# Patient Record
Sex: FEMALE | Race: WHITE | Employment: OTHER | URBAN - METROPOLITAN AREA
[De-identification: names, ages, dates, MRNs, and addresses within clinical notes are randomized per-mention and may not be internally consistent; named-entity substitution may affect disease eponyms.]

---

## 2017-01-30 ENCOUNTER — TELEPHONE (OUTPATIENT)
Dept: FAMILY MEDICINE | Facility: OTHER | Age: 67
End: 2017-01-30

## 2017-01-30 NOTE — TELEPHONE ENCOUNTER
Panel Management Review      Patient has the following on her problem list:     Hypertension   Last three blood pressure readings:  BP Readings from Last 3 Encounters:   12/20/16 120/62   11/17/16 131/67   10/17/16 109/52     Blood pressure: Passed    HTN Guidelines:  Age 18-59 BP range:  Less than 140/90  Age 60-85 with Diabetes:  Less than 140/90  Age 60-85 without Diabetes:  less than 150/90      Composite cancer screening  Chart review shows that this patient is due/due soon for the following Fecal Colorectal (FIT)  Summary:    Patient is due/failing the following:   CBC and FIT    Action needed:   Patient needs non-fasting lab only appointment and complete a FIT test     Type of outreach:    Phone, left message for patient to call back.     Questions for provider review:    None                                                                                                                                    Karey Candelaria       Chart routed to Care Team .

## 2017-01-30 NOTE — Clinical Note
[facility]  [id number] [address] [address]  Phone: [phone]  February 1, 2017      Preeti Ford  [address] [address] [place] [id number]      Dear Preeti,    We care about your health and have reviewed your health plan including your medical conditions, medications, and lab results.  Based on this review, it is recommended that you follow up regarding the following health topic(s):  -Colon Cancer Screening    We recommend you take the following action(s):  -schedule a COLONOSCOPY to look for colon cancer (due every 10 years or 5 years in higher risk situations.)  Colonoscopies can prevent 90-95% of colon cancer deaths.  Problem lesions can be removed before they ever become cancer.  If you do not wish to do a colonoscopy or cannot afford to do one at this time, there is another option called a Fecal Immunochemical Occult Blood Test (FIT) a take home stool sample kit.  It does not replace the colonoscopy for colorectal cancer screening, but it can detect hidden bleeding in the lower colon.  It does need to be repeated every year and if a positive result is obtained, you would be referred for a colonoscopy.  If you have completed either one of these tests at another facility, please have the records sent to our clinic for our records.     Please call us at the [facility] - [phone] (or use [facility]) to address the above recommendations.     Thank you for trusting [facility] and we appreciate the opportunity to serve you.  We look forward to supporting your healthcare needs in the future.    Healthy Regards,    Your Health Care Team  [facility]

## 2017-02-05 DIAGNOSIS — J44.9 COPD (CHRONIC OBSTRUCTIVE PULMONARY DISEASE) (H): Primary | ICD-10-CM

## 2017-02-08 RX ORDER — BUDESONIDE AND FORMOTEROL FUMARATE DIHYDRATE 160; 4.5 UG/1; UG/1
AEROSOL RESPIRATORY (INHALATION)
Qty: 1 INHALER | Refills: 8 | Status: ON HOLD | OUTPATIENT
Start: 2017-02-08 | End: 2017-03-07

## 2017-02-08 NOTE — TELEPHONE ENCOUNTER
symbicort       Last Written Prescription Date: 5/9/16  Last Fill Quantity: 1, # refills: 8    [place] [facility] [facility] prescribing provider:  12/20/16   Future Office Visit:       Date of Last Asthma Action Plan Letter:   There are no preventive care reminders to display for this patient.   Asthma Control Test: No flowsheet data found.    Date of Last Spirometry Test:   No results found for this or any previous visit.

## 2017-02-08 NOTE — TELEPHONE ENCOUNTER
Prescription approved per [facility] Refill Protocol.  Diego Hilario RN

## 2017-02-24 ENCOUNTER — TELEPHONE (OUTPATIENT)
Dept: OTHER | Facility: CLINIC | Age: 67
End: 2017-02-24

## 2017-02-24 NOTE — TELEPHONE ENCOUNTER
I spoke with Preeti Ford On the phone today.  She needs an aortobifemoral bypass graft we discussed it on her office visit this past November.  She had good runoff below the inguinal ligament bilaterally.  Carotid duplex revealed minimal disease.  We wanted to get cardiac clearance with Dr. Ramirez prior to the surgery.      She now reports that she has several linear sores on her right heel with more pain.  Lactic healing is related to her vascular insufficiency will not improve until she gets her bypass graft.  She is talked to cardiology both at the [facility] office and apparently cannot get an appointment until late March for her cardiac clearance.  We will try to call re: [facility] office to see if we can move this appointment up so she can proceed with her surgery.  We do not want to wait too long since the ulcerations if worsening not only give her a great deal of pain but increase her risk for infection of the bypass graft which would be a very serious complication.       Marcos Pratt MD

## 2017-02-24 NOTE — TELEPHONE ENCOUNTER
Pt hx of PAD, pt to be scheduled for aortobifemoral bypass graft with Dr. Pratt, pt needs to see cardiac prior to sx.     Pt called noting multiple right foot open sores, cramping in right leg, purplish discoloration, some numbness and tingling which has worsened. Notes hard to get out of bed and get going.   Pt notes couldn't get in until end of March with cardiology.     I discussed with Dr. Pratt.     Opal Hargrove, RN, BSN.

## 2017-02-28 ENCOUNTER — TELEPHONE (OUTPATIENT)
Dept: CARDIOLOGY | Facility: CLINIC | Age: 67
End: 2017-02-28

## 2017-02-28 NOTE — TELEPHONE ENCOUNTER
This patient has an appointment for lab work tomorrow from Dr Ramirez but the  lab orders placed have an expected date 5/12/17. Please place new orders or change the expected date on the labs placed.    Thank You,  Shani Toure MLT(ASCP)

## 2017-03-01 DIAGNOSIS — E78.00 HYPERCHOLESTEROLEMIA: ICD-10-CM

## 2017-03-01 LAB
ALT SERPL W P-5'-P-CCNC: 20 U/L (ref 0–50)
CHOLEST SERPL-MCNC: 264 MG/DL
HDLC SERPL-MCNC: 70 MG/DL
LDLC SERPL CALC-MCNC: 175 MG/DL
NONHDLC SERPL-MCNC: 194 MG/DL
TRIGL SERPL-MCNC: 95 MG/DL

## 2017-03-01 PROCEDURE — 80061 LIPID PANEL: CPT | Performed by: INTERNAL MEDICINE

## 2017-03-01 PROCEDURE — 36415 COLL VENOUS BLD VENIPUNCTURE: CPT | Performed by: INTERNAL MEDICINE

## 2017-03-01 PROCEDURE — 84460 ALANINE AMINO (ALT) (SGPT): CPT | Performed by: INTERNAL MEDICINE

## 2017-03-01 NOTE — NURSING NOTE
Patient was insistent that she needed to labs for Dr Ramirez. We told her that she was early and should return in 5/17. She wanted to do the labs anyway she is seeing a different cardiologist. She says she needs to be cleared for surgery, we told her that these were not pre op lab's, patient was adamant that we do these labs today. We tried to explain that she should wait until she has seen the Dr but she did not want to wait. She was told that she may have to be drawn again. Labs were drawn today at patient's request.

## 2017-03-02 DIAGNOSIS — I25.10 CORONARY ARTERY DISEASE INVOLVING NATIVE CORONARY ARTERY OF NATIVE HEART WITHOUT ANGINA PECTORIS: Primary | ICD-10-CM

## 2017-03-02 NOTE — PROGRESS NOTES
[facility]  [id number] [place] [id number]  [phone]  Dept: [phone]    PRE-OP EVALUATION:  Today's date: 3/6/2017    Preeti Ford (: 1950) presents for pre-operative evaluation assessment as requested by Dr. Pratt.  She requires evaluation and anesthesia risk assessment prior to undergoing surgery/procedure for treatment of Leg Circulation .  Proposed procedure: Bypass Graft Aortoiliac    Date of Surgery/ Procedure: 2017  Time of Surgery/ Procedure: 1:00pm  Hospital/Surgical Facility: [facility]  Fax number for surgical facility:   Primary Physician: Avelina Miramontes  Type of Anesthesia Anticipated: To Be Determined     Patient has a Health Care Directive or Living Will:  NO    1. Yes - Do you have a history of heart attack, stroke, stent, bypass or surgery on an artery in the head, neck, heart or legs?  History of  acute coronary syndrome and ischemic cardiomyopathy, possibly stroke ,  she denies any chest pain currently. She is at baseline   2. NO - Do you ever have any pain or discomfort in your chest?  3. NO - Do you have a history of  Heart Failure?  4. NO - Are you troubled by shortness of breath when: walking on the level, up a slight hill or at night?  5. Yes - Do you currently have a cold, bronchitis or other respiratory infection?  6. Yes - Do you have a cough, shortness of breath or wheezing?  7. Yes - Do you sometimes get pains in the calves of your legs when you walk?  8. NO - Do you or anyone in your family have previous history of blood clots?  9. NO - Do you or does anyone in your family have a serious bleeding problem such as prolonged bleeding following surgeries or cuts?  10. NO - Have you ever had problems with anemia or been told to take iron pills?  11. NO - Have you had any abnormal blood loss such as black, tarry or bloody stools, or abnormal vaginal bleeding?  12. NO - Have you ever had a blood transfusion?  13. NO -  Have you or any of your relatives ever had problems with anesthesia?  14. NO - Do you have sleep apnea, excessive snoring or daytime drowsiness?  15. NO - Do you have any prosthetic heart valves?  16. NO - Do you have prosthetic joints?  17. NO - Is there any chance that you may be pregnant?      HPI:                                                      HPI related to upcoming procedure: 66 year old female with multiple medical conditions including t ST elevation myocardial infarction involving the left anterior descending coronary artery, ischemic cardiomyopathy, history of stroke, chronic tobacco use, peripheral arterial disease, s/p stent placement , now having cramps in the right leg  worsening symptoms of PAD,  severe COPD who was been having recurrent pain in the right foot  with non healing ulcers, now Bypass Graft Aortoiliac    See problem list for active medical problems. Problems all longstanding and stable, except as noted/documented. See ROS for pertinent symptoms related to these conditions. .  HYPERTENSION - Patient has longstanding history of mod-severe HTN , currently denies any symptoms referable to elevated blood pressure. Specifically denies chest pain, palpitations, dyspnea, orthopnea, PND or peripheral edema. Blood pressure readings have been in normal range. Current medication regimen is as listed below. Patient denies any side effects of medication. .  CHF - Patient has a longstanding history of CHF of moderate severity. Exacerbating conditions include ischemic heart disease. Currently the patient's condition is improving. Current treatment regimen includes ACE inhibitor, Metoprolol, , ASA  The patient denies chest pain, edema, orthopnea, SOB or recent weight gain. Last 2D Echo was on March 32017 and showed estimated  ejection fraction of 55%  COPD - Patient has a longstanding history of -severe COPD . Patient has been doing well overall noting occasional SOB and continues on medication  regimen consisting of Albuterol, Symbicort , Spiriva without adverse reactions or side effects. Patient reports still smoking 10 cigarettes a  day  CAD - Patient has a longstanding history of mod-severe CAD. Patient denies recent chest pain or NTG use, denies exercise induced dyspnea or PND.     MEDICAL HISTORY:                                                      Patient Active Problem List    Diagnosis Date Noted     PAD (peripheral artery disease) (H) 02/12/2016     Priority: Medium     Chronic bronchitis, unspecified chronic bronchitis type (H) 02/09/2016     Priority: Medium     Ischemic cardiomyopathy 09/08/2015     Priority: Medium     Echo  With ejection fraction of 25-30 %       HTN, goal below 130/80 09/08/2015     Priority: Medium     Acute systolic congestive heart failure (H) 09/08/2015     Priority: Medium     ejection fraction 25-30%       Lung nodule 09/08/2015     Priority: Medium     See on CT , In the setting of smoking recommends 1 year follow up with CT        Esophageal reflux 09/08/2015     Priority: Medium     ST elevation myocardial infarction involving left anterior descending (LAD) coronary artery (H) 09/08/2015     Priority: Medium     ACS (acute coronary syndrome) (H) 08/28/2015     Priority: Medium     Tobacco use disorder 03/10/2015     Priority: Medium     Cervical high risk HPV (human papillomavirus) test positive 12/20/2016 12/20/16 NIL pap/+ HPV (not 16 or 18). Plan: cotest in 1 year, due by 12/20/17       History of stroke 08/21/2013     Numbness and tingling 08/21/2013     Right side of the face , upper and lower limbs         CVA (cerebral vascular accident) (H) 08/17/2013     Elevated blood pressure reading without diagnosis of hypertension 07/18/2013     Hyperlipidemia LDL goal <130 07/19/2012     Urinary incontinence 03/04/2012     Forgetfulness 03/04/2012     Advanced directives, counseling/discussion 11/14/2011     CARDIOVASCULAR SCREENING; LDL GOAL LESS THAN 130  06/10/2011     COPD (chronic obstructive pulmonary disease) (H) 07/07/2010      Past Medical History   Diagnosis Date     COPD (chronic obstructive pulmonary disease) (H)      CVA (cerebral vascular accident) (H) 8-     Past Surgical History   Procedure Laterality Date     Salpingo oophorectomy,r/l/delores       Salpingo Oophorectomy, RT/LT/DELORES     Appendectomy       Current Outpatient Prescriptions   Medication Sig Dispense Refill     SYMBICORT 160-4.5 MCG/ACT Inhaler INHALE TWO PUFFS BY MOUTH TWICE A DAY 1 Inhaler 8     metoprolol (TOPROL-XL) 25 MG 24 hr tablet Take 0.5 tablets (12.5 mg) by mouth daily 30 tablet 2     albuterol (PROAIR HFA/PROVENTIL HFA/VENTOLIN HFA) 108 (90 BASE) MCG/ACT Inhaler Inhale 2 puffs into the lungs every 6 hours as needed for shortness of breath / dyspnea or wheezing 3 Inhaler 1     montelukast (SINGULAIR) 10 MG tablet TAKE ONE TABLET BY MOUTH AT BEDTIME 90 tablet 2     ipratropium - albuterol 0.5 mg/2.5 mg/3 mL (DUONEB) 0.5-2.5 (3) MG/3ML neb solution Take 1 vial (3 mLs) by nebulization every 6 hours as needed for shortness of breath / dyspnea or wheezing 90 vial 1     Turmeric Curcumin 500 MG CAPS Take 250 mg by mouth daily (with breakfast)       Coenzyme Q10 (CO Q 10) 100 MG CAPS Take 100 mg by mouth daily       ASPIRIN PO Take 325 mg by mouth daily       SPIRIVA HANDIHALER 18 MCG inhalation capsule USING THE HANDIHALER, INHALE THE CONTENTS OF ONE CAPSULE DAILY 90 capsule 5     nicotine (NICODERM CQ) 21 MG/24HR patch 2h hr Place 1 patch onto the skin every 24 hours 30 patch 0     nicotine (NICODERM CQ) 14 MG/24HR patch 2h hr Place 1 patch onto the skin every 24 hours 30 patch      nicotine (NICODERM CQ) 7 MG/24HR patch 2h hr Place 1 patch onto the skin every 24 hours 30 patch      albuterol (PROAIR HFA, PROVENTIL HFA, VENTOLIN HFA) 108 (90 BASE) MCG/ACT inhaler Inhale 2 puffs into the lungs every 6 hours as needed for shortness of breath / dyspnea 1 Inhaler 0     SYMBICORT 160-4.5  "MCG/ACT inhaler INHALE TWO PUFFS BY MOUTH TWICE A DAY 1 Inhaler 5     calcium carbonate (OS-CALDERON 500 MG [place]. [place]) 500 MG tablet Take 1,200 mg by mouth daily       Cholecalciferol (VITAMIN D) 1000 UNITS capsule Take 1,000 Units by mouth daily       OTC products: None, except as noted above    Allergies   Allergen Reactions     Crestor [Rosuvastatin] Other (See Comments)     dizziness     Hmg-Coa-R Inhibitors Other (See Comments)     Muscle/joint aching     Lisinopril Cough     Penicillins      Optison [Albumin Human] Other (See Comments)     Pt was flush and very dizzy.  Also had a BP drop      Latex Allergy: NO    Social History   Substance Use Topics     Smoking status: Current Every Day Smoker     Packs/day: 0.50     Types: Cigarettes     Smokeless tobacco: Never Used      Comment: patient states she is working on it      Alcohol use No     History   Drug Use No       REVIEW OF SYSTEMS:                                                    C: NEGATIVE for fever, chills, change in weight  I: NEGATIVE for worrisome rashes, moles or lesions  E: NEGATIVE for vision changes or irritation  E/M: NEGATIVE for ear, mouth and throat problems  RESP:POSITIVE for congestion in the chest   CV: NEGATIVE for chest pain, palpitations or peripheral edema  GI: NEGATIVE for nausea, abdominal pain, heartburn, or change in bowel habits  : NEGATIVE for frequency, dysuria, or hematuria  M: NEGATIVE for significant arthralgias or myalgia  N: NEGATIVE for weakness, dizziness or paresthesias  E: NEGATIVE for temperature intolerance, skin/hair changes  H: NEGATIVE for bleeding problems  P: NEGATIVE for changes in mood or affect    EXAM:                                                    /74 (BP Location: Right arm, Cuff Size: Adult Small)  Pulse 76  Temp 97.6  F (36.4  C) (Temporal)  Resp 14  Ht 5' 2\" (1.575 m)  Wt 116 lb 6.4 oz (52.8 kg)  SpO2 97%  BMI 21.29 kg/m2    GENERAL APPEARANCE: alert and no distress     EYES: EOMI,  PERRL     " HENT: ear canals and TM's normal and nose and mouth without ulcers or lesions     NECK: no adenopathy, no asymmetry, masses, or scars and thyroid normal to palpation     RESP: Fair respiratory effort , reduced breath sounds bilaterally ,expiratory wheezes throughout     CV: regular rates and rhythm, normal S1 S2, no S3 or S4 and no murmur, click or rub     ABDOMEN:  soft, nontender, no HSM or masses and bowel sounds normal     MS: extremities normal- no gross deformities noted, no evidence of inflammation in joints, FROM in all extremities.     SKIN: no suspicious lesions or rashes     NEURO: Normal strength and tone, sensory exam grossly normal, mentation intact and speech normal     PSYCH: mentation appears normal. and affect normal/bright     LYMPHATICS: No axillary, cervical, or supraclavicular nodes    DIAGNOSTICS:                                                      Chest XRay   Spirometry: Very severe Obstruction , with low vital capacity     Labs Resulted Today:   Results for orders placed or performed in visit on 03/06/17   Spirometry, Breathing Capacity: Normal Order, Clinic Performed   Result Value Ref Range    FEV-1      FVC      FEV1/FVC      FEF 25/75     Basic metabolic panel  (Ca, Cl, CO2, Creat, Gluc, K, Na, BUN)   Result Value Ref Range    Sodium 141 133 - 144 mmol/L    Potassium 4.2 3.4 - 5.3 mmol/L    Chloride 104 94 - 109 mmol/L    Carbon Dioxide 32 20 - 32 mmol/L    Anion Gap 5 3 - 14 mmol/L    Glucose 101 (H) 70 - 99 mg/dL    Urea Nitrogen 16 7 - 30 mg/dL    Creatinine 0.75 0.52 - 1.04 mg/dL    GFR Estimate 77 >60 mL/min/1.7m2    GFR Estimate If Black >90   GFR Calc   >60 mL/min/1.7m2    Calcium 9.2 8.5 - 10.1 mg/dL   CBC with platelets   Result Value Ref Range    WBC 5.8 4.0 - 11.0 10e9/L    RBC Count 5.43 (H) 3.8 - 5.2 10e12/L    Hemoglobin 16.5 (H) 11.7 - 15.7 g/dL    Hematocrit 48.6 (H) 35.0 - 47.0 %    MCV 90 78 - 100 fl    MCH 30.4 26.5 - 33.0 pg    MCHC 34.0 31.5 - 36.5  g/dL    RDW 13.3 10.0 - 15.0 %    Platelet Count 185 150 - 450 10e9/L       Recent Labs   Lab Test  16   0912  16   0718 01/06/16   10/09/15   0825   09/02/15   0505   HGB   --   16.0*   --    --    --    --   13.4   PLT   --   206   --    --    --    --   204   INR   --   0.85*  2.3*   < >   --    < >  1.11   NA  141   --    --    --   137   < >  138   POTASSIUM  3.8   --    --    --   4.0   < >  4.3   CR  0.73  0.73   --    --   0.60   < >  0.84   A1C   --   5.7   --    --    --    --    --     < > = values in this interval not displayed.    Name: TARIK HERNANDEZ  MRN: [id number]  : 1950  Study Date: 2017 11:02 AM  Age: 66 yrs  Gender: Female  Patient Location: [facility]  Reason For Study: , Atherosclerotic heart disease of native coronary artery  withou  History: COPD, TOBACCO ABUSE, CAD  Ordering Physician: BOBBI KANG  Referring Physician: BOBBI KANG  Performed By: Don Birmingham     BSA: 1.5 m2  Height: 62 in  Weight: 115 lb  HR: 60  BP: 118/78 mmHg  _____________________________________________________________________________  __        Procedure  Complete Echo Adult.  _____________________________________________________________________________  __        Interpretation Summary     Left ventricular systolic function is normal. The visual ejection fraction is  estimated at 55%. There is mild hypokinesis of the distal septum and  inferoapex. There is mild concentric left ventricular hypertrophy.  Mild to moderate valvular aortic stenosis noted and the mean AoV pressure  gradient is 11mmHg. [place] measured LVOT at 2.3-2.4 cm which yields YOLANDA 1.5-  1.6 cm2. A bicuspid aortic valve cannot be excluded.  There is mild (1+) mitral regurgitation. There is trace to mild aortic  regurgitation.  Mild (35-45mmHg) pulmonary hypertension is present.  Compared to 2016, there has been a mild increase in the aortic gradient and  the LV function and wall motion appears  unchanged.  _____________________________________________________________________________  __        Left Ventricle  The left ventricle is normal in size. There is mild concentric left  ventricular hypertrophy. Left ventricular systolic function is normal. The  transmitral spectral Doppler flow pattern is suggestive of impaired LV  relaxation. E by E prime ratio is between 8 and 15, which is indeterminate for  assessment of left ventricular filling pressures. The visual ejection fraction  is estimated at 55%. There is mild hypokinesis of the distal septum and  inferoapex.     Right Ventricle  Normal right ventricle structure and size.     Atria  Normal left atrial size. Right atrial size is normal. There is no atrial shunt  seen.     Mitral Valve  There is mild (1+) mitral regurgitation.        Tricuspid Valve  There is trace tricuspid regurgitation. The right ventricular systolic  pressure is approximated at 44mmHg plus the right atrial pressure. Small IVC  (<1.5cm) with normal respiratory collapse; right atrial pressure is estimated  at 0-5mmHg. Mild (35-45mmHg) pulmonary hypertension is present.     Aortic Valve  A bicuspid aortic valve cannot be excluded. There is trace to mild aortic  regurgitation. The mean AoV pressure gradient is 11mmHg. Mild to moderate  valvular aortic stenosis. [place] measured LVOT at 2.3-2.4 cm which yields YOLANDA  1.5-1.6 cm2.     Pulmonic Valve  There is no pulmonic valvular stenosis.     Vessels  The aortic root is normal size.     Pericardium  The pericardium appears normal.        Rhythm  The rhythm was normal sinus.  _____________________________________________________________________________  __  MMode/2D Measurements & Calculations  IVSd: 0.82 cm     LVIDd: 4.4 cm  LVIDs: 3.2 cm  LVPWd: 0.94 cm  FS: 28.0 %  EDV(Teich): 87.7 ml  ESV(Teich): 40.0 ml  LV mass(C)d: 124.3 grams  Ao root diam: 3.5 cm  LA dimension: 3.1 cm  asc Aorta Diam: 2.7 cm  LA/Ao: 0.90  LVOT diam: 2.0 cm  LVOT  area: 3.2 cm2        Doppler Measurements & Calculations  MV E max unruly: 55.6 cm/sec  MV A max unruly: 71.0 cm/sec  MV E/A: 0.78  MV dec time: 0.22 sec  Ao V2 max: 228.0 cm/sec  Ao max P.8 mmHg  Ao V2 mean: 158.9 cm/sec  Ao mean P.1 mmHg  Ao V2 VTI: 50.4 cm  YOLANDA(I,D): 1.2 cm2  YOLANDA(V,D): 1.1 cm2  LV V1 max P.7 mmHg  LV V1 max: 81.8 cm/sec  LV V1 VTI: 18.6 cm  SV(LVOT): 59.1 ml  PA acc time: 0.07 sec  TR max unruly: 330.8 cm/sec  TR max P.8 mmHg  YOLANDA Index (cm2/m2): 0.78       IMPRESSION:                                                    Reason for surgery/procedure: Peripheral arterial Disease,  Claudication of the right lower extremity , Non healing ulcer of the right foot  Diagnosis/reason for consult: Pre op evaluation for anesthetic risk     The proposed surgical procedure is considered INTERMEDIATE risk.    REVISED CARDIAC RISK INDEX  The patient has the following serious cardiovascular risks for perioperative complications such as (MI, PE, VFib and 3  AV Block):  High risk surgery (>5% cardiac complication risk)  INTERPRETATION: 2 risks: Class III (moderate risk - 6.6% complication rate)    The patient has the following additional risks for perioperative complications:  The ASCVD Risk score (Jenny WRAY Jr., et al., 2013) failed to calculate for the following reasons:    The patient has a prior MCI or stroke diagnosis      ICD-10-CM    1. Preop general physical exam Z01.818 XR Chest 2 Views     Spirometry, Breathing Capacity: Normal Order, Clinic Performed     Basic metabolic panel  (Ca, Cl, CO2, Creat, Gluc, K, Na, BUN)     CBC with platelets   2. PAD (peripheral artery disease) (H) I73.9 XR Chest 2 Views     Spirometry, Breathing Capacity: Normal Order, Clinic Performed   3. Chronic bronchitis, unspecified chronic bronchitis type (H) J42 XR Chest 2 Views     Spirometry, Breathing Capacity: Normal Order, Clinic Performed   4. HTN, goal below 130/80 I10 XR Chest 2 Views     Spirometry, Breathing  Capacity: Normal Order, Clinic Performed     Basic metabolic panel  (Ca, Cl, CO2, Creat, Gluc, K, Na, BUN)   5. Ischemic cardiomyopathy I25.5 XR Chest 2 Views     Spirometry, Breathing Capacity: Normal Order, Clinic Performed       RECOMMENDATIONS:                                                      Moderate to high cardio pulmonary risk for surgery/ Procedure      --Consult hospital rounder / IM to assist post-op medical management  --Patient is to take all scheduled medications on the day of surgery EXCEPT for modifications listed below.     Anticoagulant or Antiplatelet Medication Use  Aspirin : Per vascular surgeon recommendations         Cardiovascular Risk  Patient is already on a Beta Blocker. Continue Betablocker therapy after surgery, using Beta blocker order set as necessary for NPO status.  Has further Cardiac Clearance today : Follow Cardiology recommendations         Pulmonary Risk  Patient with severe COPD with chronic bronchitis, follows with pulmonologist , she is currently not oxygen dependent , she is at some risk for perioperative complications including perioperative pneumonia and possibly prolonged mechanical ventilation  Incentive spirometry post op  Respiratory Therapy (Respiratory Care IP Consult) post op  NG tube decompression if abdominal distension or significant vomiting   Maximize COPD treatment           Signed Electronically by: Avelina Miramontes MD, MD    Copy of this evaluation report is provided to requesting physician.    [facility] Preop Guidelines

## 2017-03-03 ENCOUNTER — HOSPITAL ENCOUNTER (OUTPATIENT)
Dept: CARDIOLOGY | Facility: CLINIC | Age: 67
Discharge: HOME | End: 2017-03-03
Attending: NURSE PRACTITIONER | Admitting: NURSE PRACTITIONER
Payer: MEDICARE

## 2017-03-03 DIAGNOSIS — I25.10 CORONARY ARTERY DISEASE INVOLVING NATIVE CORONARY ARTERY OF NATIVE HEART WITHOUT ANGINA PECTORIS: ICD-10-CM

## 2017-03-03 PROCEDURE — 93306 TTE W/DOPPLER COMPLETE: CPT | Mod: 26 | Performed by: INTERNAL MEDICINE

## 2017-03-03 PROCEDURE — 93306 TTE W/DOPPLER COMPLETE: CPT

## 2017-03-06 ENCOUNTER — OFFICE VISIT (OUTPATIENT)
Dept: CARDIOLOGY | Facility: CLINIC | Age: 67
End: 2017-03-06
Payer: COMMERCIAL

## 2017-03-06 ENCOUNTER — RADIANT APPOINTMENT (OUTPATIENT)
Dept: GENERAL RADIOLOGY | Facility: OTHER | Age: 67
End: 2017-03-06
Attending: FAMILY MEDICINE
Payer: COMMERCIAL

## 2017-03-06 ENCOUNTER — OFFICE VISIT (OUTPATIENT)
Dept: FAMILY MEDICINE | Facility: OTHER | Age: 67
End: 2017-03-06
Payer: COMMERCIAL

## 2017-03-06 ENCOUNTER — TELEPHONE (OUTPATIENT)
Dept: OTHER | Facility: CLINIC | Age: 67
End: 2017-03-06

## 2017-03-06 VITALS
BODY MASS INDEX: 21.16 KG/M2 | SYSTOLIC BLOOD PRESSURE: 110 MMHG | HEIGHT: 62 IN | HEART RATE: 80 BPM | DIASTOLIC BLOOD PRESSURE: 64 MMHG | WEIGHT: 115 LBS

## 2017-03-06 VITALS
OXYGEN SATURATION: 97 % | WEIGHT: 116.4 LBS | DIASTOLIC BLOOD PRESSURE: 74 MMHG | HEART RATE: 76 BPM | SYSTOLIC BLOOD PRESSURE: 132 MMHG | RESPIRATION RATE: 14 BRPM | HEIGHT: 62 IN | TEMPERATURE: 97.6 F | BODY MASS INDEX: 21.42 KG/M2

## 2017-03-06 DIAGNOSIS — I73.9 PAD (PERIPHERAL ARTERY DISEASE) (H): ICD-10-CM

## 2017-03-06 DIAGNOSIS — Z01.818 PREOP GENERAL PHYSICAL EXAM: ICD-10-CM

## 2017-03-06 DIAGNOSIS — I10 HTN, GOAL BELOW 130/80: ICD-10-CM

## 2017-03-06 DIAGNOSIS — Z01.818 PREOP GENERAL PHYSICAL EXAM: Primary | ICD-10-CM

## 2017-03-06 DIAGNOSIS — I25.5 ISCHEMIC CARDIOMYOPATHY: ICD-10-CM

## 2017-03-06 DIAGNOSIS — I25.10 CORONARY ARTERY DISEASE INVOLVING NATIVE CORONARY ARTERY OF NATIVE HEART WITHOUT ANGINA PECTORIS: ICD-10-CM

## 2017-03-06 DIAGNOSIS — J42 CHRONIC BRONCHITIS, UNSPECIFIED CHRONIC BRONCHITIS TYPE (H): ICD-10-CM

## 2017-03-06 DIAGNOSIS — Z01.810 PRE-OPERATIVE CARDIOVASCULAR EXAMINATION: Primary | ICD-10-CM

## 2017-03-06 LAB
ANION GAP SERPL CALCULATED.3IONS-SCNC: 5 MMOL/L (ref 3–14)
BUN SERPL-MCNC: 16 MG/DL (ref 7–30)
CALCIUM SERPL-MCNC: 9.2 MG/DL (ref 8.5–10.1)
CHLORIDE SERPL-SCNC: 104 MMOL/L (ref 94–109)
CO2 SERPL-SCNC: 32 MMOL/L (ref 20–32)
CREAT SERPL-MCNC: 0.75 MG/DL (ref 0.52–1.04)
ERYTHROCYTE [DISTWIDTH] IN BLOOD BY AUTOMATED COUNT: 13.3 % (ref 10–15)
FEF 25/75: NORMAL
FEV-1: NORMAL
FEV1/FVC: NORMAL
FVC: NORMAL
GFR SERPL CREATININE-BSD FRML MDRD: 77 ML/MIN/1.7M2
GLUCOSE SERPL-MCNC: 101 MG/DL (ref 70–99)
HCT VFR BLD AUTO: 48.6 % (ref 35–47)
HGB BLD-MCNC: 16.5 G/DL (ref 11.7–15.7)
MCH RBC QN AUTO: 30.4 PG (ref 26.5–33)
MCHC RBC AUTO-ENTMCNC: 34 G/DL (ref 31.5–36.5)
MCV RBC AUTO: 90 FL (ref 78–100)
PLATELET # BLD AUTO: 185 10E9/L (ref 150–450)
POTASSIUM SERPL-SCNC: 4.2 MMOL/L (ref 3.4–5.3)
RBC # BLD AUTO: 5.43 10E12/L (ref 3.8–5.2)
SODIUM SERPL-SCNC: 141 MMOL/L (ref 133–144)
WBC # BLD AUTO: 5.8 10E9/L (ref 4–11)

## 2017-03-06 PROCEDURE — 71020 XR CHEST 2 VW: CPT

## 2017-03-06 PROCEDURE — 99214 OFFICE O/P EST MOD 30 MIN: CPT | Mod: 25 | Performed by: FAMILY MEDICINE

## 2017-03-06 PROCEDURE — 99214 OFFICE O/P EST MOD 30 MIN: CPT | Performed by: INTERNAL MEDICINE

## 2017-03-06 PROCEDURE — 80048 BASIC METABOLIC PNL TOTAL CA: CPT | Performed by: FAMILY MEDICINE

## 2017-03-06 PROCEDURE — 94010 BREATHING CAPACITY TEST: CPT | Performed by: FAMILY MEDICINE

## 2017-03-06 PROCEDURE — 36415 COLL VENOUS BLD VENIPUNCTURE: CPT | Performed by: FAMILY MEDICINE

## 2017-03-06 PROCEDURE — 85027 COMPLETE CBC AUTOMATED: CPT | Performed by: FAMILY MEDICINE

## 2017-03-06 RX ORDER — ATORVASTATIN CALCIUM 10 MG/1
10 TABLET, FILM COATED ORAL DAILY
Qty: 30 TABLET | Refills: 3 | Status: ON HOLD | OUTPATIENT
Start: 2017-03-06 | End: 2017-03-07

## 2017-03-06 ASSESSMENT — PAIN SCALES - GENERAL: PAINLEVEL: SEVERE PAIN (7)

## 2017-03-06 NOTE — LETTER
3/6/2017    Avelina Miramontes MD  [facility]  [address] [place] [address]    RE: Preeti Ford       Dear Colleague,    REASON FOR CONSULTATION:   1.  Preoperative evaluation.   2.  Coronary artery disease.      I had the pleasure of seeing Preeti Ford at the [facility] in [place] this afternoon.  She is a very pleasant 66-year-old female with an extensive cardiovascular history.  She has known coronary artery disease and suffered prior anterior myocardial infarction.  She also has peripheral vascular disease and is scheduled for aorto-fem bypass tomorrow.  She has hyperlipidemia, prior CVA, COPD and nicotine dependence.      She was hospitalized here at [facility] 2 years ago with an anterior myocardial infarction.  She presented late and was initially managed medically.  She had an MRI which demonstrated an ejection fraction of 34% and a large left ventricle mural thrombus.  She subsequently underwent coronary angiogram which demonstrated high-grade proximal LAD stenosis and high-grade mid-circumflex stenosis. Both lesions were stented successfully without any complications.        She was initiated on the appropriate medical program including Coumadin.  She has remained stable from a cardiac standpoint since then.  Her most recent echocardiogram obtained last year shows normalization of her LV function with an EF of 55% and mild hypokinesis of the distal septum and for apex.  No clot was seen in the left ventricle.  The echo also showed mild to moderate aortic valve stenosis.      She, unfortunately, continues to smoke.  She is not very active because of her severe lower extremity arterial insufficiency.  This past fall, she was walking on the treadmill up to 20 minutes.  She did not have any chest symptoms at that time.  With her current activity which includes climbing at least 1 flight of stairs, she does not endorse any angina or  exertional shortness of breath.  She remains on an excellent medical program with the exception of a statin.  Her cholesterol was well controlled initially with Crestor, but she discontinued this medication this past year, mainly due to cost issues.      Outpatient Encounter Prescriptions as of 3/6/2017   Medication Sig Dispense Refill     [DISCONTINUED] atorvastatin (LIPITOR) 10 MG tablet Take 1 tablet (10 mg) by mouth daily 30 tablet 3     [DISCONTINUED] SYMBICORT 160-4.5 MCG/ACT Inhaler INHALE TWO PUFFS BY MOUTH TWICE A DAY 1 Inhaler 8     ipratropium - albuterol 0.5 mg/2.5 mg/3 mL (DUONEB) 0.5-2.5 (3) MG/3ML neb solution Take 1 vial (3 mLs) by nebulization every 6 hours as needed for shortness of breath / dyspnea or wheezing 90 vial 1     [DISCONTINUED] metoprolol (TOPROL-XL) 25 MG 24 hr tablet Take 0.5 tablets (12.5 mg) by mouth daily 30 tablet 2     [DISCONTINUED] albuterol (PROAIR HFA/PROVENTIL HFA/VENTOLIN HFA) 108 (90 BASE) MCG/ACT Inhaler Inhale 2 puffs into the lungs every 6 hours as needed for shortness of breath / dyspnea or wheezing 3 Inhaler 1     [DISCONTINUED] montelukast (SINGULAIR) 10 MG tablet TAKE ONE TABLET BY MOUTH AT BEDTIME (Patient taking differently: 10 mg TAKE ONE TABLET BY MOUTH AT BEDTIME) 90 tablet 2     [DISCONTINUED] Turmeric Curcumin 500 MG CAPS Take 250 mg by mouth daily (with breakfast)       [DISCONTINUED] Coenzyme Q10 (CO Q 10) 100 MG CAPS Take 100 mg by mouth daily (Patient taking differently: Take 100 mg by mouth every 24 hours (at 16:00))       [DISCONTINUED] ASPIRIN PO Take 325 mg by mouth every morning Reported on 4/10/2017       [DISCONTINUED] SPIRIVA HANDIHALER 18 MCG inhalation capsule USING THE HANDIHALER, INHALE THE CONTENTS OF ONE CAPSULE DAILY 90 capsule 5     [DISCONTINUED] nicotine (NICODERM CQ) 21 MG/24HR patch 2h hr Place 1 patch onto the skin every 24 hours 30 patch 0     [DISCONTINUED] nicotine (NICODERM CQ) 14 MG/24HR patch 2h hr Place 1 patch onto the skin  every 24 hours (Patient taking differently: Place 1 patch onto the skin every 24 hours (remove daily at 19:00)) 30 patch      [DISCONTINUED] nicotine (NICODERM CQ) 7 MG/24HR patch 2h hr Place 1 patch onto the skin every 24 hours 30 patch      albuterol (PROAIR HFA, PROVENTIL HFA, VENTOLIN HFA) 108 (90 BASE) MCG/ACT inhaler Inhale 2 puffs into the lungs every 6 hours as needed for shortness of breath / dyspnea 1 Inhaler 0     SYMBICORT 160-4.5 MCG/ACT inhaler INHALE TWO PUFFS BY MOUTH TWICE A DAY 1 Inhaler 5     [DISCONTINUED] calcium carbonate (OS-CALDERON 500 MG [place]. [place]) 500 MG tablet Take 1,200 mg by mouth daily       [DISCONTINUED] Cholecalciferol (VITAMIN D) 1000 UNITS capsule Take 1,000 Units by mouth daily       Facility-Administered Encounter Medications as of 3/6/2017   Medication Dose Route Frequency Provider Last Rate Last Dose     Reason aspirin not prescribed (intentional)   Other Continuous Jesús Wade MD         IMPRESSION, REPORT AND PLAN:   1.  Known coronary artery disease.   2.  History of prior anterior myocardial infarction and stenting of the LAD and circumflex.   3.  History of ischemic cardiomyopathy, resolved.   4.  Hyperlipidemia, untreated.   5.  Hypertension.   6.  Nicotine dependence.   7.  Severe peripheral arterial disease.      She remains stable from a cardiac standpoint and currently does not endorse any significant cardiopulmonary symptoms.  Specifically, no angina or exertional shortness of breath.  Although she is somewhat limited, her functional capacity prior to the progression of her PAD was good.  As such, I recommend that she proceed with her upcoming aorto-fem bypass surgery without further delay.  She understands that given her background, she is somewhat at a higher risk, but certainly the risk is not prohibitive.      She continues to smoke.  We spent some time discussing the importance of nicotine cessation.  She hopes to quit soon.  We also discussed the  importance of risk factor control including management of her hyperlipidemia.  I recommended restarting Crestor, which she refused.  I convinced her that we start her on low-dose atorvastatin.  If she does not develop any symptoms and cost is not an issue, then we will up-titrate the atorvastatin to 80 mg to get her LDL down.      We will see her at [facility] within the next couple of months for followup, but sooner if she develops any cardiac symptoms.      I appreciate the opportunity to be part of this patient's care.     Sincerely,    Roberto Samuel MD    [facility]

## 2017-03-06 NOTE — MR AVS SNAPSHOT
After Visit Summary   3/6/2017    Preeti Ford    MRN: [id number]           Patient Information     Date Of Birth          1950        Visit Information        Provider Department      3/6/2017 8:00 AM Avelina Miramontes MD [facility]        Today's Diagnoses     Preop general physical exam    -  1    PAD (peripheral artery disease) (H)        Chronic bronchitis, unspecified chronic bronchitis type (H)        HTN, goal below 130/80        Ischemic cardiomyopathy          Care Instructions      Before Your Surgery      Call your surgeon if there is any change in your health. This includes signs of a cold or flu (such as a sore throat, runny nose, cough, rash or fever).    Do not smoke, drink alcohol or take over the counter medicine (unless your surgeon or primary care doctor tells you to) for the 24 hours before and after surgery.    If you take prescribed drugs: Follow your doctor s orders about which medicines to take and which to stop until after surgery.    Eating and drinking prior to surgery: follow the instructions from your surgeon    Take a shower or bath the night before surgery. Use the soap your surgeon gave you to gently clean your skin. If you do not have soap from your surgeon, use your regular soap. Do not shave or scrub the surgery site.  Wear clean pajamas and have clean sheets on your bed.         Follow-ups after your visit        Your next 10 appointments already scheduled     Mar 06, 2017  1:15 PM CST   Return Visit with Roberto Samuel MD   [facility] ([facility])    [address] [place] [id number]   [phone]            Mar 07, 2017 12:45 PM CST   [facility] OR with Marcos Pratt MD   [facility]    [facility])   [phone]            Mar  "2017   Procedure with Marcos Pratt MD   [facility] (--)    [address]  [place] [address] [phone]              Who to contact     If you have questions or need follow up information about today's clinic visit or your schedule please contact [facility] directly at [phone].  Normal or non-critical lab and imaging results will be communicated to you by Poweredhart, letter or phone within 4 business days after the clinic has received the results. If you do not hear from us within 7 days, please contact the clinic through Poweredhart or phone. If you have a critical or abnormal lab result, we will notify you by phone as soon as possible.  Submit refill requests through [employer] or call your pharmacy and they will forward the refill request to us. Please allow 3 business days for your refill to be completed.          Additional Information About Your Visit        Con[employer] Information     [employer] lets you send messages to your doctor, view your test results, renew your prescriptions, schedule appointments and more. To sign up, go to [email or web address] . Click on \"Log in\" on the left side of the screen, which will take you to the Welcome page. Then click on \"Sign up Now\" on the right side of the page.     You will be asked to enter the access code listed below, as well as some personal information. Please follow the directions to create your username and password.     Your access code is: [email or web address]  Expires: 2017  9:01 AM     Your access code will  in 90 days. If you need help or a new code, please call your [facility] clinic or [phone].        Care EveryWhere ID     This is your Care EveryWhere ID. This could be used by other organizations to access your [facility] medical records  [id number]        Your Vitals Were     Pulse Temperature Respirations Height Pulse Oximetry BMI (Body Mass Index)    76 97.6  F (36.4  C) (Temporal) " "14 5' 2\" (1.575 m) 97% 21.29 kg/m2       Blood Pressure from Last 3 Encounters:   03/06/17 132/74   12/20/16 120/62   11/17/16 131/67    Weight from Last 3 Encounters:   03/06/17 116 lb 6.4 oz (52.8 kg)   12/20/16 115 lb 9.6 oz (52.4 kg)   11/17/16 115 lb (52.2 kg)              We Performed the Following     Spirometry, Breathing Capacity: Normal Order, Clinic Performed        Primary Care Provider Office Phone # Fax #    Avelina Yael Miramontes [phone]       [facility] [address] [place] [address]        Thank you!     Thank you for choosing [facility]  for your care. Our goal is always to provide you with excellent care. Hearing back from our patients is one way we can continue to improve our services. Please take a few minutes to complete the written survey that you may receive in the mail after your visit with us. Thank you!             Your Updated Medication List - Protect others around you: Learn how to safely use, store and throw away your medicines at www.disposemymeds.org.          This list is accurate as of: 3/6/17  9:01 AM.  Always use your most recent med list.                   Brand Name Dispense Instructions for use    * albuterol 108 (90 BASE) MCG/ACT Inhaler    PROAIR HFA/PROVENTIL HFA/VENTOLIN HFA    1 Inhaler    Inhale 2 puffs into the lungs every 6 hours as needed for shortness of breath / dyspnea       * albuterol 108 (90 BASE) MCG/ACT Inhaler    PROAIR HFA/PROVENTIL HFA/VENTOLIN HFA    3 Inhaler    Inhale 2 puffs into the lungs every 6 hours as needed for shortness of breath / dyspnea or wheezing       ASPIRIN PO      Take 325 mg by mouth daily       calcium carbonate 500 MG tablet    OS-CALDERON 500 mg [place]. [place]     Take 1,200 mg by mouth daily       Co Q 10 100 MG Caps      Take 100 mg by mouth daily       ipratropium - albuterol 0.5 mg/2.5 mg/3 mL 0.5-2.5 (3) MG/3ML neb solution    DUONEB    90 vial    Take 1 vial (3 mLs) by nebulization " every 6 hours as needed for shortness of breath / dyspnea or wheezing       metoprolol 25 MG 24 hr tablet    TOPROL-XL    30 tablet    Take 0.5 tablets (12.5 mg) by mouth daily       montelukast 10 MG tablet    SINGULAIR    90 tablet    TAKE ONE TABLET BY MOUTH AT BEDTIME       * nicotine 21 MG/24HR 24 hr patch    NICODERM CQ    30 patch    Place 1 patch onto the skin every 24 hours       * nicotine 7 MG/24HR 24 hr patch    NICODERM CQ    30 patch    Place 1 patch onto the skin every 24 hours       * nicotine 14 MG/24HR 24 hr patch    NICODERM CQ    30 patch    Place 1 patch onto the skin every 24 hours       SPIRIVA HANDIHALER 18 MCG capsule   Generic drug:  tiotropium     90 capsule    USING THE HANDIHALER, INHALE THE CONTENTS OF ONE CAPSULE DAILY       * SYMBICORT 160-4.5 MCG/ACT Inhaler   Generic drug:  budesonide-formoterol     1 Inhaler    INHALE TWO PUFFS BY MOUTH TWICE A DAY       * SYMBICORT 160-4.5 MCG/ACT Inhaler   Generic drug:  budesonide-formoterol     1 Inhaler    INHALE TWO PUFFS BY MOUTH TWICE A DAY       Turmeric Curcumin 500 MG Caps      Take 250 mg by mouth daily (with breakfast)       vitamin D 1000 UNITS capsule      Take 1,000 Units by mouth daily       * Notice:  This list has 7 medication(s) that are the same as other medications prescribed for you. Read the directions carefully, and ask your doctor or other care provider to review them with you.

## 2017-03-06 NOTE — TELEPHONE ENCOUNTER
I called Preeti Ford about her aortic surgery tomorrow.  She had her ECHO and saw Cardiology today and is cleared for surgery.  She is taking her bowel prep now.   She had no further questions.       Marcos Pratt MD

## 2017-03-06 NOTE — NURSING NOTE
"Chief Complaint   Patient presents with     Pre-Op Exam     Panel Management     MyChart, OP annual INR referral, Colon cancer screen, Honoring choices, CBC       Initial /74 (BP Location: Right arm, Cuff Size: Adult Small)  Pulse 76  Temp 97.6  F (36.4  C) (Temporal)  Resp 14  Ht 5' 2\" (1.575 m)  Wt 116 lb 6.4 oz (52.8 kg)  SpO2 97%  BMI 21.29 kg/m2 Estimated body mass index is 21.29 kg/(m^2) as calculated from the following:    Height as of this encounter: 5' 2\" (1.575 m).    Weight as of this encounter: 116 lb 6.4 oz (52.8 kg).  Medication Reconciliation: complete     Marilee Quan MA    "

## 2017-03-07 ENCOUNTER — ANESTHESIA EVENT (OUTPATIENT)
Dept: SURGERY | Facility: CLINIC | Age: 67
DRG: 269 | End: 2017-03-07
Payer: MEDICARE

## 2017-03-07 ENCOUNTER — ANESTHESIA (OUTPATIENT)
Dept: SURGERY | Facility: CLINIC | Age: 67
DRG: 269 | End: 2017-03-07
Payer: MEDICARE

## 2017-03-07 ENCOUNTER — APPOINTMENT (OUTPATIENT)
Dept: GENERAL RADIOLOGY | Facility: CLINIC | Age: 67
DRG: 269 | End: 2017-03-07
Attending: SURGERY
Payer: MEDICARE

## 2017-03-07 ENCOUNTER — OFFICE VISIT (OUTPATIENT)
Dept: SURGERY | Facility: PHYSICIAN GROUP | Age: 67
End: 2017-03-07
Payer: COMMERCIAL

## 2017-03-07 ENCOUNTER — HOSPITAL ENCOUNTER (INPATIENT)
Facility: CLINIC | Age: 67
LOS: 5 days | Discharge: HOME | DRG: 269 | End: 2017-03-12
Attending: SURGERY | Admitting: SURGERY
Payer: MEDICARE

## 2017-03-07 DIAGNOSIS — Z71.6 ENCOUNTER FOR SMOKING CESSATION COUNSELING: ICD-10-CM

## 2017-03-07 DIAGNOSIS — I73.9 PAD (PERIPHERAL ARTERY DISEASE) (H): ICD-10-CM

## 2017-03-07 DIAGNOSIS — K21.9 GASTROESOPHAGEAL REFLUX DISEASE WITHOUT ESOPHAGITIS: ICD-10-CM

## 2017-03-07 DIAGNOSIS — G89.18 PAIN AT SURGICAL SITE: Primary | ICD-10-CM

## 2017-03-07 DIAGNOSIS — K59.03 DRUG-INDUCED CONSTIPATION: ICD-10-CM

## 2017-03-07 LAB
ABO + RH BLD: NORMAL
ABO + RH BLD: NORMAL
ALBUMIN SERPL-MCNC: 2.4 G/DL (ref 3.4–5)
ALBUMIN SERPL-MCNC: 2.5 G/DL (ref 3.4–5)
ALP SERPL-CCNC: 46 U/L (ref 40–150)
ALP SERPL-CCNC: 46 U/L (ref 40–150)
ALT SERPL W P-5'-P-CCNC: 26 U/L (ref 0–50)
ALT SERPL W P-5'-P-CCNC: 31 U/L (ref 0–50)
ANION GAP SERPL CALCULATED.3IONS-SCNC: 5 MMOL/L (ref 3–14)
ANION GAP SERPL CALCULATED.3IONS-SCNC: 6 MMOL/L (ref 3–14)
AST SERPL W P-5'-P-CCNC: 35 U/L (ref 0–45)
AST SERPL W P-5'-P-CCNC: 44 U/L (ref 0–45)
BASE DEFICIT BLDA-SCNC: 2.6 MMOL/L
BASOPHILS # BLD AUTO: 0 10E9/L (ref 0–0.2)
BASOPHILS NFR BLD AUTO: 0 %
BILIRUB SERPL-MCNC: 0.3 MG/DL (ref 0.2–1.3)
BILIRUB SERPL-MCNC: 0.4 MG/DL (ref 0.2–1.3)
BLD GP AB SCN SERPL QL: NORMAL
BLD PROD TYP BPU: NORMAL
BLOOD BANK CMNT PATIENT-IMP: NORMAL
BUN SERPL-MCNC: 11 MG/DL (ref 7–30)
BUN SERPL-MCNC: 13 MG/DL (ref 7–30)
CA-I BLD-MCNC: 4.3 MG/DL (ref 4.4–5.2)
CALCIUM SERPL-MCNC: 7.3 MG/DL (ref 8.5–10.1)
CALCIUM SERPL-MCNC: 7.3 MG/DL (ref 8.5–10.1)
CHLORIDE SERPL-SCNC: 110 MMOL/L (ref 94–109)
CHLORIDE SERPL-SCNC: 110 MMOL/L (ref 94–109)
CO2 BLD-SCNC: 24 MMOL/L (ref 21–28)
CO2 BLD-SCNC: 27 MMOL/L (ref 21–28)
CO2 SERPL-SCNC: 25 MMOL/L (ref 20–32)
CO2 SERPL-SCNC: 26 MMOL/L (ref 20–32)
CREAT SERPL-MCNC: 0.89 MG/DL (ref 0.52–1.04)
CREAT SERPL-MCNC: 1 MG/DL (ref 0.52–1.04)
DIFFERENTIAL METHOD BLD: ABNORMAL
EOSINOPHIL # BLD AUTO: 0 10E9/L (ref 0–0.7)
EOSINOPHIL NFR BLD AUTO: 0 %
ERYTHROCYTE [DISTWIDTH] IN BLOOD BY AUTOMATED COUNT: 13.1 % (ref 10–15)
ERYTHROCYTE [DISTWIDTH] IN BLOOD BY AUTOMATED COUNT: 13.2 % (ref 10–15)
FIBRINOGEN PPP-MCNC: 250 MG/DL (ref 200–420)
GFR SERPL CREATININE-BSD FRML MDRD: 56 ML/MIN/1.7M2
GFR SERPL CREATININE-BSD FRML MDRD: 64 ML/MIN/1.7M2
GLUCOSE SERPL-MCNC: 153 MG/DL (ref 70–99)
GLUCOSE SERPL-MCNC: 156 MG/DL (ref 70–99)
HBA1C MFR BLD: 5.5 % (ref 4.3–6)
HCO3 BLD-SCNC: 25 MMOL/L (ref 21–28)
HCT VFR BLD AUTO: 40.8 % (ref 35–47)
HCT VFR BLD AUTO: 42.4 % (ref 35–47)
HCT VFR BLD CALC: 34 %PCV (ref 35–47)
HCT VFR BLD CALC: 36 %PCV (ref 35–47)
HGB BLD CALC-MCNC: 11.6 G/DL (ref 11.7–15.7)
HGB BLD CALC-MCNC: 12.2 G/DL (ref 11.7–15.7)
HGB BLD-MCNC: 13.7 G/DL (ref 11.7–15.7)
HGB BLD-MCNC: 14.4 G/DL (ref 11.7–15.7)
IMM GRANULOCYTES # BLD: 0.1 10E9/L (ref 0–0.4)
IMM GRANULOCYTES NFR BLD: 0.3 %
INR PPP: 1.05 (ref 0.86–1.14)
LACTATE BLD-SCNC: 0.9 MMOL/L (ref 0.7–2.1)
LACTATE BLD-SCNC: 1.2 MMOL/L (ref 0.7–2.1)
LYMPHOCYTES # BLD AUTO: 1.2 10E9/L (ref 0.8–5.3)
LYMPHOCYTES NFR BLD AUTO: 8.3 %
MCH RBC QN AUTO: 30.8 PG (ref 26.5–33)
MCH RBC QN AUTO: 31.1 PG (ref 26.5–33)
MCHC RBC AUTO-ENTMCNC: 33.6 G/DL (ref 31.5–36.5)
MCHC RBC AUTO-ENTMCNC: 34 G/DL (ref 31.5–36.5)
MCV RBC AUTO: 92 FL (ref 78–100)
MCV RBC AUTO: 92 FL (ref 78–100)
MONOCYTES # BLD AUTO: 0.7 10E9/L (ref 0–1.3)
MONOCYTES NFR BLD AUTO: 4.6 %
NEUTROPHILS # BLD AUTO: 12.9 10E9/L (ref 1.6–8.3)
NEUTROPHILS NFR BLD AUTO: 86.8 %
NRBC # BLD AUTO: 0 10*3/UL
NRBC BLD AUTO-RTO: 0 /100
NUM BPU REQUESTED: 2
OXYHGB MFR BLD: 94 % (ref 92–100)
PCO2 BLD: 44 MM HG (ref 35–45)
PCO2 BLD: 49 MM HG (ref 35–45)
PCO2 BLD: 59 MM HG (ref 35–45)
PH BLD: 7.24 PH (ref 7.35–7.45)
PH BLD: 7.34 PH (ref 7.35–7.45)
PH BLD: 7.35 PH (ref 7.35–7.45)
PHOSPHATE SERPL-MCNC: 4.8 MG/DL (ref 2.5–4.5)
PLATELET # BLD AUTO: 125 10E9/L (ref 150–450)
PLATELET # BLD AUTO: 155 10E9/L (ref 150–450)
PO2 BLD: 105 MM HG (ref 80–105)
PO2 BLD: 142 MM HG (ref 80–105)
PO2 BLD: 182 MM HG (ref 80–105)
POTASSIUM BLD-SCNC: 4.2 MMOL/L (ref 3.4–5.3)
POTASSIUM BLD-SCNC: 4.5 MMOL/L (ref 3.4–5.3)
POTASSIUM SERPL-SCNC: 4.2 MMOL/L (ref 3.4–5.3)
POTASSIUM SERPL-SCNC: 4.6 MMOL/L (ref 3.4–5.3)
POTASSIUM SERPL-SCNC: 4.8 MMOL/L (ref 3.4–5.3)
PROT SERPL-MCNC: 4.7 G/DL (ref 6.8–8.8)
PROT SERPL-MCNC: 4.8 G/DL (ref 6.8–8.8)
RBC # BLD AUTO: 4.45 10E12/L (ref 3.8–5.2)
RBC # BLD AUTO: 4.63 10E12/L (ref 3.8–5.2)
SAO2 % BLDA FROM PO2: 100 % (ref 92–100)
SAO2 % BLDA FROM PO2: 99 % (ref 92–100)
SODIUM BLD-SCNC: 137 MMOL/L (ref 133–144)
SODIUM BLD-SCNC: 138 MMOL/L (ref 133–144)
SODIUM SERPL-SCNC: 141 MMOL/L (ref 133–144)
SODIUM SERPL-SCNC: 141 MMOL/L (ref 133–144)
SPECIMEN EXP DATE BLD: NORMAL
WBC # BLD AUTO: 14.7 10E9/L (ref 4–11)
WBC # BLD AUTO: 14.9 10E9/L (ref 4–11)

## 2017-03-07 PROCEDURE — 82330 ASSAY OF CALCIUM: CPT | Performed by: INTERNAL MEDICINE

## 2017-03-07 PROCEDURE — 37000009 ZZH ANESTHESIA TECHNICAL FEE, EACH ADDTL 15 MIN: Performed by: SURGERY

## 2017-03-07 PROCEDURE — 86900 BLOOD TYPING SEROLOGIC ABO: CPT | Performed by: SURGERY

## 2017-03-07 PROCEDURE — 71010 XR CHEST PORT 1 VW: CPT

## 2017-03-07 PROCEDURE — 20000003 ZZH R&B ICU

## 2017-03-07 PROCEDURE — 04CC0ZZ EXTIRPATION OF MATTER FROM RIGHT COMMON ILIAC ARTERY, OPEN APPROACH: ICD-10-PCS | Performed by: SURGERY

## 2017-03-07 PROCEDURE — 04UB07Z SUPPLEMENT INFERIOR MESENTERIC ARTERY WITH AUTOLOGOUS TISSUE SUBSTITUTE, OPEN APPROACH: ICD-10-PCS | Performed by: SURGERY

## 2017-03-07 PROCEDURE — 25000125 ZZHC RX 250: Performed by: SURGERY

## 2017-03-07 PROCEDURE — 80053 COMPREHEN METABOLIC PANEL: CPT | Performed by: SURGERY

## 2017-03-07 PROCEDURE — 82805 BLOOD GASES W/O2 SATURATION: CPT | Performed by: SURGERY

## 2017-03-07 PROCEDURE — 86850 RBC ANTIBODY SCREEN: CPT | Performed by: SURGERY

## 2017-03-07 PROCEDURE — 25000566 ZZH SEVOFLURANE, EA 15 MIN: Performed by: SURGERY

## 2017-03-07 PROCEDURE — S0077 INJECTION, CLINDAMYCIN PHOSP: HCPCS | Performed by: SURGERY

## 2017-03-07 PROCEDURE — 25000128 H RX IP 250 OP 636: Performed by: NURSE ANESTHETIST, CERTIFIED REGISTERED

## 2017-03-07 PROCEDURE — 25000128 H RX IP 250 OP 636: Performed by: ANESTHESIOLOGY

## 2017-03-07 PROCEDURE — 83605 ASSAY OF LACTIC ACID: CPT | Performed by: SURGERY

## 2017-03-07 PROCEDURE — 27211219 ZZ H OR RESEVOIR 3L 150 MICRON FILTER CELLSAVER HARDSHELL 00205-00: Performed by: SURGERY

## 2017-03-07 PROCEDURE — 71000014 ZZH RECOVERY PHASE 1 LEVEL 2 FIRST HR: Performed by: SURGERY

## 2017-03-07 PROCEDURE — 04C00Z6: ICD-10-PCS | Performed by: SURGERY

## 2017-03-07 PROCEDURE — 25800025 ZZH RX 258: Performed by: NURSE ANESTHETIST, CERTIFIED REGISTERED

## 2017-03-07 PROCEDURE — 85610 PROTHROMBIN TIME: CPT | Performed by: SURGERY

## 2017-03-07 PROCEDURE — 83605 ASSAY OF LACTIC ACID: CPT | Performed by: INTERNAL MEDICINE

## 2017-03-07 PROCEDURE — 71000015 ZZH RECOVERY PHASE 1 LEVEL 2 EA ADDTL HR: Performed by: SURGERY

## 2017-03-07 PROCEDURE — 25800025 ZZH RX 258: Performed by: ANESTHESIOLOGY

## 2017-03-07 PROCEDURE — A9270 NON-COVERED ITEM OR SERVICE: HCPCS | Mod: GY | Performed by: NURSE ANESTHETIST, CERTIFIED REGISTERED

## 2017-03-07 PROCEDURE — S0020 INJECTION, BUPIVICAINE HYDRO: HCPCS | Performed by: SURGERY

## 2017-03-07 PROCEDURE — 25000128 H RX IP 250 OP 636: Performed by: SURGERY

## 2017-03-07 PROCEDURE — 35638 BPG AORTOBI-ILIAC: CPT | Performed by: SURGERY

## 2017-03-07 PROCEDURE — 84295 ASSAY OF SERUM SODIUM: CPT

## 2017-03-07 PROCEDURE — 25000132 ZZH RX MED GY IP 250 OP 250 PS 637: Mod: GY | Performed by: PHYSICIAN ASSISTANT

## 2017-03-07 PROCEDURE — 99233 SBSQ HOSP IP/OBS HIGH 50: CPT | Performed by: INTERNAL MEDICINE

## 2017-03-07 PROCEDURE — 86923 COMPATIBILITY TEST ELECTRIC: CPT | Performed by: SURGERY

## 2017-03-07 PROCEDURE — 25000125 ZZHC RX 250: Performed by: ANESTHESIOLOGY

## 2017-03-07 PROCEDURE — 25000132 ZZH RX MED GY IP 250 OP 250 PS 637: Mod: GY | Performed by: NURSE ANESTHETIST, CERTIFIED REGISTERED

## 2017-03-07 PROCEDURE — 27211223 ZZ H OR BOWL PROCESSING SET 125ML FOR ELITE MACHINE CSE-P-125: Performed by: SURGERY

## 2017-03-07 PROCEDURE — 25000125 ZZHC RX 250: Performed by: NURSE ANESTHETIST, CERTIFIED REGISTERED

## 2017-03-07 PROCEDURE — 27210995 ZZH RX 272: Performed by: SURGERY

## 2017-03-07 PROCEDURE — 27210794 ZZH OR GENERAL SUPPLY STERILE: Performed by: SURGERY

## 2017-03-07 PROCEDURE — 37000008 ZZH ANESTHESIA TECHNICAL FEE, 1ST 30 MIN: Performed by: SURGERY

## 2017-03-07 PROCEDURE — 80053 COMPREHEN METABOLIC PANEL: CPT | Performed by: INTERNAL MEDICINE

## 2017-03-07 PROCEDURE — 83036 HEMOGLOBIN GLYCOSYLATED A1C: CPT | Performed by: SURGERY

## 2017-03-07 PROCEDURE — A9270 NON-COVERED ITEM OR SERVICE: HCPCS | Mod: GY | Performed by: SURGERY

## 2017-03-07 PROCEDURE — 85027 COMPLETE CBC AUTOMATED: CPT | Performed by: SURGERY

## 2017-03-07 PROCEDURE — 25000132 ZZH RX MED GY IP 250 OP 250 PS 637: Mod: GY | Performed by: SURGERY

## 2017-03-07 PROCEDURE — 36000063 ZZH SURGERY LEVEL 4 EA 15 ADDTL MIN: Performed by: SURGERY

## 2017-03-07 PROCEDURE — 27110038 ZZH RX 271: Performed by: SURGERY

## 2017-03-07 PROCEDURE — 85014 HEMATOCRIT: CPT

## 2017-03-07 PROCEDURE — 84100 ASSAY OF PHOSPHORUS: CPT | Performed by: INTERNAL MEDICINE

## 2017-03-07 PROCEDURE — 86901 BLOOD TYPING SEROLOGIC RH(D): CPT | Performed by: SURGERY

## 2017-03-07 PROCEDURE — 04100J8 BYPASS ABDOMINAL AORTA TO BILATERAL COMMON ILIAC ARTERIES WITH SYNTHETIC SUBSTITUTE, OPEN APPROACH: ICD-10-PCS | Performed by: SURGERY

## 2017-03-07 PROCEDURE — 27211220 ZZ H OR ASSEMBLY BASIC A&A LINE 00208-00: Performed by: SURGERY

## 2017-03-07 PROCEDURE — 84132 ASSAY OF SERUM POTASSIUM: CPT

## 2017-03-07 PROCEDURE — 40000169 ZZH STATISTIC PRE-PROCEDURE ASSESSMENT I: Performed by: SURGERY

## 2017-03-07 PROCEDURE — 27211215 ZZ H OR FILTER BLOOD TRANS 40 MICRON SQ40S: Performed by: SURGERY

## 2017-03-07 PROCEDURE — C1768 GRAFT, VASCULAR: HCPCS | Performed by: SURGERY

## 2017-03-07 PROCEDURE — 82803 BLOOD GASES ANY COMBINATION: CPT

## 2017-03-07 PROCEDURE — 85025 COMPLETE CBC W/AUTO DIFF WBC: CPT | Performed by: INTERNAL MEDICINE

## 2017-03-07 PROCEDURE — 85384 FIBRINOGEN ACTIVITY: CPT | Performed by: SURGERY

## 2017-03-07 PROCEDURE — 99223 1ST HOSP IP/OBS HIGH 75: CPT | Performed by: INTERNAL MEDICINE

## 2017-03-07 PROCEDURE — 84132 ASSAY OF SERUM POTASSIUM: CPT | Performed by: ANESTHESIOLOGY

## 2017-03-07 PROCEDURE — 93005 ELECTROCARDIOGRAM TRACING: CPT

## 2017-03-07 PROCEDURE — 36000093 ZZH SURGERY LEVEL 4 1ST 30 MIN: Performed by: SURGERY

## 2017-03-07 PROCEDURE — 93010 ELECTROCARDIOGRAM REPORT: CPT | Performed by: INTERNAL MEDICINE

## 2017-03-07 PROCEDURE — 04CD0ZZ EXTIRPATION OF MATTER FROM LEFT COMMON ILIAC ARTERY, OPEN APPROACH: ICD-10-PCS | Performed by: SURGERY

## 2017-03-07 DEVICE — GRAFT HEMAGARD BIFURCATED 16X08MMX50CM HGK1608: Type: IMPLANTABLE DEVICE | Site: ABDOMEN | Status: FUNCTIONAL

## 2017-03-07 RX ORDER — DIPHENHYDRAMINE HYDROCHLORIDE 50 MG/ML
25 INJECTION INTRAMUSCULAR; INTRAVENOUS EVERY 6 HOURS PRN
Status: DISCONTINUED | OUTPATIENT
Start: 2017-03-07 | End: 2017-03-12 | Stop reason: HOSPADM

## 2017-03-07 RX ORDER — SODIUM CHLORIDE, SODIUM LACTATE, POTASSIUM CHLORIDE, CALCIUM CHLORIDE 600; 310; 30; 20 MG/100ML; MG/100ML; MG/100ML; MG/100ML
INJECTION, SOLUTION INTRAVENOUS CONTINUOUS
Status: DISCONTINUED | OUTPATIENT
Start: 2017-03-07 | End: 2017-03-07 | Stop reason: HOSPADM

## 2017-03-07 RX ORDER — PROCHLORPERAZINE MALEATE 5 MG
5-10 TABLET ORAL EVERY 6 HOURS PRN
Status: DISCONTINUED | OUTPATIENT
Start: 2017-03-07 | End: 2017-03-12 | Stop reason: HOSPADM

## 2017-03-07 RX ORDER — ALUMINA, MAGNESIA, AND SIMETHICONE 2400; 2400; 240 MG/30ML; MG/30ML; MG/30ML
30 SUSPENSION ORAL EVERY 4 HOURS
Status: DISCONTINUED | OUTPATIENT
Start: 2017-03-07 | End: 2017-03-08

## 2017-03-07 RX ORDER — DIPHENHYDRAMINE HCL 25 MG
25 CAPSULE ORAL EVERY 6 HOURS PRN
Status: DISCONTINUED | OUTPATIENT
Start: 2017-03-07 | End: 2017-03-12 | Stop reason: HOSPADM

## 2017-03-07 RX ORDER — FENTANYL CITRATE 50 UG/ML
25-50 INJECTION, SOLUTION INTRAMUSCULAR; INTRAVENOUS
Status: COMPLETED | OUTPATIENT
Start: 2017-03-07 | End: 2017-03-07

## 2017-03-07 RX ORDER — ALBUTEROL SULFATE 90 UG/1
AEROSOL, METERED RESPIRATORY (INHALATION) PRN
Status: DISCONTINUED | OUTPATIENT
Start: 2017-03-07 | End: 2017-03-07

## 2017-03-07 RX ORDER — ONDANSETRON 2 MG/ML
4 INJECTION INTRAMUSCULAR; INTRAVENOUS EVERY 6 HOURS PRN
Status: DISCONTINUED | OUTPATIENT
Start: 2017-03-07 | End: 2017-03-12 | Stop reason: HOSPADM

## 2017-03-07 RX ORDER — ACETAMINOPHEN 325 MG/1
975 TABLET ORAL EVERY 8 HOURS
Status: DISPENSED | OUTPATIENT
Start: 2017-03-07 | End: 2017-03-10

## 2017-03-07 RX ORDER — ONDANSETRON 2 MG/ML
INJECTION INTRAMUSCULAR; INTRAVENOUS PRN
Status: DISCONTINUED | OUTPATIENT
Start: 2017-03-07 | End: 2017-03-07

## 2017-03-07 RX ORDER — SODIUM CHLORIDE, SODIUM LACTATE, POTASSIUM CHLORIDE, CALCIUM CHLORIDE 600; 310; 30; 20 MG/100ML; MG/100ML; MG/100ML; MG/100ML
INJECTION, SOLUTION INTRAVENOUS CONTINUOUS PRN
Status: DISCONTINUED | OUTPATIENT
Start: 2017-03-07 | End: 2017-03-07

## 2017-03-07 RX ORDER — LIDOCAINE HYDROCHLORIDE 20 MG/ML
INJECTION, SOLUTION INFILTRATION; PERINEURAL PRN
Status: DISCONTINUED | OUTPATIENT
Start: 2017-03-07 | End: 2017-03-07

## 2017-03-07 RX ORDER — ATORVASTATIN CALCIUM 10 MG/1
10 TABLET, FILM COATED ORAL
Status: DISCONTINUED | OUTPATIENT
Start: 2017-03-07 | End: 2017-03-12 | Stop reason: HOSPADM

## 2017-03-07 RX ORDER — ASPIRIN 300 MG/1
300 SUPPOSITORY RECTAL DAILY
Status: DISCONTINUED | OUTPATIENT
Start: 2017-03-07 | End: 2017-03-07 | Stop reason: HOSPADM

## 2017-03-07 RX ORDER — ASPIRIN 600 MG/1
600 SUPPOSITORY RECTAL DAILY
Status: DISCONTINUED | OUTPATIENT
Start: 2017-03-07 | End: 2017-03-07 | Stop reason: HOSPADM

## 2017-03-07 RX ORDER — ONDANSETRON 4 MG/1
4 TABLET, ORALLY DISINTEGRATING ORAL EVERY 30 MIN PRN
Status: DISCONTINUED | OUTPATIENT
Start: 2017-03-07 | End: 2017-03-07 | Stop reason: HOSPADM

## 2017-03-07 RX ORDER — MONTELUKAST SODIUM 10 MG/1
10 TABLET ORAL AT BEDTIME
Status: DISCONTINUED | OUTPATIENT
Start: 2017-03-07 | End: 2017-03-07

## 2017-03-07 RX ORDER — ALBUTEROL SULFATE 0.83 MG/ML
2.5 SOLUTION RESPIRATORY (INHALATION) EVERY 6 HOURS
Status: DISCONTINUED | OUTPATIENT
Start: 2017-03-07 | End: 2017-03-12 | Stop reason: HOSPADM

## 2017-03-07 RX ORDER — NICOTINE 21 MG/24HR
1 PATCH, TRANSDERMAL 24 HOURS TRANSDERMAL EVERY 24 HOURS
Status: DISCONTINUED | OUTPATIENT
Start: 2017-03-08 | End: 2017-03-07

## 2017-03-07 RX ORDER — CLINDAMYCIN PHOSPHATE 900 MG/50ML
900 INJECTION, SOLUTION INTRAVENOUS
Status: COMPLETED | OUTPATIENT
Start: 2017-03-07 | End: 2017-03-07

## 2017-03-07 RX ORDER — ACETAMINOPHEN 325 MG/1
650 TABLET ORAL EVERY 4 HOURS PRN
Status: DISCONTINUED | OUTPATIENT
Start: 2017-03-10 | End: 2017-03-12 | Stop reason: HOSPADM

## 2017-03-07 RX ORDER — ASPIRIN 325 MG
325 TABLET ORAL DAILY
Status: DISCONTINUED | OUTPATIENT
Start: 2017-03-08 | End: 2017-03-12 | Stop reason: HOSPADM

## 2017-03-07 RX ORDER — ONDANSETRON 4 MG/1
4 TABLET, ORALLY DISINTEGRATING ORAL EVERY 6 HOURS PRN
Status: DISCONTINUED | OUTPATIENT
Start: 2017-03-07 | End: 2017-03-12 | Stop reason: HOSPADM

## 2017-03-07 RX ORDER — CLINDAMYCIN PHOSPHATE 900 MG/50ML
900 INJECTION, SOLUTION INTRAVENOUS EVERY 8 HOURS
Status: COMPLETED | OUTPATIENT
Start: 2017-03-07 | End: 2017-03-08

## 2017-03-07 RX ORDER — IPRATROPIUM BROMIDE AND ALBUTEROL SULFATE 2.5; .5 MG/3ML; MG/3ML
1 SOLUTION RESPIRATORY (INHALATION) EVERY 6 HOURS PRN
Status: DISCONTINUED | OUTPATIENT
Start: 2017-03-07 | End: 2017-03-12 | Stop reason: HOSPADM

## 2017-03-07 RX ORDER — PROPOFOL 10 MG/ML
INJECTION, EMULSION INTRAVENOUS PRN
Status: DISCONTINUED | OUTPATIENT
Start: 2017-03-07 | End: 2017-03-07

## 2017-03-07 RX ORDER — IPRATROPIUM BROMIDE AND ALBUTEROL SULFATE 2.5; .5 MG/3ML; MG/3ML
3 SOLUTION RESPIRATORY (INHALATION) EVERY 6 HOURS
Status: DISCONTINUED | OUTPATIENT
Start: 2017-03-07 | End: 2017-03-07

## 2017-03-07 RX ORDER — NEOSTIGMINE METHYLSULFATE 1 MG/ML
VIAL (ML) INJECTION PRN
Status: DISCONTINUED | OUTPATIENT
Start: 2017-03-07 | End: 2017-03-07

## 2017-03-07 RX ORDER — HYDROMORPHONE HYDROCHLORIDE 1 MG/ML
.3-.5 INJECTION, SOLUTION INTRAMUSCULAR; INTRAVENOUS; SUBCUTANEOUS EVERY 5 MIN PRN
Status: DISCONTINUED | OUTPATIENT
Start: 2017-03-07 | End: 2017-03-07 | Stop reason: HOSPADM

## 2017-03-07 RX ORDER — MAGNESIUM HYDROXIDE 1200 MG/15ML
LIQUID ORAL PRN
Status: DISCONTINUED | OUTPATIENT
Start: 2017-03-07 | End: 2017-03-07 | Stop reason: HOSPADM

## 2017-03-07 RX ORDER — HEPARIN SODIUM 1000 [USP'U]/ML
INJECTION, SOLUTION INTRAVENOUS; SUBCUTANEOUS PRN
Status: DISCONTINUED | OUTPATIENT
Start: 2017-03-07 | End: 2017-03-07

## 2017-03-07 RX ORDER — ONDANSETRON 2 MG/ML
4 INJECTION INTRAMUSCULAR; INTRAVENOUS EVERY 30 MIN PRN
Status: DISCONTINUED | OUTPATIENT
Start: 2017-03-07 | End: 2017-03-07 | Stop reason: HOSPADM

## 2017-03-07 RX ORDER — LIDOCAINE 40 MG/G
CREAM TOPICAL
Status: DISCONTINUED | OUTPATIENT
Start: 2017-03-07 | End: 2017-03-12 | Stop reason: HOSPADM

## 2017-03-07 RX ORDER — DEXAMETHASONE SODIUM PHOSPHATE 4 MG/ML
INJECTION, SOLUTION INTRA-ARTICULAR; INTRALESIONAL; INTRAMUSCULAR; INTRAVENOUS; SOFT TISSUE PRN
Status: DISCONTINUED | OUTPATIENT
Start: 2017-03-07 | End: 2017-03-07

## 2017-03-07 RX ORDER — NICOTINE 21 MG/24HR
1 PATCH, TRANSDERMAL 24 HOURS TRANSDERMAL EVERY 24 HOURS
COMMUNITY
End: 2017-12-06

## 2017-03-07 RX ORDER — ALBUTEROL SULFATE 90 UG/1
2 AEROSOL, METERED RESPIRATORY (INHALATION) EVERY 6 HOURS PRN
Status: DISCONTINUED | OUTPATIENT
Start: 2017-03-07 | End: 2017-03-12 | Stop reason: HOSPADM

## 2017-03-07 RX ORDER — FENTANYL CITRATE 50 UG/ML
25-50 INJECTION, SOLUTION INTRAMUSCULAR; INTRAVENOUS
Status: DISCONTINUED | OUTPATIENT
Start: 2017-03-07 | End: 2017-03-07 | Stop reason: HOSPADM

## 2017-03-07 RX ORDER — AMOXICILLIN 250 MG
1-2 CAPSULE ORAL 2 TIMES DAILY
Status: DISCONTINUED | OUTPATIENT
Start: 2017-03-07 | End: 2017-03-12 | Stop reason: HOSPADM

## 2017-03-07 RX ORDER — GLYCOPYRROLATE 0.2 MG/ML
INJECTION, SOLUTION INTRAMUSCULAR; INTRAVENOUS PRN
Status: DISCONTINUED | OUTPATIENT
Start: 2017-03-07 | End: 2017-03-07

## 2017-03-07 RX ORDER — TIOTROPIUM BROMIDE 18 UG/1
18 CAPSULE ORAL; RESPIRATORY (INHALATION) EVERY 24 HOURS
COMMUNITY
End: 2017-06-14

## 2017-03-07 RX ORDER — NALOXONE HYDROCHLORIDE 0.4 MG/ML
.1-.4 INJECTION, SOLUTION INTRAMUSCULAR; INTRAVENOUS; SUBCUTANEOUS
Status: DISCONTINUED | OUTPATIENT
Start: 2017-03-07 | End: 2017-03-12 | Stop reason: HOSPADM

## 2017-03-07 RX ORDER — FENTANYL CITRATE 50 UG/ML
INJECTION, SOLUTION INTRAMUSCULAR; INTRAVENOUS PRN
Status: DISCONTINUED | OUTPATIENT
Start: 2017-03-07 | End: 2017-03-07

## 2017-03-07 RX ORDER — BUPIVACAINE HYDROCHLORIDE 2.5 MG/ML
INJECTION, SOLUTION INFILTRATION; PERINEURAL PRN
Status: DISCONTINUED | OUTPATIENT
Start: 2017-03-07 | End: 2017-03-07 | Stop reason: HOSPADM

## 2017-03-07 RX ORDER — SODIUM CHLORIDE 9 MG/ML
INJECTION, SOLUTION INTRAVENOUS CONTINUOUS PRN
Status: DISCONTINUED | OUTPATIENT
Start: 2017-03-07 | End: 2017-03-07

## 2017-03-07 RX ORDER — HEPARIN SODIUM 5000 [USP'U]/.5ML
5000 INJECTION, SOLUTION INTRAVENOUS; SUBCUTANEOUS EVERY 8 HOURS
Status: DISCONTINUED | OUTPATIENT
Start: 2017-03-08 | End: 2017-03-12 | Stop reason: HOSPADM

## 2017-03-07 RX ORDER — OXYCODONE HYDROCHLORIDE 5 MG/1
5-10 TABLET ORAL
Status: DISCONTINUED | OUTPATIENT
Start: 2017-03-07 | End: 2017-03-12 | Stop reason: HOSPADM

## 2017-03-07 RX ADMIN — SENNOSIDES AND DOCUSATE SODIUM 1 TABLET: 8.6; 5 TABLET ORAL at 22:48

## 2017-03-07 RX ADMIN — PHENYLEPHRINE HYDROCHLORIDE 50 MCG: 10 INJECTION, SOLUTION INTRAMUSCULAR; INTRAVENOUS; SUBCUTANEOUS at 17:54

## 2017-03-07 RX ADMIN — PHENYLEPHRINE HYDROCHLORIDE 100 MCG: 10 INJECTION, SOLUTION INTRAMUSCULAR; INTRAVENOUS; SUBCUTANEOUS at 17:00

## 2017-03-07 RX ADMIN — HYDROMORPHONE HYDROCHLORIDE 0.5 MG: 1 INJECTION, SOLUTION INTRAMUSCULAR; INTRAVENOUS; SUBCUTANEOUS at 16:23

## 2017-03-07 RX ADMIN — PHENYLEPHRINE HYDROCHLORIDE 100 MCG: 10 INJECTION, SOLUTION INTRAMUSCULAR; INTRAVENOUS; SUBCUTANEOUS at 17:43

## 2017-03-07 RX ADMIN — HYDROMORPHONE HYDROCHLORIDE: 10 INJECTION, SOLUTION INTRAMUSCULAR; INTRAVENOUS; SUBCUTANEOUS at 19:55

## 2017-03-07 RX ADMIN — ACETAMINOPHEN 975 MG: 325 TABLET, FILM COATED ORAL at 22:48

## 2017-03-07 RX ADMIN — MIDAZOLAM HYDROCHLORIDE 1 MG: 1 INJECTION, SOLUTION INTRAMUSCULAR; INTRAVENOUS at 18:27

## 2017-03-07 RX ADMIN — PHENYLEPHRINE HYDROCHLORIDE 50 MCG: 10 INJECTION, SOLUTION INTRAMUSCULAR; INTRAVENOUS; SUBCUTANEOUS at 14:33

## 2017-03-07 RX ADMIN — SODIUM CHLORIDE: 9 INJECTION, SOLUTION INTRAVENOUS at 16:27

## 2017-03-07 RX ADMIN — PHENYLEPHRINE HYDROCHLORIDE 100 MCG: 10 INJECTION, SOLUTION INTRAMUSCULAR; INTRAVENOUS; SUBCUTANEOUS at 17:25

## 2017-03-07 RX ADMIN — FENTANYL CITRATE 25 MCG: 50 INJECTION, SOLUTION INTRAMUSCULAR; INTRAVENOUS at 20:11

## 2017-03-07 RX ADMIN — ROCURONIUM BROMIDE 40 MG: 10 INJECTION INTRAVENOUS at 14:21

## 2017-03-07 RX ADMIN — PHENYLEPHRINE HYDROCHLORIDE 0.25 MCG/KG/MIN: 10 INJECTION, SOLUTION INTRAMUSCULAR; INTRAVENOUS; SUBCUTANEOUS at 17:37

## 2017-03-07 RX ADMIN — SODIUM CHLORIDE, POTASSIUM CHLORIDE, SODIUM LACTATE AND CALCIUM CHLORIDE: 600; 310; 30; 20 INJECTION, SOLUTION INTRAVENOUS at 17:00

## 2017-03-07 RX ADMIN — PHENYLEPHRINE HYDROCHLORIDE 100 MCG: 10 INJECTION, SOLUTION INTRAMUSCULAR; INTRAVENOUS; SUBCUTANEOUS at 17:37

## 2017-03-07 RX ADMIN — SODIUM CHLORIDE, POTASSIUM CHLORIDE, SODIUM LACTATE AND CALCIUM CHLORIDE: 600; 310; 30; 20 INJECTION, SOLUTION INTRAVENOUS at 14:40

## 2017-03-07 RX ADMIN — UMECLIDINIUM 1 PUFF: 62.5 AEROSOL, POWDER ORAL at 22:49

## 2017-03-07 RX ADMIN — ALBUTEROL SULFATE 4 PUFF: 90 AEROSOL, METERED RESPIRATORY (INHALATION) at 15:18

## 2017-03-07 RX ADMIN — FENTANYL CITRATE 50 MCG: 50 INJECTION, SOLUTION INTRAMUSCULAR; INTRAVENOUS at 12:21

## 2017-03-07 RX ADMIN — PHENYLEPHRINE HYDROCHLORIDE 100 MCG: 10 INJECTION, SOLUTION INTRAMUSCULAR; INTRAVENOUS; SUBCUTANEOUS at 17:10

## 2017-03-07 RX ADMIN — PHENYLEPHRINE HYDROCHLORIDE 50 MCG: 10 INJECTION, SOLUTION INTRAMUSCULAR; INTRAVENOUS; SUBCUTANEOUS at 14:34

## 2017-03-07 RX ADMIN — CISATRACURIUM BESYLATE 2 MG: 2 INJECTION INTRAVENOUS at 17:30

## 2017-03-07 RX ADMIN — SODIUM CHLORIDE, POTASSIUM CHLORIDE, SODIUM LACTATE AND CALCIUM CHLORIDE: 600; 310; 30; 20 INJECTION, SOLUTION INTRAVENOUS at 11:36

## 2017-03-07 RX ADMIN — FENTANYL CITRATE 200 MCG: 50 INJECTION, SOLUTION INTRAMUSCULAR; INTRAVENOUS at 14:21

## 2017-03-07 RX ADMIN — CISATRACURIUM BESYLATE 6 MG: 2 INJECTION INTRAVENOUS at 15:07

## 2017-03-07 RX ADMIN — PHENYLEPHRINE HYDROCHLORIDE 100 MCG: 10 INJECTION, SOLUTION INTRAMUSCULAR; INTRAVENOUS; SUBCUTANEOUS at 14:22

## 2017-03-07 RX ADMIN — PHENYLEPHRINE HYDROCHLORIDE 100 MCG: 10 INJECTION, SOLUTION INTRAMUSCULAR; INTRAVENOUS; SUBCUTANEOUS at 14:37

## 2017-03-07 RX ADMIN — CLINDAMYCIN PHOSPHATE 900 MG: 18 INJECTION, SOLUTION INTRAVENOUS at 22:09

## 2017-03-07 RX ADMIN — ONDANSETRON 4 MG: 2 INJECTION INTRAMUSCULAR; INTRAVENOUS at 14:52

## 2017-03-07 RX ADMIN — PHENYLEPHRINE HYDROCHLORIDE 100 MCG: 10 INJECTION, SOLUTION INTRAMUSCULAR; INTRAVENOUS; SUBCUTANEOUS at 17:17

## 2017-03-07 RX ADMIN — HYDROMORPHONE HYDROCHLORIDE 0.5 MG: 1 INJECTION, SOLUTION INTRAMUSCULAR; INTRAVENOUS; SUBCUTANEOUS at 20:04

## 2017-03-07 RX ADMIN — HEPARIN SODIUM 5000 UNITS: 1000 INJECTION, SOLUTION INTRAVENOUS; SUBCUTANEOUS at 15:44

## 2017-03-07 RX ADMIN — SODIUM CHLORIDE, POTASSIUM CHLORIDE, SODIUM LACTATE AND CALCIUM CHLORIDE: 600; 310; 30; 20 INJECTION, SOLUTION INTRAVENOUS at 16:05

## 2017-03-07 RX ADMIN — SODIUM CHLORIDE: 9 INJECTION, SOLUTION INTRAVENOUS at 18:16

## 2017-03-07 RX ADMIN — NEOSTIGMINE METHYLSULFATE 3 MG: 1 INJECTION INTRAMUSCULAR; INTRAVENOUS; SUBCUTANEOUS at 18:27

## 2017-03-07 RX ADMIN — CISATRACURIUM BESYLATE 4 MG: 2 INJECTION INTRAVENOUS at 16:51

## 2017-03-07 RX ADMIN — GLYCOPYRROLATE 0.6 MG: 0.2 INJECTION, SOLUTION INTRAMUSCULAR; INTRAVENOUS at 18:27

## 2017-03-07 RX ADMIN — HYDROMORPHONE HYDROCHLORIDE 0.5 MG: 1 INJECTION, SOLUTION INTRAMUSCULAR; INTRAVENOUS; SUBCUTANEOUS at 14:51

## 2017-03-07 RX ADMIN — PROPOFOL 80 MG: 10 INJECTION, EMULSION INTRAVENOUS at 14:21

## 2017-03-07 RX ADMIN — LIDOCAINE HYDROCHLORIDE 40 MG: 20 INJECTION, SOLUTION INFILTRATION; PERINEURAL at 14:21

## 2017-03-07 RX ADMIN — ONDANSETRON 4 MG: 2 INJECTION INTRAMUSCULAR; INTRAVENOUS at 18:14

## 2017-03-07 RX ADMIN — MIDAZOLAM HYDROCHLORIDE 1 MG: 1 INJECTION, SOLUTION INTRAMUSCULAR; INTRAVENOUS at 12:20

## 2017-03-07 RX ADMIN — PHENYLEPHRINE HYDROCHLORIDE 100 MCG: 10 INJECTION, SOLUTION INTRAMUSCULAR; INTRAVENOUS; SUBCUTANEOUS at 17:02

## 2017-03-07 RX ADMIN — CISATRACURIUM BESYLATE 2 MG: 2 INJECTION INTRAVENOUS at 16:26

## 2017-03-07 RX ADMIN — MIDAZOLAM HYDROCHLORIDE 1 MG: 1 INJECTION, SOLUTION INTRAMUSCULAR; INTRAVENOUS at 14:12

## 2017-03-07 RX ADMIN — DEXAMETHASONE SODIUM PHOSPHATE 4 MG: 4 INJECTION, SOLUTION INTRAMUSCULAR; INTRAVENOUS at 15:29

## 2017-03-07 RX ADMIN — ALBUTEROL SULFATE 6 PUFF: 90 AEROSOL, METERED RESPIRATORY (INHALATION) at 18:20

## 2017-03-07 RX ADMIN — CLINDAMYCIN PHOSPHATE 900 MG: 18 INJECTION, SOLUTION INTRAVENOUS at 14:31

## 2017-03-07 RX ADMIN — PHENYLEPHRINE HYDROCHLORIDE 100 MCG: 10 INJECTION, SOLUTION INTRAMUSCULAR; INTRAVENOUS; SUBCUTANEOUS at 17:32

## 2017-03-07 RX ADMIN — ASPIRIN 600 MG: 600 SUPPOSITORY RECTAL at 19:51

## 2017-03-07 ASSESSMENT — LIFESTYLE VARIABLES: TOBACCO_USE: 1

## 2017-03-07 ASSESSMENT — PAIN DESCRIPTION - DESCRIPTORS: DESCRIPTORS: ACHING

## 2017-03-07 ASSESSMENT — COPD QUESTIONNAIRES: COPD: 1

## 2017-03-07 NOTE — H&P (VIEW-ONLY)
[facility]  [id number] [place] [id number]  [phone]  Dept: [phone]    PRE-OP EVALUATION:  Today's date: 3/6/2017    Preeti Ford (: 1950) presents for pre-operative evaluation assessment as requested by Dr. Pratt.  She requires evaluation and anesthesia risk assessment prior to undergoing surgery/procedure for treatment of Leg Circulation .  Proposed procedure: Bypass Graft Aortoiliac    Date of Surgery/ Procedure: 2017  Time of Surgery/ Procedure: 1:00pm  Hospital/Surgical Facility: [facility]  Fax number for surgical facility:   Primary Physician: Avelina Miramontes  Type of Anesthesia Anticipated: To Be Determined     Patient has a Health Care Directive or Living Will:  NO    1. Yes - Do you have a history of heart attack, stroke, stent, bypass or surgery on an artery in the head, neck, heart or legs?  History of  acute coronary syndrome and ischemic cardiomyopathy, possibly stroke ,  she denies any chest pain currently. She is at baseline   2. NO - Do you ever have any pain or discomfort in your chest?  3. NO - Do you have a history of  Heart Failure?  4. NO - Are you troubled by shortness of breath when: walking on the level, up a slight hill or at night?  5. Yes - Do you currently have a cold, bronchitis or other respiratory infection?  6. Yes - Do you have a cough, shortness of breath or wheezing?  7. Yes - Do you sometimes get pains in the calves of your legs when you walk?  8. NO - Do you or anyone in your family have previous history of blood clots?  9. NO - Do you or does anyone in your family have a serious bleeding problem such as prolonged bleeding following surgeries or cuts?  10. NO - Have you ever had problems with anemia or been told to take iron pills?  11. NO - Have you had any abnormal blood loss such as black, tarry or bloody stools, or abnormal vaginal bleeding?  12. NO - Have you ever had a blood transfusion?  13. NO -  Have you or any of your relatives ever had problems with anesthesia?  14. NO - Do you have sleep apnea, excessive snoring or daytime drowsiness?  15. NO - Do you have any prosthetic heart valves?  16. NO - Do you have prosthetic joints?  17. NO - Is there any chance that you may be pregnant?      HPI:                                                      HPI related to upcoming procedure: 66 year old female with multiple medical conditions including t ST elevation myocardial infarction involving the left anterior descending coronary artery, ischemic cardiomyopathy, history of stroke, chronic tobacco use, peripheral arterial disease, s/p stent placement , now having cramps in the right leg  worsening symptoms of PAD,  severe COPD who was been having recurrent pain in the right foot  with non healing ulcers, now Bypass Graft Aortoiliac    See problem list for active medical problems. Problems all longstanding and stable, except as noted/documented. See ROS for pertinent symptoms related to these conditions. .  HYPERTENSION - Patient has longstanding history of mod-severe HTN , currently denies any symptoms referable to elevated blood pressure. Specifically denies chest pain, palpitations, dyspnea, orthopnea, PND or peripheral edema. Blood pressure readings have been in normal range. Current medication regimen is as listed below. Patient denies any side effects of medication. .  CHF - Patient has a longstanding history of CHF of moderate severity. Exacerbating conditions include ischemic heart disease. Currently the patient's condition is improving. Current treatment regimen includes ACE inhibitor, Metoprolol, , ASA  The patient denies chest pain, edema, orthopnea, SOB or recent weight gain. Last 2D Echo was on March 32017 and showed estimated  ejection fraction of 55%  COPD - Patient has a longstanding history of -severe COPD . Patient has been doing well overall noting occasional SOB and continues on medication  regimen consisting of Albuterol, Symbicort , Spiriva without adverse reactions or side effects. Patient reports still smoking 10 cigarettes a  day  CAD - Patient has a longstanding history of mod-severe CAD. Patient denies recent chest pain or NTG use, denies exercise induced dyspnea or PND.     MEDICAL HISTORY:                                                      Patient Active Problem List    Diagnosis Date Noted     PAD (peripheral artery disease) (H) 02/12/2016     Priority: Medium     Chronic bronchitis, unspecified chronic bronchitis type (H) 02/09/2016     Priority: Medium     Ischemic cardiomyopathy 09/08/2015     Priority: Medium     Echo  With ejection fraction of 25-30 %       HTN, goal below 130/80 09/08/2015     Priority: Medium     Acute systolic congestive heart failure (H) 09/08/2015     Priority: Medium     ejection fraction 25-30%       Lung nodule 09/08/2015     Priority: Medium     See on CT , In the setting of smoking recommends 1 year follow up with CT        Esophageal reflux 09/08/2015     Priority: Medium     ST elevation myocardial infarction involving left anterior descending (LAD) coronary artery (H) 09/08/2015     Priority: Medium     ACS (acute coronary syndrome) (H) 08/28/2015     Priority: Medium     Tobacco use disorder 03/10/2015     Priority: Medium     Cervical high risk HPV (human papillomavirus) test positive 12/20/2016 12/20/16 NIL pap/+ HPV (not 16 or 18). Plan: cotest in 1 year, due by 12/20/17       History of stroke 08/21/2013     Numbness and tingling 08/21/2013     Right side of the face , upper and lower limbs         CVA (cerebral vascular accident) (H) 08/17/2013     Elevated blood pressure reading without diagnosis of hypertension 07/18/2013     Hyperlipidemia LDL goal <130 07/19/2012     Urinary incontinence 03/04/2012     Forgetfulness 03/04/2012     Advanced directives, counseling/discussion 11/14/2011     CARDIOVASCULAR SCREENING; LDL GOAL LESS THAN 130  06/10/2011     COPD (chronic obstructive pulmonary disease) (H) 07/07/2010      Past Medical History   Diagnosis Date     COPD (chronic obstructive pulmonary disease) (H)      CVA (cerebral vascular accident) (H) 8-     Past Surgical History   Procedure Laterality Date     Salpingo oophorectomy,r/l/delores       Salpingo Oophorectomy, RT/LT/DELORES     Appendectomy       Current Outpatient Prescriptions   Medication Sig Dispense Refill     SYMBICORT 160-4.5 MCG/ACT Inhaler INHALE TWO PUFFS BY MOUTH TWICE A DAY 1 Inhaler 8     metoprolol (TOPROL-XL) 25 MG 24 hr tablet Take 0.5 tablets (12.5 mg) by mouth daily 30 tablet 2     albuterol (PROAIR HFA/PROVENTIL HFA/VENTOLIN HFA) 108 (90 BASE) MCG/ACT Inhaler Inhale 2 puffs into the lungs every 6 hours as needed for shortness of breath / dyspnea or wheezing 3 Inhaler 1     montelukast (SINGULAIR) 10 MG tablet TAKE ONE TABLET BY MOUTH AT BEDTIME 90 tablet 2     ipratropium - albuterol 0.5 mg/2.5 mg/3 mL (DUONEB) 0.5-2.5 (3) MG/3ML neb solution Take 1 vial (3 mLs) by nebulization every 6 hours as needed for shortness of breath / dyspnea or wheezing 90 vial 1     Turmeric Curcumin 500 MG CAPS Take 250 mg by mouth daily (with breakfast)       Coenzyme Q10 (CO Q 10) 100 MG CAPS Take 100 mg by mouth daily       ASPIRIN PO Take 325 mg by mouth daily       SPIRIVA HANDIHALER 18 MCG inhalation capsule USING THE HANDIHALER, INHALE THE CONTENTS OF ONE CAPSULE DAILY 90 capsule 5     nicotine (NICODERM CQ) 21 MG/24HR patch 2h hr Place 1 patch onto the skin every 24 hours 30 patch 0     nicotine (NICODERM CQ) 14 MG/24HR patch 2h hr Place 1 patch onto the skin every 24 hours 30 patch      nicotine (NICODERM CQ) 7 MG/24HR patch 2h hr Place 1 patch onto the skin every 24 hours 30 patch      albuterol (PROAIR HFA, PROVENTIL HFA, VENTOLIN HFA) 108 (90 BASE) MCG/ACT inhaler Inhale 2 puffs into the lungs every 6 hours as needed for shortness of breath / dyspnea 1 Inhaler 0     SYMBICORT 160-4.5  "MCG/ACT inhaler INHALE TWO PUFFS BY MOUTH TWICE A DAY 1 Inhaler 5     calcium carbonate (OS-CALDERON 500 MG [place]. [place]) 500 MG tablet Take 1,200 mg by mouth daily       Cholecalciferol (VITAMIN D) 1000 UNITS capsule Take 1,000 Units by mouth daily       OTC products: None, except as noted above    Allergies   Allergen Reactions     Crestor [Rosuvastatin] Other (See Comments)     dizziness     Hmg-Coa-R Inhibitors Other (See Comments)     Muscle/joint aching     Lisinopril Cough     Penicillins      Optison [Albumin Human] Other (See Comments)     Pt was flush and very dizzy.  Also had a BP drop      Latex Allergy: NO    Social History   Substance Use Topics     Smoking status: Current Every Day Smoker     Packs/day: 0.50     Types: Cigarettes     Smokeless tobacco: Never Used      Comment: patient states she is working on it      Alcohol use No     History   Drug Use No       REVIEW OF SYSTEMS:                                                    C: NEGATIVE for fever, chills, change in weight  I: NEGATIVE for worrisome rashes, moles or lesions  E: NEGATIVE for vision changes or irritation  E/M: NEGATIVE for ear, mouth and throat problems  RESP:POSITIVE for congestion in the chest   CV: NEGATIVE for chest pain, palpitations or peripheral edema  GI: NEGATIVE for nausea, abdominal pain, heartburn, or change in bowel habits  : NEGATIVE for frequency, dysuria, or hematuria  M: NEGATIVE for significant arthralgias or myalgia  N: NEGATIVE for weakness, dizziness or paresthesias  E: NEGATIVE for temperature intolerance, skin/hair changes  H: NEGATIVE for bleeding problems  P: NEGATIVE for changes in mood or affect    EXAM:                                                    /74 (BP Location: Right arm, Cuff Size: Adult Small)  Pulse 76  Temp 97.6  F (36.4  C) (Temporal)  Resp 14  Ht 5' 2\" (1.575 m)  Wt 116 lb 6.4 oz (52.8 kg)  SpO2 97%  BMI 21.29 kg/m2    GENERAL APPEARANCE: alert and no distress     EYES: EOMI,  PERRL     " HENT: ear canals and TM's normal and nose and mouth without ulcers or lesions     NECK: no adenopathy, no asymmetry, masses, or scars and thyroid normal to palpation     RESP: Fair respiratory effort , reduced breath sounds bilaterally ,expiratory wheezes throughout     CV: regular rates and rhythm, normal S1 S2, no S3 or S4 and no murmur, click or rub     ABDOMEN:  soft, nontender, no HSM or masses and bowel sounds normal     MS: extremities normal- no gross deformities noted, no evidence of inflammation in joints, FROM in all extremities.     SKIN: no suspicious lesions or rashes     NEURO: Normal strength and tone, sensory exam grossly normal, mentation intact and speech normal     PSYCH: mentation appears normal. and affect normal/bright     LYMPHATICS: No axillary, cervical, or supraclavicular nodes    DIAGNOSTICS:                                                      Chest XRay   Spirometry: Very severe Obstruction , with low vital capacity     Labs Resulted Today:   Results for orders placed or performed in visit on 03/06/17   Spirometry, Breathing Capacity: Normal Order, Clinic Performed   Result Value Ref Range    FEV-1      FVC      FEV1/FVC      FEF 25/75     Basic metabolic panel  (Ca, Cl, CO2, Creat, Gluc, K, Na, BUN)   Result Value Ref Range    Sodium 141 133 - 144 mmol/L    Potassium 4.2 3.4 - 5.3 mmol/L    Chloride 104 94 - 109 mmol/L    Carbon Dioxide 32 20 - 32 mmol/L    Anion Gap 5 3 - 14 mmol/L    Glucose 101 (H) 70 - 99 mg/dL    Urea Nitrogen 16 7 - 30 mg/dL    Creatinine 0.75 0.52 - 1.04 mg/dL    GFR Estimate 77 >60 mL/min/1.7m2    GFR Estimate If Black >90   GFR Calc   >60 mL/min/1.7m2    Calcium 9.2 8.5 - 10.1 mg/dL   CBC with platelets   Result Value Ref Range    WBC 5.8 4.0 - 11.0 10e9/L    RBC Count 5.43 (H) 3.8 - 5.2 10e12/L    Hemoglobin 16.5 (H) 11.7 - 15.7 g/dL    Hematocrit 48.6 (H) 35.0 - 47.0 %    MCV 90 78 - 100 fl    MCH 30.4 26.5 - 33.0 pg    MCHC 34.0 31.5 - 36.5  g/dL    RDW 13.3 10.0 - 15.0 %    Platelet Count 185 150 - 450 10e9/L       Recent Labs   Lab Test  16   0912  16   0718 01/06/16   10/09/15   0825   09/02/15   0505   HGB   --   16.0*   --    --    --    --   13.4   PLT   --   206   --    --    --    --   204   INR   --   0.85*  2.3*   < >   --    < >  1.11   NA  141   --    --    --   137   < >  138   POTASSIUM  3.8   --    --    --   4.0   < >  4.3   CR  0.73  0.73   --    --   0.60   < >  0.84   A1C   --   5.7   --    --    --    --    --     < > = values in this interval not displayed.    Name: TARIK HERNANDEZ  MRN: [id number]  : 1950  Study Date: 2017 11:02 AM  Age: 66 yrs  Gender: Female  Patient Location: [facility]  Reason For Study: , Atherosclerotic heart disease of native coronary artery  withou  History: COPD, TOBACCO ABUSE, CAD  Ordering Physician: BOBBI KANG  Referring Physician: BOBBI KANG  Performed By: Don Birmingham     BSA: 1.5 m2  Height: 62 in  Weight: 115 lb  HR: 60  BP: 118/78 mmHg  _____________________________________________________________________________  __        Procedure  Complete Echo Adult.  _____________________________________________________________________________  __        Interpretation Summary     Left ventricular systolic function is normal. The visual ejection fraction is  estimated at 55%. There is mild hypokinesis of the distal septum and  inferoapex. There is mild concentric left ventricular hypertrophy.  Mild to moderate valvular aortic stenosis noted and the mean AoV pressure  gradient is 11mmHg. [place] measured LVOT at 2.3-2.4 cm which yields YOLANDA 1.5-  1.6 cm2. A bicuspid aortic valve cannot be excluded.  There is mild (1+) mitral regurgitation. There is trace to mild aortic  regurgitation.  Mild (35-45mmHg) pulmonary hypertension is present.  Compared to 2016, there has been a mild increase in the aortic gradient and  the LV function and wall motion appears  unchanged.  _____________________________________________________________________________  __        Left Ventricle  The left ventricle is normal in size. There is mild concentric left  ventricular hypertrophy. Left ventricular systolic function is normal. The  transmitral spectral Doppler flow pattern is suggestive of impaired LV  relaxation. E by E prime ratio is between 8 and 15, which is indeterminate for  assessment of left ventricular filling pressures. The visual ejection fraction  is estimated at 55%. There is mild hypokinesis of the distal septum and  inferoapex.     Right Ventricle  Normal right ventricle structure and size.     Atria  Normal left atrial size. Right atrial size is normal. There is no atrial shunt  seen.     Mitral Valve  There is mild (1+) mitral regurgitation.        Tricuspid Valve  There is trace tricuspid regurgitation. The right ventricular systolic  pressure is approximated at 44mmHg plus the right atrial pressure. Small IVC  (<1.5cm) with normal respiratory collapse; right atrial pressure is estimated  at 0-5mmHg. Mild (35-45mmHg) pulmonary hypertension is present.     Aortic Valve  A bicuspid aortic valve cannot be excluded. There is trace to mild aortic  regurgitation. The mean AoV pressure gradient is 11mmHg. Mild to moderate  valvular aortic stenosis. [place] measured LVOT at 2.3-2.4 cm which yields YOLANDA  1.5-1.6 cm2.     Pulmonic Valve  There is no pulmonic valvular stenosis.     Vessels  The aortic root is normal size.     Pericardium  The pericardium appears normal.        Rhythm  The rhythm was normal sinus.  _____________________________________________________________________________  __  MMode/2D Measurements & Calculations  IVSd: 0.82 cm     LVIDd: 4.4 cm  LVIDs: 3.2 cm  LVPWd: 0.94 cm  FS: 28.0 %  EDV(Teich): 87.7 ml  ESV(Teich): 40.0 ml  LV mass(C)d: 124.3 grams  Ao root diam: 3.5 cm  LA dimension: 3.1 cm  asc Aorta Diam: 2.7 cm  LA/Ao: 0.90  LVOT diam: 2.0 cm  LVOT  area: 3.2 cm2        Doppler Measurements & Calculations  MV E max unruly: 55.6 cm/sec  MV A max unruly: 71.0 cm/sec  MV E/A: 0.78  MV dec time: 0.22 sec  Ao V2 max: 228.0 cm/sec  Ao max P.8 mmHg  Ao V2 mean: 158.9 cm/sec  Ao mean P.1 mmHg  Ao V2 VTI: 50.4 cm  YOLANDA(I,D): 1.2 cm2  YOLANDA(V,D): 1.1 cm2  LV V1 max P.7 mmHg  LV V1 max: 81.8 cm/sec  LV V1 VTI: 18.6 cm  SV(LVOT): 59.1 ml  PA acc time: 0.07 sec  TR max unruly: 330.8 cm/sec  TR max P.8 mmHg  YOLANDA Index (cm2/m2): 0.78       IMPRESSION:                                                    Reason for surgery/procedure: Peripheral arterial Disease,  Claudication of the right lower extremity , Non healing ulcer of the right foot  Diagnosis/reason for consult: Pre op evaluation for anesthetic risk     The proposed surgical procedure is considered INTERMEDIATE risk.    REVISED CARDIAC RISK INDEX  The patient has the following serious cardiovascular risks for perioperative complications such as (MI, PE, VFib and 3  AV Block):  High risk surgery (>5% cardiac complication risk)  INTERPRETATION: 2 risks: Class III (moderate risk - 6.6% complication rate)    The patient has the following additional risks for perioperative complications:  The ASCVD Risk score (Jenny WRAY Jr., et al., 2013) failed to calculate for the following reasons:    The patient has a prior MCI or stroke diagnosis      ICD-10-CM    1. Preop general physical exam Z01.818 XR Chest 2 Views     Spirometry, Breathing Capacity: Normal Order, Clinic Performed     Basic metabolic panel  (Ca, Cl, CO2, Creat, Gluc, K, Na, BUN)     CBC with platelets   2. PAD (peripheral artery disease) (H) I73.9 XR Chest 2 Views     Spirometry, Breathing Capacity: Normal Order, Clinic Performed   3. Chronic bronchitis, unspecified chronic bronchitis type (H) J42 XR Chest 2 Views     Spirometry, Breathing Capacity: Normal Order, Clinic Performed   4. HTN, goal below 130/80 I10 XR Chest 2 Views     Spirometry, Breathing  Capacity: Normal Order, Clinic Performed     Basic metabolic panel  (Ca, Cl, CO2, Creat, Gluc, K, Na, BUN)   5. Ischemic cardiomyopathy I25.5 XR Chest 2 Views     Spirometry, Breathing Capacity: Normal Order, Clinic Performed       RECOMMENDATIONS:                                                      Moderate to high cardio pulmonary risk for surgery/ Procedure      --Consult hospital rounder / IM to assist post-op medical management  --Patient is to take all scheduled medications on the day of surgery EXCEPT for modifications listed below.     Anticoagulant or Antiplatelet Medication Use  Aspirin : Per vascular surgeon recommendations         Cardiovascular Risk  Patient is already on a Beta Blocker. Continue Betablocker therapy after surgery, using Beta blocker order set as necessary for NPO status.  Has further Cardiac Clearance today : Follow Cardiology recommendations         Pulmonary Risk  Patient with severe COPD with chronic bronchitis, follows with pulmonologist , she is currently not oxygen dependent , she is at some risk for perioperative complications including perioperative pneumonia and possibly prolonged mechanical ventilation  Incentive spirometry post op  Respiratory Therapy (Respiratory Care IP Consult) post op  NG tube decompression if abdominal distension or significant vomiting   Maximize COPD treatment           Signed Electronically by: Avelina Miramontes MD, MD    Copy of this evaluation report is provided to requesting physician.    [facility] Preop Guidelines

## 2017-03-07 NOTE — ANESTHESIA PROCEDURE NOTES
ARTERIAL LINE PROCEDURE NOTE:   Pre-Procedure  Performed by MADELIN HARDING  Location: pre-op      Pre-Anesthestic Checklist: patient identified, IV checked, site marked, risks and benefits discussed, informed consent, monitors and equipment checked, pre-op evaluation, at physician/surgeon's request and post-op pain management    Timeout  Correct Patient: Yes   Correct Procedure: Yes   Correct Site: Yes   Correct Laterality: Yes   Correct Position: Yes   Site Marked: Yes   .   Procedure Documentation  Procedure: arterial line    Supine  Insertion Site:radial, right.Skin infiltrated with mL of 1% lidocaine. Injection technique: ultrasound guided  .  Artery evaluated via ultrasound confirming patency.  Using realtime imaging the artery was punctured and needle was observed entering artery..  A permanent image is entered into the patient's record.Patient Prep;all elements of maximal sterile barrier technique followed, mask, hat, sterile gown, sterile gloves, draped, hand hygiene, chlorhexidine gluconate and isopropyl alcohol    Assessment/Narrative    Catheter: 20 gauge, 1.75 in/4.5 cm quick cath (integral wire)      Tegaderm dressing used.    Arterial waveform: Yes IBP within 10% of NIBP: Yes    Comments:  No complications  Pt tolerated procedure well with light sedation

## 2017-03-07 NOTE — H&P (VIEW-ONLY)
HPI and Plan:   See dictation    Orders Placed This Encounter   Procedures     Follow-Up with Cardiac Advanced Practice Provider       Orders Placed This Encounter   Medications     atorvastatin (LIPITOR) 10 MG tablet     Sig: Take 1 tablet (10 mg) by mouth daily     Dispense:  30 tablet     Refill:  3       There are no discontinued medications.      Encounter Diagnoses   Name Primary?     Coronary artery disease involving native coronary artery of native heart without angina pectoris      Pre-operative cardiovascular examination Yes       CURRENT MEDICATIONS:  Current Outpatient Prescriptions   Medication Sig Dispense Refill     atorvastatin (LIPITOR) 10 MG tablet Take 1 tablet (10 mg) by mouth daily 30 tablet 3     SYMBICORT 160-4.5 MCG/ACT Inhaler INHALE TWO PUFFS BY MOUTH TWICE A DAY 1 Inhaler 8     metoprolol (TOPROL-XL) 25 MG 24 hr tablet Take 0.5 tablets (12.5 mg) by mouth daily 30 tablet 2     albuterol (PROAIR HFA/PROVENTIL HFA/VENTOLIN HFA) 108 (90 BASE) MCG/ACT Inhaler Inhale 2 puffs into the lungs every 6 hours as needed for shortness of breath / dyspnea or wheezing 3 Inhaler 1     montelukast (SINGULAIR) 10 MG tablet TAKE ONE TABLET BY MOUTH AT BEDTIME 90 tablet 2     ipratropium - albuterol 0.5 mg/2.5 mg/3 mL (DUONEB) 0.5-2.5 (3) MG/3ML neb solution Take 1 vial (3 mLs) by nebulization every 6 hours as needed for shortness of breath / dyspnea or wheezing 90 vial 1     Turmeric Curcumin 500 MG CAPS Take 250 mg by mouth daily (with breakfast)       Coenzyme Q10 (CO Q 10) 100 MG CAPS Take 100 mg by mouth daily       ASPIRIN PO Take 325 mg by mouth daily       SPIRIVA HANDIHALER 18 MCG inhalation capsule USING THE HANDIHALER, INHALE THE CONTENTS OF ONE CAPSULE DAILY 90 capsule 5     nicotine (NICODERM CQ) 21 MG/24HR patch 2h hr Place 1 patch onto the skin every 24 hours 30 patch 0     nicotine (NICODERM CQ) 14 MG/24HR patch 2h hr Place 1 patch onto the skin every 24 hours 30 patch      nicotine (NICODERM  CQ) 7 MG/24HR patch 2h hr Place 1 patch onto the skin every 24 hours 30 patch      albuterol (PROAIR HFA, PROVENTIL HFA, VENTOLIN HFA) 108 (90 BASE) MCG/ACT inhaler Inhale 2 puffs into the lungs every 6 hours as needed for shortness of breath / dyspnea 1 Inhaler 0     SYMBICORT 160-4.5 MCG/ACT inhaler INHALE TWO PUFFS BY MOUTH TWICE A DAY 1 Inhaler 5     calcium carbonate (OS-CALDERON 500 MG [place]) 500 MG tablet Take 1,200 mg by mouth daily       Cholecalciferol (VITAMIN D) 1000 UNITS capsule Take 1,000 Units by mouth daily         ALLERGIES     Allergies   Allergen Reactions     Crestor [Rosuvastatin] Other (See Comments)     dizziness     Hmg-Coa-R Inhibitors Other (See Comments)     Muscle/joint aching     Lisinopril Cough     Penicillins      Optison [Albumin Human] Other (See Comments)     Pt was flush and very dizzy.  Also had a BP drop       PAST MEDICAL HISTORY:  Past Medical History   Diagnosis Date     COPD (chronic obstructive pulmonary disease) (H)      CVA (cerebral vascular accident) (H) 8-       PAST SURGICAL HISTORY:  Past Surgical History   Procedure Laterality Date     Salpingo oophorectomy,r/l/delores       Salpingo Oophorectomy, RT/LT/DELORES     Appendectomy         FAMILY HISTORY:  Family History   Problem Relation Age of Onset     CEREBROVASCULAR DISEASE Mother      CEREBROVASCULAR DISEASE Father      Genitourinary Problems Brother      Dialysis       SOCIAL HISTORY:  Social History     Social History     Marital status:      Spouse name: N/A     Number of children: N/A     Years of education: N/A     Social History Main Topics     Smoking status: Current Every Day Smoker     Packs/day: 0.50     Types: Cigarettes     Smokeless tobacco: Never Used      Comment: patient states she is working on it      Alcohol use No     Drug use: No     Sexual activity: Not Currently     Partners: Male     Other Topics Concern      Service No     Blood Transfusions No     Caffeine Concern No      "Occupational Exposure No     Hobby Hazards No     Sleep Concern Yes     Trouble breathing at night due to COPD     Stress Concern No     Weight Concern No     Special Diet No     Back Care No     Exercise No     Bike Helmet No     Seat Belt Yes     Self-Exams Yes     Parent/Sibling W/ Cabg, Mi Or Angioplasty Before 65f 55m? No     Social History Narrative       Review of Systems:  Skin:  Negative       Eyes:  Positive for glasses    ENT:  Negative      Respiratory:  Positive for shortness of breath;cough COPD  on inhalers   Cardiovascular:  Negative      Gastroenterology: Negative      Genitourinary:  Negative      Musculoskeletal:  Negative      Neurologic:  Negative      Psychiatric:  Negative      Heme/Lymph/Imm:  Negative      Endocrine:  Negative        Physical Exam:  Vitals: /64  Pulse 80  Ht 1.575 m (5' 2\")  Wt 52.2 kg (115 lb)  BMI 21.03 kg/m2    Constitutional:  cooperative thin      Skin:  warm and dry to the touch        Head:  normocephalic        Eyes:           ENT:    poor dentition      Neck:  JVP normal        Chest:  clear to auscultation          Cardiac: regular rhythm;normal S1 and S2       systolic ejection murmur;grade 1          Abdomen:  abdomen soft;no masses;non-tender        Vascular:   pulses below the femoral arteries are diminished                                      Extremities and Back:  no edema              Neurological:  no gross motor deficits              CC  Avelina Miramontes MD  [facility]  [address] [place] [address]              "

## 2017-03-07 NOTE — ANESTHESIA PROCEDURE NOTES
CENTRAL LINE INSERTION PROCEDURE NOTE:   Pre-Procedure  Performed by MADELIN HARDING  Location: OR      Pre-Anesthestic Checklist: patient identified, IV checked, site marked, risks and benefits discussed, informed consent, monitors and equipment checked, pre-op evaluation, at physician/surgeon's request and post-op pain management    Timeout  Correct Patient: Yes   Correct Procedure: Yes   Correct Site: Yes   Correct Laterality: Yes   Correct Position: Yes   Site Marked: Yes   .   Procedure Documentation   Procedure: central line, new line and elective  Position: Trendelenburg  Patient Prep;all elements of maximal sterile barrier technique followed, mask, hat, sterile gown, sterile gloves, draped, chlorhexidine gluconate and isopropyl alcohol      Insertion Site:internal jugular, right  Using U/S with sterile probe cover and sterile gel, vein evaluated for patency/adequacy of cortis insertion is adequate, and using realtime U/S imaging the amarjit was punctured, and needle was observed entering vein on U/S. A permanent image is entered into the patient's record.     Catheter: 7 Fr, 20 cm, T.L.  Assessment/Narrative         Secured by suture  Tegaderm and Biopatch dressing used.  blood aspirated from all lumens  All lumens flushed: Yes  Verification method: Ultrasound  Comments:  No complications

## 2017-03-07 NOTE — PROGRESS NOTES
Admission medication history interview status for the 3/7/2017  admission is complete. See EPIC admission navigator for prior to admission medications     Medication history source reliability:Good    Medication history interview source(s):Patient    Medication history resources (including written lists, pill bottles, clinic record):None    [facility]    Additional medication history information not noted on PTA med list :None    Time spent in this activity: 30 minutes    Prior to Admission medications    Medication Sig Last Dose Taking? Auth Provider   Calcium Carbonate-Vitamin D (OSCAL 500/200 D-3 PO) Take 1 tablet by mouth 2 times daily Over 1 month ago at pm Yes Reported, Patient   Coenzyme Q10 (COQ10 PO) Take 100 mg by mouth every 24 hours (at 16:00) 3/5/2017 at 1600 Yes Reported, Patient   Metoprolol Succinate (TOPROL XL PO) Take 12.5 mg by mouth every morning (patient takes 0.5 X 25 mg = 12.5 mg dose) 3/7/2017 at 0800 Yes Reported, Patient   Montelukast Sodium (SINGULAIR PO) Take 10 mg by mouth At Bedtime 3/6/2017 at 2200 Yes Reported, Patient   nicotine (NICODERM CQ) 14 MG/24HR 24 hr patch Place 1 patch onto the skin every 24 hours (remove at 19:00) 3/5/2017 at am Yes Reported, Patient   tiotropium (SPIRIVA HANDIHALER) 18 MCG capsule Inhale 18 mcg into the lungs every 24 hours (at 16:00) 3/6/2017 at 1600 Yes Reported, Patient   Misc Natural Products (TURMERIC CURCUMIN) CAPS Take 1 capsule by mouth every 24 hours (at 16:00) 3/5/2017 at 1600 Yes Reported, Patient   ipratropium - albuterol 0.5 mg/2.5 mg/3 mL (DUONEB) 0.5-2.5 (3) MG/3ML neb solution Take 1 vial (3 mLs) by nebulization every 6 hours as needed for shortness of breath / dyspnea or wheezing Over 2 years ago at prn Yes Avelina Miramontes MD   ASPIRIN PO Take 325 mg by mouth every morning  3/7/2017 at 0800 Yes Reported, Patient   albuterol (PROAIR HFA, PROVENTIL HFA, VENTOLIN HFA) 108 (90 BASE) MCG/ACT inhaler Inhale 2 puffs  into the lungs every 6 hours as needed for shortness of breath / dyspnea 3/7/2017 at 0700 Yes Avelina Miramontes MD   SYMBICORT 160-4.5 MCG/ACT inhaler INHALE TWO PUFFS BY MOUTH TWICE A DAY 3/7/2017 at 0800 Yes Avelina Miramontes MD

## 2017-03-07 NOTE — IP AVS SNAPSHOT
MRN:[id number]                      After Visit Summary   3/7/2017    Preeti Ford    MRN: [id number]           Thank you!     Thank you for choosing [facility] for your care. Our goal is always to provide you with excellent care. Hearing back from our patients is one way we can continue to improve our services. Please take a few minutes to complete the written survey that you may receive in the mail after you visit with us. Thank you!        Patient Information     Date Of Birth          1950        About your hospital stay     You were admitted on:  March 7, 2017 You last received care in the:  [facility] [address] [facility]    You were discharged on:  March 12, 2017        Reason for your hospital stay       Peripheral arterial disease. You underwent Aortoiliac bypass.                  Who to Call     For medical emergencies, please call 911.  For non-urgent questions about your medical care, please call your primary care provider or clinic, [phone]  For questions related to your surgery, please call your surgery clinic        Attending Provider     Provider Specialty    Marcos Pratt MD Surgery       Primary Care Provider Office Phone # Fax #    Avelina Yael Miramontes [phone]        [facility] [address] [place] [address]        After Care Instructions     Activity       Your activity upon discharge: No vigorous activity. Don't lift more than 10 lb for 4 weeks.            Diet       Follow this diet upon discharge: Orders Placed This Encounter      Regular Diet Adult            Discharge Instructions       Don't drive while on narcotic pain medications.   Return to the emergency department for increasing abdominal pain not relieved by pain medications.  Call vascular surgery office if you experience fever, chills, purulent drainage from your wound, weakness.            Wound care and dressings       Instructions to care  for your wound at home: You may shower. Don't soak wound in tub bath. Allow the steri-strips to fall off on their own.                  Follow-up Appointments     Follow-up and recommended labs and tests        Follow up with Dr Pratt in 4 weeks.                  Further instructions from your care team         Aortic Bypass Grafts  Post-Operative Instructions    The typical length of stay in the hospital is 5-7 days.  You will likely spend 1 night in the Intensive Care Unit. Upon discharge from the hospital, plan on having someone available to drive you home and stay with you for approximately 1 week.    Diet    Nutrition is important for healing after surgery.  You may experience a temporary decrease in appetite after you surgery.  It is best to try smaller, more frequent meals until your appetite returns.      Medications    You may be started on a blood thinner after surgery.  If you are taking Coumadin, you need to have your blood monitored regularly.      You will be given a prescription for pain medication after surgery.  If you need to have this refilled, please call your pharmacy where you had it filled.  They will then call us for approval.  Please do not call after hours for a refill on pain medication.    Activity    Walking is important after surgery.  You will begin walking before you leave the hospital, and then you should gradually increase your distance as tolerated.  You should be taking at least 2-3 short walks a day.      You can climb stairs when you feel strong enough.      You should not drive for 2 weeks.  In addition, you can not be taking prescription strength pain medication and you must feel strong enough to drive safely.      You may return to work once you feel ready.  This varies depending on your recovery and the type of work you do ~ typically 4-6 weeks after surgery.  This can be discussed further at your 2 week post-operative visit.      You should avoid strenuous activity,  including running, biking, or weight-lifting until discussed at your post-operative appointment.  Typically, we ask that you do not lift over 20 lbs for 6 weeks.    Incision Care    You will have staples holding your incisions closed which will be removed 10-14 days after surgery.  Then, you may get strips of white tape, called steri-strips, applied over your incision.  These will curl up on the ends after 5-7 days, at which time they can be removed.      You may shower 2-3 days after your surgery - pat the incision dry to prevent irritation from moisture.  You do not need to cover with a bandage unless you have drainage.      An abdominal binder can be used as needed to provide comfort to your abdominal incision.      If you have a groin incision, you should keep a gauze pad tucked in the skin fold to prevent the skin on skin contact.  This will prevent a moist environment which hinders healing.  Call our office to discuss this further if the groin looks red or has an odor.     Common Concerns    You may develop bruising and firmness around your incision.  This will soften and resolve over the next 1-3 weeks.  Call our office if it enlarges, becomes red, or painful.      On occasion a soft, fluid-filled bulge can develop near a surgical incision - this is called a seroma.  It will likely resolve on its own, but occasionally requires your doctor to drain it in clinic.  You should call our office if you have questions about this.      If you have leg incisions, you may notice some swelling.  Leg swelling is common after surgery and must be controlled by elevating your legs above your heart.  The swelling does not go away by propping your legs up on a stool or sitting in a reclining chair.  For the first few weeks after surgery, you will need to really work at controlling this swelling, and will likely need to spend a fair amount of time with your legs elevated.  Eventually, you should be able to manage the swelling  by elevating 3-4 times a day for 20-30 minutes.  If the swelling does not decrease after elevating your legs above your heart for at least 3-4 hours, you should call our office.      You may notice numbness around your incision.  This is due to nerve irritation from the surgery and will gradually improve over the next several weeks.      It is not uncommon to feel tired and easily fatigued after this type of surgery.  You will gradually regain your energy and strength over the next several weeks.    Post-Operative Appointments    You will need to see your doctor in clinic 10-14 days after surgery.    o Date: ___________________________  o Time: __________________________  o Dr. ______________________________  o Location: [facility], [address] [place] [address], [address]      You will then be monitored regularly with an Ultrasound test to assure that the bypass graft is functioning properly.  We will notify you by mail when you are due for these appointments - typically, 1 year and 5 years post-operatively.    When to Call our Office  Call our office as previously instructed and with any concerns or questions.  Our main office number is [phone]; you will be directed to the appropriate care provider for further assistance.    Temperature greater than 101.      When you are unable to feel a pulse in your foot or leg, especially when you have been monitoring regularly.      If you notice new onset of numbness, tingling, coolness or pain in your legs or feet.      Severe pain, especially if it is new and preventing sleep.      If you will be having an invasive procedure, such as a colonoscopy or dental work, within one year of your surgery, please call our office.  You may need to take an antibiotic prior to the procedure if you had an artificial graft placed with your surgery.     Revised 5/2014          Pending Results     No orders found from 3/5/2017 to 3/8/2017.            Statement of Approval      "Ordered          17 0750  I have reviewed and agree with all the recommendations and orders detailed in this document.  EFFECTIVE NOW     Approved and electronically signed by:  Jacobo Patel MD             Admission Information     Date & Time Provider Department Dept. Phone    3/7/2017 Marcos Pratt MD [place] [facility] [address] [facility] [phone]      Your Vitals Were     Blood Pressure Pulse Temperature Respirations Weight Pulse Oximetry    103/57 (BP Location: Right arm) 90 97.5  F (36.4  C) (Oral) 16 57.6 kg (126 lb 15.8 oz) 90%    BMI (Body Mass Index)                   23.23 kg/m2           MyChart Information     [employer] lets you send messages to your doctor, view your test results, renew your prescriptions, schedule appointments and more. To sign up, go to [email or web address] . Click on \"Log in\" on the left side of the screen, which will take you to the Welcome page. Then click on \"Sign up Now\" on the right side of the page.     You will be asked to enter the access code listed below, as well as some personal information. Please follow the directions to create your username and password.     Your access code is: [email or web address]  Expires: 2017 10:01 AM     Your access code will  in 90 days. If you need help or a new code, please call your [facility] clinic or [phone].        Care EveryWhere ID     This is your Care EveryWhere ID. This could be used by other organizations to access your [facility] medical records  [id number]           Review of your medicines      START taking        Dose / Directions    aspirin 325 MG tablet   Used for:  PAD (peripheral artery disease) (H)        Dose:  325 mg   Take 1 tablet (325 mg) by mouth daily   Quantity:  180 tablet   Refills:  0       atorvastatin 10 MG tablet   Commonly known as:  LIPITOR   Used for:  PAD (peripheral artery disease) (H)        Dose:  10 mg   Take 1 tablet (10 mg) by mouth daily   Quantity:  30 " tablet   Refills:  3       oxyCODONE 5 MG IR tablet   Commonly known as:  ROXICODONE   Used for:  Pain at surgical site        Dose:  5-10 mg   Take 1-2 tablets (5-10 mg) by mouth every 3 hours as needed for moderate to severe pain   Quantity:  60 tablet   Refills:  0       pantoprazole 40 MG EC tablet   Commonly known as:  PROTONIX   Used for:  Gastroesophageal reflux disease without esophagitis        Dose:  40 mg   Take 1 tablet (40 mg) by mouth every morning   Quantity:  30 tablet   Refills:  3       senna-docusate 8.6-50 MG per tablet   Commonly known as:  SENOKOT-S;PERICOLACE   Used for:  Drug-induced constipation        Dose:  1-2 tablet   Take 1-2 tablets by mouth 2 times daily   Quantity:  30 tablet   Refills:  3         CONTINUE these medicines which may have CHANGED, or have new prescriptions. If we are uncertain of the size of tablets/capsules you have at home, strength may be listed as something that might have changed.        Dose / Directions    * nicotine 14 MG/24HR 24 hr patch   Commonly known as:  NICODERM CQ   This may have changed:  Another medication with the same name was added. Make sure you understand how and when to take each.        Dose:  1 patch   Place 1 patch onto the skin every 24 hours (remove at 19:00)   Refills:  0       * nicotine 14 MG/24HR 24 hr patch   Commonly known as:  NICODERM CQ   This may have changed:  You were already taking a medication with the same name, and this prescription was added. Make sure you understand how and when to take each.   Used for:  Encounter for smoking cessation counseling        Dose:  1 patch   Place 1 patch onto the skin every 24 hours   Quantity:  30 patch   Refills:  0       * nicotine 7 MG/24HR 24 hr patch   Commonly known as:  NICODERM CQ   This may have changed:  You were already taking a medication with the same name, and this prescription was added. Make sure you understand how and when to take each.   Used for:  Encounter for smoking  cessation counseling        Dose:  1 patch   Start taking on:  4/12/2017   Place 1 patch onto the skin every 24 hours   Quantity:  30 patch   Refills:  0       * Notice:  This list has 3 medication(s) that are the same as other medications prescribed for you. Read the directions carefully, and ask your doctor or other care provider to review them with you.      CONTINUE these medicines which have NOT CHANGED        Dose / Directions    albuterol 108 (90 BASE) MCG/ACT Inhaler   Commonly known as:  PROAIR HFA/PROVENTIL HFA/VENTOLIN HFA   Used for:  Chronic bronchitis, unspecified chronic bronchitis type (H)        Dose:  2 puff   Inhale 2 puffs into the lungs every 6 hours as needed for shortness of breath / dyspnea   Quantity:  1 Inhaler   Refills:  0       ASPIRIN PO        Dose:  325 mg   Take 325 mg by mouth every morning   Refills:  0       COQ10 PO        Dose:  100 mg   Take 100 mg by mouth every 24 hours (at 16:00)   Refills:  0       ipratropium - albuterol 0.5 mg/2.5 mg/3 mL 0.5-2.5 (3) MG/3ML neb solution   Commonly known as:  DUONEB   Used for:  Chronic bronchitis, unspecified chronic bronchitis type (H)        Dose:  1 vial   Take 1 vial (3 mLs) by nebulization every 6 hours as needed for shortness of breath / dyspnea or wheezing   Quantity:  90 vial   Refills:  1       OSCAL 500/200 D-3 PO        Dose:  1 tablet   Take 1 tablet by mouth 2 times daily   Refills:  0       SINGULAIR PO        Dose:  10 mg   Take 10 mg by mouth At Bedtime   Refills:  0       SPIRIVA HANDIHALER 18 MCG capsule   Generic drug:  tiotropium        Dose:  18 mcg   Inhale 18 mcg into the lungs every 24 hours (at 16:00)   Refills:  0       SYMBICORT 160-4.5 MCG/ACT Inhaler   Used for:  COPD (chronic obstructive pulmonary disease) (H)   Generic drug:  budesonide-formoterol        INHALE TWO PUFFS BY MOUTH TWICE A DAY   Quantity:  1 Inhaler   Refills:  5       TOPROL XL PO        Dose:  12.5 mg   Take 12.5 mg by mouth every morning  (patient takes 0.5 X 25 mg = 12.5 mg dose)   Refills:  0       Turmeric Curcumin Caps        Dose:  1 capsule   Take 1 capsule by mouth every 24 hours (at 16:00)   Refills:  0            Where to get your medicines      These medications were sent to [facility] [place], [place] - [address]  [address], [place] [address]     Phone:  [phone]     aspirin 325 MG tablet    atorvastatin 10 MG tablet    nicotine 14 MG/24HR 24 hr patch    nicotine 7 MG/24HR 24 hr patch    pantoprazole 40 MG EC tablet    senna-docusate 8.6-50 MG per tablet         Some of these will need a paper prescription and others can be bought over the counter. Ask your nurse if you have questions.     Bring a paper prescription for each of these medications     oxyCODONE 5 MG IR tablet                Protect others around you: Learn how to safely use, store and throw away your medicines at www.disposemymeds.org.             Medication List: This is a list of all your medications and when to take them. Check marks below indicate your daily home schedule. Keep this list as a reference.      Medications           Morning Afternoon Evening Bedtime As Needed    albuterol 108 (90 BASE) MCG/ACT Inhaler   Commonly known as:  PROAIR HFA/PROVENTIL HFA/VENTOLIN HFA   Inhale 2 puffs into the lungs every 6 hours as needed for shortness of breath / dyspnea   Last time this was given:  6 puffs on 3/7/2017  6:20 PM                                   aspirin 325 MG tablet   Take 1 tablet (325 mg) by mouth daily   Last time this was given:  325 mg on 3/12/2017  8:15 AM   Next Dose Due:  3/13                                   ASPIRIN PO   Take 325 mg by mouth every morning   Last time this was given:  325 mg on 3/12/2017  8:15 AM                                   atorvastatin 10 MG tablet   Commonly known as:  LIPITOR   Take 1 tablet (10 mg) by mouth daily   Last time this was given:  10 mg on 3/11/2017  7:23 PM   Next Dose Due:   3/13                                   COQ10 PO   Take 100 mg by mouth every 24 hours (at 16:00)                                   ipratropium - albuterol 0.5 mg/2.5 mg/3 mL 0.5-2.5 (3) MG/3ML neb solution   Commonly known as:  DUONEB   Take 1 vial (3 mLs) by nebulization every 6 hours as needed for shortness of breath / dyspnea or wheezing   Last time this was given:  3 mLs on 3/8/2017  1:11 AM                                   * nicotine 14 MG/24HR 24 hr patch   Commonly known as:  NICODERM CQ   Place 1 patch onto the skin every 24 hours (remove at 19:00)                                   * nicotine 14 MG/24HR 24 hr patch   Commonly known as:  NICODERM CQ   Place 1 patch onto the skin every 24 hours                                   * nicotine 7 MG/24HR 24 hr patch   Commonly known as:  NICODERM CQ   Place 1 patch onto the skin every 24 hours   Start taking on:  4/12/2017                                OSCAL 500/200 D-3 PO   Take 1 tablet by mouth 2 times daily   Next Dose Due:  3/13 evening                                      oxyCODONE 5 MG IR tablet   Commonly known as:  ROXICODONE   Take 1-2 tablets (5-10 mg) by mouth every 3 hours as needed for moderate to severe pain   Next Dose Due:  Any time                                   pantoprazole 40 MG EC tablet   Commonly known as:  PROTONIX   Take 1 tablet (40 mg) by mouth every morning   Last time this was given:  40 mg on 3/12/2017  8:15 AM   Next Dose Due:  3/13                                   senna-docusate 8.6-50 MG per tablet   Commonly known as:  SENOKOT-S;PERICOLACE   Take 1-2 tablets by mouth 2 times daily   Last time this was given:  1 tablet on 3/11/2017  9:24 PM   Next Dose Due:  3/13                                      SINGULAIR PO   Take 10 mg by mouth At Bedtime   Last time this was given:  10 mg on 3/11/2017  9:24 PM   Next Dose Due:  3/12                                   SPIRIVA HANDIHALER 18 MCG capsule   Inhale 18 mcg into the lungs every  24 hours (at 16:00)   Last time this was given:  18 mcg on 3/11/2017  5:15 PM   Generic drug:  tiotropium   Next Dose Due:  3/13                                   SYMBICORT 160-4.5 MCG/ACT Inhaler   INHALE TWO PUFFS BY MOUTH TWICE A DAY   Last time this was given:  2 puffs on 3/12/2017  6:33 AM   Generic drug:  budesonide-formoterol   Next Dose Due:  3/12                                      TOPROL XL PO   Take 12.5 mg by mouth every morning (patient takes 0.5 X 25 mg = 12.5 mg dose)   Last time this was given:  12.5 mg on 3/12/2017  8:15 AM   Next Dose Due:  3/13                                   Turmeric Curcumin Caps   Take 1 capsule by mouth every 24 hours (at 16:00)                                   * Notice:  This list has 3 medication(s) that are the same as other medications prescribed for you. Read the directions carefully, and ask your doctor or other care provider to review them with you.

## 2017-03-07 NOTE — CONSULTS
[facility]    Vascular Medicine Consultation     Attestation: I have examined the patient independently of Marcia Goncalves PA-C and agree with the examination and plan as delineated below.    David Barnhart MD      Date of Admission:  3/7/2017  Date of Consult (When I saw the patient): 03/07/17    Assessment & Plan     1. Peripheral arterial disease s/p stenting at iliac bifurcation (kissing stents) 2/18/16 now with recurrent bilateral lower extremity claudication and failing bilateral iliac stents undergoing an aortobifemoral bypass 3/7/17     Post-operative cares per Dr. Pratt / Vascular Surgery.      2. Ongoing tobacco use     She has been smoking for the majority of her life and smokes approximately 1/2 pack per day. The importance of tobacco cessation was stressed. She states she would like to quit now and desires nicotine patches to assist with this. She will be provided with a prescription of 14 mg nicotine patches for 30 days followed by 7 mg patches for 30 days.     3. Hyperlipidemia     Her most recent lipid panel on 3/1/17 was significant for an LDL of 175, HDL 70, triglycerides 95, and total cholesterol 264. She has not been on any lipid-lowering medication as she has been unable to tolerate Crestor, Pravastatin, and Zetia in the past. She was just seen by Dr. Samuel of Cardiology yesterday and was prescribed low-dose Lipitor to see if she can tolerate that. She will be continued on Lipitor 10 mg daily while in the hospital. If she is able to tolerate this, she should have a lipid panel repeated again in 3 months with up-titration of the Lipitor as tolerated.      4. Coronary artery disease s/p ROWDY 9/1/15     She is followed closely by Cardiology as an outpatient. She remains only on Aspirin 325 mg daily now. She has been asymptomatic on medical therapy. This will be continued and she will be monitored closely.     Reason for Consult   Reason for consult: Asked by Dr. Pratt to  evaluate vascular risk factors and assist with medical management in this 66 year old female undergoing an aortobifemoral bypass for bilateral lower extremity claudication and failing bilateral iliac artery stents.     Primary Care Physician   Avelina Miramontes MD      History of Present Illness   Preeti Ford is a 66 year old female smoker with a history of coronary disease, tobacco incited COPD, hyperlipidemia with an intolerance to statins, and PAD s/p bilateral iliac artery stenting 02/18/2016. At that time she was noted to have some mild stenosis of the left common femoral artery. Otherwise, the superficial femoral-popliteal and tibial runoffs were essentially normal with very little, if any, disease. Her vessels were noted to be smaller in caliber. Primary pedal runoff was via the larger posterior tibial arteries.       Initially, after stenting, the patient did well with improvement of her symptoms and RICHARD. However, she has now noticed recurrent symptoms in both of her legs where she can walk only a very short distance. She initially had a sore on her right foot that had healed up following the angioplasty and has not recurred. Her most recent ankle brachial index has decreased to 0.52 on the right and 0.6 on the left. She was also noted to have recurrent stenosis within both of the stents. Due to the extent of her vascular problems and the short-term recurrence, it  was felt that she may be better treated with surgical bypass. She presents today for an aortobifemoral bypass.      Past Medical History   Past Medical History   Diagnosis Date     Arthritis      COPD (chronic obstructive pulmonary disease) (H)      Coronary artery disease      CVA (cerebral vascular accident) (H) 8-     Hypertension        Past Surgical History   Past Surgical History   Procedure Laterality Date     Salpingo oophorectomy,r/l/delores       Salpingo Oophorectomy, RT/LT/DELORES     Appendectomy         Prior to Admission  Medications   Prior to Admission Medications   Prescriptions Last Dose Informant Patient Reported? Taking?   ASPIRIN PO 3/7/2017 at 0800 Self Yes Yes   Sig: Take 325 mg by mouth every morning    Calcium Carbonate-Vitamin D (OSCAL 500/200 D-3 PO) Over 1 month ago at pm Self Yes Yes   Sig: Take 1 tablet by mouth 2 times daily   Coenzyme Q10 (COQ10 PO) 3/5/2017 at 1600 Self Yes Yes   Sig: Take 100 mg by mouth every 24 hours (at 16:00)   Metoprolol Succinate (TOPROL XL PO) 3/7/2017 at 0800 Self Yes Yes   Sig: Take 12.5 mg by mouth every morning (patient takes 0.5 X 25 mg = 12.5 mg dose)   Misc Natural Products (TURMERIC CURCUMIN) CAPS 3/5/2017 at 1600 Self Yes Yes   Sig: Take 1 capsule by mouth every 24 hours (at 16:00)   Montelukast Sodium (SINGULAIR PO) 3/6/2017 at 2200 Self Yes Yes   Sig: Take 10 mg by mouth At Bedtime   SYMBICORT 160-4.5 MCG/ACT inhaler 3/7/2017 at 0800 Self No Yes   Sig: INHALE TWO PUFFS BY MOUTH TWICE A DAY   albuterol (PROAIR HFA, PROVENTIL HFA, VENTOLIN HFA) 108 (90 BASE) MCG/ACT inhaler 3/7/2017 at 0700 Self No Yes   Sig: Inhale 2 puffs into the lungs every 6 hours as needed for shortness of breath / dyspnea   ipratropium - albuterol 0.5 mg/2.5 mg/3 mL (DUONEB) 0.5-2.5 (3) MG/3ML neb solution Over 2 years ago at prn Self No Yes   Sig: Take 1 vial (3 mLs) by nebulization every 6 hours as needed for shortness of breath / dyspnea or wheezing   nicotine (NICODERM CQ) 14 MG/24HR 24 hr patch 3/5/2017 at am Self Yes Yes   Sig: Place 1 patch onto the skin every 24 hours (remove at 19:00)   tiotropium (SPIRIVA HANDIHALER) 18 MCG capsule 3/6/2017 at 1600 Self Yes Yes   Sig: Inhale 18 mcg into the lungs every 24 hours (at 16:00)      Facility-Administered Medications: None     Allergies   Allergies   Allergen Reactions     Crestor [Rosuvastatin] Other (See Comments)     dizziness     Hmg-Coa-R Inhibitors Other (See Comments)     Muscle/joint aching     Lisinopril Cough     Optison [Albumin Human] Other  (See Comments)     Pt was flush and very dizzy.  Also had a BP drop     Penicillins Rash       Social History   Preeti JEROME Ford  reports that she has been smoking Cigarettes.  She has been smoking about 0.50 packs per day. She has never used smokeless tobacco. She reports that she does not drink alcohol or use illicit drugs.    Family History   Family History   Problem Relation Age of Onset     CEREBROVASCULAR DISEASE Mother      CEREBROVASCULAR DISEASE Father      Genitourinary Problems Brother      Dialysis       Review of Systems   The 10 point Review of Systems is negative other than noted in the HPI or here.     Physical Exam               SpO2: 98 % O2 Device: Nasal cannula Oxygen Delivery: 2 LPM  Vital Signs with Ranges  Arterial Line BP: (100)/(50) 100/50  SpO2:  [98 %] 98 %  0 lbs 0 oz    Constitutional: awake, alert, cooperative, no apparent distress, and appears stated age  Eyes: Lids and lashes normal, pupils equal, round and reactive to light, extra ocular muscles intact, sclera clear, conjunctiva normal  ENT: normocepalic, without obvious abnormality, oropharynx pink and moist  Hematologic / Lymphatic: no lymphadenopathy  Respiratory: No increased work of breathing, good air exchange, clear to auscultation bilaterally, no crackles or wheezing  Cardiovascular: regular rate and rhythm, normal S1 and S2 and no murmur noted  GI: Normal bowel sounds, soft, non-distended, non-tender  Skin: no redness, warmth, or swelling, no rashes and no lesions  Musculoskeletal: There is no redness, warmth, or swelling of the joints.  Full range of motion noted.  Motor strength is 5 out of 5 all extremities bilaterally.  Tone is normal.  Neurologic: Awake, alert, oriented to name, place and time.  Cranial nerves II-XII are grossly intact.  Motor is 5 out of 5 bilaterally.    Neuropsychiatric:  Normal affect, memory, insight.  Pulses: Unable to palpate pedal pulses. No carotid bruits appreciated.     Data   Most Recent 3  CBC's:  Recent Labs   Lab Test  03/06/17   0904  02/18/16   0718  09/02/15   0505   WBC  5.8  7.7  7.8   HGB  16.5*  16.0*  13.4   MCV  90  90  89   PLT  185  206  204     Most Recent 3 BMP's:  Recent Labs   Lab Test  03/07/17   1100  03/06/17   0904  09/20/16   0912  02/18/16   0718  10/09/15   0825   NA   --   141  141   --   137   POTASSIUM  4.2  4.2  3.8   --   4.0   CHLORIDE   --   104  103   --   103   CO2   --   32  33*   --   29   BUN   --   16  18   --   11   CR   --   0.75  0.73  0.73  0.60   ANIONGAP   --   5  5   --   5   CALDERON   --   9.2  8.8   --   8.9   GLC   --   101*  91   --   95     Most Recent 3 INR's:  Recent Labs   Lab Test  02/18/16   0718 01/06/16 12/23/15   INR  0.85*  2.3*  2.4*     Most Recent Cholesterol Panel:  Recent Labs   Lab Test  03/01/17   0950   CHOL  264*   LDL  175*   HDL  70   TRIG  95     Most Recent Hemoglobin A1c:  Recent Labs   Lab Test  02/18/16   0718   A1C  5.7

## 2017-03-07 NOTE — IP AVS SNAPSHOT
[facility]    [address] [place] [address]    Phone:  [phone]                                       After Visit Summary   3/7/2017    Preeti Ford    MRN: [id number]           After Visit Summary Signature Page     I have received my discharge instructions, and my questions have been answered. I have discussed any challenges I see with this plan with the nurse or doctor.    ..........................................................................................................................................  Patient/Patient Representative Signature      ..........................................................................................................................................  Patient Representative Print Name and Relationship to Patient    ..................................................               ................................................  Date                                            Time    ..........................................................................................................................................  Reviewed by Signature/Title    ...................................................              ..............................................  Date                                                            Time

## 2017-03-07 NOTE — ANESTHESIA PREPROCEDURE EVALUATION
Anesthesia Evaluation     . Pt has had prior anesthetic.     No history of anesthetic complications     ROS/MED HX    ENT/Pulmonary:     (+)tobacco use, Current use COPD, , . .   (-) sleep apnea   Neurologic:     (+)CVA date: 2014 without deficits    Cardiovascular:     (+) Dyslipidemia, hypertension-Peripheral Vascular Disease-CAD, --stent,2015  . : . CHF . . :. . pulmonary hypertension, Previous cardiac testing Echodate:3/3/17results:Left Ventricle  The left ventricle is normal in size. There is mild concentric left  ventricular hypertrophy. Left ventricular systolic function is normal. The  transmitral spectral Doppler flow pattern is suggestive of impaired LV  relaxation. E by E prime ratio is between 8 and 15, which is indeterminate for  assessment of left ventricular filling pressures. The visual ejection fraction  is estimated at 55%. There is mild hypokinesis of the distal septum and  inferoapex.     Right Ventricle  Normal right ventricle structure and size.     Atria  Normal left atrial size. Right atrial size is normal. There is no atrial shunt  seen.     Mitral Valve  There is mild (1+) mitral regurgitation.        Tricuspid Valve  There is trace tricuspid regurgitation. The right ventricular systolic  pressure is approximated at 44mmHg plus the right atrial pressure. Small IVC  (<1.5cm) with normal respiratory collapse; right atrial pressure is estimated  at 0-5mmHg. Mild (35-45mmHg) pulmonary hypertension is present.     Aortic Valve  A bicuspid aortic valve cannot be excluded. There is trace to mild aortic  regurgitation. The mean AoV pressure gradient is 11mmHg. Mild to moderate  valvular aortic stenosis. Stevenson measured LVOT at 2.3-2.4 cm which yields YOLANDA  1.5-1.6 cm2.     Pulmonic Valve  There is no pulmonic valvular stenosis.     Vessels  The aortic root is normal size.     Pericardium  The pericardium appears normal.        Rhythm  The rhythm was normal sinus.  date: results: date: results:  date: results:          METS/Exercise Tolerance:     Hematologic:         Musculoskeletal:         GI/Hepatic:        (-) GERD and liver disease   Renal/Genitourinary:      (-) renal disease   Endo:      (-) Type II DM, thyroid disease and chronic steroid usage   Psychiatric:         Infectious Disease:         Malignancy:         Other:               Physical Exam  Normal systems: cardiovascular    Airway   Mallampati: II  TM distance: >3 FB  Neck ROM: full    Dental   (+) missing    Cardiovascular       Pulmonary (+) decreased breath sounds and wheezes                       Anesthesia Plan      History & Physical Review  History and physical reviewed and following examination; no interval change.    ASA Status:  4 .    NPO Status:  > 8 hours    Plan for General and ETT with Propofol induction. Maintenance will be Balanced.    PONV prophylaxis:  Ondansetron (or other 5HT-3) and Dexamethasone or Solumedrol  Additional equipment: Arterial Line, Central Line and CVP      Postoperative Care  Postoperative pain management:  IV analgesics.      Consents  Anesthetic plan, risks, benefits and alternatives discussed with:  Patient..                          .

## 2017-03-07 NOTE — PROGRESS NOTES
REASON FOR CONSULTATION:   1.  Preoperative evaluation.   2.  Coronary artery disease.      HISTORY OF PRESENT ILLNESS:  I had the pleasure of seeing Preeti Ford at the [facility] in [place] this afternoon.  She is a very pleasant 66-year-old female with an extensive cardiovascular history.  She has known coronary artery disease and suffered prior anterior myocardial infarction.  She also has peripheral vascular disease and is scheduled for aorto-fem bypass tomorrow.  She has hyperlipidemia, prior CVA, COPD and nicotine dependence.      She was hospitalized here at [facility] 2 years ago with an anterior myocardial infarction.  She presented late and was initially managed medically.  She had an MRI which demonstrated an ejection fraction of 34% and a large left ventricle mural thrombus.  She subsequently underwent coronary angiogram which demonstrated high-grade proximal LAD stenosis and high-grade mid-circumflex stenosis. Both lesions were stented successfully without any complications.        She was initiated on the appropriate medical program including Coumadin.  She has remained stable from a cardiac standpoint since then.  Her most recent echocardiogram obtained last year shows normalization of her LV function with an EF of 55% and mild hypokinesis of the distal septum and for apex.  No clot was seen in the left ventricle.  The echo also showed mild to moderate aortic valve stenosis.      She, unfortunately, continues to smoke.  She is not very active because of her severe lower extremity arterial insufficiency.  This past fall, she was walking on the treadmill up to 20 minutes.  She did not have any chest symptoms at that time.  With her current activity which includes climbing at least 1 flight of stairs, she does not endorse any angina or exertional shortness of breath.  She remains on an excellent medical program with the exception of a statin.  Her  cholesterol was well controlled initially with Crestor, but she discontinued this medication this past year, mainly due to cost issues.      IMPRESSION, REPORT AND PLAN:   1.  Known coronary artery disease.   2.  History of prior anterior myocardial infarction and stenting of the LAD and circumflex.   3.  History of ischemic cardiomyopathy, resolved.   4.  Hyperlipidemia, untreated.   5.  Hypertension.   6.  Nicotine dependence.   7.  Severe peripheral arterial disease.      She remains stable from a cardiac standpoint and currently does not endorse any significant cardiopulmonary symptoms.  Specifically, no angina or exertional shortness of breath.  Although she is somewhat limited, her functional capacity prior to the progression of her PAD was good.  As such, I recommend that she proceed with her upcoming aorto-fem bypass surgery without further delay.  She understands that given her background, she is somewhat at a higher risk, but certainly the risk is not prohibitive.      She continues to smoke.  We spent some time discussing the importance of nicotine cessation.  She hopes to quit soon.  We also discussed the importance of risk factor control including management of her hyperlipidemia.  I recommended restarting Crestor, which she refused.  I convinced her that we start her on low-dose atorvastatin.  If she does not develop any symptoms and cost is not an issue, then we will up-titrate the atorvastatin to 80 mg to get her LDL down.      We will see her at [facility] within the next couple of months for followup, but sooner if she develops any cardiac symptoms.      I appreciate the opportunity to be part of this patient's care.         MAX COLLINS MD             D: 2017 15:49   T: 2017 05:26   MT: PACO      Name:     TARIK HERNANDEZ   MRN:      [id number]        Account:      [id number]   :      1950           Service Date: 2017      Document: [id number]

## 2017-03-08 LAB
ANION GAP SERPL CALCULATED.3IONS-SCNC: 5 MMOL/L (ref 3–14)
BASOPHILS # BLD AUTO: 0 10E9/L (ref 0–0.2)
BASOPHILS NFR BLD AUTO: 0 %
BLD PROD TYP BPU: NORMAL
BLD PROD TYP BPU: NORMAL
BLD UNIT ID BPU: 0
BLD UNIT ID BPU: 0
BLOOD PRODUCT CODE: NORMAL
BLOOD PRODUCT CODE: NORMAL
BPU ID: NORMAL
BPU ID: NORMAL
BUN SERPL-MCNC: 15 MG/DL (ref 7–30)
CALCIUM SERPL-MCNC: 7.4 MG/DL (ref 8.5–10.1)
CHLORIDE SERPL-SCNC: 109 MMOL/L (ref 94–109)
CO2 SERPL-SCNC: 26 MMOL/L (ref 20–32)
CREAT SERPL-MCNC: 1.13 MG/DL (ref 0.52–1.04)
CREAT UR-MCNC: 121 MG/DL
DIFFERENTIAL METHOD BLD: ABNORMAL
EOSINOPHIL # BLD AUTO: 0 10E9/L (ref 0–0.7)
EOSINOPHIL NFR BLD AUTO: 0 %
ERYTHROCYTE [DISTWIDTH] IN BLOOD BY AUTOMATED COUNT: 13.3 % (ref 10–15)
FRACT EXCRET NA UR+SERPL-RTO: 0.7 %
GFR SERPL CREATININE-BSD FRML MDRD: 48 ML/MIN/1.7M2
GLUCOSE SERPL-MCNC: 131 MG/DL (ref 70–99)
HCT VFR BLD AUTO: 35.8 % (ref 35–47)
HGB BLD-MCNC: 11.9 G/DL (ref 11.7–15.7)
IMM GRANULOCYTES # BLD: 0 10E9/L (ref 0–0.4)
IMM GRANULOCYTES NFR BLD: 0.2 %
LYMPHOCYTES # BLD AUTO: 1.2 10E9/L (ref 0.8–5.3)
LYMPHOCYTES NFR BLD AUTO: 14.2 %
MAGNESIUM SERPL-MCNC: 2.1 MG/DL (ref 1.6–2.3)
MCH RBC QN AUTO: 30.6 PG (ref 26.5–33)
MCHC RBC AUTO-ENTMCNC: 33.2 G/DL (ref 31.5–36.5)
MCV RBC AUTO: 92 FL (ref 78–100)
MONOCYTES # BLD AUTO: 0.9 10E9/L (ref 0–1.3)
MONOCYTES NFR BLD AUTO: 11 %
NEUTROPHILS # BLD AUTO: 6.3 10E9/L (ref 1.6–8.3)
NEUTROPHILS NFR BLD AUTO: 74.6 %
NRBC # BLD AUTO: 0 10*3/UL
NRBC BLD AUTO-RTO: 0 /100
PLATELET # BLD AUTO: 121 10E9/L (ref 150–450)
POTASSIUM SERPL-SCNC: 5 MMOL/L (ref 3.4–5.3)
RBC # BLD AUTO: 3.89 10E12/L (ref 3.8–5.2)
SODIUM SERPL-SCNC: 140 MMOL/L (ref 133–144)
SODIUM UR-SCNC: 98 MMOL/L
TRANSFUSION STATUS PATIENT QL: NORMAL
WBC # BLD AUTO: 8.5 10E9/L (ref 4–11)

## 2017-03-08 PROCEDURE — 25000128 H RX IP 250 OP 636: Performed by: SURGERY

## 2017-03-08 PROCEDURE — 82570 ASSAY OF URINE CREATININE: CPT | Performed by: SURGERY

## 2017-03-08 PROCEDURE — 83735 ASSAY OF MAGNESIUM: CPT | Performed by: SURGERY

## 2017-03-08 PROCEDURE — 40000275 ZZH STATISTIC RCP TIME EA 10 MIN

## 2017-03-08 PROCEDURE — A9270 NON-COVERED ITEM OR SERVICE: HCPCS | Mod: GY | Performed by: SURGERY

## 2017-03-08 PROCEDURE — 25000132 ZZH RX MED GY IP 250 OP 250 PS 637: Mod: GY | Performed by: SURGERY

## 2017-03-08 PROCEDURE — 94640 AIRWAY INHALATION TREATMENT: CPT

## 2017-03-08 PROCEDURE — 94640 AIRWAY INHALATION TREATMENT: CPT | Mod: 76

## 2017-03-08 PROCEDURE — 40000885 ZZH STATISTIC STEP DOWN HRS EVENING

## 2017-03-08 PROCEDURE — 25000125 ZZHC RX 250: Performed by: SURGERY

## 2017-03-08 PROCEDURE — 40000884 ZZH STATISTIC STEP DOWN HRS NIGHT

## 2017-03-08 PROCEDURE — 84300 ASSAY OF URINE SODIUM: CPT | Performed by: SURGERY

## 2017-03-08 PROCEDURE — A9270 NON-COVERED ITEM OR SERVICE: HCPCS | Mod: GY | Performed by: PHYSICIAN ASSISTANT

## 2017-03-08 PROCEDURE — 25000125 ZZHC RX 250: Performed by: INTERNAL MEDICINE

## 2017-03-08 PROCEDURE — S0077 INJECTION, CLINDAMYCIN PHOSP: HCPCS | Performed by: SURGERY

## 2017-03-08 PROCEDURE — 25000132 ZZH RX MED GY IP 250 OP 250 PS 637: Mod: GY | Performed by: PHYSICIAN ASSISTANT

## 2017-03-08 PROCEDURE — 99207 ZZC NO CHARGE VISIT/PATIENT NOT SEEN: CPT | Performed by: PHYSICIAN ASSISTANT

## 2017-03-08 PROCEDURE — 12000007 ZZH R&B INTERMEDIATE

## 2017-03-08 PROCEDURE — 80048 BASIC METABOLIC PNL TOTAL CA: CPT | Performed by: SURGERY

## 2017-03-08 PROCEDURE — 85025 COMPLETE CBC W/AUTO DIFF WBC: CPT | Performed by: SURGERY

## 2017-03-08 PROCEDURE — 25000125 ZZHC RX 250: Performed by: PHYSICIAN ASSISTANT

## 2017-03-08 PROCEDURE — 99233 SBSQ HOSP IP/OBS HIGH 50: CPT | Performed by: INTERNAL MEDICINE

## 2017-03-08 RX ORDER — ALBUTEROL SULFATE 90 UG/1
2 AEROSOL, METERED RESPIRATORY (INHALATION) 4 TIMES DAILY PRN
Status: DISCONTINUED | OUTPATIENT
Start: 2017-03-08 | End: 2017-03-08

## 2017-03-08 RX ORDER — SODIUM CHLORIDE 9 MG/ML
INJECTION, SOLUTION INTRAVENOUS CONTINUOUS
Status: DISCONTINUED | OUTPATIENT
Start: 2017-03-08 | End: 2017-03-11

## 2017-03-08 RX ORDER — HEPARIN SODIUM,PORCINE 10 UNIT/ML
5-10 VIAL (ML) INTRAVENOUS EVERY 24 HOURS
Status: DISCONTINUED | OUTPATIENT
Start: 2017-03-09 | End: 2017-03-12 | Stop reason: HOSPADM

## 2017-03-08 RX ORDER — LIDOCAINE 40 MG/G
CREAM TOPICAL
Status: DISCONTINUED | OUTPATIENT
Start: 2017-03-08 | End: 2017-03-08

## 2017-03-08 RX ORDER — NITROGLYCERIN 0.4 MG/1
0.4 TABLET SUBLINGUAL EVERY 5 MIN PRN
Status: DISCONTINUED | OUTPATIENT
Start: 2017-03-08 | End: 2017-03-11

## 2017-03-08 RX ORDER — HEPARIN SODIUM,PORCINE 10 UNIT/ML
5-10 VIAL (ML) INTRAVENOUS
Status: DISCONTINUED | OUTPATIENT
Start: 2017-03-08 | End: 2017-03-12 | Stop reason: HOSPADM

## 2017-03-08 RX ADMIN — FLUTICASONE FUROATE AND VILANTEROL TRIFENATATE 1 PUFF: 100; 25 POWDER RESPIRATORY (INHALATION) at 20:15

## 2017-03-08 RX ADMIN — UMECLIDINIUM 1 PUFF: 62.5 AEROSOL, POWDER ORAL at 18:00

## 2017-03-08 RX ADMIN — ALBUTEROL SULFATE 2.5 MG: 2.5 SOLUTION RESPIRATORY (INHALATION) at 12:52

## 2017-03-08 RX ADMIN — ASPIRIN 325 MG ORAL TABLET 325 MG: 325 PILL ORAL at 11:08

## 2017-03-08 RX ADMIN — METOPROLOL TARTRATE 5 MG: 5 INJECTION INTRAVENOUS at 15:35

## 2017-03-08 RX ADMIN — SODIUM CHLORIDE: 9 INJECTION, SOLUTION INTRAVENOUS at 10:06

## 2017-03-08 RX ADMIN — SODIUM CHLORIDE 500 ML: 9 INJECTION, SOLUTION INTRAVENOUS at 00:37

## 2017-03-08 RX ADMIN — IPRATROPIUM BROMIDE AND ALBUTEROL SULFATE 3 ML: .5; 3 SOLUTION RESPIRATORY (INHALATION) at 01:11

## 2017-03-08 RX ADMIN — SODIUM CHLORIDE, PRESERVATIVE FREE 5 ML: 5 INJECTION INTRAVENOUS at 23:37

## 2017-03-08 RX ADMIN — SODIUM CHLORIDE 500 ML: 9 INJECTION, SOLUTION INTRAVENOUS at 09:10

## 2017-03-08 RX ADMIN — SODIUM CHLORIDE: 9 INJECTION, SOLUTION INTRAVENOUS at 23:37

## 2017-03-08 RX ADMIN — ACETAMINOPHEN 975 MG: 325 TABLET, FILM COATED ORAL at 21:54

## 2017-03-08 RX ADMIN — SENNOSIDES AND DOCUSATE SODIUM 1 TABLET: 8.6; 5 TABLET ORAL at 11:08

## 2017-03-08 RX ADMIN — ACETAMINOPHEN 975 MG: 325 TABLET, FILM COATED ORAL at 06:21

## 2017-03-08 RX ADMIN — HEPARIN SODIUM 5000 UNITS: 10000 INJECTION, SOLUTION INTRAVENOUS; SUBCUTANEOUS at 15:35

## 2017-03-08 RX ADMIN — SODIUM CHLORIDE 500 ML: 9 INJECTION, SOLUTION INTRAVENOUS at 18:00

## 2017-03-08 RX ADMIN — SODIUM CHLORIDE: 9 INJECTION, SOLUTION INTRAVENOUS at 13:38

## 2017-03-08 RX ADMIN — ALUMINUM HYDROXIDE, MAGNESIUM HYDROXIDE, AND DIMETHICONE 30 ML: 400; 400; 40 SUSPENSION ORAL at 06:10

## 2017-03-08 RX ADMIN — CLINDAMYCIN PHOSPHATE 900 MG: 18 INJECTION, SOLUTION INTRAVENOUS at 06:16

## 2017-03-08 RX ADMIN — ALBUTEROL SULFATE 2.5 MG: 2.5 SOLUTION RESPIRATORY (INHALATION) at 07:49

## 2017-03-08 RX ADMIN — SODIUM CHLORIDE 500 ML: 9 INJECTION, SOLUTION INTRAVENOUS at 04:50

## 2017-03-08 RX ADMIN — ALUMINUM HYDROXIDE, MAGNESIUM HYDROXIDE, AND DIMETHICONE 30 ML: 400; 400; 40 SUSPENSION ORAL at 11:08

## 2017-03-08 RX ADMIN — HEPARIN SODIUM 5000 UNITS: 10000 INJECTION, SOLUTION INTRAVENOUS; SUBCUTANEOUS at 21:54

## 2017-03-08 RX ADMIN — ALBUTEROL SULFATE 2.5 MG: 2.5 SOLUTION RESPIRATORY (INHALATION) at 19:20

## 2017-03-08 NOTE — PROGRESS NOTES
Staff Note    2:56 AM    Persistently low UOP despite prn bolus being given.  Agree with prior assessment that given stenosis, she may need diuresis, but concerned about potential for renal injury from ischemia during OR.  FENA ordered  Get AM labs now  Hold on additional boluses for now    Lay Ye

## 2017-03-08 NOTE — ANESTHESIA CARE TRANSFER NOTE
Patient: Preeti Ford    Procedure(s):  AORTO BI-ILLIAC BYPASS WITH REIMPLANTATION OF INFERIOR MESENTERIC ARTERY - Wound Class: I-Clean    Diagnosis: FAILING BILATERAL ILLIAC STENTS WITH RECURRENT CLAUDICATION  Diagnosis Additional Information: No value filed.    Anesthesia Type:   General, ETT     Note:  Airway :Face Mask  Patient transferred to:PACU  Comments: Resting quietly, 8 OAW placed      Vitals: (Last set prior to Anesthesia Care Transfer)    CRNA VITALS  3/7/2017 1831 - 3/7/2017 1905      3/7/2017             Pulse: 78    ART BP: 134/51    ART Mean: 80    SpO2: 96 %    Resp Rate (set): 10                Electronically Signed By: SPRING Newman CRNA  March 7, 2017  7:05 PM

## 2017-03-08 NOTE — BRIEF OP NOTE
[facility]    Brief Operative Note    Pre-operative diagnosis: Aortoiliac occlusive disease; rest pain  Post-operative diagnosis Same  Procedure: Procedure(s):  AORTO BI-ILLIAC BYPASS WITH REIMPLANTATION OF INFERIOR MESENTERIC ARTERY - Wound Class: I-Clean  Surgeon: Surgeon(s) and Role:     * Marcos Pratt MD - Primary  Anesthesia: General   Estimated blood loss: 800mL  Drains: None  Specimens: * No specimens in log *  Findings:   39min L renal and R accessory renal warm ischemia; [id number] bypass w/ 16/8 graft; reimplantation of ERIC; 500mL cell saver; 4900mL crystalloid; ~30mL/uop/hr; bilateral PT siganls at end of case; no pressors upon leaving OR; extubated.  Complications: None.  Implants: Dacron graft graft 16 main body 8 limbs.

## 2017-03-08 NOTE — PLAN OF CARE
Problem: Goal Outcome Summary  Goal: Goal Outcome Summary  OT/CR: Per nursing pt not appropriate for therapy today.  Will hold session and check on status tomorrow.

## 2017-03-08 NOTE — PROGRESS NOTES
Vascular Surgery Progress Note    Date of service: 3/8/2017    Subjective: Has some abdominal pain but comfortable overall, in good spirits    Objective:  /47 (BP Location: Right arm)  Pulse 81  Temp 97.2  F (36.2  C) (Oral)  Resp 10  Wt 57.2 kg (126 lb 1.7 oz)  SpO2 99%  BMI 23.06 kg/m2  GEN: NAD  PULM: mild bilat creackles  HEART: RRR  ABD: soft, NT, mildly distended, dressing with some shadowing  EXTR: bilateral biphasic dopplerable pulses      Assessment and Plan:  Preeti Ford is a 66 yof PAD s/p stenting at iliac bifurcation 2/18/16 now with recurrent bilateral lower extremity claudication and failing bilateral iliac stents. She is s/p aortobi-iliac bypass, reimplantation inferior mesenteric artery, aortic and bilateral iliac endarterectomy on 03/07/2017. Oliguric overnight despite boluses, slight GELY this Am likely due to intra-op ischemia. BP improved now.  - appreciate cares per ICU  - continue to monitor UOP  - NPO, ARBF  - encourage IS and try to ambulate today      Tatianna Marcus MD, PGY4  [phone]      STAFF:  POD #1  RICHARD With aortic and iliac endarterectomy.              Patient has a very small infrainguinal arteries but patent with dominant posterior tibial runoff.  Fairly comfortable overnight with used to On-Q pump and IV  Dilaudid..  Minimal NG output.  Urine output has been low and I'll give the patient another fluid bolus.    Chest= clear  Abdomen= few bowel sounds, nondistended.    Good dopplerable posterior tibial arteries.  Good Doppler left dorsalis pedis with weaker right.  Both feet are warm.  No open ulcers though she had rest pain in her right heel preop.      Serum creatinine= 1.13   Potassium=5.0   Hemoglobin= 13.7    Impression:  Overall doing very well.  Plan transfer to floor.  Keep on aspirin.  Up in chair.  Work on incentive spirometer with smoking history.  Discussed quitting smoking with the patient's  and daughter last evening and stressed the  importance of this with her atherosclerotic changes and small arteries.       Marcos Pratt MD

## 2017-03-08 NOTE — PLAN OF CARE
Problem: Goal Outcome Summary  Goal: Goal Outcome Summary  Outcome: No Change  Transferred from ICU, alert and oriented, pulses by doppler, feet warm.  Barnhart patent, PCA of dilaudid.  Incision to abdomen dressing dry with old drainage.

## 2017-03-08 NOTE — ANESTHESIA POSTPROCEDURE EVALUATION
Patient: Preeti Ford    Procedure(s):  AORTO BI-ILLIAC BYPASS WITH REIMPLANTATION OF INFERIOR MESENTERIC ARTERY - Wound Class: I-Clean    Diagnosis:FAILING BILATERAL ILLIAC STENTS WITH RECURRENT CLAUDICATION  Diagnosis Additional Information: No value filed.    Anesthesia Type:  General, ETT    Note:  Anesthesia Post Evaluation    Patient location during evaluation: PACU  Patient participation: Able to fully participate in evaluation  Level of consciousness: awake and alert  Pain management: adequate  Airway patency: patent  Cardiovascular status: acceptable  Respiratory status: acceptable  Hydration status: acceptable  PONV: none and controlled     Anesthetic complications: None          Last vitals:  Vitals:    03/07/17 2010 03/07/17 2020 03/07/17 2026   BP:  99/43    Pulse:      Resp: 8 14    Temp:  36.1  C (97  F)    SpO2: 99% 98% 99%         Electronically Signed By: Gómez Walker MD  March 7, 2017  9:09 PM

## 2017-03-08 NOTE — PLAN OF CARE
Problem: Tissue Perfusion, Ineffective Peripheral (Adult)  Goal: Identify Related Risk Factors and Signs and Symptoms  Related risk factors and signs and symptoms are identified upon initiation of Human Response Clinical Practice Guideline (CPG)   Outcome: Improving  Pt rested off/on overnight, pain control difficult at first but with PCA well controlled as night went on, see MAR, frequent CMS checks, good pedal pulses on doppler, palpable at times this morning, low urine output overnight, see I&O and provider notification notes, IV abx continue, no signs/symptoms of bleeding, pt motivated to increase activity and advance diet today.

## 2017-03-08 NOTE — PROVIDER NOTIFICATION
Notified tele hub of continued low urine output, see I&O, pt denies other symptoms, awaiting further orders

## 2017-03-08 NOTE — PLAN OF CARE
Problem: Individualization  Goal: Patient Preferences  Bolus 500mL of NS this morning and maintenance IV fluid started. Patient with minimal use of dilaudid PCA, also onQ pump. Barnhart with low urine output, some improvement with bolus/maintenance. NG clamping trial since 10am.  came to visit most of the morning. Incision covered, no new drainage noted (shadow drainage marked previously). Radial and post-tibial pulses 1+, dorsalis pedis pulses found w doppler. Patient denied numbness/tingling. Tele SR. Lungs coarse; patient on 4L NC. Transferred to  with flying squad after report called to receiving nurse.

## 2017-03-08 NOTE — PROGRESS NOTES
HOSPITALIST CONSULT CHART CHECK:     At this time the Vascular Medicine team has performed a consult and addressed all medical issues.  Hospitalist service was consulted for after hours medical cross-cover only. Formal consult will be deferred, hospitalist will chart-check tomorrow.     This was discussed with Dr. Olmstead who is in agreement.  Please see ASSESSMENT AND PLAN below.       Assessment & Plan  Preeti Ford is a 66 year old female who was admitted on 3/7/2017.     PAD s/p stenting at iliac bifurcation 2/18/16 now with recurrent bilateral lower extremity claudication and failing bilateral iliac stents. She is s/p aortobi-iliac bypass, reimplantation inferior mesenteric artery, aortic and bilateral iliac endarterectomy on 03/07/2017.    - Post-operative cares per Dr. Pratt / Vascular Surgery.   - IV fluids      Hyperlipidemia  Her most recent lipid panel on 3/1/17 with LDL of 175, HDL 70, triglycerides 95, and total cholesterol 264. She has not been on any lipid-lowering medication as she has been unable to tolerate Crestor, Pravastatin, and Zetia in the past.   - Continues on a trial of Lipitor 10mg daily, to be up-titrated as tolerates per PCP with repeat FLP in 3mo.      Coronary artery disease. Prior AMI, status-post ROWDY 9/1/15 to LAD and circumflex. Most recent echo done 03/03/2017 with EF 55%, mild hypokinesis of distal septum and apex. Followed by cardiology, maintained on full-dose ASA.  - Continues on PTA ASA 325mg daily     COPD. Continues on PTA Symbicort, Spiriva, Albuterol inhalers.     Tobacco use, ongoing. Estimated 25 pack-year-history.   - Nicotine replacement with nicoderm transdermal, 14mg daily x30d then 7mg daily x30d     DVT Prophylaxis: Heparin SQ per primary service  Code Status: Prior    Max Noguera PA-C  [facility] Hospitalist Service

## 2017-03-08 NOTE — PROVIDER NOTIFICATION
Notified tele hub of low urine output, also discussed that the cuff b/p not matching art line b/p well, pt has known PAD, denies dizziness, good arterial waveform,  Prn IVF bolus given per MAR, will continue to monitor a-line b/p per tele hub

## 2017-03-08 NOTE — PROGRESS NOTES
[facility]  Vascular Medicine Progress Note       For vascular medical concerns on this patient during business hours M-F, call the [facility] at [phone] to have the rounding / on call Vascular Medicine (NOT VASCULAR SURGERY) MD paged. After business hours M-F,  for medical concerns on this patient, please page hospitalist staff. For vascular surgical questions on this patient , please page the appropriate surgeon (primary vascular surgeon or on call vascular surgeon) based upon the time of day.         Assessment and Plan:   1-PAD s/p stenting at iliac bifurcation 2/18/16 now with recurrent bilateral lower extremity claudication and failing bilateral iliac stents. She is s/p aortobi-iliac bypass, reimplantation inferior mesenteric artery, aortic and bilateral iliac endarterectomy on 03/07/2017    Doing well on POD1  Able to transfer to floor  renal function acceptable  Oliguric - bolus IVF  NG out later today    2. Ongoing tobacco use      Still smokes 1/2 pack per day  14 mg nicotine patches for 30 days followed by 7 mg patches for 30 days.      3. Hyperlipidemia      LDL of 175 on 3/1/17  has been unable to tolerate Crestor, Pravastatin, and Zetia in the past.   just started on 10 mg Lipitor to see if she can tolerate that.   If she is able to tolerate this, she should have a lipid panel repeated again in 3 months with up-titration of the Lipitor as tolerated.       4. Coronary artery disease s/p ROWDY 9/1/15      , medical treatment                Interval History:   doing well; no cp, sob, n/v/d, or abd pain.              Review of Systems:   The 10 point Review of Systems is negative other than noted in the HPI             Medications:       sodium chloride 0.9%  500 mL Intravenous Once     umeclidinium  1 puff Inhalation Daily     atorvastatin  10 mg Oral Daily at 8 pm     sodium chloride (PF)  3 mL Intracatheter Q8H     aspirin  325 mg Oral Daily     alum & mag  hydroxide-simethicone  30 mL Per NG tube Q4H     heparin  5,000 Units Subcutaneous Q8H     senna-docusate  1-2 tablet Oral BID     acetaminophen  975 mg Oral Q8H     HYDROmorphone   Intravenous PCA     metoprolol  5 mg Intravenous Q6H     albuterol  2.5 mg Nebulization Q6H                  Physical Exam:     Patient Vitals for the past 24 hrs:   BP Temp Temp src Pulse Heart Rate Resp SpO2 Weight   03/08/17 0730 (!) 86/45 - - - 74 10 97 % -   03/08/17 0700 95/49 - - - 78 19 99 % -   03/08/17 0645 - - - - 73 10 99 % -   03/08/17 0630 - - - - 80 20 98 % -   03/08/17 0615 - - - - 75 15 99 % -   03/08/17 0600 - - - - 76 20 99 % 57.2 kg (126 lb 1.7 oz)   03/08/17 0545 - - - - 78 (!) 5 98 % -   03/08/17 0530 - - - - 83 15 99 % -   03/08/17 0515 - - - - 75 (!) 7 99 % -   03/08/17 0500 - - - - 74 (!) 6 (!) 88 % -   03/08/17 0445 101/47 - - - 74 8 93 % -   03/08/17 0430 - - - - 75 (!) 6 93 % -   03/08/17 0415 - - - - 73 8 95 % -   03/08/17 0400 - 97.2  F (36.2  C) Oral - 72 (!) 6 98 % -   03/08/17 0345 - - - - 76 11 98 % -   03/08/17 0330 - - - - 72 (!) 7 96 % -   03/08/17 0315 - - - - 74 9 97 % -   03/08/17 0300 - - - - 78 10 99 % -   03/08/17 0245 - - - - 75 13 98 % -   03/08/17 0230 - - - - 73 9 98 % -   03/08/17 0215 - - - - 74 10 98 % -   03/08/17 0200 - - - - 74 9 99 % -   03/08/17 0145 - - - - 77 16 98 % -   03/08/17 0130 - - - - 77 18 98 % -   03/08/17 0115 - - - - 72 13 99 % -   03/08/17 0111 - - - - - - 99 % -   03/08/17 0100 - - - - 78 23 96 % -   03/08/17 0045 - - - - 72 (!) 7 98 % -   03/08/17 0030 (!) 77/49 - - - 75 21 99 % -   03/08/17 0015 - - - - 70 10 99 % -   03/08/17 0000 - 97.9  F (36.6  C) Oral - 71 11 100 % -   03/07/17 2345 - - - - 70 11 99 % -   03/07/17 2330 - - - - 71 11 99 % -   03/07/17 2315 - - - - 70 12 99 % -   03/07/17 2300 - - - - 75 13 99 % -   03/07/17 2245 - - - - 73 11 100 % -   03/07/17 2230 - - - - 73 12 100 % -   03/07/17 2215 - - - - 72 12 100 % -   03/07/17 2200 - - - - 79 14 100 % -    03/07/17 2145 - - - - 77 19 98 % -   03/07/17 2130 - - - - 76 13 99 % -   03/07/17 2115 - - - - 77 17 99 % -   03/07/17 2110 (!) 72/56 - - - 78 13 100 % -   03/07/17 2105 96/58 - - - 75 14 100 % -   03/07/17 2100 (!) 81/69 - - - 75 10 100 % -   03/07/17 2045 (!) 76/37 97.5  F (36.4  C) Oral - 77 22 99 % 55.7 kg (122 lb 12.7 oz)   03/07/17 2026 - - - - - - 99 % -   03/07/17 2020 99/43 97  F (36.1  C) Temporal - 68 14 98 % -   03/07/17 2010 - - - - 70 8 99 % -   03/07/17 2000 113/51 - - - 74 18 98 % -   03/07/17 1950 113/51 - - - 78 24 97 % -   03/07/17 1940 113/65 - - - 81 14 97 % -   03/07/17 1930 120/55 - - - 76 - 99 % -   03/07/17 1927 116/75 - - 81 - - - -   03/07/17 1910 110/58 - - 78 - 14 98 % -   03/07/17 1904 109/50 97.4  F (36.3  C) Temporal 83 - 14 94 % -   03/07/17 1235 - - - - - - 98 % -     Wt Readings from Last 4 Encounters:   03/08/17 57.2 kg (126 lb 1.7 oz)   03/06/17 52.2 kg (115 lb)   03/06/17 52.8 kg (116 lb 6.4 oz)   12/20/16 52.4 kg (115 lb 9.6 oz)       Intake/Output Summary (Last 24 hours) at 03/08/17 0947  Last data filed at 03/08/17 0800   Gross per 24 hour   Intake             6750 ml   Output             2565 ml   Net             4185 ml     Constitutional: normal  Eyes: normal  ENT: normal  Neck: Supple, symmetrical, trachea midline, no adenopathy, thyroid symmetric, not enlarged and no tenderness, skin normal.  Hematologic / Lymphatic: normal  Back: normal  Lungs: No increased work of breathing, good air exchange, clear to auscultation bilaterally, no crackles or wheezing.  Cardiovascular: Regular rate and rhythm, normal S1 and S2, no S3 or S4, and no murmur noted.  Chest / Breast: normal  Abdomen: decreased BS  Musculoskeletal: No redness, warmth, or swelling of the joints.  Full range of motion noted.  Motor strength is 5 out of 5 all extremities bilaterally.  Tone is normal.  Neurologic: normal  Neuropsychiatric: normal  Skin: normal  Biphasic dopplerable distal pulses            Data:     Results for orders placed or performed during the hospital encounter of 03/07/17 (from the past 24 hour(s))   ABO/Rh type and screen   Result Value Ref Range    Units Ordered 2     ABO B     RH(D)  Pos     Antibody Screen Neg     Test Valid Only At [facility]     Specimen Expires 03/10/2017     Crossmatch Red Blood Cells    Potassium   Result Value Ref Range    Potassium 4.2 3.4 - 5.3 mmol/L   Blood component   Result Value Ref Range    Unit Number [id number]     Blood Component Type Red Blood Cells Leukocyte Reduced     Division Number 00     Status of Unit No longer available 03/08/2017 0818     Blood Product Code X9415Z25     Unit Status RET    Blood component   Result Value Ref Range    Unit Number [id number]     Blood Component Type Red Blood Cells Leukocyte Reduced     Division Number 00     Status of Unit No longer available 03/08/2017 0818     Blood Product Code C6905F03     Unit Status RET    EKG 12-lead, tracing only   Result Value Ref Range    Interpretation ECG Click View Image link to view waveform and result    ISTAT gases elec art POCT   Result Value Ref Range    pH Arterial 7.35 7.35 - 7.45 pH    pCO2 Arterial 49 (H) 35 - 45 mm Hg    pO2 Arterial 182 (H) 80 - 105 mm Hg    Bicarbonate Arterial 27 21 - 28 mmol/L    O2 Sat Arterial 100 92 - 100 %    Sodium 137 133 - 144 mmol/L    Potassium 4.2 3.4 - 5.3 mmol/L    Hemoglobin 12.2 11.7 - 15.7 g/dL    Hematocrit - POCT 36 35.0 - 47.0 %PCV   ISTAT gases elec art POCT   Result Value Ref Range    pH Arterial 7.34 (L) 7.35 - 7.45 pH    pCO2 Arterial 44 35 - 45 mm Hg    pO2 Arterial 142 (H) 80 - 105 mm Hg    Bicarbonate Arterial 24 21 - 28 mmol/L    O2 Sat Arterial 99 92 - 100 %    Sodium 138 133 - 144 mmol/L    Potassium 4.5 3.4 - 5.3 mmol/L    Hemoglobin 11.6 (L) 11.7 - 15.7 g/dL    Hematocrit - POCT 34 (L) 35.0 - 47.0 %PCV   Blood gas arterial and oxyhgb   Result Value Ref Range    pH Arterial 7.24 (L) 7.35 - 7.45 pH    pCO2  Arterial 59 (H) 35 - 45 mm Hg    pO2 Arterial 105 80 - 105 mm Hg    Bicarbonate Arterial 25 21 - 28 mmol/L    Oxyhemoglobin Arterial 94 92 - 100 %    Base Deficit Art 2.6 mmol/L   CBC with platelets   Result Value Ref Range    WBC 14.7 (H) 4.0 - 11.0 10e9/L    RBC Count 4.63 3.8 - 5.2 10e12/L    Hemoglobin 14.4 11.7 - 15.7 g/dL    Hematocrit 42.4 35.0 - 47.0 %    MCV 92 78 - 100 fl    MCH 31.1 26.5 - 33.0 pg    MCHC 34.0 31.5 - 36.5 g/dL    RDW 13.2 10.0 - 15.0 %    Platelet Count 155 150 - 450 10e9/L   Comprehensive metabolic panel   Result Value Ref Range    Sodium 141 133 - 144 mmol/L    Potassium 4.6 3.4 - 5.3 mmol/L    Chloride 110 (H) 94 - 109 mmol/L    Carbon Dioxide 26 20 - 32 mmol/L    Anion Gap 5 3 - 14 mmol/L    Glucose 156 (H) 70 - 99 mg/dL    Urea Nitrogen 11 7 - 30 mg/dL    Creatinine 0.89 0.52 - 1.04 mg/dL    GFR Estimate 64 >60 mL/min/1.7m2    GFR Estimate If Black 77 >60 mL/min/1.7m2    Calcium 7.3 (L) 8.5 - 10.1 mg/dL    Bilirubin Total 0.4 0.2 - 1.3 mg/dL    Albumin 2.4 (L) 3.4 - 5.0 g/dL    Protein Total 4.7 (L) 6.8 - 8.8 g/dL    Alkaline Phosphatase 46 40 - 150 U/L    ALT 26 0 - 50 U/L    AST 35 0 - 45 U/L   INR   Result Value Ref Range    INR 1.05 0.86 - 1.14   Fibrinogen activity   Result Value Ref Range    Fibrinogen 250 200 - 420 mg/dL   Lactic acid whole blood   Result Value Ref Range    Lactic Acid 1.2 0.7 - 2.1 mmol/L   Hemoglobin A1c   Result Value Ref Range    Hemoglobin A1C 5.5 4.3 - 6.0 %   XR Chest Port 1 View    Narrative    CHEST ONE VIEW  3/7/2017 7:41 PM     HISTORY: Evaluate for line placement status post aortic surgery.    COMPARISON: 3/6/2017.      Impression    IMPRESSION: Right IJ line with tip projecting at the atrial caval  junction noted. No gross pneumothorax demonstrated. No acute  infiltrates. Orogastric tube tip below the margin of the study.   Hospitalist IP Consult: Patient to be seen: Routine - within 24 hours; Please assist with Vascular Medicine Cross-coverage on  this patient who is s/p aortobifemoral bypass.; Consultant may enter orders: Yes    Narrative    Kd Holguin PA-C     3/7/2017 10:30 PM  HOSPITALIST CONSULT CHART CHECK:    At this time the Vascular Medicine team has performed a consult   and addressed all medical issues.  Hospitalist service was   consulted for after hours medical cross-cover only.  Formal   consult will be deferred, hospitalist will chart-check tomorrow.    This was discussed with Dr. Oglesby who is in agreement.    Please see ASSESSMENT AND PLAN below.      Assessment & Plan   Preeti Ford is a 66 year old female who was admitted on   3/7/2017.    PAD s/p stenting at iliac bifurcation 2/18/16 now with recurrent   bilateral lower extremity claudication and failing bilateral   iliac stents.  She is s/p aortobi-iliac bypass, reimplantation   inferior mesenteric artery, aortic and bilateral iliac   endarterectomy on 03/07/2017.    - Post-operative cares per Dr. Pratt / Vascular Surgery.       Hyperlipidemia  Her most recent lipid panel on 3/1/17 with LDL of 175, HDL 70,   triglycerides 95, and total cholesterol 264. She has not been on   any lipid-lowering medication as she has been unable to tolerate   Crestor, Pravastatin, and Zetia in the past.   - Continues on a trial of Lipitor 10mg daily, to be up-titrated   as tolerates per PCP with repeat FLP in 3mo.      Coronary artery disease. Prior AMI, status-post ROWDY 9/1/15 to LAD   and circumflex.  Most recent echo done 03/03/2017 with EF 55%,   mild hypokinesis of distal septum and apex.  Followed by   cardiology, maintained on full-dose ASA.  - Continues on PTA ASA 325mg daily    COPD.  Continues on PTA Symbicort, Spiriva, Albuterol inhalers.    Tobacco use, ongoing.  Estimated 25 pack-year-history.    - Nicotine replacement with nicoderm transdermal, 14mg daily x30d   then 7mg daily x30d    DVT Prophylaxis: Heparin SQ per primary service  Code Status: Prior    Kd Holguin PA-C  Pager:  [phone]   CBC with platelets differential   Result Value Ref Range    WBC 14.9 (H) 4.0 - 11.0 10e9/L    RBC Count 4.45 3.8 - 5.2 10e12/L    Hemoglobin 13.7 11.7 - 15.7 g/dL    Hematocrit 40.8 35.0 - 47.0 %    MCV 92 78 - 100 fl    MCH 30.8 26.5 - 33.0 pg    MCHC 33.6 31.5 - 36.5 g/dL    RDW 13.1 10.0 - 15.0 %    Platelet Count 125 (L) 150 - 450 10e9/L    Diff Method Automated Method     % Neutrophils 86.8 %    % Lymphocytes 8.3 %    % Monocytes 4.6 %    % Eosinophils 0.0 %    % Basophils 0.0 %    % Immature Granulocytes 0.3 %    Nucleated RBCs 0 0 /100    Absolute Neutrophil 12.9 (H) 1.6 - 8.3 10e9/L    Absolute Lymphocytes 1.2 0.8 - 5.3 10e9/L    Absolute Monocytes 0.7 0.0 - 1.3 10e9/L    Absolute Eosinophils 0.0 0.0 - 0.7 10e9/L    Absolute Basophils 0.0 0.0 - 0.2 10e9/L    Abs Immature Granulocytes 0.1 0 - 0.4 10e9/L    Absolute Nucleated RBC 0.0    Comprehensive metabolic panel   Result Value Ref Range    Sodium 141 133 - 144 mmol/L    Potassium 4.8 3.4 - 5.3 mmol/L    Chloride 110 (H) 94 - 109 mmol/L    Carbon Dioxide 25 20 - 32 mmol/L    Anion Gap 6 3 - 14 mmol/L    Glucose 153 (H) 70 - 99 mg/dL    Urea Nitrogen 13 7 - 30 mg/dL    Creatinine 1.00 0.52 - 1.04 mg/dL    GFR Estimate 56 (L) >60 mL/min/1.7m2    GFR Estimate If Black 68 >60 mL/min/1.7m2    Calcium 7.3 (L) 8.5 - 10.1 mg/dL    Bilirubin Total 0.3 0.2 - 1.3 mg/dL    Albumin 2.5 (L) 3.4 - 5.0 g/dL    Protein Total 4.8 (L) 6.8 - 8.8 g/dL    Alkaline Phosphatase 46 40 - 150 U/L    ALT 31 0 - 50 U/L    AST 44 0 - 45 U/L   Lactic acid whole blood   Result Value Ref Range    Lactic Acid 0.9 0.7 - 2.1 mmol/L   Calcium ionized whole blood   Result Value Ref Range    Calcium Ionized Whole Blood 4.3 (L) 4.4 - 5.2 mg/dL   Phosphorus   Result Value Ref Range    Phosphorus 4.8 (H) 2.5 - 4.5 mg/dL   Basic metabolic panel   Result Value Ref Range    Sodium 140 133 - 144 mmol/L    Potassium 5.0 3.4 - 5.3 mmol/L    Chloride 109 94 - 109 mmol/L    Carbon Dioxide 26  20 - 32 mmol/L    Anion Gap 5 3 - 14 mmol/L    Glucose 131 (H) 70 - 99 mg/dL    Urea Nitrogen 15 7 - 30 mg/dL    Creatinine 1.13 (H) 0.52 - 1.04 mg/dL    GFR Estimate 48 (L) >60 mL/min/1.7m2    GFR Estimate If Black 59 (L) >60 mL/min/1.7m2    Calcium 7.4 (L) 8.5 - 10.1 mg/dL   Magnesium   Result Value Ref Range    Magnesium 2.1 1.6 - 2.3 mg/dL   Fractional excretion of sodium   Result Value Ref Range    Creatinine Urine 121 mg/dL    Sodium Urine mmol/L 98 mmol/L    %FENA 0.7 %

## 2017-03-08 NOTE — PROGRESS NOTES
ICU Consult Note:    65 yo F-active smoker, H/O severe PVD, CAD s/p recent stenting , HTN, mild moderate AS and mild-moderate PHTN  1. s/p aortobi-iliac bypass   A) Re-implantation of ERIC  2. S/P Aortic and bilateral iliac endarterectomy  3. Pre-op ECHO ; mild-moderate AS, mild-moderate PHTN    EBL ~ 800 cc  Received: 500 cc CS,  2100 cc crystalloid  Made average ~ 30 cc/hr UOP    Extubated in PACU and doing well from respiratory and hemodynamic standpoint.  Reviewed labs, imaging and examined patient,    Physical exam significant for bilateral ankle/lateral malleolar pitting edema    Recommend  1. Obtain labs; CBC, CMP, Mg++, PO4, ICA, lactic acid  2. Would not place nicotine patch (particularly in light of recent stenting)significant increase risk of myocardial event when acutely ill  3. D/C inhalers  4. Duonebs Q 6 hrs  5. May need diuresis given AS, PHTN and volume transfused in OR.      Blossom Nielsen MD  #[id number]  Critical Care Total Time 30 minutes

## 2017-03-08 NOTE — CONSULTS
HOSPITALIST CONSULT CHART CHECK:    At this time the Vascular Medicine team has performed a consult and addressed all medical issues.  Hospitalist service was consulted for after hours medical cross-cover only.  Formal consult will be deferred, hospitalist will chart-check tomorrow.    This was discussed with Dr. Oglesby who is in agreement.  Please see ASSESSMENT AND PLAN below.      Assessment & Plan   Preeti Ford is a 66 year old female who was admitted on 3/7/2017.    PAD s/p stenting at iliac bifurcation 2/18/16 now with recurrent bilateral lower extremity claudication and failing bilateral iliac stents.  She is s/p aortobi-iliac bypass, reimplantation inferior mesenteric artery, aortic and bilateral iliac endarterectomy on 03/07/2017.    - Post-operative cares per Dr. Pratt / Vascular Surgery.       Hyperlipidemia  Her most recent lipid panel on 3/1/17 with LDL of 175, HDL 70, triglycerides 95, and total cholesterol 264. She has not been on any lipid-lowering medication as she has been unable to tolerate Crestor, Pravastatin, and Zetia in the past.   - Continues on a trial of Lipitor 10mg daily, to be up-titrated as tolerates per PCP with repeat FLP in 3mo.      Coronary artery disease. Prior AMI, status-post ROWDY 9/1/15 to LAD and circumflex.  Most recent echo done 03/03/2017 with EF 55%, mild hypokinesis of distal septum and apex.  Followed by cardiology, maintained on full-dose ASA.  - Continues on PTA ASA 325mg daily    COPD.  Continues on PTA Symbicort, Spiriva, Albuterol inhalers.    Tobacco use, ongoing.  Estimated 25 pack-year-history.    - Nicotine replacement with nicoderm transdermal, 14mg daily x30d then 7mg daily x30d    DVT Prophylaxis: Heparin SQ per primary service  Code Status: Prior    Kd Holguin PA-C  Pager: [phone]

## 2017-03-08 NOTE — OP NOTE
DATE OF PROCEDURE:  03/07/2017      PREOPERATIVE DIAGNOSIS:  Severe aortoiliac occlusive disease with rest pain to feet.      POSTOPERATIVE DIAGNOSIS:  Severe aortoiliac occlusive disease with rest pain to feet.      PROCEDURES:   1.  Aortobi-iliac bypass (16 x 8 mm intravascular graft).   a.  Reimplantation inferior mesenteric artery.   2.  Aortic and bilateral iliac endarterectomy.      SURGEON:  Marcos Pratt MD      ASSISTANT:  Rafiq Galeano MD (vascular fellow)   :   AARON WOO MD ([facility] [place]      ANESTHESIA:  General.      PREOPERATIVE MEDICATIONS:  Cleocin 900 mg IV.      INDICATIONS:  Preeti Ford is a 66-year-old patient who has severe aortoiliac occlusive disease.  She has undergone prior stenting of both common iliac arteries.  These have developed recurrent stenosis by intimal hyperplasia.  With the severe calcified infrarenal aorta it was felt that she would benefit from surgical treatment.  She has very small arteries and with some calcification in the left common femoral artery.  However, her superficial dwerbmt-zjvngueib-xnjplymcqfyk vessels are patent with a dominant posterior tibial runoff into the distal foot.  The inframesenteric artery was also noted to be patent as were both internal iliac arteries.  She has now developed rest pain in her feet, particularly on the right side where she has a superficial heel ulcer.  She comes to the operating today for aortic reconstructive surgery.      DESCRIPTION OF PROCEDURE:  The patient was brought to the operating room and induced under general anesthesia, orally intubated without difficulty.  A right jugular triple lumen catheter was placed by anesthesia staff along with a left radial arterial line.  Barnhart catheter was placed.  Calf pneumatic compression boots were used, and a pillow was placed under her knees.  The abdomen and both groin areas were prepped and draped in the usual fashion.      EXPLORATORY  LAPAROTOMY:  Timeout was called and the sites were identified.  A midline incision was made from the xiphoid to the symphysis pubis.  Dissection was carried down through a mild amount of adipose tissue to the linea alba which was split vertically.  We entered the preperitoneal space that was created.  The peritoneum was entered at its midportion.  Exploration revealed a normal stomach with NG tube in the proper position.  The gallbladder and liver were unremarkable.  The patient had a prior appendectomy.  The colon was normal with no diverticular disease.    Small bowel was unremarkable.  Aorta was noted to be quite calcified, as were both common iliac arteries with a stent being in place.      VASCULAR EXPOSURE:  Appropriate vascular retractors were placed.  The retroperitoneum was entered.  Bowel was placed within an intestinal bag for warmth and protection and deeper retractors were placed.  With the patient's body habitus ( BMI=23.06 kg/m2), we had excellent visualization.  We dissected down to the aorta which was markedly calcified.  Distally we dissected down to the external and internal iliac arteries.  There was significant plaque in the distal common iliac arteries beyond the stent.  The external iliac arteries were small in caliber but relatively disease free and felt the smaller size was partially related to be very poor inflow from the near complete stent occlusion.  We then dissected up to the left renal vein.  There was a large complex of lymphatics lying adjacent to the renal vein and these were preserved and retracted to the patient's right side.  There was one feeding branch going posteriorly that required ligation with 5-0 Prolene suture.  We then dissected around the aorta.  Small inferior pole right renal artery was preserved and encircled with Silastic vessel loops.  The left renal artery had some calcification within its orifice, but a strong pulse distally.  We prepared the aorta so we would  clamp above the left renal artery, and in between the main and accessory right renal artery.  The inferior mesenteric artery had very minimal pulse within but adequate caliber and encircled with Silastic vessel loop.      AORTIC ENDARTERECTOMY:  Heparin 5000 units intravenous were given.  After 5 minutes, a supraceliac clamp was applied to the aorta above the left renal artery.  Vessel loops were tightened around the iliac arteries.  We entered the distal left common iliac artery and Banks scissors to cut through the stent into the main aorta.  This was continued into approximately 1 cm below the accessory right renal artery.  The aorta was transected at this level.  There was significant thrombus and plaque within the more proximal segment and a partial endarterectomy was performed, being complete on the posterior aspect but a smooth rim on the medial and lateral borders and anterior border.  A good luminal diameter was noted.  An endarterectomy was performed in the rest of the aorta and several large lumbar vessels were oversewn with 3-0 silk suture as was a large caudal artery.  We extended our arteriotomy onto the right limb removing the stent.  There was still significant posterior plaque.  We had a relatively good endpoint that was made with a #15 blade scalpel at the iliac bifurcation.  We extended the arteriotomy slightly onto the external iliac artery which did accept a 4 mm dilator.  Heparinized saline solution was injected.  A similar endarterectomy was performed on the left side but the plaque was somewhat thicker.  Both of the plaques were tacked down with interrupted 5-0 Prolene sutures with a very smooth transition being noted.  The plaque did not extend into the external iliac arteries significantly and the internal iliac arteries also had minimal disease.      AORTOILIAC BYPASS:  A 16 x 8 Intravascular Dacron bifurcated graft was selected.  We left the main body long.  This was sewn end-to-end to  the inferior aorta with running 3-0 Prolene suture.  Suture line was checked and we had excellent hemostasis.  Additional cuff of graft was brought over the suture line for additional protection.  We then placed a clamp below the renal arteries on the graft to allow reperfusion of the renal arteries with good Doppler signal. Good urine output was noted after real artery reperfusion.     RIGHT ILIAC ANASTOMOSIS:  Right limb of the graft was trimmed to the appropriate length.  We performed a spatulated end-to-end anastomosis slightly extending on the external iliac artery with running 4-0 Prolene suture and loupe magnification.  The graft was flushed and blood flow was sequentially allowed to go into the right leg with a strong pulse being noted, good Doppler signal, and good hemostasis.  A small amount of needle hole bleeding stopped with Surgicel and tying on the external iliac artery.      LEFT ILIAC ANASTOMOSIS:  Clamp had been placed on the left limb of the graft.  This was again trimmed obliquely.  This was sewn end-to-end spatulated fashion to the distal left common iliac artery extending the toe onto the external iliac artery for less than 1 cm.  Again, the lumen was approximately 4 mm in diameter and had good back-bleeding easily irrigated with heparinized saline solution.  Blood flow was allowed to go down the left limb with a single 5-0 Prolene suture used on the lateral wall for good hemostasis.  Again, we had a strong pulse within the external iliac artery and good Doppler signals.  We ensured that the renal arteries also had good Doppler signals bilaterally.      REIMPLANTATION INFERIOR MESENTERIC ARTERY:  We had performed endarterectomy of the aorta at the origin of the inframammary mesenteric artery with no disease going into the artery.  This controlled with a small vascular bulldog.  A Carrel patch was cut.  A partial occluding Satinsky clamp was placed under the main body of our graft and a 1.1 cm  longitudinal graftotomy was made with an 11 blade.  A small portion of the graft was excised. The Carrel patch was sewn in an end-to-side fashion with running 5-0 Prolene suture.  Backbleeding was noted from the ERIC  We released the bulldog clamp prior to complete closure.  Blood flow was allowed to go into the ERIC with a strong pulse and Doppler. Good hemostasis being noted.      We inspected all suture lines and we had good hemostasis.  Doppler signals were appreciated in both common femoral arteries.  We had Doppler signal in the posterior tibial arteries bilaterally ( Angio with small infrainguinal arteries and 3 vessel runoff with PT dominance).      The retroperitoneum was closed with good coverage of the graft with running 2-0 Vicryl suture, protecting the duodenum and preserving a large lymphatics as mentioned earlier.  Bowel contents were returned to normal anatomical position.  We had good perfusion to the colon, particularly the descending/sigmoid colon.  A dry field was appreciated.  The peritoneum was closed with running 2-0 Vicryl suture.  The fascia was closed with looped 0 PDS suture in a running fashion.  We placed a 5-inch On-Q catheter via a left upper quadrant stab incision in the preperitoneal space for postoperative analgesia.  Subcutaneous tissue was closed with running 3-0 Vicryl and skin was closed with 4-0 Monocryl in subcuticular fashion, followed by gauze dressings and an abdominal binder.      The patient tolerated the procedure well.  Estimated blood loss is 800 mL.  500 mL was given back via the Cell Saver for acute blood loss anemia.  The patient was hemodynamically very stable with adequate urine output.  She was extubated and returned to the recovery room in satisfactory condition. Strong PT Dopplers noted in PACU.        SHAYNE NAVARRETE MD             D: 03/07/2017 18:45   T: 03/07/2017 21:39   MT: [id number]      Name:     TARIK HERNANDEZ   MRN:      [id number]        Account:         [id number]   :      1950           Procedure Date: 2017      Document: [id number]       cc: [facility]        Marcos Pratt MD

## 2017-03-09 LAB
ANION GAP SERPL CALCULATED.3IONS-SCNC: 6 MMOL/L (ref 3–14)
BASOPHILS # BLD AUTO: 0 10E9/L (ref 0–0.2)
BASOPHILS NFR BLD AUTO: 0.1 %
BUN SERPL-MCNC: 15 MG/DL (ref 7–30)
CALCIUM SERPL-MCNC: 7.6 MG/DL (ref 8.5–10.1)
CHLORIDE SERPL-SCNC: 107 MMOL/L (ref 94–109)
CO2 SERPL-SCNC: 25 MMOL/L (ref 20–32)
CREAT SERPL-MCNC: 1.14 MG/DL (ref 0.52–1.04)
DIFFERENTIAL METHOD BLD: ABNORMAL
EOSINOPHIL # BLD AUTO: 0 10E9/L (ref 0–0.7)
EOSINOPHIL NFR BLD AUTO: 0.4 %
ERYTHROCYTE [DISTWIDTH] IN BLOOD BY AUTOMATED COUNT: 13.4 % (ref 10–15)
GFR SERPL CREATININE-BSD FRML MDRD: 48 ML/MIN/1.7M2
GLUCOSE SERPL-MCNC: 69 MG/DL (ref 70–99)
HCT VFR BLD AUTO: 35.9 % (ref 35–47)
HGB BLD-MCNC: 11.7 G/DL (ref 11.7–15.7)
IMM GRANULOCYTES # BLD: 0 10E9/L (ref 0–0.4)
IMM GRANULOCYTES NFR BLD: 0.3 %
LYMPHOCYTES # BLD AUTO: 1 10E9/L (ref 0.8–5.3)
LYMPHOCYTES NFR BLD AUTO: 12.6 %
MCH RBC QN AUTO: 30.4 PG (ref 26.5–33)
MCHC RBC AUTO-ENTMCNC: 32.6 G/DL (ref 31.5–36.5)
MCV RBC AUTO: 93 FL (ref 78–100)
MONOCYTES # BLD AUTO: 0.9 10E9/L (ref 0–1.3)
MONOCYTES NFR BLD AUTO: 11.4 %
NEUTROPHILS # BLD AUTO: 6 10E9/L (ref 1.6–8.3)
NEUTROPHILS NFR BLD AUTO: 75.2 %
NRBC # BLD AUTO: 0 10*3/UL
NRBC BLD AUTO-RTO: 0 /100
PLATELET # BLD AUTO: 110 10E9/L (ref 150–450)
POTASSIUM SERPL-SCNC: 4.4 MMOL/L (ref 3.4–5.3)
RBC # BLD AUTO: 3.85 10E12/L (ref 3.8–5.2)
SODIUM SERPL-SCNC: 138 MMOL/L (ref 133–144)
WBC # BLD AUTO: 8 10E9/L (ref 4–11)

## 2017-03-09 PROCEDURE — 85025 COMPLETE CBC W/AUTO DIFF WBC: CPT | Performed by: INTERNAL MEDICINE

## 2017-03-09 PROCEDURE — A9270 NON-COVERED ITEM OR SERVICE: HCPCS | Mod: GY | Performed by: SURGERY

## 2017-03-09 PROCEDURE — 25000132 ZZH RX MED GY IP 250 OP 250 PS 637: Mod: GY | Performed by: SURGERY

## 2017-03-09 PROCEDURE — 80048 BASIC METABOLIC PNL TOTAL CA: CPT | Performed by: INTERNAL MEDICINE

## 2017-03-09 PROCEDURE — 25000125 ZZHC RX 250: Performed by: INTERNAL MEDICINE

## 2017-03-09 PROCEDURE — 25000128 H RX IP 250 OP 636: Performed by: SURGERY

## 2017-03-09 PROCEDURE — 94640 AIRWAY INHALATION TREATMENT: CPT | Mod: 76

## 2017-03-09 PROCEDURE — 40000275 ZZH STATISTIC RCP TIME EA 10 MIN

## 2017-03-09 PROCEDURE — 99207 ZZC NO CHARGE VISIT/PATIENT NOT SEEN: CPT | Performed by: PHYSICIAN ASSISTANT

## 2017-03-09 PROCEDURE — 25000132 ZZH RX MED GY IP 250 OP 250 PS 637: Mod: GY | Performed by: INTERNAL MEDICINE

## 2017-03-09 PROCEDURE — 94640 AIRWAY INHALATION TREATMENT: CPT

## 2017-03-09 PROCEDURE — 25000125 ZZHC RX 250: Performed by: SURGERY

## 2017-03-09 PROCEDURE — A9270 NON-COVERED ITEM OR SERVICE: HCPCS | Mod: GY | Performed by: INTERNAL MEDICINE

## 2017-03-09 PROCEDURE — 21400002 ZZH R&B CCU CICU CRITICAL

## 2017-03-09 PROCEDURE — 99233 SBSQ HOSP IP/OBS HIGH 50: CPT | Performed by: INTERNAL MEDICINE

## 2017-03-09 RX ORDER — MONTELUKAST SODIUM 10 MG/1
10 TABLET ORAL AT BEDTIME
Status: DISCONTINUED | OUTPATIENT
Start: 2017-03-09 | End: 2017-03-12 | Stop reason: HOSPADM

## 2017-03-09 RX ORDER — ALUMINA, MAGNESIA, AND SIMETHICONE 2400; 2400; 240 MG/30ML; MG/30ML; MG/30ML
30 SUSPENSION ORAL EVERY 4 HOURS PRN
Status: DISCONTINUED | OUTPATIENT
Start: 2017-03-09 | End: 2017-03-12 | Stop reason: HOSPADM

## 2017-03-09 RX ORDER — TIOTROPIUM BROMIDE 18 UG/1
18 CAPSULE ORAL; RESPIRATORY (INHALATION) DAILY
Status: DISCONTINUED | OUTPATIENT
Start: 2017-03-09 | End: 2017-03-12 | Stop reason: HOSPADM

## 2017-03-09 RX ORDER — MONTELUKAST SODIUM 10 MG/1
10 TABLET ORAL AT BEDTIME
Status: DISCONTINUED | OUTPATIENT
Start: 2017-03-09 | End: 2017-03-09

## 2017-03-09 RX ORDER — BUDESONIDE AND FORMOTEROL FUMARATE DIHYDRATE 160; 4.5 UG/1; UG/1
2 AEROSOL RESPIRATORY (INHALATION)
Status: DISCONTINUED | OUTPATIENT
Start: 2017-03-09 | End: 2017-03-12 | Stop reason: HOSPADM

## 2017-03-09 RX ORDER — CALCIUM CARBONATE 500 MG/1
500-1000 TABLET, CHEWABLE ORAL
Status: DISCONTINUED | OUTPATIENT
Start: 2017-03-09 | End: 2017-03-12 | Stop reason: HOSPADM

## 2017-03-09 RX ADMIN — BUDESONIDE AND FORMOTEROL FUMARATE DIHYDRATE 2 PUFF: 160; 4.5 AEROSOL RESPIRATORY (INHALATION) at 18:22

## 2017-03-09 RX ADMIN — HEPARIN SODIUM 5000 UNITS: 10000 INJECTION, SOLUTION INTRAVENOUS; SUBCUTANEOUS at 14:15

## 2017-03-09 RX ADMIN — ASPIRIN 325 MG ORAL TABLET 325 MG: 325 PILL ORAL at 09:52

## 2017-03-09 RX ADMIN — METOPROLOL TARTRATE 5 MG: 5 INJECTION INTRAVENOUS at 02:28

## 2017-03-09 RX ADMIN — HYDROMORPHONE HYDROCHLORIDE: 10 INJECTION, SOLUTION INTRAMUSCULAR; INTRAVENOUS; SUBCUTANEOUS at 22:07

## 2017-03-09 RX ADMIN — SODIUM CHLORIDE, PRESERVATIVE FREE 10 ML: 5 INJECTION INTRAVENOUS at 06:04

## 2017-03-09 RX ADMIN — ALBUTEROL SULFATE 2.5 MG: 2.5 SOLUTION RESPIRATORY (INHALATION) at 00:54

## 2017-03-09 RX ADMIN — CALCIUM CARBONATE (ANTACID) CHEW TAB 500 MG 1000 MG: 500 CHEW TAB at 06:00

## 2017-03-09 RX ADMIN — FLUTICASONE FUROATE AND VILANTEROL TRIFENATATE 1 PUFF: 100; 25 POWDER RESPIRATORY (INHALATION) at 05:52

## 2017-03-09 RX ADMIN — HEPARIN SODIUM 5000 UNITS: 10000 INJECTION, SOLUTION INTRAVENOUS; SUBCUTANEOUS at 06:01

## 2017-03-09 RX ADMIN — ALUMINUM HYDROXIDE, MAGNESIUM HYDROXIDE, AND DIMETHICONE 30 ML: 400; 400; 40 SUSPENSION ORAL at 22:07

## 2017-03-09 RX ADMIN — METOPROLOL TARTRATE 5 MG: 5 INJECTION INTRAVENOUS at 14:22

## 2017-03-09 RX ADMIN — ACETAMINOPHEN 975 MG: 325 TABLET, FILM COATED ORAL at 14:15

## 2017-03-09 RX ADMIN — METOPROLOL TARTRATE 5 MG: 5 INJECTION INTRAVENOUS at 08:07

## 2017-03-09 RX ADMIN — ALBUTEROL SULFATE 2.5 MG: 2.5 SOLUTION RESPIRATORY (INHALATION) at 08:08

## 2017-03-09 RX ADMIN — SENNOSIDES AND DOCUSATE SODIUM 2 TABLET: 8.6; 5 TABLET ORAL at 09:52

## 2017-03-09 RX ADMIN — ACETAMINOPHEN 975 MG: 325 TABLET, FILM COATED ORAL at 06:00

## 2017-03-09 RX ADMIN — MONTELUKAST SODIUM 10 MG: 10 TABLET, FILM COATED ORAL at 22:07

## 2017-03-09 RX ADMIN — TIOTROPIUM BROMIDE 18 MCG: 18 CAPSULE ORAL; RESPIRATORY (INHALATION) at 15:55

## 2017-03-09 RX ADMIN — SODIUM CHLORIDE: 9 INJECTION, SOLUTION INTRAVENOUS at 06:31

## 2017-03-09 RX ADMIN — CALCIUM CARBONATE (ANTACID) CHEW TAB 500 MG 1000 MG: 500 CHEW TAB at 08:07

## 2017-03-09 RX ADMIN — HEPARIN SODIUM 5000 UNITS: 10000 INJECTION, SOLUTION INTRAVENOUS; SUBCUTANEOUS at 22:07

## 2017-03-09 NOTE — PROGRESS NOTES
HOSPITALIST CONSULT CHART CHECK:     At this time the Vascular Medicine team has performed a consult and addressed all medical issues.  Hospitalist service was consulted for after hours medical cross-cover only. Formal consult will be deferred, hospitalist will chart-check tomorrow.     Assessment & Plan  Preeti Ford is a 66 year old female who was admitted on 3/7/2017.     PAD s/p stenting at iliac bifurcation 2/18/16 now with recurrent bilateral lower extremity claudication and failing bilateral iliac stents. She is s/p aortobi-iliac bypass, reimplantation inferior mesenteric artery, aortic and bilateral iliac endarterectomy on 03/07/2017.    - Post-operative cares per Dr. Pratt / Vascular Surgery.   - IV fluids. Having some post-op oliguria. Bolus per Vascular surgery  - Diet advancement per surgery, continues NPO with ice chips      Hyperlipidemia  Her most recent lipid panel on 3/1/17 with LDL of 175, HDL 70, triglycerides 95, and total cholesterol 264. She has not been on any lipid-lowering medication as she has been unable to tolerate Crestor, Pravastatin, and Zetia in the past.   - Continues on a trial of Lipitor 10mg daily, to be up-titrated as tolerates per PCP with repeat FLP in 3mo.      Coronary artery disease. Prior AMI, status-post ROWDY 9/1/15 to LAD and circumflex. Most recent echo done 03/03/2017 with EF 55%, mild hypokinesis of distal septum and apex. Followed by cardiology, maintained on full-dose ASA.  - Continues on PTA ASA 325mg daily     COPD. Continues on PTA Symbicort, Spiriva, Albuterol inhalers.  - O2 weaned down to 1L NC     Tobacco use, ongoing. Estimated 25 pack-year-history.   - Nicotine replacement with nicoderm transdermal, 14mg daily x30d then 7mg daily x30d     DVT Prophylaxis: Heparin SQ per primary service  Code Status: Prior

## 2017-03-09 NOTE — PROGRESS NOTES
Patient requests albuterol inhaler which she takes at home.   Nebulizers already ordered.  I ordered PRN inhaler for patient to use if she prefers.

## 2017-03-09 NOTE — PLAN OF CARE
Problem: Goal Outcome Summary  Goal: Goal Outcome Summary  Outcome: No Change  VSS. Afebrile. Weaned down to 2LPM. LS coarse, with loose nonproductive cough. BS hypoactive. Denies passing flatus. Tolerating sips. Denies nausea. Improvement in urine output via renae. CMS intact, Pulses via doppler. Incision d/i with small amount of marked shadow drainage.  Pain managed with tylenol and PCA. C/o of heartburn managed with tums. Minimal sleep overnight between frequent monitoring.

## 2017-03-09 NOTE — PROVIDER NOTIFICATION
Called about patient's inhalers, patient feeling symptomatic and breo ellipta is not ordered, patient usually takes symbicort at home.

## 2017-03-09 NOTE — PROGRESS NOTES
[facility]  Vascular Medicine Progress Note       For vascular medical concerns on this patient during business hours M-F, call the [facility] at [phone] to have the rounding / on call Vascular Medicine (NOT VASCULAR SURGERY) MD paged. After business hours M-F,  for medical concerns on this patient, please page hospitalist staff. For vascular surgical questions on this patient , please page the appropriate surgeon (primary vascular surgeon or on call vascular surgeon) based upon the time of day.         Assessment and Plan:   1-PAD s/p stenting at iliac bifurcation 2/18/16 now with recurrent bilateral lower extremity claudication and failing bilateral iliac stents. She is s/p aortobi-iliac bypass, reimplantation inferior mesenteric artery, aortic and bilateral iliac endarterectomy on 03/07/2017    Doing well on POD2  Pain under control with PCA.  Low urine output - monitoring. Continue IV fluids for now. Continue renae another day to monitor output. Renal function acceptable  Multiphasic pedal pulses by doppler.   Post-operative cares per Dr. Pratt.     2. Ongoing tobacco use      Still smokes 1/2 pack per day  14 mg nicotine patches for 30 days followed by 7 mg patches for 30 days.  Spiriva and Symbicort to be continued from home.       3. Hyperlipidemia       LDL of 175 on 3/1/17  has been unable to tolerate Crestor, Pravastatin, and Zetia in the past.   just started on 10 mg Lipitor to see if she can tolerate that.   If she is able to tolerate this, she should have a lipid panel repeated again in 3 months with up-titration of the Lipitor as tolerated. If she does not tolerate then consider use of  a PCSK9 inhibitor       4. Coronary artery disease s/p ROWDY 9/1/15     Stable. Asymptomatic.    , medical treatment                Interval History:   doing well; Some abdominal pain at surgical site, some nausea with empty stomach and meds. Would like to eat more. Not  passing gas yet. Tolerating clear liquids.               Review of Systems:   The 10 point Review of Systems is negative other than noted in the HPI             Medications:       sodium chloride 0.9%  500 mL Intravenous Once     fluticasone-vilanterol  1 puff Inhalation Daily     heparin lock flush  5-10 mL Intracatheter Q24H     umeclidinium  1 puff Inhalation Daily     atorvastatin  10 mg Oral Daily at 8 pm     aspirin  325 mg Oral Daily     heparin  5,000 Units Subcutaneous Q8H     senna-docusate  1-2 tablet Oral BID     acetaminophen  975 mg Oral Q8H     HYDROmorphone   Intravenous PCA     metoprolol  5 mg Intravenous Q6H     albuterol  2.5 mg Nebulization Q6H                  Physical Exam:     Patient Vitals for the past 24 hrs:   BP Temp Temp src Heart Rate Resp SpO2   03/09/17 0800 128/61 97.7  F (36.5  C) Oral 75 18 100 %   03/09/17 0600 108/54 - - 79 15 98 %   03/09/17 0400 122/59 - - 72 19 100 %   03/09/17 0240 119/46 - - 75 15 95 %   03/09/17 0200 117/70 - - 80 14 100 %   03/09/17 0054 - - - - - 96 %   03/09/17 0000 105/51 98.4  F (36.9  C) Oral 80 12 99 %   03/08/17 2200 119/53 - - 81 17 99 %   03/08/17 2000 104/64 - - 76 18 99 %   03/08/17 1957 - 97.4  F (36.3  C) Oral - - -   03/08/17 1900 - - - - - 97 %   03/08/17 1800 112/54 - - 75 18 91 %   03/08/17 1600 105/51 - - 69 11 100 %   03/08/17 1415 121/53 - - 81 10 98 %   03/08/17 1300 104/50 - - 80 14 100 %     Wt Readings from Last 4 Encounters:   03/08/17 57.2 kg (126 lb 1.7 oz)   03/06/17 52.2 kg (115 lb)   03/06/17 52.8 kg (116 lb 6.4 oz)   12/20/16 52.4 kg (115 lb 9.6 oz)       Intake/Output Summary (Last 24 hours) at 03/08/17 0947  Last data filed at 03/08/17 0800   Gross per 24 hour   Intake             6750 ml   Output             2565 ml   Net             4185 ml     Constitutional: normal  Eyes: normal  ENT: normal  Neck: Supple, symmetrical, trachea midline, no adenopathy, thyroid symmetric, not enlarged and no tenderness, skin  normal.  Hematologic / Lymphatic: normal  Back: normal  Lungs: No increased work of breathing, good air exchange, clear to auscultation bilaterally, no crackles or wheezing.  Cardiovascular: Regular rate and rhythm, normal S1 and S2, no S3 or S4, and no murmur noted.  Abdomen: decreased BS. Tender around incision.   Musculoskeletal: No redness, warmth, or swelling of the joints.  Full range of motion noted.  Motor strength is 5 out of 5 all extremities bilaterally.  Tone is normal.  Neurologic: normal  Neuropsychiatric: normal  Skin: normal  Biphasic dopplerable distal pulses           Data:     Results for orders placed or performed during the hospital encounter of 03/07/17 (from the past 24 hour(s))   CBC with platelets differential   Result Value Ref Range    WBC 8.0 4.0 - 11.0 10e9/L    RBC Count 3.85 3.8 - 5.2 10e12/L    Hemoglobin 11.7 11.7 - 15.7 g/dL    Hematocrit 35.9 35.0 - 47.0 %    MCV 93 78 - 100 fl    MCH 30.4 26.5 - 33.0 pg    MCHC 32.6 31.5 - 36.5 g/dL    RDW 13.4 10.0 - 15.0 %    Platelet Count 110 (L) 150 - 450 10e9/L    Diff Method Automated Method     % Neutrophils 75.2 %    % Lymphocytes 12.6 %    % Monocytes 11.4 %    % Eosinophils 0.4 %    % Basophils 0.1 %    % Immature Granulocytes 0.3 %    Nucleated RBCs 0 0 /100    Absolute Neutrophil 6.0 1.6 - 8.3 10e9/L    Absolute Lymphocytes 1.0 0.8 - 5.3 10e9/L    Absolute Monocytes 0.9 0.0 - 1.3 10e9/L    Absolute Eosinophils 0.0 0.0 - 0.7 10e9/L    Absolute Basophils 0.0 0.0 - 0.2 10e9/L    Abs Immature Granulocytes 0.0 0 - 0.4 10e9/L    Absolute Nucleated RBC 0.0    Basic metabolic panel   Result Value Ref Range    Sodium 138 133 - 144 mmol/L    Potassium 4.4 3.4 - 5.3 mmol/L    Chloride 107 94 - 109 mmol/L    Carbon Dioxide 25 20 - 32 mmol/L    Anion Gap 6 3 - 14 mmol/L    Glucose 69 (L) 70 - 99 mg/dL    Urea Nitrogen 15 7 - 30 mg/dL    Creatinine 1.14 (H) 0.52 - 1.04 mg/dL    GFR Estimate 48 (L) >60 mL/min/1.7m2    GFR Estimate If Black 58 (L)  >60 mL/min/1.7m2    Calcium 7.6 (L) 8.5 - 10.1 mg/dL

## 2017-03-09 NOTE — PROGRESS NOTES
Vascular Surgery Progress Note    Date of service: 3/9/2017    Subjective: Pain controlled but c/o SOB, states that she has not been on all of her home inhalers but that that is fixed now. Requiring minimal O2, desats off it. Not passing flatus yet.    Objective:  /54  Pulse 81  Temp 98.4  F (36.9  C) (Oral)  Resp 15  Wt 57.2 kg (126 lb 1.7 oz)  SpO2 98%  BMI 23.06 kg/m2  GEN: NAD  PULM: breathing comfortably  HEART: RRR  ABD: soft, NT, ND, incision with minimal drainage  EXTR: bilateral biphasic dopplerable pulses      Assessment and Plan:  Preeti Ford is a 66 yof PAD s/p stenting at iliac bifurcation 2/18/16 now with recurrent bilateral lower extremity claudication and failing bilateral iliac stents. She is s/p aortobi-iliac bypass, reimplantation inferior mesenteric artery, aortic and bilateral iliac endarterectomy on 03/07/2017. Oliguric with mild improvements in UOP.  - continue pRN O2 and inhalers  - continue mIVFs, additional bolus now  - continue NPO with ice chips  - encourage pulm toilet    Tatianna Marcus MD, PGY4  [phone]     VASCULAR STAFF:  POD#2      Overall doing very well.  VSS but still lower UO (dry tongue implies still dehydrated)    Chest= clear  Abd= +BS   +3 palpable PT pulses   No edema.   Wd=A     On-Q still working.       Hgb=11.7  SCr=1.14   K=4.4      PLAN:  IV fluids.   Advance diet.   Abd Binder and up in chair.  Use IS                Leave Barnhart in today for fluid monitoring.        Marcos Pratt MD

## 2017-03-10 LAB
INTERPRETATION ECG - MUSE: NORMAL
PLATELET # BLD AUTO: 121 10E9/L (ref 150–450)

## 2017-03-10 PROCEDURE — 40000275 ZZH STATISTIC RCP TIME EA 10 MIN

## 2017-03-10 PROCEDURE — 25000128 H RX IP 250 OP 636: Performed by: PHYSICIAN ASSISTANT

## 2017-03-10 PROCEDURE — 25000132 ZZH RX MED GY IP 250 OP 250 PS 637: Mod: GY | Performed by: SURGERY

## 2017-03-10 PROCEDURE — 25000128 H RX IP 250 OP 636: Performed by: SURGERY

## 2017-03-10 PROCEDURE — 25000132 ZZH RX MED GY IP 250 OP 250 PS 637: Mod: GY | Performed by: PHYSICIAN ASSISTANT

## 2017-03-10 PROCEDURE — 99207 ZZC NO CHARGE VISIT/PATIENT NOT SEEN: CPT | Performed by: PHYSICIAN ASSISTANT

## 2017-03-10 PROCEDURE — 12000007 ZZH R&B INTERMEDIATE

## 2017-03-10 PROCEDURE — A9270 NON-COVERED ITEM OR SERVICE: HCPCS | Mod: GY | Performed by: INTERNAL MEDICINE

## 2017-03-10 PROCEDURE — 25000125 ZZHC RX 250: Performed by: INTERNAL MEDICINE

## 2017-03-10 PROCEDURE — A9270 NON-COVERED ITEM OR SERVICE: HCPCS | Mod: GY | Performed by: PHYSICIAN ASSISTANT

## 2017-03-10 PROCEDURE — 25000125 ZZHC RX 250: Performed by: SURGERY

## 2017-03-10 PROCEDURE — A9270 NON-COVERED ITEM OR SERVICE: HCPCS | Mod: GY | Performed by: SURGERY

## 2017-03-10 PROCEDURE — 85049 AUTOMATED PLATELET COUNT: CPT | Performed by: SURGERY

## 2017-03-10 PROCEDURE — A9270 NON-COVERED ITEM OR SERVICE: HCPCS | Mod: GY | Performed by: FAMILY MEDICINE

## 2017-03-10 PROCEDURE — 25000132 ZZH RX MED GY IP 250 OP 250 PS 637: Mod: GY | Performed by: FAMILY MEDICINE

## 2017-03-10 PROCEDURE — 94640 AIRWAY INHALATION TREATMENT: CPT

## 2017-03-10 PROCEDURE — 25000132 ZZH RX MED GY IP 250 OP 250 PS 637: Mod: GY | Performed by: INTERNAL MEDICINE

## 2017-03-10 PROCEDURE — 99233 SBSQ HOSP IP/OBS HIGH 50: CPT | Performed by: INTERNAL MEDICINE

## 2017-03-10 RX ORDER — FUROSEMIDE 10 MG/ML
20 INJECTION INTRAMUSCULAR; INTRAVENOUS ONCE
Status: COMPLETED | OUTPATIENT
Start: 2017-03-10 | End: 2017-03-10

## 2017-03-10 RX ORDER — PANTOPRAZOLE SODIUM 40 MG/1
40 TABLET, DELAYED RELEASE ORAL EVERY MORNING
Status: DISCONTINUED | OUTPATIENT
Start: 2017-03-10 | End: 2017-03-12 | Stop reason: HOSPADM

## 2017-03-10 RX ORDER — FUROSEMIDE 10 MG/ML
20 INJECTION INTRAMUSCULAR; INTRAVENOUS EVERY 8 HOURS
Status: DISCONTINUED | OUTPATIENT
Start: 2017-03-10 | End: 2017-03-12 | Stop reason: HOSPADM

## 2017-03-10 RX ADMIN — ONDANSETRON HYDROCHLORIDE 4 MG: 2 SOLUTION INTRAMUSCULAR; INTRAVENOUS at 21:11

## 2017-03-10 RX ADMIN — ALBUTEROL SULFATE 2.5 MG: 2.5 SOLUTION RESPIRATORY (INHALATION) at 19:37

## 2017-03-10 RX ADMIN — HYDROMORPHONE HYDROCHLORIDE: 10 INJECTION, SOLUTION INTRAMUSCULAR; INTRAVENOUS; SUBCUTANEOUS at 14:02

## 2017-03-10 RX ADMIN — HEPARIN SODIUM 5000 UNITS: 10000 INJECTION, SOLUTION INTRAVENOUS; SUBCUTANEOUS at 21:12

## 2017-03-10 RX ADMIN — SODIUM CHLORIDE: 9 INJECTION, SOLUTION INTRAVENOUS at 02:01

## 2017-03-10 RX ADMIN — SODIUM CHLORIDE: 9 INJECTION, SOLUTION INTRAVENOUS at 21:19

## 2017-03-10 RX ADMIN — TIOTROPIUM BROMIDE 18 MCG: 18 CAPSULE ORAL; RESPIRATORY (INHALATION) at 16:12

## 2017-03-10 RX ADMIN — BUDESONIDE AND FORMOTEROL FUMARATE DIHYDRATE 2 PUFF: 160; 4.5 AEROSOL RESPIRATORY (INHALATION) at 06:54

## 2017-03-10 RX ADMIN — FUROSEMIDE 20 MG: 10 INJECTION, SOLUTION INTRAVENOUS at 08:10

## 2017-03-10 RX ADMIN — ALBUTEROL SULFATE 2.5 MG: 2.5 SOLUTION RESPIRATORY (INHALATION) at 08:13

## 2017-03-10 RX ADMIN — SENNOSIDES AND DOCUSATE SODIUM 1 TABLET: 8.6; 5 TABLET ORAL at 21:11

## 2017-03-10 RX ADMIN — BUDESONIDE AND FORMOTEROL FUMARATE DIHYDRATE 2 PUFF: 160; 4.5 AEROSOL RESPIRATORY (INHALATION) at 21:12

## 2017-03-10 RX ADMIN — HEPARIN SODIUM 5000 UNITS: 10000 INJECTION, SOLUTION INTRAVENOUS; SUBCUTANEOUS at 14:01

## 2017-03-10 RX ADMIN — ATORVASTATIN CALCIUM 10 MG: 10 TABLET, FILM COATED ORAL at 21:11

## 2017-03-10 RX ADMIN — ASPIRIN 325 MG ORAL TABLET 325 MG: 325 PILL ORAL at 09:49

## 2017-03-10 RX ADMIN — ALBUTEROL SULFATE 2.5 MG: 2.5 SOLUTION RESPIRATORY (INHALATION) at 15:31

## 2017-03-10 RX ADMIN — SODIUM CHLORIDE, PRESERVATIVE FREE 5 ML: 5 INJECTION INTRAVENOUS at 06:54

## 2017-03-10 RX ADMIN — HEPARIN SODIUM 5000 UNITS: 10000 INJECTION, SOLUTION INTRAVENOUS; SUBCUTANEOUS at 06:47

## 2017-03-10 RX ADMIN — ONDANSETRON HYDROCHLORIDE 4 MG: 2 SOLUTION INTRAMUSCULAR; INTRAVENOUS at 09:45

## 2017-03-10 RX ADMIN — Medication 12.5 MG: at 09:49

## 2017-03-10 RX ADMIN — MONTELUKAST SODIUM 10 MG: 10 TABLET, FILM COATED ORAL at 21:11

## 2017-03-10 RX ADMIN — ONDANSETRON HYDROCHLORIDE 4 MG: 2 SOLUTION INTRAMUSCULAR; INTRAVENOUS at 02:31

## 2017-03-10 RX ADMIN — PANTOPRAZOLE SODIUM 40 MG: 40 TABLET, DELAYED RELEASE ORAL at 08:09

## 2017-03-10 RX ADMIN — SENNOSIDES AND DOCUSATE SODIUM 2 TABLET: 8.6; 5 TABLET ORAL at 09:49

## 2017-03-10 RX ADMIN — ALBUTEROL SULFATE 2.5 MG: 2.5 SOLUTION RESPIRATORY (INHALATION) at 00:32

## 2017-03-10 RX ADMIN — FUROSEMIDE 20 MG: 10 INJECTION, SOLUTION INTRAVENOUS at 16:12

## 2017-03-10 NOTE — PROGRESS NOTES
[facility]  Vascular Medicine Progress Note     Attestation: I have examined the patient independently of Marcia Goncalves PA-C and agree with the examination and plan as delineated below.    David Renae MD         For vascular medical concerns on this patient during business hours M-F, call the [facility] at [phone] to have the rounding / on call Vascular Medicine (NOT VASCULAR SURGERY) MD paged. After business hours M-F,  for medical concerns on this patient, please page hospitalist staff. For vascular surgical questions on this patient , please page the appropriate surgeon (primary vascular surgeon or on call vascular surgeon) based upon the time of day.         Assessment and Plan:   1-PAD s/p stenting at iliac bifurcation 2/18/16 now with recurrent bilateral lower extremity claudication and failing bilateral iliac stents. She is s/p aorto bi-iliac bypass, reimplantation inferior mesenteric artery, aortic and bilateral iliac endarterectomy on 03/07/2017    Doing well on POD3  Pain under control with PCA.  Urine output ok.  Remove renae.   Renal function acceptable.  Continue IV fluids for now as remains NPO with just sips and ice chips due to ileus.  Bipedal edema. I/O up 7 liters, no weight x 2 days. Will get daily weights. Give lasix 20 mg IV q 8h.   Multiphasic pedal pulses by doppler.   Post-operative cares per Dr. Pratt.     2. Ongoing tobacco use      Still smokes 1/2 pack per day. Lungs with wheezing and rhonchi, +cough. SOB improved after going back on brand name inhalers rather than generic in hospital.   14 mg nicotine patches for 30 days followed by 7 mg patches for 30 days.  Spiriva and Symbicort to be continued from home.   Encouraged IS.       3. Hyperlipidemia       LDL of 175 on 3/1/17  has been unable to tolerate Crestor, Pravastatin, and Zetia in the past.   just started on 10 mg Lipitor to see if she can tolerate that.   If she is able to  tolerate this, she should have a lipid panel repeated again in 3 months with up-titration of the Lipitor as tolerated. If she does not tolerate then consider use of  a PCSK9 inhibitor       4. Coronary artery disease s/p ROWDY 9/1/15     Stable. Asymptomatic.    , medical treatment                Interval History:   doing well; Some abdominal pain at surgical site. Bad GERD overnight. Not passing gas yet. + Ileus. Swelling lower legs.               Review of Systems:   The 10 point Review of Systems is negative other than noted in the HPI             Medications:       pantoprazole  40 mg Oral QAM     sodium chloride 0.9%  500 mL Intravenous Once     budesonide-formoterol  2 puff Inhalation 2 times daily     montelukast (SINGULAIR) tablet 10 mg  10 mg Oral At Bedtime     tiotropium  18 mcg Inhalation Daily     metoprolol (TOPROL-XL) half-tab 12.5 mg  12.5 mg Oral Daily     heparin lock flush  5-10 mL Intracatheter Q24H     atorvastatin  10 mg Oral Daily at 8 pm     aspirin  325 mg Oral Daily     heparin  5,000 Units Subcutaneous Q8H     senna-docusate  1-2 tablet Oral BID     acetaminophen  975 mg Oral Q8H     HYDROmorphone   Intravenous PCA     albuterol  2.5 mg Nebulization Q6H                  Physical Exam:     Patient Vitals for the past 24 hrs:   BP Temp Temp src Pulse Heart Rate Resp SpO2   03/10/17 0729 129/70 97.7  F (36.5  C) Oral - 85 16 94 %   03/10/17 0600 - - - - - 16 -   03/10/17 0407 118/67 98.2  F (36.8  C) Oral - 85 16 95 %   03/10/17 0200 - - - - - 16 -   03/10/17 0130 - - - - - 16 -   03/10/17 0100 124/69 98.1  F (36.7  C) Oral - - 16 92 %   03/10/17 0032 - - - - - - 96 %   03/09/17 2018 137/64 97.9  F (36.6  C) Oral 83 - 18 92 %   03/09/17 1605 - 98.3  F (36.8  C) Oral - 78 18 91 %   03/09/17 1600 110/55 - - - 78 - 95 %   03/09/17 1224 146/62 98.1  F (36.7  C) Oral - 80 18 95 %     Wt Readings from Last 4 Encounters:   03/08/17 57.2 kg (126 lb 1.7 oz)   03/06/17 52.2 kg (115 lb)   03/06/17  52.8 kg (116 lb 6.4 oz)   12/20/16 52.4 kg (115 lb 9.6 oz)       Intake/Output Summary (Last 24 hours) at 03/08/17 0947  Last data filed at 03/08/17 0800   Gross per 24 hour   Intake             6750 ml   Output             2565 ml   Net             4185 ml     Constitutional: normal  Eyes: normal  ENT: normal  Neck: Supple, symmetrical, trachea midline, no adenopathy, thyroid symmetric, not enlarged and no tenderness, skin normal.  Hematologic / Lymphatic: normal  Back: normal  Lungs: No increased work of breathing. + Wheezing/rhonchi  Cardiovascular: Regular rate and rhythm, normal S1 and S2, no S3 or S4, and no murmur noted.  Abdomen: decreased BS. Tender around incision.   Musculoskeletal: No redness, warmth, or swelling of the joints.  Full range of motion noted.  Motor strength is 5 out of 5 all extremities bilaterally.  Tone is normal.  Neurologic: normal  Neuropsychiatric: normal  Skin: normal  Biphasic dopplerable distal pulses           Data:     Results for orders placed or performed during the hospital encounter of 03/07/17 (from the past 24 hour(s))   Platelet count   Result Value Ref Range    Platelet Count 121 (L) 150 - 450 10e9/L

## 2017-03-10 NOTE — PLAN OF CARE
Problem: Goal Outcome Summary  Goal: Goal Outcome Summary  Outcome: No Change  AVSS. NPO with ice chips. Flatus-, no BM. C/O heartburn, 1 occurrence of emesis on shift, prn zofran given. , needs encouragement, long time smoker, croupy cough, LS wheezy. Urinary renae c adequate output. Abd incision, CDI, SERGIO, binder in place. Mepilex in place on rt heel for dry skin. PCA at 0.2 for pain, river well. Continue to monitor.

## 2017-03-10 NOTE — PROGRESS NOTES
[facility]  Vascular Medicine Progress Note            For vascular medical concerns on this patient during business hours M-F, call the [facility] at [phone] to have the rounding / on call Vascular Medicine (NOT VASCULAR SURGERY) MD paged. After business hours M-F,  for medical concerns on this patient, please page hospitalist staff. For vascular surgical questions on this patient , please page the appropriate surgeon (primary vascular surgeon or on call vascular surgeon) based upon the time of day.         Assessment and Plan:   1-PAD s/p stenting at iliac bifurcation 2/18/16 now with recurrent bilateral lower extremity claudication and failing bilateral iliac stents. She is s/p aorto bi-iliac bypass, reimplantation inferior mesenteric artery, aortic and bilateral iliac endarterectomy on 03/07/2017    Doing well on POD4  Pain under control  Urine output ok.  Renal function acceptable.  Bipedal edema. I/O up 5 liters, no weight x 2 days. Will get daily weights. Continue lasix 20 mg IV q 8h.   Multiphasic pedal pulses by doppler.   Post-operative cares per Dr. Pratt.   Ambulate - positive flatus , no BM    2. Ongoing tobacco use      Still smokes 1/2 pack per day. Lungs with wheezing and rhonchi, +cough. SOB improved after going back on brand name inhalers rather than generic in hospital.   14 mg nicotine patches for 30 days followed by 7 mg patches for 30 days.  Spiriva and Symbicort to be continued from home.   Encouraged IS.       3. Hyperlipidemia       LDL of 175 on 3/1/17  has been unable to tolerate Crestor, Pravastatin, and Zetia in the past.   just started on 10 mg Lipitor to see if she can tolerate that.   If she is able to tolerate this, she should have a lipid panel repeated again in 3 months with up-titration of the Lipitor as tolerated. If she does not tolerate then consider use of  a PCSK9 inhibitor       4. Coronary artery disease s/p ROWDY 9/1/15     Stable.  Asymptomatic.    , medical treatment                Interval History:   doing well; Some abdominal pain at surgical site. passing gas no BM yet. + Ileus. Swelling lower legs.               Review of Systems:   The 10 point Review of Systems is negative other than noted in the HPI             Medications:       pantoprazole  40 mg Oral QAM     furosemide  20 mg Intravenous Q8H     sodium chloride 0.9%  500 mL Intravenous Once     budesonide-formoterol  2 puff Inhalation 2 times daily     montelukast (SINGULAIR) tablet 10 mg  10 mg Oral At Bedtime     tiotropium  18 mcg Inhalation Daily     metoprolol (TOPROL-XL) half-tab 12.5 mg  12.5 mg Oral Daily     heparin lock flush  5-10 mL Intracatheter Q24H     atorvastatin  10 mg Oral Daily at 8 pm     aspirin  325 mg Oral Daily     heparin  5,000 Units Subcutaneous Q8H     senna-docusate  1-2 tablet Oral BID     acetaminophen  975 mg Oral Q8H     HYDROmorphone   Intravenous PCA     albuterol  2.5 mg Nebulization Q6H                  Physical Exam:     Patient Vitals for the past 24 hrs:   BP Temp Temp src Pulse Heart Rate Resp SpO2   03/10/17 1556 126/67 97.9  F (36.6  C) Oral - 87 16 96 %   03/10/17 1335 - - - - - - 92 %   03/10/17 1330 - - - - - 16 (!) 88 %   03/10/17 1130 125/50 97.5  F (36.4  C) - - 73 16 96 %   03/10/17 1030 - - - - - 16 -   03/10/17 0946 142/54 - - - 88 - -   03/10/17 0729 129/70 97.7  F (36.5  C) Oral - 85 16 94 %   03/10/17 0600 - - - - - 16 -   03/10/17 0407 118/67 98.2  F (36.8  C) Oral - 85 16 95 %   03/10/17 0200 - - - - - 16 -   03/10/17 0130 - - - - - 16 -   03/10/17 0100 124/69 98.1  F (36.7  C) Oral - - 16 92 %   03/10/17 0032 - - - - - - 96 %   03/09/17 2018 137/64 97.9  F (36.6  C) Oral 83 - 18 92 %     Wt Readings from Last 4 Encounters:   03/08/17 126 lb 1.7 oz (57.2 kg)   03/06/17 115 lb (52.2 kg)   03/06/17 116 lb 6.4 oz (52.8 kg)   12/20/16 115 lb 9.6 oz (52.4 kg)       Intake/Output Summary (Last 24 hours) at 03/08/17  0947  Last data filed at 03/08/17 0800   Gross per 24 hour   Intake             6750 ml   Output             2565 ml   Net             4185 ml     Constitutional: normal  Eyes: normal  ENT: normal  Neck: Supple, symmetrical, trachea midline, no adenopathy, thyroid symmetric, not enlarged and no tenderness, skin normal.  Hematologic / Lymphatic: normal  Back: normal  Lungs: No increased work of breathing. + Wheezing/rhonchi  Cardiovascular: Regular rate and rhythm, normal S1 and S2, no S3 or S4, and no murmur noted.  Abdomen: decreased BS. Tender around incision.   Musculoskeletal: No redness, warmth, or swelling of the joints.  Full range of motion noted.  Motor strength is 5 out of 5 all extremities bilaterally.  Tone is normal.  Neurologic: normal  Neuropsychiatric: normal  Skin: normal  Biphasic dopplerable distal pulses           Data:     Results for orders placed or performed during the hospital encounter of 03/07/17 (from the past 24 hour(s))   Platelet count   Result Value Ref Range    Platelet Count 121 (L) 150 - 450 10e9/L

## 2017-03-10 NOTE — PROGRESS NOTES
HOSPITALIST CONSULT CHART CHECK:     At this time the Vascular Medicine team has performed a consult and addressed all medical issues.  Hospitalist service was consulted for after hours medical cross-cover only. Formal consult will be deferred, hospitalist will see the patient for Vascular Medicine over the weekend.     Assessment & Plan  Preeti Ford is a 66 year old female who was admitted on 3/7/2017.     PAD s/p stenting at iliac bifurcation 2/18/16 now with recurrent bilateral lower extremity claudication and failing bilateral iliac stents. She is s/p aortobi-iliac bypass, reimplantation inferior mesenteric artery, aortic and bilateral iliac endarterectomy on 03/07/2017.    - Post-operative cares per Dr. Pratt / Vascular Surgery.   - Continue IV fluids for now as remains NPO with just sips and ice chips due to ileus.  - Bipedal edema. I/O up 7 liters, no weight x 2 days. Will get daily weights. Give lasix 20 mg IV q 8h.   - Diet advancement per surgery, continues NPO with ice chips      Hyperlipidemia  Her most recent lipid panel on 3/1/17 with LDL of 175, HDL 70, triglycerides 95, and total cholesterol 264. She has not been on any lipid-lowering medication as she has been unable to tolerate Crestor, Pravastatin, and Zetia in the past.   - Continues on a trial of Lipitor 10mg daily, to be up-titrated as tolerates per PCP with repeat FLP in 3mo.      Coronary artery disease. Prior AMI, status-post ROWDY 9/1/15 to LAD and circumflex. Most recent echo done 03/03/2017 with EF 55%, mild hypokinesis of distal septum and apex. Followed by cardiology, maintained on full-dose ASA.  - Continues on PTA ASA 325mg daily     COPD. Continues on PTA Symbicort, Spiriva, Albuterol inhalers.  - O2 weaned down to 1L NC     Tobacco use, ongoing. Estimated 25 pack-year-history.   - Nicotine replacement with nicoderm transdermal, 14mg daily x30d then 7mg daily x30d.  - Spiriva and Symbicort to be continued from home.   -  Encouraged IS.      DVT Prophylaxis: Heparin SQ per primary service  Code Status: Prior    Max Noguera PA-C

## 2017-03-10 NOTE — PLAN OF CARE
"Problem: Goal Outcome Summary  Goal: Goal Outcome Summary  Outcome: Improving  A&O, AVSS. PCA at 0.2, not used on shift, pt states \"only hurts to cough\". Splinting encouraged, binder in placed. Incision WNL, adhesive strips and SERGIO. C/o indigestion, PRN Maalox/Mylanta given with relief.  BS active, - flatus. Tolerating diet. Barnhart patent, good UOP, LS very coarse. IS completed to 750 with encouragement, croupy cough, non-productive. Remains on 1L via NC. Declines much of her medications, MD aware.      "

## 2017-03-10 NOTE — PROGRESS NOTES
Vascular Surgery Progress Note    Date of service: 3/10/2017    Subjective: Had emesis, uncomfortable with gastric acidity. Having productive cough, difficulty with IS. Not passing flatus    Objective:  /67 (BP Location: Right arm)  Pulse 83  Temp 98.2  F (36.8  C) (Oral)  Resp 16  Wt 57.2 kg (126 lb 1.7 oz)  SpO2 95%  BMI 23.06 kg/m2  GEN: NAD  PULM: coarse breath sounds  HEART: RRR  ABD: soft, NT, distended  EXTR: bilateral multiphasic dopplerable pulses      Assessment and Plan:  Preeti Ford is a 66 yof PAD s/p stenting at iliac bifurcation 2/18/16 now with recurrent bilateral lower extremity claudication and failing bilateral iliac stents. She is s/p aortobi-iliac bypass, reimplantation inferior mesenteric artery, aortic and bilateral iliac endarterectomy on 03/07/2017. UOP improving, still has an ileus.  - continue Barnhart, IVFS  - NPO with sips and ice chips  - will add protonix today  Tatianna Marcus MD, PGY4  [phone]    STAFF:  Nausea and GERD (chronic)- Protonix.  Abd=soft   Wd=A                   +3 PT pulses bilaterally.                  Remove Barnhart.  Ambulate.  Use IS.        Marcos Pratt MD

## 2017-03-10 NOTE — PLAN OF CARE
Problem: Goal Outcome Summary  Goal: Goal Outcome Summary  Outcome: No Change  VSS. 1L NC. A&Ox4. Pain managed with PCA dilaudid. Incision SERGIO with steri strips, dried drainage otherwise WDL. BLE edema, +1 LLE, +2 RLE, pulses +2 with doppler. Coarse/diminished LS, VERDE. Lasix given, good outpt. Barnhart removed at 1030, voided 200cc since. Nausea and heartburn this morning, relieved by protonix and zofran. Tolerated full liquids for lunch. Up Ax1 GB, weakness. Continue to monitor.

## 2017-03-10 NOTE — PLAN OF CARE
Problem: Goal Outcome Summary  Goal: Goal Outcome Summary  Outcome: No Change  [facility] dc'd at 0830. VSS, up with 1, ambulated halls. PCA for pain. BS active, not passing flatus. Tolerating diet. Barnhart in, good UO. LS coarse, on 1L NC.

## 2017-03-11 PROCEDURE — A9270 NON-COVERED ITEM OR SERVICE: HCPCS | Mod: GY | Performed by: FAMILY MEDICINE

## 2017-03-11 PROCEDURE — 25000125 ZZHC RX 250: Performed by: INTERNAL MEDICINE

## 2017-03-11 PROCEDURE — 94640 AIRWAY INHALATION TREATMENT: CPT

## 2017-03-11 PROCEDURE — 94640 AIRWAY INHALATION TREATMENT: CPT | Mod: 76

## 2017-03-11 PROCEDURE — A9270 NON-COVERED ITEM OR SERVICE: HCPCS | Mod: GY | Performed by: INTERNAL MEDICINE

## 2017-03-11 PROCEDURE — A9270 NON-COVERED ITEM OR SERVICE: HCPCS | Mod: GY | Performed by: PHYSICIAN ASSISTANT

## 2017-03-11 PROCEDURE — 25000132 ZZH RX MED GY IP 250 OP 250 PS 637: Mod: GY | Performed by: FAMILY MEDICINE

## 2017-03-11 PROCEDURE — 25000125 ZZHC RX 250: Performed by: SURGERY

## 2017-03-11 PROCEDURE — 25000132 ZZH RX MED GY IP 250 OP 250 PS 637: Mod: GY | Performed by: INTERNAL MEDICINE

## 2017-03-11 PROCEDURE — 25000132 ZZH RX MED GY IP 250 OP 250 PS 637: Mod: GY | Performed by: SURGERY

## 2017-03-11 PROCEDURE — 99233 SBSQ HOSP IP/OBS HIGH 50: CPT | Performed by: INTERNAL MEDICINE

## 2017-03-11 PROCEDURE — 25000128 H RX IP 250 OP 636: Performed by: SURGERY

## 2017-03-11 PROCEDURE — 12000007 ZZH R&B INTERMEDIATE

## 2017-03-11 PROCEDURE — 25000132 ZZH RX MED GY IP 250 OP 250 PS 637: Mod: GY | Performed by: PHYSICIAN ASSISTANT

## 2017-03-11 PROCEDURE — 99207 ZZC NO CHARGE VISIT/PATIENT NOT SEEN: CPT | Performed by: INTERNAL MEDICINE

## 2017-03-11 PROCEDURE — 25000128 H RX IP 250 OP 636: Performed by: PHYSICIAN ASSISTANT

## 2017-03-11 PROCEDURE — 40000275 ZZH STATISTIC RCP TIME EA 10 MIN

## 2017-03-11 PROCEDURE — 99211 OFF/OP EST MAY X REQ PHY/QHP: CPT

## 2017-03-11 PROCEDURE — A9270 NON-COVERED ITEM OR SERVICE: HCPCS | Mod: GY | Performed by: SURGERY

## 2017-03-11 RX ADMIN — ALBUTEROL SULFATE 2.5 MG: 2.5 SOLUTION RESPIRATORY (INHALATION) at 20:35

## 2017-03-11 RX ADMIN — ALBUTEROL SULFATE 2.5 MG: 2.5 SOLUTION RESPIRATORY (INHALATION) at 12:12

## 2017-03-11 RX ADMIN — Medication 12.5 MG: at 09:16

## 2017-03-11 RX ADMIN — MONTELUKAST SODIUM 10 MG: 10 TABLET, FILM COATED ORAL at 21:24

## 2017-03-11 RX ADMIN — ALBUTEROL SULFATE 2.5 MG: 2.5 SOLUTION RESPIRATORY (INHALATION) at 08:12

## 2017-03-11 RX ADMIN — PANTOPRAZOLE SODIUM 40 MG: 40 TABLET, DELAYED RELEASE ORAL at 09:17

## 2017-03-11 RX ADMIN — SENNOSIDES AND DOCUSATE SODIUM 1 TABLET: 8.6; 5 TABLET ORAL at 09:17

## 2017-03-11 RX ADMIN — BUDESONIDE AND FORMOTEROL FUMARATE DIHYDRATE 2 PUFF: 160; 4.5 AEROSOL RESPIRATORY (INHALATION) at 19:24

## 2017-03-11 RX ADMIN — FUROSEMIDE 20 MG: 10 INJECTION, SOLUTION INTRAVENOUS at 00:34

## 2017-03-11 RX ADMIN — BUDESONIDE AND FORMOTEROL FUMARATE DIHYDRATE 2 PUFF: 160; 4.5 AEROSOL RESPIRATORY (INHALATION) at 05:25

## 2017-03-11 RX ADMIN — SODIUM CHLORIDE, PRESERVATIVE FREE 10 ML: 5 INJECTION INTRAVENOUS at 05:21

## 2017-03-11 RX ADMIN — ATORVASTATIN CALCIUM 10 MG: 10 TABLET, FILM COATED ORAL at 19:23

## 2017-03-11 RX ADMIN — FUROSEMIDE 20 MG: 10 INJECTION, SOLUTION INTRAVENOUS at 17:22

## 2017-03-11 RX ADMIN — HEPARIN SODIUM 5000 UNITS: 10000 INJECTION, SOLUTION INTRAVENOUS; SUBCUTANEOUS at 13:53

## 2017-03-11 RX ADMIN — HEPARIN SODIUM 5000 UNITS: 10000 INJECTION, SOLUTION INTRAVENOUS; SUBCUTANEOUS at 21:24

## 2017-03-11 RX ADMIN — HEPARIN SODIUM 5000 UNITS: 10000 INJECTION, SOLUTION INTRAVENOUS; SUBCUTANEOUS at 05:21

## 2017-03-11 RX ADMIN — FUROSEMIDE 20 MG: 10 INJECTION, SOLUTION INTRAVENOUS at 09:17

## 2017-03-11 RX ADMIN — SENNOSIDES AND DOCUSATE SODIUM 1 TABLET: 8.6; 5 TABLET ORAL at 21:24

## 2017-03-11 RX ADMIN — TIOTROPIUM BROMIDE 18 MCG: 18 CAPSULE ORAL; RESPIRATORY (INHALATION) at 17:15

## 2017-03-11 RX ADMIN — ASPIRIN 325 MG ORAL TABLET 325 MG: 325 PILL ORAL at 09:16

## 2017-03-11 NOTE — PROGRESS NOTES
Vascular Surgery Progress Note    Date of service: 3/11/2017    Subjective: AF, VSS. Pain is controlled. Trying full liquid diet this morning.     Objective:  /51 (BP Location: Right arm)  Pulse 83  Temp 97.7  F (36.5  C) (Oral)  Resp 16  Wt 126 lb 15.8 oz  SpO2 96%  BMI 23.23 kg/m2  GEN: NAD  ABD: soft, NT, distended  EXTR: bilateral multiphasic dopplerable pulses      Assessment and Plan:  Preeti Ford is a 66 yof PAD s/p stenting at iliac bifurcation 2/18/16 now with recurrent bilateral lower extremity claudication and failing bilateral iliac stents. She is s/p aortobi-iliac bypass, reimplantation inferior mesenteric artery, aortic and bilateral iliac endarterectomy on 03/07/2017. Ileus resolving.    - DC PCA  - DC IVF  - Place peripheral IV and DC central line  - PO pain meds with oxycodone PRN  - SubQ heparin  - Encourage ambulation    Karime (Danilo) MD [facility]  Vascular Surgery Fellow  [phone] (p)

## 2017-03-11 NOTE — PLAN OF CARE
Problem: Goal Outcome Summary  Goal: Goal Outcome Summary  Outcome: Improving  Pt in good spirits, pca/iv fluids/jugular central line all discontinued today, ambulating well, abdominal bruising remains unchanged with binder and on q in place, reports being hungry and did have bm today so evening nurse will f/u if diet can be advanced, abdomen c/d/i, voiding well, off oxygen.

## 2017-03-11 NOTE — PROGRESS NOTES
Hospitalist Brief note    Patient seen by vascular medicine today.  Will chart check again tomorrow to make sure that pt is seen by either hospitalist or vascular medicine.    FUAD Hobbs MD

## 2017-03-11 NOTE — PLAN OF CARE
Problem: Goal Outcome Summary  Goal: Goal Outcome Summary  Outcome: Improving  Pleasant. VSS. Denies pain. Minimal intake/slight nausea, zofran c relief. Passing gas! Voiding adequately in bathroom. Pulses dopplered. Midline adhesive strips suze/cdi. Std by.

## 2017-03-11 NOTE — PLAN OF CARE
Problem: Goal Outcome Summary  Goal: Goal Outcome Summary  Outcome: No Change  VSS. Denies pain, PCA available. SBA. Voiding well after renae removal. Denies nausea/heartburn.

## 2017-03-11 NOTE — PROGRESS NOTES
Patient currently on room air. LS faint expiratory wheezes. SpO2 in the high 90's. Neb tx given as ordered. Will continue to follow.    Ron Ovalles RT

## 2017-03-12 VITALS
SYSTOLIC BLOOD PRESSURE: 103 MMHG | TEMPERATURE: 97.5 F | DIASTOLIC BLOOD PRESSURE: 57 MMHG | RESPIRATION RATE: 16 BRPM | OXYGEN SATURATION: 90 % | HEART RATE: 90 BPM | WEIGHT: 126.98 LBS | BODY MASS INDEX: 23.23 KG/M2

## 2017-03-12 PROCEDURE — 25000128 H RX IP 250 OP 636: Performed by: PHYSICIAN ASSISTANT

## 2017-03-12 PROCEDURE — 99233 SBSQ HOSP IP/OBS HIGH 50: CPT | Performed by: INTERNAL MEDICINE

## 2017-03-12 PROCEDURE — A9270 NON-COVERED ITEM OR SERVICE: HCPCS | Mod: GY | Performed by: FAMILY MEDICINE

## 2017-03-12 PROCEDURE — A9270 NON-COVERED ITEM OR SERVICE: HCPCS | Mod: GY | Performed by: SURGERY

## 2017-03-12 PROCEDURE — 40000275 ZZH STATISTIC RCP TIME EA 10 MIN

## 2017-03-12 PROCEDURE — 25000125 ZZHC RX 250: Performed by: INTERNAL MEDICINE

## 2017-03-12 PROCEDURE — 25000132 ZZH RX MED GY IP 250 OP 250 PS 637: Mod: GY | Performed by: SURGERY

## 2017-03-12 PROCEDURE — A9270 NON-COVERED ITEM OR SERVICE: HCPCS | Mod: GY | Performed by: INTERNAL MEDICINE

## 2017-03-12 PROCEDURE — 25000128 H RX IP 250 OP 636: Performed by: SURGERY

## 2017-03-12 PROCEDURE — 94640 AIRWAY INHALATION TREATMENT: CPT

## 2017-03-12 PROCEDURE — 25000132 ZZH RX MED GY IP 250 OP 250 PS 637: Mod: GY | Performed by: INTERNAL MEDICINE

## 2017-03-12 PROCEDURE — 25000132 ZZH RX MED GY IP 250 OP 250 PS 637: Mod: GY | Performed by: FAMILY MEDICINE

## 2017-03-12 RX ORDER — ASPIRIN 325 MG
325 TABLET ORAL DAILY
Qty: 180 TABLET | Refills: 0 | Status: ON HOLD | OUTPATIENT
Start: 2017-03-12 | End: 2018-03-25

## 2017-03-12 RX ORDER — OXYCODONE HYDROCHLORIDE 5 MG/1
5-10 TABLET ORAL
Qty: 60 TABLET | Refills: 0 | Status: SHIPPED | OUTPATIENT
Start: 2017-03-12 | End: 2017-03-12

## 2017-03-12 RX ORDER — ATORVASTATIN CALCIUM 10 MG/1
10 TABLET, FILM COATED ORAL DAILY
Qty: 30 TABLET | Refills: 3 | Status: SHIPPED | OUTPATIENT
Start: 2017-03-12 | End: 2017-04-10

## 2017-03-12 RX ORDER — OXYCODONE HYDROCHLORIDE 5 MG/1
5-10 TABLET ORAL
Qty: 60 TABLET | Refills: 0 | Status: SHIPPED | OUTPATIENT
Start: 2017-03-12 | End: 2017-04-10

## 2017-03-12 RX ORDER — PANTOPRAZOLE SODIUM 40 MG/1
40 TABLET, DELAYED RELEASE ORAL EVERY MORNING
Qty: 30 TABLET | Refills: 3 | Status: SHIPPED | OUTPATIENT
Start: 2017-03-12 | End: 2017-04-10

## 2017-03-12 RX ORDER — AMOXICILLIN 250 MG
1-2 CAPSULE ORAL 2 TIMES DAILY
Qty: 30 TABLET | Refills: 3 | Status: SHIPPED | OUTPATIENT
Start: 2017-03-12 | End: 2017-12-06

## 2017-03-12 RX ORDER — NICOTINE 21 MG/24HR
1 PATCH, TRANSDERMAL 24 HOURS TRANSDERMAL EVERY 24 HOURS
Qty: 30 PATCH | Refills: 0 | Status: SHIPPED | OUTPATIENT
Start: 2017-03-12 | End: 2017-04-10

## 2017-03-12 RX ADMIN — ALBUTEROL SULFATE 2.5 MG: 2.5 SOLUTION RESPIRATORY (INHALATION) at 07:53

## 2017-03-12 RX ADMIN — BUDESONIDE AND FORMOTEROL FUMARATE DIHYDRATE 2 PUFF: 160; 4.5 AEROSOL RESPIRATORY (INHALATION) at 06:33

## 2017-03-12 RX ADMIN — FUROSEMIDE 20 MG: 10 INJECTION, SOLUTION INTRAVENOUS at 08:15

## 2017-03-12 RX ADMIN — ASPIRIN 325 MG ORAL TABLET 325 MG: 325 PILL ORAL at 08:15

## 2017-03-12 RX ADMIN — PANTOPRAZOLE SODIUM 40 MG: 40 TABLET, DELAYED RELEASE ORAL at 08:15

## 2017-03-12 RX ADMIN — FUROSEMIDE 20 MG: 10 INJECTION, SOLUTION INTRAVENOUS at 00:21

## 2017-03-12 RX ADMIN — Medication 12.5 MG: at 08:15

## 2017-03-12 RX ADMIN — HEPARIN SODIUM 5000 UNITS: 10000 INJECTION, SOLUTION INTRAVENOUS; SUBCUTANEOUS at 06:34

## 2017-03-12 NOTE — PLAN OF CARE
Problem: Goal Outcome Summary  Goal: Goal Outcome Summary  Outcome: No Change  VSS, AOx4.  Incision in abdomen is approximated, steri strips in place. Pain at 1/10, declined intervention.  1+ Edema in both legs, right foot +3. dopplar needed to find pulses. Hx of smoking, diminished lung sounds and loose cough. Advanced to regular diet.  Up independently.

## 2017-03-12 NOTE — PROGRESS NOTES
[facility]  Vascular Medicine Progress Note            Assessment and Plan:   1-PAD s/p stenting at iliac bifurcation 2/18/16 now with recurrent bilateral lower extremity claudication and failing bilateral iliac stents. She is s/p aorto bi-iliac bypass, reimplantation inferior mesenteric artery, aortic and bilateral iliac endarterectomy on 03/07/2017    Doing well on POD5  Pain under control  Urine output ok.  Renal function acceptable.  Large BM yesterday  Multiphasic pedal pulses by doppler.   Stable for discharge to home today    2. Ongoing tobacco use      Still smokes 1/2 pack per day. Lungs with wheezing and rhonchi, +cough. SOB improved after going back on brand name inhalers rather than generic in hospital.   14 mg nicotine patches for 30 days followed by 7 mg patches for 30 days.        3. Hyperlipidemia       LDL of 175 on 3/1/17  has been unable to tolerate Crestor, Pravastatin, and Zetia in the past.   just started on 10 mg Lipitor to see if she can tolerate that.   If she is able to tolerate this, she should have a lipid panel repeated again in 3 months with up-titration of the Lipitor as tolerated. If she does not tolerate then consider use of  a PCSK9 inhibitor       4. Coronary artery disease s/p ROWDY 9/1/15     Stable. Asymptomatic.    , medical treatment                Interval History:   doing well; Some abdominal pain at surgical site. passing gas no BM yet. + Ileus. Swelling lower legs.               Review of Systems:   The 10 point Review of Systems is negative other than noted in the HPI             Medications:       pantoprazole  40 mg Oral QAM     furosemide  20 mg Intravenous Q8H     sodium chloride 0.9%  500 mL Intravenous Once     budesonide-formoterol  2 puff Inhalation 2 times daily     montelukast (SINGULAIR) tablet 10 mg  10 mg Oral At Bedtime     tiotropium  18 mcg Inhalation Daily     metoprolol (TOPROL-XL) half-tab 12.5 mg  12.5 mg Oral Daily     heparin  lock flush  5-10 mL Intracatheter Q24H     atorvastatin  10 mg Oral Daily at 8 pm     aspirin  325 mg Oral Daily     heparin  5,000 Units Subcutaneous Q8H     senna-docusate  1-2 tablet Oral BID     albuterol  2.5 mg Nebulization Q6H                  Physical Exam:     Patient Vitals for the past 24 hrs:   BP Temp Temp src Pulse Heart Rate Resp SpO2   03/12/17 0753 - - - - - - 90 %   03/12/17 0717 103/57 97.5  F (36.4  C) Oral 90 87 16 91 %   03/12/17 0013 129/67 98  F (36.7  C) Oral - 90 16 91 %   03/11/17 1900 122/53 97.6  F (36.4  C) Oral - 77 16 96 %   03/11/17 1600 - - - - - 16 -   03/11/17 1500 106/59 97.8  F (36.6  C) Axillary - 96 16 96 %   03/11/17 1250 107/53 98  F (36.7  C) Oral 81 83 16 95 %   03/11/17 1212 - - - - - - 96 %     Wt Readings from Last 4 Encounters:   03/11/17 57.6 kg (126 lb 15.8 oz)   03/06/17 52.2 kg (115 lb)   03/06/17 52.8 kg (116 lb 6.4 oz)   12/20/16 52.4 kg (115 lb 9.6 oz)       Intake/Output Summary (Last 24 hours) at 03/08/17 0947  Last data filed at 03/08/17 0800   Gross per 24 hour   Intake             6750 ml   Output             2565 ml   Net             4185 ml     Constitutional: normal  Eyes: normal  ENT: normal  Neck: Supple, symmetrical, trachea midline, no adenopathy, thyroid symmetric, not enlarged and no tenderness, skin normal.  Hematologic / Lymphatic: normal  Back: normal  Lungs: No increased work of breathing. + Wheezing/rhonchi  Cardiovascular: Regular rate and rhythm, normal S1 and S2, no S3 or S4, and no murmur noted.  Abdomen: decreased BS. Tender around incision.   Musculoskeletal: No redness, warmth, or swelling of the joints.  Full range of motion noted.  Motor strength is 5 out of 5 all extremities bilaterally.  Tone is normal.  Neurologic: normal  Neuropsychiatric: normal  Skin: normal  Biphasic dopplerable distal pulses           Data:     No results found for this or any previous visit (from the past 24 hour(s)).

## 2017-03-12 NOTE — DISCHARGE INSTRUCTIONS
Aortic Bypass Grafts  Post-Operative Instructions    The typical length of stay in the hospital is 5-7 days.  You will likely spend 1 night in the Intensive Care Unit. Upon discharge from the hospital, plan on having someone available to drive you home and stay with you for approximately 1 week.    Diet    Nutrition is important for healing after surgery.  You may experience a temporary decrease in appetite after you surgery.  It is best to try smaller, more frequent meals until your appetite returns.      Medications    You may be started on a blood thinner after surgery.  If you are taking Coumadin, you need to have your blood monitored regularly.      You will be given a prescription for pain medication after surgery.  If you need to have this refilled, please call your pharmacy where you had it filled.  They will then call us for approval.  Please do not call after hours for a refill on pain medication.    Activity    Walking is important after surgery.  You will begin walking before you leave the hospital, and then you should gradually increase your distance as tolerated.  You should be taking at least 2-3 short walks a day.      You can climb stairs when you feel strong enough.      You should not drive for 2 weeks.  In addition, you can not be taking prescription strength pain medication and you must feel strong enough to drive safely.      You may return to work once you feel ready.  This varies depending on your recovery and the type of work you do ~ typically 4-6 weeks after surgery.  This can be discussed further at your 2 week post-operative visit.      You should avoid strenuous activity, including running, biking, or weight-lifting until discussed at your post-operative appointment.  Typically, we ask that you do not lift over 20 lbs for 6 weeks.    Incision Care    You will have staples holding your incisions closed which will be removed 10-14 days after surgery.  Then, you may get strips of white  tape, called steri-strips, applied over your incision.  These will curl up on the ends after 5-7 days, at which time they can be removed.      You may shower 2-3 days after your surgery - pat the incision dry to prevent irritation from moisture.  You do not need to cover with a bandage unless you have drainage.      An abdominal binder can be used as needed to provide comfort to your abdominal incision.      If you have a groin incision, you should keep a gauze pad tucked in the skin fold to prevent the skin on skin contact.  This will prevent a moist environment which hinders healing.  Call our office to discuss this further if the groin looks red or has an odor.     Common Concerns    You may develop bruising and firmness around your incision.  This will soften and resolve over the next 1-3 weeks.  Call our office if it enlarges, becomes red, or painful.      On occasion a soft, fluid-filled bulge can develop near a surgical incision - this is called a seroma.  It will likely resolve on its own, but occasionally requires your doctor to drain it in clinic.  You should call our office if you have questions about this.      If you have leg incisions, you may notice some swelling.  Leg swelling is common after surgery and must be controlled by elevating your legs above your heart.  The swelling does not go away by propping your legs up on a stool or sitting in a reclining chair.  For the first few weeks after surgery, you will need to really work at controlling this swelling, and will likely need to spend a fair amount of time with your legs elevated.  Eventually, you should be able to manage the swelling by elevating 3-4 times a day for 20-30 minutes.  If the swelling does not decrease after elevating your legs above your heart for at least 3-4 hours, you should call our office.      You may notice numbness around your incision.  This is due to nerve irritation from the surgery and will gradually improve over the  next several weeks.      It is not uncommon to feel tired and easily fatigued after this type of surgery.  You will gradually regain your energy and strength over the next several weeks.    Post-Operative Appointments    You will need to see your doctor in clinic 10-14 days after surgery.    o Date: ___________________________  o Time: __________________________  o Dr. ______________________________  o Location: [facility], [address] [place] [address], [address]      You will then be monitored regularly with an Ultrasound test to assure that the bypass graft is functioning properly.  We will notify you by mail when you are due for these appointments - typically, 1 year and 5 years post-operatively.    When to Call our Office  Call our office as previously instructed and with any concerns or questions.  Our main office number is [phone]; you will be directed to the appropriate care provider for further assistance.    Temperature greater than 101.      When you are unable to feel a pulse in your foot or leg, especially when you have been monitoring regularly.      If you notice new onset of numbness, tingling, coolness or pain in your legs or feet.      Severe pain, especially if it is new and preventing sleep.      If you will be having an invasive procedure, such as a colonoscopy or dental work, within one year of your surgery, please call our office.  You may need to take an antibiotic prior to the procedure if you had an artificial graft placed with your surgery.     Revised 5/2014

## 2017-03-12 NOTE — DISCHARGE SUMMARY
[facility]   Vascular Surgery Discharge Summary    Date of Admission: 3/7/2017  Date of Discharge: 3/12/2017    Admission Diagnosis:  1. Aortoiliac occlusive disease  Past Medical History   Diagnosis Date     Arthritis      COPD (chronic obstructive pulmonary disease) (H)      Coronary artery disease      CVA (cerebral vascular accident) (H) 8-     Hypertension        Discharge Diagnosis:  1. Same as above  Past Medical History   Diagnosis Date     Arthritis      COPD (chronic obstructive pulmonary disease) (H)      Coronary artery disease      CVA (cerebral vascular accident) (H) 8-     Hypertension        Consultations:  Hospitalist and Vascular medicine    Procedures:  Aortobi-iliac bypass (16 x 8 mm intravascular graft), reimplantation inferior mesenteric artery, aortic and bilateral iliac endarterectomy by Dr Marcos Pratt on 3/7/17    Brief HPI:  Preeti Ford is a 66-year-old patient who has severe aortoiliac occlusive disease. She has undergone prior stenting of both common iliac arteries. These have developed recurrent stenosis by intimal hyperplasia. With the severe calcified infrarenal aorta it was felt that she would benefit from surgical treatment. She has very small arteries and with some calcification in the left common femoral artery. However, her superficial hqwuakv-iyxbfmera-qdqzsdqfgtox vessels are patent with a dominant posterior tibial runoff into the distal foot. The inframesenteric artery was also noted to be patent as were both internal iliac arteries. She has now developed rest pain in her feet, particularly on the right side where she has a superficial heel ulcer. She comes to the operating today for aortic reconstructive surgery.     Hospital Course:  The patient was admitted and underwent the above procedure. The patient tolerated the procedure well. There were no complications. The patient's diet was slowly advanced. Pain was  controlled with oral pain medication and the patient was able to ambulate and void without difficulty. The patient's OnQ pain pump was removed. The patient received appropriate education post operatively. On POD #5 the patient was discharged to home. She was counseled on smoking cessation.     Discharge Physical Exam:  /51 (BP Location: Right arm) Pulse 83 Temp 97.7  F (36.5  C) (Oral) Resp 16 Wt 126 lb 15.8 oz SpO2 96% BMI 23.23 kg/m2  GEN: NAD  ABD: soft, NT, distended  EXTR: bilateral multiphasic dopplerable pulses    Meds:     Review of your medicines      START taking       Dose / Directions    aspirin 325 MG tablet   Used for:  PAD (peripheral artery disease) (H)        Dose:  325 mg   Take 1 tablet (325 mg) by mouth daily   Quantity:  180 tablet   Refills:  0       atorvastatin 10 MG tablet   Commonly known as:  LIPITOR   Used for:  PAD (peripheral artery disease) (H)        Dose:  10 mg   Take 1 tablet (10 mg) by mouth daily   Quantity:  30 tablet   Refills:  3       oxyCODONE 5 MG IR tablet   Commonly known as:  ROXICODONE   Used for:  Pain at surgical site        Dose:  5-10 mg   Take 1-2 tablets (5-10 mg) by mouth every 3 hours as needed for moderate to severe pain   Quantity:  60 tablet   Refills:  0       pantoprazole 40 MG EC tablet   Commonly known as:  PROTONIX   Used for:  Gastroesophageal reflux disease without esophagitis        Dose:  40 mg   Take 1 tablet (40 mg) by mouth every morning   Quantity:  30 tablet   Refills:  3       senna-docusate 8.6-50 MG per tablet   Commonly known as:  SENOKOT-S;PERICOLACE   Used for:  Drug-induced constipation        Dose:  1-2 tablet   Take 1-2 tablets by mouth 2 times daily   Quantity:  30 tablet   Refills:  3         CONTINUE these medicines which may have CHANGED, or have new prescriptions. If we are uncertain of the size of tablets/capsules you have at home, strength may be listed as something that might have changed.       Dose / Directions    *  nicotine 14 MG/24HR 24 hr patch   Commonly known as:  NICODERM CQ   This may have changed:  Another medication with the same name was added. Make sure you understand how and when to take each.        Dose:  1 patch   Place 1 patch onto the skin every 24 hours (remove at 19:00)   Refills:  0       * nicotine 14 MG/24HR 24 hr patch   Commonly known as:  NICODERM CQ   This may have changed:  You were already taking a medication with the same name, and this prescription was added. Make sure you understand how and when to take each.   Used for:  Encounter for smoking cessation counseling        Dose:  1 patch   Place 1 patch onto the skin every 24 hours   Quantity:  30 patch   Refills:  0       * nicotine 7 MG/24HR 24 hr patch   Commonly known as:  NICODERM CQ   This may have changed:  You were already taking a medication with the same name, and this prescription was added. Make sure you understand how and when to take each.   Used for:  Encounter for smoking cessation counseling        Dose:  1 patch   Start taking on:  4/12/2017   Place 1 patch onto the skin every 24 hours   Quantity:  30 patch   Refills:  0       * Notice:  This list has 3 medication(s) that are the same as other medications prescribed for you. Read the directions carefully, and ask your doctor or other care provider to review them with you.      CONTINUE these medicines which have NOT CHANGED       Dose / Directions    albuterol 108 (90 BASE) MCG/ACT Inhaler   Commonly known as:  PROAIR HFA/PROVENTIL HFA/VENTOLIN HFA   Used for:  Chronic bronchitis, unspecified chronic bronchitis type (H)        Dose:  2 puff   Inhale 2 puffs into the lungs every 6 hours as needed for shortness of breath / dyspnea   Quantity:  1 Inhaler   Refills:  0       ASPIRIN PO        Dose:  325 mg   Take 325 mg by mouth every morning   Refills:  0       COQ10 PO        Dose:  100 mg   Take 100 mg by mouth every 24 hours (at 16:00)   Refills:  0       ipratropium - albuterol  0.5 mg/2.5 mg/3 mL 0.5-2.5 (3) MG/3ML neb solution   Commonly known as:  DUONEB   Used for:  Chronic bronchitis, unspecified chronic bronchitis type (H)        Dose:  1 vial   Take 1 vial (3 mLs) by nebulization every 6 hours as needed for shortness of breath / dyspnea or wheezing   Quantity:  90 vial   Refills:  1       OSCAL 500/200 D-3 PO        Dose:  1 tablet   Take 1 tablet by mouth 2 times daily   Refills:  0       SINGULAIR PO        Dose:  10 mg   Take 10 mg by mouth At Bedtime   Refills:  0       SPIRIVA HANDIHALER 18 MCG capsule   Generic drug:  tiotropium        Dose:  18 mcg   Inhale 18 mcg into the lungs every 24 hours (at 16:00)   Refills:  0       SYMBICORT 160-4.5 MCG/ACT Inhaler   Used for:  COPD (chronic obstructive pulmonary disease) (H)   Generic drug:  budesonide-formoterol        INHALE TWO PUFFS BY MOUTH TWICE A DAY   Quantity:  1 Inhaler   Refills:  5       TOPROL XL PO        Dose:  12.5 mg   Take 12.5 mg by mouth every morning (patient takes 0.5 X 25 mg = 12.5 mg dose)   Refills:  0       Turmeric Curcumin Caps        Dose:  1 capsule   Take 1 capsule by mouth every 24 hours (at 16:00)   Refills:  0            Where to get your medicines      These medications were sent to [facility] [place], [place] - [address]  [address], [place] [address]     Phone:  [phone]      aspirin 325 MG tablet     atorvastatin 10 MG tablet     nicotine 14 MG/24HR 24 hr patch     nicotine 7 MG/24HR 24 hr patch     pantoprazole 40 MG EC tablet     senna-docusate 8.6-50 MG per tablet         Some of these will need a paper prescription and others can be bought over the counter. Ask your nurse if you have questions.     Bring a paper prescription for each of these medications      oxyCODONE 5 MG IR tablet             Discharge Instructions:    Reason for your hospital stay   Peripheral arterial disease. You underwent Aortoiliac bypass.     Follow-up and recommended labs and  tests    Follow up with Dr Pratt in 4 weeks.     Activity   Your activity upon discharge: No vigorous activity. Don't lift more than 10 lb for 4 weeks.     Wound care and dressings   Instructions to care for your wound at home: You may shower. Don't soak wound in tub bath. Allow the steri-strips to fall off on their own.     Discharge Instructions   Don't drive while on narcotic pain medications.   Return to the emergency department for increasing abdominal pain not relieved by pain medications.  Call vascular surgery office if you experience fever, chills, purulent drainage from your wound, weakness.     Full Code     Diet   Follow this diet upon discharge: Orders Placed This Encounter     Regular Diet Adult         Karime (Danilo) MD [facility]  Vascular Surgery Fellow  [phone] (p)

## 2017-03-12 NOTE — PROGRESS NOTES
Vascular Surgery Progress Note    Date of service: 3/12/2017    Subjective: AF, VSS. Tolerating regular diet. Passed a bowel movement. Pain is controlled.    Objective:  /57 (BP Location: Right arm)  Pulse 90  Temp 97.5  F (36.4  C) (Oral)  Resp 16  Wt 126 lb 15.8 oz  SpO2 90%  BMI 23.23 kg/m2  GEN: NAD  ABD: soft, NT, distended  EXTR: bilateral multiphasic dopplerable pulses      Assessment and Plan:  Preeti Ford is a 66 yof PAD s/p stenting at iliac bifurcation 2/18/16 now with recurrent bilateral lower extremity claudication and failing bilateral iliac stents. She is s/p aortobi-iliac bypass, reimplantation inferior mesenteric artery, aortic and bilateral iliac endarterectomy on 03/07/2017. The patient is doing well.    - DC OnQ pump.  - Discharge home today.  - Follow up with Dr Pratt in 4 weeks for wound check.  - Prescribed the patient nicotine patches to quit smoking.    Karime Kinney) MD [facility]  Vascular Surgery Fellow  [phone] (p)

## 2017-03-13 ENCOUNTER — TELEPHONE (OUTPATIENT)
Dept: FAMILY MEDICINE | Facility: OTHER | Age: 67
End: 2017-03-13

## 2017-03-13 NOTE — TELEPHONE ENCOUNTER
RN to call for hospital follow up:    Reason for follow up: Preeti Ford appeared on our list for being seen in an Emergency Room or a recent Hospital discharge.    Admitting date: 03/07/2017  Discharge date: 03/12/2017  Location: [facility]  Reason for visit: Pain At Surgical Site    Suad Goncalves RN

## 2017-03-13 NOTE — TELEPHONE ENCOUNTER
"Hospital/TCU/ED for chronic condition Discharge Protocol    \"Hi, my name is Betty Angulo, a registered nurse, and I am calling from [facility].  I am calling to follow up and see how things are going for you after your recent emergency visit/hospital/TCU stay.\"    Tell me how you are doing now that you are home?\" \"I feel exhausted, Im eating better, I don't really feel much discomfort.\"        Discharge Instructions    \"Let's review your discharge instructions.  What is/are the follow-up recommendations?  Pt. Response: Will  Follow up with Dr. Pratt in 4 weeks    \"Has an appointment with your primary care provider been scheduled?\"   Feesl like she is exhausted right now.  Will call back in 7 days to schedule follow up with PCP    \"When you see the provider, I would recommend that you bring your medications with you.\"    Medications    \"Tell me what changed about your medicines when you discharged?\"    Changes to chronic meds?    2 or more - Epic MTM referral needed    \"What questions do you have about your medications?\"    None     New diagnoses of heart failure, COPD, diabetes, or MI?    No              Medication reconciliation completed? Yes  Was MTM referral placed (*Make sure to put transitions as reason for referral)?   No    Call Summary    \"What questions or concerns do you have about your recent visit and your follow-up care?\"     Patient has some notable pitting edema in right extremity that runs from toes to above the knee.  She reports swelling is not worse than it was at discharge.  She denies any chest pain, coughing up blood, severe SOB, cold/blue feet, numbness, tingling, inability to walk, fever, warm or red streaking, pain.    Advised patient to contact specialty surgery to discuss swelling.    \"If you have questions or things don't continue to improve, we encourage you contact us through the main clinic number (give number).  Even if the clinic is not open, triage nurses are available " "24/7 to help you.     We would like you to know that our clinic has extended hours (provide information).  We also have urgent care (provide details on closest location and hours/contact info)\"      \"Thank you for your time and take care!\"  Betty Angulo RN             "

## 2017-04-06 NOTE — PROGRESS NOTES
SUBJECTIVE:                                                    Preeti Ford is a 66 year old female who presents to clinic today for the following health issues:      Concern - loss of appetite      Onset: 1 month ago     Description:   Patient states she has had loss of appetite since circulation surgery. She states she stopped taking her Lipitor due to vomiting after taking. Since stopping this her appetite has improved slightly. No problems eating sweets.    Intensity: moderate    Progression of Symptoms:  improving    Accompanying Signs & Symptoms:  Bitter taste  Nausea  Decreased swelling         Previous history of similar problem:   none    Precipitating factors:   Worsened by: Lipitor    Alleviating factors:  Improved by: none       Therapies Tried and outcome: Patient has not tried any medication.     Patient is here in clinic today with concern about lack of energy and lack of appetite. She had a surgery about a month ago and reports that she has not had energy or appetite since then. She says that  If she has a full meal she gets nauseated, she has also not had any appetite for things and states that nothing tastes good. She is able to eat sweats without any issues but has not been able to have normal meals. She does continue to smoke but has cut back. She has stopped taking her statin due to it making her nauseated, she has been taking that in the early afternoon. She has not been taking protonix and refuses another medication to help with reflux/nausea concerns. She has also not been using nicotine patches.     -------------------------------------    Problem list and histories reviewed & adjusted, as indicated.  Additional history: as documented    BP Readings from Last 3 Encounters:   04/10/17 122/64   03/12/17 103/57   03/06/17 110/64    Wt Readings from Last 3 Encounters:   04/10/17 114 lb 3.2 oz (51.8 kg)   03/11/17 126 lb 15.8 oz (57.6 kg)   03/06/17 115 lb (52.2 kg)                 "    Reviewed and updated as needed this visit by clinical staff       Reviewed and updated as needed this visit by Provider         ROS:  Constitutional, HEENT, cardiovascular, pulmonary, gi and gu systems are negative, except as otherwise noted.    OBJECTIVE:                                                    /64 (BP Location: Right arm, Cuff Size: Adult Regular)  Pulse 78  Temp 97.8  F (36.6  C) (Temporal)  Resp 14  Ht 5' 2\" (1.575 m)  Wt 114 lb 3.2 oz (51.8 kg)  SpO2 99%  Breastfeeding? No  BMI 20.89 kg/m2  Body mass index is 20.89 kg/(m^2).  GENERAL: alert, no distress and frail  RESP: lungs clear to auscultation - no rales, rhonchi or wheezes  CV: regular rates and rhythm  MS: no gross musculoskeletal defects noted, no edema  SKIN: no suspicious lesions or rashes    Diagnostic Test Results:  none      ASSESSMENT/PLAN:                                                      Tobacco Cessation:   reports that she has been smoking Cigarettes.  She has been smoking about 0.50 packs per day. She has never used smokeless tobacco.  Tobacco Cessation Action Plan: Self help information given to patient    BMI:   Estimated body mass index is 20.89 kg/(m^2) as calculated from the following:    Height as of this encounter: 5' 2\" (1.575 m).    Weight as of this encounter: 114 lb 3.2 oz (51.8 kg).   Weight management plan return visit in 1 Month        ICD-10-CM    1. Loss of appetite R63.0    2. Low energy R53.83    3. PAD (peripheral artery disease) (H) I73.9 atorvastatin (LIPITOR) 10 MG tablet   4. Tobacco use disorder F17.200        I will have patient use supplement shakes to increase nutritional intake. I would have her eat small snacks/meals every few hours rather than larger meals and would have her try to increase protein intake. I discussed the importance of her trying to stop smoking as continuing will cause problems with her current PAD, CAD and COPD. I would have her make eating and activity " adjustments and if worsening or not improving have her follow up for further evaluation.     See Patient Instructions    Angelique Ureña PA-C  [facility]

## 2017-04-10 ENCOUNTER — OFFICE VISIT (OUTPATIENT)
Dept: FAMILY MEDICINE | Facility: OTHER | Age: 67
End: 2017-04-10
Payer: COMMERCIAL

## 2017-04-10 VITALS
RESPIRATION RATE: 14 BRPM | OXYGEN SATURATION: 99 % | BODY MASS INDEX: 21.02 KG/M2 | DIASTOLIC BLOOD PRESSURE: 64 MMHG | HEART RATE: 78 BPM | TEMPERATURE: 97.8 F | SYSTOLIC BLOOD PRESSURE: 122 MMHG | HEIGHT: 62 IN | WEIGHT: 114.2 LBS

## 2017-04-10 DIAGNOSIS — R53.83 LOW ENERGY: ICD-10-CM

## 2017-04-10 DIAGNOSIS — I73.9 PAD (PERIPHERAL ARTERY DISEASE) (H): ICD-10-CM

## 2017-04-10 DIAGNOSIS — R63.0 LOSS OF APPETITE: Primary | ICD-10-CM

## 2017-04-10 DIAGNOSIS — F17.200 TOBACCO USE DISORDER: ICD-10-CM

## 2017-04-10 PROCEDURE — 99214 OFFICE O/P EST MOD 30 MIN: CPT | Performed by: PHYSICIAN ASSISTANT

## 2017-04-10 RX ORDER — ATORVASTATIN CALCIUM 10 MG/1
10 TABLET, FILM COATED ORAL AT BEDTIME
Qty: 30 TABLET | Refills: 3 | Status: SHIPPED | OUTPATIENT
Start: 2017-04-10 | End: 2017-08-04

## 2017-04-10 ASSESSMENT — PAIN SCALES - GENERAL: PAINLEVEL: NO PAIN (0)

## 2017-04-10 NOTE — MR AVS SNAPSHOT
After Visit Summary   4/10/2017    Preeti Ford    MRN: [id number]           Patient Information     Date Of Birth          1950        Visit Information        Provider Department      4/10/2017 10:40 AM Angelique Ureña PA-C [facility]        Today's Diagnoses     Loss of appetite    -  1    Low energy        PAD (peripheral artery disease) (H)        Tobacco use disorder          Care Instructions    I will have you start eating smaller meals about every 2-3 hours rather than large meals that have been upsetting stomach. Due to loss of appetite I would have you start using nutritional shakes like Boost 1-2x daily. Try to work on cutting back and stopping smoking. If symptoms are worsening or not improving with diet changes I would have you follow up for further evaluation.        Tips for Quitting Smoking (Cardiovascular)  Quitting smoking is a gift to yourself, one of the best things you can do to keep your heart disease from getting worse. Smoking reduces oxygen flow to your heart, speeds the buildup of plaque, and increases your risk for heart attack, also known as acute myocardial infarction, or AMI. Quitting helps reduce smoking's harmful effects. You may have tried to quit before, but don t give up. Try again. Many smokers try four or five times before they succeed.     You ll have the best chance of success if you join a stop-smoking group and have the support of family and friends.     Line up help    Ask for the support of your family and friends.    Join a quit-smoking class, or ask your health care provider for a referral to a psychologist who specializes in helping people quit smoking.     Ask your health care provider about nicotine replacement products and prescription medications that can help you quit.  Set a quit date    Choose a date within the next 2 to 4 weeks.    After picking a day, loretta it in bold letters on a calendar.     Your quit list  Start  "by giving up cigarettes at the times you least need them. Write down a few more ideas.     Set limits    Limit where you can smoke. Pick one room or a porch, and smoke only in that place.    Make smoking outdoors a house rule. Other smokers won t tempt you as much.    Hang a list of   quit benefits  in the spot where you smoke. Put one on the refrigerator and one on your car dashboard.     For more information    smokefree.gov/zgrb-hi-qp-expert    National Cancer [place] Smoking Quitline: 877-44U-QUIT (404-540-6945)        8042-6741 [employer]. [address] [place] [address]. All rights reserved. This information is not intended as a substitute for professional medical care. Always follow your healthcare professional's instructions.              Follow-ups after your visit        Follow-up notes from your care team     Return if symptoms worsen or fail to improve.      Who to contact     If you have questions or need follow up information about today's clinic visit or your schedule please contact [facility] directly at [phone].  Normal or non-critical lab and imaging results will be communicated to you by Nanoogohart, letter or phone within 4 business days after the clinic has received the results. If you do not hear from us within 7 days, please contact the clinic through Appscot or phone. If you have a critical or abnormal lab result, we will notify you by phone as soon as possible.  Submit refill requests through Dropcam or call your pharmacy and they will forward the refill request to us. Please allow 3 business days for your refill to be completed.          Additional Information About Your Visit        NanoogoharJaguar Animal [employer] Information     Dropcam lets you send messages to your doctor, view your test results, renew your prescriptions, schedule appointments and more. To sign up, go to [email or web address] . Click on \"Log in\" on the left side of the screen, which will take you " "to the Welcome page. Then click on \"Sign up Now\" on the right side of the page.     You will be asked to enter the access code listed below, as well as some personal information. Please follow the directions to create your username and password.     Your access code is: [email or web address]  Expires: 2017 10:01 AM     Your access code will  in 90 days. If you need help or a new code, please call your [facility] clinic or [phone].        Care EveryWhere ID     This is your Care EveryWhere ID. This could be used by other organizations to access your [facility] medical records  [id number]        Your Vitals Were     Pulse Temperature Respirations Height Pulse Oximetry Breastfeeding?    78 97.8  F (36.6  C) (Temporal) 14 5' 2\" (1.575 m) 99% No    BMI (Body Mass Index)                   20.89 kg/m2            Blood Pressure from Last 3 Encounters:   04/10/17 122/64   17 103/57   17 110/64    Weight from Last 3 Encounters:   04/10/17 114 lb 3.2 oz (51.8 kg)   17 126 lb 15.8 oz (57.6 kg)   17 115 lb (52.2 kg)              Today, you had the following     No orders found for display         Today's Medication Changes          These changes are accurate as of: 4/10/17 11:16 AM.  If you have any questions, ask your nurse or doctor.               These medicines have changed or have updated prescriptions.        Dose/Directions    atorvastatin 10 MG tablet   Commonly known as:  LIPITOR   This may have changed:  when to take this   Used for:  PAD (peripheral artery disease) (H)   Changed by:  Angelique Ureña PA-C        Dose:  10 mg   Take 1 tablet (10 mg) by mouth At Bedtime   Quantity:  30 tablet   Refills:  3         Stop taking these medicines if you haven't already. Please contact your care team if you have questions.     ASPIRIN PO   Stopped by:  Angelique Ureña PA-C                Where to get your medicines      These medications were sent to [facility] [place], [place] - [address]   [address] [place] [address]     Phone:  [phone]     atorvastatin 10 MG tablet                Primary Care Provider Office Phone # Fax #    Avelina Yael Miramontes [phone]       [facility] [address] [place] [address]        Thank you!     Thank you for choosing [facility]  for your care. Our goal is always to provide you with excellent care. Hearing back from our patients is one way we can continue to improve our services. Please take a few minutes to complete the written survey that you may receive in the mail after your visit with us. Thank you!             Your Updated Medication List - Protect others around you: Learn how to safely use, store and throw away your medicines at www.disposemymeds.org.          This list is accurate as of: 4/10/17 11:16 AM.  Always use your most recent med list.                   Brand Name Dispense Instructions for use    albuterol 108 (90 BASE) MCG/ACT Inhaler    PROAIR HFA/PROVENTIL HFA/VENTOLIN HFA    1 Inhaler    Inhale 2 puffs into the lungs every 6 hours as needed for shortness of breath / dyspnea       aspirin 325 MG tablet     180 tablet    Take 1 tablet (325 mg) by mouth daily       atorvastatin 10 MG tablet    LIPITOR    30 tablet    Take 1 tablet (10 mg) by mouth At Bedtime       COQ10 PO      Take 100 mg by mouth every 24 hours (at 16:00)       ipratropium - albuterol 0.5 mg/2.5 mg/3 mL 0.5-2.5 (3) MG/3ML neb solution    DUONEB    90 vial    Take 1 vial (3 mLs) by nebulization every 6 hours as needed for shortness of breath / dyspnea or wheezing       * nicotine 14 MG/24HR 24 hr patch    NICODERM CQ     Place 1 patch onto the skin every 24 hours Reported on 4/10/2017       * nicotine 14 MG/24HR 24 hr patch    NICODERM CQ    30 patch    Place 1 patch onto the skin every 24 hours       * nicotine 7 MG/24HR 24 hr patch   Start taking on:  4/12/2017    NICODERM CQ    30 patch     Place 1 patch onto the skin every 24 hours       OSCAL 500/200 D-3 PO      Take 1 tablet by mouth 2 times daily       pantoprazole 40 MG EC tablet    PROTONIX    30 tablet    Take 1 tablet (40 mg) by mouth every morning       senna-docusate 8.6-50 MG per tablet    SENOKOT-S;PERICOLACE    30 tablet    Take 1-2 tablets by mouth 2 times daily       SINGULAIR PO      Take 10 mg by mouth At Bedtime       SPIRIVA HANDIHALER 18 MCG capsule   Generic drug:  tiotropium      Inhale 18 mcg into the lungs every 24 hours (at 16:00)       SYMBICORT 160-4.5 MCG/ACT Inhaler   Generic drug:  budesonide-formoterol     1 Inhaler    INHALE TWO PUFFS BY MOUTH TWICE A DAY       TOPROL XL PO      Take 12.5 mg by mouth every morning (patient takes 0.5 X 25 mg = 12.5 mg dose)       Turmeric Curcumin Caps      Take 1 capsule by mouth every 24 hours (at 16:00)       * Notice:  This list has 3 medication(s) that are the same as other medications prescribed for you. Read the directions carefully, and ask your doctor or other care provider to review them with you.

## 2017-04-10 NOTE — NURSING NOTE
"Chief Complaint   Patient presents with     Surgical Followup     Panel Management     MyCnat, OP annual INR referral, Colon cancer screen, Honoring obdulia COPD action plan       Initial /64 (BP Location: Right arm, Cuff Size: Adult Regular)  Pulse 78  Temp 97.8  F (36.6  C) (Temporal)  Resp 14  Ht 5' 2\" (1.575 m)  Wt 114 lb 3.2 oz (51.8 kg)  SpO2 99%  Breastfeeding? No  BMI 20.89 kg/m2 Estimated body mass index is 20.89 kg/(m^2) as calculated from the following:    Height as of this encounter: 5' 2\" (1.575 m).    Weight as of this encounter: 114 lb 3.2 oz (51.8 kg).  Medication Reconciliation: complete     Marilee Quan MA    "

## 2017-04-10 NOTE — PATIENT INSTRUCTIONS
I will have you start eating smaller meals about every 2-3 hours rather than large meals that have been upsetting stomach. Due to loss of appetite I would have you start using nutritional shakes like Boost 1-2x daily. Try to work on cutting back and stopping smoking. If symptoms are worsening or not improving with diet changes I would have you follow up for further evaluation.        Tips for Quitting Smoking (Cardiovascular)  Quitting smoking is a gift to yourself, one of the best things you can do to keep your heart disease from getting worse. Smoking reduces oxygen flow to your heart, speeds the buildup of plaque, and increases your risk for heart attack, also known as acute myocardial infarction, or AMI. Quitting helps reduce smoking's harmful effects. You may have tried to quit before, but don t give up. Try again. Many smokers try four or five times before they succeed.     You ll have the best chance of success if you join a stop-smoking group and have the support of family and friends.     Line up help    Ask for the support of your family and friends.    Join a quit-smoking class, or ask your health care provider for a referral to a psychologist who specializes in helping people quit smoking.     Ask your health care provider about nicotine replacement products and prescription medications that can help you quit.  Set a quit date    Choose a date within the next 2 to 4 weeks.    After picking a day, loretta it in bold letters on a calendar.     Your quit list  Start by giving up cigarettes at the times you least need them. Write down a few more ideas.     Set limits    Limit where you can smoke. Pick one room or a porch, and smoke only in that place.    Make smoking outdoors a house rule. Other smokers won t tempt you as much.    Hang a list of   quit benefits  in the spot where you smoke. Put one on the refrigerator and one on your car dashboard.     For more information    smokefree.gov/angc-fn-wi-expert     National Cancer [place] Smoking Quitline: 877-44U-QUIT (613-741-9199)        4556-8347 [employer]. [address] [place] [address], [place] [address]. All rights reserved. This information is not intended as a substitute for professional medical care. Always follow your healthcare professional's instructions.

## 2017-04-28 DIAGNOSIS — Z79.2 PROPHYLACTIC ANTIBIOTIC: Primary | ICD-10-CM

## 2017-04-28 RX ORDER — CLINDAMYCIN HCL 300 MG
CAPSULE ORAL
Qty: 2 CAPSULE | Refills: 0 | Status: SHIPPED | OUTPATIENT
Start: 2017-04-28 | End: 2017-12-06

## 2017-04-28 NOTE — TELEPHONE ENCOUNTER
Fax received from [facility] for prophylactic medication/premedication verification.     Faxed information back to [facility] and Prophylactic medication (Cleocin) order placed in [facility].    Opal Hargrove RN, BSN.

## 2017-05-30 DIAGNOSIS — J42 CHRONIC BRONCHITIS, UNSPECIFIED CHRONIC BRONCHITIS TYPE (H): ICD-10-CM

## 2017-05-30 NOTE — TELEPHONE ENCOUNTER
Spiriva Handihaler 18 MCG capsule       Last Written Prescription Date: 3/9/17 (was discontinued on 3/12/17)  Last Fill Quantity: 28, # refills: 0    Last Office Visit with G, P or [facility] prescribing provider:  4/10/17   Future Office Visit:       Date of Last Asthma Action Plan Letter:   There are no preventive care reminders to display for this patient.   Asthma Control Test: No flowsheet data found.    Date of Last Spirometry Test:   No results found for this or any previous visit.

## 2017-05-31 RX ORDER — TIOTROPIUM BROMIDE 18 UG/1
CAPSULE ORAL; RESPIRATORY (INHALATION)
Qty: 90 CAPSULE | Refills: 5 | Status: SHIPPED | OUTPATIENT
Start: 2017-05-31 | End: 2018-05-16

## 2017-05-31 NOTE — TELEPHONE ENCOUNTER
Routing refill request to provider for review/approval because:  Medication is reported/historical    Minerva Rosario RN, BSN

## 2017-06-07 ENCOUNTER — TELEPHONE (OUTPATIENT)
Dept: FAMILY MEDICINE | Facility: OTHER | Age: 67
End: 2017-06-07

## 2017-06-07 NOTE — LETTER
[facility]  [address] [facility] [address] [address]  Phone: [phone]  June 7, 2017      Preeti Fodr  [address] [address] [place] [id number]      Dear Preeti,    We care about your health and have reviewed your health plan including your medical conditions, medications, and lab results.  Based on this review, it is recommended that you follow up regarding the following health topic(s):  -Colon Cancer Screening    We recommend you take the following action(s):  -schedule a COLONOSCOPY to look for colon cancer (due every 10 years or 5 years in higher risk situations.)  Colonoscopies can prevent 90-95% of colon cancer deaths.  Problem lesions can be removed before they ever become cancer.  If you do not wish to do a colonoscopy or cannot afford to do one at this time, there is another option called a Fecal Immunochemical Occult Blood Test (FIT) a take home stool sample kit.  It does not replace the colonoscopy for colorectal cancer screening, but it can detect hidden bleeding in the lower colon.  It does need to be repeated every year and if a positive result is obtained, you would be referred for a colonoscopy.  If you have completed either one of these tests at another facility, please have the records sent to our clinic for our records.     Please call us at the [facility] - [phone] (or use Sporthold) to address the above recommendations.     Thank you for trusting [facility] and we appreciate the opportunity to serve you.  We look forward to supporting your healthcare needs in the future.    Healthy Regards,    Your Health Care Team  [facility]

## 2017-06-07 NOTE — TELEPHONE ENCOUNTER
Summary:    Patient is due/failing the following:   FIT    Action needed:   Complete a FIT test     Type of outreach:    Sent letter.    Questions for provider review:    None                                                                                                                                    Karey Candelaria       Chart routed to Care Team .        Panel Management Review      Patient has the following on her problem list:     Hypertension   Last three blood pressure readings:  BP Readings from Last 3 Encounters:   04/10/17 122/64   03/12/17 103/57   03/06/17 110/64     Blood pressure: Passed    HTN Guidelines:  Age 18-59 BP range:  Less than 140/90  Age 60-85 with Diabetes:  Less than 140/90  Age 60-85 without Diabetes:  less than 150/90      Composite cancer screening  Chart review shows that this patient is due/due soon for the following Fecal Colorectal (FIT)

## 2017-06-08 NOTE — PROGRESS NOTES
SUBJECTIVE:                                                    Preeti Ford is a 66 year old female who presents to clinic today for the following health issues:      COPD Follow-Up    Symptoms are currently: slightly worsened    Current fatigue or dyspnea with ambulation: worsened from baseline    Shortness of breath: slightly worsened    Increased or change in Cough/Sputum: Yes-  Coughing at night     Fever(s): No    Baseline ambulation without stopping to rest 1 block. Able to walk up 1-2 flights of stairs without stopping to rest.    Any ER/UC or hospital admissions since your last visit? No     History   Smoking Status     Current Every Day Smoker     Packs/day: 0.50     Types: Cigarettes   Smokeless Tobacco     Never Used     Comment: patient states she is working on it      No results found for: FEV1, SCG6XYQ       Amount of exercise or physical activity: None    Problems taking medications regularly: No    Medication side effects: none    Diet: regular (no restrictions)          Problem list and histories reviewed & adjusted, as indicated.  Additional history: as documented    Patient Active Problem List   Diagnosis     COPD (chronic obstructive pulmonary disease) (H)     CARDIOVASCULAR SCREENING; LDL GOAL LESS THAN 130     Advanced directives, counseling/discussion     Urinary incontinence     Forgetfulness     Hyperlipidemia LDL goal <130     Elevated blood pressure reading without diagnosis of hypertension     History of stroke     Numbness and tingling     Tobacco use disorder     CVA (cerebral vascular accident) (H)     ACS (acute coronary syndrome) (H)     Ischemic cardiomyopathy     HTN, goal below 130/80     Acute systolic congestive heart failure (H)     Lung nodule     Esophageal reflux     ST elevation myocardial infarction involving left anterior descending (LAD) coronary artery (H)     Chronic bronchitis, unspecified chronic bronchitis type (H)     PAD (peripheral artery disease) (H)      Cervical high risk HPV (human papillomavirus) test positive     Past Surgical History:   Procedure Laterality Date     APPENDECTOMY       BYPASS GRAFT AORTOILIAC N/A 3/7/2017    Procedure: BYPASS GRAFT AORTOILIAC;  Surgeon: Marcos Pratt MD;  Location: [facility] OR     SALPINGO OOPHORECTOMY,R/L/DELORES      Salpingo Oophorectomy, RT/LT/DELORES       Social History   Substance Use Topics     Smoking status: Current Every Day Smoker     Packs/day: 0.50     Types: Cigarettes     Smokeless tobacco: Never Used      Comment: patient states she is working on it      Alcohol use No     Family History   Problem Relation Age of Onset     CEREBROVASCULAR DISEASE Mother      CEREBROVASCULAR DISEASE Father      Genitourinary Problems Brother      Dialysis         Current Outpatient Prescriptions   Medication Sig Dispense Refill     ipratropium - albuterol 0.5 mg/2.5 mg/3 mL (DUONEB) 0.5-2.5 (3) MG/3ML neb solution Take 1 vial (3 mLs) by nebulization every 6 hours as needed for shortness of breath / dyspnea or wheezing 90 vial 1     predniSONE (DELTASONE) 20 MG tablet 2 tabs (40 mg) orally daily for 3 days, 1 tab (20 mg) orally daily for 3 days, then 1/2 tab (10 mg) orally for 3 days 13 tablet 0     doxycycline (VIBRA-TABS) 100 MG tablet Take 1 tablet (100 mg) by mouth 2 times daily 20 tablet 0     SPIRIVA HANDIHALER 18 MCG capsule USING THE HANDIHALER, INHALE ONE CAPSULE BY MOUTH EVERY DAY 90 capsule 5     clindamycin (CLEOCIN) 300 MG capsule Take 2 capsules (600mg) by mouth once for 1 dose, Take 1 hour prior to invasive procedure (dental cleaning). 2 capsule 0     atorvastatin (LIPITOR) 10 MG tablet Take 1 tablet (10 mg) by mouth At Bedtime 30 tablet 3     aspirin 325 MG tablet Take 1 tablet (325 mg) by mouth daily 180 tablet 0     senna-docusate (SENOKOT-S;PERICOLACE) 8.6-50 MG per tablet Take 1-2 tablets by mouth 2 times daily 30 tablet 3     Calcium Carbonate-Vitamin D (OSCAL 500/200 D-3 PO) Take 1 tablet by mouth 2 times daily        Coenzyme Q10 (COQ10 PO) Take 100 mg by mouth every 24 hours (at 16:00)       Metoprolol Succinate (TOPROL XL PO) Take 12.5 mg by mouth every morning (patient takes 0.5 X 25 mg = 12.5 mg dose)       Montelukast Sodium (SINGULAIR PO) Take 10 mg by mouth At Bedtime       nicotine (NICODERM CQ) 14 MG/24HR 24 hr patch Place 1 patch onto the skin every 24 hours Reported on 4/10/2017       tiotropium (SPIRIVA HANDIHALER) 18 MCG capsule Inhale 18 mcg into the lungs every 24 hours (at 16:00)       Misc Natural Products (TURMERIC CURCUMIN) CAPS Take 1 capsule by mouth every 24 hours (at 16:00)       albuterol (PROAIR HFA, PROVENTIL HFA, VENTOLIN HFA) 108 (90 BASE) MCG/ACT inhaler Inhale 2 puffs into the lungs every 6 hours as needed for shortness of breath / dyspnea 1 Inhaler 0     SYMBICORT 160-4.5 MCG/ACT inhaler INHALE TWO PUFFS BY MOUTH TWICE A DAY 1 Inhaler 5     [DISCONTINUED] ipratropium - albuterol 0.5 mg/2.5 mg/3 mL (DUONEB) 0.5-2.5 (3) MG/3ML neb solution Take 1 vial (3 mLs) by nebulization every 6 hours as needed for shortness of breath / dyspnea or wheezing 90 vial 1     Allergies   Allergen Reactions     Crestor [Rosuvastatin] Other (See Comments)     dizziness     Hmg-Coa-R Inhibitors Other (See Comments)     Muscle/joint aching     Lisinopril Cough     Optison [Albumin Human] Other (See Comments)     Pt was flush and very dizzy.  Also had a BP drop     Penicillins Rash     BP Readings from Last 3 Encounters:   06/12/17 132/70   04/10/17 122/64   03/12/17 103/57    Wt Readings from Last 3 Encounters:   06/12/17 112 lb (50.8 kg)   04/10/17 114 lb 3.2 oz (51.8 kg)   03/11/17 126 lb 15.8 oz (57.6 kg)                  Labs reviewed in EPIC    Reviewed and updated as needed this visit by clinical staff       Reviewed and updated as needed this visit by Provider         ROS:  C: NEGATIVE for fever, chills, change in weight  E/M: NEGATIVE for ear, mouth and throat problems  RESP:POSITIVE for cough-non productive,  "dyspnea on exertion, Hx COPD, SOB/dyspnea and wheezing  CV: NEGATIVE for chest pain, palpitations or peripheral edema    OBJECTIVE:                                                    /70  Pulse 82  Temp 98.9  F (37.2  C) (Temporal)  Resp 14  Ht 5' 2\" (1.575 m)  Wt 112 lb (50.8 kg)  SpO2 99%  Breastfeeding? No  BMI 20.49 kg/m2  Body mass index is 20.49 kg/(m^2).   GENERAL:, alert,  well hydrated, no distress  HENT: ear canals- normal; TMs- normal; Nose- normal; Mouth- no ulcers, no lesions  NECK: no tenderness, no adenopathy, no asymmetry, no masses, no stiffness; thyroid- normal to palpation  RESP: Decreased breath sounds in lungs field bilaterally , no crackles, scant expiratory wheezes in lungs fields bilaterally    CV: regular rates and rhythm, normal S1 S2, -    Diagnostic test results:  Diagnostic Test Results:  CXR; No obvious infiltrate noted , suspects fluid in fissure so the left vs atelectasis   possible peribronchial cuffing      ASSESSMENT/PLAN:                                                    1. Chronic obstructive pulmonary disease, unspecified COPD type (H)    - COPD ACTION PLAN  - XR Chest 2 Views; Future    2. COPD exacerbation (H)  Discussed with patient   - doxycycline (VIBRA-TABS) 100 MG tablet; Take 1 tablet (100 mg) by mouth 2 times daily  Dispense: 20 tablet; Refill: 0   ipratropium - albuterol 0.5 mg/2.5 mg/3 mL         (DUONEB) 0.5-2.5 (3) MG/3ML neb solution,         predniSONE (DELTASONE) 20 MG tablet,         doxycycline (VIBRA-TABS) 100 MG tablet           3. SOB (shortness of breath)  As above   - COPD ACTION PLAN  - XR Chest 2 Views; Future    4. Tobacco use disorder  - TOBACCO CESSATION ORDER FOR HM      Follow up with Provider - nakul Miramontes MD, MD  [facility]    "

## 2017-06-12 ENCOUNTER — OFFICE VISIT (OUTPATIENT)
Dept: FAMILY MEDICINE | Facility: OTHER | Age: 67
End: 2017-06-12
Payer: COMMERCIAL

## 2017-06-12 ENCOUNTER — RADIANT APPOINTMENT (OUTPATIENT)
Dept: GENERAL RADIOLOGY | Facility: OTHER | Age: 67
End: 2017-06-12
Attending: FAMILY MEDICINE
Payer: COMMERCIAL

## 2017-06-12 VITALS
TEMPERATURE: 98.9 F | HEIGHT: 62 IN | WEIGHT: 112 LBS | BODY MASS INDEX: 20.61 KG/M2 | RESPIRATION RATE: 14 BRPM | DIASTOLIC BLOOD PRESSURE: 70 MMHG | OXYGEN SATURATION: 99 % | HEART RATE: 82 BPM | SYSTOLIC BLOOD PRESSURE: 132 MMHG

## 2017-06-12 DIAGNOSIS — F17.200 TOBACCO USE DISORDER: ICD-10-CM

## 2017-06-12 DIAGNOSIS — J44.1 COPD EXACERBATION (H): ICD-10-CM

## 2017-06-12 DIAGNOSIS — J44.9 CHRONIC OBSTRUCTIVE PULMONARY DISEASE, UNSPECIFIED COPD TYPE (H): ICD-10-CM

## 2017-06-12 DIAGNOSIS — R06.02 SOB (SHORTNESS OF BREATH): ICD-10-CM

## 2017-06-12 DIAGNOSIS — J44.9 CHRONIC OBSTRUCTIVE PULMONARY DISEASE, UNSPECIFIED COPD TYPE (H): Primary | ICD-10-CM

## 2017-06-12 PROCEDURE — 99214 OFFICE O/P EST MOD 30 MIN: CPT | Performed by: FAMILY MEDICINE

## 2017-06-12 PROCEDURE — 71020 XR CHEST 2 VW: CPT

## 2017-06-12 RX ORDER — IPRATROPIUM BROMIDE AND ALBUTEROL SULFATE 2.5; .5 MG/3ML; MG/3ML
1 SOLUTION RESPIRATORY (INHALATION) EVERY 6 HOURS PRN
Qty: 90 VIAL | Refills: 1 | Status: ON HOLD | OUTPATIENT
Start: 2017-06-12 | End: 2017-06-17

## 2017-06-12 RX ORDER — PREDNISONE 20 MG/1
TABLET ORAL
Qty: 13 TABLET | Refills: 0 | Status: ON HOLD | OUTPATIENT
Start: 2017-06-12 | End: 2017-06-17

## 2017-06-12 RX ORDER — DOXYCYCLINE HYCLATE 100 MG
100 TABLET ORAL 2 TIMES DAILY
Qty: 20 TABLET | Refills: 0 | Status: ON HOLD | OUTPATIENT
Start: 2017-06-12 | End: 2017-06-17

## 2017-06-12 ASSESSMENT — PAIN SCALES - GENERAL: PAINLEVEL: NO PAIN (0)

## 2017-06-12 NOTE — NURSING NOTE
"Chief Complaint   Patient presents with     COPD     Panel Management     FIT, honoring choices, smoking cessation, copd action plan       Initial /70  Pulse 82  Temp 98.9  F (37.2  C) (Temporal)  Resp 14  Ht 5' 2\" (1.575 m)  Wt 112 lb (50.8 kg)  SpO2 99%  Breastfeeding? No  BMI 20.49 kg/m2 Estimated body mass index is 20.49 kg/(m^2) as calculated from the following:    Height as of this encounter: 5' 2\" (1.575 m).    Weight as of this encounter: 112 lb (50.8 kg).  Medication Reconciliation: complete     Marilee Quan MA    "

## 2017-06-12 NOTE — MR AVS SNAPSHOT
"              After Visit Summary   6/12/2017    Preeti Ford    MRN: [id number]           Patient Information     Date Of Birth          1950        Visit Information        Provider Department      6/12/2017 9:20 AM Avelina Miramontes MD [facility]        Today's Diagnoses     Chronic obstructive pulmonary disease, unspecified COPD type (H)    -  1    SOB (shortness of breath)        Screen for colon cancer        Tobacco use disorder        Chronic bronchitis, unspecified chronic bronchitis type (H)        Bronchitis        COPD exacerbation (H)           Follow-ups after your visit        Who to contact     If you have questions or need follow up information about today's clinic visit or your schedule please contact [facility] directly at [phone].  Normal or non-critical lab and imaging results will be communicated to you by [employer], letter or phone within 4 business days after the clinic has received the results. If you do not hear from us within 7 days, please contact the clinic through [employer] or phone. If you have a critical or abnormal lab result, we will notify you by phone as soon as possible.  Submit refill requests through Ghost or call your pharmacy and they will forward the refill request to us. Please allow 3 business days for your refill to be completed.          Additional Information About Your Visit        [employer] Information     Ghost lets you send messages to your doctor, view your test results, renew your prescriptions, schedule appointments and more. To sign up, go to [email or web address] . Click on \"Log in\" on the left side of the screen, which will take you to the Welcome page. Then click on \"Sign up Now\" on the right side of the page.     You will be asked to enter the access code listed below, as well as some personal information. Please follow the directions to create your username and password.     Your access code is: " [email or web address]  Expires: 9/10/2017 10:01 AM     Your access code will  in 90 days. If you need help or a new code, please call your [facility] clinic or [phone].        Care EveryWhere ID     This is your Care EveryWhere ID. This could be used by other organizations to access your [facility] medical records  [id number]        Your Vitals Were     Pulse Temperature Respirations Height Pulse Oximetry Breastfeeding?    82 98.9  F (37.2  C) (Temporal) 14 5' 2\" (1.575 m) 99% No    BMI (Body Mass Index)                   20.49 kg/m2            Blood Pressure from Last 3 Encounters:   17 132/70   04/10/17 122/64   17 103/57    Weight from Last 3 Encounters:   17 112 lb (50.8 kg)   04/10/17 114 lb 3.2 oz (51.8 kg)   17 126 lb 15.8 oz (57.6 kg)              We Performed the Following     COPD ACTION PLAN     TOBACCO CESSATION ORDER FOR HM          Today's Medication Changes          These changes are accurate as of: 17 10:01 AM.  If you have any questions, ask your nurse or doctor.               Start taking these medicines.        Dose/Directions    doxycycline 100 MG tablet   Commonly known as:  VIBRA-TABS   Used for:  Bronchitis, COPD exacerbation (H)   Started by:  Avelina Miramontes MD        Dose:  100 mg   Take 1 tablet (100 mg) by mouth 2 times daily   Quantity:  20 tablet   Refills:  0       predniSONE 20 MG tablet   Commonly known as:  DELTASONE   Used for:  Chronic bronchitis, unspecified chronic bronchitis type (H)   Started by:  Avelina Miramontes MD        2 tabs (40 mg) orally daily for 3 days, 1 tab (20 mg) orally daily for 3 days, then 1/2 tab (10 mg) orally for 3 days   Quantity:  13 tablet   Refills:  0            Where to get your medicines      These medications were sent to [facility] - [address]  [address] [address]     Phone:  [phone]     doxycycline 100 MG tablet    ipratropium - " albuterol 0.5 mg/2.5 mg/3 mL 0.5-2.5 (3) MG/3ML neb solution    predniSONE 20 MG tablet                Primary Care Provider Office Phone # Fax #    Avelina Yael Miramontes [phone]       [facility] [address] [place] [address]        Thank you!     Thank you for choosing [facility]  for your care. Our goal is always to provide you with excellent care. Hearing back from our patients is one way we can continue to improve our services. Please take a few minutes to complete the written survey that you may receive in the mail after your visit with us. Thank you!             Your Updated Medication List - Protect others around you: Learn how to safely use, store and throw away your medicines at www.disposemymeds.org.          This list is accurate as of: 6/12/17 10:01 AM.  Always use your most recent med list.                   Brand Name Dispense Instructions for use    albuterol 108 (90 BASE) MCG/ACT Inhaler    PROAIR HFA/PROVENTIL HFA/VENTOLIN HFA    1 Inhaler    Inhale 2 puffs into the lungs every 6 hours as needed for shortness of breath / dyspnea       aspirin 325 MG tablet     180 tablet    Take 1 tablet (325 mg) by mouth daily       atorvastatin 10 MG tablet    LIPITOR    30 tablet    Take 1 tablet (10 mg) by mouth At Bedtime       clindamycin 300 MG capsule    CLEOCIN    2 capsule    Take 2 capsules (600mg) by mouth once for 1 dose, Take 1 hour prior to invasive procedure (dental cleaning).       COQ10 PO      Take 100 mg by mouth every 24 hours (at 16:00)       doxycycline 100 MG tablet    VIBRA-TABS    20 tablet    Take 1 tablet (100 mg) by mouth 2 times daily       ipratropium - albuterol 0.5 mg/2.5 mg/3 mL 0.5-2.5 (3) MG/3ML neb solution    DUONEB    90 vial    Take 1 vial (3 mLs) by nebulization every 6 hours as needed for shortness of breath / dyspnea or wheezing       nicotine 14 MG/24HR 24 hr patch    NICODERM CQ     Place 1 patch onto the skin every  24 hours Reported on 4/10/2017       OSCAL 500/200 D-3 PO      Take 1 tablet by mouth 2 times daily       predniSONE 20 MG tablet    DELTASONE    13 tablet    2 tabs (40 mg) orally daily for 3 days, 1 tab (20 mg) orally daily for 3 days, then 1/2 tab (10 mg) orally for 3 days       senna-docusate 8.6-50 MG per tablet    SENOKOT-S;PERICOLACE    30 tablet    Take 1-2 tablets by mouth 2 times daily       SINGULAIR PO      Take 10 mg by mouth At Bedtime       * SPIRIVA HANDIHALER 18 MCG capsule   Generic drug:  tiotropium      Inhale 18 mcg into the lungs every 24 hours (at 16:00)       * SPIRIVA HANDIHALER 18 MCG capsule   Generic drug:  tiotropium     90 capsule    USING THE HANDIHALER, INHALE ONE CAPSULE BY MOUTH EVERY DAY       SYMBICORT 160-4.5 MCG/ACT Inhaler   Generic drug:  budesonide-formoterol     1 Inhaler    INHALE TWO PUFFS BY MOUTH TWICE A DAY       TOPROL XL PO      Take 12.5 mg by mouth every morning (patient takes 0.5 X 25 mg = 12.5 mg dose)       Turmeric Curcumin Caps      Take 1 capsule by mouth every 24 hours (at 16:00)       * Notice:  This list has 2 medication(s) that are the same as other medications prescribed for you. Read the directions carefully, and ask your doctor or other care provider to review them with you.

## 2017-06-14 ENCOUNTER — HOSPITAL ENCOUNTER (INPATIENT)
Facility: CLINIC | Age: 67
LOS: 3 days | Discharge: HOME | DRG: 190 | End: 2017-06-17
Attending: EMERGENCY MEDICINE | Admitting: INTERNAL MEDICINE
Payer: MEDICARE

## 2017-06-14 ENCOUNTER — TELEPHONE (OUTPATIENT)
Dept: FAMILY MEDICINE | Facility: OTHER | Age: 67
End: 2017-06-14

## 2017-06-14 ENCOUNTER — APPOINTMENT (OUTPATIENT)
Dept: CT IMAGING | Facility: CLINIC | Age: 67
DRG: 190 | End: 2017-06-14
Attending: EMERGENCY MEDICINE
Payer: MEDICARE

## 2017-06-14 ENCOUNTER — APPOINTMENT (OUTPATIENT)
Dept: GENERAL RADIOLOGY | Facility: CLINIC | Age: 67
DRG: 190 | End: 2017-06-14
Attending: EMERGENCY MEDICINE
Payer: MEDICARE

## 2017-06-14 DIAGNOSIS — B96.5 PSEUDOMONAS RESPIRATORY INFECTION: Primary | ICD-10-CM

## 2017-06-14 DIAGNOSIS — J96.01 ACUTE RESPIRATORY FAILURE WITH HYPOXIA (H): ICD-10-CM

## 2017-06-14 DIAGNOSIS — J98.8 PSEUDOMONAS RESPIRATORY INFECTION: Primary | ICD-10-CM

## 2017-06-14 DIAGNOSIS — R79.89 ELEVATED TROPONIN: ICD-10-CM

## 2017-06-14 DIAGNOSIS — J44.1 COPD EXACERBATION (H): ICD-10-CM

## 2017-06-14 LAB
ANION GAP SERPL CALCULATED.3IONS-SCNC: 7 MMOL/L (ref 3–14)
BASE EXCESS BLDV CALC-SCNC: 3.3 MMOL/L
BASOPHILS # BLD AUTO: 0 10E9/L (ref 0–0.2)
BASOPHILS NFR BLD AUTO: 0.2 %
BUN SERPL-MCNC: 15 MG/DL (ref 7–30)
CALCIUM SERPL-MCNC: 8.8 MG/DL (ref 8.5–10.1)
CHLORIDE SERPL-SCNC: 105 MMOL/L (ref 94–109)
CO2 SERPL-SCNC: 29 MMOL/L (ref 20–32)
CREAT SERPL-MCNC: 0.66 MG/DL (ref 0.52–1.04)
D DIMER PPP FEU-MCNC: 2 UG/ML FEU (ref 0–0.5)
DIFFERENTIAL METHOD BLD: ABNORMAL
EOSINOPHIL # BLD AUTO: 0 10E9/L (ref 0–0.7)
EOSINOPHIL NFR BLD AUTO: 0 %
ERYTHROCYTE [DISTWIDTH] IN BLOOD BY AUTOMATED COUNT: 13.6 % (ref 10–15)
GFR SERPL CREATININE-BSD FRML MDRD: 89 ML/MIN/1.7M2
GLUCOSE SERPL-MCNC: 110 MG/DL (ref 70–99)
HCO3 BLDV-SCNC: 28 MMOL/L (ref 21–28)
HCT VFR BLD AUTO: 43.2 % (ref 35–47)
HGB BLD-MCNC: 13.8 G/DL (ref 11.7–15.7)
IMM GRANULOCYTES # BLD: 0 10E9/L (ref 0–0.4)
IMM GRANULOCYTES NFR BLD: 0 %
LYMPHOCYTES # BLD AUTO: 0.6 10E9/L (ref 0.8–5.3)
LYMPHOCYTES NFR BLD AUTO: 9.6 %
MCH RBC QN AUTO: 27.3 PG (ref 26.5–33)
MCHC RBC AUTO-ENTMCNC: 31.9 G/DL (ref 31.5–36.5)
MCV RBC AUTO: 86 FL (ref 78–100)
MONOCYTES # BLD AUTO: 0.2 10E9/L (ref 0–1.3)
MONOCYTES NFR BLD AUTO: 3.4 %
NEUTROPHILS # BLD AUTO: 5.3 10E9/L (ref 1.6–8.3)
NEUTROPHILS NFR BLD AUTO: 86.8 %
NT-PROBNP SERPL-MCNC: 3930 PG/ML (ref 0–900)
O2/TOTAL GAS SETTING VFR VENT: 24 %
PCO2 BLDV: 45 MM HG (ref 40–50)
PH BLDV: 7.41 PH (ref 7.32–7.43)
PLATELET # BLD AUTO: 208 10E9/L (ref 150–450)
PO2 BLDV: 73 MM HG (ref 25–47)
POTASSIUM SERPL-SCNC: 4.4 MMOL/L (ref 3.4–5.3)
RBC # BLD AUTO: 5.05 10E12/L (ref 3.8–5.2)
SODIUM SERPL-SCNC: 141 MMOL/L (ref 133–144)
TROPONIN I SERPL-MCNC: 0.36 UG/L (ref 0–0.04)
TROPONIN I SERPL-MCNC: 0.36 UG/L (ref 0–0.04)
WBC # BLD AUTO: 6.1 10E9/L (ref 4–11)

## 2017-06-14 PROCEDURE — 25000128 H RX IP 250 OP 636: Performed by: EMERGENCY MEDICINE

## 2017-06-14 PROCEDURE — 80048 BASIC METABOLIC PNL TOTAL CA: CPT | Performed by: EMERGENCY MEDICINE

## 2017-06-14 PROCEDURE — 96374 THER/PROPH/DIAG INJ IV PUSH: CPT | Performed by: EMERGENCY MEDICINE

## 2017-06-14 PROCEDURE — A9270 NON-COVERED ITEM OR SERVICE: HCPCS | Mod: GY | Performed by: INTERNAL MEDICINE

## 2017-06-14 PROCEDURE — 94640 AIRWAY INHALATION TREATMENT: CPT | Performed by: EMERGENCY MEDICINE

## 2017-06-14 PROCEDURE — 85379 FIBRIN DEGRADATION QUANT: CPT | Performed by: EMERGENCY MEDICINE

## 2017-06-14 PROCEDURE — 25000128 H RX IP 250 OP 636: Performed by: INTERNAL MEDICINE

## 2017-06-14 PROCEDURE — 82803 BLOOD GASES ANY COMBINATION: CPT | Performed by: EMERGENCY MEDICINE

## 2017-06-14 PROCEDURE — 71020 XR CHEST 2 VW: CPT | Mod: TC

## 2017-06-14 PROCEDURE — 85025 COMPLETE CBC W/AUTO DIFF WBC: CPT | Performed by: EMERGENCY MEDICINE

## 2017-06-14 PROCEDURE — 83880 ASSAY OF NATRIURETIC PEPTIDE: CPT | Performed by: EMERGENCY MEDICINE

## 2017-06-14 PROCEDURE — 25000132 ZZH RX MED GY IP 250 OP 250 PS 637: Mod: GY | Performed by: INTERNAL MEDICINE

## 2017-06-14 PROCEDURE — 71260 CT THORAX DX C+: CPT

## 2017-06-14 PROCEDURE — 99291 CRITICAL CARE FIRST HOUR: CPT | Mod: Z6 | Performed by: EMERGENCY MEDICINE

## 2017-06-14 PROCEDURE — 93005 ELECTROCARDIOGRAM TRACING: CPT | Performed by: EMERGENCY MEDICINE

## 2017-06-14 PROCEDURE — 84484 ASSAY OF TROPONIN QUANT: CPT | Performed by: EMERGENCY MEDICINE

## 2017-06-14 PROCEDURE — 25000125 ZZHC RX 250: Performed by: INTERNAL MEDICINE

## 2017-06-14 PROCEDURE — 12000007 ZZH R&B INTERMEDIATE

## 2017-06-14 PROCEDURE — 25000125 ZZHC RX 250: Performed by: EMERGENCY MEDICINE

## 2017-06-14 PROCEDURE — 99285 EMERGENCY DEPT VISIT HI MDM: CPT | Mod: 25 | Performed by: EMERGENCY MEDICINE

## 2017-06-14 PROCEDURE — 99207 ZZC MOONLIGHTING INDICATOR: CPT | Performed by: INTERNAL MEDICINE

## 2017-06-14 PROCEDURE — 87081 CULTURE SCREEN ONLY: CPT | Performed by: PEDIATRICS

## 2017-06-14 PROCEDURE — 99207 ZZC CDG-UP CODE -MED NECESSITY: CPT | Performed by: INTERNAL MEDICINE

## 2017-06-14 PROCEDURE — 99223 1ST HOSP IP/OBS HIGH 75: CPT | Mod: AI | Performed by: INTERNAL MEDICINE

## 2017-06-14 RX ORDER — ALBUTEROL SULFATE 0.83 MG/ML
2.5 SOLUTION RESPIRATORY (INHALATION)
Status: DISCONTINUED | OUTPATIENT
Start: 2017-06-14 | End: 2017-06-17 | Stop reason: HOSPADM

## 2017-06-14 RX ORDER — ATORVASTATIN CALCIUM 10 MG/1
10 TABLET, FILM COATED ORAL AT BEDTIME
Status: DISCONTINUED | OUTPATIENT
Start: 2017-06-14 | End: 2017-06-17 | Stop reason: HOSPADM

## 2017-06-14 RX ORDER — IOPAMIDOL 755 MG/ML
100 INJECTION, SOLUTION INTRAVASCULAR ONCE
Status: COMPLETED | OUTPATIENT
Start: 2017-06-14 | End: 2017-06-14

## 2017-06-14 RX ORDER — AZITHROMYCIN 250 MG/1
250 TABLET, FILM COATED ORAL DAILY
Status: DISCONTINUED | OUTPATIENT
Start: 2017-06-15 | End: 2017-06-17 | Stop reason: HOSPADM

## 2017-06-14 RX ORDER — ALBUTEROL SULFATE 0.83 MG/ML
2.5 SOLUTION RESPIRATORY (INHALATION)
Status: DISCONTINUED | OUTPATIENT
Start: 2017-06-14 | End: 2017-06-16

## 2017-06-14 RX ORDER — NICOTINE 21 MG/24HR
1 PATCH, TRANSDERMAL 24 HOURS TRANSDERMAL DAILY
Status: DISCONTINUED | OUTPATIENT
Start: 2017-06-14 | End: 2017-06-17 | Stop reason: HOSPADM

## 2017-06-14 RX ORDER — IPRATROPIUM BROMIDE AND ALBUTEROL SULFATE 2.5; .5 MG/3ML; MG/3ML
3 SOLUTION RESPIRATORY (INHALATION) ONCE
Status: COMPLETED | OUTPATIENT
Start: 2017-06-14 | End: 2017-06-14

## 2017-06-14 RX ORDER — ALBUTEROL SULFATE 0.83 MG/ML
2.5 SOLUTION RESPIRATORY (INHALATION)
Status: DISCONTINUED | OUTPATIENT
Start: 2017-06-14 | End: 2017-06-14

## 2017-06-14 RX ORDER — METHYLPREDNISOLONE SODIUM SUCCINATE 125 MG/2ML
62.5 INJECTION, POWDER, LYOPHILIZED, FOR SOLUTION INTRAMUSCULAR; INTRAVENOUS EVERY 8 HOURS
Status: DISCONTINUED | OUTPATIENT
Start: 2017-06-14 | End: 2017-06-16

## 2017-06-14 RX ORDER — PROCHLORPERAZINE MALEATE 5 MG
5-10 TABLET ORAL EVERY 6 HOURS PRN
Status: DISCONTINUED | OUTPATIENT
Start: 2017-06-14 | End: 2017-06-17 | Stop reason: HOSPADM

## 2017-06-14 RX ORDER — PROCHLORPERAZINE 25 MG
25 SUPPOSITORY, RECTAL RECTAL EVERY 12 HOURS PRN
Status: DISCONTINUED | OUTPATIENT
Start: 2017-06-14 | End: 2017-06-17 | Stop reason: HOSPADM

## 2017-06-14 RX ORDER — AMOXICILLIN 250 MG
1-2 CAPSULE ORAL 2 TIMES DAILY
Status: DISCONTINUED | OUTPATIENT
Start: 2017-06-14 | End: 2017-06-17 | Stop reason: HOSPADM

## 2017-06-14 RX ORDER — IPRATROPIUM BROMIDE AND ALBUTEROL SULFATE 2.5; .5 MG/3ML; MG/3ML
3 SOLUTION RESPIRATORY (INHALATION) EVERY 4 HOURS
Status: DISCONTINUED | OUTPATIENT
Start: 2017-06-14 | End: 2017-06-17 | Stop reason: HOSPADM

## 2017-06-14 RX ORDER — ONDANSETRON 4 MG/1
4 TABLET, ORALLY DISINTEGRATING ORAL EVERY 6 HOURS PRN
Status: DISCONTINUED | OUTPATIENT
Start: 2017-06-14 | End: 2017-06-17 | Stop reason: HOSPADM

## 2017-06-14 RX ORDER — METHYLPREDNISOLONE SODIUM SUCCINATE 125 MG/2ML
125 INJECTION, POWDER, LYOPHILIZED, FOR SOLUTION INTRAMUSCULAR; INTRAVENOUS ONCE
Status: COMPLETED | OUTPATIENT
Start: 2017-06-14 | End: 2017-06-14

## 2017-06-14 RX ORDER — AZITHROMYCIN 250 MG/1
500 TABLET, FILM COATED ORAL ONCE
Status: COMPLETED | OUTPATIENT
Start: 2017-06-14 | End: 2017-06-14

## 2017-06-14 RX ORDER — NALOXONE HYDROCHLORIDE 0.4 MG/ML
.1-.4 INJECTION, SOLUTION INTRAMUSCULAR; INTRAVENOUS; SUBCUTANEOUS
Status: DISCONTINUED | OUTPATIENT
Start: 2017-06-14 | End: 2017-06-17 | Stop reason: HOSPADM

## 2017-06-14 RX ORDER — ONDANSETRON 2 MG/ML
4 INJECTION INTRAMUSCULAR; INTRAVENOUS EVERY 6 HOURS PRN
Status: DISCONTINUED | OUTPATIENT
Start: 2017-06-14 | End: 2017-06-17 | Stop reason: HOSPADM

## 2017-06-14 RX ORDER — ASPIRIN 325 MG
325 TABLET ORAL DAILY
Status: DISCONTINUED | OUTPATIENT
Start: 2017-06-15 | End: 2017-06-17 | Stop reason: HOSPADM

## 2017-06-14 RX ORDER — MONTELUKAST SODIUM 10 MG/1
10 TABLET ORAL AT BEDTIME
Status: DISCONTINUED | OUTPATIENT
Start: 2017-06-14 | End: 2017-06-17 | Stop reason: HOSPADM

## 2017-06-14 RX ORDER — ACETAMINOPHEN 325 MG/1
650 TABLET ORAL EVERY 4 HOURS PRN
Status: DISCONTINUED | OUTPATIENT
Start: 2017-06-14 | End: 2017-06-17 | Stop reason: HOSPADM

## 2017-06-14 RX ADMIN — ALBUTEROL SULFATE 2.5 MG: 2.5 SOLUTION RESPIRATORY (INHALATION) at 23:28

## 2017-06-14 RX ADMIN — SENNOSIDES AND DOCUSATE SODIUM 1 TABLET: 8.6; 5 TABLET ORAL at 21:33

## 2017-06-14 RX ADMIN — IPRATROPIUM BROMIDE AND ALBUTEROL SULFATE 3 ML: .5; 3 SOLUTION RESPIRATORY (INHALATION) at 16:44

## 2017-06-14 RX ADMIN — IPRATROPIUM BROMIDE AND ALBUTEROL SULFATE 3 ML: .5; 3 SOLUTION RESPIRATORY (INHALATION) at 21:16

## 2017-06-14 RX ADMIN — IOPAMIDOL 70 ML: 755 INJECTION, SOLUTION INTRAVENOUS at 17:06

## 2017-06-14 RX ADMIN — MONTELUKAST 10 MG: 10 TABLET, FILM COATED ORAL at 21:33

## 2017-06-14 RX ADMIN — ATORVASTATIN CALCIUM 10 MG: 10 TABLET, FILM COATED ORAL at 21:33

## 2017-06-14 RX ADMIN — ALBUTEROL SULFATE 2.5 MG: 2.5 SOLUTION RESPIRATORY (INHALATION) at 20:12

## 2017-06-14 RX ADMIN — METHYLPREDNISOLONE SODIUM SUCCINATE 62.5 MG: 125 INJECTION, POWDER, FOR SOLUTION INTRAMUSCULAR; INTRAVENOUS at 23:26

## 2017-06-14 RX ADMIN — AZITHROMYCIN 500 MG: 250 TABLET, FILM COATED ORAL at 21:34

## 2017-06-14 RX ADMIN — SODIUM CHLORIDE 60 ML: 9 INJECTION, SOLUTION INTRAVENOUS at 17:06

## 2017-06-14 RX ADMIN — METHYLPREDNISOLONE SODIUM SUCCINATE 125 MG: 125 INJECTION, POWDER, FOR SOLUTION INTRAMUSCULAR; INTRAVENOUS at 15:31

## 2017-06-14 RX ADMIN — NICOTINE 1 PATCH: 14 PATCH, EXTENDED RELEASE TRANSDERMAL at 21:33

## 2017-06-14 ASSESSMENT — ENCOUNTER SYMPTOMS
SHORTNESS OF BREATH: 1
NUMBNESS: 0
COUGH: 1

## 2017-06-14 NOTE — TELEPHONE ENCOUNTER
Called back to see if pt got a ride to the hospital. No answer, unable to leave message, no answering machine. Will try again later.    Minerva Rosario, RN, BSN

## 2017-06-14 NOTE — ED PROVIDER NOTES
History     Chief Complaint   Patient presents with     Shortness of Breath     The history is provided by the patient.     Preeti Ford is a 66 year old female who presented to the ED with complaints of shortness of breath. She states that she has been experiencing shortness of breath, accompanied by a cough, for a couple weeks, but today was much worse. She reports that she was seen in the clinic on Monday for her shortness of breath, and was put on Prednisone and Doxycycline for a COPD episode. The patient endorses that today she has done two nebulizer treatments and two rescue inhaler treatments to help alleviate her shortness of breath. She denies daily use of a nebulizer and oxygen at home, numbness, chest pain and history of blood clots.     I have reviewed the Medications, Allergies, Past Medical and Surgical History, and Social History in the Epic system.    Patient Active Problem List   Diagnosis     COPD (chronic obstructive pulmonary disease) (H)     CARDIOVASCULAR SCREENING; LDL GOAL LESS THAN 130     Advanced directives, counseling/discussion     Urinary incontinence     Forgetfulness     Hyperlipidemia LDL goal <130     Elevated blood pressure reading without diagnosis of hypertension     History of stroke - right thalamus in 8/2013     Numbness and tingling     Tobacco use disorder     CVA (cerebral vascular accident) (H)     ACS (acute coronary syndrome) (H)     Ischemic cardiomyopathy - EF 25% in 2015 but 55% in 3/2017     HTN, goal below 130/80     Acute systolic congestive heart failure (H)     Lung nodule     Esophageal reflux     ST elevation myocardial infarction involving left anterior descending (LAD) coronary artery (H)     Chronic bronchitis, unspecified chronic bronchitis type (H)     PAD (peripheral artery disease) (H)     Cervical high risk HPV (human papillomavirus) test positive     Acute respiratory failure with hypoxia (H)     COPD exacerbation (H)     Coronary artery disease  involving native coronary artery - STEMI with LAD and circ stents in 8/2015     Elevated troponin     Past Medical History:   Diagnosis Date     Arthritis      COPD (chronic obstructive pulmonary disease) (H)      Coronary artery disease      CVA (cerebral vascular accident) (H) 8-     Hypertension      Past Surgical History:   Procedure Laterality Date     APPENDECTOMY       BYPASS GRAFT AORTOILIAC N/A 3/7/2017    Procedure: BYPASS GRAFT AORTOILIAC;  Surgeon: Marcos Pratt MD;  Location: [facility] OR     SALPINGO OOPHORECTOMY,R/L/DELORES      Salpingo Oophorectomy, RT/LT/DELORES     Family History   Problem Relation Age of Onset     CEREBROVASCULAR DISEASE Mother      CEREBROVASCULAR DISEASE Father      Genitourinary Problems Brother      Dialysis     Social History   Substance Use Topics     Smoking status: Current Every Day Smoker     Packs/day: 0.50     Types: Cigarettes     Smokeless tobacco: Never Used      Comment: patient states she is working on it      Alcohol use No      Immunization History   Administered Date(s) Administered     Influenza (IIV3) 10/18/2011, 01/04/2013     Influenza Vaccine IM 3yrs+ 4 Valent IIV4 10/07/2014, 10/14/2015, 12/20/2016     Pneumococcal 23 valent 01/04/2013     TDAP Vaccine (Adacel) 03/02/2012     Allergies   Allergen Reactions     Crestor [Rosuvastatin] Other (See Comments)     dizziness     Hmg-Coa-R Inhibitors Other (See Comments)     Muscle/joint aching     Lisinopril Cough     Optison [Albumin Human] Other (See Comments)     Pt was flush and very dizzy.  Also had a BP drop     Penicillins Rash     Current Outpatient Prescriptions   Medication Sig Dispense Refill     ipratropium - albuterol 0.5 mg/2.5 mg/3 mL (DUONEB) 0.5-2.5 (3) MG/3ML neb solution Take 1 vial (3 mLs) by nebulization every 6 hours as needed for shortness of breath / dyspnea or wheezing 90 vial 1     predniSONE (DELTASONE) 20 MG tablet 2 tabs (40 mg) orally daily for 3 days, 1 tab (20 mg) orally daily  for 3 days, then 1/2 tab (10 mg) orally for 3 days 13 tablet 0     doxycycline (VIBRA-TABS) 100 MG tablet Take 1 tablet (100 mg) by mouth 2 times daily 20 tablet 0     SPIRIVA HANDIHALER 18 MCG capsule USING THE HANDIHALER, INHALE ONE CAPSULE BY MOUTH EVERY DAY 90 capsule 5     atorvastatin (LIPITOR) 10 MG tablet Take 1 tablet (10 mg) by mouth At Bedtime 30 tablet 3     aspirin 325 MG tablet Take 1 tablet (325 mg) by mouth daily 180 tablet 0     Coenzyme Q10 (COQ10 PO) Take 100 mg by mouth every 24 hours (at 16:00)       Metoprolol Succinate (TOPROL XL PO) Take 12.5 mg by mouth every morning (patient takes 0.5 X 25 mg = 12.5 mg dose)       Montelukast Sodium (SINGULAIR PO) Take 10 mg by mouth At Bedtime       albuterol (PROAIR HFA, PROVENTIL HFA, VENTOLIN HFA) 108 (90 BASE) MCG/ACT inhaler Inhale 2 puffs into the lungs every 6 hours as needed for shortness of breath / dyspnea 1 Inhaler 0     SYMBICORT 160-4.5 MCG/ACT inhaler INHALE TWO PUFFS BY MOUTH TWICE A DAY 1 Inhaler 5     clindamycin (CLEOCIN) 300 MG capsule Take 2 capsules (600mg) by mouth once for 1 dose, Take 1 hour prior to invasive procedure (dental cleaning). 2 capsule 0     senna-docusate (SENOKOT-S;PERICOLACE) 8.6-50 MG per tablet Take 1-2 tablets by mouth 2 times daily 30 tablet 3     Calcium Carbonate-Vitamin D (OSCAL 500/200 D-3 PO) Take 1 tablet by mouth 2 times daily       nicotine (NICODERM CQ) 14 MG/24HR 24 hr patch Place 1 patch onto the skin every 24 hours Reported on 4/10/2017       [facility] Natural Products (TURMERIC CURCUMIN) CAPS Take 1 capsule by mouth every 24 hours (at 16:00)       [DISCONTINUED] tiotropium (SPIRIVA HANDIHALER) 18 MCG capsule Inhale 18 mcg into the lungs every 24 hours (at 16:00)       Review of Systems   Respiratory: Positive for cough and shortness of breath.    Cardiovascular: Negative for chest pain.   Neurological: Negative for numbness.   All other systems reviewed and are negative.    Physical Exam   BP:  189/87  Heart Rate: 90  Temp: 98.4  F (36.9  C)  Resp: 24  Weight: 50.3 kg (111 lb)  SpO2: 94 %  Physical Exam   Constitutional: She is oriented to person, place, and time. She appears well-developed and well-nourished.   HENT:   Head: Atraumatic.   Eyes: Conjunctivae and EOM are normal.   Neck: Normal range of motion. Neck supple.   Cardiovascular: Normal rate, regular rhythm and normal heart sounds.    Pulmonary/Chest: She has decreased breath sounds. She has wheezes (bi-laterally ).   Abdominal: Soft. Bowel sounds are normal.   Musculoskeletal: Normal range of motion.        Right lower leg: She exhibits edema (1+).        Left lower leg: She exhibits edema (1+).   Neurological: She is alert and oriented to person, place, and time.   Skin: Skin is warm and dry.   Psychiatric: She has a normal mood and affect. Her behavior is normal.   Nursing note and vitals reviewed.    ED Course     ED Course     Procedures           EKG reveals sinus rhythm at 90 bpm.  One  to 1-1/2 mm of ST elevation is seen isolated in lead V3.      Critical Care 60 minutes excluding procedures          Results for orders placed or performed during the hospital encounter of 06/14/17 (from the past 24 hour(s))   CBC with platelets differential   Result Value Ref Range    WBC 6.1 4.0 - 11.0 10e9/L    RBC Count 5.05 3.8 - 5.2 10e12/L    Hemoglobin 13.8 11.7 - 15.7 g/dL    Hematocrit 43.2 35.0 - 47.0 %    MCV 86 78 - 100 fl    MCH 27.3 26.5 - 33.0 pg    MCHC 31.9 31.5 - 36.5 g/dL    RDW 13.6 10.0 - 15.0 %    Platelet Count 208 150 - 450 10e9/L    Diff Method Automated Method     % Neutrophils 86.8 %    % Lymphocytes 9.6 %    % Monocytes 3.4 %    % Eosinophils 0.0 %    % Basophils 0.2 %    % Immature Granulocytes 0.0 %    Absolute Neutrophil 5.3 1.6 - 8.3 10e9/L    Absolute Lymphocytes 0.6 (L) 0.8 - 5.3 10e9/L    Absolute Monocytes 0.2 0.0 - 1.3 10e9/L    Absolute Eosinophils 0.0 0.0 - 0.7 10e9/L    Absolute Basophils 0.0 0.0 - 0.2 10e9/L    Abs  Immature Granulocytes 0.0 0 - 0.4 10e9/L   Basic metabolic panel   Result Value Ref Range    Sodium 141 133 - 144 mmol/L    Potassium 4.4 3.4 - 5.3 mmol/L    Chloride 105 94 - 109 mmol/L    Carbon Dioxide 29 20 - 32 mmol/L    Anion Gap 7 3 - 14 mmol/L    Glucose 110 (H) 70 - 99 mg/dL    Urea Nitrogen 15 7 - 30 mg/dL    Creatinine 0.66 0.52 - 1.04 mg/dL    GFR Estimate 89 >60 mL/min/1.7m2    GFR Estimate If Black >90   GFR Calc   >60 mL/min/1.7m2    Calcium 8.8 8.5 - 10.1 mg/dL   Troponin I   Result Value Ref Range    Troponin I ES 0.356 (HH) 0.000 - 0.045 ug/L   Blood gas venous   Result Value Ref Range    Ph Venous 7.41 7.32 - 7.43 pH    PCO2 Venous 45 40 - 50 mm Hg    PO2 Venous 73 (H) 25 - 47 mm Hg    Bicarbonate Venous 28 21 - 28 mmol/L    Base Excess Venous 3.3 mmol/L    FIO2 24    D dimer quantitative   Result Value Ref Range    D Dimer 2.0 (H) 0.0 - 0.50 ug/ml FEU   Nt probnp inpatient   Result Value Ref Range    N-Terminal Pro BNP Inpatient 3930 (H) 0 - 900 pg/mL   XR Chest 2 Views    Narrative    CHEST TWO VIEWS   6/14/2017 2:40 PM     HISTORY: Short of air.    COMPARISON: Chest x-rays dated 6/12/2017, 3/6/2017 and 2/12/2016.    FINDINGS:  Hyperaeration is again noted. Probable mild scarring in the  right medial base is stable. Lungs are otherwise clear. Blunting of  the costophrenic angles is stable since the prior study and likely  secondary to hyperaeration and scarring. Lungs are otherwise clear.  Heart size and pulmonary vascularity are within normal limits. No  pneumothorax is identified. No significant pleural fluid collection is  seen.      Impression    IMPRESSION: Probable right medial basilar scarring versus atelectasis.  No other evidence of acute cardiopulmonary disease is seen.     OZIEL OHARA MD   CT Chest Pulmonary Embolism w Contrast    Narrative    CT CHEST PULMONARY EMBOLISM WITH CONTRAST 6/14/2017 5:30 PM     HISTORY: Dyspnea    CONTRAST DOSE: 70mL Isovue 370      Radiation dose for this scan was reduced using automated exposure  control, adjustment of the mA and/or kV according to patient size, or  iterative reconstruction technique.    FINDINGS: There is a good contrast bolus within the pulmonary  arteries. No pulmonary arterial filling defects are demonstrated to  indicate pulmonary embolism. Minimal contrast is present within the  aorta precluding evaluation for dissection. Scattered aortic  calcifications are noted. Coronary artery calcification is also noted.  The mediastinum and alize are otherwise unremarkable. Pulmonary  emphysematous changes are noted. There is a calcified granuloma in the  left posterior costophrenic angle. The lungs are otherwise clear. No  pleural effusion or pneumothorax.      Impression    IMPRESSION:  1. Marked pulmonary emphysematous changes.  2. No CT evidence of pulmonary embolism.  3. Coronary artery and aortic calcification.   Troponin I (second draw)   Result Value Ref Range    Troponin I ES 0.364 (HH) 0.000 - 0.045 ug/L     Medications   albuterol neb solution 2.5 mg (2.5 mg Nebulization Given 6/14/17 2012)   ipratropium - albuterol 0.5 mg/2.5 mg/3 mL (DUONEB) neb solution 3 mL (3 mLs Nebulization Given 6/14/17 1644)   methylPREDNISolone sodium succinate (solu-MEDROL) injection 125 mg (125 mg Intravenous Given 6/14/17 1531)   sodium chloride (PF) 0.9% PF flush 3 mL (3 mLs Intravenous Given 6/14/17 1705)   sodium chloride 0.9 % for CT scan flush dose 500 mL (60 mLs Intravenous Given 6/14/17 1706)   iopamidol (ISOVUE-370) solution 100 mL (70 mLs Intravenous Given 6/14/17 1706)     Assessments & Plan (with Medical Decision Making)  66-year-old female  with known COPD who presents with significant dyspnea.  She was seen in clinic and initiated on prednisone.  Still not doing well.  Here she was hypoxic to 79% with short distance of ambulation.  Improved here with nebulizers.  Elevated d-dimer.  CT without PE.  Stable on O2.    She does  have an elevated troponin that is noted at 0.356.  She denies significant chest pain.  EKG with isolated change in lead V3 without other ischemic changes.  She does have known coronary artery disease with stenting previously.  Case was reviewed with her hospitalist, Dr. Faulkner.  We agreed to repeat a troponin and if not significantly change would admit her here treating her COPD and presuming her troponin elevation was possibly related to her COPD exacerbation and cardiac strain.  Repeat troponin of 0.364.  She is not given aspirin of note as she states she took a full dose this morning.       I have reviewed the nursing notes.    I have reviewed the findings, diagnosis, plan and need for follow up with the patient.             New Prescriptions    No medications on file     Final diagnoses:   COPD exacerbation (H)   Acute respiratory failure with hypoxia (H)   Elevated troponin     This document serves as a record of services personally performed by Curtis Fay MD. It was created on their behalf by Tamara Bueno, a trained medical scribe. The creation of this record is based on the provider's personal observations and the statements of the patient. This document has been checked and approved by the attending provider.    Note: Chart documentation done in part with Dragon Voice Recognition software. Although reviewed after completion, some word and grammatical errors may remain.    6/14/2017   [facility] EMERGENCY DEPARTMENT     Curtis Fay MD  06/14/17 2037

## 2017-06-14 NOTE — ED NOTES
Patient ambulated to bathroom with assist, patient extremely short of breath after using bathroom O2 sat 79% on room air. , patient wheeled back to room on the [place] Ste[place], O2 sat when back in room 83% - 86%, 1 Liter O2 applied and patients O2 sat up to 91%

## 2017-06-14 NOTE — ED NOTES
Here with increase work of breathing and shortness of breath. Was seen in the clinic two days ago for similar symptoms but feels worse today. History of COPD

## 2017-06-14 NOTE — IP AVS SNAPSHOT
[address] [facility]    [address] [place] [address]    Phone:  [phone]                                       After Visit Summary   6/14/2017    Preeti Ford    MRN: [id number]           After Visit Summary Signature Page     I have received my discharge instructions, and my questions have been answered. I have discussed any challenges I see with this plan with the nurse or doctor.    ..........................................................................................................................................  Patient/Patient Representative Signature      ..........................................................................................................................................  Patient Representative Print Name and Relationship to Patient    ..................................................               ................................................  Date                                            Time    ..........................................................................................................................................  Reviewed by Signature/Title    ...................................................              ..............................................  Date                                                            Time

## 2017-06-14 NOTE — TELEPHONE ENCOUNTER
RN to call back to check on Preeti in 20-30 minutes to make sure she got a ride to the hospital. She was going to call her  to come get her and take her to the ED. REFUSED ambulance. Offered to call ambulance for her, refused again.    Not feeling very good. A lot of trouble with breathing. Tried the nebulizer, not helpful.   Haven't felt this bad on Monday. Seems worse.  No chest pain. But upper chest feels very tight.  Wondering if it's the antibiotics.   Don't feel sweaty.   Upper chest, really really tight. Can't get a full breath in.  4am, woke and thought I needed to go to hospital - used rescure inhaler, got a little better.  Not painful to breath.  Feels like fluid in lungs, in upper part.   Wheezing.   Speaking in short sentences and pausing for breaths.  Did not ask - disposition determined and recommendation given based on severity of symptoms  Pt has COPD, CHF, Hx of stroke, Tobacco use     Call 911/ED, another person drive - Home alone currently, pt refused to call or have me call 911 for ambulance. Will call  for ride. RN to follow up to make sure she got a ride.  Will comply with recommendation: no, will not use ambulance. Yes, will go to ED,  drive.  If further questions/concerns or if sxs do not improve, worsen or new sxs develop, call your PCP or [facility] as soon as possible.    Guideline used: Breathing problems  Telephone Triage Protocols for Nurses, Fourth Edition, Aislinn Rosario, EULA, BSN

## 2017-06-14 NOTE — TELEPHONE ENCOUNTER
Attempted to reach patient again via home phone and via 's cell phone. Unable to leave message. Chart shows that patient has arrived in the ED. Will close encounter.    Minerva Rosario, RN, BSN

## 2017-06-15 LAB
BACTERIA SPEC CULT: NORMAL
GRAM STN SPEC: ABNORMAL
MICRO REPORT STATUS: ABNORMAL
MICRO REPORT STATUS: NORMAL
SPECIMEN SOURCE: ABNORMAL
SPECIMEN SOURCE: NORMAL
TROPONIN I SERPL-MCNC: 0.35 UG/L (ref 0–0.04)

## 2017-06-15 PROCEDURE — 12000007 ZZH R&B INTERMEDIATE

## 2017-06-15 PROCEDURE — 40000275 ZZH STATISTIC RCP TIME EA 10 MIN

## 2017-06-15 PROCEDURE — 40000274 ZZH STATISTIC RCP CONSULT EA 30 MIN

## 2017-06-15 PROCEDURE — 84484 ASSAY OF TROPONIN QUANT: CPT | Performed by: INTERNAL MEDICINE

## 2017-06-15 PROCEDURE — 40000270 ZZH STATISTIC OXYGEN  O2DAILY TECH TIME

## 2017-06-15 PROCEDURE — 25000132 ZZH RX MED GY IP 250 OP 250 PS 637: Mod: GY | Performed by: INTERNAL MEDICINE

## 2017-06-15 PROCEDURE — 87186 SC STD MICRODIL/AGAR DIL: CPT | Performed by: PEDIATRICS

## 2017-06-15 PROCEDURE — A9270 NON-COVERED ITEM OR SERVICE: HCPCS | Mod: GY | Performed by: INTERNAL MEDICINE

## 2017-06-15 PROCEDURE — 25000125 ZZHC RX 250: Performed by: INTERNAL MEDICINE

## 2017-06-15 PROCEDURE — 87077 CULTURE AEROBIC IDENTIFY: CPT | Performed by: PEDIATRICS

## 2017-06-15 PROCEDURE — 87205 SMEAR GRAM STAIN: CPT | Performed by: PEDIATRICS

## 2017-06-15 PROCEDURE — 36415 COLL VENOUS BLD VENIPUNCTURE: CPT | Performed by: INTERNAL MEDICINE

## 2017-06-15 PROCEDURE — 94640 AIRWAY INHALATION TREATMENT: CPT | Mod: 76

## 2017-06-15 PROCEDURE — 25000128 H RX IP 250 OP 636: Performed by: INTERNAL MEDICINE

## 2017-06-15 PROCEDURE — 94640 AIRWAY INHALATION TREATMENT: CPT

## 2017-06-15 PROCEDURE — 99232 SBSQ HOSP IP/OBS MODERATE 35: CPT | Performed by: INTERNAL MEDICINE

## 2017-06-15 PROCEDURE — 87070 CULTURE OTHR SPECIMN AEROBIC: CPT | Performed by: PEDIATRICS

## 2017-06-15 RX ORDER — METOPROLOL SUCCINATE 25 MG/1
25 TABLET, EXTENDED RELEASE ORAL EVERY MORNING
Status: DISCONTINUED | OUTPATIENT
Start: 2017-06-15 | End: 2017-06-17 | Stop reason: HOSPADM

## 2017-06-15 RX ADMIN — IPRATROPIUM BROMIDE AND ALBUTEROL SULFATE 3 ML: .5; 3 SOLUTION RESPIRATORY (INHALATION) at 16:19

## 2017-06-15 RX ADMIN — IPRATROPIUM BROMIDE AND ALBUTEROL SULFATE 3 ML: .5; 3 SOLUTION RESPIRATORY (INHALATION) at 05:31

## 2017-06-15 RX ADMIN — ALBUTEROL SULFATE 2.5 MG: 2.5 SOLUTION RESPIRATORY (INHALATION) at 14:45

## 2017-06-15 RX ADMIN — METHYLPREDNISOLONE SODIUM SUCCINATE 62.5 MG: 125 INJECTION, POWDER, FOR SOLUTION INTRAMUSCULAR; INTRAVENOUS at 14:31

## 2017-06-15 RX ADMIN — ATORVASTATIN CALCIUM 10 MG: 10 TABLET, FILM COATED ORAL at 21:15

## 2017-06-15 RX ADMIN — IPRATROPIUM BROMIDE AND ALBUTEROL SULFATE 3 ML: .5; 3 SOLUTION RESPIRATORY (INHALATION) at 21:16

## 2017-06-15 RX ADMIN — METHYLPREDNISOLONE SODIUM SUCCINATE 62.5 MG: 125 INJECTION, POWDER, FOR SOLUTION INTRAMUSCULAR; INTRAVENOUS at 06:53

## 2017-06-15 RX ADMIN — SENNOSIDES AND DOCUSATE SODIUM 1 TABLET: 8.6; 5 TABLET ORAL at 09:21

## 2017-06-15 RX ADMIN — NICOTINE 1 PATCH: 14 PATCH, EXTENDED RELEASE TRANSDERMAL at 09:22

## 2017-06-15 RX ADMIN — ALBUTEROL SULFATE 2.5 MG: 2.5 SOLUTION RESPIRATORY (INHALATION) at 06:53

## 2017-06-15 RX ADMIN — IPRATROPIUM BROMIDE AND ALBUTEROL SULFATE 3 ML: .5; 3 SOLUTION RESPIRATORY (INHALATION) at 08:51

## 2017-06-15 RX ADMIN — SENNOSIDES AND DOCUSATE SODIUM 1 TABLET: 8.6; 5 TABLET ORAL at 21:15

## 2017-06-15 RX ADMIN — MONTELUKAST 10 MG: 10 TABLET, FILM COATED ORAL at 21:15

## 2017-06-15 RX ADMIN — IPRATROPIUM BROMIDE AND ALBUTEROL SULFATE 3 ML: .5; 3 SOLUTION RESPIRATORY (INHALATION) at 01:25

## 2017-06-15 RX ADMIN — IPRATROPIUM BROMIDE AND ALBUTEROL SULFATE 3 ML: .5; 3 SOLUTION RESPIRATORY (INHALATION) at 12:03

## 2017-06-15 RX ADMIN — METHYLPREDNISOLONE SODIUM SUCCINATE 62.5 MG: 125 INJECTION, POWDER, FOR SOLUTION INTRAMUSCULAR; INTRAVENOUS at 23:18

## 2017-06-15 RX ADMIN — AZITHROMYCIN 250 MG: 250 TABLET, FILM COATED ORAL at 19:13

## 2017-06-15 RX ADMIN — ALBUTEROL SULFATE 2.5 MG: 2.5 SOLUTION RESPIRATORY (INHALATION) at 19:12

## 2017-06-15 RX ADMIN — ALBUTEROL SULFATE 2.5 MG: 2.5 SOLUTION RESPIRATORY (INHALATION) at 23:19

## 2017-06-15 RX ADMIN — ALBUTEROL SULFATE 2.5 MG: 2.5 SOLUTION RESPIRATORY (INHALATION) at 11:10

## 2017-06-15 RX ADMIN — METOPROLOL SUCCINATE 25 MG: 25 TABLET, EXTENDED RELEASE ORAL at 09:21

## 2017-06-15 RX ADMIN — ASPIRIN 325 MG ORAL TABLET 325 MG: 325 PILL ORAL at 09:21

## 2017-06-15 NOTE — PROGRESS NOTES
[facility]    Hospitalist Progress Note    Date of Service (when I saw the patient): 06/15/2017    Assessment & Plan   Preeti Ford is a 66 year old female who was admitted on 6/14/2017.     Active Problems:    Acute respiratory failure with hypoxia (H)    Assessment: small improvement from admission.  Less SOB.  Air movement is improved but remains very wheezy and gets SOB with minimal activity.     Plan: continue IV solumedrol as well as scheduled duonebs and albuterol nebs for now.       COPD exacerbation (H)    Assessment: as above    Plan: as above      History of stroke - right thalamus in 8/2013    Assessment: noted past history    Plan: continue home meds      Tobacco use disorder    Assessment: advised no smoking    Plan: nicotine patch      Ischemic cardiomyopathy - EF 25% in 2015 but 55% in 3/2017    Assessment: no signs of CHF     Plan: monitor I and O's      HTN, goal below 130/80    Assessment: BP running slightly high    Plan: increase metoprolol      PAD (peripheral artery disease) (H)    Assessment: noted past history without acute symptoms    Plan: no intervention      Coronary artery disease involving native coronary artery - STEMI with LAD and circ stents in 8/2015    Assessment: mild bump in her troponin but asymptomatic and troponin coming down.  Do not suspect MI but rather mild demand ischemia from her COPD    Plan: no need to follow troponin unless her symptoms change or worsen      Elevated troponin    Assessment: as above    Plan: as above    DVT Prophylaxis: Pneumatic Compression Devices  Code Status: Full Code    Disposition: Expected discharge in 1-3 days once acute respiratory failure has resolved.    Dereck Faulkner MD    Interval History   Slightly less SOB.  Cough has now loosened    -Data reviewed today: I reviewed all new labs and imaging results over the last 24 hours. I personally reviewed no images or EKG's today.    Physical Exam    Temp: 97.5  F (36.4  C) Temp src: Oral BP: 172/63 Pulse: 89 Heart Rate: 89 Resp: 20 SpO2: 98 % O2 Device: Nasal cannula Oxygen Delivery: 2 LPM  Vitals:    06/14/17 1347 06/14/17 2047   Weight: 50.3 kg (111 lb) 48.5 kg (106 lb 14.8 oz)     Vital Signs with Ranges  Temp:  [96  F (35.6  C)-98.4  F (36.9  C)] 97.5  F (36.4  C)  Pulse:  [77-89] 89  Heart Rate:  [77-90] 89  Resp:  [17-24] 20  BP: (142-189)/(63-89) 172/63  SpO2:  [79 %-99 %] 98 %  I/O last 3 completed shifts:  In: 240 [P.O.:240]  Out: 650 [Urine:650]    Lungs:    Air movement has improved but remains diminished and still has diffuse expiratory wheezing throughout and her work of breathing remains increased.     Cardiovascular:   RRR with no murmur, rub or gallop     Abdomen:   +BS.  Soft, NT         Medications        metoprolol (TOPROL-XL) 24 hr tablet 25 mg  25 mg Oral QAM     aspirin  325 mg Oral Daily     atorvastatin  10 mg Oral At Bedtime     montelukast (SINGULAIR) tablet 10 mg  10 mg Oral At Bedtime     nicotine  1 patch Transdermal Daily     senna-docusate  1-2 tablet Oral BID     methylPREDNISolone  62.5 mg Intravenous Q8H     ipratropium - albuterol 0.5 mg/2.5 mg/3 mL  3 mL Nebulization Q4H     albuterol  2.5 mg Nebulization Q4H While awake     azithromycin  250 mg Oral Daily     nicotine   Transdermal Daily     nicotine   Transdermal Q8H       Data     Recent Labs  Lab 06/15/17  0533 06/14/17  1910 06/14/17  1408   WBC  --   --  6.1   HGB  --   --  13.8   MCV  --   --  86   PLT  --   --  208   NA  --   --  141   POTASSIUM  --   --  4.4   CHLORIDE  --   --  105   CO2  --   --  29   BUN  --   --  15   CR  --   --  0.66   ANIONGAP  --   --  7   CALDERON  --   --  8.8   GLC  --   --  110*   TROPI 0.348* 0.364* 0.356*       Recent Results (from the past 24 hour(s))   XR Chest 2 Views    Narrative    CHEST TWO VIEWS   6/14/2017 2:40 PM     HISTORY: Short of air.    COMPARISON: Chest x-rays dated 6/12/2017, 3/6/2017 and 2/12/2016.    FINDINGS:   Hyperaeration is again noted. Probable mild scarring in the  right medial base is stable. Lungs are otherwise clear. Blunting of  the costophrenic angles is stable since the prior study and likely  secondary to hyperaeration and scarring. Lungs are otherwise clear.  Heart size and pulmonary vascularity are within normal limits. No  pneumothorax is identified. No significant pleural fluid collection is  seen.      Impression    IMPRESSION: Probable right medial basilar scarring versus atelectasis.  No other evidence of acute cardiopulmonary disease is seen.     OZIEL OHARA MD   CT Chest Pulmonary Embolism w Contrast    Narrative    CT CHEST PULMONARY EMBOLISM WITH CONTRAST 6/14/2017 5:30 PM     HISTORY: Dyspnea    CONTRAST DOSE: 70mL Isovue 370     Radiation dose for this scan was reduced using automated exposure  control, adjustment of the mA and/or kV according to patient size, or  iterative reconstruction technique.    FINDINGS: There is a good contrast bolus within the pulmonary  arteries. No pulmonary arterial filling defects are demonstrated to  indicate pulmonary embolism. Minimal contrast is present within the  aorta precluding evaluation for dissection. Scattered aortic  calcifications are noted. Coronary artery calcification is also noted.  The mediastinum and alize are otherwise unremarkable. Pulmonary  emphysematous changes are noted. There is a calcified granuloma in the  left posterior costophrenic angle. The lungs are otherwise clear. No  pleural effusion or pneumothorax.      Impression    IMPRESSION:  1. Marked pulmonary emphysematous changes.  2. No CT evidence of pulmonary embolism.  3. Coronary artery and aortic calcification.    WALDO ZEPEDA MD

## 2017-06-15 NOTE — CONSULTS
Care Transition Initial Assessment - RN    Reason For Consult: discharge planning   Met with: Patient.    DATA   Active Problems:    Tobacco use disorder    HTN, goal below 130/80    PAD (peripheral artery disease) (H)    Acute respiratory failure with hypoxia (H)    COPD exacerbation (H)    Coronary artery disease involving native coronary artery - STEMI with LAD and circ stents in 8/2015    Elevated troponin    History of stroke - right thalamus in 8/2013    Ischemic cardiomyopathy - EF 25% in 2015 but 55% in 3/2017       Cognitive Status: awake, alert and oriented.  [facility]  Primary Care MD Name: Kulwinder  Contact information and PCP information verified: Yes    Lives With: spouse  Living Arrangements: house     Description of Support System: Involved   Who is your support system?:    Support Assessment: Adequate family and caregiver support (needs to support patient in stoping smoking)     Insurance concerns: No Insurance issues identified    ASSESSMENT  Patient currently receives the following services:  none        Identified issues/concerns regarding health management: Patient continues to smoke going home on O2 and verbalizes that she is done smoking now and hopes her  will quit too.      PLAN  Financial costs for the patient include none .  Patient given options and choices for discharge Yes .  Patient/family is agreeable to the plan?  Yes:   Patient anticipates discharging to home with clinic follow up .        Patient anticipates needs for home equipment: Yes Home O2  Discharge Planner   Discharge Plans in progress: home with O2 and clinic follow up education given on COPD with Yellow stop sign symptoms  Barriers to discharge plan:  still smoking in the house  Follow up plan: Patient will follow up at the clinic and have  quit smoking or go outside       Entered by: Lisa Dawson 06/15/2017 11:34 AM           Plan/Disposition: Home    Appointments: Made at discharge    BILL Dawson CTS RN    Care  (CTS) will continue to follow as needed.

## 2017-06-15 NOTE — PLAN OF CARE
Problem: Goal Outcome Summary  Goal: Goal Outcome Summary  Outcome: No Change  Pt continues to be dyspneic with any activity. Has been up to the bedside commode to void, desats to 89% on 2Lnc, then to 95% when at rest, recovers in 1-2 minutes. Pt feels more dyspneic just before scheduled nebs are due to be given. Has congested cough, nonproductive. Tele is in NSR. Will continue with respiratory monitoring, scheduled nebs, prednisone, abx.

## 2017-06-15 NOTE — PROGRESS NOTES
S- Respiratory Therapy  B- COPD  A- Patient continues to require oxygen 2 liters via nasal cannula to maintain SpO2.  Currently SpO2 is 92 to 95 on 2 liters.  Patient has a frequent loose congested cough producing thick yellow green secretions.  Breath sounds are diminished with wheezing increased at times.  R- Patient wishes to continue with rotating nebulizer treatments every two hours during the day until dyspnea and congestion improves.  RCP will wean off oxygen as able to maintain SpO2.  Patient has no preference as to a home oxygen company if needed at discharge.   R- RCP will follow.

## 2017-06-15 NOTE — PROGRESS NOTES
SPIRITUAL HEALTH SERVICES  SPIRITUAL ASSESSMENT Progress Note  [facility]      PRIMARY FOCUS:     Emotional/spiritual/Scientologist distress    Support for coping    ILLNESS CIRCUMSTANCES:     Reviewed documentation.     Context of Serious Illness/Symptoms - COPD    Resources for Support -     DISTRESS:     Emotional/Spiritual/Existential Distress - In addition to dealing with a chronic illness, pt is trying to emotionally support her father in [place] who has Alzheimer's and is in hospice.  She said her siblings aren't interested in supporting him, so, outside of his brother and sister, she is on her own.    Presybeterian Distress - Not Applicable    Social/Cultural/Economic Distress - Not Discussed    SPIRITUAL/Mandaeism COPING:     Gnosticism/Karen - Confucianist    Spiritual Practice - prayer -  provided pt with both prayer and a prayer shawl.    GOALS OF CARE:    Goals of Care - Address COPD    Meaning/Sense-Making -  talked with pt about Artur sympathizing with her struggles since he probably  from respiratory issues.  Pt stated she had never thought of that.    PLAN:  will follow up tomorrow.    Judah Hung M.Div., [facility]  Staff   Office tel: [phone]

## 2017-06-15 NOTE — H&P
[facility]    History and Physical  Hospitalist       Date of Admission:  6/14/2017  Date of Service (when I saw the patient): 06/14/17    Assessment & Plan       Active Problems:    Acute respiratory failure with hypoxia (H)    Assessment: secondary to a COPD exacerbation.  No PE or infiltrates on CT to suggest pneumonia.  Elevated BNP but no signs of pulmonary edema on CT or edema on exam.  Elevated troponin but no clear EKG changes suggesting ischemia and repeat troponin is stable.   She has been using nebs, oral prednisone, doxycycline as an outpatient and continues to worsen.  She presents with sats in the 70's and continues to have diffuse wheezing, with increased work of breathing and will therefore need inpatient management.     Plan: admit to inpatient, change to IV steroids, start oral zithromax, scheduled duonebs and albuterol nebs in addition to prn albuterol nebs, oxygen and monitor her oxygen sats       COPD exacerbation (H)    Assessment: as above.  Poor air movement and ongoing wheezing    Plan: as above      History of stroke - right thalamus in 8/2013    Assessment: noted past history.  No acute symptoms    Plan: continue ASA.      Tobacco use disorder    Assessment: ongoing despite her significant COPD, CVA, PAD and CAD    Plan: start nicotine patch and advised to quit smoking      Ischemic cardiomyopathy - EF 25% in 2015 but 55% in 3/2017    Assessment: no signs of acute CHF.  Not on ACEI due to cough but unclear why she is not on an ARB.     Plan: continue beta blocker and monitor intake and output      HTN, goal below 130/80    Assessment: controlled    Plan: continue metoprolol      PAD (peripheral artery disease) (H)    Assessment: had aortobiiliac bypass in 3/2017 and doing well    Plan: no acute intervention      Coronary artery disease involving native coronary artery - STEMI with LAD and circ stents in 8/2015    Assessment: as above    Plan: continue ASA,  beta blocker and statin      Elevated troponin    Assessment: repeat troponin stable from the first check and MI is not suspected.  EKG with J point elevation in V3 but otherwise no acute ischemic changes    Plan: recheck tomorrow am    DVT Prophylaxis: Pneumatic Compression Devices  Code Status: Full Code    Disposition: Expected discharge in 2-3 days once acute hypoxic respiratory failure has resolved.    Dereck Faulkner MD    Primary Care Physician   Avelina Miramontes MD    Chief Complaint   SOB    History is obtained from the patient    History of Present Illness   Preeti JEROME Ford is a 66 year old female who presents with SOB and cough.  She has had a cough for the last couple of weeks.  She was seen in clinic two days ago and started on prednisone and doxycycline.  She has been using prn albuterol inhaler as well as duonebs every 6 hours but her breathing is not improving.  She has not had fever, chills or sweats.  She has some chest tightness in her upper chest but says it does not feel like the symptoms she had when she had her MI.  With ongoing symptoms she presented to the ED and is now admitted.     Past Medical History    I have reviewed this patient's medical history and updated it with pertinent information if needed.   Past Medical History:   Diagnosis Date     Arthritis      COPD (chronic obstructive pulmonary disease) (H)      Coronary artery disease      CVA (cerebral vascular accident) (H) 8-     Hypertension        Past Surgical History   I have reviewed this patient's surgical history and updated it with pertinent information if needed.  Past Surgical History:   Procedure Laterality Date     APPENDECTOMY       BYPASS GRAFT AORTOILIAC N/A 3/7/2017    Procedure: BYPASS GRAFT AORTOILIAC;  Surgeon: Marcos Pratt MD;  Location: [facility] OR     SALPINGO OOPHORECTOMY,R/L/DELORES      Salpingo Oophorectomy, RT/LT/DELORES       Prior to Admission Medications   Prior to Admission  Medications   Prescriptions Last Dose Informant Patient Reported? Taking?   Calcium Carbonate-Vitamin D (OSCAL 500/200 D-3 PO) Unknown at Unknown time Self Yes No   Sig: Take 1 tablet by mouth 2 times daily   Coenzyme Q10 (COQ10 PO) Past Week at Unknown time Self Yes Yes   Sig: Take 100 mg by mouth every 24 hours (at 16:00)   Metoprolol Succinate (TOPROL XL PO) 6/14/2017 at 0800 Self Yes Yes   Sig: Take 12.5 mg by mouth every morning (patient takes 0.5 X 25 mg = 12.5 mg dose)   Misc Natural Products (TURMERIC CURCUMIN) CAPS Unknown at Unknown time Self Yes No   Sig: Take 1 capsule by mouth every 24 hours (at 16:00)   Montelukast Sodium (SINGULAIR PO) 6/13/2017 at 2100 Self Yes Yes   Sig: Take 10 mg by mouth At Bedtime   SPIRIVA HANDIHALER 18 MCG capsule 6/13/2017 at 1600  No Yes   Sig: USING THE HANDIHALER, INHALE ONE CAPSULE BY MOUTH EVERY DAY   SYMBICORT 160-4.5 MCG/ACT inhaler 6/14/2017 at 0700 Self No Yes   Sig: INHALE TWO PUFFS BY MOUTH TWICE A DAY   albuterol (PROAIR HFA, PROVENTIL HFA, VENTOLIN HFA) 108 (90 BASE) MCG/ACT inhaler 6/14/2017 at 0830 Self No Yes   Sig: Inhale 2 puffs into the lungs every 6 hours as needed for shortness of breath / dyspnea   aspirin 325 MG tablet 6/14/2017 at 0700  No Yes   Sig: Take 1 tablet (325 mg) by mouth daily   atorvastatin (LIPITOR) 10 MG tablet 6/13/2017 at 2100  No Yes   Sig: Take 1 tablet (10 mg) by mouth At Bedtime   clindamycin (CLEOCIN) 300 MG capsule Unknown at Unknown time  No No   Sig: Take 2 capsules (600mg) by mouth once for 1 dose, Take 1 hour prior to invasive procedure (dental cleaning).   doxycycline (VIBRA-TABS) 100 MG tablet 6/14/2017 at 0700  No Yes   Sig: Take 1 tablet (100 mg) by mouth 2 times daily   ipratropium - albuterol 0.5 mg/2.5 mg/3 mL (DUONEB) 0.5-2.5 (3) MG/3ML neb solution 6/14/2017 at 0800  No Yes   Sig: Take 1 vial (3 mLs) by nebulization every 6 hours as needed for shortness of breath / dyspnea or wheezing   nicotine (NICODERM CQ) 14  MG/24HR 24 hr patch Unknown at Unknown time Self Yes No   Sig: Place 1 patch onto the skin every 24 hours Reported on 4/10/2017   predniSONE (DELTASONE) 20 MG tablet 2017 at 0700  No Yes   Si tabs (40 mg) orally daily for 3 days, 1 tab (20 mg) orally daily for 3 days, then 1/2 tab (10 mg) orally for 3 days   senna-docusate (SENOKOT-S;PERICOLACE) 8.6-50 MG per tablet Unknown at Unknown time  No No   Sig: Take 1-2 tablets by mouth 2 times daily      Facility-Administered Medications: None     Allergies   Allergies   Allergen Reactions     Crestor [Rosuvastatin] Other (See Comments)     dizziness     Hmg-Coa-R Inhibitors Other (See Comments)     Muscle/joint aching     Lisinopril Cough     Optison [Albumin Human] Other (See Comments)     Pt was flush and very dizzy.  Also had a BP drop     Penicillins Rash       Social History   I have reviewed this patient's social history and updated it with pertinent information if needed. Preeti Ford  reports that she has been smoking Cigarettes.  She has been smoking about 0.50 packs per day. She has never used smokeless tobacco. She reports that she does not drink alcohol or use illicit drugs.    Family History   I have reviewed this patient's family history and updated it with pertinent information if needed.   Family History   Problem Relation Age of Onset     CEREBROVASCULAR DISEASE Mother      CEREBROVASCULAR DISEASE Father      Genitourinary Problems Brother      Dialysis       Review of Systems   The 10 point Review of Systems is negative other than noted in the HPI or here.     Physical Exam   Temp: 98.4  F (36.9  C)   BP: 161/76   Heart Rate: 84 Resp: 17 SpO2: 96 % O2 Device: Nasal cannula Oxygen Delivery: 1 LPM  Vital Signs with Ranges  Temp:  [98.4  F (36.9  C)] 98.4  F (36.9  C)  Heart Rate:  [80-90] 84  Resp:  [17-24] 17  BP: (142-189)/(63-89) 161/76  SpO2:  [79 %-99 %] 96 %  111 lbs 0 oz    Constitutional:   awake, alert, cooperative, moderate  increased work of breathing, cachectic, and appears stated age     Eyes:   Lids and lashes normal, pupils equal, round and reactive to light, extra ocular muscles intact, sclera clear, conjunctiva normal     ENT:   Normocephalic, without obvious abnormality, atraumatic, sinuses nontender on palpation, external ears without lesions, oral pharynx with moist mucous membranes, tonsils without erythema or exudates, gums normal and good dentition.     Neck:   Supple, symmetrical, trachea midline, no adenopathy, thyroid symmetric, not enlarged and no tenderness, skin normal     Hematologic / Lymphatic:   no cervical lymphadenopathy and no supraclavicular lymphadenopathy     Back:   Symmetric, no curvature, spinous processes are non-tender on palpation, paraspinous muscles are non-tender on palpation, no costal vertebral tenderness     Lungs:   Moderate increased work of breathing, decreased BS throughout with diffuse expiratory wheezing.  No rales or rhonchi     Cardiovascular:   Normal apical impulse, regular rate and rhythm, normal S1 and S2, no S3 or S4, and no murmur noted     Abdomen:   normal bowel sounds, soft, non-distended, non-tender, no masses palpated, no hepatosplenomegally     Neurologic:   Awake, alert, oriented to name, place and time.  Cranial nerves II-XII are grossly intact.  Motor is 5 out of 5 bilaterally.   Sensory is intact.         Data   Data reviewed today:  I personally reviewed the EKG tracing showing NSR with J point elevation isolated to V3 but no acute ischemic changes noted.    Recent Labs  Lab 06/14/17  1910 06/14/17  1408   WBC  --  6.1   HGB  --  13.8   MCV  --  86   PLT  --  208   NA  --  141   POTASSIUM  --  4.4   CHLORIDE  --  105   CO2  --  29   BUN  --  15   CR  --  0.66   ANIONGAP  --  7   CALDERON  --  8.8   GLC  --  110*   TROPI 0.364* 0.356*       Recent Results (from the past 24 hour(s))   XR Chest 2 Views    Narrative    CHEST TWO VIEWS   6/14/2017 2:40 PM     HISTORY: Short of  air.    COMPARISON: Chest x-rays dated 6/12/2017, 3/6/2017 and 2/12/2016.    FINDINGS:  Hyperaeration is again noted. Probable mild scarring in the  right medial base is stable. Lungs are otherwise clear. Blunting of  the costophrenic angles is stable since the prior study and likely  secondary to hyperaeration and scarring. Lungs are otherwise clear.  Heart size and pulmonary vascularity are within normal limits. No  pneumothorax is identified. No significant pleural fluid collection is  seen.      Impression    IMPRESSION: Probable right medial basilar scarring versus atelectasis.  No other evidence of acute cardiopulmonary disease is seen.     OZIEL OHARA MD   CT Chest Pulmonary Embolism w Contrast    Narrative    CT CHEST PULMONARY EMBOLISM WITH CONTRAST 6/14/2017 5:30 PM     HISTORY: Dyspnea    CONTRAST DOSE: 70mL Isovue 370     Radiation dose for this scan was reduced using automated exposure  control, adjustment of the mA and/or kV according to patient size, or  iterative reconstruction technique.    FINDINGS: There is a good contrast bolus within the pulmonary  arteries. No pulmonary arterial filling defects are demonstrated to  indicate pulmonary embolism. Minimal contrast is present within the  aorta precluding evaluation for dissection. Scattered aortic  calcifications are noted. Coronary artery calcification is also noted.  The mediastinum and alize are otherwise unremarkable. Pulmonary  emphysematous changes are noted. There is a calcified granuloma in the  left posterior costophrenic angle. The lungs are otherwise clear. No  pleural effusion or pneumothorax.      Impression    IMPRESSION:  1. Marked pulmonary emphysematous changes.  2. No CT evidence of pulmonary embolism.  3. Coronary artery and aortic calcification.

## 2017-06-15 NOTE — PROGRESS NOTES
S-(situation): Patient arrives to room 267 via cart from ED     B-(background): respiratory distress    A-(assessment): pt is alert and oriented, SOA with activity. Pt denies pain at this time. Lungs are diminished and has rhonchi and exp wheezes t/o. Using 2L nc with O2 sats 95%.     R-(recommendations): Orders reviewed with pt. Will monitor patient per MD orders. Will closely monitor resp status, steroids, abx, nebs every two hours.     Inpatient nursing criteria listed below were met:    Health care directives status obtained and documented: Yes  Core Measures assessed (SSI): Yes  SCD's Documented: Yes  Vaccine assessment done and vaccines ordered if appropriate: Yes  Skin issues/needs documented:NA  Isolation needs addressed, if appropriate: NA  Fall Prevention: Care plan updated, Education given and documented Yes  MRSA swab completed for patient 55 years and older (exclude KRISTEN and TKA): Yes  My Chart patient sign up addressed and documented: Yes  Care Plan initiated: Yes  Education Assessment documented:Yes  Education Documented (Pre-existing chronic infection such as, MRSA/VRE need education on admission): Yes  New medication patient education completed and documented (Possible Side Effects of Common Medications handout): Yes  Home medications if not able to send immediately home with family stored here: NA   Reminder note placed in discharge instructions: NA  Discharge planning review completed (admission navigator) Yes

## 2017-06-15 NOTE — PLAN OF CARE
Problem: Goal Outcome Summary  Goal: Goal Outcome Summary  Outcome: No Change  S-(situation): End of shift note.     B-(background): COPD exacerbation.     A-(assessment): Patient alert and oriented, BP elevated at 177/73, metoprolol increased this am. Lungs sound with exp wheezes and occasional insp wheezes. At noon patient short of air, sats in 90's on 2L NC but can tell is working to breath. Duo neb given early. Alternating albuterol and duonebs every 2 hours. Lungs sounding very tight. Afebrile. Telemetry is SR. Patient pleasant.     R-(recommendations): Cont to monitor.

## 2017-06-16 LAB
ANION GAP SERPL CALCULATED.3IONS-SCNC: 5 MMOL/L (ref 3–14)
BUN SERPL-MCNC: 24 MG/DL (ref 7–30)
CALCIUM SERPL-MCNC: 8.8 MG/DL (ref 8.5–10.1)
CHLORIDE SERPL-SCNC: 101 MMOL/L (ref 94–109)
CO2 SERPL-SCNC: 34 MMOL/L (ref 20–32)
CREAT SERPL-MCNC: 0.67 MG/DL (ref 0.52–1.04)
GFR SERPL CREATININE-BSD FRML MDRD: 89 ML/MIN/1.7M2
GLUCOSE SERPL-MCNC: 124 MG/DL (ref 70–99)
POTASSIUM SERPL-SCNC: 4.5 MMOL/L (ref 3.4–5.3)
SODIUM SERPL-SCNC: 140 MMOL/L (ref 133–144)

## 2017-06-16 PROCEDURE — 25000125 ZZHC RX 250: Performed by: PEDIATRICS

## 2017-06-16 PROCEDURE — 12000007 ZZH R&B INTERMEDIATE

## 2017-06-16 PROCEDURE — 36415 COLL VENOUS BLD VENIPUNCTURE: CPT | Performed by: INTERNAL MEDICINE

## 2017-06-16 PROCEDURE — 25000125 ZZHC RX 250: Performed by: INTERNAL MEDICINE

## 2017-06-16 PROCEDURE — 94640 AIRWAY INHALATION TREATMENT: CPT | Mod: 76

## 2017-06-16 PROCEDURE — 25000132 ZZH RX MED GY IP 250 OP 250 PS 637: Mod: GY | Performed by: INTERNAL MEDICINE

## 2017-06-16 PROCEDURE — 94640 AIRWAY INHALATION TREATMENT: CPT

## 2017-06-16 PROCEDURE — 25000128 H RX IP 250 OP 636: Performed by: INTERNAL MEDICINE

## 2017-06-16 PROCEDURE — 99232 SBSQ HOSP IP/OBS MODERATE 35: CPT | Performed by: PEDIATRICS

## 2017-06-16 PROCEDURE — 80048 BASIC METABOLIC PNL TOTAL CA: CPT | Performed by: INTERNAL MEDICINE

## 2017-06-16 PROCEDURE — A9270 NON-COVERED ITEM OR SERVICE: HCPCS | Mod: GY | Performed by: INTERNAL MEDICINE

## 2017-06-16 RX ORDER — PREDNISONE 20 MG/1
40 TABLET ORAL DAILY
Status: DISCONTINUED | OUTPATIENT
Start: 2017-06-16 | End: 2017-06-17 | Stop reason: HOSPADM

## 2017-06-16 RX ADMIN — MONTELUKAST 10 MG: 10 TABLET, FILM COATED ORAL at 20:37

## 2017-06-16 RX ADMIN — IPRATROPIUM BROMIDE AND ALBUTEROL SULFATE 3 ML: .5; 3 SOLUTION RESPIRATORY (INHALATION) at 08:54

## 2017-06-16 RX ADMIN — IPRATROPIUM BROMIDE AND ALBUTEROL SULFATE 3 ML: .5; 3 SOLUTION RESPIRATORY (INHALATION) at 01:07

## 2017-06-16 RX ADMIN — IPRATROPIUM BROMIDE AND ALBUTEROL SULFATE 3 ML: .5; 3 SOLUTION RESPIRATORY (INHALATION) at 20:38

## 2017-06-16 RX ADMIN — SENNOSIDES AND DOCUSATE SODIUM 2 TABLET: 8.6; 5 TABLET ORAL at 08:19

## 2017-06-16 RX ADMIN — IPRATROPIUM BROMIDE AND ALBUTEROL SULFATE 3 ML: .5; 3 SOLUTION RESPIRATORY (INHALATION) at 05:24

## 2017-06-16 RX ADMIN — METOPROLOL SUCCINATE 25 MG: 25 TABLET, EXTENDED RELEASE ORAL at 08:20

## 2017-06-16 RX ADMIN — ASPIRIN 325 MG ORAL TABLET 325 MG: 325 PILL ORAL at 08:20

## 2017-06-16 RX ADMIN — AZITHROMYCIN 250 MG: 250 TABLET, FILM COATED ORAL at 20:37

## 2017-06-16 RX ADMIN — ALBUTEROL SULFATE 2.5 MG: 2.5 SOLUTION RESPIRATORY (INHALATION) at 06:50

## 2017-06-16 RX ADMIN — IPRATROPIUM BROMIDE AND ALBUTEROL SULFATE 3 ML: .5; 3 SOLUTION RESPIRATORY (INHALATION) at 16:33

## 2017-06-16 RX ADMIN — ALBUTEROL SULFATE 2.5 MG: 2.5 SOLUTION RESPIRATORY (INHALATION) at 18:38

## 2017-06-16 RX ADMIN — ATORVASTATIN CALCIUM 10 MG: 10 TABLET, FILM COATED ORAL at 20:37

## 2017-06-16 RX ADMIN — NICOTINE 1 PATCH: 14 PATCH, EXTENDED RELEASE TRANSDERMAL at 08:20

## 2017-06-16 RX ADMIN — SENNOSIDES AND DOCUSATE SODIUM 2 TABLET: 8.6; 5 TABLET ORAL at 21:50

## 2017-06-16 RX ADMIN — PREDNISONE 40 MG: 20 TABLET ORAL at 11:41

## 2017-06-16 RX ADMIN — METHYLPREDNISOLONE SODIUM SUCCINATE 62.5 MG: 125 INJECTION, POWDER, FOR SOLUTION INTRAMUSCULAR; INTRAVENOUS at 08:20

## 2017-06-16 RX ADMIN — IPRATROPIUM BROMIDE AND ALBUTEROL SULFATE 3 ML: .5; 3 SOLUTION RESPIRATORY (INHALATION) at 13:08

## 2017-06-16 NOTE — PROGRESS NOTES
S  Respiratory Care B  COPD  A  Breath sounds diminished with a few wheezes in bases. Oxygen weaned to room air this shift and nebulizer treatments reduced to q4 Duonebs and PRN Albuterol. Nonproductive cough. R  Follow until discharge; monitor respiratory status and provide nebulizer treatments.

## 2017-06-16 NOTE — PROGRESS NOTES
[facility]    Hospitalist Progress Note    Date of Service (when I saw the patient): 06/16/2017    Assessment & Plan   Preeti Ford is a 66 year old female who was admitted on 6/14/2017 due to COPD exacerbation with acute respiratory failure.  Her respiratory status is improving.  So far, her chronic cardiovascular problems including CAD, ischemic cardiomyopathy with chronic systolic CHF, and PAD have been stable.  Initially elevated troponin was attributed to severe hypoxia from her acute on chronic lung disease rather than an acute coronary syndrome.    Principal Problem:    COPD exacerbation (H)  Active Problems:    Ischemic cardiomyopathy - EF 25% in 2015 but 55% in 3/2017    HTN, goal below 130/80    PAD (peripheral artery disease) (H)    Acute respiratory failure with hypoxia (H)    Coronary artery disease involving native coronary artery - STEMI with LAD and circ stents in 8/2015    Elevated troponin    History of stroke - right thalamus in 8/2013    Tobacco use disorder    Switch IV to oral corticosteroids with prednisone today  Reduce frequency of bronchodilator nebulization treatments from every 2 hours to every 4 hours  Advance activity, encourage ambulation today  Wean oxygen as tolerated for saturations above 88%    DVT Prophylaxis: Low Risk/Ambulatory with no VTE prophylaxis indicated  Code Status: Full Code    Disposition: Expected discharge tomorrow to home.    Gael Bass MD    Interval History   She is feeling better today.  She is not dyspneic at rest.  She continues to get dyspneic with activity but is recovering more quickly.  Cough persists but is improving and seems looser.  She remains afebrile.  She has been stable hemodynamically with mildly to moderately elevated blood pressures.  Oxygenation has fluctuated, but she has weaned to room air today at rest although transiently used oxygen overnight.  She is tolerating good oral intake.  She is voiding  well.  She feels a bit unsteady on her feet today with wobbly legs during ambulation but denies dizziness or other weakness.    -Data reviewed today: I reviewed all new labs and imaging results over the last 24 hours. I personally reviewed no images or EKG's today.    Physical Exam   Temp: 97  F (36.1  C) Temp src: Oral BP: 165/67 Pulse: 83 Heart Rate: 83 Resp: 18 SpO2: 92 % O2 Device: None (Room air) Oxygen Delivery: 1 LPM  Vitals:    06/14/17 1347 06/14/17 2047   Weight: 50.3 kg (111 lb) 48.5 kg (106 lb 14.8 oz)     Vital Signs with Ranges  Temp:  [96.3  F (35.7  C)-97.7  F (36.5  C)] 97  F (36.1  C)  Pulse:  [80-89] 83  Heart Rate:  [65-89] 83  Resp:  [18-20] 18  BP: (152-169)/(55-76) 165/67  SpO2:  [77 %-100 %] 92 %  I/O last 3 completed shifts:  In: 1630 [P.O.:1630]  Out: 2000 [Urine:2000]    Constitutional: No acute distress sitting in a chair  Respiratory: Normal respiratory effort, diminished breath sounds throughout, clear lungs  Cardiovascular: Regular rate and rhythm    Medications        predniSONE  40 mg Oral Daily     metoprolol (TOPROL-XL) 24 hr tablet 25 mg  25 mg Oral QAM     aspirin  325 mg Oral Daily     atorvastatin  10 mg Oral At Bedtime     montelukast (SINGULAIR) tablet 10 mg  10 mg Oral At Bedtime     nicotine  1 patch Transdermal Daily     senna-docusate  1-2 tablet Oral BID     ipratropium - albuterol 0.5 mg/2.5 mg/3 mL  3 mL Nebulization Q4H     azithromycin  250 mg Oral Daily     nicotine   Transdermal Daily     nicotine   Transdermal Q8H       Data   Data reviewed today:  I personally reviewed no images or EKG's today.    Recent Labs  Lab 06/16/17  0625 06/15/17  0533 06/14/17  1910 06/14/17  1408   WBC  --   --   --  6.1   HGB  --   --   --  13.8   MCV  --   --   --  86   PLT  --   --   --  208     --   --  141   POTASSIUM 4.5  --   --  4.4   CHLORIDE 101  --   --  105   CO2 34*  --   --  29   BUN 24  --   --  15   CR 0.67  --   --  0.66   ANIONGAP 5  --   --  7   CALDERON 8.8  --   --   8.8   *  --   --  110*   TROPI  --  0.348* 0.364* 0.356*     Sputum culture is pending, nasal culture for MRSA was negative

## 2017-06-16 NOTE — PLAN OF CARE
Problem: Goal Outcome Summary  Goal: Goal Outcome Summary  Outcome: Improving  Pt has increased air exchange but still is dyspneic with activity and has coarse expiratory wheezes. Has been on RA t/o night, O2 sats 90-93% when at rest. Pt desatted to 77% on RA when up to the bathroom at beginning of the night, recovered to low 90s within 2 minutes. desatted again when up to the bathroom later in the night to 85% and again recovered in 1-2 minutes to 93%. Continues to have congested cough, scant sputum, brown. Has 1+ edema to ankles and legs. Denies pain, voiding well, pt reports overall feeling better. Will continue to monitor respiratory status, I and O.

## 2017-06-16 NOTE — PLAN OF CARE
Problem: Goal Outcome Summary  Goal: Goal Outcome Summary  Outcome: Improving  Vitals stable, afebrile. O2 sats 90-97 on room air. Lungs diminished with expiratory wheezes, air exchange improves after nebs. 1 prn Albuteral neb given in addition to the scheduled Duo nebs. Pt ambulated in the hallway x5, pt does become short of breath with ambulating but O2 sats stayed above 90%. Loose, harsh sounding cough, worse after activity.

## 2017-06-16 NOTE — PLAN OF CARE
"Problem: Goal Outcome Summary  Goal: Goal Outcome Summary  Outcome: Improving  LS exp wheezes, 2 L O2 n/c sat's mid 90's, saline locked with scheduled Zithromax po, solu medrol. Up with SBA, pt is not home O2 dependent, sitting on bed in a tripod position occasionally, states she is feeling \"much better.\" Adequate oral intake, voiding.      "

## 2017-06-17 VITALS
DIASTOLIC BLOOD PRESSURE: 63 MMHG | BODY MASS INDEX: 19.68 KG/M2 | RESPIRATION RATE: 18 BRPM | OXYGEN SATURATION: 96 % | SYSTOLIC BLOOD PRESSURE: 146 MMHG | HEIGHT: 62 IN | TEMPERATURE: 96.6 F | WEIGHT: 106.92 LBS | HEART RATE: 84 BPM

## 2017-06-17 PROCEDURE — 40000275 ZZH STATISTIC RCP TIME EA 10 MIN

## 2017-06-17 PROCEDURE — 99239 HOSP IP/OBS DSCHRG MGMT >30: CPT | Performed by: PEDIATRICS

## 2017-06-17 PROCEDURE — 25000125 ZZHC RX 250: Performed by: PEDIATRICS

## 2017-06-17 PROCEDURE — 94640 AIRWAY INHALATION TREATMENT: CPT | Mod: 76

## 2017-06-17 PROCEDURE — 94640 AIRWAY INHALATION TREATMENT: CPT

## 2017-06-17 PROCEDURE — 40000274 ZZH STATISTIC RCP CONSULT EA 30 MIN

## 2017-06-17 PROCEDURE — 25000125 ZZHC RX 250: Performed by: INTERNAL MEDICINE

## 2017-06-17 PROCEDURE — 25000132 ZZH RX MED GY IP 250 OP 250 PS 637: Mod: GY | Performed by: INTERNAL MEDICINE

## 2017-06-17 PROCEDURE — A9270 NON-COVERED ITEM OR SERVICE: HCPCS | Mod: GY | Performed by: INTERNAL MEDICINE

## 2017-06-17 RX ORDER — AZITHROMYCIN 250 MG/1
250 TABLET, FILM COATED ORAL DAILY
Qty: 2 TABLET | Refills: 0 | Status: SHIPPED | OUTPATIENT
Start: 2017-06-17 | End: 2017-06-19

## 2017-06-17 RX ORDER — METOPROLOL SUCCINATE 25 MG/1
25 TABLET, EXTENDED RELEASE ORAL EVERY MORNING
Qty: 30 TABLET | COMMUNITY
Start: 2017-06-17 | End: 2018-03-22

## 2017-06-17 RX ORDER — IPRATROPIUM BROMIDE AND ALBUTEROL SULFATE 2.5; .5 MG/3ML; MG/3ML
1 SOLUTION RESPIRATORY (INHALATION) 4 TIMES DAILY
Qty: 90 VIAL | Refills: 1 | COMMUNITY
Start: 2017-06-17 | End: 2017-08-04

## 2017-06-17 RX ORDER — PREDNISONE 20 MG/1
40 TABLET ORAL DAILY
Qty: 10 TABLET | Refills: 0 | Status: SHIPPED | OUTPATIENT
Start: 2017-06-17 | End: 2017-06-22

## 2017-06-17 RX ADMIN — METOPROLOL SUCCINATE 25 MG: 25 TABLET, EXTENDED RELEASE ORAL at 08:55

## 2017-06-17 RX ADMIN — PREDNISONE 40 MG: 20 TABLET ORAL at 08:55

## 2017-06-17 RX ADMIN — SENNOSIDES AND DOCUSATE SODIUM 2 TABLET: 8.6; 5 TABLET ORAL at 08:55

## 2017-06-17 RX ADMIN — ALBUTEROL SULFATE 2.5 MG: 2.5 SOLUTION RESPIRATORY (INHALATION) at 02:36

## 2017-06-17 RX ADMIN — IPRATROPIUM BROMIDE AND ALBUTEROL SULFATE 3 ML: .5; 3 SOLUTION RESPIRATORY (INHALATION) at 08:38

## 2017-06-17 RX ADMIN — IPRATROPIUM BROMIDE AND ALBUTEROL SULFATE 3 ML: .5; 3 SOLUTION RESPIRATORY (INHALATION) at 05:47

## 2017-06-17 RX ADMIN — ASPIRIN 325 MG ORAL TABLET 325 MG: 325 PILL ORAL at 08:55

## 2017-06-17 RX ADMIN — NICOTINE 1 PATCH: 14 PATCH, EXTENDED RELEASE TRANSDERMAL at 08:55

## 2017-06-17 NOTE — PROGRESS NOTES
"S-(situation): Patient discharged to home  with      B-(background): COPD with acute respiratory failure    A-(assessment): /63 (BP Location: Left arm)  Pulse 84  Temp 96.6  F (35.9  C) (Oral)  Resp 18  Ht 1.575 m (5' 2\")  Wt 48.5 kg (106 lb 14.8 oz)  SpO2 96%  BMI 19.56 kg/m2.   Pt is alert and oriented, vitals stable. Lungs diminished with expiratory wheezes. Pt is steady on her feet and is able to maintain O2 sats >90% with activity.     R-(recommendations): Discharge instructions reviewed with pt. Listed belongings gathered and returned to patient.          Discharge Nursing Criteria:     Care Plan and Patient education resolved: Yes    New Medications- pt has been educated about purpose and side effects: Yes    Vaccines  Pneumonia Vaccine verified at discharge: Yes  Influenza status verified at discharge:  Yes          MISC  Prescriptions if needed, hard copies sent with patient  NA  Home and hospital aquired medications returned to patient: NA  Medication Bin checked and emptied on discharge Yes  Patient reports post-discharge pain management plan is effective: Yes    "

## 2017-06-17 NOTE — PLAN OF CARE
Problem: Goal Outcome Summary  Goal: Goal Outcome Summary  Outcome: Improving  S-(situation): shift note     B-(background): COPD     A-(assessment): Pt is A&O.  Up independently to BR, steady on feet.  Gets SOA when up and moving.  Lung sounds are diminished and has some wheezes in the lower bases.  States slept  well - woke up and requested a neb TX.   States the nebs help her. VSS.  Afebrile.     R-(recommendations): Hopes to go home today.  Cont to monitor the above.

## 2017-06-17 NOTE — DISCHARGE SUMMARY
[facility]    Discharge Summary  Hospitalist    Date of Admission:  6/14/2017  Date of Discharge:  6/17/2017  Discharging Provider: Gael Bass  Date of Service (when I saw the patient): 06/17/17    Discharge Diagnoses     COPD exacerbation (H)    Ischemic cardiomyopathy - EF 25% in 2015 but 55% in 3/2017    HTN, goal below 130/80    PAD (peripheral artery disease) (H)    Acute respiratory failure with hypoxia (H)    Coronary artery disease involving native coronary artery - STEMI with LAD and circ stents in 8/2015    Elevated troponin    History of stroke - right thalamus in 8/2013    Tobacco use disorder    * No resolved hospital problems. *      History of Present Illness   Preeti Ford is an 66 year old female with COPD who presented with 2-3 week history of worsening shortness of breath and cough despite attempted outpatient treatment including a recent course of prednisone and doxycycline.   Because of concern for COPD exacerbation with acute respiratory failure, she was hospitalized. See admission history and physical for details.    Hospital Course   Preeti Ford was admitted on 6/14/2017.  The following problems were addressed during her hospitalization:    Problem #1 COPD exacerbation with acute hypoxic respiratory failure.  Chest CT demonstrated radiographic findings consistent with emphysema, but no pneumothorax, focal infiltrates, or pulmonary embolism were demonstrated.  Nasal culture for MRSA was negative.  Sputum culture is pending at discharge.  She was severely hypoxic but not hypercapnic.  She was treated with corticosteroids, bronchodilators, oxygen, and Zithromax.  She did not require assisted ventilation.  She improved and tolerated advancing diet and activity.  She weaned off of oxygen by discharge maintaining normal oxygen saturations in room air at rest and with activity.  Smoking cessation was advised.    Problem #2 elevated troponin, coronary  "artery disease with previous coronary artery stents and ischemic cardiomyopathy.  Troponin was elevated, but there were no acute ischemic changes on EKG and she did not have angina.  Troponin levels did not increase significantly and started to trend downward.  From a cardiac standpoint, she had an unremarkable clinical course.  Neither an acute coronary syndrome or worsening CHF were suspected.  After investigation, elevated troponin was attributed to severe hypoxia from her COPD exacerbation.    Gael Bass MD    Significant Results and Procedures   No procedures performed during this admission    Pending Results   These results will be followed up by PCP  Unresulted Labs Ordered in the Past 30 Days of this Admission     Date and Time Order Name Status Description    6/15/2017 1635 Sputum Culture Aerobic Bacterial In process           Code Status   Full Code       Primary Care Physician   Avelina Miramontes MD    Physical Exam   106 lbs 14.77 oz  /63 (BP Location: Left arm)  Pulse 84  Temp 96.6  F (35.9  C) (Oral)  Resp 18  Ht 1.575 m (5' 2\")  Wt 48.5 kg (106 lb 14.8 oz)  SpO2 96%  BMI 19.56 kg/m2    No acute distress, easily speaking full sentences  Normal respiratory effort, diminished breath sounds throughout, clear lungs    Discharge Disposition   Discharged to home  Condition at discharge: Stable    Consultations This Hospital Stay   SPIRITUAL HEALTH SERVICES IP CONSULT  CARE COORDINATOR IP CONSULT    Time Spent on this Encounter   I, Gael Bass, personally saw the patient today and spent greater than 30 minutes discharging this patient.    Discharge Orders     Reason for your hospital stay   Hospitalized for breathing problems due to COPD and improved     Follow-up and recommended labs and tests    Follow up with primary care provider, Avelina Miramontes MD, within 7 days for hospital follow- up.     Activity   Your activity upon discharge: activity as tolerated     Full Code "     Diet   Follow this diet upon discharge: Orders Placed This Encounter     Combination Diet Regular Diet Adult       Discharge Medications   Discharge Medication List as of 6/17/2017 10:30 AM      START taking these medications    Details   azithromycin (ZITHROMAX) 250 MG tablet Take 1 tablet (250 mg) by mouth daily for 2 days, Disp-2 tablet, R-0, E-Prescribe         CONTINUE these medications which have CHANGED    Details   ipratropium - albuterol 0.5 mg/2.5 mg/3 mL (DUONEB) 0.5-2.5 (3) MG/3ML neb solution Take 1 vial (3 mLs) by nebulization 4 times daily, Disp-90 vial, R-1, Historical      metoprolol (TOPROL XL) 25 MG 24 hr tablet Take 1 tablet (25 mg) by mouth every morning (patient takes 0.5 X 25 mg = 12.5 mg dose), Disp-30 tablet, Historical      predniSONE (DELTASONE) 20 MG tablet Take 2 tablets (40 mg) by mouth daily for 5 days, Disp-10 tablet, R-0, E-Prescribe         CONTINUE these medications which have NOT CHANGED    Details   SPIRIVA HANDIHALER 18 MCG capsule USING THE HANDIHALER, INHALE ONE CAPSULE BY MOUTH EVERY DAY, Disp-90 capsule, R-5, E-Prescribe      atorvastatin (LIPITOR) 10 MG tablet Take 1 tablet (10 mg) by mouth At Bedtime, Disp-30 tablet, R-3, E-Prescribe      aspirin 325 MG tablet Take 1 tablet (325 mg) by mouth daily, Disp-180 tablet, R-0, E-Prescribe      Coenzyme Q10 (COQ10 PO) Take 100 mg by mouth every 24 hours (at 16:00), Historical      Montelukast Sodium (SINGULAIR PO) Take 10 mg by mouth At Bedtime, Historical      albuterol (PROAIR HFA, PROVENTIL HFA, VENTOLIN HFA) 108 (90 BASE) MCG/ACT inhaler Inhale 2 puffs into the lungs every 6 hours as needed for shortness of breath / dyspnea, Disp-1 Inhaler, R-0, Fax      SYMBICORT 160-4.5 MCG/ACT inhaler INHALE TWO PUFFS BY MOUTH TWICE A DAY, Disp-1 Inhaler, R-5, Fax      clindamycin (CLEOCIN) 300 MG capsule Take 2 capsules (600mg) by mouth once for 1 dose, Take 1 hour prior to invasive procedure (dental cleaning)., Disp-2 capsule, R-0,  E-Prescribe      senna-docusate (SENOKOT-S;PERICOLACE) 8.6-50 MG per tablet Take 1-2 tablets by mouth 2 times daily, Disp-30 tablet, R-3, E-Prescribe      Calcium Carbonate-Vitamin D (OSCAL 500/200 D-3 PO) Take 1 tablet by mouth 2 times daily, Historical      nicotine (NICODERM CQ) 14 MG/24HR 24 hr patch Place 1 patch onto the skin every 24 hours Reported on 4/10/2017, Historical      Misc Natural Products (TURMERIC CURCUMIN) CAPS Take 1 capsule by mouth every 24 hours (at 16:00), Historical         STOP taking these medications       doxycycline (VIBRA-TABS) 100 MG tablet Comments:   Reason for Stopping:             Allergies   Allergies   Allergen Reactions     Crestor [Rosuvastatin] Other (See Comments)     dizziness     Hmg-Coa-R Inhibitors Other (See Comments)     Muscle/joint aching     Lisinopril Cough     Optison [Albumin Human] Other (See Comments)     Pt was flush and very dizzy.  Also had a BP drop     Penicillins Rash     Data   Most Recent 3 CBC's:  Recent Labs   Lab Test  06/14/17   1408  03/10/17   0659  03/09/17   0616  03/08/17   1140   WBC  6.1   --   8.0  8.5   HGB  13.8   --   11.7  11.9   MCV  86   --   93  92   PLT  208  121*  110*  121*      Most Recent 3 BMP's:  Recent Labs   Lab Test  06/16/17   0625  06/14/17   1408  03/09/17   0616   NA  140  141  138   POTASSIUM  4.5  4.4  4.4   CHLORIDE  101  105  107   CO2  34*  29  25   BUN  24  15  15   CR  0.67  0.66  1.14*   ANIONGAP  5  7  6   CALDERON  8.8  8.8  7.6*   GLC  124*  110*  69*     Most Recent 3 Troponin's:  Recent Labs   Lab Test  06/15/17   0533  06/14/17   1910  06/14/17   1408   08/18/13   0830   TROPI  0.348*  0.364*  0.356*   < >  Duplicate request   TROPONIN   --    --    --    --   0.00    < > = values in this interval not displayed.     Most Recent 6 Bacteria Isolates From Any Culture (See EPIC Reports for Culture Details):  Recent Labs   Lab Test  06/14/17 2120   CULT  No MRSA isolated     Most Recent TSH, T4 and A1c  Labs:  Recent Labs   Lab Test  03/07/17   1940   08/18/13   0830   TSH   --    --   1.05   A1C  5.5   < >   --     < > = values in this interval not displayed.     Results for orders placed or performed during the hospital encounter of 06/14/17   XR Chest 2 Views    Narrative    CHEST TWO VIEWS   6/14/2017 2:40 PM     HISTORY: Short of air.    COMPARISON: Chest x-rays dated 6/12/2017, 3/6/2017 and 2/12/2016.    FINDINGS:  Hyperaeration is again noted. Probable mild scarring in the  right medial base is stable. Lungs are otherwise clear. Blunting of  the costophrenic angles is stable since the prior study and likely  secondary to hyperaeration and scarring. Lungs are otherwise clear.  Heart size and pulmonary vascularity are within normal limits. No  pneumothorax is identified. No significant pleural fluid collection is  seen.      Impression    IMPRESSION: Probable right medial basilar scarring versus atelectasis.  No other evidence of acute cardiopulmonary disease is seen.     OZIEL OHARA MD   CT Chest Pulmonary Embolism w Contrast    Narrative    CT CHEST PULMONARY EMBOLISM WITH CONTRAST 6/14/2017 5:30 PM     HISTORY: Dyspnea    CONTRAST DOSE: 70mL Isovue 370     Radiation dose for this scan was reduced using automated exposure  control, adjustment of the mA and/or kV according to patient size, or  iterative reconstruction technique.    FINDINGS: There is a good contrast bolus within the pulmonary  arteries. No pulmonary arterial filling defects are demonstrated to  indicate pulmonary embolism. Minimal contrast is present within the  aorta precluding evaluation for dissection. Scattered aortic  calcifications are noted. Coronary artery calcification is also noted.  The mediastinum and alize are otherwise unremarkable. Pulmonary  emphysematous changes are noted. There is a calcified granuloma in the  left posterior costophrenic angle. The lungs are otherwise clear. No  pleural effusion or pneumothorax.       Impression    IMPRESSION:  1. Marked pulmonary emphysematous changes.  2. No CT evidence of pulmonary embolism.  3. Coronary artery and aortic calcification.    WALDO ZEPEDA MD     Addendum 6/18/17:  After hospital discharge, final sputum culture results were abnormal demonstrating heavy pseudomonas aeruginosa growth.  This is concerning for pseudomonas respiratory tract infection.  Treatment is recommended, and Zithromax which was prescribed at discharge is probably not sufficient antibiotic treatment.  Therefore, a course of ciprofloxacin was recommended and prescribed.      Gael Bass MD

## 2017-06-18 LAB
BACTERIA SPEC CULT: ABNORMAL
MICRO REPORT STATUS: ABNORMAL
MICROORGANISM SPEC CULT: ABNORMAL
SPECIMEN SOURCE: ABNORMAL

## 2017-06-19 ENCOUNTER — TELEPHONE (OUTPATIENT)
Dept: FAMILY MEDICINE | Facility: OTHER | Age: 67
End: 2017-06-19

## 2017-06-19 RX ORDER — CIPROFLOXACIN 500 MG/1
500 TABLET, FILM COATED ORAL 2 TIMES DAILY
Qty: 28 TABLET | Refills: 0 | Status: SHIPPED | OUTPATIENT
Start: 2017-06-19 | End: 2017-07-03

## 2017-06-19 NOTE — PROGRESS NOTES
"Preeti Ford  Gender: female  : 1950  [id number] [address] [place] [address] [id number] [phone] (home)     Medical Record: [id number]  Pharmacy: [facility] [place], [place] - [address]   Primary Care Provider: Avelina Miramontes    Parent's names are: Data Unavailable (mother) and Data Unavailable (father).      [facility]  2017     Discharge Phone Call:  Key Words/Key Times      Introduction - AIDET (Acknowledge, Introduce, Duration, Explanation)      Empathy-   We are calling to see how you are since your recent stay in the hospital?     Call back COMMENTS: Doing better      Clinical Questions -  (f/u appts, medication side effects/purpose, ability to care for self at home) \"For your safety, it is important to us that you understand the purpose and side effects of your medications, can you tell me what your new medications are?\"     Call back COMMENTS: No questions at this time      Staff Recognition -  We like to recognize staff and physicians who have done an excellent job.  Do you remember any people from your care team that you would like recognize?     Call back COMMENTS: \"Everyone was terrific, Nurses and doctors are the best, so impressed\"      Very Good Care -  We want to provide very good care to all patients.  How was your care?     Call back COMMENTS: Great      Opportunities for Improvement -  Our goal is to be the best.  Do you have any suggestions for things that we could improve upon?     Call back COMMENTS: No      Thank You   Kd Weir, RN      Physician wanted me to call patient with new prescription details, sputum culture came back pseudomonas. Recommended and ordered Cipro for 2 weeks, sent to retail pharmacy, patient asked me to have that changed to [facility].  Prescription rerouted.          "

## 2017-06-19 NOTE — TELEPHONE ENCOUNTER
RN to call for hospital follow up:    Reason for follow up: Preeti Ford appeared on our list for being seen in an Emergency Room or a recent Hospital discharge.    Admitting date: 06/14/2017  Discharge date: 06/17/2017  Location: [facility]  Reason for visit: Copd Exacerbation (H)    Suad Goncalves RN, BSN

## 2017-06-19 NOTE — TELEPHONE ENCOUNTER
ED / Discharge Outreach Protocol    Patient Contact    Attempt # 1    Was call answered?  No.  Unable to leave message. Busy tone.     Will ask RN from alternative site to attempt to reach out to patient. Suad Goncalves, RN, BSN

## 2017-06-20 NOTE — PROGRESS NOTES
Please call patient to inform of note from Dr Bass below  If not notified already and to  rx from pharmacy

## 2017-06-20 NOTE — TELEPHONE ENCOUNTER
ED / Discharge Outreach Protocol    Patient Contact    Attempt # 2    Was call answered?  No.  Left message on voicemail with information to call me back.    Diego Hilario, RN, BSN

## 2017-06-21 ENCOUNTER — TELEPHONE (OUTPATIENT)
Dept: FAMILY MEDICINE | Facility: OTHER | Age: 67
End: 2017-06-21

## 2017-06-21 NOTE — TELEPHONE ENCOUNTER
----- Message from Gael Bass MD sent at 6/19/2017  1:55 PM CDT -----  Abnormal results in patient previously discharged from the hospital.  Treatment with ciprofloxacin is recommended, prescription was sent to pharmacy.  Forwarded to PCP for review.

## 2017-06-21 NOTE — TELEPHONE ENCOUNTER
Notes Recorded by Avelina Miramontes MD on 6/20/2017 at 3:05 PM  Please call patient to inform of note from Dr Bass below  If not notified already and to  rx from pharmacy    Spoke with pt and she did get this and started Rx.       Melanie Clemente CMA ([facility])

## 2017-06-21 NOTE — PROGRESS NOTES
SUBJECTIVE:                                                    Preeti Ford is a 66 year old female who presents to clinic today for the following health issues:    Hospital Follow-up Visit:    Hospital/Nursing Home/IP Rehab Facility: [facility]  Date of Admission: 06/14/17  Date of Discharge: 06/17/17  Reason(s) for Admission: COPD exacerbation    Hospital course below     Problem #1 COPD exacerbation with acute hypoxic respiratory failure.  Chest CT demonstrated radiographic findings consistent with emphysema, but no pneumothorax, focal infiltrates, or pulmonary embolism were demonstrated.  Nasal culture for MRSA was negative.  Sputum culture is pending at discharge.  She was severely hypoxic but not hypercapnic.  She was treated with corticosteroids, bronchodilators, oxygen, and Zithromax.  She did not require assisted ventilation.  She improved and tolerated advancing diet and activity.  She weaned off of oxygen by discharge maintaining normal oxygen saturations in room air at rest and with activity.  Smoking cessation was advised.     Problem #2 elevated troponin, coronary artery disease with previous coronary artery stents and ischemic cardiomyopathy.  Troponin was elevated, but there were no acute ischemic changes on EKG and she did not have angina.  Troponin levels did not increase significantly and started to trend downward.  From a cardiac standpoint, she had an unremarkable clinical course.  Neither an acute coronary syndrome or worsening CHF were suspected.  After investigation, elevated troponin was attributed to severe hypoxia from her COPD exacerbation.            Problems taking medications regularly:  None       Medication changes since discharge: antibiotic change- now taking Cipro.       Problems adhering to non-medication therapy:  None    Summary of hospitalization:  [facility] discharge summary reviewed  Diagnostic Tests/Treatments reviewed.  Follow up needed:  none  Other Healthcare Providers Involved in Patient s Care:         MTM  Update since discharge: improved.     Post Discharge Medication Reconciliation: discharge medications reconciled, continue medications without change.  Plan of care communicated with patient     Coding guidelines for this visit:  Type of Medical   Decision Making Face-to-Face Visit       within 7 Days of discharge Face-to-Face Visit        within 14 days of discharge   Moderate Complexity [id number]   High Complexity [id number]                Problem list and histories reviewed & adjusted, as indicated.  Additional history: as documented    Patient Active Problem List   Diagnosis     COPD (chronic obstructive pulmonary disease) (H)     CARDIOVASCULAR SCREENING; LDL GOAL LESS THAN 130     Advanced directives, counseling/discussion     Urinary incontinence     Forgetfulness     Hyperlipidemia LDL goal <130     Elevated blood pressure reading without diagnosis of hypertension     History of stroke - right thalamus in 8/2013     Numbness and tingling     Tobacco use disorder     CVA (cerebral vascular accident) (H)     ACS (acute coronary syndrome) (H)     Ischemic cardiomyopathy - EF 25% in 2015 but 55% in 3/2017     HTN, goal below 130/80     Acute systolic congestive heart failure (H)     Lung nodule     Esophageal reflux     ST elevation myocardial infarction involving left anterior descending (LAD) coronary artery (H)     Chronic bronchitis, unspecified chronic bronchitis type (H)     PAD (peripheral artery disease) (H)     Cervical high risk HPV (human papillomavirus) test positive     Acute respiratory failure with hypoxia (H)     COPD exacerbation (H)     Coronary artery disease involving native coronary artery - STEMI with LAD and circ stents in 8/2015     Elevated troponin     Past Surgical History:   Procedure Laterality Date     APPENDECTOMY       BYPASS GRAFT AORTOILIAC N/A 3/7/2017    Procedure: BYPASS GRAFT AORTOILIAC;  Surgeon:  Marcos Pratt MD;  Location: [facility] OR     SALPINGO OOPHORECTOMY,R/L/DELORES      Salpingo Oophorectomy, RT/LT/DELORES       Social History   Substance Use Topics     Smoking status: Current Every Day Smoker     Packs/day: 0.50     Types: Cigarettes     Smokeless tobacco: Never Used      Comment: patient states she is working on it      Alcohol use No     Family History   Problem Relation Age of Onset     CEREBROVASCULAR DISEASE Mother      CEREBROVASCULAR DISEASE Father      Genitourinary Problems Brother      Dialysis         Current Outpatient Prescriptions   Medication Sig Dispense Refill     ciprofloxacin (CIPRO) 500 MG tablet Take 1 tablet (500 mg) by mouth 2 times daily for 14 days 28 tablet 0     ipratropium - albuterol 0.5 mg/2.5 mg/3 mL (DUONEB) 0.5-2.5 (3) MG/3ML neb solution Take 1 vial (3 mLs) by nebulization 4 times daily 90 vial 1     metoprolol (TOPROL XL) 25 MG 24 hr tablet Take 1 tablet (25 mg) by mouth every morning (patient takes 0.5 X 25 mg = 12.5 mg dose) 30 tablet      SPIRIVA HANDIHALER 18 MCG capsule USING THE HANDIHALER, INHALE ONE CAPSULE BY MOUTH EVERY DAY 90 capsule 5     clindamycin (CLEOCIN) 300 MG capsule Take 2 capsules (600mg) by mouth once for 1 dose, Take 1 hour prior to invasive procedure (dental cleaning). 2 capsule 0     atorvastatin (LIPITOR) 10 MG tablet Take 1 tablet (10 mg) by mouth At Bedtime 30 tablet 3     aspirin 325 MG tablet Take 1 tablet (325 mg) by mouth daily 180 tablet 0     senna-docusate (SENOKOT-S;PERICOLACE) 8.6-50 MG per tablet Take 1-2 tablets by mouth 2 times daily 30 tablet 3     Calcium Carbonate-Vitamin D (OSCAL 500/200 D-3 PO) Take 1 tablet by mouth 2 times daily       Coenzyme Q10 (COQ10 PO) Take 100 mg by mouth every 24 hours (at 16:00)       Montelukast Sodium (SINGULAIR PO) Take 10 mg by mouth At Bedtime       Misc Natural Products (TURMERIC CURCUMIN) CAPS Take 1 capsule by mouth every 24 hours (at 16:00)       albuterol (PROAIR HFA, PROVENTIL HFA,  "VENTOLIN HFA) 108 (90 BASE) MCG/ACT inhaler Inhale 2 puffs into the lungs every 6 hours as needed for shortness of breath / dyspnea 1 Inhaler 0     SYMBICORT 160-4.5 MCG/ACT inhaler INHALE TWO PUFFS BY MOUTH TWICE A DAY 1 Inhaler 5     nicotine (NICODERM CQ) 14 MG/24HR 24 hr patch Place 1 patch onto the skin every 24 hours Reported on 4/10/2017       Allergies   Allergen Reactions     Crestor [Rosuvastatin] Other (See Comments)     dizziness     Hmg-Coa-R Inhibitors Other (See Comments)     Muscle/joint aching     Lisinopril Cough     Optison [Albumin Human] Other (See Comments)     Pt was flush and very dizzy.  Also had a BP drop     Penicillins Rash     BP Readings from Last 3 Encounters:   06/23/17 120/68   06/17/17 146/63   06/12/17 132/70    Wt Readings from Last 3 Encounters:   06/23/17 118 lb 9.6 oz (53.8 kg)   06/14/17 106 lb 14.8 oz (48.5 kg)   06/12/17 112 lb (50.8 kg)                  Labs reviewed in EPIC    Reviewed and updated as needed this visit by clinical staff       Reviewed and updated as needed this visit by Provider         ROS:  C: NEGATIVE for fever, chills, change in weight  E/M: NEGATIVE for ear, mouth and throat problems  RESP:POSITIVE for Hx COPD and cough   CV: NEGATIVE for chest pain, palpitations or peripheral edema    OBJECTIVE:                                                    /68  Pulse 76  Temp 98.8  F (37.1  C) (Temporal)  Resp 20  Ht 5' 2.5\" (1.588 m)  Wt 118 lb 9.6 oz (53.8 kg)  SpO2 97%  Breastfeeding? No  BMI 21.35 kg/m2  Body mass index is 21.35 kg/(m^2).   GENERAL: alert, well hydrated, no distress, at baseline   NECK: no tenderness, no adenopathy, no asymmetry, no masses, no stiffness; thyroid- normal to palpation  RESP: Fair respiratory effort , breath sound is still bronchial, no wheezes noted today,    CV: regular rates and rhythm, normal S1 S2, no S3 or S4 and no murmur, no click or rub -  MS: extremities- no gross deformities noted, no " edema    Diagnostic test results:  Diagnostic Test Results:  none      ASSESSMENT/PLAN:                                                    1. COPD exacerbation (H)  Improving  Plan: Based on culture results was started on ciprofloxacin. Advised to call continue on complaints  Continue with inhalers and neb Treatment. Has been on 40 mg prednisone  Okay to taper down prednisone to 20 mg daily for the next 3 days and 10 mg daily for 3 days after that       2. HTN, goal below 130/80  Controlled  Plan; continue on metoprolol 25 mg by mouth daily      Follow up with Provider - allegran      Avelina Miramontes MD, MD  [facility]

## 2017-06-23 ENCOUNTER — OFFICE VISIT (OUTPATIENT)
Dept: FAMILY MEDICINE | Facility: OTHER | Age: 67
End: 2017-06-23
Payer: COMMERCIAL

## 2017-06-23 VITALS
SYSTOLIC BLOOD PRESSURE: 120 MMHG | HEART RATE: 76 BPM | TEMPERATURE: 98.8 F | WEIGHT: 118.6 LBS | HEIGHT: 63 IN | OXYGEN SATURATION: 97 % | BODY MASS INDEX: 21.02 KG/M2 | DIASTOLIC BLOOD PRESSURE: 68 MMHG | RESPIRATION RATE: 20 BRPM

## 2017-06-23 DIAGNOSIS — J44.1 COPD EXACERBATION (H): Primary | ICD-10-CM

## 2017-06-23 DIAGNOSIS — I10 HTN, GOAL BELOW 130/80: ICD-10-CM

## 2017-06-23 PROCEDURE — 99213 OFFICE O/P EST LOW 20 MIN: CPT | Performed by: FAMILY MEDICINE

## 2017-06-23 ASSESSMENT — PAIN SCALES - GENERAL: PAINLEVEL: NO PAIN (0)

## 2017-06-23 NOTE — NURSING NOTE
"Chief Complaint   Patient presents with     Hospital F/U     Panel Management     See        Initial /68  Pulse 76  Temp 98.8  F (37.1  C) (Temporal)  Resp 20  Ht 5' 2.5\" (1.588 m)  Wt 118 lb 9.6 oz (53.8 kg)  SpO2 97%  Breastfeeding? No  BMI 21.35 kg/m2 Estimated body mass index is 21.35 kg/(m^2) as calculated from the following:    Height as of this encounter: 5' 2.5\" (1.588 m).    Weight as of this encounter: 118 lb 9.6 oz (53.8 kg).  Medication Reconciliation: complete    "

## 2017-06-23 NOTE — MR AVS SNAPSHOT
"              After Visit Summary   2017    Preeti Ford    MRN: [id number]           Patient Information     Date Of Birth          1950        Visit Information        Provider Department      2017 10:50 AM Avelina Miramontes MD [facility]        Today's Diagnoses     COPD exacerbation (H)    -  1      Care Instructions    Wean down steroid , 20 mg daily for 3 days , then 10 mg daily for 3 days and stop           Follow-ups after your visit        Who to contact     If you have questions or need follow up information about today's clinic visit or your schedule please contact [facility] directly at [phone].  Normal or non-critical lab and imaging results will be communicated to you by MyChart, letter or phone within 4 business days after the clinic has received the results. If you do not hear from us within 7 days, please contact the clinic through MyChart or phone. If you have a critical or abnormal lab result, we will notify you by phone as soon as possible.  Submit refill requests through [employer] or call your pharmacy and they will forward the refill request to us. Please allow 3 business days for your refill to be completed.          Additional Information About Your Visit        MyChart Information     [employer] lets you send messages to your doctor, view your test results, renew your prescriptions, schedule appointments and more. To sign up, go to [email or web address] . Click on \"Log in\" on the left side of the screen, which will take you to the Welcome page. Then click on \"Sign up Now\" on the right side of the page.     You will be asked to enter the access code listed below, as well as some personal information. Please follow the directions to create your username and password.     Your access code is: [email or web address]  Expires: 9/10/2017 10:01 AM     Your access code will  in 90 days. If you need help or a new code, please call your " "[facility] or [phone].        Care EveryWhere ID     This is your Care EveryWhere ID. This could be used by other organizations to access your [place] medical records  [id number]        Your Vitals Were     Pulse Temperature Respirations Height Pulse Oximetry Breastfeeding?    76 98.8  F (37.1  C) (Temporal) 20 5' 2.5\" (1.588 m) 97% No    BMI (Body Mass Index)                   21.35 kg/m2            Blood Pressure from Last 3 Encounters:   06/23/17 120/68   06/17/17 146/63   06/12/17 132/70    Weight from Last 3 Encounters:   06/23/17 118 lb 9.6 oz (53.8 kg)   06/14/17 106 lb 14.8 oz (48.5 kg)   06/12/17 112 lb (50.8 kg)              Today, you had the following     No orders found for display       Primary Care Provider Office Phone # Fax #    Avelina Yael Miramontes [phone]       [facility] [address] [place] [address]        Equal Access to Services     [facility]: Hadii aad ku hadasho Soomaali, waaxda luqadaha, qaybta kaalmada adeegkeon, sagar hampton . So [facility] [phone].    ATENCIÓN: Si habla español, tiene a jeffries disposición servicios gratuitos de asistencia lingüística. Geovanna al [phone].    We comply with applicable federal civil rights laws and [place] laws. We do not discriminate on the basis of race, color, national origin, age, disability sex, sexual orientation or gender identity.            Thank you!     Thank you for choosing [facility]  for your care. Our goal is always to provide you with excellent care. Hearing back from our patients is one way we can continue to improve our services. Please take a few minutes to complete the written survey that you may receive in the mail after your visit with us. Thank you!             Your Updated Medication List - Protect others around you: Learn how to safely use, store and throw away your medicines at www.disposemymeds.org.          This list is accurate " as of: 6/23/17 11:09 AM.  Always use your most recent med list.                   Brand Name Dispense Instructions for use Diagnosis    albuterol 108 (90 BASE) MCG/ACT Inhaler    PROAIR HFA/PROVENTIL HFA/VENTOLIN HFA    1 Inhaler    Inhale 2 puffs into the lungs every 6 hours as needed for shortness of breath / dyspnea    Chronic bronchitis, unspecified chronic bronchitis type (H)       aspirin 325 MG tablet     180 tablet    Take 1 tablet (325 mg) by mouth daily    PAD (peripheral artery disease) (H)       atorvastatin 10 MG tablet    LIPITOR    30 tablet    Take 1 tablet (10 mg) by mouth At Bedtime    PAD (peripheral artery disease) (H)       ciprofloxacin 500 MG tablet    CIPRO    28 tablet    Take 1 tablet (500 mg) by mouth 2 times daily for 14 days    Pseudomonas respiratory infection       clindamycin 300 MG capsule    CLEOCIN    2 capsule    Take 2 capsules (600mg) by mouth once for 1 dose, Take 1 hour prior to invasive procedure (dental cleaning).    Prophylactic antibiotic       COQ10 PO      Take 100 mg by mouth every 24 hours (at 16:00)        ipratropium - albuterol 0.5 mg/2.5 mg/3 mL 0.5-2.5 (3) MG/3ML neb solution    DUONEB    90 vial    Take 1 vial (3 mLs) by nebulization 4 times daily    COPD exacerbation (H)       nicotine 14 MG/24HR 24 hr patch    NICODERM CQ     Place 1 patch onto the skin every 24 hours Reported on 4/10/2017        OSCAL 500/200 D-3 PO      Take 1 tablet by mouth 2 times daily        senna-docusate 8.6-50 MG per tablet    SENOKOT-S;PERICOLACE    30 tablet    Take 1-2 tablets by mouth 2 times daily    Drug-induced constipation       SINGULAIR PO      Take 10 mg by mouth At Bedtime        SPIRIVA HANDIHALER 18 MCG capsule   Generic drug:  tiotropium     90 capsule    USING THE HANDIHALER, INHALE ONE CAPSULE BY MOUTH EVERY DAY    Chronic bronchitis, unspecified chronic bronchitis type (H)       SYMBICORT 160-4.5 MCG/ACT Inhaler   Generic drug:  budesonide-formoterol     1 Inhaler     INHALE TWO PUFFS BY MOUTH TWICE A DAY    COPD (chronic obstructive pulmonary disease) (H)       TOPROL XL 25 MG 24 hr tablet   Generic drug:  metoprolol     30 tablet    Take 1 tablet (25 mg) by mouth every morning (patient takes 0.5 X 25 mg = 12.5 mg dose)        Turmeric Curcumin Caps      Take 1 capsule by mouth every 24 hours (at 16:00)

## 2017-08-03 NOTE — PROGRESS NOTES
"  SUBJECTIVE:                                                    Preeti Ford is a 66 year old female who presents to clinic today for the following health issues:  /74  Pulse 77  Temp 98.1  F (36.7  C) (Temporal)  Resp 16  Ht 5' 3\" (1.6 m)  Wt 110 lb 9.6 oz (50.2 kg)  SpO2 99%  Breastfeeding? No  BMI 19.59 kg/m2    New Patient/Transfer of Care  Hyperlipidemia Follow-Up      Rate your low fat/cholesterol diet?: fair    Taking statin?  Yes, no muscle aches from statin    Other lipid medications/supplements?:  none    Hypertension Follow-up      Outpatient blood pressures are being checked at home.  Results are 130/70-80.    Low Salt Diet: no added salt    Heart Failure Follow-up    Symptoms:    Shortness of breath: happens with exertion only - slightly worsened    Lower extremity edema: stable- swells more when she does her neb.    Chest pain: Yes-  More so discomfort from time to time. Once in awhile.    Using more pillows than normal: Yes-  2 soft pillows    Cough at night: Yes-  Lots of phlegm.     Weight:    Checking weight daily: no- couple times a week.    Weight change: none    Cardiology visits, ER/UC, or hospital admissions since last visit: None    Medication side effects: cough and swelling    COPD Follow-Up    Symptoms are currently: Slightly improved.    Current fatigue or dyspnea with ambulation: stable     Shortness of breath: slightly worsened    Increased or change in Cough/Sputum: Yes-  Coughs up more phlegm. Neb is helping lossen up.    Fever(s): No    Baseline ambulation without stopping to rest 2-3 blocks. Able to walk up 1-2 flights of stairs without stopping to rest.    Any ER/UC or hospital admissions since your last visit? No     History   Smoking Status     Current Every Day Smoker     Packs/day: 0.50     Types: Cigarettes   Smokeless Tobacco     Never Used     Comment: patient states she is working on it      No results found for: FEV1, [id number]      Amount of exercise or " physical activity: 1 day/week for an average of 15-30 minutes    Problems taking medications regularly: Yes, Sometimes if she is having a good day she wonders if she should even take it but normally she will take it because it she doesn't she knows she won't feel good.    Medication side effects: none  Diet: low salt      PROBLEMS TO ADD ON...    Problem list and histories reviewed & adjusted, as indicated.  Additional history: as documented    Patient Active Problem List   Diagnosis     COPD (chronic obstructive pulmonary disease) (H)     CARDIOVASCULAR SCREENING; LDL GOAL LESS THAN 130     Advanced directives, counseling/discussion     Urinary incontinence     Forgetfulness     Hyperlipidemia LDL goal <130     Elevated blood pressure reading without diagnosis of hypertension     History of stroke - right thalamus in 8/2013     Numbness and tingling     Tobacco use disorder     CVA (cerebral vascular accident) (H)     ACS (acute coronary syndrome) (H)     Ischemic cardiomyopathy - EF 25% in 2015 but 55% in 3/2017     HTN, goal below 130/80     Acute systolic congestive heart failure (H)     Lung nodule     Esophageal reflux     ST elevation myocardial infarction involving left anterior descending (LAD) coronary artery (H)     Chronic bronchitis, unspecified chronic bronchitis type (H)     PAD (peripheral artery disease) (H)     Cervical high risk HPV (human papillomavirus) test positive     Acute respiratory failure with hypoxia (H)     COPD exacerbation (H)     Coronary artery disease involving native coronary artery - STEMI with LAD and circ stents in 8/2015     Elevated troponin     Past Surgical History:   Procedure Laterality Date     APPENDECTOMY       BYPASS GRAFT AORTOILIAC N/A 3/7/2017    Procedure: BYPASS GRAFT AORTOILIAC;  Surgeon: Marcos Pratt MD;  Location: [facility] OR     SALPINGO OOPHORECTOMY,R/L/DELORES      Salpingo Oophorectomy, RT/LT/DELORES       Social History   Substance Use Topics     Smoking  status: Current Every Day Smoker     Packs/day: 0.50     Types: Cigarettes     Smokeless tobacco: Never Used      Comment: patient states she is working on it      Alcohol use No     Family History   Problem Relation Age of Onset     CEREBROVASCULAR DISEASE Mother      CEREBROVASCULAR DISEASE Father      Genitourinary Problems Brother      Dialysis         Current Outpatient Prescriptions   Medication Sig Dispense Refill     ipratropium - albuterol 0.5 mg/2.5 mg/3 mL (DUONEB) 0.5-2.5 (3) MG/3ML neb solution Take 1 vial (3 mLs) by nebulization 4 times daily 90 vial 1     metoprolol (TOPROL XL) 25 MG 24 hr tablet Take 1 tablet (25 mg) by mouth every morning (patient takes 0.5 X 25 mg = 12.5 mg dose) 30 tablet      SPIRIVA HANDIHALER 18 MCG capsule USING THE HANDIHALER, INHALE ONE CAPSULE BY MOUTH EVERY DAY 90 capsule 5     atorvastatin (LIPITOR) 10 MG tablet Take 1 tablet (10 mg) by mouth At Bedtime 30 tablet 3     aspirin 325 MG tablet Take 1 tablet (325 mg) by mouth daily 180 tablet 0     Calcium Carbonate-Vitamin D (OSCAL 500/200 D-3 PO) Take 1 tablet by mouth 2 times daily       Montelukast Sodium (SINGULAIR PO) Take 10 mg by mouth At Bedtime       albuterol (PROAIR HFA, PROVENTIL HFA, VENTOLIN HFA) 108 (90 BASE) MCG/ACT inhaler Inhale 2 puffs into the lungs every 6 hours as needed for shortness of breath / dyspnea 1 Inhaler 0     SYMBICORT 160-4.5 MCG/ACT inhaler INHALE TWO PUFFS BY MOUTH TWICE A DAY 1 Inhaler 5     [DISCONTINUED] ipratropium - albuterol 0.5 mg/2.5 mg/3 mL (DUONEB) 0.5-2.5 (3) MG/3ML neb solution Take 1 vial (3 mLs) by nebulization 4 times daily 90 vial 1     clindamycin (CLEOCIN) 300 MG capsule Take 2 capsules (600mg) by mouth once for 1 dose, Take 1 hour prior to invasive procedure (dental cleaning). (Patient not taking: Reported on 8/4/2017) 2 capsule 0     senna-docusate (SENOKOT-S;PERICOLACE) 8.6-50 MG per tablet Take 1-2 tablets by mouth 2 times daily (Patient not taking: Reported on  "8/4/2017) 30 tablet 3     Coenzyme Q10 (COQ10 PO) Take 100 mg by mouth every 24 hours (at 16:00)       nicotine (NICODERM CQ) 14 MG/24HR 24 hr patch Place 1 patch onto the skin every 24 hours Reported on 4/10/2017       Allergies   Allergen Reactions     Crestor [Rosuvastatin] Other (See Comments)     dizziness     Hmg-Coa-R Inhibitors Other (See Comments)     Muscle/joint aching     Lisinopril Cough     Optison [Albumin Human] Other (See Comments)     Pt was flush and very dizzy.  Also had a BP drop     Penicillins Rash         Reviewed and updated as needed this visit by clinical staff     Reviewed and updated as needed this visit by Provider         ROS:  Constitutional, HEENT, cardiovascular, pulmonary, gi and gu systems are negative, except as otherwise noted.      OBJECTIVE:   /74  Pulse 77  Temp 98.1  F (36.7  C) (Temporal)  Resp 16  Ht 5' 3\" (1.6 m)  Wt 110 lb 9.6 oz (50.2 kg)  SpO2 99%  Breastfeeding? No  BMI 19.59 kg/m2  Body mass index is 19.59 kg/(m^2).  GENERAL: healthy, alert and no distress  EYES: Eyes grossly normal to inspection, PERRL and conjunctivae and sclerae normal  NECK: no adenopathy, no asymmetry, masses, or scars and thyroid normal to palpation  RESP: lungs clear to auscultation - no rales, rhonchi or wheezes  CV: regular rate and rhythm, normal S1 S2, no S3 or S4, no murmur, click or rub, no peripheral edema and peripheral pulses strong  ABDOMEN: soft, nontender, no hepatosplenomegaly, no masses and bowel sounds normal  MS: no gross musculoskeletal defects noted, no edema  NEURO: Normal strength and tone, mentation intact and speech normal  BACK: no CVA tenderness, no paralumbar tenderness    Diagnostic Test Results:    ASSESSMENT/PLAN:     Hyperlipidemia; controlled   Plan:  No changes in the patient's current treatment plan    Hypertension; controlled   Associated with the following complications:    None   Plan:  No changes in the patient's current treatment " "plan      COPD: Moderate = FeV1 < 79% -50%, controlled  Associated with the following complications:  None    Plan:  No changes in the patient's current treatment plan    COPD education: [email or web address]        Heart Failure:   ; chronic, controlled    Tobacco Cessation:   reports that she has been smoking Cigarettes.  She has been smoking about 0.50 packs per day. She has never used smokeless tobacco.      BMI:   Estimated body mass index is 19.59 kg/(m^2) as calculated from the following:    Height as of this encounter: 5' 3\" (1.6 m).    Weight as of this encounter: 110 lb 9.6 oz (50.2 kg).   Weight management plan noted, stable and monitoring              ICD-10-CM    1. Hyperlipidemia LDL goal <130 E78.5 Lipid panel reflex to direct LDL   2. PAD (peripheral artery disease) (H) I73.9 atorvastatin (LIPITOR) 10 MG tablet   3. Tobacco use disorder F17.200    4. COPD exacerbation (H) J44.1 ipratropium - albuterol 0.5 mg/2.5 mg/3 mL (DUONEB) 0.5-2.5 (3) MG/3ML neb solution     The patient understood the rational for the diagnosis and treatment plan. All questions were answered to best of my ability and the patient's satisfaction.  I have reviewed all pertinent investigations including labs and imaging including outside records if relevent.  Risks, benefits and alternatives of treatments discussed. Plan agreed on.  Followup: if not better.  Will call, return to clinic, or go to ED if worsening or symptoms not improving as discussed.  See patient instructions.     MEDICATIONS:  Continue current medications without change  SELF MONITORING:       - Please check blood pressure readings several times per week  Work on weight loss  Regular exercise  See Patient Instructions  Patient Instructions       High Cholesterol: Assessing Your Risk    Have you been told that your cholesterol is too high? If so, you could be heading for a heart attack, also known as acute myocardial infarction (AMI), or stroke. This is especially true if " you have other risk factors for heart disease. Get smart about cholesterol and your heart disease risk. This sheet can help you understand your heart disease risk and how your cholesterol level affects it. Talk to your healthcare provider about how to get started controlling your cholesterol.  Why is high cholesterol a problem?  Blood cholesterol is a fatty substance. The body uses it to make membranes in cells and for hormone production. It travels through the bloodstream and is used by the tissues for normal function. When blood cholesterol is high, it forms plaque and causes inflammation. The plaque builds up in the walls of arteries (blood vessels that carry blood from the heart to the body). This narrows the opening for blood flow. Over time, the heart may not get enough oxygen. This can lead to coronary artery disease, heart attack, or stroke.  3 steps to assessing your risk  Step 1. Find your risk factors for heart disease and stroke  How your cholesterol numbers affect your heart health depends on other risk factors for heart attack and stroke. Check off each risk factor below that applies to you:    Are you a man 45 years old or older or a woman 55 years old or older?    Does your family have a history of heart problems before the age of 55 in male relatives or age 65 in female relatives? This includes heart attack, coronary heart disease, or atherosclerosis.    Do you have high blood pressure? Do you take medicine to treat high blood pressure?    Do you smoke?    Do you have diabetes?    Do you exercise very little or not very often? Recommendations are for 30 minutes of exercise at least 5 days a week. If you are not doing cardiovascular exercise as often as these recommendations, it may not be enough and you may be at higher risk for elevated cholesterol and heart disease.    Do you eat a diet that is high in saturated or trans fats, cholesterol, sugar, or alcohol? You may be at increased risk for heart  disease if you do not eat enough fruits, vegetables, lean meats and eat sugars or drink alcohol sparingly.    Have you been told you have high cholesterol? Do you take medicine to control your cholesterol?  Step 2. Test your cholesterol  Have your cholesterol tested every 5 years after the age of 20 and more often if you have risk factors. Cholesterol testing most often needs no preparation. Sometimes you may be asked to fast (not eat) before your test. A blood sample is taken and sent to a lab. There, the amount of cholesterol and triglyceride in your blood is measured. There are 2 types of cholesterol in the sample. The first is HDL ( good cholesterol ). The second is LDL ( bad cholesterol ). Cholesterol test results are most often shown as the total of HDL and LDL cholesterol numbers. You may also be told the separate HDL and LDL cholesterol results.  Fill in your numbers below.  HDL cholesterol:                           LDL cholesterol:                         Total cholesterol:                         Triglyceride:                           Step 3. Discuss the results with your healthcare provider   If your cholesterol levels are higher than normal, your healthcare provider will help you with steps to take to lower your levels. Steps may include lifestyle changes like diet, physical activity, and quitting smoking, and medicine to lower bad cholesterol levels.  If you have high cholesterol, you may need your cholesterol level tested more often to make sure your medicine and lifestyle changes are working to reduce your risks of having a heart attack or stroke.   Date Last Reviewed: 6/1/2016 2000-2017 [employer]. [address] [place] [address]. All rights reserved. This information is not intended as a substitute for professional medical care. Always follow your healthcare professional's instructions.        Chronic Lung Disease: Preventing Lung Infections  Chronic lung diseases  include chronic obstructive pulmonary disease (COPD), which includes chronic bronchitis and emphysema. Other chronic lung diseases include pulmonary fibrosis, sarcoidosis, and other conditions. When you have chronic lung diseases, it's very important to protect yourself from respiratory infections, like colds, the flu, and lung infections. Infections may cause your lung condition to worsen. Although you can't completely avoid them, there are things you can do to lessen the chance of infections.    Take precautions  Taking the following precautions can help you avoid illness:    Remember to keep your hands away from your nose and mouth. Germs on your hands get into your respiratory system this way.    Wash your hands often. When you wash them:    Use soap and warm water.    Rub your hands together well for at least 20 seconds.    Make sure to rinse them well.    Dry your hands on clean towels or air-dry them.    Use hand  containing alcohol, if you are unable to wash your hands. Use the  after touching doorknobs, handles, and supermarket carts, for example, since lots of people touch them. Then wash your hands as soon as you can.    To help prevent the flu, get a flu vaccination every year. This may be given at your healthcare provider's office, a drugstore, or pharmacy, or at work. Get your flu shot as soon as the vaccines are available in your area. This is usually around September each year.    To help prevent pneumococcal pneumonia, get pneumonia vaccinations. Talk with your healthcare provider about which pneumococcal vaccinations you need.    Try to stay away from people with respiratory infections, such as colds or the flu. Stay away from crowded places, like shopping centers or movie theatres during cold and flu season.    If you smoke, think about quitting. In addition to causing or worsening many lung conditions, the lung damage from smoking increases your risk of infections. Stay away  from others who smoke, too. This is also harmful and increases your chance of infections.  Date Last Reviewed: 4/14/2016 2000-2017 [employer]. [address] [place] [address], [place] [address]. All rights reserved. This information is not intended as a substitute for professional medical care. Always follow your healthcare professional's instructions.        Treatment for COPD    Your healthcare provider will prescribe the best treatments for your COPD.  Treatment  Recommendations include the following:    Medicines. Some medicines help relieve symptoms when you have them. Others are taken daily to control inflammation in the lungs. Always take your medicines as prescribed. Learn the names of your medicines, as well as how and when to use them.    Oxygen therapy. Oxygen may be prescribed if tests show that your blood contains too little oxygen.    Smoking. If you smoke, quit. Smoking is the main cause of COPD. Quitting will help you be able to better manage your COPD. Ask your healthcare provider about ways to help you quit smoking.    Avoiding infections. Infections, like a cold or the flu, can cause your symptoms to worsen. Try to stay away from people who are sick. Wash your hands often. And, ask your healthcare provider about vaccines for the flu and pneumonia.  Coping with shortness of breath  Coping tips include the following:    Exercise. Try to be as active as possible. This will improve energy levels and strengthen your muscles, so you can do more.    Breathing techniques. Ask your healthcare provider or nurse to show you how to do pursed-lip breathing.    Balance rest and activity. Each day, try to balance rest periods with activity. For example, you might start the day with getting dressed and eating breakfast, then relax and read the paper. After that, take a brief walk. And then sit with your feet up for a while.    Pulmonary rehabilitation. Ask your provider, or call your local hospital to  find out about pulmonary rehab programs. The programs help with managing your disease, breathing techniques, exercise, support and counseling.    Healthy eating. Eating a healthy, balanced diet and making an effort to maintain your ideal weight are important to staying as healthy as possible. Make sure you have a lot of fruit and vegetables every day, as well as balanced portions of whole grains, lean meats and fish, and low-fat dairy products.  Date Last Reviewed: 5/1/2016 2000-2017 [employer]. [address] [place] [address]. All rights reserved. This information is not intended as a substitute for professional medical care. Always follow your healthcare professional's instructions.            Chichi Méndez MD  [facility]

## 2017-08-04 ENCOUNTER — OFFICE VISIT (OUTPATIENT)
Dept: FAMILY MEDICINE | Facility: OTHER | Age: 67
End: 2017-08-04
Payer: COMMERCIAL

## 2017-08-04 VITALS
RESPIRATION RATE: 16 BRPM | WEIGHT: 110.6 LBS | SYSTOLIC BLOOD PRESSURE: 128 MMHG | DIASTOLIC BLOOD PRESSURE: 74 MMHG | HEART RATE: 77 BPM | HEIGHT: 63 IN | TEMPERATURE: 98.1 F | BODY MASS INDEX: 19.6 KG/M2 | OXYGEN SATURATION: 99 %

## 2017-08-04 DIAGNOSIS — I73.9 PAD (PERIPHERAL ARTERY DISEASE) (H): ICD-10-CM

## 2017-08-04 DIAGNOSIS — J44.1 COPD EXACERBATION (H): ICD-10-CM

## 2017-08-04 DIAGNOSIS — F17.200 TOBACCO USE DISORDER: ICD-10-CM

## 2017-08-04 DIAGNOSIS — E78.5 HYPERLIPIDEMIA LDL GOAL <130: Primary | ICD-10-CM

## 2017-08-04 LAB
CHOLEST SERPL-MCNC: 174 MG/DL
HDLC SERPL-MCNC: 72 MG/DL
LDLC SERPL CALC-MCNC: 90 MG/DL
NONHDLC SERPL-MCNC: 102 MG/DL
TRIGL SERPL-MCNC: 61 MG/DL

## 2017-08-04 PROCEDURE — 80061 LIPID PANEL: CPT | Performed by: FAMILY MEDICINE

## 2017-08-04 PROCEDURE — 36415 COLL VENOUS BLD VENIPUNCTURE: CPT | Performed by: FAMILY MEDICINE

## 2017-08-04 PROCEDURE — 99213 OFFICE O/P EST LOW 20 MIN: CPT | Performed by: FAMILY MEDICINE

## 2017-08-04 RX ORDER — IPRATROPIUM BROMIDE AND ALBUTEROL SULFATE 2.5; .5 MG/3ML; MG/3ML
1 SOLUTION RESPIRATORY (INHALATION) 4 TIMES DAILY
Qty: 90 VIAL | Refills: 1 | Status: SHIPPED | OUTPATIENT
Start: 2017-08-04 | End: 2017-10-16

## 2017-08-04 RX ORDER — ATORVASTATIN CALCIUM 10 MG/1
10 TABLET, FILM COATED ORAL AT BEDTIME
Qty: 90 TABLET | Refills: 3 | Status: SHIPPED | OUTPATIENT
Start: 2017-08-04 | End: 2017-12-06

## 2017-08-04 ASSESSMENT — PAIN SCALES - GENERAL: PAINLEVEL: NO PAIN (0)

## 2017-08-04 NOTE — NURSING NOTE
"Chief Complaint   Patient presents with     Establish Care     Recheck Medication     Panel Management     See        Initial /74  Pulse 77  Temp 98.1  F (36.7  C) (Temporal)  Resp 16  Ht 5' 3\" (1.6 m)  Wt 110 lb 9.6 oz (50.2 kg)  SpO2 99%  Breastfeeding? No  BMI 19.59 kg/m2 Estimated body mass index is 19.59 kg/(m^2) as calculated from the following:    Height as of this encounter: 5' 3\" (1.6 m).    Weight as of this encounter: 110 lb 9.6 oz (50.2 kg).  Medication Reconciliation: complete    "

## 2017-08-04 NOTE — MR AVS SNAPSHOT
After Visit Summary   8/4/2017    Preeti Ford    MRN: [id number]           Patient Information     Date Of Birth          1950        Visit Information        Provider Department      8/4/2017 8:10 AM Chichi Méndez MD [facility]        Today's Diagnoses     Hyperlipidemia LDL goal <130    -  1    PAD (peripheral artery disease) (H)        Tobacco use disorder        COPD exacerbation (H)          Care Instructions      High Cholesterol: Assessing Your Risk    Have you been told that your cholesterol is too high? If so, you could be heading for a heart attack, also known as acute myocardial infarction (AMI), or stroke. This is especially true if you have other risk factors for heart disease. Get smart about cholesterol and your heart disease risk. This sheet can help you understand your heart disease risk and how your cholesterol level affects it. Talk to your healthcare provider about how to get started controlling your cholesterol.  Why is high cholesterol a problem?  Blood cholesterol is a fatty substance. The body uses it to make membranes in cells and for hormone production. It travels through the bloodstream and is used by the tissues for normal function. When blood cholesterol is high, it forms plaque and causes inflammation. The plaque builds up in the walls of arteries (blood vessels that carry blood from the heart to the body). This narrows the opening for blood flow. Over time, the heart may not get enough oxygen. This can lead to coronary artery disease, heart attack, or stroke.  3 steps to assessing your risk  Step 1. Find your risk factors for heart disease and stroke  How your cholesterol numbers affect your heart health depends on other risk factors for heart attack and stroke. Check off each risk factor below that applies to you:    Are you a man 45 years old or older or a woman 55 years old or older?    Does your family have a history of heart  problems before the age of 55 in male relatives or age 65 in female relatives? This includes heart attack, coronary heart disease, or atherosclerosis.    Do you have high blood pressure? Do you take medicine to treat high blood pressure?    Do you smoke?    Do you have diabetes?    Do you exercise very little or not very often? Recommendations are for 30 minutes of exercise at least 5 days a week. If you are not doing cardiovascular exercise as often as these recommendations, it may not be enough and you may be at higher risk for elevated cholesterol and heart disease.    Do you eat a diet that is high in saturated or trans fats, cholesterol, sugar, or alcohol? You may be at increased risk for heart disease if you do not eat enough fruits, vegetables, lean meats and eat sugars or drink alcohol sparingly.    Have you been told you have high cholesterol? Do you take medicine to control your cholesterol?  Step 2. Test your cholesterol  Have your cholesterol tested every 5 years after the age of 20 and more often if you have risk factors. Cholesterol testing most often needs no preparation. Sometimes you may be asked to fast (not eat) before your test. A blood sample is taken and sent to a lab. There, the amount of cholesterol and triglyceride in your blood is measured. There are 2 types of cholesterol in the sample. The first is HDL ( good cholesterol ). The second is LDL ( bad cholesterol ). Cholesterol test results are most often shown as the total of HDL and LDL cholesterol numbers. You may also be told the separate HDL and LDL cholesterol results.  Fill in your numbers below.  HDL cholesterol:                           LDL cholesterol:                         Total cholesterol:                         Triglyceride:                           Step 3. Discuss the results with your healthcare provider   If your cholesterol levels are higher than normal, your healthcare provider will help you with steps to take to lower  your levels. Steps may include lifestyle changes like diet, physical activity, and quitting smoking, and medicine to lower bad cholesterol levels.  If you have high cholesterol, you may need your cholesterol level tested more often to make sure your medicine and lifestyle changes are working to reduce your risks of having a heart attack or stroke.   Date Last Reviewed: 6/1/2016 2000-2017 [employer]. [address] [place] [address], [place] [address]. All rights reserved. This information is not intended as a substitute for professional medical care. Always follow your healthcare professional's instructions.        Chronic Lung Disease: Preventing Lung Infections  Chronic lung diseases include chronic obstructive pulmonary disease (COPD), which includes chronic bronchitis and emphysema. Other chronic lung diseases include pulmonary fibrosis, sarcoidosis, and other conditions. When you have chronic lung diseases, it's very important to protect yourself from respiratory infections, like colds, the flu, and lung infections. Infections may cause your lung condition to worsen. Although you can't completely avoid them, there are things you can do to lessen the chance of infections.    Take precautions  Taking the following precautions can help you avoid illness:    Remember to keep your hands away from your nose and mouth. Germs on your hands get into your respiratory system this way.    Wash your hands often. When you wash them:    Use soap and warm water.    Rub your hands together well for at least 20 seconds.    Make sure to rinse them well.    Dry your hands on clean towels or air-dry them.    Use hand  containing alcohol, if you are unable to wash your hands. Use the  after touching doorknobs, handles, and supermarket carts, for example, since lots of people touch them. Then wash your hands as soon as you can.    To help prevent the flu, get a flu vaccination every year. This may be given  at your healthcare provider's office, a drugstore, or pharmacy, or at work. Get your flu shot as soon as the vaccines are available in your area. This is usually around September each year.    To help prevent pneumococcal pneumonia, get pneumonia vaccinations. Talk with your healthcare provider about which pneumococcal vaccinations you need.    Try to stay away from people with respiratory infections, such as colds or the flu. Stay away from crowded places, like shopping centers or movie theatres during cold and flu season.    If you smoke, think about quitting. In addition to causing or worsening many lung conditions, the lung damage from smoking increases your risk of infections. Stay away from others who smoke, too. This is also harmful and increases your chance of infections.  Date Last Reviewed: 4/14/2016 2000-2017 [employer]. [address] [place] [address], [place] [address]. All rights reserved. This information is not intended as a substitute for professional medical care. Always follow your healthcare professional's instructions.        Treatment for COPD    Your healthcare provider will prescribe the best treatments for your COPD.  Treatment  Recommendations include the following:    Medicines. Some medicines help relieve symptoms when you have them. Others are taken daily to control inflammation in the lungs. Always take your medicines as prescribed. Learn the names of your medicines, as well as how and when to use them.    Oxygen therapy. Oxygen may be prescribed if tests show that your blood contains too little oxygen.    Smoking. If you smoke, quit. Smoking is the main cause of COPD. Quitting will help you be able to better manage your COPD. Ask your healthcare provider about ways to help you quit smoking.    Avoiding infections. Infections, like a cold or the flu, can cause your symptoms to worsen. Try to stay away from people who are sick. Wash your hands often. And, ask your healthcare  provider about vaccines for the flu and pneumonia.  Coping with shortness of breath  Coping tips include the following:    Exercise. Try to be as active as possible. This will improve energy levels and strengthen your muscles, so you can do more.    Breathing techniques. Ask your healthcare provider or nurse to show you how to do pursed-lip breathing.    Balance rest and activity. Each day, try to balance rest periods with activity. For example, you might start the day with getting dressed and eating breakfast, then relax and read the paper. After that, take a brief walk. And then sit with your feet up for a while.    Pulmonary rehabilitation. Ask your provider, or call your local hospital to find out about pulmonary rehab programs. The programs help with managing your disease, breathing techniques, exercise, support and counseling.    Healthy eating. Eating a healthy, balanced diet and making an effort to maintain your ideal weight are important to staying as healthy as possible. Make sure you have a lot of fruit and vegetables every day, as well as balanced portions of whole grains, lean meats and fish, and low-fat dairy products.  Date Last Reviewed: 5/1/2016 2000-2017 [employer]. [address] [place] [address]. All rights reserved. This information is not intended as a substitute for professional medical care. Always follow your healthcare professional's instructions.                Follow-ups after your visit        Who to contact     If you have questions or need follow up information about today's clinic visit or your schedule please contact [facility] directly at [phone].  Normal or non-critical lab and imaging results will be communicated to you by MyChart, letter or phone within 4 business days after the clinic has received the results. If you do not hear from us within 7 days, please contact the clinic through MyChart or phone. If you have a critical or  "abnormal lab result, we will notify you by phone as soon as possible.  Submit refill requests through [employer] or call your pharmacy and they will forward the refill request to us. Please allow 3 business days for your refill to be completed.          Additional Information About Your Visit        MyChart Information     [employer] lets you send messages to your doctor, view your test results, renew your prescriptions, schedule appointments and more. To sign up, go to [email or web address] . Click on \"Log in\" on the left side of the screen, which will take you to the Welcome page. Then click on \"Sign up Now\" on the right side of the page.     You will be asked to enter the access code listed below, as well as some personal information. Please follow the directions to create your username and password.     Your access code is: [email or web address]  Expires: 9/10/2017 10:01 AM     Your access code will  in 90 days. If you need help or a new code, please call your [place] clinic or [phone].        Care EveryWhere ID     This is your Care EveryWhere ID. This could be used by other organizations to access your [place] medical records  [id number]        Your Vitals Were     Pulse Temperature Respirations Height Pulse Oximetry Breastfeeding?    77 98.1  F (36.7  C) (Temporal) 16 5' 3\" (1.6 m) 99% No    BMI (Body Mass Index)                   19.59 kg/m2            Blood Pressure from Last 3 Encounters:   17 166/74   17 120/68   17 146/63    Weight from Last 3 Encounters:   17 110 lb 9.6 oz (50.2 kg)   17 118 lb 9.6 oz (53.8 kg)   17 106 lb 14.8 oz (48.5 kg)              We Performed the Following     Lipid panel reflex to direct LDL          Where to get your medicines      These medications were sent to [facility] - [id number]  [address] [id number]     Phone:  [phone]     ipratropium - albuterol 0.5 mg/2.5 mg/3 mL " 0.5-2.5 (3) MG/3ML neb solution          Primary Care Provider Office Phone # Fax #    Chichi Méndez [phone]       [facility] [address] [facility] [address]        Equal Access to Services     LYDIA WASHINGTON : Hadii aad ku hadpoojajessica Sodollyali, waaxda luqadaha, qaybta kaalmada berenice, sagar myrick. [facility] [phone].    ATENCIÓN: Si habla español, tiene a jeffries disposición servicios gratuitos de asistencia lingüística. Llame al [phone].    We comply with applicable federal civil rights laws and [place] laws. We do not discriminate on the basis of race, color, national origin, age, disability sex, sexual orientation or gender identity.            Thank you!     Thank you for choosing [facility]  for your care. Our goal is always to provide you with excellent care. Hearing back from our patients is one way we can continue to improve our services. Please take a few minutes to complete the written survey that you may receive in the mail after your visit with us. Thank you!             Your Updated Medication List - Protect others around you: Learn how to safely use, store and throw away your medicines at www.disposemymeds.org.          This list is accurate as of: 8/4/17  9:00 AM.  Always use your most recent med list.                   Brand Name Dispense Instructions for use Diagnosis    albuterol 108 (90 BASE) MCG/ACT Inhaler    PROAIR HFA/PROVENTIL HFA/VENTOLIN HFA    1 Inhaler    Inhale 2 puffs into the lungs every 6 hours as needed for shortness of breath / dyspnea    Chronic bronchitis, unspecified chronic bronchitis type (H)       aspirin 325 MG tablet     180 tablet    Take 1 tablet (325 mg) by mouth daily    PAD (peripheral artery disease) (H)       atorvastatin 10 MG tablet    LIPITOR    30 tablet    Take 1 tablet (10 mg) by mouth At Bedtime    PAD (peripheral artery disease) (H)       clindamycin 300 MG capsule     CLEOCIN    2 capsule    Take 2 capsules (600mg) by mouth once for 1 dose, Take 1 hour prior to invasive procedure (dental cleaning).    Prophylactic antibiotic       COQ10 PO      Take 100 mg by mouth every 24 hours (at 16:00)        ipratropium - albuterol 0.5 mg/2.5 mg/3 mL 0.5-2.5 (3) MG/3ML neb solution    DUONEB    90 vial    Take 1 vial (3 mLs) by nebulization 4 times daily    COPD exacerbation (H)       nicotine 14 MG/24HR 24 hr patch    NICODERM CQ     Place 1 patch onto the skin every 24 hours Reported on 4/10/2017        OSCAL 500/200 D-3 PO      Take 1 tablet by mouth 2 times daily        senna-docusate 8.6-50 MG per tablet    SENOKOT-S;PERICOLACE    30 tablet    Take 1-2 tablets by mouth 2 times daily    Drug-induced constipation       SINGULAIR PO      Take 10 mg by mouth At Bedtime        SPIRIVA HANDIHALER 18 MCG capsule   Generic drug:  tiotropium     90 capsule    USING THE HANDIHALER, INHALE ONE CAPSULE BY MOUTH EVERY DAY    Chronic bronchitis, unspecified chronic bronchitis type (H)       SYMBICORT 160-4.5 MCG/ACT Inhaler   Generic drug:  budesonide-formoterol     1 Inhaler    INHALE TWO PUFFS BY MOUTH TWICE A DAY    COPD (chronic obstructive pulmonary disease) (H)       TOPROL XL 25 MG 24 hr tablet   Generic drug:  metoprolol     30 tablet    Take 1 tablet (25 mg) by mouth every morning (patient takes 0.5 X 25 mg = 12.5 mg dose)

## 2017-08-04 NOTE — PATIENT INSTRUCTIONS
High Cholesterol: Assessing Your Risk    Have you been told that your cholesterol is too high? If so, you could be heading for a heart attack, also known as acute myocardial infarction (AMI), or stroke. This is especially true if you have other risk factors for heart disease. Get smart about cholesterol and your heart disease risk. This sheet can help you understand your heart disease risk and how your cholesterol level affects it. Talk to your healthcare provider about how to get started controlling your cholesterol.  Why is high cholesterol a problem?  Blood cholesterol is a fatty substance. The body uses it to make membranes in cells and for hormone production. It travels through the bloodstream and is used by the tissues for normal function. When blood cholesterol is high, it forms plaque and causes inflammation. The plaque builds up in the walls of arteries (blood vessels that carry blood from the heart to the body). This narrows the opening for blood flow. Over time, the heart may not get enough oxygen. This can lead to coronary artery disease, heart attack, or stroke.  3 steps to assessing your risk  Step 1. Find your risk factors for heart disease and stroke  How your cholesterol numbers affect your heart health depends on other risk factors for heart attack and stroke. Check off each risk factor below that applies to you:    Are you a man 45 years old or older or a woman 55 years old or older?    Does your family have a history of heart problems before the age of 55 in male relatives or age 65 in female relatives? This includes heart attack, coronary heart disease, or atherosclerosis.    Do you have high blood pressure? Do you take medicine to treat high blood pressure?    Do you smoke?    Do you have diabetes?    Do you exercise very little or not very often? Recommendations are for 30 minutes of exercise at least 5 days a week. If you are not doing cardiovascular exercise as often as these  recommendations, it may not be enough and you may be at higher risk for elevated cholesterol and heart disease.    Do you eat a diet that is high in saturated or trans fats, cholesterol, sugar, or alcohol? You may be at increased risk for heart disease if you do not eat enough fruits, vegetables, lean meats and eat sugars or drink alcohol sparingly.    Have you been told you have high cholesterol? Do you take medicine to control your cholesterol?  Step 2. Test your cholesterol  Have your cholesterol tested every 5 years after the age of 20 and more often if you have risk factors. Cholesterol testing most often needs no preparation. Sometimes you may be asked to fast (not eat) before your test. A blood sample is taken and sent to a lab. There, the amount of cholesterol and triglyceride in your blood is measured. There are 2 types of cholesterol in the sample. The first is HDL ( good cholesterol ). The second is LDL ( bad cholesterol ). Cholesterol test results are most often shown as the total of HDL and LDL cholesterol numbers. You may also be told the separate HDL and LDL cholesterol results.  Fill in your numbers below.  HDL cholesterol:                           LDL cholesterol:                         Total cholesterol:                         Triglyceride:                           Step 3. Discuss the results with your healthcare provider   If your cholesterol levels are higher than normal, your healthcare provider will help you with steps to take to lower your levels. Steps may include lifestyle changes like diet, physical activity, and quitting smoking, and medicine to lower bad cholesterol levels.  If you have high cholesterol, you may need your cholesterol level tested more often to make sure your medicine and lifestyle changes are working to reduce your risks of having a heart attack or stroke.   Date Last Reviewed: 6/1/2016 2000-2017 The [employer]. [address] [place] [address], [place] [address].  All rights reserved. This information is not intended as a substitute for professional medical care. Always follow your healthcare professional's instructions.        Chronic Lung Disease: Preventing Lung Infections  Chronic lung diseases include chronic obstructive pulmonary disease (COPD), which includes chronic bronchitis and emphysema. Other chronic lung diseases include pulmonary fibrosis, sarcoidosis, and other conditions. When you have chronic lung diseases, it's very important to protect yourself from respiratory infections, like colds, the flu, and lung infections. Infections may cause your lung condition to worsen. Although you can't completely avoid them, there are things you can do to lessen the chance of infections.    Take precautions  Taking the following precautions can help you avoid illness:    Remember to keep your hands away from your nose and mouth. Germs on your hands get into your respiratory system this way.    Wash your hands often. When you wash them:    Use soap and warm water.    Rub your hands together well for at least 20 seconds.    Make sure to rinse them well.    Dry your hands on clean towels or air-dry them.    Use hand  containing alcohol, if you are unable to wash your hands. Use the  after touching doorknobs, handles, and supermarket carts, for example, since lots of people touch them. Then wash your hands as soon as you can.    To help prevent the flu, get a flu vaccination every year. This may be given at your healthcare provider's office, a drugstore, or pharmacy, or at work. Get your flu shot as soon as the vaccines are available in your area. This is usually around September each year.    To help prevent pneumococcal pneumonia, get pneumonia vaccinations. Talk with your healthcare provider about which pneumococcal vaccinations you need.    Try to stay away from people with respiratory infections, such as colds or the flu. Stay away from crowded places,  like shopping centers or movie theatres during cold and flu season.    If you smoke, think about quitting. In addition to causing or worsening many lung conditions, the lung damage from smoking increases your risk of infections. Stay away from others who smoke, too. This is also harmful and increases your chance of infections.  Date Last Reviewed: 4/14/2016 2000-2017 [employer] [address] [place] [address], [place] [address]. All rights reserved. This information is not intended as a substitute for professional medical care. Always follow your healthcare professional's instructions.        Treatment for COPD    Your healthcare provider will prescribe the best treatments for your COPD.  Treatment  Recommendations include the following:    Medicines. Some medicines help relieve symptoms when you have them. Others are taken daily to control inflammation in the lungs. Always take your medicines as prescribed. Learn the names of your medicines, as well as how and when to use them.    Oxygen therapy. Oxygen may be prescribed if tests show that your blood contains too little oxygen.    Smoking. If you smoke, quit. Smoking is the main cause of COPD. Quitting will help you be able to better manage your COPD. Ask your healthcare provider about ways to help you quit smoking.    Avoiding infections. Infections, like a cold or the flu, can cause your symptoms to worsen. Try to stay away from people who are sick. Wash your hands often. And, ask your healthcare provider about vaccines for the flu and pneumonia.  Coping with shortness of breath  Coping tips include the following:    Exercise. Try to be as active as possible. This will improve energy levels and strengthen your muscles, so you can do more.    Breathing techniques. Ask your healthcare provider or nurse to show you how to do pursed-lip breathing.    Balance rest and activity. Each day, try to balance rest periods with activity. For example, you might start  the day with getting dressed and eating breakfast, then relax and read the paper. After that, take a brief walk. And then sit with your feet up for a while.    Pulmonary rehabilitation. Ask your provider, or call your local hospital to find out about pulmonary rehab programs. The programs help with managing your disease, breathing techniques, exercise, support and counseling.    Healthy eating. Eating a healthy, balanced diet and making an effort to maintain your ideal weight are important to staying as healthy as possible. Make sure you have a lot of fruit and vegetables every day, as well as balanced portions of whole grains, lean meats and fish, and low-fat dairy products.  Date Last Reviewed: 5/1/2016 2000-2017 [employer]. [address] [place] [address], [place] [address]. All rights reserved. This information is not intended as a substitute for professional medical care. Always follow your healthcare professional's instructions.

## 2017-08-04 NOTE — LETTER
Preeti Ford  [address] [address] [place] [id number]        August 4, 2017          Dear MsBillLiliana,    We are writing to inform you of your test results.    Cholesterol is well controlled.  Keep up the great work and continue current medication.    Resulted Orders   Lipid panel reflex to direct LDL   Result Value Ref Range    Cholesterol 174 <200 mg/dL    Triglycerides 61 <150 mg/dL    HDL Cholesterol 72 >49 mg/dL    LDL Cholesterol Calculated 90 <100 mg/dL      Comment:      Desirable:       <100 mg/dl    Non HDL Cholesterol 102 <130 mg/dL       If you have any questions or concerns, please call the clinic at the number listed above.       Sincerely,        Chichi Méndez MD

## 2017-08-06 DIAGNOSIS — I21.02 ST ELEVATION MYOCARDIAL INFARCTION INVOLVING LEFT ANTERIOR DESCENDING (LAD) CORONARY ARTERY (H): ICD-10-CM

## 2017-08-07 NOTE — TELEPHONE ENCOUNTER
metoprolol (TOPROL XL) 25 MG 24 hr tablet      Last Written Prescription Date: 6/17/2017  Last Fill Quantity: 30, # refills: 0    Last Office Visit with [facility], [facility] or [facility] prescribing provider:  8/4/2017   Future Office Visit:        BP Readings from Last 3 Encounters:   08/04/17 128/74   06/23/17 120/68   06/17/17 146/63

## 2017-08-09 RX ORDER — METOPROLOL SUCCINATE 25 MG/1
TABLET, EXTENDED RELEASE ORAL
Qty: 30 TABLET | Refills: 3 | Status: SHIPPED | OUTPATIENT
Start: 2017-08-09 | End: 2017-12-06

## 2017-08-09 NOTE — TELEPHONE ENCOUNTER
Prescription [place] per [facility] [facility].  Diego Hilario, RN, BSN

## 2017-10-09 ENCOUNTER — TELEPHONE (OUTPATIENT)
Dept: FAMILY MEDICINE | Facility: OTHER | Age: 67
End: 2017-10-09

## 2017-10-09 NOTE — TELEPHONE ENCOUNTER
Summary:    Patient is due/failing the following:   Establish care with new PCP and FIT test  Action needed:   Establish care with new PCP and complete a FIT test     Type of outreach:    Phone, left message for patient to call back.     Questions for provider review:    None                                                                                                                                    Karey Candelaria       Chart routed to Care Team .      Panel Management Review      Patient has the following on her problem list:     Hypertension   Last three blood pressure readings:  BP Readings from Last 3 Encounters:   08/04/17 128/74   06/23/17 120/68   06/17/17 146/63     Blood pressure: Passed    HTN Guidelines:  Age 18-59 BP range:  Less than 140/90  Age 60-85 with Diabetes:  Less than 140/90  Age 60-85 without Diabetes:  less than 150/90        Composite cancer screening  Chart review shows that this patient is due/due soon for the following Fecal Colorectal (FIT)

## 2017-10-16 DIAGNOSIS — J44.1 COPD EXACERBATION (H): ICD-10-CM

## 2017-10-16 RX ORDER — IPRATROPIUM BROMIDE AND ALBUTEROL SULFATE 2.5; .5 MG/3ML; MG/3ML
1 SOLUTION RESPIRATORY (INHALATION) 4 TIMES DAILY
Qty: 90 VIAL | Refills: 0 | Status: SHIPPED | OUTPATIENT
Start: 2017-10-16 | End: 2017-11-17

## 2017-10-16 NOTE — TELEPHONE ENCOUNTER
Ipratropium/albuterol       Last Written Prescription Date: 8-4-17 from RYAN Méndez  Last Fill Quantity: 90, # refills: 1    Last Office Visit with G, [facility] or [facility] prescribing provider:  8-4-17   Future Office Visit:       Date of Last Asthma Action Plan Letter:   There are no preventive care reminders to display for this patient.   Asthma Control Test: No flowsheet data found.    Date of Last Spirometry Test:   No results found for this or any previous visit.

## 2017-11-01 ENCOUNTER — ALLIED HEALTH/NURSE VISIT (OUTPATIENT)
Dept: FAMILY MEDICINE | Facility: OTHER | Age: 67
End: 2017-11-01
Payer: COMMERCIAL

## 2017-11-01 DIAGNOSIS — Z23 NEED FOR VACCINATION: ICD-10-CM

## 2017-11-01 DIAGNOSIS — Z23 NEED FOR PROPHYLACTIC VACCINATION AND INOCULATION AGAINST INFLUENZA: Primary | ICD-10-CM

## 2017-11-01 PROCEDURE — 90662 IIV NO PRSV INCREASED AG IM: CPT

## 2017-11-01 PROCEDURE — G0008 ADMIN INFLUENZA VIRUS VAC: HCPCS

## 2017-11-01 PROCEDURE — 90670 PCV13 VACCINE IM: CPT

## 2017-11-01 PROCEDURE — G0009 ADMIN PNEUMOCOCCAL VACCINE: HCPCS

## 2017-11-01 PROCEDURE — 99207 ZZC NO CHARGE NURSE ONLY: CPT

## 2017-11-01 NOTE — PROGRESS NOTES
Chief Complaint   Patient presents with     Imm/Inj     flu shot, pneumonia      Prior to injection verified patient identity using patient's name and date of birth.  Injectable Influenza Immunization Documentation    1.  Is the person to be vaccinated sick today?   No    2. Does the person to be vaccinated have an allergy to a component   of the vaccine?   No  Egg Allergy Algorithm Link    3. Has the person to be vaccinated ever had a serious reaction   to influenza vaccine in the past?   No    4. Has the person to be vaccinated ever had Guillain-Barré syndrome?   No    Form completed by Melanie Clemente CMA ([facility])  Screening Questionnaire for Adult Immunization    Are you sick today?   No   Do you have allergies to medications, food, a vaccine component or latex?   No   Have you ever had a serious reaction after receiving a vaccination?   No   Do you have a long-term health problem with heart disease, lung disease, asthma, kidney disease, metabolic disease (e.g. diabetes), anemia, or other blood disorder?   Yes   Do you have cancer, leukemia, HIV/AIDS, or any other immune system problem?   No   In the past 3 months, have you taken medications that affect  your immune system, such as prednisone, other steroids, or anticancer drugs; drugs for the treatment of rheumatoid arthritis, Crohn s disease, or psoriasis; or have you had radiation treatments?   No   Have you had a seizure, or a brain or other nervous system problem?   No   During the past year, have you received a transfusion of blood or blood     products, or been given immune (gamma) globulin or antiviral drug?   No   For women: Are you pregnant or is there a chance you could become        pregnant during the next month?   No   Have you received any vaccinations in the past 4 weeks?   No     Immunization questionnaire was positive for at least one answer.  Notified provider ok to give.        Per orders of Dodie Vieira, injection of flu shot and prevnar  given by Melanie Clemente. Patient instructed to remain in clinic for 15 minutes afterwards, and to report any adverse reaction to me immediately.       Screening performed by Melanie Clemente on 11/1/2017 at 11:47 AM.

## 2017-11-01 NOTE — MR AVS SNAPSHOT
"              After Visit Summary   2017    Preeti Ford    MRN: [id number]           Patient Information     Date Of Birth          1950        Visit Information        Provider Department      2017 11:30 AM LONG CALI NURSE [facility]        Today's Diagnoses     Need for prophylactic vaccination and inoculation against influenza    -  1    Need for vaccination           Follow-ups after your visit        Who to contact     If you have questions or need follow up information about today's clinic visit or your schedule please contact [facility] directly at [phone].  Normal or non-critical lab and imaging results will be communicated to you by Chooslyhart, letter or phone within 4 business days after the clinic has received the results. If you do not hear from us within 7 days, please contact the clinic through Chooslyhart or phone. If you have a critical or abnormal lab result, we will notify you by phone as soon as possible.  Submit refill requests through Geeklist or call your pharmacy and they will forward the refill request to us. Please allow 3 business days for your refill to be completed.          Additional Information About Your Visit        MyChart Information     Geeklist lets you send messages to your doctor, view your test results, renew your prescriptions, schedule appointments and more. To sign up, go to [email or web address] . Click on \"Log in\" on the left side of the screen, which will take you to the Welcome page. Then click on \"Sign up Now\" on the right side of the page.     You will be asked to enter the access code listed below, as well as some personal information. Please follow the directions to create your username and password.     Your access code is: [email or web address]  Expires: 2018 11:50 AM     Your access code will  in 90 days. If you need help or a new code, please call your [facility] or [phone].        Care " EveryWhere ID     This is your Care EveryWhere ID. This could be used by other organizations to access your [place] medical records  [id number]         Blood Pressure from Last 3 Encounters:   08/04/17 128/74   06/23/17 120/68   06/17/17 146/63    Weight from Last 3 Encounters:   08/04/17 110 lb 9.6 oz (50.2 kg)   06/23/17 118 lb 9.6 oz (53.8 kg)   06/14/17 106 lb 14.8 oz (48.5 kg)              We Performed the Following     Each additional admin.  (Right click and add QUANTITY)  [94877]     FLU VACCINE, INCREASED ANTIGEN, PRESV FREE, AGE 65+ [81026]     Pneumococcal vaccine 13 valent PCV13 IM (Prevnar) [51381]     Vaccine Administration, Initial [40023]        Primary Care Provider    None Specified       No primary provider on file.        Equal Access to Services     LYDIA WASHINGTON : Cindy Farias, peter ashby, ruddy ng, sagar hampton . So [facility] [phone].    ATENCIÓN: Si habla español, tiene a jeffries disposición servicios gratuitos de asistencia lingüística. Llame al [phone].    We comply with applicable federal civil rights laws and [place] laws. We do not discriminate on the basis of race, color, national origin, age, disability, sex, sexual orientation, or gender identity.            Thank you!     Thank you for choosing [facility]  for your care. Our goal is always to provide you with excellent care. Hearing back from our patients is one way we can continue to improve our services. Please take a few minutes to complete the written survey that you may receive in the mail after your visit with us. Thank you!             Your Updated Medication List - Protect others around you: Learn how to safely use, store and throw away your medicines at www.disposemymeds.org.          This list is accurate as of: 11/1/17 11:50 AM.  Always use your most recent med list.                   Brand Name Dispense Instructions for use Diagnosis     albuterol 108 (90 BASE) MCG/ACT Inhaler    PROAIR HFA/PROVENTIL HFA/VENTOLIN HFA    1 Inhaler    Inhale 2 puffs into the lungs every 6 hours as needed for shortness of breath / dyspnea    Chronic bronchitis, unspecified chronic bronchitis type (H)       aspirin 325 MG tablet     180 tablet    Take 1 tablet (325 mg) by mouth daily    PAD (peripheral artery disease) (H)       atorvastatin 10 MG tablet    LIPITOR    90 tablet    Take 1 tablet (10 mg) by mouth At Bedtime    PAD (peripheral artery disease) (H)       clindamycin 300 MG capsule    CLEOCIN    2 capsule    Take 2 capsules (600mg) by mouth once for 1 dose, Take 1 hour prior to invasive procedure (dental cleaning).    Prophylactic antibiotic       COQ10 PO      Take 100 mg by mouth every 24 hours (at 16:00)        ipratropium - albuterol 0.5 mg/2.5 mg/3 mL 0.5-2.5 (3) MG/3ML neb solution    DUONEB    90 vial    Take 1 vial (3 mLs) by nebulization 4 times daily    COPD exacerbation (H)       nicotine 14 MG/24HR 24 hr patch    NICODERM CQ     Place 1 patch onto the skin every 24 hours Reported on 4/10/2017        OSCAL 500/200 D-3 PO      Take 1 tablet by mouth 2 times daily        senna-docusate 8.6-50 MG per tablet    SENOKOT-S;PERICOLACE    30 tablet    Take 1-2 tablets by mouth 2 times daily    Drug-induced constipation       SINGULAIR PO      Take 10 mg by mouth At Bedtime        SPIRIVA HANDIHALER 18 MCG capsule   Generic drug:  tiotropium     90 capsule    USING THE HANDIHALER, INHALE ONE CAPSULE BY MOUTH EVERY DAY    Chronic bronchitis, unspecified chronic bronchitis type (H)       SYMBICORT 160-4.5 MCG/ACT Inhaler   Generic drug:  budesonide-formoterol     1 Inhaler    INHALE TWO PUFFS BY MOUTH TWICE A DAY    COPD (chronic obstructive pulmonary disease) (H)       * TOPROL XL 25 MG 24 hr tablet   Generic drug:  metoprolol     30 tablet    Take 1 tablet (25 mg) by mouth every morning (patient takes 0.5 X 25 mg = 12.5 mg dose)        * metoprolol 25 MG 24  hr tablet    TOPROL-XL    30 tablet    TAKE ONE-HALF TABLET BY MOUTH EVERY DAY    ST elevation myocardial infarction involving left anterior descending (LAD) coronary artery (H)       * Notice:  This list has 2 medication(s) that are the same as other medications prescribed for you. Read the directions carefully, and ask your doctor or other care provider to review them with you.

## 2017-11-17 ENCOUNTER — TELEPHONE (OUTPATIENT)
Dept: PSYCHOLOGY | Facility: CLINIC | Age: 67
End: 2017-11-17

## 2017-11-17 DIAGNOSIS — J44.1 COPD EXACERBATION (H): ICD-10-CM

## 2017-11-17 DIAGNOSIS — J44.9 CHRONIC OBSTRUCTIVE PULMONARY DISEASE, UNSPECIFIED COPD TYPE (H): Primary | ICD-10-CM

## 2017-11-17 RX ORDER — MONTELUKAST SODIUM 10 MG/1
10 TABLET ORAL AT BEDTIME
Qty: 30 TABLET | Refills: 0 | Status: SHIPPED | OUTPATIENT
Start: 2017-11-17 | End: 2017-12-06

## 2017-11-17 RX ORDER — IPRATROPIUM BROMIDE AND ALBUTEROL SULFATE 2.5; .5 MG/3ML; MG/3ML
1 SOLUTION RESPIRATORY (INHALATION) 4 TIMES DAILY
Qty: 90 VIAL | Refills: 0 | Status: SHIPPED | OUTPATIENT
Start: 2017-11-17 | End: 2017-12-27

## 2017-11-17 RX ORDER — IPRATROPIUM BROMIDE AND ALBUTEROL SULFATE 2.5; .5 MG/3ML; MG/3ML
1 SOLUTION RESPIRATORY (INHALATION) 4 TIMES DAILY
Qty: 90 VIAL | Refills: 0 | Status: CANCELLED | OUTPATIENT
Start: 2017-11-17

## 2017-11-17 NOTE — TELEPHONE ENCOUNTER
Patient informed of the message below, and agrees.  She also needs her nuoneb solution refilled.  Otis Reynolds, YANIRA  ipratropium - albuterol 0.5 mg/2.5 mg/3 mL (DUONEB) 0.5-2.5 (3) MG/3ML neb solution         Last Written Prescription Date: 10/16/17  Last Fill Quantity: 90 vials, # refills:      [place] [facility] or [facility] prescribing provider:  08/04/17   Future Office Visit:       Date of Last Asthma Action Plan Letter:   There are no preventive care reminders to display for this patient.   Asthma Control Test: No flowsheet data found.    Date of Last Spirometry Test:   No results found for this or any previous visit.

## 2017-11-17 NOTE — TELEPHONE ENCOUNTER
Montelukast 10mg      Last Written Prescription Date:  12/20/16  Last Fill Quantity: 90,   # refills: 2  Future Office visit:       Routing refill request to provider for review/approval because:  Medication is reported/historical    On behalf of [facility]  Shea Beavers [facility]

## 2017-11-17 NOTE — TELEPHONE ENCOUNTER
Your medication has been refilled.  Please schedule a visit to establish with a new provider and to follow up on how this medication is working for you before your next refill.  We need to be sure everything is working well and stable prior to further refills.

## 2017-12-04 ENCOUNTER — TELEPHONE (OUTPATIENT)
Dept: FAMILY MEDICINE | Facility: OTHER | Age: 67
End: 2017-12-04

## 2017-12-04 RX ORDER — BUDESONIDE AND FORMOTEROL FUMARATE DIHYDRATE 160; 4.5 UG/1; UG/1
AEROSOL RESPIRATORY (INHALATION)
Qty: 1 INHALER | Refills: 5 | OUTPATIENT
Start: 2017-12-04

## 2017-12-04 NOTE — PROGRESS NOTES
"  SUBJECTIVE:                                                    Preeti Ford is a 67 year old female who presents to clinic today for the following health issues:      History of Present Illness     Diet:  Low salt  Taking medications regularly:  Yes  Additional concerns today:  YES     Patient is wondering if there is a generic Symbicort and Spiriva.    COPD Follow-Up    Symptoms are currently: During the day, may have some problems, but pretty good. Uses nebulizer in AM and PM    Current fatigue or dyspnea with ambulation: Only when walking up and down stairs and cold weather    Shortness of breath: Is short of breath when going up and down stairs and cold weather.    Increased or change in Cough/Sputum: Yes-  \"About the same\"    Fever(s): No    Baseline ambulation without stopping to rest:  1 blocks. Able to walk up 1 flights of stairs without stopping to rest.    Any ER/UC or hospital admissions since your last visit? No     History   Smoking Status     Current Every Day Smoker     Packs/day: 0.50     Types: Cigarettes   Smokeless Tobacco     Never Used     Comment: patient states she is working on it      No results found for: FEV1, DSV0NOP  Chest pain:  Chest Pain    Onset: a couple month(s) ago    Description:   Location:  left side  Radiation: none        Character: achy, tight. Denies sensation of heaviness in chest or pressure with the discomfort    Duration, How long does pain last: a few minute(s)    Intensity: mild    Frequency (if intermittent):        Frequency between episodes:  Several day(s) to weeks        Pain-free between episodes? yes    Precipitating and/or Alleviating factors:   Related to exertion: no  What type of activity will bring it on: not related to activity level  Does it go away with rest in 3-5 minutes: yes  Worse with deep breaths or coughs: no  Related to food: no    Accompanying Signs & Symptoms:        Sweating: no        Nausea/vomiting: no        Lightheadedness: no     " "   Palpitations: no        Cough: YES          Fevers: no        Abdominal pain: no      History:        First degree family History of heart disease; YES          Smoker: YES      Progression of Symptoms: same    Therapies tried and outcome: rest      Answers for HPI/ROS submitted by the patient on 12/6/2017   PHQ-2 Score: 0      Problem list and histories reviewed & adjusted, as indicated.  Additional history: as documented    Labs reviewed in EPIC    ROS:  Constitutional, HEENT, cardiovascular, pulmonary, gi and gu systems are negative, except as otherwise noted.      OBJECTIVE:   /78  Pulse 73  Temp 98.2  F (36.8  C) (Temporal)  Ht 5' 2.99\" (1.6 m)  Wt 111 lb 12.8 oz (50.7 kg)  SpO2 97%  BMI 19.81 kg/m2  Body mass index is 19.81 kg/(m^2).  GENERAL: healthy, alert and no distress  EYES: Eyes grossly normal to inspection, PERRL and conjunctivae and sclerae normal  HENT: ear canals and TM's normal, nose and mouth without ulcers or lesions  NECK: no adenopathy, no asymmetry, masses, or scars and thyroid normal to palpation  RESP: lungs clear to auscultation - no rales, rhonchi or wheezes  CV: regular rate and rhythm, normal S1 S2, no S3 or S4, no murmur, click or rub, no peripheral edema and peripheral pulses strong  ABDOMEN: soft, nontender, no hepatosplenomegaly, no masses and bowel sounds normal  MS: no gross musculoskeletal defects noted, no edema  SKIN: no suspicious lesions or rashes  NEURO: Normal strength and tone, mentation intact and speech normal  PSYCH: mentation appears normal, affect normal/bright    Diagnostic Test Results:  Results for orders placed or performed in visit on 12/06/17   Basic metabolic panel   Result Value Ref Range    Sodium 139 133 - 144 mmol/L    Potassium 4.1 3.4 - 5.3 mmol/L    Chloride 103 94 - 109 mmol/L    Carbon Dioxide 33 (H) 20 - 32 mmol/L    Anion Gap 3 3 - 14 mmol/L    Glucose 83 70 - 99 mg/dL    Urea Nitrogen 12 7 - 30 mg/dL    Creatinine 0.70 0.52 - 1.04 " mg/dL    GFR Estimate 83 >60 mL/min/1.7m2    GFR Estimate If Black >90 >60 mL/min/1.7m2    Calcium 8.6 8.5 - 10.1 mg/dL       ASSESSMENT/PLAN:       1. Chronic bronchitis, unspecified chronic bronchitis type (H)  Chronic COPD. Symptoms stable. Continue medication.  - albuterol (PROAIR HFA/PROVENTIL HFA/VENTOLIN HFA) 108 (90 BASE) MCG/ACT Inhaler; Inhale 2 puffs into the lungs every 6 hours as needed for shortness of breath / dyspnea  Dispense: 1 Inhaler; Refill: 11  - montelukast (SINGULAIR) 10 MG tablet; Take 1 tablet (10 mg) by mouth At Bedtime  Dispense: 30 tablet; Refill: 11  - budesonide-formoterol (SYMBICORT) 160-4.5 MCG/ACT Inhaler; INHALE TWO PUFFS BY MOUTH TWICE A DAY  Dispense: 1 Inhaler; Refill: 5    2. Tobacco use disorder  Not interested in smoking cessation at this time.    3. PAD (peripheral artery disease) (H)  Continue statin.  - Basic metabolic panel  - atorvastatin (LIPITOR) 10 MG tablet; Take 1 tablet (10 mg) by mouth At Bedtime  Dispense: 90 tablet; Refill: 3    4. ST elevation myocardial infarction involving left anterior descending (LAD) coronary artery (H)  History of MI. Continue beta blocker.  - metoprolol (TOPROL-XL) 25 MG 24 hr tablet; Take 0.5 tablets (12.5 mg) by mouth daily  Dispense: 45 tablet; Refill: 3    5. Chest discomfort   EKG completed today in office due to chest discomfort that has been happening occasionally over the past several months. Her EKG shows occasional ectopic ventricular beat and possible LVH hypertrophy which is consistent with her echocardiogram from March of this year. She had been nonspecific T abnormality which is likely due to her underlying COPD and CAD. Recommend follow-up with her cardiologist.  - EKG 12-lead complete w/read - Clinics    6. Encounter for screening mammogram for breast cancer  - *MA Screening Digital Bilateral; Future    7. Special screening for malignant neoplasms, colon  - Fecal colorectal cancer screen (FIT); Future    Deisy MUSA  [facility]

## 2017-12-04 NOTE — TELEPHONE ENCOUNTER
Please call. Patient due for office visit before refill. Also needs to establish care with a provider as her PCP is no longer in clinic.   Deisy [facility], NP-C

## 2017-12-04 NOTE — TELEPHONE ENCOUNTER
Spoke to patient, gave her message below. Patient expressed understanding. Appt has been made for 12/6/17

## 2017-12-06 ENCOUNTER — OFFICE VISIT (OUTPATIENT)
Dept: FAMILY MEDICINE | Facility: OTHER | Age: 67
End: 2017-12-06
Payer: COMMERCIAL

## 2017-12-06 DIAGNOSIS — F17.200 TOBACCO USE DISORDER: ICD-10-CM

## 2017-12-06 DIAGNOSIS — Z12.31 ENCOUNTER FOR SCREENING MAMMOGRAM FOR BREAST CANCER: ICD-10-CM

## 2017-12-06 DIAGNOSIS — J42 CHRONIC BRONCHITIS, UNSPECIFIED CHRONIC BRONCHITIS TYPE (H): Primary | ICD-10-CM

## 2017-12-06 DIAGNOSIS — Z12.11 SPECIAL SCREENING FOR MALIGNANT NEOPLASMS, COLON: ICD-10-CM

## 2017-12-06 DIAGNOSIS — I21.02 ST ELEVATION MYOCARDIAL INFARCTION INVOLVING LEFT ANTERIOR DESCENDING (LAD) CORONARY ARTERY (H): ICD-10-CM

## 2017-12-06 DIAGNOSIS — R07.89 CHEST DISCOMFORT: ICD-10-CM

## 2017-12-06 DIAGNOSIS — I73.9 PAD (PERIPHERAL ARTERY DISEASE) (H): ICD-10-CM

## 2017-12-06 LAB
ANION GAP SERPL CALCULATED.3IONS-SCNC: 3 MMOL/L (ref 3–14)
BUN SERPL-MCNC: 12 MG/DL (ref 7–30)
CALCIUM SERPL-MCNC: 8.6 MG/DL (ref 8.5–10.1)
CHLORIDE SERPL-SCNC: 103 MMOL/L (ref 94–109)
CO2 SERPL-SCNC: 33 MMOL/L (ref 20–32)
CREAT SERPL-MCNC: 0.7 MG/DL (ref 0.52–1.04)
GFR SERPL CREATININE-BSD FRML MDRD: 83 ML/MIN/1.7M2
GLUCOSE SERPL-MCNC: 83 MG/DL (ref 70–99)
POTASSIUM SERPL-SCNC: 4.1 MMOL/L (ref 3.4–5.3)
SODIUM SERPL-SCNC: 139 MMOL/L (ref 133–144)

## 2017-12-06 PROCEDURE — 93000 ELECTROCARDIOGRAM COMPLETE: CPT | Performed by: STUDENT IN AN ORGANIZED HEALTH CARE EDUCATION/TRAINING PROGRAM

## 2017-12-06 PROCEDURE — 36415 COLL VENOUS BLD VENIPUNCTURE: CPT | Performed by: STUDENT IN AN ORGANIZED HEALTH CARE EDUCATION/TRAINING PROGRAM

## 2017-12-06 PROCEDURE — 80048 BASIC METABOLIC PNL TOTAL CA: CPT | Performed by: STUDENT IN AN ORGANIZED HEALTH CARE EDUCATION/TRAINING PROGRAM

## 2017-12-06 PROCEDURE — 99214 OFFICE O/P EST MOD 30 MIN: CPT | Performed by: STUDENT IN AN ORGANIZED HEALTH CARE EDUCATION/TRAINING PROGRAM

## 2017-12-06 RX ORDER — MONTELUKAST SODIUM 10 MG/1
10 TABLET ORAL AT BEDTIME
Qty: 30 TABLET | Refills: 11 | Status: SHIPPED | OUTPATIENT
Start: 2017-12-06 | End: 2018-12-27

## 2017-12-06 RX ORDER — ATORVASTATIN CALCIUM 10 MG/1
10 TABLET, FILM COATED ORAL AT BEDTIME
Qty: 90 TABLET | Refills: 3 | Status: SHIPPED | OUTPATIENT
Start: 2017-12-06 | End: 2018-12-07

## 2017-12-06 RX ORDER — BUDESONIDE AND FORMOTEROL FUMARATE DIHYDRATE 160; 4.5 UG/1; UG/1
AEROSOL RESPIRATORY (INHALATION)
Qty: 1 INHALER | Refills: 5 | Status: SHIPPED | OUTPATIENT
Start: 2017-12-06 | End: 2018-07-06

## 2017-12-06 RX ORDER — METOPROLOL SUCCINATE 25 MG/1
12.5 TABLET, EXTENDED RELEASE ORAL DAILY
Qty: 45 TABLET | Refills: 3 | Status: ON HOLD | OUTPATIENT
Start: 2017-12-06 | End: 2018-03-25

## 2017-12-06 RX ORDER — ALBUTEROL SULFATE 90 UG/1
2 AEROSOL, METERED RESPIRATORY (INHALATION) EVERY 6 HOURS PRN
Qty: 1 INHALER | Refills: 11 | Status: SHIPPED | OUTPATIENT
Start: 2017-12-06 | End: 2018-12-07

## 2017-12-06 ASSESSMENT — PAIN SCALES - GENERAL: PAINLEVEL: NO PAIN (0)

## 2017-12-06 NOTE — MR AVS SNAPSHOT
After Visit Summary   12/6/2017    Preeti Ford    MRN: [id number]           Patient Information     Date Of Birth          1950        Visit Information        Provider Department      12/6/2017 10:40 AM Deisy Carter APRN CNP [facility]        Today's Diagnoses     Encounter for screening mammogram for breast cancer    -  1    Simple chronic bronchitis (H)        Chronic bronchitis, unspecified chronic bronchitis type (H)        PAD (peripheral artery disease) (H)        ST elevation myocardial infarction involving left anterior descending (LAD) coronary artery (H)        Chronic obstructive pulmonary disease, unspecified COPD type (H)        Special screening for malignant neoplasms, colon          Care Instructions    Mammogram, FIT test    Blood work    Schedule appointment to do Pap smear as your last Pap was abnormal.    KARLY Roberto                Follow-ups after your visit        Future tests that were ordered for you today     Open Future Orders        Priority Expected Expires Ordered    Fecal colorectal cancer screen (FIT) Routine 12/27/2017 2/28/2018 12/6/2017    *MA Screening Digital Bilateral Routine  12/6/2018 12/6/2017            Who to contact     If you have questions or need follow up information about today's clinic visit or your schedule please contact [facility] directly at [phone].  Normal or non-critical lab and imaging results will be communicated to you by MyChart, letter or phone within 4 business days after the clinic has received the results. If you do not hear from us within 7 days, please contact the clinic through MyChart or phone. If you have a critical or abnormal lab result, we will notify you by phone as soon as possible.  Submit refill requests through "[employer] or call your pharmacy and they will forward the refill request to us. Please allow 3 business days for your refill to be completed.     "      Additional Information About Your Visit        MyChart Information     [employer] lets you send messages to your doctor, view your test results, renew your prescriptions, schedule appointments and more. To sign up, go to [email or web address] . Click on \"Log in\" on the left side of the screen, which will take you to the Welcome page. Then click on \"Sign up Now\" on the right side of the page.     You will be asked to enter the access code listed below, as well as some personal information. Please follow the directions to create your username and password.     Your access code is: [email or web address]  Expires: 2018 10:50 AM     Your access code will  in 90 days. If you need help or a new code, please call your [place] clinic or [phone].        Care EveryWhere ID     This is your Care EveryWhere ID. This could be used by other organizations to access your [place] medical records  [id number]        Your Vitals Were     Pulse Temperature Height Pulse Oximetry BMI (Body Mass Index)       73 98.2  F (36.8  C) (Temporal) 5' 2.99\" (1.6 m) 97% 19.81 kg/m2        Blood Pressure from Last 3 Encounters:   17 174/62   17 128/74   17 120/68    Weight from Last 3 Encounters:   17 111 lb 12.8 oz (50.7 kg)   17 110 lb 9.6 oz (50.2 kg)   17 118 lb 9.6 oz (53.8 kg)              We Performed the Following     Basic metabolic panel          Today's Medication Changes          These changes are accurate as of: 17 11:18 AM.  If you have any questions, ask your nurse or doctor.               These medicines have changed or have updated prescriptions.        Dose/Directions    budesonide-formoterol 160-4.5 MCG/ACT Inhaler   Commonly known as:  SYMBICORT   This may have changed:  See the new instructions.   Used for:  Simple chronic bronchitis (H)   Changed by:  Deisy Carter APRN CNP        INHALE TWO PUFFS BY MOUTH TWICE A DAY   Quantity:  1 Inhaler   Refills:  5       " * TOPROL XL 25 MG 24 hr tablet   This may have changed:  Another medication with the same name was changed. Make sure you understand how and when to take each.   Generic drug:  metoprolol        Dose:  25 mg   Take 1 tablet (25 mg) by mouth every morning (patient takes 0.5 X 25 mg = 12.5 mg dose)   Quantity:  30 tablet   Refills:  0       * metoprolol 25 MG 24 hr tablet   Commonly known as:  TOPROL-XL   This may have changed:  See the new instructions.   Used for:  ST elevation myocardial infarction involving left anterior descending (LAD) coronary artery (H)   Changed by:  Deisy Carter APRN CNP        Dose:  12.5 mg   Take 0.5 tablets (12.5 mg) by mouth daily   Quantity:  45 tablet   Refills:  3       * Notice:  This list has 2 medication(s) that are the same as other medications prescribed for you. Read the directions carefully, and ask your doctor or other care provider to review them with you.         Where to get your medicines      These medications were sent to [facility] - [address]   [address] [place] [address]     Phone:  [phone]     albuterol 108 (90 BASE) MCG/ACT Inhaler    atorvastatin 10 MG tablet    budesonide-formoterol 160-4.5 MCG/ACT Inhaler    metoprolol 25 MG 24 hr tablet    montelukast 10 MG tablet                Primary Care Provider Fax #    Physician No Ref-Primary [phone]       No address on file        Equal Access to Services     [facility]: Hadii steve gomeso Jarrett, waaxda luqadaha, qaybta kaalmada adesveta, sagar myrick. So [facility] [phone].    ATENCIÓN: Si habla elijahañol, tiene a jeffries disposición servicios gratuitos de asistencia lingüística. Llame al [phone].    We comply with applicable federal civil rights laws and [place] laws. We do not discriminate on the basis of race, color, national origin, age, disability, sex, sexual orientation, or gender identity.             Thank you!     Thank you for choosing [facility]  for your care. Our goal is always to provide you with excellent care. Hearing back from our patients is one way we can continue to improve our services. Please take a few minutes to complete the written survey that you may receive in the mail after your visit with us. Thank you!             Your Updated Medication List - Protect others around you: Learn how to safely use, store and throw away your medicines at www.disposemymeds.org.          This list is accurate as of: 12/6/17 11:18 AM.  Always use your most recent med list.                   Brand Name Dispense Instructions for use Diagnosis    albuterol 108 (90 BASE) MCG/ACT Inhaler    PROAIR HFA/PROVENTIL HFA/VENTOLIN HFA    1 Inhaler    Inhale 2 puffs into the lungs every 6 hours as needed for shortness of breath / dyspnea    Chronic bronchitis, unspecified chronic bronchitis type (H)       aspirin 325 MG tablet     180 tablet    Take 1 tablet (325 mg) by mouth daily    PAD (peripheral artery disease) (H)       atorvastatin 10 MG tablet    LIPITOR    90 tablet    Take 1 tablet (10 mg) by mouth At Bedtime    PAD (peripheral artery disease) (H)       budesonide-formoterol 160-4.5 MCG/ACT Inhaler    SYMBICORT    1 Inhaler    INHALE TWO PUFFS BY MOUTH TWICE A DAY    Simple chronic bronchitis (H)       COQ10 PO      Take 100 mg by mouth every 24 hours (at 16:00)        ipratropium - albuterol 0.5 mg/2.5 mg/3 mL 0.5-2.5 (3) MG/3ML neb solution    DUONEB    90 vial    Take 1 vial (3 mLs) by nebulization 4 times daily    COPD exacerbation (H)       montelukast 10 MG tablet    SINGULAIR    30 tablet    Take 1 tablet (10 mg) by mouth At Bedtime    Chronic obstructive pulmonary disease, unspecified COPD type (H)       OSCAL 500/200 D-3 PO      Take 1 tablet by mouth 2 times daily        SPIRIVA HANDIHALER 18 MCG capsule   Generic drug:  tiotropium     90 capsule    USING THE HANDIHALER, INHALE ONE  CAPSULE BY MOUTH EVERY DAY    Chronic bronchitis, unspecified chronic bronchitis type (H)       * TOPROL XL 25 MG 24 hr tablet   Generic drug:  metoprolol     30 tablet    Take 1 tablet (25 mg) by mouth every morning (patient takes 0.5 X 25 mg = 12.5 mg dose)        * metoprolol 25 MG 24 hr tablet    TOPROL-XL    45 tablet    Take 0.5 tablets (12.5 mg) by mouth daily    ST elevation myocardial infarction involving left anterior descending (LAD) coronary artery (H)       * Notice:  This list has 2 medication(s) that are the same as other medications prescribed for you. Read the directions carefully, and ask your doctor or other care provider to review them with you.

## 2017-12-06 NOTE — NURSING NOTE
"Chief Complaint   Patient presents with     Recheck Medication     Panel Management     pap, immunization, op annual inr referral, fit test, dexa scan, fall risk, honoring choices, bmp       Initial /62 (BP Location: Right arm, Patient Position: Sitting, Cuff Size: Adult Regular)  Pulse 73  Temp 98.2  F (36.8  C) (Temporal)  Ht 5' 2.99\" (1.6 m)  Wt 111 lb 12.8 oz (50.7 kg)  SpO2 97%  BMI 19.81 kg/m2 Estimated body mass index is 19.81 kg/(m^2) as calculated from the following:    Height as of this encounter: 5' 2.99\" (1.6 m).    Weight as of this encounter: 111 lb 12.8 oz (50.7 kg).  Medication Reconciliation: complete   Amara Smith CMA      "

## 2017-12-06 NOTE — PATIENT INSTRUCTIONS
Mammogram, FIT test    Blood work    Schedule appointment to do Pap smear as your last Pap was abnormal.    TAHMINA RobertoC

## 2017-12-11 VITALS
TEMPERATURE: 98.2 F | HEIGHT: 63 IN | HEART RATE: 73 BPM | BODY MASS INDEX: 19.81 KG/M2 | OXYGEN SATURATION: 97 % | SYSTOLIC BLOOD PRESSURE: 138 MMHG | DIASTOLIC BLOOD PRESSURE: 78 MMHG | WEIGHT: 111.8 LBS

## 2017-12-12 PROCEDURE — 82274 ASSAY TEST FOR BLOOD FECAL: CPT | Performed by: FAMILY MEDICINE

## 2017-12-16 LAB — HEMOCCULT STL QL IA: POSITIVE

## 2017-12-18 DIAGNOSIS — Z12.11 SCREEN FOR COLON CANCER: ICD-10-CM

## 2017-12-20 ENCOUNTER — OFFICE VISIT (OUTPATIENT)
Dept: FAMILY MEDICINE | Facility: OTHER | Age: 67
End: 2017-12-20
Payer: COMMERCIAL

## 2017-12-20 VITALS
OXYGEN SATURATION: 94 % | WEIGHT: 107.4 LBS | DIASTOLIC BLOOD PRESSURE: 86 MMHG | RESPIRATION RATE: 14 BRPM | BODY MASS INDEX: 19.77 KG/M2 | HEIGHT: 62 IN | TEMPERATURE: 98.1 F | HEART RATE: 84 BPM | SYSTOLIC BLOOD PRESSURE: 142 MMHG

## 2017-12-20 DIAGNOSIS — R87.810 CERVICAL HIGH RISK HPV (HUMAN PAPILLOMAVIRUS) TEST POSITIVE: ICD-10-CM

## 2017-12-20 DIAGNOSIS — Z12.4 SCREENING FOR MALIGNANT NEOPLASM OF CERVIX: ICD-10-CM

## 2017-12-20 DIAGNOSIS — R19.5 POSITIVE FECAL OCCULT BLOOD TEST: ICD-10-CM

## 2017-12-20 DIAGNOSIS — K59.00 CONSTIPATION, UNSPECIFIED CONSTIPATION TYPE: Primary | ICD-10-CM

## 2017-12-20 PROCEDURE — 99213 OFFICE O/P EST LOW 20 MIN: CPT | Performed by: STUDENT IN AN ORGANIZED HEALTH CARE EDUCATION/TRAINING PROGRAM

## 2017-12-20 PROCEDURE — G0476 HPV COMBO ASSAY CA SCREEN: HCPCS | Performed by: STUDENT IN AN ORGANIZED HEALTH CARE EDUCATION/TRAINING PROGRAM

## 2017-12-20 PROCEDURE — G0145 SCR C/V CYTO,THINLAYER,RESCR: HCPCS | Performed by: STUDENT IN AN ORGANIZED HEALTH CARE EDUCATION/TRAINING PROGRAM

## 2017-12-20 PROCEDURE — G0124 SCREEN C/V THIN LAYER BY MD: HCPCS | Performed by: STUDENT IN AN ORGANIZED HEALTH CARE EDUCATION/TRAINING PROGRAM

## 2017-12-20 RX ORDER — POLYETHYLENE GLYCOL 3350 17 G/17G
1 POWDER, FOR SOLUTION ORAL DAILY
Qty: 510 G | Refills: 1 | Status: ON HOLD | COMMUNITY
Start: 2017-12-20 | End: 2018-05-22

## 2017-12-20 ASSESSMENT — PAIN SCALES - GENERAL: PAINLEVEL: NO PAIN (0)

## 2017-12-20 NOTE — MR AVS SNAPSHOT
After Visit Summary   12/20/2017    Preeti Ford    MRN: [id number]           Patient Information     Date Of Birth          1950        Visit Information        Provider Department      12/20/2017 2:20 PM Deisy Carter APRN CNP [facility]        Today's Diagnoses     Positive fecal occult blood test    -  1    Cervical high risk HPV (human papillomavirus) test positive        Screening for malignant neoplasm of cervix        Constipation, unspecified constipation type          Care Instructions    For constipation - try Miralax one capful daily.    Pap today. The RN will contact you with results of Pap smear.    KARLY Roberto            Follow-ups after your visit        Your next 10 appointments already scheduled     Dec 20, 2017  2:20 PM CST   [facility] SPRING [facility] C[facility]    [address] [facility] [address] [phone]           Bring a current list of meds and any records pertaining to this visit. For Physicals, please bring immunization records and any forms needing to be filled out. Please arrive 10 minutes early to complete paperwork.            Jan 02, 2018 10:45 AM CST   (Arrive by 10:30 AM)   MA SCREENING DIGITAL BILATERAL with PHMA1   [facility] ([facility])    [address] [place] [address] [phone]           Do not use any powder, lotion or deodorant under your arms or on your breast. If you do, we will ask you to remove it before your exam.  Wear comfortable, two-piece clothing.  If you have any allergies, tell your care team.  Bring any previous mammograms from other facilities or have them mailed to the breast center. Three-dimensional (3D) mammograms are available at [facility] locations in [facility], [place], [place], [facility], [place], [place], and [place]. [facility]  "locations include [facility] in [place]. Benefits of 3D mammograms include: - Improved rate of cancer detection - Decreases your chance of having to go back for more tests, which means fewer: - \"False-positive\" results (This means that there is an abnormal area but it isn't cancer.) - Invasive testing procedures, such as a biopsy or surgery - Can provide clearer images of the breast if you have dense breast tissue. 3D mammography is an optional exam that anyone can have with a 2D mammogram. It doesn't replace or take the place of a 2D mammogram. 2D mammograms remain an effective screening test for all women.  Not all insurance companies cover the cost of a 3D mammogram. Check with your insurance.              Who to contact     If you have questions or need follow up information about today's clinic visit or your schedule please contact [facility] directly at [phone].  Normal or non-critical lab and imaging results will be communicated to you by [employer], letter or phone within 4 business days after the clinic has received the results. If you do not hear from us within 7 days, please contact the clinic through [employer] or phone. If you have a critical or abnormal lab result, we will notify you by phone as soon as possible.  Submit refill requests through [employer] or call your pharmacy and they will forward the refill request to us. Please allow 3 business days for your refill to be completed.          Additional Information About Your Visit        Nitro Information     [employer] lets you send messages to your doctor, view your test results, renew your prescriptions, schedule appointments and more. To sign up, go to [email or web address] . Click on \"Log in\" on the left side of the screen, which will take you to the Welcome page. Then click on \"Sign up Now\" on the right side of the page.     You will be asked to enter the access code listed below, as well as some " "personal information. Please follow the directions to create your username and password.     Your access code is: [email or web address]  Expires: 2018 10:50 AM     Your access code will  in 90 days. If you need help or a new code, please call your [facility] or [phone].        Care EveryWhere ID     This is your Care EveryWhere ID. This could be used by other organizations to access your [place] medical records  [id number]        Your Vitals Were     Pulse Temperature Respirations Height Pulse Oximetry BMI (Body Mass Index)    84 98.1  F (36.7  C) (Oral) 14 5' 2\" (1.575 m) 94% 19.64 kg/m2       Blood Pressure from Last 3 Encounters:   17 142/86   17 138/78   17 128/74    Weight from Last 3 Encounters:   17 107 lb 6.4 oz (48.7 kg)   17 111 lb 12.8 oz (50.7 kg)   17 110 lb 9.6 oz (50.2 kg)              We Performed the Following     HPV High Risk Types DNA Cervical     Pap imaged thin layer screen with HPV - recommended age 30 - 65 years (select HPV order below)          Today's Medication Changes          These changes are accurate as of: 17 10:40 AM.  If you have any questions, ask your nurse or doctor.               Start taking these medicines.        Dose/Directions    polyethylene glycol powder   Commonly known as:  MIRALAX   Used for:  Constipation, unspecified constipation type   Started by:  Deisy Carter APRN CNP        Dose:  1 capful   Take 17 g (1 capful) by mouth daily   Quantity:  510 g   Refills:  1            Where to get your medicines      Some of these will need a paper prescription and others can be bought over the counter.  Ask your nurse if you have questions.     You don't need a prescription for these medications     polyethylene glycol powder                Primary Care Provider Office Phone # Fax #    SRPING Brown [phone]       [address] [address] [place] [address]        Equal " Access to Services     [facility]: Hadii aad ku hadpoojajessica Earleneali, wacarlos albertoda luqparker, qanicholas kadaesagar nielsen. So [facility] [phone].    ATENCIÓN: Si karola kathia, tiene a jeffries disposición servicios gratuitos de asistencia lingüística. Llame al [phone].    We comply with applicable federal civil rights laws and [place] laws. We do not discriminate on the basis of race, color, national origin, age, disability, sex, sexual orientation, or gender identity.            Thank you!     Thank you for choosing [facility]  for your care. Our goal is always to provide you with excellent care. Hearing back from our patients is one way we can continue to improve our services. Please take a few minutes to complete the written survey that you may receive in the mail after your visit with us. Thank you!             Your Updated Medication List - Protect others around you: Learn how to safely use, store and throw away your medicines at www.disposemymeds.org.          This list is accurate as of: 12/20/17 10:40 AM.  Always use your most recent med list.                   Brand Name Dispense Instructions for use Diagnosis    albuterol 108 (90 BASE) MCG/ACT Inhaler    PROAIR HFA/PROVENTIL HFA/VENTOLIN HFA    1 Inhaler    Inhale 2 puffs into the lungs every 6 hours as needed for shortness of breath / dyspnea    Chronic bronchitis, unspecified chronic bronchitis type (H)       aspirin 325 MG tablet     180 tablet    Take 1 tablet (325 mg) by mouth daily    PAD (peripheral artery disease) (H)       atorvastatin 10 MG tablet    LIPITOR    90 tablet    Take 1 tablet (10 mg) by mouth At Bedtime    PAD (peripheral artery disease) (H)       budesonide-formoterol 160-4.5 MCG/ACT Inhaler    SYMBICORT    1 Inhaler    INHALE TWO PUFFS BY MOUTH TWICE A DAY    Chronic bronchitis, unspecified chronic bronchitis type (H)       COQ10 PO      Take 100 mg by mouth every 24 hours (at 16:00)         ipratropium - albuterol 0.5 mg/2.5 mg/3 mL 0.5-2.5 (3) MG/3ML neb solution    DUONEB    90 vial    Take 1 vial (3 mLs) by nebulization 4 times daily    COPD exacerbation (H)       montelukast 10 MG tablet    SINGULAIR    30 tablet    Take 1 tablet (10 mg) by mouth At Bedtime    Chronic bronchitis, unspecified chronic bronchitis type (H)       OSCAL 500/200 D-3 PO      Take 1 tablet by mouth 2 times daily        polyethylene glycol powder    MIRALAX    510 g    Take 17 g (1 capful) by mouth daily    Constipation, unspecified constipation type       SPIRIVA HANDIHALER 18 MCG capsule   Generic drug:  tiotropium     90 capsule    USING THE HANDIHALER, INHALE ONE CAPSULE BY MOUTH EVERY DAY    Chronic bronchitis, unspecified chronic bronchitis type (H)       * TOPROL XL 25 MG 24 hr tablet   Generic drug:  metoprolol     30 tablet    Take 1 tablet (25 mg) by mouth every morning (patient takes 0.5 X 25 mg = 12.5 mg dose)        * metoprolol 25 MG 24 hr tablet    TOPROL-XL    45 tablet    Take 0.5 tablets (12.5 mg) by mouth daily    ST elevation myocardial infarction involving left anterior descending (LAD) coronary artery (H)       * Notice:  This list has 2 medication(s) that are the same as other medications prescribed for you. Read the directions carefully, and ask your doctor or other care provider to review them with you.

## 2017-12-20 NOTE — PROGRESS NOTES
"  SUBJECTIVE:   Preeti Ford is a 67 year old female who presents to clinic today for the following health issues:      PAP - patient had HPV positive Pap last year and is here for follow up. Denies pelvic pain or vaginal discharge.    FIT test positive for blood - patient does not want to do colonoscopy. She states she is constipated and thinks that the blood was from the constipation tearing her lining. She has not tried anything for the constipation which has been going on for quite some time. She is not having shalonda blood in her stool       Problem list and histories reviewed & adjusted, as indicated.  Additional history: as documented    Reviewed and updated as needed this visit by clinical staff  Tobacco  Allergies  Meds  Med Hx  Surg Hx  Fam Hx  Soc Hx      Reviewed and updated as needed this visit by Provider         ROS:  Constitutional, HEENT, cardiovascular, pulmonary, gi and gu systems are negative, except as otherwise noted.      OBJECTIVE:   /86  Pulse 84  Temp 98.1  F (36.7  C) (Oral)  Resp 14  Ht 5' 2\" (1.575 m)  Wt 107 lb 6.4 oz (48.7 kg)  SpO2 94%  BMI 19.64 kg/m2  Body mass index is 19.64 kg/(m^2).  GENERAL: healthy, alert and no distress  RESP: lungs clear to auscultation - no rales, rhonchi or wheezes  CV: regular rate and rhythm, normal S1 S2, no S3 or S4, no murmur, click or rub, no peripheral edema   ABDOMEN: soft, nontender, no hepatosplenomegaly, no masses and bowel sounds normal   (female): normal female external genitalia, normal urethral meatus, vaginal mucosa, normal cervix/adnexa/uterus without masses or discharge  MS: no gross musculoskeletal defects noted, no edema  SKIN: no suspicious lesions or rashes  NEURO: Normal strength and tone, mentation intact and speech normal  PSYCH: mentation appears normal, affect normal/bright    Diagnostic Test Results:  Pap pending  Fit test - positive for occult blood    ASSESSMENT/PLAN:     1. Constipation, unspecified " constipation type  Try Miralax daily to see if this helps with constipation. Increase fluid and fiber intake. Return to clinic if symptoms persist or worsen.  - polyethylene glycol (MIRALAX) powder; Take 17 g (1 capful) by mouth daily  Dispense: 510 g; Refill: 1    2. Cervical high risk HPV (human papillomavirus) test positive  - Pap imaged thin layer screen with HPV - recommended age 30 - 65 years (select HPV order below)  - HPV High Risk Types DNA Cervical    3. Positive fecal occult blood test  Patient adamantly declines having a colonoscopy despite having a positive FIT test and colon cancer risk.     4. Screening for malignant neoplasm of cervix  - Pap imaged thin layer screen with HPV - recommended age 30 - 65 years (select HPV order below)  - HPV High Risk Types DNA Cervical    SPRING Banegas [facility]

## 2017-12-20 NOTE — PATIENT INSTRUCTIONS
For constipation - try Miralax one capful daily.    Pap today. The RN will contact you with results of Pap smear.    TAHMINA RobertoC

## 2017-12-20 NOTE — NURSING NOTE
"Chief Complaint   Patient presents with     Gyn Exam       Initial /86  Pulse 84  Temp 98.1  F (36.7  C) (Oral)  Resp 14  Ht 5' 2\" (1.575 m)  Wt 107 lb 6.4 oz (48.7 kg)  SpO2 94%  BMI 19.64 kg/m2 Estimated body mass index is 19.64 kg/(m^2) as calculated from the following:    Height as of this encounter: 5' 2\" (1.575 m).    Weight as of this encounter: 107 lb 6.4 oz (48.7 kg).  Medication Reconciliation: complete  Moy Morrell MA    "

## 2017-12-21 NOTE — PROGRESS NOTES
Discussed these results with patient in the clinic.    Deisy Carter NP-C  [facility]

## 2017-12-22 LAB
COPATH REPORT: ABNORMAL
PAP: ABNORMAL

## 2017-12-27 DIAGNOSIS — J44.1 COPD EXACERBATION (H): ICD-10-CM

## 2017-12-27 LAB
FINAL DIAGNOSIS: ABNORMAL
HPV HR 12 DNA CVX QL NAA+PROBE: POSITIVE
HPV16 DNA SPEC QL NAA+PROBE: NEGATIVE
HPV18 DNA SPEC QL NAA+PROBE: NEGATIVE
SPECIMEN DESCRIPTION: ABNORMAL

## 2017-12-29 RX ORDER — IPRATROPIUM BROMIDE AND ALBUTEROL SULFATE 2.5; .5 MG/3ML; MG/3ML
SOLUTION RESPIRATORY (INHALATION)
Qty: 270 ML | Refills: 3 | Status: ON HOLD | OUTPATIENT
Start: 2017-12-29 | End: 2018-05-25

## 2017-12-29 NOTE — TELEPHONE ENCOUNTER
Duoneb:  Prescription approved per [facility] Refill Protocol.  Love Mays, RN, BSN

## 2017-12-29 NOTE — TELEPHONE ENCOUNTER
Pt is calling to check the status of this refill. She is hoping this can be done today.   Thank you,  Suzanne Perea- Pt Rep.

## 2018-01-02 ENCOUNTER — HOSPITAL ENCOUNTER (OUTPATIENT)
Dept: MAMMOGRAPHY | Facility: CLINIC | Age: 68
Discharge: HOME | End: 2018-01-02
Attending: STUDENT IN AN ORGANIZED HEALTH CARE EDUCATION/TRAINING PROGRAM | Admitting: STUDENT IN AN ORGANIZED HEALTH CARE EDUCATION/TRAINING PROGRAM
Payer: MEDICARE

## 2018-01-02 DIAGNOSIS — Z12.31 VISIT FOR SCREENING MAMMOGRAM: ICD-10-CM

## 2018-01-02 PROCEDURE — 77067 SCR MAMMO BI INCL CAD: CPT

## 2018-01-09 ENCOUNTER — TELEPHONE (OUTPATIENT)
Dept: FAMILY MEDICINE | Facility: OTHER | Age: 68
End: 2018-01-09

## 2018-01-09 NOTE — LETTER
[facility]  [address] [facility] [address]  Phone: [phone]  March 6, 2018      Preeti Ford  [address] [address] [place] [address]      Dear Preeti,    We care about your health and have reviewed your health plan including your medical conditions, medications, and lab results.  Based on this review, it is recommended that you follow up regarding the following health topic(s):  -High Blood Pressure    We recommend you take the following action(s):  -schedule a FREE FLOAT MA-ONLY BLOOD PRESSURE APPOINTMENT within the next 1-4 weeks.     Please call us at the [facility] - [phone] (or use DialedIN) to address the above recommendations.     Thank you for trusting [facility] and we appreciate the opportunity to serve you.  We look forward to supporting your healthcare needs in the future.    Healthy Regards,    Your Health Care Team  [facility]

## 2018-01-09 NOTE — LETTER
[facility]  [address] [facility] [address]  Phone: [phone]  January 9, 2018      Preeti Ford  [address] [address] [place] [id number]      Dear Preeti,    We care about your health and have reviewed your health plan including your medical conditions, medications, and lab results.  Based on this review, it is recommended that you follow up regarding the following health topic(s):  -High Blood Pressure    We recommend you take the following action(s):  -schedule a FREE FLOAT MA-ONLY BLOOD PRESSURE APPOINTMENT within the next 1-4 weeks.     Please call us at the [facility] [place] - [phone] (or use [employer]) to address the above recommendations.     Thank you for trusting [facility] and we appreciate the opportunity to serve you.  We look forward to supporting your healthcare needs in the future.    Healthy Regards,    Your Health Care Team  [facility]

## 2018-01-17 DIAGNOSIS — Z95.828 HISTORY OF AORTOILIOFEMORAL VASCULAR BYPASS: Primary | ICD-10-CM

## 2018-01-17 DIAGNOSIS — Z95.828 HISTORY OF AORTOILIOFEMORAL VASCULAR BYPASS: ICD-10-CM

## 2018-01-17 RX ORDER — AZITHROMYCIN 250 MG/1
500 TABLET, FILM COATED ORAL ONCE
Qty: 2 TABLET | Refills: 0 | Status: SHIPPED | OUTPATIENT
Start: 2018-01-17 | End: 2018-01-17

## 2018-02-07 NOTE — TELEPHONE ENCOUNTER
Spoke to patient and she will stop in for a BP check in the next couple of days.    Karey Candelaria

## 2018-03-22 ENCOUNTER — APPOINTMENT (OUTPATIENT)
Dept: CT IMAGING | Facility: CLINIC | Age: 68
DRG: 066 | End: 2018-03-22
Attending: EMERGENCY MEDICINE
Payer: MEDICARE

## 2018-03-22 ENCOUNTER — HOSPITAL ENCOUNTER (INPATIENT)
Facility: CLINIC | Age: 68
LOS: 2 days | Discharge: HOME HEALTH | DRG: 066 | End: 2018-03-25
Attending: EMERGENCY MEDICINE | Admitting: FAMILY MEDICINE
Payer: MEDICARE

## 2018-03-22 DIAGNOSIS — I63.9 ACUTE CVA (CEREBROVASCULAR ACCIDENT) (H): Primary | ICD-10-CM

## 2018-03-22 DIAGNOSIS — R56.9 SEIZURES (H): ICD-10-CM

## 2018-03-22 DIAGNOSIS — R79.89 ELEVATED TROPONIN: ICD-10-CM

## 2018-03-22 DIAGNOSIS — I21.02 ST ELEVATION MYOCARDIAL INFARCTION INVOLVING LEFT ANTERIOR DESCENDING (LAD) CORONARY ARTERY (H): ICD-10-CM

## 2018-03-22 PROBLEM — I25.10 CORONARY ARTERY DISEASE INVOLVING NATIVE CORONARY ARTERY: Status: ACTIVE | Noted: 2017-06-14

## 2018-03-22 PROBLEM — G93.40 ACUTE ENCEPHALOPATHY: Status: ACTIVE | Noted: 2018-03-22

## 2018-03-22 LAB
ANION GAP SERPL CALCULATED.3IONS-SCNC: 7 MMOL/L (ref 3–14)
APTT PPP: 29 SEC (ref 22–37)
BASOPHILS # BLD AUTO: 0 10E9/L (ref 0–0.2)
BASOPHILS NFR BLD AUTO: 0.5 %
BUN SERPL-MCNC: 17 MG/DL (ref 7–30)
CALCIUM SERPL-MCNC: 8.2 MG/DL (ref 8.5–10.1)
CHLORIDE SERPL-SCNC: 101 MMOL/L (ref 94–109)
CO2 SERPL-SCNC: 30 MMOL/L (ref 20–32)
CREAT SERPL-MCNC: 0.65 MG/DL (ref 0.52–1.04)
DIFFERENTIAL METHOD BLD: ABNORMAL
EOSINOPHIL # BLD AUTO: 0.2 10E9/L (ref 0–0.7)
EOSINOPHIL NFR BLD AUTO: 3 %
ERYTHROCYTE [DISTWIDTH] IN BLOOD BY AUTOMATED COUNT: 15 % (ref 10–15)
GFR SERPL CREATININE-BSD FRML MDRD: >90 ML/MIN/1.7M2
GLUCOSE SERPL-MCNC: 102 MG/DL (ref 70–99)
HCT VFR BLD AUTO: 48.4 % (ref 35–47)
HGB BLD-MCNC: 16 G/DL (ref 11.7–15.7)
IMM GRANULOCYTES # BLD: 0 10E9/L (ref 0–0.4)
IMM GRANULOCYTES NFR BLD: 0.2 %
INR PPP: 0.95 (ref 0.86–1.14)
LYMPHOCYTES # BLD AUTO: 1.3 10E9/L (ref 0.8–5.3)
LYMPHOCYTES NFR BLD AUTO: 22 %
MCH RBC QN AUTO: 29.1 PG (ref 26.5–33)
MCHC RBC AUTO-ENTMCNC: 33.1 G/DL (ref 31.5–36.5)
MCV RBC AUTO: 88 FL (ref 78–100)
MONOCYTES # BLD AUTO: 0.7 10E9/L (ref 0–1.3)
MONOCYTES NFR BLD AUTO: 11.7 %
NEUTROPHILS # BLD AUTO: 3.8 10E9/L (ref 1.6–8.3)
NEUTROPHILS NFR BLD AUTO: 62.6 %
PLATELET # BLD AUTO: 160 10E9/L (ref 150–450)
POTASSIUM SERPL-SCNC: 4 MMOL/L (ref 3.4–5.3)
RBC # BLD AUTO: 5.5 10E12/L (ref 3.8–5.2)
SODIUM SERPL-SCNC: 138 MMOL/L (ref 133–144)
TROPONIN I SERPL-MCNC: 0.23 UG/L (ref 0–0.04)
WBC # BLD AUTO: 6.1 10E9/L (ref 4–11)

## 2018-03-22 PROCEDURE — 96360 HYDRATION IV INFUSION INIT: CPT | Performed by: EMERGENCY MEDICINE

## 2018-03-22 PROCEDURE — 70450 CT HEAD/BRAIN W/O DYE: CPT | Mod: XS

## 2018-03-22 PROCEDURE — 85610 PROTHROMBIN TIME: CPT | Performed by: EMERGENCY MEDICINE

## 2018-03-22 PROCEDURE — 36415 COLL VENOUS BLD VENIPUNCTURE: CPT | Performed by: EMERGENCY MEDICINE

## 2018-03-22 PROCEDURE — 80048 BASIC METABOLIC PNL TOTAL CA: CPT | Performed by: EMERGENCY MEDICINE

## 2018-03-22 PROCEDURE — 25000128 H RX IP 250 OP 636: Performed by: EMERGENCY MEDICINE

## 2018-03-22 PROCEDURE — 70496 CT ANGIOGRAPHY HEAD: CPT

## 2018-03-22 PROCEDURE — 93010 ELECTROCARDIOGRAM REPORT: CPT | Mod: Z6 | Performed by: EMERGENCY MEDICINE

## 2018-03-22 PROCEDURE — 96361 HYDRATE IV INFUSION ADD-ON: CPT | Performed by: EMERGENCY MEDICINE

## 2018-03-22 PROCEDURE — 85730 THROMBOPLASTIN TIME PARTIAL: CPT | Performed by: EMERGENCY MEDICINE

## 2018-03-22 PROCEDURE — 93005 ELECTROCARDIOGRAM TRACING: CPT | Performed by: EMERGENCY MEDICINE

## 2018-03-22 PROCEDURE — 25000125 ZZHC RX 250: Performed by: EMERGENCY MEDICINE

## 2018-03-22 PROCEDURE — 99285 EMERGENCY DEPT VISIT HI MDM: CPT | Mod: 25 | Performed by: EMERGENCY MEDICINE

## 2018-03-22 PROCEDURE — 84484 ASSAY OF TROPONIN QUANT: CPT | Performed by: EMERGENCY MEDICINE

## 2018-03-22 PROCEDURE — 85025 COMPLETE CBC W/AUTO DIFF WBC: CPT | Performed by: EMERGENCY MEDICINE

## 2018-03-22 RX ORDER — IOPAMIDOL 755 MG/ML
100 INJECTION, SOLUTION INTRAVASCULAR ONCE
Status: COMPLETED | OUTPATIENT
Start: 2018-03-22 | End: 2018-03-22

## 2018-03-22 RX ADMIN — SODIUM CHLORIDE 100 ML: 9 INJECTION, SOLUTION INTRAVENOUS at 21:37

## 2018-03-22 RX ADMIN — SODIUM CHLORIDE 500 ML: 9 INJECTION, SOLUTION INTRAVENOUS at 22:48

## 2018-03-22 RX ADMIN — IOPAMIDOL 70 ML: 755 INJECTION, SOLUTION INTRAVENOUS at 21:37

## 2018-03-22 NOTE — IP AVS SNAPSHOT
MRN:[id number]                      After Visit Summary   3/22/2018    Preeti Ford    MRN: [id number]           Thank you!     Thank you for choosing [facility] for your care. Our goal is always to provide you with excellent care. Hearing back from our patients is one way we can continue to improve our services. Please take a few minutes to complete the written survey that you may receive in the mail after you visit with us. Thank you!        Patient Information     Date Of Birth          1950        Designated Caregiver       Most Recent Value    Caregiver    Will someone help with your care after discharge? yes    Name of designated caregiver Bud    Phone number of caregiver same    Caregiver address [place]      About your hospital stay     You were admitted on:  March 23, 2018 You last received care in the:  [address]    You were discharged on:  March 25, 2018        Reason for your hospital stay       You had another stroke which has been causing dizziness, which thankfully has been slowly improving.  You have been switched to Plavix instead of the aspirin you were previously taking to try to decrease the change of having another stroke in the future but as we discussed, you should also seriously consider stopping smoking to make your risk as low as possible for another stroke, heart attack or other issue going forward.  You are able to go home and will have home care services and ongoing physical therapy to help as you continue to recover from this stroke.  Enjoy going home!                  Who to Call     For medical emergencies, please call 911.  For non-urgent questions about your medical care, please call your primary care provider or clinic, [phone]          Attending Provider     Provider Specialty    Dereck Mendosa MD Emergency Medicine    Deisy Laird MD Family Practice       Primary Care Provider Office Phone # Fax  #    SPRING Brown [phone]      After Care Instructions     Activity       Your activity upon discharge: activity as tolerated with consistent use of a walker            Diet       Follow this diet upon discharge: Regular                  Follow-up Appointments     Follow-up and recommended labs and tests        Follow up with primary care provider, Deisy Carter, within 7 days for hospital follow- up.                  Your next 10 appointments already scheduled     Apr 02, 2018 11:30 AM CDT   Office Visit with SPRING Brown CNP   [facility]    [address] [facility] [address] [phone]           Bring a current list of meds and any records pertaining to this visit. For Physicals, please bring immunization records and any forms needing to be filled out. Please arrive 10 minutes early to complete paperwork.              Additional Services     HOME CARE NURSING REFERRAL       **Order classes of: [facility], [facility] and [facility] will route to the Home Care and Hospice Referral Pool.  Home Care or Hospice will then contact the patient to schedule their appointment.**    If you do not hear from Home Care and Hospice, or you would like to call to schedule, please call the referring place of service that your provider has listed below.  ______________________________________________________________________    Your provider has referred you to: [facility]: [facility] [place] [phone]   [email or web address]    Extended Emergency Contact Information  Primary Emergency Contact: Yoseph wray (loraine)  Address: [address] [address] [place] [address] [facility]  Home Phone: [phone]  Work Phone: none  Mobile Phone: [phone]  Relation: Spouse  Secondary Emergency Contact: Yamilet Lima   [facility]  Home Phone:  [phone]  Work Phone: none  Mobile Phone: [phone]  Relation: Relative    Patient Anticipated Discharge Date: 3/25/18   RN, PT, HHA to begin 24 - 48 hours after discharge.  PLEASE EVALUATE AND TREAT (Evaluation timeline is 24 - 48 hrs. Please call if there is need for a variance to this timeline).    REASON FOR REFERRAL: Assessment & Treatment: PT and RN    ADDITIONAL SERVICES NEEDED: none identified    OTHER PERTINENT INFORMATION: Patient was last seen by provider on 3/25/18 for acute cerebellar stroke causing residual dizziness/vertigo.    Current Outpatient Prescriptions:  acetaminophen (TYLENOL) 325 MG tablet, Take 2 tablets (650 mg) by mouth every 4 hours as needed for mild pain, Disp: 100 tablet, Rfl:   metoprolol succinate (TOPROL-XL) 25 MG 24 hr tablet, Take 1 tablet (25 mg) by mouth daily, Disp: 45 tablet, Rfl: 3  [START ON 3/26/2018] clopidogrel (PLAVIX) 75 MG tablet, 1 tablet (75 mg) by Oral or NG Tube route daily, Disp: 30 tablet, Rfl: 0      Patient Active Problem List:     Advanced directives, counseling/discussion     Urinary incontinence     Forgetfulness     Hyperlipidemia LDL goal <130     Elevated blood pressure reading without diagnosis of hypertension     History of stroke - right thalamus in 8/2013     Numbness and tingling     Tobacco use disorder     CVA (cerebral vascular accident) (H)     ACS (acute coronary syndrome) (H)     Ischemic cardiomyopathy - EF 25% in 2015 but 55% in 3/2017 and 45-50% on 3/18     HTN, goal below 130/80     Acute systolic congestive heart failure (H)     Lung nodule     Esophageal reflux     ST elevation myocardial infarction involving left anterior descending (LAD) coronary artery (H)     Chronic bronchitis, unspecified chronic bronchitis type (H)     PAD (peripheral artery disease) (H) - moderate left common carotid stenosis, aortoiliac bypass, chronic left vertebral and right posterior cerebral stenoses     Cervical high risk HPV (human papillomavirus)  test positive     Acute respiratory failure with hypoxia (H)     COPD exacerbation (H)     Coronary artery disease involving native coronary artery - STEMI with LAD and circ stents in 8/2015     Elevated troponin     Pulmonary emphysema (H)     Vertigo     Other seizures (H) - possible     Acute CVA (cerebrovascular accident) - right paramedian superior vermis and left cerebellar hemisphere      Documentation of Face to Face and Certification for Home Health Services    I certify that patient, Preeti Ford is under my care and that I, or a Nurse Practitioner or Physician's Assistant working with me, had a face-to-face encounter that meets the physician face-to-face encounter requirements with this patient on: 3/25/2018.    This encounter with the patient was in whole, or in part, for the following medical condition, which is the primary reason for Home Health Care: acute stroke causing residual dizziness/vertigo.    I certify that, based on my findings, the following services are medically necessary Home Health Services: Nursing and Physical Therapy    My clinical findings support the need for the above services because: Nurse is needed: To assess medical stability after changes in medications or other medical regimen  Physical Therapy Services are needed to assess and treat the following functional impairments: weakness and balance issues following cerebellar stroke.    Further, I certify that my clinical findings support that this patient is homebound (i.e. absences from home require considerable and taxing effort and are for medical reasons or [employer] services or infrequently or of short duration when for other reasons) because: Requires assistance of another person or specialized equipment to access medical services because patient: Requires supervision of another for safe transfer outside the home.    Based on the above findings, I certify that this patient is confined to the home and needs intermittent  "skilled nursing care, physical therapy and/or speech therapy.  The patient is under my care, and I have initiated the establishment of the plan of care.  This patient will be followed by a physician who will periodically review the plan of care.    Physician/Provider to provide follow up care: Deisy Carter    Responsible PECOS certified Physician at time of discharge: Deisy Laird MD    Please be aware that coverage of these services is subject to the terms and limitations of your health insurance plan.  Call member services at your health plan with any benefit or coverage questions.                  Pending Results     No orders found from 3/20/2018 to 3/23/2018.            Statement of Approval     Ordered          03/25/18 1035  I have reviewed and agree with all the recommendations and orders detailed in this document.  EFFECTIVE NOW     Approved and electronically signed by:  Deisy Laird MD             Admission Information     Date & Time Provider Department Dept. Phone    3/22/2018 Deisy Laird MD [address] [phone]      Your Vitals Were     Blood Pressure Pulse Temperature Respirations Height Weight    158/61 (BP Location: Right arm) 75 97.5  F (36.4  C) (Oral) 20 1.575 m (5' 2\") 49.8 kg (109 lb 12.6 oz)    Pulse Oximetry BMI (Body Mass Index)                92% 20.08 kg/m2          [employer] Information     [employer] lets you send messages to your doctor, view your test results, renew your prescriptions, schedule appointments and more. To sign up, go to [email or web address] . Click on \"Log in\" on the left side of the screen, which will take you to the Welcome page. Then click on \"Sign up Now\" on the right side of the page.     You will be asked to enter the access code listed below, as well as some personal information. Please follow the directions to create your username and password.     Your access code is: " [email or web address]  Expires: 2018  8:20 AM     Your access code will  in 90 days. If you need help or a new code, please call your [facility] clinic or [phone].        Care EveryWhere ID     This is your Care EveryWhere ID. This could be used by other organizations to access your [facility] medical records  [id number]        Equal Access to Services     LYDIA WASHINGTON : Hadii aad ku hadasho Soomaali, waaxda luqadaha, qaybta kaalmada adeegyada, waxay vonin haymadhun romepatricia neal laKazsana . So [facility] [phone].    ATENCIÓN: Si habla kathia, tiene a jeffries disposición servicios gratuitos de asistencia lingüística. Geovanna al [phone].    We comply with applicable federal civil rights laws and [place] laws. We do not discriminate on the basis of race, color, national origin, age, disability, sex, sexual orientation, or gender identity.               Review of your medicines      START taking        Dose / Directions    acetaminophen 325 MG tablet   Commonly known as:  TYLENOL        Dose:  650 mg   Take 2 tablets (650 mg) by mouth every 4 hours as needed for mild pain   Quantity:  100 tablet   Refills:  0       clopidogrel 75 MG tablet   Commonly known as:  PLAVIX        Dose:  75 mg   Start taking on:  3/26/2018   1 tablet (75 mg) by Oral or NG Tube route daily   Quantity:  30 tablet   Refills:  0         CONTINUE these medicines which may have CHANGED, or have new prescriptions. If we are uncertain of the size of tablets/capsules you have at home, strength may be listed as something that might have changed.        Dose / Directions    metoprolol succinate 25 MG 24 hr tablet   Commonly known as:  TOPROL-XL   This may have changed:  how much to take   Used for:  ST elevation myocardial infarction involving left anterior descending (LAD) coronary artery (H)        Dose:  25 mg   Take 1 tablet (25 mg) by mouth daily   Quantity:  45 tablet   Refills:  3         CONTINUE these medicines which have NOT CHANGED         Dose / Directions    albuterol 108 (90 BASE) MCG/ACT Inhaler   Commonly known as:  PROAIR HFA/PROVENTIL HFA/VENTOLIN HFA   Used for:  Chronic bronchitis, unspecified chronic bronchitis type (H)        Dose:  2 puff   Inhale 2 puffs into the lungs every 6 hours as needed for shortness of breath / dyspnea   Quantity:  1 Inhaler   Refills:  11       atorvastatin 10 MG tablet   Commonly known as:  LIPITOR   Used for:  PAD (peripheral artery disease) (H)        Dose:  10 mg   Take 1 tablet (10 mg) by mouth At Bedtime   Quantity:  90 tablet   Refills:  3       budesonide-formoterol 160-4.5 MCG/ACT Inhaler   Commonly known as:  SYMBICORT   Used for:  Chronic bronchitis, unspecified chronic bronchitis type (H)        INHALE TWO PUFFS BY MOUTH TWICE A DAY   Quantity:  1 Inhaler   Refills:  5       COQ10 PO        Dose:  100 mg   Take 100 mg by mouth every 24 hours (at 16:00)   Refills:  0       ipratropium - albuterol 0.5 mg/2.5 mg/3 mL 0.5-2.5 (3) MG/3ML neb solution   Commonly known as:  DUONEB   Used for:  COPD exacerbation (H)        USE 1 VIAL IN NEBULIZER FOUR TIMES A DAY   Quantity:  270 mL   Refills:  3       montelukast 10 MG tablet   Commonly known as:  SINGULAIR   Used for:  Chronic bronchitis, unspecified chronic bronchitis type (H)        Dose:  10 mg   Take 1 tablet (10 mg) by mouth At Bedtime   Quantity:  30 tablet   Refills:  11       OSCAL 500/200 D-3 PO        Dose:  1 tablet   Take 1 tablet by mouth 2 times daily   Refills:  0       polyethylene glycol powder   Commonly known as:  MIRALAX   Used for:  Constipation, unspecified constipation type        Dose:  1 capful   Take 17 g (1 capful) by mouth daily   Quantity:  510 g   Refills:  1       SPIRIVA HANDIHALER 18 MCG capsule   Used for:  Chronic bronchitis, unspecified chronic bronchitis type (H)   Generic drug:  tiotropium        USING THE HANDIHALER, INHALE ONE CAPSULE BY MOUTH EVERY DAY   Quantity:  90 capsule   Refills:  5         STOP taking      aspirin 325 MG tablet                Where to get your medicines      These medications were sent to [facility] [place], [place] - [address]  [address], [facility] [address]     Phone:  [phone]     clopidogrel 75 MG tablet                Protect others around you: Learn how to safely use, store and throw away your medicines at www.disposemymeds.org.             Medication List: This is a list of all your medications and when to take them. Check marks below indicate your daily home schedule. Keep this list as a reference.      Medications           Morning Afternoon Evening Bedtime As Needed    acetaminophen 325 MG tablet   Commonly known as:  TYLENOL   Take 2 tablets (650 mg) by mouth every 4 hours as needed for mild pain   Last time this was given:  650 mg on 3/24/2018  7:57 AM                                   albuterol 108 (90 BASE) MCG/ACT Inhaler   Commonly known as:  PROAIR HFA/PROVENTIL HFA/VENTOLIN HFA   Inhale 2 puffs into the lungs every 6 hours as needed for shortness of breath / dyspnea                                   atorvastatin 10 MG tablet   Commonly known as:  LIPITOR   Take 1 tablet (10 mg) by mouth At Bedtime   Last time this was given:  10 mg on 3/24/2018  8:13 PM                                   budesonide-formoterol 160-4.5 MCG/ACT Inhaler   Commonly known as:  SYMBICORT   INHALE TWO PUFFS BY MOUTH TWICE A DAY                                      clopidogrel 75 MG tablet   Commonly known as:  PLAVIX   1 tablet (75 mg) by Oral or NG Tube route daily   Start taking on:  3/26/2018   Last time this was given:  75 mg on 3/25/2018  8:50 AM                                   COQ10 PO   Take 100 mg by mouth every 24 hours (at 16:00)                                ipratropium - albuterol 0.5 mg/2.5 mg/3 mL 0.5-2.5 (3) MG/3ML neb solution   Commonly known as:  DUONEB   USE 1 VIAL IN NEBULIZER FOUR TIMES A DAY   Last time this was given:  3 mLs on 3/25/2018  9:18 AM                                             metoprolol succinate 25 MG 24 hr tablet   Commonly known as:  TOPROL-XL   Take 1 tablet (25 mg) by mouth daily   Last time this was given:  50 mg on 3/25/2018  8:50 AM                                   montelukast 10 MG tablet   Commonly known as:  SINGULAIR   Take 1 tablet (10 mg) by mouth At Bedtime   Last time this was given:  10 mg on 3/24/2018  8:13 PM                                   OSCAL 500/200 D-3 PO   Take 1 tablet by mouth 2 times daily                                      polyethylene glycol powder   Commonly known as:  MIRALAX   Take 17 g (1 capful) by mouth daily                                   SPIRIVA HANDIHALER 18 MCG capsule   USING THE HANDIHALER, INHALE ONE CAPSULE BY MOUTH EVERY DAY   Generic drug:  tiotropium                                             More Information        Clopidogrel tablets  Brand Name: Plavix  What is this medicine?  CLOPIDOGREL (kloh PID oh grel) helps to prevent blood clots. This medicine is used to prevent heart attack, stroke, or other vascular events in people who are at high risk.  How should I use this medicine?  Take this medicine by mouth with a glass of water. Follow the directions on the prescription label. You may take this medicine with or without food. If it upsets your stomach, take it with food. Take your medicine at regular intervals. Do not take it more often than directed. Do not stop taking except on your doctor's advice.  A special MedGuide will be given to you by the pharmacist with each prescription and refill. Be sure to read this information carefully each time.  Talk to your pediatrician regarding the use of this medicine in children. Special care may be needed.  What side effects may I notice from receiving this medicine?  Side effects that you should report to your doctor or health care professional as soon as possible:    allergic reactions like skin rash, itching or hives, swelling of the face, lips,  or tongue    signs and symptoms of bleeding such as bloody or black, tarry stools; red or dark-brown urine; spitting up blood or brown material that looks like coffee grounds; red spots on the skin; unusual bruising or bleeding from the eye, gums, or nose    signs and symptoms of a blood clot such as breathing problems; changes in vision; chest pain; severe, sudden headache; pain, swelling, warmth in the leg; trouble speaking; sudden numbness or weakness of the face, arm or leg  Side effects that usually do not require medical attention (report to your doctor or health care professional if they continue or are bothersome):    constipation    diarrhea    headache    upset stomach  What may interact with this medicine?  Do not take this medicine with the following medications:    dasabuvir; ombitasvir; paritaprevir; ritonavir    defibrotide  This medicine may also interact with the following medications:    antiviral medicines for HIV or AIDS    aspirin    certain medicines for depression like citalopram, fluoxetine, fluvoxamine    certain medicines for fungal infections like ketoconazole, fluconazole, voriconazole    certain medicines for seizures like felbamate, oxcarbazepine, phenytoin    certain medicines for stomach problems like cimetidine, omeprazole, esomeprazole    certain medicines that treat or prevent blood clots like warfarin, enoxaparin, dalteparin, apixaban, dabigatran, rivaroxaban, ticlopidine    chloramphenicol    cilostazol    fluvastatin    isoniazid    modafinil    nicardipine    NSAIDS, medicines for pain and inflammation, like ibuprofen or naproxen    quinine    repaglinide    tamoxifen    tolbutamide    topiramate    torsemide  What if I miss a dose?  If you miss a dose, take it as soon as you can. If it is almost time for your next dose, take only that dose. Do not take double or extra doses.  Where should I keep my medicine?  Keep out of the reach of children.  Store at room temperature of 59  to 86 degrees F (15 to 30 degrees C). Throw away any unused medicine after the expiration date.  What should I tell my health care provider before I take this medicine?  They need to know if you have any of the following conditions:    bleeding disorders    bleeding in the brain    having surgery    history of stomach bleeding    an unusual or allergic reaction to clopidogrel, other medicines, foods, dyes, or preservatives    pregnant or trying to get pregnant    breast-feeding  What should I watch for while using this medicine?  Visit your doctor or health care professional for regular check ups. Do not stop taking your medicine unless your doctor tells you to.  Notify your doctor or health care professional and seek emergency treatment if you develop breathing problems; changes in vision; chest pain; severe, sudden headache; pain, swelling, warmth in the leg; trouble speaking; sudden numbness or weakness of the face, arm or leg. These can be signs that your condition has gotten worse.  If you are going to have surgery or dental work, tell your doctor or health care professional that you are taking this medicine.  Certain genetic factors may reduce the effect of this medicine. Your doctor may use genetic tests to determine treatment.  NOTE:This sheet is a summary. It may not cover all possible information. If you have questions about this medicine, talk to your doctor, pharmacist, or health care provider. Copyright  2017 [employer]

## 2018-03-22 NOTE — LETTER
Transition Communication Hand-off for Care Transitions to Next Level of Care Provider    Name: Preeti Ford  : 1950  MRN #: [id number]  Primary Care Provider: Deisy Carter  Primary Care MD Name: SPRING Roberto, CNP   Primary Clinic: [address] [facility] [address]  Primary Care Clinic Name: [facility]   Reason for Hospitalization:  Seizures (H) [R56.9]  Admit Date/Time: 3/22/2018  8:51 PM  Discharge Date: 3/25/18  Payor Source: Payor: MEDICA / Plan: MEDICA PRIME SOLUTION / Product Type: Indemnity /     Readmission Assessment Measure (NEETU) Risk Score/category: Low           Reason for Communication Hand-off Referral: Fragility  Avoidable readmission within 30 days    Discharge Plan:  Discharge Plan:       Most Recent Value    Disposition Comments To be determined           Concern for non-adherence with plan of care:   Y/N Yes continue to smoke with her COPD  Discharge Needs Assessment:  Needs       Most Recent Value    Transportation Available car          Already enrolled in Tele-monitoring program and name of program:  no  Follow-up specialty is recommended: No    Follow-up plan:  Future Appointments  Date Time Provider Department Center   2018 11:30 AM Deisy Carter APRN CNP [facility]       Any outstanding tests or procedures:        Referrals     Future Labs/Procedures    HOME CARE NURSING REFERRAL     Comments:    **Order classes of: [facility], [facility] and [facility] will route to the Home Care and Hospice Referral Pool.  Home Care or Hospice will then contact the patient to schedule their appointment.**    If you do not hear from Home Care and Hospice, or you would like to call to schedule, please call the referring place of service that your provider has listed below.  ______________________________________________________________________    Your provider has referred you to: [facility]: [facility] [phone]   [email or web address]    Extended Emergency Contact Information  Primary Emergency Contact: Yoseph wray (loraine)  Address: [address] [place], [place] [id number] [facility]  Home Phone: [phone]  Work Phone: none  Mobile Phone: [phone]  Relation: Spouse  Secondary Emergency Contact: Yamilet Lima   [facility]  Home Phone: [phone]  Work Phone: none  Mobile Phone: [phone]  Relation: Relative    Patient Anticipated Discharge Date: 3/25/18   RN, PT, HHA to begin 24 - 48 hours after discharge.  PLEASE EVALUATE AND TREAT (Evaluation timeline is 24 - 48 hrs. Please call if there is need for a variance to this timeline).    REASON FOR REFERRAL: Assessment & Treatment: PT and RN    ADDITIONAL SERVICES NEEDED: none identified    OTHER PERTINENT INFORMATION: Patient was last seen by provider on 3/25/18 for acute cerebellar stroke causing residual dizziness/vertigo.    Current Outpatient Prescriptions:  acetaminophen (TYLENOL) 325 MG tablet, Take 2 tablets (650 mg) by mouth every 4 hours as needed for mild pain, Disp: 100 tablet, Rfl:   metoprolol succinate (TOPROL-XL) 25 MG 24 hr tablet, Take 1 tablet (25 mg) by mouth daily, Disp: 45 tablet, Rfl: 3  [START ON 3/26/2018] clopidogrel (PLAVIX) 75 MG tablet, 1 tablet (75 mg) by Oral or NG Tube route daily, Disp: 30 tablet, Rfl: 0      Patient Active Problem List:     Advanced directives, counseling/discussion     Urinary incontinence     Forgetfulness     Hyperlipidemia LDL goal <130     Elevated blood pressure reading without diagnosis of hypertension     History of stroke - right thalamus in 8/2013     Numbness and tingling     Tobacco use disorder     CVA (cerebral vascular accident) (H)     ACS (acute coronary syndrome) (H)     Ischemic cardiomyopathy - EF 25% in 2015 but 55% in 3/2017 and 45-50% on 3/18     HTN, goal below 130/80     Acute systolic congestive heart failure (H)     Lung nodule      Esophageal reflux     ST elevation myocardial infarction involving left anterior descending (LAD) coronary artery (H)     Chronic bronchitis, unspecified chronic bronchitis type (H)     PAD (peripheral artery disease) (H) - moderate left common carotid stenosis, aortoiliac bypass, chronic left vertebral and right posterior cerebral stenoses     Cervical high risk HPV (human papillomavirus) test positive     Acute respiratory failure with hypoxia (H)     COPD exacerbation (H)     Coronary artery disease involving native coronary artery - STEMI with LAD and circ stents in 8/2015     Elevated troponin     Pulmonary emphysema (H)     Vertigo     Other seizures (H) - possible     Acute CVA (cerebrovascular accident) - right paramedian superior vermis and left cerebellar hemisphere      Documentation of Face to Face and Certification for Home Health Services    I certify that patient, Preeti Ford is under my care and that I, or a Nurse Practitioner or Physician's Assistant working with me, had a face-to-face encounter that meets the physician face-to-face encounter requirements with this patient on: 3/25/2018.    This encounter with the patient was in whole, or in part, for the following medical condition, which is the primary reason for Home Health Care: acute stroke causing residual dizziness/vertigo.    I certify that, based on my findings, the following services are medically necessary Home Health Services: Nursing and Physical Therapy    My clinical findings support the need for the above services because: Nurse is needed: To assess medical stability after changes in medications or other medical regimen  Physical Therapy Services are needed to assess and treat the following functional impairments: weakness and balance issues following cerebellar stroke.    Further, I certify that my clinical findings support that this patient is homebound (i.e. absences from home require considerable and taxing effort and are  for medical reasons or Worship services or infrequently or of short duration when for other reasons) because: Requires assistance of another person or specialized equipment to access medical services because patient: Requires supervision of another for safe transfer outside the home.    Based on the above findings, I certify that this patient is confined to the home and needs intermittent skilled nursing care, physical therapy and/or speech therapy.  The patient is under my care, and I have initiated the establishment of the plan of care.  This patient will be followed by a physician who will periodically review the plan of care.    Physician/Provider to provide follow up care: Deisy Carter    Responsible PECOS certified Physician at time of discharge: Deisy Laird MD    Please be aware that coverage of these services is subject to the terms and limitations of your health insurance plan.  Call member services at your health plan with any benefit or coverage questions.            Key Recommendations:  Patient will discharge home with husbands support clinic [facility] . Patient will need encouragement to quit smoking did not want to hear about it in the hospital.    Lisa Dawson    AVS/Discharge Summary is the source of truth; this is a helpful guide for improved communication of patient story

## 2018-03-22 NOTE — IP AVS SNAPSHOT
[address] [facility]    [address] [place] [address]    Phone:  [phone]                                       After Visit Summary   3/22/2018    Preeti Ford    MRN: [id number]           After Visit Summary Signature Page     I have received my discharge instructions, and my questions have been answered. I have discussed any challenges I see with this plan with the nurse or doctor.    ..........................................................................................................................................  Patient/Patient Representative Signature      ..........................................................................................................................................  Patient Representative Print Name and Relationship to Patient    ..................................................               ................................................  Date                                            Time    ..........................................................................................................................................  Reviewed by Signature/Title    ...................................................              ..............................................  Date                                                            Time

## 2018-03-23 ENCOUNTER — APPOINTMENT (OUTPATIENT)
Dept: CARDIOLOGY | Facility: CLINIC | Age: 68
DRG: 066 | End: 2018-03-23
Attending: FAMILY MEDICINE
Payer: MEDICARE

## 2018-03-23 ENCOUNTER — APPOINTMENT (OUTPATIENT)
Dept: SPEECH THERAPY | Facility: CLINIC | Age: 68
DRG: 066 | End: 2018-03-23
Attending: FAMILY MEDICINE
Payer: MEDICARE

## 2018-03-23 ENCOUNTER — APPOINTMENT (OUTPATIENT)
Dept: MRI IMAGING | Facility: CLINIC | Age: 68
DRG: 066 | End: 2018-03-23
Attending: FAMILY MEDICINE
Payer: MEDICARE

## 2018-03-23 ENCOUNTER — APPOINTMENT (OUTPATIENT)
Dept: PHYSICAL THERAPY | Facility: CLINIC | Age: 68
DRG: 066 | End: 2018-03-23
Attending: INTERNAL MEDICINE
Payer: MEDICARE

## 2018-03-23 PROBLEM — R42 VERTIGO: Status: ACTIVE | Noted: 2018-03-23

## 2018-03-23 PROBLEM — G40.89 OTHER SEIZURES (H): Status: ACTIVE | Noted: 2018-03-23

## 2018-03-23 PROBLEM — I63.9 ACUTE CVA (CEREBROVASCULAR ACCIDENT) (H): Status: ACTIVE | Noted: 2018-03-23

## 2018-03-23 PROCEDURE — 25000132 ZZH RX MED GY IP 250 OP 250 PS 637: Mod: GY | Performed by: FAMILY MEDICINE

## 2018-03-23 PROCEDURE — 93306 TTE W/DOPPLER COMPLETE: CPT

## 2018-03-23 PROCEDURE — 92610 EVALUATE SWALLOWING FUNCTION: CPT | Mod: GN | Performed by: SPEECH-LANGUAGE PATHOLOGIST

## 2018-03-23 PROCEDURE — 99207 ZZC CDG-CODE CATEGORY CHANGED: CPT | Performed by: INTERNAL MEDICINE

## 2018-03-23 PROCEDURE — 96361 HYDRATE IV INFUSION ADD-ON: CPT | Performed by: EMERGENCY MEDICINE

## 2018-03-23 PROCEDURE — G0378 HOSPITAL OBSERVATION PER HR: HCPCS

## 2018-03-23 PROCEDURE — 94640 AIRWAY INHALATION TREATMENT: CPT | Mod: 76

## 2018-03-23 PROCEDURE — 25000128 H RX IP 250 OP 636: Performed by: INTERNAL MEDICINE

## 2018-03-23 PROCEDURE — 99222 1ST HOSP IP/OBS MODERATE 55: CPT | Mod: AI | Performed by: INTERNAL MEDICINE

## 2018-03-23 PROCEDURE — 70553 MRI BRAIN STEM W/O & W/DYE: CPT

## 2018-03-23 PROCEDURE — 97162 PT EVAL MOD COMPLEX 30 MIN: CPT | Mod: GP | Performed by: PHYSICAL THERAPIST

## 2018-03-23 PROCEDURE — 96374 THER/PROPH/DIAG INJ IV PUSH: CPT

## 2018-03-23 PROCEDURE — 93306 TTE W/DOPPLER COMPLETE: CPT | Mod: 26 | Performed by: INTERNAL MEDICINE

## 2018-03-23 PROCEDURE — 25000132 ZZH RX MED GY IP 250 OP 250 PS 637: Mod: GY | Performed by: INTERNAL MEDICINE

## 2018-03-23 PROCEDURE — 94640 AIRWAY INHALATION TREATMENT: CPT

## 2018-03-23 PROCEDURE — A9270 NON-COVERED ITEM OR SERVICE: HCPCS | Mod: GY | Performed by: FAMILY MEDICINE

## 2018-03-23 PROCEDURE — 25000128 H RX IP 250 OP 636: Performed by: FAMILY MEDICINE

## 2018-03-23 PROCEDURE — 87081 CULTURE SCREEN ONLY: CPT | Performed by: FAMILY MEDICINE

## 2018-03-23 PROCEDURE — 40000225 ZZH STATISTIC SLP WARD VISIT: Performed by: SPEECH-LANGUAGE PATHOLOGIST

## 2018-03-23 PROCEDURE — 12000007 ZZH R&B INTERMEDIATE

## 2018-03-23 PROCEDURE — 25000128 H RX IP 250 OP 636: Performed by: RADIOLOGY

## 2018-03-23 PROCEDURE — A9270 NON-COVERED ITEM OR SERVICE: HCPCS | Mod: GY | Performed by: INTERNAL MEDICINE

## 2018-03-23 PROCEDURE — A9585 GADOBUTROL INJECTION: HCPCS | Performed by: RADIOLOGY

## 2018-03-23 PROCEDURE — 99207 ZZC APP CREDIT; MD BILLING SHARED VISIT: CPT | Performed by: FAMILY MEDICINE

## 2018-03-23 PROCEDURE — 25000125 ZZHC RX 250: Performed by: INTERNAL MEDICINE

## 2018-03-23 PROCEDURE — 96375 TX/PRO/DX INJ NEW DRUG ADDON: CPT

## 2018-03-23 PROCEDURE — G8981 BODY POS CURRENT STATUS: HCPCS | Mod: GP,CL | Performed by: PHYSICAL THERAPIST

## 2018-03-23 PROCEDURE — 40000193 ZZH STATISTIC PT WARD VISIT: Performed by: PHYSICAL THERAPIST

## 2018-03-23 RX ORDER — PROCHLORPERAZINE MALEATE 5 MG
5 TABLET ORAL EVERY 6 HOURS PRN
Status: DISCONTINUED | OUTPATIENT
Start: 2018-03-23 | End: 2018-03-25 | Stop reason: HOSPADM

## 2018-03-23 RX ORDER — BUDESONIDE AND FORMOTEROL FUMARATE DIHYDRATE 160; 4.5 UG/1; UG/1
2 AEROSOL RESPIRATORY (INHALATION)
Status: DISCONTINUED | OUTPATIENT
Start: 2018-03-23 | End: 2018-03-25 | Stop reason: HOSPADM

## 2018-03-23 RX ORDER — TIOTROPIUM BROMIDE 18 UG/1
18 CAPSULE ORAL; RESPIRATORY (INHALATION) DAILY
Status: DISCONTINUED | OUTPATIENT
Start: 2018-03-23 | End: 2018-03-23 | Stop reason: ALTCHOICE

## 2018-03-23 RX ORDER — PROCHLORPERAZINE 25 MG
12.5 SUPPOSITORY, RECTAL RECTAL EVERY 12 HOURS PRN
Status: DISCONTINUED | OUTPATIENT
Start: 2018-03-23 | End: 2018-03-25 | Stop reason: HOSPADM

## 2018-03-23 RX ORDER — ONDANSETRON 2 MG/ML
4 INJECTION INTRAMUSCULAR; INTRAVENOUS EVERY 6 HOURS PRN
Status: DISCONTINUED | OUTPATIENT
Start: 2018-03-23 | End: 2018-03-25 | Stop reason: HOSPADM

## 2018-03-23 RX ORDER — CLOPIDOGREL BISULFATE 75 MG/1
75 TABLET ORAL DAILY
Status: DISCONTINUED | OUTPATIENT
Start: 2018-03-23 | End: 2018-03-25 | Stop reason: HOSPADM

## 2018-03-23 RX ORDER — NALOXONE HYDROCHLORIDE 0.4 MG/ML
.1-.4 INJECTION, SOLUTION INTRAMUSCULAR; INTRAVENOUS; SUBCUTANEOUS
Status: DISCONTINUED | OUTPATIENT
Start: 2018-03-23 | End: 2018-03-25 | Stop reason: HOSPADM

## 2018-03-23 RX ORDER — ASPIRIN 325 MG
325 TABLET ORAL DAILY
Status: DISCONTINUED | OUTPATIENT
Start: 2018-03-23 | End: 2018-03-23

## 2018-03-23 RX ORDER — ACETAMINOPHEN 325 MG/1
650 TABLET ORAL EVERY 4 HOURS PRN
Status: DISCONTINUED | OUTPATIENT
Start: 2018-03-23 | End: 2018-03-25 | Stop reason: HOSPADM

## 2018-03-23 RX ORDER — ATORVASTATIN CALCIUM 10 MG/1
10 TABLET, FILM COATED ORAL AT BEDTIME
Status: DISCONTINUED | OUTPATIENT
Start: 2018-03-23 | End: 2018-03-25 | Stop reason: HOSPADM

## 2018-03-23 RX ORDER — METOPROLOL SUCCINATE 25 MG/1
25 TABLET, EXTENDED RELEASE ORAL DAILY
Status: DISCONTINUED | OUTPATIENT
Start: 2018-03-23 | End: 2018-03-24

## 2018-03-23 RX ORDER — IPRATROPIUM BROMIDE AND ALBUTEROL SULFATE 2.5; .5 MG/3ML; MG/3ML
3 SOLUTION RESPIRATORY (INHALATION)
Status: DISCONTINUED | OUTPATIENT
Start: 2018-03-23 | End: 2018-03-23

## 2018-03-23 RX ORDER — POLYETHYLENE GLYCOL 3350 17 G/17G
17 POWDER, FOR SOLUTION ORAL DAILY PRN
Status: DISCONTINUED | OUTPATIENT
Start: 2018-03-23 | End: 2018-03-25 | Stop reason: HOSPADM

## 2018-03-23 RX ORDER — ALBUTEROL SULFATE 0.83 MG/ML
2.5 SOLUTION RESPIRATORY (INHALATION)
Status: DISCONTINUED | OUTPATIENT
Start: 2018-03-23 | End: 2018-03-25 | Stop reason: HOSPADM

## 2018-03-23 RX ORDER — METOCLOPRAMIDE HYDROCHLORIDE 5 MG/ML
5 INJECTION INTRAMUSCULAR; INTRAVENOUS EVERY 6 HOURS PRN
Status: DISCONTINUED | OUTPATIENT
Start: 2018-03-23 | End: 2018-03-25 | Stop reason: HOSPADM

## 2018-03-23 RX ORDER — ALBUTEROL SULFATE 90 UG/1
2 AEROSOL, METERED RESPIRATORY (INHALATION) EVERY 6 HOURS PRN
Status: DISCONTINUED | OUTPATIENT
Start: 2018-03-23 | End: 2018-03-25 | Stop reason: HOSPADM

## 2018-03-23 RX ORDER — GADOBUTROL 604.72 MG/ML
7.5 INJECTION INTRAVENOUS ONCE
Status: COMPLETED | OUTPATIENT
Start: 2018-03-23 | End: 2018-03-23

## 2018-03-23 RX ORDER — MONTELUKAST SODIUM 10 MG/1
10 TABLET ORAL AT BEDTIME
Status: DISCONTINUED | OUTPATIENT
Start: 2018-03-23 | End: 2018-03-25 | Stop reason: HOSPADM

## 2018-03-23 RX ORDER — ONDANSETRON 4 MG/1
4 TABLET, ORALLY DISINTEGRATING ORAL EVERY 6 HOURS PRN
Status: DISCONTINUED | OUTPATIENT
Start: 2018-03-23 | End: 2018-03-25 | Stop reason: HOSPADM

## 2018-03-23 RX ORDER — IPRATROPIUM BROMIDE AND ALBUTEROL SULFATE 2.5; .5 MG/3ML; MG/3ML
3 SOLUTION RESPIRATORY (INHALATION) EVERY 4 HOURS
Status: DISCONTINUED | OUTPATIENT
Start: 2018-03-23 | End: 2018-03-25 | Stop reason: HOSPADM

## 2018-03-23 RX ORDER — METOCLOPRAMIDE 5 MG/1
5 TABLET ORAL EVERY 6 HOURS PRN
Status: DISCONTINUED | OUTPATIENT
Start: 2018-03-23 | End: 2018-03-25 | Stop reason: HOSPADM

## 2018-03-23 RX ADMIN — MONTELUKAST SODIUM 10 MG: 10 TABLET, FILM COATED ORAL at 20:45

## 2018-03-23 RX ADMIN — ATORVASTATIN CALCIUM 10 MG: 10 TABLET, FILM COATED ORAL at 20:45

## 2018-03-23 RX ADMIN — IPRATROPIUM BROMIDE AND ALBUTEROL SULFATE 3 ML: .5; 3 SOLUTION RESPIRATORY (INHALATION) at 07:28

## 2018-03-23 RX ADMIN — UMECLIDINIUM 1 PUFF: 62.5 AEROSOL, POWDER ORAL at 18:08

## 2018-03-23 RX ADMIN — GADOBUTROL 7.5 ML: 604.72 INJECTION INTRAVENOUS at 12:55

## 2018-03-23 RX ADMIN — MONTELUKAST SODIUM 10 MG: 10 TABLET, FILM COATED ORAL at 01:52

## 2018-03-23 RX ADMIN — ONDANSETRON 4 MG: 2 INJECTION INTRAMUSCULAR; INTRAVENOUS at 07:21

## 2018-03-23 RX ADMIN — IPRATROPIUM BROMIDE AND ALBUTEROL SULFATE 3 ML: .5; 3 SOLUTION RESPIRATORY (INHALATION) at 23:40

## 2018-03-23 RX ADMIN — ATORVASTATIN CALCIUM 10 MG: 10 TABLET, FILM COATED ORAL at 01:52

## 2018-03-23 RX ADMIN — METOPROLOL SUCCINATE 25 MG: 25 TABLET, EXTENDED RELEASE ORAL at 07:08

## 2018-03-23 RX ADMIN — IPRATROPIUM BROMIDE AND ALBUTEROL SULFATE 3 ML: .5; 3 SOLUTION RESPIRATORY (INHALATION) at 18:08

## 2018-03-23 RX ADMIN — PROCHLORPERAZINE EDISYLATE 5 MG: 5 INJECTION INTRAMUSCULAR; INTRAVENOUS at 10:37

## 2018-03-23 RX ADMIN — CLOPIDOGREL BISULFATE 75 MG: 75 TABLET, FILM COATED ORAL at 17:23

## 2018-03-23 NOTE — ED NOTES
"Patient sitting in her chair this evening, started to feeling ringing in her ears, then had seizure like activity per her . Patient can recall this event. Currently completely alert and oriented. Only complaint at this time is \"pressure/headache behind eyes\".  "

## 2018-03-23 NOTE — PROGRESS NOTES
"   03/23/18 1500   General Information   Onset Date 03/22/18   Start of Care Date 03/23/18   Referring Physician Deisy Laird   Patient/Family Goals Statement \"I want to get outa here.\"   Swallowing Evaluation Bedside swallow evaluation   Behaviorial Observations WFL (within functional limits)   Mode of current nutrition Oral diet   Type of oral diet Regular;Thin liquid   Respiratory Status Room air   Comments No swallowing difficulties identified at this time per nursing report, patient report and SLP evaluation   Clinical Swallow Evaluation   Oral Musculature generally intact   Structural Abnormalities none present   Dentition present and adequate   Mucosal Quality good   Mandibular Strength and Mobility intact   Oral Labial Strength and Mobility WFL   Lingual Strength and Mobility WFL   Velar Elevation intact   Buccal Strength and Mobility intact   Laryngeal Function Cough;Throat clear;Swallow;Voicing initiated;Dry swallow palpated   Oral Musculature Comments No deficits noted.  WFL for range, rate, strength and coordination   Additional Documentation No   Swallow Eval: Clinical Impressions   Skilled Criteria for Therapy Intervention No problems identified which require skilled intervention   Functional Assessment Scale (FAS) 6   Dysphagia Outcome Severity Scale (RADHA) Level 6 - RADHA   Treatment Diagnosis no applicable treatment diagnosis   Diet texture recommendations Regular diet;Thin liquids   Recommended Feeding/Eating Techniques maintain upright posture during/after eating for 30 mins   Risks and Benefits of Treatment have been explained. Yes   Patient, family and/or staff in agreement with Plan of Care Yes   Clinical Impression Comments No swallowing difficulties identified at this time per nursing report, patient report and SLP evaluation   Therapy Certification   Medical Diagnosis CVA   Total Evaluation Time   Total Evaluation Time (Minutes) 20     "

## 2018-03-23 NOTE — ED NOTES
Patient up with assist of 2 d/t dizziness to bedside commode. Does not tolerate any activity well d/t severe dizziness.

## 2018-03-23 NOTE — PROGRESS NOTES
SPIRITUAL HEALTH SERVICES  SPIRITUAL ASSESSMENT Progress Note  [facility]      During Rounding,  introduced himself to Preeti Ford and informed her of his availability.    Judah Hung M.Div., [facility]  Staff   Office tel: [phone]

## 2018-03-23 NOTE — PROGRESS NOTES
[facility] Progress Note          Assessment and Plan:   Assessment:   Principal Problem:    Acute CVA (cerebrovascular accident) - right paramedian superior vermis and left cerebellar hemisphere    Assessment: Patient has ongoing vertigo, with symptoms significantly impairing activity this morning and was felt to be central in origin.  MRI was performed and does confirm new small acute infarct of the right paramedian superior vermis as well as the left cerebellar hemisphere.    Plan:  Discussed with Dr. Hirsch, stroke neurologist on-call for the [facility], who reviewed the CTA and MRI.  Recommendations at this time are to improve patient's blood pressure control, transition her from aspirin to Plavix 75 mg daily without need to perform bolus dosing, and proceed with remainder stroke evaluation exam including continuous cardiac monitoring and evaluation by PT, OT, and speech therapy, as well as echocardiogram with bubble study.  Will reassess hemoglobin A1c as well as cholesterol and address diabetes is identified and Lipitor dosing if LDL is above 100.  Patient will be made inpatient status as the above workup is performed.    Active Problems:    Vertigo    Assessment: Likely residual effects of CVA in the vermis and cerebellar hemisphere as above    Plan: Proceed with plan as above      Other seizures (H) - possible    Assessment: In discussion with Dr. Hirsch this is likely the onset of stroke and not likely a true seizure given lack of postictal symptoms    Plan: Monitor for any recurrent activity concerning for seizure going forward      History of stroke - right thalamus in 8/2013    Assessment: Now with recurrent stroke as above     Plan:  proceed with plan as above      Tobacco use disorder    Assessment: Ongoing    Plan: Emphasized the patient the importance of smoking cessation going forward.      Ischemic cardiomyopathy - EF 25% in 2015 but 55% in 3/2017    Assessment:  Patient continues to be euvolemic     Plan:  will proceed with echocardiogram for workup of CVA as above and recheck EF at that time      HTN, goal below 130/80    Assessment: Blood sugars consistently above goal, currently in the 160-180 range systolic.     Plan: Metoprolol has been increased to 25 mg daily but will monitor for heart rate and blood pressure effects, further titrating antihypertensive agents as needed, however in light of patient's recent stroke will need to do so more slowly.        PAD (peripheral artery disease) (H) - moderate left common carotid stenosis, aortoiliac bypass, chronic left vertebral and right posterior cerebral stenoses    Assessment:  Previous history    Plan: Transition to Plavix as above      Coronary artery disease involving native coronary artery - STEMI with LAD and circ stents in 8/2015    Assessment: Previous history but patient not having any chest pain or shortness of breath with EKG showing no acute changes.  Mild elevation of the troponin is chronic in nature    Plan: Continue home medication including statin and metoprolol with increased dosing of metoprolol as above.  Will transition to Plavix as above.      Elevated troponin    Assessment:  Chronic elevation as above    Plan: No further monitoring as needed      Pulmonary emphysema (H)    Assessment: Chronic and stable, with patient having mild wheezing on exam today and has weaned down to 1 L nasal cannula oxygen.      Plan:  We will perform VBG with next lab draw to evaluate if patient chronically retaining CO2, wean O2 supplementation as tolerated.        # Pain Assessment:  Current Pain Score 3/23/2018   Patient currently in pain? denies   Pain score (0-10) -   Pain location -   Pain descriptors -   Preeti fierro pain level was assessed and she currently denies pain.        VTE:  SCDs  Code Status:  Full code        Interval History:   About the same today with degree of vertical continuing to significantly limit  "patient's ability to ambulate.  Vitals signs stable.  Tolerating medications and treatment plan without significant side effects or problems.  Eating and voiding well.  No new concerns today.           Significant Problems:     Past Medical History:   Diagnosis Date     Arthritis      COPD (chronic obstructive pulmonary disease) (H)      Coronary artery disease      CVA (cerebral vascular accident) (H) 8-     Hypertension             Physical Exam:   Blood pressure 163/68, pulse 74, temperature 95.9  F (35.5  C), temperature source Oral, resp. rate 14, height 1.575 m (5' 2\"), weight 51 kg (112 lb 7 oz), SpO2 99 %, not currently breastfeeding.  Constitutional:   awake, alert, cooperative, no apparent distress, and appears stated age     Lungs:   No increased work of breathing, decreased air exchange, patient does have expiratory wheezing diffusely     Cardiovascular:   Normal apical impulse, regular rate and rhythm     Abdomen:   Bowel sounds present, abdomen soft and non-tender     Musculoskeletal:   Patient is able to sit up in bed without support or assistance and states it is feeling better to do that than it was this morning  no lower extremity pitting edema present     Neurologic:   Awake, alert, oriented to name, place and time.       Skin:   normal skin color, texture, turgor             Data:   All laboratory and imaging data in the past 24 hours reviewed    Attestation:  I have reviewed today's vital signs, notes, medications, labs and imaging.     Electronically Signed:  Deisy Laird MD    Note: Chart documentation done in part with Dragon Voice Recognition software. Although reviewed after completion, some word and grammatical errors may remain.       "

## 2018-03-23 NOTE — PROGRESS NOTES
S-(situation): Patient changed to inpatient status    B-(background): Patient status change due to observation goals not being met.     A-(assessment): see vs f/s. Pt A/Ox3. Pt transferring with max assist of 2, ataxia with mobility.    R-(recommendations):  . Will monitor patient per MD orders.       Inpatient nursing criteria listed below were met:    Adult Profile completedYes  Health care directives status obtained and documented: Yes  VTE prophylaxis orders: SCD's  (FYI: Asprin is not an approved anticoagulation for DVT prophylaxis)  SCD's Documented: Yes  Vaccine assessment done and vaccine ordered if needed. Yes  MRSA swab completed for patient 55 years and older (exclude KRISTEN and TKA): Yes  My Chart patient sign up addressed and documented: Yes  Care Plan initiated: Yes  Discharge planning review completed (admission navigator) Yes

## 2018-03-23 NOTE — PROGRESS NOTES
"S-(situation): Patient registered to Observation. Patient arrived to room 251 via cart from ED    B-(background): possible seizure, Vertigo    A-(assessment): /81  Pulse 75  Temp 96.8  F (36  C) (Oral)  Resp 20  Ht 1.575 m (5' 2\")  Wt 51 kg (112 lb 7 oz)  SpO2 97%  BMI 20.56 kg/m2  Pt is alert and oriented. Vital signs stable. Neuro's intact. Lungs diminished with rhonchi throughout and expiratory wheezes. Blanchable redness noted on elbows bilaterally. Pt reports continued dizziness. Pt family will bring in home inhalers in the am.     R-(recommendations): Orders and observation goals reviewed with pt    Nursing Observation criteria listed below was met:    Skin issues/needs documented:Yes  Isolation needs addressed, if appropriate: NA  Fall Prevention: Education given and documented: NA  Education Assessment documented:Yes  Education Documented (Pre-existing chronic infection such as, MRSA/VRE need education on admission): Yes  New medication patient education completed and documented (Possible Side Effects of Common Medications handout): Yes  Home medications if not able to send immediately home with family stored here: NA  Reminder note placed in discharge instructions: NA  Patient has discharge needs (If yes, please explain): No            "

## 2018-03-23 NOTE — PLAN OF CARE
"Problem: Patient Care Overview  Goal: Plan of Care/Patient Progress Review  Outcome: No Change  S-(situation): 4 hour shift note    B-(background): Dizziness, Nausea    A-(assessment): -792f/70s.  Reports nausea and difficult opening eyes this am due to severe dizziness and nausea.  Patient resting this am and awoke at 1000 and reports feeling \"better\" and still dizzy and nauseated.  Compazine IV admin prior to leaving for MRI.   Refuses to get OOB due to nausea and dizziness with slight movement. Neuros intact.     R-(recommendations): monitor      "

## 2018-03-23 NOTE — PLAN OF CARE
Problem: Patient Care Overview  Goal: Plan of Care/Patient Progress Review  Discharge Planner SLP   Patient plan for discharge:  Home with spouse support  Current status: regular diet with thin liquids  Barriers to return to prior living situation: Nothing from ST standpoint.  Refer to PT and OT evaluations.  Recommendations for discharge: regular diet with thin liquids, pills whole with water  Rationale for recommendations: no swallow or language concerns identified.  ST not warranted at this time.        Entered by: Karma Brian 03/23/2018 3:35 PM

## 2018-03-23 NOTE — CONSULTS
CARE TRANSITION SOCIAL WORK INITIAL ASSESSMENT:  Reason For Consult: discharge planning   Met with: Patient and Family.    DATA  Principal Problem:    Acute CVA (cerebrovascular accident) - right paramedian superior vermis and left cerebellar hemisphere  Active Problems:    History of stroke - right thalamus in 8/2013    Tobacco use disorder    Ischemic cardiomyopathy - EF 25% in 2015 but 55% in 3/2017    HTN, goal below 130/80    PAD (peripheral artery disease) (H) - moderate left common carotid stenosis, aortoiliac bypass, chronic left vertebral and right posterior cerebral stenoses    Coronary artery disease involving native coronary artery - STEMI with LAD and circ stents in 8/2015    Elevated troponin    Pulmonary emphysema (H)    Vertigo    Other seizures (H) - possible       [facility]   Primary Care MD Name: SPRING Roberto, CNP   Contact information and PCP information verified: Yes      ASSESSMENT  Cognitive Status: awake, alert and oriented.             Lives With: spouse  Living Arrangements: house  Quality Of Family Relationships: supportive, involved  Description of Support System: Supportive, Involved   Who is your support system?: , Children       Insurance Concerns: No Insurance issues identified        This writer met with patient and her , Bud, and introduced self and role. Discussed discharge planning and medicare guidelines in regards to home care and SNF benefits.     Patient lives with her  in a house in [place].  She has no current home care services at this time. Patient is very independent.  She has a daughter in [place] and son lives in [place].      Discussed patient's diagnosis of stroke and possible recommendations for care after the hospital, including Acute Rehab, TCU, or home care services.  Writer has provided patient Medicare certified lists of all 3.      Patient stated that she does not want to go to any TCU.  She  "would \"have to think about acute rehab or the home care\" if either is recommended.  Patient wants to return home after the hospital stay.  She is aware that PT and OT will be seeing her for evaluations.  Speech already saw patient.        PLAN    Care Transitions to continue to follow patient and make appropriate referrals based on recommendations and patient consent.      Discharge Planner   Discharge Plans in progress: to be determined based on needs  Barriers to discharge plan:   Follow up plan: Care Transitions to continue to follow and assist with d/c planning.          Entered by: RAYMOND RICO 03/23/2018 3:40 PM     PHOEBE Chavez      "

## 2018-03-23 NOTE — PROGRESS NOTES
S-(situation): shift note    B-(background): obs status-vertigo/possible stroke    A-(assessment): pt is alert and oriented. q2h neuros intact. BP elevated at 170/81 recheck /64. PRN Albuterol neb given for shortness of breath and wheezing. 1-2L O2 used overnight due to patient reporting air hunger and shortness of breath. O2 sats 90-98% Pt has a frequent harsh, loose cough. Pt is still having significant dizziness that is worse with activity. Bed pan used d/t to patient reporting she was unable to get up from dizziness.     R-(recommendations): Continue with plan of care.

## 2018-03-23 NOTE — ED PROVIDER NOTES
"  History     Chief Complaint   Patient presents with     Seizures     HPI  Preeti Ford is a 67 year old female who resents the emergency department after an episode where she was sitting in a chair and had some buzzing in her ear is followed by \"jumpy legs\".  She has had intermittent episodes of difficulty with word finding since then.  She has not had any focal neurologic weaknesses, slurred speech or facial droop.  She has been able to move her extremities normally.  She complains of some altered sensation in her left lower extremity.  She has a mild headache behind her eyes.  No nausea, vomiting, chest pain, shortness of breath, abdominal pain, diarrhea.  She has had some dizziness with these episodes.  No double vision or change in vision acutely.  No eye pain.  There is question of whether or not she had some seizure activity due to body shaking when she was at home but the patient states that she was awake and alert during this time.  This was witnessed by her significant other who says she wasn't ever confused and there was no LOC, but that she had shaking of her arms and legs. No biting of the tongue, no incontinence.    Problem List:    Patient Active Problem List    Diagnosis Date Noted     Acute respiratory failure with hypoxia (H) 06/14/2017     Priority: Medium     COPD exacerbation (H) 06/14/2017     Priority: Medium     Coronary artery disease involving native coronary artery - STEMI with LAD and circ stents in 8/2015 06/14/2017     Priority: Medium     Elevated troponin 06/14/2017     Priority: Medium     Cervical high risk HPV (human papillomavirus) test positive 12/20/2016     Priority: Medium     12/20/16 NIL pap/+ HPV (not 16 or 18). Plan: cotest in 1 year, due by 12/20/17 12/20/17 NIL Pap, +HR HPV (Not types 16/18). Plan colp  3/21/18 3 month Nelson not done, updated to 6mo Nelson/Pap due by 6/20/18          PAD (peripheral artery disease) (H) 02/12/2016     Priority: Medium     Chronic " bronchitis, unspecified chronic bronchitis type (H) 02/09/2016     Priority: Medium     Ischemic cardiomyopathy - EF 25% in 2015 but 55% in 3/2017 09/08/2015     Priority: Medium     Echo  With ejection fraction of 25-30 %       HTN, goal below 130/80 09/08/2015     Priority: Medium     Acute systolic congestive heart failure (H) 09/08/2015     Priority: Medium     ejection fraction 25-30%       Lung nodule 09/08/2015     Priority: Medium     See on CT , In the setting of smoking recommends 1 year follow up with CT        Esophageal reflux 09/08/2015     Priority: Medium     ST elevation myocardial infarction involving left anterior descending (LAD) coronary artery (H) 09/08/2015     Priority: Medium     ACS (acute coronary syndrome) (H) 08/28/2015     Priority: Medium     Tobacco use disorder 03/10/2015     Priority: Medium     History of stroke - right thalamus in 8/2013 08/21/2013     Priority: Medium     Numbness and tingling 08/21/2013     Priority: Medium     Right side of the face , upper and lower limbs         CVA (cerebral vascular accident) (H) 08/17/2013     Priority: Medium     Elevated blood pressure reading without diagnosis of hypertension 07/18/2013     Priority: Medium     Hyperlipidemia LDL goal <130 07/19/2012     Priority: Medium     Urinary incontinence 03/04/2012     Priority: Medium     Forgetfulness 03/04/2012     Priority: Medium     Advanced directives, counseling/discussion 11/14/2011     Priority: Medium        Past Medical History:    Past Medical History:   Diagnosis Date     Arthritis      COPD (chronic obstructive pulmonary disease) (H)      Coronary artery disease      CVA (cerebral vascular accident) (H) 8-     Hypertension        Past Surgical History:    Past Surgical History:   Procedure Laterality Date     APPENDECTOMY       BYPASS GRAFT AORTOILIAC N/A 3/7/2017    Procedure: BYPASS GRAFT AORTOILIAC;  Surgeon: Marcos Pratt MD;  Location: [facility]  OOPHORECTOMY,R/L/DELORES      Salpingo Oophorectomy, RT/LT/DELORES       Family History:    Family History   Problem Relation Age of Onset     CEREBROVASCULAR DISEASE Mother      CEREBROVASCULAR DISEASE Father      Genitourinary Problems Brother      Dialysis       Social History:  Marital Status:   [2]  Social History   Substance Use Topics     Smoking status: Current Every Day Smoker     Packs/day: 0.50     Types: Cigarettes     Smokeless tobacco: Never Used      Comment: patient states she is working on it      Alcohol use No        Medications:      ipratropium - albuterol 0.5 mg/2.5 mg/3 mL (DUONEB) 0.5-2.5 (3) MG/3ML neb solution   polyethylene glycol (MIRALAX) powder   albuterol (PROAIR HFA/PROVENTIL HFA/VENTOLIN HFA) 108 (90 BASE) MCG/ACT Inhaler   atorvastatin (LIPITOR) 10 MG tablet   metoprolol (TOPROL-XL) 25 MG 24 hr tablet   montelukast (SINGULAIR) 10 MG tablet   budesonide-formoterol (SYMBICORT) 160-4.5 MCG/ACT Inhaler   SPIRIVA HANDIHALER 18 MCG capsule   aspirin 325 MG tablet   Calcium Carbonate-Vitamin D (OSCAL 500/200 D-3 PO)   Coenzyme Q10 (COQ10 PO)   [DISCONTINUED] metoprolol (TOPROL XL) 25 MG 24 hr tablet         Review of Systems   All other systems reviewed and are negative.      Physical Exam   BP: (!) 175/104  Pulse: 88  Heart Rate: 88  Temp: 98  F (36.7  C)  Resp: 18  Weight: 47.6 kg (105 lb)  SpO2: 95 %      Physical Exam   Constitutional: She is oriented to person, place, and time. No distress.   Thin   HENT:   Head: Normocephalic and atraumatic.   Mouth/Throat: Oropharynx is clear and moist.   Eyes: EOM are normal. Pupils are equal, round, and reactive to light. Right eye exhibits no discharge. Left eye exhibits no discharge.   Neck: Normal range of motion. Neck supple. No JVD present. No thyromegaly present.   Cardiovascular: Normal rate, regular rhythm and normal heart sounds.  Exam reveals no gallop and no friction rub.    No murmur heard.  Pulmonary/Chest: Effort normal and breath  sounds normal. No stridor. No respiratory distress. She has no wheezes. She has no rales. She exhibits no tenderness.   Abdominal: Soft. She exhibits no distension. There is no tenderness. There is no rebound and no guarding.   Musculoskeletal: Normal range of motion. She exhibits no edema, tenderness or deformity.   Neurological: She is alert and oriented to person, place, and time. No cranial nerve deficit. She exhibits normal muscle tone. Coordination normal.   Normal finger to nose, normal coordination of her lower extremities with normal strength in all 4 extremities.  Mild subjective left lower extremity sensory changes.    No nystagmus   Skin: Skin is warm and dry. No rash noted. She is not diaphoretic. No erythema. No pallor.   Psychiatric: She has a normal mood and affect. Her behavior is normal. Judgment and thought content normal.       ED Course     ED Course     Procedures               EKG Interpretation:      Interpreted by Dereck Mendosa  Time reviewed: 2154  Symptoms at time of EKG: dizziness   Rhythm: normal sinus   Rate: normal  Axis: normal  Ectopy: none  Conduction: normal  ST Segments/ T Waves: 1 mm elevation in isolation - V3  Q Waves: V1 V2  Comparison to prior: Unchanged    Clinical Impression: normal EKG          The patient has stroke symptoms:           ED Stroke specific documentation           NIHSS PDF          Protocol PDF     Patient last known well time: 2030  ED Provider first to bedside at: 2115  CT Results received at: unknown  Patient was not treated with TPA due to the following reason(s):  Mild stroke symptoms ( NIHSS < 4 and not globally aphasic)    National Institutes of Health Stroke Scale (Baseline)  Time Performed: 2115      Score    Level of consciousness: (0)   Alert, keenly responsive    LOC questions: (0)   Answers both questions correctly    LOC commands: (0)   Performs both tasks correctly    Best gaze: (0) no gaze paresis    Visual: (0)   No visual loss    Facial  palsy: (0)   Normal symmetrical movements    Motor arm (left): (0)   No drift    Motor arm (right): (0)   No drift    Motor leg (left): (0)   No drift    Motor leg (right): (0)   No drift    Limb ataxia: (0)   Absent    Sensory: (1)   Mild to moderate sensory loss    Best language: (0)   Normal- no aphasia    Dysarthria: (0)   Normal, intermittent episodes of word finding difficulty    Extinction and inattention: (0)   No abnormality        Total Score:  1-2        Stroke Mimics were considered (including migraine headache, seizure disorder, hypoglycemia (or hyperglycemia), head or spinal trauma, CNS infection, Toxin ingestion and shock state (e.g. sepsis) .              National Institutes of Health Stroke Scale  Time Performed: 2315  Total Score: 1  (unchanged from last stroke score)                       Results for orders placed or performed during the hospital encounter of 03/22/18 (from the past 24 hour(s))   Head CT w/o contrast    Narrative    CT OF THE HEAD WITHOUT CONTRAST 3/22/2018 9:25 PM     COMPARISON: Brain MR 8/19/2013.    HISTORY: Confusion.    TECHNIQUE: 5 mm thick axial CT images of the head were acquired  without IV contrast material.    FINDINGS: There is mild diffuse cerebral volume loss. There are subtle  patchy areas of decreased density in the cerebral white matter  bilaterally that are consistent with sequela of chronic small vessel  ischemic disease. Chronic left frontal and right occipital infarcts  again noted. No definite new infarcts.    The ventricles and basal cisterns are within normal limits in  configuration given the degree of cerebral volume loss.  There is no  midline shift. There are no extra-axial fluid collections.    No intracranial hemorrhage, mass or recent infarct.    The visualized paranasal sinuses are well-aerated. There is no  mastoiditis. There are no fractures of the visualized bones.      Impression    IMPRESSION: Diffuse cerebral volume loss and cerebral white  matter  changes consistent with chronic small vessel ischemic disease. No  evidence for acute intracranial pathology.    Radiation dose for this scan was reduced using automated exposure  control, adjustment of the mA and/or kV according to patient size, or  iterative reconstruction technique.     TEJ TROTTER MD   CT Head Neck Angio w/o & w Contrast    Narrative    CT ANGIOGRAM OF THE HEAD AND NECK WITHOUT AND WITH CONTRAST  3/22/2018  9:47 PM     COMPARISON: None    HISTORY: Evaluate for dissection/thromboembolism. Altered mental  status. Slurred speech.    TECHNIQUE:  Precontrast localizing scans were followed by CT  angiography with an injection of 70mL Isovue-370 nonionic intravenous  contrast material with scans through the head and neck.  Images were  transferred to a separate 3-D workstation where multiplanar  reformations and 3-D images were created.  Estimates of carotid  stenoses are made relative to the distal internal carotid artery  diameters except as noted.      FINDINGS:   Neck CTA: There is calcified atherosclerotic plaque throughout the  aortic arch. There is moderate atherosclerotic narrowing at the arch  origin of the left common carotid artery. The common carotid arteries  bilaterally are otherwise patent without stenosis. There is calcified  atherosclerotic plaque at the origins of the internal carotid arteries  on both sides but does not result in stenosis on either side. The  cervical internal carotid arteries bilaterally are tortuous but are  patent without stenosis. The right vertebral artery is patent without  stenosis. The proximal left vertebral artery from the origin to the C6  level was not opacified with contrast. The distal left vertebral  artery from the basilar artery down to the C6 level is opacified with  contrast suggesting retrograde filling of an occluded left vertebral  artery.    Head CTA: There is calcified atherosclerotic plaque of the  intracranial distal internal  carotid arteries on both sides that  results in no significant narrowing on either side. The basilar,  bilateral anterior cerebral, bilateral middle cerebral and left  posterior cerebral arteries are patent and unremarkable. The right  posterior cerebral artery is narrow with decreased flow compared to  the normal-appearing left side either due to atherosclerotic disease  or previous occlusion. This corresponds with a chronic infarct noted  in the right occipital lobe on the accompanying head CT.      Impression    IMPRESSION:  1. Moderate atherosclerotic narrowing at the origin of the left common  carotid artery.  2. Plaque without narrowing at the origins of the internal carotid  arteries on both sides.  3. 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 This is likely chronic as  retrograde filling of the left vertebral artery was noted on a Doppler  ultrasound of the neck from 2016.  4. Decreased flow in the right posterior cerebral artery that is  likely chronic and related to the chronic right occipital infarct.  5. Otherwise, normal neck and head CTA. No evidence for acute  abnormality.      Radiation dose for this scan was reduced using automated exposure  control, adjustment of the mA and/or kV according to patient size, or  iterative reconstruction technique    TEJ TROTTER MD   CBC with platelets differential   Result Value Ref Range    WBC 6.1 4.0 - 11.0 10e9/L    RBC Count 5.50 (H) 3.8 - 5.2 10e12/L    Hemoglobin 16.0 (H) 11.7 - 15.7 g/dL    Hematocrit 48.4 (H) 35.0 - 47.0 %    MCV 88 78 - 100 fl    MCH 29.1 26.5 - 33.0 pg    MCHC 33.1 31.5 - 36.5 g/dL    RDW 15.0 10.0 - 15.0 %    Platelet Count 160 150 - 450 10e9/L    Diff Method Automated Method     % Neutrophils 62.6 %    % Lymphocytes 22.0 %    % Monocytes 11.7 %    % Eosinophils 3.0 %    % Basophils 0.5 %    % Immature  Granulocytes 0.2 %    Absolute Neutrophil 3.8 1.6 - 8.3 10e9/L    Absolute Lymphocytes 1.3 0.8 - 5.3 10e9/L    Absolute Monocytes 0.7 0.0 - 1.3 10e9/L    Absolute Eosinophils 0.2 0.0 - 0.7 10e9/L    Absolute Basophils 0.0 0.0 - 0.2 10e9/L    Abs Immature Granulocytes 0.0 0 - 0.4 10e9/L   Basic metabolic panel   Result Value Ref Range    Sodium 138 133 - 144 mmol/L    Potassium 4.0 3.4 - 5.3 mmol/L    Chloride 101 94 - 109 mmol/L    Carbon Dioxide 30 20 - 32 mmol/L    Anion Gap 7 3 - 14 mmol/L    Glucose 102 (H) 70 - 99 mg/dL    Urea Nitrogen 17 7 - 30 mg/dL    Creatinine 0.65 0.52 - 1.04 mg/dL    GFR Estimate >90 >60 mL/min/1.7m2    GFR Estimate If Black >90 >60 mL/min/1.7m2    Calcium 8.2 (L) 8.5 - 10.1 mg/dL   INR   Result Value Ref Range    INR 0.95 0.86 - 1.14   Partial thromboplastin time   Result Value Ref Range    PTT 29 22 - 37 sec   Troponin I   Result Value Ref Range    Troponin I ES 0.235 (HH) 0.000 - 0.045 ug/L       Medications   0.9% sodium chloride BOLUS (500 mLs Intravenous New Bag 3/22/18 2248)   sodium chloride 0.9 % bag 500mL for CT scan flush use (100 mLs Intravenous Given 3/22/18 2137)   sodium chloride (PF) 0.9% PF flush 3 mL (3 mLs Intravenous Given 3/22/18 2137)   iopamidol (ISOVUE-370) solution 100 mL (70 mLs Intravenous Given 3/22/18 2137)       Assessments & Plan (with Medical Decision Making)     I have reviewed the nursing notes.    I have reviewed the findings, diagnosis, plan and need for follow up with the patient.  67-year-old vasculopath here with an episode of all 4 extremity shaking that appeared to be seizure-like.  She has an abnormal troponin but has had no chest pain and no acute ST-T changes on her EKG.  She did not have altered mental status or a postictal.  Or any other evidence of seizure activity other than it being witnessed by her significant other.  Given the abnormal troponin, abnormal seizure-like activity, dizziness I felt she was appropriate for admission under  observation and seizure precautions.  Discussed with Dr. Jorgensen who accepts the patient for admission.    A: possible seizure      Elevated troponin  P: admit to observation, seizure precautions    New Prescriptions    No medications on file       Final diagnoses:   Seizures (H)       3/22/2018   [facility] EMERGENCY DEPARTMENT     Dereck Mendosa MD  03/22/18 [id number]

## 2018-03-23 NOTE — PLAN OF CARE
Problem: Patient Care Overview  Goal: Plan of Care/Patient Progress Review  Discharge Planner PT   Patient plan for discharge: Not feeling good so we did not get into details at the time of the session.   Current status: Nausea and dizziness with inability to change position (sit<->supine) or move head without increased symptoms without relieving with rest  Barriers to return to prior living situation: Safety with gait and transfers due to dizziness  Recommendations for discharge: Recommend TCU initially due to unrelenting dizziness and safety issues with gait. If she does not want to consider this, she will need 24 hour supervision and 1:1 assistance with gait, transfers and ADLs for safety.   Rationale for recommendations: Unable to decrease her dizziness even with holding positions. She may have BPPV but  Will need further work, suspect central symptoms. She will need a safe environment to avoid falls and for personal care.         Entered by: Karla Lucero 03/23/2018 10:21 AM

## 2018-03-23 NOTE — H&P
[facility]    History and Physical  Hospitalist       Date of Admission:  3/22/2018  Date of Service (when I saw the patient): 03/23/18    Assessment & Plan       Active Problems:    Vertigo    Assessment: etiology is unclear.  It does appear to be positional and may be BPPV.  She does not have any nystagmus.  She does have a previous history of stroke with known PAD and ongoing smoking so a cerebellar infarct is possible however she does not have continuous or severe symptoms which would make a cerebellar infarct less likely. Also no focal deficits on exam including finger nose finger.  Also some concern about possible seizure activity witnessed by her  at home but patient was awake and aware of the event.     Plan: register to observation, neuro checks every 2 hours, seizure precautions, PT evaluation tomorrow and if any concern about central etiology would then get an MRI.  Will give ASA.         Other seizures (H) - possible    Assessment: patient described shaking in all four extremities but patient was awake and aware during the event.  Seizures possible but no GTC, LOC or loss of control of bowel or bladder or tongue biting.     Plan: seizure precautions and neuro checks for now      History of stroke - right thalamus in 8/2013    Assessment: noted past history    Plan: continue asa      Tobacco use disorder    Assessment: ongoing    Plan: advised to quit smoking completely consider her CAD and PAD as well as COPD      Ischemic cardiomyopathy - EF 25% in 2015 but 55% in 3/2017    Assessment: no signs of CHF    Plan: no intervention      HTN, goal below 130/80    Assessment: isolated systolic HTN    Plan: continue home meds and monitor      PAD (peripheral artery disease) (H) - moderate left common carotid stenosis, aortoiliac bypass, chronic left vertebral and right posterior cerebral stenoses    Assessment: noted history    Plan: ASA      Coronary artery disease  involving native coronary artery - STEMI with LAD and circ stents in 8/2015    Assessment: no CP or SOB.  EKG without ischemic changes.  Mild elevation in troponin appears to be chronic and is lower than when checked last year    Plan: continue home meds including statin, ASA and beta blocker      Elevated troponin    Assessment: as above    Plan: as above      Pulmonary emphysema (H)    Assessment: chronic and stable    Plan: continue home nebs and inhalers      # Pain Assessment:  Current Pain Score 3/22/2018   Patient currently in pain? -   Pain score (0-10) 0   Pain location -   Pain descriptors -   Preeti fierro pain level was assessed and she currently denies pain.        DVT Prophylaxis: anticipate less than 24 hour stay  Code Status: Full Code    Disposition: Expected discharge in 1 days once PT eval completed and no seizure activity witnessed.    Dereck Faulkner MD    Primary Care Physician   Deisy Carter    Chief Complaint   Dizzy    History is obtained from the patient    History of Present Illness   Preeti Ford is a 67 year old female who presents with dizziness.  She apparently had an episode at home of buzzing in her ears and she just didn't feel right.  Her  said all four of her limbs were shaking.  The event lasted about 3 minutes.  The patient does recall the event.  She had no LOC, loss of control of bowel or bladder, tongue biting and was not post ictal.  Since that time she has been dizzy.  She describes it as the room is spinning.  The dizziness is brought on by sitting up but improved with lying down.  With these symptoms she presented to the ED and is now registered to observation with plans as outlined above.    Past Medical History    I have reviewed this patient's medical history and updated it with pertinent information if needed.   Past Medical History:   Diagnosis Date     Arthritis      COPD (chronic obstructive pulmonary disease) (H)      Coronary artery disease       CVA (cerebral vascular accident) (H) 8-     Hypertension        Past Surgical History   I have reviewed this patient's surgical history and updated it with pertinent information if needed.  Past Surgical History:   Procedure Laterality Date     APPENDECTOMY       BYPASS GRAFT AORTOILIAC N/A 3/7/2017    Procedure: BYPASS GRAFT AORTOILIAC;  Surgeon: Marcos Pratt MD;  Location: [facility] OR     SALPINGO OOPHORECTOMY,R/L/DELORES      Salpingo Oophorectomy, RT/LT/DELORES       Prior to Admission Medications   Prior to Admission Medications   Prescriptions Last Dose Informant Patient Reported? Taking?   Calcium Carbonate-Vitamin D (OSCAL 500/200 D-3 PO) 3/22/2018 at Unknown time Self Yes Yes   Sig: Take 1 tablet by mouth 2 times daily   Coenzyme Q10 (COQ10 PO) Past Month at Unknown time Self Yes Yes   Sig: Take 100 mg by mouth every 24 hours (at 16:00)   SPIRIVA HANDIHALER 18 MCG capsule 3/22/2018 at 1600  No Yes   Sig: USING THE HANDIHALER, INHALE ONE CAPSULE BY MOUTH EVERY DAY   albuterol (PROAIR HFA/PROVENTIL HFA/VENTOLIN HFA) 108 (90 BASE) MCG/ACT Inhaler 3/22/2018 at 1400  No Yes   Sig: Inhale 2 puffs into the lungs every 6 hours as needed for shortness of breath / dyspnea   aspirin 325 MG tablet 3/22/2018 at 0800  No Yes   Sig: Take 1 tablet (325 mg) by mouth daily   atorvastatin (LIPITOR) 10 MG tablet 3/21/2018 at 2100  No Yes   Sig: Take 1 tablet (10 mg) by mouth At Bedtime   budesonide-formoterol (SYMBICORT) 160-4.5 MCG/ACT Inhaler 3/22/2018 at 1900  No Yes   Sig: INHALE TWO PUFFS BY MOUTH TWICE A DAY   ipratropium - albuterol 0.5 mg/2.5 mg/3 mL (DUONEB) 0.5-2.5 (3) MG/3ML neb solution 3/22/2018 at 0700  No Yes   Sig: USE 1 VIAL IN NEBULIZER FOUR TIMES A DAY   metoprolol (TOPROL-XL) 25 MG 24 hr tablet 3/22/2018 at 0800  No Yes   Sig: Take 0.5 tablets (12.5 mg) by mouth daily   montelukast (SINGULAIR) 10 MG tablet 3/21/2018 at 2200  No Yes   Sig: Take 1 tablet (10 mg) by mouth At Bedtime   polyethylene glycol  (MIRALAX) powder Past Week at Unknown time  No Yes   Sig: Take 17 g (1 capful) by mouth daily      Facility-Administered Medications: None     Allergies   Allergies   Allergen Reactions     Crestor [Rosuvastatin] Other (See Comments)     dizziness     Hmg-Coa-R Inhibitors Other (See Comments)     Muscle/joint aching     Lisinopril Cough     Optison [Albumin Human] Other (See Comments)     Pt was flush and very dizzy.  Also had a BP drop     Penicillins Rash       Social History   I have reviewed this patient's social history and updated it with pertinent information if needed. Preeti Ford  reports that she has been smoking Cigarettes.  She has been smoking about 0.50 packs per day. She has never used smokeless tobacco. She reports that she does not drink alcohol or use illicit drugs.    Family History   I have reviewed this patient's family history and updated it with pertinent information if needed.   Family History   Problem Relation Age of Onset     CEREBROVASCULAR DISEASE Mother      CEREBROVASCULAR DISEASE Father      Genitourinary Problems Brother      Dialysis       Review of Systems   The 10 point Review of Systems is negative other than noted in the HPI or here.     Physical Exam   Temp: 98  F (36.7  C) Temp src: Oral BP: 169/87 Pulse: 78 Heart Rate: 75 Resp: 21 SpO2: 97 % O2 Device: Nasal cannula Oxygen Delivery: 2 LPM  Vital Signs with Ranges  Temp:  [98  F (36.7  C)] 98  F (36.7  C)  Pulse:  [78-88] 78  Heart Rate:  [75-88] 75  Resp:  [10-21] 21  BP: (165-175)/() 169/87  SpO2:  [93 %-98 %] 97 %  105 lbs 0 oz    Constitutional:   awake, alert, cooperative, no apparent distress, and appears stated age     Eyes:   Lids and lashes normal, pupils equal, round and reactive to light, extra ocular muscles intact, sclera clear, conjunctiva normal     ENT:   Normocephalic, without obvious abnormality, atraumatic, sinuses nontender on palpation, external ears without lesions, oral pharynx with moist  mucous membranes, tonsils without erythema or exudates, gums normal and good dentition.     Neck:   Supple, symmetrical, trachea midline, no adenopathy, thyroid symmetric, not enlarged and no tenderness, skin normal     Hematologic / Lymphatic:   no cervical lymphadenopathy and no supraclavicular lymphadenopathy     Back:   Symmetric, no curvature, spinous processes are non-tender on palpation, paraspinous muscles are non-tender on palpation, no costal vertebral tenderness     Lungs:   No increased work of breathing, good air exchange, clear to auscultation bilaterally except for mild expiratory wheezing, no crackles     Cardiovascular:   Normal apical impulse, regular rate and rhythm, normal S1 and S2, no S3 or S4, and no murmur noted     Abdomen:   normal bowel sounds, soft, non-distended, non-tender, no masses palpated, no hepatosplenomegally     Neurologic:   Awake, alert, oriented to name, place and time.  Cranial nerves II-XII are grossly intact with no nystagmus.  Motor is 5 out of 5 bilaterally to dorsi and plantar flexion of her feet and bilateral hand grasp.  Cerebellar finger to nose, heel to shin intact.  Sensory is intact to light touch throughout.       Data   Data reviewed today:  I personally reviewed the EKG tracing showing NSR without acute ischemic changes.    Recent Labs  Lab 03/22/18  2205   WBC 6.1   HGB 16.0*   MCV 88      INR 0.95      POTASSIUM 4.0   CHLORIDE 101   CO2 30   BUN 17   CR 0.65   ANIONGAP 7   CALDERON 8.2*   *   TROPI 0.235*       Recent Results (from the past 24 hour(s))   Head CT w/o contrast    Narrative    CT OF THE HEAD WITHOUT CONTRAST 3/22/2018 9:25 PM     COMPARISON: Brain MR 8/19/2013.    HISTORY: Confusion.    TECHNIQUE: 5 mm thick axial CT images of the head were acquired  without IV contrast material.    FINDINGS: There is mild diffuse cerebral volume loss. There are subtle  patchy areas of decreased density in the cerebral white matter  bilaterally  that are consistent with sequela of chronic small vessel  ischemic disease. Chronic left frontal and right occipital infarcts  again noted. No definite new infarcts.    The ventricles and basal cisterns are within normal limits in  configuration given the degree of cerebral volume loss.  There is no  midline shift. There are no extra-axial fluid collections.    No intracranial hemorrhage, mass or recent infarct.    The visualized paranasal sinuses are well-aerated. There is no  mastoiditis. There are no fractures of the visualized bones.      Impression    IMPRESSION: Diffuse cerebral volume loss and cerebral white matter  changes consistent with chronic small vessel ischemic disease. No  evidence for acute intracranial pathology.    Radiation dose for this scan was reduced using automated exposure  control, adjustment of the mA and/or kV according to patient size, or  iterative reconstruction technique.     TEJ TROTTER MD   CT Head Neck Angio w/o & w Contrast    Narrative    CT ANGIOGRAM OF THE HEAD AND NECK WITHOUT AND WITH CONTRAST  3/22/2018  9:47 PM     COMPARISON: None    HISTORY: Evaluate for dissection/thromboembolism. Altered mental  status. Slurred speech.    TECHNIQUE:  Precontrast localizing scans were followed by CT  angiography with an injection of 70mL Isovue-370 nonionic intravenous  contrast material with scans through the head and neck.  Images were  transferred to a separate 3-D workstation where multiplanar  reformations and 3-D images were created.  Estimates of carotid  stenoses are made relative to the distal internal carotid artery  diameters except as noted.      FINDINGS:   Neck CTA: There is calcified atherosclerotic plaque throughout the  aortic arch. There is moderate atherosclerotic narrowing at the arch  origin of the left common carotid artery. The common carotid arteries  bilaterally are otherwise patent without stenosis. There is calcified  atherosclerotic plaque at the origins  of the internal carotid arteries  on both sides but does not result in stenosis on either side. The  cervical internal carotid arteries bilaterally are tortuous but are  patent without stenosis. The right vertebral artery is patent without  stenosis. The proximal left vertebral artery from the origin to the C6  level was not opacified with contrast. The distal left vertebral  artery from the basilar artery down to the C6 level is opacified with  contrast suggesting retrograde filling of an occluded left vertebral  artery.    Head CTA: There is calcified atherosclerotic plaque of the  intracranial distal internal carotid arteries on both sides that  results in no significant narrowing on either side. The basilar,  bilateral anterior cerebral, bilateral middle cerebral and left  posterior cerebral arteries are patent and unremarkable. The right  posterior cerebral artery is narrow with decreased flow compared to  the normal-appearing left side either due to atherosclerotic disease  or previous occlusion. This corresponds with a chronic infarct noted  in the right occipital lobe on the accompanying head CT.      Impression    IMPRESSION:  1. Moderate atherosclerotic narrowing at the origin of the left common  carotid artery.  2. Plaque without narrowing at the origins of the internal carotid  arteries on both sides.  3. 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 This is likely chronic as  retrograde filling of the left vertebral artery was noted on a Doppler  ultrasound of the neck from 2016.  4. Decreased flow in the right posterior cerebral artery that is  likely chronic and related to the chronic right occipital infarct.  5. Otherwise, normal neck and head CTA. No evidence for acute  abnormality.      Radiation dose for this scan was reduced using automated exposure  control, adjustment  of the mA and/or kV according to patient size, or  iterative reconstruction technique    TEJ TROTTER MD

## 2018-03-23 NOTE — PROGRESS NOTES
Pt was given Metoprolol 25mg this am. About 5 min after taking medication patient felt nauseous and had a medium emesis. Emesis was clear bile, bag was inspected for pill and no pill was noted.

## 2018-03-23 NOTE — PROGRESS NOTES
03/23/18 0751   Quick Adds   Quick Adds Certification;Vestibular Eval   Type of Visit Initial PT Evaluation       Present no   Living Environment   Lives With spouse   Living Arrangements house   Home Accessibility no concerns   Number of Stairs to Enter Home 4  (bilateral railings)   Number of Stairs Within Home 12  (to the basement, one railing)   Transportation Available car  (has someone to drive her if needed)   Self-Care   Dominant Hand right   Usual Activity Tolerance moderate   Current Activity Tolerance poor  (due to dizziness)   Regular Exercise no   Functional Level Prior   Ambulation 0-->independent   Transferring 0-->independent   Toileting 0-->independent   Bathing 0-->independent   Dressing 0-->independent   Eating 0-->independent   Communication 0-->understands/communicates without difficulty   Swallowing 0-->swallows foods/liquids without difficulty   Cognition 0 - no cognition issues reported   Fall history within last six months no   Which of the above functional risks had a recent onset or change? none   Prior Functional Level Comment was independent with ADLS   General Information   Onset of Illness/Injury or Date of Surgery - Date 03/22/18   Referring Physician Dr. Dereck Faulkner   Patient/Family Goals Statement does not know, anything to get rid of the nausea   Pertinent History of Current Problem (include personal factors and/or comorbidities that impact the POC) 68 yo female with a history of a CVA. She was independent with her ADLs and gait. She lives with her . On 3-22-18 she exterienced all 4 extremities shaking. She recalls the incident. She has not had previous eexperience like this. She did not have any injury and did not fall during the shaking. She reports she remembers what happened. SHe is very nauseous this morning and just received Zofran. No dizziness with walking since last night. Her current vitals are 163/68 blood pressure with 112 bpm heart rate  per monitor. She is using a nasal cannula. She does not use oxygen at home. UPon further questoning, patient reports her dizziness has not totally gone away since last night, it just decreases.    Precautions/Limitations fall precautions;seizure precautions   Weight-Bearing Status - LUE full weight-bearing   Weight-Bearing Status - RUE full weight-bearing   Weight-Bearing Status - LLE full weight-bearing   Weight-Bearing Status - RLE full weight-bearing   General Observations In bed eyes closed moving about, feels nausea   Cognitive Status Examination   Orientation orientation to person, place and time   Level of Consciousness alert   Follows Commands and Answers Questions 100% of the time   Personal Safety and Judgment intact   Memory intact   Pain Assessment   Patient Currently in Pain No  (mainly just nausea)   Bed Mobility   Bed Mobility Comments Limited due to nausea and increased dizziness with head elevation even when slow. Rates dizziness with head elevated to approx 50 degrees as 9/10.    Cervicogenic Screen   Neck ROM Limited as she has increased dizziness with cervical movement   Oculomotor Exam   Smooth Pursuit Comment No change with lateral pursuit, increased symptoms with vertical pursuit with inability to track mid way looking up. Test completed semi supine due to patient discomfort   VOR Comments Patient too dizzy and increasing nausea   Convergence Testing Normal   Convergence Testing Comments Had to encourage her to keep her eyes open during her testing.    General Therapy Interventions   Intervention Comments Evaluation only for vertigo. May need transfer and mobility, gait skills   Clinical Impression   Criteria for Skilled Therapeutic Intervention evaluation only   PT Diagnosis Need to rule out central issues and visual issues   Influenced by the following impairments history of CVA, dizziness and nausea with movements and position changes   Functional limitations due to impairments walking, bed  "mobility and transfers   Clinical Presentation Evolving/Changing   Clinical Presentation Rationale Symptoms with smooth pursuit, position change and head movement, no relenting of symptoms even with minutes of rest.    Clinical Decision Making (Complexity) Moderate complexity   Predicted Duration of Therapy Intervention (days/wks) Will discharge from PT at this time. May need PT in near future for gait, transfer and bed mobility assessment.    Anticipated Discharge Disposition Home with Home Therapy;Home with Assist  (24 hr assist, may want to consider TCU for gait and transfer)   Risk & Benefits of therapy have been explained Yes   Patient, Family & other staff in agreement with plan of care Yes   Clinical Impression Comments Attempted Epley maneuver, but symptoms increased with cervical rotation and supine<->sit and there was minimal ramping down of vertigo with holds up to 2 minutes - 3 minutes. She may have BPPV however, there are issues with oculomotor and further investigation into central issues may be warrented. She will need 24 hour supervision to return home   1x Eval Only-Outpatient/Observation Medicare G-Code   G-code    Body Position: Changing & Maintaining Body Position   Body Position: Current Status , Goal , Discharge -Jqvo Only- Modifier the same for all G-codes    Body Position: Current & Discharge Rationale-Eval Only    Body Position Comments She has an increase in dizziness with all movements and there is no time that she feels the dizziness goes away.    Therapy Certification   Start of care date 03/23/18   Certification date from 03/23/18   Certification date to 03/23/18   Medical Diagnosis Vertigo   Certification I certify the need for these services furnished under this plan of treatment and while under my care.  (Physician co-signature of this document indicates review and certification of the therapy plan).    [facility] AM-PAC TM \"6 Clicks\"   2016, Trustees of [employer] " "[employer], under license to [employer].  All rights reserved.   6 Clicks Short Forms Basic Mobility Inpatient Short Form   [facility] AM-PAC  \"6 Clicks\" V.2 Basic Mobility Inpatient Short Form   1. Turning from your back to your side while in a flat bed without using bedrails? 3 - A Little   2. Moving from lying on your back to sitting on the side of a flat bed without using bedrails? 3 - A Little   3. Moving to and from a bed to a chair (including a wheelchair)? 2 - A Lot   4. Standing up from a chair using your arms (e.g., wheelchair, or bedside chair)? 2 - A Lot   5. To walk in hospital room? 2 - A Lot   6. Climbing 3-5 steps with a railing? 2 - A Lot   Basic Mobility Raw Score (Score out of 24.Lower scores equate to lower levels of function) 14   Total Evaluation Time   Total Evaluation Time (Minutes) 32      Karla Lucero, PT  [phone]      "

## 2018-03-23 NOTE — ED NOTES
"Shortly after arrival, patient up to bedside commode. C/O dizziness and \"not feeling well\". Assisted back to bed. Patient became confused, talking about putting items in the refrigerator, and stated that her tongue \"felt thick\". MD notified. Code stroke called. Patient to CT.  "

## 2018-03-24 ENCOUNTER — APPOINTMENT (OUTPATIENT)
Dept: PHYSICAL THERAPY | Facility: CLINIC | Age: 68
DRG: 066 | End: 2018-03-24
Payer: MEDICARE

## 2018-03-24 ENCOUNTER — APPOINTMENT (OUTPATIENT)
Dept: OCCUPATIONAL THERAPY | Facility: CLINIC | Age: 68
DRG: 066 | End: 2018-03-24
Attending: FAMILY MEDICINE
Payer: MEDICARE

## 2018-03-24 LAB
BACTERIA SPEC CULT: NORMAL
BASE EXCESS BLDV CALC-SCNC: 8 MMOL/L
CHOLEST SERPL-MCNC: 165 MG/DL
HBA1C MFR BLD: 5.5 % (ref 4.3–6)
HCO3 BLDV-SCNC: 38 MMOL/L (ref 21–28)
HDLC SERPL-MCNC: 67 MG/DL
LDLC SERPL CALC-MCNC: 77 MG/DL
NONHDLC SERPL-MCNC: 98 MG/DL
O2/TOTAL GAS SETTING VFR VENT: 36 %
PCO2 BLDV: 73 MM HG (ref 40–50)
PH BLDV: 7.32 PH (ref 7.32–7.43)
PO2 BLDV: 37 MM HG (ref 25–47)
SPECIMEN SOURCE: NORMAL
TRIGL SERPL-MCNC: 106 MG/DL

## 2018-03-24 PROCEDURE — 12000007 ZZH R&B INTERMEDIATE

## 2018-03-24 PROCEDURE — 82803 BLOOD GASES ANY COMBINATION: CPT | Performed by: FAMILY MEDICINE

## 2018-03-24 PROCEDURE — 97535 SELF CARE MNGMENT TRAINING: CPT | Mod: GO | Performed by: OCCUPATIONAL THERAPIST

## 2018-03-24 PROCEDURE — 25000132 ZZH RX MED GY IP 250 OP 250 PS 637: Mod: GY | Performed by: INTERNAL MEDICINE

## 2018-03-24 PROCEDURE — 97116 GAIT TRAINING THERAPY: CPT | Mod: GP | Performed by: PHYSICAL THERAPIST

## 2018-03-24 PROCEDURE — 40000133 ZZH STATISTIC OT WARD VISIT: Performed by: OCCUPATIONAL THERAPIST

## 2018-03-24 PROCEDURE — A9270 NON-COVERED ITEM OR SERVICE: HCPCS | Mod: GY | Performed by: FAMILY MEDICINE

## 2018-03-24 PROCEDURE — 36415 COLL VENOUS BLD VENIPUNCTURE: CPT | Performed by: FAMILY MEDICINE

## 2018-03-24 PROCEDURE — 99232 SBSQ HOSP IP/OBS MODERATE 35: CPT | Performed by: FAMILY MEDICINE

## 2018-03-24 PROCEDURE — 97530 THERAPEUTIC ACTIVITIES: CPT | Mod: GP | Performed by: PHYSICAL THERAPIST

## 2018-03-24 PROCEDURE — 25000132 ZZH RX MED GY IP 250 OP 250 PS 637: Mod: GY | Performed by: FAMILY MEDICINE

## 2018-03-24 PROCEDURE — 80061 LIPID PANEL: CPT | Performed by: FAMILY MEDICINE

## 2018-03-24 PROCEDURE — 40000193 ZZH STATISTIC PT WARD VISIT: Performed by: PHYSICAL THERAPIST

## 2018-03-24 PROCEDURE — 94640 AIRWAY INHALATION TREATMENT: CPT

## 2018-03-24 PROCEDURE — 97165 OT EVAL LOW COMPLEX 30 MIN: CPT | Mod: GO | Performed by: OCCUPATIONAL THERAPIST

## 2018-03-24 PROCEDURE — A9270 NON-COVERED ITEM OR SERVICE: HCPCS | Mod: GY | Performed by: INTERNAL MEDICINE

## 2018-03-24 PROCEDURE — 83036 HEMOGLOBIN GLYCOSYLATED A1C: CPT | Performed by: FAMILY MEDICINE

## 2018-03-24 PROCEDURE — 25000125 ZZHC RX 250: Performed by: INTERNAL MEDICINE

## 2018-03-24 PROCEDURE — 94640 AIRWAY INHALATION TREATMENT: CPT | Mod: 76

## 2018-03-24 RX ORDER — METOPROLOL SUCCINATE 50 MG/1
50 TABLET, EXTENDED RELEASE ORAL DAILY
Status: DISCONTINUED | OUTPATIENT
Start: 2018-03-25 | End: 2018-03-25 | Stop reason: HOSPADM

## 2018-03-24 RX ORDER — METOPROLOL TARTRATE 25 MG/1
25 TABLET, FILM COATED ORAL ONCE
Status: COMPLETED | OUTPATIENT
Start: 2018-03-24 | End: 2018-03-24

## 2018-03-24 RX ADMIN — ACETAMINOPHEN 650 MG: 325 TABLET ORAL at 07:57

## 2018-03-24 RX ADMIN — METOPROLOL TARTRATE 25 MG: 25 TABLET ORAL at 20:13

## 2018-03-24 RX ADMIN — UMECLIDINIUM 1 PUFF: 62.5 AEROSOL, POWDER ORAL at 07:58

## 2018-03-24 RX ADMIN — IPRATROPIUM BROMIDE AND ALBUTEROL SULFATE 3 ML: .5; 3 SOLUTION RESPIRATORY (INHALATION) at 12:11

## 2018-03-24 RX ADMIN — ATORVASTATIN CALCIUM 10 MG: 10 TABLET, FILM COATED ORAL at 20:13

## 2018-03-24 RX ADMIN — METOPROLOL SUCCINATE 25 MG: 25 TABLET, EXTENDED RELEASE ORAL at 07:58

## 2018-03-24 RX ADMIN — MONTELUKAST SODIUM 10 MG: 10 TABLET, FILM COATED ORAL at 20:13

## 2018-03-24 RX ADMIN — IPRATROPIUM BROMIDE AND ALBUTEROL SULFATE 3 ML: .5; 3 SOLUTION RESPIRATORY (INHALATION) at 16:32

## 2018-03-24 RX ADMIN — IPRATROPIUM BROMIDE AND ALBUTEROL SULFATE 3 ML: .5; 3 SOLUTION RESPIRATORY (INHALATION) at 23:49

## 2018-03-24 RX ADMIN — ALBUTEROL SULFATE 2.5 MG: 2.5 SOLUTION RESPIRATORY (INHALATION) at 04:24

## 2018-03-24 RX ADMIN — CLOPIDOGREL BISULFATE 75 MG: 75 TABLET, FILM COATED ORAL at 07:57

## 2018-03-24 RX ADMIN — IPRATROPIUM BROMIDE AND ALBUTEROL SULFATE 3 ML: .5; 3 SOLUTION RESPIRATORY (INHALATION) at 20:13

## 2018-03-24 RX ADMIN — IPRATROPIUM BROMIDE AND ALBUTEROL SULFATE 3 ML: .5; 3 SOLUTION RESPIRATORY (INHALATION) at 05:32

## 2018-03-24 ASSESSMENT — ACTIVITIES OF DAILY LIVING (ADL): PREVIOUS_RESPONSIBILITIES: MEAL PREP;HOUSEKEEPING;LAUNDRY;SHOPPING;MEDICATION MANAGEMENT;FINANCES;DRIVING

## 2018-03-24 NOTE — PROGRESS NOTES
S: Respiratory therapy  B: severe COPD   A: Bs with coarse expiratory wheezes bilaterally.  Harsh loose non productive cough.  SOB with activity and oxygen desat's down to around 69% on room air (per RN).    R: increased frequency of neb treatments, encourage DB&C.  Arthur Krishnan, RT on 3/23/2018 at 7:22 PM

## 2018-03-24 NOTE — PROGRESS NOTES
Occupational Therapy Evaluation     03/24/18 0845   Quick Adds   Type of Visit Initial Occupational Therapy Evaluation   Living Environment   Lives With spouse   Living Arrangements house   Home Accessibility tub/shower is not walk in   Number of Stairs to Enter Home 4   Number of Stairs Within Home 12   Transportation Available car   Living Environment Comment Patient only needs to go in the basement to the laundry,  will assist as needed.  No grab bars by the toilet and in the tub/shower.   Self-Care   Dominant Hand right   Usual Activity Tolerance moderate   Current Activity Tolerance moderate   Activity/Exercise/Self-Care Comment Patient enjoys; quilting, reads, colors/adult coloring   Functional Level Prior   Ambulation 0-->independent   Transferring 0-->independent   Toileting 0-->independent   Bathing 0-->independent   Dressing 0-->independent   Eating 0-->independent   Communication 0-->understands/communicates without difficulty   Swallowing 0-->swallows foods/liquids without difficulty   Cognition 0 - no cognition issues reported   Fall history within last six months no   General Information   Onset of Illness/Injury or Date of Surgery - Date 03/23/18   Referring Physician Dr Deisy Laird   Patient/Family Goals Statement Return home with spouse, if able.   Additional Occupational Profile Info/Pertinent History of Current Problem The patient is a 68 y/o female who was experiencing increased dizziness, nausea and extremity tremor and lack of coordination.  MHX: previous stroke 8/2013, PAD, CAD, pulmonary emphysemia.  She had decreased safety with functional tasks and activities including; dressing, toilet transfer and hygiene, meal prep/clean up and household mgt.   Precautions/Limitations fall precautions;oxygen therapy device and L/min  (2L)   General Observations Alert, pleasant and cooperative with OT eval and treatment.  Patient sitting upright in bed  without any symptoms demonstrated or reported.   Cognitive Status Examination   Orientation orientation to person, place and time   Level of Consciousness alert   Able to Follow Commands WNL/WFL   Personal Safety (Cognitive) WNL/WFL   Memory intact   Attention No deficits were identified   Organization/Problem Solving No deficits were identified   Executive Function No deficits were identified   Visual Perception   Visual Perception No deficits were identified  (reading)   Pain Assessment   Patient Currently in Pain No   Posture   Posture forward head position   Range of Motion (ROM)   ROM Quick Adds No deficits were identified   Strength   Manual Muscle Testing Quick Adds No deficits were identified   Coordination   Upper Extremity Coordination No deficits were identified   Mobility   Bed Mobility Bed mobility skill: Rolling/Turning;Bed mobility skill: Scooting/Bridging;Bed mobility skill: Sit to supine;Bed mobility skill: Supine to sit;Bed mobility analysis   Bed Mobility Skill: Rolling/Turning   Level of Fourmile - Bed Mobility Skill Rolling Turning independent   Bed Mobility Skill: Scooting/Bridging   Level of [place]: Scooting/Bridging independent   Bed Mobility Skill: Sit to Supine   Level of [place]: Sit/Supine independent   Bed Mobility Skill: Supine to Sit   Level of [place]: Supine/Sit independent   Transfer Skills   Transfer Transfer Safety Analysis Bed/Chair;Transfer Skill: Stand to Sit;Transfer Safety Analysis Sit/Stand   Transfer Skill: Bed to Chair/Chair to Bed   Level of Four[place]: Bed to Chair stand-by assist   Physical Assist/Nonphysical Assist: Bed to Chair set-up required   Transfer Safety Analysis Bed/Chair   Transfer Safety Concerns Noted decreased balance during turns;losing balance backward   Transfer Skill: Sit to Stand   Level of [place]: Sit/Stand independent   Toilet Transfer   Toilet Transfer Toilet Transfer Skill;Toilet Transfer Safety Analysis   Transfer  Skill: Toilet Transfer   Level of Jenner: Toilet contact guard   Physical Assist/Nonphysical Assist: Toilet set-up required;1 person assist   Transfer Safety Analysis Toilet   Transfer Safety Concerns Noted: Toilet decreased balance during turns   Balance   Balance Comments Defer to PT   Bathing   Level of Jenner independent  (pt report)   Upper Body Dressing   Level of Jenner: Dress Upper Body independent   Lower Body Dressing   Level of Jenner: Dress Lower Body independent   Toileting   Level of Jenner: Toilet independent   Grooming   Level of Jenner: Grooming independent   Eating/Self Feeding   Level of Jenner: Eating independent   Instrumental Activities of Daily Living (IADL)   Previous Responsibilities meal prep;housekeeping;laundry;shopping;medication management;finances;driving   General Therapy Interventions   Planned Therapy Interventions ADL retraining;home program guidelines   Clinical Impression   Criteria for Skilled Therapeutic Interventions Met yes, treatment indicated   OT Diagnosis Decreased safety with functional ambulation and higher level daily tasks/activities due to decreased balance.   Influenced by the following impairments decreased balance, O2 2L   Assessment of Occupational Performance 1-3 Performance Deficits   Identified Performance Deficits shower/bathing transfer, kitchen and meal prep/clean up, driving   Clinical Decision Making (Complexity) Low complexity   Predicted Duration of Therapy Intervention (days/wks) Eval and one time treatment this date ONLY.   Anticipated Equipment Needs at Discharge (OT recommended grab bar instrallation for safety)   Anticipated Discharge Disposition Home with Assist   Risks and Benefits of Treatment have been explained. Yes   Patient, Family & other staff in agreement with plan of care Yes   Clinical Impression Comments Patient will benefit from home health PT services to address safety and decreased balance  "issues.  No further OT services needed at this time.   [facility] AM-PAC TM \"6 Clicks\"   2016, Trustees of [facility], under license to [employer].  All rights reserved.   6 Clicks Short Forms Daily Activity Inpatient Short Form   [facility] AM-PAC  \"6 Clicks\" Daily Activity Inpatient Short Form   1. Putting on and taking off regular lower body clothing? 4 - None   2. Bathing (including washing, rinsing, drying)? 3 - A Little   3. Toileting, which includes using toilet, bedpan or urinal? 3 - A Little   4. Putting on and taking off regular upper body clothing? 4 - None   5. Taking care of personal grooming such as brushing teeth? 4 - None   6. Eating meals? 4 - None   Daily Activity Raw Score (Score out of 24.Lower scores equate to lower levels of function) 22   Total Evaluation Time   Total Evaluation Time (Minutes) 15       Vanessa HELLER/L  [facility]  [phone]  "

## 2018-03-24 NOTE — PROVIDER NOTIFICATION
Pt's tele rhythm changed to A-flutter from sinus rhythm.  MD notified and was up to see rhythm strips and monitor; rhythm is still sinus with a U wave.  Will continue to watch.

## 2018-03-24 NOTE — PROGRESS NOTES
CTS update: Writer talked with patient and  and they are willing to have home care a Medicare certified list of home cares and coverage was discussed and patient would like [facility] Phone: [phone] RN, PT on discharge. BILL Dawson CTS RN

## 2018-03-24 NOTE — PLAN OF CARE
Problem: Stroke (Ischemic) (Adult)  Goal: Signs and Symptoms of Listed Potential Problems Will be Absent, Minimized or Managed (Stroke)  Signs and symptoms of listed potential problems will be absent, minimized or managed by discharge/transition of care (reference Stroke (Ischemic) (Adult) CPG).   Outcome: Improving  Neuro's intact. Up with assist of one and walker, light dizziness when getting up too fast, and tends to lean to right side at times. C/o HA this AM, relieve with Tylenol and coffee. Good appetite for meals. Will continue with POC.

## 2018-03-24 NOTE — PLAN OF CARE
Problem: Patient Care Overview  Goal: Plan of Care/Patient Progress Review  Discharge Planner PT   Patient plan for discharge: patient is not back to baseline or safety to return home without 24 hour assist   Current status: min assist to CG with gait and transfers   Barriers to return to prior living situation: due to dizzy and decrease O2 level with activity patient is unsafe to return home   Recommendations for discharge: 24 hour assist   Rationale for recommendations: due to continued but improved dizzy feeling and O2 level dropping to 79 with activity        Entered by: Katlin Trotter 03/24/2018 9:30 AM

## 2018-03-24 NOTE — PROGRESS NOTES
[facility] Progress Note          Assessment and Plan:   Assessment:   Principal Problem:    Acute CVA (cerebrovascular accident) - right paramedian superior vermis and left cerebellar hemisphere    Assessment: Patient's vertigo has started to improve with patient having increased activity tolerance.  Patient has worked with physical therapy and the hope is to return home with home care services tomorrow if she continues to have improvement.  Patient has been evaluated by speech therapy and occupational therapy and no further treatment is needed.  Bubble study has been performed without positive bubble study results and overall heart unchanged from 2017.      Plan:  Will continue with Plavix, simvastatin, improving blood pressure control by increasing metoprolol, and monitor for ongoing clinical improvement.  Anticipate discharge home with home care services tomorrow following physical therapy evaluation and approval.    Active Problems:    Vertigo    Assessment:  Residual effect of CVA as above     Plan:   proceed with plan as above      Other seizures (H) - possible    Assessment:  No longer felt to be seizure activity, but likely the onset of stroke as above.    Plan:  Continue to monitor for any symptoms concerning for stroke following CVA as above      Pulmonary emphysema (H)    Assessment: patient is a pCO2 retainer and feels normal while satting 86-92% on room air.  Still is needing some oxygen support with activity as she gets winded very quickly and will desat into the 60-70% range.  Per patient this likely has been occurring at home as well as she will often need to take a break and focus on recatching her breath with activity - has been worsening over the past year.      Plan: We will monitor patient closely during the remainder of this hospitalization, but anticipate patient will likely need to discharge home with O2 supplementation with activity secondary to worsening severe COPD      History of  "stroke - right thalamus in 8/2013    Assessment: Previous history, now with acute stroke as above    Plan: Continue with plan as above      Tobacco use disorder    Assessment: Chronic and ongoing    Plan: Once more strongly discussed with patient the importance of trying to quit smoking going forward.  She continues to feel this is not a priority for her at this time and that \"the deck is stacked against me.\"  Did try to discuss with patient the increased risk she is taking by continuing to smoke and patient promises she will think about it.      Ischemic cardiomyopathy - EF 25% in 2015 but 55% in 3/2017 and now 45-50% in 3/2018    Assessment: Ejection fraction has decreased slightly compared to last year    Plan: Continue with metoprolol and monitor closely for signs and symptoms concerning for volume overload      HTN, goal below 130/80    Assessment: blood pressures have been in the 140-160 range consistently and sometimes increasing into the 180-190 range systolic.  HR has been in the 70-80s    Plan: Will increase metoprolol XL to 50 mg daily dosing and monitor for improvement of blood pressure and tolerance of HR.      PAD (peripheral artery disease) (H) - moderate left common carotid stenosis, aortoiliac bypass, chronic left vertebral and right posterior cerebral stenoses    Assessment:   Ongoing but stable    Plan: Continue with current regimen      Coronary artery disease involving native coronary artery - STEMI with LAD and circ stents in 8/2015    Assessment:   Chronic and stable without concern for acute angina    Plan: Continue plan as above      Elevated troponin    Assessment: Chronic and stable    Plan: No further monitoring as needed         # Pain Assessment:  Current Pain Score 3/23/2018   Patient currently in pain? denies   Pain score (0-10) -   Pain location -   Pain descriptors -   Preeti fierro pain level was assessed and she currently denies pain.  She does have tylenol available if needed    VTE:  " "SCDs  Code Status:  Full code        Interval History:   Continues to improve with dizziness continuing to improve but still present and worsened with activity.  Vital signs generally better with blood pressures improving into the 140-160 range most of the time.  Eating and voiding well.  Tolerating medications without significant side effects.  No new concerns today.           Significant Problems:     Past Medical History:   Diagnosis Date     Arthritis      COPD (chronic obstructive pulmonary disease) (H)      Coronary artery disease      CVA (cerebral vascular accident) (H) 8-     Hypertension             Physical Exam:   Blood pressure 161/63, pulse 82, temperature 98.7  F (37.1  C), temperature source Oral, resp. rate 20, height 1.575 m (5' 2\"), weight 48.1 kg (106 lb), SpO2 (!) 86 %, not currently breastfeeding.  Constitutional:   awake, alert, cooperative, no apparent distress, and appears older than stated age     Lungs:   No increased work of breathing, decreased air exchange but suspected at patient baseline, clear to auscultation bilaterally, no crackles or wheezing     Cardiovascular:   Normal apical impulse, regular rate and rhythm, normal S1 and S2, no S3 or S4, and no murmur noted     Abdomen:   Bowel sounds present, abdomen soft and non-tender     Musculoskeletal:   no lower extremity pitting edema present     Neurologic:   Awake, alert, oriented to name, place and time.       Skin:   normal skin color, texture, turgor             Data:   All laboratory and imaging data in the past 24 hours reviewed    Attestation:  I have reviewed today's vital signs, notes, medications, labs and imaging.     Electronically Signed:  Deisy Laird MD    Note: Chart documentation done in part with Dragon Voice Recognition software. Although reviewed after completion, some word and grammatical errors may remain.       "

## 2018-03-24 NOTE — PLAN OF CARE
Problem: Patient Care Overview  Goal: Plan of Care/Patient Progress Review  Outcome: Completed Date Met: 03/24/18  Discharge Planner OT   Patient plan for discharge: Home with  assist as needed.  Current status: OT referral received and evaluation completed.  Patient has one level living situation.  Tub shower combination without grab bars, but has a shower chair and non-skin tub floor.  Patient has a FWW. Laundry is located in the basement, 12 stairs with rail; however due to decreased balance she will have  assist with laundry needs.  Independent with all dressing, groom and hygiene tasks.  Bilateral UE strength MMT 5/5 over all planes of movement.  No UE coordination deficits. No visual deficits.  WNL cognitive processing level.  Barriers to return to prior living situation: none identified.  Recommendations for discharge: Home with /family assist as needed. Car/family transport.  Home PT services for home safety assessment and balance.  Rationale for recommendations: Patient is doing well, no dizziness or nauseated feeling during OT eval.  She has understanding for her safety with daily tasks and activities.  She demonstrated fully independence with all functional transfers using FWW and self cares.  No further inpatient OT services needed at this time.         Entered by: Vaenssa Sosa 03/24/2018 9:25 AM     Occupational Therapy Discharge Summary    Reason for therapy discharge:    All goals and outcomes met, no further needs identified.    Progress towards therapy goal(s). See goals on Care Plan in [facility] electronic health record for goal details.  Goals met    Therapy recommendation(s):    No further therapy is recommended.       PINA Ashley/L  [facility]  [phone]

## 2018-03-25 ENCOUNTER — APPOINTMENT (OUTPATIENT)
Dept: PHYSICAL THERAPY | Facility: CLINIC | Age: 68
DRG: 066 | End: 2018-03-25
Payer: MEDICARE

## 2018-03-25 VITALS
SYSTOLIC BLOOD PRESSURE: 158 MMHG | OXYGEN SATURATION: 92 % | BODY MASS INDEX: 20.2 KG/M2 | DIASTOLIC BLOOD PRESSURE: 61 MMHG | HEART RATE: 75 BPM | RESPIRATION RATE: 20 BRPM | WEIGHT: 109.79 LBS | TEMPERATURE: 97.5 F | HEIGHT: 62 IN

## 2018-03-25 PROCEDURE — G8980 MOBILITY D/C STATUS: HCPCS | Mod: GP,CL | Performed by: PHYSICAL THERAPIST

## 2018-03-25 PROCEDURE — 40000225 ZZH STATISTIC SLP WARD VISIT: Performed by: SPEECH-LANGUAGE PATHOLOGIST

## 2018-03-25 PROCEDURE — 25000125 ZZHC RX 250: Performed by: INTERNAL MEDICINE

## 2018-03-25 PROCEDURE — 94640 AIRWAY INHALATION TREATMENT: CPT

## 2018-03-25 PROCEDURE — G8979 MOBILITY GOAL STATUS: HCPCS | Mod: GP,CH | Performed by: PHYSICAL THERAPIST

## 2018-03-25 PROCEDURE — 99239 HOSP IP/OBS DSCHRG MGMT >30: CPT | Performed by: FAMILY MEDICINE

## 2018-03-25 PROCEDURE — 97530 THERAPEUTIC ACTIVITIES: CPT | Mod: GP | Performed by: PHYSICAL THERAPIST

## 2018-03-25 PROCEDURE — A9270 NON-COVERED ITEM OR SERVICE: HCPCS | Mod: GY | Performed by: FAMILY MEDICINE

## 2018-03-25 PROCEDURE — 92610 EVALUATE SWALLOWING FUNCTION: CPT | Mod: GN | Performed by: SPEECH-LANGUAGE PATHOLOGIST

## 2018-03-25 PROCEDURE — 40000193 ZZH STATISTIC PT WARD VISIT: Performed by: PHYSICAL THERAPIST

## 2018-03-25 PROCEDURE — 97116 GAIT TRAINING THERAPY: CPT | Mod: GP | Performed by: PHYSICAL THERAPIST

## 2018-03-25 PROCEDURE — 25000132 ZZH RX MED GY IP 250 OP 250 PS 637: Mod: GY | Performed by: FAMILY MEDICINE

## 2018-03-25 RX ORDER — CLOPIDOGREL BISULFATE 75 MG/1
75 TABLET ORAL DAILY
Qty: 30 TABLET | Refills: 0 | Status: SHIPPED | OUTPATIENT
Start: 2018-03-26 | End: 2018-04-02

## 2018-03-25 RX ORDER — ACETAMINOPHEN 325 MG/1
325 TABLET ORAL EVERY 4 HOURS PRN
Qty: 100 TABLET | COMMUNITY
Start: 2018-03-25 | End: 2021-01-01

## 2018-03-25 RX ORDER — METOPROLOL SUCCINATE 25 MG/1
25 TABLET, EXTENDED RELEASE ORAL DAILY
Qty: 45 TABLET | Refills: 3 | Status: ON HOLD | COMMUNITY
Start: 2018-03-25 | End: 2018-05-25

## 2018-03-25 RX ADMIN — IPRATROPIUM BROMIDE AND ALBUTEROL SULFATE 3 ML: .5; 3 SOLUTION RESPIRATORY (INHALATION) at 09:18

## 2018-03-25 RX ADMIN — METOPROLOL SUCCINATE 50 MG: 50 TABLET, EXTENDED RELEASE ORAL at 08:50

## 2018-03-25 RX ADMIN — IPRATROPIUM BROMIDE AND ALBUTEROL SULFATE 3 ML: .5; 3 SOLUTION RESPIRATORY (INHALATION) at 04:15

## 2018-03-25 RX ADMIN — ALBUTEROL SULFATE 2.5 MG: 2.5 SOLUTION RESPIRATORY (INHALATION) at 04:06

## 2018-03-25 RX ADMIN — UMECLIDINIUM 1 PUFF: 62.5 AEROSOL, POWDER ORAL at 08:52

## 2018-03-25 RX ADMIN — CLOPIDOGREL BISULFATE 75 MG: 75 TABLET, FILM COATED ORAL at 08:50

## 2018-03-25 NOTE — PROGRESS NOTES
Care Coordinator- Discharge Planning     Admission Date/Time:  3/22/2018  Attending MD:  Deisy Laird*     Data  Date of initial CC assessment:  3/23/18  Chart reviewed, discussed with interdisciplinary team.   Patient was admitted for:   1. Acute CVA (cerebrovascular accident) - right paramedian superior vermis and left cerebellar hemisphere    2. Seizures (H)    3. ST elevation myocardial infarction involving left anterior descending (LAD) coronary artery (H)         Assessment  Full assessment completed in previous note    Coordination of Care and Referrals: Provided patient/family with options for Home Care.[facility] Phone: [phone]      Plan  Anticipated Discharge Date:  3/25/18  Anticipated Discharge Plan:  Home with husbands support and [facility] RN PT    CTS Handoff completed:  YES    Lisa Dawson RN

## 2018-03-25 NOTE — DISCHARGE SUMMARY
[facility] Discharge Summary    Preeti Ford MRN# [id number]   Age: 67 year old YOB: 1950     Date of Admission:  3/22/2018  Date of Discharge::  3/25/2018  Admitting Physician:  Deisy Laird MD  Discharge Physician:  Deisy Laird MD    Home clinic: [facility]          Admission Diagnoses:   Seizures (H) [R56.9]          Discharge Diagnosis:   Principal Problem:    Acute CVA (cerebrovascular accident) - right paramedian superior vermis and left cerebellar hemisphere    Assessment: Causing dizziness, with the symptoms continuing to slowly improve and patient has been cleared to return home with home physical therapy.  Workup performed during this hospitalization including CTA, echocardiogram with bubble study, and therapist's evaluation has not showed any acute changes compared to her most recent stroke.  Patient has been transitioned to Plavix as she has now failed aspirin therapy     Plan: Patient will discharge home with ongoing Plavix 75 mg daily, ongoing Lipitor as well as increased metoprolol to improve her blood pressure control.  She will have [facility] home care services for ongoing monitoring and physical therapy will follow up with her primary care provider closely.  It has been strongly emphasized that patient does need to seriously consider stopping smoking to decrease her risk of recurrent stroke or heart attack in the future.    Active Problems:    Vertigo    Assessment:  secondary to stroke as above, improving daily    Plan:  Discharge with plan as above      Other seizures (H) - possible    Assessment:  Suspected initially, based on clinical story, but is now felt to be the onset of stroke symptoms with patient having no further activity concerning for seizure during this hospitalization.     Plan:  discharge with plan as above       History of stroke - right thalamus in 8/2013    Assessment: With acute stroke as above    Plan:  Discharge with plan as above      Tobacco use disorder    Assessment: Ongoing, with patient having little interest in quitting     Plan: much time during this hospitalization was spent in conversation as to why patient still smokes at this time she feels that the deck is stacked against her and why should she about her quitting.  Did discuss the increased risk that she is taking and strongly encouraged her to continue to think about it at time of discharge and discuss it with her primary care provider going forward.      Ischemic cardiomyopathy - EF 25% in 2015 but 55% in 3/2017 and 45-50% on 3/18    Assessment: Overall stable without concern for volume overload.  Metoprolol was increased during this hospitalization to improve blood pressure control    Plan:  Discharge patient with ongoing metoprolol 25 mg daily dosing and outpatient follow-up      HTN, goal below 130/80    Assessment:  Blood pressures were initially elevated in the 170-190 range systolically, they have decreased into the 130-150 range with increased metoprolol to 25 mg daily with heart rate tolerating this well.     Plan:  Discharge with metoprolol 25 mg daily.  Did discuss with patient that she might need further adjustment of her medication long-term if blood pressures continue to be slightly elevated.       PAD (peripheral artery disease) (H) - moderate left common carotid stenosis, aortoiliac bypass, chronic left vertebral and right posterior cerebral stenoses    Assessment: chronic and stable     Plan: Discharge with plan as above      Coronary artery disease involving native coronary artery - STEMI with LAD and circ stents in 8/2015    Assessment:  Previous history, without known anginal symptoms during this hospital stay    Plan: Discharge with plan as above      Elevated troponin    Assessment: Chronic and stable from patient's previous baseline without any evidence of angina and EKG  unchanged from previous     Plan:  discharge with plan  as above      Pulmonary emphysema (H)    Assessment:  Chronic and stable    Plan: Discharge without change to home regimen.    # Discharge Pain Plan:   - Patient currently has NO PAIN and is not being prescribed pain medications on discharge.            Procedures:   No procedures performed during this admission          Medications Prior to Admission:     Prescriptions Prior to Admission   Medication Sig Dispense Refill Last Dose     ipratropium - albuterol 0.5 mg/2.5 mg/3 mL (DUONEB) 0.5-2.5 (3) MG/3ML neb solution USE 1 VIAL IN NEBULIZER FOUR TIMES A  mL 3 3/22/2018 at 0700     polyethylene glycol (MIRALAX) powder Take 17 g (1 capful) by mouth daily 510 g 1 Past Week at Unknown time     albuterol (PROAIR HFA/PROVENTIL HFA/VENTOLIN HFA) 108 (90 BASE) MCG/ACT Inhaler Inhale 2 puffs into the lungs every 6 hours as needed for shortness of breath / dyspnea 1 Inhaler 11 3/22/2018 at 1400     atorvastatin (LIPITOR) 10 MG tablet Take 1 tablet (10 mg) by mouth At Bedtime 90 tablet 3 3/21/2018 at 2100     montelukast (SINGULAIR) 10 MG tablet Take 1 tablet (10 mg) by mouth At Bedtime 30 tablet 11 3/21/2018 at 2200     budesonide-formoterol (SYMBICORT) 160-4.5 MCG/ACT Inhaler INHALE TWO PUFFS BY MOUTH TWICE A DAY 1 Inhaler 5 3/22/2018 at 1900     SPIRIVA HANDIHALER 18 MCG capsule USING THE HANDIHALER, INHALE ONE CAPSULE BY MOUTH EVERY DAY 90 capsule 5 3/22/2018 at 1600     Calcium Carbonate-Vitamin D (OSCAL 500/200 D-3 PO) Take 1 tablet by mouth 2 times daily   3/22/2018 at Unknown time     Coenzyme Q10 (COQ10 PO) Take 100 mg by mouth every 24 hours (at 16:00)   Past Month at Unknown time     [DISCONTINUED] aspirin 325 MG tablet Take 1 tablet (325 mg) by mouth daily 180 tablet 0 3/22/2018 at 0800             Discharge Medications:     Current Discharge Medication List      START taking these medications    Details   acetaminophen (TYLENOL) 325 MG tablet Take 2 tablets (650 mg) by mouth every 4 hours as needed for mild  pain  Qty: 100 tablet      clopidogrel (PLAVIX) 75 MG tablet 1 tablet (75 mg) by Oral or NG Tube route daily  Qty: 30 tablet, Refills: 0    Associated Diagnoses: Acute CVA (cerebrovascular accident) (H)         CONTINUE these medications which have CHANGED    Details   metoprolol succinate (TOPROL-XL) 25 MG 24 hr tablet Take 1 tablet (25 mg) by mouth daily  Qty: 45 tablet, Refills: 3    Associated Diagnoses: ST elevation myocardial infarction involving left anterior descending (LAD) coronary artery (H)         CONTINUE these medications which have NOT CHANGED    Details   ipratropium - albuterol 0.5 mg/2.5 mg/3 mL (DUONEB) 0.5-2.5 (3) MG/3ML neb solution USE 1 VIAL IN NEBULIZER FOUR TIMES A DAY  Qty: 270 mL, Refills: 3    Associated Diagnoses: COPD exacerbation (H)      polyethylene glycol (MIRALAX) powder Take 17 g (1 capful) by mouth daily  Qty: 510 g, Refills: 1    Associated Diagnoses: Constipation, unspecified constipation type      albuterol (PROAIR HFA/PROVENTIL HFA/VENTOLIN HFA) 108 (90 BASE) MCG/ACT Inhaler Inhale 2 puffs into the lungs every 6 hours as needed for shortness of breath / dyspnea  Qty: 1 Inhaler, Refills: 11    Associated Diagnoses: Chronic bronchitis, unspecified chronic bronchitis type (H)      atorvastatin (LIPITOR) 10 MG tablet Take 1 tablet (10 mg) by mouth At Bedtime  Qty: 90 tablet, Refills: 3    Associated Diagnoses: PAD (peripheral artery disease) (H)      montelukast (SINGULAIR) 10 MG tablet Take 1 tablet (10 mg) by mouth At Bedtime  Qty: 30 tablet, Refills: 11    Associated Diagnoses: Chronic bronchitis, unspecified chronic bronchitis type (H)      budesonide-formoterol (SYMBICORT) 160-4.5 MCG/ACT Inhaler INHALE TWO PUFFS BY MOUTH TWICE A DAY  Qty: 1 Inhaler, Refills: 5    Associated Diagnoses: Chronic bronchitis, unspecified chronic bronchitis type (H)      SPIRIVA HANDIHALER 18 MCG capsule USING THE HANDIHALER, INHALE ONE CAPSULE BY MOUTH EVERY DAY  Qty: 90 capsule, Refills: 5     Associated Diagnoses: Chronic bronchitis, unspecified chronic bronchitis type (H)      Calcium Carbonate-Vitamin D (OSCAL 500/200 D-3 PO) Take 1 tablet by mouth 2 times daily      Coenzyme Q10 (COQ10 PO) Take 100 mg by mouth every 24 hours (at 16:00)         STOP taking these medications       aspirin 325 MG tablet Comments:   Reason for Stopping:                     Consultations:   Telephone consultation during this admission received from Dr. Hirsch, stroke neurologist on call from the [facility], regarding treatment of this patient          Brief History of Illness:   Patient is a 67-year-old female who presented to the emergency room following an episode at home in which she had shaking of all her extremities and associated buzzing in her ears.  After the shaking resolved she did not have any postictal symptoms but had persistent dizziness.  She was brought to the emergency room for evaluation.  Initial CT scan and CTA showed ongoing vascular blockage but no acute stroke.  Patient was placed under observation status for further monitoring and evaluation of possible stroke           Hospital Course:   During her observation stay an MRI was performed that did show a new superior vermis and left cerebellar stroke, consistent with her dizziness symptoms.  She was transitioned to inpatient status and had echocardiogram with bubble study that did not show significant results.  She was also evaluated by speech therapy, occupational therapy, and physical therapy and is safe to discharge home with home physical therapy services.  During this hospitalization stroke neurology was consulted and patient has been transitioned to Plavix instead of aspirin for further anticoagulation.  Her LDL is at goal and she will continue with her Lipitor.  It was strongly emphasized to the patient the importance of smoking cessation going forward to decrease her risk of recurrent stroke or heart attack.  Patient is  discharged home in stable condition.         Physical Exam:   Vitals were reviewed  Constitutional:   awake, alert, cooperative, no apparent distress, and appears older than her stated age     Lungs:   No increased work of breathing, decreased air exchange but at patient's baseline, clear to auscultation bilaterally, no crackles or wheezing     Cardiovascular:   Normal apical impulse, regular rate and rhythm, normal S1 and S2, no S3 or S4, and no murmur noted     Abdomen:   Bowel sounds present, abdomen soft and non-tender     Musculoskeletal:   no lower extremity pitting edema present     Neurologic:   Awake, alert, oriented to name, place and time.  Patient does still lean slightly to the right with head tipped to the right to help with her dizziness symptoms but is less pronounced than yesterday      Skin:   normal skin color, texture, turgor              Discharge Instructions and Follow-Up:   Discharge diet: Regular   Discharge activity: Activity as tolerated with consistent use of a cane   Discharge follow-up: Follow up with primary care provider within 7 days           Discharge Disposition:   Discharged to home with [facility] services       Attestation:  I have reviewed today's vital signs, notes, medications, labs and imaging.    More than 30 minutes was spent on this discharge.      Deisy Laird MD    Note: Chart documentation done in part with Dragon Voice Recognition software. Although reviewed after completion, some word and grammatical errors may remain.

## 2018-03-25 NOTE — PLAN OF CARE
"Problem: Stroke (Ischemic) (Adult)  Goal: Signs and Symptoms of Listed Potential Problems Will be Absent, Minimized or Managed (Stroke)  Signs and symptoms of listed potential problems will be absent, minimized or managed by discharge/transition of care (reference Stroke (Ischemic) (Adult) CPG).   Outcome: Improving  O2 sats maintained 91-92% RA with HR 72 during ambulation in halls. C/o slight dizziness towards end of ambulating. Periods of resting due to shortness of breath, but pt reports \"this is normal for me at home\". Will continue to monitor.      "

## 2018-03-25 NOTE — PROGRESS NOTES
S-(situation): Patient discharged to home via w/c with .    B-(background): CVA    A-(assessment): see VS and assessment f/s. Pt A/Ox4. Transferring with minimal SBA and walker. Gets dyspneic with exertion, but recovers at rest, this is pt's baseline at home.     R-(recommendations): Discharge instructions reviewed with pt and . Listed belongings gathered and returned to patient.          Discharge Nursing Criteria:     Care Plan and Patient education resolved: Yes    New Medications- pt has been educated about purpose and side effects: Yes    Vaccines  Influenza status verified at discharge:  Yes    MISC  Prescriptions if needed, hard copies sent with patient  Yes  Home and hospital aquired medications returned to patient: Yes  Medication Bin checked and emptied on discharge Yes  Patient reports post-discharge pain management plan is effective: Yes

## 2018-03-25 NOTE — PLAN OF CARE
Problem: Stroke (Ischemic) (Adult)  Goal: Signs and Symptoms of Listed Potential Problems Will be Absent, Minimized or Managed (Stroke)  Signs and symptoms of listed potential problems will be absent, minimized or managed by discharge/transition of care (reference Stroke (Ischemic) (Adult) CPG).   Outcome: Therapy, progress toward functional goals is gradual  Patient ambulated in hallway with walker and SBA, does become dizzy still at times if she stands or moves head too quickly.  Off oxygen all night.  Up to bathroom with assistance. No complaints of nausea through out night.  Sitting at side of bed during neb tx.   Wanting to go home today.

## 2018-03-25 NOTE — PLAN OF CARE
Problem: Patient Care Overview  Goal: Individualization & Mutuality  Discharge Planner PT   Patient plan for discharge: discharge to home with assist of family and continued home care therapy   Current status: patient did better today , was able to ambulate 100feet x 2 with CG and able to do stairs with CG , transfers with SBA and bed mobility independent   Needs to rest often due to SOB/fatigue  Barriers to return to prior living situation: will need assist at home due to continued but improved dizzy feeling for safety   Recommendations for discharge: continued home care therapy   Rationale for recommendations: continues to have improved dizzy feeling and decrease tolerance to activity        Entered by: Katlin Trotter 03/25/2018 8:28 AM

## 2018-03-26 ENCOUNTER — PATIENT OUTREACH (OUTPATIENT)
Dept: CARE COORDINATION | Facility: CLINIC | Age: 68
End: 2018-03-26

## 2018-03-26 ENCOUNTER — DOCUMENTATION ONLY (OUTPATIENT)
Dept: CARE COORDINATION | Facility: CLINIC | Age: 68
End: 2018-03-26

## 2018-03-26 ENCOUNTER — TELEPHONE (OUTPATIENT)
Dept: FAMILY MEDICINE | Facility: OTHER | Age: 68
End: 2018-03-26

## 2018-03-26 NOTE — TELEPHONE ENCOUNTER
ED / Discharge Outreach Protocol    Patient Contact    Attempt # 1    Was call answered?  No.  Left message on voicemail with information to call me back.    Next 5 appointments (look out 90 days)     Apr 02, 2018 11:30 AM CDT   Office Visit with SPRING [facility] ([facility])    [address] [facility] [id number] [phone]

## 2018-03-26 NOTE — PROGRESS NOTES
Clinic Care Coordination Contact  [facility]/Voicemail    Referral Source: IP Handoff  Name: Preeti Ford  : 1950  MRN #: [id number]  Primary Care Provider: Deisy Carter  Primary Care MD Name: Deisy Carter, SPRING, CNP   Primary Clinic: [address] [place] [id number]  Primary Care Clinic Name: [facility]   Reason for Hospitalization:  Seizures (H) [R56.9]  Admit Date/Time: 3/22/2018  8:51 PM  Discharge Date: 3/25/18  Payor Source: Payor: MEDICA / Plan: MEDICA PRIME SOLUTION / Product Type: Indemnity /      Readmission Assessment Measure (NEETU) Risk Score/category: Low         Reason for Communication Hand-off Referral: Fragility    Clinical Data: Care Coordinator Outreach  Outreach attempted x 1.  Left message on voicemail with call back information and requested return call.  Plan: Care Coordinator will mail out care coordination introduction letter with care coordinator contact information and explanation of care coordination services. Care Coordinator will try to reach patient again in 1-2 business days.    Victor Hugo Whelan RN  Clinic Care Coordinator  [facility]  [phone]

## 2018-03-26 NOTE — LETTER
[place] CARE COORDINATION  [facility]  March 26, 2018    Preeti JEROME Ford  [address] [address] [place] [id number]      Dear Preeti,    I am a clinic care coordinator who works with SPRING Banegas CNP. I recently tried to call and was unable to reach you. I wanted to introduce myself and provide you with my contact information so that you can call me with questions or concerns about your health care. Below is a description of clinic care coordination and how I can further assist you.     The clinic care coordinator is a registered nurse and/or  who understand the health care system. The goal of clinic care coordination is to help you manage your health and improve access to the [facility] system in the most efficient manner. The registered nurse can assist you in meeting your health care goals by providing education, coordinating services, and strengthening the communication among your providers. The  can assist you with financial, behavioral, psychosocial, chemical dependency, counseling, and/or psychiatric resources.    Please feel free to contact me at [phone], with any questions or concerns. We at [facility] are focused on providing you with the highest-quality healthcare experience possible and that all starts with you.     Sincerely,     Victor Hugo Alfaro

## 2018-03-26 NOTE — PROGRESS NOTES
[facility] now requests orders and shares plan of care/discharge summaries for some patients through "[employer]".  Please REPLY TO THIS MESSAGE in order to give authorization for orders when needed.  This is considered a verbal order, you will still receive a faxed copy of orders for signature.  Thank you for your assistance in improving collaboration for our patients.    ORDER    1. Skilled Nursing Visits 2w2, 1w7, 2 prn  2. PT eval and treat    Pain: thumbs. arthritic. neck 8/10.  stiff. almost constant    O2 93 percent RA, P 75, /69, R 17,  T 96.5, LS [place] BL   Coughing up cloudy sputum sometimes, thick, not so much today. SOB minimal exertion. Smoking 0.5 ppd.     Thank You for this referral.  Kelly Vela RN  [phone]

## 2018-03-27 ENCOUNTER — DOCUMENTATION ONLY (OUTPATIENT)
Dept: CARE COORDINATION | Facility: CLINIC | Age: 68
End: 2018-03-27

## 2018-03-27 NOTE — PROGRESS NOTES
[facility] utilizes an encounter to take the place of a direct phone call to your office. Please take a moment to review the below request. Your reply to this encounter will act as a verbal OK of orders if requested below. Thank you.    ORDER/Physical Therapy 1 x 1 wk then 2x2 wks for ther ex/balance/gait training and HEP.

## 2018-03-27 NOTE — TELEPHONE ENCOUNTER
ED / Discharge Outreach Protocol    Patient Contact    Attempt # 2    Was call answered?  No.  Unable to leave message. Number stated does not accept telemarketers and then disconnected. Suad Goncalves, RN, BSN

## 2018-03-29 ENCOUNTER — PATIENT OUTREACH (OUTPATIENT)
Dept: CARE COORDINATION | Facility: CLINIC | Age: 68
End: 2018-03-29

## 2018-03-29 NOTE — PROGRESS NOTES
Clinic Care Coordination Contact  [facility]       Clinical Data: Care Coordinator Outreach  Outreach attempted x 1.  Phone not answered and unable to leave a message.    4/2/2018 11:30 AM Deisy Carter APRN CNP         Plan: Care Coordinator mailed out care coordination introduction letter on 3/26/18. Care Coordinator will do no further outreaches at this time.    Victor Hugo Whelan RN  Clinic Care Coordinator  [facility]  [phone]

## 2018-03-30 ENCOUNTER — TELEPHONE (OUTPATIENT)
Dept: FAMILY MEDICINE | Facility: OTHER | Age: 68
End: 2018-03-30

## 2018-03-30 NOTE — TELEPHONE ENCOUNTER
Reason for Call:  Form, our goal is to have forms completed with 72 hours, however, some forms may require a visit or additional information.    Type of letter, form or note:  medical    Who is the form from?: Home care    Where did the form come from: form was faxed in    What clinic location was the form placed at?: [facility] - [phone]    Where the form was placed: 's Box    What number is listed as a contact on the form?: [phone]       Additional comments: [phone]    Call taken on 3/30/2018 at 2:32 PM by Suzanne Perea

## 2018-04-02 ENCOUNTER — OFFICE VISIT (OUTPATIENT)
Dept: FAMILY MEDICINE | Facility: OTHER | Age: 68
End: 2018-04-02
Payer: COMMERCIAL

## 2018-04-02 VITALS
TEMPERATURE: 98.1 F | HEART RATE: 68 BPM | BODY MASS INDEX: 18.85 KG/M2 | HEIGHT: 63 IN | SYSTOLIC BLOOD PRESSURE: 170 MMHG | WEIGHT: 106.4 LBS | DIASTOLIC BLOOD PRESSURE: 72 MMHG | OXYGEN SATURATION: 95 % | RESPIRATION RATE: 14 BRPM

## 2018-04-02 DIAGNOSIS — F17.210 CIGARETTE NICOTINE DEPENDENCE WITHOUT COMPLICATION: ICD-10-CM

## 2018-04-02 DIAGNOSIS — I63.9 ACUTE CVA (CEREBROVASCULAR ACCIDENT) (H): ICD-10-CM

## 2018-04-02 DIAGNOSIS — J43.9 PULMONARY EMPHYSEMA, UNSPECIFIED EMPHYSEMA TYPE (H): Primary | ICD-10-CM

## 2018-04-02 PROCEDURE — 99495 TRANSJ CARE MGMT MOD F2F 14D: CPT | Performed by: STUDENT IN AN ORGANIZED HEALTH CARE EDUCATION/TRAINING PROGRAM

## 2018-04-02 RX ORDER — CLOPIDOGREL BISULFATE 75 MG/1
75 TABLET ORAL DAILY
Qty: 90 TABLET | Refills: 3 | Status: SHIPPED | OUTPATIENT
Start: 2018-04-02 | End: 2019-04-14

## 2018-04-02 NOTE — TELEPHONE ENCOUNTER
Form has been faxed, sent to scanning and copy placed in Dr Diane fax folder  Closing encounter  Arabella NICOLAS (R)

## 2018-04-02 NOTE — PATIENT INSTRUCTIONS
Nicotine patch 7 mg - place on alternating places daily.    Refill of Plavix sent.    We will fax nebulizer order to [facility].    TAHMINA RobertoC

## 2018-04-02 NOTE — MR AVS SNAPSHOT
"              After Visit Summary   4/2/2018    Preeti Ford    MRN: [id number]           Patient Information     Date Of Birth          1950        Visit Information        Provider Department      4/2/2018 11:30 AM Deisy Carter APRN CNP [facility]        Today's Diagnoses     Pulmonary emphysema, unspecified emphysema type (H)    -  1    Cigarette nicotine dependence without complication        Acute CVA (cerebrovascular accident) - right paramedian superior vermis and left cerebellar hemisphere          Care Instructions    Nicotine patch 7 mg - place on alternating places daily.    Refill of Plavix sent.    We will fax nebulizer order to [facility].    TAHMINA RobertoC            Follow-ups after your visit        Who to contact     If you have questions or need follow up information about today's clinic visit or your schedule please contact [facility] directly at [phone].  Normal or non-critical lab and imaging results will be communicated to you by MyChart, letter or phone within 4 business days after the clinic has received the results. If you do not hear from us within 7 days, please contact the clinic through [employer] or phone. If you have a critical or abnormal lab result, we will notify you by phone as soon as possible.  Submit refill requests through [employer] or call your pharmacy and they will forward the refill request to us. Please allow 3 business days for your refill to be completed.          Additional Information About Your Visit        MyChart Information     Ge[employer] lets you send messages to your doctor, view your test results, renew your prescriptions, schedule appointments and more. To sign up, go to [email or web address] . Click on \"Log in\" on the left side of the screen, which will take you to the Welcome page. Then click on \"Sign up Now\" on the right side of the page.     You will be asked to enter the access code listed " "below, as well as some personal information. Please follow the directions to create your username and password.     Your access code is: [email or web address]  Expires: 2018  8:20 AM     Your access code will  in 90 days. If you need help or a new code, please call your Issue clinic or [phone].        Care EveryWhere ID     This is your Care EveryWhere ID. This could be used by other organizations to access your Issue medical records  [id number]        Your Vitals Were     Pulse Temperature Respirations Height Pulse Oximetry BMI (Body Mass Index)    68 98.1  F (36.7  C) (Temporal) 14 5' 3.07\" (1.602 m) 95% 18.81 kg/m2       Blood Pressure from Last 3 Encounters:   18 170/72   18 158/61   17 142/86    Weight from Last 3 Encounters:   18 106 lb 6.4 oz (48.3 kg)   18 109 lb 12.6 oz (49.8 kg)   17 107 lb 6.4 oz (48.7 kg)              Today, you had the following     No orders found for display         Today's Medication Changes          These changes are accurate as of 18 12:07 PM.  If you have any questions, ask your nurse or doctor.               Start taking these medicines.        Dose/Directions    nicotine 7 MG/24HR 24 hr patch   Commonly known as:  NICODERM CQ   Used for:  Cigarette nicotine dependence without complication   Started by:  Deisy Carter APRN CNP        Dose:  1 patch   Place 1 patch onto the skin every 24 hours   Quantity:  30 patch   Refills:  1       order for DME   Used for:  Pulmonary emphysema, unspecified emphysema type (H)   Started by:  Deisy Carter APRN CNP        Equipment being ordered: Nebulizer   Quantity:  1 Device   Refills:  0         These medicines have changed or have updated prescriptions.        Dose/Directions    clopidogrel 75 MG tablet   Commonly known as:  PLAVIX   This may have changed:  how to take this   Used for:  Acute CVA (cerebrovascular accident) (H)   Changed by:  Deisy Carter" SPRING Liu CNP        Dose:  75 mg   Take 1 tablet (75 mg) by mouth daily   Quantity:  90 tablet   Refills:  3            Where to get your medicines      These medications were sent to [facility] - [address]   [address] [place] [address]     Phone:  [phone]     clopidogrel 75 MG tablet    nicotine 7 MG/24HR 24 hr patch         Some of these will need a paper prescription and others can be bought over the counter.  Ask your nurse if you have questions.     Bring a paper prescription for each of these medications     order for DME                Primary Care Provider Office Phone # Fax #    Deisy SPRING Vidal [phone]       [address] [address] [place] [address]  [place] [address]        Equal Access to Services     LYDIA WASHINGTON : Hadii steve miranda hadasho Soomaali, waaxda luqadaha, qaybta kaalmada adeegyada, waxgarth hampton . So [facility] [phone].    ATENCIÓN: Si habla español, tiene a jeffries disposición servicios gratuitos de asistencia lingüística. Llame al [phone].    We comply with applicable federal civil rights laws and [place] laws. We do not discriminate on the basis of race, color, national origin, age, disability, sex, sexual orientation, or gender identity.            Thank you!     Thank you for choosing [facility]  for your care. Our goal is always to provide you with excellent care. Hearing back from our patients is one way we can continue to improve our services. Please take a few minutes to complete the written survey that you may receive in the mail after your visit with us. Thank you!             Your Updated Medication List - Protect others around you: Learn how to safely use, store and throw away your medicines at www.disposemymeds.org.          This list is accurate as of 4/2/18 12:07 PM.  Always use your most recent med list.                   Brand Name Dispense Instructions for  use Diagnosis    acetaminophen 325 MG tablet    TYLENOL    100 tablet    Take 2 tablets (650 mg) by mouth every 4 hours as needed for mild pain        albuterol 108 (90 BASE) MCG/ACT Inhaler    PROAIR HFA/PROVENTIL HFA/VENTOLIN HFA    1 Inhaler    Inhale 2 puffs into the lungs every 6 hours as needed for shortness of breath / dyspnea    Chronic bronchitis, unspecified chronic bronchitis type (H)       atorvastatin 10 MG tablet    LIPITOR    90 tablet    Take 1 tablet (10 mg) by mouth At Bedtime    PAD (peripheral artery disease) (H)       budesonide-formoterol 160-4.5 MCG/ACT Inhaler    SYMBICORT    1 Inhaler    INHALE TWO PUFFS BY MOUTH TWICE A DAY    Chronic bronchitis, unspecified chronic bronchitis type (H)       clopidogrel 75 MG tablet    PLAVIX    90 tablet    Take 1 tablet (75 mg) by mouth daily    Acute CVA (cerebrovascular accident) (H)       COQ10 PO      Take 100 mg by mouth every 24 hours (at 16:00)        ipratropium - albuterol 0.5 mg/2.5 mg/3 mL 0.5-2.5 (3) MG/3ML neb solution    DUONEB    270 mL    USE 1 VIAL IN NEBULIZER FOUR TIMES A DAY    COPD exacerbation (H)       metoprolol succinate 25 MG 24 hr tablet    TOPROL-XL    45 tablet    Take 1 tablet (25 mg) by mouth daily    ST elevation myocardial infarction involving left anterior descending (LAD) coronary artery (H)       montelukast 10 MG tablet    SINGULAIR    30 tablet    Take 1 tablet (10 mg) by mouth At Bedtime    Chronic bronchitis, unspecified chronic bronchitis type (H)       nicotine 7 MG/24HR 24 hr patch    NICODERM CQ    30 patch    Place 1 patch onto the skin every 24 hours    Cigarette nicotine dependence without complication       order for DME     1 Device    Equipment being ordered: Nebulizer    Pulmonary emphysema, unspecified emphysema type (H)       OSCAL 500/200 D-3 PO      Take 1 tablet by mouth 2 times daily        polyethylene glycol powder    MIRALAX    510 g    Take 17 g (1 capful) by mouth daily    Constipation,  unspecified constipation type       SPIRIVA HANDIHALER 18 MCG capsule   Generic drug:  tiotropium     90 capsule    USING THE HANDIHALER, INHALE ONE CAPSULE BY MOUTH EVERY DAY    Chronic bronchitis, unspecified chronic bronchitis type (H)

## 2018-04-07 DIAGNOSIS — Z53.9 DIAGNOSIS NOT YET DEFINED: Primary | ICD-10-CM

## 2018-04-07 PROCEDURE — G0180 MD CERTIFICATION HHA PATIENT: HCPCS | Performed by: FAMILY MEDICINE

## 2018-04-09 ENCOUNTER — TELEPHONE (OUTPATIENT)
Dept: FAMILY MEDICINE | Facility: OTHER | Age: 68
End: 2018-04-09

## 2018-04-09 NOTE — TELEPHONE ENCOUNTER
Reason for Call:  Form, our goal is to have forms completed with 72 hours, however, some forms may require a visit or additional information.    Type of letter, form or note:  medical    Who is the form from?: Home care    Where did the form come from: form was faxed in    What clinic location was the form placed at?: [facility] - [phone]    Where the form was placed: [facility]    What number is listed as a contact on the form?: [phone]       Additional comments: fax [phone]    Call taken on 4/9/2018 at 2:42 PM by Suzanne Perea

## 2018-04-09 NOTE — TELEPHONE ENCOUNTER
Summary:    Patient is due/failing the following:   BP CHECK    Action needed:   Patient needs nurse only appointment.    Type of outreach:    Sent letter.    Questions for provider review:    None                                                                                                                                    Karey Candelaria     Chart routed to Care Team .        Panel Management Review      Patient has the following on her problem list:       IVD   ASA:     Last LDL:    Lab Results   Component Value Date    CHOL 165 03/24/2018     Lab Results   Component Value Date    HDL 67 03/24/2018     Lab Results   Component Value Date    LDL 77 03/24/2018     Lab Results   Component Value Date    TRIG 106 03/24/2018        Lab Results   Component Value Date    CHOLHDLRATIO 3.6 10/09/2015        Is the patient on a Statin? YES   Is the patient on Aspirin? NO                  Medications     HMG CoA Reductase Inhibitors    atorvastatin (LIPITOR) 10 MG tablet          Last three blood pressure readings:  BP Readings from Last 3 Encounters:   04/02/18 170/72   03/25/18 158/61   12/20/17 142/86        Tobacco History:     History   Smoking Status     Current Every Day Smoker     Packs/day: 0.50     Types: Cigarettes   Smokeless Tobacco     Never Used     Comment: patient states she is working on it          Composite cancer screening  Chart review shows that this patient is due/due soon for the following None

## 2018-04-09 NOTE — LETTER
[facility]  [address] [facility] [address] [facility] [address]  Phone: [phone]  April 9, 2018      Preeti Ford  [address] [place] [address]      Dear Preeti,    We care about your health and have reviewed your health plan including your medical conditions, medications, and lab results.  Based on this review, it is recommended that you follow up regarding the following health topic(s):  -High Blood Pressure    We recommend you take the following action(s):  -schedule a FREE FLOAT MA-ONLY BLOOD PRESSURE APPOINTMENT within the next 1-4 weeks.     Please call us at the [facility] - [phone] (or use [employer]) to address the above recommendations.     Thank you for trusting [facility] and we appreciate the opportunity to serve you.  We look forward to supporting your healthcare needs in the future.    Healthy Regards,    Your Health Care Team  [facility]

## 2018-04-13 ENCOUNTER — TELEPHONE (OUTPATIENT)
Dept: FAMILY MEDICINE | Facility: OTHER | Age: 68
End: 2018-04-13

## 2018-04-13 ENCOUNTER — OFFICE VISIT (OUTPATIENT)
Dept: FAMILY MEDICINE | Facility: OTHER | Age: 68
End: 2018-04-13
Payer: COMMERCIAL

## 2018-04-13 ENCOUNTER — RADIANT APPOINTMENT (OUTPATIENT)
Dept: GENERAL RADIOLOGY | Facility: OTHER | Age: 68
End: 2018-04-13
Attending: PHYSICIAN ASSISTANT
Payer: COMMERCIAL

## 2018-04-13 VITALS
DIASTOLIC BLOOD PRESSURE: 58 MMHG | RESPIRATION RATE: 24 BRPM | TEMPERATURE: 97.7 F | SYSTOLIC BLOOD PRESSURE: 102 MMHG | HEART RATE: 90 BPM | WEIGHT: 98.8 LBS | OXYGEN SATURATION: 90 % | BODY MASS INDEX: 17.46 KG/M2

## 2018-04-13 DIAGNOSIS — R63.4 LOSS OF WEIGHT: ICD-10-CM

## 2018-04-13 DIAGNOSIS — R05.9 COUGH: ICD-10-CM

## 2018-04-13 DIAGNOSIS — E86.0 DEHYDRATION: ICD-10-CM

## 2018-04-13 DIAGNOSIS — Z86.73 HISTORY OF STROKE: Primary | ICD-10-CM

## 2018-04-13 DIAGNOSIS — J43.1 PANLOBULAR EMPHYSEMA (H): ICD-10-CM

## 2018-04-13 DIAGNOSIS — E44.1 MILD MALNUTRITION (H): ICD-10-CM

## 2018-04-13 DIAGNOSIS — R53.81 MALAISE: ICD-10-CM

## 2018-04-13 DIAGNOSIS — I25.5 ISCHEMIC CARDIOMYOPATHY: ICD-10-CM

## 2018-04-13 DIAGNOSIS — J42 CHRONIC BRONCHITIS, UNSPECIFIED CHRONIC BRONCHITIS TYPE (H): ICD-10-CM

## 2018-04-13 DIAGNOSIS — Z86.73 HISTORY OF STROKE: ICD-10-CM

## 2018-04-13 LAB
ALBUMIN SERPL-MCNC: 3.5 G/DL (ref 3.4–5)
ALP SERPL-CCNC: 69 U/L (ref 40–150)
ALT SERPL W P-5'-P-CCNC: 31 U/L (ref 0–50)
ANION GAP SERPL CALCULATED.3IONS-SCNC: 10 MMOL/L (ref 3–14)
AST SERPL W P-5'-P-CCNC: 44 U/L (ref 0–45)
BASOPHILS # BLD AUTO: 0 10E9/L (ref 0–0.2)
BASOPHILS NFR BLD AUTO: 0.2 %
BILIRUB SERPL-MCNC: 0.8 MG/DL (ref 0.2–1.3)
BUN SERPL-MCNC: 17 MG/DL (ref 7–30)
CALCIUM SERPL-MCNC: 8.3 MG/DL (ref 8.5–10.1)
CHLORIDE SERPL-SCNC: 93 MMOL/L (ref 94–109)
CO2 SERPL-SCNC: 26 MMOL/L (ref 20–32)
CREAT SERPL-MCNC: 0.7 MG/DL (ref 0.52–1.04)
DIFFERENTIAL METHOD BLD: ABNORMAL
EOSINOPHIL # BLD AUTO: 0 10E9/L (ref 0–0.7)
EOSINOPHIL NFR BLD AUTO: 0 %
ERYTHROCYTE [DISTWIDTH] IN BLOOD BY AUTOMATED COUNT: 15 % (ref 10–15)
ERYTHROCYTE [SEDIMENTATION RATE] IN BLOOD BY WESTERGREN METHOD: 5 MM/H (ref 0–30)
GFR SERPL CREATININE-BSD FRML MDRD: 85 ML/MIN/1.7M2
GLUCOSE SERPL-MCNC: 93 MG/DL (ref 70–99)
HCT VFR BLD AUTO: 52.4 % (ref 35–47)
HGB BLD-MCNC: 18.2 G/DL (ref 11.7–15.7)
LYMPHOCYTES # BLD AUTO: 1.2 10E9/L (ref 0.8–5.3)
LYMPHOCYTES NFR BLD AUTO: 14.5 %
MCH RBC QN AUTO: 29.6 PG (ref 26.5–33)
MCHC RBC AUTO-ENTMCNC: 34.7 G/DL (ref 31.5–36.5)
MCV RBC AUTO: 85 FL (ref 78–100)
MONOCYTES # BLD AUTO: 0.8 10E9/L (ref 0–1.3)
MONOCYTES NFR BLD AUTO: 9.6 %
NEUTROPHILS # BLD AUTO: 6.5 10E9/L (ref 1.6–8.3)
NEUTROPHILS NFR BLD AUTO: 75.7 %
PLATELET # BLD AUTO: 99 10E9/L (ref 150–450)
POTASSIUM SERPL-SCNC: 3.9 MMOL/L (ref 3.4–5.3)
PROT SERPL-MCNC: 7.1 G/DL (ref 6.8–8.8)
RBC # BLD AUTO: 6.14 10E12/L (ref 3.8–5.2)
SODIUM SERPL-SCNC: 129 MMOL/L (ref 133–144)
WBC # BLD AUTO: 8.6 10E9/L (ref 4–11)

## 2018-04-13 PROCEDURE — 71046 X-RAY EXAM CHEST 2 VIEWS: CPT | Mod: FY

## 2018-04-13 PROCEDURE — 36415 COLL VENOUS BLD VENIPUNCTURE: CPT | Performed by: PHYSICIAN ASSISTANT

## 2018-04-13 PROCEDURE — 85025 COMPLETE CBC W/AUTO DIFF WBC: CPT | Performed by: PHYSICIAN ASSISTANT

## 2018-04-13 PROCEDURE — 80053 COMPREHEN METABOLIC PANEL: CPT | Performed by: PHYSICIAN ASSISTANT

## 2018-04-13 PROCEDURE — 99213 OFFICE O/P EST LOW 20 MIN: CPT | Performed by: PHYSICIAN ASSISTANT

## 2018-04-13 PROCEDURE — 85652 RBC SED RATE AUTOMATED: CPT | Performed by: PHYSICIAN ASSISTANT

## 2018-04-13 RX ORDER — LACTOSE-REDUCED FOOD
1 LIQUID (ML) ORAL
Qty: 90 EACH | Refills: 0 | Status: SHIPPED | OUTPATIENT
Start: 2018-04-13 | End: 2021-01-01

## 2018-04-13 RX ORDER — AZITHROMYCIN 250 MG/1
TABLET, FILM COATED ORAL
Qty: 6 TABLET | Refills: 0 | Status: SHIPPED | OUTPATIENT
Start: 2018-04-13 | End: 2018-04-19

## 2018-04-13 ASSESSMENT — PAIN SCALES - GENERAL: PAINLEVEL: MODERATE PAIN (4)

## 2018-04-13 NOTE — TELEPHONE ENCOUNTER
couple days ago. Cough non-productive.  Slight fever.  Has been having headaches.     Preeti Ford is a 67 year old female who calls with increase SOB and cough.    NURSING ASSESSMENT:  Description:  Spoke with patient.  Started a few days ago.  Nonproductvie cough and congestion. Noticed nebs are not lasting as much.      Allergies:   Allergies   Allergen Reactions     Crestor [Rosuvastatin] Other (See Comments)     dizziness     Hmg-Coa-R Inhibitors Other (See Comments)     Muscle/joint aching     Lisinopril Cough     Optison [Albumin Human] Other (See Comments)     Pt was flush and very dizzy.  Also had a BP drop     Penicillins Rash       MEDICATIONS:   Taking medication(s) as prescribed? Yes  Taking over the counter medication(s?) Yes  Any medication side effects? No significant side effects    Any barriers to taking medication(s) as prescribed?  No  Medication(s) improving/managing symptoms?  Yes  Medication reconciliation completed: Yes      NURSING PLAN: Nursing advice to patient needs to be seen,  appointment made    RECOMMENDED DISPOSITION:  See in 24 hours -   Next 5 appointments (look out 90 days)     Apr 13, 2018  1:40 PM CDT   [facility] Visit with Vince Mcbride PA-C   [facility]    [address] [facility] [address] [phone]                  Will comply with recommendation: Yes  If further questions/concerns or if symptoms do not improve, worsen or new symptoms develop, call your PCP or [facility] as soon as possible.      Guideline used:breathing problem  Telephone Triage Protocols for Nurses, Fifth Edition, Aislinn Hilario, EULA, BSN

## 2018-04-13 NOTE — PATIENT INSTRUCTIONS
Dehydration (Adult)  Dehydration occurs when your body loses too much fluid. This may be the result of prolonged vomiting or diarrhea, excessive sweating, or a high fever. It may also happen if you don t drink enough fluid when you re sick or out in the heat. Misuse of diuretics (water pills) can also be a cause.  Symptoms include thirst and decreased urine output. You may also feel dizzy, weak, fatigued, or very drowsy. The diet described below is usually enough to treat dehydration. In some cases, you may need medicine.  Home care    Drink at least 12 8-ounce glasses of fluid every day to resolve the dehydration. Fluid may include water; orange juice; lemonade; apple, grape, or cranberry juice; clear fruit drinks; electrolyte replacement and sports drinks; and teas and coffee without caffeine. Don't drink alcohol. If you have been diagnosed with a kidney disease, ask your doctor how much and what types of fluids you should drink to prevent dehydration. If you have kidney disease, fluid can build up in the body. This can be dangerous to your health.    If you have a fever, muscle aches, or a headache as a result of a cold or flu, you may take acetaminophen or ibuprofen, unless another medicine was prescribed. If you have chronic liver or kidney disease, or have ever had a stomach ulcer or gastrointestinal bleeding, talk with your healthcare provider before using these medicines. Don't take aspirin if you are younger than 18 and have a fever. Aspirin raises the chance for severe liver injury.  Follow-up care  Follow up with your healthcare provider, or as advised.  When to seek medical advice  Call your healthcare provider right away if any of these occur:    Continued vomiting    Frequent diarrhea (more than 5 times a day); blood (red or black color) or mucus in diarrhea    Blood in vomit or stool    Swollen abdomen or increasing abdominal pain    Weakness, dizziness, or fainting    Unusual drowsiness or  confusion    Reduced urine output or extreme thirst    Fever of 100.4 F (38 C) or higher  Date Last Reviewed: 5/1/2017 2000-2017 [employer]. [address] [place] [address], [place] [address]. All rights reserved. This information is not intended as a substitute for professional medical care. Always follow your healthcare professional's instructions.        Diet for Vomiting or Diarrhea (Adult)    Your symptoms may return or get worse after eating certain foods listed below. If this happens, stop eating these foods until your symptoms ease and you feel better.  Once the vomiting stops, follow the steps below.   During the first 12 to 24 hours  During the first 12 to 24 hours, follow this diet:    Drinks. Plain water, sport drinks like electrolyte solutions, soft drinks without caffeine, mineral water (plain or flavored), clear fruit juices, and decaffeinated tea and coffee.    Soups. Clear broth.    Desserts. Plain gelatin, popsicles, and fruit juice bars. As you feel better, you may add 6 to 8 ounces of yogurt per day. If you have diarrhea, don't have foods or drinks that contain sugar, high-fructose corn syrup, or sugar alcohols.  During the next 24 hours  During the next 24 hours you may add the following to the above:    Hot cereal, plain toast, bread, rolls, and crackers    Plain noodles, rice, mashed potatoes, and chicken noodle or rice soup    Unsweetened canned fruit (but not pineapple) and bananas  Don't eat more than 15 grams of fat a day. Do this by staying away from margarine, butter, oils, mayonnaise, sauces, gravies, fried foods, peanut butter, meat, poultry, and fish.  Don't eat much fiber. Stay away from raw or cooked vegetables, fresh fruits (except bananas), and bran cereals.  Limit how much caffeine and chocolate you have. Do not use any spices or seasonings except salt.  During the next 24 hours  Slowly go back to your normal diet, as you feel better and your symptoms ease.  Date Last  Reviewed: 8/1/2016 2000-2017 [employer]. [address] [place] [address], [place] [address]. All rights reserved. This information is not intended as a substitute for professional medical care. Always follow your healthcare professional's instructions.

## 2018-04-13 NOTE — MR AVS SNAPSHOT
After Visit Summary   4/13/2018    Pereti Ford    MRN: [id number]           Patient Information     Date Of Birth          1950        Visit Information        Provider Department      4/13/2018 1:40 PM Vince Mcbride PA-C [facility]        Today's Diagnoses     History of stroke - right thalamus in 8/2013    -  1    Ischemic cardiomyopathy - EF 25% in 2015 but 55% in 3/2017 and 45-50% on 3/18        Chronic bronchitis, unspecified chronic bronchitis type (H)        Panlobular emphysema (H)        Cough        Malaise        Mild malnutrition (H)        Loss of weight        Dehydration          Care Instructions      Dehydration (Adult)  Dehydration occurs when your body loses too much fluid. This may be the result of prolonged vomiting or diarrhea, excessive sweating, or a high fever. It may also happen if you don t drink enough fluid when you re sick or out in the heat. Misuse of diuretics (water pills) can also be a cause.  Symptoms include thirst and decreased urine output. You may also feel dizzy, weak, fatigued, or very drowsy. The diet described below is usually enough to treat dehydration. In some cases, you may need medicine.  Home care    Drink at least 12 8-ounce glasses of fluid every day to resolve the dehydration. Fluid may include water; orange juice; lemonade; apple, grape, or cranberry juice; clear fruit drinks; electrolyte replacement and sports drinks; and teas and coffee without caffeine. Don't drink alcohol. If you have been diagnosed with a kidney disease, ask your doctor how much and what types of fluids you should drink to prevent dehydration. If you have kidney disease, fluid can build up in the body. This can be dangerous to your health.    If you have a fever, muscle aches, or a headache as a result of a cold or flu, you may take acetaminophen or ibuprofen, unless another medicine was prescribed. If you have chronic liver or kidney disease, or  have ever had a stomach ulcer or gastrointestinal bleeding, talk with your healthcare provider before using these medicines. Don't take aspirin if you are younger than 18 and have a fever. Aspirin raises the chance for severe liver injury.  Follow-up care  Follow up with your healthcare provider, or as advised.  When to seek medical advice  Call your healthcare provider right away if any of these occur:    Continued vomiting    Frequent diarrhea (more than 5 times a day); blood (red or black color) or mucus in diarrhea    Blood in vomit or stool    Swollen abdomen or increasing abdominal pain    Weakness, dizziness, or fainting    Unusual drowsiness or confusion    Reduced urine output or extreme thirst    Fever of 100.4 F (38 C) or higher  Date Last Reviewed: 5/1/2017 2000-2017 [employer]. [address] [place] [address]. All rights reserved. This information is not intended as a substitute for professional medical care. Always follow your healthcare professional's instructions.        Diet for Vomiting or Diarrhea (Adult)    Your symptoms may return or get worse after eating certain foods listed below. If this happens, stop eating these foods until your symptoms ease and you feel better.  Once the vomiting stops, follow the steps below.   During the first 12 to 24 hours  During the first 12 to 24 hours, follow this diet:    Drinks. Plain water, sport drinks like electrolyte solutions, soft drinks without caffeine, mineral water (plain or flavored), clear fruit juices, and decaffeinated tea and coffee.    Soups. Clear broth.    Desserts. Plain gelatin, popsicles, and fruit juice bars. As you feel better, you may add 6 to 8 ounces of yogurt per day. If you have diarrhea, don't have foods or drinks that contain sugar, high-fructose corn syrup, or sugar alcohols.  During the next 24 hours  During the next 24 hours you may add the following to the above:    Hot cereal, plain toast, bread,  rolls, and crackers    Plain noodles, rice, mashed potatoes, and chicken noodle or rice soup    Unsweetened canned fruit (but not pineapple) and bananas  Don't eat more than 15 grams of fat a day. Do this by staying away from margarine, butter, oils, mayonnaise, sauces, gravies, fried foods, peanut butter, meat, poultry, and fish.  Don't eat much fiber. Stay away from raw or cooked vegetables, fresh fruits (except bananas), and bran cereals.  Limit how much caffeine and chocolate you have. Do not use any spices or seasonings except salt.  During the next 24 hours  Slowly go back to your normal diet, as you feel better and your symptoms ease.  Date Last Reviewed: 8/1/2016 2000-2017 [employer]. [address] [place] [address]. All rights reserved. This information is not intended as a substitute for professional medical care. Always follow your healthcare professional's instructions.                Follow-ups after your visit        Follow-up notes from your care team     Return in about 1 week (around 4/20/2018), or if symptoms worsen or fail to improve, for with PCP.      Who to contact     If you have questions or need follow up information about today's clinic visit or your schedule please contact [facility] directly at [phone].  Normal or non-critical lab and imaging results will be communicated to you by UGOBEhart, letter or phone within 4 business days after the clinic has received the results. If you do not hear from us within 7 days, please contact the clinic through [employer] or phone. If you have a critical or abnormal lab result, we will notify you by phone as soon as possible.  Submit refill requests through [employer]WIFI or call your pharmacy and they will forward the refill request to us. Please allow 3 business days for your refill to be completed.          Additional Information About Your Visit        Ch[employer] Information     [employer] lets you send messages to  "your doctor, view your test results, renew your prescriptions, schedule appointments and more. To sign up, go to [email or web address] . Click on \"Log in\" on the left side of the screen, which will take you to the Welcome page. Then click on \"Sign up Now\" on the right side of the page.     You will be asked to enter the access code listed below, as well as some personal information. Please follow the directions to create your username and password.     Your access code is: [email or web address]  Expires: 2018  8:20 AM     Your access code will  in 90 days. If you need help or a new code, please call your [place] clinic or [phone].        Care EveryWhere ID     This is your Care EveryWhere ID. This could be used by other organizations to access your [place] medical records  [id number]        Your Vitals Were     Pulse Temperature Respirations Pulse Oximetry BMI (Body Mass Index)       90 97.7  F (36.5  C) (Temporal) 24 90% 17.46 kg/m2        Blood Pressure from Last 3 Encounters:   18 102/58   18 170/72   18 158/61    Weight from Last 3 Encounters:   18 98 lb 12.8 oz (44.8 kg)   18 106 lb 6.4 oz (48.3 kg)   18 109 lb 12.6 oz (49.8 kg)              We Performed the Following     CBC with platelets and differential     Comprehensive metabolic panel     ESR: Erythrocyte sedimentation rate          Today's Medication Changes          These changes are accurate as of 18  2:45 PM.  If you have any questions, ask your nurse or doctor.               Start taking these medicines.        Dose/Directions    azithromycin 250 MG tablet   Commonly known as:  ZITHROMAX   Used for:  Chronic bronchitis, unspecified chronic bronchitis type (H), Panlobular emphysema (H), Cough, Malaise, Mild malnutrition (H), Loss of weight   Started by:  Vince Mcbride PA-C        Two tablets first day, then one tablet daily for four days   Quantity:  6 tablet   Refills:  0       BOOST   Used for:  " History of stroke, Ischemic cardiomyopathy, Panlobular emphysema (H), Malaise, Loss of weight   Started by:  Vince Mcbride PA-C        Dose:  1 Bottle   Take 1 Bottle by mouth 3 times daily (with meals)   Quantity:  90 each   Refills:  0            Where to get your medicines      These medications were sent to [facility] [place], [place] - [address]   [address] [place] [address]     Phone:  [phone]     azithromycin 250 MG tablet    BOOST                Primary Care Provider Office Phone # Fax #    Deisy Carter, APRN [phone]       [address] [facility] [address]        Equal Access to Services     [facility] Center[facility]AL[facility] : Hadii aad ku hadasho Soomaali, waaxda luqadaha, qaybta kaalmada adeegyada, waxay vonin haysana hampton . So [facility] [phone].    ATENCIÓN: Si habla español, tiene a jeffries disposición servicios gratuitos de asistencia lingüística. Ll[facility] [phone].    We comply with applicable federal civil rights laws and [place] laws. We do not discriminate on the basis of race, color, national origin, age, disability, sex, sexual orientation, or gender identity.            Thank you!     Thank you for choosing [facility]  for your care. Our goal is always to provide you with excellent care. Hearing back from our patients is one way we can continue to improve our services. Please take a few minutes to complete the written survey that you may receive in the mail after your visit with us. Thank you!             Your Updated Medication List - Protect others around you: Learn how to safely use, store and throw away your medicines at www.disposemymeds.org.          This list is accurate as of 4/13/18  2:45 PM.  Always use your most recent med list.                   Brand Name Dispense Instructions for use Diagnosis    acetaminophen 325 MG tablet    TYLENOL    100 tablet    Take 2 tablets (650 mg) by mouth every 4  hours as needed for mild pain        albuterol 108 (90 Base) MCG/ACT Inhaler    PROAIR HFA/PROVENTIL HFA/VENTOLIN HFA    1 Inhaler    Inhale 2 puffs into the lungs every 6 hours as needed for shortness of breath / dyspnea    Chronic bronchitis, unspecified chronic bronchitis type (H)       atorvastatin 10 MG tablet    LIPITOR    90 tablet    Take 1 tablet (10 mg) by mouth At Bedtime    PAD (peripheral artery disease) (H)       azithromycin 250 MG tablet    ZITHROMAX    6 tablet    Two tablets first day, then one tablet daily for four days    Chronic bronchitis, unspecified chronic bronchitis type (H), Panlobular emphysema (H), Cough, Malaise, Mild malnutrition (H), Loss of weight       BOOST     90 each    Take 1 Bottle by mouth 3 times daily (with meals)    History of stroke, Ischemic cardiomyopathy, Panlobular emphysema (H), Malaise, Loss of weight       budesonide-formoterol 160-4.5 MCG/ACT Inhaler    SYMBICORT    1 Inhaler    INHALE TWO PUFFS BY MOUTH TWICE A DAY    Chronic bronchitis, unspecified chronic bronchitis type (H)       clopidogrel 75 MG tablet    PLAVIX    90 tablet    Take 1 tablet (75 mg) by mouth daily    Acute CVA (cerebrovascular accident) (H)       COQ10 PO      Take 100 mg by mouth every 24 hours (at 16:00)        ipratropium - albuterol 0.5 mg/2.5 mg/3 mL 0.5-2.5 (3) MG/3ML neb solution    DUONEB    270 mL    USE 1 VIAL IN NEBULIZER FOUR TIMES A DAY    COPD exacerbation (H)       metoprolol succinate 25 MG 24 hr tablet    TOPROL-XL    45 tablet    Take 1 tablet (25 mg) by mouth daily    ST elevation myocardial infarction involving left anterior descending (LAD) coronary artery (H)       montelukast 10 MG tablet    SINGULAIR    30 tablet    Take 1 tablet (10 mg) by mouth At Bedtime    Chronic bronchitis, unspecified chronic bronchitis type (H)       nicotine 7 MG/24HR 24 hr patch    NICODERM CQ    30 patch    Place 1 patch onto the skin every 24 hours    Cigarette nicotine dependence without  complication       order for DME     1 Device    Equipment being ordered: Nebulizer    Pulmonary emphysema, unspecified emphysema type (H)       OSCAL 500/200 D-3 PO      Take 1 tablet by mouth 2 times daily        polyethylene glycol powder    MIRALAX    510 g    Take 17 g (1 capful) by mouth daily    Constipation, unspecified constipation type       SPIRIVA HANDIHALER 18 MCG capsule   Generic drug:  tiotropium     90 capsule    USING THE HANDIHALER, INHALE ONE CAPSULE BY MOUTH EVERY DAY    Chronic bronchitis, unspecified chronic bronchitis type (H)

## 2018-04-13 NOTE — PROGRESS NOTES
"  SUBJECTIVE:   Preeti Ford is a 67 year old female who presents to clinic today for the following health issues:      HPI  Acute Illness   Acute illness concerns: Body aches  Onset: 2-3 days    Fever: no    Chills/Sweats: YES- Cold     Headache (location?): YES    Sinus Pressure:no    Conjunctivitis:  no    Ear Pain: no    Rhinorrhea: YES    Congestion: no    Sore Throat: no     Cough: YES - \"Coughing so hard I almost throw up\"    Wheeze: no    Decreased Appetite: YES- Patient has lost almost 10 lbs this month    Nausea: YES    Vomiting: no    Diarrhea:  no    Dysuria/Freq.: no    Fatigue/Achiness: YES    Sick/Strep Exposure: no     Therapies Tried and outcome: rest    Problem list and histories reviewed & adjusted, as indicated.  Additional history:     Patient Active Problem List   Diagnosis     Advanced directives, counseling/discussion     Urinary incontinence     Forgetfulness     Hyperlipidemia LDL goal <130     Elevated blood pressure reading without diagnosis of hypertension     History of stroke - right thalamus in 8/2013     Numbness and tingling     Tobacco use disorder     CVA (cerebral vascular accident) (H)     ACS (acute coronary syndrome) (H)     Ischemic cardiomyopathy - EF 25% in 2015 but 55% in 3/2017 and 45-50% on 3/18     HTN, goal below 130/80     Acute systolic congestive heart failure (H)     Lung nodule     GERD (gastroesophageal reflux disease)     ST elevation myocardial infarction involving left anterior descending (LAD) coronary artery (H)     Chronic bronchitis, unspecified chronic bronchitis type (H)     PAD (peripheral artery disease) (H) - moderate left common carotid stenosis, aortoiliac bypass, chronic left vertebral and right posterior cerebral stenoses     Cervical high risk HPV (human papillomavirus) test positive     Acute respiratory failure with hypoxia (H)     COPD exacerbation (H)     Coronary artery disease involving native coronary artery - STEMI with LAD and circ " stents in 8/2015     Elevated troponin     Pulmonary emphysema (H)     Vertigo     Other seizures (H) - possible     Acute CVA (cerebrovascular accident) - right paramedian superior vermis and left cerebellar hemisphere     Past Surgical History:   Procedure Laterality Date     APPENDECTOMY       BYPASS GRAFT AORTOILIAC N/A 3/7/2017    Procedure: BYPASS GRAFT AORTOILIAC;  Surgeon: Marcos Pratt MD;  Location: [facility] OR     SALPINGO OOPHORECTOMY,R/L/DELORES      Salpingo Oophorectomy, RT/LT/DELORES       Social History   Substance Use Topics     Smoking status: Current Every Day Smoker     Packs/day: 0.50     Types: Cigarettes     Smokeless tobacco: Never Used      Comment: patient states she is working on it      Alcohol use No     Family History   Problem Relation Age of Onset     CEREBROVASCULAR DISEASE Mother      CEREBROVASCULAR DISEASE Father      Genitourinary Problems Brother      Dialysis         Current Outpatient Prescriptions   Medication Sig Dispense Refill     clopidogrel (PLAVIX) 75 MG tablet Take 1 tablet (75 mg) by mouth daily 90 tablet 3     acetaminophen (TYLENOL) 325 MG tablet Take 2 tablets (650 mg) by mouth every 4 hours as needed for mild pain 100 tablet      metoprolol succinate (TOPROL-XL) 25 MG 24 hr tablet Take 1 tablet (25 mg) by mouth daily 45 tablet 3     ipratropium - albuterol 0.5 mg/2.5 mg/3 mL (DUONEB) 0.5-2.5 (3) MG/3ML neb solution USE 1 VIAL IN NEBULIZER FOUR TIMES A  mL 3     polyethylene glycol (MIRALAX) powder Take 17 g (1 capful) by mouth daily 510 g 1     albuterol (PROAIR HFA/PROVENTIL HFA/VENTOLIN HFA) 108 (90 BASE) MCG/ACT Inhaler Inhale 2 puffs into the lungs every 6 hours as needed for shortness of breath / dyspnea 1 Inhaler 11     atorvastatin (LIPITOR) 10 MG tablet Take 1 tablet (10 mg) by mouth At Bedtime 90 tablet 3     montelukast (SINGULAIR) 10 MG tablet Take 1 tablet (10 mg) by mouth At Bedtime 30 tablet 11     budesonide-formoterol (SYMBICORT) 160-4.5  MCG/ACT Inhaler INHALE TWO PUFFS BY MOUTH TWICE A DAY 1 Inhaler 5     SPIRIVA HANDIHALER 18 MCG capsule USING THE HANDIHALER, INHALE ONE CAPSULE BY MOUTH EVERY DAY 90 capsule 5     Calcium Carbonate-Vitamin D (OSCAL 500/200 D-3 PO) Take 1 tablet by mouth 2 times daily       Coenzyme Q10 (COQ10 PO) Take 100 mg by mouth every 24 hours (at 16:00)       order for DME Equipment being ordered: Nebulizer 1 Device 0     nicotine (NICODERM CQ) 7 MG/24HR 24 hr patch Place 1 patch onto the skin every 24 hours (Patient not taking: Reported on 4/13/2018) 30 patch 1     Allergies   Allergen Reactions     Crestor [Rosuvastatin] Other (See Comments)     dizziness     Hmg-Coa-R Inhibitors Other (See Comments)     Muscle/joint aching     Lisinopril Cough     Optison [Albumin Human] Other (See Comments)     Pt was flush and very dizzy.  Also had a BP drop     Penicillins Rash     Recent Labs   Lab Test  03/24/18   0515  03/22/18   2205  12/06/17   1148  08/04/17   0910   03/07/17   2300  03/07/17   1940   03/01/17   0950   02/18/16   0718   08/18/13   0830   02/16/11   1425   A1C  5.5   --    --    --    --    --   5.5   --    --    --   5.7   --    --    --    --    LDL  77   --    --   90   --    --    --    --   175*   < >  86   < >   --    < >  199*   HDL  67   --    --   72   --    --    --    --   70   < >  66   < >   --    < >  55   TRIG  106   --    --   61   --    --    --    --   95   < >  90   < >   --    < >  123   ALT   --    --    --    --    --   31  26   --   20   < >   --    < >  20   --    --    CR   --   0.65  0.70   --    < >  1.00  0.89   < >   --    < >  0.73   < >  0.84   --   0.81   GFRESTIMATED   --   >90  83   --    < >  56*  64   < >   --    < >  80   < >  69   --   73   GFRESTBLACK   --   >90  >90   --    < >  68  77   < >   --    < >  >90   GFR Calc     < >  84   --   88   POTASSIUM   --   4.0  4.1   --    < >  4.8  4.6   < >   --    < >   --    < >  3.7   --   4.8   TSH   --    --    --     --    --    --    --    --    --    --    --    --   1.05   --   0.86    < > = values in this interval not displayed.      BP Readings from Last 3 Encounters:   04/13/18 102/58   04/02/18 170/72   03/25/18 158/61    Wt Readings from Last 3 Encounters:   04/13/18 98 lb 12.8 oz (44.8 kg)   04/02/18 106 lb 6.4 oz (48.3 kg)   03/25/18 109 lb 12.6 oz (49.8 kg)         Labs reviewed in EPIC    ROS:  Constitutional, HEENT, cardiovascular, pulmonary, gi and gu systems are negative, except as otherwise noted.    OBJECTIVE:     /58 (Cuff Size: Child)  Pulse 90  Temp 97.7  F (36.5  C) (Temporal)  Resp 24  Wt 98 lb 12.8 oz (44.8 kg)  SpO2 90%  BMI 17.46 kg/m2  Body mass index is 17.46 kg/(m^2).  GENERAL: healthy, alert and no distress  HENT: ear canals and TM's normal, nose and mouth without ulcers or lesions but tongue is quite fissured C/W dehydration  NECK: no adenopathy, no asymmetry, masses, or scars and trachea midline and normal to palpation  RESP: decreased breath sounds bibasilar and faint crackles to the right base posteriorly.  CV: regular rate and rhythm, normal S1 S2, no S3 or S4, no murmur, click or rub, no peripheral edema and peripheral pulses strong  ABDOMEN: soft, nontender, no hepatosplenomegaly, no masses and bowel sounds normal  MS: no gross musculoskeletal defects noted, no edema  PSYCH: fatigued and distracted today.    Diagnostic Test Results:  Results for orders placed or performed in visit on 04/13/18 (from the past 24 hour(s))   CBC with platelets and differential   Result Value Ref Range    WBC 8.6 4.0 - 11.0 10e9/L    RBC Count 6.14 (H) 3.8 - 5.2 10e12/L    Hemoglobin 18.2 (H) 11.7 - 15.7 g/dL    Hematocrit 52.4 (H) 35.0 - 47.0 %    MCV 85 78 - 100 fl    MCH 29.6 26.5 - 33.0 pg    MCHC 34.7 31.5 - 36.5 g/dL    RDW 15.0 10.0 - 15.0 %    Platelet Count 99 (L) 150 - 450 10e9/L    Diff Method Automated Method     % Neutrophils 75.7 %    % Lymphocytes 14.5 %    % Monocytes 9.6 %    %  Eosinophils 0.0 %    % Basophils 0.2 %    Absolute Neutrophil 6.5 1.6 - 8.3 10e9/L    Absolute Lymphocytes 1.2 0.8 - 5.3 10e9/L    Absolute Monocytes 0.8 0.0 - 1.3 10e9/L    Absolute Eosinophils 0.0 0.0 - 0.7 10e9/L    Absolute Basophils 0.0 0.0 - 0.2 10e9/L        CBC is concerning for dehydration  CXR - similar to previous and without convincing breath sounds I suspect this to be WN:L.    ASSESSMENT/PLAN:     1. History of stroke - right thalamus in 8/2013  - XR Chest 2 Views; Future  - CBC with platelets and differential    2. Ischemic cardiomyopathy - EF 25% in 2015 but 55% in 3/2017 and 45-50% on 3/18  Unfortunately this limits what she can use for p.o. fluids for right rehydration we discussed this with her today.  - XR Chest 2 Views; Future  - CBC with platelets and differential    3. Chronic bronchitis, unspecified chronic bronchitis type (H)  4. Panlobular emphysema (H)  5. Cough  6. Malaise  At this point time I think that is more viral upper respiratory infection that gave her malaise and fatigue and thereby did not allow for much of an appetite.  I doubt that she has been drinking as much water as she thinks she is.  We discussed rehydration status as well as methods to try to get her more fluid without putting her into congestive heart failure.  Advised minimal salt intake.  Advised nutritional supplement 3 times a day.  - XR Chest 2 Views; Future  - CBC with platelets and differential  - Comprehensive metabolic panel  - ESR: Erythrocyte sedimentation rate    Have advised that she follow-up with her primary care provider next week for rehydration status update as well as overall nutritional concerns.      Vince Daniels PA-C  [facility]

## 2018-04-13 NOTE — TELEPHONE ENCOUNTER
Reason for Call:  Same Day Appointment, Requested Provider:  Deisy Carter NP    PCP: Deisy Carter    Reason for visit: tired / increased shortness of breath    Duration of symptoms: n/a    Have you been treated for this in the past? Yes    Additional comments: [facility] nurse is requesting that the patient be seen today.    Can we leave a detailed message on this number? YES    Phone number patient can be reached at: Home number on file [phone] (home)    Best Time: anytime    Call taken on 4/13/2018 at 9:40 AM by Beverly Garzon

## 2018-04-13 NOTE — NURSING NOTE
"Chief Complaint   Patient presents with     COPD     URI       Initial /58 (Cuff Size: Child)  Pulse 90  Temp 97.7  F (36.5  C) (Temporal)  Resp 24  Wt 98 lb 12.8 oz (44.8 kg)  SpO2 90%  BMI 17.46 kg/m2 Estimated body mass index is 17.46 kg/(m^2) as calculated from the following:    Height as of 4/2/18: 5' 3.07\" (1.602 m).    Weight as of this encounter: 98 lb 12.8 oz (44.8 kg).  Medication Reconciliation: complete  "

## 2018-04-16 ENCOUNTER — TELEPHONE (OUTPATIENT)
Dept: FAMILY MEDICINE | Facility: OTHER | Age: 68
End: 2018-04-16

## 2018-04-16 DIAGNOSIS — E87.1 HYPONATREMIA: ICD-10-CM

## 2018-04-16 DIAGNOSIS — E46 MALNUTRITION, UNSPECIFIED TYPE (H): Primary | ICD-10-CM

## 2018-04-16 DIAGNOSIS — I10 HTN, GOAL BELOW 130/80: ICD-10-CM

## 2018-04-16 DIAGNOSIS — E87.8 HYPOCHLOREMIA: ICD-10-CM

## 2018-04-16 NOTE — TELEPHONE ENCOUNTER
Discussed lab results and the need to recheck this.  She does describe a better diet and has been trying to use supplements as directed.  Labs to be rechecked this week for sodium and chloride balance.  Electronically signed:    Vince Daniels PA-C

## 2018-04-17 NOTE — PROGRESS NOTES
SUBJECTIVE:   Preeti Ford is a 67 year old female who presents to clinic today for the following health issues:    She feels she is doing better.  No new concerns    HPI  Followup:    Date of visit: 04/13/18  Reason for visit: COPD, URI  Current Status: Patient states she has been feeling better. She has gained a little weight back.        Problem list and histories reviewed & adjusted, as indicated.  Additional history: as documented    Patient Active Problem List   Diagnosis     Advanced directives, counseling/discussion     Urinary incontinence     Forgetfulness     Hyperlipidemia LDL goal <130     Elevated blood pressure reading without diagnosis of hypertension     History of stroke - right thalamus in 8/2013     Numbness and tingling     Tobacco use disorder     CVA (cerebral vascular accident) (H)     ACS (acute coronary syndrome) (H)     Ischemic cardiomyopathy - EF 25% in 2015 but 55% in 3/2017 and 45-50% on 3/18     HTN, goal below 130/80     Acute systolic congestive heart failure (H)     Lung nodule     GERD (gastroesophageal reflux disease)     ST elevation myocardial infarction involving left anterior descending (LAD) coronary artery (H)     Chronic bronchitis, unspecified chronic bronchitis type (H)     PAD (peripheral artery disease) (H) - moderate left common carotid stenosis, aortoiliac bypass, chronic left vertebral and right posterior cerebral stenoses     Cervical high risk HPV (human papillomavirus) test positive     Acute respiratory failure with hypoxia (H)     COPD exacerbation (H)     Coronary artery disease involving native coronary artery - STEMI with LAD and circ stents in 8/2015     Elevated troponin     Pulmonary emphysema (H)     Vertigo     Other seizures (H) - possible     Acute CVA (cerebrovascular accident) - right paramedian superior vermis and left cerebellar hemisphere     Malnutrition, unspecified type (H)     Hyponatremia     Hypochloremia     Past Surgical History:    Procedure Laterality Date     APPENDECTOMY       BYPASS GRAFT AORTOILIAC N/A 3/7/2017    Procedure: BYPASS GRAFT AORTOILIAC;  Surgeon: Marcos Pratt MD;  Location: [facility]     SALPINGO OOPHORECTOMY,R/L/DELORES      Salpingo Oophorectomy, RT/LT/DELORES       Social History   Substance Use Topics     Smoking status: Current Every Day Smoker     Packs/day: 0.50     Types: Cigarettes     Smokeless tobacco: Never Used      Comment: patient states she is working on it      Alcohol use No     Family History   Problem Relation Age of Onset     CEREBROVASCULAR DISEASE Mother      CEREBROVASCULAR DISEASE Father      Genitourinary Problems Brother      Dialysis         Current Outpatient Prescriptions   Medication Sig Dispense Refill     ACE/ARB/ARNI NOT PRESCRIBED, INTENTIONAL, Please choose reason not prescribed, below       acetaminophen (TYLENOL) 325 MG tablet Take 2 tablets (650 mg) by mouth every 4 hours as needed for mild pain 100 tablet      albuterol (PROAIR HFA/PROVENTIL HFA/VENTOLIN HFA) 108 (90 BASE) MCG/ACT Inhaler Inhale 2 puffs into the lungs every 6 hours as needed for shortness of breath / dyspnea 1 Inhaler 11     atorvastatin (LIPITOR) 10 MG tablet Take 1 tablet (10 mg) by mouth At Bedtime 90 tablet 3     budesonide-formoterol (SYMBICORT) 160-4.5 MCG/ACT Inhaler INHALE TWO PUFFS BY MOUTH TWICE A DAY 1 Inhaler 5     Calcium Carbonate-Vitamin D (OSCAL 500/200 D-3 PO) Take 1 tablet by mouth 2 times daily       clopidogrel (PLAVIX) 75 MG tablet Take 1 tablet (75 mg) by mouth daily 90 tablet 3     Coenzyme Q10 (COQ10 PO) Take 100 mg by mouth every 24 hours (at 16:00)       ipratropium - albuterol 0.5 mg/2.5 mg/3 mL (DUONEB) 0.5-2.5 (3) MG/3ML neb solution USE 1 VIAL IN NEBULIZER FOUR TIMES A  mL 3     metoprolol succinate (TOPROL-XL) 25 MG 24 hr tablet Take 1 tablet (25 mg) by mouth daily 45 tablet 3     montelukast (SINGULAIR) 10 MG tablet Take 1 tablet (10 mg) by mouth At Bedtime 30 tablet 11      nicotine (NICODERM CQ) 7 MG/24HR 24 hr patch Place 1 patch onto the skin every 24 hours (Patient not taking: Reported on 4/19/2018) 30 patch 1     Nutritional Supplements (BOOST) Take 1 Bottle by mouth 3 times daily (with meals) 90 each 0     order for DME Equipment being ordered: Nebulizer 1 Device 0     polyethylene glycol (MIRALAX) powder Take 17 g (1 capful) by mouth daily 510 g 1     SPIRIVA HANDIHALER 18 MCG capsule USING THE HANDIHALER, INHALE ONE CAPSULE BY MOUTH EVERY DAY 90 capsule 5     Allergies   Allergen Reactions     Crestor [Rosuvastatin] Other (See Comments)     dizziness     Hmg-Coa-R Inhibitors Other (See Comments)     Muscle/joint aching     Lisinopril Cough     Optison [Albumin Human] Other (See Comments)     Pt was flush and very dizzy.  Also had a BP drop     Penicillins Rash     Recent Labs   Lab Test  04/13/18   1419  03/24/18   0515  03/22/18   2205   08/04/17   0910   03/07/17   2300  03/07/17   1940   03/01/17   0950   02/18/16   0718   08/18/13   0830   02/16/11   1425   A1C   --   5.5   --    --    --    --    --   5.5   --    --    --   5.7   --    --    --    --    LDL   --   77   --    --   90   --    --    --    --   175*   < >  86   < >   --    < >  199*   HDL   --   67   --    --   72   --    --    --    --   70   < >  66   < >   --    < >  55   TRIG   --   106   --    --   61   --    --    --    --   95   < >  90   < >   --    < >  123   ALT  31   --    --    --    --    --   31  26   --   20   < >   --    < >  20   --    --    CR  0.70   --   0.65   < >   --    < >  1.00  0.89   < >   --    < >  0.73   < >  0.84   --   0.81   GFRESTIMATED  85   --   >90   < >   --    < >  56*  64   < >   --    < >  80   < >  69   --   73   GFRESTBLACK  >90   --   >90   < >   --    < >  68  77   < >   --    < >  >90   GFR Calc     < >  84   --   88   POTASSIUM  3.9   --   4.0   < >   --    < >  4.8  4.6   < >   --    < >   --    < >  3.7   --   4.8   TSH   --    --    --    --     --    --    --    --    --    --    --    --    --   1.05   --   0.86    < > = values in this interval not displayed.      BP Readings from Last 3 Encounters:   04/19/18 126/62   04/13/18 102/58   04/02/18 170/72    Wt Readings from Last 3 Encounters:   04/19/18 108 lb 3.2 oz (49.1 kg)   04/13/18 98 lb 12.8 oz (44.8 kg)   04/02/18 106 lb 6.4 oz (48.3 kg)                  Labs reviewed in EPIC    ROS:  Constitutional, HEENT, cardiovascular, pulmonary, gi and gu systems are negative, except as otherwise noted.    OBJECTIVE:     /62 (Cuff Size: Adult Regular)  Pulse 72  Temp 97.4  F (36.3  C) (Temporal)  Resp 24  Wt 108 lb 3.2 oz (49.1 kg)  SpO2 94%  BMI 19.12 kg/m2  Body mass index is 19.12 kg/(m^2).  GENERAL: healthy, alert and no distress  RESP: lungs clear to auscultation - no rales, rhonchi or wheezes  CV: regular rate and rhythm, normal S1 S2, no S3 or S4, no murmur, click or rub, no peripheral edema and peripheral pulses strong  MS: no gross musculoskeletal defects noted, no edema  PSYCH: mentation appears normal, affect normal/bright    Diagnostic Test Results:  No results found for this or any previous visit (from the past 24 hour(s)).    ASSESSMENT/PLAN:     1. Elevated blood pressure reading without diagnosis of hypertension  In need of further evaluation  - INR  - ACE/ARB/ARNI NOT PRESCRIBED, INTENTIONAL,; Please choose reason not prescribed, below    2. History of stroke - right thalamus in 8/2013  Rechecking labs.  - INR  - ACE/ARB/ARNI NOT PRESCRIBED, INTENTIONAL,; Please choose reason not prescribed, below    FUTURE APPOINTMENTS:       - Follow-up visit in 2-3 weeks.    Vince Daniels PA-C  [facility]

## 2018-04-19 ENCOUNTER — OFFICE VISIT (OUTPATIENT)
Dept: FAMILY MEDICINE | Facility: OTHER | Age: 68
End: 2018-04-19
Payer: COMMERCIAL

## 2018-04-19 VITALS
WEIGHT: 108.2 LBS | TEMPERATURE: 97.4 F | HEART RATE: 72 BPM | BODY MASS INDEX: 19.12 KG/M2 | RESPIRATION RATE: 24 BRPM | DIASTOLIC BLOOD PRESSURE: 62 MMHG | SYSTOLIC BLOOD PRESSURE: 126 MMHG | OXYGEN SATURATION: 94 %

## 2018-04-19 DIAGNOSIS — R03.0 ELEVATED BLOOD PRESSURE READING WITHOUT DIAGNOSIS OF HYPERTENSION: Primary | ICD-10-CM

## 2018-04-19 DIAGNOSIS — I10 HTN, GOAL BELOW 130/80: ICD-10-CM

## 2018-04-19 DIAGNOSIS — Z86.73 HISTORY OF STROKE: ICD-10-CM

## 2018-04-19 DIAGNOSIS — E87.8 HYPOCHLOREMIA: ICD-10-CM

## 2018-04-19 DIAGNOSIS — E87.1 HYPONATREMIA: ICD-10-CM

## 2018-04-19 DIAGNOSIS — E46 MALNUTRITION, UNSPECIFIED TYPE (H): ICD-10-CM

## 2018-04-19 LAB
ALBUMIN SERPL-MCNC: 3.1 G/DL (ref 3.4–5)
ALP SERPL-CCNC: 63 U/L (ref 40–150)
ALT SERPL W P-5'-P-CCNC: 25 U/L (ref 0–50)
ANION GAP SERPL CALCULATED.3IONS-SCNC: 5 MMOL/L (ref 3–14)
AST SERPL W P-5'-P-CCNC: 21 U/L (ref 0–45)
BILIRUB SERPL-MCNC: 0.5 MG/DL (ref 0.2–1.3)
BUN SERPL-MCNC: 10 MG/DL (ref 7–30)
CALCIUM SERPL-MCNC: 8.7 MG/DL (ref 8.5–10.1)
CHLORIDE SERPL-SCNC: 101 MMOL/L (ref 94–109)
CO2 SERPL-SCNC: 31 MMOL/L (ref 20–32)
CREAT SERPL-MCNC: 0.58 MG/DL (ref 0.52–1.04)
GFR SERPL CREATININE-BSD FRML MDRD: >90 ML/MIN/1.7M2
GLUCOSE SERPL-MCNC: 125 MG/DL (ref 70–99)
INR PPP: 0.95 (ref 0.86–1.14)
POTASSIUM SERPL-SCNC: 4 MMOL/L (ref 3.4–5.3)
PROT SERPL-MCNC: 6.7 G/DL (ref 6.8–8.8)
SODIUM SERPL-SCNC: 137 MMOL/L (ref 133–144)

## 2018-04-19 PROCEDURE — 80053 COMPREHEN METABOLIC PANEL: CPT | Performed by: PHYSICIAN ASSISTANT

## 2018-04-19 PROCEDURE — 99213 OFFICE O/P EST LOW 20 MIN: CPT | Performed by: PHYSICIAN ASSISTANT

## 2018-04-19 PROCEDURE — 36415 COLL VENOUS BLD VENIPUNCTURE: CPT | Performed by: PHYSICIAN ASSISTANT

## 2018-04-19 PROCEDURE — 85610 PROTHROMBIN TIME: CPT | Performed by: PHYSICIAN ASSISTANT

## 2018-04-19 ASSESSMENT — PAIN SCALES - GENERAL: PAINLEVEL: NO PAIN (0)

## 2018-04-19 NOTE — NURSING NOTE
"Chief Complaint   Patient presents with     Follow up labs     Panel Management     Pneumo, Dexa, Fall risk, Honoring choices, ASA, ACE/ARB, INR Referral, Colposcopy       Initial /62 (Cuff Size: Adult Regular)  Pulse 72  Temp 97.4  F (36.3  C) (Temporal)  Resp 24  Wt 108 lb 3.2 oz (49.1 kg)  SpO2 94%  BMI 19.12 kg/m2 Estimated body mass index is 19.12 kg/(m^2) as calculated from the following:    Height as of 4/2/18: 5' 3.07\" (1.602 m).    Weight as of this encounter: 108 lb 3.2 oz (49.1 kg).  Medication Reconciliation: complete  "

## 2018-04-19 NOTE — MR AVS SNAPSHOT
"              After Visit Summary   4/19/2018    Preeti Ford    MRN: [id number]           Patient Information     Date Of Birth          1950        Visit Information        Provider Department      4/19/2018 10:00 AM Vince Mcbride PA-C [facility]        Today's Diagnoses     Elevated blood pressure reading without diagnosis of hypertension    -  1    History of stroke - right thalamus in 8/2013        Malnutrition, unspecified type (H)        HTN, goal below 130/80        Hyponatremia        Hypochloremia           Follow-ups after your visit        Who to contact     If you have questions or need follow up information about today's clinic visit or your schedule please contact [facility] directly at [phone].  Normal or non-critical lab and imaging results will be communicated to you by MyChart, letter or phone within 4 business days after the clinic has received the results. If you do not hear from us within 7 days, please contact the clinic through [facility] or phone. If you have a critical or abnormal lab result, we will notify you by phone as soon as possible.  Submit refill requests through [employer] or call your pharmacy and they will forward the refill request to us. Please allow 3 business days for your refill to be completed.          Additional Information About Your Visit        MyChart Information     [employer]Pal lets you send messages to your doctor, view your test results, renew your prescriptions, schedule appointments and more. To sign up, go to [email or web address] . Click on \"Log in\" on the left side of the screen, which will take you to the Welcome page. Then click on \"Sign up Now\" on the right side of the page.     You will be asked to enter the access code listed below, as well as some personal information. Please follow the directions to create your username and password.     Your access code is: [email or web address]  Expires: 5/11/2018  8:20 AM     Your " access code will  in 90 days. If you need help or a new code, please call your [facility] clinic or [phone].        Care EveryWhere ID     This is your Care EveryWhere ID. This could be used by other organizations to access your [facility] medical records  [id number]        Your Vitals Were     Pulse Temperature Respirations Pulse Oximetry BMI (Body Mass Index)       72 97.4  F (36.3  C) (Temporal) 24 94% 19.12 kg/m2        Blood Pressure from Last 3 Encounters:   18 126/62   18 102/58   18 170/72    Weight from Last 3 Encounters:   18 108 lb 3.2 oz (49.1 kg)   18 98 lb 12.8 oz (44.8 kg)   18 106 lb 6.4 oz (48.3 kg)              We Performed the Following     Comprehensive metabolic panel     INR          Today's Medication Changes          These changes are accurate as of 18 11:29 AM.  If you have any questions, ask your nurse or doctor.               Start taking these medicines.        Dose/Directions    ACE/ARB/ARNI NOT PRESCRIBED (INTENTIONAL)   Used for:  Elevated blood pressure reading without diagnosis of hypertension, History of stroke   Started by:  Vince Mcbride PA-C        Please choose reason not prescribed, below   Refills:  0            Where to get your medicines      Some of these will need a paper prescription and others can be bought over the counter.  Ask your nurse if you have questions.     You don't need a prescription for these medications     ACE/ARB/ARNI NOT PRESCRIBED (INTENTIONAL)                Primary Care Provider Office Phone # Fax #    Deisy SPRING Vidla [phone] [address] [facility] [address]        Equal Access to Services     [facility] : Hadii steve gomeso Sovolodymyr, waaxda luqadaha, qaybta kaalmada roxanada, sagar myrick. So [facility] [phone].    ATENCIÓN: Si habla español, tiene a jeffries disposición servicios gratuitos de asistencia lingüística. Llame al  [phone].    We comply with applicable federal civil rights laws and [place] laws. We do not discriminate on the basis of race, color, national origin, age, disability, sex, sexual orientation, or gender identity.            Thank you!     Thank you for choosing [facility]  for your care. Our goal is always to provide you with excellent care. Hearing back from our patients is one way we can continue to improve our services. Please take a few minutes to complete the written survey that you may receive in the mail after your visit with us. Thank you!             Your Updated Medication List - Protect others around you: Learn how to safely use, store and throw away your medicines at www.disposemymeds.org.          This list is accurate as of 4/19/18 11:29 AM.  Always use your most recent med list.                   Brand Name Dispense Instructions for use Diagnosis    ACE/ARB/ARNI NOT PRESCRIBED (INTENTIONAL)      Please choose reason not prescribed, below    Elevated blood pressure reading without diagnosis of hypertension, History of stroke       acetaminophen 325 MG tablet    TYLENOL    100 tablet    Take 2 tablets (650 mg) by mouth every 4 hours as needed for mild pain        albuterol 108 (90 Base) MCG/ACT Inhaler    PROAIR HFA/PROVENTIL HFA/VENTOLIN HFA    1 Inhaler    Inhale 2 puffs into the lungs every 6 hours as needed for shortness of breath / dyspnea    Chronic bronchitis, unspecified chronic bronchitis type (H)       atorvastatin 10 MG tablet    LIPITOR    90 tablet    Take 1 tablet (10 mg) by mouth At Bedtime    PAD (peripheral artery disease) (H)       BOOST     90 each    Take 1 Bottle by mouth 3 times daily (with meals)    History of stroke, Ischemic cardiomyopathy, Panlobular emphysema (H), Malaise, Loss of weight       budesonide-formoterol 160-4.5 MCG/ACT Inhaler    SYMBICORT    1 Inhaler    INHALE TWO PUFFS BY MOUTH TWICE A DAY    Chronic bronchitis, unspecified chronic  bronchitis type (H)       clopidogrel 75 MG tablet    PLAVIX    90 tablet    Take 1 tablet (75 mg) by mouth daily    Acute CVA (cerebrovascular accident) (H)       COQ10 PO      Take 100 mg by mouth every 24 hours (at 16:00)        ipratropium - albuterol 0.5 mg/2.5 mg/3 mL 0.5-2.5 (3) MG/3ML neb solution    DUONEB    270 mL    USE 1 VIAL IN NEBULIZER FOUR TIMES A DAY    COPD exacerbation (H)       metoprolol succinate 25 MG 24 hr tablet    TOPROL-XL    45 tablet    Take 1 tablet (25 mg) by mouth daily    ST elevation myocardial infarction involving left anterior descending (LAD) coronary artery (H)       montelukast 10 MG tablet    SINGULAIR    30 tablet    Take 1 tablet (10 mg) by mouth At Bedtime    Chronic bronchitis, unspecified chronic bronchitis type (H)       nicotine 7 MG/24HR 24 hr patch    NICODERM CQ    30 patch    Place 1 patch onto the skin every 24 hours    Cigarette nicotine dependence without complication       order for DME     1 Device    Equipment being ordered: Nebulizer    Pulmonary emphysema, unspecified emphysema type (H)       OSCAL 500/200 D-3 PO      Take 1 tablet by mouth 2 times daily        polyethylene glycol powder    MIRALAX    510 g    Take 17 g (1 capful) by mouth daily    Constipation, unspecified constipation type       SPIRIVA HANDIHALER 18 MCG capsule   Generic drug:  tiotropium     90 capsule    USING THE HANDIHALER, INHALE ONE CAPSULE BY MOUTH EVERY DAY    Chronic bronchitis, unspecified chronic bronchitis type (H)

## 2018-04-20 ENCOUNTER — TELEPHONE (OUTPATIENT)
Dept: FAMILY MEDICINE | Facility: OTHER | Age: 68
End: 2018-04-20

## 2018-04-20 NOTE — TELEPHONE ENCOUNTER
Attempted to reach pt but just heard busy signal.  Please try again later and inform her of her results below.  Otis Reynolds, YANIRA Álvarez:  Improved labs are noted I would recommend that we recheck this in about 2 weeks.  Please continue to take the supplements as directed.  Electronically signed:    Vince Daniels PA-C

## 2018-04-23 ENCOUNTER — TELEPHONE (OUTPATIENT)
Dept: FAMILY MEDICINE | Facility: OTHER | Age: 68
End: 2018-04-23

## 2018-04-23 DIAGNOSIS — J42 CHRONIC BRONCHITIS, UNSPECIFIED CHRONIC BRONCHITIS TYPE (H): Primary | ICD-10-CM

## 2018-04-23 NOTE — TELEPHONE ENCOUNTER
MA/KIMMIE: please follow up on Neb from 04/02/2018.  Call pharmacy, did they received ordered,  Is it covered there?  Diego Hilario, RN, BSN

## 2018-04-23 NOTE — TELEPHONE ENCOUNTER
Spoke with pt and she stated it is the nebulizer machine not the solution. Spoke with pharmacist today and they do not have a record of even getting signed script but can not fill due to [facility] and will need to send Rx to  [facility] for her. Provider please reprint script and sign. Will fax to  [facility] when done.    Melanie Clemente CMA ([facility])

## 2018-04-23 NOTE — TELEPHONE ENCOUNTER
Reason for Call:  Medication or medication refill:    Do you use a [facility]?  Name of the pharmacy and phone number for the current request:  [facility] - [phone]    Name of the medication requested: nebulizer    Other request: patient states beginning of April she asked for nebulizer to be sent in. Pharmacy has not gotten it yet.   Please call patient    Can we leave a detailed message on this number? YES    Phone number patient can be reached at: Home number on file [phone] (home)    Best Time: any    Call taken on 4/23/2018 at 2:50 PM by Padmini Cheema

## 2018-04-24 ENCOUNTER — TELEPHONE (OUTPATIENT)
Dept: FAMILY MEDICINE | Facility: OTHER | Age: 68
End: 2018-04-24

## 2018-04-24 NOTE — TELEPHONE ENCOUNTER
Reason for Call:  Form, our goal is to have forms completed with 72 hours, however, some forms may require a visit or additional information.    Type of letter, form or note:  medical    Who is the form from?: Home care    Where did the form come from: form was faxed in    What clinic location was the form placed at?: [facility] - [phone]    Where the form was placed: 's Box    What number is listed as a contact on the form?: [phone]       Additional comments: fax [phone]

## 2018-04-25 DIAGNOSIS — Z53.9 DIAGNOSIS NOT YET DEFINED: Primary | ICD-10-CM

## 2018-04-25 NOTE — TELEPHONE ENCOUNTER
Form signed and placed in MA task. Please ask Dr. Diane if he is willing to co-sign or fax to Dr. Henderson for co-sign.   Thanks,  Deisy Carter, CNP

## 2018-04-26 NOTE — TELEPHONE ENCOUNTER
Form has been faxed. A copy is placed in Dr. Diane's faxed folder. Closing encounter.    Ranjana Crews MA

## 2018-05-02 ENCOUNTER — TELEPHONE (OUTPATIENT)
Dept: FAMILY MEDICINE | Facility: OTHER | Age: 68
End: 2018-05-02

## 2018-05-02 DIAGNOSIS — I21.02 ST ELEVATION MYOCARDIAL INFARCTION INVOLVING LEFT ANTERIOR DESCENDING (LAD) CORONARY ARTERY (H): ICD-10-CM

## 2018-05-03 NOTE — TELEPHONE ENCOUNTER
"Requested Prescriptions   Pending Prescriptions Disp Refills     metoprolol succinate (TOPROL-XL) 25 MG 24 hr tablet 45 tablet 3     Sig: Take 1 tablet (25 mg) by mouth daily    Beta-Blockers Protocol Passed    5/3/2018  4:55 PM       Passed - Blood pressure under 140/90 in past 12 months    BP Readings from Last 3 Encounters:   04/19/18 126/62   04/13/18 102/58   04/02/18 170/72          Passed - Patient is age 6 or older       Passed - Recent (12 mo) or future (30 days) visit within the authorizing provider's specialty    Patient had office visit in the last 12 months or has a visit in the next 30 days with authorizing provider or within the authorizing provider's specialty.  See \"Patient Info\" tab in inbasket, or \"Choose Columns\" in Meds & Orders section of the refill encounter.            metoprolol succinate (TOPROL-XL) 25 MG 24 hr tablet  Routing refill request to provider for review/approval because:  Medication is reported/historical    Suad Goncalves RN, BSN           "

## 2018-05-04 RX ORDER — METOPROLOL SUCCINATE 25 MG/1
25 TABLET, EXTENDED RELEASE ORAL DAILY
Qty: 45 TABLET | Refills: 3 | OUTPATIENT
Start: 2018-05-04

## 2018-05-04 NOTE — TELEPHONE ENCOUNTER
Patient needs follow up visit per last office visit note from Vince Obrien on 4/19/18. She should have refills already of metoprolol.  TAHMINA RobertoC

## 2018-05-04 NOTE — TELEPHONE ENCOUNTER
Spoke with patient informed her that she will need office visit for further refills, she should still have refills on Metoprolol.   Spoke with Harinder at [facility] Rx given over the phone as they do not have record of 3/25/2018 Rx  Closing encounter  Arabella Palmer RT (R)

## 2018-05-16 ENCOUNTER — OFFICE VISIT (OUTPATIENT)
Dept: FAMILY MEDICINE | Facility: OTHER | Age: 68
End: 2018-05-16
Payer: COMMERCIAL

## 2018-05-16 VITALS
TEMPERATURE: 99.4 F | WEIGHT: 103.4 LBS | DIASTOLIC BLOOD PRESSURE: 86 MMHG | RESPIRATION RATE: 24 BRPM | BODY MASS INDEX: 18.32 KG/M2 | HEART RATE: 87 BPM | OXYGEN SATURATION: 93 % | HEIGHT: 63 IN | SYSTOLIC BLOOD PRESSURE: 168 MMHG

## 2018-05-16 DIAGNOSIS — J20.9 ACUTE BRONCHITIS WITH COEXISTING CONDITION REQUIRING PROPHYLACTIC TREATMENT: Primary | ICD-10-CM

## 2018-05-16 DIAGNOSIS — J42 CHRONIC BRONCHITIS, UNSPECIFIED CHRONIC BRONCHITIS TYPE (H): ICD-10-CM

## 2018-05-16 PROCEDURE — 99213 OFFICE O/P EST LOW 20 MIN: CPT | Performed by: STUDENT IN AN ORGANIZED HEALTH CARE EDUCATION/TRAINING PROGRAM

## 2018-05-16 RX ORDER — AZITHROMYCIN 250 MG/1
TABLET, FILM COATED ORAL
Qty: 6 TABLET | Refills: 0 | Status: ON HOLD | OUTPATIENT
Start: 2018-05-16 | End: 2018-05-22

## 2018-05-16 RX ORDER — PREDNISONE 20 MG/1
40 TABLET ORAL DAILY
Qty: 10 TABLET | Refills: 0 | Status: ON HOLD | OUTPATIENT
Start: 2018-05-16 | End: 2018-05-25

## 2018-05-16 RX ORDER — TIOTROPIUM BROMIDE 18 UG/1
18 CAPSULE ORAL; RESPIRATORY (INHALATION) DAILY
Qty: 30 CAPSULE | Refills: 3 | Status: SHIPPED | OUTPATIENT
Start: 2018-05-16 | End: 2018-12-07

## 2018-05-16 ASSESSMENT — PAIN SCALES - GENERAL: PAINLEVEL: NO PAIN (0)

## 2018-05-16 NOTE — PATIENT INSTRUCTIONS
Duoneb four times daily.    Prednisone 40 mg daily.    Azithromycin antibiotic (Z-pack).    Avoid smoking    Eat small meals/snacks every 2-3 hours.    See me back Monday for recheck.    TAHMINA RobertoC

## 2018-05-16 NOTE — MR AVS SNAPSHOT
"              After Visit Summary   5/16/2018    Preeti Ford    MRN: [id number]           Patient Information     Date Of Birth          1950        Visit Information        Provider Department      5/16/2018 10:00 AM Deisy Carter APRN CNP [facility]        Today's Diagnoses     Acute bronchitis with coexisting condition requiring prophylactic treatment    -  1      Care Instructions    Duoneb four times daily.    Prednisone 40 mg daily.    Azithromycin antibiotic (Z-pack).    Avoid smoking    Eat small meals/snacks every 2-3 hours.    See me back Monday for recheck.    Deisy Carter, DRE-C                Follow-ups after your visit        Who to contact     If you have questions or need follow up information about today's clinic visit or your schedule please contact [facility] directly at [phone].  Normal or non-critical lab and imaging results will be communicated to you by [facility], letter or phone within 4 business days after the clinic has received the results. If you do not hear from us within 7 days, please contact the clinic through MyChart or phone. If you have a critical or abnormal lab result, we will notify you by phone as soon as possible.  Submit refill requests through [facility] or call your pharmacy and they will forward the refill request to us. Please allow 3 business days for your refill to be completed.          Additional Information About Your Visit        MyChart Information     [facility]OKT lets you send messages to your doctor, view your test results, renew your prescriptions, schedule appointments and more. To sign up, go to [email or web address] . Click on \"Log in\" on the left side of the screen, which will take you to the Welcome page. Then click on \"Sign up Now\" on the right side of the page.     You will be asked to enter the access code listed below, as well as some personal information. Please follow the directions to create " "your username and password.     Your access code is: [email or web address]  Expires: 2018 10:31 AM     Your access code will  in 90 days. If you need help or a new code, please call your [place] clinic or [phone].        Care EveryWhere ID     This is your Care EveryWhere ID. This could be used by other organizations to access your [place] medical records  [id number]        Your Vitals Were     Pulse Temperature Respirations Height Pulse Oximetry BMI (Body Mass Index)    87 99.4  F (37.4  C) (Temporal) 24 5' 3.07\" (1.602 m) 93% 18.28 kg/m2       Blood Pressure from Last 3 Encounters:   18 168/86   18 126/62   18 102/58    Weight from Last 3 Encounters:   18 103 lb 6.4 oz (46.9 kg)   18 108 lb 3.2 oz (49.1 kg)   18 98 lb 12.8 oz (44.8 kg)              Today, you had the following     No orders found for display         Today's Medication Changes          These changes are accurate as of 18 10:31 AM.  If you have any questions, ask your nurse or doctor.               Start taking these medicines.        Dose/Directions    azithromycin 250 MG tablet   Commonly known as:  ZITHROMAX   Used for:  Acute bronchitis with coexisting condition requiring prophylactic treatment   Started by:  Deisy Carter APRN CNP        Two tablets first day, then one tablet daily for four days.   Quantity:  6 tablet   Refills:  0       predniSONE 20 MG tablet   Commonly known as:  DELTASONE   Used for:  Acute bronchitis with coexisting condition requiring prophylactic treatment   Started by:  Deisy Carter APRN CNP        Dose:  40 mg   Take 2 tablets (40 mg) by mouth daily for 5 days   Quantity:  10 tablet   Refills:  0            Where to get your medicines      These medications were sent to [facility] - [id number]  [address] [address]     Phone:  [phone]     azithromycin 250 MG tablet    " predniSONE 20 MG tablet                Primary Care Provider Office Phone # Fax #    Deisy Carter, [place] [phone] [address] [address] [place] [id number]        Equal Access to Services     LYDIA WASHINGTON : Hadii steve ku hadpoojao Soomaali, waaxda luqadaha, qaybta kaalmada adeegyada, waxgarth vonin haymadhun omar ángel memo myrick. So [facility] [phone].    ATENCIÓN: Si habla español, tiene a jeffries disposición servicios gratuitos de asistencia lingüística. Llame al [phone].    We comply with applicable federal civil rights laws and [place] laws. We do not discriminate on the basis of race, color, national origin, age, disability, sex, sexual orientation, or gender identity.            Thank you!     Thank you for choosing [facility]  for your care. Our goal is always to provide you with excellent care. Hearing back from our patients is one way we can continue to improve our services. Please take a few minutes to complete the written survey that you may receive in the mail after your visit with us. Thank you!             Your Updated Medication List - Protect others around you: Learn how to safely use, store and throw away your medicines at www.disposemymeds.org.          This list is accurate as of 5/16/18 10:31 AM.  Always use your most recent med list.                   Brand Name Dispense Instructions for use Diagnosis    ACE/ARB/ARNI NOT PRESCRIBED (INTENTIONAL)      Please choose reason not prescribed, below    Elevated blood pressure reading without diagnosis of hypertension, History of stroke       acetaminophen 325 MG tablet    TYLENOL    100 tablet    Take 2 tablets (650 mg) by mouth every 4 hours as needed for mild pain        albuterol 108 (90 Base) MCG/ACT Inhaler    PROAIR HFA/PROVENTIL HFA/VENTOLIN HFA    1 Inhaler    Inhale 2 puffs into the lungs every 6 hours as needed for shortness of breath / dyspnea    Chronic bronchitis, unspecified chronic bronchitis  type (H)       atorvastatin 10 MG tablet    LIPITOR    90 tablet    Take 1 tablet (10 mg) by mouth At Bedtime    PAD (peripheral artery disease) (H)       azithromycin 250 MG tablet    ZITHROMAX    6 tablet    Two tablets first day, then one tablet daily for four days.    Acute bronchitis with coexisting condition requiring prophylactic treatment       BOOST     90 each    Take 1 Bottle by mouth 3 times daily (with meals)    History of stroke, Ischemic cardiomyopathy, Panlobular emphysema (H), Malaise, Loss of weight       budesonide-formoterol 160-4.5 MCG/ACT Inhaler    SYMBICORT    1 Inhaler    INHALE TWO PUFFS BY MOUTH TWICE A DAY    Chronic bronchitis, unspecified chronic bronchitis type (H)       clopidogrel 75 MG tablet    PLAVIX    90 tablet    Take 1 tablet (75 mg) by mouth daily    Acute CVA (cerebrovascular accident) (H)       COQ10 PO      Take 100 mg by mouth every 24 hours (at 16:00)        ipratropium - albuterol 0.5 mg/2.5 mg/3 mL 0.5-2.5 (3) MG/3ML neb solution    DUONEB    270 mL    USE 1 VIAL IN NEBULIZER FOUR TIMES A DAY    COPD exacerbation (H)       metoprolol succinate 25 MG 24 hr tablet    TOPROL-XL    45 tablet    Take 1 tablet (25 mg) by mouth daily    ST elevation myocardial infarction involving left anterior descending (LAD) coronary artery (H)       montelukast 10 MG tablet    SINGULAIR    30 tablet    Take 1 tablet (10 mg) by mouth At Bedtime    Chronic bronchitis, unspecified chronic bronchitis type (H)       nicotine 7 MG/24HR 24 hr patch    NICODERM CQ    30 patch    Place 1 patch onto the skin every 24 hours    Cigarette nicotine dependence without complication       order for DME     1 Device    Equipment being ordered: Nebulizer    Pulmonary emphysema, unspecified emphysema type (H)       order for DME     1 Device    Equipment being ordered: Nebulizer machine    Chronic bronchitis, unspecified chronic bronchitis type (H)       OSCAL 500/200 D-3 PO      Take 1 tablet by mouth 2  times daily        polyethylene glycol powder    MIRALAX    510 g    Take 17 g (1 capful) by mouth daily    Constipation, unspecified constipation type       predniSONE 20 MG tablet    DELTASONE    10 tablet    Take 2 tablets (40 mg) by mouth daily for 5 days    Acute bronchitis with coexisting condition requiring prophylactic treatment       SPIRIVA HANDIHALER 18 MCG capsule   Generic drug:  tiotropium     90 capsule    USING THE HANDIHALER, INHALE ONE CAPSULE BY MOUTH EVERY DAY    Chronic bronchitis, unspecified chronic bronchitis type (H)

## 2018-05-16 NOTE — PROGRESS NOTES
"  SUBJECTIVE:   Preeti Ford is a 67 year old female who presents to clinic today for the following health issues:      HPI  Acute Illness   Acute illness concerns: URI  Onset: Few days    Fever: YES- low grade    Chills/Sweats: YES- Chills    Headache (location?): YES- little    Sinus Pressure:no    Conjunctivitis:  no    Ear Pain: no    Rhinorrhea: YES- intermittent    Congestion: YES    Sore Throat: no     Cough: YES-productive of yellow sputum, with shortness of breath    Wheeze: no     Decreased Appetite: YES    Nausea: no    Vomiting: no    Diarrhea:  no    Dysuria/Freq.: no    Fatigue/Achiness: YES    Sick/Strep Exposure: YES-  had cold     Therapies Tried and outcome: None    Problem list and histories reviewed & adjusted, as indicated.  Additional history: as documented    BP Readings from Last 3 Encounters:   05/16/18 168/86   04/19/18 126/62   04/13/18 102/58    Wt Readings from Last 3 Encounters:   05/16/18 103 lb 6.4 oz (46.9 kg)   04/19/18 108 lb 3.2 oz (49.1 kg)   04/13/18 98 lb 12.8 oz (44.8 kg)               Labs reviewed in EPIC    ROS:  Constitutional, HEENT, cardiovascular, pulmonary, gi and gu systems are negative, except as otherwise noted.    OBJECTIVE:     /86  Pulse 87  Temp 99.4  F (37.4  C) (Temporal)  Resp 24  Ht 5' 3.07\" (1.602 m)  Wt 103 lb 6.4 oz (46.9 kg)  SpO2 93%  BMI 18.28 kg/m2  Body mass index is 18.28 kg/(m^2).  GENERAL: dyspneic, alert and no acute distress  EYES: Eyes grossly normal to inspection, PERRL and conjunctivae and sclerae normal  NECK: no adenopathy, no asymmetry, masses, or scars and thyroid normal to palpation  RESP: diminished lung sounds on right, inspiratory and expiratory wheeze left upper and lower lobes, no rhonchi  CV: regular rate and rhythm, normal S1 S2, no S3 or S4, no murmur, click or rub, no peripheral edema  MS: no gross musculoskeletal defects noted, no edema  SKIN: no suspicious lesions or rashes  NEURO: Normal strength and " tone, mentation intact and speech normal  PSYCH: mentation appears normal, affect normal/bright    Diagnostic Test Results:  none     ASSESSMENT/PLAN:     1. Acute bronchitis with coexisting condition requiring prophylactic treatment  Treat with oral steroid and antibiotic. Will hold off on CXR to avoid radiation exposure as results will not change plan of care. Recommend avoid smoking, Duoneb 4 times daily. Follow up on Monday of next week for recheck.   - predniSONE (DELTASONE) 20 MG tablet; Take 2 tablets (40 mg) by mouth daily for 5 days  Dispense: 10 tablet; Refill: 0  - azithromycin (ZITHROMAX) 250 MG tablet; Two tablets first day, then one tablet daily for four days.  Dispense: 6 tablet; Refill: 0    2. Chronic bronchitis, unspecified chronic bronchitis type (H)  - tiotropium (SPIRIVA HANDIHALER) 18 MCG capsule; Inhale 1 capsule (18 mcg) into the lungs daily  Dispense: 30 capsule; Refill: 3    [place] [facility]

## 2018-05-17 ENCOUNTER — APPOINTMENT (OUTPATIENT)
Dept: GENERAL RADIOLOGY | Facility: CLINIC | Age: 68
DRG: 246 | End: 2018-05-17
Attending: INTERNAL MEDICINE
Payer: MEDICARE

## 2018-05-17 ENCOUNTER — APPOINTMENT (OUTPATIENT)
Dept: CARDIOLOGY | Facility: CLINIC | Age: 68
DRG: 246 | End: 2018-05-17
Attending: INTERNAL MEDICINE
Payer: MEDICARE

## 2018-05-17 ENCOUNTER — HOSPITAL ENCOUNTER (EMERGENCY)
Facility: CLINIC | Age: 68
Discharge: ANOTHER HOSPITAL | End: 2018-05-17
Attending: FAMILY MEDICINE | Admitting: FAMILY MEDICINE
Payer: MEDICARE

## 2018-05-17 ENCOUNTER — APPOINTMENT (OUTPATIENT)
Dept: GENERAL RADIOLOGY | Facility: CLINIC | Age: 68
End: 2018-05-17
Attending: FAMILY MEDICINE
Payer: MEDICARE

## 2018-05-17 ENCOUNTER — HOSPITAL ENCOUNTER (INPATIENT)
Facility: CLINIC | Age: 68
LOS: 9 days | Discharge: NURSING, REHAB OR LONG-TERM CARE | DRG: 246 | End: 2018-05-26
Attending: INTERNAL MEDICINE | Admitting: INTERNAL MEDICINE
Payer: MEDICARE

## 2018-05-17 VITALS
RESPIRATION RATE: 14 BRPM | DIASTOLIC BLOOD PRESSURE: 55 MMHG | SYSTOLIC BLOOD PRESSURE: 90 MMHG | OXYGEN SATURATION: 99 %

## 2018-05-17 DIAGNOSIS — J44.1 COPD EXACERBATION (H): ICD-10-CM

## 2018-05-17 DIAGNOSIS — R79.89 ELEVATED TROPONIN: ICD-10-CM

## 2018-05-17 DIAGNOSIS — K21.9 GASTROESOPHAGEAL REFLUX DISEASE, ESOPHAGITIS PRESENCE NOT SPECIFIED: ICD-10-CM

## 2018-05-17 DIAGNOSIS — R79.89 ELEVATED BRAIN NATRIURETIC PEPTIDE (BNP) LEVEL: ICD-10-CM

## 2018-05-17 DIAGNOSIS — K59.00 CONSTIPATION, UNSPECIFIED CONSTIPATION TYPE: ICD-10-CM

## 2018-05-17 DIAGNOSIS — J96.02 ACUTE RESPIRATORY FAILURE WITH HYPOXIA AND HYPERCAPNIA (H): ICD-10-CM

## 2018-05-17 DIAGNOSIS — I25.119 CORONARY ARTERY DISEASE INVOLVING NATIVE CORONARY ARTERY OF NATIVE HEART WITH ANGINA PECTORIS (H): Primary | ICD-10-CM

## 2018-05-17 DIAGNOSIS — J96.01 ACUTE RESPIRATORY FAILURE WITH HYPOXIA AND HYPERCAPNIA (H): ICD-10-CM

## 2018-05-17 PROBLEM — J96.92 HYPERCAPNIC RESPIRATORY FAILURE (H): Status: ACTIVE | Noted: 2018-05-17

## 2018-05-17 LAB
ALBUMIN SERPL-MCNC: 3.6 G/DL (ref 3.4–5)
ALBUMIN UR-MCNC: 100 MG/DL
ALP SERPL-CCNC: 90 U/L (ref 40–150)
ALT SERPL W P-5'-P-CCNC: 48 U/L (ref 0–50)
ANION GAP SERPL CALCULATED.3IONS-SCNC: 7 MMOL/L (ref 3–14)
APPEARANCE UR: ABNORMAL
AST SERPL W P-5'-P-CCNC: 54 U/L (ref 0–45)
BACTERIA #/AREA URNS HPF: ABNORMAL /HPF
BASE DEFICIT BLDV-SCNC: 0.4 MMOL/L
BASE DEFICIT BLDV-SCNC: 2.1 MMOL/L
BASE EXCESS BLDA CALC-SCNC: 1.4 MMOL/L
BASE EXCESS BLDV CALC-SCNC: 6.6 MMOL/L
BASOPHILS # BLD AUTO: 0.1 10E9/L (ref 0–0.2)
BASOPHILS NFR BLD AUTO: 0.5 %
BILIRUB SERPL-MCNC: 0.4 MG/DL (ref 0.2–1.3)
BILIRUB UR QL STRIP: NEGATIVE
BUN SERPL-MCNC: 14 MG/DL (ref 7–30)
CALCIUM SERPL-MCNC: 8.4 MG/DL (ref 8.5–10.1)
CHLORIDE SERPL-SCNC: 100 MMOL/L (ref 94–109)
CO2 SERPL-SCNC: 30 MMOL/L (ref 20–32)
COLOR UR AUTO: ABNORMAL
CREAT SERPL-MCNC: 0.7 MG/DL (ref 0.52–1.04)
DIFFERENTIAL METHOD BLD: ABNORMAL
EOSINOPHIL # BLD AUTO: 0 10E9/L (ref 0–0.7)
EOSINOPHIL NFR BLD AUTO: 0 %
ERYTHROCYTE [DISTWIDTH] IN BLOOD BY AUTOMATED COUNT: 13.5 % (ref 10–15)
ERYTHROCYTE [DISTWIDTH] IN BLOOD BY AUTOMATED COUNT: 13.9 % (ref 10–15)
GFR SERPL CREATININE-BSD FRML MDRD: 85 ML/MIN/1.7M2
GLUCOSE BLDC GLUCOMTR-MCNC: 143 MG/DL (ref 70–99)
GLUCOSE BLDC GLUCOMTR-MCNC: 144 MG/DL (ref 70–99)
GLUCOSE BLDC GLUCOMTR-MCNC: 178 MG/DL (ref 70–99)
GLUCOSE SERPL-MCNC: 148 MG/DL (ref 70–99)
GLUCOSE UR STRIP-MCNC: 50 MG/DL
GRAM STN SPEC: NORMAL
GRAM STN SPEC: NORMAL
GRAN CASTS #/AREA URNS LPF: 69 /LPF
HCO3 BLD-SCNC: 29 MMOL/L (ref 21–28)
HCO3 BLDV-SCNC: 31 MMOL/L (ref 21–28)
HCO3 BLDV-SCNC: 32 MMOL/L (ref 21–28)
HCO3 BLDV-SCNC: 33 MMOL/L (ref 21–28)
HCT VFR BLD AUTO: 46.3 % (ref 35–47)
HCT VFR BLD AUTO: 52.7 % (ref 35–47)
HGB BLD-MCNC: 15.3 G/DL (ref 11.7–15.7)
HGB BLD-MCNC: 17.4 G/DL (ref 11.7–15.7)
HGB UR QL STRIP: NEGATIVE
HYALINE CASTS #/AREA URNS LPF: 160 /LPF (ref 0–2)
IMM GRANULOCYTES # BLD: 0 10E9/L (ref 0–0.4)
IMM GRANULOCYTES NFR BLD: 0.1 %
INR PPP: 0.91 (ref 0.86–1.14)
KETONES UR STRIP-MCNC: NEGATIVE MG/DL
LACTATE BLD-SCNC: 1.6 MMOL/L (ref 0.7–2)
LEUKOCYTE ESTERASE UR QL STRIP: NEGATIVE
LMWH PPP CHRO-ACNC: 0.35 IU/ML
LYMPHOCYTES # BLD AUTO: 2.2 10E9/L (ref 0.8–5.3)
LYMPHOCYTES NFR BLD AUTO: 22.6 %
MAGNESIUM SERPL-MCNC: 1.9 MG/DL (ref 1.6–2.3)
MCH RBC QN AUTO: 29.6 PG (ref 26.5–33)
MCH RBC QN AUTO: 29.8 PG (ref 26.5–33)
MCHC RBC AUTO-ENTMCNC: 33 G/DL (ref 31.5–36.5)
MCHC RBC AUTO-ENTMCNC: 33 G/DL (ref 31.5–36.5)
MCV RBC AUTO: 90 FL (ref 78–100)
MCV RBC AUTO: 90 FL (ref 78–100)
MIXED CELL CASTS #/AREA URNS LPF: 3 /LPF
MONOCYTES # BLD AUTO: 1.4 10E9/L (ref 0–1.3)
MONOCYTES NFR BLD AUTO: 14.3 %
MRSA DNA SPEC QL NAA+PROBE: NEGATIVE
MUCOUS THREADS #/AREA URNS LPF: PRESENT /LPF
NEUTROPHILS # BLD AUTO: 6.2 10E9/L (ref 1.6–8.3)
NEUTROPHILS NFR BLD AUTO: 62.5 %
NITRATE UR QL: NEGATIVE
NT-PROBNP SERPL-MCNC: ABNORMAL PG/ML (ref 0–900)
O2/TOTAL GAS SETTING VFR VENT: 100 %
O2/TOTAL GAS SETTING VFR VENT: 100 %
O2/TOTAL GAS SETTING VFR VENT: 50 %
OXYHGB MFR BLD: 98 % (ref 92–100)
OXYHGB MFR BLDV: 82 %
PCO2 BLD: 55 MM HG (ref 35–45)
PCO2 BLDV: 104 MM HG (ref 40–50)
PCO2 BLDV: 54 MM HG (ref 40–50)
PCO2 BLDV: 82 MM HG (ref 40–50)
PH BLD: 7.33 PH (ref 7.35–7.45)
PH BLDV: 7.09 PH (ref 7.32–7.43)
PH BLDV: 7.19 PH (ref 7.32–7.43)
PH BLDV: 7.4 PH (ref 7.32–7.43)
PH UR STRIP: 6 PH (ref 5–7)
PHOSPHATE SERPL-MCNC: 3.7 MG/DL (ref 2.5–4.5)
PLATELET # BLD AUTO: 119 10E9/L (ref 150–450)
PLATELET # BLD AUTO: 119 10E9/L (ref 150–450)
PLATELET # BLD AUTO: 192 10E9/L (ref 150–450)
PO2 BLD: 171 MM HG (ref 80–105)
PO2 BLDV: 101 MM HG (ref 25–47)
PO2 BLDV: 47 MM HG (ref 25–47)
PO2 BLDV: 95 MM HG (ref 25–47)
POTASSIUM SERPL-SCNC: 4.8 MMOL/L (ref 3.4–5.3)
PROCALCITONIN SERPL-MCNC: 0.43 NG/ML
PROT SERPL-MCNC: 7.7 G/DL (ref 6.8–8.8)
RBC # BLD AUTO: 5.14 10E12/L (ref 3.8–5.2)
RBC # BLD AUTO: 5.88 10E12/L (ref 3.8–5.2)
RBC #/AREA URNS AUTO: 2 /HPF (ref 0–2)
SODIUM SERPL-SCNC: 137 MMOL/L (ref 133–144)
SOURCE: ABNORMAL
SP GR UR STRIP: 1.02 (ref 1–1.03)
SPECIMEN SOURCE: NORMAL
SPECIMEN SOURCE: NORMAL
TROPONIN I SERPL-MCNC: 0.37 UG/L (ref 0–0.04)
TROPONIN I SERPL-MCNC: 0.5 UG/L (ref 0–0.04)
TROPONIN I SERPL-MCNC: 0.57 UG/L (ref 0–0.04)
UROBILINOGEN UR STRIP-MCNC: 2 MG/DL (ref 0–2)
WBC # BLD AUTO: 7.2 10E9/L (ref 4–11)
WBC # BLD AUTO: 9.9 10E9/L (ref 4–11)
WBC #/AREA URNS AUTO: 8 /HPF (ref 0–5)

## 2018-05-17 PROCEDURE — 25000131 ZZH RX MED GY IP 250 OP 636 PS 637: Mod: GY | Performed by: INTERNAL MEDICINE

## 2018-05-17 PROCEDURE — 96368 THER/DIAG CONCURRENT INF: CPT | Performed by: FAMILY MEDICINE

## 2018-05-17 PROCEDURE — 40000008 ZZH STATISTIC AIRWAY CARE

## 2018-05-17 PROCEDURE — 94640 AIRWAY INHALATION TREATMENT: CPT

## 2018-05-17 PROCEDURE — 31500 INSERT EMERGENCY AIRWAY: CPT | Mod: Z6 | Performed by: FAMILY MEDICINE

## 2018-05-17 PROCEDURE — 93005 ELECTROCARDIOGRAM TRACING: CPT

## 2018-05-17 PROCEDURE — 96365 THER/PROPH/DIAG IV INF INIT: CPT | Mod: 59 | Performed by: FAMILY MEDICINE

## 2018-05-17 PROCEDURE — 93005 ELECTROCARDIOGRAM TRACING: CPT | Performed by: FAMILY MEDICINE

## 2018-05-17 PROCEDURE — 31500 INSERT EMERGENCY AIRWAY: CPT | Performed by: FAMILY MEDICINE

## 2018-05-17 PROCEDURE — 87205 SMEAR GRAM STAIN: CPT | Performed by: INTERNAL MEDICINE

## 2018-05-17 PROCEDURE — 84100 ASSAY OF PHOSPHORUS: CPT | Performed by: INTERNAL MEDICINE

## 2018-05-17 PROCEDURE — 80053 COMPREHEN METABOLIC PANEL: CPT | Performed by: FAMILY MEDICINE

## 2018-05-17 PROCEDURE — 76604 US EXAM CHEST: CPT | Performed by: FAMILY MEDICINE

## 2018-05-17 PROCEDURE — 85025 COMPLETE CBC W/AUTO DIFF WBC: CPT | Performed by: FAMILY MEDICINE

## 2018-05-17 PROCEDURE — 25000128 H RX IP 250 OP 636: Performed by: FAMILY MEDICINE

## 2018-05-17 PROCEDURE — 40000986 XR CHEST PORT 1 VW

## 2018-05-17 PROCEDURE — 25000125 ZZHC RX 250: Performed by: INTERNAL MEDICINE

## 2018-05-17 PROCEDURE — 3E033XZ INTRODUCTION OF VASOPRESSOR INTO PERIPHERAL VEIN, PERCUTANEOUS APPROACH: ICD-10-PCS | Performed by: INTERNAL MEDICINE

## 2018-05-17 PROCEDURE — 83605 ASSAY OF LACTIC ACID: CPT | Performed by: FAMILY MEDICINE

## 2018-05-17 PROCEDURE — 99291 CRITICAL CARE FIRST HOUR: CPT | Mod: 25 | Performed by: INTERNAL MEDICINE

## 2018-05-17 PROCEDURE — 25000125 ZZHC RX 250

## 2018-05-17 PROCEDURE — 99292 CRITICAL CARE ADDL 30 MIN: CPT | Mod: 25 | Performed by: FAMILY MEDICINE

## 2018-05-17 PROCEDURE — 87641 MR-STAPH DNA AMP PROBE: CPT | Performed by: INTERNAL MEDICINE

## 2018-05-17 PROCEDURE — 93005 ELECTROCARDIOGRAM TRACING: CPT | Mod: 76 | Performed by: FAMILY MEDICINE

## 2018-05-17 PROCEDURE — 25000128 H RX IP 250 OP 636: Performed by: INTERNAL MEDICINE

## 2018-05-17 PROCEDURE — 94640 AIRWAY INHALATION TREATMENT: CPT | Mod: 76

## 2018-05-17 PROCEDURE — 96366 THER/PROPH/DIAG IV INF ADDON: CPT | Performed by: FAMILY MEDICINE

## 2018-05-17 PROCEDURE — 36556 INSERT NON-TUNNEL CV CATH: CPT | Performed by: INTERNAL MEDICINE

## 2018-05-17 PROCEDURE — 36600 WITHDRAWAL OF ARTERIAL BLOOD: CPT

## 2018-05-17 PROCEDURE — 84145 PROCALCITONIN (PCT): CPT | Performed by: INTERNAL MEDICINE

## 2018-05-17 PROCEDURE — 87040 BLOOD CULTURE FOR BACTERIA: CPT | Mod: 91 | Performed by: FAMILY MEDICINE

## 2018-05-17 PROCEDURE — 82805 BLOOD GASES W/O2 SATURATION: CPT | Performed by: INTERNAL MEDICINE

## 2018-05-17 PROCEDURE — 99222 1ST HOSP IP/OBS MODERATE 55: CPT | Mod: 25 | Performed by: INTERNAL MEDICINE

## 2018-05-17 PROCEDURE — 93308 TTE F-UP OR LMTD: CPT | Mod: 26 | Performed by: INTERNAL MEDICINE

## 2018-05-17 PROCEDURE — 25000132 ZZH RX MED GY IP 250 OP 250 PS 637: Mod: GY | Performed by: INTERNAL MEDICINE

## 2018-05-17 PROCEDURE — 94002 VENT MGMT INPAT INIT DAY: CPT

## 2018-05-17 PROCEDURE — 99292 CRITICAL CARE ADDL 30 MIN: CPT | Mod: 25 | Performed by: INTERNAL MEDICINE

## 2018-05-17 PROCEDURE — 93010 ELECTROCARDIOGRAM REPORT: CPT | Performed by: INTERNAL MEDICINE

## 2018-05-17 PROCEDURE — 94645 CONT INHLJ TX EACH ADDL HOUR: CPT | Performed by: FAMILY MEDICINE

## 2018-05-17 PROCEDURE — 93325 DOPPLER ECHO COLOR FLOW MAPG: CPT | Mod: 26 | Performed by: INTERNAL MEDICINE

## 2018-05-17 PROCEDURE — 20000003 ZZH R&B ICU

## 2018-05-17 PROCEDURE — 94640 AIRWAY INHALATION TREATMENT: CPT | Performed by: FAMILY MEDICINE

## 2018-05-17 PROCEDURE — A9270 NON-COVERED ITEM OR SERVICE: HCPCS | Mod: GY | Performed by: FAMILY MEDICINE

## 2018-05-17 PROCEDURE — 40000270 ZZH STATISTIC OXYGEN  O2DAILY TECH TIME

## 2018-05-17 PROCEDURE — 40000986 XR ABDOMEN PORT 1 VW

## 2018-05-17 PROCEDURE — 36415 COLL VENOUS BLD VENIPUNCTURE: CPT | Performed by: INTERNAL MEDICINE

## 2018-05-17 PROCEDURE — 87077 CULTURE AEROBIC IDENTIFY: CPT | Performed by: INTERNAL MEDICINE

## 2018-05-17 PROCEDURE — 87640 STAPH A DNA AMP PROBE: CPT | Performed by: INTERNAL MEDICINE

## 2018-05-17 PROCEDURE — 51702 INSERT TEMP BLADDER CATH: CPT

## 2018-05-17 PROCEDURE — 93321 DOPPLER ECHO F-UP/LMTD STD: CPT | Mod: 26 | Performed by: INTERNAL MEDICINE

## 2018-05-17 PROCEDURE — 99291 CRITICAL CARE FIRST HOUR: CPT | Mod: 25 | Performed by: FAMILY MEDICINE

## 2018-05-17 PROCEDURE — 85610 PROTHROMBIN TIME: CPT | Performed by: INTERNAL MEDICINE

## 2018-05-17 PROCEDURE — 94644 CONT INHLJ TX 1ST HOUR: CPT | Mod: 59 | Performed by: FAMILY MEDICINE

## 2018-05-17 PROCEDURE — 40000274 ZZH STATISTIC RCP CONSULT EA 30 MIN

## 2018-05-17 PROCEDURE — 87186 SC STD MICRODIL/AGAR DIL: CPT | Performed by: INTERNAL MEDICINE

## 2018-05-17 PROCEDURE — 85520 HEPARIN ASSAY: CPT | Performed by: INTERNAL MEDICINE

## 2018-05-17 PROCEDURE — 00000146 ZZHCL STATISTIC GLUCOSE BY METER IP

## 2018-05-17 PROCEDURE — 40000275 ZZH STATISTIC RCP TIME EA 10 MIN

## 2018-05-17 PROCEDURE — 76604 US EXAM CHEST: CPT | Mod: 26 | Performed by: FAMILY MEDICINE

## 2018-05-17 PROCEDURE — 93010 ELECTROCARDIOGRAM REPORT: CPT | Mod: 59 | Performed by: FAMILY MEDICINE

## 2018-05-17 PROCEDURE — 82803 BLOOD GASES ANY COMBINATION: CPT | Mod: 91 | Performed by: FAMILY MEDICINE

## 2018-05-17 PROCEDURE — 87070 CULTURE OTHR SPECIMN AEROBIC: CPT | Performed by: INTERNAL MEDICINE

## 2018-05-17 PROCEDURE — 25000125 ZZHC RX 250: Performed by: FAMILY MEDICINE

## 2018-05-17 PROCEDURE — 81001 URINALYSIS AUTO W/SCOPE: CPT | Performed by: FAMILY MEDICINE

## 2018-05-17 PROCEDURE — A9270 NON-COVERED ITEM OR SERVICE: HCPCS | Mod: GY | Performed by: INTERNAL MEDICINE

## 2018-05-17 PROCEDURE — 25000128 H RX IP 250 OP 636

## 2018-05-17 PROCEDURE — 93321 DOPPLER ECHO F-UP/LMTD STD: CPT

## 2018-05-17 PROCEDURE — 85027 COMPLETE CBC AUTOMATED: CPT | Performed by: INTERNAL MEDICINE

## 2018-05-17 PROCEDURE — 83735 ASSAY OF MAGNESIUM: CPT | Performed by: INTERNAL MEDICINE

## 2018-05-17 PROCEDURE — 25000132 ZZH RX MED GY IP 250 OP 250 PS 637: Mod: GY | Performed by: FAMILY MEDICINE

## 2018-05-17 PROCEDURE — 96375 TX/PRO/DX INJ NEW DRUG ADDON: CPT | Performed by: FAMILY MEDICINE

## 2018-05-17 PROCEDURE — 83880 ASSAY OF NATRIURETIC PEPTIDE: CPT | Performed by: FAMILY MEDICINE

## 2018-05-17 PROCEDURE — 84484 ASSAY OF TROPONIN QUANT: CPT | Performed by: FAMILY MEDICINE

## 2018-05-17 PROCEDURE — 84484 ASSAY OF TROPONIN QUANT: CPT | Performed by: INTERNAL MEDICINE

## 2018-05-17 PROCEDURE — 5A1945Z RESPIRATORY VENTILATION, 24-96 CONSECUTIVE HOURS: ICD-10-PCS | Performed by: INTERNAL MEDICINE

## 2018-05-17 RX ORDER — FENTANYL CITRATE 50 UG/ML
100 INJECTION, SOLUTION INTRAMUSCULAR; INTRAVENOUS ONCE
Status: COMPLETED | OUTPATIENT
Start: 2018-05-17 | End: 2018-05-17

## 2018-05-17 RX ORDER — IPRATROPIUM BROMIDE AND ALBUTEROL SULFATE 2.5; .5 MG/3ML; MG/3ML
SOLUTION RESPIRATORY (INHALATION)
Status: COMPLETED
Start: 2018-05-17 | End: 2018-05-17

## 2018-05-17 RX ORDER — SODIUM CHLORIDE, SODIUM LACTATE, POTASSIUM CHLORIDE, CALCIUM CHLORIDE 600; 310; 30; 20 MG/100ML; MG/100ML; MG/100ML; MG/100ML
INJECTION, SOLUTION INTRAVENOUS CONTINUOUS
Status: DISCONTINUED | OUTPATIENT
Start: 2018-05-17 | End: 2018-05-20

## 2018-05-17 RX ORDER — VECURONIUM BROMIDE 1 MG/ML
5 INJECTION, POWDER, LYOPHILIZED, FOR SOLUTION INTRAVENOUS ONCE
Status: COMPLETED | OUTPATIENT
Start: 2018-05-17 | End: 2018-05-17

## 2018-05-17 RX ORDER — ALBUTEROL SULFATE 0.83 MG/ML
2.5 SOLUTION RESPIRATORY (INHALATION)
Status: DISCONTINUED | OUTPATIENT
Start: 2018-05-17 | End: 2018-05-26 | Stop reason: HOSPADM

## 2018-05-17 RX ORDER — HYDROMORPHONE HYDROCHLORIDE 1 MG/ML
0.2 INJECTION, SOLUTION INTRAMUSCULAR; INTRAVENOUS; SUBCUTANEOUS
Status: DISCONTINUED | OUTPATIENT
Start: 2018-05-17 | End: 2018-05-26 | Stop reason: HOSPADM

## 2018-05-17 RX ORDER — NITROGLYCERIN 20 MG/100ML
2 INJECTION INTRAVENOUS CONTINUOUS
Status: DISCONTINUED | OUTPATIENT
Start: 2018-05-17 | End: 2018-05-17 | Stop reason: HOSPADM

## 2018-05-17 RX ORDER — PROPOFOL 10 MG/ML
5-75 INJECTION, EMULSION INTRAVENOUS CONTINUOUS
Status: DISCONTINUED | OUTPATIENT
Start: 2018-05-17 | End: 2018-05-17 | Stop reason: HOSPADM

## 2018-05-17 RX ORDER — NALOXONE HYDROCHLORIDE 0.4 MG/ML
.1-.4 INJECTION, SOLUTION INTRAMUSCULAR; INTRAVENOUS; SUBCUTANEOUS
Status: DISCONTINUED | OUTPATIENT
Start: 2018-05-17 | End: 2018-05-26 | Stop reason: HOSPADM

## 2018-05-17 RX ORDER — CHLORHEXIDINE GLUCONATE ORAL RINSE 1.2 MG/ML
15 SOLUTION DENTAL EVERY 12 HOURS
Status: DISCONTINUED | OUTPATIENT
Start: 2018-05-17 | End: 2018-05-21

## 2018-05-17 RX ORDER — ATORVASTATIN CALCIUM 10 MG/1
10 TABLET, FILM COATED ORAL AT BEDTIME
Status: DISCONTINUED | OUTPATIENT
Start: 2018-05-17 | End: 2018-05-26 | Stop reason: HOSPADM

## 2018-05-17 RX ORDER — NALOXONE HYDROCHLORIDE 0.4 MG/ML
.1-.4 INJECTION, SOLUTION INTRAMUSCULAR; INTRAVENOUS; SUBCUTANEOUS
Status: DISCONTINUED | OUTPATIENT
Start: 2018-05-17 | End: 2018-05-17

## 2018-05-17 RX ORDER — METHYLPREDNISOLONE SODIUM SUCCINATE 40 MG/ML
40 INJECTION, POWDER, LYOPHILIZED, FOR SOLUTION INTRAMUSCULAR; INTRAVENOUS DAILY
Status: DISCONTINUED | OUTPATIENT
Start: 2018-05-17 | End: 2018-05-21

## 2018-05-17 RX ORDER — ALBUTEROL SULFATE 5 MG/ML
10 SOLUTION, NON-ORAL INHALATION CONTINUOUS PRN
Status: DISPENSED | OUTPATIENT
Start: 2018-05-17 | End: 2018-05-17

## 2018-05-17 RX ORDER — NITROGLYCERIN 0.4 MG/1
0.4 TABLET SUBLINGUAL EVERY 5 MIN PRN
Status: DISCONTINUED | OUTPATIENT
Start: 2018-05-17 | End: 2018-05-17 | Stop reason: HOSPADM

## 2018-05-17 RX ORDER — FUROSEMIDE 10 MG/ML
40 INJECTION INTRAMUSCULAR; INTRAVENOUS ONCE
Status: COMPLETED | OUTPATIENT
Start: 2018-05-17 | End: 2018-05-17

## 2018-05-17 RX ORDER — METHYLPREDNISOLONE SODIUM SUCCINATE 125 MG/2ML
125 INJECTION, POWDER, LYOPHILIZED, FOR SOLUTION INTRAMUSCULAR; INTRAVENOUS ONCE
Status: COMPLETED | OUTPATIENT
Start: 2018-05-17 | End: 2018-05-17

## 2018-05-17 RX ORDER — ASPIRIN 81 MG/1
81 TABLET, CHEWABLE ORAL DAILY
Status: DISCONTINUED | OUTPATIENT
Start: 2018-05-17 | End: 2018-05-18 | Stop reason: DRUGHIGH

## 2018-05-17 RX ORDER — FENTANYL CITRATE 50 UG/ML
INJECTION, SOLUTION INTRAMUSCULAR; INTRAVENOUS
Status: COMPLETED
Start: 2018-05-17 | End: 2018-05-17

## 2018-05-17 RX ORDER — POLYETHYLENE GLYCOL 3350 17 G/17G
17 POWDER, FOR SOLUTION ORAL DAILY
Status: DISCONTINUED | OUTPATIENT
Start: 2018-05-17 | End: 2018-05-26 | Stop reason: HOSPADM

## 2018-05-17 RX ORDER — DEXTROSE MONOHYDRATE 25 G/50ML
25-50 INJECTION, SOLUTION INTRAVENOUS
Status: DISCONTINUED | OUTPATIENT
Start: 2018-05-17 | End: 2018-05-26 | Stop reason: HOSPADM

## 2018-05-17 RX ORDER — CEFTAZIDIME 1 G/1
1 INJECTION, POWDER, FOR SOLUTION INTRAMUSCULAR; INTRAVENOUS ONCE
Status: COMPLETED | OUTPATIENT
Start: 2018-05-17 | End: 2018-05-17

## 2018-05-17 RX ORDER — CLOPIDOGREL BISULFATE 75 MG/1
75 TABLET ORAL DAILY
Status: DISCONTINUED | OUTPATIENT
Start: 2018-05-17 | End: 2018-05-26 | Stop reason: HOSPADM

## 2018-05-17 RX ORDER — IPRATROPIUM BROMIDE AND ALBUTEROL SULFATE 2.5; .5 MG/3ML; MG/3ML
3 SOLUTION RESPIRATORY (INHALATION) EVERY 4 HOURS
Status: DISCONTINUED | OUTPATIENT
Start: 2018-05-17 | End: 2018-05-24

## 2018-05-17 RX ORDER — NICOTINE POLACRILEX 4 MG
15-30 LOZENGE BUCCAL
Status: DISCONTINUED | OUTPATIENT
Start: 2018-05-17 | End: 2018-05-26 | Stop reason: HOSPADM

## 2018-05-17 RX ORDER — NOREPINEPHRINE BITARTRATE/D5W 16MG/250ML
0.03-0.4 PLASTIC BAG, INJECTION (ML) INTRAVENOUS CONTINUOUS
Status: DISCONTINUED | OUTPATIENT
Start: 2018-05-17 | End: 2018-05-20

## 2018-05-17 RX ORDER — MAGNESIUM SULFATE HEPTAHYDRATE 40 MG/ML
4 INJECTION, SOLUTION INTRAVENOUS EVERY 4 HOURS PRN
Status: DISCONTINUED | OUTPATIENT
Start: 2018-05-17 | End: 2018-05-19

## 2018-05-17 RX ORDER — LORAZEPAM 2 MG/ML
0.5 INJECTION INTRAMUSCULAR ONCE
Status: COMPLETED | OUTPATIENT
Start: 2018-05-17 | End: 2018-05-17

## 2018-05-17 RX ORDER — PROPOFOL 10 MG/ML
5-75 INJECTION, EMULSION INTRAVENOUS CONTINUOUS
Status: DISCONTINUED | OUTPATIENT
Start: 2018-05-17 | End: 2018-05-19

## 2018-05-17 RX ORDER — MONTELUKAST SODIUM 10 MG/1
10 TABLET ORAL AT BEDTIME
Status: DISCONTINUED | OUTPATIENT
Start: 2018-05-17 | End: 2018-05-21

## 2018-05-17 RX ORDER — VECURONIUM BROMIDE 1 MG/ML
5 INJECTION, POWDER, LYOPHILIZED, FOR SOLUTION INTRAVENOUS ONCE
Status: DISCONTINUED | OUTPATIENT
Start: 2018-05-17 | End: 2018-05-17 | Stop reason: HOSPADM

## 2018-05-17 RX ORDER — IPRATROPIUM BROMIDE AND ALBUTEROL SULFATE 2.5; .5 MG/3ML; MG/3ML
3 SOLUTION RESPIRATORY (INHALATION)
Status: DISCONTINUED | OUTPATIENT
Start: 2018-05-17 | End: 2018-05-17 | Stop reason: HOSPADM

## 2018-05-17 RX ORDER — PROPOFOL 10 MG/ML
10-20 INJECTION, EMULSION INTRAVENOUS EVERY 30 MIN PRN
Status: DISCONTINUED | OUTPATIENT
Start: 2018-05-17 | End: 2018-05-19

## 2018-05-17 RX ORDER — POLYETHYLENE GLYCOL 3350 17 G/17G
17 POWDER, FOR SOLUTION ORAL DAILY
Status: DISCONTINUED | OUTPATIENT
Start: 2018-05-17 | End: 2018-05-17

## 2018-05-17 RX ORDER — HEPARIN SODIUM 5000 [USP'U]/.5ML
5000 INJECTION, SOLUTION INTRAVENOUS; SUBCUTANEOUS EVERY 8 HOURS
Status: DISCONTINUED | OUTPATIENT
Start: 2018-05-17 | End: 2018-05-17

## 2018-05-17 RX ADMIN — ATORVASTATIN CALCIUM 10 MG: 10 TABLET, FILM COATED ORAL at 22:21

## 2018-05-17 RX ADMIN — FAMOTIDINE 20 MG: 10 INJECTION, SOLUTION INTRAVENOUS at 10:45

## 2018-05-17 RX ADMIN — FUROSEMIDE 40 MG: 10 INJECTION, SOLUTION INTRAVENOUS at 09:27

## 2018-05-17 RX ADMIN — PROPOFOL 50 MCG/KG/MIN: 10 INJECTION, EMULSION INTRAVENOUS at 11:32

## 2018-05-17 RX ADMIN — SODIUM CHLORIDE, POTASSIUM CHLORIDE, SODIUM LACTATE AND CALCIUM CHLORIDE: 600; 310; 30; 20 INJECTION, SOLUTION INTRAVENOUS at 10:08

## 2018-05-17 RX ADMIN — MIDAZOLAM HYDROCHLORIDE 2 MG: 1 INJECTION, SOLUTION INTRAMUSCULAR; INTRAVENOUS at 12:04

## 2018-05-17 RX ADMIN — IPRATROPIUM BROMIDE AND ALBUTEROL SULFATE 3 ML: .5; 3 SOLUTION RESPIRATORY (INHALATION) at 10:55

## 2018-05-17 RX ADMIN — IPRATROPIUM BROMIDE AND ALBUTEROL SULFATE 3 ML: .5; 3 SOLUTION RESPIRATORY (INHALATION) at 15:04

## 2018-05-17 RX ADMIN — CLOPIDOGREL 75 MG: 75 TABLET, FILM COATED ORAL at 12:09

## 2018-05-17 RX ADMIN — IPRATROPIUM BROMIDE AND ALBUTEROL SULFATE 3 ML: .5; 3 SOLUTION RESPIRATORY (INHALATION) at 18:05

## 2018-05-17 RX ADMIN — PROPOFOL 10 MCG/KG/MIN: 10 INJECTION, EMULSION INTRAVENOUS at 08:30

## 2018-05-17 RX ADMIN — METHYLPREDNISOLONE SODIUM SUCCINATE 40 MG: 40 INJECTION, POWDER, FOR SOLUTION INTRAMUSCULAR; INTRAVENOUS at 09:27

## 2018-05-17 RX ADMIN — NITROGLYCERIN 0.4 MG: 0.4 TABLET SUBLINGUAL at 04:51

## 2018-05-17 RX ADMIN — HEPARIN SODIUM 5000 UNITS: 5000 INJECTION, SOLUTION INTRAVENOUS; SUBCUTANEOUS at 09:28

## 2018-05-17 RX ADMIN — FENTANYL CITRATE 100 MCG: 50 INJECTION, SOLUTION INTRAMUSCULAR; INTRAVENOUS at 12:05

## 2018-05-17 RX ADMIN — CEFTAZIDIME 1 G: 1 INJECTION, POWDER, FOR SOLUTION INTRAMUSCULAR; INTRAVENOUS at 10:25

## 2018-05-17 RX ADMIN — CHLORHEXIDINE GLUCONATE 15 ML: 1.2 RINSE ORAL at 19:22

## 2018-05-17 RX ADMIN — PROPOFOL 45 MCG/KG/MIN: 10 INJECTION, EMULSION INTRAVENOUS at 19:09

## 2018-05-17 RX ADMIN — NITROGLYCERIN 1 MCG/KG/MIN: 20 INJECTION INTRAVENOUS at 04:55

## 2018-05-17 RX ADMIN — VECURONIUM BROMIDE 5 MG: 1 INJECTION, POWDER, LYOPHILIZED, FOR SOLUTION INTRAVENOUS at 07:17

## 2018-05-17 RX ADMIN — IPRATROPIUM BROMIDE AND ALBUTEROL SULFATE 3 ML: .5; 3 SOLUTION RESPIRATORY (INHALATION) at 07:23

## 2018-05-17 RX ADMIN — FENTANYL CITRATE 100 MCG: 50 INJECTION, SOLUTION INTRAMUSCULAR; INTRAVENOUS at 12:00

## 2018-05-17 RX ADMIN — CEFTRIAXONE 1 G: 1 INJECTION, SOLUTION INTRAVENOUS at 06:14

## 2018-05-17 RX ADMIN — HEPARIN SODIUM 550 UNITS/HR: 10000 INJECTION, SOLUTION INTRAVENOUS at 13:19

## 2018-05-17 RX ADMIN — PROPOFOL 1.78 MCG/KG/MIN: 10 INJECTION, EMULSION INTRAVENOUS at 06:17

## 2018-05-17 RX ADMIN — LORAZEPAM 0.5 MG: 2 INJECTION INTRAMUSCULAR; INTRAVENOUS at 05:21

## 2018-05-17 RX ADMIN — INSULIN ASPART 1 UNITS: 100 INJECTION, SOLUTION INTRAVENOUS; SUBCUTANEOUS at 19:21

## 2018-05-17 RX ADMIN — IPRATROPIUM BROMIDE AND ALBUTEROL SULFATE 3 ML: 2.5; .5 SOLUTION RESPIRATORY (INHALATION) at 07:23

## 2018-05-17 RX ADMIN — CHLORHEXIDINE GLUCONATE 15 ML: 1.2 RINSE ORAL at 09:28

## 2018-05-17 RX ADMIN — HYDROMORPHONE HYDROCHLORIDE 0.2 MG: 1 INJECTION, SOLUTION INTRAMUSCULAR; INTRAVENOUS; SUBCUTANEOUS at 11:27

## 2018-05-17 RX ADMIN — CEFTAZIDIME 2 G: 2 INJECTION, POWDER, FOR SOLUTION INTRAVENOUS at 17:59

## 2018-05-17 RX ADMIN — ALBUTEROL SULFATE 10 MG/HR: 5 SOLUTION RESPIRATORY (INHALATION) at 05:06

## 2018-05-17 RX ADMIN — Medication 2 G: at 18:00

## 2018-05-17 RX ADMIN — FAMOTIDINE 20 MG: 10 INJECTION, SOLUTION INTRAVENOUS at 22:21

## 2018-05-17 RX ADMIN — ASPIRIN 81 MG 81 MG: 81 TABLET ORAL at 12:09

## 2018-05-17 RX ADMIN — SODIUM CHLORIDE 500 ML: 9 INJECTION, SOLUTION INTRAVENOUS at 22:18

## 2018-05-17 RX ADMIN — PROPOFOL 10 MCG/KG/MIN: 10 INJECTION, EMULSION INTRAVENOUS at 07:02

## 2018-05-17 RX ADMIN — MONTELUKAST SODIUM 10 MG: 10 TABLET, FILM COATED ORAL at 22:21

## 2018-05-17 RX ADMIN — METHYLPREDNISOLONE SODIUM SUCCINATE 125 MG: 125 INJECTION, POWDER, FOR SOLUTION INTRAMUSCULAR; INTRAVENOUS at 04:51

## 2018-05-17 RX ADMIN — NOREPINEPHRINE BITARTRATE 0.03 MCG/KG/MIN: 1 INJECTION INTRAVENOUS at 12:34

## 2018-05-17 RX ADMIN — IPRATROPIUM BROMIDE 1 MG: 0.5 SOLUTION RESPIRATORY (INHALATION) at 05:07

## 2018-05-17 ASSESSMENT — ACTIVITIES OF DAILY LIVING (ADL)
BATHING: 0-->INDEPENDENT
COMMUNICATION: OTHER (SEE COMMENTS)
DRESS: 0-->INDEPENDENT
BATHING: 4 - COMPLETELY DEPENDENT
SWALLOWING: OTHER (SEE COMMENTS)
TOILETING: 0-->INDEPENDENT
RETIRED_COMMUNICATION: 0-->UNDERSTANDS/COMMUNICATES WITHOUT DIFFICULTY
SWALLOWING: 0-->SWALLOWS FOODS/LIQUIDS WITHOUT DIFFICULTY
RETIRED_EATING: 0-->INDEPENDENT
FALL_HISTORY_WITHIN_LAST_SIX_MONTHS: NO
AMBULATION: 0-->INDEPENDENT
AMBULATION: 4 - COMPLETELY DEPENDENT
DRESS: 4 - COMPLETELY DEPENDENT
COGNITION: 0 - NO COGNITION ISSUES REPORTED
CHANGE_IN_FUNCTIONAL_STATUS_SINCE_ONSET_OF_CURRENT_ILLNESS/INJURY: YES
TOILETING: 4 - COMPLETELY DEPENDENT
TRANSFERRING: 0-->INDEPENDENT
TRANSFERRING: 4 - COMPLETELY DEPENDENT
EATING: 4 - COMPLETELY DEPENDENT

## 2018-05-17 NOTE — IP AVS SNAPSHOT
` `     [facility] CARDIAC SPECIALTY CARE: [phone]                 INTERAGENCY TRANSFER FORM - NOTES (H&P, Discharge Summary, Consults, Procedures, Therapies)   2018                    Hospital Admission Date: 2018  PREETI FORD   : 1950  Sex: Female        Patient PCP Information     Provider PCP Type    SPRING Banegas CNP General         History & Physicals      H&P by Summer Pitts MD at 2018 10:16 AM     Author:  Summer Pitts MD Service:  ICU Author Type:  Physician    Filed:  2018 10:16 AM Date of Service:  2018 10:16 AM Creation Time:  2018  9:05 AM    Status:  Signed :  Summer Pitts MD (Physician)         [facility] [place] [facility]  Comprehensive Daily ICU Note        Preeti Ford MRN# [id number]   Age: 67 year old YOB: 1950     Date of Admission: 2018    Primary care provider: Deisy Carter     CODE STATUS: FULL      Problem List:   Severe COPD with exacerbation  NSTEMI - unclear if demand ischemia or true ACS           Treatment goals for next 24 hours:   Lung protective ventilation  Evaluate for ACS  COPD treatment  Summer Pitts MD      Subjective/ Last 24 hours:   HPI: History obtained from chart as patient intubated and sedated. 66 yo woman with known severe COPD and coronary artery disease who visited her clinic 5/15afternoon complaining of a few days of shortness of breath. Given steroids and antibiotics and sent home. Presented to the ED very early the next morning () in respiratory extremis.  Had EKG changes as well as severe hypercapnia.  Intubated and transferred here.  Aunt is bedside and tells me she spoke with her a couple of days ago and she seemed very well at that time.  Of note patient has significant history of coronary artery disease with stents to Cirucumflex and LAD in  in setting of recent STEMI.  Of note, presentation noted at that  time seems similar to presentation today with subacute respiratory symptoms.[SK1.1]     Past Medical History:   Diagnosis Date     Arthritis      COPD (chronic obstructive pulmonary disease) (H)      Coronary artery disease      CVA (cerebral vascular accident) (H) 8-     Hypertension[SK1.2]    Severe obstruction seen on PFTs in 2017[SK1.1]    Social History   Substance Use Topics     Smoking status: Current Every Day Smoker     Packs/day: 0.50     Types: Cigarettes     Smokeless tobacco: Never Used      Comment: patient states she is working on it      Alcohol use No     Family History   Problem Relation Age of Onset     CEREBROVASCULAR DISEASE Mother      CEREBROVASCULAR DISEASE Father      Genitourinary Problems Brother      Dialysis     Past Surgical History:   Procedure Laterality Date     APPENDECTOMY       BYPASS GRAFT AORTOILIAC N/A 3/7/2017    Procedure: BYPASS GRAFT AORTOILIAC;  Surgeon: Marcos Pratt MD;  Location: [facility] OR     SALPINGO OOPHORECTOMY,R/L/DELORES      Salpingo Oophorectomy, RT/LT/DELORES[SK1.3]                 Mechanical Ventilation/Vitalsigns/IsandOs:[SK1.1]   Temp:  [99.4  F (37.4  C)-100  F (37.8  C)] 100  F (37.8  C)  Pulse:  [87] 87  Heart Rate:  [] 87  Resp:  [13-57] 16  BP: ()/() 133/79  FiO2 (%):  [50 %] 50 %  SpO2:  [77 %-100 %] 98 %[SK1.4]      Intake/Output Summary (Last 24 hours) at 05/17/18 0931  Last data filed at 05/17/18 0830   Gross per 24 hour   Intake                0 ml   Output               60 ml   Net              -60 ml[SK1.2]       Ventilation Mode: CMV/AC  (Continuous Mandatory Ventilation/ Assist Control)  FiO2 (%): 50 %  Rate Set (breaths/minute): 16 breaths/min  Tidal Volume Set (mL): 400 mL  PEEP (cm H2O): 5 cmH2O  Oxygen Concentration (%): 50 %  Resp: 16[SK1.5]    Day since 5/16    Peak airway pressure: 24  Auto-PEEP:  Small amount         Physical Examination:     General: Stated age, intubated, chronically ill appearing  HEENT: ET  tube, KELSEY  Lungs: crackles bilaterally, wheezes on the left  CVS: RRR, distant  Abdomen: soft, bowel sounds, non tender  Extremities/musculoskeletal: unable to palpate pulses in lower extremities and cool, palpable pulses in upper extremities and warm, no edema  Neurology: Sedated  Skin: no abnormalities  Psychiatry: unable  Exam of Line sites:  No lines       Hospital Procedures   TTE  Central line          Feeding/Glucose:   NPO    Blood glucose/Insulin requirement last 24 hours: none         Medications:[SK1.1]       atorvastatin  10 mg Oral At Bedtime     cefTAZidime  2 g Intravenous Q8H     chlorhexidine  15 mL Mouth/Throat Q12H     clopidogrel  75 mg Oral Daily     furosemide  40 mg Intravenous Once     heparin  5,000 Units Subcutaneous Q8H     ipratropium - albuterol 0.5 mg/2.5 mg/3 mL  3 mL Nebulization Q4H     metoprolol succinate  12.5 mg Oral Daily     montelukast  10 mg Oral At Bedtime     polyethylene glycol  17 g Oral Daily[SK1.5]             Labs/Diagnostic studies:     EKst EKG:  ST elevation leads V2-V4, ST depression inferior leads             2nd EKG: resolution of ST depression, less ST depression inferior leads    Echo:  pending    ROUTINE ICU LABS (Last four results)  CMP[SK1.1]  Recent Labs  Lab 18  0436      POTASSIUM 4.8   CHLORIDE 100   CO2 30   ANIONGAP 7   *   BUN 14   CR 0.70   GFRESTIMATED 85   GFRESTBLACK >90   CALDERON 8.4*   PROTTOTAL 7.7   ALBUMIN 3.6   BILITOTAL 0.4   ALKPHOS 90   AST 54*   ALT 48[SK1.5]     CBC[SK1.1]  Recent Labs  Lab 18  0436   WBC 9.9   RBC 5.88*   HGB 17.4*   HCT 52.7*   MCV 90   MCH 29.6   MCHC 33.0   RDW 13.9   [SK1.5]     INR[SK1.1]No lab results found in last 7 days.[SK1.5]  Arterial Blood Gas[SK1.1]  Recent Labs  Lab 18  0830 18  0535 18  0436   PH 7.33*  --   --    PCO2 55*  --   --    PO2 171*  --   --    HCO3 29*  --   --    O2PER 50 100 100[SK1.5]       Other Lab Data:  Troponin: 0.336  BNP:  92238    Cultures:[SK1.1]  No results for input(s): CULT in the last 168 hours.[SK1.5]  Blood culture:[SK1.1]  Invalid input(s): BC[SK1.5]   Urine culture:[SK1.1]  No results for input(s): URC in the last 168 hours.[SK1.5]          Imaging:     CXR:  Hyperinflated, possible infiltrates in lower lung fields; right more than left    CT:  Emphysematous changes seen on CT scan in 2017 along with granuloma         Assessment and Plan:     Summary:  Preeti Ford is a 67 year old female admitted on 5/17/2018 for acute respiratory failure.  I have personally reviewed the daily labs, imaging studies, cultures and discussed the case with referring physician and consulting physicians. My assessment and plan as follows:    Neurology and Psychiatry:  Needs sedation to tolerate ventilator   - Propofol infusion and narcotic bumps.    Pulmonary:   Acute hypoxic and hypercapnic respiratory failure secondary to COPD exacerbation. Initially very hypercapnic but has essentially resolved and CO2 is likely near baseline.  No longer hypoxic. Slight amount of air trapping seen on ventilator. No spontaneous respirations  - Decrease RR from 16 to 14. Recheck ABG 1 hour post changes.   - Steroids, bronchodilators, antibiotics  - Sputum culture  - Evaluate for ACS contributing to her respiratory failure    Cardiovascular system:   ST elevation on initial EKG and troponin leak on initial lab. History of coronary artery disease in LAD and Cirumflex as well as low normal EF and report of non viable anterior/apical wall. Presentation today similar to presentation in 2015 of resolving MI.   - TTE  - Cardiology consultation to recommend on whether to administer heparin and/or do other evaluation  - Trend Troponins until peak  - Statin and BB as per outpatient regimen    Renal/Electrolytes:  No acute issues. Creatinine near baseline. K high normal.   - Check Magnesium and Phosphorus. LR infusion.    ID:  No issues  - Antibiotics empirically for  severe COPD exacerbation  - Sputum culture    GI/:  No issues  - Feeding tube    Nutrition:   Tube feeds tomorrow if doesn't appear to be on track for extubation. Suspect her nutritional status is suboptimal.    Musculoskeletal/Rheumatology:  No acute issues    Endocrine:   No current issues  - Monitor sugars  - Steroid burst ordered    Heme/Onc:  Polycythemia secondary to chronic hypoxia. Mild thrombocytopenia  - Check INR. May start heparin infusion pending Cardiology opinion.     ICU prophylaxis:      DVT: heparin     VAP: As above     Stress ulcer: Ranitidine     Restraints needed: yes     Lines   Central Line/PICC - placed 5/16 needed: y   Arterial line - placed n needed: n   Barnhart catheter.  Needed: y       Prognosis:    Very guarded      Family update:  and aunt    Billing: total time spend providing critical care was 90 min, excluding procedure time.    Summer Pitts MD  [phone][SK1.1]         Revision History        User Key Date/Time User Provider Type Action    > SK1.3 5/17/2018 10:16 AM Summer Pitts MD Physician Sign     SK1.2 5/17/2018  9:31 AM Summer Pitts MD Physician      SK1.4 5/17/2018  9:15 AM Summer Pitts MD Physician      SK1.5 5/17/2018  9:06 AM Summer Pitts MD Physician      SK1.1 5/17/2018  9:05 AM Summer Pitts MD Physician                   Discharge Summaries     No notes of this type exist for this encounter.         Consult Notes      Consults by Johnna Flowers LICSW at 5/24/2018  4:27 PM     Author:  Johnna Flowers LICSW Service:  Social Work Author Type:      Filed:  5/24/2018  4:39 PM Date of Service:  5/24/2018  4:27 PM Creation Time:  5/24/2018  3:52 PM    Status:  Signed :  Johnna Flowers LICSW ()     Consult Orders:    1. Social Work IP Consult [[id number]] ordered by Rocio Nair MD at 05/24/18 0841                Care Transition Initial Assessment -   Reason For  Consult: discharge planning  Met with: Patient and Family  ( Bud)  Active Problems:    Hypercapnic respiratory failure (H)       DATA  Lives With: spouse  Living Arrangements: house  Description of Support System: Supportive, Involved  Who is your support system?:   Support Assessment: Adequate family and caregiver support.   Identified issues/concerns regarding health management:       Quality Of Family Relationships: supportive, involved  Transportation Available: car, family or friend will provide    Per social service protocol for discharge planning.  Patient was admitted on 5-17-18 hypercapnic respiratory failure.  The tentative date of discharge is 5-25-18 or 5-26-18.  Reviewed chart and spoke with patient and  regarding discharge plans.  Per patient and family report, patient lives with her  I a house with 3 steps to enter.  Once inside, patient does not need to go to the basement.  Patient uses a single end cane at baseline.  Patient is independent with ADL's and IADL's.  Patient manages her own meds.  Patient still drives.  Reviewed the therapy discharge recommendations of tcu placement on discharge and patient is considering her options.  Patient would prefer to return home with homecare.  Patient's  would prefer for patient to go to tcu[SJ1.1].  Patient states she will consider going to a tcu if it is close to home.  She is asking  referrals to be sent to [facility] in [place] and [facility].  Referrals sent, via discharge on the double, to check bed availability.  Received a call back from [facility].  They may have a bed on Sunday.  If patient will still be here then they will continue to do the assessment.  Explained that I will follow up with the physician tomorrow and update them at that time.  Awaiting a return call from [facility] in [place].  If neither facility have a bed, patient is interested in homecare services for RN/PT/OT.       AS[SJ1.2]SESSMENT  Cognitive Status:[SJ1.1]  awake, alert and oriented[SJ1.2]  Concerns to be addressed:[SJ1.1] discharge planning[SJ1.2].     PLAN  Financial costs for the patient includes[SJ1.1] N/A[SJ1.2].  Patient given options and choices for discharge[SJ1.1] TCU versus homecare[SJ1.2].  Patient/family is agreeable to the plan?[SJ1.1]  Yes[SJ1.2]  Patient Goals and Preferences:[SJ1.1] homecare versus TCU[SJ1.2].  Patient anticipates discharging to:[SJ1.1]  TBD[SJ1.2].[SJ1.1]    Will confirm a bed, continue to follow, and assist with a safe discharge plan.[SJ1.2]    Johnna Flowers[SJ1.3], [facility] [place]  [phone][SJ1.2]           Revision History        User Key Date/Time User Provider Type Action    > SJ1.3 5/24/2018  4:39 PM Johnna Flowers, [facility]  Sign     SJ1.2 5/24/2018  4:34 PM Johnna Flowers, [facility]       SJ1.1 5/24/2018  3:52 PM Johnna Flowers [facility]              Consults by Max Noguera PA at 5/23/2018  9:35 AM     Author:  Max Noguera PA Service:  Hospitalist Author Type:  Physician Assistant    Filed:  5/23/2018 10:47 AM Date of Service:  5/23/2018  9:35 AM Creation Time:  5/23/2018  9:35 AM    Status:  Cosign Needed :  Max Noguera PA (Physician Assistant)    Cosign Required:  Yes             [facility][BG1.1]    Transfer of care summary[BG1.2]  Hospitalist       Date of Admission:  5/17/2018  Date of Service (when I saw the patient): 05/23/18    Assessment & Plan[BG1.1]   Preeti Ford is a 67 year old female with PMHx of severe COPD and CAD s/p PCI who was admitted on 5/17/2018 with a severe COPD exacerbation c/b STEMI found to have restenosis of circumflex artery s/p re-stenting. Extubated 5/20.[BG1.3]  She has been clinically improving and now is on intermittent BiPAP and high flow oxygen.  The hospitalist service was contacted for transfer of care and the patient will be transferring to  the intermediate care unit[BG1.2]    STEMI, in stent restenosis s/p re-stenting to circumflex[BG1.3]   Ischemic cardiomyopathy[BG1.2]  Hypertension[BG1.1]  ST elevation on initial EKG and troponin leak on initial lab. Presentation this admission similar to presentation in 2015 with resolving MI. Angiogram found in stent restenosis of Circumflex. Was re[BG1.3]-stented. EF had recovered but now severely reduced again (EF 25-30% on 5/17).  Initially given diuretics[BG1.2].[BG1.1]   -[BG1.3]-[BG1.1] N[BG1.2]ow off pressors.  --[BG1.3] D[BG1.1]ual antiplatelet therapy[BG1.3] with Plavix and aspirin[BG1.1]  --[BG1.3] Continue[BG1.1] statin  --[BG1.3] Continue PTA[BG1.1] metoprolol. ACE/ARB when able[BG1.3]  -- Cardiology is following[BG1.2]      Encephalopathy/del[BG1.3]i[BG1.2]rium   -- encephalopathy improved. Likely at least partially related to CO2 retention vs ICU del[BG1.3]ir[BG1.2]ium   -- continue Seroquel 25mg BID  -- PRN[BG1.3] Tylenol[BG1.2] and norco       COPD exacerbation  Acute hypercapnic respiratory failure s/p extubation 5/20  Pseudomonas in sputum   -- Acute hypoxic and hypercapnic respiratory failure secondary to COPD exacerbation, and probably STEMI and ischemic cardiomyopathy. Pseudomonas in sputum[BG1.3], pansensitive[BG1.2].  - extubated 5/20  - Continue Methylpred 40 q8h  - scheduled duonebs, restart PTA inhalers   - Levofloxacin to complete a 10 day course. (On day #3)  - intermittent BiPAP and high flow NC, appears more comfortable on this regimen   - serum CO2 improving on BMP    Dysphagia  No prior hx[BG1.3]. Likely related to intubation.[BG1.2]  -- advance to level[BG1.3] 2[BG1.2] with[BG1.3] thin[BG1.2] liquids[BG1.3] per SLP eval today[BG1.2]      DVT Prophylaxis: Heparin SQ  GI Prophylaxis: Not indicated    # Pain Assessment:[BG1.3]  Current Pain Score 5/23/2018   Patient currently in pain? denies   Pain score (0-10) -   Pain location -   Pain descriptors -   CPOT pain score -[BG1.4]    Preeti fierro pain level was assessed and she currently denies pain.      Code Status: Full Code.  This should be readdressed with patient and family as there is a high likelihood that she may need to be intubated at some point again in the future.    Disposition:[BG1.1] Continue hospitalization and transfer to [facility].  Will likely require at least 2 more days or more in the hospital for close monitoring and weaning oxygen.  Continue PT/OT.[BG1.2]    Max Noguera    Primary Care Physician   Deisy Carter    Requesting provider: Sarah Parker PA-C    Reason for consultation: Transfer of care    History of Present Illness[BG1.1]   Preeti Ford is a 67 year old female with PMHx of severe COPD and CAD s/p PCI who was admitted on 5/17/2018 with a severe COPD exacerbation[BG1.3].  Patient had been complaining a few days of worsening shortness of breath and was seen at her clinic on 5/15.  She was given steroids and antibiotics and sent home.  She presented to the emergency department very early the next morning 5/16 and respiratory distress.  She had EKG changes as well as severe hypercapnia.  She was intubated and transferred here and was admitted to the intensive care unit.  She was found to have a[BG1.2] STEMI[BG1.3] with[BG1.2] restenosis of circumflex artery[BG1.3].  She is[BG1.2] s/p re-stenting[BG1.3] of the left circumflex[BG1.2]. Extubated[BG1.3] successfully on[BG1.2] 5/20.[BG1.3]  She has been clinically improving and now is on intermittent BiPAP and high flow oxygen.  The hospitalist service was contacted for transfer of care and the patient is transferring to the intermediate care unit.    Patient is seen standing up at the bedside with nursing assistance upon my arrival.  She reports feeling much better and made a big improvement over the last 24 hours.  She states that her breathing is currently comfortable.  She denies any wheezing, chest tightness or pain.  She does have a intermittent cough.   No headache, lightheadedness, fever, abdominal pain, nausea, vomiting.  She does feel fatigued.  She is on high flow oxygen.  She states that she normally is not on any oxygen at home.  She voices no other new concerns today.[BG1.2]    Past Medical History    Severe COPD  CVA-right thalamus and 8/2013 and left cerebellar and right vermis and 3/2018  Coronary artery disease[BG1.1] with stents to circumflex and LAD in 2015[BG1.2]  Hypertension  GERD  Hyperlipidemia  Tobacco use disorder  Lung nodules  Ischemic cardiomyopathy  Chronic bronchitis  Peripheral arterial disease-moderate left common carotid stenosis, aortoiliac bypass, chronic left vertebral and right posterior cerebral stenoses    Past Surgical History[BG1.1]   Past Surgical History:   Procedure Laterality Date     APPENDECTOMY       BYPASS GRAFT AORTOILIAC N/A 3/7/2017    Procedure: BYPASS GRAFT AORTOILIAC;  Surgeon: Marcos Pratt MD;  Location: [facility] OR     SALPINGO OOPHORECTOMY,R/L/DELORES      Salpingo Oophorectomy, RT/LT/DELORES[BG1.5]       Prior to Admission Medications   Prior to Admission Medications   Prescriptions Last Dose Informant Patient Reported? Taking?   ACE/ARB/ARNI NOT PRESCRIBED, INTENTIONAL,  Self No No   Sig: Please choose reason not prescribed, below   Calcium Carbonate-Vitamin D (OSCAL 500/200 D-3 PO)  Self Yes Yes   Sig: Take 1 tablet by mouth 2 times daily   Coenzyme Q10 (COQ10 PO)  Self Yes Yes   Sig: Take 100 mg by mouth every 24 hours (at 16:00)   Nutritional Supplements (BOOST)  Self No No   Sig: Take 1 Bottle by mouth 3 times daily (with meals)   acetaminophen (TYLENOL) 325 MG tablet prn med Self Yes Yes   Sig: Take 325 mg by mouth every 4 hours as needed for mild pain    albuterol (PROAIR HFA/PROVENTIL HFA/VENTOLIN HFA) 108 (90 BASE) MCG/ACT Inhaler prn med Self No Yes   Sig: Inhale 2 puffs into the lungs every 6 hours as needed for shortness of breath / dyspnea   atorvastatin (LIPITOR) 10 MG tablet  Self No Yes   Sig: Take  1 tablet (10 mg) by mouth At Bedtime   budesonide-formoterol (SYMBICORT) 160-4.5 MCG/ACT Inhaler  Self No Yes   Sig: INHALE TWO PUFFS BY MOUTH TWICE A DAY   clopidogrel (PLAVIX) 75 MG tablet  Self No Yes   Sig: Take 1 tablet (75 mg) by mouth daily   ipratropium - albuterol 0.5 mg/2.5 mg/3 mL (DUONEB) 0.5-2.5 (3) MG/3ML neb solution  Self No Yes   Sig: USE 1 VIAL IN NEBULIZER FOUR TIMES A DAY   metoprolol succinate (TOPROL-XL) 25 MG 24 hr tablet  Self Yes Yes   Sig: Take 1 tablet (25 mg) by mouth daily   montelukast (SINGULAIR) 10 MG tablet  Self No Yes   Sig: Take 1 tablet (10 mg) by mouth At Bedtime   nicotine (NICODERM CQ) 7 MG/24HR 24 hr patch  Self No Yes   Sig: Place 1 patch onto the skin every 24 hours   order for DME  Self No No   Sig: Equipment being ordered: Nebulizer   order for DME  Self No No   Sig: Equipment being ordered: Nebulizer machine   polyethylene glycol (MIRALAX/GLYCOLAX) Packet  Self Yes Yes   Sig: Take 1 packet by mouth daily as needed for constipation   predniSONE (DELTASONE) 20 MG tablet   No Yes   Sig: Take 2 tablets (40 mg) by mouth daily for 5 days   tiotropium (SPIRIVA HANDIHALER) 18 MCG capsule  Self No Yes   Sig: Inhale 1 capsule (18 mcg) into the lungs daily      Facility-Administered Medications: None     Allergies   Allergies   Allergen Reactions     Crestor [Rosuvastatin] Other (See Comments)     dizziness     Hmg-Coa-R Inhibitors Other (See Comments)     Muscle/joint aching     Lisinopril Cough     Optison [Albumin Human] Other (See Comments)     Pt was flush and very dizzy.  Also had a BP drop     Penicillins Rash       Social History[BG1.1]   The patient is still smoking cigarettes about half pack per day.  She does not drink alcohol or use illicit drugs.[BG1.2]    Family History   I have reviewed this patient's family history and updated it with pertinent information if needed.[BG1.1]   Family History   Problem Relation Age of Onset     CEREBROVASCULAR DISEASE Mother       CEREBROVASCULAR DISEASE Father      Genitourinary Problems Brother      Dialysis[BG1.5]       Review of Systems[BG1.1]   A complete 10 point review of systems was performed and is negative other than the items previously mentioned in the above HPI.[BG1.2]    Physical Exam   Temp: 97.7  F (36.5  C) Temp src: Oral BP: 95/50   Heart Rate: 90 Resp: 16 SpO2: 100 % O2 Device: High Flow Nasal Cannula (HFNC) Oxygen Delivery: Other (Comments) (30LPM)  Vital Signs with Ranges  Temp:  [97.2  F (36.2  C)-98.3  F (36.8  C)] 97.7  F (36.5  C)  Heart Rate:  [72-98] 90  Resp:  [10-28] 16  BP: ()/(50-86) 95/50  FiO2 (%):  [30 %-40 %] 40 %  SpO2:  [96 %-100 %] 100 %  99 lbs 10.37 oz    GENERAL:[BG1.1] Thin appearing female.[BG1.2]  Alert, oriented to person, place,[BG1.1] situation[BG1.2]. Cooperative[BG1.1], standing up at the bedside[BG1.2] in no acute distress.   EYES: Pupils equal[BG1.1] and round[BG1.2]. Extraocular movements[BG1.1] grossly[BG1.2] intact.  HEENT:  Normocephalic, Mucous membranes moist.    NECK: Supple  CARDIOVASCULAR: Regular rate and rhythm without murmurs, rubs, or gallops.   PULMONARY:[BG1.1] Lung sounds diminished bilaterally[BG1.2], without crackles, wheezes, or rhonchi[BG1.1]. Breathing is nonlabored.[BG1.2]  GASTROINTESTINAL: Soft and non-distended. Normoactive bowel sounds. Nontender[BG1.1].[BG1.2]  MUSCULOSKELETAL: Strength[BG1.1] normal and intact[BG1.2] in upper and lower extremeties bilaterally.   SKIN: Warm, dry[BG1.1].[BG1.2]  EXTREMITIES: No lower extremity edema.  NEUROLOGIC: Cranial nerves II-XII[BG1.1] grossly[BG1.2] intact.[BG1.1]  Strength 5/5 in all 4 extremities.[BG1.2]  PSYCHIATRIC: Normal mood and affect.     Data   Data reviewed today:  I personally reviewed[BG1.1] all lab and imaging results from the last 24 hours[BG1.2]    Recent Labs  Lab 05/23/18  0430 05/22/18  0310 05/21/18  0630 05/20/18  0550  05/19/18  0545  05/18/18  0430 05/17/18  2113 05/17/18  0911 05/17/18  0436   WBC  4.8 4.5  --  7.9  --  8.9  --   --   --  7.2 9.9   HGB 12.9 12.5  --  13.0  --  13.6  --   --   --  15.3 17.4*   MCV 92 94  --  92  --  90  --   --   --  90 90    142*  --  116*  Canceled, Test credited  --  128*  --   --   --  119*  119* 192   INR  --   --   --   --   --   --   --   --   --  0.91  --     139 144 142  --  142  --  139  --   --  137   POTASSIUM 4.1 4.2 3.7 4.0  < > 3.7  < > 3.8  --   --  4.8   CHLORIDE 101 96 97 102  --  100  --  102  --   --  100   CO2 34* 36* 41* 33*  --  37*  --  26  --   --  30   BUN 17 18 16 14  --  12  --  15  --   --  14   CR 0.48* 0.48* 0.48* 0.52  --  0.63  --  0.70  --   --  0.70   ANIONGAP 6 7 6  --   --  5  --   --   --   --  7   CALDERON 8.8 8.5 8.7 8.6  --  8.1*  --  7.9*  --   --  8.4*   * 113* 71 102*  --  90  --  110*  --   --  148*   ALBUMIN  --   --   --   --   --   --   --   --   --   --  3.6   PROTTOTAL  --   --   --   --   --   --   --   --   --   --  7.7   BILITOTAL  --   --   --   --   --   --   --   --   --   --  0.4   ALKPHOS  --   --   --   --   --   --   --   --   --   --  90   ALT  --   --   --   --   --   --   --   --   --   --  48   AST  --   --   --   --   --   --   --   --   --   --  54*   TROPI  --   --   --   --   --  0.319*  --  0.506* 0.566* 0.501* 0.366*   < > = values in this interval not displayed.    No results found for this or any previous visit (from the past 24 hour(s)).[BG1.1]       Revision History        User Key Date/Time User Provider Type Action    > BG1.2 5/23/2018 10:47 AM Max Noguera PA Physician Assistant Sign     BG1.5 5/23/2018  9:56 AM Max Noguera PA Physician Assistant      BG1.1 5/23/2018  9:50 AM Max Noguera PA Physician Assistant      BG1.4 5/23/2018  9:46 AM Max Noguera PA Physician Assistant      BG1.3 5/23/2018  9:40 AM Max Noguera PA Physician Assistant             Consults by Melinda Yepez RD, LD at 5/19/2018 12:48 PM     Author:   "Melinda Yepez RD, VINCENZO Service:  Nutrition Author Type:  Registered Dietitian    Filed:  5/19/2018 12:48 PM Date of Service:  5/19/2018 12:48 PM Creation Time:  5/19/2018 12:27 PM    Status:  Signed :  Melinda Yepez RD, VINCENZO (Registered Dietitian)     Consult Orders:    1. Nutrition Services Adult IP Consult [[id number]] ordered by Summer Pitts MD at 05/19/18 1159                CLINICAL NUTRITION SERVICES  -  ASSESSMENT NOTE      Recommendations Ordered by Registered Dietitian (BRENDAN): Replete with Fiber at 15 mL/hr to provide:  360 kcal, 23 g protein (0.5 g/kg), 45 g CHO, 5 g fiber, 299 mL H2O  Total with Propofol = 508 kcal (11 kcal/kg)  Flushes 60 mL every 4 hours  Certavite daily   Future/Additional Recommendations: Recommend eventual preliminary goal of Replete with Fiber at 35 mL/hr to provide:  840 kcal, 54 g protein (1.2 g/kg), 104 g CHO, 697 mL H2O   Total with Propofol = 988 kcal (22 kcal/kg)   Malnutrition:   % Weight Loss:  Up to 20% in 1 year (non-severe malnutrition)  % Intake:  </= 50% for >/= 1 month (severe malnutrition)  Subcutaneous Fat Loss:  None observed  Muscle Loss:  Temporal region mild-moderate depletion  Fluid Retention:  None noted    Malnutrition Diagnosis: Non-Severe malnutrition  In Context of:  Acute illness or injury  Chronic illness or disease        REASON FOR ASSESSMENT  Preeti Ford is a 67 year old female seen by Registered Dietitian for Provider Order - Registered Dietitian to Assess and Order TF per Medical Nutrition protocol      NUTRITION HISTORY  - Information obtained from  [employer] as patient intubated and sedated.  He states that she has had \"trouble gaining weight\".  Feels like over the last year she has been eating < 50% of her normal intake.  Patient has been taking Ensure or [place] Breakfast Essentials per  and typically will drink 2 per day.  Her primary issue has been poor appetite but he also believes her breathing issues have " "contributed as well.       CURRENT NUTRITION ORDERS  Diet Order:     NPO day #3    Current Intake/Tolerance:  N/A      PHYSICAL FINDINGS  Observed  Muscle Wasting - Temporal   Obtained from Chart/Interdisciplinary Team  \"Chronically ill appearing\"    ANTHROPOMETRICS  Height: 5'3\"  Weight: 45.1 kg (99#)(5/19)  Body mass index is 17.57 kg/(m^2)  Weight Status:  Underweight BMI <18.5  IBW: 52.3 kg   % IBW: 86%  Weight History:[KK1.1]   Wt Readings from Last 10 Encounters:   05/19/18 45.1 kg (99 lb 6.8 oz)   05/16/18 46.9 kg (103 lb 6.4 oz)   04/19/18 49.1 kg (108 lb 3.2 oz)   04/13/18 44.8 kg (98 lb 12.8 oz)   04/02/18 48.3 kg (106 lb 6.4 oz)   03/25/18 49.8 kg (109 lb 12.6 oz)   12/20/17 48.7 kg (107 lb 6.4 oz)   12/06/17 50.7 kg (111 lb 12.8 oz)   08/04/17 50.2 kg (110 lb 9.6 oz)   06/23/17 53.8 kg (118 lb 9.6 oz)[KK1.2]   Down 19# or 16% over the last year       LABS  Labs reviewed    MEDICATIONS  Propofol at 5.6 mL/hr= 148 kcal   LR at 50 mL/hr   Norepi drip       ASSESSED NUTRITION NEEDS PER APPROVED PRACTICE GUIDELINES:    Dosing Weight 45.1 kg   2/3 Needs 1st week on TF:  890-1040 kcal (20-23 kcal/kg)  40-53 grams (0.9-1.2 g/kg)  Estimated Energy Needs: 5158-4451 kcals (30-35 Kcal/Kg)  Justification: repletion and vented  Estimated Protein Needs: 60-80 grams protein (1.3-1.8 g pro/Kg)  Justification: repletion  Estimated Fluid Needs: 0135-4293 mL (1 mL/Kcal)  Justification: maintenance    MALNUTRITION:  % Weight Loss:  Up to 20% in 1 year (non-severe malnutrition)  % Intake:  </= 50% for >/= 1 month (severe malnutrition)  Subcutaneous Fat Loss:  None observed  Muscle Loss:  Temporal region mild-moderate depletion  Fluid Retention:  None noted    Malnutrition Diagnosis: Non-Severe malnutrition  In Context of:  Acute illness or injury  Chronic illness or disease    NUTRITION DIAGNOSIS:  Inadequate protein-energy intake related to NPO on vent as evidenced by meeting 0% protein and 15% preliminary energy needs via " "Propofol       NUTRITION INTERVENTIONS  Recommendations / Nutrition Prescription  Replete with Fiber at 15 mL/hr to provide:  360 kcal, 23 g protein (0.5 g/kg), 45 g CHO, 5 g fiber, 299 mL H2O  Total with Propofol = 508 kcal (11 kcal/kg)  Flushes 60 mL every 4 hours  Certavite daily    Recommend eventual preliminary goal of Replete with Fiber at 35 mL/hr to provide:  840 kcal, 54 g protein (1.2 g/kg), 104 g CHO, 697 mL H2O   Total with Propofol = 988 kcal (22 kcal/kg)    Implementation  Nutrition education: Not appropriate at this time due to patient condition  EN Composition, EN Schedule, Feeding Tube Flush and Multivitamin/Mineral:  Orders placed to begin Replete with Fiber at 15 mL/hr, do not advance.  Flushes and MVI ordered as above.    Nutrition Goals  Patient will meet preliminary needs within the next 2-3 days     MONITORING AND EVALUATION:  Progress towards goals will be monitored and evaluated per protocol and Practice Guidelines    Melinda Yepez RD, LD, [facility]   Clinical Dietitian - [facility][KK1.1]                      Revision History        User Key Date/Time User Provider Type Action    > KK1.2 5/19/2018 12:48 PM Melinda Yepez RD, LD Registered Dietitian Sign     KK1.1 5/19/2018 12:27 PM Melinda Yepez RD, LD Registered Dietitian             Consults by Abi Montana RD, LD at 5/18/2018 12:18 PM     Author:  Abi Montana RD, LD Service:  Nutrition Author Type:  Registered Dietitian    Filed:  5/18/2018 12:18 PM Date of Service:  5/18/2018 12:18 PM Creation Time:  5/18/2018 12:14 PM    Status:  Signed :  Abi Montana RD, LD (Registered Dietitian)     Consult Orders:    1. Nutrition Services Adult IP Consult [[id number]] ordered by Ger Correa MD at 05/18/18 1115                BRIEF NUTRITION NOTE:    Received routine Nutrition Consult s/p angiogram procedure for \"Dietitian to see for Heart Healthy Diet Education\"  Pt currently intubated and " "on 1 pressor.  Per rounds this morning, hope to extubate today.  Per Dr. Pitts's note yesterday, \"Tube feeds tomorrow if doesn't appear to be on track for extubation. Suspect her nutritional status is suboptimal.\"  Note pt was seen and instructed in Heart Healthy Diet in Sept 2015 after angiogram --> stent procedure.  Will monitor for nutritional plan of care.[MH1.1]    Abi Montana[MH1.2], VINCENZO CARDONA, [facility][MH1.1]       Revision History        User Key Date/Time User Provider Type Action    > MH1.2 5/18/2018 12:18 PM Abi Montana, BRENDAN, VINCENZO Registered Dietitian Sign     MH1.1 5/18/2018 12:14 PM Abi Montana RD, VINCENZO Registered Dietitian                      Progress Notes - Physician (Notes from 05/23/18 through 05/26/18)      Progress Notes by Johnna Flowers LICSW at 5/25/2018  3:47 PM     Author:  Johnna Flowers LICSW Service:  Social Work Author Type:      Filed:  5/25/2018  4:40 PM Date of Service:  5/25/2018  3:47 PM Creation Time:  5/25/2018  3:47 PM    Status:  Signed :  Johnna Flowers LICSW ()         SW:  D:  Received a call back from [facility] in [place].  They have one bed available and it is at the end of the parr.  They are hesitant to accept patient due to her confusion at night.  They would like me to discuss this with patient's .  Call placed to patient's  to inquire about the room situation.  He feels that patient is at her baseline and she would be fine at the end of the parr.  Call placed to update Johnna at [facility] and she is in agreement to accept patient tomorrow as early in the day as possible.  Call placed to [facility].  They will assess patient for Sunday.  Referral sent to [facility] in [place].  They have no available beds.  [facility] has no available beds.  [facility] in [place] has no available beds due to staffing.  [facility] has no available beds, [facility] has no available beds.  Received " a call back from [facility].  They can accept patient tomorrow.  Received a call back from [facility] in [place].  They have a bed available on Sunday.  Patient's  would prefer [facility].  He plans on transporting patient tomorrow.  He plans on being to the hospital around 10-10:30.  Updated [facility].  They will need a nurse-to-nurse report called to [phone].  P:  Will continue to follow.[SJ1.1]     Revision History        User Key Date/Time User Provider Type Action    > SJ1.1 5/25/2018  4:40 PM [facility]  Sign            Progress Notes by Ron Ovalles RT at 5/25/2018  3:30 PM     Author:  Ron Ovalles RT Service:  Respiratory Therapy Author Type:  Respiratory Therapist    Filed:  5/25/2018  3:30 PM Date of Service:  5/25/2018  3:30 PM Creation Time:  5/25/2018  3:30 PM    Status:  Signed :  Ron Ovalles RT (Respiratory Therapist)         Patient on 2L NC. [place] sounds diminished. SpO2 in the mid 90's.  Nebulizer tx given as ordered. Will continue to follow.    Ron NICOLAS[KV1.1]     Revision History        User Key Date/Time User Provider Type Action    > KV1.1 5/25/2018  3:30 PM Ron Ovalles, RT Respiratory Therapist Sign            Progress Notes by Rocio Nair MD at 5/25/2018  3:21 PM     Author:  Rocio Nair MD Service:  Hospitalist Author Type:  Physician    Filed:  5/25/2018  3:26 PM Date of Service:  5/25/2018  3:21 PM Creation Time:  5/25/2018  3:07 PM    Status:  Addendum :  Rocio Nair MD (Physician)         [facility]    Hospitalist Progress Note    Date of Service (when I saw the patient): 05/25/2018    Assessment & Plan   Preeti Ford is a 67 year old female with hx of known CAD s/p PCI, CHF, aortic stenosis, PAD s/p aortoiliac bypass, severe COPD, and prior CVA[MW1.1] maintained on Plavix[MW1.2] who was admitted to the ICU service on 5/17/2018 for NSTEMI with cardiogenic shock due to in-stent restenosis of  the circumflex which has been re-stented and acute hypoxic respiratory failure due to COPD exacerbation and Pseudomonas pneumonia, requiring intubation. She was successfully extubated and weaned off pressors on 5/20. She was transferred to the Hospitalist service on 5/23.    NSTEMI with cardiogenic shock due to in-stent restenosis, s/p ROWDY to mid-circumflex on 5/18  CAD, native heart, native vessels s/p PCI  PAD s/p aortoiliac bypass  She was seen in clinic one day prior to admission and sent home with prednisone and azithromycin for a COPD exacerbation. She then presented to the ED in acute respiratory distress with hypoxia to 81% on room air, and intubated on arrival. Troponin was mildly elevated to 0.566, but had worrisome EKG changes. TTE on admission showed new and decreased LVEF 25-30% with global LV dysfunction. She was taken to the cath lab on 5/18 where she was found to have mid-circumflex in-stent restenosis which was re-stented with a ROWDY. She was also treated with norepinephrine for shock between 5/18-5/20[MW1.1]  - She was previously on Plavix and atorvastatin 10 mg qhs for prior CVA[MW1.2]. These medications have been continued.[MW1.3]  - She was started on[MW1.2] aspirin 81 mg daily[MW1.1] and[MW1.2] metoprolol 12.5 mg BID[MW1.1] during this hospital stay[MW1.2]  - Cardiac rehab recommending TCU followed by OP CR at [facility]    Chronic systolic CHF due to ischemic cardiomyopathy  She was found to have a decreased LVEF to 25-30% on admission TTE compared to ~55% in Mar 2017, but this had appeared to improve to at least 40-45% by the time of her angiogram on 5/18.   - Appears euvolemic  - On BB as noted above. Not on ACEi/ARB due to reported[MW1.1] history of[MW1.2] intolerance    Moderate aortic stenosis  Noted on admission TTE with mean gradient of 14.1 mmHg  - Stable, appears euvolemic    Acute hypoxic and hypercapnic respiratory failure due to acute COPD exacerbation and Pseudomonas pneumonia  Due  to acute COPD exacerbation and Pseudomonas pneumonia. Initiated on azithromycin and prednisone by her PCP on 5/16, but developed progressive respiratory failure as noted above. CXR on arrival showed no acute infiltrates, but sputum grew Pseudomonas. She was intubated on arrival and successfully extubated to NIPPV on 5/20. She has improved with steroids, duonebs, and antibiotics (treated with ceftazidime 5/17-5/22, levofloxacin 5/22-5/26).  - On 0-2 lpm/NC, wean as tolerated  - Continues on levofloxacin, will complete 10-day course tomorrow (5/26)  - IV methylprednisolone d/c'd 5/24. Continues on prednisone 40 mg daily with[MW1.1] 10 day taper[MW1.4] at discharge  - Continues on duonebs QID while inpatient, QID PRN at discharge  - Continues on PTA Symbicort ([employer] formulary) and Spiriva ([employer] formulary)  - Encourage incentive spirometry    GI prophylaxis  She has been started on pantoprazole 40 mg daily for GI prophylaxis in setting of dual-antiplatelet therapy, steroid burst, and recent critical illness    Acute delirium, Resolved  Due to ICU delirium and possibly steroids. Her mental status continued to improve upon transfer from the ICU, and she is now at her baseline mental status.    Dysphagia due to recent intubation and encephalopathy  - On DDL2 with thin liquids as per SLP  - SLP following, advance diet as per SLP    History of CVA  - On aspirin, Plavix, and statin as noted above    # Pain Assessment:  Current Pain Score 5/25/2018   Patient currently in pain? denies   Pain score (0-10) -   Pain location -   Pain descriptors -   CPOT pain score -   Preeti fierro pain level was assessed and she currently denies pain.      FEN: DDL2 with thin liquids  DVT Prophylaxis: Heparin SQ  Code Status: Prior    Disposition: Expected discharge to TCU once bed available    Rocio Nair    Interval History   No events overnight. Continues to feel better. Denies cp/sob, dizziness/lightheadedness, nausea, or pain  elsewhere. Only gave SW 2 TCU options yesterday, but beds aren't available. Now willing to expand TCU choices.   - TCU discharge orders completed  - Wean O2 as tolerated    -Data reviewed today: I reviewed all new labs and imaging results over the last 24 hours. I personally reviewed no images or EKG's today.    Physical Exam   Temp: 98.1  F (36.7  C) Temp src: Oral BP: 111/56   Heart Rate: 77 Resp: 22 SpO2: 98 % O2 Device: None (Room air) Oxygen Delivery: 2 LPM  Vitals:    05/22/18 0400 05/24/18 1700 05/25/18 0553   Weight: 45.2 kg (99 lb 10.4 oz) 46.3 kg (102 lb 1.6 oz) 45.6 kg (100 lb 8.5 oz)     Vital Signs with Ranges  Temp:  [97.7  F (36.5  C)-98.4  F (36.9  C)] 98.1  F (36.7  C)  Heart Rate:  [69-92] 77  Resp:  [20-22] 22  BP: (104-146)/(56-80) 111/56  SpO2:  [85 %-99 %] 98 %  I/O last 3 completed shifts:  In: 1080 [P.O.:1080]  Out: 1220 [Urine:1220]    Constitutional: Chronically-ill appearing, NAD  Respiratory: Breathing non-labored. Diminished breath sounds bilaterally  Cardiovascular: Heart sounds distant, RRR. No pedal edema  GI: +BS, abd soft/NT  Skin/Integumen: No rash  Neuro: Alert and appropriate, DEE  Psych: Calm and cooperative    Medications     - MEDICATION INSTRUCTIONS -       Percutaneous Coronary Intervention orders placed (this is information for BPA alerting)       Reason ACE/ARB/ARNI order not selected       Reason beta blocker order not selected[MW1.1]         albuterol  3 mL Nebulization 4x daily     aspirin  81 mg Oral Daily     atorvastatin  10 mg Oral At Bedtime     clopidogrel  75 mg Oral or Feeding Tube Daily     dornase alpha  2.5 mg Inhalation Daily     fluticasone-vilanterol  1 puff Inhalation Daily     heparin  5,000 Units Subcutaneous Q12H     insulin aspart  1-3 Units Subcutaneous TID AC     insulin aspart  1-3 Units Subcutaneous At Bedtime     levofloxacin  500 mg Oral Daily     metoprolol tartrate  12.5 mg Oral BID     nicotine  1 patch Transdermal Daily     nicotine    Transdermal Q8H     nicotine   Transdermal Daily     pantoprazole  40 mg Oral QAM     polyethylene glycol  17 g Oral Daily     predniSONE  40 mg Oral Daily     umeclidinium  1 puff Inhalation Daily[MW1.5]     Data     Recent Labs  Lab 05/24/18  0515 05/23/18  0430 05/22/18  0310  05/19/18  0545   WBC 6.1 4.8 4.5  < > 8.9   HGB 12.6 12.9 12.5  < > 13.6   MCV 91 92 94  < > 90    179 142*  < > 128*    141 139  < > 142   POTASSIUM 4.1 4.1 4.2  < > 3.7   CHLORIDE 102 101 96  < > 100   CO2 37* 34* 36*  < > 37*   BUN 19 17 18  < > 12   CR 0.43* 0.48* 0.48*  < > 0.63   ANIONGAP 3 6 7  < > 5   CALDERON 8.8 8.8 8.5  < > 8.1*   * 120* 113*  < > 90   TROPI  --   --   --   --  0.319*   < > = values in this interval not displayed.    No results found for this or any previous visit (from the past 24 hour(s)).[MW1.1]         Revision History        User Key Date/Time User Provider Type Action    > [N/A] 5/25/2018  3:26 PM Rocio Nair MD Physician Addend     MW1.3 5/25/2018  3:26 PM Rocio Nair MD Physician Addend     MW1.2 5/25/2018  3:24 PM Rocio Nair MD Physician      MW1.4 5/25/2018  3:23 PM Rocio Nair MD Physician Addend     MW1.5 5/25/2018  3:21 PM Rocio Nair MD Physician Sign     MW1.1 5/25/2018  3:07 PM Rocio Nair MD Physician             Progress Notes by Zeynep Lozoya at 5/25/2018  7:52 AM     Author:  Zeynep Lozoya Service:  Nutrition Author Type:  Dietician    Filed:  5/25/2018  8:55 AM Date of Service:  5/25/2018  7:52 AM Creation Time:  5/25/2018  7:52 AM    Status:  Attested :  Zeynep Lozoya (Dietician)    Cosigner:  Malia Decker RD, LD at 5/25/2018 12:28 PM        Attestation signed by Malia Decker RD, LD at 5/25/2018 12:28 PM        I have reviewed and agree with the following note.  Malia Decker RD  Pager [phone] (M-F)            [phone] ([facility] Hol)                                   CLINICAL NUTRITION  "SERVICES - REASSESSMENT NOTE      Malnutrition: 5/19:  % Weight Loss:  Up to 20% in 1 year (non-severe malnutrition)  % Intake:  </= 50% for >/= 1 month (severe malnutrition)  Subcutaneous Fat Loss:  None observed  Muscle Loss:  Temporal region mild-moderate depletion  Fluid Retention:  None noted      Malnutrition Diagnosis: Non-Severe malnutrition  In Context of:  Acute illness or injury  Chronic illness or disease       EVALUATION OF PROGRESS TOWARD GOALS   Diet: DD2 w/ Thin liquids, Magic Shake BID  - Diet advanced from DD1 w/ Nectar thick liquids yesterday    Intake:   5/24:[AM1.1] Per flowsheets, consumed[AM1.2] % of 3 meals  5/23:[AM1.1] Per flowsheets, consumed[AM1.2] 50-75% of[AM1.1] 2[AM1.2] meals  5/22: D5 IVF off[AM1.1]    No documented stool since admit (5/17) -- receiving scheduled bowel regimen x 2 days    Per patient: Appetite is great today, states that she eats >/=75% of all meals and takes 2-4 protein supplements daily. Has enjoyed the [employer] and ice cream. Pt denies any chewing/dentition concerns[AM1.2]     NEW FINDINGS:   5/23: Txr --> floor  Intermittent HFNC[AM1.1]  Per SLP:[AM1.2] plan to keep[AM1.1] dentures[AM1.2] at home[AM1.1] (fear of losing them),[AM1.2] but can still chew w/o them[AM1.1]  Per chart: \"Pt needs encouragement to eat,\" Pt also has been forgetful this admit  Plan for TCU upon d/c[AM1.2]    Previous Goals:   Patient will consume 2/3-3/4 of meals TID and supplements BID  Evaluation:[AM1.1] Met[AM1.2]    Previous Nutrition Diagnosis:   Inadequate oral intake related to diet just advanced today, has been NPO on on low drip TF as evidenced by meeting 0% needs  Evaluation:[AM1.1] Completed[AM1.2]      CURRENT NUTRITION DIAGNOSIS[AM1.1]  Predicted inadequate oral intake R/t recent diet advancement to solid foods w/ h/o forgetfulness and needing encouragement to eat --> now with great appetite[AM1.2]     INTERVENTIONS  Recommendations / Nutrition " Prescription[AM1.1]  Continue diet per SLP w/ BID supplements[AM1.2]    Implementation[AM1.1]  No nutrition-related implementation at this time.[AM1.2]     Goals[AM1.1]  Pt to consume % of meals TID w/ supplements BID.[AM1.2]       MONITORING AND EVALUATION:  Progress towards goals will be monitored and evaluated per protocol and Practice Guidelines        Kanika Salvadoritian[AM1.1]        Revision History        User Key Date/Time User Provider Type Action    > AM1.2 5/25/2018  8:55 AM Zeynep Lozoya Sign     AM1.1 5/25/2018  7:52 AM Zeynep Lozoya             Progress Notes by Johnna Flowers LICSW at 5/25/2018 11:23 AM     Author:  Johnna Flowers LICSW Service:  Social Work Author Type:      Filed:  5/25/2018 11:28 AM Date of Service:  5/25/2018 11:23 AM Creation Time:  5/25/2018 11:23 AM    Status:  Signed :  Johnna Flowers LICSW ()         SW:  D:  Call placed to [facility] in [place] to follow up on the referral from yesterday.  Per Johnna, she has given the referral to nursing to complete the assessment.  Once this is done she needs to make some room changes and then she will know what her room availability is.  Patient is agreeable to expand her tcu choices.  Spoke with patient's  to get additional tcu choices.  Patient's  is asking for additional referrals to be sent to [facility], [facility], [facility] in [place], [facility] in [place], and [facility].  Referrals sent, via discharge on the double, to check bed availability.  Patient's  states he will transport when discharge is known.  P:  Will continue to follow.[SJ1.1]     Revision History        User Key Date/Time User Provider Type Action    > SJ1.1 5/25/2018 11:28 AM Johnna Flowers LICSW  Sign            Progress Notes by Sarah Parker PA-C at 5/23/2018   8:31 AM     Author:  Sarah Parker PA-C Service:  ICU Author Type:  Physician Assistant - MELINA    Filed:  5/23/2018  8:44 AM Date of Service:  5/23/2018  8:31 AM Creation Time:  5/23/2018  8:31 AM    Status:  Attested :  Sarah Parker PA-C (Physician Assistant - MELINA)    Cosigner:  Blossom Nielsen MD at 5/24/2018  6:18 PM        Attestation signed by Blossom Nielsen MD at 5/24/2018  6:18 PM        ICU STAFF:  I have discussed Ms. Ford' case with Sarah Parker PA-C and the ICU team; reviewed the patient's radiographic and laboratory data and met with her. I agree with the findings, assessment and plan as outlined below by Ms. Keith.    Blossom Nielsen MD  #[phone]                               [facility]    Critical Care Service  Progress Note    Date of Service (when I saw the patient): 05/23/2018     Problem List   COPD exacerbation   NSTEMI, in stent restenosis s/p re-stenting to circumflex  Pseudomonas in sputum      Main Plans for Today  Transfer to the floor   Continue intermittent high flow nasal cannula     Assessment & Plan  Preeti Ford is a 67 year old female with PMHx of severe COPD and CAD s/p PCI who was admitted on 5/17/2018 with a severe COPD exacerbation c/b NSTEMI found to have restenosis of circumflex artery s/p re-stenting. Extubated 5/20.       Neuro  1. Encephalopathy/delerium   Plan:  -- encephalopathy improved. Likely at least partially related to CO2 retention vs ICU delerium   -- continue Seroquel 25mg BID  -- PRN tylneol and norco       CV  1. NSTEMI, in stent restenosis s/p re-stenting to circumflex   2. HTN  Plan:  -- ST elevation on initial EKG and troponin leak on initial lab. Presentation this admission similar to presentation in 2015 with resolving MI. Angiogram found in stent restenosis of Circumflex. Was re stented. EF had recovered but now severely reduced again (EF 25-30% on 5/17).  Initially given diuretics - now off pressors.  --  "dual antiplatelet therapy   -- statin  -- on Home metoprolol. ACE/ARB when able     Resp:  1. COPD exacerbation  2. Acute hypercapnic respiratory failure s/p extubation 5/20  3. Pseudomonas in sputum   Plan:  -- Acute hypoxic and hypercapnic respiratory failure secondary to COPD exacerbation, and probably NSTEMI and ischemic cardiomyopathy. Pseudomonas in sputum.  - extubated 5/20  - Continue Methylpred 40 Q8 and then start taper   - scheduled duonebs, restart PTA inhalers   - Levofloxacin to complete a 10 day course.   - intermittent BiPAP and high flow NC, appears more comfortable on this regimen   - serum CO2 improving on BMP      GI/Nutrition  1. No prior hx  Plan:  -- swallow evaluation, advance diet as able   -- advance to level 1 with nectar thickened liquids, re-evaluate today       Renal  1. No prior hx.  Baseline creatinine appears to be 0.48  2. Compensated respiratory acidosis   Plan:  --monitor function and electrolytes as needed with replacement per ICU protocols.   -- generally avoid nephrotoxic agents such as NSAID, IV contrast unless specifically required  -- adjust medications as needed for renal clearance  -- follow I/O's as appropriate.  -- maintain euvolemia      ID  1. Pseudomonas in sputum   Plan:  -- ceftazidime changed to levofloxacin, continue for total of 10 day course       Endocrine  1. No active issues   Plan:  -- Keep BG  <180 for optimal healing  -- SSI      Heme:  1. No acute issues       General cares:  DVT Prophylaxis: Heparin SQ  GI Prophylaxis: Not indicated  Restraints: Restraints for medical healing needed: NO  Family update by me today: No      Sarah JASKARAN. Keith     Interval History   Course reviewed. No acute events overnight. Refused BiPAP throughout evening shift. Patient endorses feeling \"much better\" today. She denies feeling SOB this AM. She denies chest pain, abdominal pain. All other ROS negative.     Physical Exam   Temp: 97.7  F (36.5  C) Temp src: Oral Temp  Min: 97.2 "  F (36.2  C)  Max: 98.3  F (36.8  C) BP: 124/59   Heart Rate: 87 Resp: 18 SpO2: 100 % O2 Device: High Flow Nasal Cannula (HFNC) Oxygen Delivery: Other (Comments) (30LPM)  Vitals:    05/20/18 0650 05/21/18 0430 05/22/18 0400   Weight: 45.2 kg (99 lb 10.4 oz) 47.3 kg (104 lb 4.4 oz) 45.2 kg (99 lb 10.4 oz)     I/O last 3 completed shifts:  In: 620 [P.O.:320; I.V.:300]  Out: 2060 [Urine:2060]    GEN:  female, in no acute distress   EYES: PERRL, Anicteric sclera.   HEENT:  Normocephalic, atraumatic, trachea midline, high flow nasal cannula in place  CV: RRR, no murmurs  PULM/CHEST: Diminished, no wheezes or rales   GI:  soft, non-tender  : renae catheter in place, urine yellow and clear  EXTREMITIES: no peripheral edema  NEURO: A+O x3, Cranial nerves II-XII grossly intact, no motor-sensory deficits noted  SKIN: warm and dry   Imaging personally reviewed: no new imaging   ECG- NSR    Data     Recent Labs  Lab 05/23/18  0430 05/22/18  0310 05/21/18  0630 05/20/18  0550  05/19/18  0545  05/18/18  0430 05/17/18  2113 05/17/18  0911 05/17/18  0436   WBC 4.8 4.5  --  7.9  --  8.9  --   --   --  7.2 9.9   HGB 12.9 12.5  --  13.0  --  13.6  --   --   --  15.3 17.4*   MCV 92 94  --  92  --  90  --   --   --  90 90    142*  --  116*  Canceled, Test credited  --  128*  --   --   --  119*  119* 192   INR  --   --   --   --   --   --   --   --   --  0.91  --     139 144 142  --  142  --  139  --   --  137   POTASSIUM 4.1 4.2 3.7 4.0  < > 3.7  < > 3.8  --   --  4.8   CHLORIDE 101 96 97 102  --  100  --  102  --   --  100   CO2 34* 36* 41* 33*  --  37*  --  26  --   --  30   BUN 17 18 16 14  --  12  --  15  --   --  14   CR 0.48* 0.48* 0.48* 0.52  --  0.63  --  0.70  --   --  0.70   ANIONGAP 6 7 6  --   --  5  --   --   --   --  7   CALDERON 8.8 8.5 8.7 8.6  --  8.1*  --  7.9*  --   --  8.4*   * 113* 71 102*  --  90  --  110*  --   --  148*   ALBUMIN  --   --   --   --   --   --   --   --   --   --  3.6    PROTTOTAL  --   --   --   --   --   --   --   --   --   --  7.7   BILITOTAL  --   --   --   --   --   --   --   --   --   --  0.4   ALKPHOS  --   --   --   --   --   --   --   --   --   --  90   ALT  --   --   --   --   --   --   --   --   --   --  48   AST  --   --   --   --   --   --   --   --   --   --  54*   TROPI  --   --   --   --   --  0.319*  --  0.506* 0.566* 0.501* 0.366*   < > = values in this interval not displayed.[AT1.1]                   Revision History        User Key Date/Time User Provider Type Action    > AT1.1 5/23/2018  8:44 AM Sarah Parker PA-C Physician Assistant - MELINA Sign            Progress Notes by GALI LEGER at 5/24/2018  5:35 PM     Author:  GALI LEGER Service:  Respiratory Therapy Author Type:  Respiratory Therapist    Filed:  5/24/2018  5:37 PM Date of Service:  5/24/2018  5:35 PM Creation Time:  5/24/2018  5:35 PM    Status:  Signed :  GALI LEGER RT (Respiratory Therapist)         Patient is on a 1L NC with SpO2 in the mid to upper 90's. BS diminished. All nebs were given as ordered.  Will cont to follow.[SB1.1]  5/24/2018[SB1.2]  Gali Leger RRT[SB1.1]     Revision History        User Key Date/Time User Provider Type Action    > SB1.2 5/24/2018  5:37 PM GALI LEGER Respiratory Therapist Sign     SB1.1 5/24/2018  5:35 PM GALI LEGER Respiratory Therapist             Progress Notes by Rocio Nair MD at 5/24/2018  9:33 AM     Author:  Rocio Nair MD Service:  Hospitalist Author Type:  Physician    Filed:  5/24/2018  9:52 AM Date of Service:  5/24/2018  9:33 AM Creation Time:  5/24/2018  7:38 AM    Status:  Addendum :  Rocio Nair MD (Physician)         [facility]    Hospitalist Progress Note    Date of Service (when I saw the patient):[MW1.1] 05/24/2018    Assessment & Plan[MW1.2]   Preeti Ford is a 67 year old female with hx of known CAD s/p PCI, CHF, aortic stenosis, PAD s/p  aortoiliac bypass, severe COPD, and prior CVA who was admitted to the ICU service on 5/17/2018 for[MW1.1] N[MW1.3]STEMI with cardiogenic shock due to in-stent restenosis of the circumflex which has been re-stented and acute hypoxic respiratory failure due to COPD exacerbation and Pseudomonas pneumonia, requiring intubation. She was successfully extubated and weaned off pressors on 5/20. She was transferred to the Hospitalist service on 5/23.[MW1.1]    N[MW1.3]STEMI with cardiogenic shock due to in-stent restenosis, s/p ROWDY to mid-circumflex on 5/18  CAD, native heart, native vessels s/p PCI[MW1.1]  PAD s/p aortoiliac bypass  She was seen in clinic one day prior to admission and[MW1.3] sent home[MW1.4] with prednisone an[MW1.3]d[MW1.4] azithromycin for a COPD exacerbation. She then presented to the ED in acute respiratory distress with hypoxia to 81% on room air, and intubated on arrival. Troponin was mildly elevated to 0.566, but had worrisome EKG changes. TTE on admission showed new and decreased LVEF 25-30% with global LV dysfunction. She was taken to the cath lab on 5/18 where she was found to have mid-circumflex in-stent restenosis which was re-stented with a ROWDY. She was also treated with norepinephrine for shock between 5/18-5/20.   - Management as per Cardiology  - Currently on aspirin 81 mg daily (new), Plavix (new), metoprolol 12.5 mg BID (new), and home atorvastatin 10 mg qhs  - Cardiac rehab[MW1.3]    Chronic systolic CHF due to ischemic cardiomyopathy[MW1.1]  She was found to have a decreased LVEF to 25-30% on admission TTE compared to ~55% in Mar 2017, but this had appeared to improve to at least 40-45% by the time of her angiogram on 5/18.   - Appears euvolemic  - On BB as noted above, defer initiation of ACEi to Cardiology    Moderate aortic stenosis  Noted on admission TTE with mean gradient of 14.1 mmHg  - Stable, appears euvolemic    Acute hypoxic and hypercapnic respiratory failure due to acute  COPD exacerbation and Pseudomonas pneumonia  Due to acute COPD exacerbation and Pseudomonas pneumonia. Initiated on azithromycin and prednisone by her PCP on 5/16, but developed progressive respiratory failure as noted above. CXR on arrival showed no acute infiltrates, but sputum grew Pseudomonas. She was intubated on arrival and successfully extubated to NIPPV on 5/20. She has improved with IV steroids, duonebs, and IV antibiotics (treated with ceftazidime 5/17-5/22, levofloxacin 5/22 to present).  - On 3-5 lpm/NC, wean as tolerated  - d/c IV methylprednisolone and start prednisone 40 mg po daily  - Continues on duonebs QID  - Continues on levofloxacin to complete a 10 day course of antibiotics (last day: 5/26)  - Encourage incentive spirometry    GI prophylaxis  She has been started on pantoprazole 40 mg daily for GI prophylaxis in setting of dual-antiplatelet therapy, steroid burst, and recent critical illness[MW1.3]    Acute[MW1.1] delirium, Improved  Due to ICU delirium and possibly steroids  - She has mostly returned to her baseline mental status, but continues to sundown   - On delirium protocol with Seroquel and Haldol available PRN    Dysphagia due to recent intubation and encephalopathy  - On DDL2 with thin liquids as per SLP  - SLP following, advance diet as per SLP    History of CVA  - On aspirin, Plavix, and statin as noted above[MW1.3]    # Pain Assessment:[MW1.1]  Current Pain Score 5/24/2018   Patient currently in pain? denies   Pain score (0-10) -   Pain location -   Pain descriptors -   CPOT pain score -[MW1.2]   Preeti fierro pain level was assessed and she currently denies pain.      FEN: DDL2 with thin liquids[MW1.3]  DVT Prophylaxis:[MW1.1] Heparin SQ[MW1.3]  Code Status:[MW1.1] Full Code[MW1.2]    Disposition:[MW1.1] d/c IMC status[MW1.3], [place] [facility] [place]MW1.3] Expected discharge[MW1.1] pending ongoing improvement in respiratory status[MW1.3]. Could d/c to TCU with O2 as early as  tomorrow, but patient is resistant to TCU. Will continue to encourage.[MW1.6]    Rocio Nair    Interval History[MW1.2]   Hallucinations overnight, none this morning. Calm and cooperative. Denies chest pain, shortness of breath, or nausea. Weaned off HFNC and breathing comfortably on nasal cannula. Has not had a bowel movement since admission[MW1.5]. PT/OT recommending TCU, but she is very resistant[MW1.6]  - d/c IV methylprednisolone and start oral prednisone  - decrease duonebs to QID  - encourage incentive spirometry  - change antibiotics to PO  - start PPI for GI ppx  - PRN bowel meds ordered  - Delirium protocol ordered  - d/c IMC status and [place] [facility] [place]1.5[place]  - encourage TCU[MW1.6]    -Data reviewed today: I reviewed all new labs and imaging results over the last 24 hours. I personally reviewed[MW1.1] no images or EKG's today[MW1.5].[MW1.1]    Physical Exam[MW1.2]   Temp: 98.2  F (36.8  C)[MW1.7] Temp src: Oral BP: 105/63   Heart Rate: 79 Resp: 17 SpO2: 99 %[MW1.2] O2 Device: Nasal cannula[MW1.7] Oxygen Delivery: Other (Comments) (20L)  Vitals:    05/20/18 0650 05/21/18 0430 05/22/18 0400   Weight: 45.2 kg (99 lb 10.4 oz) 47.3 kg (104 lb 4.4 oz) 45.2 kg (99 lb 10.4 oz)[MW1.2]     Vital Signs with Ranges[MW1.1]  Temp:  [97.7  F (36.5  C)-98.9  F (37.2  C)] 98.2  F (36.8  C)  Heart Rate:  [70-87] 78  Resp:  [16-21] 20  BP: ()/(45-85) 123/65  FiO2 (%):  [40 %] 40 %  SpO2:  [96 %-100 %] 100 %[MW1.7]  I/O last 3 completed shifts:  In: 400 [P.O.:400]  Out: 1025 [Urine:1025][MW1.2]    Constitutional:[MW1.1] Chronically-ill appearing,[MW1.5] NAD  Respiratory: Breathing non-labored.[MW1.1] Diminished breath sounds bilaterally[MW1.5]  Cardiovascular: Heart[MW1.1] sounds distant, RRR[MW1.5]. No pedal edema  GI: +BS, abd soft/NT  Skin/Integumen: No rash  Neuro: Alert and appropriate, DEE  Psych: Calm and cooperative[MW1.1]    Medications[MW1.2]     - MEDICATION INSTRUCTIONS -       Percutaneous Coronary  Intervention orders placed (this is information for BPA alerting)       Reason ACE/ARB/ARNI order not selected       Reason beta blocker order not selected         aspirin  81 mg Oral Daily     atorvastatin  10 mg Oral At Bedtime     clopidogrel  75 mg Oral or Feeding Tube Daily     dornase alpha  2.5 mg Inhalation Daily     fluticasone-vilanterol  1 puff Inhalation Daily     heparin  5,000 Units Subcutaneous Q12H     insulin aspart  1-3 Units Subcutaneous TID AC     insulin aspart  1-3 Units Subcutaneous At Bedtime     ipratropium - albuterol 0.5 mg/2.5 mg/3 mL  3 mL Nebulization 4x daily     [START ON 5/25/2018] levofloxacin  500 mg Oral Daily     metoprolol tartrate  12.5 mg Oral BID     pantoprazole  40 mg Oral QAM     polyethylene glycol  17 g Oral Daily     predniSONE  40 mg Oral Daily     umeclidinium  1 puff Inhalation Daily[MW1.8]     Data     Recent Labs  Lab 05/24/18  0515 05/23/18  0430 05/22/18  0310  05/19/18  0545  05/18/18  0430  05/17/18  2113 05/17/18  0911   WBC 6.1 4.8 4.5  < > 8.9  --   --   --   --  7.2   HGB 12.6 12.9 12.5  < > 13.6  --   --   --   --  15.3   MCV 91 92 94  < > 90  --   --   --   --  90    179 142*  < > 128*  --   --   --   --  119*  119*   INR  --   --   --   --   --   --   --   --   --  0.91    141 139  < > 142  --  139  < >  --   --    POTASSIUM 4.1 4.1 4.2  < > 3.7  < > 3.8  --   --   --    CHLORIDE 102 101 96  < > 100  --  102  < >  --   --    CO2 37* 34* 36*  < > 37*  --  26  < >  --   --    BUN 19 17 18  < > 12  --  15  < >  --   --    CR 0.43* 0.48* 0.48*  < > 0.63  --  0.70  < >  --   --    ANIONGAP 3 6 7  < > 5  --   --   --   --   --    CALDERON 8.8 8.8 8.5  < > 8.1*  --  7.9*  < >  --   --    * 120* 113*  < > 90  --  110*  < >  --   --    TROPI  --   --   --   --  0.319*  --  0.506*  --  0.566* 0.501*   < > = values in this interval not displayed.    No results found for this or any previous visit (from the past 24 hour(s)).[MW1.2]         Revision  History        User Key Date/Time User Provider Type Action    > [N/A] 5/24/2018  9:52 AM Rocio Nair MD Physician Addend     MW1.4 5/24/2018  9:52 AM Rocio Nair MD Physician Addend     MW1.6 5/24/2018  9:51 AM Rocio Nair MD Physician      MW1.8 5/24/2018  9:33 AM Rocio Nair MD Physician Sign     MW1.7 5/24/2018  9:32 AM Rocio Nair MD Physician      MW1.5 5/24/2018  9:30 AM Rocio Nair MD Physician      MW1.3 5/24/2018  8:56 AM Rocio Nair MD Physician      MW1.2 5/24/2018  7:39 AM Rocio Nair MD Physician      MW1.1 5/24/2018  7:38 AM Rocio Nair MD Physician             Progress Notes by James Castro RN at 5/23/2018  6:16 PM     Author:  James Castro RN Service:  (none) Author Type:  Registered Nurse    Filed:  5/23/2018  6:17 PM Date of Service:  5/23/2018  6:16 PM Creation Time:  5/23/2018  6:16 PM    Status:  Signed :  James Castro RN (Registered Nurse)         SBAR given to CCU EULA gonzalez. Pt transferred by wheelchair, all belongings- cellphone,  and glass with pt.[TD1.1]      Revision History        User Key Date/Time User Provider Type Action    > TD1.1 5/23/2018  6:17 PM James Castro RN Registered Nurse Sign            Progress Notes by Marcia Galeano, RT at 5/23/2018  1:22 PM     Author:  Marcia Galeano,  Service:  (none) Author Type:  Respiratory Therapist    Filed:  5/23/2018  1:22 PM Date of Service:  5/23/2018  1:22 PM Creation Time:  5/23/2018  1:22 PM    Status:  Signed :  Marcia Galeano,  (Respiratory Therapist)         Patient remains on HFNC 40%, 30L.  Lung sounds diminished.  Getting scheduled nebs.  Continue to follow.[SG1.1]     Revision History        User Key Date/Time User Provider Type Action    > SG1.1 5/23/2018  1:22 PM Marcia Galeano, RT Respiratory Therapist Sign            Progress Notes by Preeti Rai at 5/23/2018 11:58 AM     Author:  Fredi  Preeti LOPES Service:  Spiritual Health Author Type:      Filed:  5/23/2018 12:02 PM Date of Service:  5/23/2018 11:58 AM Creation Time:  5/23/2018 11:58 AM    Status:  Signed :  Preeti Rai ()         SPIRITUAL HEALTH SERVICES  Spiritual Assessment Progress Note  [facility] ICU  Note from 5/22/18   visited with pt due to LOS.  Pt was very discouraged stating she has not felt so poorly.  She was wondering how long she would feel like this.  She has the support of her family.  Pt stated that she would like a prayer.     listened as pt processed her experience and challenges.  Provided encouragement and prayer.     SH continues to be available as needed.                                                                                                                                             Preeti Rai M.Div., [facility]  Staff    Pager [phone][CW1.1]     Revision History        User Key Date/Time User Provider Type Action    > CW1.1 5/23/2018 12:02 PM Preeti Raiin Sign            Progress Notes by Jaimie Renteria RT at 5/23/2018  6:05 AM     Author:  Jaimie Renteria RT Service:  (none) Author Type:  Respiratory Therapist    Filed:  5/23/2018  6:07 AM Date of Service:  5/23/2018  6:05 AM Creation Time:  5/23/2018  6:05 AM    Status:  Signed :  Jaimie Renteria RT (Respiratory Therapist)         Pt has been on HFNC 30LPM 40% throughout the night, pt refused BIPAP. Q4 schedule neb tx given as ordered. LS diminished, SPO2 100%. Will continue to follow.[HA1.1]  5/23/2018  Jaimie Renteria[HA1.2]       Revision History        User Key Date/Time User Provider Type Action    > HA1.2 5/23/2018  6:07 AM Jaimie Renteria RT Respiratory Therapist Sign     HA1.1 5/23/2018  6:05 AM Jaimie Renteria RT Respiratory Therapist                      Procedure Notes      Procedures by Summer Pitts MD at 5/17/2018 12:32 PM     Author:  Summer Pitts MD Service:  ICU  Author Type:  Physician    Filed:  5/17/2018 12:32 PM Date of Service:  5/17/2018 12:32 PM Creation Time:  5/17/2018 12:31 PM    Status:  Signed :  Summer Pitts MD (Physician)     Procedures:    1. CENTRAL LINE [PRO85 (Custom)]                 Procedure: Central Line Placement  Consent:   1. Obtained from  after discussion of risk/benefit/alternatives) and all questions answered to the best of my knowledge. This signed consent is in the chart.     Universal Protocol:   Patient Identification was verified, time out was performed, site marking N/A, Imaging data N/A. Full Barrier precaution done: Hands washed, mask, gloves, gown, cap and eye protection all used.    Indication and diagnosis: IV access for medication(s) and CVP monitoring in a patient with severe COPD and possible ACS.    Narrative: The vein was identified with portable ultrasound device (compression test, venous color and doppler flow pattern). Area prepped with chlorhexedine and Patient was head to toe draped. Sterile technique and full barrier precautions used. 1% lidocaine injected subcutaneously for local anesthesia. A triple lumen catheter was inserted using standard Seldinger technique under real time US guidance after careful evaluation of the best side to minimize risk of infection and complication related to insertion. The central line was sutured at 18 cm depth.    Post-procedure imaging:  Central line is in good position and no visible pneumothorax per my review.     Complications: No apparent complication.    Procedure performed by Dr Pitts on May 17, 2018[SK1.1]         Revision History        User Key Date/Time User Provider Type Action    > SK1.1 5/17/2018 12:32 PM Summer Pitts MD Physician Sign                     Progress Notes - Therapies (Notes from 05/23/18 through 05/26/18)      Progress Notes by Ron Ovalles RT at 5/25/2018  3:30 PM     Author:  Ron Ovalles RT Service:  Respiratory Therapy Author  Type:  Respiratory Therapist    Filed:  5/25/2018  3:30 PM Date of Service:  5/25/2018  3:30 PM Creation Time:  5/25/2018  3:30 PM    Status:  Signed :  Ron Ovalles RT (Respiratory Therapist)         Patient on 2L NC. Pedro sounds diminished. SpO2 in the mid 90's.  Nebulizer tx given as ordered. Will continue to follow.    Ron Ovalles RT[KV1.1]     Revision History        User Key Date/Time User Provider Type Action    > KV1.1 5/25/2018  3:30 PM Ron Ovalles RT Respiratory Therapist Sign            Progress Notes by GALI LEGER at 5/24/2018  5:35 PM     Author:  GALI LEGER Service:  Respiratory Therapy Author Type:  Respiratory Therapist    Filed:  5/24/2018  5:37 PM Date of Service:  5/24/2018  5:35 PM Creation Time:  5/24/2018  5:35 PM    Status:  Signed :  GALI LEGER RT (Respiratory Therapist)         Patient is on a 1L NC with SpO2 in the mid to upper 90's. BS diminished. All nebs were given as ordered.  Will cont to follow.[SB1.1]  5/24/2018[SB1.2]  Gali Leger RRT[SB1.1]     Revision History        User Key Date/Time User Provider Type Action    > SB1.2 5/24/2018  5:37 PM GALI LEGER Respiratory Therapist Sign     SB1.1 5/24/2018  5:35 PM GALI LEGER Respiratory Therapist             Progress Notes by Marcia Galeano, RT at 5/23/2018  1:22 PM     Author:  Marcia Galeano, RT Service:  (none) Author Type:  Respiratory Therapist    Filed:  5/23/2018  1:22 PM Date of Service:  5/23/2018  1:22 PM Creation Time:  5/23/2018  1:22 PM    Status:  Signed :  Marcia Galeano RT (Respiratory Therapist)         Patient remains on HFNC 40%, 30L.  Lung sounds diminished.  Getting scheduled nebs.  Continue to follow.[SG1.1]     Revision History        User Key Date/Time User Provider Type Action    > SG1.1 5/23/2018  1:22 PM Marcia Galeano, RT Respiratory Therapist Sign            Progress Notes by Jaimie Renteria RT at 5/23/2018  6:05 AM     Author:  Jaimie Renteria, RT Service:  (none)  Author Type:  Respiratory Therapist    Filed:  5/23/2018  6:07 AM Date of Service:  5/23/2018  6:05 AM Creation Time:  5/23/2018  6:05 AM    Status:  Signed :  Jaimie Renteria RT (Respiratory Therapist)         Pt has been on HFNC 30LPM 40% throughout the night, pt refused BIPAP. Q4 schedule neb tx given as ordered. LS diminished, SPO2 100%. Will continue to follow.[HA1.1]  5/23/2018  Jaimie Renteria[HA1.2]       Revision History        User Key Date/Time User Provider Type Action    > HA1.2 5/23/2018  6:07 AM Jaimie Renteria, RT Respiratory Therapist Sign     HA1.1 5/23/2018  6:05 AM Jaimie Renteria, RT Respiratory Therapist

## 2018-05-17 NOTE — IP AVS SNAPSHOT
[facility]    [address] [place], [place] [address]., [address]    [place] [address]    Phone:  [phone]                                       After Visit Summary   5/17/2018    Preeti Ford    MRN: [id number]           After Visit Summary Signature Page     I have received my discharge instructions, and my questions have been answered. I have discussed any challenges I see with this plan with the nurse or doctor.    ..........................................................................................................................................  Patient/Patient Representative Signature      ..........................................................................................................................................  Patient Representative Print Name and Relationship to Patient    ..................................................               ................................................  Date                                            Time    ..........................................................................................................................................  Reviewed by Signature/Title    ...................................................              ..............................................  Date                                                            Time

## 2018-05-17 NOTE — PROGRESS NOTES
[facility] ICU RESPIRATORY NOTE  Date of Admission: 5/17/2018  Date of Intubation (most recent): 5/17/18 (intubated at outside facility)  Reason for Mechanical Ventilation: Respiratory failure  Number of Days on Mechanical Ventilation: 1  Met Criteria for Pressure Support Trial: No    Ventilation Mode: CMV/AC  (Continuous Mandatory Ventilation/ Assist Control)  FiO2 (%): 35 %  Rate Set (breaths/minute): 14 breaths/min  Tidal Volume Set (mL): 400 mL  PEEP (cm H2O): 5 cmH2O  Oxygen Concentration (%): 35 %  Resp: 14    Significant Events Today:     ABG Results:      Recent Labs  Lab 05/17/18  0830 05/17/18  0535 05/17/18  0436   PH 7.33*  --   --    PCO2 55*  --   --    PO2 171*  --   --    HCO3 29*  --   --    O2PER 50 100 100       ETT appearance on chest x-ray:    Plan:    Continue full ventilatory support.

## 2018-05-17 NOTE — ED NOTES
See MD dictation in order to capture the entire picture. Pt was intubated and renae given, ntg off and bolus iv given. Pt tolerated it well.

## 2018-05-17 NOTE — ED PROVIDER NOTES
History     Chief Complaint   Patient presents with     Respiratory Distress     HPI  Preeti Ford is a 67 year old female who resents to the ED by ambulance this morning because of severe shortness of breath.  She was seen in the clinic yesterday and put on Zithromax and a prednisone burst for COPD exacerbation.  She woke at about 230 this morning and yelled to her  that she could not breathe.  He called 911.  When officers arrived her O2 sats were 81% and her heart rate was in the 160s.  Medics arrived and gave her a DuoNeb and supplemental O2 and her sats have been in the upper 90s.  Her end-tidal CO2 has been bouncing around but is averaged about 30.  Heart rate is now come down into the 120s.  She still quite tight.  She cannot give me any meaningful history because of her severe respiratory distress.        Problem List:    Patient Active Problem List    Diagnosis Date Noted     Malnutrition, unspecified type (H) 04/16/2018     Priority: Medium     Hyponatremia 04/16/2018     Priority: Medium     Hypochloremia 04/16/2018     Priority: Medium     Vertigo 03/23/2018     Priority: Medium     Other seizures (H) - possible 03/23/2018     Priority: Medium     Acute CVA (cerebrovascular accident) - right paramedian superior vermis and left cerebellar hemisphere 03/23/2018     Priority: Medium     Pulmonary emphysema (H) 03/22/2018     Priority: Medium     Acute respiratory failure with hypoxia (H) 06/14/2017     Priority: Medium     COPD exacerbation (H) 06/14/2017     Priority: Medium     Coronary artery disease involving native coronary artery - STEMI with LAD and circ stents in 8/2015 06/14/2017     Priority: Medium     Elevated troponin 06/14/2017     Priority: Medium     Cervical high risk HPV (human papillomavirus) test positive 12/20/2016     Priority: Medium     12/20/16 NIL pap/+ HPV (not 16 or 18). Plan: cotest in 1 year, due by 12/20/17 12/20/17 NIL Pap, +HR HPV (Not types 16/18). Plan  colp  3/21/18 3 month [place] not done, updated to 6mo [place]/Pap due by 6/20/18          PAD (peripheral artery disease) (H) - moderate left common carotid stenosis, aortoiliac bypass, chronic left vertebral and right posterior cerebral stenoses 02/12/2016     Priority: Medium     Chronic bronchitis, unspecified chronic bronchitis type (H) 02/09/2016     Priority: Medium     Ischemic cardiomyopathy - EF 25% in 2015 but 55% in 3/2017 and 45-50% on 3/18 09/08/2015     Priority: Medium     Echo  With ejection fraction of 25-30 %       HTN, goal below 130/80 09/08/2015     Priority: Medium     Acute systolic congestive heart failure (H) 09/08/2015     Priority: Medium     ejection fraction 25-30%       Lung nodule 09/08/2015     Priority: Medium     See on CT , In the setting of smoking recommends 1 year follow up with CT        GERD (gastroesophageal reflux disease) 09/08/2015     Priority: Medium     ST elevation myocardial infarction involving left anterior descending (LAD) coronary artery (H) 09/08/2015     Priority: Medium     ACS (acute coronary syndrome) (H) 08/28/2015     Priority: Medium     Tobacco use disorder 03/10/2015     Priority: Medium     History of stroke - right thalamus in 8/2013 and left cerebellar and right vermis in 3/2018 08/21/2013     Priority: Medium     Numbness and tingling 08/21/2013     Priority: Medium     Right side of the face , upper and lower limbs         CVA (cerebral vascular accident) (H) 08/17/2013     Priority: Medium     Elevated blood pressure reading without diagnosis of hypertension 07/18/2013     Priority: Medium     Hyperlipidemia LDL goal <130 07/19/2012     Priority: Medium     Urinary incontinence 03/04/2012     Priority: Medium     Forgetfulness 03/04/2012     Priority: Medium     Advanced directives, counseling/discussion 11/14/2011     Priority: Medium        Past Medical History:    Past Medical History:   Diagnosis Date     Arthritis      COPD (chronic obstructive  pulmonary disease) (H)      Coronary artery disease      CVA (cerebral vascular accident) (H) 8-     Hypertension        Past Surgical History:    Past Surgical History:   Procedure Laterality Date     APPENDECTOMY       BYPASS GRAFT AORTOILIAC N/A 3/7/2017    Procedure: BYPASS GRAFT AORTOILIAC;  Surgeon: Marcos Pratt MD;  Location: [facility] OR     SALPINGO OOPHORECTOMY,R/L/DELORES      Salpingo Oophorectomy, RT/LT/DELORES       Family History:    Family History   Problem Relation Age of Onset     CEREBROVASCULAR DISEASE Mother      CEREBROVASCULAR DISEASE Father      Genitourinary Problems Brother      Dialysis       Social History:  Marital Status:   [2]  Social History   Substance Use Topics     Smoking status: Current Every Day Smoker     Packs/day: 0.50     Types: Cigarettes     Smokeless tobacco: Never Used      Comment: patient states she is working on it      Alcohol use No        Medications:      ACE/ARB/ARNI NOT PRESCRIBED, INTENTIONAL,   acetaminophen (TYLENOL) 325 MG tablet   albuterol (PROAIR HFA/PROVENTIL HFA/VENTOLIN HFA) 108 (90 BASE) MCG/ACT Inhaler   atorvastatin (LIPITOR) 10 MG tablet   azithromycin (ZITHROMAX) 250 MG tablet   budesonide-formoterol (SYMBICORT) 160-4.5 MCG/ACT Inhaler   Calcium Carbonate-Vitamin D (OSCAL 500/200 D-3 PO)   clopidogrel (PLAVIX) 75 MG tablet   Coenzyme Q10 (COQ10 PO)   ipratropium - albuterol 0.5 mg/2.5 mg/3 mL (DUONEB) 0.5-2.5 (3) MG/3ML neb solution   metoprolol succinate (TOPROL-XL) 25 MG 24 hr tablet   montelukast (SINGULAIR) 10 MG tablet   nicotine (NICODERM CQ) 7 MG/24HR 24 hr patch   Nutritional Supplements (BOOST)   order for DME   order for DME   polyethylene glycol (MIRALAX) powder   predniSONE (DELTASONE) 20 MG tablet   tiotropium (SPIRIVA HANDIHALER) 18 MCG capsule         Review of Systems   Unable to perform ROS: Severe respiratory distress       Physical Exam   BP: (!) 202/111  Heart Rate: 125  Resp: (!) 42  SpO2: 96 %      Physical Exam    Constitutional:   Thin lady in severe respiratory distress.  Just finishing up a DuoNeb in the ambulance.   HENT:   Head: Normocephalic.   Oral mucosa is dry.   Eyes: EOM are normal.   Cardiovascular: Regular rhythm.  Tachycardia present.    Pulmonary/Chest: She is in respiratory distress (severe). She has wheezes (tight). She has no rales.   Abdominal: Soft. There is no tenderness.   Musculoskeletal: Normal range of motion. She exhibits no edema.   Neurological: She is alert.   Psychiatric: Her mood appears anxious.       ED Course  (with Medical Decision Making)        Patient was seen immediately upon arrival and report received from medics.  She was placed on BiPAP and another DuoNeb was given while setting up for continuous DuoNeb.  O2 sats were around 97%.  We were able to titrate her FiO2 down to about 40% and she dropped her sats to about 89 so we bumped it back up to 60%.    Blood pressure was still markedly elevated.  She was given a dose of sublingual nitro while setting up a nitro drip to bring her blood pressure down and also help if there was an element of pulmonary edema or cardiac ischemia.  Quick bedside ultrasound showed a few B-lines consistent with pulmonary edema.  Her chest x-ray does not look markedly wet and she has no pneumothorax.  Does have some white interstitial prominence at the right base.  I am going to cover her with Rocephin as well.    Initial VBG showed a pH of 7.19, PCO2 82, PO2 95, bicarb 31 on 100% FiO2.  Her lactic acid was normal at 1.6.  White count came back normal at 9.9.    EKG shows a sinus tachycardia at 124 bpm.  Right axis.  She does have inferior ST depression in leads II, 3, F and about a mm of ST elevation in V1-4.  She denies chest pain just states that she is short of breath.    5:31 AM:  Troponin just came back elevated at 0.366.  BC and repeat VBG will be drawn.  If her gas is worse, will likely need to intubate her.  BNP elevated over 10,000.    She is  started tiring before the labs could come back and we decided to intubate her rather than waiting.    She was kept on the BiPAP until she was paralyzed.  We used rocuronium 1.2 mg/kg paralysis.  I ordered a small dose of etomidate for sedation but she was somnolent by the time the meds were all drawn up and we elected to just paralyze her and proceed with the intubation.  We did turn off the nitro drip prior to this so as not to drop her blood pressure.    She was intubated with a 7.5 ET tube under direct laryngoscopy over a bougie.  The tube was secured at 23 cm at the upper lip.  Breath sounds were equal bilaterally.  There was color change with the CO2 detector.  O2 sats remained between 97 and 99% during the entire procedure.  She was started on propofol for sedation.  Once she was intubated her blood pressure came down nicely into the 130s systolic.    She did drop her blood pressure to 89 systolic and the propofol was discontinued.  Fluid flush was given.  We are titrating down on her FiO2.    0558:  I spoke with Dr. Alcocer, the intensivist at [facility] who graciously accepted her in transfer.  She is quite ill and with her terrible lung disease, her  is aware that she may not get off of the ventilator.  We will send her by [facility] as I do not want her sitting in morning rush hour traffic in her tenuous state.    Blood pressure back up to the 120s systolic.  She is now back on the propofol for sedation and we gave her another dose of vecuronium for the flight.      We did repeat an EKG just prior to transfer.  Shows a sinus rhythm at 88 bpm.  ST elevation has improved but she now has T-wave inversion in V3- 6.  The baseline is wandering a bit but she does have some nonspecific ST/T-wave changes in the inferior leads.  Suspect that she will have further troponin leak which may have been due to the hypoxia and severe hypertension rather than coronary artery occlusion.           ED Course      Procedures         EKG Interpretation:      Interpreted by Abhay Manuel  Time reviewed: 0515  Symptoms at time of EKG: Severe dyspnea   Rhythm: sinus tachycardia  Rate: 124  Axis: Right Axis Deviation  Ectopy: none  Conduction: normal  ST Segments/ T Waves: ST depression inferiorly in leads II, 3, F with about 1 mm of ST elevation in V1-4  Q Waves: none  Comparison to prior: ST depression and elevation is new compared to her previous EKG from 3/22/2018.    Clinical Impression: Sinus tachycardia with ST elevation in V1-4 with reciprocal changes in 2, 3 and F.  Consistent with STEMI.                  Critical Care time:  was 150 minutes for this patient excluding procedures.               Results for orders placed or performed during the hospital encounter of 05/17/18 (from the past 24 hour(s))   CBC with platelets differential   Result Value Ref Range    WBC 9.9 4.0 - 11.0 10e9/L    RBC Count 5.88 (H) 3.8 - 5.2 10e12/L    Hemoglobin 17.4 (H) 11.7 - 15.7 g/dL    Hematocrit 52.7 (H) 35.0 - 47.0 %    MCV 90 78 - 100 fl    MCH 29.6 26.5 - 33.0 pg    MCHC 33.0 31.5 - 36.5 g/dL    RDW 13.9 10.0 - 15.0 %    Platelet Count 192 150 - 450 10e9/L    Diff Method Automated Method     % Neutrophils 62.5 %    % Lymphocytes 22.6 %    % Monocytes 14.3 %    % Eosinophils 0.0 %    % Basophils 0.5 %    % Immature Granulocytes 0.1 %    Absolute Neutrophil 6.2 1.6 - 8.3 10e9/L    Absolute Lymphocytes 2.2 0.8 - 5.3 10e9/L    Absolute Monocytes 1.4 (H) 0.0 - 1.3 10e9/L    Absolute Eosinophils 0.0 0.0 - 0.7 10e9/L    Absolute Basophils 0.1 0.0 - 0.2 10e9/L    Abs Immature Granulocytes 0.0 0 - 0.4 10e9/L   Comprehensive metabolic panel   Result Value Ref Range    Sodium 137 133 - 144 mmol/L    Potassium 4.8 3.4 - 5.3 mmol/L    Chloride 100 94 - 109 mmol/L    Carbon Dioxide 30 20 - 32 mmol/L    Anion Gap 7 3 - 14 mmol/L    Glucose 148 (H) 70 - 99 mg/dL    Urea Nitrogen 14 7 - 30 mg/dL    Creatinine 0.70 0.52 - 1.04 mg/dL    GFR  Estimate 85 >60 mL/min/1.7m2    GFR Estimate If Black >90 >60 mL/min/1.7m2    Calcium 8.4 (L) 8.5 - 10.1 mg/dL    Bilirubin Total 0.4 0.2 - 1.3 mg/dL    Albumin 3.6 3.4 - 5.0 g/dL    Protein Total 7.7 6.8 - 8.8 g/dL    Alkaline Phosphatase 90 40 - 150 U/L    ALT 48 0 - 50 U/L    AST 54 (H) 0 - 45 U/L   Troponin I   Result Value Ref Range    Troponin I ES 0.366 (HH) 0.000 - 0.045 ug/L   Nt probnp inpatient   Result Value Ref Range    N-Terminal Pro BNP Inpatient 15260 (H) 0 - 900 pg/mL   Blood gas venous   Result Value Ref Range    Ph Venous 7.19 (LL) 7.32 - 7.43 pH    PCO2 Venous 82 (HH) 40 - 50 mm Hg    PO2 Venous 95 (H) 25 - 47 mm Hg    Bicarbonate Venous 31 (H) 21 - 28 mmol/L    Base Deficit Venous 0.4 mmol/L    FIO2 100    Lactic acid whole blood   Result Value Ref Range    Lactic Acid 1.6 0.7 - 2.0 mmol/L   POC US CHEST B-SCAN    Impression    Limited Bedside Lung Ultrasound, performed and interpreted by me.   Indication: shortness of breath and hypoxia    With the patient positioned upright, bilateral anterior lung fields were examined for evidence of thoracic free fluid, pulmonary consolidation, and pulmonary edema.       Findings: Very slight sliding signs bilaterally because she is not moving much air.  B lines bilaterally in the anterior upper air fields.  I did not check posteriorly because of her respiratory distress..     IMPRESSION: Few B lines bilaterally consistent with early pulmonary edema.   XR Chest Port 1 View    Narrative    XR CHEST PORT 1 VW  5/17/2018 5:01 AM     INDICATION: Shortness of breath.    COMPARISON: 4/13/2018.      Impression    IMPRESSION: Emphysema. Slight interstitial prominence at the right  base. No consolidative infiltrates or other acute findings. Normal  heart size.    PAULA SETHI MD   Blood gas venous   Result Value Ref Range    Ph Venous 7.09 (LL) 7.32 - 7.43 pH    PCO2 Venous 104 (HH) 40 - 50 mm Hg    PO2 Venous 101 (H) 25 - 47 mm Hg    Bicarbonate Venous 32 (H)  21 - 28 mmol/L    Base Deficit Venous 2.1 mmol/L    FIO2 100    XR Chest Port 1 View    Narrative    XR CHEST PORT 1 VW  5/17/2018 6:07 AM     INDICATION: Tube placement.    COMPARISON: 5/17/2018 at 0452 hours.      Impression    IMPRESSION: New ETT with tip at the level of the aortic arch in good  position. Otherwise no change. Emphysema and slight interstitial  prominence in the lower lungs, greater on the right. Coronary artery  stent.    PAULA SETHI MD   Routine UA with microscopic   Result Value Ref Range    Color Urine Mikki     Appearance Urine Cloudy     Glucose Urine 50 (A) NEG^Negative mg/dL    Bilirubin Urine Negative NEG^Negative    Ketones Urine Negative NEG^Negative mg/dL    Specific Gravity Urine 1.017 1.003 - 1.035    Blood Urine Negative NEG^Negative    pH Urine 6.0 5.0 - 7.0 pH    Protein Albumin Urine 100 (A) NEG^Negative mg/dL    Urobilinogen mg/dL 2.0 0.0 - 2.0 mg/dL    Nitrite Urine Negative NEG^Negative    Leukocyte Esterase Urine Negative NEG^Negative    Source Catheterized Urine     WBC Urine 8 (H) 0 - 5 /HPF    RBC Urine 2 0 - 2 /HPF    Bacteria Urine Few (A) NEG^Negative /HPF    Mucous Urine Present (A) NEG^Negative /LPF    Hyaline Casts 160 (H) 0 - 2 /LPF    Granular Casts 69 (A) NEG^Negative /LPF    Cellular Cast 3 (A) NEG^Negative /LPF       Medications   albuterol (PROVENTIL) continous nebulization (0 mg/hr Nebulization Stopped 5/17/18 0619)   ipratropium (ATROVENT) 0.02 % neb solution 1 mg (0 mg Nebulization Stopped 5/17/18 0619)   nitroGLYcerin (NITROSTAT) sublingual tablet 0.4 mg (0.4 mg Sublingual Given 5/17/18 0451)   nitroGLYcerin 50 mg in D5W 250 mL (adult std) infusion (0 mcg/kg/min × 46.9 kg Intravenous Stopped 5/17/18 0619)   propofol (DIPRIVAN) infusion (10 mcg/kg/min × 46.9 kg Intravenous New Bag 5/17/18 0702)   vecuronium (NORCURON) injection 5 mg (5 mg Intravenous Not Given 5/17/18 0620)   midazolam (VERSED) 1 mg/mL infusion (not administered)   ipratropium -  albuterol 0.5 mg/2.5 mg/3 mL (DUONEB) neb solution 3 mL (3 mLs Nebulization Given 5/17/18 0723)   methylPREDNISolone sodium succinate (solu-MEDROL) injection 125 mg (125 mg Intravenous Given 5/17/18 0451)   LORazepam (ATIVAN) injection 0.5 mg (0.5 mg Intravenous Given 5/17/18 0521)   cefTRIAXone (ROCEPHIN) intermittent infusion 1 g (0 g Intravenous Stopped 5/17/18 0659)   vecuronium (NORCURON) injection 5 mg (5 mg Intravenous Given 5/17/18 0717)       Assessments & Plan     I have reviewed the nursing notes.    I have reviewed the findings, diagnosis, plan and need for follow up with the patient.     Critical Care Addendum    My initial assessment, based on my review of prehospital provider report, review of vital signs, focused history and physical exam, established that Preeti Ford has respiratory failure, which requires immediate intervention, and therefore she is critically ill.     After the initial assessment, the care team initiated multiple lab tests, initiated medication therapy with continuous duo nebs and IV nitroglycerin and initiated intensive non-invasive respiratory support to provide stabilization care. Due to the critical nature of this patient, I reassessed nursing observations, vital signs, physical exam, 12 lead ECG analysis, interpretation of labs, CXR, POCUS US and respiratory status multiple times prior to her disposition.     Time also spent performing documentation, discussion with family to obtain medical information for decision making, reviewing test results and discussion with consultants.     Critical care time (excluding teaching time and procedures): 150 minutes.     Current Discharge Medication List          Final diagnoses:   Acute respiratory failure with hypoxia and hypercapnia (H)   COPD exacerbation (H)   Elevated troponin   Elevated brain natriuretic peptide (BNP) level       5/17/2018   [facility] EMERGENCY DEPARTMENT     Abhay Manuel MD  05/17/18  0714

## 2018-05-17 NOTE — PROGRESS NOTES
Modified Gilberto's test performed prior to right radial ABG draw. Collateral circulation confirmed. ABG drawn and sent to lab.     Marcia Galeano, RRT  5/17/2018

## 2018-05-17 NOTE — PROGRESS NOTES
[facility]    Cardiology Consultation     Date of Admission:  5/17/2018    Assessment & Plan     1.  Cardiomyopathy as noted below, possibly as a result of current presentation with COPD, however EKG changes and depressed LV systolic function  2.  Presentation with severe COPD exacerbation  3.  EKG with ST changes AVR and widespread ST segment depression  4.  Non-STEMI  5. History of anterior MI in the past with LV thrombus with PCI performed of the LAD and circumflex.  She had resolution of the LV thrombus with Coumadin in the past (2015)  6.  Known peripheral arterial disease with bypass in past  7.  Former smoker  8.  CVA  9.  COPD       Recommendation    1.  Patient is currently intubated.  Discussed findings with  and aunt.  Concern for ACS pattern with EKG changes versus stress pattern due to current COPD presentation.  After a lengthy discussion regarding the pros and cons of various approaches given the fact that her EKG is worrisome as well as findings on echocardiogram, we will proceed with coronary angiography.  2.  Consent is signed by   3.  Continue supportive care  4.  Further recommendations after coronary anatomy is known.      Radha Schafer MD        History of present illness    Patient is a very pleasant 67-year-old patient who is followed in the cardiovascular clinic.  She has seen my colleague Dr. Sanchez in the past.  She lives in the [facility].  She unfortunately has an extensive cardiac and vascular history.  She apparently had an anterior MI in the past with apical thrombus.  At that point she underwent PCI and was found to have two-vessel coronary artery disease in 2015.  She underwent PCI of her LAD and mid circumflex lesions.  She was placed on appropriate medical therapy including Coumadin and her LV systolic function had recovered to low normal.  There was still some mild hypokinesis in the distal septum and apex.  As the clot had  resolved she was taken off of her Coumadin.  She also had moderate aortic stenosis.  Patient over the last several days has reported to her  some dyspnea on exertion.  She has been using her inhalers without much benefit.  She also had some URI symptoms preceding her current presentation.  This morning she notified her  that she had a difficult time breathing.  EMS was subsequently called and when she presented to the hospital she had findings suggestive of respiratory arrest.  Due to concern for airway and presentation she was emergently intubated and sent to [facility].  Her troponins have been trended and they have been minimally positive.  EKGs have been reviewed and there is some ST elevation in lead aVR with widespread ST depression elsewhere.  An echocardiogram has been performed today and demonstrates a reduction in her LV systolic function with similar findings regarding her aortic valve.  It is possible on review of some images that her aortic valve is bicuspid in nature.  Due to these findings cardiology service is consulted.  She has been receiving supportive care initially she was hypotensive however currently is stable.        Radha Schafer MD    Primary Care Physician   Deisy Carter      Patient Active Problem List   Diagnosis     Advanced directives, counseling/discussion     Urinary incontinence     Forgetfulness     Hyperlipidemia LDL goal <130     Elevated blood pressure reading without diagnosis of hypertension     History of stroke - right thalamus in 8/2013 and left cerebellar and right vermis in 3/2018     Numbness and tingling     Tobacco use disorder     CVA (cerebral vascular accident) (H)     ACS (acute coronary syndrome) (H)     Ischemic cardiomyopathy - EF 25% in 2015 but 55% in 3/2017 and 45-50% on 3/18     HTN, goal below 130/80     Acute systolic congestive heart failure (H)     Lung nodule     GERD (gastroesophageal reflux disease)      ST elevation myocardial infarction involving left anterior descending (LAD) coronary artery (H)     Chronic bronchitis, unspecified chronic bronchitis type (H)     PAD (peripheral artery disease) (H) - moderate left common carotid stenosis, aortoiliac bypass, chronic left vertebral and right posterior cerebral stenoses     Cervical high risk HPV (human papillomavirus) test positive     Acute respiratory failure with hypoxia (H)     COPD exacerbation (H)     Coronary artery disease involving native coronary artery - STEMI with LAD and circ stents in 8/2015     Elevated troponin     Pulmonary emphysema (H)     Vertigo     Other seizures (H) - possible     Acute CVA (cerebrovascular accident) - right paramedian superior vermis and left cerebellar hemisphere     Malnutrition, unspecified type (H)     Hyponatremia     Hypochloremia     Hypercapnic respiratory failure (H)       Past Medical History   I have reviewed this patient's medical history and updated it with pertinent information if needed.   Past Medical History:   Diagnosis Date     Arthritis      COPD (chronic obstructive pulmonary disease) (H)      Coronary artery disease      CVA (cerebral vascular accident) (H) 8-     Hypertension        Past Surgical History   I have reviewed this patient's surgical history and updated it with pertinent information if needed.  Past Surgical History:   Procedure Laterality Date     APPENDECTOMY       BYPASS GRAFT AORTOILIAC N/A 3/7/2017    Procedure: BYPASS GRAFT AORTOILIAC;  Surgeon: Marcos Pratt MD;  Location: [facility] OR     SALPINGO OOPHORECTOMY,R/L/DELORES      Salpingo Oophorectomy, RT/LT/DELORES       Prior to Admission Medications   Prior to Admission Medications   Prescriptions Last Dose Informant Patient Reported? Taking?   ACE/ARB/ARNI NOT PRESCRIBED, INTENTIONAL,   No No   Sig: Please choose reason not prescribed, below   Calcium Carbonate-Vitamin D (OSCAL 500/200 D-3 PO)  Self Yes Yes   Sig: Take 1 tablet by  mouth 2 times daily   Coenzyme Q10 (COQ10 PO)  Self Yes Yes   Sig: Take 100 mg by mouth every 24 hours (at 16:00)   Nutritional Supplements (BOOST)   No No   Sig: Take 1 Bottle by mouth 3 times daily (with meals)   acetaminophen (TYLENOL) 325 MG tablet prn med  Yes Yes   Sig: Take 2 tablets (650 mg) by mouth every 4 hours as needed for mild pain   albuterol (PROAIR HFA/PROVENTIL HFA/VENTOLIN HFA) 108 (90 BASE) MCG/ACT Inhaler prn med  No Yes   Sig: Inhale 2 puffs into the lungs every 6 hours as needed for shortness of breath / dyspnea   atorvastatin (LIPITOR) 10 MG tablet   No Yes   Sig: Take 1 tablet (10 mg) by mouth At Bedtime   azithromycin (ZITHROMAX) 250 MG tablet   No Yes   Sig: Two tablets first day, then one tablet daily for four days.   budesonide-formoterol (SYMBICORT) 160-4.5 MCG/ACT Inhaler   No Yes   Sig: INHALE TWO PUFFS BY MOUTH TWICE A DAY   clopidogrel (PLAVIX) 75 MG tablet   No Yes   Sig: Take 1 tablet (75 mg) by mouth daily   ipratropium - albuterol 0.5 mg/2.5 mg/3 mL (DUONEB) 0.5-2.5 (3) MG/3ML neb solution   No Yes   Sig: USE 1 VIAL IN NEBULIZER FOUR TIMES A DAY   metoprolol succinate (TOPROL-XL) 25 MG 24 hr tablet   Yes Yes   Sig: Take 1 tablet (25 mg) by mouth daily   montelukast (SINGULAIR) 10 MG tablet   No Yes   Sig: Take 1 tablet (10 mg) by mouth At Bedtime   nicotine (NICODERM CQ) 7 MG/24HR 24 hr patch   No Yes   Sig: Place 1 patch onto the skin every 24 hours   order for DME   No No   Sig: Equipment being ordered: Nebulizer   order for DME   No No   Sig: Equipment being ordered: Nebulizer machine   polyethylene glycol (MIRALAX) powder   No Yes   Sig: Take 17 g (1 capful) by mouth daily   predniSONE (DELTASONE) 20 MG tablet   No Yes   Sig: Take 2 tablets (40 mg) by mouth daily for 5 days   tiotropium (SPIRIVA HANDIHALER) 18 MCG capsule   No Yes   Sig: Inhale 1 capsule (18 mcg) into the lungs daily      Facility-Administered Medications: None     Current Facility-Administered Medications    Medication Dose Route Frequency     aspirin  81 mg Oral or Feeding Tube Daily     atorvastatin  10 mg Oral At Bedtime     cefTAZidime  2 g Intravenous Q8H     chlorhexidine  15 mL Mouth/Throat Q12H     clopidogrel  75 mg Oral or Feeding Tube Daily     famotidine  20 mg Intravenous Q12H     ipratropium - albuterol 0.5 mg/2.5 mg/3 mL  3 mL Nebulization Q4H     methylPREDNISolone  40 mg Intravenous Daily     metoprolol succinate  12.5 mg Oral Daily     montelukast  10 mg Oral At Bedtime     polyethylene glycol  17 g Oral Daily     Current Facility-Administered Medications   Medication Last Rate     HEParin       lactated ringers 50 mL/hr at 05/17/18 1008     propofol (DIPRIVAN) infusion 50 mcg/kg/min (05/17/18 1132)     Allergies   Allergies   Allergen Reactions     Crestor [Rosuvastatin] Other (See Comments)     dizziness     Hmg-Coa-R Inhibitors Other (See Comments)     Muscle/joint aching     Lisinopril Cough     Optison [Albumin Human] Other (See Comments)     Pt was flush and very dizzy.  Also had a BP drop     Penicillins Rash       Social History    reports that she has been smoking Cigarettes.  She has been smoking about 0.50 packs per day. She has never used smokeless tobacco. She reports that she does not drink alcohol or use illicit drugs.    Family History   Family History   Problem Relation Age of Onset     CEREBROVASCULAR DISEASE Mother      CEREBROVASCULAR DISEASE Father      Genitourinary Problems Brother      Dialysis       Review of Systems   The comprehensive 10 point Review of Systems is negative other than noted in the HPI or here.     Physical Exam   Vital Signs with Ranges  Temp:  [99.1  F (37.3  C)-100  F (37.8  C)] 99.3  F (37.4  C)  Heart Rate:  [] 89  Resp:  [13-57] 16  BP: ()/() 139/81  FiO2 (%):  [35 %-50 %] 35 %  SpO2:  [77 %-100 %] 94 %  Wt Readings from Last 4 Encounters:   05/16/18 46.9 kg (103 lb 6.4 oz)   04/19/18 49.1 kg (108 lb 3.2 oz)   04/13/18 44.8 kg (98 lb  12.8 oz)   04/02/18 48.3 kg (106 lb 6.4 oz)            Vitals: /81  Temp 99.3  F (37.4  C)  Resp 16  SpO2 94%  General: Stated age, intubated, chronically ill appearing  HEENT: ET tube, KELSEY  Lungs: crackles bilaterally, wheezes on the left  CVS: RRR, distant  Abdomen: soft, bowel sounds, non tender  Extremities/musculoskeletal: unable to palpate pulses in lower extremities and cool, palpable pulses in upper extremities and warm, no edema  Neurology: Sedated  Skin: no abnormalities  Psychiatry: unable        Recent Labs  Lab 05/17/18  0911 05/17/18  0436   TROPI 0.501* 0.366*         Recent Labs  Lab 05/17/18  0911 05/17/18  0436   WBC 7.2 9.9   HGB 15.3 17.4*   MCV 90 90   *  119* 192   INR 0.91  --    NA  --  137   POTASSIUM  --  4.8   CHLORIDE  --  100   CO2  --  30   BUN  --  14   CR  --  0.70   GFRESTIMATED  --  85   GFRESTBLACK  --  >90   ANIONGAP  --  7   CALDERON  --  8.4*   GLC  --  148*   ALBUMIN  --  3.6   PROTTOTAL  --  7.7   BILITOTAL  --  0.4   ALKPHOS  --  90   ALT  --  48   AST  --  54*   TROPI 0.501* 0.366*     Recent Labs   Lab Test  03/24/18   0515  08/04/17   0910   10/09/15   0825  08/29/15   0700   CHOL  165  174   < >  177  207*   HDL  67  72   < >  49*  64   LDL  77  90   < >  107  117   TRIG  106  61   < >  106  132   CHOLHDLRATIO   --    --    --   3.6  3.2    < > = values in this interval not displayed.       Recent Labs  Lab 05/17/18  0911 05/17/18  0436   WBC 7.2 9.9   HGB 15.3 17.4*   HCT 46.3 52.7*   MCV 90 90   *  119* 192       Recent Labs  Lab 05/17/18  0830 05/17/18  0535 05/17/18  0436   PH 7.33*  --   --    PHV  --  7.09* 7.19*   PO2 171*  --   --    PO2V  --  101* 95*   PCO2 55*  --   --    PCO2V  --  104* 82*   HCO3 29*  --   --    HCO3V  --  32* 31*       Recent Labs  Lab 05/17/18  0436   NTBN [id number]*     No results for input(s): DD in the last 168 hours.  No results for input(s): SED, CRP in the last 168 hours.    Recent Labs  Lab 05/17/18  0969  05/17/18  0436   *  119* 192     No results for input(s): TSH in the last 168 hours.    Recent Labs  Lab 05/17/18  0614   COLOR Mikki   APPEARANCE Cloudy   URINEGLC 50*   URINEBILI Negative   URINEKETONE Negative   SG 1.017   UBLD Negative   URINEPH 6.0   PROTEIN 100*   NITRITE Negative   LEUKEST Negative   RBCU 2   WBCU 8*       Imaging:  Recent Results (from the past 48 hour(s))   POC US CHEST B-SCAN    Impression    Limited Bedside Lung Ultrasound, performed and interpreted by me.   Indication: shortness of breath and hypoxia    With the patient positioned upright, bilateral anterior lung fields were examined for evidence of thoracic free fluid, pulmonary consolidation, and pulmonary edema.       Findings: Very slight sliding signs bilaterally because she is not moving much air.  B lines bilaterally in the anterior upper air fields.  I did not check posteriorly because of her respiratory distress..     IMPRESSION: Few B lines bilaterally consistent with early pulmonary edema.   XR Chest Port 1 View    Narrative    XR CHEST PORT 1 VW  5/17/2018 5:01 AM     INDICATION: Shortness of breath.    COMPARISON: 4/13/2018.      Impression    IMPRESSION: Emphysema. Slight interstitial prominence at the right  base. No consolidative infiltrates or other acute findings. Normal  heart size.    PAULA SETHI MD   XR Chest Port 1 View    Narrative    XR CHEST PORT 1 VW  5/17/2018 6:07 AM     INDICATION: Tube placement.    COMPARISON: 5/17/2018 at 0452 hours.      Impression    IMPRESSION: New ETT with tip at the level of the aortic arch in good  position. Otherwise no change. Emphysema and slight interstitial  prominence in the lower lungs, greater on the right. Coronary artery  stent.    PAULA SETHI MD       Echo:  Recent Results (from the past 4320 hour(s))   Echo limited    Narrative    [id number]  [facility]  [id number]  [id number]^ROXI^KEREN^[facility]  Echocardiography  Laboratory  [address] [place] [address]        Name: TARIK HERNANDEZ  MRN: [id number]  : 1950  Study Date: 2018 09:14 AM  Age: 67 yrs  Gender: Female  Patient Location: [facility]  Reason For Study: Abn EKG  Ordering Physician: KEREN DIANE  Referring Physician: Deisy Carter  Performed By: Beena Colón     BSA: 1.4 m2  Height: 62 in  Weight: 103 lb  HR: 86  BP: 111/70 mmHg  _____________________________________________________________________________  __        Procedure  Limited Portable Echo Adult.  _____________________________________________________________________________  __        Interpretation Summary     The visual ejection fraction is estimated at 25-30%.  Mostly global LV dysfunction with the inferolateral wall moving best.  The right ventricular systolic function is normal.  Moderate valvular aortic stenosis.  The degree of aortic stenosis may be underestimated due to the patients  significant LV dysfunction.  Sinus rhythm was noted.  In comparison with the previous study the LVEF has dropped.  _____________________________________________________________________________  __        Left Ventricle  The visual ejection fraction is estimated at 25-30%. Left ventricular  diastolic function is indeterminate. Mostly global LV dysfunction with the  inferolateral wall moving best. The patient has a large inferolateral  papillary muscle noted on the present scan. This was seen on previous studies  but appears slightly larger on the present study. This may represent different  imaging technique but clinical correlation is recommended.     Right Ventricle  The right ventricular systolic function is normal. The RV is note well seen  but the TAPSE is 1.8 cm.     Atria  The left atrium is not well visualized. Right atrium not well visualized.  There is no atrial shunt seen.     Mitral Valve  The mitral valve leaflets appear thickened, but open well. There is trace  mitral  regurgitation.        Tricuspid Valve  The tricuspid valve is not well visualized, but is grossly normal. There is  mild (1+) tricuspid regurgitation. The right ventricular systolic pressure is  approximated at 24.9 mmHg plus the right atrial pressure.     Aortic Valve  There is trace to mild aortic regurgitation. The mean AoV pressure gradient is  14.1 mmHg. Moderate valvular aortic stenosis. The degree of aortic stenosis  may be underestimated due to the patients significant LV dysfunction.     Pulmonic Valve  The pulmonic valve is not well visualized.     Vessels  The aortic root is not well visualized. The inferior vena cava is not dilated.  The IVC does not collapse with inspiration.     Pericardium  Trivial pericardial effusion. There are no echocardiographic indications of  cardiac tamponade.        Rhythm  Sinus rhythm was noted.  _____________________________________________________________________________  __  MMode/2D Measurements & Calculations  IVSd: 1.0 cm     LVIDd: 3.8 cm  LVIDs: 3.4 cm  LVPWd: 1.2 cm  FS: 9.9 %  LV mass(C)d: 135.8 grams  LV mass(C)dI: 94.2 grams/m2  LVOT diam: 2.3 cm  LVOT area: 4.2 cm2  RWT: 0.62  TAPSE: 1.8 cm        Doppler Measurements & Calculations  MV E max ryan: 44.9 cm/sec  MV A max ryan: 83.6 cm/sec  MV E/A: 0.54  MV dec time: 0.19 sec  Ao V2 max: 246.7 cm/sec  Ao max P.0 mmHg  Ao V2 mean: 174.9 cm/sec  Ao mean P.1 mmHg  Ao V2 VTI: 44.3 cm  YOLANDA(I,D): 1.4 cm2  YOLANDA(V,D): 1.4 cm2  LV V1 max P.8 mmHg  LV V1 max: 83.3 cm/sec  LV V1 VTI: 15.2 cm  SV(LVOT): 63.7 ml  SI(LVOT): 44.2 ml/m2  TR max ryan: 249.3 cm/sec  TR max P.9 mmHg  AV Ryan Ratio (DI): 0.34  YOLANDA Index (cm2/m2): 1.00     E/E' av.0  Lateral E/e': 11.8  Medial E/e': 12.3           _____________________________________________________________________________  __           Report approved by: Chencho Hope 2018 10:59 AM

## 2018-05-17 NOTE — IP AVS SNAPSHOT
` `     [facility] CARDIAC SPECIALTY CARE: [phone]            Medication Administration Report for Preeti Ford as of 05/26/18 0948   Legend:    Given Hold Not Given Due Canceled Entry Other Actions    Time Time (Time) Time  Time-Action       Inactive    Active    Linked        Medications 05/20/18 05/21/18 05/22/18 05/23/18 05/24/18 05/25/18 05/26/18    acetaminophen (TYLENOL) tablet 325-650 mg  Dose: 325-650 mg  Freq: EVERY 4 HOURS PRN Route: PO  PRN Reasons: mild pain,headaches  Start: 05/18/18 1148   Admin Instructions: May give first dose 4 hours after last scheduled dose of acetaminophen.  Maximum acetaminophen dose from all sources = 75 mg/kg/day not to exceed 4 grams/day.    Admin. Amount: 1-2 tablet (1-2 × 325 mg tablet)  Dispense Loc: [facility]               albuterol neb solution 2.5 mg  Dose: 3 mL  Freq: 4 TIMES DAILY Route: NEBULIZATION  Start: 05/24/18 1100   Admin Instructions: Therapeutic interchange for DuoNebs while patient is already on LAMA.<br>    Admin. Amount: 2.5 mg = 3 mL Conc: 2.5 mg/3 mL  Last Admin: 05/25/18 1926  Dispense Loc:  [facility]  Volume: 3 mL         1114 (2.5 mg)-Given       1518 (2.5 mg)-Given       1941 (2.5 mg)-Given        0730 (2.5 mg)-Given       (1230)-Not Given       1527 (2.5 mg)-Given       1926 (2.5 mg)-Given        (0829)-Not Given       [ ] 1100       [ ] 1500       [ ] 1900           albuterol neb solution 2.5 mg  Dose: 2.5 mg  Freq: EVERY 1 HOUR PRN Route: NEBULIZATION  PRN Reason: wheezing  Start: 05/17/18 0856   Admin. Amount: 2.5 mg = 3 mL Conc: 2.5 mg/3 mL  Last Admin: 05/22/18 1737  Dispense Loc:  [facility]  Volume: 3 mL      0106 (2.5 mg)-Given        0948 (2.5 mg)-Given       1737 (2.5 mg)-Given               aspirin EC tablet 81 mg  Dose: 81 mg  Freq: DAILY Route: PO  Start: 05/19/18 0900   Admin Instructions: Starting today.  May omit if dose given/taken pre-procedure today.  Post -procedurally for CAR percutaneous coronary  intervention (PCI).    Admin. Amount: 1 tablet (1 × 81 mg tablet)  Last Admin: 05/26/18 0923  Dispense Loc:  [facility]     0922 (81 mg)-Given        1129 (81 mg)-Given        0807 (81 mg)-Given        0824 (81 mg)-Given        0809 (81 mg)-Given        0827 (81 mg)-Given        0923 (81 mg)-Given           atorvastatin (LIPITOR) tablet 10 mg  Dose: 10 mg  Freq: AT BEDTIME Route: PO  Start: 05/17/18 2200   Admin. Amount: 1 tablet (1 × 10 mg tablet)  Last Admin: 05/25/18 2135  Dispense Loc:  [facility]      (0513)-Not Given       2050 (10 mg)-Given               2032 (10 mg)-Given               2100 (10 mg)-Given        2052 (10 mg)-Given               2135 (10 mg)-Given        [ ] 2200           benztropine (COGENTIN) tablet 1-2 mg  Dose: 1-2 mg  Freq: 3 TIMES DAILY PRN Route: PO  PRN Reason: other  PRN Comment: for extrapyramidal effects  Start: 05/24/18 0853   Admin. Amount: 1-2 tablet (1-2 × 1 mg tablet)  Dispense Loc:  [facility]               bisacodyl (DULCOLAX) Suppository 10 mg  Dose: 10 mg  Freq: DAILY PRN Route: RE  PRN Reason: constipation  Start: 05/24/18 0901   Admin Instructions: Hold for loose stools.  This is the third step of a three step constipation treatment.    Admin. Amount: 1 suppository (1 × 10 mg suppository)  Dispense Loc:  [facility]               clopidogrel (PLAVIX) tablet 75 mg  Dose: 75 mg  Freq: DAILY Route: ORAL OR FEED  Start: 05/17/18 0915   Admin. Amount: 1 tablet (1 × 75 mg tablet)  Last Admin: 05/26/18 0920  Dispense Loc:  [facility]     0922 (75 mg)-Given        1130 (75 mg)-Given        0807 (75 mg)-Given        0824 (75 mg)-Given        0809 (75 mg)-Given        0827 (75 mg)-Given        0920 (75 mg)-Given           Continuing statin from home medication list OR statin order already placed during this visit  Freq: DOES NOT GO TO MAR Route: XX  PRN Reason: other  Start: 05/18/18 1148   Admin Instructions: Continuing statin from home medication list OR statin order already  placed during this visit    Dispense Loc: [facility] Floor Stock               dornase alpha (PULMOZYME) neb solution 2.5 mg  Dose: 2.5 mg  Freq: DAILY Route: IN  Start: 05/21/18 1330   Admin. Amount: 2.5 mg  Last Admin: 05/25/18 1932  Dispense Loc:  Main Pharmacy  Volume: 2.5 mL      (1505)-Not Given       1955 (2.5 mg)-Given        1949 (2.5 mg)-Given        (1951)-Not Given        1942 (2.5 mg)-Given        1932 (2.5 mg)-Given        [ ] 1900           fluticasone-vilanterol (BREO ELLIPTA) 200-25 MCG/INH oral inhaler 1 puff  Dose: 1 puff  Freq: DAILY Route: IN  Start: 05/21/18 0915   Admin Instructions: *Do not use more frequently than once daily.*  Rinse mouth after use. Therapeutic interchange for symbicort.    Admin. Amount: 1 puff  Last Admin: 05/26/18 0926  Dispense Loc: [facility]      0935 (1 puff)-Given        0807 (1 puff)-Given        0824 (1 puff)-Given        0936 (1 puff)-Given        0829 (1 puff)-Given        0926 (1 puff)-Given           glucose gel 15-30 g  Dose: 15-30 g  Freq: EVERY 15 MIN PRN Route: PO  PRN Reason: low blood sugar  Start: 05/17/18 1810   Admin Instructions: Give 15 g for BG 51 to 69 mg/dL IF patient is conscious and able to swallow. Give 30 g for BG less than or equal to 50 mg/dL IF patient is conscious and able to swallow. Do NOT give glucose gel via enteral tube.  IF patient has enteral tube: give apple juice 120 mL (4 oz or 15 g of CHO) via enteral tube for BG 51 to 69 mg/dL.  Give apple juice 240 mL (8 oz or 30 g of CHO) via enteral tube for BG less than or equal to 50 mg/dL.    ~Oral gel is preferable for conscious and able to swallow patient.   ~IF gel unavailable or patient refuses may provide apple juice 120 mL (4 oz or 15 g of CHO). Document juice on I and O flowsheet.    Admin. Amount: 15-30 g  Dispense Loc: [facility]  Volume: 93.75 mL                     Or  dextrose 50 % injection 25-50 mL  Dose: 25-50 mL  Freq: EVERY 15 MIN PRN Route: IV  PRN Reason: low blood  sugar  Start: 05/17/18 1810   Admin Instructions: Use if have IV access, BG less than 70 mg/dL and meet dose criteria below:  Dose if conscious and alert (or disorientated) and NPO = 25 mL  Dose if unconscious / not alert = 50 mL  Vesicant. For ordered doses up to 25 g, give IV Push undiluted. Give each 5g over 1 minute.    Admin. Amount: 25-50 mL  Last Admin: 05/21/18 0442  Dispense Loc:  [facility]  Infused Over: 1-5 Minutes  Volume: 50 mL      0442 (25 mL)-Given               Or  glucagon injection 1 mg  Dose: 1 mg  Freq: EVERY 15 MIN PRN Route: SC  PRN Reason: low blood sugar  PRN Comment: May repeat x 1 only  Start: 05/17/18 1810   Admin Instructions: May give SQ or IM. ONLY use glucagon IF patient has NO IV access AND is UNABLE to swallow AND blood glucose is LESS than or EQUAL to 50 mg/dL.  If ordered IV, give IV Push over 1 minute. Reconstitute with 1mL sterile water.    Admin. Amount: 1 mg  Dispense Loc:  [facility]                      haloperidol lactate (HALDOL) injection 1-2 mg  Dose: 1-2 mg  Freq: EVERY 6 HOURS PRN Route: IV  PRN Reasons: agitation,other  PRN Comment: or hallucinations  Start: 05/24/18 0853   Admin Instructions: For ordered doses up to 5 mg, drug can be give IV Push undiluted over 1 minute.    Admin. Amount: 1-2 mg = 0.2-0.4 mL Conc: 5 mg/mL  Dispense Loc:  [facility]  Infused Over: 1 Minutes  Volume: 1 mL               heparin sodium PF injection 5,000 Units  Dose: 5,000 Units  Freq: EVERY 12 HOURS Route: SC  Start: 05/21/18 1430   Admin Instructions: HOLD heparin IF platelet count falls below 50% baseline or less than 100,000 /  L and notify provider. Use this product If CrCl less than 30 mL/min.  High concentration heparin. Not for line flush or cath care.    Admin. Amount: 5,000 Units = 0.5 mL Conc: 5,000 Units/0.5 mL  Last Admin: 05/26/18 0310  Dispense Loc:  [facility]  Volume: 0.5 mL      1446 (5,000 Units)-Given        0302 (5,000 Units)-Given       1355 (5,000  Units)-Given        0415 (5,000 Units)-Given       1508 (5,000 Units)-Given        0134 (5,000 Units)-Given       1331 (5,000 Units)-Given        0116 (5,000 Units)-Given              1519 (5,000 Units)-Given        0310 (5,000 Units)-Given       [ ] 1430           HYDROcodone-acetaminophen (NORCO) 5-325 MG per tablet 1-2 tablet  Dose: 1-2 tablet  Freq: EVERY 4 HOURS PRN Route: PO  PRN Reason: other  PRN Comment: for pain control or improvement in physical function.  Hold dose for analgesic side effects.  Start: 05/18/18 1148   Admin Instructions: Start with the lowest dose.  May adjust dose by one tablet every 4 hours as needed. Notify provider to assess for uncontrolled pain or analgesic side effects.  Hold with regular IV opioid dosing. Maximum total is 60 mg of hydrocodone in 24 hours due to acetaminophen limit.  Maximum acetaminophen dose from all sources= 75 mg/kg/day not to exceed 4 grams    Admin. Amount: 1-2 tablet  Dispense Loc: [facility]               HYDROmorphone (PF) (DILAUDID) injection 0.2 mg  Dose: 0.2 mg  Freq: EVERY 1 HOUR PRN Route: IV  PRN Reason: other  PRN Comment: pain control or improvement in physical function.  Start: 05/17/18 0856   Admin Instructions: Hold dose for analgesic side effects.  Notify provider to assess for uncontrolled pain or analgesic side effects. Hold while on IV PCA.  For ordered doses up to 4 mg give IV Push undiluted. Administer each 2mg over 2-5 minutes.    Admin. Amount: 0.2 mg  Last Admin: 05/19/18 2115  Dispense Loc:  [facility]               magnesium hydroxide (MILK OF MAGNESIA) suspension 30 mL  Dose: 30 mL  Freq: DAILY PRN Route: PO  PRN Reason: constipation  Start: 05/24/18 0901   Admin Instructions: Hold for loose stools.  This is the second step of a three step constipation treatment.    Admin. Amount: 30 mL  Dispense Loc:  [facility]  Volume: 30 mL               magnesium sulfate 2 g in NS intermittent infusion (PharMEDium or FV Cmpd)  Dose: 2  g  Freq: DAILY PRN Route: IV  PRN Reason: magnesium supplementation  Last Dose: 2 g (05/25/18 2227)  Start: 05/19/18 0101   Admin Instructions: For Serum Mg++ 1.6 - 2 mg/dL  Give 2 g and recheck magnesium level next AM.    Admin. Amount: 2 g = 50 mL Conc: 2 g/50 mL  Last Admin: 05/25/18 2227  Dispense Loc: Contact Rx for dose  Infused Over: 60 Minutes  Volume: 50 mL   Current Line: Peripheral IV 05/17/18 Right Lower forearm     0734 (2 g)-New Bag           2227 (2 g)-New Bag            magnesium sulfate 4 g in 100 mL sterile water (premade)  Dose: 4 g  Freq: EVERY 4 HOURS PRN Route: IV  PRN Reason: magnesium supplementation  Start: 05/19/18 0101   Admin Instructions: For serum Mg++ less than 1.6 mg/dL  Give 4 g and recheck magnesium level 2 hours after dose, and next AM.    Admin. Amount: 4 g = 100 mL Conc: 4 g/100 mL  Dispense Loc: Contact Rx for dose  Infused Over: 120 Minutes  Volume: 100 mL               metoprolol tartrate (LOPRESSOR) half-tab 12.5 mg  Dose: 12.5 mg  Freq: 2 TIMES DAILY Route: PO  Start: 05/20/18 2100   Admin. Amount: 1 half-tab (1 × 12.5 mg half-tab)  Last Admin: 05/25/18 2134  Dispense Loc: [facility]     (2025)-Not Given        1130 (12.5 mg)-Given       2050 (12.5 mg)-Given        1246 (12.5 mg)-Given       2032 (12.5 mg)-Given        0824 (12.5 mg)-Given       2054 (12.5 mg)-Given        0809 (12.5 mg)-Given [C]       2052 (12.5 mg)-Given        0827 (12.5 mg)-Given       2134 (12.5 mg)-Given        (0920)-Not Given       [ ] 2100           naloxone (NARCAN) injection 0.1-0.4 mg  Dose: 0.1-0.4 mg  Freq: EVERY 2 MIN PRN Route: IV  PRN Reason: opioid reversal  Start: 05/17/18 0853   Admin Instructions: For respiratory rate LESS than or EQUAL to 8.  Partial reversal dose:  0.1 mg titrated q 2 minutes for Analgesia Side Effects Monitoring Sedation Level of 3 (frequently drowsy, arousable, drifts to sleep during conversation).Full reversal dose:  0.4 mg bolus for Analgesia Side Effects  Monitoring Sedation Level of 4 (somnolent, minimal or no response to stimulation).  For ordered doses up to 2mg give IVP. Give each 0.4mg over 15 seconds in emergency situations. For non-emergent situations further dilute in 9mL of NS to facilitate titration of response.    Admin. Amount: 0.1-0.4 mg = 0.25-1 mL Conc: 0.4 mg/mL  Dispense Loc:  [facility]  Volume: 1 mL               nicotine (NICODERM CQ) 7 MG/24HR 24 hr patch 1 patch  Dose: 1 patch  Freq: DAILY Route: TD  Start: 05/24/18 1245   Admin. Amount: 1 patch  Last Admin: 05/26/18 0920  Dispense Loc: [facility]                 0833 (1 patch)-Given        0920 (1 patch)-Given           nicotine Patch in Place  Freq: EVERY 8 HOURS Route: TD  Start: 05/24/18 1200   Admin Instructions: Chart every shift, confirming that patch is still in place on patient (no barcode scan needed). See patch order for dose information.    Last Admin: 05/26/18 0603  Dispense Loc:  Main Pharmacy                               1330 ( )-Patch in Place       2231 ( )-Patch in Place        0603 ( )-Patch in Place       [ ] 1200       [ ] 2000           nicotine patch REMOVAL  Freq: DAILY Route: TD  Start: 05/25/18 0900   Admin Instructions: Remove patch when new patch is applied or patch is discontinued.    Dispense Loc:  Main Pharmacy          0828 ( )-Patch Removed        0920 ( )-Patch Removed           nitroGLYcerin (NITROSTAT) sublingual tablet 0.4 mg  Dose: 0.4 mg  Freq: EVERY 5 MIN PRN Route: SL  PRN Reason: chest pain  Start: 05/23/18 1826   Admin Instructions: Maximum 3 doses in 15 minutes.  Notify provider if no relief after 3 doses.    Do NOT give nitroglycerin SL IF the patient has taken avanafil (STENDRA), sildenafil (VIAGRA) or (REVATIO) within the last 8 hours, vardenafil (LEVITRA) or (STAXYN) within the last 18 hours, tadalafil (CIALIS) or (ADCIRCA) within the last 36 hours. Inform provider if patient has taken one of these medications.  If patient is still having  acute angina requiring treatment, an alternative treatment option may be used such as: IV beta-blocker [2.5 mg - 5 mg metoprolol (LOPRESSOR)] if ordered by a provider.    Admin. Amount: 1 tablet (1 × 0.4 mg tablet)  Dispense Loc: [facility]               pantoprazole (PROTONIX) EC tablet 40 mg  Dose: 40 mg  Freq: EVERY MORNING Route: PO  Start: 05/24/18 0900   Admin Instructions: DO NOT CRUSH.    Admin. Amount: 1 tablet (1 × 40 mg tablet)  Last Admin: 05/26/18 0920  Dispense Loc: [facility]         0957 (40 mg)-Given        0827 (40 mg)-Given        0920 (40 mg)-Given           Percutaneous Coronary Intervention orders placed (this is information for BPA alerting)  Freq: DOES NOT GO TO MAR Route: XX  Start: 05/18/18 1148   Dispense Loc:  Main Pharmacy               polyethylene glycol (MIRALAX/GLYCOLAX) Packet 17 g  Dose: 17 g  Freq: DAILY Route: PO  Start: 05/17/18 0915   Admin Instructions: 1 Packet = 17 grams. Mixed prescribed dose in 8 ounces of water.  1 Packet = 17 grams. Mixed prescribed dose in 8 ounces of water. Follow with 8 oz. of water.    Admin. Amount: 17 g  Last Admin: 05/26/18 0920  Dispense Loc: [facility]     0923 (17 g)-Given        (0832)-Not Given        (0805)-Not Given        0822 (17 g)-Given        0809 (17 g)-Given        0827 (17 g)-Given        0920 (17 g)-Given           potassium chloride (KLOR-CON) Packet 20-40 mEq  Dose: 20-40 mEq  Freq: EVERY 2 HOURS PRN Route: ORAL OR FEED  PRN Reason: potassium supplementation  Start: 05/19/18 0101   Admin Instructions: Use if unable to tolerate tablets.    If Serum K+ 3.4-4.0, dose = 20 mEq x1. Recheck K+ level the next AM.  If Serum K+ 3.0-3.3, dose = 60 mEq po total dose (40 mEq x 1 followed in 2 hours by 20 mEq X1). Recheck K+ level 4 hours after dose and the next AM.  If Serum K+ 2.5-2.9, dose = 80 mEq po total dose (40 mEq Q2H x2). Recheck K+ level 4 hours after dose and the next AM.  If Serum K+ less than 2.5, See IV order.  Dissolve  packet contents in 4-8 ounces of cold water or juice.    Admin. Amount: 20-40 mEq  Dispense Loc: [facility]               potassium chloride 10 mEq in 100 mL intermittent infusion with 10 mg lidocaine  Dose: 10 mEq  Freq: EVERY 1 HOUR PRN Route: IV  PRN Reason: potassium supplementation  Start: 05/19/18 0101   Admin Instructions: Infuse via PERIPHERAL LINE. Use potassium with lidocaine for pain with peripheral administration.  If Serum K+ 3.4-4.0, dose = 10 mEq/hr x2 doses. Recheck K+ level the next AM.  If Serum K+ 3.0-3.3, dose = 10 mEq/hr x4 doses (40 mEq IV total dose). Recheck K+ level 2 hours after dose and the next AM.  If Serum K+ less than 3.0, dose = 10 mEq/hr x6 doses (60 mEq IV total dose). Recheck K+ level 2 hours after dose and the next AM.    Admin. Amount: 10 mEq = 100 mL Conc: 10 mEq/100 mL  Dispense Loc: Contact Rx for dose  Infused Over: 1 Hours  Volume: 100 mL               potassium chloride 10 mEq in 100 mL sterile water intermittent infusion (premix)  Dose: 10 mEq  Freq: EVERY 1 HOUR PRN Route: IV  PRN Reason: potassium supplementation  Start: 05/19/18 0101   Admin Instructions: Infuse via PERIPHERAL LINE or CENTRAL LINE. Use for central line replacement if patient weight less than 65 kg, if patient is on TPN with high potassium content or if unit does not stock 20 mEq bags.  If Serum K+ 3.4-4.0, dose = 10 mEq/hr x2 doses. Recheck K+ level the next AM.  If Serum K+ 3.0-3.3, dose = 10 mEq/hr x4 doses (40 mEq IV total dose). Recheck K+ level 2 hours after dose and the next AM.  If Serum K+ less than 3.0, dose = 10 mEq/hr x6 doses (60 mEq IV total dose). Recheck K+ level 2 hours after dose and the next AM.    Admin. Amount: 10 mEq = 100 mL Conc: 10 mEq/100 mL  Dispense Loc: Contact Rx for dose  Infused Over: 60 Minutes  Volume: 100 mL               potassium chloride 20 mEq in 50 mL intermittent infusion  Dose: 20 mEq  Freq: EVERY 2 HOURS PRN Route: IV  PRN Reason: potassium  supplementation  Last Dose: 20 mEq (05/21/18 0733)  Start: 05/19/18 0108   Admin Instructions: Infuse via CENTRAL LINE Only.  May need EKG if less than 65 kg or on TPN - Max rate is 0.3 mEq/kg/hr for patients not on EKG monitoring.    If Serum K+ 3.4-4.0, dose = 20 mEq/hr x1 doses. Recheck K+ level the next AM.  If Serum K+ 3.0-3.3, dose = 20 mEq/hr x2 doses (40 mEq IV total dose).  Recheck K+ level 2 hours after dose and the next AM.  If Serum K+ less than 3.0, dose = 20 mEq/hr x3 doses (60 mEq IV total dose). Recheck K+ level 2 hours after dose and the next AM.    Admin. Amount: 20 mEq = 50 mL Conc: 20 mEq/50 mL  Last Admin: 05/21/18 0733  Dispense Loc: Contact Rx for dose  Infused Over: 2 Hours  Volume: 50 mL     0657 (20 mEq)-New Bag        0733 (20 mEq)-New Bag                potassium chloride SA (K-DUR/KLOR-CON M) CR tablet 20-40 mEq  Dose: 20-40 mEq  Freq: EVERY 2 HOURS PRN Route: PO  PRN Reason: potassium supplementation  Start: 05/19/18 0101   Admin Instructions: Use if able to take PO.   If Serum K+ 3.4-4.0, dose = 20 mEq x1. Recheck K+ level the next AM.  If Serum K+ 3.0-3.3, dose = 60 mEq po total dose (40 mEq x1 followed in 2 hours by 20 mEq x1). Recheck K+ level 4 hours after dose and the next AM.  If Serum K+ 2.5-2.9, dose = 80 mEq po total dose (40 mEq Q2H x2). Recheck K+ level 4 hours after dose and the next AM.  If Serum K+ less than 2.5, See IV order.  DO NOT CRUSH    Admin. Amount: 1-2 tablet (1-2 × 20 mEq tablet)  Dispense Loc: [facility]               predniSONE (DELTASONE) tablet 40 mg  Dose: 40 mg  Freq: DAILY Route: PO  Start: 05/24/18 0900   Admin. Amount: 2 tablet (2 × 20 mg tablet)  Last Admin: 05/26/18 0923  Dispense Loc: [facility]         (1001)-Not Given [C]        0827 (40 mg)-Given        0923 (40 mg)-Given           QUEtiapine (SEROquel) half-tab 12.5-25 mg  Dose: 12.5-25 mg  Freq: EVERY 6 HOURS PRN Route: PO  PRN Comment: Agitation  Start: 05/24/18 0853   Admin. Amount: 1-2  half-tab (1-2 × 12.5 mg half-tab)  Last Admin: 05/25/18 0104  Dispense Loc:  [facility]          0104 (25 mg)-Given            Reason ACE/ARB/ARNI order not selected  Freq: DOES NOT GO TO MAR Route: OTHER  PRN Reason: other  Start: 05/18/18 1148   Admin Instructions: Post -procedurally for CAR percutaneous coronary intervention (PCI).        Order specific questions:  Reason not prescribed: symptomatic hypotension     Dispense Loc:  Main Pharmacy               Reason beta blocker not prescribed  Freq: DOES NOT GO TO MAR Route: XX  PRN Reason: other  Start: 05/18/18 1148   Admin Instructions: Post -procedurally for CAR percutaneous coronary intervention (PCI).            Order specific questions:  Reason not prescribed: hypotension (SBP<90)     Dispense Loc: [facility]               senna-docusate (SENOKOT-S;PERICOLACE) 8.6-50 MG per tablet 1 tablet  Dose: 1 tablet  Freq: 2 TIMES DAILY PRN Route: PO  PRN Reason: constipation  Start: 05/24/18 0901   Admin Instructions: If no bowel movement in 24 hours, increase to 2 tablets PO.  Hold for loose stools.  This is the first step of a three step constipation treatment.    Admin. Amount: 1 tablet  Dispense Loc:  [facility]              Or  senna-docusate (SENOKOT-S;PERICOLACE) 8.6-50 MG per tablet 2 tablet  Dose: 2 tablet  Freq: 2 TIMES DAILY PRN Route: PO  PRN Reason: constipation  Start: 05/24/18 0901   Admin Instructions: Hold for loose stools.  This is the first step of a three step constipation treatment.    Admin. Amount: 2 tablet  Dispense Loc: [facility]               umeclidinium (INCRUSE ELLIPTA) 62.5 MCG/INH oral inhaler 1 puff  Dose: 1 puff  Freq: DAILY Route: IN  Start: 05/21/18 0915   Admin Instructions: Therapeutic interchange for spiriva.    Admin. Amount: 1 puff  Last Admin: 05/26/18 0926  Dispense Loc: [facility]      1002 (1 puff)-Given        0807 (1 puff)-Given        0824 (1 puff)-Given        0936 (1 puff)-Given        0829 (1  puff)-Given        09 (1 puff)-Given          Completed Medications  Medications 18         Dose: 500 mg  Freq: DAILY Route: PO  Indications of Use: HEALTHCARE-ASSOCIATED PNEUMONIA  Start: 18 0700   End: 18 06   Admin Instructions: Administer at least 2 hrs before or 4 hrs after aluminum, calcium, iron, zinc or magnesium containing products.    Admin. Amount: 1 tablet (1 × 500 mg tablet)  Last Admin: 18 06  Dispense Loc:  [facility]  Administrations Remainin          0824 (500 mg)-Given        06 (500 mg)-Given          Discontinued Medications  Medications 18         Dose: 2-5 mg  Freq: EVERY 6 HOURS PRN Route: IV  PRN Reason: agitation  Start: 18   End: 18   Admin Instructions: For ordered doses up to 5 mg, drug can be give IV Push undiluted over 1 minute.    Admin. Amount: 2-5 mg = 0.4-1 mL Conc: 5 mg/mL  Last Admin: 18 1019  Dispense Loc:  [facility]  Infused Over: 1 Minutes  Volume: 1 mL        1826-Med Discontinued            Dose: 1-3 Units  Freq: AT BEDTIME Route: SC  Start: 18   End: 18 1521   Admin Instructions: LOW INSULIN RESISTANCE DOSING    Do Not give Bedtime Correction Insulin if BG less than 200.   For  - 299 give 1 unit.   For  - 399 give 2 units   For BG greater than or equal 400 give 3 units.   Notify provider if glucose greater than or equal to 350 mg/dL after administration of correction dose.  If given at mealtime, must be administered 5 min before meal or immediately after.    Admin. Amount: 1-3 Units  Dispense Loc: Contact Rx for dose  Volume: 3 mL         (4212)-Not Given        1521-Med Discontinued          Dose: 1-3 Units  Freq: 3 TIMES DAILY BEFORE MEALS Route: SC  Start: 18 0900   End: 18 1521   Admin Instructions: Correction Scale - LOW INSULIN RESISTANCE DOSING     Do  Not give Correction Insulin if Pre-Meal BG less than 140.   For Pre-Meal  - 239 give 1 unit.   For Pre-Meal  - 339 give 2 units.   For Pre-Meal BG greater than or equal to 340 give 3 units.   To be given with prandial insulin, and based on pre-meal blood glucose.   Notify provider if glucose greater than or equal to 350 mg/dL after administration of correction dose.  If given at mealtime, must be administered 5 min before meal or immediately after.    Admin. Amount: 1-3 Units  Last Admin: 05/24/18 1330  Dispense Loc: Contact Rx for dose  Volume: 3 mL         (0937)-Not Given [C]       1330 (1 Units)-Given       (1830)-Not Given        (0929)-Not Given       (1219)-Not Given       1521-Med Discontinued          Dose: 1-4 Units  Freq: EVERY 4 HOURS Route: SC  Start: 05/17/18 1815   End: 05/24/18 0855   Admin Instructions: Correction Scale - LOW INSULIN RESISTANCE DOSING     Do Not give Correction Insulin if BG less than 140.  For  - 239 give 1 unit.  For  - 339 give 2 units.  For BG  340 - 439  give 3 units  For BG greater than or equal to 440 give 4 units  Check blood glucose Q4H and administer based on blood glucose.   Notify provider if glucose greater than or equal to 350 mg/dL after administration of correction dose.  If given at mealtime, must be administered 5 min before meal or immediately after.    Admin. Amount: 1-4 Units  Last Admin: 05/23/18 5638  Dispense Loc: Contact Rx for dose  Volume: 3 mL     (0125)-Not Given       (0526)-Not Given       (0939)-Not Given       (1354)-Not Given       (1740)-Not Given       (2043)-Not Given        (0000)-Not Given       (0446)-Not Given       (0817)-Not Given       (1238)-Not Given       (1643)-Not Given       (2105)-Not Given        (0007)-Not Given       (0316)-Not Given       (0804)-Not Given       (1132)-Not Given       (1621)-Not Given       2032 (1 Units)-Given        (0050)-Not Given       (0415)-Not Given       (0739)-Not Given        (1220)-Not Given       (1614)-Not Given       2053 (1 Units)-Given [C]       2358 (1 Units)-Given [C]        (0417)-Not Given [C]       0855-Med Discontinued  (936)-Not Given [C]               Dose: 100 mL  Freq: ONCE Route: IA  Start: 18 111   End: 18   Admin. Amount: 100 mL  Dispense Loc:  [facility]  Administrations Remainin  Volume: 100 mL        1829-Med Discontinued            Dose: 3 mL  Freq: EVERY 4 HOURS Route: NEBULIZATION  Start: 18   End: 18 0853   Admin. Amount: 3 mL  Last Admin: 18  Dispense Loc:  [facility]  Volume: 3 mL     0302 (3 mL)-Given       0724 (3 mL)-Given       1050 (3 mL)-Given       1521 (3 mL)-Given       1956 (3 mL)-Given       2226 (3 mL)-Given        0402 (3 mL)-Given       0811 (3 mL)-Given       1149 (3 mL)-Given       1611 (3 mL)-Given       1955 (3 mL)-Given       2343 (3 mL)-Given        0354 (3 mL)-Given       0733 (3 mL)-Given       1130 (3 mL)-Given       1610 (3 mL)-Given       1949 (3 mL)-Given       2335 (3 mL)-Given        0451 (3 mL)-Given       0752 (3 mL)-Given       1113 (3 mL)-Given       1531 (3 mL)-Given       1907 (3 mL)-Given       2253 (3 mL)-Given        (0319)-Not Given       0702 (3 mL)-Given       0853-Med Discontinued           Dose: 500 mg  Freq: EVERY 24 HOURS Route: IV  Indications Comment: COPD exacerbation  Last Dose: 500 mg (18)  Start: 18 0600   End: 18 0852   Admin Instructions: Irritant. Administer at a rate of no greater than 100 mL/hr    Admin. Amount: 500 mg = 100 mL Conc: 500 mg/100 mL  Last Admin: 18  Dispense Loc:  Main Pharmacy  Infused Over: 60 Minutes  Administrations Remaining: 3   Current Line: Peripheral IV 18 Right Lower forearm     0626 (500 mg)-New Bag        0539 (500 mg)-New Bag        0618 (500 mg)-New Bag        0515 (500 mg)-New Bag       0852-Med Discontinued           Dose: 500 mg  Freq: DAILY Route: PO  Indications of Use:  HEALTHCARE-ASSOCIATED PNEUMONIA  Start: 05/25/18 0700   End: 05/24/18 0922   Admin Instructions: Administer at least 2 hrs before or 4 hrs after aluminum, calcium, iron, zinc or magnesium containing products.    Admin. Amount: 1 tablet (1 × 500 mg tablet)  Dispense Loc: [facility]         0922-Med Discontinued           Dose: 5 mg  Freq: DAILY Route: PO  Start: 05/25/18 1515   End: 05/25/18 1509   Admin Instructions: Hold for SBP < 100    Admin. Amount: 1 tablet (1 × 5 mg tablet)          1509-Med Discontinued          Dose: 40 mg  Freq: EVERY 8 HOURS Route: IV  Start: 05/22/18 1607   End: 05/24/18 0852   Admin Instructions: Give Doses 125mg and less IV Push over 2-3 minutes - reconstitute with 1 mL of bacteriostatic water if no diluent is provided. Doses greater than 125 mg need to be in at least 50 mL IVPB.    Admin. Amount: 40 mg = 1 mL Conc: 40 mg/mL  Last Admin: 05/24/18 0809  Dispense Loc:  [facility]  Volume: 1 mL   Current Line: Peripheral IV 05/17/18 Right Lower forearm      1625 (40 mg)-Given        0050 (40 mg)-Given       0824 (40 mg)-Given       1616 (40 mg)-Given        0005 (40 mg)-Given       0809 (40 mg)-Given       0852-Med Discontinued           Dose: 1 patch  Freq: DAILY Route: TD  Start: 05/24/18 1200   End: 05/24/18 1233   Admin. Amount: 1 patch  Dispense Loc:  [facility]                1233-Med Discontinued           Dose: 0.4 mg  Freq: EVERY 5 MIN PRN Route: SL  PRN Reason: chest pain  Start: 05/18/18 1148   End: 05/23/18 1830   Admin Instructions: Maximum 3 doses in 15 minutes. Notify provider if no relief after 3 doses.  Do NOT give nitroGLYCERIN SL IF patient has received sildenafil (VIAGRA/REVATIO) within the last 8 hours, avanafil (STENDRA) within the last 8 hours, vardenafil (LEVITRA/STAXYN) within the last 18 hours, or tadalafil (CIALIS/ADCIRCA) within the last 36 hours.  Inform provider if patient has taken one of these medications.  If patient is still having acute angina requiring  treatment, an alternative treatment option may be used such as: IV beta-blocker (2.5 mg - 5 mg) metoprolol (LOPRESSOR) if ordered by a provider.    Admin. Amount: 1 tablet (1 × 0.4 mg tablet)  Dispense Loc: [facility]        1830-Med Discontinued            Dose: 25 mg  Freq: 2 TIMES DAILY Route: PO  Start: 05/22/18 1100   End: 05/24/18 0852   Admin. Amount: 1 tablet (1 × 25 mg tablet)  Last Admin: 05/24/18 0809  Dispense Loc: [facility]       1127 (25 mg)-Given       2032 (25 mg)-Given        0824 (25 mg)-Given       2054 (25 mg)-Given        0809 (25 mg)-Given       0852-Med Discontinued      Medications 05/20/18 05/21/18 05/22/18 05/23/18 05/24/18 05/25/18 05/26/18

## 2018-05-17 NOTE — H&P
[facility]  Comprehensive Daily ICU Note        Preeti Ford MRN# [id number]   Age: 67 year old YOB: 1950     Date of Admission: 5/17/2018    Primary care provider: Deisy Carter     CODE STATUS: FULL      Problem List:   Severe COPD with exacerbation  NSTEMI - unclear if demand ischemia or true ACS           Treatment goals for next 24 hours:   Lung protective ventilation  Evaluate for ACS  COPD treatment  Summer Pitts MD      Subjective/ Last 24 hours:   HPI: History obtained from chart as patient intubated and sedated. 68 yo woman with known severe COPD and coronary artery disease who visited her clinic 5/15afternoon complaining of a few days of shortness of breath. Given steroids and antibiotics and sent home. Presented to the ED very early the next morning (5/16) in respiratory extremis.  Had EKG changes as well as severe hypercapnia.  Intubated and transferred here.  Aunt is bedside and tells me she spoke with her a couple of days ago and she seemed very well at that time.  Of note patient has significant history of coronary artery disease with stents to Cirucumflex and LAD in 2015 in setting of recent STEMI.  Of note, presentation noted at that time seems similar to presentation today with subacute respiratory symptoms.     Past Medical History:   Diagnosis Date     Arthritis      COPD (chronic obstructive pulmonary disease) (H)      Coronary artery disease      CVA (cerebral vascular accident) (H) 8-     Hypertension    Severe obstruction seen on PFTs in 2017    Social History   Substance Use Topics     Smoking status: Current Every Day Smoker     Packs/day: 0.50     Types: Cigarettes     Smokeless tobacco: Never Used      Comment: patient states she is working on it      Alcohol use No     Family History   Problem Relation Age of Onset     CEREBROVASCULAR DISEASE Mother      CEREBROVASCULAR DISEASE Father      Genitourinary Problems Brother       Dialysis     Past Surgical History:   Procedure Laterality Date     APPENDECTOMY       BYPASS GRAFT AORTOILIAC N/A 3/7/2017    Procedure: BYPASS GRAFT AORTOILIAC;  Surgeon: Marcos Pratt MD;  Location: [facility] OR     SALPINGO OOPHORECTOMY,R/L/DELORES      Salpingo Oophorectomy, RT/LT/DELORES                 Mechanical Ventilation/Vitalsigns/IsandOs:   Temp:  [99.4  F (37.4  C)-100  F (37.8  C)] 100  F (37.8  C)  Pulse:  [87] 87  Heart Rate:  [] 87  Resp:  [13-57] 16  BP: ()/() 133/79  FiO2 (%):  [50 %] 50 %  SpO2:  [77 %-100 %] 98 %      Intake/Output Summary (Last 24 hours) at 05/17/18 0931  Last data filed at 05/17/18 0830   Gross per 24 hour   Intake                0 ml   Output               60 ml   Net              -60 ml       Ventilation Mode: CMV/AC  (Continuous Mandatory Ventilation/ Assist Control)  FiO2 (%): 50 %  Rate Set (breaths/minute): 16 breaths/min  Tidal Volume Set (mL): 400 mL  PEEP (cm H2O): 5 cmH2O  Oxygen Concentration (%): 50 %  Resp: 16    Day since 5/16    Peak airway pressure: 24  Auto-PEEP:  Small amount         Physical Examination:     General: Stated age, intubated, chronically ill appearing  HEENT: ET tube, KELSEY  Lungs: crackles bilaterally, wheezes on the left  CVS: RRR, distant  Abdomen: soft, bowel sounds, non tender  Extremities/musculoskeletal: unable to palpate pulses in lower extremities and cool, palpable pulses in upper extremities and warm, no edema  Neurology: Sedated  Skin: no abnormalities  Psychiatry: unable  Exam of Line sites:  No lines       Hospital Procedures   TTE  Central line          Feeding/Glucose:   NPO    Blood glucose/Insulin requirement last 24 hours: none         Medications:       atorvastatin  10 mg Oral At Bedtime     cefTAZidime  2 g Intravenous Q8H     chlorhexidine  15 mL Mouth/Throat Q12H     clopidogrel  75 mg Oral Daily     furosemide  40 mg Intravenous Once     heparin  5,000 Units Subcutaneous Q8H     ipratropium - albuterol 0.5  mg/2.5 mg/3 mL  3 mL Nebulization Q4H     metoprolol succinate  12.5 mg Oral Daily     montelukast  10 mg Oral At Bedtime     polyethylene glycol  17 g Oral Daily             Labs/Diagnostic studies:     EKst EKG:  ST elevation leads V2-V4, ST depression inferior leads             2nd EKG: resolution of ST depression, less ST depression inferior leads    Echo:  pending    ROUTINE ICU LABS (Last four results)  CMP  Recent Labs  Lab 18  0436      POTASSIUM 4.8   CHLORIDE 100   CO2 30   ANIONGAP 7   *   BUN 14   CR 0.70   GFRESTIMATED 85   GFRESTBLACK >90   CALDERON 8.4*   PROTTOTAL 7.7   ALBUMIN 3.6   BILITOTAL 0.4   ALKPHOS 90   AST 54*   ALT 48     CBC  Recent Labs  Lab 18  0436   WBC 9.9   RBC 5.88*   HGB 17.4*   HCT 52.7*   MCV 90   MCH 29.6   MCHC 33.0   RDW 13.9        INRNo lab results found in last 7 days.  Arterial Blood Gas  Recent Labs  Lab 18  0830 18  0535 18  0436   PH 7.33*  --   --    PCO2 55*  --   --    PO2 171*  --   --    HCO3 29*  --   --    O2PER 50 100 100       Other Lab Data:  Troponin: 0.336  BNP: 38420    Cultures:  No results for input(s): CULT in the last 168 hours.  Blood culture:  Invalid input(s): BC   Urine culture:  No results for input(s): URC in the last 168 hours.          Imaging:     CXR:  Hyperinflated, possible infiltrates in lower lung fields; right more than left    CT:  Emphysematous changes seen on CT scan in 2017 along with granuloma         Assessment and Plan:     Summary:  Preeti oFrd is a 67 year old female admitted on 2018 for acute respiratory failure.  I have personally reviewed the daily labs, imaging studies, cultures and discussed the case with referring physician and consulting physicians. My assessment and plan as follows:    Neurology and Psychiatry:  Needs sedation to tolerate ventilator   - Propofol infusion and narcotic bumps.    Pulmonary:   Acute hypoxic and hypercapnic respiratory failure  secondary to COPD exacerbation. Initially very hypercapnic but has essentially resolved and CO2 is likely near baseline.  No longer hypoxic. Slight amount of air trapping seen on ventilator. No spontaneous respirations  - Decrease RR from 16 to 14. Recheck ABG 1 hour post changes.   - Steroids, bronchodilators, antibiotics  - Sputum culture  - Evaluate for ACS contributing to her respiratory failure    Cardiovascular system:   ST elevation on initial EKG and troponin leak on initial lab. History of coronary artery disease in LAD and Cirumflex as well as low normal EF and report of non viable anterior/apical wall. Presentation today similar to presentation in 2015 of resolving MI.   - TTE  - Cardiology consultation to recommend on whether to administer heparin and/or do other evaluation  - Trend Troponins until peak  - Statin and BB as per outpatient regimen    Renal/Electrolytes:  No acute issues. Creatinine near baseline. K high normal.   - Check Magnesium and Phosphorus. LR infusion.    ID:  No issues  - Antibiotics empirically for severe COPD exacerbation  - Sputum culture    GI/:  No issues  - Feeding tube    Nutrition:   Tube feeds tomorrow if doesn't appear to be on track for extubation. Suspect her nutritional status is suboptimal.    Musculoskeletal/Rheumatology:  No acute issues    Endocrine:   No current issues  - Monitor sugars  - Steroid burst ordered    Heme/Onc:  Polycythemia secondary to chronic hypoxia. Mild thrombocytopenia  - Check INR. May start heparin infusion pending Cardiology opinion.     ICU prophylaxis:      DVT: heparin     VAP: As above     Stress ulcer: Ranitidine     Restraints needed: yes     Lines   Central Line/PICC - placed 5/16 needed: y   Arterial line - placed n needed: n   Barnhart catheter.  Needed: y       Prognosis:    Very guarded      Family update:  and aunt    Billing: total time spend providing critical care was 90 min, excluding procedure time.    Summer Pitts  MD  [phone]

## 2018-05-17 NOTE — IP AVS SNAPSHOT
MRN:[id number]                      After Visit Summary   5/17/2018    Preeti Ford    MRN: [id number]           Thank you!     Thank you for choosing [facility] for your care. Our goal is always to provide you with excellent care. Hearing back from our patients is one way we can continue to improve our services. Please take a few minutes to complete the written survey that you may receive in the mail after you visit with us. Thank you!        Patient Information     Date Of Birth          1950        Designated Caregiver       Most Recent Value    Caregiver    Will someone help with your care after discharge? yes    Name of designated caregiver Yoseph    Phone number of caregiver same as pt    Caregiver address same as pt      About your hospital stay     You were admitted on:  May 17, 2018 You last received care in the:  [facility]    You were discharged on:  May 26, 2018        Reason for your hospital stay       Heart attack due to blockage of one of your stents. This was replaced. You were also treated for pneumonia and a COPD exacerbation                  Who to Call     For medical emergencies, please call 911.  For non-urgent questions about your medical care, please call your primary care provider or clinic, [phone]  For questions related to your surgery, please call your surgery clinic        Attending Provider     Provider Specialty    Tramaine Alcocer MD Critical Care Medicine    GisselleSummer chaney MD Internal Medicine       Primary Care Provider Office Phone # Fax #    [facility] [phone]      After Care Instructions     Activity - Up with nursing assistance           Advance Diet as Tolerated       Follow this diet upon discharge: Orders Placed This Encounter  *Dysphagia Diet Level 2 Mech Altered Thin Liquids (water, ice chips, juice, milk gelatin, ice cream, etc)  *Magic shake 10-2 (RD);  Between Meals            Daily weights       Call Provider for weight gain of more than 2 pounds per day or 5 pounds per week.            General info for SNF       Length of Stay Estimate: Short Term Care: Estimated # of Days <30  Condition at Discharge: Improving  Level of care:skilled   Rehabilitation Potential: Excellent  Admission H&P remains valid and up-to-date: Yes  Recent Chemotherapy: N/A  Use Nursing Home Standing Orders: Yes            Intake and output       Every shift            Mantoux instructions       Give two-step Mantoux (PPD) Per Facility Policy Yes            Oxygen - Nasal cannula       2 Lpm by nasal cannula to keep O2 sats 90% or greater.                  Follow-up Appointments     Follow Up and recommended labs and tests       Follow up with transitional care physician.  No follow up labs or test are needed.                  Additional Services     Nutrition Services Adult IP Consult       Reason:  Non-severe malnutrition - on Magic cup BID            Occupational Therapy Adult Consult       Evaluate and treat as clinically indicated.    Reason:  Generalized weakness, deconditioning. NSTEMI            Physical Therapy Adult Consult       Evaluate and treat as clinically indicated.    Reason:  Generalized weakness, deconditioning. NSTEMI            Speech Language Path Adult Consult       Evaluate and treat as clinically indicated.    Reason:  Dysphagia                  Further instructions from your care team         Discharge Instructions for Coronary Angioplasty and Stenting  During your angioplasty, a doctor inserts a thin tube called a catheter into a blood vessel in your groin or wrist. The catheter is pushed through your blood vessel to a blocked area in one of your heart s arteries. The doctor inflates a tiny balloon at the tip of the catheter and stretches the blocked vessel so blood can flow freely. The balloon is then deflated and removed with the catheter. The doctor may also  insert a metal mesh tube called a stent in the blocked vessel. The stent helps the vessel stay open. You may get several stents if you have blockages in more than one of your arteries.  Home care    Ask someone to drive you to your appointments for the next few days.    Rest for 2 to 3 days after the procedure. Most people are able to go back to normal activity within a few days.    Take your temperature and check your incision for signs of infection every day for a week. Signs of infection include redness, swelling, drainage, or warmth. It is normal to have a small bruise or bump where the catheter was inserted.    Take your medicines exactly as directed. Don t skip doses. It is important to take aspirin or other similar medicines for as long as your doctor advises. If you were also prescribed clopidogrel, prasugrel, or ticagrelor, it is very important to take these medicines, as well. These medicines prevent clots that could cause a heart attack. If you have a problem with any of your medicines, call healthcare provider right away. Call your provider right away if you have extra bleeding, but go to the emergency room if the bleeding can't be controlled.    Unless told otherwise, drink plenty of fluids to help flush your body of the dye that was used during your angioplasty. Let your healthcare provider know if the color of your urine changes and doesn't return to normal color.    Eat a healthy diet that is low in fat, salt, and cholesterol. Ask your healthcare team for menus and other diet information.    Exercise according to your healthcare team's recommendation. Depending on your case, your team may recommend you start a cardiac rehabilitation program. Cardiac rehab is an exercise program in which trained healthcare staff monitor your progress and stress on your heart while you exercise. Ask how to enroll if your team recommends this program.    Don't swim or take a bath for 5 to 7 days. You may shower the day  after the procedure. This keeps the incision site from getting wet and infected until the skin and artery can heal.  Follow-up care    Make a follow-up appointment as directed by our staff. Follow-up appointments are usually scheduled for 2 to 4 weeks after an angioplasty or coronary stent procedure.    Have a yearly checkup to make sure you are still doing well and not having any new symptoms.    Don't wait for a follow-up appointment if your medicines are not working or you are having heart-related symptoms.     When to call your healthcare provider  Call your healthcare provider right away if you have any of the following:    Chest pain or a return of the symptoms you had prior to the angioplasty    Constant or increasing pain or numbness in your leg, or if your leg looks blue or feels cold    Fever above 100.4 F (38.0 C) or other signs of infection (redness, swelling, drainage, or warmth at the incision site of the leg or wrist)    Shortness of breath    Bleeding, bruising, or a large swelling where the catheter (tube) was inserted    Blood in your urine    Black or tarry stools    Feeling faint    Difficulty speaking or weakness in any muscle   Date Last Reviewed: 10/1/2016    2779-6687 [employer]. [address] [place] [address]. All rights reserved. This information is not intended as a substitute for professional medical care. Always follow your healthcare professional's instructions.          General Recommendations To Control Heart Failure When You Get Home       Heart Failure Instructions for Patients and Families: Please read and check off each of these important instructions as you do them when you get home.          Weight and Symptoms       ___ Put a scale in your bathroom.    ___ Post a weight chart or calendar next to your scale.    ___ Weigh yourself everyday as soon as you get up in the morning (before breakfast).  You should only be wearing your pajamas.  Write your  weight on the chart/calendar.  ___ Bring your weight chart/calendar with you to all appointments.  ___ Call your doctor or nurse practitioner if you gain 2 pounds (in 1 day) or 5 pounds in (1 week) from your goal  good  weight.  Your good weight is also called your  dry  weight.  Your doctor or nurse will tell you what your good weight should be.    ___ Call your doctor or nurse practitioner if you have shortness of breath that gets worse over time, leg swelling or fatigue.       Medications and Diet       ___ Make sure to take your medication as prescribed.    ___ Bring a current list of your medication and all of your medicine bottles with you to all appointments.    ___ Limit fluids if you still have swelling or shortness of breath, or if your doctor tells you to do so.    ___ Eat less than 2000 mg of sodium (salt) every day. Read food labels, and do not add salt to meals. Remember, if you eat less salt you retain less fluid.  ___ Follow a heart healthy diet that is low in saturated fat.        Activity and Suggested Lifestyle Changes      ___ Stay active. Talk to your doctor about an exercise program that is safe for your heart.  ___ Stop smoking. Reduce alcohol use.      ___ Lose weight if you are overweight. Extra weight puts a lot of stress on the heart.                 Control for Leg Swelling     ___ Keep your legs elevated (raised) as needed for swelling. If swelling is uncomfortable or elevation doesn t help, ask your doctor about using ACE wraps or support stockings.        What is the [facility]?  Cardiomyopathy  Optimization  Rehabilitation  Education    The [facility] is a heart failure specialty clinic within [facility].  It is an outpatient disease management program that is based on a phase-by-phase approach, which is tailored to each patient s individual needs.  The cardiologist, nurse practitioner, physician assistant and nurses provide an ongoing outpatient care and treatment  plan that guides heart failure and cardiomyopathy patients from evaluation and education to stabilization. This team works with your current primary care doctor and cardiologist to help you:    - Avoid hospitalizations  - Slow the progression of the disease  - Improve length and quality of life  - Know who and when to call if heart failure symptoms appear  - Receive easy access to quality health care and advice  - Better understand your condition and treatment  - Decrease the tremendous cost burden of heart failure care  - Detect future heart problems before they become life threatening                                 Follow-up Appointments     ___ An appointment with the [facility] may already have been made for you (see section   Your next appointments already scheduled ).  If you have a question about your appointment, would like to speak with a [facility].O.R[facility] nurse, or would like to become a [facility] Clinic patient, please call one of the following locations:     - [facility]):                                             [phone]  - [facility]):                                            [phone]  - [facility]):                 [phone]      ___ Your [facility].O.R[facility] Clinic Team will continue to educate you on your heart failure and may adjust medications based on your vital signs, lab work, and how you are feeling.  Therefore, it is very important to bring the following to all C.O.R.E[facility] appointments:    - An accurate list of your medications  - Your medicine bottles  - Your weight chart/calendar                   Pending Results     No orders found from 5/15/2018 to 5/18/2018.            Statement of Approval     Ordered          05/26/18 0834  I have reviewed and agree with all the recommendations and orders detailed in this document.  EFFECTIVE NOW     Approved and electronically signed by:  Soren Robles MD   "           Admission Information     Date & Time Provider Department Dept. Phone    2018 Summer Pitts MD [facility] [phone]      Your Vitals Were     Blood Pressure Pulse Temperature Respirations Height Weight    91/50 (BP Location: Right arm) 66 97.7  F (36.5  C) (Oral) 20 1.575 m (5' 2\") 45.5 kg (100 lb 3.2 oz)    Pulse Oximetry BMI (Body Mass Index)                94% 18.33 kg/m2          [employer] Information     [employer] lets you send messages to your doctor, view your test results, renew your prescriptions, schedule appointments and more. To sign up, go to [email or web address] . Click on \"Log in\" on the left side of the screen, which will take you to the Welcome page. Then click on \"Sign up Now\" on the right side of the page.     You will be asked to enter the access code listed below, as well as some personal information. Please follow the directions to create your username and password.     Your access code is: [email or web address]  Expires: 2018 10:31 AM     Your access code will  in 90 days. If you need help or a new code, please call your [place] clinic or [phone].        Care EveryWhere ID     This is your Care EveryWhere ID. This could be used by other organizations to access your [place] medical records  [id number]        Equal Access to Services     LYDIA WASHINGTON AH: Hadii steve gomeso Sovolodymyr, waaxda luqadaha, qaybta kaalmada berenice, wax dave hampton . So [facility] [phone].    ATENCIÓN: Si habla español, tiene a jeffries disposición servicios gratuitos de asistencia lingüística. Geovanna al [phone].    We comply with applicable federal civil rights laws and [place] laws. We do not discriminate on the basis of race, color, national origin, age, disability, sex, sexual orientation, or gender identity.               Review of your medicines      START taking        Dose / Directions    aspirin 81 MG EC tablet   Used " for:  Coronary artery disease involving native coronary artery of native heart with angina pectoris (H)        Dose:  81 mg   Take 1 tablet (81 mg) by mouth daily   Refills:  0       metoprolol tartrate 25 MG tablet   Commonly known as:  LOPRESSOR   Used for:  Coronary artery disease involving native coronary artery of native heart with angina pectoris (H)        Dose:  12.5 mg   Take 0.5 tablets (12.5 mg) by mouth 2 times daily   Quantity:  60 tablet   Refills:  0       nitroGLYcerin 0.4 MG sublingual tablet   Commonly known as:  NITROSTAT   Used for:  Coronary artery disease involving native coronary artery of native heart with angina pectoris (H)        For chest pain place 1 tablet under the tongue every 5 minutes for 3 doses. If symptoms persist 5 minutes after 1st dose call 911.   Quantity:  25 tablet   Refills:  0       pantoprazole 40 MG EC tablet   Commonly known as:  PROTONIX   Used for:  Gastroesophageal reflux disease, esophagitis presence not specified        Dose:  40 mg   Take 1 tablet (40 mg) by mouth every morning   Quantity:  30 tablet   Refills:  0       senna-docusate 8.6-50 MG per tablet   Commonly known as:  SENOKOT-S;PERICOLACE   Used for:  Constipation, unspecified constipation type        Dose:  1 tablet   Take 1 tablet by mouth 2 times daily as needed for constipation   Quantity:  100 tablet   Refills:  0         CONTINUE these medicines which may have CHANGED, or have new prescriptions. If we are uncertain of the size of tablets/capsules you have at home, strength may be listed as something that might have changed.        Dose / Directions    ipratropium - albuterol 0.5 mg/2.5 mg/3 mL 0.5-2.5 (3) MG/3ML neb solution   Commonly known as:  DUONEB   This may have changed:  See the new instructions.   Used for:  COPD exacerbation (H)        Dose:  1 vial   Take 1 vial (3 mLs) by nebulization every 4 hours as needed for shortness of breath / dyspnea or wheezing   Quantity:  270 mL   Refills:  3        predniSONE 10 MG tablet   Commonly known as:  DELTASONE   This may have changed:    - medication strength  - how much to take  - how to take this  - when to take this  - additional instructions   Used for:  COPD exacerbation (H)        Start taking on:  5/27/2018   Take 3 tabs daily for 4 days, then 2 tabs daily for 4 days, then 1 tab daily for 4 days, then stop   Quantity:  30 tablet   Refills:  0         CONTINUE these medicines which have NOT CHANGED        Dose / Directions    ACE/ARB/ARNI NOT PRESCRIBED (INTENTIONAL)   Used for:  Elevated blood pressure reading without diagnosis of hypertension, History of stroke        Please choose reason not prescribed, below   Refills:  0       acetaminophen 325 MG tablet   Commonly known as:  TYLENOL        Dose:  325 mg   Take 325 mg by mouth every 4 hours as needed for mild pain   Quantity:  100 tablet   Refills:  0       albuterol 108 (90 Base) MCG/ACT Inhaler   Commonly known as:  PROAIR HFA/PROVENTIL HFA/VENTOLIN HFA   Used for:  Chronic bronchitis, unspecified chronic bronchitis type (H)        Dose:  2 puff   Inhale 2 puffs into the lungs every 6 hours as needed for shortness of breath / dyspnea   Quantity:  1 Inhaler   Refills:  11       atorvastatin 10 MG tablet   Commonly known as:  LIPITOR   Used for:  PAD (peripheral artery disease) (H)        Dose:  10 mg   Take 1 tablet (10 mg) by mouth At Bedtime   Quantity:  90 tablet   Refills:  3       BOOST   Used for:  History of stroke, Ischemic cardiomyopathy, Panlobular emphysema (H), Malaise, Loss of weight        Dose:  1 Bottle   Take 1 Bottle by mouth 3 times daily (with meals)   Quantity:  90 each   Refills:  0       budesonide-formoterol 160-4.5 MCG/ACT Inhaler   Commonly known as:  SYMBICORT   Used for:  Chronic bronchitis, unspecified chronic bronchitis type (H)        INHALE TWO PUFFS BY MOUTH TWICE A DAY   Quantity:  1 Inhaler   Refills:  5       clopidogrel 75 MG tablet   Commonly known as:  PLAVIX    Used for:  Acute CVA (cerebrovascular accident) (H)        Dose:  75 mg   Take 1 tablet (75 mg) by mouth daily   Quantity:  90 tablet   Refills:  3       COQ10 PO        Dose:  100 mg   Take 100 mg by mouth every 24 hours (at 16:00)   Refills:  0       montelukast 10 MG tablet   Commonly known as:  SINGULAIR   Used for:  Chronic bronchitis, unspecified chronic bronchitis type (H)        Dose:  10 mg   Take 1 tablet (10 mg) by mouth At Bedtime   Quantity:  30 tablet   Refills:  11       nicotine 7 MG/24HR 24 hr patch   Commonly known as:  NICODERM CQ   Used for:  Cigarette nicotine dependence without complication        Dose:  1 patch   Place 1 patch onto the skin every 24 hours   Quantity:  30 patch   Refills:  1       order for DME   Used for:  Pulmonary emphysema, unspecified emphysema type (H)        Equipment being ordered: Nebulizer   Quantity:  1 Device   Refills:  0       order for DME   Used for:  Chronic bronchitis, unspecified chronic bronchitis type (H)        Equipment being ordered: Nebulizer machine   Quantity:  1 Device   Refills:  0       OSCAL 500/200 D-3 PO        Dose:  1 tablet   Take 1 tablet by mouth 2 times daily   Refills:  0       polyethylene glycol Packet   Commonly known as:  MIRALAX/GLYCOLAX        Dose:  1 packet   Take 1 packet by mouth daily as needed for constipation   Refills:  0       tiotropium 18 MCG capsule   Commonly known as:  SPIRIVA HANDIHALER   Used for:  Chronic bronchitis, unspecified chronic bronchitis type (H)        Dose:  18 mcg   Inhale 1 capsule (18 mcg) into the lungs daily   Quantity:  30 capsule   Refills:  3         STOP taking     metoprolol succinate 25 MG 24 hr tablet   Commonly known as:  TOPROL-XL                Where to get your medicines      Some of these will need a paper prescription and others can be bought over the counter. Ask your nurse if you have questions.     You don't need a prescription for these medications     aspirin 81 MG EC tablet     ipratropium - albuterol 0.5 mg/2.5 mg/3 mL 0.5-2.5 (3) MG/3ML neb solution    metoprolol tartrate 25 MG tablet    nitroGLYcerin 0.4 MG sublingual tablet    pantoprazole 40 MG EC tablet    predniSONE 10 MG tablet    senna-docusate 8.6-50 MG per tablet                Protect others around you: Learn how to safely use, store and throw away your medicines at www.disposemymeds.org.             Medication List: This is a list of all your medications and when to take them. Check marks below indicate your daily home schedule. Keep this list as a reference.      Medications           Morning Afternoon Evening Bedtime As Needed    ACE/ARB/ARNI NOT PRESCRIBED (INTENTIONAL)   Please choose reason not prescribed, below                                acetaminophen 325 MG tablet   Commonly known as:  TYLENOL   Take 325 mg by mouth every 4 hours as needed for mild pain                                   albuterol 108 (90 Base) MCG/ACT Inhaler   Commonly known as:  PROAIR HFA/PROVENTIL HFA/VENTOLIN HFA   Inhale 2 puffs into the lungs every 6 hours as needed for shortness of breath / dyspnea                                   aspirin 81 MG EC tablet   Take 1 tablet (81 mg) by mouth daily   Last time this was given:  81 mg on 5/26/2018  9:23 AM   Next Dose Due:  5-                                   atorvastatin 10 MG tablet   Commonly known as:  LIPITOR   Take 1 tablet (10 mg) by mouth At Bedtime   Last time this was given:  10 mg on 5/25/2018  9:35 PM   Next Dose Due:  5-                                   BOOST   Take 1 Bottle by mouth 3 times daily (with meals)                                budesonide-formoterol 160-4.5 MCG/ACT Inhaler   Commonly known as:  SYMBICORT   INHALE TWO PUFFS BY MOUTH TWICE A DAY   Next Dose Due:  5-                                      clopidogrel 75 MG tablet   Commonly known as:  PLAVIX   Take 1 tablet (75 mg) by mouth daily   Last time this was given:  75 mg on 5/26/2018  9:20 AM    Next Dose Due:  5-                                   COQ10 PO   Take 100 mg by mouth every 24 hours (at 16:00)   Next Dose Due:  5-                                   ipratropium - albuterol 0.5 mg/2.5 mg/3 mL 0.5-2.5 (3) MG/3ML neb solution   Commonly known as:  DUONEB   Take 1 vial (3 mLs) by nebulization every 4 hours as needed for shortness of breath / dyspnea or wheezing   Last time this was given:  3 mLs on 5/24/2018  7:02 AM                                   metoprolol tartrate 25 MG tablet   Commonly known as:  LOPRESSOR   Take 0.5 tablets (12.5 mg) by mouth 2 times daily   Last time this was given:  12.5 mg on 5/25/2018  9:34 PM   Next Dose Due:  5-                                      montelukast 10 MG tablet   Commonly known as:  SINGULAIR   Take 1 tablet (10 mg) by mouth At Bedtime   Last time this was given:  10 mg on 5/19/2018  9:15 PM   Next Dose Due:  5-                                     nicotine 7 MG/24HR 24 hr patch   Commonly known as:  NICODERM CQ   Place 1 patch onto the skin every 24 hours   Last time this was given:  1 patch on 5/26/2018  9:20 AM   Next Dose Due:  5-                                     nitroGLYcerin 0.4 MG sublingual tablet   Commonly known as:  NITROSTAT   For chest pain place 1 tablet under the tongue every 5 minutes for 3 doses. If symptoms persist 5 minutes after 1st dose call 911.                                   order for DME   Equipment being ordered: Nebulizer                                order for DME   Equipment being ordered: Nebulizer machine                                OSCAL 500/200 D-3 PO   Take 1 tablet by mouth 2 times daily   Next Dose Due:  5-                                      pantoprazole 40 MG EC tablet   Commonly known as:  PROTONIX   Take 1 tablet (40 mg) by mouth every morning   Last time this was given:  40 mg on 5/26/2018  9:20 AM   Next Dose Due:  5-                                      polyethylene glycol Packet   Commonly known as:  MIRALAX/GLYCOLAX   Take 1 packet by mouth daily as needed for constipation   Last time this was given:  17 g on 5/26/2018  9:20 AM                                   predniSONE 10 MG tablet   Commonly known as:  DELTASONE   Take 3 tabs daily for 4 days, then 2 tabs daily for 4 days, then 1 tab daily for 4 days, then stop   Start taking on:  5/27/2018   Last time this was given:  40 mg on 5/26/2018  9:23 AM   Next Dose Due:  5-                                   senna-docusate 8.6-50 MG per tablet   Commonly known as:  SENOKOT-S;PERICOLACE   Take 1 tablet by mouth 2 times daily as needed for constipation                                   tiotropium 18 MCG capsule   Commonly known as:  SPIRIVA HANDIHALER   Inhale 1 capsule (18 mcg) into the lungs daily   Next Dose Due:  5-

## 2018-05-17 NOTE — PLAN OF CARE
Problem: Patient Care Overview  Goal: Plan of Care/Patient Progress Review  Outcome: Therapy, unable to show any progress toward functional goals  Received from [facility] via helicopter.  Is intubated and vented.  Sedated w propofol.  Breath sounds are diminished.  Sputum culture sent.  MRSA swab sent.  ECHO done.  Central line inserted by Dr Pitts.  Anxious at times--given fentanyl and versed for line placement. UO good.   has been at bedside and understands POC.

## 2018-05-17 NOTE — IP AVS SNAPSHOT
"` [facility] CARDIAC SPECIAL[facility] CARE: [phone]                                              INTERAGENCY TRANSFER FORM - NURSING   2018                    Hospital Admission Date: 2018  TARIK HERNANDEZ   : 1950  Sex: Female        Attending Provider: Summer Pitts MD     Allergies:  Crestor [Rosuvastatin], Hmg-coa-r Inhibitors, Lisinopril, Optison [Albumin Human], Penicillins    Infection:  None   Service:  HOSPITALIST    Ht:  1.575 m (5' 2\")   Wt:  45.5 kg (100 lb 3.2 oz)   Admission Wt:  46 kg (101 lb 6.6 oz)    BMI:  18.33 kg/m 2   BSA:  1.41 m 2            Patient PCP Information     Provider PCP Type    SPRING Banegas CNP General      Current Code Status     Date Active Code Status Order ID Comments User Context       Prior      Code Status History     Date Active Date Inactive Code Status Order ID Comments User Context    2018  9:21 AM  Full Code [id number]  Rocio Nair MD Outpatient    2018  9:00 AM 2018  9:21 AM Full Code [id number]  Summer Pitts MD Inpatient    3/25/2018 10:34 AM 2018  9:00 AM Full Code [id number]  Deisy Laird MD Outpatient    3/23/2018  1:28 AM 3/25/2018 10:34 AM Full Code [id number]  Dereck Faulkner MD Inpatient    2017 10:10 AM 3/23/2018  1:28 AM Full Code [id number]  Gael Bass MD Outpatient    2017  8:39 PM 2017 10:10 AM Full Code [id number]  Dereck Faulkner MD Inpatient    3/12/2017  7:55 AM 2017  8:39 PM Full Code [id number]  Karime Mendoza MD Outpatient    2015  2:06 PM 3/12/2017  7:55 AM Full Code [id number]  Jesús Olmstead MD Outpatient    2015 10:15 AM 2015  2:06 PM Full Code [id number]  Carroll Patterson MD Inpatient      Advance Directives        Scanned docmt in ACP Activity?           No scanned doc        Hospital Problems as of 2018              Priority Class Noted POA    Hypercapnic respiratory failure (H) Medium "  5/17/2018 Yes      Non-Hospital Problems as of 5/26/2018              Priority Class Noted    Advanced directives, counseling/discussion Medium  11/14/2011    Urinary incontinence Medium  3/4/2012    Forgetfulness Medium  3/4/2012    Hyperlipidemia LDL goal <130 Medium  7/19/2012    Elevated blood pressure reading without diagnosis of hypertension Medium  7/18/2013    CVA (cerebral vascular accident) (H) Medium  8/17/2013    History of stroke - right thalamus in 8/2013 and left cerebellar and right vermis in 3/2018 Medium  8/21/2013    Numbness and tingling Medium  8/21/2013    Tobacco use disorder Medium  3/10/2015    ACS (acute coronary syndrome) (H) Medium  8/28/2015    Ischemic cardiomyopathy - EF 25% in 2015 but 55% in 3/2017 and 45-50% on 3/18 Medium  9/8/2015    HTN, goal below 130/80 Medium  9/8/2015    Acute systolic congestive heart failure (H) Medium  9/8/2015    Lung nodule Medium  9/8/2015    GERD (gastroesophageal reflux disease) Medium  9/8/2015    ST elevation myocardial infarction involving left anterior descending (LAD) coronary artery (H) Medium  9/8/2015    Chronic bronchitis, unspecified chronic bronchitis type (H) Medium  2/9/2016    PAD (peripheral artery disease) (H) - moderate left common carotid stenosis, aortoiliac bypass, chronic left vertebral and right posterior cerebral stenoses Medium  2/12/2016    Cervical high risk HPV (human papillomavirus) test positive Medium  12/20/2016    Acute respiratory failure with hypoxia (H) Medium  6/14/2017    COPD exacerbation (H) Medium  6/14/2017    Coronary artery disease involving native coronary artery - STEMI with LAD and circ stents in 8/2015 Medium  6/14/2017    Elevated troponin Medium  6/14/2017    Pulmonary emphysema (H) Medium  3/22/2018    Vertigo Medium  3/23/2018    Other seizures (H) - possible Medium  3/23/2018    Acute CVA (cerebrovascular accident) - right paramedian superior vermis and left cerebellar hemisphere Medium  3/23/2018     Malnutrition, unspecified type (H) Medium  4/16/2018    Hyponatremia Medium  4/16/2018    Hypochloremia Medium  4/16/2018      Immunizations     Name Date      Influenza (High Dose) 3 valent vaccine 11/01/17     Influenza (IIV3) PF 01/04/13     Influenza (IIV3) PF 10/18/11     Influenza Vaccine IM 3yrs+ 4 Valent IIV4 12/20/16     Influenza Vaccine IM 3yrs+ 4 Valent IIV4 10/14/15     Influenza Vaccine IM 3yrs+ 4 Valent IIV4 10/07/14     Pneumo Conj 13-V (2010&after) 11/01/17     Pneumococcal 23 valent 01/04/13     TDAP Vaccine (Adacel) 03/02/12          END      ASSESSMENT     Discharge Profile Flowsheet     EXPECTED DISCHARGE     GI WDL  WDL 05/25/18 2144    Expected Discharge Date  05/26/18 05/25/18 1412   Abdominal Appearance  flat 05/24/18 1016    DISCHARGE NEEDS ASSESSMENT     All Quadrants Bowel Sounds  audible and normoactive 05/23/18 1614    Equipment Currently Used at Home  cane, straight 05/21/18 0944   Last Bowel Movement  05/17/18 05/23/18 1614    Transportation Available  car;family or friend will provide 05/21/18 0944   GI Signs/Symptoms  constipation 05/24/18 2004    # of Referrals Placed by CTS  Post Acute Facilities 05/24/18 1552   Passing flatus  yes 05/25/18 2144    Key Recommendations  Return home with O2 stop smoking clinic follow up COPD education and tellow stoplight given with verbal understanding 06/15/17 1132   COMMUNICATION ASSESSMENT      [facility]  03/23/18 1420   Patient's communication style  spoken language (English or Bilingual) 03/23/18 1736    Primary Care MD Name  Deisy Carter, APRN, CNP  03/23/18 1420   SKIN      FUNCTIONAL LEVEL CURRENT     Inspection of bony prominences  Full 05/25/18 1637    Ambulation  4 - completely dependent 05/17/18 1556   Inspection under devices  Full 05/24/18 2004    Transferring  4 - completely dependent 05/17/18 1556   Skin WDL  ex 05/25/18 2144    Toileting  4 - completely dependent 05/17/18 1556   Skin  "Color/Characteristics  dusky;bruised (ecchymotic) 05/25/18 2144    Bathing  4 - completely dependent 05/17/18 1556   Skin Temperature  cool 05/25/18 2144    Dressing  4 - completely dependent 05/17/18 1556   Skin Moisture  dry 05/25/18 2144    Eating  4 - completely dependent 05/17/18 1556   Skin Elasticity  slow return to original state 05/23/18 0837    Communication  other (see comments) (intubated) 05/17/18 1556   Skin Integrity  bruise(s) 05/25/18 2144    Swallowing  other (see comments) (NPO) 05/17/18 1556   SAFETY      Current Functional Level Comment  on ventilator & bedrest 05/17/18 1556   Safety WDL  WDL 05/25/18 2144    Change in Functional Status Since Onset of Current Illness/Injury  yes 05/17/18 1556   Safety Equipment  oxygen flowmeter;manual resusciator with appropriate mask;suction regulator;suction equipment 05/22/18 1159    GASTROINTESTINAL (ADULT,PEDIATRIC,OB)     All Alarms  alarm(s) activated and audible 05/26/18 0548                 Assessment WDL (Within Defined Limits) Definitions           Safety WDL     Effective: 09/28/15    Row Information: <b>WDL Definition:</b> Bed in low position, wheels locked; call light in reach; upper side rails up x 2; ID band on<br> <font color=\"gray\"><i>Item=AS safety wdl>>List=AS safety wdl>>Version=F14</i></font>      Skin WDL     Effective: 09/28/15    Row Information: <b>WDL Definition:</b> Warm; dry; intact; elastic; without discoloration; pressure points without redness<br> <font color=\"gray\"><i>Item=AS skin wdl>>List=AS skin wdl>>Version=F14</i></font>      Vitals     Vital Signs Flowsheet     COMMENTS     BMI (Calculated)  18.71 05/24/18 1735    Comments  Following amb with CR on RA 02 dropped, (was 100% on 2L at rest). Replaced NC @2L and recovered >90% withing 40sec. 05/25/18 1206   POSITIONING      VITAL SIGNS     Body Position  independently positioning 05/26/18 0900    Temp  97.7  F (36.5  C) 05/26/18 0900   Head of Bed (HOB)  HOB at 30-45 degrees " 05/26/18 0548    Temp src  Oral 05/26/18 0900   Positioning/Transfer Devices  pillows;in use 05/26/18 0548    Resp  20 05/26/18 0824   Chair  Upright in chair 05/26/18 0900    Pulse  66 05/24/18 1044   ECG      Heart Rate  89 05/26/18 0923   ECG Rhythm  Atrial fibrillation 05/25/18 1628    Pulse/Heart Rate Source  Monitor 05/25/18 1612   Ectopy  None 05/23/18 0454    BP  91/50 05/26/18 0923   Lead Monitored  Lead II;V 1 05/23/18 0454    BP Location  Right arm 05/26/18 0932   Equipment  electrodes changed 05/22/18 0405    OXYGEN THERAPY     RESPIRATORY MONITORING      SpO2  94 % 05/26/18 0915   Respiratory Monitoring (EtCO2)  53 mmHg 05/17/18 0656    O2 Device  None (Room air) 05/26/18 0915   POINT OF CARE TESTING      FiO2 (%)  40 % 05/24/18 0816   Puncture Site  fingertip 05/23/18 0106    Oxygen Delivery  2 LPM 05/26/18 0824   Bedside Glucose (mg/dl )   119 mg/dl 05/23/18 0106    Suction Occurrance  2 05/17/18 2119   DRUG CALCULATION WEIGHT      PAIN/COMFORT     Drug Calculation Weight  47 kg (103 lb 9.9 oz) 05/17/18 0505    Patient Currently in Pain  denies 05/26/18 0824   DAILY CARE      Preferred Pain Scale  number (Numeric Rating Pain Scale) 05/24/18 1731   Activity Management  ambulated to bathroom 05/26/18 0900    Patient's Stated Pain Goal  No pain 05/24/18 1731   Activity Assistance Provided  assistance, 1 person 05/26/18 0900    CRITICAL-CARE PAIN OBSERVATION TOOL (CPOT)     Assistive Device Utilized  gait belt;walker 05/26/18 0900    Facial Expression  0 05/20/18 2200   Additional Documentation  Activity Device Assistance (Row) 05/25/18 1628    Body Movements  0 05/20/18 2200   [REMOVED] RIGHT GROIN INTERVENTIONAL PROCEDURE ACCESS      Compliance w/ventilator (intubated patients)  Extubated 05/20/18 2200   Site Assessment  Ecchymotic 05/23/18 1316    Vocalization (extubated patients)  0 05/20/18 2200   Hemostasis management  Unchanged 05/23/18 1316    Muscle Tension  0 05/20/18 2200   Femoral Bruit   "not present 05/23/18 1316    Total  0 05/20/18 2200   CMS Right Extremity  WDL Except 05/23/18 1316    ANALGESIA SIDE EFFECTS MONITORING     Dorsalis Pulse - Right Leg  Weak 05/23/18 1316    Side Effects Monitoring: Respiratory Quality  R 05/25/18 1628   Popliteal Pulse - Right Leg  Weak 05/20/18 0629    Side Effects Monitoring: Respiratory Depth  N 05/25/18 1628   Posterior Tibial Pulse - Right Leg  Weak 05/23/18 1316    Side Effects Monitoring: Sedation Level  1 05/25/18 1628   JEREMIAH COMA SCALE      HEIGHT AND WEIGHT     Best Eye Response  4-->(E4) spontaneous 05/26/18 0722    Height  1.575 m (5' 2\") 05/24/18 1733   Best Motor Response  6-->(M6) obeys commands 05/26/18 0722    Weight  45.5 kg (100 lb 3.2 oz) 05/26/18 0541   Best Verbal Response  5-->(V5) oriented 05/26/18 0722    Weight Method  Standing scale 05/24/18 1735   Jeremiah Coma Scale Score  15 05/26/18 0722    Bed Scale  Standard (fitted sheet, draw sheet/ pad, cover/flat sheet, blanket, two pillows) 05/20/18 0723   Assessment Qualifiers  patient not sedated/intubated 05/23/18 0454    BSA (Calculated - sq m)  1.42 05/24/18 1735                 Patient Lines/Drains/Airways Status    Active LINES/DRAINS/AIRWAYS     Name: Placement date: Placement time: Site: Days: Last dressing change:    Peripheral IV 05/17/18 Right Lower forearm 05/17/18   0436   Lower forearm   9             Patient Lines/Drains/Airways Status    Active PICC/CVC     None            Intake/Output Detail Report     Date Intake         Output   Net    Shift P.O. I.V. NG/GT IV Piggyback Enteral Total Urine Emesis/NG output Total       Noc 05/24/18 2300 - 05/25/18 0659 100 -- -- -- -- 100 200 -- 200 -100    Day 05/25/18 0700 - 05/25/18 1459 700 -- -- -- -- 700 820 -- 820 -120    Lilly 05/25/18 1500 - 05/25/18 2259 240 -- -- -- -- 240 300 -- 300 -60    Noc 05/25/18 2300 - 05/26/18 0659 -- -- -- -- -- -- 450 -- 450 -450    Day 05/26/18 0700 - 05/26/18 1459 240 -- -- -- -- 240 400 -- 400 " -160      Last Void/BM       Most Recent Value    Urine Occurrence 1 at 05/24/2018 1911    Stool Occurrence       Case Management/Discharge Planning     Case Management/Discharge Planning Flowsheet     REFERRAL INFORMATION     Spouse's Name  Bud 05/24/18 1552    Did the Initial Social Work Assessment result in a Social Work Case?  Yes 05/24/18 1552   Description of Support System  Supportive;Involved 05/24/18 1552    Admission Type  inpatient 05/24/18 1552   Support Assessment  Adequate family and caregiver support 05/24/18 1552    Arrived From  home or self-care 05/24/18 1552   EMPLOYMENT      Referral Source  physician 05/24/18 1552   Do you work full or part-time?  no 05/24/18 1552    # of Referrals Placed by CTS  Post Acute Facilities 05/24/18 1552   COPING/STRESS      Post Acute Facilities  TCU 05/24/18 1552   Major Change/Loss/Stressor  hospitalization 05/21/18 1241    Reason For Consult  discharge planning 05/24/18 1552   EXPECTED DISCHARGE      Record Reviewed  medical record 05/24/18 1552   Expected Discharge Date  05/26/18 05/25/18 1412    CTS Assigned to Case  Johnna Flowers 05/24/18 1552   DISCHARGE PLANNING      [facility]  03/23/18 1420   Transportation Available  car;family or friend will provide 05/21/18 0944    Primary Care MD Name  SPRING Roberto, CNP  03/23/18 1420   Key Recommendations  Return home with O2 stop smoking clinic follow up COPD education and tellow stoplight given with verbal understanding 06/15/17 1132    LIVING ENVIRONMENT     FINAL RESOURCES      Lives With  spouse 05/24/18 1552   Equipment Currently Used at Home  cane, straight 05/21/18 0944    Living Arrangements  house 05/24/18 1552   ABUSE RISK SCREEN      Provides Primary Care For  no one 05/24/18 1552   QUESTION TO PATIENT:  Has a member of your family or a partner(now or in the past) intimidated, hurt, manipulated, or controlled you in any way?  patient declined to answer or is  unable to answer 05/17/18 1557    Quality Of Family Relationships  supportive;involved 05/24/18 1552   QUESTION TO PATIENT: Do you feel safe going back to the place where you are living?  patient declined to answer or is unable to answer 05/17/18 1557    ASSESSMENT OF FAMILY/SOCIAL SUPPORT     OBSERVATION: Is there reason to believe there has been maltreatment of a vulnerable adult (ie. Physical/Sexual/Emotional abuse, self neglect, lack of adequate food, shelter, medical care, or financial exploitation)?  no 05/17/18 1557    Marital Status   05/24/18 1552   OTHER      Who is your support system?   05/24/18 1552   Are you depressed or being treated for depression?  No 05/21/18 1241

## 2018-05-17 NOTE — PROCEDURES
Procedure: Central Line Placement  Consent:   1. Obtained from  after discussion of risk/benefit/alternatives) and all questions answered to the best of my knowledge. This signed consent is in the chart.     Universal Protocol:   Patient Identification was verified, time out was performed, site marking N/A, Imaging data N/A. Full Barrier precaution done: Hands washed, mask, gloves, gown, cap and eye protection all used.    Indication and diagnosis: IV access for medication(s) and CVP monitoring in a patient with severe COPD and possible ACS.    Narrative: The vein was identified with portable ultrasound device (compression test, venous color and doppler flow pattern). Area prepped with chlorhexedine and Patient was head to toe draped. Sterile technique and full barrier precautions used. 1% lidocaine injected subcutaneously for local anesthesia. A triple lumen catheter was inserted using standard Seldinger technique under real time US guidance after careful evaluation of the best side to minimize risk of infection and complication related to insertion. The central line was sutured at 18 cm depth.    Post-procedure imaging:  Central line is in good position and no visible pneumothorax per my review.     Complications: No apparent complication.    Procedure performed by Dr Pitts on May 17, 2018

## 2018-05-17 NOTE — IP AVS SNAPSHOT
[facility] CARDIAC SPECIALTY CARE: [phone]                                              INTERAGENCY TRANSFER FORM - PHYSICIAN ORDERS   5/17/2018                   CHF Orders/Instructions for Nursing Home/TCU     CHF Orders and Instructions for Nursing Home/TCU  1.  Have the patient get a scale to put in their room and then use at home.  2.  Post a weight chart or calendar in room next to the scale.  3.  Ask patient to weigh themselves daily first thing when they get up in the morning, before breakfast, with only their night gown on and record on the chart/calendar (if able).  4.  Record NH/TCU admission weight.  5.  Record daily am weight, with same clothes every day. If patient is in a wheelchair, note weight of wheel chair.   6.  Provide patient CHF education booklet and review with patient and family.  7.  Vital signs twice daily and prn. Check oxygen sats prn dyspnea.  8.  Apply Oxygen 2 or 3 liters/min by nasal cannula prn O2 Sat < 90%.   9.  Notify NP/MD:   1. If HR <50 bpm, SBP <90mmHg, or O2 Sat < 90%.   2. If weight is up more than 2 lbs. in one day or 5 lbs. in one week from their admission weight OR if patient has signs or symptoms of CHF, such as worsening dyspnea or leg edema.  10.  ACE-wraps or support stockings (knee high) to bilateral lower extremities prn edema. On in am and off at HS.   11.  Diet: 2 gm sodium cardiac diet, with additional diet modifications if ordered elsewhere.  12.  Fluid restriction:  1500cc/day, if hospital discharge sodium < 134.  13.  Dietician: CHF education  14.  PT/OT to Evaluate and Treat for deconditioning and CHF.  12.  Activity as tolerated   13.  PT to notify RN if wheelchair equipment has been modified (change in weight should also be noted on the patients weight calendar).    Heart Failure Follow-up Appointments and Instructions for TCU Discharge   _____An appointment to enroll the patient into [facility] may already have been made (see section  " Your next appointments already scheduled ).  If there is a question about the appointment or you would like to speak with a [facility].O.R.E[facility] nurse, please call one of the following locations:     [facility]):                                                    [phone]    [facility] ([place]):                                                   [phone]    [facility] ([place]):                   [phone]    _____If no [facility].O.R.E[facility] appointment was made, please call one of the locations and schedule:    NP/PA appointment 14 days after hospital discharge if still in TCU    NP/PA appointment for 3-5 days after discharge from TCU    _____Have patient bring their med list and daily vitals/weight chart with them to appointment.    _____At discharge from the TCU give the patient the \"General Recommendations to Control Heart Failure When You Get Home  information for them to take home and their scale.  _____Upon discharge from the TCU reinforce the importance of home management of CHF including follow up in [facility].  What is the [facility] Clinic?  Cardiomyopathy  Optimization  Rehabilitation  Education  The [facility] is a heart failure specialty clinic within [facility].  It is an outpatient disease management program that is based on a phase-by-phase approach, which is tailored to each patient s individual needs.  The cardiologist, nurse practitioner, physician assistant and nurses provide an ongoing outpatient care and treatment plan that guides heart failure and cardiomyopathy patients from evaluation and education to stabilization. This team works with the current primary care doctor and cardiologist to help patients:    Avoid hospitalizations    Slow the progression of the disease    Improve length and quality of life    Know who and when to call if heart failure symptoms appear    Receive easy access to quality health care and " "advice    Better understand your condition and treatment    Decrease the tremendous cost burden of heart failure care    Detect future heart problems before they become life threatening         Hospital Admission Date: 2018  TARIK HERNANDEZ   : 1950  Sex: Female        Attending Provider: Summer Pitts MD     Allergies:  Crestor [Rosuvastatin], Hmg-coa-r Inhibitors, Lisinopril, Optison [Albumin Human], Penicillins    Infection:  None   Service:  HOSPITALIST    Ht:  1.575 m (5' 2\")   Wt:  45.5 kg (100 lb 3.2 oz)   Admission Wt:  46 kg (101 lb 6.6 oz)    BMI:  18.33 kg/m 2   BSA:  1.41 m 2            Patient PCP Information     Provider PCP Type    [facility] General      ED Clinical Impression     Diagnosis Description Comment Added By Time Added    Coronary artery disease involving native coronary artery of native heart with angina pectoris (H) [I25.119] Coronary artery disease involving native coronary artery of native heart with angina pectoris (H) [I25.119]  Rocio Nair MD 2018  9:15 AM    Constipation, unspecified constipation type [K59.00] Constipation, unspecified constipation type [K59.00]  Rocio Nair MD 2018  9:16 AM    Gastroesophageal reflux disease, esophagitis presence not specified [K21.9] Gastroesophageal reflux disease, esophagitis presence not specified [K21.9]  Rocio Nair MD 2018  9:17 AM    COPD exacerbation (H) [J44.1] COPD exacerbation (H) [J44.1]  Rocio Nair MD 2018  3:13 PM      Hospital Problems as of 2018              Priority Class Noted POA    Hypercapnic respiratory failure (H) Medium  2018 Yes      Non-Hospital Problems as of 2018              Priority Class Noted    Advanced directives, counseling/discussion Medium  2011    Urinary incontinence Medium  3/4/2012    Forgetfulness Medium  3/4/2012    Hyperlipidemia LDL goal <130 Medium  2012    Elevated blood " pressure reading without diagnosis of hypertension Medium  7/18/2013    CVA (cerebral vascular accident) (H) Medium  8/17/2013    History of stroke - right thalamus in 8/2013 and left cerebellar and right vermis in 3/2018 Medium  8/21/2013    Numbness and tingling Medium  8/21/2013    Tobacco use disorder Medium  3/10/2015    ACS (acute coronary syndrome) (H) Medium  8/28/2015    Ischemic cardiomyopathy - EF 25% in 2015 but 55% in 3/2017 and 45-50% on 3/18 Medium  9/8/2015    HTN, goal below 130/80 Medium  9/8/2015    Acute systolic congestive heart failure (H) Medium  9/8/2015    Lung nodule Medium  9/8/2015    GERD (gastroesophageal reflux disease) Medium  9/8/2015    ST elevation myocardial infarction involving left anterior descending (LAD) coronary artery (H) Medium  9/8/2015    Chronic bronchitis, unspecified chronic bronchitis type (H) Medium  2/9/2016    PAD (peripheral artery disease) (H) - moderate left common carotid stenosis, aortoiliac bypass, chronic left vertebral and right posterior cerebral stenoses Medium  2/12/2016    Cervical high risk HPV (human papillomavirus) test positive Medium  12/20/2016    Acute respiratory failure with hypoxia (H) Medium  6/14/2017    COPD exacerbation (H) Medium  6/14/2017    Coronary artery disease involving native coronary artery - STEMI with LAD and circ stents in 8/2015 Medium  6/14/2017    Elevated troponin Medium  6/14/2017    Pulmonary emphysema (H) Medium  3/22/2018    Vertigo Medium  3/23/2018    Other seizures (H) - possible Medium  3/23/2018    Acute CVA (cerebrovascular accident) - right paramedian superior vermis and left cerebellar hemisphere Medium  3/23/2018    Malnutrition, unspecified type (H) Medium  4/16/2018    Hyponatremia Medium  4/16/2018    Hypochloremia Medium  4/16/2018      Code Status History     Date Active Date Inactive Code Status Order ID Comments User Context    5/25/2018  9:21 AM  Full Code [id number]  Rocio Nair MD  Outpatient    5/17/2018  9:00 AM 5/25/2018  9:21 AM Full Code [id number]  Summer Pitts MD Inpatient    3/25/2018 10:34 AM 5/17/2018  9:00 AM Full Code [id number]  Deisy Laird MD Outpatient    3/23/2018  1:28 AM 3/25/2018 10:34 AM Full Code [id number]  Dereck Faulkner MD Inpatient    6/17/2017 10:10 AM 3/23/2018  1:28 AM Full Code [id number]  Gael Bass MD Outpatient    6/14/2017  8:39 PM 6/17/2017 10:10 AM Full Code [id number]  Dereck Faulkner MD Inpatient    3/12/2017  7:55 AM 6/14/2017  8:39 PM Full Code [id number]  Karime Mendoza MD Outpatient    9/5/2015  2:06 PM 3/12/2017  7:55 AM Full Code [id number]  Jesús Olmstead MD Outpatient    8/31/2015 10:15 AM 9/5/2015  2:06 PM Full Code [id number]  Carroll Patterson MD Inpatient         Medication Review      START taking        Dose / Directions Comments    aspirin 81 MG EC tablet   Used for:  Coronary artery disease involving native coronary artery of native heart with angina pectoris (H)        Dose:  81 mg   Take 1 tablet (81 mg) by mouth daily   Refills:  0        metoprolol tartrate 25 MG tablet   Commonly known as:  LOPRESSOR   Used for:  Coronary artery disease involving native coronary artery of native heart with angina pectoris (H)        Dose:  12.5 mg   Take 0.5 tablets (12.5 mg) by mouth 2 times daily   Quantity:  60 tablet   Refills:  0        nitroGLYcerin 0.4 MG sublingual tablet   Commonly known as:  NITROSTAT   Used for:  Coronary artery disease involving native coronary artery of native heart with angina pectoris (H)        For chest pain place 1 tablet under the tongue every 5 minutes for 3 doses. If symptoms persist 5 minutes after 1st dose call 911.   Quantity:  25 tablet   Refills:  0        pantoprazole 40 MG EC tablet   Commonly known as:  PROTONIX   Used for:  Gastroesophageal reflux disease, esophagitis presence not specified        Dose:  40 mg   Take 1 tablet (40 mg) by mouth every morning   Quantity:   30 tablet   Refills:  0        senna-docusate 8.6-50 MG per tablet   Commonly known as:  SENOKOT-S;PERICOLACE   Used for:  Constipation, unspecified constipation type        Dose:  1 tablet   Take 1 tablet by mouth 2 times daily as needed for constipation   Quantity:  100 tablet   Refills:  0          CONTINUE these medications which may have CHANGED, or have new prescriptions. If we are uncertain of the size of tablets/capsules you have at home, strength may be listed as something that might have changed.        Dose / Directions Comments    ipratropium - albuterol 0.5 mg/2.5 mg/3 mL 0.5-2.5 (3) MG/3ML neb solution   Commonly known as:  DUONEB   This may have changed:  See the new instructions.   Used for:  COPD exacerbation (H)        Dose:  1 vial   Take 1 vial (3 mLs) by nebulization every 4 hours as needed for shortness of breath / dyspnea or wheezing   Quantity:  270 mL   Refills:  3        predniSONE 10 MG tablet   Commonly known as:  DELTASONE   This may have changed:    - medication strength  - how much to take  - how to take this  - when to take this  - additional instructions   Used for:  COPD exacerbation (H)        Start taking on:  5/27/2018   Take 3 tabs daily for 4 days, then 2 tabs daily for 4 days, then 1 tab daily for 4 days, then stop   Quantity:  30 tablet   Refills:  0          CONTINUE these medications which have NOT CHANGED        Dose / Directions Comments    ACE/ARB/ARNI NOT PRESCRIBED (INTENTIONAL)   Used for:  Elevated blood pressure reading without diagnosis of hypertension, History of stroke        Please choose reason not prescribed, below   Refills:  0        acetaminophen 325 MG tablet   Commonly known as:  TYLENOL        Dose:  325 mg   Take 325 mg by mouth every 4 hours as needed for mild pain   Quantity:  100 tablet   Refills:  0        albuterol 108 (90 Base) MCG/ACT Inhaler   Commonly known as:  PROAIR HFA/PROVENTIL HFA/VENTOLIN HFA   Used for:  Chronic bronchitis, unspecified  chronic bronchitis type (H)        Dose:  2 puff   Inhale 2 puffs into the lungs every 6 hours as needed for shortness of breath / dyspnea   Quantity:  1 Inhaler   Refills:  11        atorvastatin 10 MG tablet   Commonly known as:  LIPITOR   Used for:  PAD (peripheral artery disease) (H)        Dose:  10 mg   Take 1 tablet (10 mg) by mouth At Bedtime   Quantity:  90 tablet   Refills:  3        BOOST   Used for:  History of stroke, Ischemic cardiomyopathy, Panlobular emphysema (H), Malaise, Loss of weight        Dose:  1 Bottle   Take 1 Bottle by mouth 3 times daily (with meals)   Quantity:  90 each   Refills:  0        budesonide-formoterol 160-4.5 MCG/ACT Inhaler   Commonly known as:  SYMBICORT   Used for:  Chronic bronchitis, unspecified chronic bronchitis type (H)        INHALE TWO PUFFS BY MOUTH TWICE A DAY   Quantity:  1 Inhaler   Refills:  5        clopidogrel 75 MG tablet   Commonly known as:  PLAVIX   Used for:  Acute CVA (cerebrovascular accident) (H)        Dose:  75 mg   Take 1 tablet (75 mg) by mouth daily   Quantity:  90 tablet   Refills:  3        COQ10 PO        Dose:  100 mg   Take 100 mg by mouth every 24 hours (at 16:00)   Refills:  0        montelukast 10 MG tablet   Commonly known as:  SINGULAIR   Used for:  Chronic bronchitis, unspecified chronic bronchitis type (H)        Dose:  10 mg   Take 1 tablet (10 mg) by mouth At Bedtime   Quantity:  30 tablet   Refills:  11        nicotine 7 MG/24HR 24 hr patch   Commonly known as:  NICODERM CQ   Used for:  Cigarette nicotine dependence without complication        Dose:  1 patch   Place 1 patch onto the skin every 24 hours   Quantity:  30 patch   Refills:  1        order for DME   Used for:  Pulmonary emphysema, unspecified emphysema type (H)        Equipment being ordered: Nebulizer   Quantity:  1 Device   Refills:  0        order for DME   Used for:  Chronic bronchitis, unspecified chronic bronchitis type (H)        Equipment being ordered: Nebulizer  machine   Quantity:  1 Device   Refills:  0        OSCAL 500/200 D-3 PO        Dose:  1 tablet   Take 1 tablet by mouth 2 times daily   Refills:  0        polyethylene glycol Packet   Commonly known as:  MIRALAX/GLYCOLAX        Dose:  1 packet   Take 1 packet by mouth daily as needed for constipation   Refills:  0        tiotropium 18 MCG capsule   Commonly known as:  SPIRIVA HANDIHALER   Used for:  Chronic bronchitis, unspecified chronic bronchitis type (H)        Dose:  18 mcg   Inhale 1 capsule (18 mcg) into the lungs daily   Quantity:  30 capsule   Refills:  3          STOP taking     metoprolol succinate 25 MG 24 hr tablet   Commonly known as:  TOPROL-XL                     Further instructions from your care team         Discharge Instructions for Coronary Angioplasty and Stenting  During your angioplasty, a doctor inserts a thin tube called a catheter into a blood vessel in your groin or wrist. The catheter is pushed through your blood vessel to a blocked area in one of your heart s arteries. The doctor inflates a tiny balloon at the tip of the catheter and stretches the blocked vessel so blood can flow freely. The balloon is then deflated and removed with the catheter. The doctor may also insert a metal mesh tube called a stent in the blocked vessel. The stent helps the vessel stay open. You may get several stents if you have blockages in more than one of your arteries.  Home care    Ask someone to drive you to your appointments for the next few days.    Rest for 2 to 3 days after the procedure. Most people are able to go back to normal activity within a few days.    Take your temperature and check your incision for signs of infection every day for a week. Signs of infection include redness, swelling, drainage, or warmth. It is normal to have a small bruise or bump where the catheter was inserted.    Take your medicines exactly as directed. Don t skip doses. It is important to take aspirin or other similar  medicines for as long as your doctor advises. If you were also prescribed clopidogrel, prasugrel, or ticagrelor, it is very important to take these medicines, as well. These medicines prevent clots that could cause a heart attack. If you have a problem with any of your medicines, call healthcare provider right away. Call your provider right away if you have extra bleeding, but go to the emergency room if the bleeding can't be controlled.    Unless told otherwise, drink plenty of fluids to help flush your body of the dye that was used during your angioplasty. Let your healthcare provider know if the color of your urine changes and doesn't return to normal color.    Eat a healthy diet that is low in fat, salt, and cholesterol. Ask your healthcare team for menus and other diet information.    Exercise according to your healthcare team's recommendation. Depending on your case, your team may recommend you start a cardiac rehabilitation program. Cardiac rehab is an exercise program in which trained healthcare staff monitor your progress and stress on your heart while you exercise. Ask how to enroll if your team recommends this program.    Don't swim or take a bath for 5 to 7 days. You may shower the day after the procedure. This keeps the incision site from getting wet and infected until the skin and artery can heal.  Follow-up care    Make a follow-up appointment as directed by our staff. Follow-up appointments are usually scheduled for 2 to 4 weeks after an angioplasty or coronary stent procedure.    Have a yearly checkup to make sure you are still doing well and not having any new symptoms.    Don't wait for a follow-up appointment if your medicines are not working or you are having heart-related symptoms.     When to call your healthcare provider  Call your healthcare provider right away if you have any of the following:    Chest pain or a return of the symptoms you had prior to the angioplasty    Constant or  increasing pain or numbness in your leg, or if your leg looks blue or feels cold    Fever above 100.4 F (38.0 C) or other signs of infection (redness, swelling, drainage, or warmth at the incision site of the leg or wrist)    Shortness of breath    Bleeding, bruising, or a large swelling where the catheter (tube) was inserted    Blood in your urine    Black or tarry stools    Feeling faint    Difficulty speaking or weakness in any muscle   Date Last Reviewed: 10/1/2016    1133-1608 [employer]. [address] [place] [address]. All rights reserved. This information is not intended as a substitute for professional medical care. Always follow your healthcare professional's instructions.          Summary of Visit     Reason for your hospital stay       Heart attack due to blockage of one of your stents. This was replaced. You were also treated for pneumonia and a COPD exacerbation             After Care     Activity - Up with nursing assistance           Advance Diet as Tolerated       Follow this diet upon discharge: Orders Placed This Encounter  *Dysphagia Diet Level 2 [facility] Altered Thin Liquids (water, ice chips, juice, milk gelatin, ice cream, etc)  *Magic shake 10-2 (RD); Between Meals       Daily weights       Call Provider for weight gain of more than 2 pounds per day or 5 pounds per week.       General info for SNF       Length of Stay Estimate: Short Term Care: Estimated # of Days <30  Condition at Discharge: Improving  Level of care:skilled   Rehabilitation Potential: Excellent  Admission H&P remains valid and up-to-date: Yes  Recent Chemotherapy: N/A  Use Nursing Home Standing Orders: Yes       Intake and output       Every shift       Mantoux instructions       Give two-step Mantoux (PPD) Per Facility Policy Yes             Procedures     Oxygen - Nasal cannula       2 Lpm by nasal cannula to keep O2 sats 90% or greater.             Referrals     Nutrition Services Adult IP Consult        Reason:  Non-severe malnutrition - on Magic cup BID       Occupational Therapy Adult Consult       Evaluate and treat as clinically indicated.    Reason:  Generalized weakness, deconditioning. NSTEMI       Physical Therapy Adult Consult       Evaluate and treat as clinically indicated.    Reason:  Generalized weakness, deconditioning. NSTEMI       Speech Language Path Adult Consult       Evaluate and treat as clinically indicated.    Reason:  Dysphagia             Follow-Up Appointment Instructions     Future Labs/Procedures    Follow Up and recommended labs and tests     Comments:    Follow up with transitional care physician.  No follow up labs or test are needed.      Follow-Up Appointment Instructions     Follow Up and recommended labs and tests       Follow up with transitional care physician.  No follow up labs or test are needed.             Statement of Approval     Ordered          05/26/18 0834  I have reviewed and agree with all the recommendations and orders detailed in this document.  EFFECTIVE NOW     Approved and electronically signed by:  Soren Robles MD               General Recommendations To Control Heart Failure When You Get Home (Give at DC from TCU)       Heart Failure Instructions for Patients and Families: Please read and check off each of these important instructions as you do them when you get home.          Weight and Symptoms       ___ Put a scale in your bathroom.    ___ Post a weight chart or calendar next to your scale.    ___ Weigh yourself everyday as soon as you get up in the morning (before breakfast).  You should only be wearing your pajamas.  Write your weight on the chart/calendar.  ___ Bring your weight chart/calendar with you to all appointments.  ___ Call your doctor or nurse practitioner if you gain 2 pounds (in 1 day) or 5 pounds in (1 week) from your goal  good  weight.  Your good weight is also called your  dry  weight.  Your doctor or nurse will tell you what  your good weight should be.    ___ Call your doctor or nurse practitioner if you have shortness of breath that gets worse over time, leg swelling or fatigue.       Medications and Diet       ___ Make sure to take your medication as prescribed.    ___ Bring a current list of your medication and all of your medicine bottles with you to all appointments.    ___ Limit fluids if you still have swelling or shortness of breath, or if your doctor tells you to do so.    ___ Eat less than 2000 mg of sodium (salt) every day. Read food labels, and do not add salt to meals. Remember, if you eat less salt you retain less fluid.  ___ Follow a heart healthy diet that is low in saturated fat.        Activity and Suggested Lifestyle Changes      ___ Stay active. Talk to your doctor about an exercise program that is safe for your heart.  ___ Stop smoking. Reduce alcohol use.      ___ Lose weight if you are overweight. Extra weight puts a lot of stress on the heart.                 Control for Leg Swelling     ___ Keep your legs elevated (raised) as needed for swelling. If swelling is uncomfortable or elevation doesn t help, ask your doctor about using ACE wraps or support stockings.        What is the [facility]?  Cardiomyopathy  Optimization  Rehabilitation  Education    The [facility] Clinic is a heart failure specialty clinic within [facility].  It is an outpatient disease management program that is based on a phase-by-phase approach, which is tailored to each patient s individual needs.  The cardiologist, nurse practitioner, physician assistant and nurses provide an ongoing outpatient care and treatment plan that guides heart failure and cardiomyopathy patients from evaluation and education to stabilization. This team works with your current primary care doctor and cardiologist to help you:    - Avoid hospitalizations  - Slow the progression of the disease  - Improve length and quality of life  - Know who and when to  call if heart failure symptoms appear  - Receive easy access to quality health care and advice  - Better understand your condition and treatment  - Decrease the tremendous cost burden of heart failure care  - Detect future heart problems before they become life threatening                                 Follow-up Appointments     ___ An appointment with the [facility] may already have been made for you (see section   Your next appointments already scheduled ).  If you have a question about your appointment, would like to speak with a C.O.R.E. nurse, or would like to become a [facility] patient, please call one of the following locations:     - [facility]):                                             [phone]  - [facility]):                                            [phone]  - [facility] ([place]):                 [phone]      ___ Your C.O.R.E. Clinic Team will continue to educate you on your heart failure and may adjust medications based on your vital signs, lab work, and how you are feeling.  Therefore, it is very important to bring the following to all C.O.R.E. appointments:    - An accurate list of your medications  - Your medicine bottles  - Your weight chart/calendar

## 2018-05-17 NOTE — IP AVS SNAPSHOT
"          [facility]: [phone]                                              INTERAGENCY TRANSFER FORM - LAB / IMAGING / EKG / EMG RESULTS   2018                    Hospital Admission Date: 2018  TARIK HERNANDEZ   : 1950  Sex: Female        Attending Provider: Summer Pitts MD     Allergies:  Crestor [Rosuvastatin], Hmg-coa-r Inhibitors, Lisinopril, Optison [Albumin Human], Penicillins    Infection:  None   Service:  HOSPITALIST    Ht:  1.575 m (5' 2\")   Wt:  45.5 kg (100 lb 3.2 oz)   Admission Wt:  46 kg (101 lb 6.6 oz)    BMI:  18.33 kg/m 2   BSA:  1.41 m 2            Patient PCP Information     Provider PCP Type    SPRING Banegas CNP General         Lab Results - 3 Days      Glucose by meter [[id number]]  Resulted: 18 0806, Result status: Final result    Ordering provider: Summer Pitts MD  18 0750 Resulting lab: POINT OF CARE TEST, GLUCOSE    Specimen Information    Type Source Collected On     18 0750          Components       Value Reference Range Flag Lab   Glucose 81 70 - 99 mg/dL  170            Magnesium (AM Draw) [[id number]]  Resulted: 18 0623, Result status: Final result    Ordering provider: Summer Pitts MD  18 0000 Resulting lab: [facility]    Specimen Information    Type Source Collected On   Blood  18 0555          Components       Value Reference Range Flag Lab   Magnesium 2.2 1.6 - 2.3 mg/dL  FrStHsLb            Magnesium (AM Draw) [[id number]]  Resulted: 18 2117, Result status: Final result    Ordering provider: Rocio Nair MD  18 2004 Resulting lab: [facility]    Specimen Information    Type Source Collected On   Blood  18 2050          Components       Value Reference Range Flag Lab   Magnesium 1.9 1.6 - 2.3 mg/dL  FrStHsLb            Glucose by meter [[id number]] (Abnormal)  Resulted: 18 1205, Result " status: Final result    Ordering provider: Summer Pitts MD  05/25/18 1148 Resulting lab: POINT OF CARE TEST, GLUCOSE    Specimen Information    Type Source Collected On     05/25/18 1148          Components       Value Reference Range Flag Lab   Glucose 125 70 - 99 mg/dL H 170            Glucose by meter [[id number]]  Resulted: 05/25/18 0905, Result status: Final result    Ordering provider: Summer Pitts MD  05/25/18 0853 Resulting lab: POINT OF CARE TEST, GLUCOSE    Specimen Information    Type Source Collected On     05/25/18 0853          Components       Value Reference Range Flag Lab   Glucose 87 70 - 99 mg/dL  170            Glucose by meter [[id number]] (Abnormal)  Resulted: 05/24/18 2249, Result status: Final result    Ordering provider: Summer Pitts MD  05/24/18 2235 Resulting lab: POINT OF CARE TEST, GLUCOSE    Specimen Information    Type Source Collected On     05/24/18 2235          Components       Value Reference Range Flag Lab   Glucose 110 70 - 99 mg/dL H 170            Glucose by meter [[id number]]  Resulted: 05/24/18 1916, Result status: Final result    Ordering provider: Summer Pitts MD  05/24/18 1904 Resulting lab: POINT OF CARE TEST, GLUCOSE    Specimen Information    Type Source Collected On     05/24/18 1904          Components       Value Reference Range Flag Lab   Glucose 77 70 - 99 mg/dL  170            Glucose by meter [[id number]] (Abnormal)  Resulted: 05/24/18 1206, Result status: Final result    Ordering provider: Summer Pitts MD  05/24/18 1154 Resulting lab: POINT OF CARE TEST, GLUCOSE    Specimen Information    Type Source Collected On     05/24/18 1154          Components       Value Reference Range Flag Lab   Glucose 156 70 - 99 mg/dL H 170            Glucose by meter [[id number]] (Abnormal)  Resulted: 05/24/18 1139, Result status: Final result    Ordering provider: Summer Pitts MD  05/24/18 1127 Resulting lab: POINT OF  CARE TEST, GLUCOSE    Specimen Information    Type Source Collected On     05/24/18 1127          Components       Value Reference Range Flag Lab   Glucose 193 70 - 99 mg/dL H 170            Glucose by meter [[id number]]  Resulted: 05/24/18 0813, Result status: Final result    Ordering provider: Summer Pitts MD  05/24/18 0757 Resulting lab: POINT OF CARE TEST, GLUCOSE    Specimen Information    Type Source Collected On     05/24/18 0757          Components       Value Reference Range Flag Lab   Glucose 93 70 - 99 mg/dL  170            Basic metabolic panel [[id number]] (Abnormal)  Resulted: 05/24/18 0542, Result status: Final result    Ordering provider: Max Noguera PA  05/24/18 0000 Resulting lab: [facility]    Specimen Information    Type Source Collected On   Blood  05/24/18 0515          Components       Value Reference Range Flag Lab   Sodium 142 133 - 144 mmol/L  FrStHsLb   Potassium 4.1 3.4 - 5.3 mmol/L  FrStHsLb   Chloride 102 94 - 109 mmol/L  FrStHsLb   Carbon Dioxide 37 20 - 32 mmol/L H FrStHsLb   Anion Gap 3 3 - 14 mmol/L  FrStHsLb   Glucose 128 70 - 99 mg/dL H FrStHsLb   Urea Nitrogen 19 7 - 30 mg/dL  FrStHsLb   Creatinine 0.43 0.52 - 1.04 mg/dL L FrStHsLb   GFR Estimate >90 >60 mL/min/1.7m2  FrStHsLb   Comment:  Non  GFR Calc   GFR Estimate If Black >90 >60 mL/min/1.7m2  FrStHsLb   Comment:  African American GFR Calc   Calcium 8.8 8.5 - 10.1 mg/dL  FrStHsLb            CBC with platelets [[id number]]  Resulted: 05/24/18 0528, Result status: Final result    Ordering provider: Max Noguera PA  05/24/18 0000 Resulting lab: [facility]    Specimen Information    Type Source Collected On   Blood  05/24/18 0515          Components       Value Reference Range Flag Lab   WBC 6.1 4.0 - 11.0 10e9/L  FrStHsLb   RBC Count 4.24 3.8 - 5.2 10e12/L  FrStHsLb   Hemoglobin 12.6 11.7 - 15.7 g/dL  FrStHsLb   Hematocrit 38.5 35.0 - 47.0 %   FrStHsLb   MCV 91 78 - 100 fl  FrStHsLb   MCH 29.7 26.5 - 33.0 pg  FrStHsLb   MCHC 32.7 31.5 - 36.5 g/dL  FrStHsLb   RDW 12.5 10.0 - 15.0 %  FrStHsLb   Platelet Count 176 150 - 450 10e9/L  FrStHsLb            Glucose by meter [[id number]] (Abnormal)  Resulted: 05/24/18 0405, Result status: Final result    Ordering provider: Summer Pitts MD  05/24/18 0353 Resulting lab: POINT OF CARE TEST, GLUCOSE    Specimen Information    Type Source Collected On     05/24/18 0353          Components       Value Reference Range Flag Lab   Glucose 125 70 - 99 mg/dL H 170            Glucose by meter [[id number]] (Abnormal)  Resulted: 05/24/18 0008, Result status: Final result    Ordering provider: Summer Pitts MD  05/23/18 2355 Resulting lab: POINT OF CARE TEST, GLUCOSE    Specimen Information    Type Source Collected On     05/23/18 2355          Components       Value Reference Range Flag Lab   Glucose 143 70 - 99 mg/dL H 170            Glucose by meter [[id number]] (Abnormal)  Resulted: 05/23/18 2040, Result status: Final result    Ordering provider: Summer Pitts MD  05/23/18 2028 Resulting lab: POINT OF CARE TEST, GLUCOSE    Specimen Information    Type Source Collected On     05/23/18 2028          Components       Value Reference Range Flag Lab   Glucose 143 70 - 99 mg/dL H 170            Glucose by meter [[id number]] (Abnormal)  Resulted: 05/23/18 1622, Result status: Final result    Ordering provider: Summer Pitts MD  05/23/18 1610 Resulting lab: POINT OF CARE TEST, GLUCOSE    Specimen Information    Type Source Collected On     05/23/18 1610          Components       Value Reference Range Flag Lab   Glucose 135 70 - 99 mg/dL H 170            Glucose by meter [[id number]] (Abnormal)  Resulted: 05/23/18 1203, Result status: Final result    Ordering provider: Summer Pitts MD  05/23/18 1152 Resulting lab: POINT OF CARE TEST, GLUCOSE    Specimen Information    Type Source  Collected On     05/23/18 1152          Components       Value Reference Range Flag Lab   Glucose 109 70 - 99 mg/dL H 170            Glucose by meter [[id number]] (Abnormal)  Resulted: 05/23/18 0749, Result status: Final result    Ordering provider: Summer Pitts MD  05/23/18 0738 Resulting lab: POINT OF CARE TEST, GLUCOSE    Specimen Information    Type Source Collected On     05/23/18 0738          Components       Value Reference Range Flag Lab   Glucose 124 70 - 99 mg/dL H 170            Basic metabolic panel [[id number]] (Abnormal)  Resulted: 05/23/18 0454, Result status: Final result    Ordering provider: Sarah Parker PA-C  05/23/18 0000 Resulting lab: [facility]    Specimen Information    Type Source Collected On   Blood  05/23/18 0430          Components       Value Reference Range Flag Lab   Sodium 141 133 - 144 mmol/L  FrStHsLb   Potassium 4.1 3.4 - 5.3 mmol/L  FrStHsLb   Chloride 101 94 - 109 mmol/L  FrStHsLb   Carbon Dioxide 34 20 - 32 mmol/L H FrStHsLb   Anion Gap 6 3 - 14 mmol/L  FrStHsLb   Glucose 120 70 - 99 mg/dL H FrStHsLb   Urea Nitrogen 17 7 - 30 mg/dL  FrStHsLb   Creatinine 0.48 0.52 - 1.04 mg/dL L FrStHsLb   GFR Estimate >90 >60 mL/min/1.7m2  FrStHsLb   Comment:  Non  GFR Calc   GFR Estimate If Black >90 >60 mL/min/1.7m2  FrStHsLb   Comment:  African American GFR Calc   Calcium 8.8 8.5 - 10.1 mg/dL  FrStHsLb            CBC with platelets [[id number]]  Resulted: 05/23/18 0441, Result status: Final result    Ordering provider: Sarah Parker PA-C  05/23/18 0000 Resulting lab: [facility]    Specimen Information    Type Source Collected On   Blood  05/23/18 0430          Components       Value Reference Range Flag Lab   WBC 4.8 4.0 - 11.0 10e9/L  FrStHsLb   RBC Count 4.40 3.8 - 5.2 10e12/L  FrStHsLb   Hemoglobin 12.9 11.7 - 15.7 g/dL  FrStHsLb   Hematocrit 40.3 35.0 - 47.0 %  FrStHsLb   MCV 92 78 - 100 fl  FrStHsLb   MCH 29.3  26.5 - 33.0 pg  FrStHsLb   MCHC 32.0 31.5 - 36.5 g/dL  FrStHsLb   RDW 12.6 10.0 - 15.0 %  FrStHsLb   Platelet Count 179 150 - 450 10e9/L  FrStHsLb            Glucose by meter [[id number]] (Abnormal)  Resulted: 05/23/18 0426, Result status: Final result    Ordering provider: Summer Pitts MD  05/23/18 0413 Resulting lab: POINT OF CARE TEST, GLUCOSE    Specimen Information    Type Source Collected On     05/23/18 0413          Components       Value Reference Range Flag Lab   Glucose 127 70 - 99 mg/dL H 170            Glucose by meter [[id number]] (Abnormal)  Resulted: 05/23/18 0100, Result status: Final result    Ordering provider: Summer Pitts MD  05/23/18 0048 Resulting lab: POINT OF CARE TEST, GLUCOSE    Specimen Information    Type Source Collected On     05/23/18 0048          Components       Value Reference Range Flag Lab   Glucose 119 70 - 99 mg/dL H 170            Glucose by meter [[id number]] (Abnormal)  Resulted: 05/22/18 2042, Result status: Final result    Ordering provider: Summer Pitts MD  05/22/18 2030 Resulting lab: POINT OF CARE TEST, GLUCOSE    Specimen Information    Type Source Collected On     05/22/18 2030          Components       Value Reference Range Flag Lab   Glucose 167 70 - 99 mg/dL H 170            Testing Performed By     Lab - Abbreviation Name Director Address Valid Date Range    14 - [facility] Unknown [address] [place] [address] 05/08/15 1057 - Present    170 - Unknown POINT OF CARE TEST, GLUCOSE Unknown Unknown 10/31/11 1114 - Present            Unresulted Labs (24h ago through future)    Start       Ordered    05/24/18 0600  Platelet count  (Pharmacological Prophylaxis- heparin (If CrCl less than 30 mL/min))  EVERY THREE DAYS,   Routine     Comments:  Repeat every 3 days while on VTE prophylaxis. If no result is listed, this lab has not been done the past 365 days. LATEST LAB RESULT: Platelet Count (10e9/L)       Date    "                  Value                 05/20/2018                                 Canceled, Test credited       05/20/2018               116 (L)          ----------      05/21/18 1421    05/20/18 0600  Platelet count  (Pharmacological Prophylaxis - heparin (If CrCl less than 30 mL/min))  EVERY THREE DAYS,   Routine     Comments:  If no result is listed, this lab has not been done the past 365 days. LATEST LAB RESULT: Platelet Count (10e9/L)       Date                     Value                 05/19/2018               128 (L)          ----------    05/20/18 0441    Unscheduled  Magnesium  (Magnesium Replacement - Adult - \"High\" - Replacement for all levels less than or equal to 2 mg/dL)  CONDITIONAL (SPECIFY),   Routine     Comments:  Obtain Magnesium Level for these conditions:  *IF no magnesium result within 24 hrs before initiation of order set, draw magnesium level with next lab collect.    *2 HOURS AFTER last magnesium replacement dose when magnesium replacement given for level less than 1.6  *Next morning after magnesium dose.     Repeat Magnesium Replacement if necessary.    05/17/18 1715    Unscheduled  Potassium  (Potassium Replacement - \"High\" - Replacement for all levels less than 4.1 mmol/L - UU,UR,UA,RH,SH,PH,WY )  CONDITIONAL (SPECIFY),   Routine     Comments:  Obtain Potassium Level for these conditions:  *IF no potassium result within 24 hrs before initiation of order set, draw potassium level with next lab collect.    *2 HOURS AFTER last IV potassium replacement dose and 4 hours after an oral replacement dose when potassium replacement given for level less than 3.4.  *Next morning after potassium dose.     Repeat Potassium Replacement if necessary.    05/19/18 0101    Unscheduled  Magnesium  (Magnesium Replacement - Adult - \"High\" - Replacement for all levels less than or equal to 2 mg/dL)  CONDITIONAL (SPECIFY),   Routine     Comments:  Obtain Magnesium Level for these conditions:  *IF no magnesium " result within 24 hrs before initiation of order set, draw magnesium level with next lab collect.    *2 HOURS AFTER last magnesium replacement dose when magnesium replacement given for level less than 1.6  *Next morning after magnesium dose.     Repeat Magnesium Replacement if necessary.    18 0101    Unscheduled  Blood gas blood with oxy  CONDITIONAL X 3,   Routine     Comments:  Release order if patient in respiratory distress and Notify Provider.    18 182    Unscheduled  Glucose  CONDITIONAL X 3,   Routine     Comments:  Release order if patient experiencing hyperglycemia or hypoglycemia symptoms and Notify Provider.    18 182    Unscheduled  Hemoglobin  CONDITIONAL X 3,   Routine     Comments:  Release order if patient experiencing active bleeding and/or hypotension and Notify Provider.    18 182         ECG/EMG Results      Echo limited [[id number]]  Resulted: 18, Result status: Edited Result - FINAL    Ordering provider: Summer Diane MD  18 0847 Resulted by: Chencho Hua MD    Performed: 1848 - 18 Resulting lab: RADIOLOGY RESULTS    Narrative:       [id number]  [facility]  [id number]  [id number]^ROXI^SUMMER^MUNA           [facility]  Echocardiography Laboratory  [address] [place] [address]        Name: TARIK HERNANDEZ  MRN: [id number]  : 1950  Study Date: 2018 09:14 AM  Age: 67 yrs  Gender: Female  Patient Location: [facility]  Reason For Study: Abn EKG  Ordering Physician: SUMMER DIANE  Referring Physician: Deisy Carter  Performed By: Beena Colón     BSA: 1.4 m2  Height: 62 in  Weight: 103 lb  HR: 86  BP: 111/70 mmHg  _____________________________________________________________________________  __        Procedure  Limited Portable Echo Adult.  _____________________________________________________________________________  __        Interpretation Summary     The visual ejection fraction is  estimated at 25-30%.  Mostly global LV dysfunction with the inferolateral wall moving best.  The right ventricular systolic function is normal.  Moderate valvular aortic stenosis.  The degree of aortic stenosis may be underestimated due to the patients  significant LV dysfunction.  Sinus rhythm was noted.  In comparison with the previous study the LVEF has dropped.  _____________________________________________________________________________  __        Left Ventricle  The visual ejection fraction is estimated at 25-30%. Left ventricular  diastolic function is indeterminate. Mostly global LV dysfunction with the  inferolateral wall moving best. The patient has a large inferolateral  papillary muscle noted on the present scan. This was seen on previous studies  but appears slightly larger on the present study. This may represent different  imaging technique but clinical correlation is recommended.     Right Ventricle  The right ventricular systolic function is normal. The RV is note well seen  but the TAPSE is 1.8 cm.     Atria  The left atrium is not well visualized. Right atrium not well visualized.  There is no atrial shunt seen.     Mitral Valve  The mitral valve leaflets appear thickened, but open well. There is trace  mitral regurgitation.        Tricuspid Valve  The tricuspid valve is not well visualized, but is grossly normal. There is  mild (1+) tricuspid regurgitation. The right ventricular systolic pressure is  approximated at 24.9 mmHg plus the right atrial pressure.     Aortic Valve  There is trace to mild aortic regurgitation. The mean AoV pressure gradient is  14.1 mmHg. Moderate valvular aortic stenosis. The degree of aortic stenosis  may be underestimated due to the patients significant LV dysfunction.     Pulmonic Valve  The pulmonic valve is not well visualized.     Vessels  The aortic root is not well visualized. The inferior vena cava is not dilated.  The IVC does not collapse with inspiration.      Pericardium  Trivial pericardial effusion. There are no echocardiographic indications of  cardiac tamponade.        Rhythm  Sinus rhythm was noted.  _____________________________________________________________________________  __  MMode/2D Measurements & Calculations  IVSd: 1.0 cm     LVIDd: 3.8 cm  LVIDs: 3.4 cm  LVPWd: 1.2 cm  FS: 9.9 %  LV mass(C)d: 135.8 grams  LV mass(C)dI: 94.2 grams/m2  LVOT diam: 2.3 cm  LVOT area: 4.2 cm2  RWT: 0.62  TAPSE: 1.8 cm        Doppler Measurements & Calculations  MV E max ryan: 44.9 cm/sec  MV A max ryan: 83.6 cm/sec  MV E/A: 0.54  MV dec time: 0.19 sec  Ao V2 max: 246.7 cm/sec  Ao max P.0 mmHg  Ao V2 mean: 174.9 cm/sec  Ao mean P.1 mmHg  Ao V2 VTI: 44.3 cm  YOLANDA(I,D): 1.4 cm2  YOLANDA(V,D): 1.4 cm2  LV V1 max P.8 mmHg  LV V1 max: 83.3 cm/sec  LV V1 VTI: 15.2 cm  SV(LVOT): 63.7 ml  SI(LVOT): 44.2 ml/m2  TR max ryan: 249.3 cm/sec  TR max P.9 mmHg  AV Ryan Ratio (DI): 0.34  YOLANDA Index (cm2/m2): 1.00     E/E' av.0  Lateral E/e': 11.8  Medial E/e': 12.3           _____________________________________________________________________________  __           Report approved by: Chencho Hope 2018 10:59 AM       1    Type Source Collected On     18 0914          View Image (below)              Encounter-Level Documents:     There are no encounter-level documents.      Order-Level Documents:     There are no order-level documents.

## 2018-05-17 NOTE — IP AVS SNAPSHOT
` ` Patient Information     Patient Name Sex     Preeti Ford ([id number]) Female 1950       Room Bed    0259 0259-01      Patient Demographics     Address Phone E-mail Address    [address] [place] [id number] [phone] (Home)  [phone] (Mobile) [email or web address]      Patient Ethnicity & Race     Ethnic Group Patient Race    American White      Emergency Contact(s)     Name Relation Home Work Mobile    Yoseph Ford (bud) Spouse [phone] none [phone]    Yfn Nunez  Son   [phone]    Yamilet Lima Relative [phone] none [phone]      Documents on File        Status Date Received Description       Documents for the Patient    Privacy Notice - [place] Received 07/07/10     Face Sheet Received () 07/07/10     Waiver - Payment Received 07/07/10     Insurance Card  ()      External Medication Information Consent Accepted () 11     Patient ID Received () 14 DL - 10/15    Consent for Services - Hospital/Clinic Received () 11     Other Received 11 bcbs cob    Consent for Services - Hospital/Clinic Received () 06/10/11     Other Received 10/18/11 BCBS COB    HIM LEVON Authorization   Disability Determination    HIM LEVON Authorization   [facility]    Insurance Card Received () 12     Other Received 12 bcbs cob    External Medication Information Consent Accepted () 12     Consent for Services - Hospital/Clinic Received () 12     HIM LEVON Authorization - File Only   AUTHORIZATION TO DISCLOSE PHI [facility] PROVIDERS CARE EVERYWHERE - 12    Consent for EHR Access  13 Copied from existing Consent for services - C/HOD collected on 2012    [facility] Specified Other       Insurance Card Received 07/15/13 medicare    External Medication Information Consent Accepted 07/15/13     Consent for Services - Hospital/Clinic  Received () 13     Insurance Card Received 13 medicare n rx cards    HIM LEVON Authorization  10/02/13 [facility] NEUROLOGY 10/02/13    [facility] LEVON Authorization  10/03/13 [facility] NEUROLOGY 10/03/13    [facility] LEVON Authorization - File Only  10/04/13 [facility] NEUROLOGY - 10/3/2013    Insurance Card Received () 14 medica    Consent for Services - Hospital/Clinic Received () 10/07/14     Insurance Card Received 08/26/15 [employer] -     Consent for Services - Hospital/Clinic Received () 10/09/15     Consent for Services/Privacy Notice - Hospital/Clinic Received () 16     Consent to Communicate  16 AUTHORIZATION TO DISCUSS PROTECTED HEALTH INFORMATION    Insurance Card Received 10/06/16 medica      Insurance Card Received 10/06/16 medicare    Consent for Services/Privacy Notice - Hospital/Clinic Received () 17     Business/Insurance/Care Coordination/Health Form - Patient  17 [facility] DENTAL-PREMEDICATION VERIFICATION-17    Care Everywhere Prospective Auth Received 17     Insurance Card Received 18 [employer]    Patient ID Received 01/02/18 10/24/19    Consent for Services/Privacy Notice - Hospital/Clinic Received 18     Consent for Services - Hospital and Clinic Received 18     HIE Auth Received 18        Documents for the Encounter    CMS IM for Patient Signature Received 18     Cardiac Cath - HIM Scan   cardiac cath      Admission Information     Attending Provider Admitting Provider Admission Type Admission Date/Time    Summer Pitts MD Kesler, Sarah Margaret, MD Urgent 18  0816    Discharge Date Hospital Service Auth/Cert Status Service Area     Hospitalist [facility] SERVICES    Unit Room/Bed Admission Status       SH CARDIAC SPEC CARE 0259/0259-01 Admission (Confirmed)       Admission     Complaint    respiratory failure COPD HIGH  TROP, Hypercapnic respiratory failure (H), RESPIRATORY FAILURE/COPD, RESPIRATORY FAILURE/COPD      Hospital Account     Name Acct ID Class Status Primary Coverage    Preeti Ford [id number] Inpatient Open MEDICARE - MEDICARE FOR HB SUPPLEMENT            Guarantor Account (for Hospital Account #[id number])     Name Relation to Pt Service Area Active? Acct Type    Preeti Ford  FCS Yes Personal/Family    Address Phone          [id number] [place] [address] [phone](H)              Coverage Information (for Hospital Account #[id number])     1. MEDICARE/MEDICARE FOR HB SUPPLEMENT     F/O Payor/Plan Precert #    MEDICARE/MEDICARE FOR HB SUPPLEMENT     Subscriber Subscriber #    Preeti Ford [id number]    Address Phone    ATTN CLAIMS  [address]  [place] [address] [phone]          2. MEDICA/MEDICA PRIME SOLUTION     F/O Payor/Plan Precert #    MEDICA/MEDICA PRIME SOLUTION     Subscriber Subscriber #    Preeti Ford [id number]    Address Phone    [address] [address]  [place] [address] [phone]

## 2018-05-18 ENCOUNTER — APPOINTMENT (OUTPATIENT)
Dept: GENERAL RADIOLOGY | Facility: CLINIC | Age: 68
DRG: 246 | End: 2018-05-18
Attending: INTERNAL MEDICINE
Payer: MEDICARE

## 2018-05-18 ENCOUNTER — APPOINTMENT (OUTPATIENT)
Dept: CARDIOLOGY | Facility: CLINIC | Age: 68
DRG: 246 | End: 2018-05-18
Attending: INTERNAL MEDICINE
Payer: MEDICARE

## 2018-05-18 ENCOUNTER — ANESTHESIA EVENT (OUTPATIENT)
Dept: SURGERY | Facility: CLINIC | Age: 68
DRG: 246 | End: 2018-05-18
Payer: MEDICARE

## 2018-05-18 ENCOUNTER — ANESTHESIA (OUTPATIENT)
Dept: SURGERY | Facility: CLINIC | Age: 68
DRG: 246 | End: 2018-05-18
Payer: MEDICARE

## 2018-05-18 LAB
BUN SERPL-MCNC: 15 MG/DL (ref 7–30)
CALCIUM SERPL-MCNC: 7.9 MG/DL (ref 8.5–10.1)
CHLORIDE SERPL-SCNC: 102 MMOL/L (ref 94–109)
CHOLEST SERPL-MCNC: 165 MG/DL
CO2 SERPL-SCNC: 26 MMOL/L (ref 20–32)
CREAT SERPL-MCNC: 0.7 MG/DL (ref 0.52–1.04)
GFR SERPL CREATININE-BSD FRML MDRD: 84 ML/MIN/1.7M2
GLUCOSE BLDC GLUCOMTR-MCNC: 123 MG/DL (ref 70–99)
GLUCOSE BLDC GLUCOMTR-MCNC: 124 MG/DL (ref 70–99)
GLUCOSE BLDC GLUCOMTR-MCNC: 127 MG/DL (ref 70–99)
GLUCOSE BLDC GLUCOMTR-MCNC: 132 MG/DL (ref 70–99)
GLUCOSE BLDC GLUCOMTR-MCNC: 139 MG/DL (ref 70–99)
GLUCOSE SERPL-MCNC: 110 MG/DL (ref 70–99)
HDLC SERPL-MCNC: 53 MG/DL
KCT BLD-ACNC: 176 SEC (ref 75–150)
KCT BLD-ACNC: 310 SEC (ref 75–150)
LACTATE BLD-SCNC: 1.3 MMOL/L (ref 0.7–2)
LDLC SERPL CALC-MCNC: 91 MG/DL
LMWH PPP CHRO-ACNC: 0.28 IU/ML
MAGNESIUM SERPL-MCNC: 2.2 MG/DL (ref 1.6–2.3)
NONHDLC SERPL-MCNC: 112 MG/DL
POTASSIUM SERPL-SCNC: 3.8 MMOL/L (ref 3.4–5.3)
SODIUM SERPL-SCNC: 139 MMOL/L (ref 133–144)
TRIGL SERPL-MCNC: 107 MG/DL
TROPONIN I SERPL-MCNC: 0.51 UG/L (ref 0–0.04)

## 2018-05-18 PROCEDURE — 25000128 H RX IP 250 OP 636: Performed by: INTERNAL MEDICINE

## 2018-05-18 PROCEDURE — 99232 SBSQ HOSP IP/OBS MODERATE 35: CPT | Mod: 25 | Performed by: INTERNAL MEDICINE

## 2018-05-18 PROCEDURE — 71045 X-RAY EXAM CHEST 1 VIEW: CPT

## 2018-05-18 PROCEDURE — 94003 VENT MGMT INPAT SUBQ DAY: CPT

## 2018-05-18 PROCEDURE — 027034Z DILATION OF CORONARY ARTERY, ONE ARTERY WITH DRUG-ELUTING INTRALUMINAL DEVICE, PERCUTANEOUS APPROACH: ICD-10-PCS | Performed by: INTERNAL MEDICINE

## 2018-05-18 PROCEDURE — 25000125 ZZHC RX 250: Performed by: INTERNAL MEDICINE

## 2018-05-18 PROCEDURE — 27210845 ZZH DEVICE INFLATION CR5

## 2018-05-18 PROCEDURE — B2111ZZ FLUOROSCOPY OF MULTIPLE CORONARY ARTERIES USING LOW OSMOLAR CONTRAST: ICD-10-PCS | Performed by: INTERNAL MEDICINE

## 2018-05-18 PROCEDURE — 93571 IV DOP VEL&/PRESS C FLO 1ST: CPT | Mod: 26 | Performed by: INTERNAL MEDICINE

## 2018-05-18 PROCEDURE — 27210787 ZZH MANIFOLD CR2

## 2018-05-18 PROCEDURE — C1887 CATHETER, GUIDING: HCPCS

## 2018-05-18 PROCEDURE — 27210856 ZZH ACCESS HEART CATH CR2

## 2018-05-18 PROCEDURE — 4A033BC MEASUREMENT OF ARTERIAL PRESSURE, CORONARY, PERCUTANEOUS APPROACH: ICD-10-PCS | Performed by: INTERNAL MEDICINE

## 2018-05-18 PROCEDURE — 94640 AIRWAY INHALATION TREATMENT: CPT | Mod: 76

## 2018-05-18 PROCEDURE — C1769 GUIDE WIRE: HCPCS

## 2018-05-18 PROCEDURE — 92928 PRQ TCAT PLMT NTRAC ST 1 LES: CPT | Mod: LC | Performed by: INTERNAL MEDICINE

## 2018-05-18 PROCEDURE — 25000128 H RX IP 250 OP 636: Performed by: NURSE ANESTHETIST, CERTIFIED REGISTERED

## 2018-05-18 PROCEDURE — 37000009 ZZH ANESTHESIA TECHNICAL FEE, EACH ADDTL 15 MIN

## 2018-05-18 PROCEDURE — 37000008 ZZH ANESTHESIA TECHNICAL FEE, 1ST 30 MIN

## 2018-05-18 PROCEDURE — 94640 AIRWAY INHALATION TREATMENT: CPT

## 2018-05-18 PROCEDURE — 27210946 ZZH KIT HC TOTES DISP CR8

## 2018-05-18 PROCEDURE — 84484 ASSAY OF TROPONIN QUANT: CPT | Performed by: INTERNAL MEDICINE

## 2018-05-18 PROCEDURE — 25000125 ZZHC RX 250: Performed by: NURSE ANESTHETIST, CERTIFIED REGISTERED

## 2018-05-18 PROCEDURE — C1874 STENT, COATED/COV W/DEL SYS: HCPCS

## 2018-05-18 PROCEDURE — 93005 ELECTROCARDIOGRAM TRACING: CPT

## 2018-05-18 PROCEDURE — 25000128 H RX IP 250 OP 636

## 2018-05-18 PROCEDURE — 4A023N7 MEASUREMENT OF CARDIAC SAMPLING AND PRESSURE, LEFT HEART, PERCUTANEOUS APPROACH: ICD-10-PCS | Performed by: INTERNAL MEDICINE

## 2018-05-18 PROCEDURE — 25000132 ZZH RX MED GY IP 250 OP 250 PS 637: Mod: GY | Performed by: INTERNAL MEDICINE

## 2018-05-18 PROCEDURE — 94660 CPAP INITIATION&MGMT: CPT

## 2018-05-18 PROCEDURE — A9270 NON-COVERED ITEM OR SERVICE: HCPCS | Mod: GY | Performed by: INTERNAL MEDICINE

## 2018-05-18 PROCEDURE — 85520 HEPARIN ASSAY: CPT | Performed by: INTERNAL MEDICINE

## 2018-05-18 PROCEDURE — 20000003 ZZH R&B ICU

## 2018-05-18 PROCEDURE — C1725 CATH, TRANSLUMIN NON-LASER: HCPCS

## 2018-05-18 PROCEDURE — 83605 ASSAY OF LACTIC ACID: CPT | Performed by: INTERNAL MEDICINE

## 2018-05-18 PROCEDURE — 25000566 ZZH SEVOFLURANE, EA 15 MIN

## 2018-05-18 PROCEDURE — 99291 CRITICAL CARE FIRST HOUR: CPT | Performed by: INTERNAL MEDICINE

## 2018-05-18 PROCEDURE — 27211089 ZZH KIT ACIST INJECTOR CR3

## 2018-05-18 PROCEDURE — 40000008 ZZH STATISTIC AIRWAY CARE

## 2018-05-18 PROCEDURE — 80061 LIPID PANEL: CPT | Performed by: INTERNAL MEDICINE

## 2018-05-18 PROCEDURE — 00000146 ZZHCL STATISTIC GLUCOSE BY METER IP

## 2018-05-18 PROCEDURE — 93571 IV DOP VEL&/PRESS C FLO 1ST: CPT | Mod: 52

## 2018-05-18 PROCEDURE — 93458 L HRT ARTERY/VENTRICLE ANGIO: CPT | Mod: 26 | Performed by: INTERNAL MEDICINE

## 2018-05-18 PROCEDURE — 93458 L HRT ARTERY/VENTRICLE ANGIO: CPT | Mod: XU

## 2018-05-18 PROCEDURE — 40000275 ZZH STATISTIC RCP TIME EA 10 MIN

## 2018-05-18 PROCEDURE — 85347 COAGULATION TIME ACTIVATED: CPT

## 2018-05-18 PROCEDURE — C9600 PERC DRUG-EL COR STENT SING: HCPCS | Mod: LC

## 2018-05-18 PROCEDURE — B2151ZZ FLUOROSCOPY OF LEFT HEART USING LOW OSMOLAR CONTRAST: ICD-10-PCS | Performed by: INTERNAL MEDICINE

## 2018-05-18 PROCEDURE — 83735 ASSAY OF MAGNESIUM: CPT | Performed by: INTERNAL MEDICINE

## 2018-05-18 PROCEDURE — 93010 ELECTROCARDIOGRAM REPORT: CPT | Mod: 76 | Performed by: INTERNAL MEDICINE

## 2018-05-18 PROCEDURE — 80048 BASIC METABOLIC PNL TOTAL CA: CPT | Performed by: INTERNAL MEDICINE

## 2018-05-18 RX ORDER — LORAZEPAM 0.5 MG/1
0.5 TABLET ORAL
Status: DISCONTINUED | OUTPATIENT
Start: 2018-05-18 | End: 2018-05-18

## 2018-05-18 RX ORDER — EPINEPHRINE 1 MG/ML
0.3 INJECTION, SOLUTION, CONCENTRATE INTRAVENOUS
Status: DISCONTINUED | OUTPATIENT
Start: 2018-05-18 | End: 2018-05-18 | Stop reason: HOSPADM

## 2018-05-18 RX ORDER — NITROGLYCERIN 0.4 MG/1
0.4 TABLET SUBLINGUAL EVERY 5 MIN PRN
Status: DISCONTINUED | OUTPATIENT
Start: 2018-05-18 | End: 2018-05-23

## 2018-05-18 RX ORDER — LIDOCAINE HYDROCHLORIDE 10 MG/ML
30 INJECTION, SOLUTION EPIDURAL; INFILTRATION; INTRACAUDAL; PERINEURAL
Status: DISCONTINUED | OUTPATIENT
Start: 2018-05-18 | End: 2018-05-18 | Stop reason: HOSPADM

## 2018-05-18 RX ORDER — DOPAMINE HYDROCHLORIDE 160 MG/100ML
2-20 INJECTION, SOLUTION INTRAVENOUS CONTINUOUS PRN
Status: DISCONTINUED | OUTPATIENT
Start: 2018-05-18 | End: 2018-05-18 | Stop reason: HOSPADM

## 2018-05-18 RX ORDER — FENTANYL CITRATE 50 UG/ML
25-50 INJECTION, SOLUTION INTRAMUSCULAR; INTRAVENOUS
Status: ACTIVE | OUTPATIENT
Start: 2018-05-18 | End: 2018-05-18

## 2018-05-18 RX ORDER — ACETAMINOPHEN 325 MG/1
325-650 TABLET ORAL EVERY 4 HOURS PRN
Status: DISCONTINUED | OUTPATIENT
Start: 2018-05-18 | End: 2018-05-26 | Stop reason: HOSPADM

## 2018-05-18 RX ORDER — NITROGLYCERIN 5 MG/ML
100-500 VIAL (ML) INTRAVENOUS
Status: DISCONTINUED | OUTPATIENT
Start: 2018-05-18 | End: 2018-05-18 | Stop reason: HOSPADM

## 2018-05-18 RX ORDER — PRASUGREL 10 MG/1
10-60 TABLET, FILM COATED ORAL
Status: DISCONTINUED | OUTPATIENT
Start: 2018-05-18 | End: 2018-05-18 | Stop reason: HOSPADM

## 2018-05-18 RX ORDER — LIDOCAINE 40 MG/G
CREAM TOPICAL
Status: DISCONTINUED | OUTPATIENT
Start: 2018-05-18 | End: 2018-05-18 | Stop reason: HOSPADM

## 2018-05-18 RX ORDER — ASPIRIN 81 MG/1
81 TABLET ORAL DAILY
Status: DISCONTINUED | OUTPATIENT
Start: 2018-05-19 | End: 2018-05-26 | Stop reason: HOSPADM

## 2018-05-18 RX ORDER — ATROPINE SULFATE 0.1 MG/ML
0.5 INJECTION INTRAVENOUS EVERY 5 MIN PRN
Status: ACTIVE | OUTPATIENT
Start: 2018-05-18 | End: 2018-05-18

## 2018-05-18 RX ORDER — CLOPIDOGREL 300 MG/1
300-600 TABLET, FILM COATED ORAL
Status: DISCONTINUED | OUTPATIENT
Start: 2018-05-18 | End: 2018-05-18 | Stop reason: HOSPADM

## 2018-05-18 RX ORDER — SODIUM NITROPRUSSIDE 25 MG/ML
100-200 INJECTION INTRAVENOUS
Status: DISCONTINUED | OUTPATIENT
Start: 2018-05-18 | End: 2018-05-18 | Stop reason: HOSPADM

## 2018-05-18 RX ORDER — METOPROLOL TARTRATE 1 MG/ML
5 INJECTION, SOLUTION INTRAVENOUS EVERY 5 MIN PRN
Status: DISCONTINUED | OUTPATIENT
Start: 2018-05-18 | End: 2018-05-18 | Stop reason: HOSPADM

## 2018-05-18 RX ORDER — ASPIRIN 325 MG
325 TABLET ORAL
Status: DISCONTINUED | OUTPATIENT
Start: 2018-05-18 | End: 2018-05-18 | Stop reason: HOSPADM

## 2018-05-18 RX ORDER — NITROGLYCERIN 5 MG/ML
100-200 VIAL (ML) INTRAVENOUS
Status: DISCONTINUED | OUTPATIENT
Start: 2018-05-18 | End: 2018-05-18 | Stop reason: HOSPADM

## 2018-05-18 RX ORDER — PROTAMINE SULFATE 10 MG/ML
25-100 INJECTION, SOLUTION INTRAVENOUS EVERY 5 MIN PRN
Status: DISCONTINUED | OUTPATIENT
Start: 2018-05-18 | End: 2018-05-18 | Stop reason: HOSPADM

## 2018-05-18 RX ORDER — POTASSIUM CHLORIDE 1500 MG/1
20 TABLET, EXTENDED RELEASE ORAL
Status: DISCONTINUED | OUTPATIENT
Start: 2018-05-18 | End: 2018-05-18

## 2018-05-18 RX ORDER — MORPHINE SULFATE 2 MG/ML
1-2 INJECTION, SOLUTION INTRAMUSCULAR; INTRAVENOUS EVERY 5 MIN PRN
Status: DISCONTINUED | OUTPATIENT
Start: 2018-05-18 | End: 2018-05-18 | Stop reason: HOSPADM

## 2018-05-18 RX ORDER — LIDOCAINE HYDROCHLORIDE 10 MG/ML
1-10 INJECTION, SOLUTION EPIDURAL; INFILTRATION; INTRACAUDAL; PERINEURAL
Status: COMPLETED | OUTPATIENT
Start: 2018-05-18 | End: 2018-05-18

## 2018-05-18 RX ORDER — LORAZEPAM 2 MG/ML
.5-2 INJECTION INTRAMUSCULAR EVERY 4 HOURS PRN
Status: DISCONTINUED | OUTPATIENT
Start: 2018-05-18 | End: 2018-05-18 | Stop reason: HOSPADM

## 2018-05-18 RX ORDER — CLOPIDOGREL BISULFATE 75 MG/1
75 TABLET ORAL
Status: DISCONTINUED | OUTPATIENT
Start: 2018-05-18 | End: 2018-05-18 | Stop reason: HOSPADM

## 2018-05-18 RX ORDER — ONDANSETRON 2 MG/ML
4 INJECTION INTRAMUSCULAR; INTRAVENOUS EVERY 4 HOURS PRN
Status: DISCONTINUED | OUTPATIENT
Start: 2018-05-18 | End: 2018-05-18 | Stop reason: HOSPADM

## 2018-05-18 RX ORDER — SODIUM CHLORIDE 9 MG/ML
INJECTION, SOLUTION INTRAVENOUS CONTINUOUS PRN
Status: DISCONTINUED | OUTPATIENT
Start: 2018-05-18 | End: 2018-05-18

## 2018-05-18 RX ORDER — NALOXONE HYDROCHLORIDE 0.4 MG/ML
.1-.4 INJECTION, SOLUTION INTRAMUSCULAR; INTRAVENOUS; SUBCUTANEOUS
Status: DISCONTINUED | OUTPATIENT
Start: 2018-05-18 | End: 2018-05-18

## 2018-05-18 RX ORDER — PROTAMINE SULFATE 10 MG/ML
1-5 INJECTION, SOLUTION INTRAVENOUS
Status: DISCONTINUED | OUTPATIENT
Start: 2018-05-18 | End: 2018-05-18 | Stop reason: HOSPADM

## 2018-05-18 RX ORDER — HALOPERIDOL 5 MG/ML
5 INJECTION INTRAMUSCULAR ONCE
Status: COMPLETED | OUTPATIENT
Start: 2018-05-18 | End: 2018-05-18

## 2018-05-18 RX ORDER — LORAZEPAM 2 MG/ML
0.5 INJECTION INTRAMUSCULAR
Status: DISCONTINUED | OUTPATIENT
Start: 2018-05-18 | End: 2018-05-18

## 2018-05-18 RX ORDER — NIFEDIPINE 10 MG/1
10 CAPSULE ORAL
Status: DISCONTINUED | OUTPATIENT
Start: 2018-05-18 | End: 2018-05-18 | Stop reason: HOSPADM

## 2018-05-18 RX ORDER — PROPOFOL 10 MG/ML
INJECTION, EMULSION INTRAVENOUS CONTINUOUS PRN
Status: DISCONTINUED | OUTPATIENT
Start: 2018-05-18 | End: 2018-05-18

## 2018-05-18 RX ORDER — HYDROCODONE BITARTRATE AND ACETAMINOPHEN 5; 325 MG/1; MG/1
1-2 TABLET ORAL EVERY 4 HOURS PRN
Status: DISCONTINUED | OUTPATIENT
Start: 2018-05-18 | End: 2018-05-26 | Stop reason: HOSPADM

## 2018-05-18 RX ORDER — POTASSIUM CHLORIDE 29.8 MG/ML
20 INJECTION INTRAVENOUS
Status: DISCONTINUED | OUTPATIENT
Start: 2018-05-18 | End: 2018-05-18 | Stop reason: HOSPADM

## 2018-05-18 RX ORDER — LORAZEPAM 2 MG/ML
0.5 INJECTION INTRAMUSCULAR
Status: DISCONTINUED | OUTPATIENT
Start: 2018-05-18 | End: 2018-05-18 | Stop reason: HOSPADM

## 2018-05-18 RX ORDER — ASPIRIN 81 MG/1
81-324 TABLET, CHEWABLE ORAL
Status: DISCONTINUED | OUTPATIENT
Start: 2018-05-18 | End: 2018-05-18 | Stop reason: HOSPADM

## 2018-05-18 RX ORDER — DEXTROSE MONOHYDRATE 25 G/50ML
12.5-5 INJECTION, SOLUTION INTRAVENOUS EVERY 30 MIN PRN
Status: DISCONTINUED | OUTPATIENT
Start: 2018-05-18 | End: 2018-05-18 | Stop reason: HOSPADM

## 2018-05-18 RX ORDER — FUROSEMIDE 10 MG/ML
40 INJECTION INTRAMUSCULAR; INTRAVENOUS ONCE
Status: COMPLETED | OUTPATIENT
Start: 2018-05-19 | End: 2018-05-19

## 2018-05-18 RX ORDER — METHYLPREDNISOLONE SODIUM SUCCINATE 125 MG/2ML
125 INJECTION, POWDER, LYOPHILIZED, FOR SOLUTION INTRAMUSCULAR; INTRAVENOUS
Status: DISCONTINUED | OUTPATIENT
Start: 2018-05-18 | End: 2018-05-18 | Stop reason: HOSPADM

## 2018-05-18 RX ORDER — DOBUTAMINE HYDROCHLORIDE 200 MG/100ML
2-20 INJECTION INTRAVENOUS CONTINUOUS PRN
Status: DISCONTINUED | OUTPATIENT
Start: 2018-05-18 | End: 2018-05-18 | Stop reason: HOSPADM

## 2018-05-18 RX ORDER — PHENYLEPHRINE HCL IN 0.9% NACL 1 MG/10 ML
20-100 SYRINGE (ML) INTRAVENOUS
Status: DISCONTINUED | OUTPATIENT
Start: 2018-05-18 | End: 2018-05-18 | Stop reason: HOSPADM

## 2018-05-18 RX ORDER — ADENOSINE 3 MG/ML
12-12000 INJECTION, SOLUTION INTRAVENOUS
Status: DISCONTINUED | OUTPATIENT
Start: 2018-05-18 | End: 2018-05-18 | Stop reason: HOSPADM

## 2018-05-18 RX ORDER — NICARDIPINE HYDROCHLORIDE 2.5 MG/ML
100 INJECTION INTRAVENOUS
Status: DISCONTINUED | OUTPATIENT
Start: 2018-05-18 | End: 2018-05-18 | Stop reason: HOSPADM

## 2018-05-18 RX ORDER — SODIUM CHLORIDE 9 MG/ML
INJECTION, SOLUTION INTRAVENOUS CONTINUOUS
Status: DISCONTINUED | OUTPATIENT
Start: 2018-05-18 | End: 2018-05-18

## 2018-05-18 RX ORDER — NALOXONE HYDROCHLORIDE 0.4 MG/ML
0.4 INJECTION, SOLUTION INTRAMUSCULAR; INTRAVENOUS; SUBCUTANEOUS EVERY 5 MIN PRN
Status: DISCONTINUED | OUTPATIENT
Start: 2018-05-18 | End: 2018-05-18 | Stop reason: HOSPADM

## 2018-05-18 RX ORDER — NITROGLYCERIN 20 MG/100ML
.07-2 INJECTION INTRAVENOUS CONTINUOUS PRN
Status: DISCONTINUED | OUTPATIENT
Start: 2018-05-18 | End: 2018-05-18 | Stop reason: HOSPADM

## 2018-05-18 RX ORDER — SODIUM CHLORIDE, SODIUM LACTATE, POTASSIUM CHLORIDE, CALCIUM CHLORIDE 600; 310; 30; 20 MG/100ML; MG/100ML; MG/100ML; MG/100ML
INJECTION, SOLUTION INTRAVENOUS CONTINUOUS PRN
Status: DISCONTINUED | OUTPATIENT
Start: 2018-05-18 | End: 2018-05-18

## 2018-05-18 RX ORDER — HEPARIN SODIUM 1000 [USP'U]/ML
1000-10000 INJECTION, SOLUTION INTRAVENOUS; SUBCUTANEOUS EVERY 5 MIN PRN
Status: DISCONTINUED | OUTPATIENT
Start: 2018-05-18 | End: 2018-05-18 | Stop reason: HOSPADM

## 2018-05-18 RX ORDER — HYDRALAZINE HYDROCHLORIDE 20 MG/ML
10-20 INJECTION INTRAMUSCULAR; INTRAVENOUS
Status: DISCONTINUED | OUTPATIENT
Start: 2018-05-18 | End: 2018-05-18 | Stop reason: HOSPADM

## 2018-05-18 RX ORDER — FENTANYL CITRATE 50 UG/ML
25-50 INJECTION, SOLUTION INTRAMUSCULAR; INTRAVENOUS
Status: DISCONTINUED | OUTPATIENT
Start: 2018-05-18 | End: 2018-05-18 | Stop reason: HOSPADM

## 2018-05-18 RX ORDER — LIDOCAINE 40 MG/G
CREAM TOPICAL
Status: DISCONTINUED | OUTPATIENT
Start: 2018-05-18 | End: 2018-05-18

## 2018-05-18 RX ORDER — BUPIVACAINE HYDROCHLORIDE 2.5 MG/ML
1-10 INJECTION, SOLUTION EPIDURAL; INFILTRATION; INTRACAUDAL
Status: DISCONTINUED | OUTPATIENT
Start: 2018-05-18 | End: 2018-05-18 | Stop reason: HOSPADM

## 2018-05-18 RX ORDER — ATROPINE SULFATE 0.1 MG/ML
.5-1 INJECTION INTRAVENOUS
Status: DISCONTINUED | OUTPATIENT
Start: 2018-05-18 | End: 2018-05-18 | Stop reason: HOSPADM

## 2018-05-18 RX ORDER — HALOPERIDOL 5 MG/ML
2-5 INJECTION INTRAMUSCULAR EVERY 6 HOURS PRN
Status: DISCONTINUED | OUTPATIENT
Start: 2018-05-18 | End: 2018-05-23

## 2018-05-18 RX ORDER — NALOXONE HYDROCHLORIDE 0.4 MG/ML
.2-.4 INJECTION, SOLUTION INTRAMUSCULAR; INTRAVENOUS; SUBCUTANEOUS
Status: ACTIVE | OUTPATIENT
Start: 2018-05-18 | End: 2018-05-18

## 2018-05-18 RX ORDER — FLUMAZENIL 0.1 MG/ML
0.2 INJECTION, SOLUTION INTRAVENOUS
Status: ACTIVE | OUTPATIENT
Start: 2018-05-18 | End: 2018-05-18

## 2018-05-18 RX ORDER — POTASSIUM CHLORIDE 1500 MG/1
20 TABLET, EXTENDED RELEASE ORAL
Status: DISCONTINUED | OUTPATIENT
Start: 2018-05-18 | End: 2018-05-18 | Stop reason: HOSPADM

## 2018-05-18 RX ORDER — ENALAPRILAT 1.25 MG/ML
1.25-2.5 INJECTION INTRAVENOUS
Status: DISCONTINUED | OUTPATIENT
Start: 2018-05-18 | End: 2018-05-18 | Stop reason: HOSPADM

## 2018-05-18 RX ORDER — IOPAMIDOL 755 MG/ML
100 INJECTION, SOLUTION INTRAVASCULAR ONCE
Status: DISCONTINUED | OUTPATIENT
Start: 2018-05-18 | End: 2018-05-23

## 2018-05-18 RX ORDER — VERAPAMIL HYDROCHLORIDE 2.5 MG/ML
1-2.5 INJECTION, SOLUTION INTRAVENOUS
Status: DISCONTINUED | OUTPATIENT
Start: 2018-05-18 | End: 2018-05-18 | Stop reason: HOSPADM

## 2018-05-18 RX ORDER — FUROSEMIDE 10 MG/ML
20-100 INJECTION INTRAMUSCULAR; INTRAVENOUS
Status: DISCONTINUED | OUTPATIENT
Start: 2018-05-18 | End: 2018-05-18 | Stop reason: HOSPADM

## 2018-05-18 RX ORDER — FUROSEMIDE 10 MG/ML
40 INJECTION INTRAMUSCULAR; INTRAVENOUS ONCE
Status: COMPLETED | OUTPATIENT
Start: 2018-05-18 | End: 2018-05-18

## 2018-05-18 RX ORDER — HALOPERIDOL 5 MG/ML
INJECTION INTRAMUSCULAR
Status: COMPLETED
Start: 2018-05-18 | End: 2018-05-18

## 2018-05-18 RX ORDER — PROMETHAZINE HYDROCHLORIDE 25 MG/ML
6.25-25 INJECTION, SOLUTION INTRAMUSCULAR; INTRAVENOUS EVERY 4 HOURS PRN
Status: DISCONTINUED | OUTPATIENT
Start: 2018-05-18 | End: 2018-05-18 | Stop reason: HOSPADM

## 2018-05-18 RX ORDER — SODIUM CHLORIDE 9 MG/ML
INJECTION, SOLUTION INTRAVENOUS CONTINUOUS
Status: DISCONTINUED | OUTPATIENT
Start: 2018-05-18 | End: 2018-05-18 | Stop reason: HOSPADM

## 2018-05-18 RX ORDER — DIPHENHYDRAMINE HYDROCHLORIDE 50 MG/ML
25-50 INJECTION INTRAMUSCULAR; INTRAVENOUS
Status: DISCONTINUED | OUTPATIENT
Start: 2018-05-18 | End: 2018-05-18 | Stop reason: HOSPADM

## 2018-05-18 RX ORDER — CLOPIDOGREL BISULFATE 75 MG/1
75 TABLET ORAL DAILY
Status: DISCONTINUED | OUTPATIENT
Start: 2018-05-19 | End: 2018-05-18

## 2018-05-18 RX ORDER — POTASSIUM CHLORIDE 7.45 MG/ML
10 INJECTION INTRAVENOUS
Status: DISCONTINUED | OUTPATIENT
Start: 2018-05-18 | End: 2018-05-18 | Stop reason: HOSPADM

## 2018-05-18 RX ORDER — FLUMAZENIL 0.1 MG/ML
0.2 INJECTION, SOLUTION INTRAVENOUS
Status: DISCONTINUED | OUTPATIENT
Start: 2018-05-18 | End: 2018-05-18 | Stop reason: HOSPADM

## 2018-05-18 RX ORDER — LORAZEPAM 0.5 MG/1
0.5 TABLET ORAL
Status: DISCONTINUED | OUTPATIENT
Start: 2018-05-18 | End: 2018-05-18 | Stop reason: HOSPADM

## 2018-05-18 RX ORDER — NITROGLYCERIN 0.4 MG/1
0.4 TABLET SUBLINGUAL EVERY 5 MIN PRN
Status: DISCONTINUED | OUTPATIENT
Start: 2018-05-18 | End: 2018-05-18 | Stop reason: HOSPADM

## 2018-05-18 RX ADMIN — HALOPERIDOL LACTATE 5 MG: 5 INJECTION, SOLUTION INTRAMUSCULAR at 16:07

## 2018-05-18 RX ADMIN — ROCURONIUM BROMIDE 30 MG: 10 INJECTION INTRAVENOUS at 10:11

## 2018-05-18 RX ADMIN — CEFTAZIDIME 2 G: 2 INJECTION, POWDER, FOR SOLUTION INTRAVENOUS at 09:38

## 2018-05-18 RX ADMIN — PROPOFOL 65 MCG/KG/MIN: 10 INJECTION, EMULSION INTRAVENOUS at 12:04

## 2018-05-18 RX ADMIN — IPRATROPIUM BROMIDE AND ALBUTEROL SULFATE 3 ML: .5; 3 SOLUTION RESPIRATORY (INHALATION) at 07:39

## 2018-05-18 RX ADMIN — IPRATROPIUM BROMIDE AND ALBUTEROL SULFATE 3 ML: .5; 3 SOLUTION RESPIRATORY (INHALATION) at 23:17

## 2018-05-18 RX ADMIN — HYDROMORPHONE HYDROCHLORIDE 0.2 MG: 1 INJECTION, SOLUTION INTRAMUSCULAR; INTRAVENOUS; SUBCUTANEOUS at 09:10

## 2018-05-18 RX ADMIN — SODIUM CHLORIDE: 9 INJECTION, SOLUTION INTRAVENOUS at 07:43

## 2018-05-18 RX ADMIN — CHLORHEXIDINE GLUCONATE 15 ML: 1.2 RINSE ORAL at 20:20

## 2018-05-18 RX ADMIN — PROPOFOL 65 MCG/KG/MIN: 10 INJECTION, EMULSION INTRAVENOUS at 10:09

## 2018-05-18 RX ADMIN — CEFTAZIDIME 2 G: 2 INJECTION, POWDER, FOR SOLUTION INTRAVENOUS at 01:26

## 2018-05-18 RX ADMIN — IPRATROPIUM BROMIDE AND ALBUTEROL SULFATE 3 ML: .5; 3 SOLUTION RESPIRATORY (INHALATION) at 11:57

## 2018-05-18 RX ADMIN — HALOPERIDOL 5 MG: 5 INJECTION INTRAMUSCULAR at 16:07

## 2018-05-18 RX ADMIN — PROPOFOL 40 MCG/KG/MIN: 10 INJECTION, EMULSION INTRAVENOUS at 18:21

## 2018-05-18 RX ADMIN — ROCURONIUM BROMIDE 20 MG: 10 INJECTION INTRAVENOUS at 10:22

## 2018-05-18 RX ADMIN — SODIUM CHLORIDE, POTASSIUM CHLORIDE, SODIUM LACTATE AND CALCIUM CHLORIDE: 600; 310; 30; 20 INJECTION, SOLUTION INTRAVENOUS at 10:09

## 2018-05-18 RX ADMIN — ASPIRIN 325 MG: 325 TABLET, DELAYED RELEASE ORAL at 09:39

## 2018-05-18 RX ADMIN — CEFTAZIDIME 2 G: 2 INJECTION, POWDER, FOR SOLUTION INTRAVENOUS at 18:20

## 2018-05-18 RX ADMIN — NOREPINEPHRINE BITARTRATE 0.3 MCG/KG/MIN: 1 INJECTION INTRAVENOUS at 20:24

## 2018-05-18 RX ADMIN — HEPARIN SODIUM 10000 UNITS: 1000 INJECTION, SOLUTION INTRAVENOUS; SUBCUTANEOUS at 10:55

## 2018-05-18 RX ADMIN — MONTELUKAST SODIUM 10 MG: 10 TABLET, FILM COATED ORAL at 21:40

## 2018-05-18 RX ADMIN — IPRATROPIUM BROMIDE AND ALBUTEROL SULFATE 3 ML: .5; 3 SOLUTION RESPIRATORY (INHALATION) at 03:36

## 2018-05-18 RX ADMIN — PROPOFOL 65 MCG/KG/MIN: 10 INJECTION, EMULSION INTRAVENOUS at 07:38

## 2018-05-18 RX ADMIN — FAMOTIDINE 20 MG: 10 INJECTION, SOLUTION INTRAVENOUS at 09:39

## 2018-05-18 RX ADMIN — IPRATROPIUM BROMIDE AND ALBUTEROL SULFATE 3 ML: .5; 3 SOLUTION RESPIRATORY (INHALATION) at 19:44

## 2018-05-18 RX ADMIN — CHLORHEXIDINE GLUCONATE 15 ML: 1.2 RINSE ORAL at 09:47

## 2018-05-18 RX ADMIN — METHYLPREDNISOLONE SODIUM SUCCINATE 40 MG: 40 INJECTION, POWDER, FOR SOLUTION INTRAMUSCULAR; INTRAVENOUS at 09:40

## 2018-05-18 RX ADMIN — PROPOFOL 50 MCG/KG/MIN: 10 INJECTION, EMULSION INTRAVENOUS at 20:23

## 2018-05-18 RX ADMIN — LIDOCAINE HYDROCHLORIDE 4 ML: 10 INJECTION, SOLUTION EPIDURAL; INFILTRATION; INTRACAUDAL; PERINEURAL at 10:34

## 2018-05-18 RX ADMIN — SODIUM CHLORIDE: 9 INJECTION, SOLUTION INTRAVENOUS at 10:09

## 2018-05-18 RX ADMIN — PROPOFOL 55 MCG/KG/MIN: 10 INJECTION, EMULSION INTRAVENOUS at 01:20

## 2018-05-18 RX ADMIN — FUROSEMIDE 40 MG: 10 INJECTION, SOLUTION INTRAVENOUS at 14:08

## 2018-05-18 RX ADMIN — IPRATROPIUM BROMIDE AND ALBUTEROL SULFATE 3 ML: .5; 3 SOLUTION RESPIRATORY (INHALATION) at 00:25

## 2018-05-18 RX ADMIN — DEXMEDETOMIDINE HYDROCHLORIDE 0.2 MCG/KG/HR: 100 INJECTION, SOLUTION INTRAVENOUS at 17:42

## 2018-05-18 RX ADMIN — PROPOFOL 50 MCG/KG/MIN: 10 INJECTION, EMULSION INTRAVENOUS at 23:07

## 2018-05-18 RX ADMIN — NITROGLYCERIN 200 MCG: 5 INJECTION, SOLUTION INTRAVENOUS at 11:02

## 2018-05-18 RX ADMIN — CLOPIDOGREL 75 MG: 75 TABLET, FILM COATED ORAL at 09:40

## 2018-05-18 RX ADMIN — SODIUM CHLORIDE, POTASSIUM CHLORIDE, SODIUM LACTATE AND CALCIUM CHLORIDE 50 ML/HR: 600; 310; 30; 20 INJECTION, SOLUTION INTRAVENOUS at 20:27

## 2018-05-18 RX ADMIN — IPRATROPIUM BROMIDE AND ALBUTEROL SULFATE 3 ML: .5; 3 SOLUTION RESPIRATORY (INHALATION) at 15:30

## 2018-05-18 RX ADMIN — SODIUM CHLORIDE, POTASSIUM CHLORIDE, SODIUM LACTATE AND CALCIUM CHLORIDE: 600; 310; 30; 20 INJECTION, SOLUTION INTRAVENOUS at 12:05

## 2018-05-18 RX ADMIN — FAMOTIDINE 20 MG: 10 INJECTION, SOLUTION INTRAVENOUS at 21:40

## 2018-05-18 RX ADMIN — ATORVASTATIN CALCIUM 10 MG: 10 TABLET, FILM COATED ORAL at 21:40

## 2018-05-18 ASSESSMENT — COPD QUESTIONNAIRES: COPD: 1

## 2018-05-18 ASSESSMENT — LIFESTYLE VARIABLES: TOBACCO_USE: 1

## 2018-05-18 NOTE — CONSULTS
"BRIEF NUTRITION NOTE:    Received routine Nutrition Consult s/p angiogram procedure for \"Dietitian to see for Heart Healthy Diet Education\"  Pt currently intubated and on 1 pressor.  Per rounds this morning, hope to extubate today.  Per Dr. Pitts's note yesterday, \"Tube feeds tomorrow if doesn't appear to be on track for extubation. Suspect her nutritional status is suboptimal.\"  Note pt was seen and instructed in Heart Healthy Diet in Sept 2015 after angiogram --> stent procedure.  Will monitor for nutritional plan of care.    Abi Montana, RD, LD, CNSC    "

## 2018-05-18 NOTE — PROGRESS NOTES
Cardiology    S/P PCI of CFX for ISR.  LV Gram reviewed and function is better than what the echocardiogram described.  Question whether her presentation caused some stunning and depressed LV systolic function.  Continue with dual antiplatelet therapy.  Would recommend repeat limited echocardiogram prior to discharge.      Radha Schafer MD

## 2018-05-18 NOTE — ANESTHESIA PREPROCEDURE EVALUATION
Procedure: Procedure(s):  ANESTHESIA OUT OF OR  Preop diagnosis: RESPIRATORY FAILURE/COPD  Allergies   Allergen Reactions     Crestor [Rosuvastatin] Other (See Comments)     dizziness     Hmg-Coa-R Inhibitors Other (See Comments)     Muscle/joint aching     Lisinopril Cough     Optison [Albumin Human] Other (See Comments)     Pt was flush and very dizzy.  Also had a BP drop     Penicillins Rash     Patient Active Problem List   Diagnosis     Advanced directives, counseling/discussion     Urinary incontinence     Forgetfulness     Hyperlipidemia LDL goal <130     Elevated blood pressure reading without diagnosis of hypertension     History of stroke - right thalamus in 8/2013 and left cerebellar and right vermis in 3/2018     Numbness and tingling     Tobacco use disorder     CVA (cerebral vascular accident) (H)     ACS (acute coronary syndrome) (H)     Ischemic cardiomyopathy - EF 25% in 2015 but 55% in 3/2017 and 45-50% on 3/18     HTN, goal below 130/80     Acute systolic congestive heart failure (H)     Lung nodule     GERD (gastroesophageal reflux disease)     ST elevation myocardial infarction involving left anterior descending (LAD) coronary artery (H)     Chronic bronchitis, unspecified chronic bronchitis type (H)     PAD (peripheral artery disease) (H) - moderate left common carotid stenosis, aortoiliac bypass, chronic left vertebral and right posterior cerebral stenoses     Cervical high risk HPV (human papillomavirus) test positive     Acute respiratory failure with hypoxia (H)     COPD exacerbation (H)     Coronary artery disease involving native coronary artery - STEMI with LAD and circ stents in 8/2015     Elevated troponin     Pulmonary emphysema (H)     Vertigo     Other seizures (H) - possible     Acute CVA (cerebrovascular accident) - right paramedian superior vermis and left cerebellar hemisphere     Malnutrition, unspecified type (H)     Hyponatremia     Hypochloremia     Hypercapnic respiratory  failure (H)     Past Medical History:   Diagnosis Date     Arthritis      COPD (chronic obstructive pulmonary disease) (H)      Coronary artery disease      CVA (cerebral vascular accident) (H) 8-     Hypertension      Past Surgical History:   Procedure Laterality Date     APPENDECTOMY       BYPASS GRAFT AORTOILIAC N/A 3/7/2017    Procedure: BYPASS GRAFT AORTOILIAC;  Surgeon: Marcos Pratt MD;  Location: [facility] OR     SALPINGO OOPHORECTOMY,R/L/DELORES      Salpingo Oophorectomy, RT/LT/DELORES       Current Facility-Administered Medications on File Prior to Encounter:  Reason aspirin not prescribed (intentional)   [DISCONTINUED] ipratropium - albuterol 0.5 mg/2.5 mg/3 mL (DUONEB) neb solution 3 mL   [DISCONTINUED] midazolam (VERSED) 1 mg/mL infusion   [DISCONTINUED] nitroGLYcerin (NITROSTAT) sublingual tablet 0.4 mg   [DISCONTINUED] nitroGLYcerin 50 mg in D5W 250 mL (adult std) infusion   [DISCONTINUED] propofol (DIPRIVAN) infusion   [DISCONTINUED] vecuronium (NORCURON) injection 5 mg     Current Outpatient Prescriptions on File Prior to Encounter:  acetaminophen (TYLENOL) 325 MG tablet Take 2 tablets (650 mg) by mouth every 4 hours as needed for mild pain   albuterol (PROAIR HFA/PROVENTIL HFA/VENTOLIN HFA) 108 (90 BASE) MCG/ACT Inhaler Inhale 2 puffs into the lungs every 6 hours as needed for shortness of breath / dyspnea   atorvastatin (LIPITOR) 10 MG tablet Take 1 tablet (10 mg) by mouth At Bedtime   azithromycin (ZITHROMAX) 250 MG tablet Two tablets first day, then one tablet daily for four days.   budesonide-formoterol (SYMBICORT) 160-4.5 MCG/ACT Inhaler INHALE TWO PUFFS BY MOUTH TWICE A DAY   Calcium Carbonate-Vitamin D (OSCAL 500/200 D-3 PO) Take 1 tablet by mouth 2 times daily   clopidogrel (PLAVIX) 75 MG tablet Take 1 tablet (75 mg) by mouth daily   Coenzyme Q10 (COQ10 PO) Take 100 mg by mouth every 24 hours (at 16:00)   ipratropium - albuterol 0.5 mg/2.5 mg/3 mL (DUONEB) 0.5-2.5 (3) MG/3ML neb solution  USE 1 VIAL IN NEBULIZER FOUR TIMES A DAY   metoprolol succinate (TOPROL-XL) 25 MG 24 hr tablet Take 1 tablet (25 mg) by mouth daily   montelukast (SINGULAIR) 10 MG tablet Take 1 tablet (10 mg) by mouth At Bedtime   nicotine (NICODERM CQ) 7 MG/24HR 24 hr patch Place 1 patch onto the skin every 24 hours   polyethylene glycol (MIRALAX) powder Take 17 g (1 capful) by mouth daily   predniSONE (DELTASONE) 20 MG tablet Take 2 tablets (40 mg) by mouth daily for 5 days   tiotropium (SPIRIVA HANDIHALER) 18 MCG capsule Inhale 1 capsule (18 mcg) into the lungs daily   ACE/ARB/ARNI NOT PRESCRIBED, INTENTIONAL, Please choose reason not prescribed, below   Nutritional Supplements (BOOST) Take 1 Bottle by mouth 3 times daily (with meals)   order for DME Equipment being ordered: Nebulizer   order for DME Equipment being ordered: Nebulizer machine     /67  Temp 37.1  C (98.8  F)  Resp 13  Wt 46 kg (101 lb 6.6 oz)  SpO2 97%  BMI 17.92 kg/m2    Lab Results   Component Value Date    WBC 7.2 05/17/2018     Lab Results   Component Value Date    RBC 5.14 05/17/2018     Lab Results   Component Value Date    HGB 15.3 05/17/2018     Lab Results   Component Value Date    HCT 46.3 05/17/2018     Lab Results   Component Value Date    MCV 90 05/17/2018     Lab Results   Component Value Date    MCH 29.8 05/17/2018     Lab Results   Component Value Date    MCHC 33.0 05/17/2018     Lab Results   Component Value Date    RDW 13.5 05/17/2018     Lab Results   Component Value Date     05/17/2018     05/17/2018     Lab Results   Component Value Date    INR 0.91 05/17/2018       Last Basic Metabolic Panel:  Lab Results   Component Value Date     05/17/2018      Lab Results   Component Value Date    POTASSIUM 3.8 05/18/2018     Lab Results   Component Value Date    CHLORIDE 100 05/17/2018     Lab Results   Component Value Date    CALDERON 8.4 05/17/2018     Lab Results   Component Value Date    CO2 30 05/17/2018     Lab Results    Component Value Date    BUN 14 05/17/2018     Lab Results   Component Value Date    CR 0.70 05/17/2018     Lab Results   Component Value Date     05/17/2018     Echo  Interpretation Summary     The visual ejection fraction is estimated at 25-30%.  Mostly global LV dysfunction with the inferolateral wall moving best.  The right ventricular systolic function is normal.  Moderate valvular aortic stenosis.  The degree of aortic stenosis may be underestimated due to the patients  significant LV dysfunction.  Sinus rhythm was noted.  In comparison with the previous study the LVEF has dropped.  _____________________________________________________________________________  __        Left Ventricle  The visual ejection fraction is estimated at 25-30%. Left ventricular  diastolic function is indeterminate. Mostly global LV dysfunction with the  inferolateral wall moving best. The patient has a large inferolateral  papillary muscle noted on the present scan. This was seen on previous studies  but appears slightly larger on the present study. This may represent different  imaging technique but clinical correlation is recommended.     Right Ventricle  The right ventricular systolic function is normal. The RV is note well seen  but the TAPSE is 1.8 cm.     Atria  The left atrium is not well visualized. Right atrium not well visualized.  There is no atrial shunt seen.     Mitral Valve  The mitral valve leaflets appear thickened, but open well. There is trace  mitral regurgitation.        Tricuspid Valve  The tricuspid valve is not well visualized, but is grossly normal. There is  mild (1+) tricuspid regurgitation. The right ventricular systolic pressure is  approximated at 24.9 mmHg plus the right atrial pressure.     Aortic Valve  There is trace to mild aortic regurgitation. The mean AoV pressure gradient is  14.1 mmHg. Moderate valvular aortic stenosis. The degree of aortic stenosis  may be underestimated due to the  patients significant LV dysfunction.     Pulmonic Valve  The pulmonic valve is not well visualized.     Vessels  The aortic root is not well visualized. The inferior vena cava is not dilated.  The IVC does not collapse with inspiration.     Pericardium  Trivial pericardial effusion. There are no echocardiographic indications of  cardiac tamponade.    18-MAY-2018 07:40:16 [facility] ROUTINE RECORD  Sinus rhythm  Right superior axis deviation  Pulmonary disease pattern  Septal infarct , age undetermined  ST & T wave abnormality, consider inferior ischemia  ST & T wave abnormality, consider anterolateral ischemia  Prolonged QT  Abnormal ECG  When compared with ECG of 17-MAY-2018 10:02, (unconfirmed)  Questionable change in QRS axis ...    Anesthesia Evaluation     . Pt has had prior anesthetic.            ROS/MED HX    ENT/Pulmonary: Comment: Hypoxic respiratory failure - intubated FiO2 35%, , rr 14, peep 5  Chronic bronchitis    Sedated with propofol    (+)tobacco use, Current use COPD, , . .    Neurologic: Comment: vertigo    (+)CVA (rigfht thalmus 8/2013; right vermis 3/2018)     Cardiovascular: Comment: PVD - left common carotid stenosis, aortoiliac bypass, left vertebral and right posterior cerebral stenosis    Ischemic cardiomyopathy - EF 25% in 2015 but 55% in 3/2017 and 45-50% on 3/18.  EKG changes and worsening EF will go to angiography    Hx of LV thrombus with resolution of thrombus with coumadin    Heparin/ levophed infusions    (+) Dyslipidemia, hypertension-Peripheral Vascular Disease-CAD, -past MI (ST elevation MI LAD distribution in past, NONSTEMI this admission),-stent (2015 to CIRC and LAD),2015  2 . Taking blood thinners : . CHF . . :. .       METS/Exercise Tolerance:     Hematologic:         Musculoskeletal:   (+) arthritis, , , -       GI/Hepatic: Comment: malnutrition    (+) GERD       Renal/Genitourinary:         Endo:         Psychiatric:         Infectious Disease:          Malignancy:         Other: Comment: Right IJ central line                    Physical Exam  Normal systems: dental    Airway   Comment: Intubated with 7.0 ET tube    Dental     Cardiovascular   Rhythm and rate: regular and normal      Pulmonary   PE comment: Coarse breath sounds bilaterally                    Anesthesia Plan      History & Physical Review  History and physical reviewed and following examination; no interval change.    ASA Status:  4 .    NPO Status:  > 8 hours    Plan for General and ETT     Hx obtained from chart      Postoperative Care      Consents  Anesthetic plan, risks, benefits and alternatives discussed with:  Implied consent/emergency..                          .

## 2018-05-18 NOTE — PROGRESS NOTES
[facility] ICU RESPIRATORY NOTE  Date of Admission: 5/17/2018  Date of Intubation (most recent): 5/17/2018  Reason for Mechanical Ventilation: Respiratory failure  Number of Days on Mechanical Ventilation: 2  Met Criteria for Pressure Support Trial: No  Length of Pressure Support Trial: PS 10/5 about 10 minutes   Reason for Stopping Pressure Support Trial: Low Vt    Significant Events Today: Cath lab (angiogram west)  Ventilation Mode: CMV/AC  (Continuous Mandatory Ventilation/ Assist Control)  FiO2 (%): 35 %  Rate Set (breaths/minute): 14 breaths/min  Tidal Volume Set (mL): 400 mL  PEEP (cm H2O): 5 cmH2O  Oxygen Concentration (%): 35 %  Resp: 18    ABG Results: No ABG result for today    ETT appearance on chest x-ray: ET tube in good position    Plan:  Try PS trial to extubate later today

## 2018-05-18 NOTE — PROGRESS NOTES
Notified of low UOP.  Giving 500 cc bolus.  Checking central venous o2 sat.  If low will consider inotropy.    ADDENDUM:  scvo2 82%, ok.  Hold off on inotropy.

## 2018-05-18 NOTE — PROGRESS NOTES
Cardiology    Patient seen and examined this am.  Findings overnight reviewed.  Family at bedside.    Blood pressure 118/67, temperature 98.8  F (37.1  C), resp. rate 13, weight 46 kg (101 lb 6.6 oz), SpO2 97 %, not currently breastfeeding.    General: Stated age, intubated, chronically ill appearing  HEENT: ET tube, KELSEY  Lungs: crackles bilaterally, wheezes on the left  CVS: RRR, distant  Abdomen: soft, bowel sounds, non tender  Extremities/musculoskeletal: unable to palpate pulses in lower extremities and cool, palpable pulses in upper extremities and warm, no edema  Neurology: Sedated  Skin: no abnormalities  Psychiatry: unable    Echo:  The visual ejection fraction is estimated at 25-30%.  Mostly global LV dysfunction with the inferolateral wall moving best.  The right ventricular systolic function is normal.  Moderate valvular aortic stenosis.  The degree of aortic stenosis may be underestimated due to the patients  significant LV dysfunction.  Sinus rhythm was noted.  In comparison with the previous study the LVEF has dropped.    A/P    1.  Cardiomyopathy as noted below, possibly as a result of current presentation with COPD, however EKG changes and depressed LV systolic function  2.  Presentation with severe COPD exacerbation  3.  EKG with ST changes AVR and widespread ST segment depression  4.  Non-STEMI  5. History of anterior MI in the past with LV thrombus with PCI performed of the LAD and circumflex.  She had resolution of the LV thrombus with Coumadin in the past (2015)  6.  Known peripheral arterial disease with bypass in past  7.  Former smoker  8.  CVA  9.  COPD         Recommendation     1.  Patient is currently intubated.  Discussed findings with  and aunt.  Concern for ACS pattern with EKG changes versus stress pattern due to current COPD presentation.  After a lengthy discussion regarding the pros and cons of various approaches given the fact that her EKG is worrisome as well as  findings on echocardiogram, we will proceed with coronary angiography.  2.  Consent is signed by   3.  Continue supportive care  4.  Further recommendations after coronary anatomy is known      Radha Schafer MD

## 2018-05-18 NOTE — PLAN OF CARE
Problem: Patient Care Overview  Goal: Plan of Care/Patient Progress Review  Outcome: No Change  Pt remains intubated, sedated on propofol.  Pt meet RASS of -2, pt wakes nods to questions, denies pain, DEE.  Pt intermittently restless w/coughing,    Sinus Rhythm, VSS, MAP meets goal of >65 w/use of low dose norepi gtt. Heparin gtt infusing per orders.   Lungs coarse, rhonchi, sm amt creamy occ slight blood tinged secretions via ETT.    Low urine output from renae, NS bolus infusing per MD order.  Pt's son fYn at bedside frequently, updated to plan of care.

## 2018-05-18 NOTE — PROGRESS NOTES
Date of Admission: 5/17/2018  Date of Intubation (most recent): 5/17/18 (intubated at outside facility)  Reason for Mechanical Ventilation: Respiratory failure  Number of Days on Mechanical Ventilation: 1  Met Criteria for Pressure Support Trial: No  Ventilation Mode: CMV/AC  (Continuous Mandatory Ventilation/ Assist Control)  FiO2 (%): 35 %  Rate Set (breaths/minute): 14 breaths/min  Tidal Volume Set (mL): 400 mL  PEEP (cm H2O): 5 cmH2O  Oxygen Concentration (%): 35 %  Resp: 14    Recent Labs  Lab 05/17/18  0830 05/17/18  0535 05/17/18  0436   PH 7.33*  --   --    PCO2 55*  --   --    PO2 171*  --   --    HCO3 29*  --   --    O2PER 50 100 100   plan: continue full vent support tonight

## 2018-05-18 NOTE — PROGRESS NOTES
[facility]  Comprehensive Daily ICU Note        Preeti Ford MRN# [id number]   Age: 67 year old YOB: 1950     Date of Admission: 5/17/2018    Primary care provider: Deisy Carter     CODE STATUS: FULL      Problem List:   Severe COPD with exacerbation  NSTEMI - unclear if demand ischemia or true ACS           Treatment goals for next 24 hours:   Lung protective ventilation  Evaluate for ACS  COPD treatment    Summer Pitts MD      Subjective/ Last 24 hours:   HPI: History obtained from chart as patient intubated and sedated. 68 yo woman with known severe COPD and coronary artery disease who visited her clinic 5/15afternoon complaining of a few days of shortness of breath. Given steroids and antibiotics and sent home. Presented to the ED very early the next morning (5/16) in respiratory extremis.  Had EKG changes as well as severe hypercapnia.  Intubated and transferred here.  Aunt is bedside and tells me she spoke with her a couple of days ago and she seemed very well at that time.  Of note patient has significant history of coronary artery disease with stents to Cirucumflex and LAD in 2015 in setting of recent STEMI.  Of note, presentation noted at that time seems similar to presentation today with subacute respiratory symptoms.     Past Medical History:   Diagnosis Date     Arthritis      COPD (chronic obstructive pulmonary disease) (H)      Coronary artery disease      CVA (cerebral vascular accident) (H) 8-     Hypertension    Severe obstruction seen on PFTs in 2017    Social History   Substance Use Topics     Smoking status: Current Every Day Smoker     Packs/day: 0.50     Types: Cigarettes     Smokeless tobacco: Never Used      Comment: patient states she is working on it      Alcohol use No     Family History   Problem Relation Age of Onset     CEREBROVASCULAR DISEASE Mother      CEREBROVASCULAR DISEASE Father      Genitourinary Problems Brother       Dialysis     Past Surgical History:   Procedure Laterality Date     APPENDECTOMY       BYPASS GRAFT AORTOILIAC N/A 3/7/2017    Procedure: BYPASS GRAFT AORTOILIAC;  Surgeon: Marcos Pratt MD;  Location: [facility] OR     SALPINGO OOPHORECTOMY,R/L/DELORES      Salpingo Oophorectomy, RT/LT/DELORES                 Mechanical Ventilation/Vitalsigns/IsandOs:   Temp:  [98.4  F (36.9  C)-99.3  F (37.4  C)] 98.8  F (37.1  C)  Heart Rate:  [79-89] 85  Resp:  [9-18] 13  BP: ()/(53-88) 118/67  FiO2 (%):  [35 %] 35 %  SpO2:  [89 %-100 %] 97 %      Intake/Output Summary (Last 24 hours) at 05/18/18 1530  Last data filed at 05/18/18 1200   Gross per 24 hour   Intake          1767.34 ml   Output              500 ml   Net          1267.34 ml   +360 ml      Ventilation Mode: CMV/AC  (Continuous Mandatory Ventilation/ Assist Control)  FiO2 (%): 35 %  Rate Set (breaths/minute): 14 breaths/min  Tidal Volume Set (mL): 400 mL  PEEP (cm H2O): 5 cmH2O  Oxygen Concentration (%): 35 %  Resp: 13    Day since 5/16    Peak airway pressure: 24  Auto-PEEP:  Small amount         Physical Examination:     General: Stated age, intubated, chronically ill appearing  HEENT: ET tube, KELSEY  Lungs: crackles bilaterally, wheezes on the left  CVS: RRR, distant  Abdomen: soft, bowel sounds, non tender  Extremities/musculoskeletal: unable to palpate pulses in lower extremities and cool, palpable pulses in upper extremities and warm, no edema  Neurology: Sedated  Skin: no abnormalities  Psychiatry: unable  Exam of Line sites:  No lines       Hospital Procedures   TTE  Central line          Feeding/Glucose:   NPO    Blood glucose/Insulin requirement last 24 hours: none         Medications:       [START ON 5/19/2018] aspirin  81 mg Oral Daily     atorvastatin  10 mg Oral At Bedtime     cefTAZidime  2 g Intravenous Q8H     chlorhexidine  15 mL Mouth/Throat Q12H     clopidogrel  75 mg Oral or Feeding Tube Daily     famotidine  20 mg Intravenous Q12H     insulin  aspart  1-4 Units Subcutaneous Q4H     iopamidol  100 mL INTRA-ARTERIAL Once     ipratropium - albuterol 0.5 mg/2.5 mg/3 mL  3 mL Nebulization Q4H     methylPREDNISolone  40 mg Intravenous Daily     metoprolol tartrate  6.25 mg Oral BID     montelukast  10 mg Oral At Bedtime     polyethylene glycol  17 g Oral Daily             Labs/Diagnostic studies:     EKst EKG:  ST elevation leads V2-V4, ST depression inferior leads             2nd EKG: resolution of ST depression, less ST depression inferior leads    Echo:  pending    ROUTINE ICU LABS (Last four results)  CMP    Recent Labs  Lab 18  0430 18  0911 18  0436     --  137   POTASSIUM 3.8  --  4.8   CHLORIDE 102  --  100   CO2 26  --  30   ANIONGAP  --   --  7   *  --  148*   BUN 15  --  14   CR 0.70  --  0.70   GFRESTIMATED 84  --  85   GFRESTBLACK >90  --  >90   CALDERON 7.9*  --  8.4*   MAG 2.2 1.9  --    PHOS  --  3.7  --    PROTTOTAL  --   --  7.7   ALBUMIN  --   --  3.6   BILITOTAL  --   --  0.4   ALKPHOS  --   --  90   AST  --   --  54*   ALT  --   --  48     CBC    Recent Labs  Lab 18  0911 18  0436   WBC 7.2 9.9   RBC 5.14 5.88*   HGB 15.3 17.4*   HCT 46.3 52.7*   MCV 90 90   MCH 29.8 29.6   MCHC 33.0 33.0   RDW 13.5 13.9   *  119* 192     INR    Recent Labs  Lab 18  0911   INR 0.91     Arterial Blood Gas    Recent Labs  Lab 18  0830 18  0535 18  0436   PH 7.33*  --   --    PCO2 55*  --   --    PO2 171*  --   --    HCO3 29*  --   --    O2PER 50 100 100       Other Lab Data:  Troponin: peaked at 0.566  BNP: 59582    Cultures:    Recent Labs  Lab 18  1020 18  0530   CULT Light growthNormal melany to date  Moderate growthNon lactose fermenting gram negative rods* No growth after 1 day  No growth after 1 day     Blood culture:  Invalid input(s): BC   Urine culture:  No results for input(s): URC in the last 168 hours.          Imaging:     CXR:  Hyperinflated, possible  infiltrates in lower lung fields; right more than left    CT:  Emphysematous changes seen on CT scan in 2017 along with granuloma         Assessment and Plan:     Summary:  Preeti Ford is a 67 year old female admitted on 5/17/2018 for acute respiratory failure.  I have personally reviewed the daily labs, imaging studies, cultures and discussed the case with referring physician and consulting physicians. My assessment and plan as follows:    Neurology and Psychiatry:  Needs sedation to tolerate ventilator   - Propofol infusion and narcotic bumps.  - May need to quickly discontinue sedation to extubate    Pulmonary:   Acute hypoxic and hypercapnic respiratory failure secondary to COPD exacerbation, and probably NSTEMI and ischemic cardiomyopathy. Initially very hypercapnic but has essentially resolved and CO2 is likely near baseline. No longer hypoxic. Slight amount of air trapping seen on ventilator. Sputum culture with GNR.  - Prolonged pressure support trial with hope for extubation later today.  - Steroids, bronchodilators, antibiotics  - Await final sputum culture  - Stent as below.    Cardiovascular system:   ST elevation on initial EKG and troponin leak on initial lab. History of coronary artery disease in LAD and Cirumflex as well as low normal EF and report of non viable anterior/apical wall. Presentation this admission similar to presentation in 2015 with resolving MI. Angiogram done today found in stent restenosis of Circumflex. Was re stented.  - Dual antiplatelet therapy  - Statin and BB as per outpatient regimen  - Repeat diuretic dose today    Renal/Electrolytes:  No acute issues. Creatinine near baseline. K high normal.   - Check Magnesium and Phosphorus. LR infusion.    ID:  GNR growing in sputum   - Continue Ceftazidime. Tailor antibiotics when able.    GI/:  No issues  - Continue outpatient Miralax for now.    Nutrition:   Defer tube feeds for now. Hope to extubate today or  tomorrow..    Musculoskeletal/Rheumatology:  No acute issues    Endocrine:   No current issues  - Monitor sugars  - Steroid burst ordered    Heme/Onc:  Polycythemia secondary to chronic hypoxia. Mild thrombocytopenia  - Resume dual antiplatelet therapy    ICU prophylaxis:      DVT: heparin     VAP: As above     Stress ulcer: Ranitidine     Restraints needed: yes     Lines   Central Line/PICC - placed 5/16 needed: y   Arterial line - placed n needed: n   Barnhart catheter.  Needed: y       Prognosis:  Guarded    Family update:   and aunt yesterday    Billing: total time spend providing critical care was 50 min, excluding procedure time.    Summer Pitts MD  [phone]

## 2018-05-18 NOTE — PLAN OF CARE
Problem: Patient Care Overview  Goal: Plan of Care/Patient Progress Review  Outcome: No Change  N: On propofol for sedation; very agitated at times. Restraints in place. Follows some commands, moves all extremities. PERRL.  CV: Remains on levophed to maintain MAP > 65. Tele shows SR.   Pulm: Remains on full vent support. Lung sounds coarse.   GI/: NG to LIS. Barnhart patent. Low UO, improved slightly with NS bolus last evening.   Skin: Intact.    Plan for angiogram today. No family present this shift. Will continue to monitor.

## 2018-05-18 NOTE — ANESTHESIA POSTPROCEDURE EVALUATION
Patient: Preeti Ford    Procedure(s):  ANGIOGRAM OF HEART.    Diagnosis:RESPIRATORY FAILURE/COPD  Diagnosis Additional Information: No value filed.    Anesthesia Type:  General, ETT    Note:  Anesthesia Post Evaluation    Patient location during evaluation: ICU  Patient participation: Unable to evaluate secondary to administered sedation  Post-procedure mental status: intubated and sedated.  Pain management: adequate  Airway patency: patent  Cardiovascular status: acceptable  Respiratory status: ETT and acceptable  Hydration status: acceptable  PONV: none     Anesthetic complications: None          Last vitals:  Vitals:    05/18/18 0645 05/18/18 0742 05/18/18 1004   BP: 118/67     Resp: 13     Temp: 37.1  C (98.8  F)     SpO2: 97% 97% 97%         Electronically Signed By: Victor Hugo Tavares MD  May 18, 2018  11:42 AM

## 2018-05-18 NOTE — ANESTHESIA CARE TRANSFER NOTE
Patient: Preeti Ford    Procedure(s):  ANGIOGRAM OF HEART.    Diagnosis: RESPIRATORY FAILURE/COPD  Diagnosis Additional Information: No value filed.    Anesthesia Type:   General, ETT     Note:  Airway :ETT  Patient transferred to:PACU  Comments: Patient connected to SpO2, EKG, and blood pressure transport monitors and accompanied by CRNA, anesthesiologist, circulating RN to ICU room. Patient ventilated by anesthesiologist with ambu via ETT with O2 at 15 liters per minute during transport.     On arrival to ICU, endotracheal tube position unchanged, equal, bilateral breath sounds auscultated in ICU room, patient placed on ICU ventilator by respiratory therapist, teeth and oral mucosa intact and unchanged at handoff of care. At anesthesia handoff of care, clinical monitors and alarms on and functioning, report on patient's clinical status given to ICU RN, report on patient's clinical status given to intensivist, ICU staff questions answered.Handoff Report: Identifed the Patient, Identified the Reponsible Provider, Reviewed the pertinent medical history, Discussed the surgical course, Reviewed Intra-OP anesthesia mangement and issues during anesthesia, Set expectations for post-procedure period and Allowed opportunity for questions and acknowledgement of understanding      Vitals: (Last set prior to Anesthesia Care Transfer)    CRNA VITALS  5/18/2018 1047 - 5/18/2018 1131      5/18/2018             NIBP: 132/62    Ht Rate: 76    SpO2: 99 %    EKG: Sinus rhythm                Electronically Signed By: SPRING Johnston CRNA  May 18, 2018  11:31 AM

## 2018-05-18 NOTE — PROGRESS NOTES
Pt's right femoral sheath removed at 1702.  Hematoma started to form at 1707 and stopped at 1712.  Direct pressure applied until 1735.  Hematoma area marked.

## 2018-05-19 LAB
ANION GAP SERPL CALCULATED.3IONS-SCNC: 5 MMOL/L (ref 3–14)
BASE EXCESS BLDA CALC-SCNC: 8.7 MMOL/L
BASE EXCESS BLDV CALC-SCNC: 9.4 MMOL/L
BASE EXCESS BLDV CALC-SCNC: 9.9 MMOL/L
BUN SERPL-MCNC: 12 MG/DL (ref 7–30)
CALCIUM SERPL-MCNC: 8.1 MG/DL (ref 8.5–10.1)
CHLORIDE SERPL-SCNC: 100 MMOL/L (ref 94–109)
CO2 SERPL-SCNC: 37 MMOL/L (ref 20–32)
CREAT SERPL-MCNC: 0.63 MG/DL (ref 0.52–1.04)
ERYTHROCYTE [DISTWIDTH] IN BLOOD BY AUTOMATED COUNT: 13.6 % (ref 10–15)
GFR SERPL CREATININE-BSD FRML MDRD: >90 ML/MIN/1.7M2
GLUCOSE BLDC GLUCOMTR-MCNC: 105 MG/DL (ref 70–99)
GLUCOSE BLDC GLUCOMTR-MCNC: 117 MG/DL (ref 70–99)
GLUCOSE BLDC GLUCOMTR-MCNC: 125 MG/DL (ref 70–99)
GLUCOSE BLDC GLUCOMTR-MCNC: 81 MG/DL (ref 70–99)
GLUCOSE BLDC GLUCOMTR-MCNC: 97 MG/DL (ref 70–99)
GLUCOSE SERPL-MCNC: 90 MG/DL (ref 70–99)
HCO3 BLD-SCNC: 36 MMOL/L (ref 21–28)
HCO3 BLDV-SCNC: 38 MMOL/L (ref 21–28)
HCO3 BLDV-SCNC: 38 MMOL/L (ref 21–28)
HCT VFR BLD AUTO: 41.5 % (ref 35–47)
HGB BLD-MCNC: 13.6 G/DL (ref 11.7–15.7)
LACTATE BLD-SCNC: 2.1 MMOL/L (ref 0.7–2)
MAGNESIUM SERPL-MCNC: 1.8 MG/DL (ref 1.6–2.3)
MCH RBC QN AUTO: 29.5 PG (ref 26.5–33)
MCHC RBC AUTO-ENTMCNC: 32.8 G/DL (ref 31.5–36.5)
MCV RBC AUTO: 90 FL (ref 78–100)
O2/TOTAL GAS SETTING VFR VENT: ABNORMAL %
OXYHGB MFR BLD: 97 % (ref 92–100)
OXYHGB MFR BLDV: 78 %
OXYHGB MFR BLDV: 78 %
PCO2 BLD: 60 MM HG (ref 35–45)
PCO2 BLDV: 65 MM HG (ref 40–50)
PCO2 BLDV: 69 MM HG (ref 40–50)
PH BLD: 7.39 PH (ref 7.35–7.45)
PH BLDV: 7.35 PH (ref 7.32–7.43)
PH BLDV: 7.37 PH (ref 7.32–7.43)
PLATELET # BLD AUTO: 128 10E9/L (ref 150–450)
PO2 BLD: 96 MM HG (ref 80–105)
PO2 BLDV: 45 MM HG (ref 25–47)
PO2 BLDV: 46 MM HG (ref 25–47)
POTASSIUM SERPL-SCNC: 3.4 MMOL/L (ref 3.4–5.3)
POTASSIUM SERPL-SCNC: 3.7 MMOL/L (ref 3.4–5.3)
POTASSIUM SERPL-SCNC: 4.2 MMOL/L (ref 3.4–5.3)
RBC # BLD AUTO: 4.61 10E12/L (ref 3.8–5.2)
SODIUM SERPL-SCNC: 142 MMOL/L (ref 133–144)
TROPONIN I SERPL-MCNC: 0.32 UG/L (ref 0–0.04)
WBC # BLD AUTO: 8.9 10E9/L (ref 4–11)

## 2018-05-19 PROCEDURE — 25000128 H RX IP 250 OP 636: Performed by: SURGERY

## 2018-05-19 PROCEDURE — 84484 ASSAY OF TROPONIN QUANT: CPT | Performed by: INTERNAL MEDICINE

## 2018-05-19 PROCEDURE — 40000008 ZZH STATISTIC AIRWAY CARE

## 2018-05-19 PROCEDURE — 94640 AIRWAY INHALATION TREATMENT: CPT

## 2018-05-19 PROCEDURE — 94003 VENT MGMT INPAT SUBQ DAY: CPT

## 2018-05-19 PROCEDURE — 25000125 ZZHC RX 250: Performed by: INTERNAL MEDICINE

## 2018-05-19 PROCEDURE — 94640 AIRWAY INHALATION TREATMENT: CPT | Mod: 76

## 2018-05-19 PROCEDURE — 83605 ASSAY OF LACTIC ACID: CPT | Performed by: INTERNAL MEDICINE

## 2018-05-19 PROCEDURE — 25000132 ZZH RX MED GY IP 250 OP 250 PS 637: Mod: GY | Performed by: INTERNAL MEDICINE

## 2018-05-19 PROCEDURE — A9270 NON-COVERED ITEM OR SERVICE: HCPCS | Mod: GY | Performed by: INTERNAL MEDICINE

## 2018-05-19 PROCEDURE — 85027 COMPLETE CBC AUTOMATED: CPT | Performed by: INTERNAL MEDICINE

## 2018-05-19 PROCEDURE — 84132 ASSAY OF SERUM POTASSIUM: CPT | Performed by: SURGERY

## 2018-05-19 PROCEDURE — 36600 WITHDRAWAL OF ARTERIAL BLOOD: CPT

## 2018-05-19 PROCEDURE — 99291 CRITICAL CARE FIRST HOUR: CPT | Performed by: INTERNAL MEDICINE

## 2018-05-19 PROCEDURE — 20000003 ZZH R&B ICU

## 2018-05-19 PROCEDURE — 99232 SBSQ HOSP IP/OBS MODERATE 35: CPT | Performed by: INTERNAL MEDICINE

## 2018-05-19 PROCEDURE — 25000128 H RX IP 250 OP 636: Performed by: INTERNAL MEDICINE

## 2018-05-19 PROCEDURE — 80048 BASIC METABOLIC PNL TOTAL CA: CPT | Performed by: INTERNAL MEDICINE

## 2018-05-19 PROCEDURE — 25000125 ZZHC RX 250: Performed by: SURGERY

## 2018-05-19 PROCEDURE — 83735 ASSAY OF MAGNESIUM: CPT | Performed by: INTERNAL MEDICINE

## 2018-05-19 PROCEDURE — 40000275 ZZH STATISTIC RCP TIME EA 10 MIN

## 2018-05-19 PROCEDURE — 82805 BLOOD GASES W/O2 SATURATION: CPT | Performed by: INTERNAL MEDICINE

## 2018-05-19 PROCEDURE — 00000146 ZZHCL STATISTIC GLUCOSE BY METER IP

## 2018-05-19 RX ORDER — HALOPERIDOL 5 MG/ML
2 INJECTION INTRAMUSCULAR EVERY 6 HOURS
Status: DISCONTINUED | OUTPATIENT
Start: 2018-05-19 | End: 2018-05-21

## 2018-05-19 RX ORDER — HALOPERIDOL 5 MG/ML
5 INJECTION INTRAMUSCULAR ONCE
Status: COMPLETED | OUTPATIENT
Start: 2018-05-19 | End: 2018-05-19

## 2018-05-19 RX ORDER — POTASSIUM CHLORIDE 1.5 G/1.58G
20-40 POWDER, FOR SOLUTION ORAL
Status: DISCONTINUED | OUTPATIENT
Start: 2018-05-19 | End: 2018-05-26 | Stop reason: HOSPADM

## 2018-05-19 RX ORDER — FUROSEMIDE 10 MG/ML
20 INJECTION INTRAMUSCULAR; INTRAVENOUS DAILY
Status: DISCONTINUED | OUTPATIENT
Start: 2018-05-19 | End: 2018-05-19

## 2018-05-19 RX ORDER — POTASSIUM CHLORIDE 29.8 MG/ML
20 INJECTION INTRAVENOUS
Status: DISCONTINUED | OUTPATIENT
Start: 2018-05-19 | End: 2018-05-26 | Stop reason: HOSPADM

## 2018-05-19 RX ORDER — POTASSIUM CHLORIDE 7.45 MG/ML
10 INJECTION INTRAVENOUS
Status: DISCONTINUED | OUTPATIENT
Start: 2018-05-19 | End: 2018-05-26 | Stop reason: HOSPADM

## 2018-05-19 RX ORDER — PROPOFOL 10 MG/ML
5-75 INJECTION, EMULSION INTRAVENOUS CONTINUOUS
Status: DISCONTINUED | OUTPATIENT
Start: 2018-05-19 | End: 2018-05-20

## 2018-05-19 RX ORDER — POTASSIUM CL/LIDO/0.9 % NACL 10MEQ/0.1L
10 INTRAVENOUS SOLUTION, PIGGYBACK (ML) INTRAVENOUS
Status: DISCONTINUED | OUTPATIENT
Start: 2018-05-19 | End: 2018-05-26 | Stop reason: HOSPADM

## 2018-05-19 RX ORDER — PROPOFOL 10 MG/ML
10-20 INJECTION, EMULSION INTRAVENOUS EVERY 30 MIN PRN
Status: DISCONTINUED | OUTPATIENT
Start: 2018-05-19 | End: 2018-05-20

## 2018-05-19 RX ORDER — MAGNESIUM SULFATE HEPTAHYDRATE 40 MG/ML
4 INJECTION, SOLUTION INTRAVENOUS EVERY 4 HOURS PRN
Status: DISCONTINUED | OUTPATIENT
Start: 2018-05-19 | End: 2018-05-26 | Stop reason: HOSPADM

## 2018-05-19 RX ORDER — POTASSIUM CHLORIDE 1500 MG/1
20-40 TABLET, EXTENDED RELEASE ORAL
Status: DISCONTINUED | OUTPATIENT
Start: 2018-05-19 | End: 2018-05-26 | Stop reason: HOSPADM

## 2018-05-19 RX ADMIN — IPRATROPIUM BROMIDE AND ALBUTEROL SULFATE 3 ML: .5; 3 SOLUTION RESPIRATORY (INHALATION) at 20:14

## 2018-05-19 RX ADMIN — CEFTAZIDIME 2 G: 2 INJECTION, POWDER, FOR SOLUTION INTRAVENOUS at 10:21

## 2018-05-19 RX ADMIN — Medication 6.25 MG: at 21:15

## 2018-05-19 RX ADMIN — POTASSIUM CHLORIDE 20 MEQ: 400 INJECTION, SOLUTION INTRAVENOUS at 07:48

## 2018-05-19 RX ADMIN — IPRATROPIUM BROMIDE AND ALBUTEROL SULFATE 3 ML: .5; 3 SOLUTION RESPIRATORY (INHALATION) at 02:39

## 2018-05-19 RX ADMIN — PROPOFOL 40 MCG/KG/MIN: 10 INJECTION, EMULSION INTRAVENOUS at 05:35

## 2018-05-19 RX ADMIN — MONTELUKAST SODIUM 10 MG: 10 TABLET, FILM COATED ORAL at 21:15

## 2018-05-19 RX ADMIN — Medication 2 G: at 06:34

## 2018-05-19 RX ADMIN — HALOPERIDOL LACTATE 5 MG: 5 INJECTION, SOLUTION INTRAMUSCULAR at 10:19

## 2018-05-19 RX ADMIN — CHLORHEXIDINE GLUCONATE 15 ML: 1.2 RINSE ORAL at 21:16

## 2018-05-19 RX ADMIN — IPRATROPIUM BROMIDE AND ALBUTEROL SULFATE 3 ML: .5; 3 SOLUTION RESPIRATORY (INHALATION) at 23:27

## 2018-05-19 RX ADMIN — CEFTAZIDIME 2 G: 2 INJECTION, POWDER, FOR SOLUTION INTRAVENOUS at 18:46

## 2018-05-19 RX ADMIN — SODIUM CHLORIDE, POTASSIUM CHLORIDE, SODIUM LACTATE AND CALCIUM CHLORIDE 50 ML/HR: 600; 310; 30; 20 INJECTION, SOLUTION INTRAVENOUS at 19:24

## 2018-05-19 RX ADMIN — MULTIVITAMIN 15 ML: LIQUID ORAL at 18:46

## 2018-05-19 RX ADMIN — HYDROMORPHONE HYDROCHLORIDE 0.2 MG: 1 INJECTION, SOLUTION INTRAMUSCULAR; INTRAVENOUS; SUBCUTANEOUS at 05:30

## 2018-05-19 RX ADMIN — IPRATROPIUM BROMIDE AND ALBUTEROL SULFATE 3 ML: .5; 3 SOLUTION RESPIRATORY (INHALATION) at 15:39

## 2018-05-19 RX ADMIN — IPRATROPIUM BROMIDE AND ALBUTEROL SULFATE 3 ML: .5; 3 SOLUTION RESPIRATORY (INHALATION) at 11:57

## 2018-05-19 RX ADMIN — ATORVASTATIN CALCIUM 10 MG: 10 TABLET, FILM COATED ORAL at 21:15

## 2018-05-19 RX ADMIN — PROPOFOL 20 MCG/KG/MIN: 10 INJECTION, EMULSION INTRAVENOUS at 19:04

## 2018-05-19 RX ADMIN — METHYLPREDNISOLONE SODIUM SUCCINATE 40 MG: 40 INJECTION, POWDER, FOR SOLUTION INTRAMUSCULAR; INTRAVENOUS at 08:53

## 2018-05-19 RX ADMIN — PROPOFOL 30 MCG/KG/MIN: 10 INJECTION, EMULSION INTRAVENOUS at 19:25

## 2018-05-19 RX ADMIN — IPRATROPIUM BROMIDE AND ALBUTEROL SULFATE 3 ML: .5; 3 SOLUTION RESPIRATORY (INHALATION) at 07:53

## 2018-05-19 RX ADMIN — SODIUM CHLORIDE 500 ML: 9 INJECTION, SOLUTION INTRAVENOUS at 12:03

## 2018-05-19 RX ADMIN — DEXMEDETOMIDINE HYDROCHLORIDE 0.7 MCG/KG/HR: 100 INJECTION, SOLUTION INTRAVENOUS at 09:51

## 2018-05-19 RX ADMIN — POTASSIUM CHLORIDE 20 MEQ: 400 INJECTION, SOLUTION INTRAVENOUS at 01:59

## 2018-05-19 RX ADMIN — FUROSEMIDE 40 MG: 10 INJECTION, SOLUTION INTRAVENOUS at 00:04

## 2018-05-19 RX ADMIN — HYDROMORPHONE HYDROCHLORIDE 0.2 MG: 1 INJECTION, SOLUTION INTRAMUSCULAR; INTRAVENOUS; SUBCUTANEOUS at 12:11

## 2018-05-19 RX ADMIN — HALOPERIDOL LACTATE 5 MG: 5 INJECTION, SOLUTION INTRAMUSCULAR at 07:52

## 2018-05-19 RX ADMIN — CLOPIDOGREL 75 MG: 75 TABLET, FILM COATED ORAL at 08:53

## 2018-05-19 RX ADMIN — HYDROMORPHONE HYDROCHLORIDE 0.2 MG: 1 INJECTION, SOLUTION INTRAMUSCULAR; INTRAVENOUS; SUBCUTANEOUS at 21:15

## 2018-05-19 RX ADMIN — HALOPERIDOL LACTATE 2 MG: 5 INJECTION, SOLUTION INTRAMUSCULAR at 21:20

## 2018-05-19 RX ADMIN — RANITIDINE HYDROCHLORIDE 150 MG: 150 SOLUTION ORAL at 09:20

## 2018-05-19 RX ADMIN — HYDROMORPHONE HYDROCHLORIDE 0.2 MG: 1 INJECTION, SOLUTION INTRAMUSCULAR; INTRAVENOUS; SUBCUTANEOUS at 01:19

## 2018-05-19 RX ADMIN — CHLORHEXIDINE GLUCONATE 15 ML: 1.2 RINSE ORAL at 08:14

## 2018-05-19 RX ADMIN — DEXMEDETOMIDINE HYDROCHLORIDE 0.5 MCG/KG/HR: 100 INJECTION, SOLUTION INTRAVENOUS at 21:54

## 2018-05-19 RX ADMIN — CEFTAZIDIME 2 G: 2 INJECTION, POWDER, FOR SOLUTION INTRAVENOUS at 01:19

## 2018-05-19 RX ADMIN — ASPIRIN 81 MG: 81 TABLET, COATED ORAL at 08:53

## 2018-05-19 RX ADMIN — RANITIDINE HYDROCHLORIDE 150 MG: 150 SOLUTION ORAL at 21:15

## 2018-05-19 RX ADMIN — HALOPERIDOL LACTATE 2 MG: 5 INJECTION, SOLUTION INTRAMUSCULAR at 14:52

## 2018-05-19 NOTE — PROGRESS NOTES
[facility] ICU RESPIRATORY NOTE  Date of Admission: 5/17/2018  Date of Intubation (most recent):5/17/18  Reason for Mechanical Ventilation:Respiratory failure  Number of Days on Mechanical Ventilation:3  Met Criteria for Pressure Support Trial:Yes  Length of Pressure Support Trial:PS 10/5 3.5 hrs, 15/5 45 minutes, 7/5 3hrs. Settings changed per Dr. Pitts.  Reason for Stopping Pressure Support Trial:RR in the 30's    Ventilation Mode: CMV/AC  (Continuous Mandatory Ventilation/ Assist Control)  FiO2 (%): 35 %  Rate Set (breaths/minute): 10 breaths/min  Tidal Volume Set (mL): 400 mL  PEEP (cm H2O): 5 cmH2O  Pressure Support (cm H2O): 7 cmH2O  Oxygen Concentration (%): 35 %  Resp: 10      Significant Events Today:ABG done while pt was on PS 7/5    ABG Results:    Recent Labs  Lab 05/19/18  1400 05/17/18  0830 05/17/18  0535 05/17/18  0436   PH 7.39 7.33*  --   --    PCO2 60* 55*  --   --    PO2 96 171*  --   --    HCO3 36* 29*  --   --    O2PER VENT 35% 50 100 100         ETT appearance on chest x-ray: In good position.    Plan:  Will cont full vent support overnight and assess for another PS trial in the morning.  5/19/2018  Gali Leger RRT

## 2018-05-19 NOTE — PROGRESS NOTES
Gilberto's test performed on right wrist prior to radial ABG draw. Collateral circulation confirmed. ABG was then drawn.  5/19/2018  Gali Leger RRT

## 2018-05-19 NOTE — PLAN OF CARE
Problem: Cardiac: ACS (Acute Coronary Syndrome) (Adult)  Goal: Signs and Symptoms of Listed Potential Problems Will be Absent, Minimized or Managed (Cardiac: ACS)  Signs and symptoms of listed potential problems will be absent, minimized or managed by discharge/transition of care (reference Cardiac: ACS (Acute Coronary Syndrome) (Adult) CPG).  Outcome: No Change  [facility]   Intensive Care Unit   Nursing Note    Vital signs stable during the day.  PERRL.  Moves all extremities, spontaneously. Pt had a drug alluding stent placed in the mid Circ today in cath lab.  Tolerating ventilator on propofol at 40 and precedex at 0.4. Pt had low 150-180 volumes when vent wean was tried. Pt is on full vent support. Labs noted.  Continue to monitor closely.    Lab Results   Component Value Date    TROPI 0.506 () 05/18/2018    TROPI 0.566 () 05/17/2018    TROPI 0.501 () 05/17/2018    TROPI 0.366 () 05/17/2018    TROPI 0.235 () 03/22/2018    TROPONIN 0.00 08/18/2013       ROUTINE IP LABS (Last four results)  BMP  Recent Labs  Lab 05/18/18  0430 05/17/18  0436    137   POTASSIUM 3.8 4.8   CHLORIDE 102 100   CALDERON 7.9* 8.4*   CO2 26 30   BUN 15 14   CR 0.70 0.70   * 148*     INR  Recent Labs  Lab 05/17/18  0911   INR 0.91       Donn Vasquez RN

## 2018-05-19 NOTE — CONSULTS
"CLINICAL NUTRITION SERVICES  -  ASSESSMENT NOTE      Recommendations Ordered by Registered Dietitian (RD): Replete with Fiber at 15 mL/hr to provide:  360 kcal, 23 g protein (0.5 g/kg), 45 g CHO, 5 g fiber, 299 mL H2O  Total with Propofol = 508 kcal (11 kcal/kg)  Flushes 60 mL every 4 hours  Certavite daily   Future/Additional Recommendations: Recommend eventual preliminary goal of Replete with Fiber at 35 mL/hr to provide:  840 kcal, 54 g protein (1.2 g/kg), 104 g CHO, 697 mL H2O   Total with Propofol = 988 kcal (22 kcal/kg)   Malnutrition:   % Weight Loss:  Up to 20% in 1 year (non-severe malnutrition)  % Intake:  </= 50% for >/= 1 month (severe malnutrition)  Subcutaneous Fat Loss:  None observed  Muscle Loss:  Temporal region mild-moderate depletion  Fluid Retention:  None noted    Malnutrition Diagnosis: Non-Severe malnutrition  In Context of:  Acute illness or injury  Chronic illness or disease        REASON FOR ASSESSMENT  Preeti Ford is a 67 year old female seen by Registered Dietitian for Provider Order - Registered Dietitian to Assess and Order TF per Medical Nutrition protocol      NUTRITION HISTORY  - Information obtained from  [place] as patient intubated and sedated.  He states that she has had \"trouble gaining weight\".  Feels like over the last year she has been eating < 50% of her normal intake.  Patient has been taking Ensure or [place] Breakfast Essentials per  and typically will drink 2 per day.  Her primary issue has been poor appetite but he also believes her breathing issues have contributed as well.       CURRENT NUTRITION ORDERS  Diet Order:     NPO day #3    Current Intake/Tolerance:  N/A      PHYSICAL FINDINGS  Observed  Muscle Wasting - Temporal   Obtained from Chart/Interdisciplinary Team  \"Chronically ill appearing\"    ANTHROPOMETRICS  Height: 5'3\"  Weight: 45.1 kg (99#)(5/19)  Body mass index is 17.57 kg/(m^2)  Weight Status:  Underweight BMI <18.5  IBW: 52.3 kg   % " IBW: 86%  Weight History:   Wt Readings from Last 10 Encounters:   05/19/18 45.1 kg (99 lb 6.8 oz)   05/16/18 46.9 kg (103 lb 6.4 oz)   04/19/18 49.1 kg (108 lb 3.2 oz)   04/13/18 44.8 kg (98 lb 12.8 oz)   04/02/18 48.3 kg (106 lb 6.4 oz)   03/25/18 49.8 kg (109 lb 12.6 oz)   12/20/17 48.7 kg (107 lb 6.4 oz)   12/06/17 50.7 kg (111 lb 12.8 oz)   08/04/17 50.2 kg (110 lb 9.6 oz)   06/23/17 53.8 kg (118 lb 9.6 oz)   Down 19# or 16% over the last year       LABS  Labs reviewed    MEDICATIONS  Propofol at 5.6 mL/hr= 148 kcal   LR at 50 mL/hr   Norepi drip       ASSESSED NUTRITION NEEDS PER APPROVED PRACTICE GUIDELINES:    Dosing Weight 45.1 kg   2/3 Needs 1st week on TF:  890-1040 kcal (20-23 kcal/kg)  40-53 grams (0.9-1.2 g/kg)  Estimated Energy Needs: 3731-3695 kcals (30-35 Kcal/Kg)  Justification: repletion and vented  Estimated Protein Needs: 60-80 grams protein (1.3-1.8 g pro/Kg)  Justification: repletion  Estimated Fluid Needs: 8721-1739 mL (1 mL/Kcal)  Justification: maintenance    MALNUTRITION:  % Weight Loss:  Up to 20% in 1 year (non-severe malnutrition)  % Intake:  </= 50% for >/= 1 month (severe malnutrition)  Subcutaneous Fat Loss:  None observed  Muscle Loss:  Temporal region mild-moderate depletion  Fluid Retention:  None noted    Malnutrition Diagnosis: Non-Severe malnutrition  In Context of:  Acute illness or injury  Chronic illness or disease    NUTRITION DIAGNOSIS:  Inadequate protein-energy intake related to NPO on vent as evidenced by meeting 0% protein and 15% preliminary energy needs via Propofol       NUTRITION INTERVENTIONS  Recommendations / Nutrition Prescription  Replete with Fiber at 15 mL/hr to provide:  360 kcal, 23 g protein (0.5 g/kg), 45 g CHO, 5 g fiber, 299 mL H2O  Total with Propofol = 508 kcal (11 kcal/kg)  Flushes 60 mL every 4 hours  Certavite daily    Recommend eventual preliminary goal of Replete with Fiber at 35 mL/hr to provide:  840 kcal, 54 g protein (1.2 g/kg), 104 g CHO,  697 mL H2O   Total with Propofol = 988 kcal (22 kcal/kg)    Implementation  Nutrition education: Not appropriate at this time due to patient condition  EN Composition, EN Schedule, Feeding Tube Flush and Multivitamin/Mineral:  Orders placed to begin Replete with Fiber at 15 mL/hr, do not advance.  Flushes and MVI ordered as above.    Nutrition Goals  Patient will meet preliminary needs within the next 2-3 days     MONITORING AND EVALUATION:  Progress towards goals will be monitored and evaluated per protocol and Practice Guidelines    Melinda Yepez RD, LD, CNSC   Clinical Dietitian - [facility]

## 2018-05-19 NOTE — PROGRESS NOTES
[facility]  Comprehensive Daily ICU Note        Preeti Ford MRN# [id number]   Age: 67 year old YOB: 1950     Date of Admission: 5/17/2018    Primary care provider: Deisy Carter     CODE STATUS: FULL      Problem List:   Severe COPD with exacerbation. Not doing well enough for extubation today.  NSTEMI - in stent restenosis found on angiogram  Delirium            Treatment goals for next 24 hours:   Lung protective ventilation with daily PS trials  Evaluate for ACS  COPD treatment  Delirium treatment    Summer Pitts MD      Subjective/ Last 24 hours:   HPI: History obtained from chart as patient intubated and sedated. 66 yo woman with known severe COPD and coronary artery disease who visited her clinic 5/15afternoon complaining of a few days of shortness of breath. Given steroids and antibiotics and sent home. Presented to the ED very early the next morning (5/16) in respiratory extremis.  Had EKG changes as well as severe hypercapnia.  Intubated and transferred here.  Aunt is bedside and tells me she spoke with her a couple of days ago and she seemed very well at that time.  Of note patient has significant history of coronary artery disease with stents to Cirucumflex and LAD in 2015 in setting of recent STEMI.  Of note, presentation noted at that time seems similar to presentation today with subacute respiratory symptoms.     Past Medical History:   Diagnosis Date     Arthritis      COPD (chronic obstructive pulmonary disease) (H)      Coronary artery disease      CVA (cerebral vascular accident) (H) 8-     Hypertension    Severe obstruction seen on PFTs in 2017    Social History   Substance Use Topics     Smoking status: Current Every Day Smoker     Packs/day: 0.50     Types: Cigarettes     Smokeless tobacco: Never Used      Comment: patient states she is working on it      Alcohol use No     Family History   Problem Relation Age of Onset      CEREBROVASCULAR DISEASE Mother      CEREBROVASCULAR DISEASE Father      Genitourinary Problems Brother      Dialysis     Past Surgical History:   Procedure Laterality Date     APPENDECTOMY       BYPASS GRAFT AORTOILIAC N/A 3/7/2017    Procedure: BYPASS GRAFT AORTOILIAC;  Surgeon: Marcos Pratt MD;  Location: [facility] OR     SALPINGO OOPHORECTOMY,R/L/DELORES      Salpingo Oophorectomy, RT/LT/DELORES                 Mechanical Ventilation/Vitalsigns/IsandOs:   Temp:  [96.3  F (35.7  C)-98.8  F (37.1  C)] 98.8  F (37.1  C)  Heart Rate:  [71-86] 71  Resp:  [7-29] 11  BP: ()/(42-88) 86/49  FiO2 (%):  [35 %] 35 %  SpO2:  [92 %-100 %] 97 %      Intake/Output Summary (Last 24 hours) at 05/19/18 1141  Last data filed at 05/19/18 0846   Gross per 24 hour   Intake          1856.38 ml   Output             4990 ml   Net         -3133.62 ml   -2.7L      Ventilation Mode: CMV/AC  (Continuous Mandatory Ventilation/ Assist Control)  FiO2 (%): 35 %  Rate Set (breaths/minute): 14 breaths/min  Tidal Volume Set (mL): 400 mL  PEEP (cm H2O): 5 cmH2O  Oxygen Concentration (%): 35 %  Resp: 11    Day since 5/16    Peak airway pressure: 24  Auto-PEEP:  Small amount         Physical Examination:     General: Stated age, intubated, chronically ill appearing  HEENT: ET tube, KELSEY  Lungs: LCTAB anteriorly  CVS: RRR, distant  Abdomen: soft, bowel sounds, non tender  Extremities/musculoskeletal: unable to palpate pulses in lower extremities and cool, palpable pulses in upper extremities and warm, no edema, bruising in left groin  Neurology: intermittently very sedated and very agitated  Skin: no abnormalities  Psychiatry: unable  Exam of Line sites:  Right central line in IJ looks good      Hospital Procedures   TTE  Central line   CXRs  AXR  Angiogram  EKGs         Feeding/Glucose:   NPO    Blood glucose/Insulin requirement last 24 hours: none         Medications:       aspirin  81 mg Oral Daily     atorvastatin  10 mg Oral At Bedtime      cefTAZidime  2 g Intravenous Q8H     chlorhexidine  15 mL Mouth/Throat Q12H     clopidogrel  75 mg Oral or Feeding Tube Daily     famotidine  20 mg Intravenous Q12H     haloperidol lactate  5 mg Intravenous Once     insulin aspart  1-4 Units Subcutaneous Q4H     iopamidol  100 mL INTRA-ARTERIAL Once     ipratropium - albuterol 0.5 mg/2.5 mg/3 mL  3 mL Nebulization Q4H     methylPREDNISolone  40 mg Intravenous Daily     metoprolol tartrate  6.25 mg Oral BID     montelukast  10 mg Oral At Bedtime     polyethylene glycol  17 g Oral Daily        - MEDICATION INSTRUCTIONS -       dexmedetomidine 0.7 mcg/kg/hr (18 0951)     lactated ringers 50 mL/hr at 18 0714     norepinephrine 0.12 mcg/kg/min (18 0858)     Percutaneous Coronary Intervention orders placed (this is information for BPA alerting)       propofol (DIPRIVAN) infusion Stopped (18 0846)     Reason ACE/ARB/ARNI order not selected       Reason beta blocker order not selected                Labs/Diagnostic studies:     EKst EKG:  ST elevation leads V2-V4, ST depression inferior leads             2nd EKG: resolution of ST depression, less ST depression inferior leads    Echo:  pending    ROUTINE ICU LABS (Last four results)  CMP    Recent Labs  Lab 18  0545 18  0124 18  0430 18  0911 18  0436     --  139  --  137   POTASSIUM 3.7 3.4 3.8  --  4.8   CHLORIDE 100  --  102  --  100   CO2 37*  --  26  --  30   ANIONGAP 5  --   --   --  7   GLC 90  --  110*  --  148*   BUN 12  --  15  --  14   CR 0.63  --  0.70  --  0.70   GFRESTIMATED >90  --  84  --  85   GFRESTBLACK >90  --  >90  --  >90   CALDERON 8.1*  --  7.9*  --  8.4*   MAG 1.8  --  2.2 1.9  --    PHOS  --   --   --  3.7  --    PROTTOTAL  --   --   --   --  7.7   ALBUMIN  --   --   --   --  3.6   BILITOTAL  --   --   --   --  0.4   ALKPHOS  --   --   --   --  90   AST  --   --   --   --  54*   ALT  --   --   --   --  48     CBC    Recent Labs  Lab  05/19/18  0545 05/17/18  0911 05/17/18  0436   WBC 8.9 7.2 9.9   RBC 4.61 5.14 5.88*   HGB 13.6 15.3 17.4*   HCT 41.5 46.3 52.7*   MCV 90 90 90   MCH 29.5 29.8 29.6   MCHC 32.8 33.0 33.0   RDW 13.6 13.5 13.9   * 119*  119* 192     INR    Recent Labs  Lab 05/17/18  0911   INR 0.91     Arterial Blood Gas    Recent Labs  Lab 05/17/18  0830 05/17/18  0535 05/17/18  0436   PH 7.33*  --   --    PCO2 55*  --   --    PO2 171*  --   --    HCO3 29*  --   --    O2PER 50 100 100       Other Lab Data:  Troponin: peaked at 0.566  BNP: 08849    Cultures:    Recent Labs  Lab 05/17/18  1020 05/17/18  0530   CULT Light growthNormal melany to date  Moderate growthNon lactose fermenting gram negative rods* No growth after 1 day  No growth after 1 day     Blood culture:  Invalid input(s): BC   Urine culture:  No results for input(s): URC in the last 168 hours.          Imaging:     CXR:  Hyperinflated, possible infiltrates in lower lung fields; right more than left    CT:  Emphysematous changes seen on CT scan in 2017 along with granuloma         Assessment and Plan:     Summary:  Preeti Ford is a 67 year old female admitted on 5/17/2018 for acute respiratory failure.  I have personally reviewed the daily labs, imaging studies, cultures and discussed the case with referring physician and consulting physicians. My assessment and plan as follows:    Neurology and Psychiatry:  Needs sedation to tolerate ventilator and appears to be delirious.  - Propofol infusion and narcotic bumps.  - Dexmedetomidine and Haloperidol  - If respiratory status allows may need to quickly wean sedation and extubate    Pulmonary:   Acute hypoxic and hypercapnic respiratory failure secondary to COPD exacerbation, and probably NSTEMI and ischemic cardiomyopathy. Initially very hypercapnic but has essentially resolved and CO2 is likely near baseline. No longer hypoxic. Slight amount of air trapping seen on ventilator. Sputum culture with GNR. On  pressure support trial is tachypneic on 10/5 and worse when support is reduced  - Pressure support trial with higher levels of support.   - Steroids, bronchodilators, antibiotics  - Await final sputum culture  - Stent as below.    Cardiovascular system:   ST elevation on initial EKG and troponin leak on initial lab. Presentation this admission similar to presentation in 2015 with resolving MI. Angiogram done today found in stent restenosis of Circumflex. Was re stented. EF had recovered but now severely reduced again.  Initially given diuretics - hypotensive now with increased pressors needs  - Dual antiplatelet therapy  - Statin and BB as per outpatient regimen when blood pressure allows  - Small IVF bolus    Renal/Electrolytes:  No acute issues.   - LR infusion until on tube feeds.    ID:  GNR growing in sputum   - Continue Ceftazidime. Tailor antibiotics when able.    GI/:  No issues  - Continue outpatient Miralax for now.    Nutrition:   Nutrition consultation as does not appear she will be extubated in the near future.     Musculoskeletal/Rheumatology:  No acute issues.   - Start out of bed    Endocrine:   No current issues  - Monitor sugars  - Steroid burst ordered    Heme/Onc:  Polycythemia secondary to chronic hypoxia. Mild thrombocytopenia  - Resume dual antiplatelet therapy    ICU prophylaxis:      DVT: heparin     VAP: As above     Stress ulcer: Ranitidine     Restraints needed: yes     Lines   Central Line/PICC - placed 5/16 needed: y   Arterial line - placed n needed: n   Barnhart catheter.  Needed: y       Prognosis:  Guarded    Family update:   and aunt yesterday    Billing: total time spend providing critical care was 50 min, excluding procedure time.    Summer Pitts MD  [phone]

## 2018-05-19 NOTE — PROVIDER NOTIFICATION
Notified Person: MD Jiménez     Notification Date/Time: 5/19/2018 0115    Notification Interaction: In person via med student    Purpose of Notification: to replace K    Orders Received: yes    Comments:  K replacement protocol initiated

## 2018-05-19 NOTE — PROGRESS NOTES
[facility]    Cardiology Progress Note     Assessment & Plan   Preeti Ford is a 67 year old female admitted on 5/17/2018 for acute respiratory failure due to a severe COPD exacerbation.  Patient was found to have an overlapping NSTEMI.  She is now s/p successful PCI of a mid-circumflex ISR.      Summary:       NSTEMI s/p successful ROWDY to mid circumflex in-stent restenosis on 5/17/18    Coronary Artery Disease with a history of MI (2015)    Ischemic Cardiomyopathy, LVEF 25-30% on latest TTE    History of LV thrombus    Acute hypoxic and hypercapnic respiratory failure secondary to COPD exacerbation    Peripheral arterial disease with a history of aortoiliac bypassbypass     COPD    HL    HTN    Former smoker    Prior CVA  - Continue with dual antiplatelet therapy  - Continue BB upward titration as tolerated      --- continue metoprolol 6.25 mg BID for now  - After weaning pressors would introduce low dose ACEI      --- when able, recommend initiating lisinopril 5 mg Qdaily  - Plan for limited echocardiogram prior to discharge    Carroll Eduardo MD    Interval History   Patient remains intubated on SCMV mode ventilation.  Patient continues to have a low-dose pressor requirement.    Physical Exam   Temp: 97.9  F (36.6  C) Temp src: Esophageal BP: 144/74   Heart Rate: 75 Resp: 10 SpO2: 96 % O2 Device: Mechanical Ventilator    Vitals:    05/18/18 0600 05/19/18 0600   Weight: 46 kg (101 lb 6.6 oz) 45.1 kg (99 lb 6.8 oz)     Vital Signs with Ranges  Temp:  [96.3  F (35.7  C)-98.8  F (37.1  C)] 97.9  F (36.6  C)  Heart Rate:  [67-86] 75  Resp:  [7-31] 10  BP: ()/(26-88) 144/74  FiO2 (%):  [35 %] 35 %  SpO2:  [88 %-100 %] 96 %  I/O last 3 completed shifts:  In: 2216.55 [I.V.:2216.55]  Out: 4900 [Urine:4750; Emesis/NG output:150]  Patient Active Problem List   Diagnosis     Advanced directives, counseling/discussion     Urinary incontinence     Forgetfulness     Hyperlipidemia LDL goal <130      Elevated blood pressure reading without diagnosis of hypertension     History of stroke - right thalamus in 8/2013 and left cerebellar and right vermis in 3/2018     Numbness and tingling     Tobacco use disorder     CVA (cerebral vascular accident) (H)     ACS (acute coronary syndrome) (H)     Ischemic cardiomyopathy - EF 25% in 2015 but 55% in 3/2017 and 45-50% on 3/18     HTN, goal below 130/80     Acute systolic congestive heart failure (H)     Lung nodule     GERD (gastroesophageal reflux disease)     ST elevation myocardial infarction involving left anterior descending (LAD) coronary artery (H)     Chronic bronchitis, unspecified chronic bronchitis type (H)     PAD (peripheral artery disease) (H) - moderate left common carotid stenosis, aortoiliac bypass, chronic left vertebral and right posterior cerebral stenoses     Cervical high risk HPV (human papillomavirus) test positive     Acute respiratory failure with hypoxia (H)     COPD exacerbation (H)     Coronary artery disease involving native coronary artery - STEMI with LAD and circ stents in 8/2015     Elevated troponin     Pulmonary emphysema (H)     Vertigo     Other seizures (H) - possible     Acute CVA (cerebrovascular accident) - right paramedian superior vermis and left cerebellar hemisphere     Malnutrition, unspecified type (H)     Hyponatremia     Hypochloremia     Hypercapnic respiratory failure (H)       General:  Intubated and sedated  Vessels:  Nonelevated JVP.    Heart:  Normal S1/2 No murmurs appreciated.  No S3 or S4.  No RV lift.    Lungs:  Profound wheezing bilaterally  Abdomen:  Soft, nontender, nondistended.   Extremities:  No edema.  Normal perfusion.    Medications     - MEDICATION INSTRUCTIONS -       dexmedetomidine 0.7 mcg/kg/hr (05/19/18 0951)     IV fluid REPLACEMENT ONLY       lactated ringers 50 mL/hr at 05/19/18 0714     norepinephrine 0.03 mcg/kg/min (05/19/18 1241)     Percutaneous Coronary Intervention orders placed (this is  information for BPA alerting)       propofol (DIPRIVAN) infusion 20 mcg/kg/min (05/19/18 1205)     Reason ACE/ARB/ARNI order not selected       Reason beta blocker order not selected         aspirin  81 mg Oral Daily     atorvastatin  10 mg Oral At Bedtime     cefTAZidime  2 g Intravenous Q8H     chlorhexidine  15 mL Mouth/Throat Q12H     clopidogrel  75 mg Oral or Feeding Tube Daily     haloperidol lactate  2 mg Intravenous Q6H     insulin aspart  1-4 Units Subcutaneous Q4H     iopamidol  100 mL INTRA-ARTERIAL Once     ipratropium - albuterol 0.5 mg/2.5 mg/3 mL  3 mL Nebulization Q4H     methylPREDNISolone  40 mg Intravenous Daily     metoprolol tartrate  6.25 mg Oral BID     montelukast  10 mg Oral At Bedtime     multivitamins with minerals  15 mL Per Feeding Tube Daily     polyethylene glycol  17 g Oral Daily     rantidine  150 mg Oral BID       Data   Results for orders placed or performed during the hospital encounter of 05/17/18 (from the past 24 hour(s))   Glucose by meter   Result Value Ref Range    Glucose 139 (H) 70 - 99 mg/dL   Activated clotting time POCT   Result Value Ref Range    Activated Clot Time 176 (H) 75 - 150 sec   Glucose by meter   Result Value Ref Range    Glucose 124 (H) 70 - 99 mg/dL   Glucose by meter   Result Value Ref Range    Glucose 105 (H) 70 - 99 mg/dL   Potassium   Result Value Ref Range    Potassium 3.4 3.4 - 5.3 mmol/L   Glucose by meter   Result Value Ref Range    Glucose 81 70 - 99 mg/dL   Troponin I   Result Value Ref Range    Troponin I ES 0.319 (HH) 0.000 - 0.045 ug/L   Basic metabolic panel   Result Value Ref Range    Sodium 142 133 - 144 mmol/L    Potassium 3.7 3.4 - 5.3 mmol/L    Chloride 100 94 - 109 mmol/L    Carbon Dioxide 37 (H) 20 - 32 mmol/L    Anion Gap 5 3 - 14 mmol/L    Glucose 90 70 - 99 mg/dL    Urea Nitrogen 12 7 - 30 mg/dL    Creatinine 0.63 0.52 - 1.04 mg/dL    GFR Estimate >90 >60 mL/min/1.7m2    GFR Estimate If Black >90 >60 mL/min/1.7m2    Calcium 8.1 (L)  8.5 - 10.1 mg/dL   CBC with platelets   Result Value Ref Range    WBC 8.9 4.0 - 11.0 10e9/L    RBC Count 4.61 3.8 - 5.2 10e12/L    Hemoglobin 13.6 11.7 - 15.7 g/dL    Hematocrit 41.5 35.0 - 47.0 %    MCV 90 78 - 100 fl    MCH 29.5 26.5 - 33.0 pg    MCHC 32.8 31.5 - 36.5 g/dL    RDW 13.6 10.0 - 15.0 %    Platelet Count 128 (L) 150 - 450 10e9/L   Magnesium   Result Value Ref Range    Magnesium 1.8 1.6 - 2.3 mg/dL   Glucose by meter   Result Value Ref Range    Glucose 97 70 - 99 mg/dL   Blood gas venous with oxyhemoglobin (1200)   Result Value Ref Range    Ph Venous 7.35 7.32 - 7.43 pH    PCO2 Venous 69 (H) 40 - 50 mm Hg    PO2 Venous 46 25 - 47 mm Hg    Bicarbonate Venous 38 (H) 21 - 28 mmol/L    Oxyhemoglobin Venous 78 %    Base Excess Venous 9.4 mmol/L   Lactic acid whole blood   Result Value Ref Range    Lactic Acid 2.1 (H) 0.7 - 2.0 mmol/L   Nutrition Services Adult IP Consult    Narrative    Melinda Yepez RD, LD     5/19/2018 12:48 PM  CLINICAL NUTRITION SERVICES  -  ASSESSMENT NOTE      Recommendations Ordered by Registered Dietitian (RD): Replete   with Fiber at 15 mL/hr to provide:  360 kcal, 23 g protein (0.5   g/kg), 45 g CHO, 5 g fiber, 299 mL H2O  Total with Propofol = 508 kcal (11 kcal/kg)  Flushes 60 mL every 4 hours  Certavite daily   Future/Additional Recommendations: Recommend eventual preliminary   goal of Replete with Fiber at 35 mL/hr to provide:  840 kcal, 54   g protein (1.2 g/kg), 104 g CHO, 697 mL H2O   Total with Propofol = 988 kcal (22 kcal/kg)   Malnutrition:   % Weight Loss:  Up to 20% in 1 year (non-severe malnutrition)  % Intake:  </= 50% for >/= 1 month (severe malnutrition)  Subcutaneous Fat Loss:  None observed  Muscle Loss:  Temporal region mild-moderate depletion  Fluid Retention:  None noted    Malnutrition Diagnosis: Non-Severe malnutrition  In Context of:  Acute illness or injury  Chronic illness or disease        REASON FOR ASSESSMENT  Preeti A Maliongas is a 67 year old  "female seen by Registered   Dietitian for Provider Order - Registered Dietitian to Assess and   Order TF per Medical Nutrition protocol      NUTRITION HISTORY  - Information obtained from  [place]ley as patient intubated and   sedated.  He states that she has had \"trouble gaining weight\".    Feels like over the last year she has been eating < 50% of her   normal intake.  Patient has been taking Ensure or Sipsey   Breakfast Essentials per  and typically will drink 2 per   day.  Her primary issue has been poor appetite but he also   believes her breathing issues have contributed as well.       CURRENT NUTRITION ORDERS  Diet Order:     NPO day #3    Current Intake/Tolerance:  N/A      PHYSICAL FINDINGS  Observed  Muscle Wasting - Temporal   Obtained from Chart/Interdisciplinary Team  \"Chronically ill appearing\"    ANTHROPOMETRICS  Height: 5'3\"  Weight: 45.1 kg (99#)(5/19)  Body mass index is 17.57 kg/(m^2)  Weight Status:  Underweight BMI <18.5  IBW: 52.3 kg   % IBW: 86%  Weight History:   Wt Readings from Last 10 Encounters:   05/19/18 45.1 kg (99 lb 6.8 oz)   05/16/18 46.9 kg (103 lb 6.4 oz)   04/19/18 49.1 kg (108 lb 3.2 oz)   04/13/18 44.8 kg (98 lb 12.8 oz)   04/02/18 48.3 kg (106 lb 6.4 oz)   03/25/18 49.8 kg (109 lb 12.6 oz)   12/20/17 48.7 kg (107 lb 6.4 oz)   12/06/17 50.7 kg (111 lb 12.8 oz)   08/04/17 50.2 kg (110 lb 9.6 oz)   06/23/17 53.8 kg (118 lb 9.6 oz)   Down 19# or 16% over the last year       LABS  Labs reviewed    MEDICATIONS  Propofol at 5.6 mL/hr= 148 kcal   LR at 50 mL/hr   Norepi drip       ASSESSED NUTRITION NEEDS PER APPROVED PRACTICE GUIDELINES:    Dosing Weight 45.1 kg   2/3 Needs 1st week on TF:  890-1040 kcal (20-23 kcal/kg)  40-53 grams (0.9-1.2 g/kg)  Estimated Energy Needs: 7300-4373 kcals (30-35 Kcal/Kg)  Justification: repletion and vented  Estimated Protein Needs: 60-80 grams protein (1.3-1.8 g pro/Kg)  Justification: repletion  Estimated Fluid Needs: 3419-3318 mL (1 " mL/Kcal)  Justification: maintenance    MALNUTRITION:  % Weight Loss:  Up to 20% in 1 year (non-severe malnutrition)  % Intake:  </= 50% for >/= 1 month (severe malnutrition)  Subcutaneous Fat Loss:  None observed  Muscle Loss:  Temporal region mild-moderate depletion  Fluid Retention:  None noted    Malnutrition Diagnosis: Non-Severe malnutrition  In Context of:  Acute illness or injury  Chronic illness or disease    NUTRITION DIAGNOSIS:  Inadequate protein-energy intake related to NPO on vent as   evidenced by meeting 0% protein and 15% preliminary energy needs   via Propofol       NUTRITION INTERVENTIONS  Recommendations / Nutrition Prescription  Replete with Fiber at 15 mL/hr to provide:  360 kcal, 23 g   protein (0.5 g/kg), 45 g CHO, 5 g fiber, 299 mL H2O  Total with Propofol = 508 kcal (11 kcal/kg)  Flushes 60 mL every 4 hours  Certavite daily    Recommend eventual preliminary goal of Replete with Fiber at 35   mL/hr to provide:  840 kcal, 54 g protein (1.2 g/kg), 104 g CHO,   697 mL H2O   Total with Propofol = 988 kcal (22 kcal/kg)    Implementation  Nutrition education: Not appropriate at this time due to patient   condition  EN Composition, EN Schedule, Feeding Tube Flush and   Multivitamin/Mineral:  Orders placed to begin Replete with Fiber   at 15 mL/hr, do not advance.  Flushes and MVI ordered as above.    Nutrition Goals  Patient will meet preliminary needs within the next 2-3 days     MONITORING AND EVALUATION:  Progress towards goals will be monitored and evaluated per   protocol and Practice Guidelines    Melinda Yepez, RD, LD, [facility]   Clinical Dietitian - [facility]                        Recent Labs  Lab 05/19/18  0545 05/19/18  0124 05/18/18  0430 05/17/18  2113 05/17/18  0911 05/17/18  0436   WBC 8.9  --   --   --  7.2 9.9   HGB 13.6  --   --   --  15.3 17.4*   MCV 90  --   --   --  90 90   *  --   --   --  119*  119* 192   INR  --   --   --   --  0.91  --      --   139  --   --  137   POTASSIUM 3.7 3.4 3.8  --   --  4.8   CHLORIDE 100  --  102  --   --  100   CO2 37*  --  26  --   --  30   BUN 12  --  15  --   --  14   CR 0.63  --  0.70  --   --  0.70   ANIONGAP 5  --   --   --   --  7   CALDERON 8.1*  --  7.9*  --   --  8.4*   GLC 90  --  110*  --   --  148*   ALBUMIN  --   --   --   --   --  3.6   PROTTOTAL  --   --   --   --   --  7.7   BILITOTAL  --   --   --   --   --  0.4   ALKPHOS  --   --   --   --   --  90   ALT  --   --   --   --   --  48   AST  --   --   --   --   --  54*   TROPI 0.319*  --  0.506* 0.566* 0.501* 0.366*     No results found for this or any previous visit (from the past 24 hour(s)).

## 2018-05-19 NOTE — PLAN OF CARE
Problem: Patient Care Overview  Goal: Plan of Care/Patient Progress Review  Outcome: No Change  Pts remained on full vent support overnight. Sedated with propofol and precedex gtt. Blood pressure supported on a very low dose of levophed. Coarse lung sounds. No BM and adequate UOP per renae as a result of lasix 40 mg. Pts gets episodes of agitation intermittently. Prn dilaudid given. Electrolytes monitored and replaced overnight per protocol. Plan sedation vacation, vent weaning and likely extubation today. Will continue to monitor.

## 2018-05-19 NOTE — PROGRESS NOTES
Date of Admission: 5/17/2018  Date of Intubation (most recent): 5/17/18 (intubated at outside facility)  Reason for Mechanical Ventilation: Respiratory failure  Number of Days on Mechanical Ventilation: 2  Met Criteria for Pressure Support Trial: yes  Ventilation Mode: CMV/AC  (Continuous Mandatory Ventilation/ Assist Control)  FiO2 (%): 35 %  Rate Set (breaths/minute): 14 breaths/min  Tidal Volume Set (mL): 400 mL  PEEP (cm H2O): 5 cmH2O  Oxygen Concentration (%): 35 %  Resp: 13      Recent Labs  Lab 05/17/18  0830 05/17/18  0535 05/17/18  0436   PH 7.33*  --   --    PCO2 55*  --   --    PO2 171*  --   --    HCO3 29*  --   --    O2PER 50 100 100     Plan: Continue full vent support tonight

## 2018-05-19 NOTE — PLAN OF CARE
Problem: Patient Care Overview  Goal: Plan of Care/Patient Progress Review  OT/CR: Nurse reports patient will potentially be extubated today. Holding eval until extubation.

## 2018-05-20 LAB
BACTERIA SPEC CULT: ABNORMAL
BACTERIA SPEC CULT: ABNORMAL
BASE EXCESS BLDV CALC-SCNC: 8.8 MMOL/L
BUN SERPL-MCNC: 14 MG/DL (ref 7–30)
CALCIUM SERPL-MCNC: 8.6 MG/DL (ref 8.5–10.1)
CHLORIDE SERPL-SCNC: 102 MMOL/L (ref 94–109)
CO2 SERPL-SCNC: 33 MMOL/L (ref 20–32)
CREAT SERPL-MCNC: 0.52 MG/DL (ref 0.52–1.04)
ERYTHROCYTE [DISTWIDTH] IN BLOOD BY AUTOMATED COUNT: 13.8 % (ref 10–15)
GFR SERPL CREATININE-BSD FRML MDRD: >90 ML/MIN/1.7M2
GLUCOSE BLDC GLUCOMTR-MCNC: 112 MG/DL (ref 70–99)
GLUCOSE BLDC GLUCOMTR-MCNC: 85 MG/DL (ref 70–99)
GLUCOSE BLDC GLUCOMTR-MCNC: 88 MG/DL (ref 70–99)
GLUCOSE BLDC GLUCOMTR-MCNC: 93 MG/DL (ref 70–99)
GLUCOSE SERPL-MCNC: 102 MG/DL (ref 70–99)
HCO3 BLDV-SCNC: 38 MMOL/L (ref 21–28)
HCT VFR BLD AUTO: 40.3 % (ref 35–47)
HGB BLD-MCNC: 13 G/DL (ref 11.7–15.7)
INTERPRETATION ECG - MUSE: NORMAL
MAGNESIUM SERPL-MCNC: 2 MG/DL (ref 1.6–2.3)
MCH RBC QN AUTO: 29.8 PG (ref 26.5–33)
MCHC RBC AUTO-ENTMCNC: 32.3 G/DL (ref 31.5–36.5)
MCV RBC AUTO: 92 FL (ref 78–100)
OXYHGB MFR BLDV: 79 %
PCO2 BLDV: 74 MM HG (ref 40–50)
PH BLDV: 7.32 PH (ref 7.32–7.43)
PLATELET # BLD AUTO: 116 10E9/L (ref 150–450)
PLATELET # BLD AUTO: NORMAL 10E9/L (ref 150–450)
PO2 BLDV: 48 MM HG (ref 25–47)
POTASSIUM SERPL-SCNC: 4 MMOL/L (ref 3.4–5.3)
RBC # BLD AUTO: 4.36 10E12/L (ref 3.8–5.2)
SODIUM SERPL-SCNC: 142 MMOL/L (ref 133–144)
SPECIMEN SOURCE: ABNORMAL
WBC # BLD AUTO: 7.9 10E9/L (ref 4–11)

## 2018-05-20 PROCEDURE — 99232 SBSQ HOSP IP/OBS MODERATE 35: CPT | Performed by: INTERNAL MEDICINE

## 2018-05-20 PROCEDURE — 85049 AUTOMATED PLATELET COUNT: CPT | Performed by: INTERNAL MEDICINE

## 2018-05-20 PROCEDURE — 82805 BLOOD GASES W/O2 SATURATION: CPT | Performed by: INTERNAL MEDICINE

## 2018-05-20 PROCEDURE — 85027 COMPLETE CBC AUTOMATED: CPT | Performed by: INTERNAL MEDICINE

## 2018-05-20 PROCEDURE — 83735 ASSAY OF MAGNESIUM: CPT | Performed by: INTERNAL MEDICINE

## 2018-05-20 PROCEDURE — 25000132 ZZH RX MED GY IP 250 OP 250 PS 637: Mod: GY | Performed by: INTERNAL MEDICINE

## 2018-05-20 PROCEDURE — 94640 AIRWAY INHALATION TREATMENT: CPT | Mod: 76

## 2018-05-20 PROCEDURE — 80048 BASIC METABOLIC PNL TOTAL CA: CPT | Performed by: INTERNAL MEDICINE

## 2018-05-20 PROCEDURE — 40000008 ZZH STATISTIC AIRWAY CARE

## 2018-05-20 PROCEDURE — 25000128 H RX IP 250 OP 636: Performed by: INTERNAL MEDICINE

## 2018-05-20 PROCEDURE — 99291 CRITICAL CARE FIRST HOUR: CPT | Performed by: INTERNAL MEDICINE

## 2018-05-20 PROCEDURE — 25000125 ZZHC RX 250: Performed by: INTERNAL MEDICINE

## 2018-05-20 PROCEDURE — A9270 NON-COVERED ITEM OR SERVICE: HCPCS | Mod: GY | Performed by: INTERNAL MEDICINE

## 2018-05-20 PROCEDURE — 94003 VENT MGMT INPAT SUBQ DAY: CPT

## 2018-05-20 PROCEDURE — 20000003 ZZH R&B ICU

## 2018-05-20 PROCEDURE — 40000275 ZZH STATISTIC RCP TIME EA 10 MIN

## 2018-05-20 PROCEDURE — 00000146 ZZHCL STATISTIC GLUCOSE BY METER IP

## 2018-05-20 PROCEDURE — 25000128 H RX IP 250 OP 636: Performed by: SURGERY

## 2018-05-20 PROCEDURE — 94640 AIRWAY INHALATION TREATMENT: CPT

## 2018-05-20 RX ORDER — LEVOFLOXACIN 5 MG/ML
500 INJECTION, SOLUTION INTRAVENOUS EVERY 24 HOURS
Status: DISCONTINUED | OUTPATIENT
Start: 2018-05-21 | End: 2018-05-24

## 2018-05-20 RX ORDER — FUROSEMIDE 10 MG/ML
20 INJECTION INTRAMUSCULAR; INTRAVENOUS DAILY
Status: DISCONTINUED | OUTPATIENT
Start: 2018-05-20 | End: 2018-05-21

## 2018-05-20 RX ORDER — SODIUM CHLORIDE, SODIUM LACTATE, POTASSIUM CHLORIDE, CALCIUM CHLORIDE 600; 310; 30; 20 MG/100ML; MG/100ML; MG/100ML; MG/100ML
INJECTION, SOLUTION INTRAVENOUS CONTINUOUS
Status: DISCONTINUED | OUTPATIENT
Start: 2018-05-20 | End: 2018-05-21

## 2018-05-20 RX ORDER — LISINOPRIL 5 MG/1
5 TABLET ORAL DAILY
Status: DISCONTINUED | OUTPATIENT
Start: 2018-05-20 | End: 2018-05-20

## 2018-05-20 RX ADMIN — PROPOFOL 30 MCG/KG/MIN: 10 INJECTION, EMULSION INTRAVENOUS at 02:12

## 2018-05-20 RX ADMIN — IPRATROPIUM BROMIDE AND ALBUTEROL SULFATE 3 ML: .5; 3 SOLUTION RESPIRATORY (INHALATION) at 10:50

## 2018-05-20 RX ADMIN — HALOPERIDOL LACTATE 2 MG: 5 INJECTION, SOLUTION INTRAMUSCULAR at 15:28

## 2018-05-20 RX ADMIN — CEFTAZIDIME 2 G: 2 INJECTION, POWDER, FOR SOLUTION INTRAVENOUS at 02:57

## 2018-05-20 RX ADMIN — CEFTAZIDIME 2 G: 2 INJECTION, POWDER, FOR SOLUTION INTRAVENOUS at 17:44

## 2018-05-20 RX ADMIN — METHYLPREDNISOLONE SODIUM SUCCINATE 40 MG: 40 INJECTION, POWDER, FOR SOLUTION INTRAMUSCULAR; INTRAVENOUS at 09:23

## 2018-05-20 RX ADMIN — IPRATROPIUM BROMIDE AND ALBUTEROL SULFATE 3 ML: .5; 3 SOLUTION RESPIRATORY (INHALATION) at 22:26

## 2018-05-20 RX ADMIN — POTASSIUM CHLORIDE 20 MEQ: 400 INJECTION, SOLUTION INTRAVENOUS at 06:57

## 2018-05-20 RX ADMIN — HALOPERIDOL LACTATE 2 MG: 5 INJECTION, SOLUTION INTRAMUSCULAR at 20:35

## 2018-05-20 RX ADMIN — FUROSEMIDE 20 MG: 10 INJECTION, SOLUTION INTRAMUSCULAR; INTRAVENOUS at 18:19

## 2018-05-20 RX ADMIN — IPRATROPIUM BROMIDE AND ALBUTEROL SULFATE 3 ML: .5; 3 SOLUTION RESPIRATORY (INHALATION) at 03:02

## 2018-05-20 RX ADMIN — CLOPIDOGREL 75 MG: 75 TABLET, FILM COATED ORAL at 09:22

## 2018-05-20 RX ADMIN — IPRATROPIUM BROMIDE AND ALBUTEROL SULFATE 3 ML: .5; 3 SOLUTION RESPIRATORY (INHALATION) at 07:24

## 2018-05-20 RX ADMIN — IPRATROPIUM BROMIDE AND ALBUTEROL SULFATE 3 ML: .5; 3 SOLUTION RESPIRATORY (INHALATION) at 15:21

## 2018-05-20 RX ADMIN — RANITIDINE HYDROCHLORIDE 150 MG: 150 SOLUTION ORAL at 09:23

## 2018-05-20 RX ADMIN — HALOPERIDOL LACTATE 2 MG: 5 INJECTION, SOLUTION INTRAMUSCULAR at 02:58

## 2018-05-20 RX ADMIN — CEFTAZIDIME 2 G: 2 INJECTION, POWDER, FOR SOLUTION INTRAVENOUS at 09:39

## 2018-05-20 RX ADMIN — ASPIRIN 81 MG: 81 TABLET, COATED ORAL at 09:22

## 2018-05-20 RX ADMIN — POLYETHYLENE GLYCOL 3350 17 G: 17 POWDER, FOR SOLUTION ORAL at 09:23

## 2018-05-20 RX ADMIN — HALOPERIDOL LACTATE 2 MG: 5 INJECTION, SOLUTION INTRAMUSCULAR at 09:22

## 2018-05-20 RX ADMIN — MULTIVITAMIN 15 ML: LIQUID ORAL at 09:23

## 2018-05-20 RX ADMIN — CHLORHEXIDINE GLUCONATE 15 ML: 1.2 RINSE ORAL at 20:35

## 2018-05-20 RX ADMIN — IPRATROPIUM BROMIDE AND ALBUTEROL SULFATE 3 ML: .5; 3 SOLUTION RESPIRATORY (INHALATION) at 19:56

## 2018-05-20 RX ADMIN — CHLORHEXIDINE GLUCONATE 15 ML: 1.2 RINSE ORAL at 08:53

## 2018-05-20 NOTE — PROVIDER NOTIFICATION
Notified Person: MD naranjo        Notification Date/Time: 5/20/2018 0200    Notification Interaction: via med student     Purpose of Notification: low UOP    Orders Received: yes     Comments:  Increased LR gtt to 75 and continue to monitor

## 2018-05-20 NOTE — PROGRESS NOTES
[facility] ICU RESPIRATORY NOTE  Date of Admission: 5/17/2018  Date of Intubation (most recent): 5/17/18  Reason for Mechanical Ventilation: Respiratory failure   Number of Days on Mechanical Ventilation: 4  Met Criteria for Pressure Support Trial: No  Reason for No Pressure Support Trial: rest overnight     Significant Events Today: None  Ventilation Mode: CMV/AC  (Continuous Mandatory Ventilation/ Assist Control)  FiO2 (%): 35 %  Rate Set (breaths/minute): 10 breaths/min  Tidal Volume Set (mL): 400 mL  PEEP (cm H2O): 5 cmH2O  Pressure Support (cm H2O): 7 cmH2O  Oxygen Concentration (%): 35 %  Resp: 13    ABG Results:    Recent Labs  Lab 05/19/18  1400 05/17/18  0830 05/17/18  0535 05/17/18  0436   PH 7.39 7.33*  --   --    PCO2 60* 55*  --   --    PO2 96 171*  --   --    HCO3 36* 29*  --   --    O2PER VENT 35% 50 100 100     ETT appearance on chest x-ray: ETT in good position     Plan: Pt will remains on full vent support.    Harsha Curran RT  5/20/2018

## 2018-05-20 NOTE — PROVIDER NOTIFICATION
Restraints were removed because patient demonstrated ability to cooperate, follow instructions and leave remaining therapeutic lines and tubes alone while awake, thus meeting discontinuation criteria.  Pt was extubated to Bipap at 1205. Restrainst D/C.

## 2018-05-20 NOTE — PLAN OF CARE
Problem: Ventilation, Mechanical Invasive (Adult)  Goal: Signs and Symptoms of Listed Potential Problems Will be Absent, Minimized or Managed (Ventilation, Mechanical Invasive)  Signs and symptoms of listed potential problems will be absent, minimized or managed by discharge/transition of care (reference Ventilation, Mechanical Invasive (Adult) CPG).   Outcome: No Change  [facility]   Intensive Care Unit   Nursing Note    Vital signs stable during the day.  PERRL.  Moves all extremities.  Follows commands when off propofol. Tolerating ventilator on propofol at 20 and precedex at 0.7.  Pt weaned on PS 7/5 better in the afternoon when on propofol and precedex. Spouse updated at the bedside.Labs noted.  Continue to monitor closely.      Recent Labs  Lab 05/19/18  1400 05/17/18  0830 05/17/18  0535 05/17/18  0436   PH 7.39 7.33*  --   --    PCO2 60* 55*  --   --    PO2 96 171*  --   --    HCO3 36* 29*  --   --    O2PER VENT 35% 50 100 100       Lab Results   Component Value Date    TROPI 0.319 () 05/19/2018    TROPI 0.506 () 05/18/2018    TROPI 0.566 () 05/17/2018    TROPI 0.501 () 05/17/2018    TROPI 0.366 () 05/17/2018    TROPONIN 0.00 08/18/2013       ROUTINE IP LABS (Last four results)  BMP  Recent Labs  Lab 05/19/18  1514 05/19/18  0545 05/19/18  0124 05/18/18  0430 05/17/18  0436   NA  --  142  --  139 137   POTASSIUM 4.2 3.7 3.4 3.8 4.8   CHLORIDE  --  100  --  102 100   CALDERON  --  8.1*  --  7.9* 8.4*   CO2  --  37*  --  26 30   BUN  --  12  --  15 14   CR  --  0.63  --  0.70 0.70   GLC  --  90  --  110* 148*     CBC  Recent Labs  Lab 05/19/18  0545 05/17/18  0911 05/17/18  0436   WBC 8.9 7.2 9.9   RBC 4.61 5.14 5.88*   HGB 13.6 15.3 17.4*   HCT 41.5 46.3 52.7*   MCV 90 90 90   MCH 29.5 29.8 29.6   MCHC 32.8 33.0 33.0   RDW 13.6 13.5 13.9   * 119*  119* 192     Donn Vasquez RN  \

## 2018-05-20 NOTE — PLAN OF CARE
Problem: Patient Care Overview  Goal: Plan of Care/Patient Progress Review  OT/CR: Continues to be intubated and with hypotension this AM. Not appropriate for CR eval.

## 2018-05-20 NOTE — PROGRESS NOTES
.  [facility]    Cardiology Progress Note     Assessment & Plan   Preeti Ford is a 67 year old female admitted on 5/17/2018 for acute respiratory failure due to a severe COPD exacerbation.  Patient was found to have an overlapping NSTEMI.  She is now s/p successful PCI of a mid-circumflex ISR.      Summary:       NSTEMI s/p successful ROWDY to mid circumflex in-stent restenosis on 5/17/18    Coronary Artery Disease with a history of MI (2015)    Ischemic Cardiomyopathy, LVEF 25-30% on latest TTE    History of LV thrombus    Acute hypoxic and hypercapnic respiratory failure secondary to COPD exacerbation    Peripheral arterial disease with a history of aortoiliac bypassbypass     COPD    HL    HTN    Former smoker    Prior CVA  - Continue with dual antiplatelet therapy  - Continue BB and ACEI upward titration as tolerated      --- continue metoprolol 6.25 mg BID for now      --- initiating lisinopril 5 mg Qdaily  - Plan for limited echocardiogram prior to discharge    Carroll Eduardo MD    Interval History   Patient was extubated today.  Now on NIPPV. Vasopressors have been weaned.    Physical Exam   Temp: 99  F (37.2  C) Temp src: Esophageal BP: 109/64   Heart Rate: 82 Resp: (!) 31 SpO2: 98 % O2 Device: BiPAP/CPAP (Simultaneous filing. User may not have seen previous data.)    Vitals:    05/18/18 0600 05/19/18 0600 05/20/18 0650   Weight: 46 kg (101 lb 6.6 oz) 45.1 kg (99 lb 6.8 oz) 45.2 kg (99 lb 10.4 oz)     Vital Signs with Ranges  Temp:  [97.3  F (36.3  C)-99.3  F (37.4  C)] 99  F (37.2  C)  Heart Rate:  [62-90] 82  Resp:  [8-35] 31  BP: ()/() 109/64  FiO2 (%):  [35 %-40 %] 40 %  SpO2:  [91 %-100 %] 98 %  I/O last 3 completed shifts:  In: 2456.4 [I.V.:1404.4; NG/GT:320; IV Piggyback:500]  Out: 1080 [Urine:830; Emesis/NG output:250]  Patient Active Problem List   Diagnosis     Advanced directives, counseling/discussion     Urinary incontinence     Forgetfulness     Hyperlipidemia LDL  goal <130     Elevated blood pressure reading without diagnosis of hypertension     History of stroke - right thalamus in 8/2013 and left cerebellar and right vermis in 3/2018     Numbness and tingling     Tobacco use disorder     CVA (cerebral vascular accident) (H)     ACS (acute coronary syndrome) (H)     Ischemic cardiomyopathy - EF 25% in 2015 but 55% in 3/2017 and 45-50% on 3/18     HTN, goal below 130/80     Acute systolic congestive heart failure (H)     Lung nodule     GERD (gastroesophageal reflux disease)     ST elevation myocardial infarction involving left anterior descending (LAD) coronary artery (H)     Chronic bronchitis, unspecified chronic bronchitis type (H)     PAD (peripheral artery disease) (H) - moderate left common carotid stenosis, aortoiliac bypass, chronic left vertebral and right posterior cerebral stenoses     Cervical high risk HPV (human papillomavirus) test positive     Acute respiratory failure with hypoxia (H)     COPD exacerbation (H)     Coronary artery disease involving native coronary artery - STEMI with LAD and circ stents in 8/2015     Elevated troponin     Pulmonary emphysema (H)     Vertigo     Other seizures (H) - possible     Acute CVA (cerebrovascular accident) - right paramedian superior vermis and left cerebellar hemisphere     Malnutrition, unspecified type (H)     Hyponatremia     Hypochloremia     Hypercapnic respiratory failure (H)       General: On NIPPV; able to follow commands; unable to communicate clearly  Vessels:  Nonelevated JVP.    Heart:  Normal S1/2 No murmurs appreciated.  No S3 or S4.  No RV lift.    Lungs:  Profound wheezing bilaterally  Abdomen:  Soft, nontender, nondistended.   Extremities:  No edema.  Normal perfusion.    Medications     - MEDICATION INSTRUCTIONS -       dexmedetomidine 0.3 mcg/kg/hr (05/20/18 1205)     IV fluid REPLACEMENT ONLY       lactated ringers 75 mL/hr at 05/20/18 0852     norepinephrine Stopped (05/20/18 1145)      Percutaneous Coronary Intervention orders placed (this is information for BPA alerting)       propofol (DIPRIVAN) infusion Stopped (05/20/18 1155)     Reason ACE/ARB/ARNI order not selected       Reason beta blocker order not selected         aspirin  81 mg Oral Daily     atorvastatin  10 mg Oral At Bedtime     cefTAZidime  2 g Intravenous Q8H     chlorhexidine  15 mL Mouth/Throat Q12H     clopidogrel  75 mg Oral or Feeding Tube Daily     haloperidol lactate  2 mg Intravenous Q6H     insulin aspart  1-4 Units Subcutaneous Q4H     iopamidol  100 mL INTRA-ARTERIAL Once     ipratropium - albuterol 0.5 mg/2.5 mg/3 mL  3 mL Nebulization Q4H     methylPREDNISolone  40 mg Intravenous Daily     metoprolol tartrate  6.25 mg Oral BID     montelukast  10 mg Oral At Bedtime     multivitamins with minerals  15 mL Per Feeding Tube Daily     polyethylene glycol  17 g Oral Daily     rantidine  150 mg Oral BID       Data   Results for orders placed or performed during the hospital encounter of 05/17/18 (from the past 24 hour(s))   Blood gas venous with oxyhemoglobin (1200)   Result Value Ref Range    Ph Venous 7.37 7.32 - 7.43 pH    PCO2 Venous 65 (H) 40 - 50 mm Hg    PO2 Venous 45 25 - 47 mm Hg    Bicarbonate Venous 38 (H) 21 - 28 mmol/L    Oxyhemoglobin Venous 78 %    Base Excess Venous 9.9 mmol/L   Potassium   Result Value Ref Range    Potassium 4.2 3.4 - 5.3 mmol/L   Glucose by meter   Result Value Ref Range    Glucose 125 (H) 70 - 99 mg/dL   Glucose by meter   Result Value Ref Range    Glucose 117 (H) 70 - 99 mg/dL   Glucose by meter   Result Value Ref Range    Glucose 93 70 - 99 mg/dL   Glucose by meter   Result Value Ref Range    Glucose 85 70 - 99 mg/dL   Platelet count   Result Value Ref Range    Platelet Count Canceled, Test credited 150 - 450 10e9/L   Magnesium   Result Value Ref Range    Magnesium 2.0 1.6 - 2.3 mg/dL   Potassium   Result Value Ref Range    Potassium 4.0 3.4 - 5.3 mmol/L   Blood gas venous and oxyhgb    Result Value Ref Range    Ph Venous 7.32 7.32 - 7.43 pH    PCO2 Venous 74 (H) 40 - 50 mm Hg    PO2 Venous 48 (H) 25 - 47 mm Hg    Bicarbonate Venous 38 (H) 21 - 28 mmol/L    Oxyhemoglobin Venous 79 %    Base Excess Venous 8.8 mmol/L   Glucose by meter   Result Value Ref Range    Glucose 112 (H) 70 - 99 mg/dL       Recent Labs  Lab 05/20/18  0550 05/19/18  1514 05/19/18  0545  05/18/18  0430 05/17/18  2113 05/17/18  0911 05/17/18  0436   WBC  --   --  8.9  --   --   --  7.2 9.9   HGB  --   --  13.6  --   --   --  15.3 17.4*   MCV  --   --  90  --   --   --  90 90   PLT Canceled, Test credited  --  128*  --   --   --  119*  119* 192   INR  --   --   --   --   --   --  0.91  --    NA  --   --  142  --  139  --   --  137   POTASSIUM 4.0 4.2 3.7  < > 3.8  --   --  4.8   CHLORIDE  --   --  100  --  102  --   --  100   CO2  --   --  37*  --  26  --   --  30   BUN  --   --  12  --  15  --   --  14   CR  --   --  0.63  --  0.70  --   --  0.70   ANIONGAP  --   --  5  --   --   --   --  7   CALDERON  --   --  8.1*  --  7.9*  --   --  8.4*   GLC  --   --  90  --  110*  --   --  148*   ALBUMIN  --   --   --   --   --   --   --  3.6   PROTTOTAL  --   --   --   --   --   --   --  7.7   BILITOTAL  --   --   --   --   --   --   --  0.4   ALKPHOS  --   --   --   --   --   --   --  90   ALT  --   --   --   --   --   --   --  48   AST  --   --   --   --   --   --   --  54*   TROPI  --   --  0.319*  --  0.506* 0.566* 0.501* 0.366*   < > = values in this interval not displayed.  No results found for this or any previous visit (from the past 24 hour(s)).

## 2018-05-20 NOTE — PROGRESS NOTES
Patient was extubated at 1205 to BiPAP 12/5 40%. SpO2 in the upper 90's. No stridor present. Gel pad in place. Alarm volume set at 10.  Will cont to follow.  5/20/2018  Gali Leger RRT

## 2018-05-20 NOTE — PLAN OF CARE
Problem: Patient Care Overview  Goal: Plan of Care/Patient Progress Review  Outcome: Declining  Pts remained same full vent setting overnight. Labile blood pressure. Had hypotensive episode this am and restarted levophed. titrating propofol/precedex. Low UOP. Increased LR to 75 cc/hr. TF at 15 cc with residual of 110. Will continue to monitor.

## 2018-05-20 NOTE — PROGRESS NOTES
[facility]  Comprehensive Daily ICU Note        Preeti Ford MRN# [id number]   Age: 67 year old YOB: 1950     Date of Admission: 5/17/2018    Primary care provider: Deisy Carter     CODE STATUS: FULL      Problem List:   Severe COPD with exacerbation.   NSTEMI - in stent restenosis found on angiogram  Delirium            Treatment goals for next 24 hours:   Extubate to Bipap  COPD treatment  Delirium treatment    Summer Pitts MD      Subjective/ Last 24 hours:   HPI: History obtained from chart as patient intubated and sedated. 68 yo woman with known severe COPD and coronary artery disease who visited her clinic 5/15afternoon complaining of a few days of shortness of breath. Given steroids and antibiotics and sent home. Presented to the ED very early the next morning (5/16) in respiratory extremis.  Had EKG changes as well as severe hypercapnia.  Intubated and transferred here.  Aunt is bedside and tells me she spoke with her a couple of days ago and she seemed very well at that time.  Of note patient has significant history of coronary artery disease with stents to Cirucumflex and LAD in 2015 in setting of recent STEMI.  Of note, presentation noted at that time seems similar to presentation here with subacute respiratory symptoms.     Had new stent placed on 5/17. Since then have been treating delirium and COPD. On 5/20, has marginal RSBI but stable vital signs and baseline blood gas after extended PS trial. Calmer today.          Mechanical Ventilation/Vitalsigns/IsandOs:   Temp:  [97.3  F (36.3  C)-99.3  F (37.4  C)] 98.2  F (36.8  C)  Heart Rate:  [62-78] 64  Resp:  [8-28] 10  BP: ()/(43-87) 112/57  FiO2 (%):  [35 %] 35 %  SpO2:  [96 %-100 %] 97 %      Intake/Output Summary (Last 24 hours) at 05/20/18 1156  Last data filed at 05/20/18 0900   Gross per 24 hour   Intake          2496.57 ml   Output             1170 ml   Net          1326.57 ml   -1.2  L      Ventilation Mode: CPAP/PS  (Continuous positive airway pressure with Pressure Support)  FiO2 (%): 35 %  Rate Set (breaths/minute): 10 breaths/min  Tidal Volume Set (mL): 400 mL  PEEP (cm H2O): 5 cmH2O  Pressure Support (cm H2O): 7 cmH2O  Oxygen Concentration (%): 35 %  Resp: 10    Day since 5/16           Physical Examination:     General: Stated age, intubated, chronically ill appearing  HEENT: ET tube, KELSEY  Lungs: LCTAB anteriorly  CVS: RRR, distant  Abdomen: soft, bowel sounds, non tender  Extremities/musculoskeletal: unable to palpate pulses in lower extremities and cool, palpable pulses in upper extremities and warm, no edema, bruising in left groin has receded from yesterday  Neurology: much calmer than yesterday  Skin: no abnormalities  Psychiatry: unable  Exam of Line sites:  Right central line in IJ looks good  Hospital Procedures   TTE  Central line   CXRs  AXR  Angiogram  EKGs  Intubation and mechanical ventilation         Feeding/Glucose:   Tube feeds    Blood glucose/Insulin requirement last 24 hours: none         Medications:       aspirin  81 mg Oral Daily     atorvastatin  10 mg Oral At Bedtime     cefTAZidime  2 g Intravenous Q8H     chlorhexidine  15 mL Mouth/Throat Q12H     clopidogrel  75 mg Oral or Feeding Tube Daily     haloperidol lactate  2 mg Intravenous Q6H     insulin aspart  1-4 Units Subcutaneous Q4H     iopamidol  100 mL INTRA-ARTERIAL Once     ipratropium - albuterol 0.5 mg/2.5 mg/3 mL  3 mL Nebulization Q4H     methylPREDNISolone  40 mg Intravenous Daily     metoprolol tartrate  6.25 mg Oral BID     montelukast  10 mg Oral At Bedtime     multivitamins with minerals  15 mL Per Feeding Tube Daily     polyethylene glycol  17 g Oral Daily     rantidine  150 mg Oral BID        - MEDICATION INSTRUCTIONS -       dexmedetomidine 0.5 mcg/kg/hr (05/20/18 0851)     IV fluid REPLACEMENT ONLY       lactated ringers 75 mL/hr at 05/20/18 0852     norepinephrine 0.05 mcg/kg/min (05/20/18  6531)     Percutaneous Coronary Intervention orders placed (this is information for BPA alerting)       propofol (DIPRIVAN) infusion 20 mcg/kg/min (18 0955)     Reason ACE/ARB/ARNI order not selected       Reason beta blocker order not selected                Labs/Diagnostic studies:     EKst EKG:  ST elevation leads V2-V4, ST depression inferior leads             2nd EKG: resolution of ST depression, less ST depression inferior leads    Echo:      ROUTINE ICU LABS (Last four results)  CMP    Recent Labs  Lab 18  0550 18  1514 18  0545 18  0124 18  0430 18  0911 18  0436     --  142  --  139  --  137   POTASSIUM 4.0 4.2 3.7 3.4 3.8  --  4.8   CHLORIDE 102  --  100  --  102  --  100   CO2 33*  --  37*  --  26  --  30   ANIONGAP  --   --  5  --   --   --  7   *  --  90  --  110*  --  148*   BUN 14  --  12  --  15  --  14   CR 0.52  --  0.63  --  0.70  --  0.70   GFRESTIMATED >90  --  >90  --  84  --  85   GFRESTBLACK >90  --  >90  --  >90  --  >90   CALDERON 8.6  --  8.1*  --  7.9*  --  8.4*   MAG 2.0  --  1.8  --  2.2 1.9  --    PHOS  --   --   --   --   --  3.7  --    PROTTOTAL  --   --   --   --   --   --  7.7   ALBUMIN  --   --   --   --   --   --  3.6   BILITOTAL  --   --   --   --   --   --  0.4   ALKPHOS  --   --   --   --   --   --  90   AST  --   --   --   --   --   --  54*   ALT  --   --   --   --   --   --  48     CBC    Recent Labs  Lab 18  0550 18  0545 18  0911 18  0436   WBC 7.9 8.9 7.2 9.9   RBC 4.36 4.61 5.14 5.88*   HGB 13.0 13.6 15.3 17.4*   HCT 40.3 41.5 46.3 52.7*   MCV 92 90 90 90   MCH 29.8 29.5 29.8 29.6   MCHC 32.3 32.8 33.0 33.0   RDW 13.8 13.6 13.5 13.9   *  Canceled, Test credited 128* 119*  119* 192     INR    Recent Labs  Lab 18  0911   INR 0.91     Arterial Blood Gas    Recent Labs  Lab 18  1400 18  0830 18  0535 18  0436   PH 7.39 7.33*  --   --    PCO2 60* 55*  --    --    PO2 96 171*  --   --    HCO3 36* 29*  --   --    O2PER VENT 35% 50 100 100       Other Lab Data:  Troponin: peaked at 0.566  BNP: 28427    Cultures:    Recent Labs  Lab 05/17/18  1020 05/17/18  0530   CULT Light growthNormal melany  Moderate growthPseudomonas aeruginosa* No growth after 3 days  No growth after 3 days     Blood culture:  Invalid input(s): BC   Urine culture:  No results for input(s): URC in the last 168 hours.          Imaging:     CXR:  Hyperinflated, possible infiltrates in lower lung fields; right more than left    CT:  Emphysematous changes seen on CT scan in 2017 along with granuloma         Assessment and Plan:     Summary:  Preeti Ford is a 67 year old female admitted on 5/17/2018 for acute respiratory failure.  I have personally reviewed the daily labs, imaging studies, cultures and discussed the case with referring physician and consulting physicians. My assessment and plan as follows:    Neurology and Psychiatry:  Needed sedation to tolerate ventilator and then was very agitated.  Haloperidol was helpful and looks much better today.   - Dexmedetomidine (if needed) and Haloperidol after extubation.     Pulmonary:   Acute hypoxic and hypercapnic respiratory failure secondary to COPD exacerbation, and probably NSTEMI and ischemic cardiomyopathy. Initially very hypercapnic but has essentially resolved and CO2 is likely near baseline. Pseudomonas in sputum. Doing better on PS trial: probably because delirium is better.  - Trial of extubation.   - Steroids, bronchodilators, antibiotics  - Change to Levofloxacin to complete a 10 day course.   - Stent as below.    Cardiovascular system:   ST elevation on initial EKG and troponin leak on initial lab. Presentation this admission similar to presentation in 2015 with resolving MI. Angiogram done today found in stent restenosis of Circumflex. Was re stented. EF had recovered but now severely reduced again.  Initially given diuretics - now  off pressors.   - Dual antiplatelet therapy  - Statin   - Titrate up BB. ACE/ARB when able.  Has a cough with Lisinopril so will probably need to start an ARB.  - Scheduled low dose diuretics to achieve net even      Renal/Electrolytes:  No acute issues.   - LR infusion until tomorrow and hopefully will be able to resume PO.     ID:  Pan sensitive Pseudomonas in sputum.   - Stop Ceftazidime and change to Levofloxacin.     GI/:  No issues  - Continue outpatient Miralax for now.    Nutrition:   Was on tube feeds. She pulled her feeding tube.     Musculoskeletal/Rheumatology:  Deconditioned and now able to participate.  - PT tomorrow    Endocrine:   No current issues  - Monitor sugars  - Steroids    Heme/Onc:  Polycythemia secondary to chronic hypoxia. Mild thrombocytopenia  - Resume dual antiplatelet therapy after tracheostomy    ICU prophylaxis:      DVT: heparin     VAP: As above     Stress ulcer: Ranitidine     Restraints needed: yes     Lines   Central Line/PICC - placed 5/16 needed: y   Arterial line - placed n needed: n   Barnhart catheter.  Needed: y       Prognosis:  Guarded    Family update:   on the phone    Billing: total time spend providing critical care was 50 min, excluding procedure time.    Summer Pitts MD  [phone]

## 2018-05-21 ENCOUNTER — APPOINTMENT (OUTPATIENT)
Dept: SPEECH THERAPY | Facility: CLINIC | Age: 68
DRG: 246 | End: 2018-05-21
Attending: INTERNAL MEDICINE
Payer: MEDICARE

## 2018-05-21 ENCOUNTER — APPOINTMENT (OUTPATIENT)
Dept: GENERAL RADIOLOGY | Facility: CLINIC | Age: 68
DRG: 246 | End: 2018-05-21
Attending: PHYSICIAN ASSISTANT
Payer: MEDICARE

## 2018-05-21 ENCOUNTER — APPOINTMENT (OUTPATIENT)
Dept: OCCUPATIONAL THERAPY | Facility: CLINIC | Age: 68
DRG: 246 | End: 2018-05-21
Attending: INTERNAL MEDICINE
Payer: MEDICARE

## 2018-05-21 LAB
ANION GAP SERPL CALCULATED.3IONS-SCNC: 6 MMOL/L (ref 3–14)
BASE EXCESS BLDV CALC-SCNC: 15.4 MMOL/L
BUN SERPL-MCNC: 16 MG/DL (ref 7–30)
CALCIUM SERPL-MCNC: 8.7 MG/DL (ref 8.5–10.1)
CHLORIDE SERPL-SCNC: 97 MMOL/L (ref 94–109)
CO2 SERPL-SCNC: 41 MMOL/L (ref 20–32)
CREAT SERPL-MCNC: 0.48 MG/DL (ref 0.52–1.04)
GFR SERPL CREATININE-BSD FRML MDRD: >90 ML/MIN/1.7M2
GLUCOSE BLDC GLUCOMTR-MCNC: 104 MG/DL (ref 70–99)
GLUCOSE BLDC GLUCOMTR-MCNC: 106 MG/DL (ref 70–99)
GLUCOSE BLDC GLUCOMTR-MCNC: 114 MG/DL (ref 70–99)
GLUCOSE BLDC GLUCOMTR-MCNC: 121 MG/DL (ref 70–99)
GLUCOSE BLDC GLUCOMTR-MCNC: 66 MG/DL (ref 70–99)
GLUCOSE BLDC GLUCOMTR-MCNC: 75 MG/DL (ref 70–99)
GLUCOSE SERPL-MCNC: 71 MG/DL (ref 70–99)
HCO3 BLDV-SCNC: 46 MMOL/L (ref 21–28)
MAGNESIUM SERPL-MCNC: 1.9 MG/DL (ref 1.6–2.3)
OXYHGB MFR BLDV: 76 %
PCO2 BLDV: 88 MM HG (ref 40–50)
PH BLDV: 7.33 PH (ref 7.32–7.43)
PHOSPHATE SERPL-MCNC: 2.6 MG/DL (ref 2.5–4.5)
PO2 BLDV: 44 MM HG (ref 25–47)
POTASSIUM SERPL-SCNC: 3.7 MMOL/L (ref 3.4–5.3)
SODIUM SERPL-SCNC: 144 MMOL/L (ref 133–144)

## 2018-05-21 PROCEDURE — 97530 THERAPEUTIC ACTIVITIES: CPT | Mod: GO | Performed by: OCCUPATIONAL THERAPIST

## 2018-05-21 PROCEDURE — 94640 AIRWAY INHALATION TREATMENT: CPT | Mod: 76

## 2018-05-21 PROCEDURE — 25000125 ZZHC RX 250: Performed by: INTERNAL MEDICINE

## 2018-05-21 PROCEDURE — 40000275 ZZH STATISTIC RCP TIME EA 10 MIN

## 2018-05-21 PROCEDURE — 25800025 ZZH RX 258: Performed by: INTERNAL MEDICINE

## 2018-05-21 PROCEDURE — 71045 X-RAY EXAM CHEST 1 VIEW: CPT

## 2018-05-21 PROCEDURE — 25000125 ZZHC RX 250: Performed by: SURGERY

## 2018-05-21 PROCEDURE — 40000225 ZZH STATISTIC SLP WARD VISIT: Performed by: SPEECH-LANGUAGE PATHOLOGIST

## 2018-05-21 PROCEDURE — 25800025 ZZH RX 258

## 2018-05-21 PROCEDURE — 80048 BASIC METABOLIC PNL TOTAL CA: CPT | Performed by: INTERNAL MEDICINE

## 2018-05-21 PROCEDURE — 99232 SBSQ HOSP IP/OBS MODERATE 35: CPT | Performed by: INTERNAL MEDICINE

## 2018-05-21 PROCEDURE — 00000146 ZZHCL STATISTIC GLUCOSE BY METER IP

## 2018-05-21 PROCEDURE — 25000128 H RX IP 250 OP 636: Performed by: INTERNAL MEDICINE

## 2018-05-21 PROCEDURE — 40000133 ZZH STATISTIC OT WARD VISIT: Performed by: OCCUPATIONAL THERAPIST

## 2018-05-21 PROCEDURE — 82805 BLOOD GASES W/O2 SATURATION: CPT | Performed by: PHYSICIAN ASSISTANT

## 2018-05-21 PROCEDURE — 99233 SBSQ HOSP IP/OBS HIGH 50: CPT | Performed by: PHYSICIAN ASSISTANT

## 2018-05-21 PROCEDURE — 20000003 ZZH R&B ICU

## 2018-05-21 PROCEDURE — 25000128 H RX IP 250 OP 636

## 2018-05-21 PROCEDURE — 25000132 ZZH RX MED GY IP 250 OP 250 PS 637: Mod: GY | Performed by: INTERNAL MEDICINE

## 2018-05-21 PROCEDURE — 94640 AIRWAY INHALATION TREATMENT: CPT

## 2018-05-21 PROCEDURE — 97535 SELF CARE MNGMENT TRAINING: CPT | Mod: GO | Performed by: OCCUPATIONAL THERAPIST

## 2018-05-21 PROCEDURE — 92610 EVALUATE SWALLOWING FUNCTION: CPT | Mod: GN | Performed by: SPEECH-LANGUAGE PATHOLOGIST

## 2018-05-21 PROCEDURE — A9270 NON-COVERED ITEM OR SERVICE: HCPCS | Mod: GY | Performed by: INTERNAL MEDICINE

## 2018-05-21 PROCEDURE — 97166 OT EVAL MOD COMPLEX 45 MIN: CPT | Mod: GO | Performed by: OCCUPATIONAL THERAPIST

## 2018-05-21 PROCEDURE — 84100 ASSAY OF PHOSPHORUS: CPT | Performed by: INTERNAL MEDICINE

## 2018-05-21 PROCEDURE — 25000132 ZZH RX MED GY IP 250 OP 250 PS 637: Mod: GY

## 2018-05-21 PROCEDURE — 25000128 H RX IP 250 OP 636: Performed by: PHYSICIAN ASSISTANT

## 2018-05-21 PROCEDURE — 25000125 ZZHC RX 250: Performed by: PHYSICIAN ASSISTANT

## 2018-05-21 PROCEDURE — 25000128 H RX IP 250 OP 636: Performed by: SURGERY

## 2018-05-21 PROCEDURE — 92526 ORAL FUNCTION THERAPY: CPT | Mod: GN | Performed by: SPEECH-LANGUAGE PATHOLOGIST

## 2018-05-21 PROCEDURE — 83735 ASSAY OF MAGNESIUM: CPT | Performed by: INTERNAL MEDICINE

## 2018-05-21 RX ORDER — BUDESONIDE AND FORMOTEROL FUMARATE DIHYDRATE 160; 4.5 UG/1; UG/1
2 AEROSOL RESPIRATORY (INHALATION)
Status: DISCONTINUED | OUTPATIENT
Start: 2018-05-21 | End: 2018-05-21 | Stop reason: CLARIF

## 2018-05-21 RX ORDER — DEXTROSE MONOHYDRATE, SODIUM CHLORIDE, AND POTASSIUM CHLORIDE 50; .745; 4.5 G/1000ML; G/1000ML; G/1000ML
INJECTION, SOLUTION INTRAVENOUS CONTINUOUS
Status: DISCONTINUED | OUTPATIENT
Start: 2018-05-21 | End: 2018-05-21

## 2018-05-21 RX ORDER — METHYLPREDNISOLONE SODIUM SUCCINATE 40 MG/ML
40 INJECTION, POWDER, LYOPHILIZED, FOR SOLUTION INTRAMUSCULAR; INTRAVENOUS EVERY 12 HOURS
Status: DISCONTINUED | OUTPATIENT
Start: 2018-05-21 | End: 2018-05-22

## 2018-05-21 RX ORDER — TIOTROPIUM BROMIDE 18 UG/1
18 CAPSULE ORAL; RESPIRATORY (INHALATION) DAILY
Status: DISCONTINUED | OUTPATIENT
Start: 2018-05-21 | End: 2018-05-21 | Stop reason: CLARIF

## 2018-05-21 RX ORDER — ACETAZOLAMIDE 500 MG/5ML
250 INJECTION, POWDER, LYOPHILIZED, FOR SOLUTION INTRAVENOUS EVERY 12 HOURS
Status: COMPLETED | OUTPATIENT
Start: 2018-05-21 | End: 2018-05-22

## 2018-05-21 RX ORDER — HEPARIN SODIUM 5000 [USP'U]/.5ML
5000 INJECTION, SOLUTION INTRAVENOUS; SUBCUTANEOUS EVERY 12 HOURS
Status: DISCONTINUED | OUTPATIENT
Start: 2018-05-21 | End: 2018-05-26 | Stop reason: HOSPADM

## 2018-05-21 RX ADMIN — SODIUM CHLORIDE, POTASSIUM CHLORIDE, SODIUM LACTATE AND CALCIUM CHLORIDE: 600; 310; 30; 20 INJECTION, SOLUTION INTRAVENOUS at 00:32

## 2018-05-21 RX ADMIN — HALOPERIDOL LACTATE 2 MG: 5 INJECTION, SOLUTION INTRAMUSCULAR at 08:19

## 2018-05-21 RX ADMIN — ACETAZOLAMIDE 250 MG: 500 INJECTION, POWDER, LYOPHILIZED, FOR SOLUTION INTRAVENOUS at 14:46

## 2018-05-21 RX ADMIN — CLOPIDOGREL 75 MG: 75 TABLET, FILM COATED ORAL at 11:30

## 2018-05-21 RX ADMIN — UMECLIDINIUM 1 PUFF: 62.5 AEROSOL, POWDER ORAL at 10:02

## 2018-05-21 RX ADMIN — IPRATROPIUM BROMIDE AND ALBUTEROL SULFATE 3 ML: .5; 3 SOLUTION RESPIRATORY (INHALATION) at 04:02

## 2018-05-21 RX ADMIN — POTASSIUM CHLORIDE: 149 INJECTION, SOLUTION, CONCENTRATE INTRAVENOUS at 08:24

## 2018-05-21 RX ADMIN — LEVOFLOXACIN 500 MG: 5 INJECTION, SOLUTION INTRAVENOUS at 06:26

## 2018-05-21 RX ADMIN — Medication 2 G: at 07:34

## 2018-05-21 RX ADMIN — Medication 12.5 MG: at 20:50

## 2018-05-21 RX ADMIN — POTASSIUM CHLORIDE 20 MEQ: 400 INJECTION, SOLUTION INTRAVENOUS at 07:33

## 2018-05-21 RX ADMIN — Medication 12.5 MG: at 11:30

## 2018-05-21 RX ADMIN — ATORVASTATIN CALCIUM 10 MG: 10 TABLET, FILM COATED ORAL at 20:50

## 2018-05-21 RX ADMIN — DORNASE ALFA 2.5 MG: 1 SOLUTION RESPIRATORY (INHALATION) at 19:55

## 2018-05-21 RX ADMIN — ALBUTEROL SULFATE 2.5 MG: 2.5 SOLUTION RESPIRATORY (INHALATION) at 01:06

## 2018-05-21 RX ADMIN — DEXTROSE MONOHYDRATE 25 ML: 25 INJECTION, SOLUTION INTRAVENOUS at 04:42

## 2018-05-21 RX ADMIN — DEXMEDETOMIDINE HYDROCHLORIDE 0.2 MCG/KG/HR: 100 INJECTION, SOLUTION INTRAVENOUS at 21:15

## 2018-05-21 RX ADMIN — METHYLPREDNISOLONE SODIUM SUCCINATE 40 MG: 40 INJECTION, POWDER, FOR SOLUTION INTRAMUSCULAR; INTRAVENOUS at 08:20

## 2018-05-21 RX ADMIN — HEPARIN SODIUM 5000 UNITS: 5000 INJECTION, SOLUTION INTRAVENOUS; SUBCUTANEOUS at 14:46

## 2018-05-21 RX ADMIN — ASPIRIN 81 MG: 81 TABLET, COATED ORAL at 11:29

## 2018-05-21 RX ADMIN — CEFTAZIDIME 2 G: 2 INJECTION, POWDER, FOR SOLUTION INTRAVENOUS at 03:14

## 2018-05-21 RX ADMIN — HALOPERIDOL LACTATE 2 MG: 5 INJECTION, SOLUTION INTRAMUSCULAR at 03:14

## 2018-05-21 RX ADMIN — IPRATROPIUM BROMIDE AND ALBUTEROL SULFATE 3 ML: .5; 3 SOLUTION RESPIRATORY (INHALATION) at 19:55

## 2018-05-21 RX ADMIN — IPRATROPIUM BROMIDE AND ALBUTEROL SULFATE 3 ML: .5; 3 SOLUTION RESPIRATORY (INHALATION) at 16:11

## 2018-05-21 RX ADMIN — IPRATROPIUM BROMIDE AND ALBUTEROL SULFATE 3 ML: .5; 3 SOLUTION RESPIRATORY (INHALATION) at 23:43

## 2018-05-21 RX ADMIN — FLUTICASONE FUROATE AND VILANTEROL TRIFENATATE 1 PUFF: 200; 25 POWDER RESPIRATORY (INHALATION) at 09:35

## 2018-05-21 RX ADMIN — CEFTAZIDIME 2 G: 2 INJECTION, POWDER, FOR SOLUTION INTRAVENOUS at 09:27

## 2018-05-21 RX ADMIN — IPRATROPIUM BROMIDE AND ALBUTEROL SULFATE 3 ML: .5; 3 SOLUTION RESPIRATORY (INHALATION) at 08:11

## 2018-05-21 RX ADMIN — FUROSEMIDE 20 MG: 10 INJECTION, SOLUTION INTRAMUSCULAR; INTRAVENOUS at 08:20

## 2018-05-21 RX ADMIN — IPRATROPIUM BROMIDE AND ALBUTEROL SULFATE 3 ML: .5; 3 SOLUTION RESPIRATORY (INHALATION) at 11:49

## 2018-05-21 RX ADMIN — METHYLPREDNISOLONE SODIUM SUCCINATE 40 MG: 40 INJECTION, POWDER, FOR SOLUTION INTRAMUSCULAR; INTRAVENOUS at 20:50

## 2018-05-21 ASSESSMENT — ACTIVITIES OF DAILY LIVING (ADL): PREVIOUS_RESPONSIBILITIES: MEAL PREP;HOUSEKEEPING;LAUNDRY;SHOPPING;MEDICATION MANAGEMENT;FINANCES;DRIVING

## 2018-05-21 NOTE — PROGRESS NOTES
05/21/18 0943   Quick Adds   Type of Visit Initial Occupational Therapy Evaluation   Living Environment   Lives With spouse   Living Arrangements house   Home Accessibility stairs to enter home   Number of Stairs to Enter Home 3   Number of Stairs Within Home (basement doesn't need to go down)   Transportation Available car;family or friend will provide   Self-Care   Dominant Hand right   Equipment Currently Used at Home cane, straight   Activity/Exercise/Self-Care Comment uses a SEC once in awhile   Functional Level Prior   Ambulation 1-->assistive equipment   Transferring 0-->independent   Toileting 0-->independent   Bathing 0-->independent   Dressing 0-->independent   Eating 0-->independent   Communication 0-->understands/communicates without difficulty   Swallowing 0-->swallows foods/liquids without difficulty   Cognition 0 - no cognition issues reported   Fall history within last six months no   Prior Functional Level Comment Pt I with all ADL/IADL's prior to admit, driving, shopping, managing own meds, etc.    General Information   Onset of Illness/Injury or Date of Surgery - Date 05/17/18   Referring Physician Ger Correa MD   Patient/Family Goals Statement will think about rehab, home   Additional Occupational Profile Info/Pertinent History of Current Problem Preeti Ford is a 67 year old female admitted on 5/17/2018 for acute respiratory failure due to a severe COPD exacerbation.  Patient was found to have an overlapping NSTEMI.  She is now s/p successful PCI of a mid-circumflex ISR.  PMH of MI and ischemic EF 25-30%. PT was intubated and extubated 5/20   Precautions/Limitations fall precautions;oxygen therapy device and L/min   Heart Disease Risk Factors High blood pressure;Lack of physical activity;Dislipidemia;Medical history;Age  (former smoker)   Cognitive Status Examination   Orientation orientation to person, place and time   Level of Consciousness alert;lethargic/somnolent   Able to  Follow Commands WNL/WFL   Personal Safety (Cognitive) moderate impairment   Memory (apperas WFL)   Sensory Examination   Sensory Quick Adds No deficits were identified   Pain Assessment   Patient Currently in Pain No   Range of Motion (ROM)   ROM Comment B UE AAROM WFL   Strength   Strength Comments B UE MMT NT due to overall weakness and    Bed Mobility Skill: Scooting/Bridging   Level of [place]: Scooting/Bridging minimum assist (75% patients effort)   Bed Mobility Skill: Supine to Sit   Level of [place]: Supine/Sit minimum assist (75% patients effort)   Physical Assist/Nonphysical Assist: Supine/Sit (HOB raised 60 degrees)   Assistive Device: Supine/Sit bedrail   Transfer Skill: Bed to Chair/Chair to Bed   Level of [place]: Bed to Chair moderate assist (50% patients effort)   Physical Assist/Nonphysical Assist: Bed to Chair 2 persons   Transfer Skill: Sit to Stand   Level of [place]: Sit/Stand moderate assist (50% patients effort)   Physical Assist/Nonphysical Assist: Sit/Stand 2 persons   Upper Body Dressing   Level of [place]: Dress Upper Body maximum assist (25% patients effort)   Lower Body Dressing   Level of [place]: Dress Lower Body dependent (less than 25% patients effort)   Eating/Self Feeding   Level of [place]: Eating minimum assist (75% patients effort)   Instrumental Activities of Daily Living (IADL)   Previous Responsibilities meal prep;housekeeping;laundry;shopping;medication management;finances;driving   Activities of Daily Living Analysis   Impairments Contributing to Impaired Activities of Daily Living balance impaired;strength decreased;post surgical precautions  (decreased activity tolerance)   General Therapy Interventions   Planned Therapy Interventions ADL retraining;cognition;transfer training;home program guidelines;progressive activity/exercise;risk factor education   Clinical Impression   Criteria for Skilled Therapeutic Interventions Met yes,  "treatment indicated   OT Diagnosis decreased ADL/IADL's and functional mobiltiy   Influenced by the following impairments impaired balance, overall wekaness and activity tolerance decreased    Assessment of Occupational Performance 3-5 Performance Deficits   Identified Performance Deficits impaired I with dressing, toielt and shower transfer, driving, cleaning, cooking, laundry, etc.   Clinical Decision Making (Complexity) Moderate complexity   Therapy Frequency 2 times/day   Predicted Duration of Therapy Intervention (days/wks) 3 days   Anticipated Discharge Disposition Transitional Care Facility  (eventual OP CR)   Risks and Benefits of Treatment have been explained. Yes   Patient, Family & other staff in agreement with plan of care Yes   [facility] AM-PAC  \"6 Clicks\" Daily Activity Inpatient Short Form   1. Putting on and taking off regular lower body clothing? 1 - Total   2. Bathing (including washing, rinsing, drying)? 2 - A Lot   3. Toileting, which includes using toilet, bedpan or urinal? 2 - A Lot   4. Putting on and taking off regular upper body clothing? 2 - A Lot   5. Taking care of personal grooming such as brushing teeth? 3 - A Little   6. Eating meals? 3 - A Little   Daily Activity Raw Score (Score out of 24.Lower scores equate to lower levels of function) 13   Total Evaluation Time   Total Evaluation Time (Minutes) 10     "

## 2018-05-21 NOTE — PROGRESS NOTES
[facility]    Cardiology Progress Note    Date of Service (when I saw the patient): 05/21/2018     Assessment & Plan   Preeti Ford is a 67 year old female who was admitted on 5/17/2018.     1-acute STEMI present admission complicating severe underlying lung disease.  At the time of angiography circumflex was patent but severely abnormal eye far.  Successfully stented.  Peak troponin only about 0.5.  Initial echo suggested significant LV dysfunction, but at the time of angiography ejection fraction 40-45% at least.  Suspect ejection fraction acutely was affected by her hypoxia and hyper Anemia.  Expected to get back to its baseline this admission.  Currently stable on dual antiplatelet therapy.    2-history of prior stenting of both the proximal LAD which is widely patent, and the midcircumflex.  Status post ISR PCI to his admission.    3-aortic stenosis, likely moderate.  Does not appear severe by 2-D.  Gradient is modest.  Exam suggests at most moderate left ear.    4-severe COPD exacerbation with hypoxia and hypercapnia.  Per ICU service.  Improving.    She appears stable from a cardiac standpoint with resolution of ischemia.  Likely ejection fraction is back close to her baseline of 45-50%.  Recent echo had shown only mild to moderate aortic stenosis, no likely change in this.  Not hemodynamically significant at this point.  She is on appropriate medical therapy from a cardiac standpoint including statin and dual antiplatelet therapy.  Not sure if she really tolerates metoprolol well and she could get by without it if needed.  No arrhythmia findings.    Tramaine Melendez M.D.    Interval History   Denies any chest discomfort.  States breathing and be getting worse for weeks prior to her presentation but that event may have precipitated the event on her previously existing in-stent restenosis.  Currently denies any chest discomfort.    Physical Exam   Vital Signs with Ranges  Temp:  [97  F (36.1   C)-98.5  F (36.9  C)] 98  F (36.7  C)  Heart Rate:  [] 84  Resp:  [12-30] 18  BP: ()/() 136/67  FiO2 (%):  [30 %-50 %] 50 %  SpO2:  [88 %-100 %] 100 %  Vitals:    05/19/18 0600 05/20/18 0650 05/21/18 0430   Weight: 45.1 kg (99 lb 6.8 oz) 45.2 kg (99 lb 10.4 oz) 47.3 kg (104 lb 4.4 oz)     I/O last 3 completed shifts:  In: 1930 [I.V.:1930]  Out: 5460 [Urine:5460]    Vitals: /67  Temp 98  F (36.7  C) (Axillary)  Resp 18  Wt 47.3 kg (104 lb 4.4 oz)  SpO2 100%  BMI 18.43 kg/m2    Constitutional: On Oxymizer.  No acute respiratory distress.    Chest:  Diffuse upper airway mild rhonchi.  Some crackles and decreased breath sounds left base.  No real increased effort.    Cardiac: Distant heart sounds.  Grade 2 to 3/6 systolic murmur loudest left sternal border, heard at base also.  No clear JVD.    Abdomen:  Nondistended, nontender    Vascular/Extremities: Upper and lower extremities warm and dry.  No edema.      Recent Labs  Lab 05/19/18  0545 05/18/18  0430 05/17/18 2113 05/17/18  0911 05/17/18  0436   TROPI 0.319* 0.506* 0.566* 0.501* 0.366*         Recent Labs  Lab 05/21/18  0630 05/20/18  0550 05/19/18  1514 05/19/18  0545  05/18/18  0430 05/17/18 2113 05/17/18  0911 05/17/18  0436   WBC  --  7.9  --  8.9  --   --   --  7.2 9.9   HGB  --  13.0  --  13.6  --   --   --  15.3 17.4*   MCV  --  92  --  90  --   --   --  90 90   PLT  --  116*  Canceled, Test credited  --  128*  --   --   --  119*  119* 192   INR  --   --   --   --   --   --   --  0.91  --     142  --  142  --  139  --   --  137   POTASSIUM 3.7 4.0 4.2 3.7  < > 3.8  --   --  4.8   CHLORIDE 97 102  --  100  --  102  --   --  100   CO2 41* 33*  --  37*  --  26  --   --  30   BUN 16 14  --  12  --  15  --   --  14   CR 0.48* 0.52  --  0.63  --  0.70  --   --  0.70   GFRESTIMATED >90 >90  --  >90  --  84  --   --  85   GFRESTBLACK >90 >90  --  >90  --  >90  --   --  >90   ANIONGAP 6  --   --  5  --   --   --   --  7   CALDERON  8.7 8.6  --  8.1*  --  7.9*  --   --  8.4*   GLC 71 102*  --  90  --  110*  --   --  148*   ALBUMIN  --   --   --   --   --   --   --   --  3.6   PROTTOTAL  --   --   --   --   --   --   --   --  7.7   BILITOTAL  --   --   --   --   --   --   --   --  0.4   ALKPHOS  --   --   --   --   --   --   --   --  90   ALT  --   --   --   --   --   --   --   --  48   AST  --   --   --   --   --   --   --   --  54*   TROPI  --   --   --  0.319*  --  0.506* 0.566* 0.501* 0.366*   < > = values in this interval not displayed.    Recent Results (from the past 48 hour(s))   XR Chest Port 1 View    Narrative    XR CHEST PORT 1 VW  2018 10:25 AM     HISTORY:  COPD exacerbation;     COMPARISON: Film dated 2018    FINDINGS:  The ET tube, temperature probe, and NG tube have been  removed. Right jugular venous catheter tip is projected over the  superior vena cava. Opacity over the left upper lung is probably due  to a skin fold. Patchy opacity right base slightly improved.      Impression    IMPRESSION: ET tube and temperature probe removed. Slightly improved  right basilar opacity.    ANUP MORENO MD       Recent Results (from the past 4320 hour(s))   Echo limited    Narrative    [id number]  [facility]  [id number]  [id number]^ROXI^KEREN^[facility]  Echocardiography Laboratory  [address] [place] [id number]        Name: TARIK HERNANDEZ  MRN: [id number]  : 1950  Study Date: 2018 09:14 AM  Age: 67 yrs  Gender: Female  Patient Location: [facility]  Reason For Study: Abn EKG  Ordering Physician: KEREN DIANE  Referring Physician: Deisy Carter  Performed By: Beena Colón     BSA: 1.4 m2  Height: 62 in  Weight: 103 lb  HR: 86  BP: 111/70 mmHg  _____________________________________________________________________________  __        Procedure  Limited Portable Echo Adult.  _____________________________________________________________________________  __        Interpretation  Summary     The visual ejection fraction is estimated at 25-30%.  Mostly global LV dysfunction with the inferolateral wall moving best.  The right ventricular systolic function is normal.  Moderate valvular aortic stenosis.  The degree of aortic stenosis may be underestimated due to the patients  significant LV dysfunction.  Sinus rhythm was noted.  In comparison with the previous study the LVEF has dropped.  _____________________________________________________________________________  __        Left Ventricle  The visual ejection fraction is estimated at 25-30%. Left ventricular  diastolic function is indeterminate. Mostly global LV dysfunction with the  inferolateral wall moving best. The patient has a large inferolateral  papillary muscle noted on the present scan. This was seen on previous studies  but appears slightly larger on the present study. This may represent different  imaging technique but clinical correlation is recommended.     Right Ventricle  The right ventricular systolic function is normal. The RV is note well seen  but the TAPSE is 1.8 cm.     Atria  The left atrium is not well visualized. Right atrium not well visualized.  There is no atrial shunt seen.     Mitral Valve  The mitral valve leaflets appear thickened, but open well. There is trace  mitral regurgitation.        Tricuspid Valve  The tricuspid valve is not well visualized, but is grossly normal. There is  mild (1+) tricuspid regurgitation. The right ventricular systolic pressure is  approximated at 24.9 mmHg plus the right atrial pressure.     Aortic Valve  There is trace to mild aortic regurgitation. The mean AoV pressure gradient is  14.1 mmHg. Moderate valvular aortic stenosis. The degree of aortic stenosis  may be underestimated due to the patients significant LV dysfunction.     Pulmonic Valve  The pulmonic valve is not well visualized.     Vessels  The aortic root is not well visualized. The inferior vena cava is not  dilated.  The IVC does not collapse with inspiration.     Pericardium  Trivial pericardial effusion. There are no echocardiographic indications of  cardiac tamponade.        Rhythm  Sinus rhythm was noted.  _____________________________________________________________________________  __  MMode/2D Measurements & Calculations  IVSd: 1.0 cm     LVIDd: 3.8 cm  LVIDs: 3.4 cm  LVPWd: 1.2 cm  FS: 9.9 %  LV mass(C)d: 135.8 grams  LV mass(C)dI: 94.2 grams/m2  LVOT diam: 2.3 cm  LVOT area: 4.2 cm2  RWT: 0.62  TAPSE: 1.8 cm        Doppler Measurements & Calculations  MV E max ryan: 44.9 cm/sec  MV A max ryan: 83.6 cm/sec  MV E/A: 0.54  MV dec time: 0.19 sec  Ao V2 max: 246.7 cm/sec  Ao max P.0 mmHg  Ao V2 mean: 174.9 cm/sec  Ao mean P.1 mmHg  Ao V2 VTI: 44.3 cm  YOLANDA(I,D): 1.4 cm2  YOLANDA(V,D): 1.4 cm2  LV V1 max P.8 mmHg  LV V1 max: 83.3 cm/sec  LV V1 VTI: 15.2 cm  SV(LVOT): 63.7 ml  SI(LVOT): 44.2 ml/m2  TR max ryan: 249.3 cm/sec  TR max P.9 mmHg  AV Ryan Ratio (DI): 0.34  YOLANDA Index (cm2/m2): 1.00     E/E' av.0  Lateral E/e': 11.8  Medial E/e': 12.3           _____________________________________________________________________________  __           Report approved by: Chencho Hope 2018 10:59 AM      Echo Complete Bubble    Narrative    [id number]  [facility]  [id number]  [id number]^ANNA^DEISY^ABHISHEK           [facility]  Echocardiography Laboratory  [address] [place] [address]        Name: TARIK HERNANDEZ  MRN: [id number]  : 1950  Study Date: 2018 02:05 PM  Age: 67 yrs  Gender: Female  Patient Location: [facility]  Reason For Study: CVA  History: COPD, CAD, AS, Tobacco Abuse     Ordering Physician: DEISY CASTILLO  Referring Physician: Deisy Gottlieb  Performed By: Don Birmingham     BSA: 1.5 m2  Height: 62 in  Weight: 112 lb  HR: 66  BP: 163/68 mmHg  _____________________________________________________________________________  __         Procedure  Complete Portable Bubble Echo Adult.  _____________________________________________________________________________  __        Interpretation Summary     1. Moderate valvular aortic stenosis. The aortic valve cusps were not well  visualized but appear calcified. Cannot rule out congenital/functional  bicuspid aortic valve.  2. Mean gradient 10.8 mmHg, valve area 1.2 cmÂ , peak velocity 2.5 m/s, normal  stroke volume index of 38.6 mL/m2.  3. Moderately decreased left ventricular systolic function. Visually LVEF 45-  50%.  4. Apical hypokinesia. Contrast not used due to patient reported reaction in  the past.  5. Normal right ventricle size and systolic function.  6. Negative bubble study.     Compared to the prior study dated 3/3/2017, there have been no changes.  _____________________________________________________________________________  __        Left Ventricle  The left ventricle is normal in size. There is mild concentric left  ventricular hypertrophy. Left ventricular systolic function is moderately  reduced. The visual ejection fraction is estimated at 45-50%. Grade I or early  diastolic dysfunction. There is moderate apical wall hypokinesis.     Right Ventricle  The right ventricle is normal in size and function.     Atria  The left atrium is moderately dilated. Right atrial size is normal. There is  no color Doppler evidence of an atrial shunt. A contrast injection (Bubble  Study) was performed that was negative for flow across the interatrial septum.     Mitral Valve  The mitral valve leaflets appear normal. There is no evidence of stenosis,  fluttering, or prolapse. There is trace mitral regurgitation.        Tricuspid Valve  Normal tricuspid valve. There is mild (1+) tricuspid regurgitation. The right  ventricular systolic pressure is approximated at 44.3 mmHg plus the right  atrial pressure.     Aortic Valve  The aortic valve is not well visualized. The cusps are heavily calcified.  Cannot  rule out congenitally/functionally bicuspid aortic valve. There is mild  (1+) aortic regurgitation. Moderate valvular aortic stenosis. The calculated  aortic valve are is 1.2 cm^2. The mean AoV pressure gradient is 10.8 mmHg.  Peak velocity 2.5 m/s. LVOT diameter 2.0 centimeters.     Pulmonic Valve  The pulmonic valve is not well seen, but is grossly normal. There is trace  pulmonic valvular regurgitation. Normal pulmonic valve velocity.     Vessels  The aortic root is normal size. (3.0 cm). Normal size ascending aorta. (2.6  cm). The IVC is normal in size and reactivity with respiration, suggesting  normal central venous pressure.     Pericardium  There is no pericardial effusion.        Rhythm  Sinus rhythm was noted.  _____________________________________________________________________________  __  MMode/2D Measurements & Calculations  IVSd: 0.84 cm     LVIDd: 4.7 cm  LVIDs: 3.6 cm  LVPWd: 1.0 cm  FS: 23.5 %  LV mass(C)d: 150.9 grams  LV mass(C)dI: 101.1 grams/m2  Ao root diam: 3.0 cm  LA dimension: 3.4 cm  asc Aorta Diam: 2.6 cm  LA/Ao: 1.1  LVOT diam: 2.0 cm  LVOT area: 3.1 cm2  RWT: 0.43        Doppler Measurements & Calculations  MV E max ryan: 64.1 cm/sec  MV A max ryan: 77.3 cm/sec  MV E/A: 0.83  MV dec time: 0.17 sec  Ao V2 max: 235.5 cm/sec  Ao max P.2 mmHg  Ao V2 mean: 155.2 cm/sec  Ao mean PG: 10.8 mmHg  Ao V2 VTI: 48.5 cm  YOLANDA(I,D): 1.2 cm2  YOLANDA(V,D): 1.1 cm2  LV V1 max P.9 mmHg  LV V1 max: 84.5 cm/sec  LV V1 VTI: 18.5 cm  SV(LVOT): 57.6 ml  SI(LVOT): 38.6 ml/m2  PA acc time: 0.09 sec  TR max ryan: 332.9 cm/sec  TR max P.3 mmHg     AV Ryan Ratio (DI): 0.36  YOLANDA Index (cm2/m2): 0.80  E/E' av.0  Lateral E/e': 12.5  Medial E/e': 13.6           _____________________________________________________________________________  __           Report approved by: Dr Lynsey Mendoza 2018 03:59 PM          Medications     Current Facility-Administered Medications   Medication Dose Route  Frequency     acetaZOLAMIDE  250 mg Intravenous Q12H     aspirin  81 mg Oral Daily     atorvastatin  10 mg Oral At Bedtime     clopidogrel  75 mg Oral or Feeding Tube Daily     dornase alpha  2.5 mg Inhalation Daily     fluticasone-vilanterol  1 puff Inhalation Daily     heparin  5,000 Units Subcutaneous Q12H     insulin aspart  1-4 Units Subcutaneous Q4H     iopamidol  100 mL INTRA-ARTERIAL Once     ipratropium - albuterol 0.5 mg/2.5 mg/3 mL  3 mL Nebulization Q4H     levofloxacin  500 mg Intravenous Q24H     methylPREDNISolone  40 mg Intravenous Q12H     metoprolol tartrate  12.5 mg Oral BID     polyethylene glycol  17 g Oral Daily     umeclidinium  1 puff Inhalation Daily         Current Facility-Administered Medications   Medication Last Rate     - MEDICATION INSTRUCTIONS -       dexmedetomidine       IV infusion builder WITH additives 50 mL/hr at 05/21/18 0824     Percutaneous Coronary Intervention orders placed (this is information for BPA alerting)       Reason ACE/ARB/ARNI order not selected       Reason beta blocker order not selected

## 2018-05-21 NOTE — PROGRESS NOTES
"   05/21/18 1133   General Information   Onset Date 05/17/18   Start of Care Date 05/21/18   Referring Physician Dr. Pitts   Patient Profile Review/OT: Additional Occupational Profile Info See Profile for full history and prior level of function   Patient/Family Goals Statement \"I'm hungry\"   Swallowing Evaluation Bedside swallow evaluation   Behaviorial Observations Initiation problems  (Fatigued, eyes half open, fluctuating alertness)   Mode of current nutrition NPO   Respiratory Status O2 Supply   Type of O2 supply (Oxymizer)   Comments Per MD note: Preeti Ford is a 67 year old female admitted on 5/17/2018 for acute respiratory failure due to a severe COPD exacerbation.  Patient was found to have an overlapping NSTEMI.  She is now s/p successful PCI of a mid-circumflex ISR.      Hx of CVI.  No dysphagia concerns in past.  Limited po trials were presented given patient's overall level of fatigue and fluctuating alertness.   Clinical Swallow Evaluation   Oral Musculature generally intact  (overall mild fatigue)   Dentition (missing teeth, dentures note present)   Laryngeal Function Cough;Throat clear;Swallow;Voicing initiated;Dry swallow palpated  (Weak, hoarse voice, decreased elevation)   Clinical Swallow Eval: Thin Liquid Texture Trial   Mode of Presentation, Thin Liquids spoon   Volume of Liquid or Food Presented ice chip x 1   Oral Phase of Swallow Premature pharyngeal entry   Pharyngeal Phase of Swallow reduction in laryngeal movement;repeated swallows  (delay)   Diagnostic Statement no overt signs of aspiration    Clinical Swallow Eval: Nectar Thick Liquid Texture Trial   Mode of Presentation, Nectar spoon   Volume of Nectar Presented tsps x 7   Oral Phase, Nectar Premature pharyngeal entry   Pharyngeal Phase, Nectar reduction in laryngeal movement;repeated swallows  (delay)   Diagnostic Statement no overt signs of aspiration    Swallow Compensations   Swallow Compensations Pacing;Reduce " amounts;Multiple swallow;Effortful swallow   Esophageal Phase of Swallow   Patient reports or presents with symptoms of esophageal dysphagia No   Swallow Eval: Clinical Impressions   Skilled Criteria for Therapy Intervention Skilled criteria met.  Treatment indicated.   Functional Assessment Scale (FAS) 2   Treatment Diagnosis mod-severe dysphagia impacted by fatigue and decreased alertness   Diet texture recommendations NPO  (see below)   Recommended Feeding/Eating Techniques (see below)   Therapy Frequency daily   Predicted Duration of Therapy Intervention (days/wks) 1 week   Anticipated Discharge Disposition inpatient rehabilitation facility   Risks and Benefits of Treatment have been explained. Yes   Patient, family and/or staff in agreement with Plan of Care Yes   Clinical Impression Comments Patient presents with moderate-severe oral-pharyngeal dysphagia impacted by overall fatigue and fluctuating alertness.  Deficits also include decreased oral awareness, swallow delay, decreased elevation, and need for 2-3 swallows per trial.  Recommend NPO status except for the following with nursing supervision: crushed meds and 1-10 tsps nectar thick by tsp, verify/cue 2 swallows per tsp, sit at 90 degrees, slow rate, hold po if aspiration signs, decreased alertness, and/or respiratory status declines.  Plan to continue swallow Tx with increased po trials on 5/22 as able to assess safety for diet trays.   Total Evaluation Time   Total Evaluation Time (Minutes) 15

## 2018-05-21 NOTE — PLAN OF CARE
Problem: Patient Care Overview  Goal: Plan of Care/Patient Progress Review  Outcome: No Change  Neuro: lethargic, drowsy, at times alert after BIPAP. Up in the chair X2 with 2 person assist  CV: SR, BP within parameters  Pulm: on and off BIPAP, was on 6L Oximizer now on 40LPM 50%fio2 hiflow to flush CO2. sats %, LS exp insp wheeze- dim, non productive nagging cough. Pt with increased Work of breathing with accessory muscle use this morning. Family spoke with leisa about code status, currently full code. Will trial BIPAP over night if in distress and if agitated precedex to start.   GI/: nectar thick , meds curshed. Barnhart with good response to diuretic, lasix dcd and diamox started.  Skin: bruising generalized, fragile frail skin  Lines: TLC RIJ, PIV X1 Rt arm    Pul toilet encouarged, pt SOB with activity, family- spouse, son, daughters aunt updated about the POC. Pt home inhalers restarted.      Plan: Rest overnight on HIflow and if resp distress increase transition to BIPAP, start precedex if pt agitated on BIPAP per MD

## 2018-05-21 NOTE — PLAN OF CARE
Problem: Patient Care Overview  Goal: Plan of Care/Patient Progress Review    Discharge Planner SLP   Patient plan for discharge: Did not state  Current status: A bedside swallow evaluation was completed this am.  Patient presents with moderate-severe oral-pharyngeal dysphagia impacted by overall fatigue and fluctuating alertness.  Recommend NPO status except for the following with nursing supervision: crushed meds and 1-10 tsps nectar thick by tsp, verify/cue 2 swallows per tsp, sit at 90 degrees, slow rate, hold po if aspiration signs, decreased alertness, and/or respiratory status declines.  Plan to continue swallow Tx with increased po trials on 5/22 as able to assess safety for diet trays.  Barriers to return to prior living situation: Weakness, level of assist  Recommendations for discharge: TCU with SLP swallow Tx  Rationale for recommendations: SLP swallow Tx to maximize swallow function for a least restrictive diet       Entered by: Nona Berman 05/21/2018 11:26 AM

## 2018-05-21 NOTE — PLAN OF CARE
Problem: Cardiac: ACS (Acute Coronary Syndrome) (Adult)  Goal: Signs and Symptoms of Listed Potential Problems Will be Absent, Minimized or Managed (Cardiac: ACS)  Signs and symptoms of listed potential problems will be absent, minimized or managed by discharge/transition of care (reference Cardiac: ACS (Acute Coronary Syndrome) (Adult) CPG).   Outcome: Improving  [facility]   Intensive Care Unit   Nursing Note    Vital signs stable during the day.  PERRL.  Moves all extremities.  Follows commands.  Pt extubated to Bipap 40% 12/5 at 1205.  Pt states she does not want to be intubated and will discuss with  [facility] tomorrow. Pt is still FULL code.  Pt on 6 liters oxymask now and Bipap PRN. Great Barnhart output and lasix given. Bud updated via phone.  Labs noted.  Continue to monitor closely.      ROUTINE IP LABS (Last four results)  BMP  Recent Labs  Lab 05/20/18  0550 05/19/18  1514 05/19/18  0545 05/19/18  0124 05/18/18  0430 05/17/18  0436     --  142  --  139 137   POTASSIUM 4.0 4.2 3.7 3.4 3.8 4.8   CHLORIDE 102  --  100  --  102 100   CALDERON 8.6  --  8.1*  --  7.9* 8.4*   CO2 33*  --  37*  --  26 30   BUN 14  --  12  --  15 14   CR 0.52  --  0.63  --  0.70 0.70   *  --  90  --  110* 148*     CBC  Recent Labs  Lab 05/20/18  0550 05/19/18  0545 05/17/18  0911 05/17/18  0436   WBC 7.9 8.9 7.2 9.9   RBC 4.36 4.61 5.14 5.88*   HGB 13.0 13.6 15.3 17.4*   HCT 40.3 41.5 46.3 52.7*   MCV 92 90 90 90   MCH 29.8 29.5 29.8 29.6   MCHC 32.3 32.8 33.0 33.0   RDW 13.8 13.6 13.5 13.9   *  Canceled, Test credited 128* 119*  119* 192       Donn Vasquez RN

## 2018-05-21 NOTE — PROGRESS NOTES
[facility]    Critical Care Service  Progress Note    Date of Service (when I saw the patient): 05/21/2018     Main Plans for Today    VBG  CXR  Restart home inhalers  Swallow evaluation   Intermittent BiPAP  D/C scheduled haldol   Acetazolamide  D/C lasix  Increase methylpred to BID    Assessment & Plan   Preeti Ford is a 67 year old female with PMHx of severe COPD and CAD s/p PCI who was admitted on 5/17/2018 with a severe COPD exacerbation c/b NSTEMI found to have restenosis of circumflex artery s/p re-stenting. Extubated 5/20.     Neuro  1. Encephalopathy/delerium   Plan:  -- encephalopathy could be partially secondary to CO2 retention vs ICU delerium   -- D/C scheduled haldol   -- PRN tylneol and norco     CV  1. NSTEMI, in stent restenosis s/p re-stenting to circumflex   2. HTN  Plan:  -- ST elevation on initial EKG and troponin leak on initial lab. Presentation this admission similar to presentation in 2015 with resolving MI. Angiogram done today found in stent restenosis of Circumflex. Was re stented. EF had recovered but now severely reduced again.  Initially given diuretics - now off pressors.   -- dual antiplatelet therapy   -- statin  -- titrate up BB. ACE/ARB when able   -- low dose diuretics to achieve net even     Resp:  1. COPD exacerbation  2. Acute hypercapnic respiratory failure s/p extubation 5/20  3. Pseudomonas in sputum   Plan:  -- Acute hypoxic and hypercapnic respiratory failure secondary to COPD exacerbation, and probably NSTEMI and ischemic cardiomyopathy. Initially very hypercapnic but has essentially resolved and CO2 is likely near baseline. Pseudomonas in sputum.  - extubated 5/20  - Increase to Methylpred 40 BID  - scheduled duonebs, restart PTA inhalers   - Levofloxacin to complete a 10 day course.   - CXR and VBG  - intermittent BiPAP and high flow NC    GI/Nutrition  1. No prior hx  Plan:  -- swallow evaluation, advance diet as able     Renal  1. No prior hx.   Baseline creatinine appears to be 0.48  2. Compensated respiratory acidosis   Plan:  --monitor function and electrolytes as needed with replacement per ICU protocols. - generally avoid nephrotoxic agents such as NSAID, IV contrast unless specifically required  -- adjust medications as needed for renal clearance  -- follow I/O's as appropriate.  -- maintain euvolemia  -- D5 1/2 +K at 50cc/hr until able to take adequate PO  -- D/C lasix gtt, give two doses of acetazolamide given high serum bicarb     ID  1. Pseudomonas in sputum   Plan:  -- ceftazidime changed to levofloxacin, continue for total of 10 day course     Endocrine  1. No active issues   Plan:  --  Insulin gtt per protocol if indicated  -- Keep BG  <180 for optimal healing    Heme:  1. No acute issues     General cares:  DVT Prophylaxis: Heparin SQ  GI Prophylaxis: Not indicated  Restraints: Restraints for medical healing needed: NO  Family update by me today: Yes     Sarah Parker     Interval History   Course reviewed. No acute events overnight. Patient tolerated being extubated. Intermittent BiPAP today. Endorses SOB and cough. Denies chest pain, headache or abdominal pain.    Physical Exam   Temp: 98  F (36.7  C) Temp src: Axillary Temp  Min: 97  F (36.1  C)  Max: 98.5  F (36.9  C) BP: 144/68   Heart Rate: 85 Resp: 28 SpO2: 97 % O2 Device: BiPAP/CPAP Oxygen Delivery: 6 LPM  Vitals:    05/19/18 0600 05/20/18 0650 05/21/18 0430   Weight: 45.1 kg (99 lb 6.8 oz) 45.2 kg (99 lb 10.4 oz) 47.3 kg (104 lb 4.4 oz)     I/O last 3 completed shifts:  In: 1875.79 [I.V.:1732.79; NG/GT:60]  Out: 4050 [Urine:4050]    GEN:  female, appears somewhat uncomfortable   EYES: PERRL, Anicteric sclera.   HEENT:  Normocephalic, atraumatic, trachea midline  CV: RRR, no murmurs  PULM/CHEST: Diminished, course throughout, expiratory wheeze  GI:  soft, non-tender  : renae catheter in place, urine yellow and clear  EXTREMITIES: no peripheral edema  NEURO: Cranial nerves  II-XII grossly intact, no motor-sensory deficits noted  SKIN: warm and dry   Imaging personally reviewed: hyperinflated lungs   ECG- NSR    Data     Recent Labs  Lab 05/21/18  0630 05/20/18  0550 05/19/18  1514 05/19/18  0545  05/18/18  0430 05/17/18  2113 05/17/18  0911 05/17/18  0436   WBC  --  7.9  --  8.9  --   --   --  7.2 9.9   HGB  --  13.0  --  13.6  --   --   --  15.3 17.4*   MCV  --  92  --  90  --   --   --  90 90   PLT  --  116*  Canceled, Test credited  --  128*  --   --   --  119*  119* 192   INR  --   --   --   --   --   --   --  0.91  --     142  --  142  --  139  --   --  137   POTASSIUM 3.7 4.0 4.2 3.7  < > 3.8  --   --  4.8   CHLORIDE 97 102  --  100  --  102  --   --  100   CO2 41* 33*  --  37*  --  26  --   --  30   BUN 16 14  --  12  --  15  --   --  14   CR 0.48* 0.52  --  0.63  --  0.70  --   --  0.70   ANIONGAP 6  --   --  5  --   --   --   --  7   CALDERON 8.7 8.6  --  8.1*  --  7.9*  --   --  8.4*   GLC 71 102*  --  90  --  110*  --   --  148*   ALBUMIN  --   --   --   --   --   --   --   --  3.6   PROTTOTAL  --   --   --   --   --   --   --   --  7.7   BILITOTAL  --   --   --   --   --   --   --   --  0.4   ALKPHOS  --   --   --   --   --   --   --   --  90   ALT  --   --   --   --   --   --   --   --  48   AST  --   --   --   --   --   --   --   --  54*   TROPI  --   --   --  0.319*  --  0.506* 0.566* 0.501* 0.366*   < > = values in this interval not displayed.

## 2018-05-21 NOTE — PROGRESS NOTES
Patient is on 5L Oxymask with SpO2 96%. BBS coarse/cracles. All nebs given as order. Will continue to monitor.    Harsha Curran RT  5/21/2018

## 2018-05-21 NOTE — PLAN OF CARE
Problem: Patient Care Overview  Goal: Plan of Care/Patient Progress Review  Discharge Planner OT   Patient plan for discharge: home  Current status: Eval completed and treatment initiated. Pt lives in a house with her  and reports I with ADL/IADl's and functional mobility at time uses a SEC. Pt manages own meds, drives, does the shopping, etc. Pt admitted due to NSTEMI, s/p PCI to circumflex restenosis. CM 25-30%. Pt requires MIN A for supine to sit with HOB raised approx 60 degrees, sit <> stand and transfer to bedside chair with MOD A x 2 and cues for tech. Pt dyspneic and fatigued with activity. Hypotensive response to activity and sitting up in chair, pt with no c/o dizziness, nursing aware. Pt reports feeling good sitting in chair. Pt attempted to complete LE CVC exercises but feeling to fatigued and weak at this time. Pt resting arms on table in front of her. Pt able to complete simple grooming tasks with SBA/min A.  Barriers to return to prior living situation: increased A with ADL/IADL and functional mobility, overall weakness, decreased activity tolerance and balance.   Recommendations for discharge: TCU  Rationale for recommendations: daily therapy to increase ADL/IADL and functional mobility I and safety to OF, followed by OP CR at [facility] when returns home.        Entered by: Suzanne Thomson 05/21/2018 9:59 AM

## 2018-05-21 NOTE — PLAN OF CARE
Problem: Patient Care Overview  Goal: Plan of Care/Patient Progress Review  Outcome: No Change  Pt with coarse LS and expiratory wheezes. Nonproductive, tight cough. Pt tripods at times. Sats 90+% on 6L Oxymask. Pt refused nicotine patch at this time. Will continue to monitor.

## 2018-05-22 ENCOUNTER — APPOINTMENT (OUTPATIENT)
Dept: SPEECH THERAPY | Facility: CLINIC | Age: 68
DRG: 246 | End: 2018-05-22
Attending: INTERNAL MEDICINE
Payer: MEDICARE

## 2018-05-22 ENCOUNTER — APPOINTMENT (OUTPATIENT)
Dept: OCCUPATIONAL THERAPY | Facility: CLINIC | Age: 68
DRG: 246 | End: 2018-05-22
Attending: INTERNAL MEDICINE
Payer: MEDICARE

## 2018-05-22 LAB
ANION GAP SERPL CALCULATED.3IONS-SCNC: 7 MMOL/L (ref 3–14)
BACTERIA SPEC CULT: NORMAL
BACTERIA SPEC CULT: NORMAL
BUN SERPL-MCNC: 18 MG/DL (ref 7–30)
CALCIUM SERPL-MCNC: 8.5 MG/DL (ref 8.5–10.1)
CHLORIDE SERPL-SCNC: 96 MMOL/L (ref 94–109)
CO2 SERPL-SCNC: 36 MMOL/L (ref 20–32)
CREAT SERPL-MCNC: 0.48 MG/DL (ref 0.52–1.04)
ERYTHROCYTE [DISTWIDTH] IN BLOOD BY AUTOMATED COUNT: 12.8 % (ref 10–15)
GFR SERPL CREATININE-BSD FRML MDRD: >90 ML/MIN/1.7M2
GLUCOSE BLDC GLUCOMTR-MCNC: 107 MG/DL (ref 70–99)
GLUCOSE BLDC GLUCOMTR-MCNC: 107 MG/DL (ref 70–99)
GLUCOSE BLDC GLUCOMTR-MCNC: 109 MG/DL (ref 70–99)
GLUCOSE BLDC GLUCOMTR-MCNC: 115 MG/DL (ref 70–99)
GLUCOSE BLDC GLUCOMTR-MCNC: 139 MG/DL (ref 70–99)
GLUCOSE BLDC GLUCOMTR-MCNC: 167 MG/DL (ref 70–99)
GLUCOSE SERPL-MCNC: 113 MG/DL (ref 70–99)
HCT VFR BLD AUTO: 39.4 % (ref 35–47)
HGB BLD-MCNC: 12.5 G/DL (ref 11.7–15.7)
INTERPRETATION ECG - MUSE: NORMAL
INTERPRETATION ECG - MUSE: NORMAL
MAGNESIUM SERPL-MCNC: 2.1 MG/DL (ref 1.6–2.3)
MCH RBC QN AUTO: 29.8 PG (ref 26.5–33)
MCHC RBC AUTO-ENTMCNC: 31.7 G/DL (ref 31.5–36.5)
MCV RBC AUTO: 94 FL (ref 78–100)
PLATELET # BLD AUTO: 142 10E9/L (ref 150–450)
POTASSIUM SERPL-SCNC: 4.2 MMOL/L (ref 3.4–5.3)
RBC # BLD AUTO: 4.19 10E12/L (ref 3.8–5.2)
SODIUM SERPL-SCNC: 139 MMOL/L (ref 133–144)
SPECIMEN SOURCE: NORMAL
SPECIMEN SOURCE: NORMAL
WBC # BLD AUTO: 4.5 10E9/L (ref 4–11)

## 2018-05-22 PROCEDURE — 97110 THERAPEUTIC EXERCISES: CPT | Mod: GO

## 2018-05-22 PROCEDURE — 25000125 ZZHC RX 250: Performed by: INTERNAL MEDICINE

## 2018-05-22 PROCEDURE — 25000132 ZZH RX MED GY IP 250 OP 250 PS 637: Mod: GY | Performed by: INTERNAL MEDICINE

## 2018-05-22 PROCEDURE — 97530 THERAPEUTIC ACTIVITIES: CPT | Mod: GO

## 2018-05-22 PROCEDURE — 80048 BASIC METABOLIC PNL TOTAL CA: CPT | Performed by: PHYSICIAN ASSISTANT

## 2018-05-22 PROCEDURE — 94640 AIRWAY INHALATION TREATMENT: CPT | Mod: 76

## 2018-05-22 PROCEDURE — 00000146 ZZHCL STATISTIC GLUCOSE BY METER IP

## 2018-05-22 PROCEDURE — A9270 NON-COVERED ITEM OR SERVICE: HCPCS | Mod: GY | Performed by: INTERNAL MEDICINE

## 2018-05-22 PROCEDURE — 85027 COMPLETE CBC AUTOMATED: CPT | Performed by: PHYSICIAN ASSISTANT

## 2018-05-22 PROCEDURE — 20000003 ZZH R&B ICU

## 2018-05-22 PROCEDURE — 40000225 ZZH STATISTIC SLP WARD VISIT: Performed by: SPEECH-LANGUAGE PATHOLOGIST

## 2018-05-22 PROCEDURE — 25000128 H RX IP 250 OP 636: Performed by: INTERNAL MEDICINE

## 2018-05-22 PROCEDURE — A9270 NON-COVERED ITEM OR SERVICE: HCPCS | Mod: GY | Performed by: PHYSICIAN ASSISTANT

## 2018-05-22 PROCEDURE — 94640 AIRWAY INHALATION TREATMENT: CPT

## 2018-05-22 PROCEDURE — 40000275 ZZH STATISTIC RCP TIME EA 10 MIN

## 2018-05-22 PROCEDURE — 25000128 H RX IP 250 OP 636: Performed by: PHYSICIAN ASSISTANT

## 2018-05-22 PROCEDURE — 25000132 ZZH RX MED GY IP 250 OP 250 PS 637: Mod: GY | Performed by: PHYSICIAN ASSISTANT

## 2018-05-22 PROCEDURE — 40000133 ZZH STATISTIC OT WARD VISIT

## 2018-05-22 PROCEDURE — 83735 ASSAY OF MAGNESIUM: CPT | Performed by: PHYSICIAN ASSISTANT

## 2018-05-22 PROCEDURE — 92526 ORAL FUNCTION THERAPY: CPT | Mod: GN | Performed by: SPEECH-LANGUAGE PATHOLOGIST

## 2018-05-22 PROCEDURE — 25000128 H RX IP 250 OP 636

## 2018-05-22 PROCEDURE — 40000983 ZZH STATISTIC HFNC ADULT NON-CPAP

## 2018-05-22 PROCEDURE — 99233 SBSQ HOSP IP/OBS HIGH 50: CPT | Performed by: PHYSICIAN ASSISTANT

## 2018-05-22 PROCEDURE — 25800025 ZZH RX 258

## 2018-05-22 RX ORDER — POLYETHYLENE GLYCOL 3350 17 G/17G
1 POWDER, FOR SOLUTION ORAL DAILY PRN
COMMUNITY
End: 2021-01-01

## 2018-05-22 RX ORDER — QUETIAPINE FUMARATE 25 MG/1
25 TABLET, FILM COATED ORAL 2 TIMES DAILY
Status: DISCONTINUED | OUTPATIENT
Start: 2018-05-22 | End: 2018-05-24

## 2018-05-22 RX ORDER — METHYLPREDNISOLONE SODIUM SUCCINATE 40 MG/ML
40 INJECTION, POWDER, LYOPHILIZED, FOR SOLUTION INTRAMUSCULAR; INTRAVENOUS EVERY 8 HOURS
Status: DISCONTINUED | OUTPATIENT
Start: 2018-05-22 | End: 2018-05-24

## 2018-05-22 RX ADMIN — IPRATROPIUM BROMIDE AND ALBUTEROL SULFATE 3 ML: .5; 3 SOLUTION RESPIRATORY (INHALATION) at 16:10

## 2018-05-22 RX ADMIN — ASPIRIN 81 MG: 81 TABLET, COATED ORAL at 08:07

## 2018-05-22 RX ADMIN — POTASSIUM CHLORIDE: 149 INJECTION, SOLUTION, CONCENTRATE INTRAVENOUS at 05:39

## 2018-05-22 RX ADMIN — INSULIN ASPART 1 UNITS: 100 INJECTION, SOLUTION INTRAVENOUS; SUBCUTANEOUS at 20:32

## 2018-05-22 RX ADMIN — ACETAZOLAMIDE 250 MG: 500 INJECTION, POWDER, LYOPHILIZED, FOR SOLUTION INTRAVENOUS at 03:02

## 2018-05-22 RX ADMIN — HEPARIN SODIUM 5000 UNITS: 5000 INJECTION, SOLUTION INTRAVENOUS; SUBCUTANEOUS at 13:55

## 2018-05-22 RX ADMIN — HEPARIN SODIUM 5000 UNITS: 5000 INJECTION, SOLUTION INTRAVENOUS; SUBCUTANEOUS at 03:02

## 2018-05-22 RX ADMIN — QUETIAPINE FUMARATE 25 MG: 25 TABLET ORAL at 11:27

## 2018-05-22 RX ADMIN — ALBUTEROL SULFATE 2.5 MG: 2.5 SOLUTION RESPIRATORY (INHALATION) at 17:37

## 2018-05-22 RX ADMIN — IPRATROPIUM BROMIDE AND ALBUTEROL SULFATE 3 ML: .5; 3 SOLUTION RESPIRATORY (INHALATION) at 19:49

## 2018-05-22 RX ADMIN — LEVOFLOXACIN 500 MG: 5 INJECTION, SOLUTION INTRAVENOUS at 05:39

## 2018-05-22 RX ADMIN — FLUTICASONE FUROATE AND VILANTEROL TRIFENATATE 1 PUFF: 200; 25 POWDER RESPIRATORY (INHALATION) at 08:07

## 2018-05-22 RX ADMIN — ATORVASTATIN CALCIUM 10 MG: 10 TABLET, FILM COATED ORAL at 20:32

## 2018-05-22 RX ADMIN — CLOPIDOGREL 75 MG: 75 TABLET, FILM COATED ORAL at 08:07

## 2018-05-22 RX ADMIN — IPRATROPIUM BROMIDE AND ALBUTEROL SULFATE 3 ML: .5; 3 SOLUTION RESPIRATORY (INHALATION) at 23:35

## 2018-05-22 RX ADMIN — Medication 12.5 MG: at 12:46

## 2018-05-22 RX ADMIN — IPRATROPIUM BROMIDE AND ALBUTEROL SULFATE 3 ML: .5; 3 SOLUTION RESPIRATORY (INHALATION) at 03:54

## 2018-05-22 RX ADMIN — METHYLPREDNISOLONE SODIUM SUCCINATE 40 MG: 40 INJECTION, POWDER, FOR SOLUTION INTRAMUSCULAR; INTRAVENOUS at 08:07

## 2018-05-22 RX ADMIN — IPRATROPIUM BROMIDE AND ALBUTEROL SULFATE 3 ML: .5; 3 SOLUTION RESPIRATORY (INHALATION) at 11:30

## 2018-05-22 RX ADMIN — IPRATROPIUM BROMIDE AND ALBUTEROL SULFATE 3 ML: .5; 3 SOLUTION RESPIRATORY (INHALATION) at 07:33

## 2018-05-22 RX ADMIN — UMECLIDINIUM 1 PUFF: 62.5 AEROSOL, POWDER ORAL at 08:07

## 2018-05-22 RX ADMIN — QUETIAPINE FUMARATE 25 MG: 25 TABLET ORAL at 20:32

## 2018-05-22 RX ADMIN — DORNASE ALFA 2.5 MG: 1 SOLUTION RESPIRATORY (INHALATION) at 19:49

## 2018-05-22 RX ADMIN — ALBUTEROL SULFATE 2.5 MG: 2.5 SOLUTION RESPIRATORY (INHALATION) at 09:48

## 2018-05-22 RX ADMIN — Medication 12.5 MG: at 20:32

## 2018-05-22 RX ADMIN — METHYLPREDNISOLONE SODIUM SUCCINATE 40 MG: 40 INJECTION, POWDER, FOR SOLUTION INTRAMUSCULAR; INTRAVENOUS at 16:25

## 2018-05-22 NOTE — PROGRESS NOTES
Patient is on HFNC 40L 50% with SpO2 in the upper 90's. BS diminished. All nebs were given as ordered.  Will cont to follow.  5/21/2018  Gali Leger RRT

## 2018-05-22 NOTE — PLAN OF CARE
Problem: Patient Care Overview  Goal: Plan of Care/Patient Progress Review    Discharge Planner SLP   Patient plan for discharge: Did not state  Current status: Swallow Tx was provided this am with increased po trials.  Patient demonstrated improved alertness, voicing, and laryngeal elevation this am.  Patient tolerated ice chip, nectar thick liquid, and pudding trials without overt signs of aspiration.  Thin liquids and solids were not attempted due to swallow delay.  Recommend a dysphagia diet level 1 and nectar thick liquids by tsp, sit at 90 degrees, slow rate, only while on oxymizer or nasal cannual with stable respiratory status, only when alert, crush meds and give with puree, ok for single ice chips in between meals.  Hold po if aspiration signs noted.  Plan to continue Tx to advance diet as able.  Barriers to return to prior living situation: Weakness, level of assist  Recommendations for discharge: TCU with SLP swallow Tx  Rationale for recommendations: SLP swallow Tx to maximize swallow function for a least restrictive diet       Entered by: Nona Berman 05/22/2018 8:41 AM

## 2018-05-22 NOTE — PROGRESS NOTES
Pt remains on HFNC 35LPM 50% with SPO2 of 100%. BBS diminished, schedule neb tx given as ordered. Will continue to follow.  5/22/2018  Jaimie Renteria

## 2018-05-22 NOTE — PHARMACY-ADMISSION MEDICATION HISTORY
Admission medication history interview status for the 5/17/2018  admission is complete. See EPIC admission navigator for prior to admission medications     Medication history source reliability:Good    Actions taken by pharmacist (provider contacted, etc):None     Additional medication history information not noted on PTA med list :None    Medication reconciliation/reorder completed by provider prior to medication history? Yes    Time spent in this activity: 10    Prior to Admission medications    Medication Sig Last Dose Taking? Auth Provider   acetaminophen (TYLENOL) 325 MG tablet Take 325 mg by mouth every 4 hours as needed for mild pain  prn med Yes Deisy Laird MD   albuterol (PROAIR HFA/PROVENTIL HFA/VENTOLIN HFA) 108 (90 BASE) MCG/ACT Inhaler Inhale 2 puffs into the lungs every 6 hours as needed for shortness of breath / dyspnea prn med Yes Deisy Carter APRN CNP   atorvastatin (LIPITOR) 10 MG tablet Take 1 tablet (10 mg) by mouth At Bedtime  Yes Deisy Carter APRN CNP   budesonide-formoterol (SYMBICORT) 160-4.5 MCG/ACT Inhaler INHALE TWO PUFFS BY MOUTH TWICE A DAY  Yes Deisy Carter APRN CNP   Calcium Carbonate-Vitamin D (OSCAL 500/200 D-3 PO) Take 1 tablet by mouth 2 times daily  Yes Reported, Patient   clopidogrel (PLAVIX) 75 MG tablet Take 1 tablet (75 mg) by mouth daily  Yes Deisy Carter APRN CNP   Coenzyme Q10 (COQ10 PO) Take 100 mg by mouth every 24 hours (at 16:00)  Yes Reported, Patient   ipratropium - albuterol 0.5 mg/2.5 mg/3 mL (DUONEB) 0.5-2.5 (3) MG/3ML neb solution USE 1 VIAL IN NEBULIZER FOUR TIMES A DAY  Yes Deisy Carter APRN CNP   metoprolol succinate (TOPROL-XL) 25 MG 24 hr tablet Take 1 tablet (25 mg) by mouth daily  Yes Deisy Laird MD   montelukast (SINGULAIR) 10 MG tablet Take 1 tablet (10 mg) by mouth At Bedtime  Yes Deisy Carter APRN CNP   nicotine (NICODERM CQ) 7  MG/24HR 24 hr patch Place 1 patch onto the skin every 24 hours  Yes Deisy Carter APRN CNP   polyethylene glycol (MIRALAX/GLYCOLAX) Packet Take 1 packet by mouth daily as needed for constipation  Yes Unknown, Entered By History   tiotropium (SPIRIVA HANDIHALER) 18 MCG capsule Inhale 1 capsule (18 mcg) into the lungs daily  Yes Deisy Carter APRN CNP   ACE/ARB/ARNI NOT PRESCRIBED, INTENTIONAL, Please choose reason not prescribed, below   Vince Mcbride PA-C   Nutritional Supplements (BOOST) Take 1 Bottle by mouth 3 times daily (with meals)   Vince Mcbride PA-C   order for DME Equipment being ordered: Nebulizer   Deisy Carter APRN CNP   order for DME Equipment being ordered: Nebulizer machine   Deisy Carter APRN CNP

## 2018-05-22 NOTE — PLAN OF CARE
"Problem: Patient Care Overview  Goal: Plan of Care/Patient Progress Review  Outcome: No Change  Neuro: more alert today than yesterday. Oriented X4  CV: SR, BP within parameter  Pulm: on BIPAP 1 hour today, refusing to keep on, currently on 30LPM 40% fio2 hi flow. On NC at 2L when eating. LS dim some exp wheeze ( better than yesterday), getting scheduled neb in addition to home inhalers  GI/: DD1 diet with nectar thick liquid and ice chips. Barnhart with good UOP  Skin: multiple bruises   Lines: TLC RIJ. Barnhart PIV Rt arm    Family- spouse and aunt here today updated about the POC. Pt most of the time SOB, using accessory muscles, refusing to wear BIPAP. Pt informed if resp status declines then pt will get intubated, pt verbalizes \" donot want that mask\" ok if gets intubated.     Plan: Rest overnight, possible transfer if resp status improves      "

## 2018-05-22 NOTE — PROGRESS NOTES
Nebs given as ordered. LS are diminished t/o and pt is on HFNC 25L 40%.  Pt continues to be SOB but refuses bipap.  Will continue to follow.  Austin Chaudhary

## 2018-05-22 NOTE — PROGRESS NOTES
[facility]    Critical Care Service  Progress Note    Date of Service (when I saw the patient): 05/22/2018     Problem List   COPD exacerbation   NSTEMI, in stent restenosis s/p re-stenting to circumflex  Pseudomonas in sputum     Main Plans for Today    - continue intermittent BiPAP and high flow   - advance diet as tolerated   - add seroquel   - increase methylpred from BID to Q8    Assessment & Plan   Preeti Ford is a 67 year old female with PMHx of severe COPD and CAD s/p PCI who was admitted on 5/17/2018 with a severe COPD exacerbation c/b NSTEMI found to have restenosis of circumflex artery s/p re-stenting. Extubated 5/20.      Neuro  1. Encephalopathy/delerium   Plan:  -- encephalopathy improving. Likely at least partially related to CO2 retention vs ICU delerium   -- D/C scheduled haldol   -- PRN tylneol and norco      CV  1. NSTEMI, in stent restenosis s/p re-stenting to circumflex   2. HTN  Plan:  -- ST elevation on initial EKG and troponin leak on initial lab. Presentation this admission similar to presentation in 2015 with resolving MI. Angiogram found in stent restenosis of Circumflex. Was re stented. EF had recovered but now severely reduced again (EF 25-30% on 5/17).  Initially given diuretics - now off pressors.  -- dual antiplatelet therapy   -- statin  -- BB. ACE/ARB when able, hold off for now given lower MAPs  -- D/C lasix given increasing serum CO2, will give 2 doses of acetazolamide and re-assess      Resp:  1. COPD exacerbation  2. Acute hypercapnic respiratory failure s/p extubation 5/20  3. Pseudomonas in sputum   Plan:  -- Acute hypoxic and hypercapnic respiratory failure secondary to COPD exacerbation, and probably NSTEMI and ischemic cardiomyopathy. Pseudomonas in sputum.  - extubated 5/20  - Increase to Methylpred 40 BID  - scheduled duonebs, restart PTA inhalers   - Levofloxacin to complete a 10 day course.   - intermittent BiPAP and high flow NC, appears more  comfortable on this regimen   - serum CO2 improving on BMP     GI/Nutrition  1. No prior hx  Plan:  -- swallow evaluation, advance diet as able   -- advance to level 1 with nectar thickened liquids      Renal  1. No prior hx.  Baseline creatinine appears to be 0.48  2. Compensated respiratory acidosis   Plan:  --monitor function and electrolytes as needed with replacement per ICU protocols. - generally avoid nephrotoxic agents such as NSAID, IV contrast unless specifically required  -- adjust medications as needed for renal clearance  -- follow I/O's as appropriate.  -- maintain euvolemia  -- D5 1/2 +K at 50cc/hr until able to take adequate PO  -- D/C lasix gtt, give two doses of acetazolamide given high serum bicarb      ID  1. Pseudomonas in sputum   Plan:  -- ceftazidime changed to levofloxacin, continue for total of 10 day course      Endocrine  1. No active issues   Plan:  --  Insulin gtt per protocol if indicated  -- Keep BG  <180 for optimal healing     Heme:  1. No acute issues      General cares:  DVT Prophylaxis: Heparin SQ  GI Prophylaxis: Not indicated  Restraints: Restraints for medical healing needed: NO  Family update by me today: No      Sarah Parker     Interval History   Course reviewed. No acute events overnight. Patient intermittently required BiPAP. She endorses feeling less SOB and less fatigued today than yesterday. She denies abdominal pain, chest pain.     Physical Exam   Temp: 97.2  F (36.2  C) Temp src: Axillary Temp  Min: 96.8  F (36  C)  Max: 98.2  F (36.8  C) BP: 93/47   Heart Rate: 80 Resp: 26 SpO2: 100 % O2 Device: Oxymizer cannula Oxygen Delivery: 5 LPM  Vitals:    05/20/18 0650 05/21/18 0430 05/22/18 0400   Weight: 45.2 kg (99 lb 10.4 oz) 47.3 kg (104 lb 4.4 oz) 45.2 kg (99 lb 10.4 oz)     I/O last 3 completed shifts:  In: 1699.3 [I.V.:1699.3]  Out: 3160 [Urine:3160]     GEN:  female, in no acute distress   EYES: PERRL, Anicteric sclera.   HEENT:  Normocephalic,  atraumatic, trachea midline, BiPAP mask in place   CV: RRR, no murmurs  PULM/CHEST: Diminished, course throughout, expiratory wheeze  GI:  soft, non-tender  : renae catheter in place, urine yellow and clear  EXTREMITIES: no peripheral edema  NEURO: A+O x3, Cranial nerves II-XII grossly intact, no motor-sensory deficits noted  SKIN: warm and dry   Imaging personally reviewed: no new imaging   ECG- NSR    Data     Recent Labs  Lab 05/22/18  0310 05/21/18  0630 05/20/18  0550  05/19/18  0545  05/18/18  0430 05/17/18  2113 05/17/18  0911 05/17/18  0436   WBC 4.5  --  7.9  --  8.9  --   --   --  7.2 9.9   HGB 12.5  --  13.0  --  13.6  --   --   --  15.3 17.4*   MCV 94  --  92  --  90  --   --   --  90 90   *  --  116*  Canceled, Test credited  --  128*  --   --   --  119*  119* 192   INR  --   --   --   --   --   --   --   --  0.91  --     144 142  --  142  --  139  --   --  137   POTASSIUM 4.2 3.7 4.0  < > 3.7  < > 3.8  --   --  4.8   CHLORIDE 96 97 102  --  100  --  102  --   --  100   CO2 36* 41* 33*  --  37*  --  26  --   --  30   BUN 18 16 14  --  12  --  15  --   --  14   CR 0.48* 0.48* 0.52  --  0.63  --  0.70  --   --  0.70   ANIONGAP 7 6  --   --  5  --   --   --   --  7   CALDERON 8.5 8.7 8.6  --  8.1*  --  7.9*  --   --  8.4*   * 71 102*  --  90  --  110*  --   --  148*   ALBUMIN  --   --   --   --   --   --   --   --   --  3.6   PROTTOTAL  --   --   --   --   --   --   --   --   --  7.7   BILITOTAL  --   --   --   --   --   --   --   --   --  0.4   ALKPHOS  --   --   --   --   --   --   --   --   --  90   ALT  --   --   --   --   --   --   --   --   --  48   AST  --   --   --   --   --   --   --   --   --  54*   TROPI  --   --   --   --  0.319*  --  0.506* 0.566* 0.501* 0.366*   < > = values in this interval not displayed.

## 2018-05-22 NOTE — PROGRESS NOTES
CLINICAL NUTRITION SERVICES - REASSESSMENT NOTE      Recommendations Ordered by Registered Dietitian (RD): Magic Shake BID between meals    Malnutrition: 5/19:  % Weight Loss:  Up to 20% in 1 year (non-severe malnutrition)  % Intake:  </= 50% for >/= 1 month (severe malnutrition)  Subcutaneous Fat Loss:  None observed  Muscle Loss:  Temporal region mild-moderate depletion  Fluid Retention:  None noted     Malnutrition Diagnosis: Non-Severe malnutrition  In Context of:  Acute illness or injury  Chronic illness or disease       EVALUATION OF PROGRESS TOWARD GOALS   Diet:  DD1, Nectar thick (initiated today)  Nutrition Support:  Patient started on TF 5/19 (ran at 15 mL/hr) but off with extubation.  Has been with little nutrition since admit (day #6).  Intake:  Patient has not eaten yet today since diet advanced.  RN approached this writer in search of a supplement to be sent up to ease into oral intake (rather than taking pureed food right away).      ASSESSED NUTRITION NEEDS:  Estimated Energy Needs: 4625-5514 kcals (30-35 Kcal/Kg)  Justification: repletion and vented  Estimated Protein Needs: 60-80 grams protein (1.3-1.8 g pro/Kg)  Justification: repletion      NEW FINDINGS:   Patient receiving D5 IVF at 50 mL/hr= 60 g CHO, 204 kcal   BGM < 150 with Low SSI     Previous Goals (5/19):   Patient will meet preliminary needs within the next 2-3 days   Evaluation: Not met    Previous Nutrition Diagnosis (5/19):   Inadequate protein-energy intake related to NPO on vent as evidenced by meeting 0% protein and 15% preliminary energy needs via Propofol   Evaluation: No change      CURRENT NUTRITION DIAGNOSIS  Inadequate oral intake related to diet just advanced today, has been NPO on on low drip TF as evidenced by meeting 0% needs    INTERVENTIONS  Recommendations / Nutrition Prescription  Continue DD1, Nectar thick liquids as above   Magic Shake BID between meals     Implementation  Medical Food Supplement:  Ordered as above    Collaboration and Referral of Nutrition care:  Patient discussed today during interdisciplinary bedside rounds    Goals  Patient will consume 2/3-3/4 of meals TID and supplements BID    MONITORING AND EVALUATION:  Progress towards goals will be monitored and evaluated per protocol and Practice Guidelines    Melinda Yepez RD, LD, CNSC   Clinical Dietitian - [facility]

## 2018-05-22 NOTE — PLAN OF CARE
Problem: Patient Care Overview  Goal: Plan of Care/Patient Progress Review  Discharge Planner OT   Patient plan for discharge: did not state  Current status: Performed supine to sit SBA, sit to stand and bed to chair with Min A of 1. Tolerated CV ex's for 1min intervals, 4 seated and 2 in standing holding onto back of chair for balance support and therapist providing CGA. Pt c/o SOB with activity and required 2-3min rest breaks between each set but maintained normal cardiac response throughout. /64 to 121/56, HR 88, 02 96%. Barriers to return to prior living situation: current level of assist  Recommendations for discharge: TCU followed by OPCR at [facility]  Rationale for recommendations: pt will benefit from daily therapies following dc to advance ADLs and mobilities prior to return home and initiation of OPCR.        Entered by: Salazar Rowe 05/22/2018 1:00 PM

## 2018-05-22 NOTE — PLAN OF CARE
Problem: Patient Care Overview  Goal: Plan of Care/Patient Progress Review  Outcome: No Change  Pt lethargic, hoarse voice however oriented x4. VSS on HFNC overnight. Last evening tri podding upright in bed; attempted to premedicate with precedex before initiating BiPAP at hs. Pt adamantly refusing to trail BiPAP until 0615 this morning. LS exp wheezes. Fragile skin. Tolerated only a few sips of NT liquids with medications last night. Pt and family need to further discuss goals of care.

## 2018-05-23 ENCOUNTER — APPOINTMENT (OUTPATIENT)
Dept: SPEECH THERAPY | Facility: CLINIC | Age: 68
DRG: 246 | End: 2018-05-23
Attending: INTERNAL MEDICINE
Payer: MEDICARE

## 2018-05-23 ENCOUNTER — APPOINTMENT (OUTPATIENT)
Dept: OCCUPATIONAL THERAPY | Facility: CLINIC | Age: 68
DRG: 246 | End: 2018-05-23
Attending: INTERNAL MEDICINE
Payer: MEDICARE

## 2018-05-23 LAB
ANION GAP SERPL CALCULATED.3IONS-SCNC: 6 MMOL/L (ref 3–14)
BUN SERPL-MCNC: 17 MG/DL (ref 7–30)
CALCIUM SERPL-MCNC: 8.8 MG/DL (ref 8.5–10.1)
CHLORIDE SERPL-SCNC: 101 MMOL/L (ref 94–109)
CO2 SERPL-SCNC: 34 MMOL/L (ref 20–32)
CREAT SERPL-MCNC: 0.48 MG/DL (ref 0.52–1.04)
ERYTHROCYTE [DISTWIDTH] IN BLOOD BY AUTOMATED COUNT: 12.6 % (ref 10–15)
GFR SERPL CREATININE-BSD FRML MDRD: >90 ML/MIN/1.7M2
GLUCOSE BLDC GLUCOMTR-MCNC: 109 MG/DL (ref 70–99)
GLUCOSE BLDC GLUCOMTR-MCNC: 119 MG/DL (ref 70–99)
GLUCOSE BLDC GLUCOMTR-MCNC: 124 MG/DL (ref 70–99)
GLUCOSE BLDC GLUCOMTR-MCNC: 127 MG/DL (ref 70–99)
GLUCOSE BLDC GLUCOMTR-MCNC: 135 MG/DL (ref 70–99)
GLUCOSE BLDC GLUCOMTR-MCNC: 143 MG/DL (ref 70–99)
GLUCOSE SERPL-MCNC: 120 MG/DL (ref 70–99)
HCT VFR BLD AUTO: 40.3 % (ref 35–47)
HGB BLD-MCNC: 12.9 G/DL (ref 11.7–15.7)
MCH RBC QN AUTO: 29.3 PG (ref 26.5–33)
MCHC RBC AUTO-ENTMCNC: 32 G/DL (ref 31.5–36.5)
MCV RBC AUTO: 92 FL (ref 78–100)
PLATELET # BLD AUTO: 179 10E9/L (ref 150–450)
POTASSIUM SERPL-SCNC: 4.1 MMOL/L (ref 3.4–5.3)
RBC # BLD AUTO: 4.4 10E12/L (ref 3.8–5.2)
SODIUM SERPL-SCNC: 141 MMOL/L (ref 133–144)
WBC # BLD AUTO: 4.8 10E9/L (ref 4–11)

## 2018-05-23 PROCEDURE — 85027 COMPLETE CBC AUTOMATED: CPT | Performed by: PHYSICIAN ASSISTANT

## 2018-05-23 PROCEDURE — 21000000 ZZH R&B IMCU HEART CARE

## 2018-05-23 PROCEDURE — 92526 ORAL FUNCTION THERAPY: CPT | Mod: GN | Performed by: SPEECH-LANGUAGE PATHOLOGIST

## 2018-05-23 PROCEDURE — 25000132 ZZH RX MED GY IP 250 OP 250 PS 637: Mod: GY | Performed by: INTERNAL MEDICINE

## 2018-05-23 PROCEDURE — 25000125 ZZHC RX 250: Performed by: INTERNAL MEDICINE

## 2018-05-23 PROCEDURE — A9270 NON-COVERED ITEM OR SERVICE: HCPCS | Mod: GY | Performed by: PHYSICIAN ASSISTANT

## 2018-05-23 PROCEDURE — 25000132 ZZH RX MED GY IP 250 OP 250 PS 637: Mod: GY | Performed by: PHYSICIAN ASSISTANT

## 2018-05-23 PROCEDURE — A9270 NON-COVERED ITEM OR SERVICE: HCPCS | Mod: GY | Performed by: INTERNAL MEDICINE

## 2018-05-23 PROCEDURE — 00000146 ZZHCL STATISTIC GLUCOSE BY METER IP

## 2018-05-23 PROCEDURE — 99233 SBSQ HOSP IP/OBS HIGH 50: CPT | Performed by: PHYSICIAN ASSISTANT

## 2018-05-23 PROCEDURE — 94640 AIRWAY INHALATION TREATMENT: CPT

## 2018-05-23 PROCEDURE — 25000128 H RX IP 250 OP 636: Performed by: PHYSICIAN ASSISTANT

## 2018-05-23 PROCEDURE — 40000885 ZZH STATISTIC STEP DOWN HRS EVENING

## 2018-05-23 PROCEDURE — 40000884 ZZH STATISTIC STEP DOWN HRS NIGHT

## 2018-05-23 PROCEDURE — 40000275 ZZH STATISTIC RCP TIME EA 10 MIN

## 2018-05-23 PROCEDURE — 40000225 ZZH STATISTIC SLP WARD VISIT: Performed by: SPEECH-LANGUAGE PATHOLOGIST

## 2018-05-23 PROCEDURE — 40000983 ZZH STATISTIC HFNC ADULT NON-CPAP

## 2018-05-23 PROCEDURE — 40000133 ZZH STATISTIC OT WARD VISIT

## 2018-05-23 PROCEDURE — 25000128 H RX IP 250 OP 636: Performed by: INTERNAL MEDICINE

## 2018-05-23 PROCEDURE — 99223 1ST HOSP IP/OBS HIGH 75: CPT | Performed by: PHYSICIAN ASSISTANT

## 2018-05-23 PROCEDURE — 94640 AIRWAY INHALATION TREATMENT: CPT | Mod: 76

## 2018-05-23 PROCEDURE — 80048 BASIC METABOLIC PNL TOTAL CA: CPT | Performed by: PHYSICIAN ASSISTANT

## 2018-05-23 PROCEDURE — 97110 THERAPEUTIC EXERCISES: CPT | Mod: GO

## 2018-05-23 RX ORDER — NITROGLYCERIN 0.4 MG/1
0.4 TABLET SUBLINGUAL EVERY 5 MIN PRN
Status: DISCONTINUED | OUTPATIENT
Start: 2018-05-23 | End: 2018-05-26 | Stop reason: HOSPADM

## 2018-05-23 RX ADMIN — QUETIAPINE FUMARATE 25 MG: 25 TABLET ORAL at 08:24

## 2018-05-23 RX ADMIN — POLYETHYLENE GLYCOL 3350 17 G: 17 POWDER, FOR SOLUTION ORAL at 08:22

## 2018-05-23 RX ADMIN — INSULIN ASPART 1 UNITS: 100 INJECTION, SOLUTION INTRAVENOUS; SUBCUTANEOUS at 23:58

## 2018-05-23 RX ADMIN — IPRATROPIUM BROMIDE AND ALBUTEROL SULFATE 3 ML: .5; 3 SOLUTION RESPIRATORY (INHALATION) at 15:31

## 2018-05-23 RX ADMIN — FLUTICASONE FUROATE AND VILANTEROL TRIFENATATE 1 PUFF: 200; 25 POWDER RESPIRATORY (INHALATION) at 08:24

## 2018-05-23 RX ADMIN — Medication 12.5 MG: at 08:24

## 2018-05-23 RX ADMIN — ATORVASTATIN CALCIUM 10 MG: 10 TABLET, FILM COATED ORAL at 21:00

## 2018-05-23 RX ADMIN — IPRATROPIUM BROMIDE AND ALBUTEROL SULFATE 3 ML: .5; 3 SOLUTION RESPIRATORY (INHALATION) at 11:13

## 2018-05-23 RX ADMIN — IPRATROPIUM BROMIDE AND ALBUTEROL SULFATE 3 ML: .5; 3 SOLUTION RESPIRATORY (INHALATION) at 04:51

## 2018-05-23 RX ADMIN — HEPARIN SODIUM 5000 UNITS: 5000 INJECTION, SOLUTION INTRAVENOUS; SUBCUTANEOUS at 04:15

## 2018-05-23 RX ADMIN — IPRATROPIUM BROMIDE AND ALBUTEROL SULFATE 3 ML: .5; 3 SOLUTION RESPIRATORY (INHALATION) at 19:07

## 2018-05-23 RX ADMIN — INSULIN ASPART 1 UNITS: 100 INJECTION, SOLUTION INTRAVENOUS; SUBCUTANEOUS at 20:53

## 2018-05-23 RX ADMIN — METHYLPREDNISOLONE SODIUM SUCCINATE 40 MG: 40 INJECTION, POWDER, FOR SOLUTION INTRAMUSCULAR; INTRAVENOUS at 00:50

## 2018-05-23 RX ADMIN — ASPIRIN 81 MG: 81 TABLET, COATED ORAL at 08:24

## 2018-05-23 RX ADMIN — UMECLIDINIUM 1 PUFF: 62.5 AEROSOL, POWDER ORAL at 08:24

## 2018-05-23 RX ADMIN — CLOPIDOGREL 75 MG: 75 TABLET, FILM COATED ORAL at 08:24

## 2018-05-23 RX ADMIN — METHYLPREDNISOLONE SODIUM SUCCINATE 40 MG: 40 INJECTION, POWDER, FOR SOLUTION INTRAMUSCULAR; INTRAVENOUS at 08:24

## 2018-05-23 RX ADMIN — LEVOFLOXACIN 500 MG: 5 INJECTION, SOLUTION INTRAVENOUS at 06:18

## 2018-05-23 RX ADMIN — IPRATROPIUM BROMIDE AND ALBUTEROL SULFATE 3 ML: .5; 3 SOLUTION RESPIRATORY (INHALATION) at 07:52

## 2018-05-23 RX ADMIN — HEPARIN SODIUM 5000 UNITS: 5000 INJECTION, SOLUTION INTRAVENOUS; SUBCUTANEOUS at 15:08

## 2018-05-23 RX ADMIN — Medication 12.5 MG: at 20:54

## 2018-05-23 RX ADMIN — IPRATROPIUM BROMIDE AND ALBUTEROL SULFATE 3 ML: .5; 3 SOLUTION RESPIRATORY (INHALATION) at 22:53

## 2018-05-23 RX ADMIN — QUETIAPINE FUMARATE 25 MG: 25 TABLET ORAL at 20:54

## 2018-05-23 RX ADMIN — METHYLPREDNISOLONE SODIUM SUCCINATE 40 MG: 40 INJECTION, POWDER, FOR SOLUTION INTRAMUSCULAR; INTRAVENOUS at 16:16

## 2018-05-23 NOTE — PLAN OF CARE
"Problem: Patient Care Overview  Goal: Plan of Care/Patient Progress Review  Discharge Planner OT   Patient plan for discharge: did not state  Current status: Pt able to complete 3 standing exercises x2 mins and 2 seated exercises x 1 min each. Seated rest breaks provided. Difficulty to obtain accurate 02 sats- nursing aware. Pt with no signs/symptoms of activity intolerance. Pt noted to be hypertensive to activity with /62, HR 79 pre and 133/65, HR 80 post.     PM session: Bed mobility with S/SBA and A for lines, sit/stand with SBA. Pt able to complete 3 standing CVCs x 2 mins each. 1 seated rest break during activity. Pt with some noted confusion, oriented to month, year, and date (her anniversary) and to \"hospital\". Pt hypertensive with standing, and then hypertensive when returning to bed. See flow sheet- nursing updated.   Barriers to return to prior living situation: current level of assist  Recommendations for discharge: TCU followed by OPCR at [facility] location  Rationale for recommendations: pt will benefit from daily therapies following dc to advance ADLs and mobilities prior to return home and initiation of OPCR.        Entered by: Letty Costello 05/23/2018 10:48 AM         "

## 2018-05-23 NOTE — PROGRESS NOTES
Patient remains on HFNC 40%, 30L.  Lung sounds diminished.  Getting scheduled nebs.  Continue to follow.

## 2018-05-23 NOTE — PROGRESS NOTES
Pt has been on HFNC 30LPM 40% throughout the night, pt refused BIPAP. Q4 schedule neb tx given as ordered. LS diminished, SPO2 100%. Will continue to follow.  5/23/2018  Jaimie Renteria

## 2018-05-23 NOTE — CONSULTS
[facility]    Transfer of care summary  Hospitalist       Date of Admission:  5/17/2018  Date of Service (when I saw the patient): 05/23/18    Assessment & Plan   Preeti Ford is a 67 year old female with PMHx of severe COPD and CAD s/p PCI who was admitted on 5/17/2018 with a severe COPD exacerbation c/b STEMI found to have restenosis of circumflex artery s/p re-stenting. Extubated 5/20.  She has been clinically improving and now is on intermittent BiPAP and high flow oxygen.  The hospitalist service was contacted for transfer of care and the patient will be transferring to the intermediate care unit    STEMI, in stent restenosis s/p re-stenting to circumflex   Ischemic cardiomyopathy  Hypertension  ST elevation on initial EKG and troponin leak on initial lab. Presentation this admission similar to presentation in 2015 with resolving MI. Angiogram found in stent restenosis of Circumflex. Was re-stented. EF had recovered but now severely reduced again (EF 25-30% on 5/17).  Initially given diuretics.   -- Now off pressors.  -- Dual antiplatelet therapy with Plavix and aspirin  -- Continue statin  -- Continue PTA metoprolol. ACE/ARB when able  -- Cardiology is following      Encephalopathy/delirium   -- encephalopathy improved. Likely at least partially related to CO2 retention vs ICU delirium   -- continue Seroquel 25mg BID  -- PRN Tylenol and norco       COPD exacerbation  Acute hypercapnic respiratory failure s/p extubation 5/20  Pseudomonas in sputum   -- Acute hypoxic and hypercapnic respiratory failure secondary to COPD exacerbation, and probably STEMI and ischemic cardiomyopathy. Pseudomonas in sputum, pansensitive.  - extubated 5/20  - Continue Methylpred 40 q8h  - scheduled duonebs, restart PTA inhalers   - Levofloxacin to complete a 10 day course. (On day #3)  - intermittent BiPAP and high flow NC, appears more comfortable on this regimen   - serum CO2 improving on BMP    Dysphagia  No  prior hx. Likely related to intubation.  -- advance to level 2 with thin liquids per SLP eval today      DVT Prophylaxis: Heparin SQ  GI Prophylaxis: Not indicated    # Pain Assessment:  Current Pain Score 5/23/2018   Patient currently in pain? denies   Pain score (0-10) -   Pain location -   Pain descriptors -   CPOT pain score -   Preeti fierro pain level was assessed and she currently denies pain.      Code Status: Full Code.  This should be readdressed with patient and family as there is a high likelihood that she may need to be intubated at some point again in the future.    Disposition: Continue hospitalization and [place] [facility]  Will likely require at least 2 more days or more in the hospital for close monitoring and weaning oxygen.  Continue PT/OT.    Max Noguera    Primary Care Physician   Deisy Carter    Requesting provider: Sarah Parker PA-C    Reason for consultation: Transfer of care    History of Present Illness   Preeti Ford is a 67 year old female with PMHx of severe COPD and CAD s/p PCI who was admitted on 5/17/2018 with a severe COPD exacerbation.  Patient had been complaining a few days of worsening shortness of breath and was seen at her clinic on 5/15.  She was given steroids and antibiotics and sent home.  She presented to the emergency department very early the next morning 5/16 and respiratory distress.  She had EKG changes as well as severe hypercapnia.  She was intubated and transferred here and was admitted to the intensive care unit.  She was found to have a STEMI with restenosis of circumflex artery.  She is s/p re-stenting of the left circumflex. Extubated successfully on 5/20.  She has been clinically improving and now is on intermittent BiPAP and high flow oxygen.  The hospitalist service was contacted for transfer of care and the patient is transferring to the intermediate care unit.    Patient is seen standing up at the bedside with nursing assistance upon my  arrival.  She reports feeling much better and made a big improvement over the last 24 hours.  She states that her breathing is currently comfortable.  She denies any wheezing, chest tightness or pain.  She does have a intermittent cough.  No headache, lightheadedness, fever, abdominal pain, nausea, vomiting.  She does feel fatigued.  She is on high flow oxygen.  She states that she normally is not on any oxygen at home.  She voices no other new concerns today.    Past Medical History    Severe COPD  CVA-right thalamus and 8/2013 and left cerebellar and right vermis and 3/2018  Coronary artery disease with stents to circumflex and LAD in 2015  Hypertension  GERD  Hyperlipidemia  Tobacco use disorder  Lung nodules  Ischemic cardiomyopathy  Chronic bronchitis  Peripheral arterial disease-moderate left common carotid stenosis, aortoiliac bypass, chronic left vertebral and right posterior cerebral stenoses    Past Surgical History   Past Surgical History:   Procedure Laterality Date     APPENDECTOMY       BYPASS GRAFT AORTOILIAC N/A 3/7/2017    Procedure: BYPASS GRAFT AORTOILIAC;  Surgeon: Marcos Pratt MD;  Location: [facility] OR     SALPINGO OOPHORECTOMY,R/L/DELORES      Salpingo Oophorectomy, RT/LT/DELORES       Prior to Admission Medications   Prior to Admission Medications   Prescriptions Last Dose Informant Patient Reported? Taking?   ACE/ARB/ARNI NOT PRESCRIBED, INTENTIONAL,  Self No No   Sig: Please choose reason not prescribed, below   Calcium Carbonate-Vitamin D (OSCAL 500/200 D-3 PO)  Self Yes Yes   Sig: Take 1 tablet by mouth 2 times daily   Coenzyme Q10 (COQ10 PO)  Self Yes Yes   Sig: Take 100 mg by mouth every 24 hours (at 16:00)   Nutritional Supplements (BOOST)  Self No No   Sig: Take 1 Bottle by mouth 3 times daily (with meals)   acetaminophen (TYLENOL) 325 MG tablet prn med Self Yes Yes   Sig: Take 325 mg by mouth every 4 hours as needed for mild pain    albuterol (PROAIR HFA/PROVENTIL HFA/VENTOLIN HFA) 108  (90 BASE) MCG/ACT Inhaler prn med Self No Yes   Sig: Inhale 2 puffs into the lungs every 6 hours as needed for shortness of breath / dyspnea   atorvastatin (LIPITOR) 10 MG tablet  Self No Yes   Sig: Take 1 tablet (10 mg) by mouth At Bedtime   budesonide-formoterol (SYMBICORT) 160-4.5 MCG/ACT Inhaler  Self No Yes   Sig: INHALE TWO PUFFS BY MOUTH TWICE A DAY   clopidogrel (PLAVIX) 75 MG tablet  Self No Yes   Sig: Take 1 tablet (75 mg) by mouth daily   ipratropium - albuterol 0.5 mg/2.5 mg/3 mL (DUONEB) 0.5-2.5 (3) MG/3ML neb solution  Self No Yes   Sig: USE 1 VIAL IN NEBULIZER FOUR TIMES A DAY   metoprolol succinate (TOPROL-XL) 25 MG 24 hr tablet  Self Yes Yes   Sig: Take 1 tablet (25 mg) by mouth daily   montelukast (SINGULAIR) 10 MG tablet  Self No Yes   Sig: Take 1 tablet (10 mg) by mouth At Bedtime   nicotine (NICODERM CQ) 7 MG/24HR 24 hr patch  Self No Yes   Sig: Place 1 patch onto the skin every 24 hours   order for DME  Self No No   Sig: Equipment being ordered: Nebulizer   order for DME  Self No No   Sig: Equipment being ordered: Nebulizer machine   polyethylene glycol (MIRALAX/GLYCOLAX) Packet  Self Yes Yes   Sig: Take 1 packet by mouth daily as needed for constipation   predniSONE (DELTASONE) 20 MG tablet   No Yes   Sig: Take 2 tablets (40 mg) by mouth daily for 5 days   tiotropium (SPIRIVA HANDIHALER) 18 MCG capsule  Self No Yes   Sig: Inhale 1 capsule (18 mcg) into the lungs daily      Facility-Administered Medications: None     Allergies   Allergies   Allergen Reactions     Crestor [Rosuvastatin] Other (See Comments)     dizziness     Hmg-Coa-R Inhibitors Other (See Comments)     Muscle/joint aching     Lisinopril Cough     Optison [Albumin Human] Other (See Comments)     Pt was flush and very dizzy.  Also had a BP drop     Penicillins Rash       Social History   The patient is still smoking cigarettes about half pack per day.  She does not drink alcohol or use illicit drugs.    Family History   I have  reviewed this patient's family history and updated it with pertinent information if needed.   Family History   Problem Relation Age of Onset     CEREBROVASCULAR DISEASE Mother      CEREBROVASCULAR DISEASE Father      Genitourinary Problems Brother      Dialysis       Review of Systems   A complete 10 point review of systems was performed and is negative other than the items previously mentioned in the above HPI.    Physical Exam   Temp: 97.7  F (36.5  C) Temp src: Oral BP: 95/50   Heart Rate: 90 Resp: 16 SpO2: 100 % O2 Device: High Flow Nasal Cannula (HFNC) Oxygen Delivery: Other (Comments) (30LPM)  Vital Signs with Ranges  Temp:  [97.2  F (36.2  C)-98.3  F (36.8  C)] 97.7  F (36.5  C)  Heart Rate:  [72-98] 90  Resp:  [10-28] 16  BP: ()/(50-86) 95/50  FiO2 (%):  [30 %-40 %] 40 %  SpO2:  [96 %-100 %] 100 %  99 lbs 10.37 oz    GENERAL: Thin appearing female.  Alert, oriented to person, place, situation. Cooperative, standing up at the bedside in no acute distress.   EYES: Pupils equal and round. Extraocular movements grossly intact.  HEENT:  Normocephalic, Mucous membranes moist.    NECK: Supple  CARDIOVASCULAR: Regular rate and rhythm without murmurs, rubs, or gallops.   PULMONARY: Lung sounds diminished bilaterally, without crackles, wheezes, or rhonchi. Breathing is nonlabored.  GASTROINTESTINAL: Soft and non-distended. Normoactive bowel sounds. Nontender.  MUSCULOSKELETAL: Strength normal and intact in upper and lower extremeties bilaterally.   SKIN: Warm, dry.  EXTREMITIES: No lower extremity edema.  NEUROLOGIC: Cranial nerves II-XII grossly intact.  Strength 5/5 in all 4 extremities.  PSYCHIATRIC: Normal mood and affect.     Data   Data reviewed today:  I personally reviewed all lab and imaging results from the last 24 hours    Recent Labs  Lab 05/23/18  0430 05/22/18  0310 05/21/18  0630 05/20/18  0550  05/19/18  0545  05/18/18  0430 05/17/18  2113 05/17/18  0911 05/17/18  0436   WBC 4.8 4.5  --  7.9  --   8.9  --   --   --  7.2 9.9   HGB 12.9 12.5  --  13.0  --  13.6  --   --   --  15.3 17.4*   MCV 92 94  --  92  --  90  --   --   --  90 90    142*  --  116*  Canceled, Test credited  --  128*  --   --   --  119*  119* 192   INR  --   --   --   --   --   --   --   --   --  0.91  --     139 144 142  --  142  --  139  --   --  137   POTASSIUM 4.1 4.2 3.7 4.0  < > 3.7  < > 3.8  --   --  4.8   CHLORIDE 101 96 97 102  --  100  --  102  --   --  100   CO2 34* 36* 41* 33*  --  37*  --  26  --   --  30   BUN 17 18 16 14  --  12  --  15  --   --  14   CR 0.48* 0.48* 0.48* 0.52  --  0.63  --  0.70  --   --  0.70   ANIONGAP 6 7 6  --   --  5  --   --   --   --  7   CALDERON 8.8 8.5 8.7 8.6  --  8.1*  --  7.9*  --   --  8.4*   * 113* 71 102*  --  90  --  110*  --   --  148*   ALBUMIN  --   --   --   --   --   --   --   --   --   --  3.6   PROTTOTAL  --   --   --   --   --   --   --   --   --   --  7.7   BILITOTAL  --   --   --   --   --   --   --   --   --   --  0.4   ALKPHOS  --   --   --   --   --   --   --   --   --   --  90   ALT  --   --   --   --   --   --   --   --   --   --  48   AST  --   --   --   --   --   --   --   --   --   --  54*   TROPI  --   --   --   --   --  0.319*  --  0.506* 0.566* 0.501* 0.366*   < > = values in this interval not displayed.    No results found for this or any previous visit (from the past 24 hour(s)).

## 2018-05-23 NOTE — PLAN OF CARE
Problem: Patient Care Overview  Goal: Plan of Care/Patient Progress Review  Outcome: Improving  Pt alert, orientated and very conversational last evening. Remains on HFNC 40% 30L and reports no SOB, no observed tri podding or accessory muscle use. Independently sitting and standing at the edge of bed. Refusing any trail of BiPAP. Iv steroids and abx ongoing. Tolerating NT liquids and DD1 diet. Barnhart with good output. Extremely fragile skin, refusing hygiene cares overnight. Spouse Bud updated over the phone regarding plan of care.

## 2018-05-23 NOTE — PLAN OF CARE
Problem: Patient Care Overview  Goal: Plan of Care/Patient Progress Review  Discharge Planner SLP   Patient plan for discharge: Not stated.   Current status: Patient seen for swallowing treatment, tolerating trials of soft solids and thin liquids without overt Sx of aspiration or O2 sats changes on HFNC at this time.  Recommend upgrade to dysphagia diet level 2 with thin liquids when up to chair for meals, pacing rate of intake.  Patient refusing dentures to be brought to hospital for fear of losing them.   Barriers to return to prior living situation: Weakness  Recommendations for discharge: TCU  Rationale for recommendations: Recommend follow up SLP services to safe return to her baseline regular diet.       Entered by: Viviana Solitario 05/23/2018 9:22 AM

## 2018-05-23 NOTE — PROGRESS NOTES
[facility]    Critical Care Service  Progress Note    Date of Service (when I saw the patient): 05/23/2018     Problem List   COPD exacerbation   NSTEMI, in stent restenosis s/p re-stenting to circumflex  Pseudomonas in sputum      Main Plans for Today  Transfer to the floor   Continue intermittent high flow nasal cannula     Assessment & Plan  Preeti Ford is a 67 year old female with PMHx of severe COPD and CAD s/p PCI who was admitted on 5/17/2018 with a severe COPD exacerbation c/b NSTEMI found to have restenosis of circumflex artery s/p re-stenting. Extubated 5/20.       Neuro  1. Encephalopathy/delerium   Plan:  -- encephalopathy improved. Likely at least partially related to CO2 retention vs ICU delerium   -- continue Seroquel 25mg BID  -- PRN tylneol and norco       CV  1. NSTEMI, in stent restenosis s/p re-stenting to circumflex   2. HTN  Plan:  -- ST elevation on initial EKG and troponin leak on initial lab. Presentation this admission similar to presentation in 2015 with resolving MI. Angiogram found in stent restenosis of Circumflex. Was re stented. EF had recovered but now severely reduced again (EF 25-30% on 5/17).  Initially given diuretics - now off pressors.  -- dual antiplatelet therapy   -- statin  -- on Home metoprolol. ACE/ARB when able     Resp:  1. COPD exacerbation  2. Acute hypercapnic respiratory failure s/p extubation 5/20  3. Pseudomonas in sputum   Plan:  -- Acute hypoxic and hypercapnic respiratory failure secondary to COPD exacerbation, and probably NSTEMI and ischemic cardiomyopathy. Pseudomonas in sputum.  - extubated 5/20  - Continue Methylpred 40 Q8 and then start taper   - scheduled duonebs, restart PTA inhalers   - Levofloxacin to complete a 10 day course.   - intermittent BiPAP and high flow NC, appears more comfortable on this regimen   - serum CO2 improving on BMP      GI/Nutrition  1. No prior hx  Plan:  -- swallow evaluation, advance diet as able   --  "advance to level 1 with nectar thickened liquids, re-evaluate today       Renal  1. No prior hx.  Baseline creatinine appears to be 0.48  2. Compensated respiratory acidosis   Plan:  --monitor function and electrolytes as needed with replacement per ICU protocols.   -- generally avoid nephrotoxic agents such as NSAID, IV contrast unless specifically required  -- adjust medications as needed for renal clearance  -- follow I/O's as appropriate.  -- maintain euvolemia      ID  1. Pseudomonas in sputum   Plan:  -- ceftazidime changed to levofloxacin, continue for total of 10 day course       Endocrine  1. No active issues   Plan:  -- Keep BG  <180 for optimal healing  -- SSI      Heme:  1. No acute issues       General cares:  DVT Prophylaxis: Heparin SQ  GI Prophylaxis: Not indicated  Restraints: Restraints for medical healing needed: NO  Family update by me today: No      Sarah Parker     Interval History   Course reviewed. No acute events overnight. Refused BiPAP throughout evening shift. Patient endorses feeling \"much better\" today. She denies feeling SOB this AM. She denies chest pain, abdominal pain. All other ROS negative.     Physical Exam   Temp: 97.7  F (36.5  C) Temp src: Oral Temp  Min: 97.2  F (36.2  C)  Max: 98.3  F (36.8  C) BP: 124/59   Heart Rate: 87 Resp: 18 SpO2: 100 % O2 Device: High Flow Nasal Cannula (HFNC) Oxygen Delivery: Other (Comments) (30LPM)  Vitals:    05/20/18 0650 05/21/18 0430 05/22/18 0400   Weight: 45.2 kg (99 lb 10.4 oz) 47.3 kg (104 lb 4.4 oz) 45.2 kg (99 lb 10.4 oz)     I/O last 3 completed shifts:  In: 620 [P.O.:320; I.V.:300]  Out: 2060 [Urine:2060]    GEN:  female, in no acute distress   EYES: PERRL, Anicteric sclera.   HEENT:  Normocephalic, atraumatic, trachea midline, high flow nasal cannula in place  CV: RRR, no murmurs  PULM/CHEST: Diminished, no wheezes or rales   GI:  soft, non-tender  : renae catheter in place, urine yellow and clear  EXTREMITIES: no " peripheral edema  NEURO: A+O x3, Cranial nerves II-XII grossly intact, no motor-sensory deficits noted  SKIN: warm and dry   Imaging personally reviewed: no new imaging   ECG- NSR    Data     Recent Labs  Lab 05/23/18  0430 05/22/18  0310 05/21/18  0630 05/20/18  0550  05/19/18  0545  05/18/18  0430 05/17/18  2113 05/17/18  0911 05/17/18  0436   WBC 4.8 4.5  --  7.9  --  8.9  --   --   --  7.2 9.9   HGB 12.9 12.5  --  13.0  --  13.6  --   --   --  15.3 17.4*   MCV 92 94  --  92  --  90  --   --   --  90 90    142*  --  116*  Canceled, Test credited  --  128*  --   --   --  119*  119* 192   INR  --   --   --   --   --   --   --   --   --  0.91  --     139 144 142  --  142  --  139  --   --  137   POTASSIUM 4.1 4.2 3.7 4.0  < > 3.7  < > 3.8  --   --  4.8   CHLORIDE 101 96 97 102  --  100  --  102  --   --  100   CO2 34* 36* 41* 33*  --  37*  --  26  --   --  30   BUN 17 18 16 14  --  12  --  15  --   --  14   CR 0.48* 0.48* 0.48* 0.52  --  0.63  --  0.70  --   --  0.70   ANIONGAP 6 7 6  --   --  5  --   --   --   --  7   CALDERON 8.8 8.5 8.7 8.6  --  8.1*  --  7.9*  --   --  8.4*   * 113* 71 102*  --  90  --  110*  --   --  148*   ALBUMIN  --   --   --   --   --   --   --   --   --   --  3.6   PROTTOTAL  --   --   --   --   --   --   --   --   --   --  7.7   BILITOTAL  --   --   --   --   --   --   --   --   --   --  0.4   ALKPHOS  --   --   --   --   --   --   --   --   --   --  90   ALT  --   --   --   --   --   --   --   --   --   --  48   AST  --   --   --   --   --   --   --   --   --   --  54*   TROPI  --   --   --   --   --  0.319*  --  0.506* 0.566* 0.501* 0.366*   < > = values in this interval not displayed.

## 2018-05-23 NOTE — PROGRESS NOTES
SPIRITUAL HEALTH SERVICES  Spiritual Assessment Progress Note  [facility] ICU  Note from 5/22/18   visited with pt due to LOS.  Pt was very discouraged stating she has not felt so poorly.  She was wondering how long she would feel like this.  She has the support of her family.  Pt stated that she would like a prayer.     listened as pt processed her experience and challenges.  Provided encouragement and prayer.     SH continues to be available as needed.                                                                                                                                             Preeti Rai M.Div., [facility]  Staff    Pager [phone]

## 2018-05-24 ENCOUNTER — APPOINTMENT (OUTPATIENT)
Dept: SPEECH THERAPY | Facility: CLINIC | Age: 68
DRG: 246 | End: 2018-05-24
Attending: INTERNAL MEDICINE
Payer: MEDICARE

## 2018-05-24 ENCOUNTER — APPOINTMENT (OUTPATIENT)
Dept: OCCUPATIONAL THERAPY | Facility: CLINIC | Age: 68
DRG: 246 | End: 2018-05-24
Attending: INTERNAL MEDICINE
Payer: MEDICARE

## 2018-05-24 LAB
ANION GAP SERPL CALCULATED.3IONS-SCNC: 3 MMOL/L (ref 3–14)
BUN SERPL-MCNC: 19 MG/DL (ref 7–30)
CALCIUM SERPL-MCNC: 8.8 MG/DL (ref 8.5–10.1)
CHLORIDE SERPL-SCNC: 102 MMOL/L (ref 94–109)
CO2 SERPL-SCNC: 37 MMOL/L (ref 20–32)
CREAT SERPL-MCNC: 0.43 MG/DL (ref 0.52–1.04)
ERYTHROCYTE [DISTWIDTH] IN BLOOD BY AUTOMATED COUNT: 12.5 % (ref 10–15)
GFR SERPL CREATININE-BSD FRML MDRD: >90 ML/MIN/1.7M2
GLUCOSE BLDC GLUCOMTR-MCNC: 110 MG/DL (ref 70–99)
GLUCOSE BLDC GLUCOMTR-MCNC: 125 MG/DL (ref 70–99)
GLUCOSE BLDC GLUCOMTR-MCNC: 143 MG/DL (ref 70–99)
GLUCOSE BLDC GLUCOMTR-MCNC: 156 MG/DL (ref 70–99)
GLUCOSE BLDC GLUCOMTR-MCNC: 193 MG/DL (ref 70–99)
GLUCOSE BLDC GLUCOMTR-MCNC: 77 MG/DL (ref 70–99)
GLUCOSE BLDC GLUCOMTR-MCNC: 93 MG/DL (ref 70–99)
GLUCOSE SERPL-MCNC: 128 MG/DL (ref 70–99)
HCT VFR BLD AUTO: 38.5 % (ref 35–47)
HGB BLD-MCNC: 12.6 G/DL (ref 11.7–15.7)
MCH RBC QN AUTO: 29.7 PG (ref 26.5–33)
MCHC RBC AUTO-ENTMCNC: 32.7 G/DL (ref 31.5–36.5)
MCV RBC AUTO: 91 FL (ref 78–100)
PLATELET # BLD AUTO: 176 10E9/L (ref 150–450)
POTASSIUM SERPL-SCNC: 4.1 MMOL/L (ref 3.4–5.3)
RBC # BLD AUTO: 4.24 10E12/L (ref 3.8–5.2)
SODIUM SERPL-SCNC: 142 MMOL/L (ref 133–144)
WBC # BLD AUTO: 6.1 10E9/L (ref 4–11)

## 2018-05-24 PROCEDURE — A9270 NON-COVERED ITEM OR SERVICE: HCPCS | Mod: GY | Performed by: PHYSICIAN ASSISTANT

## 2018-05-24 PROCEDURE — 97535 SELF CARE MNGMENT TRAINING: CPT | Mod: GO

## 2018-05-24 PROCEDURE — 25000132 ZZH RX MED GY IP 250 OP 250 PS 637: Mod: GY | Performed by: INTERNAL MEDICINE

## 2018-05-24 PROCEDURE — 92526 ORAL FUNCTION THERAPY: CPT | Mod: GN | Performed by: SPEECH-LANGUAGE PATHOLOGIST

## 2018-05-24 PROCEDURE — 40000275 ZZH STATISTIC RCP TIME EA 10 MIN

## 2018-05-24 PROCEDURE — 94640 AIRWAY INHALATION TREATMENT: CPT

## 2018-05-24 PROCEDURE — 25000132 ZZH RX MED GY IP 250 OP 250 PS 637: Mod: GY | Performed by: PHYSICIAN ASSISTANT

## 2018-05-24 PROCEDURE — 97530 THERAPEUTIC ACTIVITIES: CPT | Mod: GO

## 2018-05-24 PROCEDURE — A9270 NON-COVERED ITEM OR SERVICE: HCPCS | Mod: GY | Performed by: INTERNAL MEDICINE

## 2018-05-24 PROCEDURE — 40000886 ZZH STATISTIC STEP DOWN HRS DAY

## 2018-05-24 PROCEDURE — 25000125 ZZHC RX 250: Performed by: INTERNAL MEDICINE

## 2018-05-24 PROCEDURE — 36415 COLL VENOUS BLD VENIPUNCTURE: CPT | Performed by: PHYSICIAN ASSISTANT

## 2018-05-24 PROCEDURE — 25000128 H RX IP 250 OP 636: Performed by: INTERNAL MEDICINE

## 2018-05-24 PROCEDURE — 99233 SBSQ HOSP IP/OBS HIGH 50: CPT | Performed by: INTERNAL MEDICINE

## 2018-05-24 PROCEDURE — 40000133 ZZH STATISTIC OT WARD VISIT

## 2018-05-24 PROCEDURE — 00000146 ZZHCL STATISTIC GLUCOSE BY METER IP

## 2018-05-24 PROCEDURE — 21000001 ZZH R&B HEART CARE

## 2018-05-24 PROCEDURE — 80048 BASIC METABOLIC PNL TOTAL CA: CPT | Performed by: PHYSICIAN ASSISTANT

## 2018-05-24 PROCEDURE — 94640 AIRWAY INHALATION TREATMENT: CPT | Mod: 76

## 2018-05-24 PROCEDURE — 97110 THERAPEUTIC EXERCISES: CPT | Mod: GO

## 2018-05-24 PROCEDURE — 40000225 ZZH STATISTIC SLP WARD VISIT: Performed by: SPEECH-LANGUAGE PATHOLOGIST

## 2018-05-24 PROCEDURE — 85027 COMPLETE CBC AUTOMATED: CPT | Performed by: PHYSICIAN ASSISTANT

## 2018-05-24 PROCEDURE — 25000128 H RX IP 250 OP 636: Performed by: PHYSICIAN ASSISTANT

## 2018-05-24 RX ORDER — BISACODYL 10 MG
10 SUPPOSITORY, RECTAL RECTAL DAILY PRN
Status: DISCONTINUED | OUTPATIENT
Start: 2018-05-24 | End: 2018-05-26 | Stop reason: HOSPADM

## 2018-05-24 RX ORDER — AMOXICILLIN 250 MG
2 CAPSULE ORAL 2 TIMES DAILY PRN
Status: DISCONTINUED | OUTPATIENT
Start: 2018-05-24 | End: 2018-05-26 | Stop reason: HOSPADM

## 2018-05-24 RX ORDER — BENZTROPINE MESYLATE 1 MG/1
1-2 TABLET ORAL 3 TIMES DAILY PRN
Status: DISCONTINUED | OUTPATIENT
Start: 2018-05-24 | End: 2018-05-26 | Stop reason: HOSPADM

## 2018-05-24 RX ORDER — LEVOFLOXACIN 500 MG/1
500 TABLET, FILM COATED ORAL DAILY
Status: DISCONTINUED | OUTPATIENT
Start: 2018-05-25 | End: 2018-05-24

## 2018-05-24 RX ORDER — AMOXICILLIN 250 MG
1 CAPSULE ORAL 2 TIMES DAILY PRN
Status: DISCONTINUED | OUTPATIENT
Start: 2018-05-24 | End: 2018-05-26 | Stop reason: HOSPADM

## 2018-05-24 RX ORDER — HALOPERIDOL 5 MG/ML
1-2 INJECTION INTRAMUSCULAR EVERY 6 HOURS PRN
Status: DISCONTINUED | OUTPATIENT
Start: 2018-05-24 | End: 2018-05-26 | Stop reason: HOSPADM

## 2018-05-24 RX ORDER — LEVOFLOXACIN 500 MG/1
500 TABLET, FILM COATED ORAL DAILY
Status: COMPLETED | OUTPATIENT
Start: 2018-05-25 | End: 2018-05-26

## 2018-05-24 RX ORDER — PANTOPRAZOLE SODIUM 40 MG/1
40 TABLET, DELAYED RELEASE ORAL EVERY MORNING
Status: DISCONTINUED | OUTPATIENT
Start: 2018-05-24 | End: 2018-05-26 | Stop reason: HOSPADM

## 2018-05-24 RX ORDER — PREDNISONE 20 MG/1
40 TABLET ORAL DAILY
Status: DISCONTINUED | OUTPATIENT
Start: 2018-05-24 | End: 2018-05-26 | Stop reason: HOSPADM

## 2018-05-24 RX ORDER — NICOTINE 21 MG/24HR
1 PATCH, TRANSDERMAL 24 HOURS TRANSDERMAL DAILY
Status: DISCONTINUED | OUTPATIENT
Start: 2018-05-24 | End: 2018-05-24

## 2018-05-24 RX ORDER — ALBUTEROL SULFATE 0.83 MG/ML
3 SOLUTION RESPIRATORY (INHALATION)
Status: DISCONTINUED | OUTPATIENT
Start: 2018-05-24 | End: 2018-05-26 | Stop reason: HOSPADM

## 2018-05-24 RX ADMIN — IPRATROPIUM BROMIDE AND ALBUTEROL SULFATE 3 ML: .5; 3 SOLUTION RESPIRATORY (INHALATION) at 07:02

## 2018-05-24 RX ADMIN — ALBUTEROL SULFATE 2.5 MG: 2.5 SOLUTION RESPIRATORY (INHALATION) at 11:14

## 2018-05-24 RX ADMIN — UMECLIDINIUM 1 PUFF: 62.5 AEROSOL, POWDER ORAL at 09:36

## 2018-05-24 RX ADMIN — FLUTICASONE FUROATE AND VILANTEROL TRIFENATATE 1 PUFF: 200; 25 POWDER RESPIRATORY (INHALATION) at 09:36

## 2018-05-24 RX ADMIN — HEPARIN SODIUM 5000 UNITS: 5000 INJECTION, SOLUTION INTRAVENOUS; SUBCUTANEOUS at 01:34

## 2018-05-24 RX ADMIN — POLYETHYLENE GLYCOL 3350 17 G: 17 POWDER, FOR SOLUTION ORAL at 08:09

## 2018-05-24 RX ADMIN — PANTOPRAZOLE SODIUM 40 MG: 40 TABLET, DELAYED RELEASE ORAL at 09:57

## 2018-05-24 RX ADMIN — QUETIAPINE FUMARATE 25 MG: 25 TABLET ORAL at 08:09

## 2018-05-24 RX ADMIN — METHYLPREDNISOLONE SODIUM SUCCINATE 40 MG: 40 INJECTION, POWDER, FOR SOLUTION INTRAMUSCULAR; INTRAVENOUS at 00:05

## 2018-05-24 RX ADMIN — Medication 12.5 MG: at 08:09

## 2018-05-24 RX ADMIN — CLOPIDOGREL 75 MG: 75 TABLET, FILM COATED ORAL at 08:09

## 2018-05-24 RX ADMIN — ATORVASTATIN CALCIUM 10 MG: 10 TABLET, FILM COATED ORAL at 20:52

## 2018-05-24 RX ADMIN — HEPARIN SODIUM 5000 UNITS: 5000 INJECTION, SOLUTION INTRAVENOUS; SUBCUTANEOUS at 13:31

## 2018-05-24 RX ADMIN — ALBUTEROL SULFATE 2.5 MG: 2.5 SOLUTION RESPIRATORY (INHALATION) at 15:18

## 2018-05-24 RX ADMIN — LEVOFLOXACIN 500 MG: 5 INJECTION, SOLUTION INTRAVENOUS at 05:15

## 2018-05-24 RX ADMIN — Medication 12.5 MG: at 20:52

## 2018-05-24 RX ADMIN — DORNASE ALFA 2.5 MG: 1 SOLUTION RESPIRATORY (INHALATION) at 19:42

## 2018-05-24 RX ADMIN — ASPIRIN 81 MG: 81 TABLET, COATED ORAL at 08:09

## 2018-05-24 RX ADMIN — METHYLPREDNISOLONE SODIUM SUCCINATE 40 MG: 40 INJECTION, POWDER, FOR SOLUTION INTRAMUSCULAR; INTRAVENOUS at 08:09

## 2018-05-24 RX ADMIN — ALBUTEROL SULFATE 2.5 MG: 2.5 SOLUTION RESPIRATORY (INHALATION) at 19:41

## 2018-05-24 NOTE — PLAN OF CARE
Problem: Patient Care Overview  Goal: Plan of Care/Patient Progress Review  Outcome: No Change  VSS, HFNC 40%FiO2 30L, SR, denies pain, up with SBA, renae patent with adequate output. No BM in several days, pt reports being given something during the day-per documentation scheduled miralax administered, pt declined any further medication at this time. Some intermittent confusion from encephalopathy d/t Co2 retention. Pt restless with impulsive bed exits, frequent education/redirection needed. Pt becomes agitated at times but explanation helpful in de-escalation. Continue to monitor.

## 2018-05-24 NOTE — PLAN OF CARE
Problem: Patient Care Overview  Goal: Plan of Care/Patient Progress Review  Discharge Planner SLP   Patient plan for discharge: Pt reported home with  tomorrow  Current status: SLP: Pt alert in chair after transferring from CICU. Pt able to wean to nasal cannula. Pt reported plan to keep dentures at home, but can still chew foods without them. Pt agreeable to trial regular texture cracker, however, she requested applesauce to moisten the cracker. Pt tolerated cup of semi-solids with adequate oral phase and no overt s/sx of aspiration throughout. Pt verbalized agreement with recommended: upgrade to mechanical/dental soft diet with thin liquids.   Barriers to return to prior living situation: Per PT/OT  Recommendations for discharge: Per PT/OT  Rationale for recommendations: Will follow 1-2 sessions at a meal to ensure tolerance and expect pt will be able to return to regular solids at home with her dentures in place.        Entered by: Becky Mendes 05/24/2018 11:52 AM

## 2018-05-24 NOTE — PROGRESS NOTES
Patient is on a 1L NC with SpO2 in the mid to upper 90's. BS diminished. All nebs were given as ordered.  Will cont to follow.  5/24/2018  Gali Leger RRT

## 2018-05-24 NOTE — PLAN OF CARE
Problem: Patient Care Overview  Goal: Plan of Care/Patient Progress Review  Discharge Planner OT   Patient plan for discharge: did not state  Current:  OT/cardiac rehab saw pt in pm following transfer to floor. Pt tolerated ambulation with FWW CGA x 180ft on 2L 02. Unable to get 02 reading despite multiple attempts due to low perfusion, however BP and HR stable (see flowsheet). Pt performed toilet transfer (commode fitted over toilet) with SBA following cues for safe walker use.   Barriers to return to prior living situation: general weakness, stairs, intermittent confusion, level of assist  Recommendations for discharge: TCU followed by OPCR at [facility]  Rationale for recommendations: pt will benefit from continued therapies to advance mobility and ADLs prior to return home and then from OPCR to advance cardiac health       Entered by: Salazar Rowe 05/24/2018 3:23 PM

## 2018-05-24 NOTE — PROGRESS NOTES
[facility]    Hospitalist Progress Note    Date of Service (when I saw the patient): 05/24/2018    Assessment & Plan   Preeti Ford is a 67 year old female with hx of known CAD s/p PCI, CHF, aortic stenosis, PAD s/p aortoiliac bypass, severe COPD, and prior CVA who was admitted to the ICU service on 5/17/2018 for NSTEMI with cardiogenic shock due to in-stent restenosis of the circumflex which has been re-stented and acute hypoxic respiratory failure due to COPD exacerbation and Pseudomonas pneumonia, requiring intubation. She was successfully extubated and weaned off pressors on 5/20. She was transferred to the Hospitalist service on 5/23.    NSTEMI with cardiogenic shock due to in-stent restenosis, s/p ROWDY to mid-circumflex on 5/18  CAD, native heart, native vessels s/p PCI  PAD s/p aortoiliac bypass  She was seen in clinic one day prior to admission and sent home with prednisone and azithromycin for a COPD exacerbation. She then presented to the ED in acute respiratory distress with hypoxia to 81% on room air, and intubated on arrival. Troponin was mildly elevated to 0.566, but had worrisome EKG changes. TTE on admission showed new and decreased LVEF 25-30% with global LV dysfunction. She was taken to the cath lab on 5/18 where she was found to have mid-circumflex in-stent restenosis which was re-stented with a ROWDY. She was also treated with norepinephrine for shock between 5/18-5/20.   - Management as per Cardiology  - Currently on aspirin 81 mg daily (new), Plavix (new), metoprolol 12.5 mg BID (new), and home atorvastatin 10 mg qhs  - Cardiac rehab    Chronic systolic CHF due to ischemic cardiomyopathy  She was found to have a decreased LVEF to 25-30% on admission TTE compared to ~55% in Mar 2017, but this had appeared to improve to at least 40-45% by the time of her angiogram on 5/18.   - Appears euvolemic  - On BB as noted above, defer initiation of ACEi to Cardiology    Moderate aortic  stenosis  Noted on admission TTE with mean gradient of 14.1 mmHg  - Stable, appears euvolemic    Acute hypoxic and hypercapnic respiratory failure due to acute COPD exacerbation and Pseudomonas pneumonia  Due to acute COPD exacerbation and Pseudomonas pneumonia. Initiated on azithromycin and prednisone by her PCP on 5/16, but developed progressive respiratory failure as noted above. CXR on arrival showed no acute infiltrates, but sputum grew Pseudomonas. She was intubated on arrival and successfully extubated to NIPPV on 5/20. She has improved with IV steroids, duonebs, and IV antibiotics (treated with ceftazidime 5/17-5/22, levofloxacin 5/22 to present).  - On 3-5 lpm/NC, wean as tolerated  - d/c IV methylprednisolone and start prednisone 40 mg po daily  - Continues on duonebs QID  - Continues on levofloxacin to complete a 10 day course of antibiotics (last day: 5/26)  - Encourage incentive spirometry    GI prophylaxis  She has been started on pantoprazole 40 mg daily for GI prophylaxis in setting of dual-antiplatelet therapy, steroid burst, and recent critical illness    Acute delirium, Improved  Due to ICU delirium and possibly steroids  - She has mostly returned to her baseline mental status, but continues to sundown   - On delirium protocol with Seroquel and Haldol available PRN    Dysphagia due to recent intubation and encephalopathy  - On DDL2 with thin liquids as per SLP  - SLP following, advance diet as per SLP    History of CVA  - On aspirin, Plavix, and statin as noted above    # Pain Assessment:  Current Pain Score 5/24/2018   Patient currently in pain? denies   Pain score (0-10) -   Pain location -   Pain descriptors -   CPOT pain score -   Preeti fierro pain level was assessed and she currently denies pain.      FEN: DDL2 with thin liquids  DVT Prophylaxis: Heparin SQ  Code Status: Full Code    Disposition: d/c IMC status, OK to transfer to [facility]. Expected discharge pending ongoing improvement in respiratory  status. Could d/c to TCU with O2 as early as tomorrow, but patient is resistant to TCU. Will continue to encourage.    Rocio De Los Santos Korey    Interval History   Hallucinations overnight, none this morning. Calm and cooperative. Denies chest pain, shortness of breath, or nausea. Weaned off HFNC and breathing comfortably on nasal cannula. Has not had a bowel movement since admission. PT/OT recommending TCU, but she is very resistant  - d/c IV methylprednisolone and start oral prednisone  - decrease duonebs to QID  - encourage incentive spirometry  - change antibiotics to PO  - start PPI for GI ppx  - PRN bowel meds ordered  - Delirium protocol ordered  - d/c IM status and transfer to [facility]  - encourage TCU    -Data reviewed today: I reviewed all new labs and imaging results over the last 24 hours. I personally reviewed no images or EKG's today.    Physical Exam   Temp: 98.2  F (36.8  C) Temp src: Oral BP: 105/63   Heart Rate: 79 Resp: 17 SpO2: 99 % O2 Device: Nasal cannula Oxygen Delivery: Other (Comments) (20L)  Vitals:    05/20/18 0650 05/21/18 0430 05/22/18 0400   Weight: 45.2 kg (99 lb 10.4 oz) 47.3 kg (104 lb 4.4 oz) 45.2 kg (99 lb 10.4 oz)     Vital Signs with Ranges  Temp:  [97.7  F (36.5  C)-98.9  F (37.2  C)] 98.2  F (36.8  C)  Heart Rate:  [70-87] 78  Resp:  [16-21] 20  BP: ()/(45-85) 123/65  FiO2 (%):  [40 %] 40 %  SpO2:  [96 %-100 %] 100 %  I/O last 3 completed shifts:  In: 400 [P.O.:400]  Out: 1025 [Urine:1025]    Constitutional: Chronically-ill appearing, NAD  Respiratory: Breathing non-labored. Diminished breath sounds bilaterally  Cardiovascular: Heart sounds distant, RRR. No pedal edema  GI: +BS, abd soft/NT  Skin/Integumen: No rash  Neuro: Alert and appropriate, DEE  Psych: Calm and cooperative    Medications     - MEDICATION INSTRUCTIONS -       Percutaneous Coronary Intervention orders placed (this is information for BPA alerting)       Reason ACE/ARB/ARNI order not selected       Reason beta  blocker order not selected         aspirin  81 mg Oral Daily     atorvastatin  10 mg Oral At Bedtime     clopidogrel  75 mg Oral or Feeding Tube Daily     dornase alpha  2.5 mg Inhalation Daily     fluticasone-vilanterol  1 puff Inhalation Daily     heparin  5,000 Units Subcutaneous Q12H     insulin aspart  1-3 Units Subcutaneous TID AC     insulin aspart  1-3 Units Subcutaneous At Bedtime     ipratropium - albuterol 0.5 mg/2.5 mg/3 mL  3 mL Nebulization 4x daily     [START ON 5/25/2018] levofloxacin  500 mg Oral Daily     metoprolol tartrate  12.5 mg Oral BID     pantoprazole  40 mg Oral QAM     polyethylene glycol  17 g Oral Daily     predniSONE  40 mg Oral Daily     umeclidinium  1 puff Inhalation Daily     Data     Recent Labs  Lab 05/24/18  0515 05/23/18  0430 05/22/18  0310  05/19/18  0545  05/18/18  0430  05/17/18  2113 05/17/18  0911   WBC 6.1 4.8 4.5  < > 8.9  --   --   --   --  7.2   HGB 12.6 12.9 12.5  < > 13.6  --   --   --   --  15.3   MCV 91 92 94  < > 90  --   --   --   --  90    179 142*  < > 128*  --   --   --   --  119*  119*   INR  --   --   --   --   --   --   --   --   --  0.91    141 139  < > 142  --  139  < >  --   --    POTASSIUM 4.1 4.1 4.2  < > 3.7  < > 3.8  --   --   --    CHLORIDE 102 101 96  < > 100  --  102  < >  --   --    CO2 37* 34* 36*  < > 37*  --  26  < >  --   --    BUN 19 17 18  < > 12  --  15  < >  --   --    CR 0.43* 0.48* 0.48*  < > 0.63  --  0.70  < >  --   --    ANIONGAP 3 6 7  < > 5  --   --   --   --   --    CALDERON 8.8 8.8 8.5  < > 8.1*  --  7.9*  < >  --   --    * 120* 113*  < > 90  --  110*  < >  --   --    TROPI  --   --   --   --  0.319*  --  0.506*  --  0.566* 0.501*   < > = values in this interval not displayed.    No results found for this or any previous visit (from the past 24 hour(s)).

## 2018-05-24 NOTE — PLAN OF CARE
Problem: Patient Care Overview  Goal: Plan of Care/Patient Progress Review  Discharge Planner OT   Patient plan for discharge: pt wants to go home and on [place] picnic with family this w/e  Current status: Pt seen in CICU this am. Up in chair upon arrival and 02 100% on 3L, /68, HR 77. Tolerated seated and standing cals @ 2min each, trialled RA for initial seated ex and maintained between 94-97% however when trialled in standing rapidly decreased below 90%. Therefore reapplied NC and completed 2 standing and 2seated cals maintaining 02 between 94-97% on 3L, HR max 88, /78. Plan is transfer to floor today and OT/CR to advance mobility and ADLs and assess cognitive function.  Barriers to return to prior living situation: current level of assist  Recommendations for discharge: TCU followed by OPCR at [facility]  Rationale for recommendations: pt will benefit from continued daily therapies to advance safety and independence with mobility and ADLS prior to return home to [place].        Entered by: Salazar Rowe 05/24/2018 10:07 AM

## 2018-05-24 NOTE — PLAN OF CARE
Problem: Patient Care Overview  Goal: Plan of Care/Patient Progress Review  Outcome: No Change  A/O, VSS on 2L o2, VERDE, renae out, voiding well. Needs encouragement to eat. Tele SR, plan to dc to TCU when bed avl. Cont to monitor.

## 2018-05-24 NOTE — CONSULTS
Care Transition Initial Assessment -   Reason For Consult: discharge planning  Met with: Patient and Family  ( Bud)  Active Problems:    Hypercapnic respiratory failure (H)       DATA  Lives With: spouse  Living Arrangements: house  Description of Support System: Supportive, Involved  Who is your support system?:   Support Assessment: Adequate family and caregiver support.   Identified issues/concerns regarding health management:       Quality Of Family Relationships: supportive, involved  Transportation Available: car, family or friend will provide    Per social service protocol for discharge planning.  Patient was admitted on 5-17-18 hypercapnic respiratory failure.  The tentative date of discharge is 5-25-18 or 5-26-18.  Reviewed chart and spoke with patient and  regarding discharge plans.  Per patient and family report, patient lives with her  I a house with 3 steps to enter.  Once inside, patient does not need to go to the basement.  Patient uses a single end cane at baseline.  Patient is independent with ADL's and IADL's.  Patient manages her own meds.  Patient still drives.  Reviewed the therapy discharge recommendations of tcu placement on discharge and patient is considering her options.  Patient would prefer to return home with homecare.  Patient's  would prefer for patient to go to tcu.  Patient states she will consider going to a tcu if it is close to home.  She is asking  referrals to be sent to [facility] in [place] and [facility].  Referrals sent, via discharge on the double, to check bed availability.  Received a call back from [facility].  They may have a bed on Sunday.  If patient will still be here then they will continue to do the assessment.  Explained that I will follow up with the physician tomorrow and update them at that time.  Awaiting a return call from [facility] in [place].  If neither facility have a bed, patient is interested in  homecare services for RN/PT/OT.      ASSESSMENT  Cognitive Status:  awake, alert and oriented  Concerns to be addressed: discharge planning.     PLAN  Financial costs for the patient includes N/A.  Patient given options and choices for discharge TCU versus homecare.  Patient/family is agreeable to the plan?  Yes  Patient Goals and Preferences: homecare versus TCU.  Patient anticipates discharging to:  TBD.    Will confirm a bed, continue to follow, and assist with a safe discharge plan.    LUIS Casanova, [facility]  [phone]

## 2018-05-25 ENCOUNTER — APPOINTMENT (OUTPATIENT)
Dept: OCCUPATIONAL THERAPY | Facility: CLINIC | Age: 68
DRG: 246 | End: 2018-05-25
Attending: INTERNAL MEDICINE
Payer: MEDICARE

## 2018-05-25 LAB
GLUCOSE BLDC GLUCOMTR-MCNC: 125 MG/DL (ref 70–99)
GLUCOSE BLDC GLUCOMTR-MCNC: 87 MG/DL (ref 70–99)
MAGNESIUM SERPL-MCNC: 1.9 MG/DL (ref 1.6–2.3)

## 2018-05-25 PROCEDURE — A9270 NON-COVERED ITEM OR SERVICE: HCPCS | Mod: GY | Performed by: INTERNAL MEDICINE

## 2018-05-25 PROCEDURE — 99233 SBSQ HOSP IP/OBS HIGH 50: CPT | Performed by: INTERNAL MEDICINE

## 2018-05-25 PROCEDURE — 97535 SELF CARE MNGMENT TRAINING: CPT | Mod: GO

## 2018-05-25 PROCEDURE — 94640 AIRWAY INHALATION TREATMENT: CPT

## 2018-05-25 PROCEDURE — 25000125 ZZHC RX 250: Performed by: INTERNAL MEDICINE

## 2018-05-25 PROCEDURE — 94640 AIRWAY INHALATION TREATMENT: CPT | Mod: 76

## 2018-05-25 PROCEDURE — 25000125 ZZHC RX 250: Performed by: SURGERY

## 2018-05-25 PROCEDURE — 25000132 ZZH RX MED GY IP 250 OP 250 PS 637: Mod: GY | Performed by: INTERNAL MEDICINE

## 2018-05-25 PROCEDURE — 36415 COLL VENOUS BLD VENIPUNCTURE: CPT | Performed by: INTERNAL MEDICINE

## 2018-05-25 PROCEDURE — 25000128 H RX IP 250 OP 636: Performed by: PHYSICIAN ASSISTANT

## 2018-05-25 PROCEDURE — 83735 ASSAY OF MAGNESIUM: CPT | Performed by: INTERNAL MEDICINE

## 2018-05-25 PROCEDURE — 97110 THERAPEUTIC EXERCISES: CPT | Mod: GO

## 2018-05-25 PROCEDURE — 40000133 ZZH STATISTIC OT WARD VISIT

## 2018-05-25 PROCEDURE — 00000146 ZZHCL STATISTIC GLUCOSE BY METER IP

## 2018-05-25 PROCEDURE — 40000275 ZZH STATISTIC RCP TIME EA 10 MIN

## 2018-05-25 PROCEDURE — 21000001 ZZH R&B HEART CARE

## 2018-05-25 RX ORDER — AMOXICILLIN 250 MG
1 CAPSULE ORAL 2 TIMES DAILY PRN
Qty: 100 TABLET | DISCHARGE
Start: 2018-05-25 | End: 2021-01-01

## 2018-05-25 RX ORDER — IPRATROPIUM BROMIDE AND ALBUTEROL SULFATE 2.5; .5 MG/3ML; MG/3ML
1 SOLUTION RESPIRATORY (INHALATION) EVERY 4 HOURS PRN
Qty: 270 ML | Refills: 3 | DISCHARGE
Start: 2018-05-25 | End: 2020-02-14

## 2018-05-25 RX ORDER — NITROGLYCERIN 0.4 MG/1
TABLET SUBLINGUAL
Qty: 25 TABLET | DISCHARGE
Start: 2018-05-25 | End: 2021-01-01

## 2018-05-25 RX ORDER — PANTOPRAZOLE SODIUM 40 MG/1
40 TABLET, DELAYED RELEASE ORAL EVERY MORNING
Qty: 30 TABLET | DISCHARGE
Start: 2018-05-26 | End: 2019-12-11

## 2018-05-25 RX ORDER — METOPROLOL TARTRATE 25 MG/1
12.5 TABLET, FILM COATED ORAL 2 TIMES DAILY
Qty: 60 TABLET | DISCHARGE
Start: 2018-05-25 | End: 2018-06-07

## 2018-05-25 RX ORDER — PREDNISONE 10 MG/1
TABLET ORAL
Qty: 30 TABLET | Refills: 0 | DISCHARGE
Start: 2018-05-27 | End: 2018-05-26

## 2018-05-25 RX ORDER — LISINOPRIL 5 MG/1
5 TABLET ORAL DAILY
Status: DISCONTINUED | OUTPATIENT
Start: 2018-05-25 | End: 2018-05-25

## 2018-05-25 RX ADMIN — CLOPIDOGREL 75 MG: 75 TABLET, FILM COATED ORAL at 08:27

## 2018-05-25 RX ADMIN — Medication 12.5 MG: at 21:34

## 2018-05-25 RX ADMIN — ALBUTEROL SULFATE 2.5 MG: 2.5 SOLUTION RESPIRATORY (INHALATION) at 15:27

## 2018-05-25 RX ADMIN — ALBUTEROL SULFATE 2.5 MG: 2.5 SOLUTION RESPIRATORY (INHALATION) at 19:26

## 2018-05-25 RX ADMIN — ATORVASTATIN CALCIUM 10 MG: 10 TABLET, FILM COATED ORAL at 21:35

## 2018-05-25 RX ADMIN — HEPARIN SODIUM 5000 UNITS: 5000 INJECTION, SOLUTION INTRAVENOUS; SUBCUTANEOUS at 15:19

## 2018-05-25 RX ADMIN — NICOTINE 1 PATCH: 7 PATCH, EXTENDED RELEASE TRANSDERMAL at 08:33

## 2018-05-25 RX ADMIN — HEPARIN SODIUM 5000 UNITS: 5000 INJECTION, SOLUTION INTRAVENOUS; SUBCUTANEOUS at 01:16

## 2018-05-25 RX ADMIN — ASPIRIN 81 MG: 81 TABLET, COATED ORAL at 08:27

## 2018-05-25 RX ADMIN — PREDNISONE 40 MG: 20 TABLET ORAL at 08:27

## 2018-05-25 RX ADMIN — UMECLIDINIUM 1 PUFF: 62.5 AEROSOL, POWDER ORAL at 08:29

## 2018-05-25 RX ADMIN — POLYETHYLENE GLYCOL 3350 17 G: 17 POWDER, FOR SOLUTION ORAL at 08:27

## 2018-05-25 RX ADMIN — PANTOPRAZOLE SODIUM 40 MG: 40 TABLET, DELAYED RELEASE ORAL at 08:27

## 2018-05-25 RX ADMIN — DORNASE ALFA 2.5 MG: 1 SOLUTION RESPIRATORY (INHALATION) at 19:32

## 2018-05-25 RX ADMIN — FLUTICASONE FUROATE AND VILANTEROL TRIFENATATE 1 PUFF: 200; 25 POWDER RESPIRATORY (INHALATION) at 08:29

## 2018-05-25 RX ADMIN — Medication 25 MG: at 01:04

## 2018-05-25 RX ADMIN — Medication 2 G: at 22:27

## 2018-05-25 RX ADMIN — LEVOFLOXACIN 500 MG: 500 TABLET, FILM COATED ORAL at 08:24

## 2018-05-25 RX ADMIN — ALBUTEROL SULFATE 2.5 MG: 2.5 SOLUTION RESPIRATORY (INHALATION) at 07:30

## 2018-05-25 RX ADMIN — Medication 12.5 MG: at 08:27

## 2018-05-25 NOTE — PROGRESS NOTES
[facility]    Hospitalist Progress Note    Date of Service (when I saw the patient): 05/25/2018    Assessment & Plan   Preeti Ford is a 67 year old female with hx of known CAD s/p PCI, CHF, aortic stenosis, PAD s/p aortoiliac bypass, severe COPD, and prior CVA maintained on Plavix who was admitted to the ICU service on 5/17/2018 for NSTEMI with cardiogenic shock due to in-stent restenosis of the circumflex which has been re-stented and acute hypoxic respiratory failure due to COPD exacerbation and Pseudomonas pneumonia, requiring intubation. She was successfully extubated and weaned off pressors on 5/20. She was transferred to the Hospitalist service on 5/23.    NSTEMI with cardiogenic shock due to in-stent restenosis, s/p ROWDY to mid-circumflex on 5/18  CAD, native heart, native vessels s/p PCI  PAD s/p aortoiliac bypass  She was seen in clinic one day prior to admission and sent home with prednisone and azithromycin for a COPD exacerbation. She then presented to the ED in acute respiratory distress with hypoxia to 81% on room air, and intubated on arrival. Troponin was mildly elevated to 0.566, but had worrisome EKG changes. TTE on admission showed new and decreased LVEF 25-30% with global LV dysfunction. She was taken to the cath lab on 5/18 where she was found to have mid-circumflex in-stent restenosis which was re-stented with a ROWDY. She was also treated with norepinephrine for shock between 5/18-5/20  - She was previously on Plavix and atorvastatin 10 mg qhs for prior CVA. These medications have been continued.  - She was started on aspirin 81 mg daily and metoprolol 12.5 mg BID during this hospital stay  - Cardiac rehab recommending TCU followed by OP CR at [facility]    Chronic systolic CHF due to ischemic cardiomyopathy  She was found to have a decreased LVEF to 25-30% on admission TTE compared to ~55% in Mar 2017, but this had appeared to improve to at least 40-45% by the time of her  angiogram on 5/18.   - Appears euvolemic  - On BB as noted above. Not on ACEi/ARB due to reported history of intolerance    Moderate aortic stenosis  Noted on admission TTE with mean gradient of 14.1 mmHg  - Stable, appears euvolemic    Acute hypoxic and hypercapnic respiratory failure due to acute COPD exacerbation and Pseudomonas pneumonia  Due to acute COPD exacerbation and Pseudomonas pneumonia. Initiated on azithromycin and prednisone by her PCP on 5/16, but developed progressive respiratory failure as noted above. CXR on arrival showed no acute infiltrates, but sputum grew Pseudomonas. She was intubated on arrival and successfully extubated to NIPPV on 5/20. She has improved with steroids, duonebs, and antibiotics (treated with ceftazidime 5/17-5/22, levofloxacin 5/22-5/26).  - On 0-2 lpm/NC, wean as tolerated  - Continues on levofloxacin, will complete 10-day course tomorrow (5/26)  - IV methylprednisolone d/c'd 5/24. Continues on prednisone 40 mg daily with 10 day taper at discharge  - Continues on duonebs QID while inpatient, QID PRN at discharge  - Continues on PTA Symbicort ([employer] formulary) and Spiriva ([employer] formulary)  - Encourage incentive spirometry    GI prophylaxis  She has been started on pantoprazole 40 mg daily for GI prophylaxis in setting of dual-antiplatelet therapy, steroid burst, and recent critical illness    Acute delirium, Resolved  Due to ICU delirium and possibly steroids. Her mental status continued to improve upon transfer from the ICU, and she is now at her baseline mental status.    Dysphagia due to recent intubation and encephalopathy  - On DDL2 with thin liquids as per SLP  - SLP following, advance diet as per SLP    History of CVA  - On aspirin, Plavix, and statin as noted above    # Pain Assessment:  Current Pain Score 5/25/2018   Patient currently in pain? denies   Pain score (0-10) -   Pain location -   Pain descriptors -   CPOT pain score -   Preeti s pain level was  assessed and she currently denies pain.      FEN: DDL2 with thin liquids  DVT Prophylaxis: Heparin SQ  Code Status: Prior    Disposition: Expected discharge to TCU once bed available    Rocio Nair    Interval History   No events overnight. Continues to feel better. Denies cp/sob, dizziness/lightheadedness, nausea, or pain elsewhere. Only gave SW 2 TCU options yesterday, but beds aren't available. Now willing to expand TCU choices.   - TCU discharge orders completed  - Wean O2 as tolerated    -Data reviewed today: I reviewed all new labs and imaging results over the last 24 hours. I personally reviewed no images or EKG's today.    Physical Exam   Temp: 98.1  F (36.7  C) Temp src: Oral BP: 111/56   Heart Rate: 77 Resp: 22 SpO2: 98 % O2 Device: None (Room air) Oxygen Delivery: 2 LPM  Vitals:    05/22/18 0400 05/24/18 1700 05/25/18 0553   Weight: 45.2 kg (99 lb 10.4 oz) 46.3 kg (102 lb 1.6 oz) 45.6 kg (100 lb 8.5 oz)     Vital Signs with Ranges  Temp:  [97.7  F (36.5  C)-98.4  F (36.9  C)] 98.1  F (36.7  C)  Heart Rate:  [69-92] 77  Resp:  [20-22] 22  BP: (104-146)/(56-80) 111/56  SpO2:  [85 %-99 %] 98 %  I/O last 3 completed shifts:  In: 1080 [P.O.:1080]  Out: 1220 [Urine:1220]    Constitutional: Chronically-ill appearing, NAD  Respiratory: Breathing non-labored. Diminished breath sounds bilaterally  Cardiovascular: Heart sounds distant, RRR. No pedal edema  GI: +BS, abd soft/NT  Skin/Integumen: No rash  Neuro: Alert and appropriate, DEE  Psych: Calm and cooperative    Medications     - MEDICATION INSTRUCTIONS -       Percutaneous Coronary Intervention orders placed (this is information for BPA alerting)       Reason ACE/ARB/ARNI order not selected       Reason beta blocker order not selected         albuterol  3 mL Nebulization 4x daily     aspirin  81 mg Oral Daily     atorvastatin  10 mg Oral At Bedtime     clopidogrel  75 mg Oral or Feeding Tube Daily     dornase alpha  2.5 mg Inhalation Daily      fluticasone-vilanterol  1 puff Inhalation Daily     heparin  5,000 Units Subcutaneous Q12H     insulin aspart  1-3 Units Subcutaneous TID AC     insulin aspart  1-3 Units Subcutaneous At Bedtime     levofloxacin  500 mg Oral Daily     metoprolol tartrate  12.5 mg Oral BID     nicotine  1 patch Transdermal Daily     nicotine   Transdermal Q8H     nicotine   Transdermal Daily     pantoprazole  40 mg Oral QAM     polyethylene glycol  17 g Oral Daily     predniSONE  40 mg Oral Daily     umeclidinium  1 puff Inhalation Daily     Data     Recent Labs  Lab 05/24/18  0515 05/23/18  0430 05/22/18  0310  05/19/18  0545   WBC 6.1 4.8 4.5  < > 8.9   HGB 12.6 12.9 12.5  < > 13.6   MCV 91 92 94  < > 90    179 142*  < > 128*    141 139  < > 142   POTASSIUM 4.1 4.1 4.2  < > 3.7   CHLORIDE 102 101 96  < > 100   CO2 37* 34* 36*  < > 37*   BUN 19 17 18  < > 12   CR 0.43* 0.48* 0.48*  < > 0.63   ANIONGAP 3 6 7  < > 5   CALDERON 8.8 8.8 8.5  < > 8.1*   * 120* 113*  < > 90   TROPI  --   --   --   --  0.319*   < > = values in this interval not displayed.    No results found for this or any previous visit (from the past 24 hour(s)).

## 2018-05-25 NOTE — PROGRESS NOTES
Patient on 2L NC. Pedro sounds diminished. SpO2 in the mid 90's.  Nebulizer tx given as ordered. Will continue to follow.    Ron NICOLAS

## 2018-05-25 NOTE — PROGRESS NOTES
SW:  D:  Call placed to [facility] in [place] to follow up on the referral from yesterday.  Per Johnna, she has given the referral to nursing to complete the assessment.  Once this is done she needs to make some room changes and then she will know what her room availability is.  Patient is agreeable to expand her tcu choices.  Spoke with patient's  to get additional tcu choices.  Patient's  is asking for additional referrals to be sent to [facility], [facility], [facility] in [place], [facility] in [place], and [facility].  Referrals sent, via discharge on the double, to check bed availability.  Patient's  states he will transport when discharge is known.  P:  Will continue to follow.

## 2018-05-25 NOTE — PLAN OF CARE
Problem: Patient Care Overview  Goal: Plan of Care/Patient Progress Review  Outcome: Improving  A/O, VSS on 2L o2, VERDE. SBA w/walker. Needs encouragement to eat, appetite improving. Tele SR, plan to dc to TCU when bed avl. Cont to monitor

## 2018-05-25 NOTE — PROGRESS NOTES
SW:  D:  Received a call back from [facility] in [place].  They have one bed available and it is at the end of the parr.  They are hesitant to accept patient due to her confusion at night.  They would like me to discuss this with patient's .  Call placed to patient's  to inquire about the room situation.  He feels that patient is at her baseline and she would be fine at the end of the parr.  Call placed to update Johnna at [facility] and she is in agreement to accept patient tomorrow as early in the day as possible.  Call placed to [facility].  They will assess patient for Sunday.  Referral sent to [facility] in [place].  They have no available beds.  [facility] has no available beds.  [facility] in [place] has no available beds due to staffing.  [facility] has no available beds, [facility] has no available beds.  Received a call back from [facility].  They can accept patient tomorrow.  Received a call back from [facility] in [place].  They have a bed available on Sunday.  Patient's  would prefer [facility].  He plans on transporting patient tomorrow.  He plans on being to the hospital around 10-10:30.  Updated [facility].  They will need a nurse-to-nurse report called to [phone].  P:  Will continue to follow.

## 2018-05-25 NOTE — PLAN OF CARE
Problem: Patient Care Overview  Goal: Plan of Care/Patient Progress Review  Attempted to see patient x2 for swallowing treatment.  Patient requiring other cares both times and requesting reschedule. Will reschedule 1x follow up treatment for the weekend, as swallowing is reported to be going well per patient and nursing.

## 2018-05-25 NOTE — PLAN OF CARE
Problem: Patient Care Overview  Goal: Plan of Care/Patient Progress Review  Discharge Planner OT   Patient plan for discharge: did not state  Current status: pt completed [facility] Mental Status Exam (SLUMS) and scored 10/30 which falls at moderate dementia range (1-20). Errors noted globally in STM, delayed recall, visuospatial and executive fns. Prior to activity /80, HR 77, 02 100% on 2L. Trialled RA with walk in parr and 02 sats decreased to 85% and pt became lightheaded, /56. Replaced NC and recovered >90% within 40 sec.   OT/CR to decrease frequency to once daily.  Encourage patient to ambulate with nursing.   Barriers to return to prior living situation: general weakness, confusion  Recommendations for discharge: TCU  Rationale for recommendations: pt will benefit from continued daily therapies for strengthening to advance mobility and ADLs prior to return home       Entered by: Salazar Rowe 05/25/2018 12:07 PM

## 2018-05-25 NOTE — PROGRESS NOTES
"CLINICAL NUTRITION SERVICES - REASSESSMENT NOTE      Malnutrition: 5/19:  % Weight Loss:  Up to 20% in 1 year (non-severe malnutrition)  % Intake:  </= 50% for >/= 1 month (severe malnutrition)  Subcutaneous Fat Loss:  None observed  Muscle Loss:  Temporal region mild-moderate depletion  Fluid Retention:  None noted      Malnutrition Diagnosis: Non-Severe malnutrition  In Context of:  Acute illness or injury  Chronic illness or disease       EVALUATION OF PROGRESS TOWARD GOALS   Diet: DD2 w/ Thin liquids, Magic Shake BID  - Diet advanced from DD1 w/ Nectar thick liquids yesterday    Intake:   5/24: Per flowsheets, consumed % of 3 meals  5/23: Per flowsheets, consumed 50-75% of 2 meals  5/22: D5 IVF off    No documented stool since admit (5/17) -- receiving scheduled bowel regimen x 2 days    Per patient: Appetite is great today, states that she eats >/=75% of all meals and takes 2-4 protein supplements daily. Has enjoyed the [employer] and ice cream. Pt denies any chewing/dentition concerns     NEW FINDINGS:   5/23: Txr --> floor  Intermittent HFNC  Per SLP: plan to keep dentures at home (fear of losing them), but can still chew w/o them  Per chart: \"Pt needs encouragement to eat,\" Pt also has been forgetful this admit  Plan for TCU upon d/c    Previous Goals:   Patient will consume 2/3-3/4 of meals TID and supplements BID  Evaluation: Met    Previous Nutrition Diagnosis:   Inadequate oral intake related to diet just advanced today, has been NPO on on low drip TF as evidenced by meeting 0% needs  Evaluation: Completed      CURRENT NUTRITION DIAGNOSIS  Predicted inadequate oral intake R/t recent diet advancement to solid foods w/ h/o forgetfulness and needing encouragement to eat --> now with great appetite     INTERVENTIONS  Recommendations / Nutrition Prescription  Continue diet per SLP w/ BID supplements    Implementation  No nutrition-related implementation at this time.     Goals  Pt to consume % of " meals TID w/ supplements BID.       MONITORING AND EVALUATION:  Progress towards goals will be monitored and evaluated per protocol and Practice Guidelines        Zeynep Lozoya, Dietitian

## 2018-05-25 NOTE — PLAN OF CARE
Problem: Patient Care Overview  Goal: Plan of Care/Patient Progress Review  Outcome: No Change  Pt disoriented to situation with some episodes of hallucination. Pt impulsive and agitated during the night, PRN Seroquel given. Up w/ A-1 and walker. On 2 lit O2 thru NC overnight, intermittent cough present. NSR on tele. VSS. On PO abx for pneumonia. Likely TCU on d/c. Will continue to monitor pt.

## 2018-05-26 VITALS
SYSTOLIC BLOOD PRESSURE: 91 MMHG | BODY MASS INDEX: 18.44 KG/M2 | HEART RATE: 66 BPM | WEIGHT: 100.2 LBS | RESPIRATION RATE: 20 BRPM | HEIGHT: 62 IN | OXYGEN SATURATION: 96 % | TEMPERATURE: 97.7 F | DIASTOLIC BLOOD PRESSURE: 50 MMHG

## 2018-05-26 LAB
GLUCOSE BLDC GLUCOMTR-MCNC: 81 MG/DL (ref 70–99)
MAGNESIUM SERPL-MCNC: 2.2 MG/DL (ref 1.6–2.3)

## 2018-05-26 PROCEDURE — A9270 NON-COVERED ITEM OR SERVICE: HCPCS | Mod: GY | Performed by: INTERNAL MEDICINE

## 2018-05-26 PROCEDURE — 40000275 ZZH STATISTIC RCP TIME EA 10 MIN

## 2018-05-26 PROCEDURE — 25000128 H RX IP 250 OP 636: Performed by: PHYSICIAN ASSISTANT

## 2018-05-26 PROCEDURE — 25000125 ZZHC RX 250: Performed by: INTERNAL MEDICINE

## 2018-05-26 PROCEDURE — 99239 HOSP IP/OBS DSCHRG MGMT >30: CPT | Performed by: INTERNAL MEDICINE

## 2018-05-26 PROCEDURE — 25000132 ZZH RX MED GY IP 250 OP 250 PS 637: Mod: GY | Performed by: INTERNAL MEDICINE

## 2018-05-26 PROCEDURE — 36415 COLL VENOUS BLD VENIPUNCTURE: CPT | Performed by: INTERNAL MEDICINE

## 2018-05-26 PROCEDURE — 00000146 ZZHCL STATISTIC GLUCOSE BY METER IP

## 2018-05-26 PROCEDURE — 83735 ASSAY OF MAGNESIUM: CPT | Performed by: INTERNAL MEDICINE

## 2018-05-26 RX ORDER — PREDNISONE 10 MG/1
TABLET ORAL
Qty: 30 TABLET | Refills: 0 | DISCHARGE
Start: 2018-05-27 | End: 2018-06-25

## 2018-05-26 RX ORDER — PREDNISONE 10 MG/1
TABLET ORAL
Qty: 30 TABLET | Refills: 0 | DISCHARGE
Start: 2018-05-27 | End: 2018-05-26

## 2018-05-26 RX ADMIN — ASPIRIN 81 MG: 81 TABLET, COATED ORAL at 09:23

## 2018-05-26 RX ADMIN — FLUTICASONE FUROATE AND VILANTEROL TRIFENATATE 1 PUFF: 200; 25 POWDER RESPIRATORY (INHALATION) at 09:26

## 2018-05-26 RX ADMIN — NICOTINE 1 PATCH: 7 PATCH, EXTENDED RELEASE TRANSDERMAL at 09:20

## 2018-05-26 RX ADMIN — UMECLIDINIUM 1 PUFF: 62.5 AEROSOL, POWDER ORAL at 09:26

## 2018-05-26 RX ADMIN — CLOPIDOGREL 75 MG: 75 TABLET, FILM COATED ORAL at 09:20

## 2018-05-26 RX ADMIN — HEPARIN SODIUM 5000 UNITS: 5000 INJECTION, SOLUTION INTRAVENOUS; SUBCUTANEOUS at 03:10

## 2018-05-26 RX ADMIN — POLYETHYLENE GLYCOL 3350 17 G: 17 POWDER, FOR SOLUTION ORAL at 09:20

## 2018-05-26 RX ADMIN — LEVOFLOXACIN 500 MG: 500 TABLET, FILM COATED ORAL at 06:01

## 2018-05-26 RX ADMIN — PANTOPRAZOLE SODIUM 40 MG: 40 TABLET, DELAYED RELEASE ORAL at 09:20

## 2018-05-26 RX ADMIN — ALBUTEROL SULFATE 2.5 MG: 2.5 SOLUTION RESPIRATORY (INHALATION) at 10:07

## 2018-05-26 RX ADMIN — PREDNISONE 40 MG: 20 TABLET ORAL at 09:23

## 2018-05-26 NOTE — DISCHARGE INSTRUCTIONS
Discharge Instructions for Coronary Angioplasty and Stenting  During your angioplasty, a doctor inserts a thin tube called a catheter into a blood vessel in your groin or wrist. The catheter is pushed through your blood vessel to a blocked area in one of your heart s arteries. The doctor inflates a tiny balloon at the tip of the catheter and stretches the blocked vessel so blood can flow freely. The balloon is then deflated and removed with the catheter. The doctor may also insert a metal mesh tube called a stent in the blocked vessel. The stent helps the vessel stay open. You may get several stents if you have blockages in more than one of your arteries.  Home care    Ask someone to drive you to your appointments for the next few days.    Rest for 2 to 3 days after the procedure. Most people are able to go back to normal activity within a few days.    Take your temperature and check your incision for signs of infection every day for a week. Signs of infection include redness, swelling, drainage, or warmth. It is normal to have a small bruise or bump where the catheter was inserted.    Take your medicines exactly as directed. Don t skip doses. It is important to take aspirin or other similar medicines for as long as your doctor advises. If you were also prescribed clopidogrel, prasugrel, or ticagrelor, it is very important to take these medicines, as well. These medicines prevent clots that could cause a heart attack. If you have a problem with any of your medicines, call healthcare provider right away. Call your provider right away if you have extra bleeding, but go to the emergency room if the bleeding can't be controlled.    Unless told otherwise, drink plenty of fluids to help flush your body of the dye that was used during your angioplasty. Let your healthcare provider know if the color of your urine changes and doesn't return to normal color.    Eat a healthy diet that is low in fat, salt, and cholesterol.  Ask your healthcare team for menus and other diet information.    Exercise according to your healthcare team's recommendation. Depending on your case, your team may recommend you start a cardiac rehabilitation program. Cardiac rehab is an exercise program in which trained healthcare staff monitor your progress and stress on your heart while you exercise. Ask how to enroll if your team recommends this program.    Don't swim or take a bath for 5 to 7 days. You may shower the day after the procedure. This keeps the incision site from getting wet and infected until the skin and artery can heal.  Follow-up care    Make a follow-up appointment as directed by our staff. Follow-up appointments are usually scheduled for 2 to 4 weeks after an angioplasty or coronary stent procedure.    Have a yearly checkup to make sure you are still doing well and not having any new symptoms.    Don't wait for a follow-up appointment if your medicines are not working or you are having heart-related symptoms.     When to call your healthcare provider  Call your healthcare provider right away if you have any of the following:    Chest pain or a return of the symptoms you had prior to the angioplasty    Constant or increasing pain or numbness in your leg, or if your leg looks blue or feels cold    Fever above 100.4 F (38.0 C) or other signs of infection (redness, swelling, drainage, or warmth at the incision site of the leg or wrist)    Shortness of breath    Bleeding, bruising, or a large swelling where the catheter (tube) was inserted    Blood in your urine    Black or tarry stools    Feeling faint    Difficulty speaking or weakness in any muscle   Date Last Reviewed: 10/1/2016    3190-7181 [employer] [address] [place] [id number]. All rights reserved. This information is not intended as a substitute for professional medical care. Always follow your healthcare professional's instructions.

## 2018-05-26 NOTE — DISCHARGE SUMMARY
[facility]    Discharge Summary  Hospitalist    Date of Admission:  5/17/2018  Date of Discharge:  5/26/2018  Discharging Provider: Soren Robles MD    Discharge Diagnoses   NSTEMI with cardiogenic shock due to in-stent restenosis, s/p ROWDY to mid-circumflex on 5/18  Acute hypoxic and hypercapnic respiratory failure due to acute COPD exacerbation and Pseudomonas pneumonia, improved  Ischemic cardiomyopathy, improving  Hypertension  Encephalopathy/delirium, resolved  New onset dysphagia    History of Present Illness   Preeti Ford is a 67 year old female with hx of known CAD s/p PCI, CHF, aortic stenosis, PAD s/p aortoiliac bypass, severe COPD, and prior CVA maintained on Plavix who was admitted to the ICU service on 5/17/2018 for NSTEMI with cardiogenic shock due to in-stent restenosis of the circumflex, acute hypoxic respiratory failure due to COPD exacerbation, and Pseudomonas pneumonia, requiring intubation. She underwent angiogram 5/18 which showed in-stent restenosis of the mid circumflex s/p ROWDY. She was successfully extubated and weaned off pressors on 5/20. Post extubation, she did have encephalopathy/delirium related to her critical illness which gradually improved. She was transferred to the Hospitalist service on 5/23.     NSTEMI with cardiogenic shock due to in-stent restenosis, s/p ROWDY to mid-circumflex on 5/18  Chronic systolic CHF due to ischemic cardiomyopathy  She was seen in clinic one day prior to admission and sent home with prednisone and azithromycin for a COPD exacerbation. She then presented to the ED in acute respiratory distress with hypoxia to 81% on room air, and intubated on arrival. Troponin was mildly elevated to 0.566, but had worrisome EKG changes. TTE on admission showed new and decreased LVEF 25-30% with global LV dysfunction. She was taken to the cath lab on 5/18 where she was found to have mid-circumflex in-stent restenosis which was re-stented with a  ROWDY. Ejection appeared to improve to at least 40-45% by the time of her angiogram on 5/18. She was also treated with norepinephrine for shock between 5/18-5/20.  - Continue PTA Plavix and atorvastatin  - She was started on aspirin 81 mg daily and metoprolol 12.5 mg BID during this hospital stay  - Cardiac rehab recommending TCU followed by OP CR at [facility]   - On BB as noted above. Not on ACEi/ARB due to reported history of intolerance  - Will likely need repeat TTE, defer to outpatient cardiology     Moderate aortic stenosis  Noted on admission TTE with mean gradient of 14.1 mmHg  - Stable, appears euvolemic     Acute hypoxic and hypercapnic respiratory failure due to acute COPD exacerbation and Pseudomonas pneumonia  Initiated on azithromycin and prednisone by her PCP on 5/16, but developed progressive respiratory failure as noted above. CXR on arrival showed no acute infiltrates, but sputum grew Pseudomonas. She was intubated on arrival and successfully extubated to NIPPV on 5/20. She has improved with steroids, duonebs, and completed a course of antibiotics (treated with ceftazidime 5/17-5/22, levofloxacin 5/22-5/26).  - Remains on 2L at discharge, continue to wean  - Discharge with 12-day prednisone taper  - Duonebs PRN, PTA Symbicort, PTA Spiriva    Acute delirium, Resolved: Due to ICU delirium and possibly steroids. Back to baseline mental status.     Dysphagia due to recent intubation and encephalopathy:  - On DDL2 with thin liquids as per SLP  - SLP following, advance diet as per SLP    Hospital Course   Preeti oFrd was admitted on 5/17/2018.  The following problems were addressed during her hospitalization:    Active Problems:    Hypercapnic respiratory failure (H)      Soren Robles MD    Significant Results and Procedures   Angiogram 5/18    Pending Results   None    Code Status   Full Code       Primary Care Physician   Deisy Carter    # Discharge Pain Plan:   - Patient currently  has NO PAIN and is not being prescribed pain medications on discharge.      Physical Exam   Temp: 98.1  F (36.7  C) Temp src: Oral BP: 99/53   Heart Rate: 83 Resp: 20 SpO2: 99 % O2 Device: Nasal cannula Oxygen Delivery: 2 LPM  Vitals:    05/24/18 1700 05/25/18 0553 05/26/18 0541   Weight: 46.3 kg (102 lb 1.6 oz) 45.6 kg (100 lb 8.5 oz) 45.5 kg (100 lb 3.2 oz)     Vital Signs with Ranges  Temp:  [98.1  F (36.7  C)-98.6  F (37  C)] 98.1  F (36.7  C)  Heart Rate:  [74-83] 83  Resp:  [18-22] 20  BP: ()/(53-82) 99/53  SpO2:  [85 %-99 %] 99 %  I/O last 3 completed shifts:  In: 940 [P.O.:940]  Out: 1570 [Urine:1570]    Constitutional: Elderly thin female in NAD  Eyes: PERRL, nonicteric, normal ocular movements  HEENT: Normocephalic, atraumatic, oral mucosa moist  Respiratory: CTAB, no wheezing or crackles, good air entry  Cardiovascular: RRR, normal S1/2, systolic murmur  GI: No organomegaly, normoactive bowel sounds, nontender, nondistended  Skin: Scattered bruises over wrists, arms, BLE  Musculoskeletal: Moves all extremities  Neurologic: A&Ox3  Psychiatric: Appropriate affect and mood    Discharge Disposition   Discharged to short-term care facility  Condition at discharge: Stable    Consultations This Hospital Stay   RESPIRATORY CARE IP CONSULT  CARDIOLOGY IP CONSULT  CARDIOLOGY IP CONSULT  PHARMACY TO DOSE HEPARIN  CARDIAC REHAB IP CONSULT  NUTRITION SERVICES ADULT IP CONSULT  PHARMACY IP CONSULT  PHARMACY IP CONSULT  NUTRITION SERVICES ADULT IP CONSULT  PHARMACY IP CONSULT  SWALLOW EVAL SPEECH PATH AT BEDSIDE IP CONSULT  SWALLOW EVAL SPEECH PATH AT BEDSIDE IP CONSULT  SOCIAL WORK IP CONSULT  PHYSICAL THERAPY ADULT IP CONSULT  SPEECH LANGUAGE PATH ADULT IP CONSULT  OCCUPATIONAL THERAPY ADULT IP CONSULT  NUTRITION SERVICES ADULT IP CONSULT  SMOKING CESSATION PROGRAM IP CONSULT    Time Spent on this Encounter   ISoren, personally saw the patient today and spent greater than 30 minutes discharging  this patient.    Discharge Orders     General info for SNF   Length of Stay Estimate: Short Term Care: Estimated # of Days <30  Condition at Discharge: Improving  Level of care:skilled   Rehabilitation Potential: Excellent  Admission H&P remains valid and up-to-date: Yes  Recent Chemotherapy: N/A  Use Nursing Home Standing Orders: Yes     Mantoux instructions   Give two-step Mantoux (PPD) Per Facility Policy Yes     Reason for your hospital stay   Heart attack due to blockage of one of your stents. This was replaced. You were also treated for pneumonia and a COPD exacerbation     Intake and output   Every shift     Daily weights   Call Provider for weight gain of more than 2 pounds per day or 5 pounds per week.     Follow Up and recommended labs and tests   Follow up with transitional care physician.  No follow up labs or test are needed.     Activity - Up with nursing assistance     Full Code     Physical Therapy Adult Consult   Evaluate and treat as clinically indicated.    Reason:  Generalized weakness, deconditioning. NSTEMI     Speech Language Path Adult Consult   Evaluate and treat as clinically indicated.    Reason:  Dysphagia     Occupational Therapy Adult Consult   Evaluate and treat as clinically indicated.    Reason:  Generalized weakness, deconditioning. NSTEMI     Nutrition Services Adult IP Consult   Reason:  Non-severe malnutrition - on Magic cup BID     Oxygen - Nasal cannula   2 Lpm by nasal cannula to keep O2 sats 90% or greater.     Advance Diet as Tolerated   Follow this diet upon discharge: Orders Placed This Encounter  *Dysphagia Diet Level 2 [facility] Altered Thin Liquids (water, ice chips, juice, milk gelatin, ice cream, etc)  *Magic shake 10-2 (RD); Between Meals       Discharge Medications   Current Discharge Medication List      START taking these medications    Details   aspirin 81 MG EC tablet Take 1 tablet (81 mg) by mouth daily    Associated Diagnoses: Coronary artery disease involving  native coronary artery of native heart with angina pectoris (H)      metoprolol tartrate (LOPRESSOR) 25 MG tablet Take 0.5 tablets (12.5 mg) by mouth 2 times daily  Qty: 60 tablet    Associated Diagnoses: Coronary artery disease involving native coronary artery of native heart with angina pectoris (H)      nitroGLYcerin (NITROSTAT) 0.4 MG sublingual tablet For chest pain place 1 tablet under the tongue every 5 minutes for 3 doses. If symptoms persist 5 minutes after 1st dose call 911.  Qty: 25 tablet    Associated Diagnoses: Coronary artery disease involving native coronary artery of native heart with angina pectoris (H)      pantoprazole (PROTONIX) 40 MG EC tablet Take 1 tablet (40 mg) by mouth every morning  Qty: 30 tablet    Associated Diagnoses: Gastroesophageal reflux disease, esophagitis presence not specified      senna-docusate (SENOKOT-S;PERICOLACE) 8.6-50 MG per tablet Take 1 tablet by mouth 2 times daily as needed for constipation  Qty: 100 tablet    Associated Diagnoses: Constipation, unspecified constipation type         CONTINUE these medications which have CHANGED    Details   ipratropium - albuterol 0.5 mg/2.5 mg/3 mL (DUONEB) 0.5-2.5 (3) MG/3ML neb solution Take 1 vial (3 mLs) by nebulization every 4 hours as needed for shortness of breath / dyspnea or wheezing  Qty: 270 mL, Refills: 3    Associated Diagnoses: COPD exacerbation (H)      predniSONE (DELTASONE) 10 MG tablet Take 3 tabs daily for 4 days, then 2 tabs daily for 4 days, then 1 tab daily for 4 days, then stop  Qty: 30 tablet, Refills: 0    Associated Diagnoses: COPD exacerbation (H)         CONTINUE these medications which have NOT CHANGED    Details   acetaminophen (TYLENOL) 325 MG tablet Take 325 mg by mouth every 4 hours as needed for mild pain   Qty: 100 tablet      albuterol (PROAIR HFA/PROVENTIL HFA/VENTOLIN HFA) 108 (90 BASE) MCG/ACT Inhaler Inhale 2 puffs into the lungs every 6 hours as needed for shortness of breath /  dyspnea  Qty: 1 Inhaler, Refills: 11    Associated Diagnoses: Chronic bronchitis, unspecified chronic bronchitis type (H)      atorvastatin (LIPITOR) 10 MG tablet Take 1 tablet (10 mg) by mouth At Bedtime  Qty: 90 tablet, Refills: 3    Associated Diagnoses: PAD (peripheral artery disease) (H)      budesonide-formoterol (SYMBICORT) 160-4.5 MCG/ACT Inhaler INHALE TWO PUFFS BY MOUTH TWICE A DAY  Qty: 1 Inhaler, Refills: 5    Associated Diagnoses: Chronic bronchitis, unspecified chronic bronchitis type (H)      Calcium Carbonate-Vitamin D (OSCAL 500/200 D-3 PO) Take 1 tablet by mouth 2 times daily      clopidogrel (PLAVIX) 75 MG tablet Take 1 tablet (75 mg) by mouth daily  Qty: 90 tablet, Refills: 3    Associated Diagnoses: Acute CVA (cerebrovascular accident) (H)      Coenzyme Q10 (COQ10 PO) Take 100 mg by mouth every 24 hours (at 16:00)      montelukast (SINGULAIR) 10 MG tablet Take 1 tablet (10 mg) by mouth At Bedtime  Qty: 30 tablet, Refills: 11    Associated Diagnoses: Chronic bronchitis, unspecified chronic bronchitis type (H)      nicotine (NICODERM CQ) 7 MG/24HR 24 hr patch Place 1 patch onto the skin every 24 hours  Qty: 30 patch, Refills: 1    Associated Diagnoses: Cigarette nicotine dependence without complication      polyethylene glycol (MIRALAX/GLYCOLAX) Packet Take 1 packet by mouth daily as needed for constipation      tiotropium (SPIRIVA HANDIHALER) 18 MCG capsule Inhale 1 capsule (18 mcg) into the lungs daily  Qty: 30 capsule, Refills: 3    Associated Diagnoses: Chronic bronchitis, unspecified chronic bronchitis type (H)      ACE/ARB/ARNI NOT PRESCRIBED, INTENTIONAL, Please choose reason not prescribed, below    Associated Diagnoses: Elevated blood pressure reading without diagnosis of hypertension; History of stroke      Nutritional Supplements (BOOST) Take 1 Bottle by mouth 3 times daily (with meals)  Qty: 90 each, Refills: 0    Associated Diagnoses: History of stroke; Ischemic cardiomyopathy;  Panlobular emphysema (H); Malaise; Loss of weight      !! order for DME Equipment being ordered: Nebulizer machine  Qty: 1 Device, Refills: 0    Associated Diagnoses: Chronic bronchitis, unspecified chronic bronchitis type (H)      !! order for DME Equipment being ordered: Nebulizer  Qty: 1 Device, Refills: 0    Associated Diagnoses: Pulmonary emphysema, unspecified emphysema type (H)       !! - Potential duplicate medications found. Please discuss with provider.      STOP taking these medications       metoprolol succinate (TOPROL-XL) 25 MG 24 hr tablet Comments:   Reason for Stopping:         polyethylene glycol (MIRALAX) powder Comments:   Reason for Stopping:             Allergies   Allergies   Allergen Reactions     Crestor [Rosuvastatin] Other (See Comments)     dizziness     Hmg-Coa-R Inhibitors Other (See Comments)     Muscle/joint aching     Lisinopril Cough     Optison [Albumin Human] Other (See Comments)     Pt was flush and very dizzy.  Also had a BP drop     Penicillins Rash     Data   Most Recent 3 CBC's:  Recent Labs   Lab Test  05/24/18   0515  05/23/18   0430  05/22/18   0310   WBC  6.1  4.8  4.5   HGB  12.6  12.9  12.5   MCV  91  92  94   PLT  176  179  142*      Most Recent 3 BMP's:  Recent Labs   Lab Test  05/24/18   0515  05/23/18   0430  05/22/18   0310   NA  142  141  139   POTASSIUM  4.1  4.1  4.2   CHLORIDE  102  101  96   CO2  37*  34*  36*   BUN  19  17  18   CR  0.43*  0.48*  0.48*   ANIONGAP  3  6  7   CALDERON  8.8  8.8  8.5   GLC  128*  120*  113*     Most Recent 2 LFT's:  Recent Labs   Lab Test  05/17/18   0436  04/19/18   1244   AST  54*  21   ALT  48  25   ALKPHOS  90  63   BILITOTAL  0.4  0.5     Most Recent INR's and Anticoagulation Dosing History:  Anticoagulation Dose History     Recent Dosing and Labs Latest Ref Rng & Units 12/23/2015 1/6/2016 2/18/2016 3/7/2017 3/22/2018 4/19/2018 5/17/2018    INR 0.86 - 1.14 - - 0.85(L) 1.05 0.95 0.95 0.91    INR 0.86 - 1.14 2.4(A) 2.3(A) - - - -  -        Most Recent 3 Troponin's:  Recent Labs   Lab Test  05/19/18   0545  05/18/18   0430  05/17/18   2113   08/18/13   0830   TROPI  0.319*  0.506*  0.566*   < >  Duplicate request   TROPONIN   --    --    --    --   0.00    < > = values in this interval not displayed.     Most Recent Cholesterol Panel:  Recent Labs   Lab Test  05/18/18   1255   CHOL  165   LDL  91   HDL  53   TRIG  107     Most Recent 6 Bacteria Isolates From Any Culture (See EPIC Reports for Culture Details):  Recent Labs   Lab Test  05/17/18   1020  05/17/18   0530  03/23/18   1515  06/15/17   1945  06/14/17   2120   CULT  Light growth  Normal melany    Moderate growth  Pseudomonas aeruginosa  *  No growth after 5 days  No growth after 5 days  No MRSA isolated  Heavy growth Normal melany  Heavy growth Pseudomonas aeruginosa  *  No MRSA isolated     Most Recent TSH, T4 and A1c Labs:  Recent Labs   Lab Test  03/24/18   0515   08/18/13   0830   TSH   --    --   1.05   A1C  5.5   < >   --     < > = values in this interval not displayed.     Results for orders placed or performed during the hospital encounter of 05/17/18   XR Chest Port 1 View    Narrative    XR CHEST PORT 1 VW 5/17/2018 1:12 PM    COMPARISON: 5/17/2018    HISTORY: Central line placement.      Impression    IMPRESSION: Right internal jugular central venous catheter tip in the  high SVC. Enteric tube courses below the diaphragm, the tip not  included in this image. Endotracheal tube tip in the mid trachea. No  pneumothorax seen on either side.    TOAN LUQUE MD   XR Abdomen Port 1 View    Narrative    ABDOMEN PORTABLE ONE VIEW  5/17/2018 1:12 PM    HISTORY:  67-year-old with new esophagogastric tube.    COMPARISON: None.      Impression    IMPRESSION: Esophagogastric tube is visible passing below the level of  the diaphragm. The more proximal sidehole is at the gastroesophageal  junction. No obvious intraperitoneal air, though this assessment is  limited given supine position.  Air-filled loop of bowel in the left  abdomen is slightly dilated as it is thought to be small bowel, though  could be transverse colon. Mild amount of stool scattered throughout  portions of the colon.    BRETT MUNOZ MD   XR Chest Port 1 View    Narrative    CHEST ONE VIEW PORTABLE   5/18/2018 5:09 AM     HISTORY:  Endotracheal tube positioning.     COMPARISON: 5/17/2018.      Impression    IMPRESSION: Endotracheal tube, nasogastric tube, central venous  catheter and esophageal temperature probe are unchanged. Patchy  infiltrate at the right lung base is more prominent. Otherwise lungs  are clear and hyperinflated. Normal heart size and pulmonary  vascularity.    JAMES MILLER MD   XR Chest Port 1 View    Narrative    XR CHEST PORT 1 VW  5/21/2018 10:25 AM     HISTORY:  COPD exacerbation;     COMPARISON: Film dated 5/18/2018    FINDINGS:  The ET tube, temperature probe, and NG tube have been  removed. Right jugular venous catheter tip is projected over the  superior vena cava. Opacity over the left upper lung is probably due  to a skin fold. Patchy opacity right base slightly improved.      Impression    IMPRESSION: ET tube and temperature probe removed. Slightly improved  right basilar opacity.    ANUP MORENO MD

## 2018-05-26 NOTE — PLAN OF CARE
Problem: Patient Care Overview  Goal: Plan of Care/Patient Progress Review  Speech Language Therapy Discharge Summary    Reason for therapy discharge:    Discharged to acute rehabilitation facility.    Progress towards therapy goal(s). See goals on Care Plan in [facility] electronic health record for goal details.  Goals met    Therapy recommendation(s):    No further therapy is recommended.  Thin liquids/Dental soft diet unless she has her dentures and then she can upgrade to regular with SLP to check for diet tolerance with dentures in place.

## 2018-05-26 NOTE — PROGRESS NOTES
SW:  D:  Received discharge orders for patient.  Bed available at [facility] in [place] for today.  Patient will need portable oxygen for transport.  Explained to patient and  that there is a $75 charge for the portable oxygen tank and patient and family are in agreement.  Call placed to  Respiratory to arrange for the portable oxygen tank to be delivered by 10:30 today.  Patient's  will transport around 11:00 today.  Patient and  informed of the plan and in agreement to the plan.  Gave patient's nurse the phone number to call for nurse to nurse report.  Call placed to update [facility] and faxed the orders and the PAS.  Call placed to [facility] to cancel the bed.  Call placed to [facility] to cancel the bed.      PAS-RR    D: Per DHS regulation, IVONNE completed and submitted PAS-RR to [place] [employer] Direct Connect via the Senior LinkAge Line.  PAS-RR confirmation # is : [id number].    I: SW spoke with patient and  and they are aware a PAS-RR has been submitted.  IVONNE reviewed with patient and  that they may be contacted for a follow up appointment within 10 days of hospital discharge if their SNF stay is < 30 days.  Contact information for [facility] LinkAge Line was also provided.    A: Patient and  verbalized understanding.    P: Further questions may be directed to [facility] LinkAge Line at #[phone], option #4 for PAS-RR staff.

## 2018-05-26 NOTE — PLAN OF CARE
Problem: Patient Care Overview  Goal: Plan of Care/Patient Progress Review  Outcome: No Change  Pt stable over night, VSS, no c/o Chest pain or sob, Pt in SR without ectopy.  Pt is alert and oriented, and denies any hallucinations.  Pt slept well and no new issues.  Pt may d/c today.

## 2018-05-26 NOTE — PLAN OF CARE
Problem: Patient Care Overview  Goal: Plan of Care/Patient Progress Review  Outcome: No Change  A&O. Tele: SR, HR 80's. BP's elevated, all other VSS on 2 L O2. Denies CP and SOB. Up w/ SBA and walker. Mg 1.9 on high Mg protocol, 2 g Mg replacement. Will re-draw Magnesium in AM. Plan for d/c when bed available at TCU.

## 2018-05-26 NOTE — PLAN OF CARE
Problem: Patient Care Overview  Goal: Plan of Care/Patient Progress Review  .Occupational Therapy Discharge Summary    Reason for therapy discharge:    Discharged to transitional care facility.    Progress towards therapy goal(s). See [facility] on Care Plan [facility] for goal details.  Goals partially met.  Barriers to achieving goals:   discharge from facility.    Therapy recommendation(s)to advance safety and indep with mobility and ADLs prior to return home  Continued therapy is recommended.  Rationale/Recommendations:  to advance safety and indep with mobility and ADLs prior to return home.

## 2018-05-26 NOTE — PLAN OF CARE
Problem: Patient Care Overview  Goal: Plan of Care/Patient Progress Review  Outcome: Declining  Pt. Discharged to [facility] in [place] per order. O2 at 2L [place]. Transportation per spouse. Packet sent with pt.

## 2018-05-29 ENCOUNTER — NURSING HOME VISIT (OUTPATIENT)
Dept: GERIATRICS | Facility: CLINIC | Age: 68
End: 2018-05-29
Payer: COMMERCIAL

## 2018-05-29 VITALS
BODY MASS INDEX: 18.22 KG/M2 | HEART RATE: 84 BPM | DIASTOLIC BLOOD PRESSURE: 60 MMHG | WEIGHT: 99.6 LBS | OXYGEN SATURATION: 95 % | SYSTOLIC BLOOD PRESSURE: 115 MMHG | TEMPERATURE: 96.7 F | RESPIRATION RATE: 18 BRPM

## 2018-05-29 DIAGNOSIS — E46 MALNUTRITION, UNSPECIFIED TYPE (H): ICD-10-CM

## 2018-05-29 DIAGNOSIS — I50.22 CHRONIC SYSTOLIC CONGESTIVE HEART FAILURE (H): ICD-10-CM

## 2018-05-29 DIAGNOSIS — I25.5 ISCHEMIC CARDIOMYOPATHY: ICD-10-CM

## 2018-05-29 DIAGNOSIS — J15.1 PNEUMONIA DUE TO PSEUDOMONAS SPECIES, UNSPECIFIED LATERALITY, UNSPECIFIED PART OF LUNG (H): ICD-10-CM

## 2018-05-29 DIAGNOSIS — I10 HTN, GOAL BELOW 130/80: ICD-10-CM

## 2018-05-29 DIAGNOSIS — I73.9 PAD (PERIPHERAL ARTERY DISEASE) (H): ICD-10-CM

## 2018-05-29 DIAGNOSIS — J43.1 PANLOBULAR EMPHYSEMA (H): Primary | ICD-10-CM

## 2018-05-29 PROCEDURE — 99310 SBSQ NF CARE HIGH MDM 45: CPT | Performed by: NURSE PRACTITIONER

## 2018-05-29 NOTE — PROGRESS NOTES
[facility]  PRIMARY CARE PROVIDER AND CLINIC:  Deisy Carterianne [address]  / [facility] [address]  Chief Complaint   Patient presents with     Hospital F/U     Clinic Care Coordination - Initial     [facility] Medical Record Number:  [id number]    HPI:    Preeti Ford is a 67 year old  (1950),admitted to the [facility]  from [facility].  Hospital stay 5/17/18 through 5/26/18.  Admitted to this facility for  rehab, medical management and nursing care.  HPI information obtained from: facility chart records, facility staff, patient report and [facility] Epic chart review.  Current issues are:        1. Panlobular emphysema (H)    2. Pneumonia due to Pseudomonas species, unspecified laterality, unspecified part of lung (H)    3. Ischemic cardiomyopathy - EF 25% in 2015 but 55% in 3/2017 and 45-50% on 3/18    4. PAD (peripheral artery disease) (H) - moderate left common carotid stenosis, aortoiliac bypass, chronic left vertebral and right posterior cerebral stenoses    5. HTN, goal below 130/80    6. Chronic systolic congestive heart failure (H)    7. Malnutrition, unspecified type (H)      - met with Preeti today as she was sitting up in her w/c in [facility] area.  Alert and pleasant.  Using oxygen at 2L/min continuous.  Preeti states she did not use oxygen at home and goal is to get off this at some point.    See below for history of illness.  Reviewed medications today.    Blood pressures range from 's/60-70's.    Discussed how being in the cooler air helps her breath as having trouble in the heat.  Hotter in her room.    Did see Preeti participate in therapies as well this morning.    FV d/c note:  History of Present Illness      Preeti Ford is a 67 year old female with hx of known CAD s/p PCI, CHF, aortic stenosis, PAD s/p aortoiliac bypass, severe COPD, and prior CVA maintained on Plavix who was admitted to the ICU  service on 5/17/2018 for NSTEMI with cardiogenic shock due to in-stent restenosis of the circumflex, acute hypoxic respiratory failure due to COPD exacerbation, and Pseudomonas pneumonia, requiring intubation. She underwent angiogram 5/18 which showed in-stent restenosis of the mid circumflex s/p ROWDY. She was successfully extubated and weaned off pressors on 5/20. Post extubation, she did have encephalopathy/delirium related to her critical illness which gradually improved. She was transferred to the Hospitalist service on 5/23.      NSTEMI with cardiogenic shock due to in-stent restenosis, s/p ROWDY to mid-circumflex on 5/18  Chronic systolic CHF due to ischemic cardiomyopathy  She was seen in clinic one day prior to admission and sent home with prednisone and azithromycin for a COPD exacerbation. She then presented to the ED in acute respiratory distress with hypoxia to 81% on room air, and intubated on arrival. Troponin was mildly elevated to 0.566, but had worrisome EKG changes. TTE on admission showed new and decreased LVEF 25-30% with global LV dysfunction. She was taken to the cath lab on 5/18 where she was found to have mid-circumflex in-stent restenosis which was re-stented with a ROWDY. Ejection appeared to improve to at least 40-45% by the time of her angiogram on 5/18. She was also treated with norepinephrine for shock between 5/18-5/20.  - Continue PTA Plavix and atorvastatin  - She was started on aspirin 81 mg daily and metoprolol 12.5 mg BID during this hospital stay  - Cardiac rehab recommending TCU followed by OP CR at [facility]   - On BB as noted above. Not on ACEi/ARB due to reported history of intolerance  - Will likely need repeat TTE, defer to outpatient cardiology      Moderate aortic stenosis  Noted on admission TTE with mean gradient of 14.1 mmHg  - Stable, appears euvolemic      Acute hypoxic and hypercapnic respiratory failure due to acute COPD exacerbation and Pseudomonas pneumonia  Initiated  on azithromycin and prednisone by her PCP on 5/16, but developed progressive respiratory failure as noted above. CXR on arrival showed no acute infiltrates, but sputum grew Pseudomonas. She was intubated on arrival and successfully extubated to NIPPV on 5/20. She has improved with steroids, duonebs, and completed a course of antibiotics (treated with ceftazidime 5/17-5/22, levofloxacin 5/22-5/26).  - Remains on 2L at discharge, continue to wean  - Discharge with 12-day prednisone taper  - Duonebs PRN, PTA Symbicort, PTA Spiriva     Acute delirium, Resolved: Due to ICU delirium and possibly steroids. Back to baseline mental status.      Dysphagia due to recent intubation and encephalopathy:  - On DDL2 with thin liquids as per SLP  - SLP following, advance diet as per SLP       CODE STATUS/ADVANCE DIRECTIVES DISCUSSION:   CPR/Full code   Patient's living condition: lives with spouse    ALLERGIES:Crestor [rosuvastatin]; Hmg-coa-r inhibitors; Lisinopril; Optison [albumin human]; and Penicillins  PAST MEDICAL HISTORY:  has a past medical history of Arthritis; COPD (chronic obstructive pulmonary disease) (H); Coronary artery disease; CVA (cerebral vascular accident) (H) (8-); and Hypertension.  PAST SURGICAL HISTORY:  has a past surgical history that includes salpingo oophorectomy,r/l/florentin; appendectomy; and Bypass graft aortoiliac (N/A, 3/7/2017).  FAMILY HISTORY: family history includes CEREBROVASCULAR DISEASE in her father and mother; Genitourinary Problems in her brother.  SOCIAL HISTORY:  reports that she has been smoking Cigarettes.  She has been smoking about 0.50 packs per day. She has never used smokeless tobacco. She reports that she does not drink alcohol or use illicit drugs.    Post Discharge Medication Reconciliation Status: discharge medications reconciled, continue medications without change.  Current Outpatient Prescriptions   Medication Sig Dispense Refill     acetaminophen (TYLENOL) 325 MG tablet  Take 325 mg by mouth every 4 hours as needed for mild pain  100 tablet      albuterol (PROAIR HFA/PROVENTIL HFA/VENTOLIN HFA) 108 (90 BASE) MCG/ACT Inhaler Inhale 2 puffs into the lungs every 6 hours as needed for shortness of breath / dyspnea 1 Inhaler 11     aspirin 81 MG EC tablet Take 1 tablet (81 mg) by mouth daily       atorvastatin (LIPITOR) 10 MG tablet Take 1 tablet (10 mg) by mouth At Bedtime 90 tablet 3     budesonide-formoterol (SYMBICORT) 160-4.5 MCG/ACT Inhaler INHALE TWO PUFFS BY MOUTH TWICE A DAY 1 Inhaler 5     Calcium Carbonate-Vitamin D (OSCAL 500/200 D-3 PO) Take 1 tablet by mouth 2 times daily       clopidogrel (PLAVIX) 75 MG tablet Take 1 tablet (75 mg) by mouth daily 90 tablet 3     Coenzyme Q10 (COQ10 PO) Take 100 mg by mouth every 24 hours (at 16:00)       ipratropium - albuterol 0.5 mg/2.5 mg/3 mL (DUONEB) 0.5-2.5 (3) MG/3ML neb solution Take 1 vial (3 mLs) by nebulization every 4 hours as needed for shortness of breath / dyspnea or wheezing 270 mL 3     metoprolol tartrate (LOPRESSOR) 25 MG tablet Take 0.5 tablets (12.5 mg) by mouth 2 times daily 60 tablet      montelukast (SINGULAIR) 10 MG tablet Take 1 tablet (10 mg) by mouth At Bedtime 30 tablet 11     nicotine (NICODERM CQ) 7 MG/24HR 24 hr patch Place 1 patch onto the skin every 24 hours 30 patch 1     nitroGLYcerin (NITROSTAT) 0.4 MG sublingual tablet For chest pain place 1 tablet under the tongue every 5 minutes for 3 doses. If symptoms persist 5 minutes after 1st dose call 911. 25 tablet      Nutritional Supplements (BOOST) Take 1 Bottle by mouth 3 times daily (with meals) 90 each 0     pantoprazole (PROTONIX) 40 MG EC tablet Take 1 tablet (40 mg) by mouth every morning 30 tablet      polyethylene glycol (MIRALAX/GLYCOLAX) Packet Take 1 packet by mouth daily as needed for constipation       predniSONE (DELTASONE) 10 MG tablet Take 3 tabs daily for 4 days, then 2 tabs daily for 4 days, then 1 tab daily for 4 days, then stop 30 tablet  0     senna-docusate (SENOKOT-S;PERICOLACE) 8.6-50 MG per tablet Take 1 tablet by mouth 2 times daily as needed for constipation 100 tablet      tiotropium (SPIRIVA HANDIHALER) 18 MCG capsule Inhale 1 capsule (18 mcg) into the lungs daily 30 capsule 3     ACE/ARB/ARNI NOT PRESCRIBED, INTENTIONAL, Please choose reason not prescribed, below       order for DME Equipment being ordered: Nebulizer machine 1 Device 0     order for DME Equipment being ordered: Nebulizer 1 Device 0       ROS:  10 point ROS of systems including Constitutional, Eyes, Respiratory, Cardiovascular, Gastroenterology, Genitourinary, Integumentary, Muscularskeletal, Psychiatric were all negative except for pertinent positives noted in my HPI.    Exam:  /60  Pulse 84  Temp 96.7  F (35.9  C)  Resp 18  Wt 99 lb 9.6 oz (45.2 kg)  SpO2 95%  BMI 18.22 kg/m2  GENERAL APPEARANCE:  Alert, in no distress, oriented, thin, cooperative  EYES:  EOM, conjunctivae, lids, pupils and irises normal, PERRL  NECK:  No adenopathy,masses or thyromegaly, holds her head to the side often  RESP:  respiratory effort and palpation of chest normal, lungs clear to auscultation   CV:  Palpation and auscultation of heart done , regular rate and rhythm, no murmur, rub, or gallop, no edema  ABDOMEN:  normal bowel sounds, soft, nontender, no hepatosplenomegaly or other masses, no guarding or rebound  M/S:   Gait and station abnormal uses w/c for distances and able to propel, able to do own ADLs and feed self  SKIN:  bruising on arms in various areas.  skin is pink, dry and intact  PSYCH:  oriented X 3, normal insight, judgement and memory, affect and mood normal, can be forgetful    Lab/Diagnostic data:     CBC RESULTS:   Recent Labs   Lab Test  05/24/18   0515  05/23/18   0430   WBC  6.1  4.8   RBC  4.24  4.40   HGB  12.6  12.9   HCT  38.5  40.3   MCV  91  92   MCH  29.7  29.3   MCHC  32.7  32.0   RDW  12.5  12.6   PLT  176  179       Last Basic Metabolic Panel:  Recent  Labs   Lab Test  05/24/18   0515  05/23/18   0430   NA  142  141   POTASSIUM  4.1  4.1   CHLORIDE  102  101   CALDERON  8.8  8.8   CO2  37*  34*   BUN  19  17   CR  0.43*  0.48*   GLC  128*  120*       Liver Function Studies -   Recent Labs   Lab Test  05/17/18   0436  04/19/18   1244   PROTTOTAL  7.7  6.7*   ALBUMIN  3.6  3.1*   BILITOTAL  0.4  0.5   ALKPHOS  90  63   AST  54*  21   ALT  48  25       Lab Results   Component Value Date    A1C 5.5 03/24/2018    A1C 5.5 03/07/2017       ASSESSMENT/PLAN:  Panlobular emphysema (H)  Fighting the humidity today.  Staff have fans set up in her room to help.  Does have Duoneb every 4 hours prn, Prednisone taper daily until 6/7/18, Singular 10mg at HS, Spiriva  18mcg daily inhaler, and Symbicort inhaler twice a day.  Goal is to get off oxygen as able.      Pneumonia due to Pseudomonas species, unspecified laterality, unspecified part of lung (H)  Completed two ABX at the hospital.  Wean off oxygen as able.      Ischemic cardiomyopathy - EF 25% in 2015 but 55% in 3/2017 and 45-50% on 3/18  ASA 81mg daily and Lopressor 12.5mg twice a day.  Does take CoQ-10 100mg daily.  Continue with the PRN Nitrostat as well.      PAD (peripheral artery disease) (H) - moderate left common carotid stenosis, aortoiliac bypass, chronic left vertebral and right posterior cerebral stenoses  Remains on Lipitor 10mg at HS.      HTN, goal below 130/80  Is on Lopressor 12.5mg BID   ACE/ARB not intentionally prescribed.    Chronic systolic congestive heart failure (H)  No diuretics at this time.  Daily weights and ACE wraps PRN.    Malnutrition, unspecified type (H)  Has an order for BOOST 1 bottle TID.  Weights daily.  Parameters for weights but monitor if calorie weight or fluid.       Orders:  1.  CBC & BMP next Monday.  Dx: CAD    Total time spent with patient visit at the skilled nursing facility was 35 min including patient visit and review of past records. Greater than 50% of total time spent with  counseling and coordinating care due to medication review, discuss nursing home routine and her goals of care    Electronically signed by:  SPRING Barrera CNP

## 2018-05-29 NOTE — MR AVS SNAPSHOT
After Visit Summary   5/29/2018    Preeti Ford    MRN: [id number]           Patient Information     Date Of Birth          1950        Visit Information        Provider Department      5/29/2018 11:00 AM Kenya Santiago APRN CNP [facility]s Transitional [facility]        Today's Diagnoses     Panlobular emphysema (H)    -  1    Pneumonia due to Pseudomonas species, unspecified laterality, unspecified part of lung (H)        Ischemic cardiomyopathy - EF 25% in 2015 but 55% in 3/2017 and 45-50% on 3/18        PAD (peripheral artery disease) (H) - moderate left common carotid stenosis, aortoiliac bypass, chronic left vertebral and right posterior cerebral stenoses        HTN, goal below 130/80        Chronic systolic congestive heart failure (H)        Malnutrition, unspecified type (H)           Follow-ups after your visit        Your next 10 appointments already scheduled     Jun 07, 2018  1:00 PM CDT   Return Visit with Gael Hernandez MD   [facility] ([address] [address] [phone]              Who to contact     If you have questions or need follow up information about today's clinic visit or your schedule please contact G[facility] directly at [phone].  Normal or non-critical lab and imaging results will be communicated to you by MyChart, letter or phone within 4 business days after the clinic has received the results. If you do not hear from us within 7 days, please contact the clinic through MyChart or phone. If you have a critical or abnormal lab result, we will notify you by phone as soon as possible.  Submit refill requests through [employer] or call your pharmacy and they will forward the refill request to us. Please allow 3 business days for your refill to be completed.          Additional Information About Your Visit        MyChart Information     [employer]  "lets you send messages to your doctor, view your test results, renew your prescriptions, schedule appointments and more. To sign up, go to [email or web address] . Click on \"Log in\" on the left side of the screen, which will take you to the Welcome page. Then click on \"Sign up Now\" on the right side of the page.     You will be asked to enter the access code listed below, as well as some personal information. Please follow the directions to create your username and password.     Your access code is: [email or web address]  Expires: 2018 10:31 AM     Your access code will  in 90 days. If you need help or a new code, please call your [facility] clinic or [phone].        Care EveryWhere ID     This is your Care EveryWhere ID. This could be used by other organizations to access your [facility] medical records  [id number]        Your Vitals Were     Pulse Temperature Respirations Pulse Oximetry BMI (Body Mass Index)       84 96.7  F (35.9  C) 18 95% 18.22 kg/m2        Blood Pressure from Last 3 Encounters:   18 132/88   18 91/50   18 115/60    Weight from Last 3 Encounters:   18 105 lb 3.2 oz (47.7 kg)   18 103 lb 9.6 oz (47 kg)   18 99 lb 9.6 oz (45.2 kg)              Today, you had the following     No orders found for display       Primary Care Provider Office Phone # Fax #    Deisy SPRING [facility] [phone] [address] [place] [address]        Equal Access to Services     [facility] PADMINI : Hadii steve Farias, wacarlos albertoda luqadaha, qaybta kaalmada berenice, sagar myrick. So [facility] [phone].    ATENCIÓN: Si habla español, tiene a jeffries disposición servicios gratuitos de asistencia lingüística. Llame al [phone].    We comply with applicable federal civil rights laws and [place] laws. We do not discriminate on the basis of race, color, national origin, age, disability, sex, sexual orientation, or " gender identity.            Thank you!     Thank you for choosing G[facility]AT[facility]  for your care. Our goal is always to provide you with excellent care. Hearing back from our patients is one way we can continue to improve our services. Please take a few minutes to complete the written survey that you may receive in the mail after your visit with us. Thank you!             Your Updated Medication List - Protect others around you: Learn how to safely use, store and throw away your medicines at www.disposemymeds.org.          This list is accurate as of 5/29/18 11:59 PM.  Always use your most recent med list.                   Brand Name Dispense Instructions for use Diagnosis    ACE/ARB/ARNI NOT PRESCRIBED (INTENTIONAL)      Please choose reason not prescribed, below    Elevated blood pressure reading without diagnosis of hypertension, History of stroke       acetaminophen 325 MG tablet    TYLENOL    100 tablet    Take 325 mg by mouth every 4 hours as needed for mild pain        albuterol 108 (90 Base) MCG/ACT Inhaler    PROAIR HFA/PROVENTIL HFA/VENTOLIN HFA    1 Inhaler    Inhale 2 puffs into the lungs every 6 hours as needed for shortness of breath / dyspnea    Chronic bronchitis, unspecified chronic bronchitis type (H)       aspirin 81 MG EC tablet      Take 1 tablet (81 mg) by mouth daily    Coronary artery disease involving native coronary artery of native heart with angina pectoris (H)       atorvastatin 10 MG tablet    LIPITOR    90 tablet    Take 1 tablet (10 mg) by mouth At Bedtime    PAD (peripheral artery disease) (H)       BOOST     90 each    Take 1 Bottle by mouth 3 times daily (with meals)    History of stroke, Ischemic cardiomyopathy, Panlobular emphysema (H), Malaise, Loss of weight       budesonide-formoterol 160-4.5 MCG/ACT Inhaler    SYMBICORT    1 Inhaler    INHALE TWO PUFFS BY MOUTH TWICE A DAY    Chronic bronchitis, unspecified chronic bronchitis type (H)       clopidogrel 75 MG  tablet    PLAVIX    90 tablet    Take 1 tablet (75 mg) by mouth daily    Acute CVA (cerebrovascular accident) (H)       COQ10 PO      Take 100 mg by mouth every 24 hours (at 16:00)        ipratropium - albuterol 0.5 mg/2.5 mg/3 mL 0.5-2.5 (3) MG/3ML neb solution    DUONEB    270 mL    Take 1 vial (3 mLs) by nebulization every 4 hours as needed for shortness of breath / dyspnea or wheezing    COPD exacerbation (H)       metoprolol tartrate 25 MG tablet    LOPRESSOR    60 tablet    Take 0.5 tablets (12.5 mg) by mouth 2 times daily    Coronary artery disease involving native coronary artery of native heart with angina pectoris (H)       montelukast 10 MG tablet    SINGULAIR    30 tablet    Take 1 tablet (10 mg) by mouth At Bedtime    Chronic bronchitis, unspecified chronic bronchitis type (H)       nicotine 7 MG/24HR 24 hr patch    NICODERM CQ    30 patch    Place 1 patch onto the skin every 24 hours    Cigarette nicotine dependence without complication       nitroGLYcerin 0.4 MG sublingual tablet    NITROSTAT    25 tablet    For chest pain place 1 tablet under the tongue every 5 minutes for 3 doses. If symptoms persist 5 minutes after 1st dose call 911.    Coronary artery disease involving native coronary artery of native heart with angina pectoris (H)       order for DME     1 Device    Equipment being ordered: Nebulizer    Pulmonary emphysema, unspecified emphysema type (H)       order for DME     1 Device    Equipment being ordered: Nebulizer machine    Chronic bronchitis, unspecified chronic bronchitis type (H)       OSCAL 500/200 D-3 PO      Take 1 tablet by mouth 2 times daily        pantoprazole 40 MG EC tablet    PROTONIX    30 tablet    Take 1 tablet (40 mg) by mouth every morning    Gastroesophageal reflux disease, esophagitis presence not specified       polyethylene glycol Packet    MIRALAX/GLYCOLAX     Take 1 packet by mouth daily as needed for constipation        predniSONE 10 MG tablet    DELTASONE     30 tablet    Take 3 tabs daily for 4 days, then 2 tabs daily for 4 days, then 1 tab daily for 4 days, then stop    COPD exacerbation (H)       senna-docusate 8.6-50 MG per tablet    SENOKOT-S;PERICOLACE    100 tablet    Take 1 tablet by mouth 2 times daily as needed for constipation    Constipation, unspecified constipation type       tiotropium 18 MCG capsule    SPIRIVA HANDIHALER    30 capsule    Inhale 1 capsule (18 mcg) into the lungs daily    Chronic bronchitis, unspecified chronic bronchitis type (H)

## 2018-05-30 ENCOUNTER — TELEPHONE (OUTPATIENT)
Dept: CARDIOLOGY | Facility: CLINIC | Age: 68
End: 2018-05-30

## 2018-05-30 DIAGNOSIS — Z95.5 S/P CORONARY ARTERY STENT PLACEMENT: Primary | ICD-10-CM

## 2018-05-30 NOTE — TELEPHONE ENCOUNTER
Patient was evaluated by cardiology while inpatient for NSTEMI with cardiogenic shock due to in-stent restenosis, s/p ROWDY to mid-circumflex on 5/18.RN called [facility] TCU to confirm the follow up plan for patient with primary nurse that pt is taking aspirin and Plavix.  RN confirmed with primary RN that need to schedule f/u MAURICIO and Cardiology OV. Scheduling phone numbers provided and orders entered.  RN confirmed with primary nurse that  last progress note, MAR, active orders, and provider order sheet to appt. JACOB Farias RN.

## 2018-05-31 ENCOUNTER — NURSING HOME VISIT (OUTPATIENT)
Dept: GERIATRICS | Facility: CLINIC | Age: 68
End: 2018-05-31
Payer: COMMERCIAL

## 2018-05-31 VITALS
RESPIRATION RATE: 20 BRPM | TEMPERATURE: 96.8 F | WEIGHT: 103.6 LBS | OXYGEN SATURATION: 96 % | HEART RATE: 83 BPM | SYSTOLIC BLOOD PRESSURE: 91 MMHG | DIASTOLIC BLOOD PRESSURE: 50 MMHG | BODY MASS INDEX: 18.95 KG/M2

## 2018-05-31 DIAGNOSIS — J42 CHRONIC BRONCHITIS, UNSPECIFIED CHRONIC BRONCHITIS TYPE (H): Primary | ICD-10-CM

## 2018-05-31 DIAGNOSIS — I25.5 ISCHEMIC CARDIOMYOPATHY: ICD-10-CM

## 2018-05-31 DIAGNOSIS — I10 HTN, GOAL BELOW 130/80: ICD-10-CM

## 2018-05-31 PROCEDURE — 99309 SBSQ NF CARE MODERATE MDM 30: CPT | Performed by: NURSE PRACTITIONER

## 2018-05-31 RX ORDER — GUAIFENESIN 600 MG/1
600 TABLET, EXTENDED RELEASE ORAL 2 TIMES DAILY
COMMUNITY
End: 2020-12-02

## 2018-05-31 RX ORDER — ALBUTEROL SULFATE 0.83 MG/ML
1 SOLUTION RESPIRATORY (INHALATION) EVERY 4 HOURS PRN
COMMUNITY
End: 2020-06-15

## 2018-05-31 NOTE — PROGRESS NOTES
[facility]    Chief Complaint   Patient presents with     Nursing Home Acute       HPI:    Preeti Ford is a 67 year old  (1950), who is being seen today for an episodic care visit at [facility].    HPI information obtained from: facility chart records, facility staff, patient report and [facility] chart review. Today's concern is:    1. Chronic bronchitis, unspecified chronic bronchitis type (H)    2. HTN, goal below 130/80    3. Ischemic cardiomyopathy - EF 25% in 2015 but 55% in 3/2017 and 45-50% on 3/18      - staff brought up to NP that Preeti was having some issues breathing.  Humidity and heat are hard on her to start with but she tires easily.  Yesterday a couple of fans placed in her room to help with circulating the air which has helped.  Has a loose congested cough.  Staff wondering for a CXR and Mucinex.    Preeti is walking around with therapies.  Using oxygen at 2L/min.  Did not use oxygen at home and hopes to be done with it soon.    Preeti enjoys socializing with others on the unit.  Spouse comes to visit often.    REVIEW OF SYSTEMS:  4 point ROS including Respiratory, CV, GI and , other than that noted in the HPI,  is negative.  Denied chest pain.    BP 91/50  Pulse 83  Temp 96.8  F (36  C)  Resp 20  Wt 103 lb 9.6 oz (47 kg)  SpO2 96%  BMI 18.95 kg/m2  GENERAL APPEARANCE:  Alert, in no distress  Color is pink/pale.  Slender female.    Heart rate regular and strong.  Lungs are clear but has a congested loose cough with no production during the visit.    Blood pressures range from 100-140's/60-70's      ASSESSMENT/PLAN:  1. Chronic bronchitis, unspecified chronic bronchitis type (H)    2. HTN, goal below 130/80    3. Ischemic cardiomyopathy - EF 25% in 2015 but 55% in 3/2017 and 45-50% on 3/18      - since Preeti came with lung issues, would like to see how her lungs are in general and if pneumonia.  She predicts her Bronchitis.  Will order Mucinex to see if it  will help produce more phlegm.  Just finished a short burst of prednisone yesterday.  Staff found the albuterol neb alone gave her better relief than the Duoneb.  Will give order for PRN Albuterol neb.  Blood pressures good.  Daily vitals done.      1.  Albuterol neb 1 vial Q4H PRN.  Dx: SOB  2.  CXR - AP and lateral view.  Dx: productive cough, SOB  3.  Mucinex 600 mg po BID.  Dx: cough        Electronically signed by:  SPRING Barrera CNP

## 2018-05-31 NOTE — MR AVS SNAPSHOT
After Visit Summary   5/31/2018    Preeti Ford    MRN: [id number]           Patient Information     Date Of Birth          1950        Visit Information        Provider Department      5/31/2018 12:00 PM Kenya Santiago APRN CNP Geriatrics Transitional Care        Today's Diagnoses     Chronic bronchitis, unspecified chronic bronchitis type (H)    -  1    HTN, goal below 130/80        Ischemic cardiomyopathy - EF 25% in 2015 but 55% in 3/2017 and 45-50% on 3/18           Follow-ups after your visit        Your next 10 appointments already scheduled     Jul 10, 2018 11:15 AM CDT   Return Visit with SPRING Jerome CNP   [facility] ([facility])    [address] [place] [phone]            Sep 07, 2018 11:00 AM CDT   Ech Complete with PHECHR1   [facility] ([facility])    [address] [place] [address] [phone]           1. Please bring or wear a comfortable two-piece outfit. 2. You may eat, drink and take your normal medicines. 3. For any questions that cannot be answered, please contact the ordering physician            Sep 11, 2018 11:00 AM CDT   Return Visit with Gael Hernandez MD   [facility] ([facility])    [address] [place] [address] [phone]              Who to contact     If you have questions or need follow up information about today's clinic visit or your schedule please contact GERIATRICS TRANSITIONAL CARE directly at [phone].  Normal or non-critical lab and imaging results will be communicated to you by MyChart, letter or phone within 4 business days after the clinic has received the results. If you do not hear from us within 7 days, please contact the clinic through MyChart or phone. If you have a critical or abnormal lab result, we  "will notify you by phone as soon as possible.  Submit refill requests through The Box or call your pharmacy and they will forward the refill request to us. Please allow 3 business days for your refill to be completed.          Additional Information About Your Visit        Good Peoplehart Information     The Box lets you send messages to your doctor, view your test results, renew your prescriptions, schedule appointments and more. To sign up, go to [email or web address] . Click on \"Log in\" on the left side of the screen, which will take you to the Welcome page. Then click on \"Sign up Now\" on the right side of the page.     You will be asked to enter the access code listed below, as well as some personal information. Please follow the directions to create your username and password.     Your access code is: [email or web address]  Expires: 2018 10:31 AM     Your access code will  in 90 days. If you need help or a new code, please call your [place] clinic or [phone].        Care EveryWhere ID     This is your Care EveryWhere ID. This could be used by other organizations to access your [place] medical records  [id number]        Your Vitals Were     Pulse Temperature Respirations Pulse Oximetry BMI (Body Mass Index)       83 96.8  F (36  C) 20 96% 18.95 kg/m2        Blood Pressure from Last 3 Encounters:   18 126/56   18 132/88   18 91/50    Weight from Last 3 Encounters:   18 107 lb 12.8 oz (48.9 kg)   18 105 lb 3.2 oz (47.7 kg)   18 103 lb 9.6 oz (47 kg)              Today, you had the following     No orders found for display       Primary Care Provider Office Phone # Fax #    SPRING [facility] [phone] [address] [address] [facility] [address]        Equal Access to Services     LYDIA W[place] AH: Cindy Farias, peter ashby, sagar ivann ah. So [facility] " [phone].    ATENCIÓN: Si kerri bae, tiene a jeffries disposición servicios gratuitos de asistencia lingüística. Geovanna martinez [phone].    We comply with applicable federal civil rights laws and [place] laws. We do not discriminate on the basis of race, color, national origin, age, disability, sex, sexual orientation, or gender identity.            Thank you!     Thank you for choosing GERIATRICS TRANSITIONAL CARE  for your care. Our goal is always to provide you with excellent care. Hearing back from our patients is one way we can continue to improve our services. Please take a few minutes to complete the written survey that you may receive in the mail after your visit with us. Thank you!             Your Updated Medication List - Protect others around you: Learn how to safely use, store and throw away your medicines at www.disposemymeds.org.          This list is accurate as of 5/31/18 11:59 PM.  Always use your most recent med list.                   Brand Name Dispense Instructions for use Diagnosis    ACE/ARB/ARNI NOT PRESCRIBED (INTENTIONAL)      Please choose reason not prescribed, below    Elevated blood pressure reading without diagnosis of hypertension, History of stroke       acetaminophen 325 MG tablet    TYLENOL    100 tablet    Take 325 mg by mouth every 4 hours as needed for mild pain        * albuterol (2.5 MG/3ML) 0.083% neb solution      Take 1 vial by nebulization every 4 hours as needed for shortness of breath / dyspnea or wheezing        * albuterol 108 (90 Base) MCG/ACT Inhaler    PROAIR HFA/PROVENTIL HFA/VENTOLIN HFA    1 Inhaler    Inhale 2 puffs into the lungs every 6 hours as needed for shortness of breath / dyspnea    Chronic bronchitis, unspecified chronic bronchitis type (H)       aspirin 81 MG EC tablet      Take 1 tablet (81 mg) by mouth daily    Coronary artery disease involving native coronary artery of native heart with angina pectoris (H)       atorvastatin 10 MG tablet     LIPITOR    90 tablet    Take 1 tablet (10 mg) by mouth At Bedtime    PAD (peripheral artery disease) (H)       BOOST     90 each    Take 1 Bottle by mouth 3 times daily (with meals)    History of stroke, Ischemic cardiomyopathy, Panlobular emphysema (H), Malaise, Loss of weight       budesonide-formoterol 160-4.5 MCG/ACT Inhaler    SYMBICORT    1 Inhaler    INHALE TWO PUFFS BY MOUTH TWICE A DAY    Chronic bronchitis, unspecified chronic bronchitis type (H)       clopidogrel 75 MG tablet    PLAVIX    90 tablet    Take 1 tablet (75 mg) by mouth daily    Acute CVA (cerebrovascular accident) (H)       COQ10 PO      Take 100 mg by mouth every 24 hours (at 16:00)        guaiFENesin 600 MG 12 hr tablet    MUCINEX     Take 600 mg by mouth 2 times daily        ipratropium - albuterol 0.5 mg/2.5 mg/3 mL 0.5-2.5 (3) MG/3ML neb solution    DUONEB    270 mL    Take 1 vial (3 mLs) by nebulization every 4 hours as needed for shortness of breath / dyspnea or wheezing    COPD exacerbation (H)       montelukast 10 MG tablet    SINGULAIR    30 tablet    Take 1 tablet (10 mg) by mouth At Bedtime    Chronic bronchitis, unspecified chronic bronchitis type (H)       nicotine 7 MG/24HR 24 hr patch    NICODERM CQ    30 patch    Place 1 patch onto the skin every 24 hours    Cigarette nicotine dependence without complication       nitroGLYcerin 0.4 MG sublingual tablet    NITROSTAT    25 tablet    For chest pain place 1 tablet under the tongue every 5 minutes for 3 doses. If symptoms persist 5 minutes after 1st dose call 911.    Coronary artery disease involving native coronary artery of native heart with angina pectoris (H)       order for DME     1 Device    Equipment being ordered: Nebulizer    Pulmonary emphysema, unspecified emphysema type (H)       order for DME     1 Device    Equipment being ordered: Nebulizer machine    Chronic bronchitis, unspecified chronic bronchitis type (H)       OSCAL 500/200 D-3 PO      Take 1 tablet by mouth  2 times daily        pantoprazole 40 MG EC tablet    PROTONIX    30 tablet    Take 1 tablet (40 mg) by mouth every morning    Gastroesophageal reflux disease, esophagitis presence not specified       polyethylene glycol Packet    MIRALAX/GLYCOLAX     Take 1 packet by mouth daily as needed for constipation        predniSONE 10 MG tablet    DELTASONE    30 tablet    Take 3 tabs daily for 4 days, then 2 tabs daily for 4 days, then 1 tab daily for 4 days, then stop    COPD exacerbation (H)       senna-docusate 8.6-50 MG per tablet    SENOKOT-S;PERICOLACE    100 tablet    Take 1 tablet by mouth 2 times daily as needed for constipation    Constipation, unspecified constipation type       tiotropium 18 MCG capsule    SPIRIVA HANDIHALER    30 capsule    Inhale 1 capsule (18 mcg) into the lungs daily    Chronic bronchitis, unspecified chronic bronchitis type (H)       * Notice:  This list has 2 medication(s) that are the same as other medications prescribed for you. Read the directions carefully, and ask your doctor or other care provider to review them with you.

## 2018-06-04 ENCOUNTER — NURSING HOME VISIT (OUTPATIENT)
Dept: GERIATRICS | Facility: CLINIC | Age: 68
End: 2018-06-04
Payer: COMMERCIAL

## 2018-06-04 ENCOUNTER — HOSPITAL LABORATORY (OUTPATIENT)
Dept: NURSING HOME | Facility: OTHER | Age: 68
End: 2018-06-04

## 2018-06-04 VITALS
DIASTOLIC BLOOD PRESSURE: 88 MMHG | BODY MASS INDEX: 19.24 KG/M2 | RESPIRATION RATE: 20 BRPM | HEART RATE: 70 BPM | WEIGHT: 105.2 LBS | OXYGEN SATURATION: 98 % | TEMPERATURE: 97.2 F | SYSTOLIC BLOOD PRESSURE: 132 MMHG

## 2018-06-04 DIAGNOSIS — I25.5 ISCHEMIC CARDIOMYOPATHY: ICD-10-CM

## 2018-06-04 DIAGNOSIS — J42 CHRONIC BRONCHITIS, UNSPECIFIED CHRONIC BRONCHITIS TYPE (H): Primary | ICD-10-CM

## 2018-06-04 LAB
ANION GAP SERPL CALCULATED.3IONS-SCNC: 4 MMOL/L (ref 3–14)
BUN SERPL-MCNC: 18 MG/DL (ref 7–30)
CALCIUM SERPL-MCNC: 8.6 MG/DL (ref 8.5–10.1)
CHLORIDE SERPL-SCNC: 101 MMOL/L (ref 94–109)
CO2 SERPL-SCNC: 37 MMOL/L (ref 20–32)
CREAT SERPL-MCNC: 0.61 MG/DL (ref 0.52–1.04)
ERYTHROCYTE [DISTWIDTH] IN BLOOD BY AUTOMATED COUNT: 13 % (ref 10–15)
GFR SERPL CREATININE-BSD FRML MDRD: >90 ML/MIN/1.7M2
GLUCOSE SERPL-MCNC: 72 MG/DL (ref 70–99)
HCT VFR BLD AUTO: 40.1 % (ref 35–47)
HGB BLD-MCNC: 12.6 G/DL (ref 11.7–15.7)
MCH RBC QN AUTO: 29.6 PG (ref 26.5–33)
MCHC RBC AUTO-ENTMCNC: 31.4 G/DL (ref 31.5–36.5)
MCV RBC AUTO: 94 FL (ref 78–100)
PLATELET # BLD AUTO: 236 10E9/L (ref 150–450)
POTASSIUM SERPL-SCNC: 4.2 MMOL/L (ref 3.4–5.3)
RBC # BLD AUTO: 4.26 10E12/L (ref 3.8–5.2)
SODIUM SERPL-SCNC: 142 MMOL/L (ref 133–144)
WBC # BLD AUTO: 6.4 10E9/L (ref 4–11)

## 2018-06-04 PROCEDURE — 99309 SBSQ NF CARE MODERATE MDM 30: CPT | Performed by: NURSE PRACTITIONER

## 2018-06-04 NOTE — PROGRESS NOTES
[facility]    Chief Complaint   Patient presents with     Nursing Home Acute       HPI:    Preeti Ford is a 67 year old  (1950), who is being seen today for an episodic care visit at [facility].    HPI information obtained from: facility chart records, facility staff, patient report and [facility] chart review. Today's concern is:    1. Chronic bronchitis, unspecified chronic bronchitis type (H)    2. Ischemic cardiomyopathy - EF 25% in 2015 but 55% in 3/2017 and 45-50% on 3/18      - came to see Pretei today as she is wishing to be off her oxygen and moving around independently now.  Using a walker for ambulation.  No complaints of SOB or coughing.  Did have a course of Zpak for Bronchitis after a CXR done on 5/31/18.  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      REVIEW OF SYSTEMS:  4 point ROS including Respiratory, CV, GI and , other than that noted in the HPI,  is negative    /88  Pulse 70  Temp 97.2  F (36.2  C)  Resp 20  Wt 105 lb 3.2 oz (47.7 kg)  SpO2 98%  BMI 19.24 kg/m2  GENERAL APPEARANCE:  Alert, in no distress  Ambulating around the unit.  Gait is steady.  Socializes with most everyone, peers and staff.  No visible SOB seen.  Lungs are clear.  Did not use oxygen at home.    Heart rate regular and strong.  No edema.  Weight has been slowly going up but probably calorie weight.  Came in around 101 lbs.      Component      Latest Ref Rng & Units 6/4/2018   Sodium      133 - 144 mmol/L 142   Potassium      3.4 - 5.3 mmol/L 4.2   Chloride      94 - 109 mmol/L 101   Carbon Dioxide      20 - 32 mmol/L 37 (H)   Anion Gap      3 - 14 mmol/L 4   Glucose      70 - 99 mg/dL 72   Urea Nitrogen      7 - 30 mg/dL 18   Creatinine      0.52 - 1.04 mg/dL 0.61   GFR Estimate      >60 mL/min/1.7m2 >90   GFR Estimate If Black      >60 mL/min/1.7m2 >90   Calcium      8.5 - 10.1 mg/dL 8.6   WBC      4.0 - 11.0 10e9/L 6.4   RBC Count      3.8 -  5.2 10e12/L 4.26   Hemoglobin      11.7 - 15.7 g/dL 12.6   Hematocrit      35.0 - 47.0 % 40.1   MCV      78 - 100 fl 94   MCH      26.5 - 33.0 pg 29.6   MCHC      31.5 - 36.5 g/dL 31.4 (L)   RDW      10.0 - 15.0 % 13.0   Platelet Count      150 - 450 10e9/L 236     ASSESSMENT/PLAN:  1. Chronic bronchitis, unspecified chronic bronchitis type (H)    2. Ischemic cardiomyopathy - EF 25% in 2015 but 55% in 3/2017 and 45-50% on 3/18    -improving with strength, endurance and progressing with ambulation from w/c to walker with goal of cane.    Will discontinue oxygen as able.    Labs done today and overall pleased with results.  No further labs at this time.   Cardiology appointment later this week.    1.  Ok to wean on 02 as able.        Electronically signed by:  SPRING Barrera CNP

## 2018-06-04 NOTE — MR AVS SNAPSHOT
After Visit Summary   6/4/2018    Preeti Ford    MRN: [id number]           Patient Information     Date Of Birth          1950        Visit Information        Provider Department      6/4/2018 11:30 AM Kenya Santiago APRN CNP Geriatrics Transitional Care        Today's Diagnoses     Chronic bronchitis, unspecified chronic bronchitis type (H)    -  1    Ischemic cardiomyopathy - EF 25% in 2015 but 55% in 3/2017 and 45-50% on 3/18           Follow-ups after your visit        Your next 10 appointments already scheduled     Jul 10, 2018 11:15 AM CDT   Return Visit with SPRING Jerome CNP   [facility] ([facility])    [address] [place] [address] [phone]            Sep 07, 2018 11:00 AM CDT   Ech Complete with PHECHR1   [facility] ([facility])    [address] [place] [phone]           1. Please bring or wear a comfortable two-piece outfit. 2. You may eat, drink and take your normal medicines. 3. For any questions that cannot be answered, please contact the ordering physician            Sep 11, 2018 11:00 AM CDT   Return Visit with Gael Hernandez MD   [facility] ([facility])    [address] [place] [phone]              Who to contact     If you have questions or need follow up information about today's clinic visit or your schedule please contact GERIATRICS TRANSITIONAL CARE directly at [phone].  Normal or non-critical lab and imaging results will be communicated to you by MyChart, letter or phone within 4 business days after the clinic has received the results. If you do not hear from us within 7 days, please contact the clinic through MyChart or phone. If you have a critical or abnormal lab result, we will notify you by phone as soon  "as possible.  Submit refill requests through Si TV or call your pharmacy and they will forward the refill request to us. Please allow 3 business days for your refill to be completed.          Additional Information About Your Visit        BirdiharNCTech Information     Si TV lets you send messages to your doctor, view your test results, renew your prescriptions, schedule appointments and more. To sign up, go to [email or web address] . Click on \"Log in\" on the left side of the screen, which will take you to the Welcome page. Then click on \"Sign up Now\" on the right side of the page.     You will be asked to enter the access code listed below, as well as some personal information. Please follow the directions to create your username and password.     Your access code is: [email or web address]  Expires: 2018 10:31 AM     Your access code will  in 90 days. If you need help or a new code, please call your [place] clinic or [phone].        Care EveryWhere ID     This is your Care EveryWhere ID. This could be used by other organizations to access your [place] medical records  [id number]        Your Vitals Were     Pulse Temperature Respirations Pulse Oximetry BMI (Body Mass Index)       70 97.2  F (36.2  C) 20 98% 19.24 kg/m2        Blood Pressure from Last 3 Encounters:   18 126/56   18 132/88   18 91/50    Weight from Last 3 Encounters:   18 107 lb 12.8 oz (48.9 kg)   18 105 lb 3.2 oz (47.7 kg)   18 103 lb 9.6 oz (47 kg)              Today, you had the following     No orders found for display       Primary Care Provider Office Phone # Fax #    SPRING [facility] [phone] [address] [address] [place] [address]        Equal Access to Services     [facility] PADMINI : Cindy Farias, waaxda luqadaha, qaybta ambrosioalmolly ng, sagar hampton . So [facility] [phone].    ATENCIÓN: Si erika meyer a mervin " disposición servicios gratuitos de asistencia lingüística. Geovanna martinez [phone].    We comply with applicable federal civil rights laws and [place] laws. We do not discriminate on the basis of race, color, national origin, age, disability, sex, sexual orientation, or gender identity.            Thank you!     Thank you for choosing GERIATRICS TRANSITIONAL CARE  for your care. Our goal is always to provide you with excellent care. Hearing back from our patients is one way we can continue to improve our services. Please take a few minutes to complete the written survey that you may receive in the mail after your visit with us. Thank you!             Your Updated Medication List - Protect others around you: Learn how to safely use, store and throw away your medicines at www.disposemymeds.org.          This list is accurate as of 6/4/18 11:59 PM.  Always use your most recent med list.                   Brand Name Dispense Instructions for use Diagnosis    ACE/ARB/ARNI NOT PRESCRIBED (INTENTIONAL)      Please choose reason not prescribed, below    Elevated blood pressure reading without diagnosis of hypertension, History of stroke       acetaminophen 325 MG tablet    TYLENOL    100 tablet    Take 325 mg by mouth every 4 hours as needed for mild pain        * albuterol (2.5 MG/3ML) 0.083% neb solution      Take 1 vial by nebulization every 4 hours as needed for shortness of breath / dyspnea or wheezing        * albuterol 108 (90 Base) MCG/ACT Inhaler    PROAIR HFA/PROVENTIL HFA/VENTOLIN HFA    1 Inhaler    Inhale 2 puffs into the lungs every 6 hours as needed for shortness of breath / dyspnea    Chronic bronchitis, unspecified chronic bronchitis type (H)       aspirin 81 MG EC tablet      Take 1 tablet (81 mg) by mouth daily    Coronary artery disease involving native coronary artery of native heart with angina pectoris (H)       atorvastatin 10 MG tablet    LIPITOR    90 tablet    Take 1 tablet (10 mg) by mouth At  Bedtime    PAD (peripheral artery disease) (H)       BOOST     90 each    Take 1 Bottle by mouth 3 times daily (with meals)    History of stroke, Ischemic cardiomyopathy, Panlobular emphysema (H), Malaise, Loss of weight       budesonide-formoterol 160-4.5 MCG/ACT Inhaler    SYMBICORT    1 Inhaler    INHALE TWO PUFFS BY MOUTH TWICE A DAY    Chronic bronchitis, unspecified chronic bronchitis type (H)       clopidogrel 75 MG tablet    PLAVIX    90 tablet    Take 1 tablet (75 mg) by mouth daily    Acute CVA (cerebrovascular accident) (H)       COQ10 PO      Take 100 mg by mouth every 24 hours (at 16:00)        guaiFENesin 600 MG 12 hr tablet    MUCINEX     Take 600 mg by mouth 2 times daily        ipratropium - albuterol 0.5 mg/2.5 mg/3 mL 0.5-2.5 (3) MG/3ML neb solution    DUONEB    270 mL    Take 1 vial (3 mLs) by nebulization every 4 hours as needed for shortness of breath / dyspnea or wheezing    COPD exacerbation (H)       montelukast 10 MG tablet    SINGULAIR    30 tablet    Take 1 tablet (10 mg) by mouth At Bedtime    Chronic bronchitis, unspecified chronic bronchitis type (H)       nicotine 7 MG/24HR 24 hr patch    NICODERM CQ    30 patch    Place 1 patch onto the skin every 24 hours    Cigarette nicotine dependence without complication       nitroGLYcerin 0.4 MG sublingual tablet    NITROSTAT    25 tablet    For chest pain place 1 tablet under the tongue every 5 minutes for 3 doses. If symptoms persist 5 minutes after 1st dose call 911.    Coronary artery disease involving native coronary artery of native heart with angina pectoris (H)       order for DME     1 Device    Equipment being ordered: Nebulizer    Pulmonary emphysema, unspecified emphysema type (H)       order for DME     1 Device    Equipment being ordered: Nebulizer machine    Chronic bronchitis, unspecified chronic bronchitis type (H)       OSCAL 500/200 D-3 PO      Take 1 tablet by mouth 2 times daily        pantoprazole 40 MG EC tablet     PROTONIX    30 tablet    Take 1 tablet (40 mg) by mouth every morning    Gastroesophageal reflux disease, esophagitis presence not specified       polyethylene glycol Packet    MIRALAX/GLYCOLAX     Take 1 packet by mouth daily as needed for constipation        predniSONE 10 MG tablet    DELTASONE    30 tablet    Take 3 tabs daily for 4 days, then 2 tabs daily for 4 days, then 1 tab daily for 4 days, then stop    COPD exacerbation (H)       senna-docusate 8.6-50 MG per tablet    SENOKOT-S;PERICOLACE    100 tablet    Take 1 tablet by mouth 2 times daily as needed for constipation    Constipation, unspecified constipation type       tiotropium 18 MCG capsule    SPIRIVA HANDIHALER    30 capsule    Inhale 1 capsule (18 mcg) into the lungs daily    Chronic bronchitis, unspecified chronic bronchitis type (H)       * Notice:  This list has 2 medication(s) that are the same as other medications prescribed for you. Read the directions carefully, and ask your doctor or other care provider to review them with you.

## 2018-06-07 ENCOUNTER — OFFICE VISIT (OUTPATIENT)
Dept: CARDIOLOGY | Facility: CLINIC | Age: 68
End: 2018-06-07
Payer: COMMERCIAL

## 2018-06-07 VITALS
HEART RATE: 72 BPM | SYSTOLIC BLOOD PRESSURE: 126 MMHG | HEIGHT: 63 IN | DIASTOLIC BLOOD PRESSURE: 56 MMHG | BODY MASS INDEX: 19.1 KG/M2 | OXYGEN SATURATION: 97 % | WEIGHT: 107.8 LBS

## 2018-06-07 DIAGNOSIS — I63.9 ACUTE CVA (CEREBROVASCULAR ACCIDENT) (H): Primary | ICD-10-CM

## 2018-06-07 DIAGNOSIS — Z95.5 S/P CORONARY ARTERY STENT PLACEMENT: ICD-10-CM

## 2018-06-07 DIAGNOSIS — I25.119 CORONARY ARTERY DISEASE INVOLVING NATIVE CORONARY ARTERY OF NATIVE HEART WITH ANGINA PECTORIS (H): ICD-10-CM

## 2018-06-07 PROCEDURE — 99214 OFFICE O/P EST MOD 30 MIN: CPT | Performed by: INTERNAL MEDICINE

## 2018-06-07 RX ORDER — METOPROLOL TARTRATE 25 MG/1
25 TABLET, FILM COATED ORAL 2 TIMES DAILY
Qty: 180 TABLET | Refills: 3 | Status: SHIPPED | OUTPATIENT
Start: 2018-06-07 | End: 2019-07-05

## 2018-06-07 NOTE — PROGRESS NOTES
"6/7/18 Follow up post angiogram \"Successful deployment of a drug eluting stent   -2.25 x 12 mm  Synergy drug eluting stent was successfully deployed  across the mid circumflex artery ( in the prior ISR segment)\"      NSTEMI with cardiogenic shock due to in-stent restenosis, s/p ROWDY to mid-circumflex on 5/18  Acute hypoxic and hypercapnic respiratory failure due to acute COPD exacerbation and Pseudomonas pneumonia, improved  \"Former smoker\".  Ischemic cardiomyopathy, improving  Hypertension  Encephalopathy/delirium, resolved  New onset dysphagia  Moderate AoV stenosis    HPI and Plan:   See dictation  I recommend continuing current treatment plans in the chart.        No orders of the defined types were placed in this encounter.    No orders of the defined types were placed in this encounter.    There are no discontinued medications.      Encounter Diagnosis   Name Primary?     S/P coronary artery stent placement        CURRENT MEDICATIONS:  Current Outpatient Prescriptions   Medication Sig Dispense Refill     ACE/ARB/ARNI NOT PRESCRIBED, INTENTIONAL, Please choose reason not prescribed, below       acetaminophen (TYLENOL) 325 MG tablet Take 325 mg by mouth every 4 hours as needed for mild pain  100 tablet      albuterol (2.5 MG/3ML) 0.083% neb solution Take 1 vial by nebulization every 4 hours as needed for shortness of breath / dyspnea or wheezing       albuterol (PROAIR HFA/PROVENTIL HFA/VENTOLIN HFA) 108 (90 BASE) MCG/ACT Inhaler Inhale 2 puffs into the lungs every 6 hours as needed for shortness of breath / dyspnea 1 Inhaler 11     aspirin 81 MG EC tablet Take 1 tablet (81 mg) by mouth daily       atorvastatin (LIPITOR) 10 MG tablet Take 1 tablet (10 mg) by mouth At Bedtime 90 tablet 3     budesonide-formoterol (SYMBICORT) 160-4.5 MCG/ACT Inhaler INHALE TWO PUFFS BY MOUTH TWICE A DAY 1 Inhaler 5     Calcium Carbonate-Vitamin D (OSCAL 500/200 D-3 PO) Take 1 tablet by mouth 2 times daily       clopidogrel (PLAVIX) " 75 MG tablet Take 1 tablet (75 mg) by mouth daily 90 tablet 3     Coenzyme Q10 (COQ10 PO) Take 100 mg by mouth every 24 hours (at 16:00)       ipratropium - albuterol 0.5 mg/2.5 mg/3 mL (DUONEB) 0.5-2.5 (3) MG/3ML neb solution Take 1 vial (3 mLs) by nebulization every 4 hours as needed for shortness of breath / dyspnea or wheezing 270 mL 3     metoprolol tartrate (LOPRESSOR) 25 MG tablet Take 0.5 tablets (12.5 mg) by mouth 2 times daily 60 tablet      montelukast (SINGULAIR) 10 MG tablet Take 1 tablet (10 mg) by mouth At Bedtime 30 tablet 11     nicotine (NICODERM CQ) 7 MG/24HR 24 hr patch Place 1 patch onto the skin every 24 hours 30 patch 1     Nutritional Supplements (BOOST) Take 1 Bottle by mouth 3 times daily (with meals) 90 each 0     pantoprazole (PROTONIX) 40 MG EC tablet Take 1 tablet (40 mg) by mouth every morning 30 tablet      polyethylene glycol (MIRALAX/GLYCOLAX) Packet Take 1 packet by mouth daily as needed for constipation       predniSONE (DELTASONE) 10 MG tablet Take 3 tabs daily for 4 days, then 2 tabs daily for 4 days, then 1 tab daily for 4 days, then stop 30 tablet 0     senna-docusate (SENOKOT-S;PERICOLACE) 8.6-50 MG per tablet Take 1 tablet by mouth 2 times daily as needed for constipation 100 tablet      tiotropium (SPIRIVA HANDIHALER) 18 MCG capsule Inhale 1 capsule (18 mcg) into the lungs daily 30 capsule 3     guaiFENesin (MUCINEX) 600 MG 12 hr tablet Take 600 mg by mouth 2 times daily       nitroGLYcerin (NITROSTAT) 0.4 MG sublingual tablet For chest pain place 1 tablet under the tongue every 5 minutes for 3 doses. If symptoms persist 5 minutes after 1st dose call 911. (Patient not taking: Reported on 6/7/2018) 25 tablet      order for DME Equipment being ordered: Nebulizer machine 1 Device 0     order for DME Equipment being ordered: Nebulizer 1 Device 0       ALLERGIES     Allergies   Allergen Reactions     Crestor [Rosuvastatin] Other (See Comments)     dizziness     Hmg-Coa-R  Inhibitors Other (See Comments)     Muscle/joint aching     Lisinopril Cough     Optison [Albumin Human] Other (See Comments)     Pt was flush and very dizzy.  Also had a BP drop     Penicillins Rash       PAST MEDICAL HISTORY:  Past Medical History:   Diagnosis Date     Arthritis      COPD (chronic obstructive pulmonary disease) (H)      Coronary artery disease      CVA (cerebral vascular accident) (H) 8-     Hypertension        PAST SURGICAL HISTORY:  Past Surgical History:   Procedure Laterality Date     APPENDECTOMY       BYPASS GRAFT AORTOILIAC N/A 3/7/2017    Procedure: BYPASS GRAFT AORTOILIAC;  Surgeon: Marcos Pratt MD;  Location: [facility] OR     SALPINGO OOPHORECTOMY,R/L/DELORES      Salpingo Oophorectomy, RT/LT/DELORES       FAMILY HISTORY:  Family History   Problem Relation Age of Onset     CEREBROVASCULAR DISEASE Mother      CEREBROVASCULAR DISEASE Father      Genitourinary Problems Brother      Dialysis       SOCIAL HISTORY:  Social History     Social History     Marital status:      Spouse name: N/A     Number of children: N/A     Years of education: N/A     Social History Main Topics     Smoking status: Current Every Day Smoker     Packs/day: 0.50     Types: Cigarettes     Smokeless tobacco: Never Used      Comment: patient states she is working on it      Alcohol use No     Drug use: No     Sexual activity: Not Currently     Partners: Male     Other Topics Concern      Service No     Blood Transfusions No     Caffeine Concern No     Occupational Exposure No     Hobby Hazards No     Sleep Concern Yes     Trouble breathing at night due to COPD     Stress Concern No     Weight Concern No     Special Diet No     Back Care No     Exercise No     Bike Helmet No     Seat Belt Yes     Self-Exams Yes     Parent/Sibling W/ Cabg, Mi Or Angioplasty Before 65f 55m? No     Social History Narrative         Review of Systems:  Skin:  Negative       Eyes:  Positive for glasses    ENT:  Negative     "  Respiratory:  Positive for shortness of breath;cough COPD  on inhalers   Cardiovascular:  Negative for;palpitations;chest pain;lightheadedness;dizziness Positive for;edema    Gastroenterology: Negative      Genitourinary:  Negative      Musculoskeletal:  Negative      Neurologic:  Negative      Psychiatric:  Negative      Heme/Lymph/Imm:  Positive for easy bruising    Endocrine:  Negative        Physical Exam:  Vitals: /56 (BP Location: Right arm, Patient Position: Fowlers, Cuff Size: Adult Regular)  Pulse 72  Ht 1.602 m (5' 3.07\")  Wt 48.9 kg (107 lb 12.8 oz)  SpO2 97%  BMI 19.05 kg/m2    Constitutional:  cooperative thin;cachectic;frail      Skin:  warm and dry to the touch          Head:  normocephalic        Eyes:  pupils equal and round;conjunctivae and lids unremarkable;EOMS intact        Lymph:      ENT:  no pallor or cyanosis poor dentition      Neck:  JVP normal   torticollis    Respiratory:  clear to auscultation diminished breath sounds bilaterally;prolonged expiration       Cardiac: regular rhythm;normal S1 and S2       systolic ejection murmur;grade 1        pulses full and equal pulses below the femoral arteries are diminished                                 right and left radial sites without hematomas or bruits    GI:  abdomen soft;no masses;non-tender        Extremities and Muscular Skeletal:  no edema         keegan    Neurological:  affect appropriate        Psych:  oriented to time, person and place        Recent Lab Results:  LIPID RESULTS:  Lab Results   Component Value Date    CHOL 165 05/18/2018    HDL 53 05/18/2018    LDL 91 05/18/2018    TRIG 107 05/18/2018    CHOLHDLRATIO 3.6 10/09/2015       LIVER ENZYME RESULTS:  Lab Results   Component Value Date    AST 54 (H) 05/17/2018    ALT 48 05/17/2018       CBC RESULTS:  Lab Results   Component Value Date    WBC 6.4 06/04/2018    RBC 4.26 06/04/2018    HGB 12.6 06/04/2018    HCT 40.1 06/04/2018    MCV 94 06/04/2018    MCH 29.6 " 06/04/2018    MCHC 31.4 (L) 06/04/2018    RDW 13.0 06/04/2018     06/04/2018       BMP RESULTS:  Lab Results   Component Value Date     06/04/2018    POTASSIUM 4.2 06/04/2018    CHLORIDE 101 06/04/2018    CO2 37 (H) 06/04/2018    ANIONGAP 4 06/04/2018    GLC 72 06/04/2018    BUN 18 06/04/2018    CR 0.61 06/04/2018    GFRESTIMATED >90 06/04/2018    GFRESTBLACK >90 06/04/2018    CALDERON 8.6 06/04/2018        A1C RESULTS:  Lab Results   Component Value Date    A1C 5.5 03/24/2018       INR RESULTS:  Lab Results   Component Value Date    INR 0.91 05/17/2018    INR 0.95 04/19/2018           CC  No referring provider defined for this encounter.

## 2018-06-07 NOTE — MR AVS SNAPSHOT
"              After Visit Summary   6/7/2018    Preeti Ford    MRN: [id number]           Patient Information     Date Of Birth          1950        Visit Information        Provider Department      6/7/2018 1:00 PM Gael Hernandez MD [facility]        Today's Diagnoses     Acute CVA (cerebrovascular accident) - right paramedian superior vermis and left cerebellar hemisphere    -  1    S/P coronary artery stent placement        Coronary artery disease involving native coronary artery of native heart with angina pectoris (H)           Follow-ups after your visit        Additional Services     Follow-Up with Cardiac Advanced Practice Provider                 Future tests that were ordered for you today     Open Future Orders        Priority Expected Expires Ordered    Follow-Up with Cardiac Advanced Practice Provider Routine 7/7/2018 6/7/2019 6/7/2018            Who to contact     If you have questions or need follow up information about today's clinic visit or your schedule please contact [facility] directly at [phone].  Normal or non-critical lab and imaging results will be communicated to you by [employer], letter or phone within 4 business days after the clinic has received the results. If you do not hear from us within 7 days, please contact the clinic through [employer] or phone. If you have a critical or abnormal lab result, we will notify you by phone as soon as possible.  Submit refill requests through [employer] or call your pharmacy and they will forward the refill request to us. Please allow 3 business days for your refill to be completed.          Additional Information About Your Visit        MyChart Information     [employer] lets you send messages to your doctor, view your test results, renew your prescriptions, schedule appointments and more. To sign up, go to [email or web address] . Click on \"Log in\" on the " "left side of the screen, which will take you to the Welcome page. Then click on \"Sign up Now\" on the right side of the page.     You will be asked to enter the access code listed below, as well as some personal information. Please follow the directions to create your username and password.     Your access code is: [email or web address]  Expires: 2018 10:31 AM     Your access code will  in 90 days. If you need help or a new code, please call your [place] clinic or [phone].        Care EveryWhere ID     This is your Care EveryWhere ID. This could be used by other organizations to access your [place] medical records  [id number]        Your Vitals Were     Pulse Height Pulse Oximetry BMI (Body Mass Index)          72 1.602 m (5' 3.07\") 97% 19.05 kg/m2         Blood Pressure from Last 3 Encounters:   18 126/56   18 132/88   18 91/50    Weight from Last 3 Encounters:   18 48.9 kg (107 lb 12.8 oz)   18 47.7 kg (105 lb 3.2 oz)   18 47 kg (103 lb 9.6 oz)              We Performed the Following     Follow-Up with Cardiologist          Today's Medication Changes          These changes are accurate as of 18  1:48 PM.  If you have any questions, ask your nurse or doctor.               These medicines have changed or have updated prescriptions.        Dose/Directions    metoprolol tartrate 25 MG tablet   Commonly known as:  LOPRESSOR   This may have changed:  how much to take   Used for:  Coronary artery disease involving native coronary artery of native heart with angina pectoris (H)        Dose:  25 mg   Take 1 tablet (25 mg) by mouth 2 times daily   Quantity:  180 tablet   Refills:  3            Where to get your medicines      These medications were sent to [facility] - [address]  [address] [address]     Phone:  [phone]     metoprolol tartrate 25 MG tablet                Primary Care Provider Office Phone # Fax " #    Deisyradha Carter, APRN [phone]       [address] [facility] [address]        Equal Access to Services     LYDIA WASHINGTON : Hadii steve miranda mireyao Sodollyali, waaxda luqadaha, qaybta kaalmada adeegyada, sagar vaughan romepatricia neal memo . So [facility] [phone].    ATENCIÓN: Si habla español, tiene a jeffries disposición servicios gratuitos de asistencia lingüística. Llame al [phone].    We comply with applicable federal civil rights laws and [place] laws. We do not discriminate on the basis of race, color, national origin, age, disability, sex, sexual orientation, or gender identity.            Thank you!     Thank you for choosing [facility]  for your care. Our goal is always to provide you with excellent care. Hearing back from our patients is one way we can continue to improve our services. Please take a few minutes to complete the written survey that you may receive in the mail after your visit with us. Thank you!             Your Updated Medication List - Protect others around you: Learn how to safely use, store and throw away your medicines at www.disposemymeds.org.          This list is accurate as of 6/7/18  1:48 PM.  Always use your most recent med list.                   Brand Name Dispense Instructions for use Diagnosis    ACE/ARB/ARNI NOT PRESCRIBED (INTENTIONAL)      Please choose reason not prescribed, below    Elevated blood pressure reading without diagnosis of hypertension, History of stroke       acetaminophen 325 MG tablet    TYLENOL    100 tablet    Take 325 mg by mouth every 4 hours as needed for mild pain        * albuterol (2.5 MG/3ML) 0.083% neb solution      Take 1 vial by nebulization every 4 hours as needed for shortness of breath / dyspnea or wheezing        * albuterol 108 (90 Base) MCG/ACT Inhaler    PROAIR HFA/PROVENTIL HFA/VENTOLIN HFA    1 Inhaler    Inhale 2 puffs into the lungs every 6 hours as  needed for shortness of breath / dyspnea    Chronic bronchitis, unspecified chronic bronchitis type (H)       aspirin 81 MG EC tablet      Take 1 tablet (81 mg) by mouth daily    Coronary artery disease involving native coronary artery of native heart with angina pectoris (H)       atorvastatin 10 MG tablet    LIPITOR    90 tablet    Take 1 tablet (10 mg) by mouth At Bedtime    PAD (peripheral artery disease) (H)       BOOST     90 each    Take 1 Bottle by mouth 3 times daily (with meals)    History of stroke, Ischemic cardiomyopathy, Panlobular emphysema (H), Malaise, Loss of weight       budesonide-formoterol 160-4.5 MCG/ACT Inhaler    SYMBICORT    1 Inhaler    INHALE TWO PUFFS BY MOUTH TWICE A DAY    Chronic bronchitis, unspecified chronic bronchitis type (H)       clopidogrel 75 MG tablet    PLAVIX    90 tablet    Take 1 tablet (75 mg) by mouth daily    Acute CVA (cerebrovascular accident) (H)       COQ10 PO      Take 100 mg by mouth every 24 hours (at 16:00)        guaiFENesin 600 MG 12 hr tablet    MUCINEX     Take 600 mg by mouth 2 times daily        ipratropium - albuterol 0.5 mg/2.5 mg/3 mL 0.5-2.5 (3) MG/3ML neb solution    DUONEB    270 mL    Take 1 vial (3 mLs) by nebulization every 4 hours as needed for shortness of breath / dyspnea or wheezing    COPD exacerbation (H)       metoprolol tartrate 25 MG tablet    LOPRESSOR    180 tablet    Take 1 tablet (25 mg) by mouth 2 times daily    Coronary artery disease involving native coronary artery of native heart with angina pectoris (H)       montelukast 10 MG tablet    SINGULAIR    30 tablet    Take 1 tablet (10 mg) by mouth At Bedtime    Chronic bronchitis, unspecified chronic bronchitis type (H)       nicotine 7 MG/24HR 24 hr patch    NICODERM CQ    30 patch    Place 1 patch onto the skin every 24 hours    Cigarette nicotine dependence without complication       nitroGLYcerin 0.4 MG sublingual tablet    NITROSTAT    25 tablet    For chest pain place 1  tablet under the tongue every 5 minutes for 3 doses. If symptoms persist 5 minutes after 1st dose call 911.    Coronary artery disease involving native coronary artery of native heart with angina pectoris (H)       order for DME     1 Device    Equipment being ordered: Nebulizer    Pulmonary emphysema, unspecified emphysema type (H)       order for DME     1 Device    Equipment being ordered: Nebulizer machine    Chronic bronchitis, unspecified chronic bronchitis type (H)       OSCAL 500/200 D-3 PO      Take 1 tablet by mouth 2 times daily        pantoprazole 40 MG EC tablet    PROTONIX    30 tablet    Take 1 tablet (40 mg) by mouth every morning    Gastroesophageal reflux disease, esophagitis presence not specified       polyethylene glycol Packet    MIRALAX/GLYCOLAX     Take 1 packet by mouth daily as needed for constipation        predniSONE 10 MG tablet    DELTASONE    30 tablet    Take 3 tabs daily for 4 days, then 2 tabs daily for 4 days, then 1 tab daily for 4 days, then stop    COPD exacerbation (H)       senna-docusate 8.6-50 MG per tablet    SENOKOT-S;PERICOLACE    100 tablet    Take 1 tablet by mouth 2 times daily as needed for constipation    Constipation, unspecified constipation type       tiotropium 18 MCG capsule    SPIRIVA HANDIHALER    30 capsule    Inhale 1 capsule (18 mcg) into the lungs daily    Chronic bronchitis, unspecified chronic bronchitis type (H)       * Notice:  This list has 2 medication(s) that are the same as other medications prescribed for you. Read the directions carefully, and ask your doctor or other care provider to review them with you.

## 2018-06-07 NOTE — LETTER
6/7/2018      Deisy Carter, APRN CNP  [address] [facility] [address]      RE: Preeti Ford       Dear Colleague,    I had the pleasure of seeing Preeti Ford in the [facility].    Service Date: 06/07/2018      HISTORY OF PRESENT ILLNESS:  I got together with Preeti Ford today.  She is 67.  I have not met her before.  She was in [facility] at the end of 05/2018 with respiratory failure requiring ventilatory support due to severe COPD, suspected pneumonia and an underlying component of an ischemic cardiomyopathy with ejection fractions by echo of 30%.  She had periods of delirium that resolved.  She gradually got off the ventilator and has been able to work herself back to the point that she is now in a transitional care rehab facility.      Diagnoses on discharge included:   1.  Severe life-altering COPD.   2.  Respiratory failure requiring intubation.   3.  Coronary artery disease with cardiogenic shock and in-stent restenosis.  The patient had a repeat mid left circumflex stent placed.   4.  Class IV left ventricular failure.   5.  Suspected moderate aortic stenosis by echo associated with a decreased ejection fraction.   6.  Chronic tobacco abuse with COPD.      She says she is eating adequately.  She is up and around walking.  She uses a cane but has good enough strength not to need it all the time.  She was able to get up on the exam table relatively smoothly and effortlessly.  She denies shortness of breath on exertion with minimal acts of daily living.  She has not had chest pain, dizziness or falling.      Her echocardiogram dated 05/17, a month ago showed a nondilated LV.  EF was 25%-30%.  There was inferolateral hypo-akinesia.  Mitral valve showed trace MR.  There was mild TR.  RVSP was 25 mmHg plus right atrial pressure.  The rhythm was sinus.      PHYSICAL EXAMINATION:   GENERAL:  She presents today bright, alert, oriented and  cooperative.  She is slender, somewhat on the almost cachectic side.  She weighs 107 pounds.   VITAL SIGNS:  Blood pressure 120/60, heart rate 72 beats a minute.   SKIN:  She has rather diffuse bruising of her forearms of each extremity and the legs characteristic of senile skin changes.    NECK:  She did not have neck vein distention at 30 degrees.  Carotids were equal, no bruits heard.   HEART:  Regular with 1/6 apical murmur, no S3.  Heart tones were diminished.   LUNGS:  Showed no rales.  She had prolonged expiratory phase and trace wheezes with forced expiration.   ABDOMEN:  Flat, scaphoid, nontender without pain or mass.   EXTREMITIES:  Free of edema.  Pedal pulses diminished.      CURRENT MEDICATIONS:  Preeti's current program is then:   1.  Aspirin 81 mg a day.   2.  Plavix 75 mg a day.  She got a left circumflex stent and accordingly should take Plavix for 1 year.   3.  Hyperlipidemia, on atorvastatin 10 mg with an LDL of 91.  This is acceptable.   4.  Albuterol inhaler p.r.n.   5.  Symbicort inhaler daily.   6.  Calcium and vitamin D daily.   7.  Coenzyme Q daily.   8.  Metoprolol tartrate 12.5 mg b.i.d.   9.  Singulair 10 mg a day.   10.  Nicotine patch 7 mg daily.   11.  Spiriva inhaler daily.     12.  Mucinex daily.   13.  Nitro p.r.n. sublingually, but she has not had to use it.      Today I increased her metoprolol to 25 mg b.i.d. to further improve beta blockade protection and treating her cardiomyopathy.  I believe her lungs have only minimal airway obstruction at this point.  I do not believe bronchospasm will be a problem.  Hypotension might be, so we need to listen for any signs of dizziness or, even worse, syncope.      I think her main challenge is to regain strength, good nutrition, adequate rehab and adequate rest.  I do not believe she can afford ever to smoke another cigarette, and I stressed this many times.      I increased metoprolol and made no other changes.  We will see her in 2-4  weeks and see how she is doing with her blood pressures.  She promises never to smoke again.  Time will tell.      IMPRESSION:   1.  Severe ischemic cardiomyopathy, not currently a major issue.   2.  Severe COPD.   3.  Right heart pressures were upper limits of normal.   4.  Some malnourish, cachectic-looking and weak.   5.  Chronic tobacco abuse, but she promises she is not smoking.   6.  Peripheral vascular artery disease with diminished pedal pulses.      PLAN:  Rehab, nutrition and exercise, gradually increase the heart failure program to protect her LV dysfunction as best we can.  Hopefully improvement will follow.      Prognosis is guarded.  She is very frail but seems determined.      Over 35 minutes was spent doing the consult, reviewing multiple records, consults, discharge summaries, imaging, x-ray, echo, laboratory work, laboratory data, medications, medication side effects, indications, etc.      cc:   Deisy Carter, [address], [address]    [place]  [address]         ZIGGY MCCRAY MD, [facility]             D: 2018   T: 2018   MT: PACO      Name:     TARIK HERNANDEZ   MRN:      [id number]        Account:      [id number]   :      1950           Service Date: 2018      Document: [id number]         Outpatient Encounter Prescriptions as of 2018   Medication Sig Dispense Refill     ACE/ARB/ARNI NOT PRESCRIBED, INTENTIONAL, Please choose reason not prescribed, below       acetaminophen (TYLENOL) 325 MG tablet Take 325 mg by mouth every 4 hours as needed for mild pain  100 tablet      albuterol (2.5 MG/3ML) 0.083% neb solution Take 1 vial by nebulization every 4 hours as needed for shortness of breath / dyspnea or wheezing       albuterol (PROAIR HFA/PROVENTIL HFA/VENTOLIN HFA) 108 (90 BASE) MCG/ACT Inhaler Inhale 2 puffs into the lungs every 6 hours as needed for shortness of breath / dyspnea 1 Inhaler 11     aspirin 81 MG EC  tablet Take 1 tablet (81 mg) by mouth daily       atorvastatin (LIPITOR) 10 MG tablet Take 1 tablet (10 mg) by mouth At Bedtime 90 tablet 3     budesonide-formoterol (SYMBICORT) 160-4.5 MCG/ACT Inhaler INHALE TWO PUFFS BY MOUTH TWICE A DAY 1 Inhaler 5     Calcium Carbonate-Vitamin D (OSCAL 500/200 D-3 PO) Take 1 tablet by mouth 2 times daily       clopidogrel (PLAVIX) 75 MG tablet Take 1 tablet (75 mg) by mouth daily 90 tablet 3     Coenzyme Q10 (COQ10 PO) Take 100 mg by mouth every 24 hours (at 16:00)       ipratropium - albuterol 0.5 mg/2.5 mg/3 mL (DUONEB) 0.5-2.5 (3) MG/3ML neb solution Take 1 vial (3 mLs) by nebulization every 4 hours as needed for shortness of breath / dyspnea or wheezing 270 mL 3     metoprolol tartrate (LOPRESSOR) 25 MG tablet Take 1 tablet (25 mg) by mouth 2 times daily 180 tablet 3     montelukast (SINGULAIR) 10 MG tablet Take 1 tablet (10 mg) by mouth At Bedtime 30 tablet 11     nicotine (NICODERM CQ) 7 MG/24HR 24 hr patch Place 1 patch onto the skin every 24 hours 30 patch 1     Nutritional Supplements (BOOST) Take 1 Bottle by mouth 3 times daily (with meals) 90 each 0     pantoprazole (PROTONIX) 40 MG EC tablet Take 1 tablet (40 mg) by mouth every morning 30 tablet      polyethylene glycol (MIRALAX/GLYCOLAX) Packet Take 1 packet by mouth daily as needed for constipation       predniSONE (DELTASONE) 10 MG tablet Take 3 tabs daily for 4 days, then 2 tabs daily for 4 days, then 1 tab daily for 4 days, then stop 30 tablet 0     senna-docusate (SENOKOT-S;PERICOLACE) 8.6-50 MG per tablet Take 1 tablet by mouth 2 times daily as needed for constipation 100 tablet      tiotropium (SPIRIVA HANDIHALER) 18 MCG capsule Inhale 1 capsule (18 mcg) into the lungs daily 30 capsule 3     guaiFENesin (MUCINEX) 600 MG 12 hr tablet Take 600 mg by mouth 2 times daily       nitroGLYcerin (NITROSTAT) 0.4 MG sublingual tablet For chest pain place 1 tablet under the tongue every 5 minutes for 3 doses. If symptoms  persist 5 minutes after 1st dose call 911. (Patient not taking: Reported on 6/7/2018) 25 tablet      order for DME Equipment being ordered: Nebulizer machine 1 Device 0     order for DME Equipment being ordered: Nebulizer 1 Device 0     [DISCONTINUED] metoprolol tartrate (LOPRESSOR) 25 MG tablet Take 0.5 tablets (12.5 mg) by mouth 2 times daily 60 tablet      Facility-Administered Encounter Medications as of 6/7/2018   Medication Dose Route Frequency Provider Last Rate Last Dose     Reason aspirin not prescribed (intentional)   Other Continuous PRN Jesús Olmstead MD           Again, thank you for allowing me to participate in the care of your patient.      Sincerely,    ZIGGY MCCRAY MD     [facility]

## 2018-06-07 NOTE — LETTER
"6/7/2018    Deisy Carter, APRN CNP  [address] [facility] [address]    RE: Preeti Ford       Dear Colleague,    I had the pleasure of seeing Preeti Ford in the [facility].    6/7/18 Follow up post angiogram \"Successful deployment of a drug eluting stent   -2.25 x 12 mm  Synergy drug eluting stent was successfully deployed  across the mid circumflex artery ( in the prior ISR segment)\"      NSTEMI with cardiogenic shock due to in-stent restenosis, s/p ROWDY to mid-circumflex on 5/18  Acute hypoxic and hypercapnic respiratory failure due to acute COPD exacerbation and Pseudomonas pneumonia, improved  \"Former smoker\".  Ischemic cardiomyopathy, improving  Hypertension  Encephalopathy/delirium, resolved  New onset dysphagia  Moderate AoV stenosis    HPI and Plan:   See dictation  I recommend continuing current treatment plans in the chart.        No orders of the defined types were placed in this encounter.    No orders of the defined types were placed in this encounter.    There are no discontinued medications.      Encounter Diagnosis   Name Primary?     S/P coronary artery stent placement        CURRENT MEDICATIONS:  Current Outpatient Prescriptions   Medication Sig Dispense Refill     ACE/ARB/ARNI NOT PRESCRIBED, INTENTIONAL, Please choose reason not prescribed, below       acetaminophen (TYLENOL) 325 MG tablet Take 325 mg by mouth every 4 hours as needed for mild pain  100 tablet      albuterol (2.5 MG/3ML) 0.083% neb solution Take 1 vial by nebulization every 4 hours as needed for shortness of breath / dyspnea or wheezing       albuterol (PROAIR HFA/PROVENTIL HFA/VENTOLIN HFA) 108 (90 BASE) MCG/ACT Inhaler Inhale 2 puffs into the lungs every 6 hours as needed for shortness of breath / dyspnea 1 Inhaler 11     aspirin 81 MG EC tablet Take 1 tablet (81 mg) by mouth daily       atorvastatin (LIPITOR) 10 MG tablet Take 1 tablet (10 mg) by mouth At Bedtime 90 tablet 3 "     budesonide-formoterol (SYMBICORT) 160-4.5 MCG/ACT Inhaler INHALE TWO PUFFS BY MOUTH TWICE A DAY 1 Inhaler 5     Calcium Carbonate-Vitamin D (OSCAL 500/200 D-3 PO) Take 1 tablet by mouth 2 times daily       clopidogrel (PLAVIX) 75 MG tablet Take 1 tablet (75 mg) by mouth daily 90 tablet 3     Coenzyme Q10 (COQ10 PO) Take 100 mg by mouth every 24 hours (at 16:00)       ipratropium - albuterol 0.5 mg/2.5 mg/3 mL (DUONEB) 0.5-2.5 (3) MG/3ML neb solution Take 1 vial (3 mLs) by nebulization every 4 hours as needed for shortness of breath / dyspnea or wheezing 270 mL 3     metoprolol tartrate (LOPRESSOR) 25 MG tablet Take 0.5 tablets (12.5 mg) by mouth 2 times daily 60 tablet      montelukast (SINGULAIR) 10 MG tablet Take 1 tablet (10 mg) by mouth At Bedtime 30 tablet 11     nicotine (NICODERM CQ) 7 MG/24HR 24 hr patch Place 1 patch onto the skin every 24 hours 30 patch 1     Nutritional Supplements (BOOST) Take 1 Bottle by mouth 3 times daily (with meals) 90 each 0     pantoprazole (PROTONIX) 40 MG EC tablet Take 1 tablet (40 mg) by mouth every morning 30 tablet      polyethylene glycol (MIRALAX/GLYCOLAX) Packet Take 1 packet by mouth daily as needed for constipation       predniSONE (DELTASONE) 10 MG tablet Take 3 tabs daily for 4 days, then 2 tabs daily for 4 days, then 1 tab daily for 4 days, then stop 30 tablet 0     senna-docusate (SENOKOT-S;PERICOLACE) 8.6-50 MG per tablet Take 1 tablet by mouth 2 times daily as needed for constipation 100 tablet      tiotropium (SPIRIVA HANDIHALER) 18 MCG capsule Inhale 1 capsule (18 mcg) into the lungs daily 30 capsule 3     guaiFENesin (MUCINEX) 600 MG 12 hr tablet Take 600 mg by mouth 2 times daily       nitroGLYcerin (NITROSTAT) 0.4 MG sublingual tablet For chest pain place 1 tablet under the tongue every 5 minutes for 3 doses. If symptoms persist 5 minutes after 1st dose call 911. (Patient not taking: Reported on 6/7/2018) 25 tablet      order for DME Equipment being  ordered: Nebulizer machine 1 Device 0     order for DME Equipment being ordered: Nebulizer 1 Device 0       ALLERGIES     Allergies   Allergen Reactions     Crestor [Rosuvastatin] Other (See Comments)     dizziness     Hmg-Coa-R Inhibitors Other (See Comments)     Muscle/joint aching     Lisinopril Cough     Optison [Albumin Human] Other (See Comments)     Pt was flush and very dizzy.  Also had a BP drop     Penicillins Rash       PAST MEDICAL HISTORY:  Past Medical History:   Diagnosis Date     Arthritis      COPD (chronic obstructive pulmonary disease) (H)      Coronary artery disease      CVA (cerebral vascular accident) (H) 8-     Hypertension        PAST SURGICAL HISTORY:  Past Surgical History:   Procedure Laterality Date     APPENDECTOMY       BYPASS GRAFT AORTOILIAC N/A 3/7/2017    Procedure: BYPASS GRAFT AORTOILIAC;  Surgeon: Marcos Pratt MD;  Location: [facility] OR     SALPINGO OOPHORECTOMY,R/L/DELORES      Salpingo Oophorectomy, RT/LT/DELORES       FAMILY HISTORY:  Family History   Problem Relation Age of Onset     CEREBROVASCULAR DISEASE Mother      CEREBROVASCULAR DISEASE Father      Genitourinary Problems Brother      Dialysis       SOCIAL HISTORY:  Social History     Social History     Marital status:      Spouse name: N/A     Number of children: N/A     Years of education: N/A     Social History Main Topics     Smoking status: Current Every Day Smoker     Packs/day: 0.50     Types: Cigarettes     Smokeless tobacco: Never Used      Comment: patient states she is working on it      Alcohol use No     Drug use: No     Sexual activity: Not Currently     Partners: Male     Other Topics Concern      Service No     Blood Transfusions No     Caffeine Concern No     Occupational Exposure No     Hobby Hazards No     Sleep Concern Yes     Trouble breathing at night due to COPD     Stress Concern No     Weight Concern No     Special Diet No     Back Care No     Exercise No     Bike Helmet No  "    Seat Belt Yes     Self-Exams Yes     Parent/Sibling W/ Cabg, Mi Or Angioplasty Before 65f 55m? No     Social History Narrative         Review of Systems:  Skin:  Negative       Eyes:  Positive for glasses    ENT:  Negative      Respiratory:  Positive for shortness of breath;cough COPD  on inhalers   Cardiovascular:  Negative for;palpitations;chest pain;lightheadedness;dizziness Positive for;edema    Gastroenterology: Negative      Genitourinary:  Negative      Musculoskeletal:  Negative      Neurologic:  Negative      Psychiatric:  Negative      Heme/Lymph/Imm:  Positive for easy bruising    Endocrine:  Negative        Physical Exam:  Vitals: /56 (BP Location: Right arm, Patient Position: Fowlers, Cuff Size: Adult Regular)  Pulse 72  Ht 1.602 m (5' 3.07\")  Wt 48.9 kg (107 lb 12.8 oz)  SpO2 97%  BMI 19.05 kg/m2    Constitutional:  cooperative thin;cachectic;frail      Skin:  warm and dry to the touch          Head:  normocephalic        Eyes:  pupils equal and round;conjunctivae and lids unremarkable;EOMS intact        Lymph:      ENT:  no pallor or cyanosis poor dentition      Neck:  JVP normal   torticollis    Respiratory:  clear to auscultation diminished breath sounds bilaterally;prolonged expiration       Cardiac: regular rhythm;normal S1 and S2       systolic ejection murmur;grade 1        pulses full and equal pulses below the femoral arteries are diminished                                 right and left radial sites without hematomas or bruits    GI:  abdomen soft;no masses;non-tender        Extremities and Muscular Skeletal:  no edema         keegan    Neurological:  affect appropriate        Psych:  oriented to time, person and place        Recent Lab Results:  LIPID RESULTS:  Lab Results   Component Value Date    CHOL 165 05/18/2018    HDL 53 05/18/2018    LDL 91 05/18/2018    TRIG 107 05/18/2018    CHOLHDLRATIO 3.6 10/09/2015       LIVER ENZYME RESULTS:  Lab Results   Component Value Date    " AST 54 (H) 05/17/2018    ALT 48 05/17/2018       CBC RESULTS:  Lab Results   Component Value Date    WBC 6.4 06/04/2018    RBC 4.26 06/04/2018    HGB 12.6 06/04/2018    HCT 40.1 06/04/2018    MCV 94 06/04/2018    MCH 29.6 06/04/2018    MCHC 31.4 (L) 06/04/2018    RDW 13.0 06/04/2018     06/04/2018       BMP RESULTS:  Lab Results   Component Value Date     06/04/2018    POTASSIUM 4.2 06/04/2018    CHLORIDE 101 06/04/2018    CO2 37 (H) 06/04/2018    ANIONGAP 4 06/04/2018    GLC 72 06/04/2018    BUN 18 06/04/2018    CR 0.61 06/04/2018    GFRESTIMATED >90 06/04/2018    GFRESTBLACK >90 06/04/2018    CALDERON 8.6 06/04/2018        A1C RESULTS:  Lab Results   Component Value Date    A1C 5.5 03/24/2018       INR RESULTS:  Lab Results   Component Value Date    INR 0.91 05/17/2018    INR 0.95 04/19/2018           CC  No referring provider defined for this encounter.                    Thank you for allowing me to participate in the care of your patient.      Sincerely,     ZIGGY MCCRAY MD     [facility]    cc:   No referring provider defined for this encounter.

## 2018-06-08 NOTE — PROGRESS NOTES
Service Date: 06/07/2018      HISTORY OF PRESENT ILLNESS:  I got together with Preeti Ford today.  She is 67.  I have not met her before.  She was in [facility] at the end of 05/2018 with respiratory failure requiring ventilatory support due to severe COPD, suspected pneumonia and an underlying component of an ischemic cardiomyopathy with ejection fractions by echo of 30%.  She had periods of delirium that resolved.  She gradually got off the ventilator and has been able to work herself back to the point that she is now in a transitional care rehab facility.      Diagnoses on discharge included:   1.  Severe life-altering COPD.   2.  Respiratory failure requiring intubation.   3.  Coronary artery disease with cardiogenic shock and in-stent restenosis.  The patient had a repeat mid left circumflex stent placed.   4.  Class IV left ventricular failure.   5.  Suspected moderate aortic stenosis by echo associated with a decreased ejection fraction.   6.  Chronic tobacco abuse with COPD.      She says she is eating adequately.  She is up and around walking.  She uses a cane but has good enough strength not to need it all the time.  She was able to get up on the exam table relatively smoothly and effortlessly.  She denies shortness of breath on exertion with minimal acts of daily living.  She has not had chest pain, dizziness or falling.      Her echocardiogram dated 05/17, a month ago showed a nondilated LV.  EF was 25%-30%.  There was inferolateral hypo-akinesia.  Mitral valve showed trace MR.  There was mild TR.  RVSP was 25 mmHg plus right atrial pressure.  The rhythm was sinus.      PHYSICAL EXAMINATION:   GENERAL:  She presents today bright, alert, oriented and cooperative.  She is slender, somewhat on the almost cachectic side.  She weighs 107 pounds.   VITAL SIGNS:  Blood pressure 120/60, heart rate 72 beats a minute.   SKIN:  She has rather diffuse bruising of her forearms of each extremity  and the legs characteristic of senile skin changes.    NECK:  She did not have neck vein distention at 30 degrees.  Carotids were equal, no bruits heard.   HEART:  Regular with 1/6 apical murmur, no S3.  Heart tones were diminished.   LUNGS:  Showed no rales.  She had prolonged expiratory phase and trace wheezes with forced expiration.   ABDOMEN:  Flat, scaphoid, nontender without pain or mass.   EXTREMITIES:  Free of edema.  Pedal pulses diminished.      CURRENT MEDICATIONS:  Preeti's current program is then:   1.  Aspirin 81 mg a day.   2.  Plavix 75 mg a day.  She got a left circumflex stent and accordingly should take Plavix for 1 year.   3.  Hyperlipidemia, on atorvastatin 10 mg with an LDL of 91.  This is acceptable.   4.  Albuterol inhaler p.r.n.   5.  Symbicort inhaler daily.   6.  Calcium and vitamin D daily.   7.  Coenzyme Q daily.   8.  Metoprolol tartrate 12.5 mg b.i.d.   9.  Singulair 10 mg a day.   10.  Nicotine patch 7 mg daily.   11.  Spiriva inhaler daily.     12.  Mucinex daily.   13.  Nitro p.r.n. sublingually, but she has not had to use it.      Today I increased her metoprolol to 25 mg b.i.d. to further improve beta blockade protection and treating her cardiomyopathy.  I believe her lungs have only minimal airway obstruction at this point.  I do not believe bronchospasm will be a problem.  Hypotension might be, so we need to listen for any signs of dizziness or, even worse, syncope.      I think her main challenge is to regain strength, good nutrition, adequate rehab and adequate rest.  I do not believe she can afford ever to smoke another cigarette, and I stressed this many times.      I increased metoprolol and made no other changes.  We will see her in 2-4 weeks and see how she is doing with her blood pressures.  She promises never to smoke again.  Time will tell.      IMPRESSION:   1.  Severe ischemic cardiomyopathy, not currently a major issue.   2.  Severe COPD.   3.  Right heart pressures  were upper limits of normal.   4.  Some malnourish, cachectic-looking and weak.   5.  Chronic tobacco abuse, but she promises she is not smoking.   6.  Peripheral vascular artery disease with diminished pedal pulses.      PLAN:  Rehab, nutrition and exercise, gradually increase the heart failure program to protect her LV dysfunction as best we can.  Hopefully improvement will follow.      Prognosis is guarded.  She is very frail but seems determined.      Over 35 minutes was spent doing the consult, reviewing multiple records, consults, discharge summaries, imaging, x-ray, echo, laboratory work, laboratory data, medications, medication side effects, indications, etc.      cc:   Deisy Carter, [address], [address]    [place]  [address]         Z[facility]CR[facility], [facility]             D: 2018   T: 2018   MT: PACO      Name:     TARIK HERNANDEZ   MRN:      [id number]        Account:      [id number]   :      1950           Service Date: 2018      Document: [id number]

## 2018-06-11 ENCOUNTER — DISCHARGE SUMMARY NURSING HOME (OUTPATIENT)
Dept: GERIATRICS | Facility: CLINIC | Age: 68
End: 2018-06-11
Payer: COMMERCIAL

## 2018-06-11 DIAGNOSIS — I25.119 CORONARY ARTERY DISEASE INVOLVING NATIVE CORONARY ARTERY OF NATIVE HEART WITH ANGINA PECTORIS (H): ICD-10-CM

## 2018-06-11 DIAGNOSIS — I10 HTN, GOAL BELOW 130/80: ICD-10-CM

## 2018-06-11 DIAGNOSIS — J43.1 PANLOBULAR EMPHYSEMA (H): Primary | ICD-10-CM

## 2018-06-11 DIAGNOSIS — I73.9 PAD (PERIPHERAL ARTERY DISEASE) (H): ICD-10-CM

## 2018-06-11 PROCEDURE — 99316 NF DSCHRG MGMT 30 MIN+: CPT | Performed by: NURSE PRACTITIONER

## 2018-06-12 VITALS
RESPIRATION RATE: 18 BRPM | OXYGEN SATURATION: 93 % | SYSTOLIC BLOOD PRESSURE: 88 MMHG | HEART RATE: 68 BPM | BODY MASS INDEX: 18.74 KG/M2 | DIASTOLIC BLOOD PRESSURE: 58 MMHG | TEMPERATURE: 97 F | WEIGHT: 106 LBS

## 2018-06-12 NOTE — PROGRESS NOTES
[facility] DISCHARGE SUMMARY    PATIENT'S NAME: Preeti Ford  YOB: 1950  MEDICAL RECORD NUMBER:  [id number]    PRIMARY CARE PROVIDER AND CLINIC RESPONSIBLE AFTER TRANSFER: Deisy Carter [address] [facility] / [place] [address]     CODE STATUS/ADVANCE DIRECTIVES DISCUSSION:   Full code/DNI       Allergies   Allergen Reactions     Crestor [Rosuvastatin] Other (See Comments)     dizziness     Hmg-Coa-R Inhibitors Other (See Comments)     Muscle/joint aching     Lisinopril Cough     Optison [Albumin Human] Other (See Comments)     Pt was flush and very dizzy.  Also had a BP drop     Penicillins Rash       TRANSFERRING PROVIDERS: SPRING Barrera CNP, Deborah Ferreira MD  DATE OF SNF ADMISSION:  May / 26 / 2018  DATE OF SNF (anticipated) DISCHARGE: June / 11 / 2018  DISCHARGE DISPOSITION: FMG Provider   Nursing Facility: [facility] stay 5/17/18 to 5/26/18.     Condition on Discharge:  Improving.  Function:  Ambulating with a cane  Cognitive Scores: BIMS 6/6/18 = 15/15    Equipment: cane    DISCHARGE DIAGNOSIS:   1. Panlobular emphysema (H)    2. Coronary artery disease involving native coronary artery of native heart with angina pectoris (H)    3. HTN, goal below 130/80    4. PAD (peripheral artery disease) (H) - moderate left common carotid stenosis, aortoiliac bypass, chronic left vertebral and right posterior cerebral stenoses        HPI Nursing Facility Course:  HPI information obtained from: facility chart records, facility staff, patient report and [facility] chart review.  Panlobular emphysema (H)  No coughing at this time.  Did treat Preeti with Zpack on 5/31/18 for bronchitis.  Otherwise remains on PRN Albuterol neb, Mucinex 600mg BID, PRN Albuterol inhaler, Singular, Advair, and Symbicort.    No complications at discharge.    Coronary artery disease involving native coronary  artery of native heart with angina pectoris (H)  No complaints of chest pain.  Does have PRN Nitrostat, ASA 81mg daily, and CoQ-10.    Saw Dr. Hernandez on 6/7/18.  Metoprolol was increased to 25mg BID to improve beta blockade protection and treat cardiomyopathy.  Cardiology strongly encouraged her to stop smoking.  Cant afford to do so with her frail health.      HTN, goal below 130/80  Lopressor 25mg BID.  Range of 's/50-60's.      PAD (peripheral artery disease) (H) - moderate left common carotid stenosis, aortoiliac bypass, chronic left vertebral and right posterior cerebral stenoses  Remains on Lipitor 10mg at HS.  Is to have a follow up with cardiology in 2-4 weeks according to visit note.      PAST MEDICAL HISTORY:  has a past medical history of Arthritis; COPD (chronic obstructive pulmonary disease) (H); Coronary artery disease; CVA (cerebral vascular accident) (H) (8-); and Hypertension.    DISCHARGE MEDICATIONS:  Current Outpatient Prescriptions   Medication Sig Dispense Refill     acetaminophen (TYLENOL) 325 MG tablet Take 325 mg by mouth every 4 hours as needed for mild pain  100 tablet      albuterol (2.5 MG/3ML) 0.083% neb solution Take 1 vial by nebulization every 4 hours as needed for shortness of breath / dyspnea or wheezing       albuterol (PROAIR HFA/PROVENTIL HFA/VENTOLIN HFA) 108 (90 BASE) MCG/ACT Inhaler Inhale 2 puffs into the lungs every 6 hours as needed for shortness of breath / dyspnea 1 Inhaler 11     aspirin 81 MG EC tablet Take 1 tablet (81 mg) by mouth daily       atorvastatin (LIPITOR) 10 MG tablet Take 1 tablet (10 mg) by mouth At Bedtime 90 tablet 3     budesonide-formoterol (SYMBICORT) 160-4.5 MCG/ACT Inhaler INHALE TWO PUFFS BY MOUTH TWICE A DAY 1 Inhaler 5     Calcium Carbonate-Vitamin D (OSCAL 500/200 D-3 PO) Take 1 tablet by mouth 2 times daily       clopidogrel (PLAVIX) 75 MG tablet Take 1 tablet (75 mg) by mouth daily 90 tablet 3     Coenzyme Q10 (COQ10 PO) Take 100  mg by mouth every 24 hours (at 16:00)       guaiFENesin (MUCINEX) 600 MG 12 hr tablet Take 600 mg by mouth 2 times daily       ipratropium - albuterol 0.5 mg/2.5 mg/3 mL (DUONEB) 0.5-2.5 (3) MG/3ML neb solution Take 1 vial (3 mLs) by nebulization every 4 hours as needed for shortness of breath / dyspnea or wheezing 270 mL 3     metoprolol tartrate (LOPRESSOR) 25 MG tablet Take 1 tablet (25 mg) by mouth 2 times daily 180 tablet 3     montelukast (SINGULAIR) 10 MG tablet Take 1 tablet (10 mg) by mouth At Bedtime 30 tablet 11     nicotine (NICODERM CQ) 7 MG/24HR 24 hr patch Place 1 patch onto the skin every 24 hours 30 patch 1     nitroGLYcerin (NITROSTAT) 0.4 MG sublingual tablet For chest pain place 1 tablet under the tongue every 5 minutes for 3 doses. If symptoms persist 5 minutes after 1st dose call 911. 25 tablet      Nutritional Supplements (BOOST) Take 1 Bottle by mouth 3 times daily (with meals) 90 each 0     pantoprazole (PROTONIX) 40 MG EC tablet Take 1 tablet (40 mg) by mouth every morning 30 tablet      polyethylene glycol (MIRALAX/GLYCOLAX) Packet Take 1 packet by mouth daily as needed for constipation       predniSONE (DELTASONE) 10 MG tablet Take 3 tabs daily for 4 days, then 2 tabs daily for 4 days, then 1 tab daily for 4 days, then stop 30 tablet 0     senna-docusate (SENOKOT-S;PERICOLACE) 8.6-50 MG per tablet Take 1 tablet by mouth 2 times daily as needed for constipation 100 tablet      tiotropium (SPIRIVA HANDIHALER) 18 MCG capsule Inhale 1 capsule (18 mcg) into the lungs daily 30 capsule 3     ACE/ARB/ARNI NOT PRESCRIBED, INTENTIONAL, Please choose reason not prescribed, below       order for DME Equipment being ordered: Nebulizer machine 1 Device 0     order for DME Equipment being ordered: Nebulizer 1 Device 0       MEDICATION CHANGES/RATIONALE:   5/28/18  OT eval and treat for ADLs, therapeutic exercise and functional mobility  5/29/18  CBC and BMP next Monday - CAD  5/29/18  PT to eval and  treat for therapeutic exercise, gait and NM re-ed.  5/31/18  Albuterol neb 1 vial every 4 hours prn.- SOB, CXR AP and lateral view due to productive cough, Mucinex 600mg BID.  5/31/18  Z-pack 500mg tonight and 250mg daily x4 days for bronchitis.    6/1/18  Advance diet to regular, D/C magic shake, D/C boost TID, Begin 120cc house supp TID r/t low body weight.  6/4/18  OK to wean off oxygen as able.  D/C prn tylenol.  6/11/18  OK to discharge home with medications, D/C prn albuterol nebs, D/C prn Miralax and Ivon-S    Controlled medications sent with patient:   not applicable/none     ROS:    4 point ROS including Respiratory, CV, GI and , other than that noted in the HPI,  is negative    Physical Exam:   Vitals: BP (!) 88/58  Pulse 68  Temp 97  F (36.1  C)  Resp 18  Wt 106 lb (48.1 kg)  SpO2 93%  BMI 18.74 kg/m2  BMI= Body mass index is 18.74 kg/(m^2).    GENERAL APPEARANCE:  Alert, in no distress, appears healthy, oriented, thin, cooperative  EYES:  EOM, conjunctivae, lids, pupils and irises normal, PERRL  NECK:  No adenopathy,masses or thyromegaly  RESP:  respiratory effort and palpation of chest normal, no respiratory distress, no air movement in lungs.  no use of oyxgen.  CV:  Palpation and auscultation of heart done , regular rate and rhythm, no murmur, rub, or gallop, no edema  ABDOMEN:  normal bowel sounds, soft, nontender, no hepatosplenomegaly or other masses, no guarding or rebound  M/S:   Gait and station abnormal uses a cane for ambulation and able to transfer self  SKIN:  Inspection of skin and subcutaneous tissue baseline, Palpation of skin and subcutaneous tissue baseline  PSYCH:  oriented X 3, normal insight, judgement and memory, affect and mood normal    DISCHARGE PLAN:  return home without services  Patient instructed to follow-up with:  PCP in 7-10 days       [facility] scheduled appointments:  Future Appointments  Date Time Provider Department Center   7/10/2018 11:15 AM Deanna  SPRING Darnell CNP [facility]   9/7/2018 11:00 AM PHECHR1 DARI FUENTES [facility]   9/11/2018 11:00 AM Gael Hernandez MD [facility]       MTM referral needed and placed by this provider: No    Pending labs: none    SNF labs   CBC RESULTS:   Recent Labs   Lab Test  06/04/18   0716  05/24/18   0515   WBC  6.4  6.1   RBC  4.26  4.24   HGB  12.6  12.6   HCT  40.1  38.5   MCV  94  91   MCH  29.6  29.7   MCHC  31.4*  32.7   RDW  13.0  12.5   PLT  236  176       Last Basic Metabolic Panel:  Recent Labs   Lab Test  06/04/18   0716  05/24/18   0515   NA  142  142   POTASSIUM  4.2  4.1   CHLORIDE  101  102   CALDERON  8.6  8.8   CO2  37*  37*   BUN  18  19   CR  0.61  0.43*   GLC  72  128*       Liver Function Studies -   Recent Labs   Lab Test  05/17/18   0436  04/19/18   1244   PROTTOTAL  7.7  6.7*   ALBUMIN  3.6  3.1*   BILITOTAL  0.4  0.5   ALKPHOS  90  63   AST  54*  21   ALT  48  25       Lab Results   Component Value Date    A1C 5.5 03/24/2018    A1C 5.5 03/07/2017           Discharge Treatments:OK to discharge home without services.  D/C PRN Miralax, Albuterol nebs, and Senna-S    TOTAL DISCHARGE TIME:   Greater than 30 minutes  Electronically signed by:  SPRING Barrera CNP

## 2018-06-13 ENCOUNTER — TELEPHONE (OUTPATIENT)
Dept: FAMILY MEDICINE | Facility: OTHER | Age: 68
End: 2018-06-13

## 2018-06-13 NOTE — TELEPHONE ENCOUNTER
Our goal is to have forms completed with 72 hours, however some forms may require a visit or additional information.    Who is the form from?: Home care  Where the form came from: form was faxed in  What clinic location was the form placed at?: [place]  Where the form was placed: 's Box  What number is listed as a contact on the form?: [phone]    Phone call message- patient request for a letter, form or note:    Date needed: as soon as possible  Please fax to [phone]  Has the patient signed a consent form for release of information? NO    Additional comments:     Call taken on 6/13/2018 at 7:13 AM by Rowena Douglass    Type of letter, form or note: medical

## 2018-06-13 NOTE — TELEPHONE ENCOUNTER
Dr Roberson please sign for Dr Diane    Form has been faxed to Dr Roberson for signature, copy placed in Dr Diane fax folder  Thanks  Arabella Palmer RT (R)

## 2018-06-14 ENCOUNTER — TELEPHONE (OUTPATIENT)
Dept: CARDIOLOGY | Facility: CLINIC | Age: 68
End: 2018-06-14

## 2018-06-14 NOTE — TELEPHONE ENCOUNTER
Patient notified and states she has been on metoprolol 25mg BID since May and is feeling fine.  She has only had to add metoprolol in the evening once since she has returned from the hospital.

## 2018-06-14 NOTE — TELEPHONE ENCOUNTER
----- Message from William Palacios RN sent at 6/8/2018 11:19 AM CDT -----  Regarding: Per David call pt some time this week and see how she's doing on metop 25 mg bid up from 12.5 mg bid      ----- Message -----     From: Gael Hernandez MD     Sent: 6/8/2018  10:07 AM       To: [facility] Heart Team 3    6/8/18:    Call her next wek some time and see how she's doing on metop 25 mg bid up from 12.5 mg bid   je

## 2018-06-23 ENCOUNTER — HEALTH MAINTENANCE LETTER (OUTPATIENT)
Age: 68
End: 2018-06-23

## 2018-06-25 ENCOUNTER — OFFICE VISIT (OUTPATIENT)
Dept: FAMILY MEDICINE | Facility: OTHER | Age: 68
End: 2018-06-25
Payer: COMMERCIAL

## 2018-06-25 VITALS
BODY MASS INDEX: 18.77 KG/M2 | HEART RATE: 70 BPM | HEIGHT: 63 IN | DIASTOLIC BLOOD PRESSURE: 72 MMHG | WEIGHT: 105.9 LBS | TEMPERATURE: 99.4 F | OXYGEN SATURATION: 99 % | RESPIRATION RATE: 20 BRPM | SYSTOLIC BLOOD PRESSURE: 122 MMHG

## 2018-06-25 DIAGNOSIS — K21.9 GASTROESOPHAGEAL REFLUX DISEASE WITHOUT ESOPHAGITIS: ICD-10-CM

## 2018-06-25 DIAGNOSIS — J43.1 PANLOBULAR EMPHYSEMA (H): ICD-10-CM

## 2018-06-25 DIAGNOSIS — F17.200 TOBACCO USE DISORDER: ICD-10-CM

## 2018-06-25 DIAGNOSIS — E46 MALNUTRITION, UNSPECIFIED TYPE (H): ICD-10-CM

## 2018-06-25 DIAGNOSIS — I10 HTN, GOAL BELOW 130/80: ICD-10-CM

## 2018-06-25 DIAGNOSIS — Z95.5 S/P CORONARY ARTERY STENT PLACEMENT: Primary | ICD-10-CM

## 2018-06-25 PROCEDURE — 99214 OFFICE O/P EST MOD 30 MIN: CPT | Performed by: STUDENT IN AN ORGANIZED HEALTH CARE EDUCATION/TRAINING PROGRAM

## 2018-06-25 ASSESSMENT — PAIN SCALES - GENERAL: PAINLEVEL: NO PAIN (0)

## 2018-06-25 NOTE — LETTER
My COPD Action Plan     Name: Preeti Ford    YOB: 1950   Date: 6/25/2018    My doctor: SPRING Banegas CNP   My clinic: [address] [address] [phone]  My Controller Medicine: Tiotropium (Spiriva)  Formoterol/Budesonide (Symbicort)  Singulair   Dose: see current dosing     My Rescue Medicine: Albuterol (Proair/Ventolin/Proventil) inhaler   Dose: every 4 hours as needed  Duoneb     My Flare Up Medicine: { :655051}   Dose: ***     My COPD Severity: { :725407}      Use of Oxygen: { :806913}     Make sure you've had your pneumonia   vaccines.          GREEN ZONE       Doing well today      Usual level of activity and exercise    Usual amount of cough and mucus    No shortness of breath    Usual level of health (thinking clearly, sleeping well, feel like eating) Actions:      Take daily medicines    Use oxygen as prescribed    Follow regular exercise and diet plan    Avoid cigarette smoke and other irritants that harm the lungs           YELLOW ZONE          Having a bad day or flare up      Short of breath more than usual    A lot more sputum (mucus) than usual    Sputum looks yellow, green, tan, brown or bloody    More coughing or wheezing    Fever or chills    Less energy; trouble completing activities    Trouble thinking or focusing    Using quick relief inhaler or nebulizer more often    Poor sleep; symptoms wake me up    Do not feel like eating Actions:      Get plenty of rest    Take daily medicines    Use quick relief inhaler every *** hours    If you use oxygen, call you doctor to see if you should adjust your oxygen    Do breathing exercises or other things to help you relax    Let a loved one, friend or neighbor know you are feeling worse    Call your care team if you have 2 or more symptoms.  Start taking steroids or antibiotics if directed by your care team           RED ZONE       Need medical care now      Severe  shortness of breath (feel you can't breathe)    Fever, chills    Not enough breath to do any activity    Trouble coughing up mucus, walking or talking    Blood in mucus    Frequent coughing   Rescue medicines are not working    Not able to sleep because of breathing    Feel confused or drowsy    Chest pain    Actions:      Call your health care team.  If you cannot reach your care team, call 911 or go to the emergency room.        Annual Reminders:  Meet with Care Team, Flu Shot every Fall  Pharmacy:    [facility] - [id number] [place] [facility]

## 2018-06-25 NOTE — MR AVS SNAPSHOT
After Visit Summary   6/25/2018    Preeti Ford    MRN: [id number]           Patient Information     Date Of Birth          1950        Visit Information        Provider Department      6/25/2018 2:30 PM Deisy Carter APRN CNP [facility]        Today's Diagnoses     S/P coronary artery stent placement    -  1    Tobacco use disorder        Panlobular emphysema (H)        Malnutrition, unspecified type (H)        Gastroesophageal reflux disease without esophagitis        HTN, goal below 130/80           Follow-ups after your visit        Your next 10 appointments already scheduled     Jul 10, 2018 11:15 AM CDT   Return Visit with SPRING [place] CNP   [facility] ([facility])    [address] [place] [id number] [phone]            Sep 07, 2018 11:00 AM CDT   Ech Complete with PHECHR1   [facility] Echocardiography ([facility])    [address] [place], [place] [address] [phone]           1. Please bring or wear a comfortable two-piece outfit. 2. You may eat, drink and take your normal medicines. 3. For any questions that cannot be answered, please contact the ordering physician            Sep 11, 2018 11:00 AM CDT   Return Visit with Gael Hernandez MD   [facility] ([facility])    [address] [place] [id number] [phone]              Who to contact     If you have questions or need follow up information about today's clinic visit or your schedule please contact [facility] directly at [phone].  Normal or non-critical lab and imaging results will be communicated to you by MyChart, letter or phone within 4 business days after the clinic has received the results. If you do not hear from us within 7 days, please contact the clinic through MyChart or phone. If  "you have a critical or abnormal lab result, we will notify you by phone as soon as possible.  Submit refill requests through [employer] or call your pharmacy and they will forward the refill request to us. Please allow 3 business days for your refill to be completed.          Additional Information About Your Visit        Care EveryWhere ID     This is your Care EveryWhere ID. This could be used by other organizations to access your [place] medical records  [id number]        Your Vitals Were     Pulse Temperature Respirations Height Pulse Oximetry BMI (Body Mass Index)    70 99.4  F (37.4  C) (Temporal) 20 5' 3.07\" (1.602 m) 99% 18.72 kg/m2       Blood Pressure from Last 3 Encounters:   06/25/18 122/72   06/12/18 (!) 88/58   06/07/18 126/56    Weight from Last 3 Encounters:   06/25/18 105 lb 14.4 oz (48 kg)   06/12/18 106 lb (48.1 kg)   06/07/18 107 lb 12.8 oz (48.9 kg)              We Performed the Following     COPD ACTION PLAN     TOBACCO CESSATION ORDER FOR         Primary Care Provider Office Phone # Fax #    Deisy Alexa Carter, SPRING [facility] [phone]       [address] [address] [place] [address]        Equal Access to Services     LYDIA WASHINGTON : Hadii aad ku hadasho Soomaali, waaxda luqadaha, qaybta kaalmada adeegyada, waxay idiin haymadhun omar hampton . So [facility] [phone].    ATENCIÓN: Si habla español, tiene a jeffries disposición servicios gratuitos de asistencia lingüística. Llame al [phone].    We comply with applicable federal civil rights laws and [place] laws. We do not discriminate on the basis of race, color, national origin, age, disability, sex, sexual orientation, or gender identity.            Thank you!     Thank you for choosing [facility]  for your care. Our goal is always to provide you with excellent care. Hearing back from our patients is one way we can continue to improve our services. Please take a few minutes to complete the written survey that " you may receive in the mail after your visit with us. Thank you!             Your Updated Medication List - Protect others around you: Learn how to safely use, store and throw away your medicines at www.disposemymeds.org.          This list is accurate as of 6/25/18 11:59 PM.  Always use your most recent med list.                   Brand Name Dispense Instructions for use Diagnosis    ACE/ARB/ARNI NOT PRESCRIBED (INTENTIONAL)      Please choose reason not prescribed, below    Elevated blood pressure reading without diagnosis of hypertension, History of stroke       acetaminophen 325 MG tablet    TYLENOL    100 tablet    Take 325 mg by mouth every 4 hours as needed for mild pain        * albuterol (2.5 MG/3ML) 0.083% neb solution      Take 1 vial by nebulization every 4 hours as needed for shortness of breath / dyspnea or wheezing        * albuterol 108 (90 Base) MCG/ACT Inhaler    PROAIR HFA/PROVENTIL HFA/VENTOLIN HFA    1 Inhaler    Inhale 2 puffs into the lungs every 6 hours as needed for shortness of breath / dyspnea    Chronic bronchitis, unspecified chronic bronchitis type (H)       aspirin 81 MG EC tablet      Take 1 tablet (81 mg) by mouth daily    Coronary artery disease involving native coronary artery of native heart with angina pectoris (H)       atorvastatin 10 MG tablet    LIPITOR    90 tablet    Take 1 tablet (10 mg) by mouth At Bedtime    PAD (peripheral artery disease) (H)       BOOST     90 each    Take 1 Bottle by mouth 3 times daily (with meals)    History of stroke, Ischemic cardiomyopathy, Panlobular emphysema (H), Malaise, Loss of weight       budesonide-formoterol 160-4.5 MCG/ACT Inhaler    SYMBICORT    1 Inhaler    INHALE TWO PUFFS BY MOUTH TWICE A DAY    Chronic bronchitis, unspecified chronic bronchitis type (H)       clopidogrel 75 MG tablet    PLAVIX    90 tablet    Take 1 tablet (75 mg) by mouth daily    Acute CVA (cerebrovascular accident) (H)       COQ10 PO      Take 100 mg by mouth  every 24 hours (at 16:00)        guaiFENesin 600 MG 12 hr tablet    MUCINEX     Take 600 mg by mouth 2 times daily        ipratropium - albuterol 0.5 mg/2.5 mg/3 mL 0.5-2.5 (3) MG/3ML neb solution    DUONEB    270 mL    Take 1 vial (3 mLs) by nebulization every 4 hours as needed for shortness of breath / dyspnea or wheezing    COPD exacerbation (H)       metoprolol tartrate 25 MG tablet    LOPRESSOR    180 tablet    Take 1 tablet (25 mg) by mouth 2 times daily    Coronary artery disease involving native coronary artery of native heart with angina pectoris (H)       montelukast 10 MG tablet    SINGULAIR    30 tablet    Take 1 tablet (10 mg) by mouth At Bedtime    Chronic bronchitis, unspecified chronic bronchitis type (H)       nicotine 7 MG/24HR 24 hr patch    NICODERM CQ    30 patch    Place 1 patch onto the skin every 24 hours    Cigarette nicotine dependence without complication       nitroGLYcerin 0.4 MG sublingual tablet    NITROSTAT    25 tablet    For chest pain place 1 tablet under the tongue every 5 minutes for 3 doses. If symptoms persist 5 minutes after 1st dose call 911.    Coronary artery disease involving native coronary artery of native heart with angina pectoris (H)       order for DME     1 Device    Equipment being ordered: Nebulizer    Pulmonary emphysema, unspecified emphysema type (H)       order for DME     1 Device    Equipment being ordered: Nebulizer machine    Chronic bronchitis, unspecified chronic bronchitis type (H)       OSCAL 500/200 D-3 PO      Take 1 tablet by mouth 2 times daily        pantoprazole 40 MG EC tablet    PROTONIX    30 tablet    Take 1 tablet (40 mg) by mouth every morning    Gastroesophageal reflux disease, esophagitis presence not specified       polyethylene glycol Packet    MIRALAX/GLYCOLAX     Take 1 packet by mouth daily as needed for constipation        senna-docusate 8.6-50 MG per tablet    SENOKOT-S;PERICOLACE    100 tablet    Take 1 tablet by mouth 2 times  daily as needed for constipation    Constipation, unspecified constipation type       tiotropium 18 MCG capsule    SPIRIVA HANDIHALER    30 capsule    Inhale 1 capsule (18 mcg) into the lungs daily    Chronic bronchitis, unspecified chronic bronchitis type (H)       * Notice:  This list has 2 medication(s) that are the same as other medications prescribed for you. Read the directions carefully, and ask your doctor or other care provider to review them with you.

## 2018-07-06 DIAGNOSIS — J42 CHRONIC BRONCHITIS, UNSPECIFIED CHRONIC BRONCHITIS TYPE (H): ICD-10-CM

## 2018-07-06 RX ORDER — BUDESONIDE AND FORMOTEROL FUMARATE DIHYDRATE 160; 4.5 UG/1; UG/1
AEROSOL RESPIRATORY (INHALATION)
Qty: 10.2 G | Refills: 5 | Status: SHIPPED | OUTPATIENT
Start: 2018-07-06 | End: 2019-01-09

## 2018-07-06 NOTE — TELEPHONE ENCOUNTER
"Requested Prescriptions   Pending Prescriptions Disp Refills     SYMBICORT 160-4.5 MCG/ACT Inhaler [Pharmacy Med Name: SYMBICORT 160-4.5MCG/ACT AERO] 10.2 g 5     Sig: INHALE TWO PUFFS BY MOUTH TWICE A DAY    Inhaled Steroids Protocol Passed    7/6/2018 12:54 PM       Passed - Patient is age 12 or older       Passed - Recent (12 mo) or future (30 days) visit within the authorizing provider's specialty    Patient had office visit in the last 12 months or has a visit in the next 30 days with authorizing provider or within the authorizing provider's specialty.  See \"Patient Info\" tab in inbasket, or \"Choose Columns\" in Meds & Orders section of the refill encounter.            No flowsheet data found.  budesonide-formoterol (SYMBICORT) 160-4.5 MCG/ACT Inhaler  Prescription approved per [facility] Refill Protocol.  Suad Goncalves, RN, BSN     "

## 2018-07-10 ENCOUNTER — OFFICE VISIT (OUTPATIENT)
Dept: CARDIOLOGY | Facility: CLINIC | Age: 68
End: 2018-07-10
Payer: COMMERCIAL

## 2018-07-10 VITALS
HEART RATE: 68 BPM | WEIGHT: 105.1 LBS | DIASTOLIC BLOOD PRESSURE: 74 MMHG | SYSTOLIC BLOOD PRESSURE: 158 MMHG | OXYGEN SATURATION: 95 % | BODY MASS INDEX: 18.62 KG/M2 | HEIGHT: 63 IN

## 2018-07-10 DIAGNOSIS — I10 BENIGN ESSENTIAL HYPERTENSION: Primary | ICD-10-CM

## 2018-07-10 DIAGNOSIS — I63.9 ACUTE CVA (CEREBROVASCULAR ACCIDENT) (H): ICD-10-CM

## 2018-07-10 DIAGNOSIS — I25.119 CORONARY ARTERY DISEASE INVOLVING NATIVE CORONARY ARTERY OF NATIVE HEART WITH ANGINA PECTORIS (H): ICD-10-CM

## 2018-07-10 DIAGNOSIS — Z95.5 S/P CORONARY ARTERY STENT PLACEMENT: ICD-10-CM

## 2018-07-10 PROCEDURE — 99214 OFFICE O/P EST MOD 30 MIN: CPT | Performed by: NURSE PRACTITIONER

## 2018-07-10 RX ORDER — ISOSORBIDE MONONITRATE 30 MG/1
30 TABLET, EXTENDED RELEASE ORAL DAILY
Qty: 30 TABLET | Refills: 11 | Status: SHIPPED | OUTPATIENT
Start: 2018-07-10 | End: 2018-09-11

## 2018-07-10 NOTE — LETTER
7/10/2018    Deisy Carter, APRN CNP  [address] [facility] [address]    RE: Preeti Ford       Dear Colleague,    I had the pleasure of seeing Preeti Ford in the [facility].    Cardiology Clinic Progress Note  Preeti Ford MRN# [id number]   YOB: 1950 Age: 67 year old     Reason for visit: Medication titration follow up           Assessment and Plan:     1. Non-ST elevation MI, coronary artery disease, cardiogenic shock    Status post drug-eluting stent to the mid circumflex for in-stent restenosis    Mild to moderate residual disease elsewhere    Dull chest pain at rest with associated times of stress and not with exertion    Add isosorbide mononitrate 30 mg daily    Dual antiplatelet therapy with aspirin and Plavix for 1 year uninterrupted    2. Ischemic cardiomyopathy    LVEF 25-30% with global LV dysfunction    Continue metoprolol 25 mg twice daily    Repeat echocardiogram in 2 months and follow-up with Dr. Hernandez    3. Hypertension    Continue metoprolol 25 mg twice daily    Add isosorbide mononitrate 30 mg daily as above    4. Tobacco abuse    Continues to smoke 5 cigarettes daily      absolute smoking cessation highly encouraged    5. Hyperlipidemia    Most recent LDL was 91    Continue atorvastatin 10 mg daily (unable to tolerate higher doses of statin due to myalgias)         History of Presenting Illness:    Preeti Ford is a very pleasant 67 year old patient of Dr. Hernandez who presents today for a follow up post medication titration. She has a past medical history for coronary artery disease, ischemic cardiomyopathy, systolic congestive heart failure, aortic stenosis, PAD status post aortoiliac bypass, severe COPD and prior CVA (on chronic Plavix).      She presented to the emergency department on 5/17/18 in acute respiratory distress with hypoxia and was intubated on arrival. Her troponin was initially mildly elevated at 0.566  and her echocardiogram at the time of admission showed her LVEF was 25-30% with global LV dysfunction (EF in 3/2018 was 45-50% and 55% in 3/2017). She underwent a coronary angiogram on 5/18/2018 that showed in-stent restenosis of the mid circumflex that was treated with a drug-eluting stent.  She was transferred to the ICU for management of cardiogenic shock due to in-stent restenosis of her circumflex.  She has had acute hypoxic respiratory failure due to COPD exacerbation and Pseudomonas pneumonia requiring intubation. Post extubation she had complications with encephalopathy and delirium related to her critical illness that gradually improved.  Her prior to admission Plavix was continued and she was started on aspirin 81 mg daily and metoprolol 12.5 mg twice daily.  She is not on an ACE/ARB due to reported history of intolerance.    She seen by Dr. Hernandez post hospitalization, and he recommended that she increase her metoprolol to 25 mg twice daily.  Return to clinic today for a follow-up. Her BP is elevated today and she reports that is has been elevated for the last week at home. She denies increase salt intake, but states she is under significant personal stress with her brother potentially needing to be placed in a long-term care facility.  Unfortunately, she continues to smoke.  She has cut down from half a pack today to approximately 5 cigarettes daily.  She does note an occasional ache in her chest at rest that  is associated with increased stress.               Review of Systems:   Review of Systems:  Skin:  Negative     Eyes:  Positive for glasses  ENT:  Negative    Respiratory:  Positive for shortness of breath;cough  Cardiovascular:  Negative for;palpitations;lightheadedness;dizziness;edema chest pain;Positive for  Gastroenterology: Negative    Genitourinary:  Negative    Musculoskeletal:  Negative    Neurologic:  Negative    Psychiatric:  Negative    Heme/Lymph/Imm:  Positive for easy  "bruising  Endocrine:  Negative                Physical Exam:     Vitals: /74  Pulse 68  Ht 1.602 m (5' 3.07\")  Wt 47.7 kg (105 lb 1.6 oz)  SpO2 95%  BMI 18.58 kg/m2  Constitutional:  cooperative thin;cachectic;frail      Skin:  warm and dry to the touch        Head:  normocephalic        Eyes:  pupils equal and round;conjunctivae and lids unremarkable;EOMS intact        ENT:  no pallor or cyanosis poor dentition      Neck:  JVP normal   torticollis    Chest:  clear to auscultation diminished breath sounds bilaterally;prolonged expiration      Cardiac: regular rhythm;normal S1 and S2       systolic ejection murmur;grade 1          Abdomen:  abdomen soft;no masses;non-tender        Vascular: pulses full and equal pulses below the femoral arteries are diminished                                    Extremities and Back:  no edema   keegan    Neurological:  affect appropriate                 Medications:     Current Outpatient Prescriptions   Medication Sig Dispense Refill     acetaminophen (TYLENOL) 325 MG tablet Take 325 mg by mouth every 4 hours as needed for mild pain  100 tablet      albuterol (2.5 MG/3ML) 0.083% neb solution Take 1 vial by nebulization every 4 hours as needed for shortness of breath / dyspnea or wheezing       albuterol (PROAIR HFA/PROVENTIL HFA/VENTOLIN HFA) 108 (90 BASE) MCG/ACT Inhaler Inhale 2 puffs into the lungs every 6 hours as needed for shortness of breath / dyspnea 1 Inhaler 11     aspirin 81 MG EC tablet Take 1 tablet (81 mg) by mouth daily       atorvastatin (LIPITOR) 10 MG tablet Take 1 tablet (10 mg) by mouth At Bedtime 90 tablet 3     Calcium Carbonate-Vitamin D (OSCAL 500/200 D-3 PO) Take 1 tablet by mouth 2 times daily       clopidogrel (PLAVIX) 75 MG tablet Take 1 tablet (75 mg) by mouth daily 90 tablet 3     Coenzyme Q10 (COQ10 PO) Take 100 mg by mouth every 24 hours (at 16:00)       guaiFENesin (MUCINEX) 600 MG 12 hr tablet Take 600 mg by mouth 2 times daily       " ipratropium - albuterol 0.5 mg/2.5 mg/3 mL (DUONEB) 0.5-2.5 (3) MG/3ML neb solution Take 1 vial (3 mLs) by nebulization every 4 hours as needed for shortness of breath / dyspnea or wheezing 270 mL 3     isosorbide mononitrate (IMDUR) 30 MG 24 hr tablet Take 1 tablet (30 mg) by mouth daily 30 tablet 11     metoprolol tartrate (LOPRESSOR) 25 MG tablet Take 1 tablet (25 mg) by mouth 2 times daily 180 tablet 3     montelukast (SINGULAIR) 10 MG tablet Take 1 tablet (10 mg) by mouth At Bedtime 30 tablet 11     nicotine (NICODERM CQ) 7 MG/24HR 24 hr patch Place 1 patch onto the skin every 24 hours 30 patch 1     Nutritional Supplements (BOOST) Take 1 Bottle by mouth 3 times daily (with meals) 90 each 0     pantoprazole (PROTONIX) 40 MG EC tablet Take 1 tablet (40 mg) by mouth every morning 30 tablet      polyethylene glycol (MIRALAX/GLYCOLAX) Packet Take 1 packet by mouth daily as needed for constipation       senna-docusate (SENOKOT-S;PERICOLACE) 8.6-50 MG per tablet Take 1 tablet by mouth 2 times daily as needed for constipation 100 tablet      SYMBICORT 160-4.5 MCG/ACT Inhaler INHALE TWO PUFFS BY MOUTH TWICE A DAY 10.2 g 5     tiotropium (SPIRIVA HANDIHALER) 18 MCG capsule Inhale 1 capsule (18 mcg) into the lungs daily 30 capsule 3     ACE/ARB/ARNI NOT PRESCRIBED, INTENTIONAL, Please choose reason not prescribed, below (Patient not taking: Reported on 7/10/2018)       nitroGLYcerin (NITROSTAT) 0.4 MG sublingual tablet For chest pain place 1 tablet under the tongue every 5 minutes for 3 doses. If symptoms persist 5 minutes after 1st dose call [phone]. [phone] tablet      order for DME Equipment being ordered: Nebulizer machine 1 Device 0     order for DME Equipment being ordered: Nebulizer 1 Device 0           Family History   Problem Relation Age of Onset     Cerebrovascular Disease Mother      Cerebrovascular Disease Father      Genitourinary Problems Brother      Dialysis       Social History     Social History     Marital  status:      Spouse name: N/A     Number of children: N/A     Years of education: N/A     Occupational History     Not on file.     Social History Main Topics     Smoking status: Current Every Day Smoker     Packs/day: 0.50     Types: Cigarettes     Smokeless tobacco: Never Used      Comment: patient states she is working on it      Alcohol use No     Drug use: No     Sexual activity: Not Currently     Partners: Male     Other Topics Concern      Service No     Blood Transfusions No     Caffeine Concern No     Occupational Exposure No     Hobby Hazards No     Sleep Concern Yes     Trouble breathing at night due to COPD     Stress Concern No     Weight Concern No     Special Diet No     Back Care No     Exercise No     Bike Helmet No     Seat Belt Yes     Self-Exams Yes     Parent/Sibling W/ Cabg, Mi Or Angioplasty Before 65f 55m? No     Social History Narrative            Past Medical History:     Past Medical History:   Diagnosis Date     Arthritis      COPD (chronic obstructive pulmonary disease) (H)      Coronary artery disease      CVA (cerebral vascular accident) (H) 8-     Hypertension               Past Surgical History:     Past Surgical History:   Procedure Laterality Date     APPENDECTOMY       BYPASS GRAFT AORTOILIAC N/A 3/7/2017    Procedure: BYPASS GRAFT AORTOILIAC;  Surgeon: Marcos Pratt MD;  Location: [facility] OR     SALPINGO OOPHORECTOMY,R/L/DELORES      Salpingo Oophorectomy, RT/LT/DELORES              Allergies:   Crestor [rosuvastatin]; Hmg-coa-r inhibitors; Lisinopril; Optison [albumin human]; and Penicillins       Data:   All laboratory data reviewed:    LAST CHOLESTEROL:  Lab Results   Component Value Date    CHOL 165 05/18/2018     Lab Results   Component Value Date    HDL 53 05/18/2018     Lab Results   Component Value Date    LDL 91 05/18/2018     Lab Results   Component Value Date    TRIG 107 05/18/2018     Lab Results   Component Value Date    CHOLHDLRATIO 3.6 10/09/2015        LAST BMP:  Lab Results   Component Value Date     06/04/2018      Lab Results   Component Value Date    POTASSIUM 4.2 06/04/2018     Lab Results   Component Value Date    CHLORIDE 101 06/04/2018     Lab Results   Component Value Date    CALDERON 8.6 06/04/2018     Lab Results   Component Value Date    CO2 37 06/04/2018     Lab Results   Component Value Date    BUN 18 06/04/2018     Lab Results   Component Value Date    CR 0.61 06/04/2018     Lab Results   Component Value Date    GLC 72 06/04/2018       LAST CBC:  Lab Results   Component Value Date    WBC 6.4 06/04/2018     Lab Results   Component Value Date    RBC 4.26 06/04/2018     Lab Results   Component Value Date    HGB 12.6 06/04/2018     Lab Results   Component Value Date    HCT 40.1 06/04/2018     Lab Results   Component Value Date    MCV 94 06/04/2018     Lab Results   Component Value Date    MCH 29.6 06/04/2018     Lab Results   Component Value Date    MCHC 31.4 06/04/2018     Lab Results   Component Value Date    RDW 13.0 06/04/2018     Lab Results   Component Value Date     06/04/2018       Thank you for allowing me to participate in the care of your patient.    Sincerely,     [facility]

## 2018-07-10 NOTE — MR AVS SNAPSHOT
After Visit Summary   7/10/2018    Preeti Ford    MRN: [id number]           Patient Information     Date Of Birth          1950        Visit Information        Provider Department      7/10/2018 11:15 AM Rowena Huerta APRN CNP [facility]        Today's Diagnoses     Benign essential hypertension    -  1    S/P coronary artery stent placement        Coronary artery disease involving native coronary artery of native heart with angina pectoris (H)        Acute CVA (cerebrovascular accident) - right paramedian superior vermis and left cerebellar hemisphere          Care Instructions    1. ADD isosorbide mononitrate (Imdur) 30 mg daily in the morning  2. If BP is under 100 before medications do not take Imdur  3. Return for visit with Dr. Hernandez with heart ultrasound prior.    If you have questions or concerns please call clinic at [phone].    Please call [phone] for scheduling.      It was a pleasure seeing you today!     Reminder: Please bring in all current medications, over the counter supplements and vitamin bottles to your next appointment.            Follow-ups after your visit        Your next 10 appointments already scheduled     Sep 07, 2018 11:00 AM CDT   Ech Complete with TRES   [facility] Ech[facility] ([facility])    [address] [place] [address]  [place] [address] [phone]           1. Please bring or wear a comfortable two-piece outfit. 2. You may eat, drink and take your normal medicines. 3. For any questions that cannot be answered, please contact the ordering physician            Sep 11, 2018 11:00 AM CDT   Return Visit with Gael Hernandez MD   [facility] ([facility])    [address] [facility] [address] [phone]              Who to contact     If you have questions or need follow up information about today's  "clinic visit or your schedule please contact [facility] directly at [phone].  Normal or non-critical lab and imaging results will be communicated to you by MyChart, letter or phone within 4 business days after the clinic has received the results. If you do not hear from us within 7 days, please contact the clinic through MyChart or phone. If you have a critical or abnormal lab result, we will notify you by phone as soon as possible.  Submit refill requests through [employer] or call your pharmacy and they will forward the refill request to us. Please allow 3 business days for your refill to be completed.          Additional Information About Your Visit        Care EveryWhere ID     This is your Care EveryWhere ID. This could be used by other organizations to access your [facility] medical records  [id number]        Your Vitals Were     Pulse Height Pulse Oximetry BMI (Body Mass Index)          68 1.602 m (5' 3.07\") 95% 18.58 kg/m2         Blood Pressure from Last 3 Encounters:   07/10/18 158/74   06/25/18 122/72   06/12/18 (!) 88/58    Weight from Last 3 Encounters:   07/10/18 47.7 kg (105 lb 1.6 oz)   06/25/18 48 kg (105 lb 14.4 oz)   06/12/18 48.1 kg (106 lb)              We Performed the Following     Follow-Up with Cardiac Advanced Practice Provider          Today's Medication Changes          These changes are accurate as of 7/10/18 11:49 AM.  If you have any questions, ask your nurse or doctor.               Start taking these medicines.        Dose/Directions    isosorbide mononitrate 30 MG 24 hr tablet   Commonly known as:  IMDUR   Used for:  Coronary artery disease involving native coronary artery of native heart with angina pectoris (H), Benign essential hypertension        Dose:  30 mg   Take 1 tablet (30 mg) by mouth daily   Quantity:  30 tablet   Refills:  11            Where to get your medicines      These medications were sent to [facility] " [facility]  [place] - [address]   [address] [place] [address]     Phone:  [phone]     isosorbide mononitrate 30 MG 24 hr tablet                Primary Care Provider Office Phone # Fax #    [facility] [phone]       [address] [facility]  [place] [address]        Equal Access to Services     [facility] : Hadii aad ku hadasho Soomaali, waaxda luqadaha, qaybta kaalmada adeegyada, waxay idiin hayaan adeeg kharash la'aan . So [facility] [phone].    ATENCIÓN: Si karola espshiv, tiene a jeffries disposición servicios gratuitos de asistencia lingüística. [facility] [phone].    We comply with applicable federal civil rights laws and [place] laws. We do not discriminate on the basis of race, color, national origin, age, disability, sex, sexual orientation, or gender identity.            Thank you!     Thank you for choosing [facility]  for your care. Our goal is always to provide you with excellent care. Hearing back from our patients is one way we can continue to improve our services. Please take a few minutes to complete the written survey that you may receive in the mail after your visit with us. Thank you!             Your Updated Medication List - Protect others around you: Learn how to safely use, store and throw away your medicines at www.disposemymeds.org.          This list is accurate as of 7/10/18 11:49 AM.  Always use your most recent med list.                   Brand Name Dispense Instructions for use Diagnosis    ACE/ARB/ARNI NOT PRESCRIBED (INTENTIONAL)      Please choose reason not prescribed, below    Elevated blood pressure reading without diagnosis of hypertension, History of stroke       acetaminophen 325 MG tablet    TYLENOL    100 tablet    Take 325 mg by mouth every 4 hours as needed for mild pain        * albuterol (2.5 MG/3ML) 0.083% neb solution      Take 1 vial by nebulization every 4  hours as needed for shortness of breath / dyspnea or wheezing        * albuterol 108 (90 Base) MCG/ACT Inhaler    PROAIR HFA/PROVENTIL HFA/VENTOLIN HFA    1 Inhaler    Inhale 2 puffs into the lungs every 6 hours as needed for shortness of breath / dyspnea    Chronic bronchitis, unspecified chronic bronchitis type (H)       aspirin 81 MG EC tablet      Take 1 tablet (81 mg) by mouth daily    Coronary artery disease involving native coronary artery of native heart with angina pectoris (H)       atorvastatin 10 MG tablet    LIPITOR    90 tablet    Take 1 tablet (10 mg) by mouth At Bedtime    PAD (peripheral artery disease) (H)       BOOST     90 each    Take 1 Bottle by mouth 3 times daily (with meals)    History of stroke, Ischemic cardiomyopathy, Panlobular emphysema (H), Malaise, Loss of weight       clopidogrel 75 MG tablet    PLAVIX    90 tablet    Take 1 tablet (75 mg) by mouth daily    Acute CVA (cerebrovascular accident) (H)       COQ10 PO      Take 100 mg by mouth every 24 hours (at 16:00)        guaiFENesin 600 MG 12 hr tablet    MUCINEX     Take 600 mg by mouth 2 times daily        ipratropium - albuterol 0.5 mg/2.5 mg/3 mL 0.5-2.5 (3) MG/3ML neb solution    DUONEB    270 mL    Take 1 vial (3 mLs) by nebulization every 4 hours as needed for shortness of breath / dyspnea or wheezing    COPD exacerbation (H)       isosorbide mononitrate 30 MG 24 hr tablet    IMDUR    30 tablet    Take 1 tablet (30 mg) by mouth daily    Coronary artery disease involving native coronary artery of native heart with angina pectoris (H), Benign essential hypertension       metoprolol tartrate 25 MG tablet    LOPRESSOR    180 tablet    Take 1 tablet (25 mg) by mouth 2 times daily    Coronary artery disease involving native coronary artery of native heart with angina pectoris (H)       montelukast 10 MG tablet    SINGULAIR    30 tablet    Take 1 tablet (10 mg) by mouth At Bedtime    Chronic bronchitis, unspecified chronic bronchitis  type (H)       nicotine 7 MG/24HR 24 hr patch    NICODERM CQ    30 patch    Place 1 patch onto the skin every 24 hours    Cigarette nicotine dependence without complication       nitroGLYcerin 0.4 MG sublingual tablet    NITROSTAT    25 tablet    For chest pain place 1 tablet under the tongue every 5 minutes for 3 doses. If symptoms persist 5 minutes after 1st dose call 911.    Coronary artery disease involving native coronary artery of native heart with angina pectoris (H)       order for DME     1 Device    Equipment being ordered: Nebulizer    Pulmonary emphysema, unspecified emphysema type (H)       order for DME     1 Device    Equipment being ordered: Nebulizer machine    Chronic bronchitis, unspecified chronic bronchitis type (H)       OSCAL 500/200 D-3 PO      Take 1 tablet by mouth 2 times daily        pantoprazole 40 MG EC tablet    PROTONIX    30 tablet    Take 1 tablet (40 mg) by mouth every morning    Gastroesophageal reflux disease, esophagitis presence not specified       polyethylene glycol Packet    MIRALAX/GLYCOLAX     Take 1 packet by mouth daily as needed for constipation        senna-docusate 8.6-50 MG per tablet    SENOKOT-S;PERICOLACE    100 tablet    Take 1 tablet by mouth 2 times daily as needed for constipation    Constipation, unspecified constipation type       SYMBICORT 160-4.5 MCG/ACT Inhaler   Generic drug:  budesonide-formoterol     10.2 g    INHALE TWO PUFFS BY MOUTH TWICE A DAY    Chronic bronchitis, unspecified chronic bronchitis type (H)       tiotropium 18 MCG capsule    SPIRIVA HANDIHALER    30 capsule    Inhale 1 capsule (18 mcg) into the lungs daily    Chronic bronchitis, unspecified chronic bronchitis type (H)       * Notice:  This list has 2 medication(s) that are the same as other medications prescribed for you. Read the directions carefully, and ask your doctor or other care provider to review them with you.

## 2018-07-10 NOTE — PATIENT INSTRUCTIONS
1. ADD isosorbide mononitrate (Imdur) 30 mg daily in the morning  2. If BP is under 100 before medications do not take Imdur  3. Return for visit with Dr. Hernandez with heart ultrasound prior.    If you have questions or concerns please call clinic at [phone].    Please call [phone] for scheduling.      It was a pleasure seeing you today!     Reminder: Please bring in all current medications, over the counter supplements and vitamin bottles to your next appointment.

## 2018-07-10 NOTE — PROGRESS NOTES
Cardiology Clinic Progress Note  Preeti Ford MRN# [id number]   YOB: 1950 Age: 67 year old     Reason for visit: Medication titration follow up           Assessment and Plan:     1. Non-ST elevation MI, coronary artery disease, cardiogenic shock    Status post drug-eluting stent to the mid circumflex for in-stent restenosis    Mild to moderate residual disease elsewhere    Dull chest pain at rest with associated times of stress and not with exertion    Add isosorbide mononitrate 30 mg daily    Dual antiplatelet therapy with aspirin and Plavix for 1 year uninterrupted    2. Ischemic cardiomyopathy    LVEF 25-30% with global LV dysfunction    Continue metoprolol 25 mg twice daily    Repeat echocardiogram in 2 months and follow-up with Dr. Hernandez    3. Hypertension    Continue metoprolol 25 mg twice daily    Add isosorbide mononitrate 30 mg daily as above    4. Tobacco abuse    Continues to smoke 5 cigarettes daily      absolute smoking cessation highly encouraged    5. Hyperlipidemia    Most recent LDL was 91    Continue atorvastatin 10 mg daily (unable to tolerate higher doses of statin due to myalgias)         History of Presenting Illness:    Preeti Ford is a very pleasant 67 year old patient of Dr. Hernandez who presents today for a follow up post medication titration. She has a past medical history for coronary artery disease, ischemic cardiomyopathy, systolic congestive heart failure, aortic stenosis, PAD status post aortoiliac bypass, severe COPD and prior CVA (on chronic Plavix).      She presented to the emergency department on 5/17/18 in acute respiratory distress with hypoxia and was intubated on arrival. Her troponin was initially mildly elevated at 0.566 and her echocardiogram at the time of admission showed her LVEF was 25-30% with global LV dysfunction (EF in 3/2018 was 45-50% and 55% in 3/2017). She underwent a coronary angiogram on 5/18/2018 that showed in-stent restenosis of  "the mid circumflex that was treated with a drug-eluting stent.  She was transferred to the ICU for management of cardiogenic shock due to in-stent restenosis of her circumflex.  She has had acute hypoxic respiratory failure due to COPD exacerbation and Pseudomonas pneumonia requiring intubation. Post extubation she had complications with encephalopathy and delirium related to her critical illness that gradually improved.  Her prior to admission Plavix was continued and she was started on aspirin 81 mg daily and metoprolol 12.5 mg twice daily.  She is not on an ACE/ARB due to reported history of intolerance.    She seen by Dr. Hernandez post hospitalization, and he recommended that she increase her metoprolol to 25 mg twice daily.  Return to clinic today for a follow-up. Her BP is elevated today and she reports that is has been elevated for the last week at home. She denies increase salt intake, but states she is under significant personal stress with her brother potentially needing to be placed in a long-term care facility.  Unfortunately, she continues to smoke.  She has cut down from half a pack today to approximately 5 cigarettes daily.  She does note an occasional ache in her chest at rest that  is associated with increased stress.               Review of Systems:   Review of Systems:  Skin:  Negative     Eyes:  Positive for glasses  ENT:  Negative    Respiratory:  Positive for shortness of breath;cough  Cardiovascular:  Negative for;palpitations;lightheadedness;dizziness;edema chest pain;Positive for  Gastroenterology: Negative    Genitourinary:  Negative    Musculoskeletal:  Negative    Neurologic:  Negative    Psychiatric:  Negative    Heme/Lymph/Imm:  Positive for easy bruising  Endocrine:  Negative                Physical Exam:     Vitals: /74  Pulse 68  Ht 1.602 m (5' 3.07\")  Wt 47.7 kg (105 lb 1.6 oz)  SpO2 95%  BMI 18.58 kg/m2  Constitutional:  cooperative thin;cachectic;frail      Skin:  warm " and dry to the touch        Head:  normocephalic        Eyes:  pupils equal and round;conjunctivae and lids unremarkable;EOMS intact        ENT:  no pallor or cyanosis poor dentition      Neck:  JVP normal   torticollis    Chest:  clear to auscultation diminished breath sounds bilaterally;prolonged expiration      Cardiac: regular rhythm;normal S1 and S2       systolic ejection murmur;grade 1          Abdomen:  abdomen soft;no masses;non-tender        Vascular: pulses full and equal pulses below the femoral arteries are diminished                                    Extremities and Back:  no edema   keegan    Neurological:  affect appropriate                 Medications:     Current Outpatient Prescriptions   Medication Sig Dispense Refill     acetaminophen (TYLENOL) 325 MG tablet Take 325 mg by mouth every 4 hours as needed for mild pain  100 tablet      albuterol (2.5 MG/3ML) 0.083% neb solution Take 1 vial by nebulization every 4 hours as needed for shortness of breath / dyspnea or wheezing       albuterol (PROAIR HFA/PROVENTIL HFA/VENTOLIN HFA) 108 (90 BASE) MCG/ACT Inhaler Inhale 2 puffs into the lungs every 6 hours as needed for shortness of breath / dyspnea 1 Inhaler 11     aspirin 81 MG EC tablet Take 1 tablet (81 mg) by mouth daily       atorvastatin (LIPITOR) 10 MG tablet Take 1 tablet (10 mg) by mouth At Bedtime 90 tablet 3     Calcium Carbonate-Vitamin D (OSCAL 500/200 D-3 PO) Take 1 tablet by mouth 2 times daily       clopidogrel (PLAVIX) 75 MG tablet Take 1 tablet (75 mg) by mouth daily 90 tablet 3     Coenzyme Q10 (COQ10 PO) Take 100 mg by mouth every 24 hours (at 16:00)       guaiFENesin (MUCINEX) 600 MG 12 hr tablet Take 600 mg by mouth 2 times daily       ipratropium - albuterol 0.5 mg/2.5 mg/3 mL (DUONEB) 0.5-2.5 (3) MG/3ML neb solution Take 1 vial (3 mLs) by nebulization every 4 hours as needed for shortness of breath / dyspnea or wheezing 270 mL 3     isosorbide mononitrate (IMDUR) 30 MG 24 hr  tablet Take 1 tablet (30 mg) by mouth daily 30 tablet 11     metoprolol tartrate (LOPRESSOR) 25 MG tablet Take 1 tablet (25 mg) by mouth 2 times daily 180 tablet 3     montelukast (SINGULAIR) 10 MG tablet Take 1 tablet (10 mg) by mouth At Bedtime 30 tablet 11     nicotine (NICODERM CQ) 7 MG/24HR 24 hr patch Place 1 patch onto the skin every 24 hours 30 patch 1     Nutritional Supplements (BOOST) Take 1 Bottle by mouth 3 times daily (with meals) 90 each 0     pantoprazole (PROTONIX) 40 MG EC tablet Take 1 tablet (40 mg) by mouth every morning 30 tablet      polyethylene glycol (MIRALAX/GLYCOLAX) Packet Take 1 packet by mouth daily as needed for constipation       senna-docusate (SENOKOT-S;PERICOLACE) 8.6-50 MG per tablet Take 1 tablet by mouth 2 times daily as needed for constipation 100 tablet      SYMBICORT 160-4.5 MCG/ACT Inhaler INHALE TWO PUFFS BY MOUTH TWICE A DAY 10.2 g 5     tiotropium (SPIRIVA HANDIHALER) 18 MCG capsule Inhale 1 capsule (18 mcg) into the lungs daily 30 capsule 3     ACE/ARB/ARNI NOT PRESCRIBED, INTENTIONAL, Please choose reason not prescribed, below (Patient not taking: Reported on 7/10/2018)       nitroGLYcerin (NITROSTAT) 0.4 MG sublingual tablet For chest pain place 1 tablet under the tongue every 5 minutes for 3 doses. If symptoms persist 5 minutes after 1st dose call 911. 25 tablet      order for DME Equipment being ordered: Nebulizer machine 1 Device 0     order for DME Equipment being ordered: Nebulizer 1 Device 0           Family History   Problem Relation Age of Onset     Cerebrovascular Disease Mother      Cerebrovascular Disease Father      Genitourinary Problems Brother      Dialysis       Social History     Social History     Marital status:      Spouse name: N/A     Number of children: N/A     Years of education: N/A     Occupational History     Not on file.     Social History Main Topics     Smoking status: Current Every Day Smoker     Packs/day: 0.50     Types:  Cigarettes     Smokeless tobacco: Never Used      Comment: patient states she is working on it      Alcohol use No     Drug use: No     Sexual activity: Not Currently     Partners: Male     Other Topics Concern      Service No     Blood Transfusions No     Caffeine Concern No     Occupational Exposure No     Hobby Hazards No     Sleep Concern Yes     Trouble breathing at night due to COPD     Stress Concern No     Weight Concern No     Special Diet No     Back Care No     Exercise No     Bike Helmet No     Seat Belt Yes     Self-Exams Yes     Parent/Sibling W/ Cabg, Mi Or Angioplasty Before 65f 55m? No     Social History Narrative            Past Medical History:     Past Medical History:   Diagnosis Date     Arthritis      COPD (chronic obstructive pulmonary disease) (H)      Coronary artery disease      CVA (cerebral vascular accident) (H) 8-     Hypertension               Past Surgical History:     Past Surgical History:   Procedure Laterality Date     APPENDECTOMY       BYPASS GRAFT AORTOILIAC N/A 3/7/2017    Procedure: BYPASS GRAFT AORTOILIAC;  Surgeon: Marcos Pratt MD;  Location: [facility] OR     SALPINGO OOPHORECTOMY,R/L/DELORES      Salpingo Oophorectomy, RT/LT/DELORES              Allergies:   Crestor [rosuvastatin]; Hmg-coa-r inhibitors; Lisinopril; Optison [albumin human]; and Penicillins       Data:   All laboratory data reviewed:    LAST CHOLESTEROL:  Lab Results   Component Value Date    CHOL 165 05/18/2018     Lab Results   Component Value Date    HDL 53 05/18/2018     Lab Results   Component Value Date    LDL 91 05/18/2018     Lab Results   Component Value Date    TRIG 107 05/18/2018     Lab Results   Component Value Date    CHOLHDLRATIO 3.6 10/09/2015       LAST BMP:  Lab Results   Component Value Date     06/04/2018      Lab Results   Component Value Date    POTASSIUM 4.2 06/04/2018     Lab Results   Component Value Date    CHLORIDE 101 06/04/2018     Lab Results   Component Value  Date    CALDERON 8.6 06/04/2018     Lab Results   Component Value Date    CO2 37 06/04/2018     Lab Results   Component Value Date    BUN 18 06/04/2018     Lab Results   Component Value Date    CR 0.61 06/04/2018     Lab Results   Component Value Date    GLC 72 06/04/2018       LAST CBC:  Lab Results   Component Value Date    WBC 6.4 06/04/2018     Lab Results   Component Value Date    RBC 4.26 06/04/2018     Lab Results   Component Value Date    HGB 12.6 06/04/2018     Lab Results   Component Value Date    HCT 40.1 06/04/2018     Lab Results   Component Value Date    MCV 94 06/04/2018     Lab Results   Component Value Date    MCH 29.6 06/04/2018     Lab Results   Component Value Date    MCHC 31.4 06/04/2018     Lab Results   Component Value Date    RDW 13.0 06/04/2018     Lab Results   Component Value Date     06/04/2018         [address], CNP

## 2018-08-25 ENCOUNTER — HEALTH MAINTENANCE LETTER (OUTPATIENT)
Age: 68
End: 2018-08-25

## 2018-09-07 ENCOUNTER — HOSPITAL ENCOUNTER (OUTPATIENT)
Dept: CARDIOLOGY | Facility: CLINIC | Age: 68
Discharge: HOME | End: 2018-09-07
Attending: INTERNAL MEDICINE | Admitting: INTERNAL MEDICINE
Payer: MEDICARE

## 2018-09-07 DIAGNOSIS — I25.119 CORONARY ARTERY DISEASE INVOLVING NATIVE CORONARY ARTERY OF NATIVE HEART WITH ANGINA PECTORIS (H): ICD-10-CM

## 2018-09-07 DIAGNOSIS — I63.9 ACUTE CVA (CEREBROVASCULAR ACCIDENT) (H): ICD-10-CM

## 2018-09-07 DIAGNOSIS — Z95.5 S/P CORONARY ARTERY STENT PLACEMENT: ICD-10-CM

## 2018-09-07 PROCEDURE — 93306 TTE W/DOPPLER COMPLETE: CPT

## 2018-09-07 PROCEDURE — 93306 TTE W/DOPPLER COMPLETE: CPT | Mod: 26 | Performed by: INTERNAL MEDICINE

## 2018-09-11 ENCOUNTER — TELEPHONE (OUTPATIENT)
Dept: CARDIOLOGY | Facility: CLINIC | Age: 68
End: 2018-09-11

## 2018-09-11 ENCOUNTER — OFFICE VISIT (OUTPATIENT)
Dept: CARDIOLOGY | Facility: CLINIC | Age: 68
End: 2018-09-11
Payer: COMMERCIAL

## 2018-09-11 VITALS
DIASTOLIC BLOOD PRESSURE: 84 MMHG | WEIGHT: 108 LBS | HEIGHT: 63 IN | SYSTOLIC BLOOD PRESSURE: 148 MMHG | BODY MASS INDEX: 19.14 KG/M2 | HEART RATE: 64 BPM | OXYGEN SATURATION: 97 %

## 2018-09-11 DIAGNOSIS — Z95.5 S/P CORONARY ARTERY STENT PLACEMENT: ICD-10-CM

## 2018-09-11 DIAGNOSIS — I25.119 CORONARY ARTERY DISEASE INVOLVING NATIVE CORONARY ARTERY OF NATIVE HEART WITH ANGINA PECTORIS (H): ICD-10-CM

## 2018-09-11 DIAGNOSIS — I63.9 ACUTE CVA (CEREBROVASCULAR ACCIDENT) (H): ICD-10-CM

## 2018-09-11 DIAGNOSIS — F17.200 TOBACCO USE DISORDER: ICD-10-CM

## 2018-09-11 DIAGNOSIS — I10 BENIGN ESSENTIAL HYPERTENSION: ICD-10-CM

## 2018-09-11 DIAGNOSIS — J44.1 COPD EXACERBATION (H): Primary | ICD-10-CM

## 2018-09-11 PROCEDURE — 99214 OFFICE O/P EST MOD 30 MIN: CPT | Performed by: INTERNAL MEDICINE

## 2018-09-11 RX ORDER — ISOSORBIDE MONONITRATE 30 MG/1
15 TABLET, EXTENDED RELEASE ORAL DAILY
Qty: 30 TABLET | Refills: 11 | Status: SHIPPED | OUTPATIENT
Start: 2018-09-11 | End: 2020-02-13

## 2018-09-11 NOTE — MR AVS SNAPSHOT
After Visit Summary   9/11/2018    Preeti Ford    MRN: [id number]           Patient Information     Date Of Birth          1950        Visit Information        Provider Department      9/11/2018 11:00 AM Gael Hernandez MD [facility]        Today's Diagnoses     COPD exacerbation (H)    -  1    S/P coronary artery stent placement        Coronary artery disease involving native coronary artery of native heart with angina pectoris (H)        Acute CVA (cerebrovascular accident) - right paramedian superior vermis and left cerebellar hemisphere        Tobacco use disorder           Follow-ups after your visit        Additional Services     Follow-Up with Cardiac Advanced Practice Provider                 Future tests that were ordered for you today     Open Future Orders        Priority Expected Expires Ordered    Follow-Up with Cardiac Advanced Practice Provider Routine 4/10/2019 9/11/2019 9/11/2018            Who to contact     If you have questions or need follow up information about today's clinic visit or your schedule please contact [facility] directly at [phone].  Normal or non-critical lab and imaging results will be communicated to you by [employer], letter or phone within 4 business days after the clinic has received the results. If you do not hear from us within 7 days, please contact the clinic through [employer] or phone. If you have a critical or abnormal lab result, we will notify you by phone as soon as possible.  Submit refill requests through [employer] or call your pharmacy and they will forward the refill request to us. Please allow 3 business days for your refill to be completed.          Additional Information About Your Visit        Care EveryWhere ID     This is your Care EveryWhere ID. This could be used by other organizations to access your [facility] medical records  [id number]       "  Your Vitals Were     Pulse Height Pulse Oximetry BMI (Body Mass Index)          64 1.602 m (5' 3.07\") 97% 19.09 kg/m2         Blood Pressure from Last 3 Encounters:   09/11/18 148/84   07/10/18 158/74   06/25/18 122/72    Weight from Last 3 Encounters:   09/11/18 49 kg (108 lb)   07/10/18 47.7 kg (105 lb 1.6 oz)   06/25/18 48 kg (105 lb 14.4 oz)              We Performed the Following     Follow-Up with Cardiologist        Primary Care Provider Office Phone # Fax #    Deisy Carter, APRN [facility] [phone]       [address] [address] [place] [address]        Equal Access to Services     LYDIA WASHINGTON : Hadii aad ku hadasho Soomaali, waaxda luqadaha, qaybta kaalmada adeegyada, sagar hampton . So [facility] [phone].    ATENCIÓN: Si habla español, tiene a jeffries disposición servicios gratuitos de asistencia lingüística. Llame al [phone].    We comply with applicable federal civil rights laws and [place] laws. We do not discriminate on the basis of race, color, national origin, age, disability, sex, sexual orientation, or gender identity.            Thank you!     Thank you for choosing [facility]  for your care. Our goal is always to provide you with excellent care. Hearing back from our patients is one way we can continue to improve our services. Please take a few minutes to complete the written survey that you may receive in the mail after your visit with us. Thank you!             Your Updated Medication List - Protect others around you: Learn how to safely use, store and throw away your medicines at www.disposemymeds.org.          This list is accurate as of 9/11/18 11:27 AM.  Always use your most recent med list.                   Brand Name Dispense Instructions for use Diagnosis    ACE/ARB/ARNI NOT PRESCRIBED (INTENTIONAL)      Please choose reason not prescribed, below    Elevated blood pressure reading without " diagnosis of hypertension, History of stroke       acetaminophen 325 MG tablet    TYLENOL    100 tablet    Take 325 mg by mouth every 4 hours as needed for mild pain        * albuterol (2.5 MG/3ML) 0.083% neb solution      Take 1 vial by nebulization every 4 hours as needed for shortness of breath / dyspnea or wheezing        * albuterol 108 (90 Base) MCG/ACT inhaler    PROAIR HFA/PROVENTIL HFA/VENTOLIN HFA    1 Inhaler    Inhale 2 puffs into the lungs every 6 hours as needed for shortness of breath / dyspnea    Chronic bronchitis, unspecified chronic bronchitis type (H)       aspirin 81 MG EC tablet      Take 1 tablet (81 mg) by mouth daily    Coronary artery disease involving native coronary artery of native heart with angina pectoris (H)       atorvastatin 10 MG tablet    LIPITOR    90 tablet    Take 1 tablet (10 mg) by mouth At Bedtime    PAD (peripheral artery disease) (H)       BOOST     90 each    Take 1 Bottle by mouth 3 times daily (with meals)    History of stroke, Ischemic cardiomyopathy, Panlobular emphysema (H), Malaise, Loss of weight       clopidogrel 75 MG tablet    PLAVIX    90 tablet    Take 1 tablet (75 mg) by mouth daily    Acute CVA (cerebrovascular accident) (H)       COQ10 PO      Take 100 mg by mouth every 24 hours (at 16:00)        guaiFENesin 600 MG 12 hr tablet    MUCINEX     Take 600 mg by mouth 2 times daily        ipratropium - albuterol 0.5 mg/2.5 mg/3 mL 0.5-2.5 (3) MG/3ML neb solution    DUONEB    270 mL    Take 1 vial (3 mLs) by nebulization every 4 hours as needed for shortness of breath / dyspnea or wheezing    COPD exacerbation (H)       isosorbide mononitrate 30 MG 24 hr tablet    IMDUR    30 tablet    Take 1 tablet (30 mg) by mouth daily    Coronary artery disease involving native coronary artery of native heart with angina pectoris (H), Benign essential hypertension       metoprolol tartrate 25 MG tablet    LOPRESSOR    180 tablet    Take 1 tablet (25 mg) by mouth 2 times  daily    Coronary artery disease involving native coronary artery of native heart with angina pectoris (H)       montelukast 10 MG tablet    SINGULAIR    30 tablet    Take 1 tablet (10 mg) by mouth At Bedtime    Chronic bronchitis, unspecified chronic bronchitis type (H)       nicotine 7 MG/24HR 24 hr patch    NICODERM CQ    30 patch    Place 1 patch onto the skin every 24 hours    Cigarette nicotine dependence without complication       nitroGLYcerin 0.4 MG sublingual tablet    NITROSTAT    25 tablet    For chest pain place 1 tablet under the tongue every 5 minutes for 3 doses. If symptoms persist 5 minutes after 1st dose call 911.    Coronary artery disease involving native coronary artery of native heart with angina pectoris (H)       order for DME     1 Device    Equipment being ordered: Nebulizer    Pulmonary emphysema, unspecified emphysema type (H)       order for DME     1 Device    Equipment being ordered: Nebulizer machine    Chronic bronchitis, unspecified chronic bronchitis type (H)       OSCAL 500/200 D-3 PO      Take 1 tablet by mouth 2 times daily        pantoprazole 40 MG EC tablet    PROTONIX    30 tablet    Take 1 tablet (40 mg) by mouth every morning    Gastroesophageal reflux disease, esophagitis presence not specified       polyethylene glycol Packet    MIRALAX/GLYCOLAX     Take 1 packet by mouth daily as needed for constipation        senna-docusate 8.6-50 MG per tablet    SENOKOT-S;PERICOLACE    100 tablet    Take 1 tablet by mouth 2 times daily as needed for constipation    Constipation, unspecified constipation type       SYMBICORT 160-4.5 MCG/ACT Inhaler   Generic drug:  budesonide-formoterol     10.2 g    INHALE TWO PUFFS BY MOUTH TWICE A DAY    Chronic bronchitis, unspecified chronic bronchitis type (H)       tiotropium 18 MCG capsule    SPIRIVA HANDIHALER    30 capsule    Inhale 1 capsule (18 mcg) into the lungs daily    Chronic bronchitis, unspecified chronic bronchitis type (H)       *  Notice:  This list has 2 medication(s) that are the same as other medications prescribed for you. Read the directions carefully, and ask your doctor or other care provider to review them with you.

## 2018-09-11 NOTE — LETTER
2018      Deisy Carter, APRN CNP  [address] [facility] [address]      RE: Preeti Ford       Dear Colleague,    I had the pleasure of seeing Preeti Ford in the [facility].    Service Date: 2018      Deisy Carter CNP    [facility]   [address], [address]   [place] [address]      RE: Preeti oFrd   MRN: [id number]   : 1950      Dear Deisy:       I got together again with Preeti Ford today who is 67.  Unfortunately, she looks 10 years older.  She has been a chronic smoker, tobacco abuse, and has been shown to have coronary artery disease iliofemoral disease, carotid disease, severe COPD and some degree of claudication.      Since she has been taking better care of herself, she finally admits that she does feel better.  She is having virtually no angina.  She has recovered from her severe unstable angina associated with in-stent restenosis followed by repeated coronary stenting.  She had LV failure with ejection fractions of 25%-30%.        She is still having cigarettes on a daily basis.  I could not get her to quite quantify it but I am hoping it is not very many.  I spent a good 50% of my time repeatedly and redundantly reinforcing the absolute need for her to stop smoking.      She is not having any current angina.  Her breathing has actually improved, as she is smoking less and she has been on appropriate medical therapy.      With her feeling better, she was also rewarded with the fact that an echocardiogram done  showed an improved ejection fraction of 45%-50% with a calculated biplane ejection fraction of 45%.  The left atrium was moderately enlarged. She had mild-to-moderate pulmonary hypertension but this significant improvement in ejection fraction was a spectacular benefit for her.  Reinforced for her the absolute need for her to stay on her medicines as well as to maintain abstinence from  cigarettes.        She has moderate pulmonary hypertension.  She has significant COPD.  Currently, she is not requiring oxygen 24/7.      PHYSICAL EXAMINATION:   GENERAL:  Reveals a slightly cachectic-looking woman at 108 pounds but she is up 3 pounds from 2 months ago.   VITAL SIGNS:  Blood pressure 140/80, heart rate 64 beats per minute.   NECK:  She had no neck vein distention or bruits.   HEART:  Regular without gallop or murmur.   LUNGS:  Show trace rhonchi with no wheezes or rales.   ABDOMEN:  Flat without organomegaly.   EXTREMITIES:  Free of edema.      CURRENT MEDICATIONS:  We discussed Preeti's medications at length.  She is currently on:    1.  Rather low-dose metoprolol tartrate 25 mg b.i.d.   2.  Aspirin 81 mg a day.   3.  I had ordered for her Imdur 30 mg a day but she tells me she does not think she is taking it.  I had her go home and reinforce that.   4.  Spiriva inhaler daily.   5.  Symbicort inhaler b.i.d.   6.  Senna daily.   7.  MiraLax daily.    8.  NutriBoost daily.   9.  Protonix 40 mg a day.   10.  Singular 10 mg a day.   11.  Coenzyme Q.    12.  Plavix.    13.  Calcium.    14.  Vitamin D.   15.  Atorvastatin 10 mg a day   16.  Aspirin 81 mg a day.      This is a pretty profound improvement in ejection fraction and she sheepishly admits that she feels better.  Her EF has gone from 25-30 up to 45-50, truly remarkable.  She has recovered with stenting of her left circumflex.  She has recovered in terms of taking her medicines faithfully.  She has gotten better because she is smoking less and her lungs feel better but continuing to smoke and she does not have overt left or right heart failure at this time.      IMPRESSION:   1.  Improved left heart failure associated with improved ejection fraction as noted above.   2.  Ischemic cardiomyopathy, improved with medications and repeated left circumflex stenting.   3.  Mild-to-moderate aortic stenosis by echo.   4.  Severe significant COPD.   5.   Significant carotid artery disease in the past.   6.  Significant iliofemoral disease but currently her legs are not slowing her down too much, partially because she has altered her lifestyle.      PLAN:  We will see her in 6 months.  She will follow up with you accordingly.  If she has problems, let me know.  Over 40 minutes was spent doing her total consult, more than half the time education and more than half of that time continuing to demand mandatory abstinence from smoking.  I do not think she will stop but here's hoping.      Sincerely,         ZIGGY MCRCAY MD, [facility]             D: 2018   T: 2018   MT: KAI      Name:     TARIK HERNANDEZ   MRN:      [id number]        Account:      [id number]   :      1950           Service Date: 2018      Document: [id number]         Outpatient Encounter Prescriptions as of 2018   Medication Sig Dispense Refill     acetaminophen (TYLENOL) 325 MG tablet Take 325 mg by mouth every 4 hours as needed for mild pain  100 tablet      albuterol (2.5 MG/3ML) 0.083% neb solution Take 1 vial by nebulization every 4 hours as needed for shortness of breath / dyspnea or wheezing       albuterol (PROAIR HFA/PROVENTIL HFA/VENTOLIN HFA) 108 (90 BASE) MCG/ACT Inhaler Inhale 2 puffs into the lungs every 6 hours as needed for shortness of breath / dyspnea 1 Inhaler 11     aspirin 81 MG EC tablet Take 1 tablet (81 mg) by mouth daily       atorvastatin (LIPITOR) 10 MG tablet Take 1 tablet (10 mg) by mouth At Bedtime 90 tablet 3     Calcium Carbonate-Vitamin D (OSCAL 500/200 D-3 PO) Take 1 tablet by mouth 2 times daily       clopidogrel (PLAVIX) 75 MG tablet Take 1 tablet (75 mg) by mouth daily 90 tablet 3     Coenzyme Q10 (COQ10 PO) Take 100 mg by mouth every 24 hours (at 16:00)       guaiFENesin (MUCINEX) 600 MG 12 hr tablet Take 600 mg by mouth 2 times daily       ipratropium - albuterol 0.5 mg/2.5 mg/3 mL (DUONEB) 0.5-2.5 (3) MG/3ML neb solution Take 1 vial  (3 mLs) by nebulization every 4 hours as needed for shortness of breath / dyspnea or wheezing 270 mL 3     metoprolol tartrate (LOPRESSOR) 25 MG tablet Take 1 tablet (25 mg) by mouth 2 times daily 180 tablet 3     montelukast (SINGULAIR) 10 MG tablet Take 1 tablet (10 mg) by mouth At Bedtime 30 tablet 11     nicotine (NICODERM CQ) 7 MG/24HR 24 hr patch Place 1 patch onto the skin every 24 hours 30 patch 1     Nutritional Supplements (BOOST) Take 1 Bottle by mouth 3 times daily (with meals) 90 each 0     pantoprazole (PROTONIX) 40 MG EC tablet Take 1 tablet (40 mg) by mouth every morning 30 tablet      polyethylene glycol (MIRALAX/GLYCOLAX) Packet Take 1 packet by mouth daily as needed for constipation       senna-docusate (SENOKOT-S;PERICOLACE) 8.6-50 MG per tablet Take 1 tablet by mouth 2 times daily as needed for constipation 100 tablet      SYMBICORT 160-4.5 MCG/ACT Inhaler INHALE TWO PUFFS BY MOUTH TWICE A DAY 10.2 g 5     tiotropium (SPIRIVA HANDIHALER) 18 MCG capsule Inhale 1 capsule (18 mcg) into the lungs daily 30 capsule 3     ACE/ARB/ARNI NOT PRESCRIBED, INTENTIONAL, Please choose reason not prescribed, below (Patient not taking: Reported on 9/11/2018)       nitroGLYcerin (NITROSTAT) 0.4 MG sublingual tablet For chest pain place 1 tablet under the tongue every 5 minutes for 3 doses. If symptoms persist 5 minutes after 1st dose call 911. (Patient not taking: Reported on 9/11/2018) 25 tablet      order for DME Equipment being ordered: Nebulizer machine 1 Device 0     order for DME Equipment being ordered: Nebulizer 1 Device 0     [DISCONTINUED] isosorbide mononitrate (IMDUR) 30 MG 24 hr tablet Take 1 tablet (30 mg) by mouth daily (Patient not taking: Reported on 9/11/2018) 30 tablet 11     Facility-Administered Encounter Medications as of 9/11/2018   Medication Dose Route Frequency Provider Last Rate Last Dose     Reason aspirin not prescribed (intentional)   Other Continuous Jesús Wade MD            Again, thank you for allowing me to participate in the care of your patient.      Sincerely,    ZIGGY MCCRAY MD     [facility]

## 2018-09-11 NOTE — TELEPHONE ENCOUNTER
Patient calling to report that she has not been taking her Imdur. She states that she will start tomorrow with a half of a tab, per her discussion with Dr. Hernandez, but is wondering when this should be increased to the full tab?   Isosorbide Mononitrate IMDUR 30 MG Take 1 tablet (30 mg) by mouth daily     Routing to provider to review and advise. Patient would like a call back with recommendation.   Kika Henning RN

## 2018-09-11 NOTE — PROGRESS NOTES
Service Date: 2018      Deisy Carter CNP    [address], [address]   [place] [address]      RE: Preeti Ford   MRN: [id number]   : 1950      Dear Deisy:       I got together again with Preeti Ford today who is 67.  Unfortunately, she looks 10 years older.  She has been a chronic smoker, tobacco abuse, and has been shown to have coronary artery disease iliofemoral disease, carotid disease, severe COPD and some degree of claudication.      Since she has been taking better care of herself, she finally admits that she does feel better.  She is having virtually no angina.  She has recovered from her severe unstable angina associated with in-stent restenosis followed by repeated coronary stenting.  She had LV failure with ejection fractions of 25%-30%.        She is still having cigarettes on a daily basis.  I could not get her to quite quantify it but I am hoping it is not very many.  I spent a good 50% of my time repeatedly and redundantly reinforcing the absolute need for her to stop smoking.      She is not having any current angina.  Her breathing has actually improved, as she is smoking less and she has been on appropriate medical therapy.      With her feeling better, she was also rewarded with the fact that an echocardiogram done  showed an improved ejection fraction of 45%-50% with a calculated biplane ejection fraction of 45%.  The left atrium was moderately enlarged. She had mild-to-moderate pulmonary hypertension but this significant improvement in ejection fraction was a spectacular benefit for her.  Reinforced for her the absolute need for her to stay on her medicines as well as to maintain abstinence from cigarettes.        She has moderate pulmonary hypertension.  She has significant COPD.  Currently, she is not requiring oxygen .      PHYSICAL EXAMINATION:   GENERAL:  Reveals a slightly cachectic-looking woman at 108 pounds but she  is up 3 pounds from 2 months ago.   VITAL SIGNS:  Blood pressure 140/80, heart rate 64 beats per minute.   NECK:  She had no neck vein distention or bruits.   HEART:  Regular without gallop or murmur.   LUNGS:  Show trace rhonchi with no wheezes or rales.   ABDOMEN:  Flat without organomegaly.   EXTREMITIES:  Free of edema.      CURRENT MEDICATIONS:  We discussed Preeti's medications at length.  She is currently on:    1.  Rather low-dose metoprolol tartrate 25 mg b.i.d.   2.  Aspirin 81 mg a day.   3.  I had ordered for her Imdur 30 mg a day but she tells me she does not think she is taking it.  I had her go home and reinforce that.   4.  Spiriva inhaler daily.   5.  Symbicort inhaler b.i.d.   6.  Senna daily.   7.  MiraLax daily.    8.  NutriBoost daily.   9.  Protonix 40 mg a day.   10.  Singular 10 mg a day.   11.  Coenzyme Q.    12.  Plavix.    13.  Calcium.    14.  Vitamin D.   15.  Atorvastatin 10 mg a day   16.  Aspirin 81 mg a day.      This is a pretty profound improvement in ejection fraction and she sheepishly admits that she feels better.  Her EF has gone from 25-30 up to 45-50, truly remarkable.  She has recovered with stenting of her left circumflex.  She has recovered in terms of taking her medicines faithfully.  She has gotten better because she is smoking less and her lungs feel better but continuing to smoke and she does not have overt left or right heart failure at this time.      IMPRESSION:   1.  Improved left heart failure associated with improved ejection fraction as noted above.   2.  Ischemic cardiomyopathy, improved with medications and repeated left circumflex stenting.   3.  Mild-to-moderate aortic stenosis by echo.   4.  Severe significant COPD.   5.  Significant carotid artery disease in the past.   6.  Significant iliofemoral disease but currently her legs are not slowing her down too much, partially because she has altered her lifestyle.      PLAN:  We will see her in 6 months.  She  will follow up with you accordingly.  If she has problems, let me know.  Over 40 minutes was spent doing her total consult, more than half the time education and more than half of that time continuing to demand mandatory abstinence from smoking.  I do not think she will stop but here's hoping.      Sincerely,         ZIGGY MCCRAY MD, [facility]             D: 2018   T: 2018   MT: KAI      Name:     TARIK HERNANDEZ   MRN:      [id number]        Account:      [id number]   :      1950           Service Date: 2018      Document: [id number]

## 2018-09-11 NOTE — LETTER
9/11/2018    Deisy Carter, APRN CNP  [address] [facility] [address]    RE: Preeti Ford       Dear Colleague,    I had the pleasure of seeing Preeti Juradoracquel in the [facility].    Diagnoses on discharge included:   1.  Severe life-altering COPD.   2.  Respiratory failure requiring intubation.   3.  Coronary artery disease with cardiogenic shock and in-stent restenosis.  The patient had a repeat mid left circumflex stent placed.   4.  Class IV left ventricular failure.   5.  Suspected moderate aortic stenosis by echo associated with a decreased ejection fraction.   6.  Chronic tobacco abuse with COPD.     1.  Severe ischemic cardiomyopathy, not currently a major issue.   2.  Severe COPD.   3.  Right heart pressures were upper limits of normal.   4.  Some malnourish, cachectic-looking and weak.   5.  Chronic tobacco abuse, but she promises she is not smoking.   6.  Peripheral vascular artery disease with diminished pedal pulses.     1. Non-ST elevation MI, coronary artery disease, cardiogenic shock    Status post drug-eluting stent to the mid circumflex for in-stent restenosis    Mild to moderate residual disease elsewhere    Dull chest pain at rest with associated times of stress and not with exertion    Add isosorbide mononitrate 30 mg daily    Dual antiplatelet therapy with aspirin and Plavix for 1 year uninterrupted     2. Ischemic cardiomyopathy    LVEF 25-30% with global LV dysfunction    Continue metoprolol 25 mg twice daily    Repeat echocardiogram in 2 months and follow-up with Dr. Hernandez     3. Hypertension    Continue metoprolol 25 mg twice daily    Add isosorbide mononitrate 30 mg daily as above    I have reviewed the catheterization procedures including risks and benefits.   This could include angiography, angioplasty, stenting, and other coronary interventions as the  Interventional cardiologist deems necessary.  Risks were discussed but may not be  "limited to death, heart attack, bleeding, kidney injury, stroke, urgent coronary bypass surgery and vascular injury.    HPI and Plan:   See dictation    9/11/18 2 month follow up with echocardiogram done on 9/7, LOV added imdur 30mg daily    7/10/18 Follow up, LOV with Dr. Hernandez increased metoprolol to 25mg BID    6/7/18 Follow up post angiogram \"Successful deployment of a drug eluting stent   -2.25 x 12 mm  Synergy drug eluting stent was successfully deployed  across the mid circumflex artery ( in the prior ISR segment)\"      No orders of the defined types were placed in this encounter.    No orders of the defined types were placed in this encounter.    There are no discontinued medications.      Encounter Diagnoses   Name Primary?     S/P coronary artery stent placement      Coronary artery disease involving native coronary artery of native heart with angina pectoris (H)      Acute CVA (cerebrovascular accident) - right paramedian superior vermis and left cerebellar hemisphere        CURRENT MEDICATIONS:  Current Outpatient Prescriptions   Medication Sig Dispense Refill     acetaminophen (TYLENOL) 325 MG tablet Take 325 mg by mouth every 4 hours as needed for mild pain  100 tablet      albuterol (2.5 MG/3ML) 0.083% neb solution Take 1 vial by nebulization every 4 hours as needed for shortness of breath / dyspnea or wheezing       albuterol (PROAIR HFA/PROVENTIL HFA/VENTOLIN HFA) 108 (90 BASE) MCG/ACT Inhaler Inhale 2 puffs into the lungs every 6 hours as needed for shortness of breath / dyspnea 1 Inhaler 11     aspirin 81 MG EC tablet Take 1 tablet (81 mg) by mouth daily       atorvastatin (LIPITOR) 10 MG tablet Take 1 tablet (10 mg) by mouth At Bedtime 90 tablet 3     Calcium Carbonate-Vitamin D (OSCAL 500/200 D-3 PO) Take 1 tablet by mouth 2 times daily       clopidogrel (PLAVIX) 75 MG tablet Take 1 tablet (75 mg) by mouth daily 90 tablet 3     Coenzyme Q10 (COQ10 PO) Take 100 mg by mouth every 24 hours (at " 16:00)       guaiFENesin (MUCINEX) 600 MG 12 hr tablet Take 600 mg by mouth 2 times daily       ipratropium - albuterol 0.5 mg/2.5 mg/3 mL (DUONEB) 0.5-2.5 (3) MG/3ML neb solution Take 1 vial (3 mLs) by nebulization every 4 hours as needed for shortness of breath / dyspnea or wheezing 270 mL 3     metoprolol tartrate (LOPRESSOR) 25 MG tablet Take 1 tablet (25 mg) by mouth 2 times daily 180 tablet 3     montelukast (SINGULAIR) 10 MG tablet Take 1 tablet (10 mg) by mouth At Bedtime 30 tablet 11     nicotine (NICODERM CQ) 7 MG/24HR 24 hr patch Place 1 patch onto the skin every 24 hours 30 patch 1     Nutritional Supplements (BOOST) Take 1 Bottle by mouth 3 times daily (with meals) 90 each 0     pantoprazole (PROTONIX) 40 MG EC tablet Take 1 tablet (40 mg) by mouth every morning 30 tablet      polyethylene glycol (MIRALAX/GLYCOLAX) Packet Take 1 packet by mouth daily as needed for constipation       senna-docusate (SENOKOT-S;PERICOLACE) 8.6-50 MG per tablet Take 1 tablet by mouth 2 times daily as needed for constipation 100 tablet      SYMBICORT 160-4.5 MCG/ACT Inhaler INHALE TWO PUFFS BY MOUTH TWICE A DAY 10.2 g 5     tiotropium (SPIRIVA HANDIHALER) 18 MCG capsule Inhale 1 capsule (18 mcg) into the lungs daily 30 capsule 3     ACE/ARB/ARNI NOT PRESCRIBED, INTENTIONAL, Please choose reason not prescribed, below (Patient not taking: Reported on 9/11/2018)       isosorbide mononitrate (IMDUR) 30 MG 24 hr tablet Take 1 tablet (30 mg) by mouth daily (Patient not taking: Reported on 9/11/2018) 30 tablet 11     nitroGLYcerin (NITROSTAT) 0.4 MG sublingual tablet For chest pain place 1 tablet under the tongue every 5 minutes for 3 doses. If symptoms persist 5 minutes after 1st dose call 911. (Patient not taking: Reported on 9/11/2018) 25 tablet      order for DME Equipment being ordered: Nebulizer machine 1 Device 0     order for DME Equipment being ordered: Nebulizer 1 Device 0       ALLERGIES     Allergies   Allergen  Reactions     Crestor [Rosuvastatin] Other (See Comments)     dizziness     Hmg-Coa-R Inhibitors Other (See Comments)     Muscle/joint aching     Lisinopril Cough     Optison [Albumin Human] Other (See Comments)     Pt was flush and very dizzy.  Also had a BP drop     Penicillins Rash       PAST MEDICAL HISTORY:  Past Medical History:   Diagnosis Date     Arthritis      COPD (chronic obstructive pulmonary disease) (H)      Coronary artery disease      CVA (cerebral vascular accident) (H) 8-     Hypertension        PAST SURGICAL HISTORY:  Past Surgical History:   Procedure Laterality Date     APPENDECTOMY       BYPASS GRAFT AORTOILIAC N/A 3/7/2017    Procedure: BYPASS GRAFT AORTOILIAC;  Surgeon: Marcos Pratt MD;  Location: [facility] OR     SALPINGO OOPHORECTOMY,R/L/DELORES      Salpingo Oophorectomy, RT/LT/DELORES       FAMILY HISTORY:  Family History   Problem Relation Age of Onset     Cerebrovascular Disease Mother      Cerebrovascular Disease Father      Genitourinary Problems Brother      Dialysis       SOCIAL HISTORY:  Social History     Social History     Marital status:      Spouse name: N/A     Number of children: N/A     Years of education: N/A     Social History Main Topics     Smoking status: Current Every Day Smoker     Packs/day: 0.50     Types: Cigarettes     Smokeless tobacco: Never Used      Comment: patient states she is working on it      Alcohol use No     Drug use: No     Sexual activity: Not Currently     Partners: Male     Other Topics Concern      Service No     Blood Transfusions No     Caffeine Concern No     Occupational Exposure No     Hobby Hazards No     Sleep Concern Yes     Trouble breathing at night due to COPD     Stress Concern No     Weight Concern No     Special Diet No     Back Care No     Exercise No     Bike Helmet No     Seat Belt Yes     Self-Exams Yes     Parent/Sibling W/ Cabg, Mi Or Angioplasty Before 65f 55m? No     Social History Narrative         Review  "of Systems:  Skin:  Negative       Eyes:  Positive for glasses    ENT:  Negative      Respiratory:  Positive for shortness of breath;cough COPD  on inhalers   Cardiovascular:    chest pain;Positive for occ. dull ache of chest no pattern to it   Gastroenterology: Negative      Genitourinary:  Negative      Musculoskeletal:  Negative      Neurologic:  Negative      Psychiatric:  Negative      Heme/Lymph/Imm:  Positive for easy bruising    Endocrine:  Negative        Physical Exam:  Vitals: /84 (BP Location: Left arm, Patient Position: Fowlers, Cuff Size: Adult Regular)  Pulse 64  Ht 1.602 m (5' 3.07\")  Wt 49 kg (108 lb)  SpO2 97%  BMI 19.09 kg/m2    Constitutional:           Skin:             Head:           Eyes:           Lymph:      ENT:           Neck:           Respiratory:            Cardiac:                                                           GI:           Extremities and Muscular Skeletal:                 Neurological:           Psych:           Recent Lab Results:  LIPID RESULTS:  Lab Results   Component Value Date    CHOL 165 05/18/2018    HDL 53 05/18/2018    LDL 91 05/18/2018    TRIG 107 05/18/2018    CHOLHDLRATIO 3.6 10/09/2015       LIVER ENZYME RESULTS:  Lab Results   Component Value Date    AST 54 (H) 05/17/2018    ALT 48 05/17/2018       CBC RESULTS:  Lab Results   Component Value Date    WBC 6.4 06/04/2018    RBC 4.26 06/04/2018    HGB 12.6 06/04/2018    HCT 40.1 06/04/2018    MCV 94 06/04/2018    MCH 29.6 06/04/2018    MCHC 31.4 (L) 06/04/2018    RDW 13.0 06/04/2018     06/04/2018       BMP RESULTS:  Lab Results   Component Value Date     06/04/2018    POTASSIUM 4.2 06/04/2018    CHLORIDE 101 06/04/2018    CO2 37 (H) 06/04/2018    ANIONGAP 4 06/04/2018    GLC 72 06/04/2018    BUN 18 06/04/2018    CR 0.61 06/04/2018    GFRESTIMATED >90 06/04/2018    GFRESTBLACK >90 06/04/2018    CALDERON 8.6 06/04/2018        A1C RESULTS:  Lab Results   Component Value Date    A1C 5.5 " 03/24/2018       INR RESULTS:  Lab Results   Component Value Date    INR 0.91 05/17/2018    INR 0.95 04/19/2018           CC  Ziggy Hernandez MD  [address]  [place] [phone]                       Thank you for allowing me to participate in the care of your patient.      Sincerely,     ZIGGY HERNANDEZ MD     [facility]    cc:   Ziggy Hernandez MD  [address]  [place] [phone]

## 2018-09-11 NOTE — PROGRESS NOTES
Diagnoses on discharge included:   1.  Severe life-altering COPD.   2.  Respiratory failure requiring intubation.   3.  Coronary artery disease with cardiogenic shock and in-stent restenosis.  The patient had a repeat mid left circumflex stent placed.   4.  Class IV left ventricular failure.   5.  Suspected moderate aortic stenosis by echo associated with a decreased ejection fraction.   6.  Chronic tobacco abuse with COPD.     1.  Severe ischemic cardiomyopathy, not currently a major issue.   2.  Severe COPD.   3.  Right heart pressures were upper limits of normal.   4.  Some malnourish, cachectic-looking and weak.   5.  Chronic tobacco abuse, but she promises she is not smoking.   6.  Peripheral vascular artery disease with diminished pedal pulses.     1. Non-ST elevation MI, coronary artery disease, cardiogenic shock    Status post drug-eluting stent to the mid circumflex for in-stent restenosis    Mild to moderate residual disease elsewhere    Dull chest pain at rest with associated times of stress and not with exertion    Add isosorbide mononitrate 30 mg daily    Dual antiplatelet therapy with aspirin and Plavix for 1 year uninterrupted     2. Ischemic cardiomyopathy    LVEF 25-30% with global LV dysfunction    Continue metoprolol 25 mg twice daily    Repeat echocardiogram in 2 months and follow-up with Dr. Hernandez     3. Hypertension    Continue metoprolol 25 mg twice daily    Add isosorbide mononitrate 30 mg daily as above    I have reviewed the catheterization procedures including risks and benefits.   This could include angiography, angioplasty, stenting, and other coronary interventions as the  Interventional cardiologist deems necessary.  Risks were discussed but may not be limited to death, heart attack, bleeding, kidney injury, stroke, urgent coronary bypass surgery and vascular injury.    HPI and Plan:   See dictation    9/11/18 2 month follow up with echocardiogram done on 9/7, LOV added imdur 30mg  "daily    7/10/18 Follow up, LOV with Dr. Hernandez increased metoprolol to 25mg BID    6/7/18 Follow up post angiogram \"Successful deployment of a drug eluting stent   -2.25 x 12 mm  Synergy drug eluting stent was successfully deployed  across the mid circumflex artery ( in the prior ISR segment)\"      No orders of the defined types were placed in this encounter.    No orders of the defined types were placed in this encounter.    There are no discontinued medications.      Encounter Diagnoses   Name Primary?     S/P coronary artery stent placement      Coronary artery disease involving native coronary artery of native heart with angina pectoris (H)      Acute CVA (cerebrovascular accident) - right paramedian superior vermis and left cerebellar hemisphere        CURRENT MEDICATIONS:  Current Outpatient Prescriptions   Medication Sig Dispense Refill     acetaminophen (TYLENOL) 325 MG tablet Take 325 mg by mouth every 4 hours as needed for mild pain  100 tablet      albuterol (2.5 MG/3ML) 0.083% neb solution Take 1 vial by nebulization every 4 hours as needed for shortness of breath / dyspnea or wheezing       albuterol (PROAIR HFA/PROVENTIL HFA/VENTOLIN HFA) 108 (90 BASE) MCG/ACT Inhaler Inhale 2 puffs into the lungs every 6 hours as needed for shortness of breath / dyspnea 1 Inhaler 11     aspirin 81 MG EC tablet Take 1 tablet (81 mg) by mouth daily       atorvastatin (LIPITOR) 10 MG tablet Take 1 tablet (10 mg) by mouth At Bedtime 90 tablet 3     Calcium Carbonate-Vitamin D (OSCAL 500/200 D-3 PO) Take 1 tablet by mouth 2 times daily       clopidogrel (PLAVIX) 75 MG tablet Take 1 tablet (75 mg) by mouth daily 90 tablet 3     Coenzyme Q10 (COQ10 PO) Take 100 mg by mouth every 24 hours (at 16:00)       guaiFENesin (MUCINEX) 600 MG 12 hr tablet Take 600 mg by mouth 2 times daily       ipratropium - albuterol 0.5 mg/2.5 mg/3 mL (DUONEB) 0.5-2.5 (3) MG/3ML neb solution Take 1 vial (3 mLs) by nebulization every 4 hours as " needed for shortness of breath / dyspnea or wheezing 270 mL 3     metoprolol tartrate (LOPRESSOR) 25 MG tablet Take 1 tablet (25 mg) by mouth 2 times daily 180 tablet 3     montelukast (SINGULAIR) 10 MG tablet Take 1 tablet (10 mg) by mouth At Bedtime 30 tablet 11     nicotine (NICODERM CQ) 7 MG/24HR 24 hr patch Place 1 patch onto the skin every 24 hours 30 patch 1     Nutritional Supplements (BOOST) Take 1 Bottle by mouth 3 times daily (with meals) 90 each 0     pantoprazole (PROTONIX) 40 MG EC tablet Take 1 tablet (40 mg) by mouth every morning 30 tablet      polyethylene glycol (MIRALAX/GLYCOLAX) Packet Take 1 packet by mouth daily as needed for constipation       senna-docusate (SENOKOT-S;PERICOLACE) 8.6-50 MG per tablet Take 1 tablet by mouth 2 times daily as needed for constipation 100 tablet      SYMBICORT 160-4.5 MCG/ACT Inhaler INHALE TWO PUFFS BY MOUTH TWICE A DAY 10.2 g 5     tiotropium (SPIRIVA HANDIHALER) 18 MCG capsule Inhale 1 capsule (18 mcg) into the lungs daily 30 capsule 3     ACE/ARB/ARNI NOT PRESCRIBED, INTENTIONAL, Please choose reason not prescribed, below (Patient not taking: Reported on 9/11/2018)       isosorbide mononitrate (IMDUR) 30 MG 24 hr tablet Take 1 tablet (30 mg) by mouth daily (Patient not taking: Reported on 9/11/2018) 30 tablet 11     nitroGLYcerin (NITROSTAT) 0.4 MG sublingual tablet For chest pain place 1 tablet under the tongue every 5 minutes for 3 doses. If symptoms persist 5 minutes after 1st dose call 911. (Patient not taking: Reported on 9/11/2018) 25 tablet      order for DME Equipment being ordered: Nebulizer machine 1 Device 0     order for DME Equipment being ordered: Nebulizer 1 Device 0       ALLERGIES     Allergies   Allergen Reactions     Crestor [Rosuvastatin] Other (See Comments)     dizziness     Hmg-Coa-R Inhibitors Other (See Comments)     Muscle/joint aching     Lisinopril Cough     Optison [Albumin Human] Other (See Comments)     Pt was flush and very  dizzy.  Also had a BP drop     Penicillins Rash       PAST MEDICAL HISTORY:  Past Medical History:   Diagnosis Date     Arthritis      COPD (chronic obstructive pulmonary disease) (H)      Coronary artery disease      CVA (cerebral vascular accident) (H) 8-     Hypertension        PAST SURGICAL HISTORY:  Past Surgical History:   Procedure Laterality Date     APPENDECTOMY       BYPASS GRAFT AORTOILIAC N/A 3/7/2017    Procedure: BYPASS GRAFT AORTOILIAC;  Surgeon: Marcos Pratt MD;  Location: [facility] OR     SALPINGO OOPHORECTOMY,R/L/DELORES      Salpingo Oophorectomy, RT/LT/DELORES       FAMILY HISTORY:  Family History   Problem Relation Age of Onset     Cerebrovascular Disease Mother      Cerebrovascular Disease Father      Genitourinary Problems Brother      Dialysis       SOCIAL HISTORY:  Social History     Social History     Marital status:      Spouse name: N/A     Number of children: N/A     Years of education: N/A     Social History Main Topics     Smoking status: Current Every Day Smoker     Packs/day: 0.50     Types: Cigarettes     Smokeless tobacco: Never Used      Comment: patient states she is working on it      Alcohol use No     Drug use: No     Sexual activity: Not Currently     Partners: Male     Other Topics Concern      Service No     Blood Transfusions No     Caffeine Concern No     Occupational Exposure No     Hobby Hazards No     Sleep Concern Yes     Trouble breathing at night due to COPD     Stress Concern No     Weight Concern No     Special Diet No     Back Care No     Exercise No     Bike Helmet No     Seat Belt Yes     Self-Exams Yes     Parent/Sibling W/ Cabg, Mi Or Angioplasty Before 65f 55m? No     Social History Narrative         Review of Systems:  Skin:  Negative       Eyes:  Positive for glasses    ENT:  Negative      Respiratory:  Positive for shortness of breath;cough COPD  on inhalers   Cardiovascular:    chest pain;Positive for occ. dull ache of chest no pattern  "to it   Gastroenterology: Negative      Genitourinary:  Negative      Musculoskeletal:  Negative      Neurologic:  Negative      Psychiatric:  Negative      Heme/Lymph/Imm:  Positive for easy bruising    Endocrine:  Negative        Physical Exam:  Vitals: /84 (BP Location: Left arm, Patient Position: Fowlers, Cuff Size: Adult Regular)  Pulse 64  Ht 1.602 m (5' 3.07\")  Wt 49 kg (108 lb)  SpO2 97%  BMI 19.09 kg/m2    Constitutional:           Skin:             Head:           Eyes:           Lymph:      ENT:           Neck:           Respiratory:            Cardiac:                                                           GI:           Extremities and Muscular Skeletal:                 Neurological:           Psych:           Recent Lab Results:  LIPID RESULTS:  Lab Results   Component Value Date    CHOL 165 05/18/2018    HDL 53 05/18/2018    LDL 91 05/18/2018    TRIG 107 05/18/2018    CHOLHDLRATIO 3.6 10/09/2015       LIVER ENZYME RESULTS:  Lab Results   Component Value Date    AST 54 (H) 05/17/2018    ALT 48 05/17/2018       CBC RESULTS:  Lab Results   Component Value Date    WBC 6.4 06/04/2018    RBC 4.26 06/04/2018    HGB 12.6 06/04/2018    HCT 40.1 06/04/2018    MCV 94 06/04/2018    MCH 29.6 06/04/2018    MCHC 31.4 (L) 06/04/2018    RDW 13.0 06/04/2018     06/04/2018       BMP RESULTS:  Lab Results   Component Value Date     06/04/2018    POTASSIUM 4.2 06/04/2018    CHLORIDE 101 06/04/2018    CO2 37 (H) 06/04/2018    ANIONGAP 4 06/04/2018    GLC 72 06/04/2018    BUN 18 06/04/2018    CR 0.61 06/04/2018    GFRESTIMATED >90 06/04/2018    GFRESTBLACK >90 06/04/2018    CALDERON 8.6 06/04/2018        A1C RESULTS:  Lab Results   Component Value Date    A1C 5.5 03/24/2018       INR RESULTS:  Lab Results   Component Value Date    INR 0.91 05/17/2018    INR 0.95 04/19/2018           CC  Gael Hernandez MD  [id number]  [place] [address]                     "

## 2018-09-11 NOTE — TELEPHONE ENCOUNTER
Called patient, per Dr. Hernandez patient is to take half tab (15mg) of Imdur daily until her next follow up visit with cardiology in the spring. Patient verbalized understanding.

## 2018-09-12 ENCOUNTER — TELEPHONE (OUTPATIENT)
Dept: CARDIOLOGY | Facility: CLINIC | Age: 68
End: 2018-09-12

## 2018-09-12 NOTE — TELEPHONE ENCOUNTER
Preeti and her  are calling as she saw Dr Hernandez yesterday. Dr Hernandez asked her to take her IMDUR to help with her coronary issues.  She calls today reporting that she has been very dizzy and lightheaded today after taking Imdur. I spoke to Dr Hernandez about her symptoms and he directed her to discontinue the Imdur for the time being, but advised her to remain on the rest of her medications as planned. She was asked to call us early next week to check back on the medication change.

## 2018-11-06 NOTE — PROGRESS NOTES
SUBJECTIVE:   Preeti Ford is a 68 year old female who presents to clinic today for the following health issues:      HPI  Acute Illness   Acute illness concerns: Cough  Onset: Couple months    Fever: no    Chills/Sweats: YES    Headache (location?): YES    Sinus Pressure:no    Conjunctivitis:  no    Ear Pain: no    Rhinorrhea: YES- little    Congestion: YES    Sore Throat: no     Cough: YES-productive of yellow sputum, with shortness of breath    Wheeze: YES    Decreased Appetite: no    Nausea: no    Vomiting: no    Diarrhea:  no    Dysuria/Freq.: no    Fatigue/Achiness: no    Sick/Strep Exposure: no     Therapies Tried and outcome: None    Albuterol inhaler when wakes up at 4-5 am. Has not been using nebulizer.      Problem list and histories reviewed & adjusted, as indicated.  Additional history: as documented    Patient Active Problem List   Diagnosis     Advanced directives, counseling/discussion     Urinary incontinence     Forgetfulness     Hyperlipidemia LDL goal <130     History of stroke - right thalamus in 8/2013 and left cerebellar and right vermis in 3/2018     Numbness and tingling     Tobacco use disorder     CVA (cerebral vascular accident) (H)     ACS (acute coronary syndrome) (H)     Ischemic cardiomyopathy - EF 25% in 2015 but 55% in 3/2017 and 45-50% on 3/18     HTN, goal below 130/80     Acute systolic congestive heart failure (H)     Lung nodule     GERD (gastroesophageal reflux disease)     ST elevation myocardial infarction involving left anterior descending (LAD) coronary artery (H)     Chronic bronchitis, unspecified chronic bronchitis type (H)     PAD (peripheral artery disease) (H) - moderate left common carotid stenosis, aortoiliac bypass, chronic left vertebral and right posterior cerebral stenoses     Cervical high risk HPV (human papillomavirus) test positive     Acute respiratory failure with hypoxia (H)     COPD exacerbation (H)     Coronary artery disease involving native  coronary artery - STEMI with LAD and circ stents in 8/2015     Elevated troponin     Pulmonary emphysema (H)     Vertigo     Other seizures (H) - possible     Acute CVA (cerebrovascular accident) - right paramedian superior vermis and left cerebellar hemisphere     Malnutrition, unspecified type (H)     Hyponatremia     Hypochloremia     Hypercapnic respiratory failure (H)     Past Surgical History:   Procedure Laterality Date     APPENDECTOMY       BYPASS GRAFT AORTOILIAC N/A 3/7/2017    Procedure: BYPASS GRAFT AORTOILIAC;  Surgeon: Marcos Pratt MD;  Location: [facility] OR     SALPINGO OOPHORECTOMY,R/L/DELORES      Salpingo Oophorectomy, RT/LT/DELORES       Social History   Substance Use Topics     Smoking status: Current Every Day Smoker     Packs/day: 0.50     Types: Cigarettes     Smokeless tobacco: Never Used      Comment: patient states she is working on it      Alcohol use No     Family History   Problem Relation Age of Onset     Cerebrovascular Disease Mother      Cerebrovascular Disease Father      Genitourinary Problems Brother      Dialysis         Current Outpatient Prescriptions   Medication Sig Dispense Refill     ACE/ARB/ARNI NOT PRESCRIBED, INTENTIONAL, Please choose reason not prescribed, below       acetaminophen (TYLENOL) 325 MG tablet Take 325 mg by mouth every 4 hours as needed for mild pain  100 tablet      albuterol (2.5 MG/3ML) 0.083% neb solution Take 1 vial by nebulization every 4 hours as needed for shortness of breath / dyspnea or wheezing       albuterol (PROAIR HFA/PROVENTIL HFA/VENTOLIN HFA) 108 (90 BASE) MCG/ACT Inhaler Inhale 2 puffs into the lungs every 6 hours as needed for shortness of breath / dyspnea 1 Inhaler 11     aspirin 81 MG EC tablet Take 1 tablet (81 mg) by mouth daily       atorvastatin (LIPITOR) 10 MG tablet Take 1 tablet (10 mg) by mouth At Bedtime 90 tablet 3     Calcium Carbonate-Vitamin D (OSCAL 500/200 D-3 PO) Take 1 tablet by mouth 2 times daily       clopidogrel  (PLAVIX) 75 MG tablet Take 1 tablet (75 mg) by mouth daily 90 tablet 3     Coenzyme Q10 (COQ10 PO) Take 100 mg by mouth every 24 hours (at 16:00)       doxycycline (VIBRAMYCIN) 100 MG capsule Take 1 capsule (100 mg) by mouth 2 times daily 20 capsule 0     guaiFENesin (MUCINEX) 600 MG 12 hr tablet Take 600 mg by mouth 2 times daily       ipratropium - albuterol 0.5 mg/2.5 mg/3 mL (DUONEB) 0.5-2.5 (3) MG/3ML neb solution Take 1 vial (3 mLs) by nebulization every 4 hours as needed for shortness of breath / dyspnea or wheezing 270 mL 3     isosorbide mononitrate (IMDUR) 30 MG 24 hr tablet Take 0.5 tablets (15 mg) by mouth daily 30 tablet 11     metoprolol tartrate (LOPRESSOR) 25 MG tablet Take 1 tablet (25 mg) by mouth 2 times daily 180 tablet 3     montelukast (SINGULAIR) 10 MG tablet Take 1 tablet (10 mg) by mouth At Bedtime 30 tablet 11     nicotine (NICODERM CQ) 7 MG/24HR 24 hr patch Place 1 patch onto the skin every 24 hours 30 patch 1     nitroGLYcerin (NITROSTAT) 0.4 MG sublingual tablet For chest pain place 1 tablet under the tongue every 5 minutes for 3 doses. If symptoms persist 5 minutes after 1st dose call 911. 25 tablet      Nutritional Supplements (BOOST) Take 1 Bottle by mouth 3 times daily (with meals) 90 each 0     order for DME Equipment being ordered: Nebulizer machine 1 Device 0     order for DME Equipment being ordered: Nebulizer 1 Device 0     pantoprazole (PROTONIX) 40 MG EC tablet Take 1 tablet (40 mg) by mouth every morning 30 tablet      polyethylene glycol (MIRALAX/GLYCOLAX) Packet Take 1 packet by mouth daily as needed for constipation       senna-docusate (SENOKOT-S;PERICOLACE) 8.6-50 MG per tablet Take 1 tablet by mouth 2 times daily as needed for constipation 100 tablet      SYMBICORT 160-4.5 MCG/ACT Inhaler INHALE TWO PUFFS BY MOUTH TWICE A DAY 10.2 g 5     tiotropium (SPIRIVA HANDIHALER) 18 MCG capsule Inhale 1 capsule (18 mcg) into the lungs daily 30 capsule 3     Allergies   Allergen  Reactions     Crestor [Rosuvastatin] Other (See Comments)     dizziness     Hmg-Coa-R Inhibitors Other (See Comments)     Muscle/joint aching     Lisinopril Cough     Optison [Albumin Human] Other (See Comments)     Pt was flush and very dizzy.  Also had a BP drop     Penicillins Rash     Recent Labs   Lab Test  11/09/18   1201  06/04/18   0716   05/18/18   1255   05/17/18   0436  04/19/18   1244  04/13/18   1419  03/24/18   0515   08/04/17   0910   03/07/17   1940   02/18/16   0718   08/18/13   0830   02/16/11   1425   A1C   --    --    --    --    --    --    --    --   5.5   --    --    --   5.5   --   5.7   --    --    --    --    LDL   --    --    --   91   --    --    --    --   77   --   90   --    --    < >  86   < >   --    < >  199*   HDL   --    --    --   53   --    --    --    --   67   --   72   --    --    < >  66   < >   --    < >  55   TRIG   --    --    --   107   --    --    --    --   106   --   61   --    --    < >  90   < >   --    < >  123   ALT   --    --    --    --    --   48  25  31   --    --    --    < >  26   < >   --    < >  20   --    --    CR  0.80  0.61   < >   --    < >  0.70  0.58  0.70   --    < >   --    < >  0.89   < >  0.73   < >  0.84   --   0.81   GFRESTIMATED  72  >90   < >   --    < >  85  >90  85   --    < >   --    < >  64   < >  80   < >  69   --   73   GFRESTBLACK  87  >90   < >   --    < >  >90  >90  >90   --    < >   --    < >  77   < >  >90   GFR Calc     < >  84   --   88   POTASSIUM  4.4  4.2   < >   --    < >  4.8  4.0  3.9   --    < >   --    < >  4.6   < >   --    < >  3.7   --   4.8   TSH   --    --    --    --    --    --    --    --    --    --    --    --    --    --    --    --   1.05   --   0.86    < > = values in this interval not displayed.      BP Readings from Last 3 Encounters:   11/09/18 168/76   09/11/18 148/84   07/10/18 158/74    Wt Readings from Last 3 Encounters:   11/09/18 109 lb 6.4 oz (49.6 kg)   09/11/18 108 lb (49 kg)  "  07/10/18 105 lb 1.6 oz (47.7 kg)                  Labs reviewed in EPIC    ROS:  Constitutional, HEENT, cardiovascular, pulmonary, GI, , musculoskeletal, neuro, skin, endocrine and psych systems are negative, except as otherwise noted.    OBJECTIVE:     /76  Pulse 56  Temp 97  F (36.1  C) (Temporal)  Resp 16  Ht 5' 3.07\" (1.602 m)  Wt 109 lb 6.4 oz (49.6 kg)  SpO2 97%  BMI 19.34 kg/m2  Body mass index is 19.34 kg/(m^2).  GENERAL: alert and no acute distress  EYES: Eyes grossly normal to inspection, PERRL and conjunctivae and sclerae normal  NECK: no adenopathy, no asymmetry, masses, or scars and thyroid normal to palpation  RESP: Limited air movement auscultated in lungs bilaterally, expiratory wheeze bilateral lower lungs  CV: regular rate and rhythm, normal S1 S2, no S3 or S4, no murmur, click or rub  MS: no gross musculoskeletal defects noted, no edema  SKIN: no suspicious lesions or rashes  NEURO: Normal strength and tone, mentation intact and speech normal  PSYCH: mentation appears normal, affect normal/bright    Diagnostic Test Results:  Results for orders placed or performed in visit on 11/09/18   BASIC METABOLIC PANEL   Result Value Ref Range    Sodium 137 133 - 144 mmol/L    Potassium 4.4 3.4 - 5.3 mmol/L    Chloride 99 94 - 109 mmol/L    Carbon Dioxide 29 20 - 32 mmol/L    Anion Gap 9 3 - 14 mmol/L    Glucose 78 70 - 99 mg/dL    Urea Nitrogen 12 7 - 30 mg/dL    Creatinine 0.80 0.52 - 1.04 mg/dL    GFR Estimate 72 >60 mL/min/1.7m2    GFR Estimate If Black 87 >60 mL/min/1.7m2    Calcium 8.7 8.5 - 10.1 mg/dL   CBC with platelets   Result Value Ref Range    WBC 6.1 4.0 - 11.0 10e9/L    RBC Count 5.45 (H) 3.8 - 5.2 10e12/L    Hemoglobin 15.6 11.7 - 15.7 g/dL    Hematocrit 46.9 35.0 - 47.0 %    MCV 86 78 - 100 fl    MCH 28.6 26.5 - 33.0 pg    MCHC 33.3 31.5 - 36.5 g/dL    RDW 14.3 10.0 - 15.0 %    Platelet Count 210 150 - 450 10e9/L       CXR - negative    ASSESSMENT/PLAN:     1. Acute " bronchitis with symptoms > 10 days  Patient declined oral steroid as she feels she worsens whenever she takes steroids.  I discussed with her that steroids will help reduce inflammation in her lungs and improve breathing but she is adamant about not taking oral steroids.  Given the duration of symptoms, her comorbidities and severity of COPD we will treat with oral antibiotic for 10 days.  Discussed she should be seen again or go to the ER if symptoms worsen or persist. Recommend she establish care with a pulmonologist.  - doxycycline (VIBRAMYCIN) 100 MG capsule; Take 1 capsule (100 mg) by mouth 2 times daily  Dispense: 20 capsule; Refill: 0  - CBC with platelets    2. COPD, severe (H)  - XR Chest 2 Views; Future  - PULMONARY MEDICINE REFERRAL    3. HTN, goal below 130/80  - BASIC METABOLIC PANEL    [facility]

## 2018-11-09 ENCOUNTER — RADIANT APPOINTMENT (OUTPATIENT)
Dept: GENERAL RADIOLOGY | Facility: OTHER | Age: 68
End: 2018-11-09
Attending: STUDENT IN AN ORGANIZED HEALTH CARE EDUCATION/TRAINING PROGRAM
Payer: COMMERCIAL

## 2018-11-09 ENCOUNTER — OFFICE VISIT (OUTPATIENT)
Dept: FAMILY MEDICINE | Facility: OTHER | Age: 68
End: 2018-11-09
Payer: COMMERCIAL

## 2018-11-09 VITALS
HEART RATE: 56 BPM | HEIGHT: 63 IN | RESPIRATION RATE: 16 BRPM | DIASTOLIC BLOOD PRESSURE: 76 MMHG | WEIGHT: 109.4 LBS | TEMPERATURE: 97 F | BODY MASS INDEX: 19.38 KG/M2 | OXYGEN SATURATION: 97 % | SYSTOLIC BLOOD PRESSURE: 168 MMHG

## 2018-11-09 DIAGNOSIS — J20.9 ACUTE BRONCHITIS WITH SYMPTOMS > 10 DAYS: Primary | ICD-10-CM

## 2018-11-09 DIAGNOSIS — J44.9 COPD, SEVERE (H): ICD-10-CM

## 2018-11-09 DIAGNOSIS — I10 HTN, GOAL BELOW 130/80: ICD-10-CM

## 2018-11-09 LAB
ANION GAP SERPL CALCULATED.3IONS-SCNC: 9 MMOL/L (ref 3–14)
BUN SERPL-MCNC: 12 MG/DL (ref 7–30)
CALCIUM SERPL-MCNC: 8.7 MG/DL (ref 8.5–10.1)
CHLORIDE SERPL-SCNC: 99 MMOL/L (ref 94–109)
CO2 SERPL-SCNC: 29 MMOL/L (ref 20–32)
CREAT SERPL-MCNC: 0.8 MG/DL (ref 0.52–1.04)
ERYTHROCYTE [DISTWIDTH] IN BLOOD BY AUTOMATED COUNT: 14.3 % (ref 10–15)
GFR SERPL CREATININE-BSD FRML MDRD: 72 ML/MIN/1.7M2
GLUCOSE SERPL-MCNC: 78 MG/DL (ref 70–99)
HCT VFR BLD AUTO: 46.9 % (ref 35–47)
HGB BLD-MCNC: 15.6 G/DL (ref 11.7–15.7)
MCH RBC QN AUTO: 28.6 PG (ref 26.5–33)
MCHC RBC AUTO-ENTMCNC: 33.3 G/DL (ref 31.5–36.5)
MCV RBC AUTO: 86 FL (ref 78–100)
PLATELET # BLD AUTO: 210 10E9/L (ref 150–450)
POTASSIUM SERPL-SCNC: 4.4 MMOL/L (ref 3.4–5.3)
RBC # BLD AUTO: 5.45 10E12/L (ref 3.8–5.2)
SODIUM SERPL-SCNC: 137 MMOL/L (ref 133–144)
WBC # BLD AUTO: 6.1 10E9/L (ref 4–11)

## 2018-11-09 PROCEDURE — 80048 BASIC METABOLIC PNL TOTAL CA: CPT | Performed by: STUDENT IN AN ORGANIZED HEALTH CARE EDUCATION/TRAINING PROGRAM

## 2018-11-09 PROCEDURE — 85027 COMPLETE CBC AUTOMATED: CPT | Performed by: STUDENT IN AN ORGANIZED HEALTH CARE EDUCATION/TRAINING PROGRAM

## 2018-11-09 PROCEDURE — 99213 OFFICE O/P EST LOW 20 MIN: CPT | Performed by: STUDENT IN AN ORGANIZED HEALTH CARE EDUCATION/TRAINING PROGRAM

## 2018-11-09 PROCEDURE — 36415 COLL VENOUS BLD VENIPUNCTURE: CPT | Performed by: STUDENT IN AN ORGANIZED HEALTH CARE EDUCATION/TRAINING PROGRAM

## 2018-11-09 PROCEDURE — 71046 X-RAY EXAM CHEST 2 VIEWS: CPT | Mod: FY

## 2018-11-09 RX ORDER — LEVOFLOXACIN 500 MG/1
500 TABLET, FILM COATED ORAL DAILY
Qty: 10 TABLET | Refills: 0 | Status: CANCELLED | OUTPATIENT
Start: 2018-11-09

## 2018-11-09 RX ORDER — PREDNISONE 20 MG/1
TABLET ORAL
Qty: 20 TABLET | Refills: 0 | Status: CANCELLED | OUTPATIENT
Start: 2018-11-09

## 2018-11-09 RX ORDER — DOXYCYCLINE 100 MG/1
100 CAPSULE ORAL 2 TIMES DAILY
Qty: 20 CAPSULE | Refills: 0 | Status: SHIPPED | OUTPATIENT
Start: 2018-11-09 | End: 2020-02-13

## 2018-11-09 ASSESSMENT — PAIN SCALES - GENERAL: PAINLEVEL: NO PAIN (0)

## 2018-11-09 NOTE — PATIENT INSTRUCTIONS
Antibiotic: Doxycycline 100 mg twice daily for 10 days.    We will call you with the results of the chest x-ray.    Blood work today.    Follow up if symptoms are worsening or persisting.     TAHMINA RobertoC

## 2018-11-09 NOTE — MR AVS SNAPSHOT
After Visit Summary   11/9/2018    Preeti Ford    MRN: [id number]           Patient Information     Date Of Birth          1950        Visit Information        Provider Department      11/9/2018 11:00 AM Deisy Carter APRN CNP [facility]        Today's Diagnoses     Cough    -  1    Asymptomatic postmenopausal status        Need for prophylactic vaccination and inoculation against influenza        Need for prophylactic vaccination against Streptococcus pneumoniae (pneumococcus)        COPD exacerbation (H)        COPD, severe (H)        HTN, goal below 130/80          Care Instructions    Antibiotic: Doxycycline 100 mg twice daily for 10 days.    We will call you with the results of the chest x-ray.    Blood work today.    Follow up if symptoms are worsening or persisting.     Deisy Carter, NP-C            Follow-ups after your visit        Additional Services     PULMONARY MEDICINE REFERRAL       Your provider has referred you to: [facility]: [facility] [phone]   [email or web address]    Please be aware that coverage of these services is subject to the terms and limitations of your health insurance plan.  Call member services at your health plan with any benefit or coverage questions.      Please bring the following with you to your appointment:    (1) Any X-Rays, CTs or MRIs which have been performed.  Contact the facility where they were done to arrange for  prior to your scheduled appointment.    (2) List of current medications   (3) This referral request   (4) Any documents/labs given to you for this referral                  Who to contact     If you have questions or need follow up information about today's clinic visit or your schedule please contact [facility] directly at [phone].  Normal or non-critical lab and imaging results will be communicated to you by  "MyChart, letter or phone within 4 business days after the clinic has received the results. If you do not hear from us within 7 days, please contact the clinic through [employer] or phone. If you have a critical or abnormal lab result, we will notify you by phone as soon as possible.  Submit refill requests through [employer] or call your pharmacy and they will forward the refill request to us. Please allow 3 business days for your refill to be completed.          Additional Information About Your Visit        [employer] Information     [employer] lets you send messages to your doctor, view your test results, renew your prescriptions, schedule appointments and more. To sign up, go to [email or web address] . Click on \"Log in\" on the left side of the screen, which will take you to the Welcome page. Then click on \"Sign up Now\" on the right side of the page.     You will be asked to enter the access code listed below, as well as some personal information. Please follow the directions to create your username and password.     Your access code is: [email or web address]  Expires: 2019 11:55 AM     Your access code will  in 90 days. If you need help or a new code, please call your [place] clinic or [phone].        Care EveryWhere ID     This is your Care EveryWhere ID. This could be used by other organizations to access your [place] medical records  [id number]        Your Vitals Were     Pulse Temperature Respirations Height Pulse Oximetry BMI (Body Mass Index)    56 97  F (36.1  C) (Temporal) 16 5' 3.07\" (1.602 m) 97% 19.34 kg/m2       Blood Pressure from Last 3 Encounters:   18 168/76   18 148/84   07/10/18 158/74    Weight from Last 3 Encounters:   18 109 lb 6.4 oz (49.6 kg)   18 108 lb (49 kg)   07/10/18 105 lb 1.6 oz (47.7 kg)              We Performed the Following     BASIC METABOLIC PANEL     CBC with platelets     PULMONARY MEDICINE REFERRAL          Today's Medication Changes          These " changes are accurate as of 11/9/18 11:55 AM.  If you have any questions, ask your nurse or doctor.               Start taking these medicines.        Dose/Directions    doxycycline 100 MG capsule   Commonly known as:  VIBRAMYCIN   Used for:  COPD exacerbation (H)   Started by:  Deisy Carter APRN CNP        Dose:  100 mg   Take 1 capsule (100 mg) by mouth 2 times daily   Quantity:  20 capsule   Refills:  0            Where to get your medicines      These medications were sent to [facility] [address]   [address] [place] [address]     Phone:  [phone]     doxycycline 100 MG capsule                Primary Care Provider Office Phone # Fax #    [facility] [phone] [address] [place] [address]        Equal Access to Services     [facility]: Hadii steve ku hadasho Soomaali, waaxda luqadaha, qaybta kaalmada adeegyada, waxgarth longoriain haysana hampton . So [facility] [phone].    ATENCIÓN: Si habla español, tiene a jeffries disposición servicios gratuitos de asistencia lingüística. Llame al [phone].    We comply with applicable federal civil rights laws and [place] laws. We do not discriminate on the basis of race, color, national origin, age, disability, sex, sexual orientation, or gender identity.            Thank you!     Thank you for choosing [facility]  for your care. Our goal is always to provide you with excellent care. Hearing back from our patients is one way we can continue to improve our services. Please take a few minutes to complete the written survey that you may receive in the mail after your visit with us. Thank you!             Your Updated Medication List - Protect others around you: Learn how to safely use, store and throw away your medicines at www.disposemymeds.org.          This list is accurate as of 11/9/18 11:55 AM.  Always use your most recent med  list.                   Brand Name Dispense Instructions for use Diagnosis    ACE/ARB/ARNI NOT PRESCRIBED (INTENTIONAL)      Please choose reason not prescribed, below    Elevated blood pressure reading without diagnosis of hypertension, History of stroke       acetaminophen 325 MG tablet    TYLENOL    100 tablet    Take 325 mg by mouth every 4 hours as needed for mild pain        * albuterol (2.5 MG/3ML) 0.083% neb solution      Take 1 vial by nebulization every 4 hours as needed for shortness of breath / dyspnea or wheezing        * albuterol 108 (90 Base) MCG/ACT inhaler    PROAIR HFA/PROVENTIL HFA/VENTOLIN HFA    1 Inhaler    Inhale 2 puffs into the lungs every 6 hours as needed for shortness of breath / dyspnea    Chronic bronchitis, unspecified chronic bronchitis type (H)       aspirin 81 MG EC tablet      Take 1 tablet (81 mg) by mouth daily    Coronary artery disease involving native coronary artery of native heart with angina pectoris (H)       atorvastatin 10 MG tablet    LIPITOR    90 tablet    Take 1 tablet (10 mg) by mouth At Bedtime    PAD (peripheral artery disease) (H)       BOOST     90 each    Take 1 Bottle by mouth 3 times daily (with meals)    History of stroke, Ischemic cardiomyopathy, Panlobular emphysema (H), Malaise, Loss of weight       clopidogrel 75 MG tablet    PLAVIX    90 tablet    Take 1 tablet (75 mg) by mouth daily    Acute CVA (cerebrovascular accident) (H)       COQ10 PO      Take 100 mg by mouth every 24 hours (at 16:00)        doxycycline 100 MG capsule    VIBRAMYCIN    20 capsule    Take 1 capsule (100 mg) by mouth 2 times daily    COPD exacerbation (H)       guaiFENesin 600 MG 12 hr tablet    MUCINEX     Take 600 mg by mouth 2 times daily        ipratropium - albuterol 0.5 mg/2.5 mg/3 mL 0.5-2.5 (3) MG/3ML neb solution    DUONEB    270 mL    Take 1 vial (3 mLs) by nebulization every 4 hours as needed for shortness of breath / dyspnea or wheezing    COPD exacerbation (H)        isosorbide mononitrate 30 MG 24 hr tablet    IMDUR    30 tablet    Take 0.5 tablets (15 mg) by mouth daily    Coronary artery disease involving native coronary artery of native heart with angina pectoris (H), Benign essential hypertension       metoprolol tartrate 25 MG tablet    LOPRESSOR    180 tablet    Take 1 tablet (25 mg) by mouth 2 times daily    Coronary artery disease involving native coronary artery of native heart with angina pectoris (H)       montelukast 10 MG tablet    SINGULAIR    30 tablet    Take 1 tablet (10 mg) by mouth At Bedtime    Chronic bronchitis, unspecified chronic bronchitis type (H)       nicotine 7 MG/24HR 24 hr patch    NICODERM CQ    30 patch    Place 1 patch onto the skin every 24 hours    Cigarette nicotine dependence without complication       nitroGLYcerin 0.4 MG sublingual tablet    NITROSTAT    25 tablet    For chest pain place 1 tablet under the tongue every 5 minutes for 3 doses. If symptoms persist 5 minutes after 1st dose call 911.    Coronary artery disease involving native coronary artery of native heart with angina pectoris (H)       order for DME     1 Device    Equipment being ordered: Nebulizer    Pulmonary emphysema, unspecified emphysema type (H)       order for DME     1 Device    Equipment being ordered: Nebulizer machine    Chronic bronchitis, unspecified chronic bronchitis type (H)       OSCAL 500/200 D-3 PO      Take 1 tablet by mouth 2 times daily        pantoprazole 40 MG EC tablet    PROTONIX    30 tablet    Take 1 tablet (40 mg) by mouth every morning    Gastroesophageal reflux disease, esophagitis presence not specified       polyethylene glycol Packet    MIRALAX/GLYCOLAX     Take 1 packet by mouth daily as needed for constipation        senna-docusate 8.6-50 MG per tablet    SENOKOT-S;PERICOLACE    100 tablet    Take 1 tablet by mouth 2 times daily as needed for constipation    Constipation, unspecified constipation type       SYMBICORT 160-4.5 MCG/ACT  Inhaler   Generic drug:  budesonide-formoterol     10.2 g    INHALE TWO PUFFS BY MOUTH TWICE A DAY    Chronic bronchitis, unspecified chronic bronchitis type (H)       tiotropium 18 MCG capsule    SPIRIVA HANDIHALER    30 capsule    Inhale 1 capsule (18 mcg) into the lungs daily    Chronic bronchitis, unspecified chronic bronchitis type (H)       * Notice:  This list has 2 medication(s) that are the same as other medications prescribed for you. Read the directions carefully, and ask your doctor or other care provider to review them with you.

## 2018-11-23 ENCOUNTER — TELEPHONE (OUTPATIENT)
Dept: FAMILY MEDICINE | Facility: OTHER | Age: 68
End: 2018-11-23

## 2018-11-23 NOTE — TELEPHONE ENCOUNTER
You placed a referral for patient to pulmonology on 11/9/18.  Patient has not scheduled as of yet.      Please review and forward to team if follow up with the patient is needed.     Thank you!  Violet/Clinic Referrals Dyad II

## 2018-11-30 NOTE — TELEPHONE ENCOUNTER
I spoke with patient and she said she just hasn't scheduled yet.  She is aware of the referral  Otis Reynolds CMA

## 2018-12-07 DIAGNOSIS — I73.9 PAD (PERIPHERAL ARTERY DISEASE) (H): ICD-10-CM

## 2018-12-07 DIAGNOSIS — J42 CHRONIC BRONCHITIS, UNSPECIFIED CHRONIC BRONCHITIS TYPE (H): ICD-10-CM

## 2018-12-07 RX ORDER — ATORVASTATIN CALCIUM 10 MG/1
10 TABLET, FILM COATED ORAL AT BEDTIME
Qty: 90 TABLET | Refills: 1 | Status: SHIPPED | OUTPATIENT
Start: 2018-12-07 | End: 2019-06-06

## 2018-12-07 RX ORDER — ALBUTEROL SULFATE 90 UG/1
2 AEROSOL, METERED RESPIRATORY (INHALATION) EVERY 6 HOURS PRN
Qty: 1 INHALER | Refills: 3 | Status: SHIPPED | OUTPATIENT
Start: 2018-12-07 | End: 2019-06-05

## 2018-12-07 NOTE — TELEPHONE ENCOUNTER
"Requested Prescriptions   Pending Prescriptions Disp Refills     SPIRIVA HANDIHALER 18 MCG inhaled capsule [Pharmacy Med Name: SPIRIVA HANDIHALER 18MCG CAPS] 30 capsule 3     Sig: USING THE HANDIHALER, INHALE THE CONTENTS OF ONE CAPSULE BY MOUTH DAILY    Asthma Maintenance Inhalers - Anticholinergics Passed    12/7/2018  3:44 PM       Passed - Patient is age 12 years or older       Passed - Recent (12 mo) or future (30 days) visit within the authorizing provider's specialty    Patient had office visit in the last 12 months or has a visit in the next 30 days with authorizing provider or within the authorizing provider's specialty.  See \"Patient Info\" tab in inbasket, or \"Choose Columns\" in Meds & Orders section of the refill encounter.            albuterol (PROAIR HFA/PROVENTIL HFA/VENTOLIN HFA) 108 (90 Base) MCG/ACT inhaler 1 Inhaler 11     Sig: Inhale 2 puffs into the lungs every 6 hours as needed for shortness of breath / dyspnea    Asthma Maintenance Inhalers - Anticholinergics Passed    12/7/2018  3:44 PM  No flowsheet data found.         Passed - Patient is age 12 years or older       Passed - Recent (12 mo) or future (30 days) visit within the authorizing provider's specialty    Patient had office visit in the last 12 months or has a visit in the next 30 days with authorizing provider or within the authorizing provider's specialty.  See \"Patient Info\" tab in inbasket, or \"Choose Columns\" in Meds & Orders section of the refill encounter.            atorvastatin (LIPITOR) 10 MG tablet 90 tablet 3     Sig: Take 1 tablet (10 mg) by mouth At Bedtime    Statins Protocol Passed    12/7/2018  3:44 PM       Passed - LDL on file in past 12 months    Recent Labs   Lab Test  05/18/18   1255   LDL  91          Passed - No abnormal creatine kinase in past 12 months    Recent Labs   Lab Test  03/14/14   0950   CKT  81          Passed - Recent (12 mo) or future (30 days) visit within the authorizing provider's specialty    " "Patient had office visit in the last 12 months or has a visit in the next 30 days with authorizing provider or within the authorizing provider's specialty.  See \"Patient Info\" tab in inbasket, or \"Choose Columns\" in Meds & Orders section of the refill encounter.         Passed - Patient is age 18 or older       Passed - No active pregnancy on record       Passed - No positive pregnancy test in past 12 months        albuterol (PROAIR HFA/PROVENTIL HFA/VENTOLIN HFA) 108 (90 BASE) MCG/ACT Inhaler  Prescription approved per [facility] Refill Protocol.    atorvastatin (LIPITOR) 10 MG tablet  Prescription approved per [facility] Refill Protocol.    tiotropium (SPIRIVA HANDIHALER) 18 MCG capsule  Routing refill request to provider for review/approval because:  A break in medication, should have needed refills 09/2018    Suad Goncalves RN, BSN       "

## 2018-12-11 ENCOUNTER — TELEPHONE (OUTPATIENT)
Dept: FAMILY MEDICINE | Facility: OTHER | Age: 68
End: 2018-12-11

## 2018-12-11 NOTE — TELEPHONE ENCOUNTER
Pt is past due for f/u pap smear if declining colposcopy.  Spoke with pt, states she will call to schedule.   Marcy Wright,    Pap Tracking

## 2018-12-13 RX ORDER — TIOTROPIUM BROMIDE 18 UG/1
CAPSULE ORAL; RESPIRATORY (INHALATION)
Qty: 30 CAPSULE | Refills: 3 | Status: SHIPPED | OUTPATIENT
Start: 2018-12-13 | End: 2019-04-09

## 2018-12-24 DIAGNOSIS — J42 CHRONIC BRONCHITIS, UNSPECIFIED CHRONIC BRONCHITIS TYPE (H): ICD-10-CM

## 2018-12-27 RX ORDER — MONTELUKAST SODIUM 10 MG/1
10 TABLET ORAL AT BEDTIME
Qty: 90 TABLET | Refills: 3 | Status: SHIPPED | OUTPATIENT
Start: 2018-12-27 | End: 2019-12-11

## 2018-12-27 NOTE — TELEPHONE ENCOUNTER
Singulair:  Prescription approved per [facility] Refill Protocol.  Please let her know.     Love Mays, RN, BSN

## 2019-01-09 ENCOUNTER — TELEPHONE (OUTPATIENT)
Dept: FAMILY MEDICINE | Facility: OTHER | Age: 69
End: 2019-01-09

## 2019-01-09 DIAGNOSIS — J42 CHRONIC BRONCHITIS, UNSPECIFIED CHRONIC BRONCHITIS TYPE (H): ICD-10-CM

## 2019-01-09 RX ORDER — BUDESONIDE AND FORMOTEROL FUMARATE DIHYDRATE 160; 4.5 UG/1; UG/1
AEROSOL RESPIRATORY (INHALATION)
Qty: 10.2 G | Refills: 5 | Status: SHIPPED | OUTPATIENT
Start: 2019-01-09 | End: 2020-12-02

## 2019-01-09 NOTE — TELEPHONE ENCOUNTER
Last ov 11/09/2018  Pending Prescriptions:                       Disp   Refills    budesonide-formoterol (SYMBICORT) 160-4.5*10.2 g 5            Sig: INHALE TWO PUFFS BY MOUTH TWICE A DAY      Prescription approved per [facility] Refill Protocol.  Diego Hilario, RN, BSN    Please call patient and let her know of refill.

## 2019-01-09 NOTE — TELEPHONE ENCOUNTER
Reason for Call:  Medication or medication refill:    Do you use a [facility]?  Name of the pharmacy and phone number for the current request:  [facility] - [phone]    Name of the medication requested: Symbicort    Other request: Patient has been asking for this refill. She stated request was put in but there is nothing in chart. She needs this right away, Please refill, or call patient to inform if there is an issue.     Can we leave a detailed message on this number? YES    Phone number patient can be reached at: Home number on file [phone] (home)    Best Time: any    Call taken on 1/9/2019 at 11:08 AM by Samuel Jang

## 2019-01-09 NOTE — TELEPHONE ENCOUNTER
Patient notified that rx has been sent to [facility] pharmacy  Closing encounter  Arabella Palmer RT (R)

## 2019-04-09 ENCOUNTER — OFFICE VISIT (OUTPATIENT)
Dept: CARDIOLOGY | Facility: CLINIC | Age: 69
End: 2019-04-09
Payer: COMMERCIAL

## 2019-04-09 VITALS
SYSTOLIC BLOOD PRESSURE: 136 MMHG | DIASTOLIC BLOOD PRESSURE: 70 MMHG | WEIGHT: 108.4 LBS | OXYGEN SATURATION: 95 % | BODY MASS INDEX: 19.21 KG/M2 | HEIGHT: 63 IN | HEART RATE: 60 BPM

## 2019-04-09 DIAGNOSIS — E78.5 HYPERLIPIDEMIA LDL GOAL <70: ICD-10-CM

## 2019-04-09 DIAGNOSIS — I25.10 CORONARY ARTERY DISEASE INVOLVING NATIVE CORONARY ARTERY OF NATIVE HEART WITHOUT ANGINA PECTORIS: Primary | ICD-10-CM

## 2019-04-09 DIAGNOSIS — I10 BENIGN ESSENTIAL HYPERTENSION: ICD-10-CM

## 2019-04-09 DIAGNOSIS — I25.5 ISCHEMIC CARDIOMYOPATHY: ICD-10-CM

## 2019-04-09 DIAGNOSIS — I73.9 PERIPHERAL ARTERIAL DISEASE (H): ICD-10-CM

## 2019-04-09 DIAGNOSIS — I77.9 CAROTID ARTERY DISEASE, UNSPECIFIED LATERALITY, UNSPECIFIED TYPE (H): ICD-10-CM

## 2019-04-09 DIAGNOSIS — F17.200 TOBACCO USE DISORDER: ICD-10-CM

## 2019-04-09 PROCEDURE — 99214 OFFICE O/P EST MOD 30 MIN: CPT | Performed by: PHYSICIAN ASSISTANT

## 2019-04-09 ASSESSMENT — MIFFLIN-ST. JEOR: SCORE: 1001.94

## 2019-04-09 NOTE — LETTER
4/9/2019    Deisy Carter, APRN CNP  [address] [place] [address]    RE: Preeti Ford       Dear Colleague,    I had the pleasure of seeing Preeti Ford in the [facility].    Primary Cardiologist: Monica Hernandez.     Reason for Visit: 6 month Follow Up    History of Present Illness:   This is a pleasant 68-year-old female with past medical history notable for coronary artery disease (hx of stenting for ISR of LCX lesion with mild residual disease, on baby aspirin), ischemic cardiomyopathy (EF improved from 25-30% to ~45% after PCI of the ISR; on BB therapy), peripheral arterial disease (history of aortoiliac bypass, residual mild chronic claudication), carotid artery disease, severe COPD, moderate aortic stenosis, hypertension, history of CVA (chronic plavix therapy), hyperlipidemia (intolerance to some statins), and current tobacco use.    She returns to clinic today, stating that she is doing well. She denies any recurrent chest discomfort, VERDE, peripheral edema, palpitations, presyncope, or syncope. She unfortunately continues to smoke 7 cigarettes daily. She is not interested to quit.  She denies any bleeding issues.       Assessment and Plan:   This is a pleasant 68-year-old female with past medical history notable for coronary artery disease (hx of stenting for ISR of LCX lesion with mild residual disease, on baby aspirin), ischemic cardiomyopathy (EF improved from 25-30% to ~45% after PCI of the ISR; on BB therapy), peripheral arterial disease (history of aortoiliac bypass, residual mild chronic claudication), carotid artery disease, severe COPD, moderate aortic stenosis, hypertension, history of CVA (chronic plavix therapy), hyperlipidemia (intolerance to some statins), and current tobacco use.    She denies any symptoms to suggest heart failure, ischemia, or arrhythmia.  I have discussed with her that we should further increase her atorvastatin  dose for more optimal lipid control but she declined this.  She tells me that she has tried higher doses in the past and this caused significant weight loss.  She also had full body muscle aches.  She is doing well on the 10 mg dose and would like to stay there.    I have asked her to return in 6 months with repeat echocardiogram as well as lipid/ALT and BMP. She is encouraged to contact our clinic if she has any new symptoms or concerns in the interim.     Thank you for allowing me to participate in the care of this pleasant patient today.       This note was completed in part using Dragon voice recognition software. Although reviewed after completion, some word and grammatical errors may occur.    Orders this Visit:  No orders of the defined types were placed in this encounter.    No orders of the defined types were placed in this encounter.    There are no discontinued medications.      Encounter Diagnoses   Name Primary?     S/P coronary artery stent placement      Coronary artery disease involving native coronary artery of native heart with angina pectoris (H)      Acute CVA (cerebrovascular accident) - right paramedian superior vermis and left cerebellar hemisphere        CURRENT MEDICATIONS:  Current Outpatient Medications   Medication Sig Dispense Refill     ACE/ARB/ARNI NOT PRESCRIBED, INTENTIONAL, Please choose reason not prescribed, below       acetaminophen (TYLENOL) 325 MG tablet Take 325 mg by mouth every 4 hours as needed for mild pain  100 tablet      albuterol (2.5 MG/3ML) 0.083% neb solution Take 1 vial by nebulization every 4 hours as needed for shortness of breath / dyspnea or wheezing       albuterol (PROAIR HFA/PROVENTIL HFA/VENTOLIN HFA) 108 (90 Base) MCG/ACT inhaler Inhale 2 puffs into the lungs every 6 hours as needed for shortness of breath / dyspnea 1 Inhaler 3     aspirin 81 MG EC tablet Take 1 tablet (81 mg) by mouth daily       atorvastatin (LIPITOR) 10 MG tablet Take 1 tablet (10 mg)  by mouth At Bedtime 90 tablet 1     budesonide-formoterol (SYMBICORT) 160-4.5 MCG/ACT Inhaler INHALE TWO PUFFS BY MOUTH TWICE A DAY 10.2 g 5     Calcium Carbonate-Vitamin D (OSCAL 500/200 D-3 PO) Take 1 tablet by mouth 2 times daily       clopidogrel (PLAVIX) 75 MG tablet Take 1 tablet (75 mg) by mouth daily 90 tablet 3     Coenzyme Q10 (COQ10 PO) Take 100 mg by mouth every 24 hours (at 16:00)       doxycycline (VIBRAMYCIN) 100 MG capsule Take 1 capsule (100 mg) by mouth 2 times daily 20 capsule 0     guaiFENesin (MUCINEX) 600 MG 12 hr tablet Take 600 mg by mouth 2 times daily       ipratropium - albuterol 0.5 mg/2.5 mg/3 mL (DUONEB) 0.5-2.5 (3) MG/3ML neb solution Take 1 vial (3 mLs) by nebulization every 4 hours as needed for shortness of breath / dyspnea or wheezing 270 mL 3     isosorbide mononitrate (IMDUR) 30 MG 24 hr tablet Take 0.5 tablets (15 mg) by mouth daily 30 tablet 11     metoprolol tartrate (LOPRESSOR) 25 MG tablet Take 1 tablet (25 mg) by mouth 2 times daily 180 tablet 3     montelukast (SINGULAIR) 10 MG tablet Take 1 tablet (10 mg) by mouth At Bedtime 90 tablet 3     nicotine (NICODERM CQ) 7 MG/24HR 24 hr patch Place 1 patch onto the skin every 24 hours 30 patch 1     nitroGLYcerin (NITROSTAT) 0.4 MG sublingual tablet For chest pain place 1 tablet under the tongue every 5 minutes for 3 doses. If symptoms persist 5 minutes after 1st dose call 911. 25 tablet      Nutritional Supplements (BOOST) Take 1 Bottle by mouth 3 times daily (with meals) 90 each 0     order for DME Equipment being ordered: Nebulizer machine 1 Device 0     order for DME Equipment being ordered: Nebulizer 1 Device 0     pantoprazole (PROTONIX) 40 MG EC tablet Take 1 tablet (40 mg) by mouth every morning 30 tablet      polyethylene glycol (MIRALAX/GLYCOLAX) Packet Take 1 packet by mouth daily as needed for constipation       senna-docusate (SENOKOT-S;PERICOLACE) 8.6-50 MG per tablet Take 1 tablet by mouth 2 times daily as needed  for constipation 100 tablet      SPIRIVA HANDIHALER 18 MCG inhaled capsule USING THE HANDIHALER, INHALE THE CONTENTS OF ONE CAPSULE BY MOUTH DAILY 30 capsule 3       ALLERGIES     Allergies   Allergen Reactions     Crestor [Rosuvastatin] Other (See Comments)     dizziness     Hmg-Coa-R Inhibitors Other (See Comments)     Muscle/joint aching     Lisinopril Cough     Optison [Albumin Human] Other (See Comments)     Pt was flush and very dizzy.  Also had a BP drop     Penicillins Rash       PAST MEDICAL HISTORY:  Past Medical History:   Diagnosis Date     Arthritis      COPD (chronic obstructive pulmonary disease) (H)      Coronary artery disease      CVA (cerebral vascular accident) (H) 8-     Hypertension        PAST SURGICAL HISTORY:  Past Surgical History:   Procedure Laterality Date     APPENDECTOMY       BYPASS GRAFT AORTOILIAC N/A 3/7/2017    Procedure: BYPASS GRAFT AORTOILIAC;  Surgeon: Marcos Pratt MD;  Location: [facility] OR     SALPINGO OOPHORECTOMY,R/L/DELORES      Salpingo Oophorectomy, RT/LT/DELORES       FAMILY HISTORY:  Family History   Problem Relation Age of Onset     Cerebrovascular Disease Mother      Cerebrovascular Disease Father      Genitourinary Problems Brother         Dialysis       SOCIAL HISTORY:  Social History     Socioeconomic History     Marital status:      Spouse name: Not on file     Number of children: Not on file     Years of education: Not on file     Highest education level: Not on file   Occupational History     Not on file   Social Needs     Financial resource strain: Not on file     Food insecurity:     Worry: Not on file     Inability: Not on file     Transportation needs:     Medical: Not on file     Non-medical: Not on file   Tobacco Use     Smoking status: Current Every Day Smoker     Packs/day: 0.50     Types: Cigarettes     Smokeless tobacco: Never Used     Tobacco comment: patient states she is working on it    Substance and Sexual Activity     Alcohol use: No  "    Alcohol/week: 0.0 oz     Drug use: No     Sexual activity: Not Currently     Partners: Male   Lifestyle     Physical activity:     Days per week: Not on file     Minutes per session: Not on file     Stress: Not on file   Relationships     Social connections:     Talks on phone: Not on file     Gets together: Not on file     Attends Sabianism service: Not on file     Active member of club or organization: Not on file     Attends meetings of clubs or organizations: Not on file     Relationship status: Not on file     Intimate partner violence:     Fear of current or ex partner: Not on file     Emotionally abused: Not on file     Physically abused: Not on file     Forced sexual activity: Not on file   Other Topics Concern      Service No     Blood Transfusions No     Caffeine Concern No     Occupational Exposure No     Hobby Hazards No     Sleep Concern Yes     Comment: Trouble breathing at night due to COPD     Stress Concern No     Weight Concern No     Special Diet No     Back Care No     Exercise No     Bike Helmet No     Seat Belt Yes     Self-Exams Yes     Parent/sibling w/ CABG, MI or angioplasty before 65F 55M? No   Social History Narrative     Not on file       Review of Systems:  Skin:        Eyes:       ENT:       Respiratory:       Cardiovascular:       Gastroenterology:      Genitourinary:       Musculoskeletal:       Neurologic:       Psychiatric:       Heme/Lymph/Imm:       Endocrine:         Physical Exam:  Vitals: /70 (BP Location: Right arm, Patient Position: Fowlers, Cuff Size: Adult Regular)   Pulse 60   Ht 1.602 m (5' 3.07\")   Wt 49.2 kg (108 lb 6.4 oz)   SpO2 95%   BMI 19.16 kg/m       GEN:  NAD  NECK: No JVD  C/V:  Regular rate and rhythm, no murmur, rub or gallop.  RESP: Clear to auscultation bilaterally without wheezing, rales, or rhonchi.  GI: Abdomen soft, nontender, nondistended.   EXTREM: No LE edema.   NEURO: Alert and oriented, cooperative. No obvious focal " deficits.   PSYCH: Normal affect.  SKIN: Warm and dry.       Recent Lab Results:  LIPID RESULTS:  Lab Results   Component Value Date    CHOL 165 05/18/2018    HDL 53 05/18/2018    LDL 91 05/18/2018    TRIG 107 05/18/2018    CHOLHDLRATIO 3.6 10/09/2015       LIVER ENZYME RESULTS:  Lab Results   Component Value Date    AST 54 (H) 05/17/2018    ALT 48 05/17/2018       CBC RESULTS:  Lab Results   Component Value Date    WBC 6.1 11/09/2018    RBC 5.45 (H) 11/09/2018    HGB 15.6 11/09/2018    HCT 46.9 11/09/2018    MCV 86 11/09/2018    MCH 28.6 11/09/2018    MCHC 33.3 11/09/2018    RDW 14.3 11/09/2018     11/09/2018       BMP RESULTS:  Lab Results   Component Value Date     11/09/2018    POTASSIUM 4.4 11/09/2018    CHLORIDE 99 11/09/2018    CO2 29 11/09/2018    ANIONGAP 9 11/09/2018    GLC 78 11/09/2018    BUN 12 11/09/2018    CR 0.80 11/09/2018    GFRESTIMATED 72 11/09/2018    GFRESTBLACK 87 11/09/2018    CALDERON 8.7 11/09/2018        A1C RESULTS:  Lab Results   Component Value Date    A1C 5.5 03/24/2018       INR RESULTS:  Lab Results   Component Value Date    INR 0.91 05/17/2018    INR 0.95 04/19/2018         Thank you for allowing me to participate in the care of your patient.    Sincerely,     Michelle Hamilton PA-C     [facility]

## 2019-04-09 NOTE — PATIENT INSTRUCTIONS
Today's Plan:   1) Continue with current medications but I recommend we try to increase your cholesterol medication more. Let me know if you change your mind.   2) Follow up with us in 6 months with repeat heart ultrasound and fasting labs prior.     If you have questions or concerns please call clinic at [phone].    Please call [phone] for scheduling.       It was a pleasure seeing you today!     Reminder: Please bring in all current medications, over the counter supplements and vitamin bottles to your next appointment.    Michelle Hamilton  4/9/2019

## 2019-04-09 NOTE — PROGRESS NOTES
Primary Cardiologist: Formerly Dr. Hernandez.     Reason for Visit: 6 month Follow Up    History of Present Illness:   This is a pleasant 68-year-old female with past medical history notable for coronary artery disease (hx of stenting for ISR of LCX lesion with mild residual disease, on baby aspirin), ischemic cardiomyopathy (EF improved from 25-30% to ~45% after PCI of the ISR; on BB therapy), peripheral arterial disease (history of aortoiliac bypass, residual mild chronic claudication), carotid artery disease, severe COPD, moderate aortic stenosis, hypertension, history of CVA (chronic plavix therapy), hyperlipidemia (intolerance to some statins), and current tobacco use.    She returns to clinic today, stating that she is doing well. She denies any recurrent chest discomfort, VERDE, peripheral edema, palpitations, presyncope, or syncope. She unfortunately continues to smoke 7 cigarettes daily. She is not interested to quit.  She denies any bleeding issues.       Assessment and Plan:   This is a pleasant 68-year-old female with past medical history notable for coronary artery disease (hx of stenting for ISR of LCX lesion with mild residual disease, on baby aspirin), ischemic cardiomyopathy (EF improved from 25-30% to ~45% after PCI of the ISR; on BB therapy), peripheral arterial disease (history of aortoiliac bypass, residual mild chronic claudication), carotid artery disease, severe COPD, moderate aortic stenosis, hypertension, history of CVA (chronic plavix therapy), hyperlipidemia (intolerance to some statins), and current tobacco use.    She denies any symptoms to suggest heart failure, ischemia, or arrhythmia.  I have discussed with her that we should further increase her atorvastatin dose for more optimal lipid control but she declined this.  She tells me that she has tried higher doses in the past and this caused significant weight loss.  She also had full body muscle aches.  She is doing well on the 10 mg dose  and would like to stay there.    I have asked her to return in 6 months with repeat echocardiogram as well as lipid/ALT and BMP. She is encouraged to contact our clinic if she has any new symptoms or concerns in the interim.     Thank you for allowing me to participate in the care of this pleasant patient today.       This note was completed in part using Dragon voice recognition software. Although reviewed after completion, some word and grammatical errors may occur.    Orders this Visit:  No orders of the defined types were placed in this encounter.    No orders of the defined types were placed in this encounter.    There are no discontinued medications.      Encounter Diagnoses   Name Primary?     S/P coronary artery stent placement      Coronary artery disease involving native coronary artery of native heart with angina pectoris (H)      Acute CVA (cerebrovascular accident) - right paramedian superior vermis and left cerebellar hemisphere        CURRENT MEDICATIONS:  Current Outpatient Medications   Medication Sig Dispense Refill     ACE/ARB/ARNI NOT PRESCRIBED, INTENTIONAL, Please choose reason not prescribed, below       acetaminophen (TYLENOL) 325 MG tablet Take 325 mg by mouth every 4 hours as needed for mild pain  100 tablet      albuterol (2.5 MG/3ML) 0.083% neb solution Take 1 vial by nebulization every 4 hours as needed for shortness of breath / dyspnea or wheezing       albuterol (PROAIR HFA/PROVENTIL HFA/VENTOLIN HFA) 108 (90 Base) MCG/ACT inhaler Inhale 2 puffs into the lungs every 6 hours as needed for shortness of breath / dyspnea 1 Inhaler 3     aspirin 81 MG EC tablet Take 1 tablet (81 mg) by mouth daily       atorvastatin (LIPITOR) 10 MG tablet Take 1 tablet (10 mg) by mouth At Bedtime 90 tablet 1     budesonide-formoterol (SYMBICORT) 160-4.5 MCG/ACT Inhaler INHALE TWO PUFFS BY MOUTH TWICE A DAY 10.2 g 5     Calcium Carbonate-Vitamin D (OSCAL 500/200 D-3 PO) Take 1 tablet by mouth 2 times daily        clopidogrel (PLAVIX) 75 MG tablet Take 1 tablet (75 mg) by mouth daily 90 tablet 3     Coenzyme Q10 (COQ10 PO) Take 100 mg by mouth every 24 hours (at 16:00)       doxycycline (VIBRAMYCIN) 100 MG capsule Take 1 capsule (100 mg) by mouth 2 times daily 20 capsule 0     guaiFENesin (MUCINEX) 600 MG 12 hr tablet Take 600 mg by mouth 2 times daily       ipratropium - albuterol 0.5 mg/2.5 mg/3 mL (DUONEB) 0.5-2.5 (3) MG/3ML neb solution Take 1 vial (3 mLs) by nebulization every 4 hours as needed for shortness of breath / dyspnea or wheezing 270 mL 3     isosorbide mononitrate (IMDUR) 30 MG 24 hr tablet Take 0.5 tablets (15 mg) by mouth daily 30 tablet 11     metoprolol tartrate (LOPRESSOR) 25 MG tablet Take 1 tablet (25 mg) by mouth 2 times daily 180 tablet 3     montelukast (SINGULAIR) 10 MG tablet Take 1 tablet (10 mg) by mouth At Bedtime 90 tablet 3     nicotine (NICODERM CQ) 7 MG/24HR 24 hr patch Place 1 patch onto the skin every 24 hours 30 patch 1     nitroGLYcerin (NITROSTAT) 0.4 MG sublingual tablet For chest pain place 1 tablet under the tongue every 5 minutes for 3 doses. If symptoms persist 5 minutes after 1st dose call 911. 25 tablet      Nutritional Supplements (BOOST) Take 1 Bottle by mouth 3 times daily (with meals) 90 each 0     order for DME Equipment being ordered: Nebulizer machine 1 Device 0     order for DME Equipment being ordered: Nebulizer 1 Device 0     pantoprazole (PROTONIX) 40 MG EC tablet Take 1 tablet (40 mg) by mouth every morning 30 tablet      polyethylene glycol (MIRALAX/GLYCOLAX) Packet Take 1 packet by mouth daily as needed for constipation       senna-docusate (SENOKOT-S;PERICOLACE) 8.6-50 MG per tablet Take 1 tablet by mouth 2 times daily as needed for constipation 100 tablet      SPIRIVA HANDIHALER 18 MCG inhaled capsule USING THE HANDIHALER, INHALE THE CONTENTS OF ONE CAPSULE BY MOUTH DAILY 30 capsule 3       ALLERGIES     Allergies   Allergen Reactions     Crestor  [Rosuvastatin] Other (See Comments)     dizziness     Hmg-Coa-R Inhibitors Other (See Comments)     Muscle/joint aching     Lisinopril Cough     Optison [Albumin Human] Other (See Comments)     Pt was flush and very dizzy.  Also had a BP drop     Penicillins Rash       PAST MEDICAL HISTORY:  Past Medical History:   Diagnosis Date     Arthritis      COPD (chronic obstructive pulmonary disease) (H)      Coronary artery disease      CVA (cerebral vascular accident) (H) 8-     Hypertension        PAST SURGICAL HISTORY:  Past Surgical History:   Procedure Laterality Date     APPENDECTOMY       BYPASS GRAFT AORTOILIAC N/A 3/7/2017    Procedure: BYPASS GRAFT AORTOILIAC;  Surgeon: Marcos Pratt MD;  Location: [facility] OR     SALPINGO OOPHORECTOMY,R/L/DELORES      Salpingo Oophorectomy, RT/LT/DELORES       FAMILY HISTORY:  Family History   Problem Relation Age of Onset     Cerebrovascular Disease Mother      Cerebrovascular Disease Father      Genitourinary Problems Brother         Dialysis       SOCIAL HISTORY:  Social History     Socioeconomic History     Marital status:      Spouse name: Not on file     Number of children: Not on file     Years of education: Not on file     Highest education level: Not on file   Occupational History     Not on file   Social Needs     Financial resource strain: Not on file     Food insecurity:     Worry: Not on file     Inability: Not on file     Transportation needs:     Medical: Not on file     Non-medical: Not on file   Tobacco Use     Smoking status: Current Every Day Smoker     Packs/day: 0.50     Types: Cigarettes     Smokeless tobacco: Never Used     Tobacco comment: patient states she is working on it    Substance and Sexual Activity     Alcohol use: No     Alcohol/week: 0.0 oz     Drug use: No     Sexual activity: Not Currently     Partners: Male   Lifestyle     Physical activity:     Days per week: Not on file     Minutes per session: Not on file     Stress: Not on file  "  Relationships     Social connections:     Talks on phone: Not on file     Gets together: Not on file     Attends Holiness service: Not on file     Active member of club or organization: Not on file     Attends meetings of clubs or organizations: Not on file     Relationship status: Not on file     Intimate partner violence:     Fear of current or ex partner: Not on file     Emotionally abused: Not on file     Physically abused: Not on file     Forced sexual activity: Not on file   Other Topics Concern      Service No     Blood Transfusions No     Caffeine Concern No     Occupational Exposure No     Hobby Hazards No     Sleep Concern Yes     Comment: Trouble breathing at night due to COPD     Stress Concern No     Weight Concern No     Special Diet No     Back Care No     Exercise No     Bike Helmet No     Seat Belt Yes     Self-Exams Yes     Parent/sibling w/ CABG, MI or angioplasty before 65F 55M? No   Social History Narrative     Not on file       Review of Systems:  Skin:        Eyes:       ENT:       Respiratory:       Cardiovascular:       Gastroenterology:      Genitourinary:       Musculoskeletal:       Neurologic:       Psychiatric:       Heme/Lymph/Imm:       Endocrine:         Physical Exam:  Vitals: /70 (BP Location: Right arm, Patient Position: Fowlers, Cuff Size: Adult Regular)   Pulse 60   Ht 1.602 m (5' 3.07\")   Wt 49.2 kg (108 lb 6.4 oz)   SpO2 95%   BMI 19.16 kg/m       GEN:  NAD  NECK: No JVD  C/V:  Regular rate and rhythm, no murmur, rub or gallop.  RESP: Clear to auscultation bilaterally without wheezing, rales, or rhonchi.  GI: Abdomen soft, nontender, nondistended.   EXTREM: No LE edema.   NEURO: Alert and oriented, cooperative. No obvious focal deficits.   PSYCH: Normal affect.  SKIN: Warm and dry.       Recent Lab Results:  LIPID RESULTS:  Lab Results   Component Value Date    CHOL 165 05/18/2018    HDL 53 05/18/2018    LDL 91 05/18/2018    TRIG 107 05/18/2018    " CHOLHDLRATIO 3.6 10/09/2015       LIVER ENZYME RESULTS:  Lab Results   Component Value Date    AST 54 (H) 05/17/2018    ALT 48 05/17/2018       CBC RESULTS:  Lab Results   Component Value Date    WBC 6.1 11/09/2018    RBC 5.45 (H) 11/09/2018    HGB 15.6 11/09/2018    HCT 46.9 11/09/2018    MCV 86 11/09/2018    MCH 28.6 11/09/2018    MCHC 33.3 11/09/2018    RDW 14.3 11/09/2018     11/09/2018       BMP RESULTS:  Lab Results   Component Value Date     11/09/2018    POTASSIUM 4.4 11/09/2018    CHLORIDE 99 11/09/2018    CO2 29 11/09/2018    ANIONGAP 9 11/09/2018    GLC 78 11/09/2018    BUN 12 11/09/2018    CR 0.80 11/09/2018    GFRESTIMATED 72 11/09/2018    GFRESTBLACK 87 11/09/2018    CALDERON 8.7 11/09/2018        A1C RESULTS:  Lab Results   Component Value Date    A1C 5.5 03/24/2018       INR RESULTS:  Lab Results   Component Value Date    INR 0.91 05/17/2018    INR 0.95 04/19/2018           Michelle Hamilton PA-C   April 9, 2019

## 2019-04-14 DIAGNOSIS — I63.9 ACUTE CVA (CEREBROVASCULAR ACCIDENT) (H): ICD-10-CM

## 2019-04-16 RX ORDER — CLOPIDOGREL BISULFATE 75 MG/1
TABLET ORAL
Qty: 90 TABLET | Refills: 1 | Status: SHIPPED | OUTPATIENT
Start: 2019-04-16 | End: 2019-10-30

## 2019-04-16 NOTE — TELEPHONE ENCOUNTER
"Requested Prescriptions   Pending Prescriptions Disp Refills     clopidogrel (PLAVIX) 75 MG tablet [Pharmacy Med Name: CLOPIDOGREL BISULFATE 75MG TABS] 90 tablet 3     Sig: TAKE ONE TABLET BY MOUTH EVERY DAY       Plavix Passed - 4/14/2019 10:23 AM        Passed - No active PPI on record unless is Protonix        Passed - Normal HGB on file in past 12 months     Recent Labs   Lab Test 11/09/18  1201   HGB 15.6               Passed - Normal Platelets on file in past 12 months     Recent Labs   Lab Test 11/09/18  1201                  Passed - Recent (12 mo) or future (30 days) visit within the authorizing provider's specialty     Patient had office visit in the last 12 months or has a visit in the next 30 days with authorizing provider or within the authorizing provider's specialty.  See \"Patient Info\" tab in inbasket, or \"Choose Columns\" in Meds & Orders section of the refill encounter.              Passed - Medication is active on med list        Passed - Patient is age 18 or older        Passed - No active pregnancy on record        Passed - No positive pregnancy test in past 12 months        Last ov 11/09/2018  Last filled 04/02/2018    Prescription approved per [facility] Refill Protocol.  Diego Hilario, RN, BSN        "

## 2019-06-05 DIAGNOSIS — I73.9 PAD (PERIPHERAL ARTERY DISEASE) (H): ICD-10-CM

## 2019-06-05 DIAGNOSIS — J42 CHRONIC BRONCHITIS, UNSPECIFIED CHRONIC BRONCHITIS TYPE (H): ICD-10-CM

## 2019-06-05 NOTE — LETTER
[facility]  [address] [facility] [address] [phone]        June 6, 2019    Preeti Ford                                                                                                                     [address] [address] [place] [id number]              Dear Preeti,    We attempted to reach you by phone but I believe the number we have in your chart is incorrect. Please call the clinic to verify.     A one month kristopher refill was given of the lipitor but you are due for some fasting labs before your next refill is due. Please call the clinic to schedule a lab only appointment. [phone]    Sincerely,         Deisy Gottlieb CNP

## 2019-06-06 RX ORDER — ATORVASTATIN CALCIUM 10 MG/1
10 TABLET, FILM COATED ORAL AT BEDTIME
Qty: 30 TABLET | Refills: 0 | Status: SHIPPED | OUTPATIENT
Start: 2019-06-06 | End: 2019-07-08

## 2019-06-06 RX ORDER — ALBUTEROL SULFATE 90 UG/1
2 AEROSOL, METERED RESPIRATORY (INHALATION) EVERY 6 HOURS PRN
Qty: 18 G | Refills: 0 | Status: SHIPPED | OUTPATIENT
Start: 2019-06-06 | End: 2020-08-14

## 2019-06-06 NOTE — TELEPHONE ENCOUNTER
Albuterol    Prescription approved per [facility] Refill Protocol.      lipitor    Medication is being filled for 1 time refill only due to:  Patient needs labs fasting.       Melissa Nance RN on 6/6/2019 at 2:07 PM

## 2019-07-05 DIAGNOSIS — I73.9 PAD (PERIPHERAL ARTERY DISEASE) (H): ICD-10-CM

## 2019-07-05 DIAGNOSIS — I25.119 CORONARY ARTERY DISEASE INVOLVING NATIVE CORONARY ARTERY OF NATIVE HEART WITH ANGINA PECTORIS (H): ICD-10-CM

## 2019-07-05 RX ORDER — METOPROLOL TARTRATE 25 MG/1
25 TABLET, FILM COATED ORAL 2 TIMES DAILY
Qty: 180 TABLET | Refills: 0 | Status: SHIPPED | OUTPATIENT
Start: 2019-07-05 | End: 2019-10-04

## 2019-07-08 RX ORDER — ATORVASTATIN CALCIUM 10 MG/1
10 TABLET, FILM COATED ORAL AT BEDTIME
Qty: 30 TABLET | Refills: 0 | OUTPATIENT
Start: 2019-07-08

## 2019-07-08 RX ORDER — ATORVASTATIN CALCIUM 10 MG/1
10 TABLET, FILM COATED ORAL AT BEDTIME
Qty: 90 TABLET | Refills: 1 | Status: SHIPPED | OUTPATIENT
Start: 2019-07-08 | End: 2019-10-07

## 2019-07-08 NOTE — TELEPHONE ENCOUNTER
Lipitor  Routing refill request to provider for review/approval because:  Radha given x1 and patient did not follow up  Labs not current:  FLP    Love Mays, RN, BSN

## 2019-07-08 NOTE — TELEPHONE ENCOUNTER
"Spoke to patient, patient states that she is seeing her cardiologist in October and that is when they will do lab work to see if a change in dosage is necessary. For right now, patient needs a refill of atorvastatin, but \"they want\" EM to refill lipitor. Patient declined appointment for physical.    "

## 2019-10-02 ENCOUNTER — HOSPITAL ENCOUNTER (OUTPATIENT)
Dept: CARDIOLOGY | Facility: CLINIC | Age: 69
Discharge: HOME | End: 2019-10-02
Attending: PHYSICIAN ASSISTANT | Admitting: PHYSICIAN ASSISTANT
Payer: COMMERCIAL

## 2019-10-02 DIAGNOSIS — I25.10 CORONARY ARTERY DISEASE INVOLVING NATIVE CORONARY ARTERY OF NATIVE HEART WITHOUT ANGINA PECTORIS: ICD-10-CM

## 2019-10-02 LAB
ALT SERPL W P-5'-P-CCNC: 29 U/L (ref 0–50)
CHOLEST SERPL-MCNC: 193 MG/DL
HDLC SERPL-MCNC: 81 MG/DL
LDLC SERPL CALC-MCNC: 99 MG/DL
NONHDLC SERPL-MCNC: 112 MG/DL
TRIGL SERPL-MCNC: 63 MG/DL

## 2019-10-02 PROCEDURE — 93306 TTE W/DOPPLER COMPLETE: CPT | Mod: 26 | Performed by: INTERNAL MEDICINE

## 2019-10-02 PROCEDURE — 93306 TTE W/DOPPLER COMPLETE: CPT

## 2019-10-02 PROCEDURE — 36415 COLL VENOUS BLD VENIPUNCTURE: CPT | Performed by: PHYSICIAN ASSISTANT

## 2019-10-02 PROCEDURE — 80061 LIPID PANEL: CPT | Performed by: PHYSICIAN ASSISTANT

## 2019-10-02 PROCEDURE — 84460 ALANINE AMINO (ALT) (SGPT): CPT | Performed by: PHYSICIAN ASSISTANT

## 2019-10-03 DIAGNOSIS — I25.119 CORONARY ARTERY DISEASE INVOLVING NATIVE CORONARY ARTERY OF NATIVE HEART WITH ANGINA PECTORIS (H): ICD-10-CM

## 2019-10-04 RX ORDER — METOPROLOL TARTRATE 25 MG/1
25 TABLET, FILM COATED ORAL 2 TIMES DAILY
Qty: 180 TABLET | Refills: 0 | Status: SHIPPED | OUTPATIENT
Start: 2019-10-04 | End: 2020-01-02

## 2019-10-04 NOTE — TELEPHONE ENCOUNTER
Metoprolol  Prescription approved per [facility] [place].    Love Mays, RN, BSN

## 2019-10-07 ENCOUNTER — OFFICE VISIT (OUTPATIENT)
Dept: CARDIOLOGY | Facility: CLINIC | Age: 69
End: 2019-10-07
Payer: COMMERCIAL

## 2019-10-07 VITALS
HEIGHT: 62 IN | HEART RATE: 68 BPM | WEIGHT: 105.6 LBS | RESPIRATION RATE: 12 BRPM | BODY MASS INDEX: 19.43 KG/M2 | SYSTOLIC BLOOD PRESSURE: 142 MMHG | OXYGEN SATURATION: 98 % | DIASTOLIC BLOOD PRESSURE: 62 MMHG

## 2019-10-07 DIAGNOSIS — I35.0 NONRHEUMATIC AORTIC VALVE STENOSIS: Primary | ICD-10-CM

## 2019-10-07 DIAGNOSIS — I25.10 CORONARY ARTERY DISEASE INVOLVING NATIVE CORONARY ARTERY OF NATIVE HEART WITHOUT ANGINA PECTORIS: ICD-10-CM

## 2019-10-07 DIAGNOSIS — I73.9 PAD (PERIPHERAL ARTERY DISEASE) (H): ICD-10-CM

## 2019-10-07 PROCEDURE — 99214 OFFICE O/P EST MOD 30 MIN: CPT | Performed by: INTERNAL MEDICINE

## 2019-10-07 RX ORDER — ATORVASTATIN CALCIUM 20 MG/1
20 TABLET, FILM COATED ORAL AT BEDTIME
Qty: 90 TABLET | Refills: 3 | Status: SHIPPED | OUTPATIENT
Start: 2019-10-07 | End: 2020-10-28

## 2019-10-07 ASSESSMENT — PAIN SCALES - GENERAL: PAINLEVEL: NO PAIN (0)

## 2019-10-07 ASSESSMENT — MIFFLIN-ST. JEOR: SCORE: 972.25

## 2019-10-07 NOTE — LETTER
10/7/2019    Deisy Carter, SPRING CNP  [address] [place] [address]    RE: Preeti Ford       Dear Colleague,    I had the pleasure of seeing Preeti Ford in the [facility].    HISTORY:    Preeti Ford is a pleasant 68-year-old female with a history of coronary artery disease with circumflex disease initially stented followed by restenosis and cardiomyopathy, EF 25%.  She underwent repeat stenting in May of last year with recovery of LV function.  She also has a history of aortoiliac bypass, and artery disease, severe COPD with ongoing cigarette use, mild aortic stenosis, hypertension, history of CVA, hyperlipidemia with variable statin intolerance.    Preeti is here to see me today for the first time.  She has been cared for by multiple caregivers in the past.  She reports occasional episodes of chest discomfort which she describes as a dull ache.  These episodes typically occur at rest while she is sitting and watching TV and may last for up to 15 minutes.  She has never noticed any association with exertion and pleuritic and does not radiate.  It is not associate with nausea shortness of breath or diaphoresis, and it is unlike her previous angina which she recalls being simply worsening of her dyspnea.    A recent echocardiogram was reviewed with her.  It shows recovery of her ejection fraction to 55% and mild aortic stenosis with a mean gradient of just 12 mmHg.  She also has severe pulmonary hypertension.  Lipid values and metabolic profile were also reviewed with her and are acceptable.  Her total cholesterol is 193 with an LDL of 99 but her HDL is high at 81, giving her great ratio.    Physical exam shows an apical systolic ejection murmur.  Lungs sound rather clear but are distant.  Peripheral pulses are within normal limits.  The patient appears be in her late 70s and smells of cigarette smoke.      ASSESSMENT/PLAN:    1.  History of coronary artery  disease.  Currently experiencing atypical chest pain, unlike her previous angina and not very suspicious for coronary disease.  Her last angiogram was done less than 1-1/2 years ago and showed in-stent restenosis of her complex which was repaired and only mild disease elsewhere.  At this point I do not think we need to do further stress testing but asked her to let us know if her chest pains becomes exertional or more prolonged or severe.  2.  Hyperlipidemia.  She has modestly elevated LDL but excellent HDL.  I still think she would benefit from higher dose of statins and I have offered to increase her dose of Lipitor from 10 mg daily to 20 mg.  She is willing to do so and will contact me if she has any problems with the higher dose.  3.  Aortic stenosis.  Mild by echocardiography, continue to follow on an annual basis.    Thank you for inviting me to participate in your patient's care.  Please do not hesitate to call if I can be of further assistance.    Orders Placed This Encounter   Procedures     Basic metabolic panel     Lipid Profile     ALT     Follow-Up with Cardiologist     Echocardiogram Complete     Orders Placed This Encounter   Medications     atorvastatin (LIPITOR) 20 MG tablet     Sig: Take 1 tablet (20 mg) by mouth At Bedtime     Dispense:  90 tablet     Refill:  3     Medications Discontinued During This Encounter   Medication Reason     atorvastatin (LIPITOR) 10 MG tablet        10 year ASCVD risk: The ASCVD Risk score (Jenny DC Jr., et al., 2013) failed to calculate for the following reasons:    The patient has a prior MI or stroke diagnosis    Encounter Diagnoses   Name Primary?     Coronary artery disease involving native coronary artery of native heart without angina pectoris      PAD (peripheral artery disease) (H)      Nonrheumatic aortic valve stenosis Yes       CURRENT MEDICATIONS:  Current Outpatient Medications   Medication Sig Dispense Refill     ACE/ARB/ARNI NOT PRESCRIBED, INTENTIONAL,  Please choose reason not prescribed, below       acetaminophen (TYLENOL) 325 MG tablet Take 325 mg by mouth every 4 hours as needed for mild pain  100 tablet      albuterol (2.5 MG/3ML) 0.083% neb solution Take 1 vial by nebulization every 4 hours as needed for shortness of breath / dyspnea or wheezing       albuterol (PROAIR HFA/PROVENTIL HFA/VENTOLIN HFA) 108 (90 Base) MCG/ACT inhaler Inhale 2 puffs into the lungs every 6 hours as needed for shortness of breath / dyspnea 18 g 0     aspirin 81 MG EC tablet Take 1 tablet (81 mg) by mouth daily       atorvastatin (LIPITOR) 20 MG tablet Take 1 tablet (20 mg) by mouth At Bedtime 90 tablet 3     budesonide-formoterol (SYMBICORT) 160-4.5 MCG/ACT Inhaler INHALE TWO PUFFS BY MOUTH TWICE A DAY 10.2 g 5     Calcium Carbonate-Vitamin D (OSCAL 500/200 D-3 PO) Take 1 tablet by mouth 2 times daily       clopidogrel (PLAVIX) 75 MG tablet TAKE ONE TABLET BY MOUTH EVERY DAY 90 tablet 1     Coenzyme Q10 (COQ10 PO) Take 100 mg by mouth every 24 hours (at 16:00)       ipratropium - albuterol 0.5 mg/2.5 mg/3 mL (DUONEB) 0.5-2.5 (3) MG/3ML neb solution Take 1 vial (3 mLs) by nebulization every 4 hours as needed for shortness of breath / dyspnea or wheezing 270 mL 3     metoprolol tartrate (LOPRESSOR) 25 MG tablet Take 1 tablet (25 mg) by mouth 2 times daily 180 tablet 0     nitroGLYcerin (NITROSTAT) 0.4 MG sublingual tablet For chest pain place 1 tablet under the tongue every 5 minutes for 3 doses. If symptoms persist 5 minutes after 1st dose call 911. 25 tablet      order for DME Equipment being ordered: Nebulizer machine 1 Device 0     order for DME Equipment being ordered: Nebulizer 1 Device 0     polyethylene glycol (MIRALAX/GLYCOLAX) Packet Take 1 packet by mouth daily as needed for constipation       senna-docusate (SENOKOT-S;PERICOLACE) 8.6-50 MG per tablet Take 1 tablet by mouth 2 times daily as needed for constipation 100 tablet      umeclidinium (INCRUSE ELLIPTA) 62.5 MCG/INH  inhaler Inhale 1 puff into the lungs daily       doxycycline (VIBRAMYCIN) 100 MG capsule Take 1 capsule (100 mg) by mouth 2 times daily (Patient not taking: Reported on 10/7/2019) 20 capsule 0     guaiFENesin (MUCINEX) 600 MG 12 hr tablet Take 600 mg by mouth 2 times daily       isosorbide mononitrate (IMDUR) 30 MG 24 hr tablet Take 0.5 tablets (15 mg) by mouth daily (Patient not taking: Reported on 4/9/2019) 30 tablet 11     montelukast (SINGULAIR) 10 MG tablet Take 1 tablet (10 mg) by mouth At Bedtime (Patient not taking: Reported on 10/7/2019) 90 tablet 3     nicotine (NICODERM CQ) 7 MG/24HR 24 hr patch Place 1 patch onto the skin every 24 hours (Patient not taking: Reported on 10/7/2019) 30 patch 1     Nutritional Supplements (BOOST) Take 1 Bottle by mouth 3 times daily (with meals) (Patient not taking: Reported on 10/7/2019) 90 each 0     pantoprazole (PROTONIX) 40 MG EC tablet Take 1 tablet (40 mg) by mouth every morning (Patient not taking: Reported on 10/7/2019) 30 tablet        ALLERGIES     Allergies   Allergen Reactions     Crestor [Rosuvastatin] Other (See Comments)     dizziness     Hmg-Coa-R Inhibitors Other (See Comments)     Muscle/joint aching     Lisinopril Cough     Optison [Albumin Human] Other (See Comments)     Pt was flush and very dizzy.  Also had a BP drop     Penicillins Rash       PAST MEDICAL HISTORY:  Past Medical History:   Diagnosis Date     Arthritis      COPD (chronic obstructive pulmonary disease) (H)      Coronary artery disease      CVA (cerebral vascular accident) (H) 8-     Hypertension        PAST SURGICAL HISTORY:  Past Surgical History:   Procedure Laterality Date     APPENDECTOMY       BYPASS GRAFT AORTOILIAC N/A 3/7/2017    Procedure: BYPASS GRAFT AORTOILIAC;  Surgeon: Marcos Pratt MD;  Location: [facility] OR     SALPINGO OOPHORECTOMY,R/L/DELORES      Salpingo Oophorectomy, RT/LT/DELORES       FAMILY HISTORY:  Family History   Problem Relation Age of Onset      Cerebrovascular Disease Mother      Cerebrovascular Disease Father      Genitourinary Problems Brother         Dialysis       SOCIAL HISTORY:  Social History     Socioeconomic History     Marital status:      Spouse name: Not on file     Number of children: Not on file     Years of education: Not on file     Highest education level: Not on file   Occupational History     Not on file   Social Needs     Financial resource strain: Not on file     Food insecurity:     Worry: Not on file     Inability: Not on file     Transportation needs:     Medical: Not on file     Non-medical: Not on file   Tobacco Use     Smoking status: Current Every Day Smoker     Packs/day: 0.50     Types: Cigarettes     Smokeless tobacco: Never Used     Tobacco comment: patient states she is working on it    Substance and Sexual Activity     Alcohol use: No     Alcohol/week: 0.0 standard drinks     Drug use: No     Sexual activity: Not Currently     Partners: Male   Lifestyle     Physical activity:     Days per week: Not on file     Minutes per session: Not on file     Stress: Not on file   Relationships     Social connections:     Talks on phone: Not on file     Gets together: Not on file     Attends Evangelical service: Not on file     Active member of club or organization: Not on file     Attends meetings of clubs or organizations: Not on file     Relationship status: Not on file     Intimate partner violence:     Fear of current or ex partner: Not on file     Emotionally abused: Not on file     Physically abused: Not on file     Forced sexual activity: Not on file   Other Topics Concern      Service No     Blood Transfusions No     Caffeine Concern No     Occupational Exposure No     Hobby Hazards No     Sleep Concern Yes     Comment: Trouble breathing at night due to COPD     Stress Concern No     Weight Concern No     Special Diet No     Back Care No     Exercise No     Bike Helmet No     Seat Belt Yes     Self-Exams Yes      "Parent/sibling w/ CABG, MI or angioplasty before 65F 55M? No   Social History Narrative     Not on file       Review of Systems:  Skin:  Negative     Eyes:  Positive for glasses  ENT:  Negative    Respiratory:  Positive for shortness of breath;cough  Cardiovascular:  Negative for;palpitations;lightheadedness;dizziness;edema chest pain;Positive for;fatigue;exercise intolerance  Gastroenterology: Negative    Genitourinary:  Negative    Musculoskeletal:  Negative joint pain  Neurologic:  Negative numbness or tingling of hands;numbness or tingling of feet  Psychiatric:  Negative sleep disturbances  Heme/Lymph/Imm:  Positive for easy bruising  Endocrine:  Negative      Physical Exam:  Vitals: BP (!) 142/62 (BP Location: Right arm, Cuff Size: Adult Regular)   Pulse 68   Resp 12   Ht 1.575 m (5' 2\")   Wt 47.9 kg (105 lb 9.6 oz)   SpO2 98%   BMI 19.31 kg/m       Constitutional:  cooperative, alert and oriented, well developed, well nourished, in no acute distress thin;cachectic;frail;appears older than stated age      Skin:  warm and dry to the touch        Head:  normocephalic        Eyes:  pupils equal and round;conjunctivae and lids unremarkable;EOMS intact        ENT:  no pallor or cyanosis poor dentition      Neck:           Chest:    diminished breath sounds bilaterally;prolonged expiration      Cardiac: regular rhythm;normal S1 and S2       grade 2;apical;systolic ejection murmur          Abdomen:  abdomen soft;BS normoactive        Vascular:       3+         1+   3+         2+        Extremities and Back:  no edema        Neurological:  no gross motor deficits          Recent Lab Results:  LIPID RESULTS:  Lab Results   Component Value Date    CHOL 193 10/02/2019    HDL 81 10/02/2019    LDL 99 10/02/2019    TRIG 63 10/02/2019    CHOLHDLRATIO 3.6 10/09/2015       LIVER ENZYME RESULTS:  Lab Results   Component Value Date    AST 54 (H) 05/17/2018    ALT 29 10/02/2019       CBC RESULTS:  Lab Results   Component " Value Date    WBC 6.1 11/09/2018    RBC 5.45 (H) 11/09/2018    HGB 15.6 11/09/2018    HCT 46.9 11/09/2018    MCV 86 11/09/2018    MCH 28.6 11/09/2018    MCHC 33.3 11/09/2018    RDW 14.3 11/09/2018     11/09/2018       BMP RESULTS:  Lab Results   Component Value Date     11/09/2018    POTASSIUM 4.4 11/09/2018    CHLORIDE 99 11/09/2018    CO2 29 11/09/2018    ANIONGAP 9 11/09/2018    GLC 78 11/09/2018    BUN 12 11/09/2018    CR 0.80 11/09/2018    GFRESTIMATED 72 11/09/2018    GFRESTBLACK 87 11/09/2018    CALDERON 8.7 11/09/2018        A1C RESULTS:  Lab Results   Component Value Date    A1C 5.5 03/24/2018       INR RESULTS:  Lab Results   Component Value Date    INR 0.91 05/17/2018    INR 0.95 04/19/2018       Thank you for allowing me to participate in the care of your patient.    Sincerely,     Victor Hugo Cortez MD     [facility]

## 2019-10-07 NOTE — PROGRESS NOTES
HISTORY:    Preeti Ford is a pleasant 68-year-old female with a history of coronary artery disease with circumflex disease initially stented followed by restenosis and cardiomyopathy, EF 25%.  She underwent repeat stenting in May of last year with recovery of LV function.  She also has a history of aortoiliac bypass, and artery disease, severe COPD with ongoing cigarette use, mild aortic stenosis, hypertension, history of CVA, hyperlipidemia with variable statin intolerance.    Preeti is here to see me today for the first time.  She has been cared for by multiple caregivers in the past.  She reports occasional episodes of chest discomfort which she describes as a dull ache.  These episodes typically occur at rest while she is sitting and watching TV and may last for up to 15 minutes.  She has never noticed any association with exertion and pleuritic and does not radiate.  It is not associate with nausea shortness of breath or diaphoresis, and it is unlike her previous angina which she recalls being simply worsening of her dyspnea.    A recent echocardiogram was reviewed with her.  It shows recovery of her ejection fraction to 55% and mild aortic stenosis with a mean gradient of just 12 mmHg.  She also has severe pulmonary hypertension.  Lipid values and metabolic profile were also reviewed with her and are acceptable.  Her total cholesterol is 193 with an LDL of 99 but her HDL is high at 81, giving her great ratio.    Physical exam shows an apical systolic ejection murmur.  Lungs sound rather clear but are distant.  Peripheral pulses are within normal limits.  The patient appears be in her late 70s and smells of cigarette smoke.      ASSESSMENT/PLAN:    1.  History of coronary artery disease.  Currently experiencing atypical chest pain, unlike her previous angina and not very suspicious for coronary disease.  Her last angiogram was done less than 1-1/2 years ago and showed in-stent restenosis of her complex  which was repaired and only mild disease elsewhere.  At this point I do not think we need to do further stress testing but asked her to let us know if her chest pains becomes exertional or more prolonged or severe.  2.  Hyperlipidemia.  She has modestly elevated LDL but excellent HDL.  I still think she would benefit from higher dose of statins and I have offered to increase her dose of Lipitor from 10 mg daily to 20 mg.  She is willing to do so and will contact me if she has any problems with the higher dose.  3.  Aortic stenosis.  Mild by echocardiography, continue to follow on an annual basis.    Thank you for inviting me to participate in your patient's care.  Please do not hesitate to call if I can be of further assistance.    Orders Placed This Encounter   Procedures     Basic metabolic panel     Lipid Profile     ALT     Follow-Up with Cardiologist     Echocardiogram Complete     Orders Placed This Encounter   Medications     atorvastatin (LIPITOR) 20 MG tablet     Sig: Take 1 tablet (20 mg) by mouth At Bedtime     Dispense:  90 tablet     Refill:  3     Medications Discontinued During This Encounter   Medication Reason     atorvastatin (LIPITOR) 10 MG tablet        10 year ASCVD risk: The ASCVD Risk score (Jenny DC Jr., et al., 2013) failed to calculate for the following reasons:    The patient has a prior MI or stroke diagnosis    Encounter Diagnoses   Name Primary?     Coronary artery disease involving native coronary artery of native heart without angina pectoris      PAD (peripheral artery disease) (H)      Nonrheumatic aortic valve stenosis Yes       CURRENT MEDICATIONS:  Current Outpatient Medications   Medication Sig Dispense Refill     ACE/ARB/ARNI NOT PRESCRIBED, INTENTIONAL, Please choose reason not prescribed, below       acetaminophen (TYLENOL) 325 MG tablet Take 325 mg by mouth every 4 hours as needed for mild pain  100 tablet      albuterol (2.5 MG/3ML) 0.083% neb solution Take 1 vial by  nebulization every 4 hours as needed for shortness of breath / dyspnea or wheezing       albuterol (PROAIR HFA/PROVENTIL HFA/VENTOLIN HFA) 108 (90 Base) MCG/ACT inhaler Inhale 2 puffs into the lungs every 6 hours as needed for shortness of breath / dyspnea 18 g 0     aspirin 81 MG EC tablet Take 1 tablet (81 mg) by mouth daily       atorvastatin (LIPITOR) 20 MG tablet Take 1 tablet (20 mg) by mouth At Bedtime 90 tablet 3     budesonide-formoterol (SYMBICORT) 160-4.5 MCG/ACT Inhaler INHALE TWO PUFFS BY MOUTH TWICE A DAY 10.2 g 5     Calcium Carbonate-Vitamin D (OSCAL 500/200 D-3 PO) Take 1 tablet by mouth 2 times daily       clopidogrel (PLAVIX) 75 MG tablet TAKE ONE TABLET BY MOUTH EVERY DAY 90 tablet 1     Coenzyme Q10 (COQ10 PO) Take 100 mg by mouth every 24 hours (at 16:00)       ipratropium - albuterol 0.5 mg/2.5 mg/3 mL (DUONEB) 0.5-2.5 (3) MG/3ML neb solution Take 1 vial (3 mLs) by nebulization every 4 hours as needed for shortness of breath / dyspnea or wheezing 270 mL 3     metoprolol tartrate (LOPRESSOR) 25 MG tablet Take 1 tablet (25 mg) by mouth 2 times daily 180 tablet 0     nitroGLYcerin (NITROSTAT) 0.4 MG sublingual tablet For chest pain place 1 tablet under the tongue every 5 minutes for 3 doses. If symptoms persist 5 minutes after 1st dose call 911. 59 tablet      order for DME Equipment being ordered: Nebulizer machine 1 Device 0     order for DME Equipment being ordered: Nebulizer 1 Device 0     polyethylene glycol (MIRALAX/GLYCOLAX) Packet Take 1 packet by mouth daily as needed for constipation       senna-docusate (SENOKOT-S;PERICOLACE) 8.6-50 MG per tablet Take 1 tablet by mouth 2 times daily as needed for constipation 100 tablet      umeclidinium (INCRUSE ELLIPTA) 62.5 MCG/INH inhaler Inhale 1 puff into the lungs daily       doxycycline (VIBRAMYCIN) 100 MG capsule Take 1 capsule (100 mg) by mouth 2 times daily (Patient not taking: Reported on 10/7/2019) 20 capsule 0     guaiFENesin (MUCINEX)  600 MG 12 hr tablet Take 600 mg by mouth 2 times daily       isosorbide mononitrate (IMDUR) 30 MG 24 hr tablet Take 0.5 tablets (15 mg) by mouth daily (Patient not taking: Reported on 4/9/2019) 30 tablet 11     montelukast (SINGULAIR) 10 MG tablet Take 1 tablet (10 mg) by mouth At Bedtime (Patient not taking: Reported on 10/7/2019) 90 tablet 3     nicotine (NICODERM CQ) 7 MG/24HR 24 hr patch Place 1 patch onto the skin every 24 hours (Patient not taking: Reported on 10/7/2019) 30 patch 1     Nutritional Supplements (BOOST) Take 1 Bottle by mouth 3 times daily (with meals) (Patient not taking: Reported on 10/7/2019) 90 each 0     pantoprazole (PROTONIX) 40 MG EC tablet Take 1 tablet (40 mg) by mouth every morning (Patient not taking: Reported on 10/7/2019) 30 tablet        ALLERGIES     Allergies   Allergen Reactions     Crestor [Rosuvastatin] Other (See Comments)     dizziness     Hmg-Coa-R Inhibitors Other (See Comments)     Muscle/joint aching     Lisinopril Cough     Optison [Albumin Human] Other (See Comments)     Pt was flush and very dizzy.  Also had a BP drop     Penicillins Rash       PAST MEDICAL HISTORY:  Past Medical History:   Diagnosis Date     Arthritis      COPD (chronic obstructive pulmonary disease) (H)      Coronary artery disease      CVA (cerebral vascular accident) (H) 8-     Hypertension        PAST SURGICAL HISTORY:  Past Surgical History:   Procedure Laterality Date     APPENDECTOMY       BYPASS GRAFT AORTOILIAC N/A 3/7/2017    Procedure: BYPASS GRAFT AORTOILIAC;  Surgeon: Marcos Pratt MD;  Location: [facility]     SALPINGO OOPHORECTOMY,R/L/DELORES      Salpingo Oophorectomy, RT/LT/DELORES       FAMILY HISTORY:  Family History   Problem Relation Age of Onset     Cerebrovascular Disease Mother      Cerebrovascular Disease Father      Genitourinary Problems Brother         Dialysis       SOCIAL HISTORY:  Social History     Socioeconomic History     Marital status:      Spouse name:  Not on file     Number of children: Not on file     Years of education: Not on file     Highest education level: Not on file   Occupational History     Not on file   Social Needs     Financial resource strain: Not on file     Food insecurity:     Worry: Not on file     Inability: Not on file     Transportation needs:     Medical: Not on file     Non-medical: Not on file   Tobacco Use     Smoking status: Current Every Day Smoker     Packs/day: 0.50     Types: Cigarettes     Smokeless tobacco: Never Used     Tobacco comment: patient states she is working on it    Substance and Sexual Activity     Alcohol use: No     Alcohol/week: 0.0 standard drinks     Drug use: No     Sexual activity: Not Currently     Partners: Male   Lifestyle     Physical activity:     Days per week: Not on file     Minutes per session: Not on file     Stress: Not on file   Relationships     Social connections:     Talks on phone: Not on file     Gets together: Not on file     Attends [employer] service: Not on file     Active member of club or organization: Not on file     Attends meetings of clubs or organizations: Not on file     Relationship status: Not on file     Intimate partner violence:     Fear of current or ex partner: Not on file     Emotionally abused: Not on file     Physically abused: Not on file     Forced sexual activity: Not on file   Other Topics Concern      Service No     Blood Transfusions No     Caffeine Concern No     Occupational Exposure No     Hobby Hazards No     Sleep Concern Yes     Comment: Trouble breathing at night due to COPD     Stress Concern No     Weight Concern No     Special Diet No     Back Care No     Exercise No     Bike Helmet No     Seat Belt Yes     Self-Exams Yes     Parent/sibling w/ CABG, MI or angioplasty before 65F 55M? No   Social History Narrative     Not on file       Review of Systems:  Skin:  Negative     Eyes:  Positive for glasses  ENT:  Negative    Respiratory:  Positive for  "shortness of breath;cough  Cardiovascular:  Negative for;palpitations;lightheadedness;dizziness;edema chest pain;Positive for;fatigue;exercise intolerance  Gastroenterology: Negative    Genitourinary:  Negative    Musculoskeletal:  Negative joint pain  Neurologic:  Negative numbness or tingling of hands;numbness or tingling of feet  Psychiatric:  Negative sleep disturbances  Heme/Lymph/Imm:  Positive for easy bruising  Endocrine:  Negative      Physical Exam:  Vitals: BP (!) 142/62 (BP Location: Right arm, Cuff Size: Adult Regular)   Pulse 68   Resp 12   Ht 1.575 m (5' 2\")   Wt 47.9 kg (105 lb 9.6 oz)   SpO2 98%   BMI 19.31 kg/m      Constitutional:  cooperative, alert and oriented, well developed, well nourished, in no acute distress thin;cachectic;frail;appears older than stated age      Skin:  warm and dry to the touch        Head:  normocephalic        Eyes:  pupils equal and round;conjunctivae and lids unremarkable;EOMS intact        ENT:  no pallor or cyanosis poor dentition      Neck:           Chest:    diminished breath sounds bilaterally;prolonged expiration      Cardiac: regular rhythm;normal S1 and S2       grade 2;apical;systolic ejection murmur          Abdomen:  abdomen soft;BS normoactive        Vascular:       3+         1+   3+         2+        Extremities and Back:  no edema        Neurological:  no gross motor deficits          Recent Lab Results:  LIPID RESULTS:  Lab Results   Component Value Date    CHOL 193 10/02/2019    HDL 81 10/02/2019    LDL 99 10/02/2019    TRIG 63 10/02/2019    CHOLHDLRATIO 3.6 10/09/2015       LIVER ENZYME RESULTS:  Lab Results   Component Value Date    AST 54 (H) 05/17/2018    ALT 29 10/02/2019       CBC RESULTS:  Lab Results   Component Value Date    WBC 6.1 11/09/2018    RBC 5.45 (H) 11/09/2018    HGB 15.6 11/09/2018    HCT 46.9 11/09/2018    MCV 86 11/09/2018    MCH 28.6 11/09/2018    MCHC 33.3 11/09/2018    RDW 14.3 11/09/2018     11/09/2018       BMP " RESULTS:  Lab Results   Component Value Date     11/09/2018    POTASSIUM 4.4 11/09/2018    CHLORIDE 99 11/09/2018    CO2 29 11/09/2018    ANIONGAP 9 11/09/2018    GLC 78 11/09/2018    BUN 12 11/09/2018    CR 0.80 11/09/2018    GFRESTIMATED 72 11/09/2018    GFRESTBLACK 87 11/09/2018    CALDERON 8.7 11/09/2018        A1C RESULTS:  Lab Results   Component Value Date    A1C 5.5 03/24/2018       INR RESULTS:  Lab Results   Component Value Date    INR 0.91 05/17/2018    INR 0.95 04/19/2018         Victor Hugo Cortez MD, FACC    CC  Michelle Hamilton PA-C  [address] [place] [address]

## 2019-10-30 DIAGNOSIS — I63.9 ACUTE CVA (CEREBROVASCULAR ACCIDENT) (H): ICD-10-CM

## 2019-10-30 RX ORDER — CLOPIDOGREL BISULFATE 75 MG/1
TABLET ORAL
Qty: 30 TABLET | Refills: 0 | Status: SHIPPED | OUTPATIENT
Start: 2019-10-30 | End: 2019-12-03

## 2019-10-30 NOTE — TELEPHONE ENCOUNTER
Pending Prescriptions:                       Disp   Refills    clopidogrel (PLAVIX) 75 MG tablet [Pharma*30 tab*0            Sig: TAKE ONE TABLET BY MOUTH ONCE DAILY    Medication is being filled for 1 time refill only due to:  Patient needs to be seen because LOV 11/2018.     Please help schedule OV    Mikki Frost, RN, BSN

## 2019-10-31 NOTE — TELEPHONE ENCOUNTER
Called patient advised RX was sent to the pharmacy one time fill. Patient aware she is due for OV does not want to schedule at this time. Will call when she is able to schedule.  Kd Kessler MA on 10/31/2019 at 9:50 AM

## 2019-12-03 DIAGNOSIS — I63.9 ACUTE CVA (CEREBROVASCULAR ACCIDENT) (H): ICD-10-CM

## 2019-12-04 RX ORDER — CLOPIDOGREL BISULFATE 75 MG/1
TABLET ORAL
Qty: 30 TABLET | Refills: 0 | Status: SHIPPED | OUTPATIENT
Start: 2019-12-04 | End: 2019-12-11

## 2019-12-04 NOTE — TELEPHONE ENCOUNTER
Pending Prescriptions:                       Disp   Refills    clopidogrel (PLAVIX) 75 MG tablet [Pharma*30 tab*0            Sig: TAKE ONE TABLET BY MOUTH ONCE DAILY (NEED OFFICE           VISIT)    Next 5 appointments (look out 90 days)    Dec 10, 2019  1:20 PM CST  [facility]YS[facility] with SPRING [facility] CNP  [facility] [address] [facility] [id number] [phone]        Routing refill request to provider for review/approval because:  Labs out of range:  CBC, Pl    Mikki Frost, RN, BSN

## 2019-12-10 NOTE — PATIENT INSTRUCTIONS
Patient Education   Personalized Prevention Plan  You are due for the preventive services outlined below.  Your care team is available to assist you in scheduling these services.  If you have already completed any of these items, please share that information with your care team to update in your medical record.  Health Maintenance Due   Topic Date Due     Osteoporosis Screening  1950     INR CLINIC REFERRAL - yearly  10/24/1951     Colposcopy  10/24/1973     Zoster (Shingles) Vaccine (1 of 2) 10/24/2002     Discuss Advance Care Planning  11/14/2016     Annual Wellness Visit  12/20/2017     Pneumococcal Vaccine (2 of 2 - PPSV23) 11/01/2018     FIT Test  12/12/2018     PHQ-2  01/01/2019     Basic Metabolic Panel  05/09/2019     Comprehensive Metabolic Panel  05/17/2019     Talk to your care team about options to quit tobacco use.  06/25/2019     LOW DOSE ASA FOR PRIMARY PREVENTION  06/25/2019     Heart Failure Action Plan  07/01/2019     Flu Vaccine (1) 09/01/2019     FALL RISK ASSESSMENT  11/09/2019     Complete Blood Count  11/09/2019     Mammogram  01/02/2020

## 2019-12-10 NOTE — PROGRESS NOTES
"SUBJECTIVE:   Preeti Ford is a 69 year old female who presents for Preventive Visit.  {PVP to remind patient that this is not necessarily a physical exam; physical exam may or may not be done:829784::\"click delete button to remove this line now\"}  {PVP to inform patient that additional E&M charge may apply, if additional problems addressed:869137::\"click delete button to remove this line now\"}  Are you in the first 12 months of your Medicare coverage?  { :167749::\"No\"}    HPI  Do you feel safe in your environment? { :212069}    Have you ever done Advance Care Planning? (For example, a Health Directive, POLST, or a discussion with a medical provider or your loved ones about your wishes): { :418159}    {Hearing Test Done (Optional):759895}  Fall risk  { :243282}  {If any of the above assessments are answered yes, consider ordering appropriate referrals (Optional):159548::\"click delete button to remove this line now\"}  Cognitive Screening { :271428}    {Do you have sleep apnea, excessive snoring or daytime drowsiness? (Optional):877845}    Reviewed and updated as needed this visit by clinical staff         Reviewed and updated as needed this visit by Provider        Social History     Tobacco Use     Smoking status: Current Every Day Smoker     Packs/day: 0.50     Types: Cigarettes     Smokeless tobacco: Never Used     Tobacco comment: patient states she is working on it    Substance Use Topics     Alcohol use: No     Alcohol/week: 0.0 standard drinks     {Rooming Staff- Complete this question if Prescreen response is not shown below for today's visit. If you drink alcohol do you typically have >3 drinks per day or >7 drinks per week? (Optional):841064}    Alcohol Use 12/20/2016   Prescreen: >3 drinks/day or >7 drinks/week? The patient does not drink >3 drinks per day nor >7 drinks per week.   {add AUDIT responses (Optional) (A score of 7 for adult men is an indication of hazardous drinking; a score of 8 or more " is an indication of an alcohol use disorder.  A score of 7 or more for adult women is an indication of hazardous drinking or an alchohol use disorder):076609}    {Outside tests to abstract? :961565}    {additional problems to add (Optional):801332}    Current providers sharing in care for this patient include: {Rooming staff:  Please update Care Team in Rooming Activity, refresh this note and then delete this statement}  Patient Care Team:  Deisy Carter APRN CNP as PCP - General (Nurse Practitioner - Family)  Marcos Pratt MD as Surgeon (Surgery)  Deisy Carter APRN CNP as Assigned PCP    The following health maintenance items are reviewed in Epic and correct as of today:  Health Maintenance   Topic Date Due     DEXA  1950     OP ANNUAL INR REFERRAL  10/24/1953     COLPOSCOPY  10/24/1973     ZOSTER IMMUNIZATION (1 of 2) 10/24/2002     ADVANCE CARE PLANNING  11/14/2016     MEDICARE ANNUAL WELLNESS VISIT  12/20/2017     PNEUMOCOCCAL IMMUNIZATION 65+ LOW/MEDIUM RISK (2 of 2 - PPSV23) 11/01/2018     FIT  12/12/2018     PHQ-2  01/01/2019     BMP  05/09/2019     CMP  05/17/2019     TOBACCO CESSATION COUNSELING  06/25/2019     LOW DOSE ASA FOR PRIMARY PREVENTION  06/25/2019     HF ACTION PLAN  07/01/2019     INFLUENZA VACCINE (1) 09/01/2019     FALL RISK ASSESSMENT  11/09/2019     CBC  11/09/2019     MAMMO SCREENING  01/02/2020     LIPID  04/02/2020     ALT  10/02/2020     DTAP/TDAP/TD IMMUNIZATION (2 - Td) 03/02/2022     SPIROMETRY  Completed     HEPATITIS C SCREENING  Completed     COPD ACTION PLAN  Completed     IPV IMMUNIZATION  Aged Out     MENINGITIS IMMUNIZATION  Aged Out     {Chronicprobdata (optional):929275}  {Decision Support (Optional):616528}    Review of Systems  {ROS COMP (Optional):267561}    OBJECTIVE:   There were no vitals taken for this visit. Estimated body mass index is 19.31 kg/m  as calculated from the following:    Height as of 10/7/19: 1.575 m (5'  "2\").    Weight as of 10/7/19: 47.9 kg (105 lb 9.6 oz).  Physical Exam  {Exam (Optional) :227474}    {Diagnostic Test Results (Optional):533890::\"Diagnostic Test Results:\",\"Labs reviewed in Epic\"}    ASSESSMENT / PLAN:   {Diag Picklist:043931}    COUNSELING:  {Medicare Counselin}    Estimated body mass index is 19.31 kg/m  as calculated from the following:    Height as of 10/7/19: 1.575 m (5' 2\").    Weight as of 10/7/19: 47.9 kg (105 lb 9.6 oz).    {Weight Management Plan (ACO) Complete if BMI is abnormal-  Ages 18-64  BMI >24.9.  Age 65+ with BMI <23 or >30 (Optional):516304}     reports that she has been smoking cigarettes. She has been smoking about 0.50 packs per day. She has never used smokeless tobacco.  {Tobacco Cessation -- Complete if patient is a smoker (Optional):484058}    Appropriate preventive services were discussed with this patient, including applicable screening as appropriate for cardiovascular disease, diabetes, osteopenia/osteoporosis, and glaucoma.  As appropriate for age/gender, discussed screening for colorectal cancer, prostate cancer, breast cancer, and cervical cancer. Checklist reviewing preventive services available has been given to the patient.    Reviewed patients plan of care and provided an AVS. The {CarePlan:978790} for Preeti meets the Care Plan requirement. This Care Plan has been established and reviewed with the {PATIENT, FAMILY MEMBER, CAREGIVER:574581}.    Counseling Resources:  ATP IV Guidelines  Pooled Cohorts Equation Calculator  Breast Cancer Risk Calculator  FRAX Risk Assessment  ICSI Preventive Guidelines  Dietary Guidelines for Americans, 2010  USDA's MyPlate  ASA Prophylaxis  Lung CA Screening    [facility]    Identified Health Risks:  "

## 2019-12-11 ENCOUNTER — OFFICE VISIT (OUTPATIENT)
Dept: FAMILY MEDICINE | Facility: OTHER | Age: 69
End: 2019-12-11
Payer: COMMERCIAL

## 2019-12-11 VITALS
DIASTOLIC BLOOD PRESSURE: 86 MMHG | OXYGEN SATURATION: 93 % | RESPIRATION RATE: 26 BRPM | SYSTOLIC BLOOD PRESSURE: 139 MMHG | HEART RATE: 78 BPM | WEIGHT: 105.4 LBS | TEMPERATURE: 97.8 F | BODY MASS INDEX: 19.4 KG/M2 | HEIGHT: 62 IN

## 2019-12-11 DIAGNOSIS — I63.9 ACUTE CVA (CEREBROVASCULAR ACCIDENT) (H): ICD-10-CM

## 2019-12-11 DIAGNOSIS — I25.119 CORONARY ARTERY DISEASE INVOLVING NATIVE CORONARY ARTERY OF NATIVE HEART WITH ANGINA PECTORIS (H): ICD-10-CM

## 2019-12-11 DIAGNOSIS — J44.9 COPD, SEVERE (H): ICD-10-CM

## 2019-12-11 DIAGNOSIS — E46 MALNUTRITION, UNSPECIFIED TYPE (H): Primary | ICD-10-CM

## 2019-12-11 DIAGNOSIS — F17.200 TOBACCO DEPENDENCE SYNDROME: ICD-10-CM

## 2019-12-11 DIAGNOSIS — I50.22 CHRONIC SYSTOLIC CONGESTIVE HEART FAILURE (H): ICD-10-CM

## 2019-12-11 LAB
ALBUMIN SERPL-MCNC: 3.8 G/DL (ref 3.4–5)
ALP SERPL-CCNC: 76 U/L (ref 40–150)
ALT SERPL W P-5'-P-CCNC: 40 U/L (ref 0–50)
ANION GAP SERPL CALCULATED.3IONS-SCNC: 4 MMOL/L (ref 3–14)
AST SERPL W P-5'-P-CCNC: 30 U/L (ref 0–45)
BILIRUB SERPL-MCNC: 0.7 MG/DL (ref 0.2–1.3)
BUN SERPL-MCNC: 16 MG/DL (ref 7–30)
CALCIUM SERPL-MCNC: 9.1 MG/DL (ref 8.5–10.1)
CHLORIDE SERPL-SCNC: 100 MMOL/L (ref 94–109)
CO2 SERPL-SCNC: 31 MMOL/L (ref 20–32)
CREAT SERPL-MCNC: 0.68 MG/DL (ref 0.52–1.04)
ERYTHROCYTE [DISTWIDTH] IN BLOOD BY AUTOMATED COUNT: 13.1 % (ref 10–15)
GFR SERPL CREATININE-BSD FRML MDRD: >90 ML/MIN/{1.73_M2}
GLUCOSE SERPL-MCNC: 86 MG/DL (ref 70–99)
HCT VFR BLD AUTO: 53.5 % (ref 35–47)
HGB BLD-MCNC: 17.4 G/DL (ref 11.7–15.7)
MCH RBC QN AUTO: 29.1 PG (ref 26.5–33)
MCHC RBC AUTO-ENTMCNC: 32.5 G/DL (ref 31.5–36.5)
MCV RBC AUTO: 90 FL (ref 78–100)
PLATELET # BLD AUTO: 192 10E9/L (ref 150–450)
POTASSIUM SERPL-SCNC: 4.3 MMOL/L (ref 3.4–5.3)
PROT SERPL-MCNC: 7.6 G/DL (ref 6.8–8.8)
RBC # BLD AUTO: 5.97 10E12/L (ref 3.8–5.2)
SODIUM SERPL-SCNC: 135 MMOL/L (ref 133–144)
WBC # BLD AUTO: 7.1 10E9/L (ref 4–11)

## 2019-12-11 PROCEDURE — G0009 ADMIN PNEUMOCOCCAL VACCINE: HCPCS | Performed by: STUDENT IN AN ORGANIZED HEALTH CARE EDUCATION/TRAINING PROGRAM

## 2019-12-11 PROCEDURE — 85027 COMPLETE CBC AUTOMATED: CPT | Performed by: STUDENT IN AN ORGANIZED HEALTH CARE EDUCATION/TRAINING PROGRAM

## 2019-12-11 PROCEDURE — 90732 PPSV23 VACC 2 YRS+ SUBQ/IM: CPT | Performed by: STUDENT IN AN ORGANIZED HEALTH CARE EDUCATION/TRAINING PROGRAM

## 2019-12-11 PROCEDURE — 90662 IIV NO PRSV INCREASED AG IM: CPT | Performed by: STUDENT IN AN ORGANIZED HEALTH CARE EDUCATION/TRAINING PROGRAM

## 2019-12-11 PROCEDURE — 99214 OFFICE O/P EST MOD 30 MIN: CPT | Mod: 25 | Performed by: STUDENT IN AN ORGANIZED HEALTH CARE EDUCATION/TRAINING PROGRAM

## 2019-12-11 PROCEDURE — 80053 COMPREHEN METABOLIC PANEL: CPT | Performed by: STUDENT IN AN ORGANIZED HEALTH CARE EDUCATION/TRAINING PROGRAM

## 2019-12-11 PROCEDURE — G0008 ADMIN INFLUENZA VIRUS VAC: HCPCS | Performed by: STUDENT IN AN ORGANIZED HEALTH CARE EDUCATION/TRAINING PROGRAM

## 2019-12-11 PROCEDURE — 36415 COLL VENOUS BLD VENIPUNCTURE: CPT | Performed by: STUDENT IN AN ORGANIZED HEALTH CARE EDUCATION/TRAINING PROGRAM

## 2019-12-11 RX ORDER — CLOPIDOGREL BISULFATE 75 MG/1
TABLET ORAL
Qty: 90 TABLET | Refills: 3 | Status: SHIPPED | OUTPATIENT
Start: 2019-12-11 | End: 2020-12-02

## 2019-12-11 RX ORDER — MONTELUKAST SODIUM 10 MG/1
10 TABLET ORAL AT BEDTIME
Qty: 90 TABLET | Refills: 3 | Status: SHIPPED | OUTPATIENT
Start: 2019-12-11 | End: 2020-12-02

## 2019-12-11 ASSESSMENT — MIFFLIN-ST. JEOR: SCORE: 966.47

## 2019-12-11 NOTE — LETTER
December 12, 2019      Preeti Ford  [id number] [address] [place] [id number]        Dear ,    We are writing to inform you of your test results.    Your kidney function is normal. Your hemoglobin is elevated but this is likely due to smoking and COPD.   Deisy Carter, NP-C    Resulted Orders   Comprehensive metabolic panel (BMP + Alb, Alk Phos, ALT, AST, Total. Bili, TP)   Result Value Ref Range    Sodium 135 133 - 144 mmol/L    Potassium 4.3 3.4 - 5.3 mmol/L    Chloride 100 94 - 109 mmol/L    Carbon Dioxide 31 20 - 32 mmol/L    Anion Gap 4 3 - 14 mmol/L    Glucose 86 70 - 99 mg/dL    Urea Nitrogen 16 7 - 30 mg/dL    Creatinine 0.68 0.52 - 1.04 mg/dL    GFR Estimate >90 >60 mL/min/[1.73_m2]      Comment:      Non  GFR Calc  Starting 12/18/2018, serum creatinine based estimated GFR (eGFR) will be   calculated using the Chronic Kidney Disease Epidemiology Collaboration   (CKD-EPI) equation.      GFR Estimate If Black >90 >60 mL/min/[1.73_m2]      Comment:       GFR Calc  Starting 12/18/2018, serum creatinine based estimated GFR (eGFR) will be   calculated using the Chronic Kidney Disease Epidemiology Collaboration   (CKD-EPI) equation.      Calcium 9.1 8.5 - 10.1 mg/dL    Bilirubin Total 0.7 0.2 - 1.3 mg/dL    Albumin 3.8 3.4 - 5.0 g/dL    Protein Total 7.6 6.8 - 8.8 g/dL    Alkaline Phosphatase 76 40 - 150 U/L    ALT 40 0 - 50 U/L    AST 30 0 - 45 U/L   CBC with platelets   Result Value Ref Range    WBC 7.1 4.0 - 11.0 10e9/L    RBC Count 5.97 (H) 3.8 - 5.2 10e12/L    Hemoglobin 17.4 (H) 11.7 - 15.7 g/dL    Hematocrit 53.5 (H) 35.0 - 47.0 %    MCV 90 78 - 100 fl    MCH 29.1 26.5 - 33.0 pg    MCHC 32.5 31.5 - 36.5 g/dL    RDW 13.1 10.0 - 15.0 %    Platelet Count 192 150 - 450 10e9/L       If you have any questions or concerns, please call the clinic at the number listed above.

## 2019-12-11 NOTE — PROGRESS NOTES
Subjective     Preeti Ford is a 69 year old female who presents to clinic today for the following health issues:    HPI     Hyperlipidemia Follow-Up      Are you regularly taking any medication or supplement to lower your cholesterol?   Yes- atorvastatin    Are you having muscle aches or other side effects that you think could be caused by your cholesterol lowering medication?  No    Hypertension Follow-up      Do you check your blood pressure regularly outside of the clinic? Yes     Are you following a low salt diet? Yes    Are your blood pressures ever more than 140 on the top number (systolic) OR more   than 90 on the bottom number (diastolic), for example 140/90? No    COPD Follow-Up    Overall, how are your COPD symptoms since your last clinic visit?  Slightly worse    How much fatigue or shortness of breath do you have when you are walking?  More than usual    How much shortness of breath do you have when you are resting?  More than usual    How often do you cough? All the time    Have you noticed any change in your sputum/phlegm?  Yes- Thicker    Have you experienced a recent fever? No    Please describe how far you can walk without stopping to rest:  Less than 10 feet    How many flights of stairs are you able to walk up without stopping?  None 1/2 flight  Have you had any Emergency Room Visits, Urgent Care Visits, or Hospital Admissions because of your COPD since your last office visit?  No    She knows she was supposed to have a colposcopy done but she does not want to do this. She does not want to do any cancer screenings right now.    History   Smoking Status     Current Every Day Smoker     Packs/day: 0.50     Types: Cigarettes   Smokeless Tobacco     Never Used     Comment: patient states she is working on it      No results found for: FEV1, LRA2TOM      How many servings of fruits and vegetables do you eat daily?  2-3    On average, how many sweetened beverages do you drink each day (Examples:  soda, juice, sweet tea, etc.  Do NOT count diet or artificially sweetened beverages)?   Couple time a week    How many days per week do you miss taking your medication? 0    Patient Active Problem List   Diagnosis     Advanced directives, counseling/discussion     Urinary incontinence     Forgetfulness     Hyperlipidemia LDL goal <130     History of stroke - right thalamus in 8/2013 and left cerebellar and right vermis in 3/2018     Numbness and tingling     Tobacco use disorder     CVA (cerebral vascular accident) (H)     ACS (acute coronary syndrome) (H)     Ischemic cardiomyopathy - EF 25% in 2015 but 55% in 3/2017 and 45-50% on 3/18     HTN, goal below 130/80     Acute systolic congestive heart failure (H)     Lung nodule     GERD (gastroesophageal reflux disease)     ST elevation myocardial infarction involving left anterior descending (LAD) coronary artery (H)     Chronic bronchitis, unspecified chronic bronchitis type (H)     PAD (peripheral artery disease) (H) - moderate left common carotid stenosis, aortoiliac bypass, chronic left vertebral and right posterior cerebral stenoses     Cervical high risk HPV (human papillomavirus) test positive     Acute respiratory failure with hypoxia (H)     COPD exacerbation (H)     Coronary artery disease involving native coronary artery - STEMI with LAD and circ stents in 8/2015     Elevated troponin     Pulmonary emphysema (H)     Vertigo     Other seizures (H) - possible     Acute CVA (cerebrovascular accident) - right paramedian superior vermis and left cerebellar hemisphere     Malnutrition, unspecified type (H)     Hyponatremia     Hypochloremia     Hypercapnic respiratory failure (H)     Past Surgical History:   Procedure Laterality Date     APPENDECTOMY       BYPASS GRAFT AORTOILIAC N/A 3/7/2017    Procedure: BYPASS GRAFT AORTOILIAC;  Surgeon: Marcos Pratt MD;  Location: [facility]     SALPINGO OOPHORECTOMY,R/L/DELORES      Salpingo Oophorectomy, RT/LT/DELORES        Social History     Tobacco Use     Smoking status: Current Every Day Smoker     Packs/day: 0.50     Types: Cigarettes     Smokeless tobacco: Never Used     Tobacco comment: patient states she is working on it    Substance Use Topics     Alcohol use: No     Alcohol/week: 0.0 standard drinks     Family History   Problem Relation Age of Onset     Cerebrovascular Disease Mother      Cerebrovascular Disease Father      Genitourinary Problems Brother         Dialysis         Current Outpatient Medications   Medication Sig Dispense Refill     ACE/ARB/ARNI NOT PRESCRIBED, INTENTIONAL, Please choose reason not prescribed, below       acetaminophen (TYLENOL) 325 MG tablet Take 325 mg by mouth every 4 hours as needed for mild pain  100 tablet      albuterol (2.5 MG/3ML) 0.083% neb solution Take 1 vial by nebulization every 4 hours as needed for shortness of breath / dyspnea or wheezing       albuterol (PROAIR HFA/PROVENTIL HFA/VENTOLIN HFA) 108 (90 Base) MCG/ACT inhaler Inhale 2 puffs into the lungs every 6 hours as needed for shortness of breath / dyspnea 18 g 0     aspirin 81 MG EC tablet Take 1 tablet (81 mg) by mouth daily       atorvastatin (LIPITOR) 20 MG tablet Take 1 tablet (20 mg) by mouth At Bedtime 90 tablet 3     budesonide-formoterol (SYMBICORT) 160-4.5 MCG/ACT Inhaler INHALE TWO PUFFS BY MOUTH TWICE A DAY 10.2 g 5     Calcium Carbonate-Vitamin D (OSCAL 500/200 D-3 PO) Take 1 tablet by mouth 2 times daily       clopidogrel (PLAVIX) 75 MG tablet TAKE ONE TABLET BY MOUTH ONCE DAILY 90 tablet 3     Coenzyme Q10 (COQ10 PO) Take 100 mg by mouth every 24 hours (at 16:00)       guaiFENesin (MUCINEX) 600 MG 12 hr tablet Take 600 mg by mouth 2 times daily       ipratropium - albuterol 0.5 mg/2.5 mg/3 mL (DUONEB) 0.5-2.5 (3) MG/3ML neb solution Take 1 vial (3 mLs) by nebulization every 4 hours as needed for shortness of breath / dyspnea or wheezing 270 mL 3     metoprolol tartrate (LOPRESSOR) 25 MG tablet Take 1 tablet  (25 mg) by mouth 2 times daily 180 tablet 0     montelukast (SINGULAIR) 10 MG tablet Take 1 tablet (10 mg) by mouth At Bedtime 90 tablet 3     nitroGLYcerin (NITROSTAT) 0.4 MG sublingual tablet For chest pain place 1 tablet under the tongue every 5 minutes for 3 doses. If symptoms persist 5 minutes after 1st dose call 911. 14 tablet      order for DME Equipment being ordered: Nebulizer machine 1 Device 0     order for DME Equipment being ordered: Nebulizer 1 Device 0     polyethylene glycol (MIRALAX/GLYCOLAX) Packet Take 1 packet by mouth daily as needed for constipation       senna-docusate (SENOKOT-S;PERICOLACE) 8.6-50 MG per tablet Take 1 tablet by mouth 2 times daily as needed for constipation 100 tablet      umeclidinium (INCRUSE ELLIPTA) 62.5 MCG/INH inhaler Inhale 1 puff into the lungs daily       doxycycline (VIBRAMYCIN) 100 MG capsule Take 1 capsule (100 mg) by mouth 2 times daily (Patient not taking: Reported on 10/7/2019) 20 capsule 0     isosorbide mononitrate (IMDUR) 30 MG 24 hr tablet Take 0.5 tablets (15 mg) by mouth daily (Patient not taking: Reported on 4/9/2019) 30 tablet 11     nicotine (NICODERM CQ) 7 MG/24HR 24 hr patch Place 1 patch onto the skin every 24 hours (Patient not taking: Reported on 10/7/2019) 30 patch 1     Nutritional Supplements (BOOST) Take 1 Bottle by mouth 3 times daily (with meals) (Patient not taking: Reported on 10/7/2019) 90 each 0     Allergies   Allergen Reactions     Crestor [Rosuvastatin] Other (See Comments)     dizziness     Hmg-Coa-R Inhibitors Other (See Comments)     Muscle/joint aching     Lisinopril Cough     Optison [Albumin Human] Other (See Comments)     Pt was flush and very dizzy.  Also had a BP drop     Penicillins Rash     Recent Labs   Lab Test 12/11/19  1003 10/02/19  0931 11/09/18  1201  05/18/18  1255  05/17/18  0436  03/24/18  0515  03/07/17  1940  02/18/16  0718  08/18/13  0830   A1C  --   --   --   --   --   --   --   --  5.5  --  5.5  --  5.7  --    "--    LDL  --  99  --   --  91  --   --   --  77   < >  --    < > 86   < >  --    HDL  --  81  --   --  53  --   --   --  67   < >  --    < > 66   < >  --    TRIG  --  63  --   --  107  --   --   --  106   < >  --    < > 90   < >  --    ALT 40 29  --   --   --   --  48   < >  --    < > 26   < >  --    < > 20   CR 0.68  --  0.80   < >  --    < > 0.70   < >  --    < > 0.89   < > 0.73   < > 0.84   GFRESTIMATED >90  --  72   < >  --    < > 85   < >  --    < > 64   < > 80   < > 69   GFRESTBLACK >90  --  87   < >  --    < > >90   < >  --    < > 77   < > >90   GFR Calc     < > 84   POTASSIUM 4.3  --  4.4   < >  --    < > 4.8   < >  --    < > 4.6   < >  --    < > 3.7   TSH  --   --   --   --   --   --   --   --   --   --   --   --   --   --  1.05    < > = values in this interval not displayed.      BP Readings from Last 3 Encounters:   12/11/19 139/86   10/07/19 (!) 142/62   04/09/19 136/70    Wt Readings from Last 3 Encounters:   12/11/19 47.8 kg (105 lb 6.4 oz)   10/07/19 47.9 kg (105 lb 9.6 oz)   04/09/19 49.2 kg (108 lb 6.4 oz)                    Reviewed and updated as needed this visit by Provider         Review of Systems   ROS COMP: Constitutional, HEENT, cardiovascular, pulmonary, GI, , musculoskeletal, neuro, skin, endocrine and psych systems are negative, except as otherwise noted.      Objective    /86   Pulse 78   Temp 97.8  F (36.6  C) (Temporal)   Resp 26   Ht 1.575 m (5' 2.01\")   Wt 47.8 kg (105 lb 6.4 oz)   SpO2 93%   BMI 19.27 kg/m    Body mass index is 19.27 kg/m .  Physical Exam   GENERAL: alert and no acute distress, appears older than stated age  EYES: Eyes grossly normal to inspection, PERRL and conjunctivae and sclerae normal  NECK: no adenopathy, no asymmetry, masses, or scars and thyroid normal to palpation  RESP: clear but diminished lung sounds throughout  CV: regular rate and rhythm, normal S1 S2, no S3 or S4, no murmur, click or rub, no peripheral edema  MS: no " gross musculoskeletal defects noted, no edema  SKIN: no suspicious lesions or rashes  NEURO: Normal strength and tone, mentation intact and speech normal  PSYCH: mentation appears normal, affect normal/bright, anxious at times    Diagnostic Test Results:  Labs reviewed in Epic  Results for orders placed or performed in visit on 12/11/19 (from the past 24 hour(s))   Comprehensive metabolic panel (BMP + Alb, Alk Phos, ALT, AST, Total. Bili, TP)   Result Value Ref Range    Sodium 135 133 - 144 mmol/L    Potassium 4.3 3.4 - 5.3 mmol/L    Chloride 100 94 - 109 mmol/L    Carbon Dioxide 31 20 - 32 mmol/L    Anion Gap 4 3 - 14 mmol/L    Glucose 86 70 - 99 mg/dL    Urea Nitrogen 16 7 - 30 mg/dL    Creatinine 0.68 0.52 - 1.04 mg/dL    GFR Estimate >90 >60 mL/min/[1.73_m2]    GFR Estimate If Black >90 >60 mL/min/[1.73_m2]    Calcium 9.1 8.5 - 10.1 mg/dL    Bilirubin Total 0.7 0.2 - 1.3 mg/dL    Albumin 3.8 3.4 - 5.0 g/dL    Protein Total 7.6 6.8 - 8.8 g/dL    Alkaline Phosphatase 76 40 - 150 U/L    ALT 40 0 - 50 U/L    AST 30 0 - 45 U/L   CBC with platelets   Result Value Ref Range    WBC 7.1 4.0 - 11.0 10e9/L    RBC Count 5.97 (H) 3.8 - 5.2 10e12/L    Hemoglobin 17.4 (H) 11.7 - 15.7 g/dL    Hematocrit 53.5 (H) 35.0 - 47.0 %    MCV 90 78 - 100 fl    MCH 29.1 26.5 - 33.0 pg    MCHC 32.5 31.5 - 36.5 g/dL    RDW 13.1 10.0 - 15.0 %    Platelet Count 192 150 - 450 10e9/L           Assessment & Plan   1. Malnutrition, unspecified type (H)  Protein is good today. She is maintaining her weight.   - Comprehensive metabolic panel (BMP + Alb, Alk Phos, ALT, AST, Total. Bili, TP)  - CBC with platelets    2. Chronic systolic congestive heart failure (H)  Stable. Follows with cardiology    3. COPD, severe (H)  Stable. Continue current medication(s) and dose(s).   - montelukast (SINGULAIR) 10 MG tablet; Take 1 tablet (10 mg) by mouth At Bedtime  Dispense: 90 tablet; Refill: 3  - Comprehensive metabolic panel (BMP + Alb, Alk Phos, ALT, AST,  Total. Bili, TP)  - CBC with platelets    4. Coronary artery disease involving native coronary artery of native heart with angina pectoris (H)  - Comprehensive metabolic panel (BMP + Alb, Alk Phos, ALT, AST, Total. Bili, TP)  - CBC with platelets    5. Acute CVA (cerebrovascular accident) - right paramedian superior vermis and left cerebellar hemisphere  Stable. Continue current medication(s) and dose(s).   - clopidogrel (PLAVIX) 75 MG tablet; TAKE ONE TABLET BY MOUTH ONCE DAILY  Dispense: 90 tablet; Refill: 3    6. Tobacco dependence syndrome  Is not interested in quitting smoking. Recommended smoking cessation for her health.   - TOBACCO CESSATION ORDER FOR      Tobacco Cessation:   reports that she has been smoking cigarettes. She has been smoking about 0.50 packs per day. She has never used smokeless tobacco.  Tobacco Cessation Action Plan: Information offered: Patient not interested at this time      Return in about 53 weeks (around 12/16/2020) for Annual Wellness Visit.    [facility]

## 2019-12-12 ENCOUNTER — TELEPHONE (OUTPATIENT)
Dept: FAMILY MEDICINE | Facility: OTHER | Age: 69
End: 2019-12-12

## 2019-12-12 NOTE — TELEPHONE ENCOUNTER
Attempted to contact patient, both numbers state they are not in service at this time and can not leave a message   Letter sent with results  Thanks  Arabella Palmer RT (R)       Please call patient and let her know that her kidney function is normal. Her hemoglobin is elevated but this is likely due to smoking and COPD.   Deisy Carter, DRE-C

## 2020-01-01 DIAGNOSIS — I25.119 CORONARY ARTERY DISEASE INVOLVING NATIVE CORONARY ARTERY OF NATIVE HEART WITH ANGINA PECTORIS (H): ICD-10-CM

## 2020-01-02 RX ORDER — METOPROLOL TARTRATE 25 MG/1
TABLET, FILM COATED ORAL
Qty: 180 TABLET | Refills: 0 | Status: SHIPPED | OUTPATIENT
Start: 2020-01-02 | End: 2020-02-13

## 2020-01-03 NOTE — TELEPHONE ENCOUNTER
Prescription approved per [facility] [place].  Diego Hilario, RN, BSN

## 2020-02-06 ENCOUNTER — TRANSFERRED RECORDS (OUTPATIENT)
Dept: HEALTH INFORMATION MANAGEMENT | Facility: CLINIC | Age: 70
End: 2020-02-06

## 2020-02-12 ENCOUNTER — MEDICAL CORRESPONDENCE (OUTPATIENT)
Dept: HEALTH INFORMATION MANAGEMENT | Facility: CLINIC | Age: 70
End: 2020-02-12

## 2020-02-12 NOTE — PROGRESS NOTES
Subjective     Preeti Ford is a 69 year old female who presents to clinic today for the following health issues:    HPI       Hospital Follow-up Visit:    Hospital/Nursing Home/IP Rehab Facility: [facility]  Date of Admission: 2/6/2020  Date of Discharge: 2/12/2020  Reason(s) for Admission: Stroke            Problems taking medications regularly:  None       Medication changes since discharge: Stop taking Imdur, change how taking: Metoprolol: take one-quarter by mouth 2 times daily       Problems adhering to non-medication therapy:  None    Summary of hospitalization:  CareEverywhere information obtained and reviewed  Diagnostic Tests/Treatments reviewed.  Follow up needed: see notes  Other Healthcare Providers Involved in Patient s Care:         Homecare  Update since discharge: stable.   physical therapy and occupational therapy were recommended but no rehab beds available.  has been trying to help her walk with walker at home. [facility] - Alfreda came out and helped go through medication changes. Fariba from [facility] coming today. Home care physical therapy and occupational therapy were not ordered by hospital. Preeti is having trouble with memory and word finding since the stroke. Has some right sided weakness. Breathing stable on O2 1 L.  is going to learn how to do neb treatments. She is underweight.  is giving her Ensure or [place] instant breakfast in addition to meals. Appetite has been good.   She is still smoking.  states he is going to help her with quitting.     Post Discharge Medication Reconciliation: discharge medications reconciled, continue medications without change.  Plan of care communicated with patient and        Coding guidelines for this visit:  Type of Medical   Decision Making Face-to-Face Visit       within 7 Days of discharge Face-to-Face Visit        within 14 days of discharge   Moderate Complexity 33963 [id number]   High Complexity 05132  [id number]              START taking these medicines   Instructions   oxygen-air delivery systems  For diagnoses: COPD, moderate (HC), Chronic obstructive pulmonary disease, unspecified COPD type (HC)  Commonly known as: HOME OXYGEN  Oxygen for home use. Liters per minute: 1 per nasal cannula. Frequency of use: With activity;. Length of need: 99.      CHANGE how you take these medicines   Instructions   acetaminophen 325 mg tablet  For diagnoses: Preventative health care  What changed:     how much to take    when to take this    reasons to take this  Commonly known as: TYLENOL  Take 2 tablets by mouth every 6 hours if needed (pain).    * albuterol 90 mcg/actuation INHALER  For diagnoses: COPD, moderate (HC)  What changed:     how much to take    reasons to take this  Commonly known as: ProAir RespiClick  Inhale 2 Puffs by mouth every 6 hours if needed (wheezing, shortness of breath). Generic albuterol accepted    * albuterol 0.083 % neb solution  For diagnoses: Panlobular emphysema (HC)  What changed: reasons to take this  Commonly known as: PROVENTIL  Inhale 3 mL via a nebulizer every 4 hours if needed (wheezing, shortness of breath).    metoprolol tartrate 25 mg tablet  For diagnoses: Chronic systolic CHF (congestive heart failure) (HC)  What changed: how much to take  Commonly known as: LOPRESSOR  Take one-quarter tablet by mouth 2 times daily.      * This list has 2 medication(s) that are the same as other medications prescribed for you. Read the directions carefully, and ask your doctor or other care provider to review them with you.         CONTINUE taking these medicines   Instructions   albuterol-ipratropium (2.5-0.5 mg) in 3 mL NEBULIZATION solution  Commonly known as: DUONEB  Inhale 3 mL by mouth every 4 hours if needed for Shortness Of Breath or Wheezing.    aspirin 81 mg enteric coated tablet  Commonly known as: ECOTRIN  Take 81 mg by mouth once daily with a meal.    atorvastatin 20 mg tablet  Commonly known  as: LIPITOR  Take 20 mg by mouth at bedtime.    budesonide-formoteroL 160-4.5 mcg/actuation (160-4.5 mcg each actuation) inhaler  For diagnoses: Panlobular emphysema (HC)  Commonly known as: Symbicort  Inhale 2 Puffs by mouth 2 times daily.    calcium with vitamin D3 tablet  Commonly known as: OS-CALDERON 500 + D  Take 1 tablet by mouth 2 times daily with meals.    clopidogreL 75 mg tablet  Commonly known as: PLAVIX  Take 75 mg by mouth every morning.    coenzyme q10 100 mg Cap  Take 100 mg by mouth once daily.    guaiFENesin 600 mg Extended-Release tablet  Commonly known as: MUCINEX  Take 600 mg by mouth 2 times daily.    nicotine 7 mg/24 hr 7 mg/24 hr patch  Commonly known as: NICODERM; HABITROL  Apply 1 Patch on dry, clean, hairless skin once daily.    nitroglycerin 0.4 mg sublingual tablet  Commonly known as: NITROSTAT  For chest pain place 1 tablet under the tongue every 5 minutes for 3 doses. If symptoms persist 5 minutes after 1st dose call 911.    polyethylene glycol 17 g powder for solution  Commonly known as: MIRALAX; GLYCOLAX  Take 1 Packet by mouth once daily if needed for Constipation.    sennosides-docusate (8.6-50 mg) 8.6-50 mg tablet  Commonly known as: SENOKOT S  Take 1 tablet by mouth 2 times daily if needed for Constipation.    Singulair 10 mg tablet  Generic drug: montelukast  Take 1 tablet by mouth at bedtime.    umeclidinium 62.5 mcg/actuation powder for inhalation  For diagnoses: Panlobular emphysema (HC)  Commonly known as: Incruse Ellipta  Inhale 1 Puff by mouth once daily.      STOP taking these medicines   isosorbide mononitrate 30 mg extended release tablet 24 Hour  Commonly known as: IMDUR    Patient Active Problem List   Diagnosis     Advanced directives, counseling/discussion     Urinary incontinence     Forgetfulness     Hyperlipidemia LDL goal <130     History of stroke - right thalamus in 8/2013 and left cerebellar and right vermis in 3/2018     Numbness and tingling     Tobacco use  disorder     CVA (cerebral vascular accident) (H)     ACS (acute coronary syndrome) (H)     Ischemic cardiomyopathy - EF 25% in 2015 but 55% in 3/2017 and 45-50% on 3/18     HTN, goal below 130/80     Acute systolic congestive heart failure (H)     Lung nodule     GERD (gastroesophageal reflux disease)     ST elevation myocardial infarction involving left anterior descending (LAD) coronary artery (H)     Chronic bronchitis, unspecified chronic bronchitis type (H)     PAD (peripheral artery disease) (H) - moderate left common carotid stenosis, aortoiliac bypass, chronic left vertebral and right posterior cerebral stenoses     Cervical high risk HPV (human papillomavirus) test positive     COPD exacerbation (H)     Coronary artery disease involving native coronary artery - STEMI with LAD and circ stents in 8/2015     Elevated troponin     Pulmonary emphysema (H)     Vertigo     Other seizures (H) - possible     Acute CVA (cerebrovascular accident) - right paramedian superior vermis and left cerebellar hemisphere     Malnutrition, unspecified type (H)     Hyponatremia     Hypochloremia     Past Surgical History:   Procedure Laterality Date     APPENDECTOMY       BYPASS GRAFT AORTOILIAC N/A 3/7/2017    Procedure: BYPASS GRAFT AORTOILIAC;  Surgeon: Marcos Pratt MD;  Location: [facility] OR     SALPINGO OOPHORECTOMY,R/L/DELORES      Salpingo Oophorectomy, RT/LT/DELORES       Social History     Tobacco Use     Smoking status: Current Every Day Smoker     Packs/day: 0.50     Types: Cigarettes     Smokeless tobacco: Never Used     Tobacco comment: patient states she is working on it    Substance Use Topics     Alcohol use: No     Alcohol/week: 0.0 standard drinks     Family History   Problem Relation Age of Onset     Cerebrovascular Disease Mother      Cerebrovascular Disease Father      Genitourinary Problems Brother         Dialysis         Current Outpatient Medications   Medication Sig Dispense Refill     ACE/ARB/ARNI NOT  PRESCRIBED, INTENTIONAL, Please choose reason not prescribed, below       acetaminophen (TYLENOL) 325 MG tablet Take 325 mg by mouth every 4 hours as needed for mild pain  100 tablet      albuterol (2.5 MG/3ML) 0.083% neb solution Take 1 vial by nebulization every 4 hours as needed for shortness of breath / dyspnea or wheezing       albuterol (PROAIR HFA/PROVENTIL HFA/VENTOLIN HFA) 108 (90 Base) MCG/ACT inhaler Inhale 2 puffs into the lungs every 6 hours as needed for shortness of breath / dyspnea 18 g 0     aspirin 81 MG EC tablet Take 1 tablet (81 mg) by mouth daily       atorvastatin (LIPITOR) 20 MG tablet Take 1 tablet (20 mg) by mouth At Bedtime 90 tablet 3     budesonide-formoterol (SYMBICORT) 160-4.5 MCG/ACT Inhaler INHALE TWO PUFFS BY MOUTH TWICE A DAY 10.2 g 5     Calcium Carbonate-Vitamin D (OSCAL 500/200 D-3 PO) Take 1 tablet by mouth 2 times daily       clopidogrel (PLAVIX) 75 MG tablet TAKE ONE TABLET BY MOUTH ONCE DAILY 90 tablet 3     Coenzyme Q10 (COQ10 PO) Take 100 mg by mouth every 24 hours (at 16:00)       guaiFENesin (MUCINEX) 600 MG 12 hr tablet Take 600 mg by mouth 2 times daily       ipratropium - albuterol 0.5 mg/2.5 mg/3 mL (DUONEB) 0.5-2.5 (3) MG/3ML neb solution Take 1 vial (3 mLs) by nebulization every 4 hours as needed for shortness of breath / dyspnea or wheezing 270 mL 3     metoprolol tartrate (LOPRESSOR) 25 MG tablet Take 0.25 tablets (6.25 mg) by mouth 2 times daily       montelukast (SINGULAIR) 10 MG tablet Take 1 tablet (10 mg) by mouth At Bedtime 90 tablet 3     nitroGLYcerin (NITROSTAT) 0.4 MG sublingual tablet For chest pain place 1 tablet under the tongue every 5 minutes for 3 doses. If symptoms persist 5 minutes after 1st dose call 911. 25 tablet      order for DME Equipment being ordered: Nebulizer machine 1 Device 0     order for DME Equipment being ordered: Nebulizer 1 Device 0     polyethylene glycol (MIRALAX/GLYCOLAX) Packet Take 1 packet by mouth daily as needed for  "constipation       senna-docusate (SENOKOT-S;PERICOLACE) 8.6-50 MG per tablet Take 1 tablet by mouth 2 times daily as needed for constipation 100 tablet      umeclidinium (INCRUSE ELLIPTA) 62.5 MCG/INH inhaler Inhale 1 puff into the lungs daily       nicotine (NICODERM CQ) 7 MG/24HR 24 hr patch Place 1 patch onto the skin every 24 hours (Patient not taking: Reported on 10/7/2019) 30 patch 1     Nutritional Supplements (BOOST) Take 1 Bottle by mouth 3 times daily (with meals) (Patient not taking: Reported on 2/14/2020) 90 each 0     Allergies   Allergen Reactions     Crestor [Rosuvastatin] Other (See Comments)     dizziness     Hmg-Coa-R Inhibitors Other (See Comments)     Muscle/joint aching     Lisinopril Cough     Optison [Albumin Human] Other (See Comments)     Pt was flush and very dizzy.  Also had a BP drop     Penicillins Rash     BP Readings from Last 3 Encounters:   02/14/20 100/74   12/11/19 139/86   10/07/19 (!) 142/62    Wt Readings from Last 3 Encounters:   02/14/20 45.4 kg (100 lb 1.6 oz)   12/11/19 47.8 kg (105 lb 6.4 oz)   10/07/19 47.9 kg (105 lb 9.6 oz)                    Reviewed and updated as needed this visit by Provider         Review of Systems   ROS COMP: Constitutional, HEENT, cardiovascular, pulmonary, GI, , musculoskeletal, neuro, skin, endocrine and psych systems are negative, except as otherwise noted.      Objective    /74   Pulse 71   Temp 98.5  F (36.9  C) (Temporal)   Resp 20   Ht 1.575 m (5' 2.01\")   Wt 45.4 kg (100 lb 1.6 oz)   SpO2 98%   BMI 18.30 kg/m    Body mass index is 18.3 kg/m .  Physical Exam   GENERAL: alert, no distress, frail and appears older than stated age  EYES: Eyes grossly normal to inspection, PERRL and conjunctivae and sclerae normal  NECK: no adenopathy, no asymmetry, masses, or scars and thyroid normal to palpation  RESP: lungs diminished but clear to auscultation - no rales, rhonchi or wheezes  CV: regular rate and rhythm, normal S1 S2, no S3 " or S4, no murmur, click or rub, no peripheral edema  ABDOMEN: soft, nontender, no hepatosplenomegaly, no masses and bowel sounds normal  MS: no gross musculoskeletal defects noted, no edema, right sided weakness, torticollis of right neck  SKIN: no suspicious lesions or rashes  NEURO: weakness generalized, dysphasia  PSYCH: affect flat and appearance well groomed    Diagnostic Test Results:  Labs reviewed in Epic        Assessment & Plan     1. Cerebrovascular accident (CVA), unspecified mechanism (H)  I recommended she work with physical therapy and occupational therapy in home for generalized weakness, right sided weakness due to high risk for falls. Work with occupational therapy for speech and cognitive difficulties from stroke. Reviewed medication changes with  and will continue same medications and doses without changes. Recommended smoking cessation.     2. Coronary artery disease involving native coronary artery of native heart with angina pectoris (H)  Stable. Continue current medication(s) and dose(s).   - metoprolol tartrate (LOPRESSOR) 25 MG tablet; Take 0.25 tablets (6.25 mg) by mouth 2 times daily    3. Simple chronic bronchitis (H)  Stable. Continue current medication(s) and dose(s). Currently on 1L oxygen.  Encouraged smoking cessation  - ipratropium - albuterol 0.5 mg/2.5 mg/3 mL (DUONEB) 0.5-2.5 (3) MG/3ML neb solution; Take 1 vial (3 mLs) by nebulization every 4 hours as needed for shortness of breath / dyspnea or wheezing  Dispense: 270 mL; Refill: 3    4. Malnutrition, unspecified type (H)  Drinking Ensure or [place] instant breakfast, has good appetite.  cutting up food into small bites. No problems with swallowing.     5. Chronic systolic congestive heart failure (H)  Stable. Last echo showed ejection fraction of 55 %    6. PAD (peripheral artery disease) (H)  Stable.     7. Seizures (H)  No recent seizures.     8. Dysphasia  See note #1  - OCCUPATIONAL THERAPY REFERRAL;  Future    9. Risk for falls  See note #1  - PHYSICAL THERAPY REFERRAL; Future    10. Generalized muscle weakness  See note #1  - PHYSICAL THERAPY REFERRAL; Future    11. Cognitive changes  See note #1  - OCCUPATIONAL THERAPY REFERRAL; Future    No follow-ups on file.    [facility]

## 2020-02-13 ENCOUNTER — TELEPHONE (OUTPATIENT)
Dept: FAMILY MEDICINE | Facility: OTHER | Age: 70
End: 2020-02-13

## 2020-02-13 NOTE — TELEPHONE ENCOUNTER
Patient was seen at [facility] for CVA.  Please call and help her schedule ED Follow-up with any provider as will not know what they may have changed for her.    Diego Hilario, RN, BSN

## 2020-02-13 NOTE — TELEPHONE ENCOUNTER
Agree with plan for SN 2 visits in 5 days.  Please call to update Fadumo at [facility].  TAHMINA RobertoC

## 2020-02-13 NOTE — TELEPHONE ENCOUNTER
LMTC for Fadumo at [facility] please inform her of message below  Thanks  Arabella Palmer RT (R)

## 2020-02-13 NOTE — TELEPHONE ENCOUNTER
Reason for Call:  Other medication questions    Detailed comments: spouse states patient was discharged from hospital and he is not sure which medications were changed and what she is supposed to take.    Phone Number Patient can be reached at: Home number on file [phone] (home)    Best Time: any    Can we leave a detailed message on this number? YES    Call taken on 2/13/2020 at 10:26 AM by Padmini Cheema

## 2020-02-13 NOTE — TELEPHONE ENCOUNTER
Reason for Call:  Other call back    Detailed comments: YESENIA from [facility] called clinic needing updated verbal orders for: skilled nursing 2 visits in 5 days. Patient was just discharge from [facility] after having a stroke. [phone]    Phone Number Patient can be reached at: Home number on file [phone] (home)    Best Time: any    Can we leave a detailed message on this number? YES    Call taken on 2/13/2020 at 3:03 PM by Samuel Jang

## 2020-02-14 ENCOUNTER — TELEPHONE (OUTPATIENT)
Dept: FAMILY MEDICINE | Facility: OTHER | Age: 70
End: 2020-02-14

## 2020-02-14 ENCOUNTER — OFFICE VISIT (OUTPATIENT)
Dept: FAMILY MEDICINE | Facility: OTHER | Age: 70
End: 2020-02-14
Payer: COMMERCIAL

## 2020-02-14 VITALS
OXYGEN SATURATION: 98 % | HEART RATE: 71 BPM | RESPIRATION RATE: 20 BRPM | SYSTOLIC BLOOD PRESSURE: 100 MMHG | HEIGHT: 62 IN | DIASTOLIC BLOOD PRESSURE: 74 MMHG | WEIGHT: 100.1 LBS | BODY MASS INDEX: 18.42 KG/M2 | TEMPERATURE: 98.5 F

## 2020-02-14 DIAGNOSIS — I25.119 CORONARY ARTERY DISEASE INVOLVING NATIVE CORONARY ARTERY OF NATIVE HEART WITH ANGINA PECTORIS (H): ICD-10-CM

## 2020-02-14 DIAGNOSIS — E46 MALNUTRITION, UNSPECIFIED TYPE (H): ICD-10-CM

## 2020-02-14 DIAGNOSIS — J41.0 SIMPLE CHRONIC BRONCHITIS (H): ICD-10-CM

## 2020-02-14 DIAGNOSIS — R41.89 COGNITIVE CHANGES: ICD-10-CM

## 2020-02-14 DIAGNOSIS — I73.9 PAD (PERIPHERAL ARTERY DISEASE) (H): ICD-10-CM

## 2020-02-14 DIAGNOSIS — R47.02 DYSPHASIA: ICD-10-CM

## 2020-02-14 DIAGNOSIS — M62.81 GENERALIZED MUSCLE WEAKNESS: ICD-10-CM

## 2020-02-14 DIAGNOSIS — I63.9 CEREBROVASCULAR ACCIDENT (CVA), UNSPECIFIED MECHANISM (H): Primary | ICD-10-CM

## 2020-02-14 DIAGNOSIS — Z91.81 RISK FOR FALLS: ICD-10-CM

## 2020-02-14 DIAGNOSIS — R56.9 SEIZURES (H): ICD-10-CM

## 2020-02-14 DIAGNOSIS — I50.22 CHRONIC SYSTOLIC CONGESTIVE HEART FAILURE (H): ICD-10-CM

## 2020-02-14 PROBLEM — J96.01 ACUTE RESPIRATORY FAILURE WITH HYPOXIA (H): Status: RESOLVED | Noted: 2017-06-14 | Resolved: 2020-02-14

## 2020-02-14 PROBLEM — J96.92 HYPERCAPNIC RESPIRATORY FAILURE (H): Status: RESOLVED | Noted: 2018-05-17 | Resolved: 2020-02-14

## 2020-02-14 PROCEDURE — 99214 OFFICE O/P EST MOD 30 MIN: CPT | Performed by: STUDENT IN AN ORGANIZED HEALTH CARE EDUCATION/TRAINING PROGRAM

## 2020-02-14 RX ORDER — IPRATROPIUM BROMIDE AND ALBUTEROL SULFATE 2.5; .5 MG/3ML; MG/3ML
1 SOLUTION RESPIRATORY (INHALATION) EVERY 4 HOURS PRN
Qty: 270 ML | Refills: 3 | Status: SHIPPED | OUTPATIENT
Start: 2020-02-14 | End: 2020-12-30

## 2020-02-14 RX ORDER — METOPROLOL TARTRATE 25 MG/1
6.25 TABLET, FILM COATED ORAL 2 TIMES DAILY
Start: 2020-02-14 | End: 2020-06-18

## 2020-02-14 ASSESSMENT — MIFFLIN-ST. JEOR: SCORE: 942.43

## 2020-02-14 NOTE — TELEPHONE ENCOUNTER
Fadumo freeman [facility] returned call and was given message no further questions   Closing encounter  Arabella Palmer RT (R)

## 2020-02-14 NOTE — TELEPHONE ENCOUNTER
Reason for Call:  Form, our goal is to have forms completed with 72 hours, however, some forms may require a visit or additional information.    Type of letter, form or note:  medical    Who is the form from?: Home care    Where did the form come from: form was faxed in    What clinic location was the form placed at?: [place]    Where the form was placed: mailbox    What number is listed as a contact on the form?: [phone]       Additional comments:     Call taken on 2/14/2020 at 10:00 AM by Rowena Douglass

## 2020-02-17 ENCOUNTER — TELEPHONE (OUTPATIENT)
Dept: FAMILY MEDICINE | Facility: OTHER | Age: 70
End: 2020-02-17

## 2020-02-17 NOTE — TELEPHONE ENCOUNTER
Adding orders for occupational therapy and physical therapy through home care with [facility].  Please fax and/or call [facility] with orders.  TAHMINA RobertoC

## 2020-02-17 NOTE — TELEPHONE ENCOUNTER
Reason for Call:  Form, our goal is to have forms completed with 72 hours, however, some forms may require a visit or additional information.    Type of letter, form or note:  medical    Who is the form from?: [facility] (if other please explain)    Where did the form come from: form was faxed in    What clinic location was the form placed at?: [facility]    Where the form was placed: mailbox    What number is listed as a contact on the form?: [phone]       Additional comments:     Call taken on 2/17/2020 at 9:58 AM by Rowena Douglass

## 2020-02-17 NOTE — TELEPHONE ENCOUNTER
Spoke to Fadumo at [facility], orders can be faxed to [phone] with note stating that this is an open home care order and patient is needing additional service for PT and OT.

## 2020-02-17 NOTE — TELEPHONE ENCOUNTER
Form needs provider signature  Form has been placed in Dr Diane form bin on wall   Thanks  Arabella Palmer RT (R)

## 2020-02-17 NOTE — TELEPHONE ENCOUNTER
Unable to find this form. Attempted to reach per the number below but all it did was ring. Will try again later. Will also have TC check RightFax for form to see if this has been completed already and just not documented.    Melanie Clemente CMA ([facility])

## 2020-02-19 ENCOUNTER — TELEPHONE (OUTPATIENT)
Dept: FAMILY MEDICINE | Facility: OTHER | Age: 70
End: 2020-02-19

## 2020-02-19 NOTE — TELEPHONE ENCOUNTER
Form signed by provider and placed in MA basket. Faxed by me and placed into scanning basket.  Kd Kessler MA on 2/19/2020 at 10:05 AM

## 2020-02-19 NOTE — TELEPHONE ENCOUNTER
Reason for Call:  Other call back    Detailed comments: Isa from [facility] called clinic requesting verbal orders for physical therapy and a social work evaluation. Please call back with verbal: [phone]    Also patient's BP was 80/58. She is still taking BP medication at this time. This may need to be addressed.   Isa asked that this be sent to Dr. Diane.     Phone Number Patient can be reached at: Home number on file [phone] (home)    Best Time: any    Can we leave a detailed message on this number? YES    Call taken on 2/19/2020 at 10:07 AM by Samuel Jang

## 2020-02-19 NOTE — TELEPHONE ENCOUNTER
Misael OT with [facility] calling to report updated VS and request VORB for OT:    VS at 11:45:  /60  HR 64  O2 84% on RA - she was not wearing her O2 when he arrived  - was not in respiratory distress    Applied 1L O2 NC, went for a 5 min walk  - then recheck O2 while sitting was only 35%, steadily increased over 5 minutes to 95%  - he states patient had O2 on during this time, was not in respiratory distress or passed out/feeling faint  - when he left her home, left O2 on her     Requesting VORB:  OT treatment, 1 time every 2 weeks, for 4 weeks for ADL and IADL training.    Flagged for LM.  Vivi Clay, BSN, RN, PHN

## 2020-02-20 ENCOUNTER — TELEPHONE (OUTPATIENT)
Dept: FAMILY MEDICINE | Facility: OTHER | Age: 70
End: 2020-02-20

## 2020-02-20 NOTE — TELEPHONE ENCOUNTER
Reason for Call:  Form, our goal is to have forms completed with 72 hours, however, some forms may require a visit or additional information.    Type of letter, form or note:  medical    Who is the form from?: Home care    Where did the form come from: form was faxed in    What clinic location was the form placed at?: [facility] - [phone]    Where the form was placed: box Box/Folder    What number is listed as a contact on the form?: fax [phone]       Additional comments: Please complete and fax    Call taken on 2/20/2020 at 7:13 AM by Summer Jc

## 2020-02-20 NOTE — TELEPHONE ENCOUNTER
Form has been placed in EM's form bin on the wall for Jaja to review and sign    Thanks  Arabella NICOLAS (R)

## 2020-02-21 ENCOUNTER — TELEPHONE (OUTPATIENT)
Dept: FAMILY MEDICINE | Facility: OTHER | Age: 70
End: 2020-02-21

## 2020-02-21 NOTE — TELEPHONE ENCOUNTER
Reason for Call:  Form, our goal is to have forms completed with 72 hours, however, some forms may require a visit or additional information.    Type of letter, form or note:  medical    Who is the form from?: Home care    Where did the form come from: form was faxed in    What clinic location was the form placed at?: [facility] - [phone]    Where the form was placed: box Box/Folder    What number is listed as a contact on the form?: fax [phone]       Additional comments: Please complete and fax    Call taken on 2/21/2020 at 8:04 AM by Summer Jc

## 2020-02-21 NOTE — TELEPHONE ENCOUNTER
Reason for Call: Request for an order or referral:    Order or referral being requested:  Verbal orders for speech therapy 3 x a week for 3 weeks, 2 x a week for 2 weeks, aphasia and dysphagia therapy.     Date needed: as soon as possible    Has the patient been seen by the PCP for this problem? YES    Additional comments:  Beba from home care calling.  Need verbal order    Phone number Patient can be reached at:  Cell number on file:    Telephone Information:   Mobile [phone]       Best Time:  any    Can we leave a detailed message on this number?  YES    Call taken on 2/21/2020 at 4:29 PM by Isa Osborn

## 2020-02-24 ENCOUNTER — TELEPHONE (OUTPATIENT)
Dept: FAMILY MEDICINE | Facility: OTHER | Age: 70
End: 2020-02-24

## 2020-02-24 ENCOUNTER — MEDICAL CORRESPONDENCE (OUTPATIENT)
Dept: HEALTH INFORMATION MANAGEMENT | Facility: CLINIC | Age: 70
End: 2020-02-24

## 2020-02-24 NOTE — TELEPHONE ENCOUNTER
See other encounter encompassing both physical therapy and occupational therapy orders.  Deisy Carter, NP-C

## 2020-02-24 NOTE — TELEPHONE ENCOUNTER
Called Beba left message to call clinic back. When she calls back please relay message below.  Kd Kessler MA on 2/24/2020 at 10:39 AM

## 2020-02-24 NOTE — TELEPHONE ENCOUNTER
Forms placed in bin above desk for signature from provider.    Kd Kessler MA on 2/24/2020 at 9:25 AM

## 2020-02-24 NOTE — TELEPHONE ENCOUNTER
Reason for Call:  Form, our goal is to have forms completed with 72 hours, however, some forms may require a visit or additional information.    Type of letter, form or note:  medical    Who is the form from?: Home care    Where did the form come from: form was faxed in    What clinic location was the form placed at?: [facility] - [phone]    Where the form was placed: box Box/Folder    What number is listed as a contact on the form?: fax [phone]       Additional comments: Please complete and fax    Call taken on 2/24/2020 at 7:47 AM by Summer Jc

## 2020-02-24 NOTE — TELEPHONE ENCOUNTER
Reviewed and signed. Please ask if Dr. Diane will co-sign the occupational therapy orders. He already co-signed the physical therapy orders.  TAHMINA RobertoC

## 2020-02-24 NOTE — TELEPHONE ENCOUNTER
Okay for verbal order for speech therapy 3 x a week for 3 weeks, 2 x a week for 2 weeks, aphasia and dysphagia therapy.   Deisy Carter, DRE-C

## 2020-02-25 ENCOUNTER — TELEPHONE (OUTPATIENT)
Dept: FAMILY MEDICINE | Facility: OTHER | Age: 70
End: 2020-02-25

## 2020-02-25 DIAGNOSIS — J42 CHRONIC BRONCHITIS, UNSPECIFIED CHRONIC BRONCHITIS TYPE (H): ICD-10-CM

## 2020-02-25 DIAGNOSIS — J43.1 PANLOBULAR EMPHYSEMA (H): Primary | ICD-10-CM

## 2020-02-25 NOTE — TELEPHONE ENCOUNTER
Reason for Call: Request for an order or referral:    Order or referral being requested: New med machine    Date needed: as soon as possible    Has the patient been seen by the PCP for this problem? YES    Additional comments: Vanessa called and is requesting a new med machine with the tubing and new mouth piece. She said the one there using is getting old. Please Advise thank you    Phone number Patient can be reached at:  Other phone number:  [phone]    Best Time:  anytime    Can we leave a detailed message on this number?  YES    Call taken on 2/25/2020 at 2:32 PM by Letty Hernandez

## 2020-02-25 NOTE — TELEPHONE ENCOUNTER
Left message for Vanessa to return call. Please get clarification on what she is looking for. (Is she looking for a nebulizer machine for patient?)

## 2020-02-26 ENCOUNTER — MEDICAL CORRESPONDENCE (OUTPATIENT)
Dept: HEALTH INFORMATION MANAGEMENT | Facility: CLINIC | Age: 70
End: 2020-02-26

## 2020-02-26 ENCOUNTER — TELEPHONE (OUTPATIENT)
Dept: FAMILY MEDICINE | Facility: OTHER | Age: 70
End: 2020-02-26

## 2020-02-26 RX ORDER — NEBULIZER ACCESSORIES
1 KIT MISCELLANEOUS PRN
Qty: 1 KIT | Refills: 1 | Status: SHIPPED | OUTPATIENT
Start: 2020-02-26

## 2020-02-26 NOTE — TELEPHONE ENCOUNTER
Phone number listed below for Vanessa is an incorrect number. Provider, please order new nebulizer supplies for patient as per request below.

## 2020-02-26 NOTE — TELEPHONE ENCOUNTER
Reason for Call:  Form, our goal is to have forms completed with 72 hours, however, some forms may require a visit or additional information.    Type of letter, form or note:  medical    Who is the form from?: Home care    Where did the form come from: form was faxed in    What clinic location was the form placed at?: [facility] - [phone]    Where the form was placed: box Box/Folder    What number is listed as a contact on the form?: fax [phone]       Additional comments: Please complete and fax    Call taken on 2/26/2020 at 8:53 AM by Summer Jc

## 2020-02-26 NOTE — TELEPHONE ENCOUNTER
Spoke to patient's  [place] and advised him of message below. He verbalized understanding. No further questions.

## 2020-03-03 ENCOUNTER — TELEPHONE (OUTPATIENT)
Dept: FAMILY MEDICINE | Facility: OTHER | Age: 70
End: 2020-03-03

## 2020-03-03 NOTE — TELEPHONE ENCOUNTER
Reason for Call:  Form, our goal is to have forms completed with 72 hours, however, some forms may require a visit or additional information.    Type of letter, form or note:  medical    Who is the form from?: Home care    Where did the form come from: form was faxed in    What clinic location was the form placed at?: [facility] - [phone]    Where the form was placed: box Box/Folder    What number is listed as a contact on the form?: fax [phone]       Additional comments: Please complete and fax    Call taken on 3/3/2020 at 7:26 AM by Summer Jc

## 2020-03-04 ENCOUNTER — MEDICAL CORRESPONDENCE (OUTPATIENT)
Dept: HEALTH INFORMATION MANAGEMENT | Facility: CLINIC | Age: 70
End: 2020-03-04

## 2020-03-05 ENCOUNTER — TELEPHONE (OUTPATIENT)
Dept: FAMILY MEDICINE | Facility: OTHER | Age: 70
End: 2020-03-05

## 2020-03-05 NOTE — TELEPHONE ENCOUNTER
Reason for Call:  Form, our goal is to have forms completed with 72 hours, however, some forms may require a visit or additional information.    Type of letter, form or note:  medical    Who is the form from?: Home care    Where did the form come from: form was faxed in    What clinic location was the form placed at?: [facility] - [phone]    Where the form was placed: box Box/Folder    What number is listed as a contact on the form?: fax [phone]       Additional comments: Please complete and fax    Call taken on 3/5/2020 at 8:36 AM by Summer Jc

## 2020-03-06 ENCOUNTER — TELEPHONE (OUTPATIENT)
Dept: FAMILY MEDICINE | Facility: OTHER | Age: 70
End: 2020-03-06

## 2020-03-06 DIAGNOSIS — Z86.73 HISTORY OF STROKE: ICD-10-CM

## 2020-03-06 DIAGNOSIS — R03.0 ELEVATED BLOOD PRESSURE READING WITHOUT DIAGNOSIS OF HYPERTENSION: ICD-10-CM

## 2020-03-06 NOTE — TELEPHONE ENCOUNTER
Please call Zulema at [facility], [phone]. Please leave a detailed message.  Should she continue taking Asprin? She is on Plavix.  Can her blood pressure medication be reduced or elimated? She has been tracking her blood pressure for 3 weeks, it has been averaging 110/60.

## 2020-03-09 ENCOUNTER — MEDICAL CORRESPONDENCE (OUTPATIENT)
Dept: HEALTH INFORMATION MANAGEMENT | Facility: CLINIC | Age: 70
End: 2020-03-09

## 2020-03-09 NOTE — TELEPHONE ENCOUNTER
Left message for Zulema to return call. Unable to leave detailed message as voicemail is a generic voicemail. When call is returned, please see provider message below.

## 2020-03-09 NOTE — TELEPHONE ENCOUNTER
Spoke to Zulema at [facility] and advised her of message below. She verbalized understanding. No further questions.

## 2020-03-09 NOTE — TELEPHONE ENCOUNTER
Please call home care nurse and let her know that yes, she is supposed to be on both Plavix and aspirin per the hospital discharge notes.   The only medication she has that affects her blood pressure is the metoprolol. She has a history of coronary artery disease and aortic stenosis and the metoprolol is likely helping with the efficiency of her heart so I recommend continuing this until she sees cardiology for follow up.   Deisy Carter, DRE-C

## 2020-03-10 ENCOUNTER — TELEPHONE (OUTPATIENT)
Dept: FAMILY MEDICINE | Facility: OTHER | Age: 70
End: 2020-03-10

## 2020-03-10 NOTE — TELEPHONE ENCOUNTER
Our goal is to have forms completed with 72 hours, however some forms may require a visit or additional information.    Who is the form from?: Home care  Where the form came from: form was faxed in  What clinic location was the form placed at?: [place]  Where the form was placed: mailbox  What number is listed as a contact on the form?: [phone]    Phone call message- patient request for a letter, form or note:    Date needed: as soon as possible  Please fax to [phone]  Has the patient signed a consent form for release of information? NO    Additional comments:     Call taken on 3/10/2020 at 8:26 AM by Rowena Douglass    Type of letter, form or note: medical

## 2020-03-11 ENCOUNTER — TELEPHONE (OUTPATIENT)
Dept: FAMILY MEDICINE | Facility: OTHER | Age: 70
End: 2020-03-11

## 2020-03-11 NOTE — TELEPHONE ENCOUNTER
Reason for Call:  Form, our goal is to have forms completed with 72 hours, however, some forms may require a visit or additional information.    Type of letter, form or note:  medical    Who is the form from?: Home care    Where did the form come from: form was faxed in    What clinic location was the form placed at?: [facility] - [phone]    Where the form was placed: box Box/Folder    What number is listed as a contact on the form?: fax [phone]       Additional comments: Please complete and fax    Call taken on 3/11/2020 at 9:26 AM by Summer Jc

## 2020-03-16 ENCOUNTER — TELEPHONE (OUTPATIENT)
Dept: FAMILY MEDICINE | Facility: OTHER | Age: 70
End: 2020-03-16

## 2020-03-16 NOTE — TELEPHONE ENCOUNTER
Our goal is to have forms completed with 72 hours, however some forms may require a visit or additional information.    Who is the form from?: home care  Where the form came from: form was faxed in  What clinic location was the form placed at?: [place]  Where the form was placed: forms bin  What number is listed as a contact on the form?: [phone]    Phone call message- patient request for a letter, form or note:    Date needed: as soon as possible  Please fax to [phone]  Has the patient signed a consent form for release of information? NO    Additional comments:     Call taken on 3/16/2020 at 12:39 PM by Rowena Douglass    Type of letter, form or note: medical

## 2020-03-17 ENCOUNTER — TELEPHONE (OUTPATIENT)
Dept: FAMILY MEDICINE | Facility: OTHER | Age: 70
End: 2020-03-17

## 2020-03-17 NOTE — TELEPHONE ENCOUNTER
Reason for Call:  Form, our goal is to have forms completed with 72 hours, however, some forms may require a visit or additional information.    Type of letter, form or note:  medical    Who is the form from?: Home care    Where did the form come from: form was faxed in    What clinic location was the form placed at?: [facility] - [phone]    Where the form was placed: box Box/Folder    What number is listed as a contact on the form?: fax [phone]       Additional comments: Please complete and fax    Call taken on 3/17/2020 at 7:23 AM by Summer Jc

## 2020-03-19 ENCOUNTER — TELEPHONE (OUTPATIENT)
Dept: FAMILY MEDICINE | Facility: OTHER | Age: 70
End: 2020-03-19

## 2020-03-19 ENCOUNTER — MEDICAL CORRESPONDENCE (OUTPATIENT)
Dept: HEALTH INFORMATION MANAGEMENT | Facility: CLINIC | Age: 70
End: 2020-03-19

## 2020-03-19 NOTE — TELEPHONE ENCOUNTER
Reason for Call:  Form, our goal is to have forms completed with 72 hours, however, some forms may require a visit or additional information.    Type of letter, form or note:  medical    Who is the form from?: Home care    Where did the form come from: form was faxed in    What clinic location was the form placed at?: [facility] - [phone]    Where the form was placed: box Box/Folder    What number is listed as a contact on the form?: fax [phone]       Additional comments: Please complete and fax    Call taken on 3/19/2020 at 7:15 AM by Summer Jc

## 2020-03-20 ENCOUNTER — MEDICAL CORRESPONDENCE (OUTPATIENT)
Dept: HEALTH INFORMATION MANAGEMENT | Facility: CLINIC | Age: 70
End: 2020-03-20

## 2020-03-20 ENCOUNTER — TELEPHONE (OUTPATIENT)
Dept: FAMILY MEDICINE | Facility: OTHER | Age: 70
End: 2020-03-20

## 2020-03-20 NOTE — TELEPHONE ENCOUNTER
Form appears to be informational only. Reviewed documents and sent to scanning.  TAHMINA RobertoC

## 2020-03-20 NOTE — TELEPHONE ENCOUNTER
Reason for Call:  Form, our goal is to have forms completed with 72 hours, however, some forms may require a visit or additional information.    Type of letter, form or note:  medical    Who is the form from?: Home care    Where did the form come from: form was faxed in    What clinic location was the form placed at?: [facility] - [phone]    Where the form was placed: box Box/Folder    What number is listed as a contact on the form?: fax [phone]       Additional comments: Please complete and fax    Call taken on 3/20/2020 at 7:54 AM by Summer Jc

## 2020-03-23 ENCOUNTER — TELEPHONE (OUTPATIENT)
Dept: FAMILY MEDICINE | Facility: OTHER | Age: 70
End: 2020-03-23

## 2020-03-23 NOTE — TELEPHONE ENCOUNTER
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  It is okay to be off of oxygen.PT's levels do drop below 90 when active. She is above 90 at rest but when she moves around it drops a little bit. She has not been wearing it overnight. PT and her  are wondering if she can be off of it. Please call Shani back because she will be going back to the pt's home.

## 2020-03-23 NOTE — TELEPHONE ENCOUNTER
Reason for Call:  Form, our goal is to have forms completed with 72 hours, however, some forms may require a visit or additional information.    Type of letter, form or note:  medical    Who is the form from?: Home care    Where did the form come from: form was faxed in    What clinic location was the form placed at?: [facility] - [phone]    Where the form was placed: box Box/Folder    What number is listed as a contact on the form?: fax [phone]       Additional comments: please complete and fax    Call taken on 3/23/2020 at 8:45 AM by Summer Jc

## 2020-03-24 ENCOUNTER — MEDICAL CORRESPONDENCE (OUTPATIENT)
Dept: HEALTH INFORMATION MANAGEMENT | Facility: CLINIC | Age: 70
End: 2020-03-24

## 2020-03-24 NOTE — TELEPHONE ENCOUNTER
Shani called back and 90% sats at rest and high 80s with activity with physical therapy. physical therapy also concerned about trip hazard. Gave okay to discontinue oxygen.  Deisy Carter, DRE-C

## 2020-03-27 ENCOUNTER — MEDICAL CORRESPONDENCE (OUTPATIENT)
Dept: HEALTH INFORMATION MANAGEMENT | Facility: CLINIC | Age: 70
End: 2020-03-27

## 2020-03-27 ENCOUNTER — TELEPHONE (OUTPATIENT)
Dept: FAMILY MEDICINE | Facility: OTHER | Age: 70
End: 2020-03-27

## 2020-03-27 NOTE — TELEPHONE ENCOUNTER
Our goal is to have forms completed with 72 hours, however some forms may require a visit or additional information.    Who is the form from?: Home care  Where the form came from: form was faxed in  What clinic location was the form placed at?: [place]  Where the form was placed: mailbox  What number is listed as a contact on the form?: [phone]    Phone call message- patient request for a letter, form or note:    Date needed: as soon as possible  Please fax to [phone]  Has the patient signed a consent form for release of information? NO    Additional comments:     Call taken on 3/27/2020 at 9:00 AM by Rowena Douglass    Type of letter, form or note: medical

## 2020-03-30 ENCOUNTER — TELEPHONE (OUTPATIENT)
Dept: FAMILY MEDICINE | Facility: OTHER | Age: 70
End: 2020-03-30

## 2020-03-30 NOTE — TELEPHONE ENCOUNTER
Our goal is to have forms completed with 72 hours, however some forms may require a visit or additional information.    Who is the form from?: Home care  Where the form came from: form was faxed in  What clinic location was the form placed at?: [place]  Where the form was placed: mailbox  What number is listed as a contact on the form?: [phone]    Phone call message- patient request for a letter, form or note:    Date needed: as soon as possible  Please fax to [phone]  Has the patient signed a consent form for release of information? NO    Additional comments:     Call taken on 3/30/2020 at 1:21 PM by Rowena Douglass    Type of letter, form or note: medical

## 2020-03-31 ENCOUNTER — MEDICAL CORRESPONDENCE (OUTPATIENT)
Dept: HEALTH INFORMATION MANAGEMENT | Facility: CLINIC | Age: 70
End: 2020-03-31

## 2020-04-06 ENCOUNTER — TELEPHONE (OUTPATIENT)
Dept: FAMILY MEDICINE | Facility: OTHER | Age: 70
End: 2020-04-06

## 2020-04-06 NOTE — TELEPHONE ENCOUNTER
Our goal is to have forms completed with 72 hours, however some forms may require a visit or additional information.    Who is the form from?: Home care  Where the form came from: form was faxed in  What clinic location was the form placed at?: [place]  Where the form was placed: mailbox  What number is listed as a contact on the form?: [phone]    Phone call message- patient request for a letter, form or note:    Date needed: as soon as possible  Please fax to [phone]  Has the patient signed a consent form for release of information? NO    Additional comments:     Call taken on 4/6/2020 at 1:35 PM by Rowena Douglass    Type of letter, form or note: medical

## 2020-04-13 ENCOUNTER — TELEPHONE (OUTPATIENT)
Dept: FAMILY MEDICINE | Facility: OTHER | Age: 70
End: 2020-04-13

## 2020-04-13 ENCOUNTER — MEDICAL CORRESPONDENCE (OUTPATIENT)
Dept: HEALTH INFORMATION MANAGEMENT | Facility: CLINIC | Age: 70
End: 2020-04-13

## 2020-04-13 NOTE — TELEPHONE ENCOUNTER
Reason for Call:  Form, our goal is to have forms completed with 72 hours, however, some forms may require a visit or additional information.    Type of letter, form or note:  medical    Who is the form from?: Home care    Where did the form come from: form was faxed in    What clinic location was the form placed at?: [facility] - [phone]    Where the form was placed: box Box/Folder    What number is listed as a contact on the form?: fax [phone]       Additional comments: Please complete and fax    Call taken on 4/13/2020 at 8:45 AM by Summer Jc

## 2020-04-14 NOTE — TELEPHONE ENCOUNTER
Forms require provider review and signature only. Routing to [place].    Melanie Clemente CMA ([facility])

## 2020-04-15 ENCOUNTER — MEDICAL CORRESPONDENCE (OUTPATIENT)
Dept: HEALTH INFORMATION MANAGEMENT | Facility: CLINIC | Age: 70
End: 2020-04-15

## 2020-04-28 ENCOUNTER — MEDICAL CORRESPONDENCE (OUTPATIENT)
Dept: HEALTH INFORMATION MANAGEMENT | Facility: CLINIC | Age: 70
End: 2020-04-28

## 2020-04-28 ENCOUNTER — TELEPHONE (OUTPATIENT)
Dept: FAMILY MEDICINE | Facility: OTHER | Age: 70
End: 2020-04-28

## 2020-04-28 NOTE — TELEPHONE ENCOUNTER
Our goal is to have forms completed with 72 hours, however some forms may require a visit or additional information.    Who is the form from?: Home care  Where the form came from: form was faxed in  What clinic location was the form placed at?: [place]  Where the form was placed: mailbox  What number is listed as a contact on the form?: [phone]    Phone call message- patient request for a letter, form or note:    Date needed: as soon as possible  Please fax to [phone]  Has the patient signed a consent form for release of information? NO    Additional comments:     Call taken on 4/28/2020 at 10:01 AM by Rowena Douglass    Type of letter, form or note: medical

## 2020-04-28 NOTE — TELEPHONE ENCOUNTER
Requires MD signature only. Routing to [place] for signature.     Melanie Clemente CMA ([facility])

## 2020-04-29 ENCOUNTER — MEDICAL CORRESPONDENCE (OUTPATIENT)
Dept: HEALTH INFORMATION MANAGEMENT | Facility: CLINIC | Age: 70
End: 2020-04-29

## 2020-05-01 ENCOUNTER — TELEPHONE (OUTPATIENT)
Dept: FAMILY MEDICINE | Facility: OTHER | Age: 70
End: 2020-05-01

## 2020-05-01 NOTE — TELEPHONE ENCOUNTER
Reason for Call:  Form, our goal is to have forms completed with 72 hours, however, some forms may require a visit or additional information.    Type of letter, form or note:  Speech Therapy Discharge Orders    Who is the form from?: Home care    Where did the form come from: form was faxed in    What clinic location was the form placed at?: [facility] - [phone]    Where the form was placed: Dr. Ceron/Folder    What number is listed as a contact on the form?: [phone]       Additional comments: Please sign and date form. Fax form back to [phone]. Thank you    Call taken on 5/1/2020 at 7:10 AM by Suzanne Flowers

## 2020-05-02 ENCOUNTER — APPOINTMENT (OUTPATIENT)
Dept: GENERAL RADIOLOGY | Facility: CLINIC | Age: 70
End: 2020-05-02
Attending: EMERGENCY MEDICINE
Payer: COMMERCIAL

## 2020-05-02 ENCOUNTER — NURSE TRIAGE (OUTPATIENT)
Dept: NURSING | Facility: CLINIC | Age: 70
End: 2020-05-02

## 2020-05-02 ENCOUNTER — HOSPITAL ENCOUNTER (EMERGENCY)
Facility: CLINIC | Age: 70
Discharge: ANOTHER HOSPITAL | End: 2020-05-02
Attending: EMERGENCY MEDICINE | Admitting: EMERGENCY MEDICINE
Payer: COMMERCIAL

## 2020-05-02 ENCOUNTER — HOSPITAL ENCOUNTER (INPATIENT)
Facility: CLINIC | Age: 70
LOS: 5 days | Discharge: NURSING, REHAB OR LONG-TERM CARE | DRG: 280 | End: 2020-05-07
Attending: HOSPITALIST | Admitting: INTERNAL MEDICINE
Payer: COMMERCIAL

## 2020-05-02 ENCOUNTER — APPOINTMENT (OUTPATIENT)
Dept: CT IMAGING | Facility: CLINIC | Age: 70
End: 2020-05-02
Attending: EMERGENCY MEDICINE
Payer: COMMERCIAL

## 2020-05-02 ENCOUNTER — APPOINTMENT (OUTPATIENT)
Dept: GENERAL RADIOLOGY | Facility: CLINIC | Age: 70
DRG: 280 | End: 2020-05-02
Attending: PHYSICIAN ASSISTANT
Payer: COMMERCIAL

## 2020-05-02 VITALS
HEART RATE: 89 BPM | RESPIRATION RATE: 31 BRPM | OXYGEN SATURATION: 94 % | SYSTOLIC BLOOD PRESSURE: 120 MMHG | BODY MASS INDEX: 18.74 KG/M2 | WEIGHT: 102.5 LBS | TEMPERATURE: 96.7 F | DIASTOLIC BLOOD PRESSURE: 109 MMHG

## 2020-05-02 DIAGNOSIS — R79.89 ELEVATED TROPONIN: ICD-10-CM

## 2020-05-02 DIAGNOSIS — I21.4 NSTEMI (NON-ST ELEVATED MYOCARDIAL INFARCTION) (H): ICD-10-CM

## 2020-05-02 DIAGNOSIS — F17.200 TOBACCO USE DISORDER: ICD-10-CM

## 2020-05-02 DIAGNOSIS — J44.1 COPD EXACERBATION (H): ICD-10-CM

## 2020-05-02 DIAGNOSIS — J96.11 CHRONIC RESPIRATORY FAILURE WITH HYPOXIA AND HYPERCAPNIA (H): ICD-10-CM

## 2020-05-02 DIAGNOSIS — I25.10 CORONARY ARTERY DISEASE INVOLVING NATIVE HEART WITHOUT ANGINA PECTORIS, UNSPECIFIED VESSEL OR LESION TYPE: ICD-10-CM

## 2020-05-02 DIAGNOSIS — J96.12 CHRONIC RESPIRATORY FAILURE WITH HYPOXIA AND HYPERCAPNIA (H): ICD-10-CM

## 2020-05-02 DIAGNOSIS — I24.9 ACS (ACUTE CORONARY SYNDROME) (H): Primary | ICD-10-CM

## 2020-05-02 DIAGNOSIS — R91.1 RIGHT LOWER LOBE PULMONARY NODULE: ICD-10-CM

## 2020-05-02 DIAGNOSIS — I73.9 PAD (PERIPHERAL ARTERY DISEASE) (H): ICD-10-CM

## 2020-05-02 LAB
ALBUMIN SERPL-MCNC: 3.6 G/DL (ref 3.4–5)
ALP SERPL-CCNC: 78 U/L (ref 40–150)
ALT SERPL W P-5'-P-CCNC: 34 U/L (ref 0–50)
ANION GAP SERPL CALCULATED.3IONS-SCNC: 6 MMOL/L (ref 3–14)
AST SERPL W P-5'-P-CCNC: 30 U/L (ref 0–45)
BASE EXCESS BLDA CALC-SCNC: 0.1 MMOL/L
BASE EXCESS BLDA CALC-SCNC: 0.9 MMOL/L
BASE EXCESS BLDV CALC-SCNC: 4.6 MMOL/L
BASOPHILS # BLD AUTO: 0 10E9/L (ref 0–0.2)
BASOPHILS NFR BLD AUTO: 0.4 %
BILIRUB SERPL-MCNC: 0.4 MG/DL (ref 0.2–1.3)
BUN SERPL-MCNC: 14 MG/DL (ref 7–30)
CALCIUM SERPL-MCNC: 8.3 MG/DL (ref 8.5–10.1)
CHLORIDE SERPL-SCNC: 104 MMOL/L (ref 94–109)
CO2 SERPL-SCNC: 30 MMOL/L (ref 20–32)
CREAT SERPL-MCNC: 0.57 MG/DL (ref 0.52–1.04)
CRP SERPL-MCNC: 4.2 MG/L (ref 0–8)
D DIMER PPP FEU-MCNC: 2.9 UG/ML FEU (ref 0–0.5)
DIFFERENTIAL METHOD BLD: ABNORMAL
EOSINOPHIL NFR BLD AUTO: 0.5 %
ERYTHROCYTE [DISTWIDTH] IN BLOOD BY AUTOMATED COUNT: 12.8 % (ref 10–15)
GFR SERPL CREATININE-BSD FRML MDRD: >90 ML/MIN/{1.73_M2}
GLUCOSE SERPL-MCNC: 129 MG/DL (ref 70–99)
HCO3 BLD-SCNC: 30 MMOL/L (ref 21–28)
HCO3 BLD-SCNC: 31 MMOL/L (ref 21–28)
HCO3 BLDV-SCNC: 32 MMOL/L (ref 21–28)
HCT VFR BLD AUTO: 48.2 % (ref 35–47)
HGB BLD-MCNC: 16.1 G/DL (ref 11.7–15.7)
IMM GRANULOCYTES # BLD: 0 10E9/L (ref 0–0.4)
IMM GRANULOCYTES NFR BLD: 0.3 %
INR PPP: 0.84 (ref 0.86–1.14)
LYMPHOCYTES # BLD AUTO: 0.8 10E9/L (ref 0.8–5.3)
LYMPHOCYTES NFR BLD AUTO: 10.5 %
MCH RBC QN AUTO: 29.7 PG (ref 26.5–33)
MCHC RBC AUTO-ENTMCNC: 33.4 G/DL (ref 31.5–36.5)
MCV RBC AUTO: 89 FL (ref 78–100)
MONOCYTES # BLD AUTO: 0.5 10E9/L (ref 0–1.3)
MONOCYTES NFR BLD AUTO: 6.4 %
NEUTROPHILS # BLD AUTO: 6.4 10E9/L (ref 1.6–8.3)
NEUTROPHILS NFR BLD AUTO: 81.9 %
NRBC # BLD AUTO: 0 10*3/UL
NRBC BLD AUTO-RTO: 0 /100
NT-PROBNP SERPL-MCNC: 9512 PG/ML (ref 0–900)
O2/TOTAL GAS SETTING VFR VENT: 24 %
O2/TOTAL GAS SETTING VFR VENT: ABNORMAL %
OXYHGB MFR BLD: 97 % (ref 92–100)
OXYHGB MFR BLD: 98 % (ref 92–100)
PCO2 BLD: 68 MM HG (ref 35–45)
PCO2 BLD: 72 MM HG (ref 35–45)
PCO2 BLDV: 59 MM HG (ref 40–50)
PH BLD: 7.24 PH (ref 7.35–7.45)
PH BLD: 7.25 PH (ref 7.35–7.45)
PH BLDV: 7.35 PH (ref 7.32–7.43)
PLATELET # BLD AUTO: 232 10E9/L (ref 150–450)
PO2 BLD: 180 MM HG (ref 80–105)
PO2 BLD: 250 MM HG (ref 80–105)
PO2 BLDV: 38 MM HG (ref 25–47)
POTASSIUM SERPL-SCNC: 4.4 MMOL/L (ref 3.4–5.3)
PROT SERPL-MCNC: 7.6 G/DL (ref 6.8–8.8)
RBC # BLD AUTO: 5.43 10E12/L (ref 3.8–5.2)
SARS-COV-2 PCR COMMENT: NORMAL
SARS-COV-2 RNA SPEC QL NAA+PROBE: NEGATIVE
SARS-COV-2 RNA SPEC QL NAA+PROBE: NORMAL
SODIUM SERPL-SCNC: 140 MMOL/L (ref 133–144)
SPECIMEN SOURCE: NORMAL
SPECIMEN SOURCE: NORMAL
TROPONIN I SERPL-MCNC: 0.68 UG/L (ref 0–0.04)
TROPONIN I SERPL-MCNC: 1.04 UG/L (ref 0–0.04)
TROPONIN I SERPL-MCNC: 2.14 UG/L (ref 0–0.04)
TROPONIN I SERPL-MCNC: 2.14 UG/L (ref 0–0.04)
WBC # BLD AUTO: 7.8 10E9/L (ref 4–11)

## 2020-05-02 PROCEDURE — 36600 WITHDRAWAL OF ARTERIAL BLOOD: CPT

## 2020-05-02 PROCEDURE — 71046 X-RAY EXAM CHEST 2 VIEWS: CPT | Mod: TC

## 2020-05-02 PROCEDURE — 96367 TX/PROPH/DG ADDL SEQ IV INF: CPT | Performed by: EMERGENCY MEDICINE

## 2020-05-02 PROCEDURE — 36415 COLL VENOUS BLD VENIPUNCTURE: CPT | Performed by: PHYSICIAN ASSISTANT

## 2020-05-02 PROCEDURE — 84484 ASSAY OF TROPONIN QUANT: CPT | Performed by: PHYSICIAN ASSISTANT

## 2020-05-02 PROCEDURE — 25000132 ZZH RX MED GY IP 250 OP 250 PS 637: Performed by: PHYSICIAN ASSISTANT

## 2020-05-02 PROCEDURE — 99223 1ST HOSP IP/OBS HIGH 75: CPT | Mod: AI | Performed by: INTERNAL MEDICINE

## 2020-05-02 PROCEDURE — 93005 ELECTROCARDIOGRAM TRACING: CPT | Performed by: EMERGENCY MEDICINE

## 2020-05-02 PROCEDURE — 25000125 ZZHC RX 250

## 2020-05-02 PROCEDURE — 85610 PROTHROMBIN TIME: CPT | Performed by: EMERGENCY MEDICINE

## 2020-05-02 PROCEDURE — 40000275 ZZH STATISTIC RCP TIME EA 10 MIN

## 2020-05-02 PROCEDURE — 85379 FIBRIN DEGRADATION QUANT: CPT | Performed by: EMERGENCY MEDICINE

## 2020-05-02 PROCEDURE — 82805 BLOOD GASES W/O2 SATURATION: CPT | Performed by: PHYSICIAN ASSISTANT

## 2020-05-02 PROCEDURE — 87635 SARS-COV-2 COVID-19 AMP PRB: CPT | Performed by: EMERGENCY MEDICINE

## 2020-05-02 PROCEDURE — 83880 ASSAY OF NATRIURETIC PEPTIDE: CPT | Performed by: PHYSICIAN ASSISTANT

## 2020-05-02 PROCEDURE — 25000128 H RX IP 250 OP 636: Performed by: EMERGENCY MEDICINE

## 2020-05-02 PROCEDURE — 93005 ELECTROCARDIOGRAM TRACING: CPT | Mod: 76 | Performed by: EMERGENCY MEDICINE

## 2020-05-02 PROCEDURE — 84484 ASSAY OF TROPONIN QUANT: CPT | Mod: 91 | Performed by: EMERGENCY MEDICINE

## 2020-05-02 PROCEDURE — 25800030 ZZH RX IP 258 OP 636: Performed by: EMERGENCY MEDICINE

## 2020-05-02 PROCEDURE — 71275 CT ANGIOGRAPHY CHEST: CPT

## 2020-05-02 PROCEDURE — 25000125 ZZHC RX 250: Performed by: EMERGENCY MEDICINE

## 2020-05-02 PROCEDURE — 99285 EMERGENCY DEPT VISIT HI MDM: CPT | Mod: 25 | Performed by: EMERGENCY MEDICINE

## 2020-05-02 PROCEDURE — 96361 HYDRATE IV INFUSION ADD-ON: CPT | Performed by: EMERGENCY MEDICINE

## 2020-05-02 PROCEDURE — 40000274 ZZH STATISTIC RCP CONSULT EA 30 MIN

## 2020-05-02 PROCEDURE — 93010 ELECTROCARDIOGRAM REPORT: CPT | Mod: 76 | Performed by: EMERGENCY MEDICINE

## 2020-05-02 PROCEDURE — 96375 TX/PRO/DX INJ NEW DRUG ADDON: CPT | Mod: 59 | Performed by: EMERGENCY MEDICINE

## 2020-05-02 PROCEDURE — 25000132 ZZH RX MED GY IP 250 OP 250 PS 637: Performed by: EMERGENCY MEDICINE

## 2020-05-02 PROCEDURE — 25000128 H RX IP 250 OP 636: Performed by: INTERNAL MEDICINE

## 2020-05-02 PROCEDURE — 86140 C-REACTIVE PROTEIN: CPT | Performed by: PHYSICIAN ASSISTANT

## 2020-05-02 PROCEDURE — 12000000 ZZH R&B MED SURG/OB

## 2020-05-02 PROCEDURE — 96365 THER/PROPH/DIAG IV INF INIT: CPT | Mod: 59 | Performed by: EMERGENCY MEDICINE

## 2020-05-02 PROCEDURE — 40000270 ZZH STATISTIC OXYGEN  O2DAILY TECH TIME

## 2020-05-02 PROCEDURE — 85025 COMPLETE CBC W/AUTO DIFF WBC: CPT | Performed by: EMERGENCY MEDICINE

## 2020-05-02 PROCEDURE — 93010 ELECTROCARDIOGRAM REPORT: CPT | Mod: Z6 | Performed by: EMERGENCY MEDICINE

## 2020-05-02 PROCEDURE — 71045 X-RAY EXAM CHEST 1 VIEW: CPT

## 2020-05-02 PROCEDURE — 94640 AIRWAY INHALATION TREATMENT: CPT

## 2020-05-02 PROCEDURE — 25000128 H RX IP 250 OP 636: Performed by: PHYSICIAN ASSISTANT

## 2020-05-02 PROCEDURE — 84484 ASSAY OF TROPONIN QUANT: CPT | Performed by: EMERGENCY MEDICINE

## 2020-05-02 PROCEDURE — 80053 COMPREHEN METABOLIC PANEL: CPT | Performed by: EMERGENCY MEDICINE

## 2020-05-02 PROCEDURE — 87040 BLOOD CULTURE FOR BACTERIA: CPT | Performed by: PHYSICIAN ASSISTANT

## 2020-05-02 PROCEDURE — 82803 BLOOD GASES ANY COMBINATION: CPT | Performed by: EMERGENCY MEDICINE

## 2020-05-02 RX ORDER — IPRATROPIUM BROMIDE AND ALBUTEROL SULFATE 2.5; .5 MG/3ML; MG/3ML
SOLUTION RESPIRATORY (INHALATION)
Status: COMPLETED
Start: 2020-05-02 | End: 2020-05-02

## 2020-05-02 RX ORDER — AMOXICILLIN 250 MG
1 CAPSULE ORAL 2 TIMES DAILY PRN
Status: DISCONTINUED | OUTPATIENT
Start: 2020-05-02 | End: 2020-05-07 | Stop reason: HOSPADM

## 2020-05-02 RX ORDER — IOPAMIDOL 755 MG/ML
500 INJECTION, SOLUTION INTRAVASCULAR ONCE
Status: COMPLETED | OUTPATIENT
Start: 2020-05-02 | End: 2020-05-02

## 2020-05-02 RX ORDER — ONDANSETRON 2 MG/ML
4 INJECTION INTRAMUSCULAR; INTRAVENOUS EVERY 6 HOURS PRN
Status: DISCONTINUED | OUTPATIENT
Start: 2020-05-02 | End: 2020-05-07 | Stop reason: HOSPADM

## 2020-05-02 RX ORDER — ONDANSETRON 4 MG/1
4 TABLET, ORALLY DISINTEGRATING ORAL EVERY 6 HOURS PRN
Status: DISCONTINUED | OUTPATIENT
Start: 2020-05-02 | End: 2020-05-07 | Stop reason: HOSPADM

## 2020-05-02 RX ORDER — CLOPIDOGREL BISULFATE 75 MG/1
75 TABLET ORAL DAILY
Status: DISCONTINUED | OUTPATIENT
Start: 2020-05-03 | End: 2020-05-07 | Stop reason: HOSPADM

## 2020-05-02 RX ORDER — LORAZEPAM 2 MG/ML
0.5 INJECTION INTRAMUSCULAR ONCE
Status: COMPLETED | OUTPATIENT
Start: 2020-05-02 | End: 2020-05-02

## 2020-05-02 RX ORDER — AMOXICILLIN 250 MG
2 CAPSULE ORAL 2 TIMES DAILY PRN
Status: DISCONTINUED | OUTPATIENT
Start: 2020-05-02 | End: 2020-05-07 | Stop reason: HOSPADM

## 2020-05-02 RX ORDER — LORAZEPAM 2 MG/ML
0.5 INJECTION INTRAMUSCULAR EVERY 4 HOURS PRN
Status: DISCONTINUED | OUTPATIENT
Start: 2020-05-02 | End: 2020-05-07 | Stop reason: HOSPADM

## 2020-05-02 RX ORDER — NALOXONE HYDROCHLORIDE 0.4 MG/ML
.1-.4 INJECTION, SOLUTION INTRAMUSCULAR; INTRAVENOUS; SUBCUTANEOUS
Status: DISCONTINUED | OUTPATIENT
Start: 2020-05-02 | End: 2020-05-07 | Stop reason: HOSPADM

## 2020-05-02 RX ORDER — ATORVASTATIN CALCIUM 20 MG/1
20 TABLET, FILM COATED ORAL AT BEDTIME
Status: DISCONTINUED | OUTPATIENT
Start: 2020-05-02 | End: 2020-05-07 | Stop reason: HOSPADM

## 2020-05-02 RX ORDER — NITROGLYCERIN 0.4 MG/1
0.4 TABLET SUBLINGUAL EVERY 5 MIN PRN
Status: DISCONTINUED | OUTPATIENT
Start: 2020-05-02 | End: 2020-05-07 | Stop reason: HOSPADM

## 2020-05-02 RX ORDER — GUAIFENESIN 600 MG/1
600 TABLET, EXTENDED RELEASE ORAL 2 TIMES DAILY
Status: DISCONTINUED | OUTPATIENT
Start: 2020-05-02 | End: 2020-05-07 | Stop reason: HOSPADM

## 2020-05-02 RX ORDER — MONTELUKAST SODIUM 10 MG/1
10 TABLET ORAL AT BEDTIME
Status: DISCONTINUED | OUTPATIENT
Start: 2020-05-02 | End: 2020-05-07 | Stop reason: HOSPADM

## 2020-05-02 RX ORDER — ALBUTEROL SULFATE 90 UG/1
2 AEROSOL, METERED RESPIRATORY (INHALATION) EVERY 6 HOURS PRN
Status: DISCONTINUED | OUTPATIENT
Start: 2020-05-02 | End: 2020-05-07 | Stop reason: HOSPADM

## 2020-05-02 RX ORDER — HEPARIN SODIUM 10000 [USP'U]/100ML
700 INJECTION, SOLUTION INTRAVENOUS CONTINUOUS
Status: DISCONTINUED | OUTPATIENT
Start: 2020-05-02 | End: 2020-05-05

## 2020-05-02 RX ORDER — BUDESONIDE AND FORMOTEROL FUMARATE DIHYDRATE 160; 4.5 UG/1; UG/1
2 AEROSOL RESPIRATORY (INHALATION)
Status: DISCONTINUED | OUTPATIENT
Start: 2020-05-02 | End: 2020-05-02

## 2020-05-02 RX ORDER — CEFTRIAXONE 2 G/1
2 INJECTION, POWDER, FOR SOLUTION INTRAMUSCULAR; INTRAVENOUS EVERY 24 HOURS
Status: DISCONTINUED | OUTPATIENT
Start: 2020-05-03 | End: 2020-05-06

## 2020-05-02 RX ORDER — METHYLPREDNISOLONE SODIUM SUCCINATE 125 MG/2ML
60 INJECTION, POWDER, LYOPHILIZED, FOR SOLUTION INTRAMUSCULAR; INTRAVENOUS ONCE
Status: COMPLETED | OUTPATIENT
Start: 2020-05-02 | End: 2020-05-02

## 2020-05-02 RX ORDER — ASPIRIN 81 MG/1
324 TABLET, CHEWABLE ORAL ONCE
Status: COMPLETED | OUTPATIENT
Start: 2020-05-02 | End: 2020-05-02

## 2020-05-02 RX ORDER — METHYLPREDNISOLONE SODIUM SUCCINATE 125 MG/2ML
125 INJECTION, POWDER, LYOPHILIZED, FOR SOLUTION INTRAMUSCULAR; INTRAVENOUS ONCE
Status: COMPLETED | OUTPATIENT
Start: 2020-05-02 | End: 2020-05-02

## 2020-05-02 RX ORDER — ALBUTEROL SULFATE 0.83 MG/ML
2.5 SOLUTION RESPIRATORY (INHALATION) ONCE
Status: COMPLETED | OUTPATIENT
Start: 2020-05-02 | End: 2020-05-02

## 2020-05-02 RX ORDER — ACETAMINOPHEN 325 MG/1
650 TABLET ORAL EVERY 4 HOURS PRN
Status: DISCONTINUED | OUTPATIENT
Start: 2020-05-02 | End: 2020-05-07 | Stop reason: HOSPADM

## 2020-05-02 RX ORDER — ACETAMINOPHEN 650 MG/1
650 SUPPOSITORY RECTAL EVERY 4 HOURS PRN
Status: DISCONTINUED | OUTPATIENT
Start: 2020-05-02 | End: 2020-05-07 | Stop reason: HOSPADM

## 2020-05-02 RX ORDER — ASPIRIN 81 MG/1
81 TABLET, CHEWABLE ORAL DAILY
Status: DISCONTINUED | OUTPATIENT
Start: 2020-05-03 | End: 2020-05-02

## 2020-05-02 RX ORDER — IPRATROPIUM BROMIDE AND ALBUTEROL SULFATE 2.5; .5 MG/3ML; MG/3ML
3 SOLUTION RESPIRATORY (INHALATION) ONCE
Status: COMPLETED | OUTPATIENT
Start: 2020-05-02 | End: 2020-05-02

## 2020-05-02 RX ORDER — HEPARIN SODIUM 10000 [USP'U]/100ML
0-3500 INJECTION, SOLUTION INTRAVENOUS CONTINUOUS
Status: DISCONTINUED | OUTPATIENT
Start: 2020-05-02 | End: 2020-05-02 | Stop reason: HOSPADM

## 2020-05-02 RX ORDER — PROCHLORPERAZINE MALEATE 5 MG
5 TABLET ORAL EVERY 6 HOURS PRN
Status: DISCONTINUED | OUTPATIENT
Start: 2020-05-02 | End: 2020-05-07 | Stop reason: HOSPADM

## 2020-05-02 RX ORDER — LIDOCAINE 40 MG/G
CREAM TOPICAL
Status: DISCONTINUED | OUTPATIENT
Start: 2020-05-02 | End: 2020-05-07 | Stop reason: HOSPADM

## 2020-05-02 RX ORDER — ASPIRIN 81 MG/1
81 TABLET ORAL DAILY
Status: DISCONTINUED | OUTPATIENT
Start: 2020-05-03 | End: 2020-05-07 | Stop reason: HOSPADM

## 2020-05-02 RX ORDER — CEFTRIAXONE 2 G/1
2 INJECTION, POWDER, FOR SOLUTION INTRAMUSCULAR; INTRAVENOUS EVERY 24 HOURS
Status: DISCONTINUED | OUTPATIENT
Start: 2020-05-02 | End: 2020-05-02 | Stop reason: HOSPADM

## 2020-05-02 RX ORDER — LANOLIN ALCOHOL/MO/W.PET/CERES
3 CREAM (GRAM) TOPICAL
Status: DISCONTINUED | OUTPATIENT
Start: 2020-05-02 | End: 2020-05-07 | Stop reason: HOSPADM

## 2020-05-02 RX ORDER — GLIPIZIDE 10 MG/1
1 TABLET ORAL
Status: DISCONTINUED | OUTPATIENT
Start: 2020-05-02 | End: 2020-05-07 | Stop reason: HOSPADM

## 2020-05-02 RX ORDER — NITROGLYCERIN 0.4 MG/1
0.4 TABLET SUBLINGUAL EVERY 5 MIN PRN
Status: DISCONTINUED | OUTPATIENT
Start: 2020-05-02 | End: 2020-05-02

## 2020-05-02 RX ORDER — PROCHLORPERAZINE 25 MG
12.5 SUPPOSITORY, RECTAL RECTAL EVERY 12 HOURS PRN
Status: DISCONTINUED | OUTPATIENT
Start: 2020-05-02 | End: 2020-05-07 | Stop reason: HOSPADM

## 2020-05-02 RX ORDER — ALUMINA, MAGNESIA, AND SIMETHICONE 2400; 2400; 240 MG/30ML; MG/30ML; MG/30ML
30 SUSPENSION ORAL EVERY 4 HOURS PRN
Status: DISCONTINUED | OUTPATIENT
Start: 2020-05-02 | End: 2020-05-07 | Stop reason: HOSPADM

## 2020-05-02 RX ORDER — MORPHINE SULFATE 2 MG/ML
2-4 INJECTION, SOLUTION INTRAMUSCULAR; INTRAVENOUS
Status: DISCONTINUED | OUTPATIENT
Start: 2020-05-02 | End: 2020-05-07 | Stop reason: HOSPADM

## 2020-05-02 RX ORDER — SODIUM CHLORIDE 9 MG/ML
1000 INJECTION, SOLUTION INTRAVENOUS CONTINUOUS
Status: DISCONTINUED | OUTPATIENT
Start: 2020-05-02 | End: 2020-05-02 | Stop reason: HOSPADM

## 2020-05-02 RX ORDER — PREDNISONE 20 MG/1
60 TABLET ORAL DAILY
Status: DISCONTINUED | OUTPATIENT
Start: 2020-05-03 | End: 2020-05-06

## 2020-05-02 RX ORDER — AZITHROMYCIN 250 MG/1
250 TABLET, FILM COATED ORAL DAILY
Status: DISCONTINUED | OUTPATIENT
Start: 2020-05-03 | End: 2020-05-03

## 2020-05-02 RX ADMIN — ALBUTEROL SULFATE 2 PUFF: 90 AEROSOL, METERED RESPIRATORY (INHALATION) at 21:06

## 2020-05-02 RX ADMIN — IPRATROPIUM BROMIDE AND ALBUTEROL SULFATE 3 ML: 2.5; .5 SOLUTION RESPIRATORY (INHALATION) at 08:23

## 2020-05-02 RX ADMIN — MONTELUKAST 10 MG: 10 TABLET, FILM COATED ORAL at 21:22

## 2020-05-02 RX ADMIN — IOPAMIDOL 70 ML: 755 INJECTION, SOLUTION INTRAVENOUS at 10:35

## 2020-05-02 RX ADMIN — SODIUM CHLORIDE 1000 ML: 9 INJECTION, SOLUTION INTRAVENOUS at 11:03

## 2020-05-02 RX ADMIN — ALBUTEROL SULFATE 2.5 MG: 2.5 SOLUTION RESPIRATORY (INHALATION) at 11:46

## 2020-05-02 RX ADMIN — LORAZEPAM 0.5 MG: 2 INJECTION INTRAMUSCULAR; INTRAVENOUS at 09:07

## 2020-05-02 RX ADMIN — SODIUM CHLORIDE 500 ML: 9 INJECTION, SOLUTION INTRAVENOUS at 10:03

## 2020-05-02 RX ADMIN — CEFTRIAXONE SODIUM 2 G: 2 INJECTION, POWDER, FOR SOLUTION INTRAMUSCULAR; INTRAVENOUS at 10:03

## 2020-05-02 RX ADMIN — METHYLPREDNISOLONE SODIUM SUCCINATE 62.5 MG: 125 INJECTION, POWDER, FOR SOLUTION INTRAMUSCULAR; INTRAVENOUS at 08:45

## 2020-05-02 RX ADMIN — IPRATROPIUM BROMIDE AND ALBUTEROL SULFATE 3 ML: .5; 3 SOLUTION RESPIRATORY (INHALATION) at 08:23

## 2020-05-02 RX ADMIN — ONDANSETRON 4 MG: 2 INJECTION INTRAMUSCULAR; INTRAVENOUS at 17:50

## 2020-05-02 RX ADMIN — AZITHROMYCIN 500 MG: 500 INJECTION, POWDER, LYOPHILIZED, FOR SOLUTION INTRAVENOUS at 10:52

## 2020-05-02 RX ADMIN — ASPIRIN 81 MG 324 MG: 81 TABLET ORAL at 12:56

## 2020-05-02 RX ADMIN — HEPARIN SODIUM 550 UNITS/HR: 10000 INJECTION, SOLUTION INTRAVENOUS at 16:29

## 2020-05-02 RX ADMIN — METOPROLOL TARTRATE 6.25 MG: 25 TABLET, FILM COATED ORAL at 20:25

## 2020-05-02 RX ADMIN — SODIUM CHLORIDE, PRESERVATIVE FREE 3 ML: 5 INJECTION INTRAVENOUS at 10:36

## 2020-05-02 RX ADMIN — METHYLPREDNISOLONE SODIUM SUCCINATE 125 MG: 125 INJECTION, POWDER, FOR SOLUTION INTRAMUSCULAR; INTRAVENOUS at 18:33

## 2020-05-02 RX ADMIN — ALBUTEROL SULFATE 2.5 MG: 2.5 SOLUTION RESPIRATORY (INHALATION) at 11:00

## 2020-05-02 RX ADMIN — HEPARIN SODIUM 550 UNITS/HR: 10000 INJECTION, SOLUTION INTRAVENOUS at 12:52

## 2020-05-02 RX ADMIN — GUAIFENESIN 600 MG: 600 TABLET, EXTENDED RELEASE ORAL at 20:25

## 2020-05-02 RX ADMIN — ATORVASTATIN CALCIUM 20 MG: 20 TABLET, FILM COATED ORAL at 21:22

## 2020-05-02 ASSESSMENT — ENCOUNTER SYMPTOMS
ACTIVITY CHANGE: 1
SHORTNESS OF BREATH: 1
RHINORRHEA: 0
MYALGIAS: 0
NEUROLOGICAL NEGATIVE: 1
FATIGUE: 1
CHILLS: 0
WHEEZING: 1
ARTHRALGIAS: 0
MUSCULOSKELETAL NEGATIVE: 1
FEVER: 0
EYES NEGATIVE: 1
BRUISES/BLEEDS EASILY: 0
PALPITATIONS: 0
COUGH: 1
GASTROINTESTINAL NEGATIVE: 1
NERVOUS/ANXIOUS: 1

## 2020-05-02 ASSESSMENT — ACTIVITIES OF DAILY LIVING (ADL)
ADLS_ACUITY_SCORE: 13
ADLS_ACUITY_SCORE: 15

## 2020-05-02 NOTE — PROGRESS NOTES
"Nebulizer given again with patient saying\" No I don't want it on\" . Pulling off mask constantly.    "

## 2020-05-02 NOTE — PROGRESS NOTES
Patient found to have small hematoma at sight of ABG draw. Pressure held. Wrapped. Bedside nurse notified.

## 2020-05-02 NOTE — PROGRESS NOTES
"Patient pulling of nebulizer treatment not breathing through mouth. Switched to mask.  Patient pulling off mask \"stating this is pewey\"  These things don't help\"  Reinforced need for nebulizer.  Stated Dr want's you to have this.  Constantly pulling off mask and Oxygen.  Reapplied both.  Breathing improved post neb and SpO2 in mid 90's with oxygen on.   "

## 2020-05-02 NOTE — ED PROVIDER NOTES
History     Chief Complaint   Patient presents with     Copd Exacerbation     HPI  Preeti Ford is a 69 year old female with significant past medical history for prior right thalamic stroke, chronic tobacco use disorder, acute coronary syndrome with ischemic cardiomyopathy (EF recovery to 55% per March 2017 echo), hypertension, PAD, COPD O2 dependent, underweight with malnourishment-    Presents with increased breathing difficulties.  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  She typically is on home oxygen at 1 to 2 L nasal cannula.  Has noted increased work effort.  Denies:  No fever  No change in phlegm  No myalgias  No arthralgias  No sore throat  No anosmia    Reports she has not recently been using Singulair, albuterol MDI, Symbicort inhaler or her DuoNeb nebulizer therapy.    Patient denies chest discomfort with no pleuritic chest pain.  Patient denies any recognition for leg swelling or calf tenderness.    Has been socially isolating herself with the COVID outbreak.    Allergies:  Allergies   Allergen Reactions     Crestor [Rosuvastatin] Other (See Comments)     dizziness     Hmg-Coa-R Inhibitors Other (See Comments)     Muscle/joint aching     Lisinopril Cough     Optison [Albumin Human] Other (See Comments)     Pt was flush and very dizzy.  Also had a BP drop     Penicillins Rash       Problem List:    Patient Active Problem List    Diagnosis Date Noted     Malnutrition, unspecified type (H) 04/16/2018     Priority: Medium     Hyponatremia 04/16/2018     Priority: Medium     Hypochloremia 04/16/2018     Priority: Medium     Vertigo 03/23/2018     Priority: Medium     Other seizures (H) - possible 03/23/2018     Priority: Medium     Acute CVA (cerebrovascular accident) - right paramedian superior vermis and left cerebellar hemisphere 03/23/2018     Priority: Medium     Pulmonary emphysema (H) 03/22/2018     Priority: Medium     COPD exacerbation (H)  06/14/2017     Priority: Medium     Coronary artery disease involving native coronary artery - STEMI with LAD and circ stents in 8/2015 06/14/2017     Priority: Medium     Elevated troponin 06/14/2017     Priority: Medium     Cervical high risk HPV (human papillomavirus) test positive 12/20/2016     Priority: Medium     12/20/16 NIL pap/+ HPV (not 16 or 18). Plan: cotest in 1 year, due by 12/20/17 12/20/17 NIL Pap, +HR HPV (Not types 16/18). Plan colp  12/26/18 Patient is lost to pap tracking follow-up           PAD (peripheral artery disease) (H) - moderate left common carotid stenosis, aortoiliac bypass, chronic left vertebral and right posterior cerebral stenoses 02/12/2016     Priority: Medium     Chronic bronchitis, unspecified chronic bronchitis type (H) 02/09/2016     Priority: Medium     Ischemic cardiomyopathy - EF 25% in 2015 but 55% in 3/2017 and 45-50% on 3/18 09/08/2015     Priority: Medium     Echo  With ejection fraction of 25-30 %       HTN, goal below 130/80 09/08/2015     Priority: Medium     Acute systolic congestive heart failure (H) 09/08/2015     Priority: Medium     ejection fraction 25-30%       Lung nodule 09/08/2015     Priority: Medium     See on CT , In the setting of smoking recommends 1 year follow up with CT        GERD (gastroesophageal reflux disease) 09/08/2015     Priority: Medium     ST elevation myocardial infarction involving left anterior descending (LAD) coronary artery (H) 09/08/2015     Priority: Medium     ACS (acute coronary syndrome) (H) 08/28/2015     Priority: Medium     Tobacco use disorder 03/10/2015     Priority: Medium     History of stroke - right thalamus in 8/2013 and left cerebellar and right vermis in 3/2018 08/21/2013     Priority: Medium     Numbness and tingling 08/21/2013     Priority: Medium     Right side of the face , upper and lower limbs         CVA (cerebral vascular accident) (H) 08/17/2013     Priority: Medium     Hyperlipidemia LDL goal <130  07/19/2012     Priority: Medium     Urinary incontinence 03/04/2012     Priority: Medium     Forgetfulness 03/04/2012     Priority: Medium     Advanced directives, counseling/discussion 11/14/2011     Priority: Medium        Past Medical History:    Past Medical History:   Diagnosis Date     Arthritis      COPD (chronic obstructive pulmonary disease) (H)      Coronary artery disease      CVA (cerebral vascular accident) (H) 8-     Hypertension        Past Surgical History:    Past Surgical History:   Procedure Laterality Date     APPENDECTOMY       BYPASS GRAFT AORTOILIAC N/A 3/7/2017    Procedure: BYPASS GRAFT AORTOILIAC;  Surgeon: Marcos Pratt MD;  Location: [facility] OR     SALPINGO OOPHORECTOMY,R/L/DELORES      Salpingo Oophorectomy, RT/LT/DELORES       Family History:    Family History   Problem Relation Age of Onset     Cerebrovascular Disease Mother      Cerebrovascular Disease Father      Genitourinary Problems Brother         Dialysis       Social History:  Marital Status:   [2]  Social History     Tobacco Use     Smoking status: Current Every Day Smoker     Packs/day: 0.50     Types: Cigarettes     Smokeless tobacco: Never Used     Tobacco comment: patient states she is working on it    Substance Use Topics     Alcohol use: No     Alcohol/week: 0.0 standard drinks     Drug use: No        Medications:    ACE/ARB/ARNI NOT PRESCRIBED, INTENTIONAL,  acetaminophen (TYLENOL) 325 MG tablet  albuterol (2.5 MG/3ML) 0.083% neb solution  albuterol (PROAIR HFA/PROVENTIL HFA/VENTOLIN HFA) 108 (90 Base) MCG/ACT inhaler  aspirin 81 MG EC tablet  atorvastatin (LIPITOR) 20 MG tablet  budesonide-formoterol (SYMBICORT) 160-4.5 MCG/ACT Inhaler  Calcium Carbonate-Vitamin D (OSCAL 500/200 D-3 PO)  clopidogrel (PLAVIX) 75 MG tablet  Coenzyme Q10 (COQ10 PO)  guaiFENesin (MUCINEX) 600 MG 12 hr tablet  ipratropium - albuterol 0.5 mg/2.5 mg/3 mL (DUONEB) 0.5-2.5 (3) MG/3ML neb solution  metoprolol tartrate (LOPRESSOR) 25  MG tablet  montelukast (SINGULAIR) 10 MG tablet  nicotine (NICODERM CQ) 7 MG/24HR 24 hr patch  nitroGLYcerin (NITROSTAT) 0.4 MG sublingual tablet  Nutritional Supplements (BOOST)  order for DME  order for DME  polyethylene glycol (MIRALAX/GLYCOLAX) Packet  Respiratory Therapy Supplies (NEBULIZER/TUBING/MOUTHPIECE) KIT  senna-docusate (SENOKOT-S;PERICOLACE) 8.6-50 MG per tablet  umeclidinium (INCRUSE ELLIPTA) 62.5 MCG/INH inhaler          Review of Systems   Constitutional: Positive for activity change and fatigue. Negative for chills and fever.   HENT: Negative for congestion and rhinorrhea.    Eyes: Negative.    Respiratory: Positive for cough (Typical cough COPD.  No change in intensity or phlegm production), shortness of breath and wheezing.    Cardiovascular: Negative for chest pain, palpitations and leg swelling.   Gastrointestinal: Negative.    Genitourinary: Negative.    Musculoskeletal: Negative.  Negative for arthralgias and myalgias.   Skin: Negative.    Neurological: Negative.    Hematological: Does not bruise/bleed easily.   Psychiatric/Behavioral: The patient is nervous/anxious.    All other systems reviewed and are negative.      Physical Exam   BP: (!) 176/100  Pulse: 82  Temp: 96  F (35.6  C)  Resp: 22  Weight: 46.5 kg (102 lb 8 oz)  SpO2: 98 %      Physical Exam  Vitals signs and nursing note reviewed.   Constitutional:       Comments: Thin and cachectic appearance   HENT:      Head: Normocephalic.      Mouth/Throat:      Mouth: Mucous membranes are dry.   Eyes:      Pupils: Pupils are equal, round, and reactive to light.   Neck:      Musculoskeletal: Normal range of motion.   Cardiovascular:      Rate and Rhythm: Normal rate and regular rhythm.      Pulses: Normal pulses.      Heart sounds: Normal heart sounds. No murmur.   Pulmonary:      Effort: Tachypnea and accessory muscle usage present. No retractions.      Breath sounds: Decreased breath sounds and wheezing present. No rhonchi or rales.    Abdominal:      Tenderness: There is no abdominal tenderness.      Comments: None/scaphoid   Musculoskeletal: Normal range of motion.         General: No tenderness.      Right lower leg: No edema.      Left lower leg: No edema.      Comments: Negative Homans test bilateral   Lymphadenopathy:      Cervical: No cervical adenopathy.   Skin:     General: Skin is warm.      Capillary Refill: Capillary refill takes 2 to 3 seconds.      Findings: No rash.   Neurological:      Mental Status: She is alert.      Comments: Dysarthric speech at baseline from previous CVA   Psychiatric:         Mood and Affect: Mood is anxious.         ED Course        Procedures          EKG:  Interpretation by Dereck Flowers DO.   Indication: Respiratory distress and elevated troponin  Sinus rhythm.  Rate 80 bpm.  Right axis.  Left atrial enlargement with a biphasic P wave in lead I.  Old anterior infarct.  There is nonspecific diffuse ST depression.  Comparison EKGs on file from May 17, 2018 and March 22, 2019 shows no change.  Impressions: Abnormal EKG    EKG:(#2)  Interpretation by Dereck Flowers DO.   Indication: Sinus rhythm.  Rate 80 bpm.  Poor R wave progression.  No development of ST segment elevation.  Continued diffuse ST segment depression.  EKG unchanged from first EKG.                 Results for orders placed or performed during the hospital encounter of 05/02/20 (from the past 24 hour(s))   CBC with platelets differential   Result Value Ref Range    WBC 7.8 4.0 - 11.0 10e9/L    RBC Count 5.43 (H) 3.8 - 5.2 10e12/L    Hemoglobin 16.1 (H) 11.7 - 15.7 g/dL    Hematocrit 48.2 (H) 35.0 - 47.0 %    MCV 89 78 - 100 fl    MCH 29.7 26.5 - 33.0 pg    MCHC 33.4 31.5 - 36.5 g/dL    RDW 12.8 10.0 - 15.0 %    Platelet Count 232 150 - 450 10e9/L    Diff Method Automated Method     % Neutrophils 81.9 %    % Lymphocytes 10.5 %    % Monocytes 6.4 %    % Eosinophils 0.5 %    % Basophils 0.4 %    % Immature Granulocytes 0.3 %    Nucleated RBCs 0  0 /100    Absolute Neutrophil 6.4 1.6 - 8.3 10e9/L    Absolute Lymphocytes 0.8 0.8 - 5.3 10e9/L    Absolute Monocytes 0.5 0.0 - 1.3 10e9/L    Absolute Basophils 0.0 0.0 - 0.2 10e9/L    Abs Immature Granulocytes 0.0 0 - 0.4 10e9/L    Absolute Nucleated RBC 0.0    Comprehensive metabolic panel   Result Value Ref Range    Sodium 140 133 - 144 mmol/L    Potassium 4.4 3.4 - 5.3 mmol/L    Chloride 104 94 - 109 mmol/L    Carbon Dioxide 30 20 - 32 mmol/L    Anion Gap 6 3 - 14 mmol/L    Glucose 129 (H) 70 - 99 mg/dL    Urea Nitrogen 14 7 - 30 mg/dL    Creatinine 0.57 0.52 - 1.04 mg/dL    GFR Estimate >90 >60 mL/min/[1.73_m2]    GFR Estimate If Black >90 >60 mL/min/[1.73_m2]    Calcium 8.3 (L) 8.5 - 10.1 mg/dL    Bilirubin Total 0.4 0.2 - 1.3 mg/dL    Albumin 3.6 3.4 - 5.0 g/dL    Protein Total 7.6 6.8 - 8.8 g/dL    Alkaline Phosphatase 78 40 - 150 U/L    ALT 34 0 - 50 U/L    AST 30 0 - 45 U/L   Troponin I   Result Value Ref Range    Troponin I ES 0.680 (HH) 0.000 - 0.045 ug/L   Blood gas venous   Result Value Ref Range    Ph Venous 7.35 7.32 - 7.43 pH    PCO2 Venous 59 (H) 40 - 50 mm Hg    PO2 Venous 38 25 - 47 mm Hg    Bicarbonate Venous 32 (H) 21 - 28 mmol/L    Base Excess Venous 4.6 mmol/L    FIO2 24    D dimer quantitative   Result Value Ref Range    D Dimer 2.9 (H) 0.0 - 0.50 ug/ml FEU   XR Chest 2 Views    Narrative    CHEST TWO VIEWS  5/2/2020 9:01 AM     HISTORY:  Shortness of breath. COPD exacerbation.      COMPARISON: 11/9/2018.      Impression    IMPRESSION: Hyperinflation both lungs. Mild hazy increased density at  the right lung base could be related to atelectasis or pneumonia. The  left lung is otherwise clear. No pneumothorax. No pleural effusions  are identified. Pulmonary vascularity is within normal limits. Aortic  calcification. Coronary artery stenting.    ELVER SOLIMAN MD   CT Chest Pulmonary Embolism w Contrast    Narrative    CT CHEST PULMONARY EMBOLISM WITH CONTRAST 5/2/2020 10:50 AM    CLINICAL  HISTORY: Hypoxia. Dyspnea. History of COPD. Elevated D-dimer.    TECHNIQUE: CT angiogram chest during arterial phase injection IV  contrast. 2D and 3D MIP reconstructions were performed by the CT  technologist. Dose reduction techniques were used.     CONTRAST: 70 mL Isovue 370.    COMPARISON: Chest CT performed 6/14/2017.    FINDINGS:  ANGIOGRAM CHEST: There is suboptimal opacification of the pulmonary  arteries, however the central pulmonary arteries are moderately well  opacified and no large central pulmonary embolism is identified. The  thoracic aorta is well opacified, and there is no evidence for  thoracic aortic aneurysm or dissection. Atherosclerotic calcification  of the thoracic aorta and coronary arteries. No CT evidence of right  heart strain. No pleural effusions.    LUNGS AND PLEURA: Advanced emphysematous changes are noted throughout  both lungs. Indeterminate pulmonary nodule in the superior segment of  the right lower lobe medially (series 5 image 160) is new since the  previous exam, measuring 1.9 x 1.3 cm, and is suspicious for  malignancy. Mild scarring at both lung apices is unchanged.    MEDIASTINUM/AXILLAE: Indeterminate 1.2 cm right thyroid nodule is not  appreciably changed. No enlarged lymph nodes are identified in the  chest. No pericardial effusion. Small hiatal hernia.    UPPER ABDOMEN: Limited views of the upper abdomen are unremarkable.    MUSCULOSKELETAL: No aggressive-appearing bone lesions are identified.      Impression    IMPRESSION:  1. There is suboptimal opacification of the pulmonary arteries;  however, no pulmonary embolism is identified.  2. Emphysema.  3. Indeterminate 1.9 cm right lower lobe pulmonary nodule is new since  the previous exam, and is suspicious for malignancy.   Troponin I   Result Value Ref Range    Troponin I ES 1.042 (HH) 0.000 - 0.045 ug/L       Medications   ipratropium - albuterol 0.5 mg/2.5 mg/3 mL (DUONEB) neb solution 3 mL (has no administration in  time range)   ipratropium - albuterol 0.5 mg/2.5 mg/3 mL (DUONEB) 0.5-2.5 (3) MG/3ML neb solution (has no administration in time range)   methylPREDNISolone sodium succinate (solu-MEDROL) injection 62.5 mg (has no administration in time range)       Assessments & Plan (with Medical Decision Making) Preeti is 69 years of age.  Presents with increased dyspnea.  Known history for COPD on oxygen 1 to 2 L at home via nasal cannula.  Continued tobacco use disorder.  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  She has had no fever.  Slight increased cough but no change in phlegm appearance or abundance.  Reviewed the typical symptoms of COVID which were negative per screening.  She denied any chest discomfort.  She has noted no leg discomfort or swelling.  On examination:  Vital signs reveal a BP of 170/83, pulse = 83, temperature 96.0, respirations 24-32 breaths/min, O2 sats 96% on 1 L nasal cannula.  She is somnolent.  Has some residual dysarthria from previous CVA that appears to be baseline.  Cardiac examination confirmed sinus rhythm.  EKG confirmed sinus rhythm with no ectopy.  Had diffuse ST segment depression though unchanged from previous EKGs on file dated back to March and May 2018.  Did not appear fluid overload.  Did have Inspra respiratory wheezing and a few bibasilar crackles.  Her lower extremities were nontender with no calf tenderness and negative Homans test.  Plan: CBC, CMP, troponin, venous blood gas, 2 view chest x-ray, DuoNeb, IV hydration, IV Solu-Medrol  9:50 AM  Problem list:  1.  Elevated troponin NSTEMI versus troponin leak. Hx of CAD/PAD  Patient noted to have elevated troponin.  = 0.680.  Plan to repeat troponin at 11 AM to determine if it stable.  This will help define if she has an STEMI with potential increasing levels versus troponin leak and will aid in decision making as to appropriate hospital location for admission.   Repeat EKG also at 11 AM.  2.  COPD exacerbation-venous blood gas = 7.35/59/38/32/4 .6/24% (improvement from prior venous blood gases on file with less CO2 retention).  Currently oxygenating adequately with O2 sats of 96% on 1 L nasal cannula.   3.  Chest x-ray suggestive for  R base infiltrate versus atelectasis  Start antibiotic therapy for community-acquired pneumonia with azithromycin and ceftriaxone.  She does have an allergy listed for penicillin with a ill-defined history for a possible rash.  Low probability for cross-reactivity with the cephalosporin.  4.  With hospital anticipation screening for COVID-19  5.  PE screening with d-dimer.  If elevated in his normal GFR would proceed with CT PE protocol  6.  Dehydration  Started on IV fluids with normal saline 500 cc bolus and maintenance at 100 cc/h  7.  Thin cachectic in appearance.  Normal serum proteins(albumin = 3.6, total protein = 7.6)  11:19 AM  CT PE protocol completed due to elevated d-dimer.  No infectious infiltrate noted.  Advanced emphysematous changes noted in all lung fields.  New pulmonary nodule right lower lobe suspicious for malignancy(size =1.9 cm)  Recommending hospitalization for COPD exacerbation.  Will require outpatient work-up for nodule/malignancy.  Repeat EKG is unchanged.  Second troponin pending.  11:45 AM  Patient has benefited from albuterol nebs.  Still has inspiratory expiratory wheezing though improved breath sounds consistent with improved tidal volume.  Concerning his second troponin has continued to increase(1.0432).  Concerning for NSTEMI  Plan: Transfer to [facility] for cardiology consultation- Not available at [facility] [place].  Call placed to hospitalist- Dr. Saunders.    Patient received aspirin 324 mg.  Heparin low intensity protocol started.         I have reviewed the nursing notes.    I have reviewed the findings, diagnosis, plan and need for follow up with the patient.      New Prescriptions     No medications on file       Final diagnoses:   Elevated troponin   COPD exacerbation (H)   Tobacco use disorder   Right lower lobe pulmonary nodule - 1.9 cm/suspicious for malignancy   Coronary artery disease involving native heart without angina pectoris, unspecified vessel or lesion type   PAD (peripheral artery disease) (H)   NSTEMI (non-ST elevated myocardial infarction) (H)       5/2/2020   [facility] EMERGENCY DEPARTMENT     Dereck Flowers DO  05/02/20 [id number]

## 2020-05-02 NOTE — PROVIDER NOTIFICATION
MD Notification    Notified Person: MD    Notified Person Name: Max SRIVASTAVA    Notification Date/Time: 05/02/20 @ 6:45 PM     Notification Interaction: web paged PA    Purpose of Notification: trop 2.140, heparin drip infusing.    Orders Received:    Comments:

## 2020-05-02 NOTE — PROGRESS NOTES
S- Respiratory Care Consultation    Preeti Ford    Age: 69 year old      Date of Admission:  5/2/2020    Reason for consult: Dyspnea, Increased work of Breathing               Level of consult: Consult and follow for daily recommendations           Chief complaint:   Assessment:   Dyspnea, Labored Breathing           History of Present Illness:   This patient with a significant past medical history of chronic obstructive pulmonary disease who presents with the following condition requiring a Respiratory Care Consultation:  Patient states she has not been using home MDI's or nebs.   Dyspenea, Tachynepia, Increased work of breathing, loose congested cough non producitive      Assessment:   Lungs:   tachypneic, Moderate respiratory distress, wheezing improved post neb  Patient tripoding using accessory muscles    RESPIRATORY:  positive for  cough with sputum, dyspnea and wheezing  Oxygenation = O2 saturation 95% on home level of 1 liter via nasal cannula   Patient feels slight benefit post neb.   Does patient have home oxygen Yes  Home oxygen deliver device :Nasal cannula at 1 liters per minute    Cough: Frequent loose congested non-productive     History is obtained from the patient      MRI:  MRI Thoracic Spine with and without Contrast No results found for this or any previous visit.  X-Ray:  X-Ray Chest   Results for orders placed in visit on 11/09/18   XR Chest 2 Views    Narrative CHEST TWO VIEWS   11/9/2018 11:42 AM     HISTORY:  Chronic obstructive pulmonary disease, severe (H).    COMPARISON: 5/21/2018.    FINDINGS: Inflation in both lungs due to COPD. No infiltrates or acute  abnormality. No interval change except that a central line has been  removed in the interval.      Impression IMPRESSION: COPD. Nothing acute.    FRANCIE KURTZ MD       ABG:  pH Arterial (pH)   Date Value   05/19/2018 7.39     pO2 Arterial (mm Hg)   Date Value   05/19/2018 96     pCO2 Arterial (mm Hg)   Date Value   05/19/2018  60 (H)     Bicarbonate Arterial (mmol/L)   Date Value   05/19/2018 36 (H)       Pulmonary function testing:  No flowsheet data found.    Results: Severe obstructive ventilatory defect.                  Past Medical History:     Past Medical History:   Diagnosis Date     Arthritis      COPD (chronic obstructive pulmonary disease) (H)      Coronary artery disease      CVA (cerebral vascular accident) (H) 8-     Hypertension              Past Surgical History:     Past Surgical History:   Procedure Laterality Date     APPENDECTOMY       BYPASS GRAFT AORTOILIAC N/A 3/7/2017    Procedure: BYPASS GRAFT AORTOILIAC;  Surgeon: Marcos Pratt MD;  Location: [facility] OR     SALPINGO OOPHORECTOMY,R/L/DELORES      Salpingo Oophorectomy, RT/LT/DELORES             Social History:     Social History     Socioeconomic History     Marital status:      Spouse name: Not on file     Number of children: Not on file     Years of education: Not on file     Highest education level: Not on file   Occupational History     Not on file   Social Needs     Financial resource strain: Not on file     Food insecurity     Worry: Not on file     Inability: Not on file     Transportation needs     Medical: Not on file     Non-medical: Not on file   Tobacco Use     Smoking status: Current Every Day Smoker     Packs/day: 0.50     Types: Cigarettes     Smokeless tobacco: Never Used     Tobacco comment: patient states she is working on it    Substance and Sexual Activity     Alcohol use: No     Alcohol/week: 0.0 standard drinks     Drug use: No     Sexual activity: Not Currently     Partners: Male   Lifestyle     Physical activity     Days per week: Not on file     Minutes per session: Not on file     Stress: Not on file   Relationships     Social connections     Talks on phone: Not on file     Gets together: Not on file     Attends Mandaeism service: Not on file     Active member of club or organization: Not on file     Attends meetings of clubs  or organizations: Not on file     Relationship status: Not on file     Intimate partner violence     Fear of current or ex partner: Not on file     Emotionally abused: Not on file     Physically abused: Not on file     Forced sexual activity: Not on file   Other Topics Concern      Service No     Blood Transfusions No     Caffeine Concern No     Occupational Exposure No     Hobby Hazards No     Sleep Concern Yes     Comment: Trouble breathing at night due to COPD     Stress Concern No     Weight Concern No     Special Diet No     Back Care No     Exercise No     Bike Helmet No     Seat Belt Yes     Self-Exams Yes     Parent/sibling w/ CABG, MI or angioplasty before 65F 55M? No   Social History Narrative     Not on file     Exposures:  Construction going on next door.        Family History:     Family History   Problem Relation Age of Onset     Cerebrovascular Disease Mother      Cerebrovascular Disease Father      Genitourinary Problems Brother         Dialysis     Family history              Allergies:   This patient is allergic to is allergic to crestor [rosuvastatin]; hmg-coa-r inhibitors; lisinopril; optison [albumin human]; and penicillins.          Medications:     Current Facility-Administered Medications   Medication     ipratropium - albuterol 0.5 mg/2.5 mg/3 mL (DUONEB) neb solution 3 mL     methylPREDNISolone sodium succinate (solu-MEDROL) injection 62.5 mg     Current Outpatient Medications   Medication Sig     ACE/ARB/ARNI NOT PRESCRIBED, INTENTIONAL, Please choose reason not prescribed, below     acetaminophen (TYLENOL) 325 MG tablet Take 325 mg by mouth every 4 hours as needed for mild pain      albuterol (2.5 MG/3ML) 0.083% neb solution Take 1 vial by nebulization every 4 hours as needed for shortness of breath / dyspnea or wheezing     albuterol (PROAIR HFA/PROVENTIL HFA/VENTOLIN HFA) 108 (90 Base) MCG/ACT inhaler Inhale 2 puffs into the lungs every 6 hours as needed for shortness of breath  / dyspnea     aspirin 81 MG EC tablet Take 1 tablet (81 mg) by mouth daily     atorvastatin (LIPITOR) 20 MG tablet Take 1 tablet (20 mg) by mouth At Bedtime     budesonide-formoterol (SYMBICORT) 160-4.5 MCG/ACT Inhaler INHALE TWO PUFFS BY MOUTH TWICE A DAY     Calcium Carbonate-Vitamin D (OSCAL 500/200 D-3 PO) Take 1 tablet by mouth 2 times daily     clopidogrel (PLAVIX) 75 MG tablet TAKE ONE TABLET BY MOUTH ONCE DAILY     Coenzyme Q10 (COQ10 PO) Take 100 mg by mouth every 24 hours (at 16:00)     guaiFENesin (MUCINEX) 600 MG 12 hr tablet Take 600 mg by mouth 2 times daily     ipratropium - albuterol 0.5 mg/2.5 mg/3 mL (DUONEB) 0.5-2.5 (3) MG/3ML neb solution Take 1 vial (3 mLs) by nebulization every 4 hours as needed for shortness of breath / dyspnea or wheezing     metoprolol tartrate (LOPRESSOR) 25 MG tablet Take 0.25 tablets (6.25 mg) by mouth 2 times daily     montelukast (SINGULAIR) 10 MG tablet Take 1 tablet (10 mg) by mouth At Bedtime     nicotine (NICODERM CQ) 7 MG/24HR 24 hr patch Place 1 patch onto the skin every 24 hours (Patient not taking: Reported on 10/7/2019)     nitroGLYcerin (NITROSTAT) 0.4 MG sublingual tablet For chest pain place 1 tablet under the tongue every 5 minutes for 3 doses. If symptoms persist 5 minutes after 1st dose call 911.     Nutritional Supplements (BOOST) Take 1 Bottle by mouth 3 times daily (with meals) (Patient not taking: Reported on 2/14/2020)     order for DME Equipment being ordered: Nebulizer machine     order for DME Equipment being ordered: Nebulizer     polyethylene glycol (MIRALAX/GLYCOLAX) Packet Take 1 packet by mouth daily as needed for constipation     Respiratory Therapy Supplies (NEBULIZER/TUBING/MOUTHPIECE) KIT 1 kit as needed (if becomes damaged)     senna-docusate (SENOKOT-S;PERICOLACE) 8.6-50 MG per tablet Take 1 tablet by mouth 2 times daily as needed for constipation     umeclidinium (INCRUSE ELLIPTA) 62.5 MCG/INH inhaler Inhale 1 puff into the lungs daily      Facility-Administered Medications Ordered in Other Encounters   Medication     Reason aspirin not prescribed (intentional)                  Recommendations:   Recommendations:      Encourage use of home respiratory medications            Result data:     Lab Results   Component Value Date    PH 7.39 05/19/2018    PH 7.33 (L) 05/17/2018    PH 7.24 (L) 03/07/2017    PO2 96 05/19/2018    PO2 171 (H) 05/17/2018    PO2 105 03/07/2017    PCO2 60 (H) 05/19/2018    PCO2 55 (H) 05/17/2018    PCO2 59 (H) 03/07/2017    HCO3 36 (H) 05/19/2018    HCO3 29 (H) 05/17/2018    HCO3 25 03/07/2017    OLIVIA 8.7 05/19/2018    OLIVIA 1.4 05/17/2018    OLIVIA 6.6 09/02/2015             Kristina Riedel B.S./MAEGAN/CPCHAR/JEANINEC         R- RCP will follow

## 2020-05-02 NOTE — ED TRIAGE NOTES
Her COPD has been bothering her for the past 2 days. She thinks it is from the road construction outside her house.

## 2020-05-02 NOTE — TELEPHONE ENCOUNTER
" Yoseph calling\" My wife was taking tot he ER(see epic) she is still there now for SOB. I just wanted it somewhere in her chart that she took her meds this am but did not take the   clopidogrel (PLAVIX) 75 MG tablet  90 tablet  3  12/11/2019   No    Sig: TAKE ONE TABLET BY MOUTH ONCE DAILY     Will make an encounter per epic per callers request.  Nury Friedman RN [facility]      "

## 2020-05-03 ENCOUNTER — APPOINTMENT (OUTPATIENT)
Dept: CARDIOLOGY | Facility: CLINIC | Age: 70
DRG: 280 | End: 2020-05-03
Attending: PHYSICIAN ASSISTANT
Payer: COMMERCIAL

## 2020-05-03 LAB
ANION GAP SERPL CALCULATED.3IONS-SCNC: 4 MMOL/L (ref 3–14)
BASE EXCESS BLDV CALC-SCNC: 4.2 MMOL/L
BUN SERPL-MCNC: 16 MG/DL (ref 7–30)
CALCIUM SERPL-MCNC: 8.9 MG/DL (ref 8.5–10.1)
CHLORIDE SERPL-SCNC: 103 MMOL/L (ref 94–109)
CO2 SERPL-SCNC: 30 MMOL/L (ref 20–32)
CREAT SERPL-MCNC: 0.62 MG/DL (ref 0.52–1.04)
ERYTHROCYTE [DISTWIDTH] IN BLOOD BY AUTOMATED COUNT: 13 % (ref 10–15)
GFR SERPL CREATININE-BSD FRML MDRD: >90 ML/MIN/{1.73_M2}
GLUCOSE SERPL-MCNC: 104 MG/DL (ref 70–99)
HCO3 BLDV-SCNC: 33 MMOL/L (ref 21–28)
HCT VFR BLD AUTO: 47.9 % (ref 35–47)
HGB BLD-MCNC: 15.5 G/DL (ref 11.7–15.7)
LMWH PPP CHRO-ACNC: 0.2 IU/ML
LMWH PPP CHRO-ACNC: 0.2 IU/ML
LMWH PPP CHRO-ACNC: NORMAL IU/ML
MCH RBC QN AUTO: 29.5 PG (ref 26.5–33)
MCHC RBC AUTO-ENTMCNC: 32.4 G/DL (ref 31.5–36.5)
MCV RBC AUTO: 91 FL (ref 78–100)
OXYHGB MFR BLDV: 84 %
PCO2 BLDV: 64 MM HG (ref 40–50)
PH BLDV: 7.32 PH (ref 7.32–7.43)
PLATELET # BLD AUTO: 197 10E9/L (ref 150–450)
PO2 BLDV: 49 MM HG (ref 25–47)
POTASSIUM SERPL-SCNC: 4.6 MMOL/L (ref 3.4–5.3)
PROCALCITONIN SERPL-MCNC: <0.05 NG/ML
RBC # BLD AUTO: 5.25 10E12/L (ref 3.8–5.2)
SODIUM SERPL-SCNC: 137 MMOL/L (ref 133–144)
TROPONIN I SERPL-MCNC: 2.12 UG/L (ref 0–0.04)
TROPONIN I SERPL-MCNC: 2.4 UG/L (ref 0–0.04)
TROPONIN I SERPL-MCNC: 2.89 UG/L (ref 0–0.04)
WBC # BLD AUTO: 6.9 10E9/L (ref 4–11)

## 2020-05-03 PROCEDURE — 27210338 ZZH CIRCUIT HUMID FACE/TRACH MSK

## 2020-05-03 PROCEDURE — 85520 HEPARIN ASSAY: CPT | Performed by: PHYSICIAN ASSISTANT

## 2020-05-03 PROCEDURE — 27211316 ZZ H MASK, CPAP TOTAL HEADGEAR, LATEX FREE

## 2020-05-03 PROCEDURE — 85027 COMPLETE CBC AUTOMATED: CPT | Performed by: PHYSICIAN ASSISTANT

## 2020-05-03 PROCEDURE — 84484 ASSAY OF TROPONIN QUANT: CPT | Performed by: PHYSICIAN ASSISTANT

## 2020-05-03 PROCEDURE — 40000275 ZZH STATISTIC RCP TIME EA 10 MIN

## 2020-05-03 PROCEDURE — 94660 CPAP INITIATION&MGMT: CPT

## 2020-05-03 PROCEDURE — 99233 SBSQ HOSP IP/OBS HIGH 50: CPT | Performed by: HOSPITALIST

## 2020-05-03 PROCEDURE — 27210339 ZZH CIRCUIT HUMIDITY W/CPAP BIP

## 2020-05-03 PROCEDURE — 25000125 ZZHC RX 250: Performed by: HOSPITALIST

## 2020-05-03 PROCEDURE — 94640 AIRWAY INHALATION TREATMENT: CPT | Mod: 76

## 2020-05-03 PROCEDURE — 25800030 ZZH RX IP 258 OP 636: Performed by: HOSPITALIST

## 2020-05-03 PROCEDURE — 94640 AIRWAY INHALATION TREATMENT: CPT

## 2020-05-03 PROCEDURE — 93306 TTE W/DOPPLER COMPLETE: CPT

## 2020-05-03 PROCEDURE — 84145 PROCALCITONIN (PCT): CPT | Performed by: INTERNAL MEDICINE

## 2020-05-03 PROCEDURE — 80048 BASIC METABOLIC PNL TOTAL CA: CPT | Performed by: PHYSICIAN ASSISTANT

## 2020-05-03 PROCEDURE — 85520 HEPARIN ASSAY: CPT | Performed by: HOSPITALIST

## 2020-05-03 PROCEDURE — 36415 COLL VENOUS BLD VENIPUNCTURE: CPT | Performed by: INTERNAL MEDICINE

## 2020-05-03 PROCEDURE — 36415 COLL VENOUS BLD VENIPUNCTURE: CPT | Performed by: PHYSICIAN ASSISTANT

## 2020-05-03 PROCEDURE — 82805 BLOOD GASES W/O2 SATURATION: CPT | Performed by: INTERNAL MEDICINE

## 2020-05-03 PROCEDURE — 93306 TTE W/DOPPLER COMPLETE: CPT | Mod: 26 | Performed by: INTERNAL MEDICINE

## 2020-05-03 PROCEDURE — 25000128 H RX IP 250 OP 636: Performed by: PHYSICIAN ASSISTANT

## 2020-05-03 PROCEDURE — 21000000 ZZH R&B IMCU HEART CARE

## 2020-05-03 PROCEDURE — 25000128 H RX IP 250 OP 636: Performed by: HOSPITALIST

## 2020-05-03 PROCEDURE — 99222 1ST HOSP IP/OBS MODERATE 55: CPT | Performed by: INTERNAL MEDICINE

## 2020-05-03 PROCEDURE — 25000132 ZZH RX MED GY IP 250 OP 250 PS 637: Performed by: INTERNAL MEDICINE

## 2020-05-03 PROCEDURE — 25000132 ZZH RX MED GY IP 250 OP 250 PS 637: Performed by: PHYSICIAN ASSISTANT

## 2020-05-03 RX ORDER — LEVALBUTEROL 1.25 MG/.5ML
1.25 SOLUTION, CONCENTRATE RESPIRATORY (INHALATION) EVERY 4 HOURS PRN
Status: DISCONTINUED | OUTPATIENT
Start: 2020-05-03 | End: 2020-05-07 | Stop reason: HOSPADM

## 2020-05-03 RX ORDER — AZITHROMYCIN 500 MG/1
500 INJECTION, POWDER, LYOPHILIZED, FOR SOLUTION INTRAVENOUS EVERY 24 HOURS
Status: DISCONTINUED | OUTPATIENT
Start: 2020-05-03 | End: 2020-05-04

## 2020-05-03 RX ORDER — SODIUM CHLORIDE 9 MG/ML
INJECTION, SOLUTION INTRAVENOUS CONTINUOUS
Status: DISCONTINUED | OUTPATIENT
Start: 2020-05-03 | End: 2020-05-05

## 2020-05-03 RX ORDER — METHYLPREDNISOLONE SODIUM SUCCINATE 40 MG/ML
40 INJECTION, POWDER, LYOPHILIZED, FOR SOLUTION INTRAMUSCULAR; INTRAVENOUS ONCE
Status: COMPLETED | OUTPATIENT
Start: 2020-05-03 | End: 2020-05-03

## 2020-05-03 RX ADMIN — ATORVASTATIN CALCIUM 20 MG: 20 TABLET, FILM COATED ORAL at 21:00

## 2020-05-03 RX ADMIN — METHYLPREDNISOLONE SODIUM SUCCINATE 40 MG: 40 INJECTION, POWDER, FOR SOLUTION INTRAMUSCULAR; INTRAVENOUS at 11:52

## 2020-05-03 RX ADMIN — MELATONIN 3 MG: 3 TAB ORAL at 21:09

## 2020-05-03 RX ADMIN — MONTELUKAST 10 MG: 10 TABLET, FILM COATED ORAL at 21:09

## 2020-05-03 RX ADMIN — SODIUM CHLORIDE: 9 INJECTION, SOLUTION INTRAVENOUS at 13:12

## 2020-05-03 RX ADMIN — ASPIRIN 81 MG: 81 TABLET, DELAYED RELEASE ORAL at 15:06

## 2020-05-03 RX ADMIN — METOPROLOL TARTRATE 6.25 MG: 25 TABLET, FILM COATED ORAL at 15:06

## 2020-05-03 RX ADMIN — FLUTICASONE FUROATE AND VILANTEROL TRIFENATATE 1 PUFF: 200; 25 POWDER RESPIRATORY (INHALATION) at 14:57

## 2020-05-03 RX ADMIN — GUAIFENESIN 600 MG: 600 TABLET, EXTENDED RELEASE ORAL at 20:59

## 2020-05-03 RX ADMIN — IPRATROPIUM BROMIDE 0.5 MG: 0.5 SOLUTION RESPIRATORY (INHALATION) at 11:31

## 2020-05-03 RX ADMIN — METOPROLOL TARTRATE 6.25 MG: 25 TABLET, FILM COATED ORAL at 20:59

## 2020-05-03 RX ADMIN — GUAIFENESIN 600 MG: 600 TABLET, EXTENDED RELEASE ORAL at 15:06

## 2020-05-03 RX ADMIN — CLOPIDOGREL BISULFATE 75 MG: 75 TABLET, FILM COATED ORAL at 15:07

## 2020-05-03 RX ADMIN — LEVALBUTEROL HYDROCHLORIDE 1.25 MG: 1.25 SOLUTION, CONCENTRATE RESPIRATORY (INHALATION) at 09:52

## 2020-05-03 RX ADMIN — ALBUTEROL SULFATE 2 PUFF: 90 AEROSOL, METERED RESPIRATORY (INHALATION) at 14:58

## 2020-05-03 RX ADMIN — CEFTRIAXONE 2 G: 2 INJECTION, POWDER, FOR SOLUTION INTRAMUSCULAR; INTRAVENOUS at 10:47

## 2020-05-03 RX ADMIN — AZITHROMYCIN MONOHYDRATE 500 MG: 500 INJECTION, POWDER, LYOPHILIZED, FOR SOLUTION INTRAVENOUS at 11:51

## 2020-05-03 RX ADMIN — IPRATROPIUM BROMIDE 0.5 MG: 0.5 SOLUTION RESPIRATORY (INHALATION) at 15:56

## 2020-05-03 RX ADMIN — IPRATROPIUM BROMIDE 0.5 MG: 0.5 SOLUTION RESPIRATORY (INHALATION) at 19:53

## 2020-05-03 RX ADMIN — UMECLIDINIUM 1 PUFF: 62.5 AEROSOL, POWDER ORAL at 14:57

## 2020-05-03 ASSESSMENT — ACTIVITIES OF DAILY LIVING (ADL): ADLS_ACUITY_SCORE: 18

## 2020-05-03 NOTE — PROGRESS NOTES
VBG drawn. Results show some improvement since last ABGs. Pt to remain on AVAPS mode on V60.. will re-assess for the need of BiPAP later this AM. RT recommends another VBG prior to removing BiPAP.     Brian Hernandez, RT on 5/3/2020 at 4:39 AM

## 2020-05-03 NOTE — CONSULTS
"CARDIOLOGY CONSULT    REASON FOR CONSULT:  Elevated troponin    PRIMARY CARE PHYSICIAN:  Deisy Carter    HISTORY OF PRESENT ILLNESS:   Ms. Ford is a 70 y/o woman with PMH significant for right thalamic CVA, COPD on chronic oxygen, coronary artery disease with ischemic cardiomyopathy, hypertension, PAD who presented with shortness of breath.  She is not able to provide a history for me and his is gleamed from her admission H and P.  At that time, she reported 1-2 weeks hx of difficulty breathing.  She is on 1-2L O2 at that time.  She denies any fevers/chills/n/v/diaphoresis.  In the ED she was noted to be hypertensive. ECG without acute changes.  Troponin was 0.68 and increased to 2.893 on slow trend.  She was ultimately placed on BIPAP and treated for COPD exacerbation.  Echocardiogram demonstrates moderately reduced LV function with multiple regional wall motion abnormalities.  She has been placed on heparin gtt.  She reports feeling \"tired\", is not able to tell me anything additional.           PAST MEDICAL HISTORY:  1.  Ischemic cardiomyopathy  2.  Coronary artery disease:  Coronary angiogram in 2018 demonstrated 80% in-stent restenosis of mid circumflex stent s/p PCI with ROWDY, nonobstructive disease elsewhere  3.  COPD on chronic O2  4.  Right thalamic CVA  5.  Peripheral arterial disease  6.  Hypertension    MEDICATIONS:  Current Facility-Administered Medications   Medication     acetaminophen (TYLENOL) Suppository 650 mg     acetaminophen (TYLENOL) tablet 650 mg     albuterol (PROAIR HFA/PROVENTIL HFA/VENTOLIN HFA) 108 (90 Base) MCG/ACT inhaler 2 puff     alum & mag hydroxide-simethicone (MAALOX  ES) suspension 30 mL     artificial tears (GENTEAL) 0.1-0.2-0.3 % ophthalmic solution 1 drop     aspirin EC tablet 81 mg     atorvastatin (LIPITOR) tablet 20 mg     azithromycin (ZITHROMAX) 500 mg vial to attach to  mL bag     cefTRIAXone (ROCEPHIN) 2 g vial to attach to  ml bag for ADULTS or " NS 50 ml bag for PEDS     clopidogrel (PLAVIX) tablet 75 mg     Continuing beta blocker from home medication list OR beta blocker order already placed during this visit     Continuing statin from home medication list OR statin order already placed during this visit     fluticasone-vilanterol (BREO ELLIPTA) 200-25 MCG/INH inhaler 1 puff     guaiFENesin (MUCINEX) 12 hr tablet 600 mg     heparin 25,000 units in 0.45% NaCl 250 mL ANTICOAGULANT  infusion     HOLD: nitroGLYcerin IF     ipratropium (ATROVENT) 0.02 % neb solution 0.5 mg     levalbuterol (XOPENEX CONC) neb solution 1.25 mg     lidocaine (LMX4) cream     lidocaine (LMX4) cream     lidocaine 1 % 0.1-1 mL     lidocaine 1 % 0.1-1 mL     LORazepam (ATIVAN) injection 0.5 mg     medication instruction     melatonin tablet 3 mg     metoprolol tartrate (LOPRESSOR) quarter-tab 6.25 mg     montelukast (SINGULAIR) tablet 10 mg     morphine (PF) injection 2-4 mg     naloxone (NARCAN) injection 0.1-0.4 mg     nitroGLYcerin (NITROSTAT) sublingual tablet 0.4 mg     No lozenges or gum should be given while patient on BIPAP/AVAPS/AVAPS AE     ondansetron (ZOFRAN-ODT) ODT tab 4 mg    Or     ondansetron (ZOFRAN) injection 4 mg     Patient is already receiving anticoagulation with heparin, enoxaparin (LOVENOX), warfarin (COUMADIN)  or other anticoagulant medication     Patient may continue current oral medications     predniSONE (DELTASONE) tablet 60 mg     prochlorperazine (COMPAZINE) injection 5 mg    Or     prochlorperazine (COMPAZINE) tablet 5 mg    Or     prochlorperazine (COMPAZINE) Suppository 12.5 mg     Reason ACE/ARB/ARNI order not selected     senna-docusate (SENOKOT-S/PERICOLACE) 8.6-50 MG per tablet 1 tablet    Or     senna-docusate (SENOKOT-S/PERICOLACE) 8.6-50 MG per tablet 2 tablet     sodium chloride (PF) 0.9% PF flush 3 mL     sodium chloride (PF) 0.9% PF flush 3 mL     sodium chloride (PF) 0.9% PF flush 3 mL     sodium chloride (PF) 0.9% PF flush 3 mL      sodium chloride 0.9% infusion     umeclidinium (INCRUSE ELLIPTA) 62.5 MCG/INH inhaler 1 puff     Facility-Administered Medications Ordered in Other Encounters   Medication     Reason aspirin not prescribed (intentional)       ALLERGIES:  Allergies   Allergen Reactions     Crestor [Rosuvastatin] Other (See Comments)     dizziness     Hmg-Coa-R Inhibitors Other (See Comments)     Muscle/joint aching     Lisinopril Cough     Optison [Albumin Human] Other (See Comments)     Pt was flush and very dizzy.  Also had a BP drop     Penicillins Rash       SOCIAL HISTORY:  Smokes 1/2 pack per day.  No significant ETOH    FAMILY HISTORY:  I have reviewed this patient's family history and updated it with pertinent information if needed.   Family History   Problem Relation Age of Onset     Cerebrovascular Disease Mother      Cerebrovascular Disease Father      Genitourinary Problems Brother         Dialysis       REVIEW OF SYSTEMS:  Unable to complete ROS due to BiPAP and confusion    PHYSICAL EXAM:  Temp: 98.5  F (36.9  C) Temp src: Oral BP: (!) 113/92 Pulse: 89 Heart Rate: 87 Resp: 16 SpO2: 99 % O2 Device: Oxymizer cannula Oxygen Delivery: 4 LPM  Vital Signs with Ranges  Temp:  [97.5  F (36.4  C)-99.7  F (37.6  C)] 98.5  F (36.9  C)  Pulse:  [82-95] 89  Heart Rate:  [80-96] 87  Resp:  [13-29] 16  BP: (113-163)/(58-92) 113/92  FiO2 (%):  [25 %-30 %] 30 %  SpO2:  [86 %-100 %] 99 %  102 lbs 1.17 oz    Constitutional: alert, oriented to self only  Eyes: PERRL, sclera nonicteric  ENT: trachea midline  Respiratory:   Diminished breath sounds bilaterally  Cardiovascular: RRR no m/r/g, no JVD  GI: nondistended, nontender, bowel sounds present  Lymph/Hematologic: no lymphadenopathy  Skin: dry, no rash  Musculoskeletal: good muscle tone, no edema bilaterally  Neurologic: no focal deficits  Neuropsychiatric: appropriate affact    DATA:    EKG: Sinus rhythm, nonspecific ST segment changes       Echocardiogram  1. Moderately decreased left  ventricular systolic function. visual ejection  fraction is estimated at 35-40%.  2. Basal to mid inferior and inferolateral wall hypokinesis is present. There  is basal to mid anteroseptal/inferoseptal wall hypokinesis.  3. Mildly decreased right ventricular systolic function. RV apex appears  severely hypokinetic.  4. There is mild (1+) mitral regurgitation.  5. Mild aortic stenosis; mean gradient 15 mmHg, peak velocity 2.5 m/sec,  calculated valve area 1.1 cm2.  6. In comparison with prior study, LV function has declined and regional wall  motion abnormalities are new.        ASSESSMENT:  1.  NSTEMI:  In the setting of COPD exacerbation.  May be a component of demand ischemia but interval worsening of LV function and new regional wall motion abnormalities  2.  Ischemic cardiomyopathy:  EF 35-40%  3.  COPD exacerbation:  On chronic O2, currently requiring biPAP.  Currently receiving steroids.    4.  Hx of thalamic CVA  5.  Confusion/AMS:  Oriented to self only; this may be baseline (?)      RECOMMENDATIONS:  1.  Continue ASA/plavix, heparin gtt  2.  Continue statin/betablocker  3.  She is on appropriate medical therapy for management of NSTEMI.  Will consider diagnostic coronary angiogram depending on clinical course and goals of care.  She is currently oriented to self only and unclear what her baseline is.  A discussion of goals of care this admission would be reasonable.      Kenya He MD  Cardiology - [facility]  Pager:  [phone]  May 3, 2020

## 2020-05-03 NOTE — PLAN OF CARE
A&O to self. VS stable on O2 @ 4LPM with oxymizer cannula. Up with assist x1 to stand at the bedside. Lung sounds diminished with inspiratory and expiratory wheezes throughout. Shortness of breath, infrequent nonproductive cough. Toes are dusky and cyanotic. Skin with multiple bruises. Purewick in place, urine clear, yellow. Tele monitored, SR with PACs and PVCs. Patient's spouse, Bud, given an update this afternoon. Discharge plan pending.

## 2020-05-03 NOTE — PLAN OF CARE
Neuro:oriented to self, answering questions inappropriately with last conversation per  via telephone this is baseline  CV/Rhythm:sr  Resp/02:30% bipap most of shift, trialed off this am, unable, SOB, now off since 115pm, 4 L oximzer  GI/Diet:NPO, now water with meds  :voiding  Skin/Incisions/Sites:bruising BLE, B toe tips keegan, dusky purple, dry  Pulses/CMS:1+ pedals  Edema:none  Activity/Falls Risk:fall risk, bedrest with bipap  Lines/Drains/IVs:PIV  Labs/BGM:trop bump, cards aware  Test/Procedures:none now, pulm consult  VS/Pain:stable, denies pain, SOB  DC Plan:? Social work consult  Other:confused

## 2020-05-03 NOTE — PROVIDER NOTIFICATION
MD Notification    Notified Person: MD    Notified Person Name: Leola    Notification Date/Time: 05/03/20 @ 5:05 PM     Notification Interaction: web paged MD    Purpose of Notification: With patient off BiPap, can we advance diet?    Orders Received:    Comments:

## 2020-05-03 NOTE — PLAN OF CARE
"Rcvd pt from 68 at 0030. Oriented to self only(baseline), very anxious,poor historian. Denies pain but states \"I don't feel well\". C/o SOB. BiPap placed, pt resistant, pulling at mask. VBG improved. Transitioned to NC for 1 hr, pt c/o SOB again so placed back on Bipap.Lungs diminished with some wheezes. RR 20-22. SR, VSS. Incontinent of urine. Heparin infusing at 550 units/hr. Troponin elevated, continue to monitor.  "

## 2020-05-03 NOTE — PROGRESS NOTES
Pt placed on BiPAP 16/6 back up rate 14 and 27% due to  hypercapnia respiratory failure. Based on last ABG Pt oxygenating well. SpO2 at 93% on 27% FiO2. Adequate min ventilation noted. pt seems to be taking off the mask. RT to adjust BiPAP settings after couple of hours.     Brian Hernandez, RT on 5/3/2020 at 12:53 AM

## 2020-05-03 NOTE — PLAN OF CARE
Disoriented x 3. VSS except for tachypnea. Occasional labored breathing with tripoding. Weaned down to 1.5 L NC from 8 L oxymask. LS coarse with expiratory wheezes. Sounded better after albuterol inhaler given. Congested cough. Hematoma from right radial CMS intact, with no increase in bleeding. Heparin drip infusing at 5.5 ml/hr. Heparin 10a pending. Last troponin flat, recheck at midnight. Bedrest. Incontinent of urine. Pt to transfer to CCU for BIPAP. Report given to CCU nurse

## 2020-05-03 NOTE — PROGRESS NOTES
[facility]    Medicine Progress Note - Hospitalist Service       Date of Admission:  5/2/2020  Assessment & Plan       Pereti Ford is a very pleasant 69-year-old female with a past medical history of COPD, coronary artery disease, CVA, hypertension, arthritis, PAD and tobacco use who presented to [facility] Emergency Department with complaints of shortness of breath.  She was found to have an acute COPD exacerbation along with NSTEMI and thus was transferred to [facility] for further management.       Acute on chronic hypoxic respiratory failure  Acute COPD exacerbation  Possible right lower lobe pneumonia  Patient presenting with progressive shortness of breath and wheezing.  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  Once covid ruled out, pt was moved to [facility] status and required Bipap throughout most of the night and day today.    CT chest negative for PE, with pulmonary nodule concerning for malignancy  Covid negative  --Bipap or HFNC as needed (off bipap to HF around 1:30pm today)  --scheduled nebs - ordered levalbuterol given ongoing NSTEMI  --Follow up blood cultures and sputum culture  --Continue azithromycin and ceftriaxone, though low suspicion for pneumonia - would complete short course  --IV Solu-Medrol given, will convert to oral prednisone 60 mg daily tomorrow.  --Mucinex available  --Continue PTA inhalers, Singulair, nebulizers  --Pulmonology consulted given poor improvement and concerning lung nodule     Non-ST elevation MI  Chronic systolic CHF  Hypertension  Hyperlipidemia  Patient denies chest pain, but does endorse shortness of breath.  EKG with nonspecific ST-T wave changes.  Troponin 0.680--1.042.  Suspect in part due to her COPD exacerbation.  May be some heart failure component.  proBNP 9512.  CRP 4.2.  --Cardiology consulted  --Continue to trend troponin  --Monitor on telemetry  --Continue IV heparin drip initiated in ER, pharmacy to  dose  --Continue PTA Plavix and aspirin 81 mg  --Continue PTA metoprolol 6.25 mg twice daily  --TTE showed decreased EF from previous and new WMAs  --Statin ordered  --anticipate pt will need angio when stabilized     Pulmonary nodules, concern for malignancy  Indeterminant 1.9 cm right lower lobe pulmonary nodules new since previous exam, and is suspicious for primary or metastatic malignancy per radiology report.  --Further follow-up pending stabilization of the above issues.    History of CVA with aphasia     Tobacco use disorder  Patient has tried to quit smoking, however still continues to use at least 3 to 4 cigarettes a day.    --Declined nicotine patch at this time.    --Continue to encourage smoking cessation     Dehydration  Appeared dry on initial examination.  --Provided with IV fluids in the ER.  Monitor intake and output and volume status.  --Started on maintenance fluids today given NPO status most of the day     Suspected malnutrition.  Patient is thin and cachectic in appearance, normal serum proteins (albumin 3.6, total protein 7.6.)  --Nutrition consulted     Physical deconditioning:  Patient is weak and suspect will have difficulty being independent at home.    --Plan to consult PT and OT when pt more stable     Diet: NPO for Medical/Clinical Reasons Except for: Meds    DVT Prophylaxis: heparin drip  Barnhart Catheter: not present  Code Status: Full Code  Patient is full code, confirmed with the patient upon admission. Had a long discussion again today with patient. She is thinking about things.    Disposition Plan   Expected discharge: 2 - 3 days, recommended to prior living arrangement once cardiac issues addressed and respiratory status stabilized.  Entered: Vee Bhagat MD 05/03/2020, 1:42 PM       The patient's care was discussed with the Bedside Nurse and Patient.    Vee Bhagat MD  Hospitalist Service  [facility]  "Hospital    ______________________________________________________________________    Interval History   Patient was in respiratory distress when seen in early morning. Apparently had refused resumption of bipap but when I saw her she was saying \"I can't breathe\" and was willing to restart bipap or anything that would make her feel better. Seen in the afternoon about 90 minutes after coming off bipap again, pt looked a lot more comfortable. She was requesting water. Seemed to follow conversation about MI and lung issues, but with aphasia difficult to know for sure as she could not repeat information back to me.    Data reviewed today: I reviewed all medications, new labs and imaging results over the last 24 hours. I personally reviewed no images or EKG's today.    Physical Exam   Vital Signs: Temp: 98.5  F (36.9  C) Temp src: Oral BP: (!) 113/92 Pulse: 89 Heart Rate: 87 Resp: 16 SpO2: 99 % O2 Device: Oxymizer cannula Oxygen Delivery: 4 LPM  Weight: 102 lbs 1.17 oz  Constitutional: awake, tripod-ing and uncomfortable appearing in the morning, placed back on bipap. More comfortable in the afternoon. Very cachectic  Respiratory: poor air movement. No wheezes auscultated  Cardiovascular: Normal apical impulse, regular rate and rhythm, normal S1 and S2, no S3 or S4, and no murmur noted  GI: No scars, normal bowel sounds, soft, non-distended, non-tender, no masses palpated, no hepatosplenomegally  Skin: no bruising or bleeding  Musculoskeletal: no lower extremity pitting edema present  Neurologic: Able to have a discussion about possible malignancy and code status, but when asked simple orientation questions, does not answer appropriately. Aphasia evident, but apparently baseline.    Data   Recent Labs   Lab 05/03/20  1018 05/03/20  0358 05/03/20  0115  05/02/20  0834   WBC 6.9  --   --   --  7.8   HGB 15.5  --   --   --  16.1*   MCV 91  --   --   --  89     --   --   --  232   INR  --   --   --   --  0.84*   NA " 137  --   --   --  140   POTASSIUM 4.6  --   --   --  4.4   CHLORIDE 103  --   --   --  104   CO2 30  --   --   --  30   BUN 16  --   --   --  14   CR 0.62  --   --   --  0.57   ANIONGAP 4  --   --   --  6   CALDERON 8.9  --   --   --  8.3*   *  --   --   --  129*   ALBUMIN  --   --   --   --  3.6   PROTTOTAL  --   --   --   --  7.6   BILITOTAL  --   --   --   --  0.4   ALKPHOS  --   --   --   --  78   ALT  --   --   --   --  34   AST  --   --   --   --  30   TROPI 2.893* 2.396* 2.121*   < > 0.680*    < > = values in this interval not displayed.     Recent Results (from the past 24 hour(s))   XR Chest Port 1 View    Narrative    CHEST ONE VIEW  2020 7:56 PM     HISTORY: Hypoxia.    COMPARISON: 2020 at 0858 hours.      Impression    IMPRESSION: Density at the right base is much improved, possibly  related to atelectasis. No new infiltrates.     ALFRED DUNCAN MD   Echocardiogram Complete    Narrative    [id number]  [id number]  [id number]^^BRETT^ELVER           [facility]  Echocardiography Laboratory  [address] [place], [place] [id number]        Name: TARIK HERNANDEZ  MRN: [id number]  : 1950  Study Date: 2020 07:35 AM  Age: 69 yrs  Gender: Female  Patient Location: [facility]  Reason For Study: MI  Ordering Physician: BRETT DURANT  Referring Physician: NAYE WILLIS  Performed By: Kanu Oliver RDCS     BSA: 1.4 m2  Height: 62 in  Weight: 104 lb  HR: 85  BP: 137/67 mmHg  _____________________________________________________________________________  __        Procedure  Complete Portable Echo Adult. Optison (NDC #[id number]) given intravenously.  _____________________________________________________________________________  __        Interpretation Summary     1. Moderately decreased left ventricular systolic function. visual ejection  fraction is estimated at 35-40%.  2. Basal to mid inferior and inferolateral wall hypokinesis is present. There  is basal to mid  anteroseptal/inferoseptal wall hypokinesis.  3. Mildly decreased right ventricular systolic function. RV apex appears  severely hypokinetic.  4. There is mild (1+) mitral regurgitation.  5. Mild aortic stenosis; mean gradient 15 mmHg, peak velocity 2.5 m/sec,  calculated valve area 1.1 cm2.  6. In comparison with prior study, LV function has declined and regional wall  motion abnormalities are new.  _____________________________________________________________________________  __        Left Ventricle  The left ventricle is normal in size. There is normal left ventricular wall  thickness. Moderately decreased left ventricular systolic function. The visual  ejection fraction is estimated at 35-40%. Grade I or early diastolic  dysfunction. Basal to mid inferior and inferolateral wall hypokinesis is  present. There is basal to mid anteroseptal/inferoseptal wall hypokinesis.     Right Ventricle  The right ventricle is normal size. Mildly decreased right ventricular  systolic function. RV apex appears severely hypokinetic.     Atria  Normal left atrial size. Right atrial size is normal. There is no color  Doppler evidence of an atrial shunt.     Mitral Valve  The mitral valve leaflets appear thickened, but open well. There is mild (1+)  mitral regurgitation.        Tricuspid Valve  The tricuspid valve is normal in structure and function. There is trace  tricuspid regurgitation. The right ventricular systolic pressure is  approximated at 38mmHg plus the right atrial pressure.     Aortic Valve  There is trace aortic regurgitation. Mild valvular aortic stenosis. Mild  aortic stenosis; mean gradient 15 mmHg, peak velocity 2.5 m/sec, calculated  valve area 1.1 cm2.     Pulmonic Valve  The pulmonic valve is normal in structure and function. There is trace  pulmonic valvular regurgitation.     Vessels  Normal size aorta. IVC diameter and respiratory changes fall into an  intermediate range suggesting an RA pressure of 8 mmHg.      Pericardium  There is no pericardial effusion.        Rhythm  Sinus rhythm was noted.  _____________________________________________________________________________  __  MMode/2D Measurements & Calculations  IVSd: 0.99 cm     LVIDd: 4.3 cm  LVIDs: 3.1 cm  LVPWd: 0.98 cm  FS: 28.8 %  LV mass(C)d: 140.3 grams  LV mass(C)dI: 96.9 grams/m2  Ao root diam: 3.3 cm  LA dimension: 3.6 cm  LA/Ao: 1.1  LVOT diam: 2.2 cm  LVOT area: 3.9 cm2  RWT: 0.46        Doppler Measurements & Calculations  MV E max ryan: 66.8 cm/sec  MV A max ryan: 83.3 cm/sec  MV E/A: 0.80  MV dec time: 0.11 sec  Ao V2 max: 252.6 cm/sec  Ao max P.0 mmHg  Ao V2 mean: 178.1 cm/sec  Ao mean P.8 mmHg  Ao V2 VTI: 51.6 cm  YOLANDA(I,D): 1.1 cm2  YOLANDA(V,D): 1.1 cm2  LV V1 max P.1 mmHg  LV V1 max: 71.8 cm/sec  LV V1 VTI: 14.6 cm  SV(LVOT): 56.5 ml  SI(LVOT): 39.0 ml/m2  PA acc time: 0.10 sec  TR max ryan: 306.8 cm/sec  TR max P.6 mmHg  AV Ryan Ratio (DI): 0.28     YOLANDA Index (cm2/m2): 0.76  E/E' av.9  Lateral E/e': 13.5  Medial E/e': 14.3           _____________________________________________________________________________  __           Report approved by: Rob Baron 2020 09:22 AM        Medications     - MEDICATION INSTRUCTIONS -       - MEDICATION INSTRUCTIONS -       HEParin 550 Units/hr (20 0934)     - MEDICATION INSTRUCTIONS -       - MEDICATION INSTRUCTIONS -       - MEDICATION INSTRUCTIONS -       - MEDICATION INSTRUCTIONS -       ACE/ARB/ARNI NOT PRESCRIBED       sodium chloride 75 mL/hr at 20 1312       aspirin  81 mg Oral Daily     atorvastatin  20 mg Oral At Bedtime     azithromycin  500 mg Intravenous Q24H     cefTRIAXone  2 g Intravenous Q24H     clopidogrel  75 mg Oral Daily     fluticasone-vilanterol  1 puff Inhalation Daily     guaiFENesin  600 mg Oral BID     ipratropium  0.5 mg Nebulization 4x daily     metoprolol tartrate  6.25 mg Oral BID     montelukast  10 mg Oral At Bedtime     predniSONE  60  mg Oral Daily     sodium chloride (PF)  3 mL Intracatheter Q8H     sodium chloride (PF)  3 mL Intracatheter Q8H     umeclidinium  1 puff Inhalation Daily

## 2020-05-03 NOTE — H&P
Admitted:     05/02/2020      PRIMARY CARE PROVIDER:  Deisy Carter CNP      CHIEF COMPLAINT:  Shortness of breath.      HISTORY OF PRESENT ILLNESS:    Preeti Ford is a pleasant 69-year-old female with a past medical history of a prior right thalamic stroke, chronic tobacco use, COPD, coronary artery disease with ischemic cardiomyopathy (EF 55% as of 03/2017), hypertension, PAD, and oxygen dependence who presented to [facility] Emergency Department with complaints of breathing difficulties.  The patient reports at least 1-2 weeks of difficulty breathing.  She states that it is hard to get her air.  She is typically on 1-2 liters of oxygen via nasal cannula at baseline.  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  She denies any travel or ill contacts.  She denies any fever, sputum production, myalgias, arthralgias, sore throat or chest pains.  She denies any abdominal pain, nausea, vomiting, diarrhea, dysuria or focal weakness.  She reports she has not been recently using her Singulair, albuterol, Symbicort inhaler or her duo nebulizer therapy.  She denies any exertional chest pressure.  She denies any leg swelling or calf tenderness.      In the [facility] Emergency Department, the patient was evaluated by Dr. Dereck Flowers.  There she was found to have an initial blood pressure 176/100 with pulse of 82 and temperature 96.  Respiratory rate was 22 with oxygen saturation 98%.  EKG shows no acute ischemic changes.  A CBC, CMP and troponin were notable for initial troponin of 0.680 with repeat troponin of 1.042.  ProBNP was elevated at 9512.  Her CRP is 4.2.  Chest x-ray shows mild hazy density at the right lung base, could be related to atelectasis or pneumonia.  Left lung is clear.  No pneumothorax or effusions.  CT chest, PE study was done due to mildly elevated D-dimer; that shows no PE identified, but emphysematous  changes.  There is also an indeterminate 1.9 cm right lower lobe pulmonary nodule which is new since previous exam and is suspicious for malignancy.  The patient was given DuoNeb treatment as well as IV Solu-Medrol and started on treatment for possible community-acquired pneumonia with IV azithromycin and ceftriaxone.  She was also tested with a nasopharyngeal swab for COVID-19.  She is being transferred here to [facility] for further management and treatment.      On patient arrival she appears to be short of breath, not in distress but mildly tachypneic and with audible wheezing.  ABG is pending.  She denies any chest pain currently.  Vital signs are stable.      PAST MEDICAL HISTORY:    1. Emphysema  2. COPD, oxygen dependent on 1 to 2 L continuously  3. History of hyponatremia  4. Malnutrition  5. History of CVA, right thalamus 8/2013 and left cerebellar and right vermis in 3/2018  6. Dysarthria  7. History of elevated troponin  8. Hypertension  9. Hyperlipidemia  10. Chronic systolic CHF  11. Lung nodules  12. GERD  13. Coronary artery disease, ST elevation MI involving LAD  14. Tobacco use disorder  15. Urinary incontinence  16. Forgetfulness  17. Osteoarthritis     PAST SURGICAL HISTORY:    Past Surgical History:   Procedure Laterality Date     APPENDECTOMY       BYPASS GRAFT AORTOILIAC N/A 3/7/2017    Procedure: BYPASS GRAFT AORTOILIAC;  Surgeon: Marcos Pratt MD;  Location: [facility] OR     SALPINGO OOPHORECTOMY,R/L/DELORES      Salpingo Oophorectomy, RT/LT/DELORES     FAMILY HISTORY:   Patient unable to recall her family history.  Chart reviewed showing mother and father with CVA.  Brother on dialysis.     SOCIAL HISTORY:  The patient is a longtime smoker.  She currently still smokes occasionally, although states she only has a couple cigarettes every so often.  She denies any alcohol or illicit drug use.  She is .      PRIOR TO ADMISSION MEDICATIONS:    Medications Prior to Admission    Medication Sig Dispense Refill Last Dose     ACE/ARB/ARNI NOT PRESCRIBED, INTENTIONAL, Please choose reason not prescribed, below        acetaminophen (TYLENOL) 325 MG tablet Take 325 mg by mouth every 4 hours as needed for mild pain  100 tablet       albuterol (2.5 MG/3ML) 0.083% neb solution Take 1 vial by nebulization every 4 hours as needed for shortness of breath / dyspnea or wheezing        albuterol (PROAIR HFA/PROVENTIL HFA/VENTOLIN HFA) 108 (90 Base) MCG/ACT inhaler Inhale 2 puffs into the lungs every 6 hours as needed for shortness of breath / dyspnea 18 g 0      aspirin 81 MG EC tablet Take 1 tablet (81 mg) by mouth daily        atorvastatin (LIPITOR) 20 MG tablet Take 1 tablet (20 mg) by mouth At Bedtime 90 tablet 3      budesonide-formoterol (SYMBICORT) 160-4.5 MCG/ACT Inhaler INHALE TWO PUFFS BY MOUTH TWICE A DAY 10.2 g 5      Calcium Carbonate-Vitamin D (OSCAL 500/200 D-3 PO) Take 1 tablet by mouth 2 times daily        clopidogrel (PLAVIX) 75 MG tablet TAKE ONE TABLET BY MOUTH ONCE DAILY 90 tablet 3      Coenzyme Q10 (COQ10 PO) Take 100 mg by mouth every 24 hours (at 16:00)        guaiFENesin (MUCINEX) 600 MG 12 hr tablet Take 600 mg by mouth 2 times daily        ipratropium - albuterol 0.5 mg/2.5 mg/3 mL (DUONEB) 0.5-2.5 (3) MG/3ML neb solution Take 1 vial (3 mLs) by nebulization every 4 hours as needed for shortness of breath / dyspnea or wheezing 270 mL 3      metoprolol tartrate (LOPRESSOR) 25 MG tablet Take 0.25 tablets (6.25 mg) by mouth 2 times daily        montelukast (SINGULAIR) 10 MG tablet Take 1 tablet (10 mg) by mouth At Bedtime 90 tablet 3      nicotine (NICODERM CQ) 7 MG/24HR 24 hr patch Place 1 patch onto the skin every 24 hours 30 patch 1      nitroGLYcerin (NITROSTAT) 0.4 MG sublingual tablet For chest pain place 1 tablet under the tongue every 5 minutes for 3 doses. If symptoms persist 5 minutes after 1st dose call 911. 25 tablet       Nutritional Supplements (BOOST) Take 1 Bottle  by mouth 3 times daily (with meals) 90 each 0      order for DME Equipment being ordered: Nebulizer machine 1 Device 0      order for DME Equipment being ordered: Nebulizer 1 Device 0      polyethylene glycol (MIRALAX/GLYCOLAX) Packet Take 1 packet by mouth daily as needed for constipation        Respiratory Therapy Supplies (NEBULIZER/TUBING/MOUTHPIECE) KIT 1 kit as needed (if becomes damaged) 1 kit 1      senna-docusate (SENOKOT-S;PERICOLACE) 8.6-50 MG per tablet Take 1 tablet by mouth 2 times daily as needed for constipation 100 tablet       umeclidinium (INCRUSE ELLIPTA) 62.5 MCG/INH inhaler Inhale 1 puff into the lungs daily          ALLERGIES:   1.  CRESTOR.   2.  STATINS.   3.  LISINOPRIL.   4.  OPTICIN.   5.  PENICILLINS.      REVIEW OF SYSTEMS:  A complete 10-point review of systems was performed and is negative other than the items mentioned in above HPI.      PHYSICAL EXAMINATION:   VITAL SIGNS:  Blood pressure 133/58, heart rate 95 beats per minute, temperature 98.2, respiratory rate 28, oxygen saturation 95% on 2 liters of oxygen.   GENERAL:  The patient is alert, oriented to self only, not place, time, date, or situation.  She is elderly and frail appearing.   EYES:  Pupils equal and round.  Extraocular movements intact.   HENT:  Head normocephalic.  Throat, lips, mucosa and tongue appear moist.   NECK:  Supple.   CARDIOVASCULAR:  Heart regular rate and rhythm, no murmurs, rubs or gallops.  No edema.   PULMONARY:  Lungs with wheezes bilaterally, no crackles or rhonchi.  Breathing is mild to moderately tachypneic.   SKIN:  Warm, dry, pale.  No rashes seen on exposed skin.   MUSCULOSKELETAL:  The patient moves all 4 extremities equally with normal strength.  Limbs appear atraumatic.   NEUROLOGIC:  Alert, cranial nerves II-XII are grossly intact.  The patient has some dysarthria which is apparently her baseline.  No other deficits.   PSYCHIATRIC:  Normal mood and affect.  She does appear to be somewhat  confused though.      LABORATORY DATA:        Results for orders placed or performed during the hospital encounter of 05/02/20 (from the past 24 hour(s))   CBC with platelets differential   Result Value Ref Range     WBC 7.8 4.0 - 11.0 10e9/L     RBC Count 5.43 (H) 3.8 - 5.2 10e12/L     Hemoglobin 16.1 (H) 11.7 - 15.7 g/dL     Hematocrit 48.2 (H) 35.0 - 47.0 %     MCV 89 78 - 100 fl     MCH 29.7 26.5 - 33.0 pg     MCHC 33.4 31.5 - 36.5 g/dL     RDW 12.8 10.0 - 15.0 %     Platelet Count 232 150 - 450 10e9/L     Diff Method Automated Method       % Neutrophils 81.9 %     % Lymphocytes 10.5 %     % Monocytes 6.4 %     % Eosinophils 0.5 %     % Basophils 0.4 %     % Immature Granulocytes 0.3 %     Nucleated RBCs 0 0 /100     Absolute Neutrophil 6.4 1.6 - 8.3 10e9/L     Absolute Lymphocytes 0.8 0.8 - 5.3 10e9/L     Absolute Monocytes 0.5 0.0 - 1.3 10e9/L     Absolute Basophils 0.0 0.0 - 0.2 10e9/L     Abs Immature Granulocytes 0.0 0 - 0.4 10e9/L     Absolute Nucleated RBC 0.0     Comprehensive metabolic panel   Result Value Ref Range     Sodium 140 133 - 144 mmol/L     Potassium 4.4 3.4 - 5.3 mmol/L     Chloride 104 94 - 109 mmol/L     Carbon Dioxide 30 20 - 32 mmol/L     Anion Gap 6 3 - 14 mmol/L     Glucose 129 (H) 70 - 99 mg/dL     Urea Nitrogen 14 7 - 30 mg/dL     Creatinine 0.57 0.52 - 1.04 mg/dL     GFR Estimate >90 >60 mL/min/[1.73_m2]     GFR Estimate If Black >90 >60 mL/min/[1.73_m2]     Calcium 8.3 (L) 8.5 - 10.1 mg/dL     Bilirubin Total 0.4 0.2 - 1.3 mg/dL     Albumin 3.6 3.4 - 5.0 g/dL     Protein Total 7.6 6.8 - 8.8 g/dL     Alkaline Phosphatase 78 40 - 150 U/L     ALT 34 0 - 50 U/L     AST 30 0 - 45 U/L   Troponin I   Result Value Ref Range     Troponin I ES 0.680 (HH) 0.000 - 0.045 ug/L   Blood gas venous   Result Value Ref Range     Ph Venous 7.35 7.32 - 7.43 pH     PCO2 Venous 59 (H) 40 - 50 mm Hg     PO2 Venous 38 25 - 47 mm Hg     Bicarbonate Venous 32 (H) 21 - 28 mmol/L     Base Excess Venous 4.6  mmol/L     FIO2 24     D dimer quantitative   Result Value Ref Range     D Dimer 2.9 (H) 0.0 - 0.50 ug/ml FEU        IMAGING:   XR Chest 2 Views     Narrative     CHEST TWO VIEWS  5/2/2020 9:01 AM      HISTORY:  Shortness of breath. COPD exacerbation.       COMPARISON: 11/9/2018.        Impression     IMPRESSION: Hyperinflation both lungs. Mild hazy increased density at  the right lung base could be related to atelectasis or pneumonia. The  left lung is otherwise clear. No pneumothorax. No pleural effusions  are identified. Pulmonary vascularity is within normal limits. Aortic  calcification. Coronary artery stenting.     ELVER SOLIMAN MD   CT Chest Pulmonary Embolism w Contrast     Narrative     CT CHEST PULMONARY EMBOLISM WITH CONTRAST 5/2/2020 10:50 AM     CLINICAL HISTORY: Hypoxia. Dyspnea. History of COPD. Elevated D-dimer.     TECHNIQUE: CT angiogram chest during arterial phase injection IV  contrast. 2D and 3D MIP reconstructions were performed by the CT  technologist. Dose reduction techniques were used.      CONTRAST: 70 mL Isovue 370.     COMPARISON: Chest CT performed 6/14/2017.     FINDINGS:  ANGIOGRAM CHEST: There is suboptimal opacification of the pulmonary  arteries, however the central pulmonary arteries are moderately well  opacified and no large central pulmonary embolism is identified. The  thoracic aorta is well opacified, and there is no evidence for  thoracic aortic aneurysm or dissection. Atherosclerotic calcification  of the thoracic aorta and coronary arteries. No CT evidence of right  heart strain. No pleural effusions.     LUNGS AND PLEURA: Advanced emphysematous changes are noted throughout  both lungs. Indeterminate pulmonary nodule in the superior segment of  the right lower lobe medially (series 5 image 160) is new since the  previous exam, measuring 1.9 x 1.3 cm, and is suspicious for  malignancy. Mild scarring at both lung apices is unchanged.     MEDIASTINUM/AXILLAE:  Indeterminate 1.2 cm right thyroid nodule is not  appreciably changed. No enlarged lymph nodes are identified in the  chest. No pericardial effusion. Small hiatal hernia.     UPPER ABDOMEN: Limited views of the upper abdomen are unremarkable.     MUSCULOSKELETAL: No aggressive-appearing bone lesions are identified.        Impression     IMPRESSION:  1. There is suboptimal opacification of the pulmonary arteries;  however, no pulmonary embolism is identified.  2. Emphysema.  3. Indeterminate 1.9 cm right lower lobe pulmonary nodule is new since  the previous exam, and is suspicious for malignancy.         EKG:  Interpretation by Dereck Flowers DO.   Indication: Respiratory distress and elevated troponin  Sinus rhythm.  Rate 80 bpm.  Right axis.  Left atrial enlargement with a biphasic P wave in lead I.  Old anterior infarct.  There is nonspecific diffuse ST depression.  Comparison EKGs on file from May 17, 2018 and March 22, 2019 shows no change.  Impressions: Abnormal EKG     EKG:(#2)  Interpretation by Dereck Flowers DO.   Indication: Sinus rhythm.  Rate 80 bpm.  Poor R wave progression.  No development of ST segment elevation.  Continued diffuse ST segment depression.  EKG unchanged from first EKG.    EKGs personally reviewed.       ASSESSMENT AND PLAN:    Preeti Ford is a very pleasant 69-year-old female with a past medical history of COPD, coronary artery disease, CVA, hypertension, arthritis, PAD and tobacco use who presented to [facility] Emergency Department with complaints of shortness of breath.  She was found to have an acute COPD exacerbation along with NSTEMI and thus was transferred to [facility] for further management.  The Hospitalist Service has taken the patient in transfer.      Acute on chronic hypoxic respiratory failure  COPD exacerbation  Possible right lower lobe pneumonia  COVID-19 rule out  Patient presenting with progressive shortness of breath and wheezing.   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  No known COVID risks.  Patient is afebrile, but does have cough no acute shortness of breath.  VBG at outside facility shows pH 7.35, PCO2 59, bicarbonate 38.  On 1 to 2 L of oxygen chronically.  Currently on baseline.  Chest x-ray suggestive for right base infiltrate versus atelectasis.  CT chest negative for PE, shows pulmonary nodules.  --COVID-19 swab obtained, rule out pending  --Droplet and isolation precautions  --Oxygen as needed  --Stat ABG  --Check procalcitonin  --We will obtain blood cultures and sputum culture  --Continue azithromycin and ceftriaxone initiated in outside ER, narrow therapy as able.  --IV Solu-Medrol given, will convert to oral prednisone 60 mg daily tomorrow.  --Mucinex available  --Continue PTA inhalers, Singulair, nebulizers    Non-ST elevation MI  Chronic systolic CHF  Hypertension  Hyperlipidemia  Patient denies chest pain, but does endorse shortness of breath.  EKG with nonspecific ST-T wave changes.  Troponin 0.680--1.042.  Suspect in part due to her COPD exacerbation.  May be some heart failure component.  proBNP 9512.  CRP 4.2.  --Cardiology consulted  --Continue to trend troponin  --Monitor on telemetry  --Continue IV heparin drip initiated in ER, pharmacy dose  --Continue PTA Plavix and aspirin 81 mg  --Continue PTA metoprolol 6.25 mg twice daily  --Obtain echocardiogram  --Oxygen as needed  --Sublingual nitroglycerin  --Morphine as needed  --Statin ordered    Pulmonary nodules, concern for malignancy  Indeterminant 1.9 cm right lower lobe pulmonary nodules new since previous exam, and is suspicious for primary or metastatic malignancy per radiology report.  --Further follow-up pending stabilization of the above issues.    Tobacco use disorder  Patient has tried to quit smoking, however still continues to use at least 3 to 4 cigarettes a day.    --Declined nicotine patch at this time.    --Continue to encourage smoking  cessation    Dehydration  Appeared dry on initial examination.  --Provided with IV fluids in the ER.  Monitor intake and output and volume status.    Suspected malnutrition.  Patient is thin and cachectic in appearance, normal serum proteins (albumin 3.6, total protein 7.6.)  --Monitor, consider nutrition consult    Physical deconditioning:  Patient is weak and suspect will have difficulty being independent at home.    --Plan to consult PT and OT when appropriate and ruled out for COVID-19.    DVT prophylaxis: Patient on heparin drip    CODE STATUS: Patient is full code, confirmed with the patient upon admission     The patient was discussed with Dr. Trevor Arnold of the Hospitalist Service.  He is in agreement with my assessment and plan of care.         ALFREDO ARNOLD MD       As dictated by BRETT DURANT PA-C     Patient seen and examined.  Agree with impression and plan.     Alfredo Arnold MD  Pager: [phone]  Cell Phone:  [phone]             D: 2020   T: 2020   MT: JULES      Name:     TARIK HERNANDEZ   MRN:      [id number]        Account:      [id number]   :      1950        Admitted:     2020                   Document: [id number]       cc: NAYE WILLIS CNP

## 2020-05-03 NOTE — PROVIDER NOTIFICATION
MD Notification    Notified Person: MD    Notified Person Name: Dr. De Anda     Notification Date/Time: 5/2/20; 2210    Notification Interaction:  phone    Purpose of Notification: ABG resulted     Orders Received: Call [facility] microbiology to try to get Covid results sooner. Call back when have results, may do BIPAP if negative. Keep oxygen closer to 92%.     Addendum: Covid negative. Pt will be transferred to another floor for Biapap.

## 2020-05-03 NOTE — PROGRESS NOTES
RT Note:    Patient on/off BIPAP throughout day. BIPAP as needed for SOB. BS expiratory wheezes, nebulizers scheduled and administered per order. Increase aeration post nebulizers, patient reported mild improvement.     4lpm oxymizer cannula when off BIPAP.     RT will continue to follow.

## 2020-05-03 NOTE — PROVIDER NOTIFICATION
Paged re: ABG results    Essentially stable from prior    Saturating 98% on O2, decrease closer to 92% to prevent respiratory suppression in COPD pt (though note no chronic hypercarbia    Utilize optichamber w/ inhalers    Have [facility] micro batch her covid test earlier as she is low suspicion and may benefit from BIPAP trial for COPD exacerbation. Hold on BIPAP while awaiting covid results.    If worsening clinically (has actually improved so far), can transition to [facility] status.    Kanu De Anda MD  10:12 PM    COVID negative.    BIPAP overnight, transfer to [facility] for BIPAP treatment for COPD.  Repeat VBG in AM (has conditional order w/ [facility] status)    Kanu De Anda MD  11:15 PM

## 2020-05-03 NOTE — PROGRESS NOTES
Spiritual [facility]  Heart    SH visited Pt per consult and request for health care directive. Nurse explained it would be best to attempt to visit Pt tomorrow.     SH will make a follow up visit tomorrow.     Isela Kee  Chaplain Resident

## 2020-05-03 NOTE — PROGRESS NOTES
Progress note.  Post BiPAP assessment. Pt generating low tidal volume of 150 mL on S/T mode.  Pt switched over to AVAPS mode for hyperventilation. Settings as follows:  490 mL targeted Vt, BUR 14, EPAP 5, min 10, max 35, 30% FiO2.  ABG to draw after 2 hours.

## 2020-05-03 NOTE — PLAN OF CARE
Patient arrived from [facility] ED with EMS. Patient alert, disoriented to place, time and situation; expressive aphasia at baseline. VS stable on O2 @ 8LPM via oxymask. Lung sounds coarse with expiratory wheezes throughout; congested cough. Tele monitored, SR. Patient had ABGs drawn, ATIF Anne aware of results, plan to redraw at 21:00. Right radial site from ABG draw was noted to be bleeding, pressure held, small hematoma noted, pressure bandage applied, notified PA. Troponin elevated, recheck at 20:00; heparin drip infusing. COVID test pending. Plan to monitor ABGs, respiratory status, await COVID results, continue heparin drip. Discharge plan pending.

## 2020-05-03 NOTE — PROGRESS NOTES
Updated , Yoseph, on plan to move patient to CCU on BIPAP.  in agreement. Informed that Covid 19 was negative.

## 2020-05-03 NOTE — PLAN OF CARE
CR/OT: Per RN pt remains on Bipap and is not appropriate for therapy today. CR will continue to follow tomorrow, 5/4/2020.

## 2020-05-04 ENCOUNTER — APPOINTMENT (OUTPATIENT)
Dept: OCCUPATIONAL THERAPY | Facility: CLINIC | Age: 70
DRG: 280 | End: 2020-05-04
Attending: PHYSICIAN ASSISTANT
Payer: COMMERCIAL

## 2020-05-04 LAB
ANION GAP SERPL CALCULATED.3IONS-SCNC: 1 MMOL/L (ref 3–14)
BASE EXCESS BLDV CALC-SCNC: 7.2 MMOL/L
BUN SERPL-MCNC: 19 MG/DL (ref 7–30)
CALCIUM SERPL-MCNC: 8.5 MG/DL (ref 8.5–10.1)
CHLORIDE SERPL-SCNC: 101 MMOL/L (ref 94–109)
CO2 SERPL-SCNC: 34 MMOL/L (ref 20–32)
CREAT SERPL-MCNC: 0.59 MG/DL (ref 0.52–1.04)
ERYTHROCYTE [DISTWIDTH] IN BLOOD BY AUTOMATED COUNT: 12.9 % (ref 10–15)
GFR SERPL CREATININE-BSD FRML MDRD: >90 ML/MIN/{1.73_M2}
GLUCOSE SERPL-MCNC: 96 MG/DL (ref 70–99)
HCO3 BLDV-SCNC: 38 MMOL/L (ref 21–28)
HCT VFR BLD AUTO: 44.9 % (ref 35–47)
HGB BLD-MCNC: 14.2 G/DL (ref 11.7–15.7)
LMWH PPP CHRO-ACNC: 0.17 IU/ML
MAGNESIUM SERPL-MCNC: 2 MG/DL (ref 1.6–2.3)
MCH RBC QN AUTO: 29.3 PG (ref 26.5–33)
MCHC RBC AUTO-ENTMCNC: 31.6 G/DL (ref 31.5–36.5)
MCV RBC AUTO: 93 FL (ref 78–100)
O2/TOTAL GAS SETTING VFR VENT: ABNORMAL %
OXYHGB MFR BLDV: 75 %
PCO2 BLDV: 81 MM HG (ref 40–50)
PH BLDV: 7.28 PH (ref 7.32–7.43)
PLATELET # BLD AUTO: 201 10E9/L (ref 150–450)
PO2 BLDV: 43 MM HG (ref 25–47)
POTASSIUM SERPL-SCNC: 4.5 MMOL/L (ref 3.4–5.3)
RBC # BLD AUTO: 4.84 10E12/L (ref 3.8–5.2)
SODIUM SERPL-SCNC: 136 MMOL/L (ref 133–144)
WBC # BLD AUTO: 6.3 10E9/L (ref 4–11)

## 2020-05-04 PROCEDURE — 40000141 ZZH STATISTIC PERIPHERAL IV START W/O US GUIDANCE

## 2020-05-04 PROCEDURE — 40000275 ZZH STATISTIC RCP TIME EA 10 MIN

## 2020-05-04 PROCEDURE — 25000132 ZZH RX MED GY IP 250 OP 250 PS 637: Performed by: PHYSICIAN ASSISTANT

## 2020-05-04 PROCEDURE — 85520 HEPARIN ASSAY: CPT | Performed by: PHYSICIAN ASSISTANT

## 2020-05-04 PROCEDURE — 21000001 ZZH R&B HEART CARE

## 2020-05-04 PROCEDURE — 99233 SBSQ HOSP IP/OBS HIGH 50: CPT | Performed by: INTERNAL MEDICINE

## 2020-05-04 PROCEDURE — 97165 OT EVAL LOW COMPLEX 30 MIN: CPT | Mod: GO

## 2020-05-04 PROCEDURE — 99207 ZZC CDG-MDM COMPONENT: MEETS MODERATE - UP CODED: CPT | Performed by: INTERNAL MEDICINE

## 2020-05-04 PROCEDURE — 99232 SBSQ HOSP IP/OBS MODERATE 35: CPT | Performed by: INTERNAL MEDICINE

## 2020-05-04 PROCEDURE — 94640 AIRWAY INHALATION TREATMENT: CPT | Mod: 76

## 2020-05-04 PROCEDURE — 25000128 H RX IP 250 OP 636: Performed by: INTERNAL MEDICINE

## 2020-05-04 PROCEDURE — 25000125 ZZHC RX 250: Performed by: HOSPITALIST

## 2020-05-04 PROCEDURE — 97535 SELF CARE MNGMENT TRAINING: CPT | Mod: GO

## 2020-05-04 PROCEDURE — 25800030 ZZH RX IP 258 OP 636: Performed by: HOSPITALIST

## 2020-05-04 PROCEDURE — 94640 AIRWAY INHALATION TREATMENT: CPT

## 2020-05-04 PROCEDURE — 25000132 ZZH RX MED GY IP 250 OP 250 PS 637: Performed by: INTERNAL MEDICINE

## 2020-05-04 PROCEDURE — 83735 ASSAY OF MAGNESIUM: CPT | Performed by: PHYSICIAN ASSISTANT

## 2020-05-04 PROCEDURE — 82805 BLOOD GASES W/O2 SATURATION: CPT | Performed by: HOSPITALIST

## 2020-05-04 PROCEDURE — 85027 COMPLETE CBC AUTOMATED: CPT | Performed by: PHYSICIAN ASSISTANT

## 2020-05-04 PROCEDURE — 36415 COLL VENOUS BLD VENIPUNCTURE: CPT | Performed by: PHYSICIAN ASSISTANT

## 2020-05-04 PROCEDURE — 25000131 ZZH RX MED GY IP 250 OP 636 PS 637: Performed by: INTERNAL MEDICINE

## 2020-05-04 PROCEDURE — 80048 BASIC METABOLIC PNL TOTAL CA: CPT | Performed by: PHYSICIAN ASSISTANT

## 2020-05-04 PROCEDURE — 25000128 H RX IP 250 OP 636: Performed by: PHYSICIAN ASSISTANT

## 2020-05-04 RX ORDER — AZITHROMYCIN 250 MG/1
250 TABLET, FILM COATED ORAL DAILY
Status: DISCONTINUED | OUTPATIENT
Start: 2020-05-04 | End: 2020-05-07 | Stop reason: HOSPADM

## 2020-05-04 RX ADMIN — CEFTRIAXONE 2 G: 2 INJECTION, POWDER, FOR SOLUTION INTRAMUSCULAR; INTRAVENOUS at 10:25

## 2020-05-04 RX ADMIN — MONTELUKAST 10 MG: 10 TABLET, FILM COATED ORAL at 21:44

## 2020-05-04 RX ADMIN — ALBUTEROL SULFATE 2 PUFF: 90 AEROSOL, METERED RESPIRATORY (INHALATION) at 18:50

## 2020-05-04 RX ADMIN — PREDNISONE 60 MG: 20 TABLET ORAL at 10:35

## 2020-05-04 RX ADMIN — SODIUM CHLORIDE: 9 INJECTION, SOLUTION INTRAVENOUS at 02:43

## 2020-05-04 RX ADMIN — METOPROLOL TARTRATE 6.25 MG: 25 TABLET, FILM COATED ORAL at 10:34

## 2020-05-04 RX ADMIN — GUAIFENESIN 600 MG: 600 TABLET, EXTENDED RELEASE ORAL at 20:33

## 2020-05-04 RX ADMIN — IPRATROPIUM BROMIDE 0.5 MG: 0.5 SOLUTION RESPIRATORY (INHALATION) at 16:06

## 2020-05-04 RX ADMIN — CLOPIDOGREL BISULFATE 75 MG: 75 TABLET, FILM COATED ORAL at 10:35

## 2020-05-04 RX ADMIN — IPRATROPIUM BROMIDE 0.5 MG: 0.5 SOLUTION RESPIRATORY (INHALATION) at 10:41

## 2020-05-04 RX ADMIN — GUAIFENESIN 600 MG: 600 TABLET, EXTENDED RELEASE ORAL at 10:34

## 2020-05-04 RX ADMIN — AZITHROMYCIN MONOHYDRATE 250 MG: 250 TABLET ORAL at 16:35

## 2020-05-04 RX ADMIN — HEPARIN SODIUM 550 UNITS/HR: 10000 INJECTION, SOLUTION INTRAVENOUS at 10:30

## 2020-05-04 RX ADMIN — LEVALBUTEROL HYDROCHLORIDE 1.25 MG: 1.25 SOLUTION, CONCENTRATE RESPIRATORY (INHALATION) at 01:41

## 2020-05-04 RX ADMIN — ATORVASTATIN CALCIUM 20 MG: 20 TABLET, FILM COATED ORAL at 21:44

## 2020-05-04 RX ADMIN — SODIUM CHLORIDE: 9 INJECTION, SOLUTION INTRAVENOUS at 20:27

## 2020-05-04 RX ADMIN — IPRATROPIUM BROMIDE 0.5 MG: 0.5 SOLUTION RESPIRATORY (INHALATION) at 07:33

## 2020-05-04 RX ADMIN — ASPIRIN 81 MG: 81 TABLET, DELAYED RELEASE ORAL at 10:33

## 2020-05-04 RX ADMIN — METOPROLOL TARTRATE 6.25 MG: 25 TABLET, FILM COATED ORAL at 20:33

## 2020-05-04 NOTE — PLAN OF CARE
Off bipap.  On 2l nasal cannula. Wants to eat. Alert. Forgetful. Speech diff to understand  from previous cva. Matias. Incont. Urine.

## 2020-05-04 NOTE — CONSULTS
"CLINICAL NUTRITION SERVICES  -  ASSESSMENT NOTE      Recommendations Ordered by Registered Dietitian (RD): Boost TID with meals    Malnutrition:   % Weight Loss:  Up to 10% in 6 months (non-severe malnutrition)  % Intake:  Unable to determine   Subcutaneous Fat Loss:  Unable to determine   Muscle Loss:  Temporal region mild-moderate depletion per 5/2018 exam   Fluid Retention:  None noted    Malnutrition Diagnosis: Non-Severe malnutrition  In Context of:  Acute illness or injury  Chronic illness or disease        REASON FOR ASSESSMENT  Preeti Ford is a 69 year old female seen by Registered Dietitian for Provider Order - Suspected Malnutrition       NUTRITION HISTORY  - Information obtained from [facility] - attempted to reach patient via phone but she is currently on Bipap per RN.  Patient was seen by our services on 5/19/18 and the following history was obtained:   - Information obtained from  [facility] as patient intubated and sedated.  He states that she has had \"trouble gaining weight\".  Feels like over the last year she has been eating < 50% of her normal intake.  Patient has been taking Ensure or [place] Breakfast Essentials per  and typically will drink 2 per day.  Her primary issue has been poor appetite but he also believes her breathing issues have contributed as well.       CURRENT NUTRITION ORDERS  Diet Order:     Regular     Current Intake/Tolerance:  Intake not recorded, did not eat this morning due to Bipap reliance   Noted patient did order two dinners last night but intake not recorded -->  Meal #1 - Meatloaf, mashed potatoes, corn, pudding   Meal #2 - Beans and rice, corn, green beans, a banana, and milk     Appears that off and on Bipap reliance a factor in reduced intake         NUTRITION FOCUSED PHYSICAL ASSESSMENT FOR DIAGNOSING MALNUTRITION)  No:  Unable to visit patient in person due to distancing precautions during COVID-19 pandemic                 It was noted on the 5/19/18 RD " "visit that patient had mild-moderate temporal region depletion     Observed:    N/A    Obtained from Chart/Interdisciplinary Team:  None     ANTHROPOMETRICS  Height: 5'2\"  Weight: 44.4 kg (98#)(5/4)  Body mass index is 17.9 kg/m   Weight Status:  Underweight BMI <18.5  IBW: 50 kg   % IBW: 89%  Weight History:   Wt Readings from Last 10 Encounters:   05/04/20 44.4 kg (97 lb 14.4 oz)   05/02/20 46.5 kg (102 lb 8 oz)   02/14/20 45.4 kg (100 lb 1.6 oz)   12/11/19 47.8 kg (105 lb 6.4 oz)   10/07/19 47.9 kg (105 lb 9.6 oz)   04/09/19 49.2 kg (108 lb 6.4 oz)   11/09/18 49.6 kg (109 lb 6.4 oz)   09/11/18 49 kg (108 lb)   07/10/18 47.7 kg (105 lb 1.6 oz)   06/25/18 48 kg (105 lb 14.4 oz)   Down 7# or 7% over the last 5 months       LABS  Labs reviewed    MEDICATIONS  Medications reviewed      ASSESSED NUTRITION NEEDS PER APPROVED PRACTICE GUIDELINES:    Dosing Weight 44.4 kg   Estimated Energy Needs: 2061-4813 kcals (35-40 Kcal/Kg)  Justification: repletion and underweight  Estimated Protein Needs: 65-90 grams protein (1.5-2 g pro/Kg)  Justification: repletion and hypercatabolism with acute illness  Estimated Fluid Needs: 1006-8161 mL (1 mL/Kcal)  Justification: maintenance    MALNUTRITION:  % Weight Loss:  Up to 10% in 6 months (non-severe malnutrition)  % Intake:  Unable to determine   Subcutaneous Fat Loss:  Unable to determine   Muscle Loss:  Temporal region mild-moderate depletion per 5/2018 exam   Fluid Retention:  None noted    Malnutrition Diagnosis: Non-Severe malnutrition  In Context of:  Acute illness or injury  Chronic illness or disease    NUTRITION DIAGNOSIS:  Inadequate oral intake related to on and off Bipap reliance as evidenced by no intake thus far today       NUTRITION INTERVENTIONS  Recommendations / Nutrition Prescription  Continue regular diet as tolerated  Boost TID with meals     Implementation  Nutrition education: Not appropriate at this time due to patient condition  Medical Food Supplement:  " Ordered as above     Nutrition Goals  Patient will consume at least 50% of 2-3 meals and supplements per day     MONITORING AND EVALUATION:  Progress towards goals will be monitored and evaluated per protocol and Practice Guidelines    Melinda Yepez RD, LD, CNSC   Clinical Dietitian - [facility]

## 2020-05-04 NOTE — PROGRESS NOTES
05/04/20 1323   Quick Adds   Type of Visit Initial Occupational Therapy Evaluation   Living Environment   Lives With spouse   Living Arrangements house   Transportation Anticipated family or friend will provide   Living Environment Comment History of aphasia. Difficult to obtain PLOF due to aphasia. Unable to reach family via telephone. RN provided some information regarding PLOF.    Self-Care   Usual Activity Tolerance moderate   Current Activity Tolerance fair   Equipment Currently Used at Home shower chair   Functional Level   Ambulation 0-->independent   Transferring 0-->independent   Toileting 0-->independent   Bathing 1-->assistive equipment  (shower chair per pt )   Dressing 0-->independent   Fall history within last six months no   General Information   Onset of Illness/Injury or Date of Surgery - Date 05/02/20   Referring Physician Soren Robles MD   Patient/Family Goals Statement home    Additional Occupational Profile Info/Pertinent History of Current Problem Per chart: Preeti Ford is a 69 year old female with PMH COPD, CAD, CVA, HTN, arthritis, PAD, tobacco use disorder who was admitted on 5/2/2020 with shortness of breath, found to have a COPD exacerbation, NSTEMI, and decreased ejection fraction. Per chart: NSTEMI:  In the setting of COPD exacerbation.  May be a component of demand ischemia but interval worsening of LV function and new regional wall motion abnormalities. See chart for details.    Cognitive Status Examination   Orientation person   Level of Consciousness alert   Follows Commands (Cognition) 50-74% accuracy   Memory impaired   Organization/Problem Solving Sequencing impaired;Problem solving impaired   Executive Function Impulsive   Cognitive Comment Pt has apahsia, difficult to assess cognition at this time.     Mobility   Bed Mobility Bed mobility skill: Supine to sit   Bed Mobility Skill: Supine to Sit   Level of [place]: Supine/Sit stand-by assist   Physical  "Assist/Nonphysical Assist: Supine/Sit set-up required;verbal cues   Transfer Skills   Transfer Transfer Safety Analysis Bed/Chair;Transfer Skill: Stand to Sit;Transfer Safety Analysis Sit/Stand   Transfer Skill: Bed to Chair/Chair to Bed   Level of [place]: Bed to Chair contact guard   Physical Assist/Nonphysical Assist: Bed to Chair set-up required;verbal cues;1 person assist   Transfer Skill: Sit to Stand   Level of [place]: Sit/Stand contact guard   Physical Assist/Nonphysical Assist: Sit/Stand set-up required;verbal cues;1 person assist   Toilet Transfer   Toilet Transfer Toilet Transfer Skill;Toilet Transfer Safety Analysis   Lower Body Dressing   Level of [place]: Dress Lower Body minimum assist (75% patients effort)   Physical Assist/Nonphysical Assist: Dress Lower Body set-up required;verbal cues;1 person assist   General Therapy Interventions   Planned Therapy Interventions ADL retraining;IADL retraining;cognition;transfer training   Clinical Impression   Criteria for Skilled Therapeutic Interventions Met yes, treatment indicated   OT Diagnosis Decreased independence in I/ADLs    Influenced by the following impairments Decreased independence in I/ADLs    Assessment of Occupational Performance 1-3 Performance Deficits   Identified Performance Deficits Decreased independence in I/ADLs  (Dressing, bathing, toileting)   Clinical Decision Making (Complexity) Low complexity   Therapy Frequency Daily   Predicted Duration of Therapy Intervention (days/wks) 4 days   Anticipated Discharge Disposition Home with Assist;Transitional Care Facility   Risks and Benefits of Treatment have been explained. Yes   Patient, Family & other staff in agreement with plan of care Yes   [facility] AM-PAC TM \"6 Clicks\"   2016, Trustees of [facility], under license to [employer].  All rights reserved.   6 Clicks Short Forms Daily Activity Inpatient Short Form   [facility] AM-PAC  \"6 Clicks\" Daily " Activity Inpatient Short Form   1. Putting on and taking off regular lower body clothing? 3 - A Little   2. Bathing (including washing, rinsing, drying)? 2 - A Lot   3. Toileting, which includes using toilet, bedpan or urinal? 3 - A Little   4. Putting on and taking off regular upper body clothing? 3 - A Little   5. Taking care of personal grooming such as brushing teeth? 3 - A Little   6. Eating meals? 4 - None   Daily Activity Raw Score (Score out of 24.Lower scores equate to lower levels of function) 18   Total Evaluation Time   Total Evaluation Time (Minutes) 8

## 2020-05-04 NOTE — PROVIDER NOTIFICATION
MD Notification    Notified Person: MD    Notified Person Name: Susana    Notification Date/Time: 05/04/20 5:55 AM    Notification Interaction: Text page    Purpose of Notification:  FYI critical PCO2 of 81 on VBG. Placing pt back on BiPap now.    Orders Received: No new orders    Comments:

## 2020-05-04 NOTE — PROGRESS NOTES
[facility]    Hospitalist Progress Note    Interval History   - Alert to self but not name of hospital, date. Generally good attention. Satting 99% with 2L oxygen and breathing easily    Assessment & Plan   Summary: Preeti Ford is a 69 year old female with PMH COPD, CAD, CVA, HTN, arthritis, PAD, tobacco use disorder who was admitted on 5/2/2020 with shortness of breath, found to have a COPD exacerbation, NSTEMI, and decreased ejection fraction.     Acute on chronic hypoxic and hypercapneic respiratory failure  Acute COPD exacerbation  Suspected bronchitis  COVID-19 negative  Patient presenting with progressive shortness of breath and wheezing. COVID 19 negative. Patient required BIPap initially, improved with antibiotics, steroids. CT chest negative for PE, with pulmonary nodule concerning for malignancy.  - Appreciate Pulm consult   - Continue azithro, ceftriaxone   - Prednisone 60mg daily  - BIPap as needed today  - Scheduled nebs  - Follow up blood cultures and sputum culture, NGTD     NSTEMI  Possible systolic congestive heart failure  HTN, HLD  Patient denies chest pain, but does endorse shortness of breath.  EKG with nonspecific ST-T wave changes.  Troponin 0.680-->2.893.  Suspect in part due to her COPD exacerbation.  May be some heart failure component.  proBNP 9512.  CRP 4.2. TTE 5/3/2020 with decreased EF 35-40%, basal to mid inferior WMA, mild aortic stenosis; EF is decreased compared to prior.  - Cardiology consulted   - Continue Heparin drip   - Plavix, ASA   - Metoprolol   - Telemetry   - Possible angiography     Pulmonary nodules, concern for malignancy  Indeterminant 1.9 cm right lower lobe pulmonary nodules new since previous exam, and is suspicious for primary or metastatic malignancy per radiology report. Per Pulm, outpatient follow up.     History of CVA with aphasia     Tobacco use disorder  Patient has tried to quit smoking, however still continues to use at least 3 to 4  cigarettes a day.    - Declined nicotine patch at this time.    - Continue to encourage smoking cessation     Nonsevere malnutrition: Appreciate Nutrition consult     Physical deconditioning: PT/OT reconsulted today     DVT Prophylaxis: Heparin drip  Code Status: Full Code  PT/OT: Hold PT/OT  Diet: Regular Diet Adult  Snacks/Supplements Adult: Boost Plus; With Meals      Disposition: Expected discharge in ~2 days pending improvement in O2 needs, Cardiology workup    Soren Robles MD  Text Page  (7am to 6pm)  -Data reviewed today: I reviewed all new labs and imaging results over the last 24 hours.    Physical Exam   Temp: 98.6  F (37  C) Temp src: Axillary BP: 130/67 Pulse: 79 Heart Rate: 82 Resp: 22 SpO2: (!) 88 % O2 Device: Nasal cannula Oxygen Delivery: 2 LPM  Vitals:    05/03/20 0033 05/04/20 0400   Weight: 46.3 kg (102 lb 1.2 oz) 44.4 kg (97 lb 14.4 oz)     Vital Signs with Ranges  Temp:  [97.5  F (36.4  C)-98.7  F (37.1  C)] 98.6  F (37  C)  Pulse:  [74-86] 79  Heart Rate:  [74-87] 82  Resp:  [12-31] 22  BP: (113-137)/(65-95) 130/67  FiO2 (%):  [30 %] 30 %  SpO2:  [88 %-100 %] 88 %  I/O last 3 completed shifts:  In: 1699 [P.O.:240; I.V.:1459]  Out: 575 [Urine:575]  O2 requirements: none    Constitutional: Thin elderly female in NAD  HEENT: Eyes nonicteric, oral mucosa moist  Cardiovascular: RRR, normal S1/2, no m/r/g  Respiratory: Diminished lung sounds bilaterally, minimal wheezing, no crackles heard  Vascular: No LE pitting edema  GI: Normoactive bowel sounds, nontender, nondistended  Skin/Integumen: No rashes  Neuro/Psych: Appropriate affect and mood. A&Ox3, moves all extremities    Medications     - MEDICATION INSTRUCTIONS -       - MEDICATION INSTRUCTIONS -       HEParin 550 Units/hr (05/04/20 1030)     - MEDICATION INSTRUCTIONS -       - MEDICATION INSTRUCTIONS -       - MEDICATION INSTRUCTIONS -       - MEDICATION INSTRUCTIONS -       ACE/ARB/ARNI NOT PRESCRIBED       sodium chloride 75 mL/hr at  05/04/20 0243       aspirin  81 mg Oral Daily     atorvastatin  20 mg Oral At Bedtime     azithromycin  500 mg Intravenous Q24H     cefTRIAXone  2 g Intravenous Q24H     clopidogrel  75 mg Oral Daily     fluticasone-vilanterol  1 puff Inhalation Daily     guaiFENesin  600 mg Oral BID     ipratropium  0.5 mg Nebulization 4x daily     metoprolol tartrate  6.25 mg Oral BID     montelukast  10 mg Oral At Bedtime     predniSONE  60 mg Oral Daily     sodium chloride (PF)  3 mL Intracatheter Q8H     sodium chloride (PF)  3 mL Intracatheter Q8H     umeclidinium  1 puff Inhalation Daily       Data   Recent Labs   Lab 05/04/20  0534 05/03/20  1018 05/03/20  0358 05/03/20  0115  05/02/20  0834   WBC 6.3 6.9  --   --   --  7.8   HGB 14.2 15.5  --   --   --  16.1*   MCV 93 91  --   --   --  89    197  --   --   --  232   INR  --   --   --   --   --  0.84*    137  --   --   --  140   POTASSIUM 4.5 4.6  --   --   --  4.4   CHLORIDE 101 103  --   --   --  104   CO2 34* 30  --   --   --  30   BUN 19 16  --   --   --  14   CR 0.59 0.62  --   --   --  0.57   ANIONGAP 1* 4  --   --   --  6   CALDERON 8.5 8.9  --   --   --  8.3*   GLC 96 104*  --   --   --  129*   ALBUMIN  --   --   --   --   --  3.6   PROTTOTAL  --   --   --   --   --  7.6   BILITOTAL  --   --   --   --   --  0.4   ALKPHOS  --   --   --   --   --  78   ALT  --   --   --   --   --  34   AST  --   --   --   --   --  30   TROPI  --  2.893* 2.396* 2.121*   < > 0.680*    < > = values in this interval not displayed.       Imaging:   No results found for this or any previous visit (from the past 24 hour(s)).

## 2020-05-04 NOTE — PLAN OF CARE
Oriented to self at baseline. Denies pain, c/o occasional SOB. Lungs diminished with wheezes, non-productive cough. Oxymizer canula titrated down to 1L. VBG with critical PCO2, pt placed back on BiPap at 0600. VSS, tele SR. Purewick in place. Heparin gtt infusing at 550 units per hour.Plan for pulmonary consult, possible angiogram when stable.

## 2020-05-04 NOTE — CONSULTS
Consult Date:  05/04/2020      PULMONARY CONSULTATION      REASON FOR CONSULTATION:  COPD exacerbation, pulmonary nodule.      HISTORY OF PRESENT ILLNESS:  The patient is a 69-year-old female smoker with a history of COPD, coronary artery disease, who was admitted with shortness of breath secondary to a COPD exacerbation and NSTEMI.  The patient originally presented to [facility] Emergency Department with complaints of shortness of breath for 1-2 weeks.  She does have oxygen at home that she uses at 1-2 liters at baseline.  She was not having any fevers, chills or night sweats.  COVID test was negative.  Chest x-ray showed hyperinflated lung fields with a mild increased hazy density at the right lung base with the left lung clear, concerning for possible pneumonia.  CT scan of the chest showed emphysema and a 1.9 cm right lower lobe pulmonary nodule, new versus prior study 06/14/2017; there was no evidence for pneumonia.  The patient was transferred to [facility] for further evaluation and treatment.  Additional evaluation included an echocardiogram which showed new regional wall motion abnormalities and an LVEF of 35% to 40%.  The patient has been seen by Cardiology and is currently being managed medically.  We are now consulted regarding further evaluation and management.  The patient today states that her breathing feels improved.  She was transiently on BiPAP last night but is currently on nasal cannula oxygen at present.      CURRENT MEDICATIONS:  Include aspirin, Lipitor, azithromycin, Rocephin, Plavix, Breo 200/25, Mucinex, Atrovent nebs, Lopressor, Singulair, prednisone 60 mg daily, and Incruse.      PAST MEDICAL HISTORY:   1.  COPD.   2.  Coronary artery disease.   3.  History of cerebrovascular accident.   4.  Hypertension.   5.  Hyperlipidemia.   6.  Ischemic cardiomyopathy.   7.  GERD.   8.  Memory impairment.      ALLERGIES:  CRESTOR, STATINS, LISINOPRIL, AND PENICILLIN.      SOCIAL  HISTORY:  The patient is a current active smoker, 1/2 pack per day.      FAMILY HISTORY AND REVIEW OF SYSTEMS:  Noncontributory.      PHYSICAL EXAMINATION:   GENERAL:  A pleasant, mildly forgetful, older female resting comfortably in bed in no acute distress.   VITAL SIGNS:  She is afebrile, respiratory rate 24, pulse is in the 80s, blood pressure 131/78, oxygen saturation currently 88% on 2 liters per nasal cannula.   HEENT:  Grossly unremarkable.   NECK:  Supple.   LUNGS:  Decreased breath sounds with prolonged expiratory phase and a few faint end-expiratory wheezes.   CARDIOVASCULAR:  Regular rate and rhythm.   ABDOMEN:  Soft and nontender.   EXTREMITIES:  No cyanosis, clubbing or edema.      LABORATORY TESTING:  Electrolytes are normal with the exception of a bicarbonate of 34.  Renal function normal.  Blood gas:  pH 7.28, pCO2 81, pO2 of 43 on 2 liters per nasal cannula.  White count, hemoglobin, hematocrit and platelet counts are all normal.  Chest imaging studies are as summarized above.      ASSESSMENT:  A 69-year-old female smoker with a history of significant chronic obstructive pulmonary disease, now admitted with shortness of breath.  CT scan of the chest does not demonstrate pneumonia but does show emphysema and a new 1.9 cm right lower lobe pulmonary nodule concerning for malignancy.  Respiratory status is slowly improving with bronchodilators, antibiotics and steroids.  Further evaluation of the pulmonary nodule will likely include a PET scan and either CT-guided needle biopsy or bronchoscopy, depending upon the results; this will be done at a later date as an outpatient once she is recovered from her current acute illness and her Plavix can be held.      PLAN:   1.  Titrate oxygen to keep SaO2 greater than 90%.   2.  BiPAP as needed.   3.  Bronchodilators, Breo 200/25, Incruse.   4.  Antibiotics, azithromycin and Rocephin.   5.  Steroids, prednisone.   6.  Mucolytics, Mucinex.   7.  Outpatient  pulmonary followup for further evaluation of the right lower lobe pulmonary nodule.      Case was discussed with Dr. Robles.  I appreciate the opportunity to participate in her care.         JAYSON FRANCOIS MD             D: 2020   T: 2020   MT: JOON      Name:     TARIK HERNANDEZ   MRN:      [id number]        Account:       [id number]   :      1950           Consult Date:  2020      Document: [id number]       cc: Jayson Francois MD

## 2020-05-04 NOTE — PROGRESS NOTES
Spiritual Health  Heart    SH visited Pt per follow up and health care directive request. Pt has no SH needs at this time and a health care directive is not appropriate at this time.     SH will remain available as needed.     Isela Kee  Chaplain Resident

## 2020-05-04 NOTE — PROGRESS NOTES
IVONNE    I) Aware of the  order for discharge planning. Patient is a 69 year old female admitted with Acute COPD exacerbation. Patient has aphasia. Discharge is anticipated in 2 days. OT recommends 24/7 assistance/supervision, or a TCU stay if this level of assistance can't be met.  Called spouse Yoseph (Bud) and left a message requesting a return call.    P) Awaiting spouse's call.

## 2020-05-04 NOTE — PROGRESS NOTES
Pt is on 4L oxymizer cannula, 02 Sats 98%. LS diminished and increased aeration with expiratory wheezes throughout. Neb tx given as schedule. Will continue to follow.  5/3/2020  Harsha Tang, RT

## 2020-05-04 NOTE — PROGRESS NOTES
PULMONARY CONSULT NOTE    Full consult dictated.    Principal Problem:    NSTEMI (non-ST elevated myocardial infarction) (H)  Active Problems:    Smoker    ACS (acute coronary syndrome) (H)    HTN, goal below 130/80    Pulmonary nodule -- 1.9 cm RLL, new 5/2/20    COPD exacerbation, Chronic O2 Dependent           Assessment and Plan:       69-year-old female smoker with a history of COPD, coronary artery disease admitted with shortness of breath secondary to a COPD exacerbation and NSTEMI.  Chest x-ray on admission showed hyperinflated lung fields with a mildly increased hazy density at the right lung base, left lung clear.  CT scan of the chest showed emphysema and a 1.9 cm right lower lobe pulmonary nodule, new versus prior exam 6/14/2017; there was no evidence for pneumonia.  Echocardiogram shows new regional wall motion abnormalities with an LVEF 35 to 40%.  Patient has been seen by cardiology and is currently being managed medically.  Respiratory status has slowly improved with bronchodilators, antibiotics and steroids.  Right lower lobe pulmonary nodule concerning for malignancy, this will require further evaluation (likely PET scan and CT-guided needle biopsy versus bronchoscopy) at a later date once recovered from her current acute illness (and Plavix can be held).    Diagnoses  Abnl CT/CXR  R91.8  COPD exacerb J44.1  Emphysema  J43.9  Nicotine depend F17.210  Pulm nodule solit R91.1  Resp fail acute J96.00  SOB   R06.02    PLAN:  1. Adjust oxygen, keep SaO2 > 90%  2. BiPAP prn.  3. Bronchodilators - Breo 200/25, Incruse  4. Antibiotics - Zithro & Rocephin.  5. Steroids - Prednisone  6. Mucolytics - Mucinex  7. Outpatient Pulmonary follow-up for further evaluation of RLL pulmonary nodule.    Case discussed with Dr. Robles.    Thanks, will follow.      Mckay Hines MD    [facility]  [phone] (pager)  [phone] (office)

## 2020-05-04 NOTE — PLAN OF CARE
OT- Evaluation and treatment initiated. Pt lives in a home with her spouse. History of aphasia. Difficult to obtain PLOF due to aphasia. Unable to reach family via telephone. RN provided some information regarding PLOF.  Discharge Planner OT   Patient plan for discharge: Home  Current status: Pt went from sit<>Stand with CGA. Pt took 4 steps to the bedside chair with CGA and walker. While seated/standing pt completed LE dressing with minimum assist and set up.    Barriers to return to prior living situation: Current level of A for I/ADLs  Recommendations for discharge: Home with 24/7 A/supervision for all I/ADLs and home OT/PT.   Rationale for recommendations: Skilled OT to address safety and independence in ADLs. If pt does not have recommended level of assistance, then TCU should be considered.        Entered by: Reagan Galdamez 05/04/2020 2:17 PM

## 2020-05-05 ENCOUNTER — APPOINTMENT (OUTPATIENT)
Dept: OCCUPATIONAL THERAPY | Facility: CLINIC | Age: 70
DRG: 280 | End: 2020-05-05
Attending: HOSPITALIST
Payer: COMMERCIAL

## 2020-05-05 LAB
ANION GAP SERPL CALCULATED.3IONS-SCNC: <1 MMOL/L (ref 3–14)
BUN SERPL-MCNC: 20 MG/DL (ref 7–30)
CALCIUM SERPL-MCNC: 8.2 MG/DL (ref 8.5–10.1)
CHLORIDE SERPL-SCNC: 106 MMOL/L (ref 94–109)
CO2 SERPL-SCNC: 36 MMOL/L (ref 20–32)
CREAT SERPL-MCNC: 0.57 MG/DL (ref 0.52–1.04)
ERYTHROCYTE [DISTWIDTH] IN BLOOD BY AUTOMATED COUNT: 12.9 % (ref 10–15)
GFR SERPL CREATININE-BSD FRML MDRD: >90 ML/MIN/{1.73_M2}
GLUCOSE SERPL-MCNC: 94 MG/DL (ref 70–99)
HCT VFR BLD AUTO: 43.4 % (ref 35–47)
HGB BLD-MCNC: 13.4 G/DL (ref 11.7–15.7)
LMWH PPP CHRO-ACNC: <0.1 IU/ML
MCH RBC QN AUTO: 28.9 PG (ref 26.5–33)
MCHC RBC AUTO-ENTMCNC: 30.9 G/DL (ref 31.5–36.5)
MCV RBC AUTO: 94 FL (ref 78–100)
PLATELET # BLD AUTO: 202 10E9/L (ref 150–450)
POTASSIUM SERPL-SCNC: 4.2 MMOL/L (ref 3.4–5.3)
RBC # BLD AUTO: 4.64 10E12/L (ref 3.8–5.2)
SODIUM SERPL-SCNC: 142 MMOL/L (ref 133–144)
TROPONIN I SERPL-MCNC: 0.85 UG/L (ref 0–0.04)
WBC # BLD AUTO: 5.6 10E9/L (ref 4–11)

## 2020-05-05 PROCEDURE — 25000125 ZZHC RX 250: Performed by: HOSPITALIST

## 2020-05-05 PROCEDURE — 25000128 H RX IP 250 OP 636: Performed by: PHYSICIAN ASSISTANT

## 2020-05-05 PROCEDURE — 94640 AIRWAY INHALATION TREATMENT: CPT | Mod: 76

## 2020-05-05 PROCEDURE — 97530 THERAPEUTIC ACTIVITIES: CPT | Mod: GO | Performed by: OCCUPATIONAL THERAPIST

## 2020-05-05 PROCEDURE — 94640 AIRWAY INHALATION TREATMENT: CPT

## 2020-05-05 PROCEDURE — 36415 COLL VENOUS BLD VENIPUNCTURE: CPT | Performed by: NURSE PRACTITIONER

## 2020-05-05 PROCEDURE — 36415 COLL VENOUS BLD VENIPUNCTURE: CPT | Performed by: PHYSICIAN ASSISTANT

## 2020-05-05 PROCEDURE — 25800030 ZZH RX IP 258 OP 636: Performed by: HOSPITALIST

## 2020-05-05 PROCEDURE — 84484 ASSAY OF TROPONIN QUANT: CPT | Performed by: NURSE PRACTITIONER

## 2020-05-05 PROCEDURE — 21000001 ZZH R&B HEART CARE

## 2020-05-05 PROCEDURE — 97535 SELF CARE MNGMENT TRAINING: CPT | Mod: GO | Performed by: OCCUPATIONAL THERAPIST

## 2020-05-05 PROCEDURE — 80048 BASIC METABOLIC PNL TOTAL CA: CPT | Performed by: PHYSICIAN ASSISTANT

## 2020-05-05 PROCEDURE — 99233 SBSQ HOSP IP/OBS HIGH 50: CPT | Performed by: INTERNAL MEDICINE

## 2020-05-05 PROCEDURE — 25000132 ZZH RX MED GY IP 250 OP 250 PS 637: Performed by: PHYSICIAN ASSISTANT

## 2020-05-05 PROCEDURE — 25000131 ZZH RX MED GY IP 250 OP 636 PS 637: Performed by: INTERNAL MEDICINE

## 2020-05-05 PROCEDURE — 99232 SBSQ HOSP IP/OBS MODERATE 35: CPT | Performed by: INTERNAL MEDICINE

## 2020-05-05 PROCEDURE — 25000132 ZZH RX MED GY IP 250 OP 250 PS 637: Performed by: INTERNAL MEDICINE

## 2020-05-05 PROCEDURE — 40000275 ZZH STATISTIC RCP TIME EA 10 MIN

## 2020-05-05 PROCEDURE — 85520 HEPARIN ASSAY: CPT | Performed by: PHYSICIAN ASSISTANT

## 2020-05-05 PROCEDURE — 85027 COMPLETE CBC AUTOMATED: CPT | Performed by: PHYSICIAN ASSISTANT

## 2020-05-05 RX ORDER — SODIUM CHLORIDE 9 MG/ML
INJECTION, SOLUTION INTRAVENOUS CONTINUOUS
Status: CANCELLED | OUTPATIENT
Start: 2020-05-05

## 2020-05-05 RX ORDER — LORAZEPAM 2 MG/ML
0.5 INJECTION INTRAMUSCULAR
Status: CANCELLED | OUTPATIENT
Start: 2020-05-05

## 2020-05-05 RX ORDER — LIDOCAINE 40 MG/G
CREAM TOPICAL
Status: CANCELLED | OUTPATIENT
Start: 2020-05-05

## 2020-05-05 RX ORDER — LORAZEPAM 0.5 MG/1
0.5 TABLET ORAL
Status: CANCELLED | OUTPATIENT
Start: 2020-05-05

## 2020-05-05 RX ORDER — POTASSIUM CHLORIDE 1500 MG/1
20 TABLET, EXTENDED RELEASE ORAL
Status: CANCELLED | OUTPATIENT
Start: 2020-05-05

## 2020-05-05 RX ADMIN — SODIUM CHLORIDE: 9 INJECTION, SOLUTION INTRAVENOUS at 08:42

## 2020-05-05 RX ADMIN — ALBUTEROL SULFATE 2 PUFF: 90 AEROSOL, METERED RESPIRATORY (INHALATION) at 00:11

## 2020-05-05 RX ADMIN — PREDNISONE 60 MG: 20 TABLET ORAL at 08:41

## 2020-05-05 RX ADMIN — AZITHROMYCIN MONOHYDRATE 250 MG: 250 TABLET ORAL at 16:05

## 2020-05-05 RX ADMIN — GUAIFENESIN 600 MG: 600 TABLET, EXTENDED RELEASE ORAL at 08:41

## 2020-05-05 RX ADMIN — MONTELUKAST 10 MG: 10 TABLET, FILM COATED ORAL at 21:58

## 2020-05-05 RX ADMIN — IPRATROPIUM BROMIDE 0.5 MG: 0.5 SOLUTION RESPIRATORY (INHALATION) at 16:02

## 2020-05-05 RX ADMIN — ASPIRIN 81 MG: 81 TABLET, DELAYED RELEASE ORAL at 08:41

## 2020-05-05 RX ADMIN — METOPROLOL TARTRATE 6.25 MG: 25 TABLET, FILM COATED ORAL at 20:18

## 2020-05-05 RX ADMIN — IPRATROPIUM BROMIDE 0.5 MG: 0.5 SOLUTION RESPIRATORY (INHALATION) at 12:54

## 2020-05-05 RX ADMIN — Medication 2000 UNITS: at 07:56

## 2020-05-05 RX ADMIN — FLUTICASONE FUROATE AND VILANTEROL TRIFENATATE 1 PUFF: 200; 25 POWDER RESPIRATORY (INHALATION) at 08:41

## 2020-05-05 RX ADMIN — IPRATROPIUM BROMIDE 0.5 MG: 0.5 SOLUTION RESPIRATORY (INHALATION) at 07:14

## 2020-05-05 RX ADMIN — CEFTRIAXONE 2 G: 2 INJECTION, POWDER, FOR SOLUTION INTRAMUSCULAR; INTRAVENOUS at 10:08

## 2020-05-05 RX ADMIN — METOPROLOL TARTRATE 6.25 MG: 25 TABLET, FILM COATED ORAL at 08:41

## 2020-05-05 RX ADMIN — LEVALBUTEROL HYDROCHLORIDE 1.25 MG: 1.25 SOLUTION, CONCENTRATE RESPIRATORY (INHALATION) at 03:39

## 2020-05-05 RX ADMIN — IPRATROPIUM BROMIDE 0.5 MG: 0.5 SOLUTION RESPIRATORY (INHALATION) at 20:02

## 2020-05-05 RX ADMIN — UMECLIDINIUM 1 PUFF: 62.5 AEROSOL, POWDER ORAL at 08:41

## 2020-05-05 RX ADMIN — CLOPIDOGREL BISULFATE 75 MG: 75 TABLET, FILM COATED ORAL at 08:41

## 2020-05-05 RX ADMIN — GUAIFENESIN 600 MG: 600 TABLET, EXTENDED RELEASE ORAL at 20:18

## 2020-05-05 RX ADMIN — ATORVASTATIN CALCIUM 20 MG: 20 TABLET, FILM COATED ORAL at 21:58

## 2020-05-05 NOTE — PLAN OF CARE
Discharge Planner PT   Patient plan for discharge: Unknown  Current status: Order received and chart reviewed. Per discussion with OT, discharge plan is established for patient to discharge in next 1-2 days to TCU. Mobility needs being addressed by OT during this hospital stay. Will defer PT evaluation to next level of care and complete PT orders.  Barriers to return to prior living situation: Defer to OT  Recommendations for discharge: Defer to OT  Rationale for recommendations: Pt discharging to TCU in next 1-2 days, will defer therapy needs to OT.       Entered by: Charlotte Haines 05/05/2020 2:11 PM

## 2020-05-05 NOTE — PLAN OF CARE
VSS overnight, denied pain. C/o SOB with accessory muscle use. Albuterol inhaler and neb given which helped with wheezing. Oriented to self only. Up to BSC with 1 and gait belt.Incontinent of urine at times, does good with toileting schedule.  Tele SR. SW following for discharge planning.

## 2020-05-05 NOTE — PROGRESS NOTES
SW    I) Received a message form patient's spouse, Oh who has only his home phone listed. Oh reports he is working again and will try to call back sometime today. Left a voice mail message for son, Yfn also.   Spoke with the bedside RN who states patient is oriented to self only so speaking with her would not be appropriate.  Noted, per chart review, patient went to [facility] 5/26/18.    P) A TCU stay is likely needed, as spouse is working. Will send referrals once able to speak with family to confirm this plan.

## 2020-05-05 NOTE — PLAN OF CARE
Neuro: Hx of stroke, alert to self, illogical speaking at baseline. Forgetful.    Cardiac: SR, Vitals stable    Respiratory: 4L NC (home O2 1/2 L baseline). Coarse lung sounds with crackles and inspiratory wheezing, nebs ordered. Attempted Bipap this morning, off at 10:00 am.     Drips: NS@ 75, Heparin @ 550    GI/: incontinent of urine - toileting schedule every 2-3 hours works well with bedside commode    Activity: up with 1    Discharge:  TBD

## 2020-05-05 NOTE — PROGRESS NOTES
[facility]  Cardiology Progress Note  Date of Service: 05/05/2020  Primary Cardiologist: Dr. Cortez ([place])    Assessment & Plan    Preeti Ford is a 69 year old female with past medical history significant for right thalamic CVA, COPD on chronic oxygen, coronary artery disease with ischemic cardiomyopathy, hypertension, PAD admitted on 5/2/2020 with shortness of breath for 1-2 week history of difficulty breathing.     Assessment:   1. NSTEMI    Troponin 2.893    Consider appropriate medical therapy (statin, BB, ASA, Plavix, heparin gtt)    Consider diagnostic angiogram depending on clinical course, but at this time will remain on medical therapy    2. Ischemic cardiomyopathy    LVEF 35-40%, Basal to mid inferior and inferolateral wall hypokinesis is present with basal to mid anteroseptal/inferoseptal wall hypokinesis.    3. Coronary artery disease    Coronary angiogram in 2018 demonstrated 80% in-stent restenosis of mid circumflex stent s/p PCI with ROWDY, nonobstructive disease elsewhere    4. COPD    Chronic O2 1-2 L    Off BiPap    5. Confusion/Altered mental status    Orientated to self ? Unsure of baseline. Unable to reach significant other per RN    6. History of right thalamic CVA-Feb 2020    7. Lung nodule    8. PAD    Plan:  1. Continue medical management of NSTEMI   2. Cardiology will sign off  3. Follow up with SPRING Paulson, CNP 5/13 via a telephone visit    SPRING LANE CNP  Pager:  [phone]   (7am - 5pm, M-F)    Interval History   Remains orientated to self only. Nearing baseline oxygen requirement.     Discussed in detail with the patient's spouse, Yoseph. He was in agreement that we will continue medical therapy at this time given risk of the coronary angiography. Discussed also with RN and there is concern with sedation impacting her mental status and her ability to be flat with underlying COPD.       Physical Exam   Temp: 98.2  F (36.8  C) Temp src: Oral  BP: 118/56 Pulse: 81 Heart Rate: 74 Resp: 20 SpO2: 98 % O2 Device: Nasal cannula Oxygen Delivery: 2 LPM  Vitals:    05/03/20 0033 05/04/20 0400 05/05/20 0400   Weight: 46.3 kg (102 lb 1.2 oz) 44.4 kg (97 lb 14.4 oz) 45.5 kg (100 lb 3.2 oz)     Constitutional:   Orientated to self only   Skin:   Warm and dry   Head:   Nontraumatic   Neck:   no JVD   Lungs:   symmetric, clear to auscultation   Cardiovascular:   irregularly irregular rhythm, normal S1 and S2   Abdomen:   Benign   Extremities and Back:   No edema   Neurological:   Grossly nonfocal       Medications     - MEDICATION INSTRUCTIONS -       - MEDICATION INSTRUCTIONS -       HEParin 700 Units/hr (05/05/20 0755)     - MEDICATION INSTRUCTIONS -       - MEDICATION INSTRUCTIONS -       - MEDICATION INSTRUCTIONS -       - MEDICATION INSTRUCTIONS -       ACE/ARB/ARNI NOT PRESCRIBED       sodium chloride 75 mL/hr at 05/05/20 0842       aspirin  81 mg Oral Daily     atorvastatin  20 mg Oral At Bedtime     azithromycin  250 mg Oral Daily     cefTRIAXone  2 g Intravenous Q24H     clopidogrel  75 mg Oral Daily     fluticasone-vilanterol  1 puff Inhalation Daily     guaiFENesin  600 mg Oral BID     ipratropium  0.5 mg Nebulization 4x daily     metoprolol tartrate  6.25 mg Oral BID     montelukast  10 mg Oral At Bedtime     predniSONE  60 mg Oral Daily     sodium chloride (PF)  3 mL Intracatheter Q8H     sodium chloride (PF)  3 mL Intracatheter Q8H     umeclidinium  1 puff Inhalation Daily       Data     Most Recent 3 BMP's:  Recent Labs   Lab Test 05/05/20  0535 05/04/20  0534 05/03/20  1018    136 137   POTASSIUM 4.2 4.5 4.6   CHLORIDE 106 101 103   CO2 36* 34* 30   BUN 20 19 16   CR 0.57 0.59 0.62   ANIONGAP <1* 1* 4   CALDERON 8.2* 8.5 8.9   GLC 94 96 104*     Most Recent 3 Hemoglobins:  Recent Labs   Lab Test 05/05/20  0535 05/04/20  0534 05/03/20  1018   HGB 13.4 14.2 15.5     Most Recent 3 Troponin's:  Recent Labs   Lab Test 05/03/20  1018 05/03/20  0358  05/03/20  0115  08/18/13  0830   TROPI 2.893* 2.396* 2.121*   < > Duplicate request   TROPONIN  --   --   --   --  0.00    < > = values in this interval not displayed.     Most Recent D-dimer:  Recent Labs   Lab Test 05/02/20  0834   DD 2.9*     Most Recent Cholesterol Panel:  Recent Labs   Lab Test 10/02/19  0931   CHOL 193   LDL 99   HDL 81   TRIG 63

## 2020-05-05 NOTE — PLAN OF CARE
Discharge Planner OT   Patient plan for discharge: none stated.   Current status: sit <> stand and ambulates to BR and back with CGA and FWW with CGA. Stood at sink briefly to wash hands with CGA and max cues for ww placement. Pt requires MIN A for LE dress and due to fatigue and weakness needs increased A. Vitals stable and appears SOB, pt teary at times, difficulty expressing self due to aphasia.   Barriers to return to prior living situation: impaired activity tolerance, strength, balance, safety, expressive aphasia, cognition  Recommendations for discharge: TCU  Rationale for recommendations: pt requires increased A with ADL's and functional mobility. Pt will require daily therapy to increase ADL and functional mobility independence and safety to PLOF. If home will require 24 hr A with ADl/IADL's and functional mobility, home RN, OT, PT and HHA to A with bathing, etc.        Entered by: Suzanne Thomson 05/05/2020 10:07 AM

## 2020-05-05 NOTE — PLAN OF CARE
VSS on 2L.  Tele: SR. Denies pain or shortness of breath.  Alert to self.  Can  Be incontinent at times.  Call light within reach.  Will continue to monitor.

## 2020-05-05 NOTE — PROGRESS NOTES
"PULMONOLOGY PROGRESS NOTE    Date of Admission: 5/2/2020    CC/Reason for Hospital visit:  COPD, pulmonary nodule  SUBJECTIVE      Events of last night reviewed. Stable night. No new respiratory problems. Patient has been oriented only to self.    ROS: A Problem Pertinent review of systems was negative except for items noted in HPI.  Past Medical, Family, and Social/Substance History has been reviewed: No interval changes.    OBJECTIVE   Vital signs:  Temp: 98.2  F (36.8  C) Temp src: Oral BP: 118/56 Pulse: 81 Heart Rate: 72 Resp: 20 SpO2: 98 % O2 Device: Nasal cannula Oxygen Delivery: 2 LPM   Weight: 45.5 kg (100 lb 3.2 oz)  Estimated body mass index is 18.32 kg/m  as calculated from the following:    Height as of 2/14/20: 1.575 m (5' 2.01\").    Weight as of this encounter: 45.5 kg (100 lb 3.2 oz).      I/O last 3 completed shifts:  In: 2886 [P.O.:710; I.V.:2176]  Out: -     CONSTITUTIONAL/GENERAL APPEARANCE: Alert female. No Apparent Distress.  PSYCHIATRIC: Pleasant and appropriate mood and affect. Oriented x 3.  EARS, NOSE,THROAT,MOUTH: External ears and nose overall normal. Normal oral mucosa.   NECK: Neck appearance normal. No neck masses and the thyroid is not enlarged.   RESPIRATORY: Non-labored effort. Decreased BS, prolonged exp phase.  CARDIOVASCULAR: S1, S2, regular rate and rhythm.    LABORATORY ASSESSMENT    Arterial Blood Gas  Recent Labs   Lab 05/04/20  0534 05/02/20  2135 05/02/20  1756 05/02/20  0834   PH  --  7.24* 7.25*  --    PCO2  --  72* 68*  --    PO2  --  250* 180*  --    HCO3  --  31* 30*  --    O2PER 2lpm  --  8L 24     CBC  Recent Labs   Lab 05/05/20  0535 05/04/20  0534 05/03/20  1018 05/02/20  0834   WBC 5.6 6.3 6.9 7.8   RBC 4.64 4.84 5.25* 5.43*   HGB 13.4 14.2 15.5 16.1*   HCT 43.4 44.9 47.9* 48.2*   MCV 94 93 91 89   MCH 28.9 29.3 29.5 29.7   MCHC 30.9* 31.6 32.4 33.4   RDW 12.9 12.9 13.0 12.8    201 197 232     BMP  Recent Labs   Lab 05/05/20  0535 05/04/20  0534 " 05/03/20  1018 05/02/20  0834    136 137 140   POTASSIUM 4.2 4.5 4.6 4.4   CHLORIDE 106 101 103 104   CALDERON 8.2* 8.5 8.9 8.3*   CO2 36* 34* 30 30   BUN 20 19 16 14   CR 0.57 0.59 0.62 0.57   GLC 94 96 104* 129*     INR  Recent Labs   Lab 05/02/20  0834   INR 0.84*      BNPNo lab results found in last 7 days.  VENOUS BLOOD GASES  Recent Labs   Lab 05/04/20  0534 05/03/20  0358 05/02/20  0834   PHV 7.28* 7.32 7.35   PCO2V 81* 64* 59*   PO2V 43 49* 38   HCO3V 38* 33* 32*   KYLER 7.2 4.2 4.6       Additional labs and/or comments:    IMAGING      CXR 5/2 -  IMPRESSION: Density at the right base is much improved, possibly  related to atelectasis. No new infiltrates.     CT Chest 5/2 -  IMPRESSION:  1. There is suboptimal opacification of the pulmonary arteries;  however, no pulmonary embolism is identified.  2. Emphysema.  3. Indeterminate 1.9 cm right lower lobe pulmonary nodule is new since  the previous exam, and is suspicious for primary or metastatic  malignancy.    PFT & OTHER TESTING       ASSESSMENT / PLAN      Pulmonary Diagnoses:  Abnl CT/CXR R91.8  COPD exacerb J44.1  Emphysema J43.9  Nicotine depend F17.210  Pulm nodule solit R91.1  Resp fail acute J96.00  SOB R06.02    Additional COVID-19 diagnoses:  Concern of possible exposure to COVID-19, Now RULED OUT Z03.818    ASSESSMENT: 69-year-old female smoker with a history of severe O2-dependent COPD, coronary artery disease admitted with shortness of breath secondary to a COPD exacerbation and NSTEMI.  COVID negative. Chest x-ray on admission showed hyperinflated lung fields with a mildly increased hazy density at the right lung base, left lung clear.  CT scan of the chest showed emphysema and a 1.9 cm right lower lobe pulmonary nodule, new versus prior exam 6/14/2017; there was no evidence for pneumonia.  Echocardiogram shows new regional wall motion abnormalities with an LVEF 35 to 40%.  Patient has been seen by cardiology and is currently being managed  medically.  Respiratory status has slowly improved with bronchodilators, antibiotics and steroids.  Right lower lobe pulmonary nodule concerning for malignancy, this will require further evaluation (likely PET scan and CT-guided needle biopsy versus bronchoscopy) at a later date once recovered from her current acute illness (and Plavix can be held). Respiratory status remains stable on low flow O2.    PLAN:  1. Adjust oxygen, keep SaO2 > 90%  2. BiPAP prn.  3. Bronchodilators - Breo 200/25, Incruse  4. Antibiotics - Zithro & Rocephin.  5. Steroids - Prednisone  6. Mucolytics - Mucinex  7. Outpatient Pulmonary follow-up for further evaluation of RLL pulmonary nodule.      Mckay Hines MD    [facility]  [phone] (pager)  [phone] (office)

## 2020-05-05 NOTE — CONSULTS
Care Transition Initial Assessment - IVONNE     Spoke with: SpouseYoseph (Oh)  Principal Problem:    NSTEMI (non-ST elevated myocardial infarction) (H)  Active Problems:    Smoker    ACS (acute coronary syndrome) (H)    HTN, goal below 130/80    Pulmonary nodule -- 1.9 cm RLL, new 5/2/20    COPD exacerbation, Chronic O2 Dependent       DATA  Lives With: spouse   Living Arrangements: house    Identified issues/concerns regarding health management: Was able to connect with spouse, Oh to discuss the discharge plan. Previously, patient was home with him. Reviewed TCU recommendation and anticipated coverage.    Discussed associated Medicare star ratings to assist with choice for referrals/discharge planning; yes  Education was given to pt/family that star ratings are updated/maintained by Medicare and can be reviewed by visiting www.medicare.gov;yes     Oh agrees to referrals at [facility] (preferred location) and [facility] [place]. [facility] referrals were sent.    Patient had a prior stay in 2018 at [facility] and Oh reports she was happy there.    We reviewed additional options, if needed. He would also agree to referrals to [facility], [facility] and [facility], if neither facility has availability.    Oh plans to provide transport at discharge.    ASSESSMENT  Cognitive Status: Oriented to self only, per RN  Concerns to be addressed: SW is following for discharge coordination   PLAN  Financial costs for the patient includes: None currently known  Patient/family is agreeable to the plan? Yes  Patient/family Goals and Preferences: TCU stay prior to return home    Addendum  Suzanne from [facility] ([phone]) states they would be able to accept patient if she agrees not to smoke and the PET scan is done AFTER her TCU stay.

## 2020-05-05 NOTE — PROGRESS NOTES
[facility]    Hospitalist Progress Note    Interval History   - Less confused today, she has clear expressive aphasia, able to follow commands, alert. Discussed with  Yoseph, she is near her baseline cognition. Yoseph also noted that he has been dealing with a lot of caregiver stress and wonders whether she would be able to benefit from rehabilitation. Yoseph also said that he would be supportive of angiogram if offered.  - O2 needs back to baseline of ~2L, desats easily  - Discussed with cardiology, patient stable from a pulm standpoint for angiogram    Assessment & Plan   Summary: Preeti Ford is a 69 year old female with PMH COPD, CAD, CVA, HTN, arthritis, PAD, tobacco use disorder who was admitted on 5/2/2020 with shortness of breath, found to have a COPD exacerbation, NSTEMI, and decreased ejection fraction.     Acute on chronic hypoxic and hypercapneic respiratory failure  Acute COPD exacerbation  Suspected bronchitis  COVID-19 negative  Patient presenting with progressive shortness of breath and wheezing. COVID 19 negative. Patient required BIPap initially, improved with antibiotics, steroids. CT chest negative for PE, with pulmonary nodule concerning for malignancy. Respiratory status near back to baseline on 5/5/2020.  - Okay to stop IMC  - Appreciate Pulm consult  - Continue azithro, ceftriaxone--probably 5 day course  - Prednisone 60mg daily, then taper  - CPAP at night, check VBG in AM  - Scheduled nebs  - Follow up blood cultures and sputum culture, NGTD     NSTEMI  Ischemic cardiomyopathy  Possible systolic congestive heart failure  CAD s/p PCI 2018 due to instent restenosis of mid-circumflex  HTN, HLD  Patient denies chest pain, but does endorse shortness of breath.  EKG with nonspecific ST-T wave changes.  Troponin 0.680-->2.893.  Suspect in part due to her COPD exacerbation.  May be some heart failure component.  proBNP 9512.  CRP 4.2. TTE 5/3/2020 with decreased EF 35-40%,  basal to mid inferior WMA, mild aortic stenosis; EF is decreased compared to prior.  - Cardiology consulted   - Continue Heparin drip   - Plavix, ASA   - Metoprolol   - Telemetry   - Possible angiography     Pulmonary nodules, concern for malignancy  Indeterminant 1.9 cm right lower lobe pulmonary nodules new since previous exam, and is suspicious for primary or metastatic malignancy per radiology report. Per Pulm, outpatient follow up.     History of CVA with expressive aphasia.     Tobacco use disorder  Patient has tried to quit smoking, however still continues to use at least 3 to 4 cigarettes a day.    - Declined nicotine patch at this time.    - Continue to encourage smoking cessation    Hx PAD with aortoiliac bypass.     Nonsevere malnutrition: Appreciate Nutrition consult     Physical deconditioning: PT/OT reconsulted today     DVT Prophylaxis: Heparin drip  Code Status: Full Code  PT/OT: Hold PT/OT  Diet: Regular Diet Adult  Snacks/Supplements Adult: Boost Plus; With Meals      Disposition: Expected discharge in ~1-2 days, pending Cardiology workup    Soren Robles MD  Text Page  (7am to 6pm)  -Data reviewed today: I reviewed all new labs and imaging results over the last 24 hours.    Physical Exam   Temp: 98.2  F (36.8  C) Temp src: Oral BP: 118/56 Pulse: 81 Heart Rate: 72 Resp: 20 SpO2: 98 % O2 Device: Nasal cannula Oxygen Delivery: 2 LPM  Vitals:    05/03/20 0033 05/04/20 0400 05/05/20 0400   Weight: 46.3 kg (102 lb 1.2 oz) 44.4 kg (97 lb 14.4 oz) 45.5 kg (100 lb 3.2 oz)     Vital Signs with Ranges  Temp:  [98.1  F (36.7  C)-98.3  F (36.8  C)] 98.2  F (36.8  C)  Pulse:  [78-89] 81  Heart Rate:  [72-89] 72  Resp:  [20-22] 20  BP: (102-147)/(52-86) 118/56  SpO2:  [93 %-100 %] 98 %  I/O last 3 completed shifts:  In: 2886 [P.O.:710; I.V.:2176]  Out: -   O2 requirements: none    Constitutional: Thin elderly female in NAD  HEENT: Eyes nonicteric, oral mucosa moist  Cardiovascular: RRR, normal S1/2, no  m/r/g  Respiratory: Diminished lung sounds bilaterally, minimal wheezing, no crackles heard  Vascular: No LE pitting edema  GI: Normoactive bowel sounds, nontender, nondistended  Skin/Integumen: No rashes  Neuro/Psych: Appropriate affect and mood. A&Ox3, moves all extremities    Medications     - MEDICATION INSTRUCTIONS -       - MEDICATION INSTRUCTIONS -       HEParin 700 Units/hr (05/05/20 0755)     - MEDICATION INSTRUCTIONS -       - MEDICATION INSTRUCTIONS -       - MEDICATION INSTRUCTIONS -       - MEDICATION INSTRUCTIONS -       ACE/ARB/ARNI NOT PRESCRIBED       sodium chloride 75 mL/hr at 05/05/20 0842       aspirin  81 mg Oral Daily     atorvastatin  20 mg Oral At Bedtime     azithromycin  250 mg Oral Daily     cefTRIAXone  2 g Intravenous Q24H     clopidogrel  75 mg Oral Daily     fluticasone-vilanterol  1 puff Inhalation Daily     guaiFENesin  600 mg Oral BID     ipratropium  0.5 mg Nebulization 4x daily     metoprolol tartrate  6.25 mg Oral BID     montelukast  10 mg Oral At Bedtime     predniSONE  60 mg Oral Daily     sodium chloride (PF)  3 mL Intracatheter Q8H     sodium chloride (PF)  3 mL Intracatheter Q8H     umeclidinium  1 puff Inhalation Daily       Data   Recent Labs   Lab 05/05/20  0535 05/04/20  0534 05/03/20  1018 05/03/20  0358 05/03/20  0115  05/02/20  0834   WBC 5.6 6.3 6.9  --   --   --  7.8   HGB 13.4 14.2 15.5  --   --   --  16.1*   MCV 94 93 91  --   --   --  89    201 197  --   --   --  232   INR  --   --   --   --   --   --  0.84*    136 137  --   --   --  140   POTASSIUM 4.2 4.5 4.6  --   --   --  4.4   CHLORIDE 106 101 103  --   --   --  104   CO2 36* 34* 30  --   --   --  30   BUN 20 19 16  --   --   --  14   CR 0.57 0.59 0.62  --   --   --  0.57   ANIONGAP <1* 1* 4  --   --   --  6   CALDERON 8.2* 8.5 8.9  --   --   --  8.3*   GLC 94 96 104*  --   --   --  129*   ALBUMIN  --   --   --   --   --   --  3.6   PROTTOTAL  --   --   --   --   --   --  7.6   BILITOTAL  --   --    --   --   --   --  0.4   ALKPHOS  --   --   --   --   --   --  78   ALT  --   --   --   --   --   --  34   AST  --   --   --   --   --   --  30   TROPI  --   --  2.893* 2.396* 2.121*   < > 0.680*    < > = values in this interval not displayed.       Imaging:   No results found for this or any previous visit (from the past 24 hour(s)).

## 2020-05-06 ENCOUNTER — APPOINTMENT (OUTPATIENT)
Dept: OCCUPATIONAL THERAPY | Facility: CLINIC | Age: 70
DRG: 280 | End: 2020-05-06
Attending: HOSPITALIST
Payer: COMMERCIAL

## 2020-05-06 LAB
BASE EXCESS BLDV CALC-SCNC: 11.3 MMOL/L
HCO3 BLDV-SCNC: 40 MMOL/L (ref 21–28)
O2/TOTAL GAS SETTING VFR VENT: ABNORMAL %
OXYHGB MFR BLDV: 91 %
PCO2 BLDV: 72 MM HG (ref 40–50)
PH BLDV: 7.35 PH (ref 7.32–7.43)
PO2 BLDV: 62 MM HG (ref 25–47)

## 2020-05-06 PROCEDURE — 21000001 ZZH R&B HEART CARE

## 2020-05-06 PROCEDURE — 97530 THERAPEUTIC ACTIVITIES: CPT | Mod: GO | Performed by: OCCUPATIONAL THERAPIST

## 2020-05-06 PROCEDURE — 25000128 H RX IP 250 OP 636: Performed by: PHYSICIAN ASSISTANT

## 2020-05-06 PROCEDURE — 82805 BLOOD GASES W/O2 SATURATION: CPT | Performed by: INTERNAL MEDICINE

## 2020-05-06 PROCEDURE — 25000132 ZZH RX MED GY IP 250 OP 250 PS 637: Performed by: INTERNAL MEDICINE

## 2020-05-06 PROCEDURE — 25000132 ZZH RX MED GY IP 250 OP 250 PS 637: Performed by: PHYSICIAN ASSISTANT

## 2020-05-06 PROCEDURE — 99232 SBSQ HOSP IP/OBS MODERATE 35: CPT | Performed by: INTERNAL MEDICINE

## 2020-05-06 PROCEDURE — 94640 AIRWAY INHALATION TREATMENT: CPT | Mod: 76

## 2020-05-06 PROCEDURE — 36415 COLL VENOUS BLD VENIPUNCTURE: CPT | Performed by: INTERNAL MEDICINE

## 2020-05-06 PROCEDURE — 40000275 ZZH STATISTIC RCP TIME EA 10 MIN

## 2020-05-06 PROCEDURE — 25000125 ZZHC RX 250: Performed by: HOSPITALIST

## 2020-05-06 PROCEDURE — 94640 AIRWAY INHALATION TREATMENT: CPT

## 2020-05-06 PROCEDURE — 97535 SELF CARE MNGMENT TRAINING: CPT | Mod: GO | Performed by: OCCUPATIONAL THERAPIST

## 2020-05-06 PROCEDURE — 25000131 ZZH RX MED GY IP 250 OP 636 PS 637: Performed by: INTERNAL MEDICINE

## 2020-05-06 RX ORDER — PREDNISONE 20 MG/1
TABLET ORAL
DISCHARGE
Start: 2020-05-07 | End: 2020-05-22

## 2020-05-06 RX ORDER — PREDNISONE 20 MG/1
40 TABLET ORAL DAILY
Status: DISCONTINUED | OUTPATIENT
Start: 2020-05-07 | End: 2020-05-07 | Stop reason: HOSPADM

## 2020-05-06 RX ADMIN — GUAIFENESIN 600 MG: 600 TABLET, EXTENDED RELEASE ORAL at 20:06

## 2020-05-06 RX ADMIN — PREDNISONE 60 MG: 20 TABLET ORAL at 08:44

## 2020-05-06 RX ADMIN — IPRATROPIUM BROMIDE 0.5 MG: 0.5 SOLUTION RESPIRATORY (INHALATION) at 07:19

## 2020-05-06 RX ADMIN — GUAIFENESIN 600 MG: 600 TABLET, EXTENDED RELEASE ORAL at 08:43

## 2020-05-06 RX ADMIN — ALBUTEROL SULFATE 2 PUFF: 90 AEROSOL, METERED RESPIRATORY (INHALATION) at 12:08

## 2020-05-06 RX ADMIN — METOPROLOL TARTRATE 6.25 MG: 25 TABLET, FILM COATED ORAL at 08:43

## 2020-05-06 RX ADMIN — IPRATROPIUM BROMIDE 0.5 MG: 0.5 SOLUTION RESPIRATORY (INHALATION) at 16:13

## 2020-05-06 RX ADMIN — FLUTICASONE FUROATE AND VILANTEROL TRIFENATATE 1 PUFF: 200; 25 POWDER RESPIRATORY (INHALATION) at 09:01

## 2020-05-06 RX ADMIN — CEFTRIAXONE 2 G: 2 INJECTION, POWDER, FOR SOLUTION INTRAMUSCULAR; INTRAVENOUS at 09:53

## 2020-05-06 RX ADMIN — ATORVASTATIN CALCIUM 20 MG: 20 TABLET, FILM COATED ORAL at 21:50

## 2020-05-06 RX ADMIN — AZITHROMYCIN MONOHYDRATE 250 MG: 250 TABLET ORAL at 15:56

## 2020-05-06 RX ADMIN — IPRATROPIUM BROMIDE 0.5 MG: 0.5 SOLUTION RESPIRATORY (INHALATION) at 10:54

## 2020-05-06 RX ADMIN — LEVALBUTEROL HYDROCHLORIDE 1.25 MG: 1.25 SOLUTION, CONCENTRATE RESPIRATORY (INHALATION) at 07:19

## 2020-05-06 RX ADMIN — UMECLIDINIUM 1 PUFF: 62.5 AEROSOL, POWDER ORAL at 08:59

## 2020-05-06 RX ADMIN — MONTELUKAST 10 MG: 10 TABLET, FILM COATED ORAL at 21:50

## 2020-05-06 RX ADMIN — LEVALBUTEROL HYDROCHLORIDE 1.25 MG: 1.25 SOLUTION, CONCENTRATE RESPIRATORY (INHALATION) at 13:08

## 2020-05-06 RX ADMIN — METOPROLOL TARTRATE 6.25 MG: 25 TABLET, FILM COATED ORAL at 20:06

## 2020-05-06 RX ADMIN — IPRATROPIUM BROMIDE 0.5 MG: 0.5 SOLUTION RESPIRATORY (INHALATION) at 19:23

## 2020-05-06 RX ADMIN — CLOPIDOGREL BISULFATE 75 MG: 75 TABLET, FILM COATED ORAL at 08:43

## 2020-05-06 RX ADMIN — ASPIRIN 81 MG: 81 TABLET, DELAYED RELEASE ORAL at 08:43

## 2020-05-06 NOTE — PROGRESS NOTES
Neuro: A&O to self. BL confusion,  intermittent inappropriate statements/responses.  Pleasant, alert.  Cardiac:  SR on tele. VSS. Afebrile.  Respiratory: LS diminished, wheezy at times. Scheduled, prn nebs, inhalers. Maintaining 02 sats on 1-2LNC (Home baseline) today.  VERDE.  GI/: Adequate urine output. No BM.  Incontinence at times.   Diet/appetite: Tolerating regular diet.  Ate a fair amount today at all three meals.  Enjoys the supplemental shakes with meal trays.  Activity:  Assist x1 with GB, walker when OOB.  Pain: Denies  Skin: No new deficits noted.  LDA's: PIVx1    Plan: Continue with POC. Notify primary team with changes.  Likely discharge to TCU on 5/7.

## 2020-05-06 NOTE — PLAN OF CARE
Discharge Planner OT   Patient plan for discharge: home  Current status: supine to sit with SBA, sit <> stand and ambulate to bathroom and back with FWW with CGA and max cues for hand placement safety with FWW. Pt able to manage clothing and doff/cathy pull up pad with SBA/CGA. Pt stood at sink with cues for FWW placement with CGA. Pt's BP blunted, no c.o dizziness and O2 sats WFL with 2 L O2.   Barriers to return to prior living situation: impaired balance, safety, baseline aphasia, impaired strength and activity tolerance and would be home alone during the day as appears that pt's  works.   Recommendations for discharge: TCU  Rationale for recommendations: pt making good progress today and appeared to have improved activity tolerance. Pt continues to require increased A with ADLs and functional mobility and safety cues. Pt will require daily therapy to increase ADL and functional mobility independence and safety to PLOF. If pt returned home would require 24 hr care and A with all functional mobility and ADL's and IADL's, ie dressing, toileting, tub/shower transfer, med mgmt, etc and home RN, OT, PT and HHA.       Entered by: Suzanne Thomson 05/06/2020 3:28 PM

## 2020-05-06 NOTE — PROGRESS NOTES
"PULMONOLOGY PROGRESS NOTE    Date of Admission: 5/2/2020    CC/Reason for Hospital visit:  COPD, pulmonary nodule  SUBJECTIVE      Events reviewed since last seen. Patient refused CPAP overnight. No new respiratory problems. States breathing is slightly better today.    ROS: A Problem Pertinent review of systems was negative except for items noted in HPI.  Past Medical, Family, and Social/Substance History has been reviewed: No interval changes.    OBJECTIVE   Vital signs:  Temp: 98.3  F (36.8  C) Temp src: Oral BP: (!) 159/81   Heart Rate: 77 Resp: 20 SpO2: 95 % O2 Device: Nasal cannula Oxygen Delivery: 2 LPM   Weight: 46.2 kg (101 lb 13.6 oz)  Estimated body mass index is 18.62 kg/m  as calculated from the following:    Height as of 2/14/20: 1.575 m (5' 2.01\").    Weight as of this encounter: 46.2 kg (101 lb 13.6 oz).      I/O last 3 completed shifts:  In: 360 [P.O.:360]  Out: 350 [Urine:350]    CONSTITUTIONAL/GENERAL APPEARANCE: Alert female. No Apparent Distress.  PSYCHIATRIC: Pleasant and appropriate mood and affect. Oriented x 3.  EARS, NOSE,THROAT,MOUTH: External ears and nose overall normal. Normal oral mucosa.   NECK: Neck appearance normal. No neck masses and the thyroid is not enlarged.   RESPIRATORY: Non-labored effort. Decreased BS, prolonged exp phase, no change.  CARDIOVASCULAR: S1, S2, regular rate and rhythm.    LABORATORY ASSESSMENT    Arterial Blood Gas  Recent Labs   Lab 05/06/20  0532 05/04/20  0534 05/02/20  2135 05/02/20  1756 05/02/20  0834   PH  --   --  7.24* 7.25*  --    PCO2  --   --  72* 68*  --    PO2  --   --  250* 180*  --    HCO3  --   --  31* 30*  --    O2PER 2L PER RN 2lpm  --  8L 24     CBC  Recent Labs   Lab 05/05/20  0535 05/04/20  0534 05/03/20  1018 05/02/20  0834   WBC 5.6 6.3 6.9 7.8   RBC 4.64 4.84 5.25* 5.43*   HGB 13.4 14.2 15.5 16.1*   HCT 43.4 44.9 47.9* 48.2*   MCV 94 93 91 89   MCH 28.9 29.3 29.5 29.7   MCHC 30.9* 31.6 32.4 33.4   RDW 12.9 12.9 13.0 12.8    201 " 197 232     BMP  Recent Labs   Lab 05/05/20  0535 05/04/20  0534 05/03/20  1018 05/02/20  0834    136 137 140   POTASSIUM 4.2 4.5 4.6 4.4   CHLORIDE 106 101 103 104   CALDERON 8.2* 8.5 8.9 8.3*   CO2 36* 34* 30 30   BUN 20 19 16 14   CR 0.57 0.59 0.62 0.57   GLC 94 96 104* 129*     INR  Recent Labs   Lab 05/02/20  0834   INR 0.84*      BNPNo lab results found in last 7 days.  VENOUS BLOOD GASES  Recent Labs   Lab 05/06/20  0532 05/04/20  0534 05/03/20  0358 05/02/20  0834   PHV 7.35 7.28* 7.32 7.35   PCO2V 72* 81* 64* 59*   PO2V 62* 43 49* 38   HCO3V 40* 38* 33* 32*   KYLER 11.3 7.2 4.2 4.6       Additional labs and/or comments:    IMAGING      CXR 5/2 -  IMPRESSION: Density at the right base is much improved, possibly  related to atelectasis. No new infiltrates.     CT Chest 5/2 -  IMPRESSION:  1. There is suboptimal opacification of the pulmonary arteries;  however, no pulmonary embolism is identified.  2. Emphysema.  3. Indeterminate 1.9 cm right lower lobe pulmonary nodule is new since  the previous exam, and is suspicious for primary or metastatic  malignancy.    PFT & OTHER TESTING       ASSESSMENT / PLAN      Pulmonary Diagnoses:  Abnl CT/CXR R91.8  COPD exacerb J44.1  Emphysema J43.9  Nicotine depend F17.210  Pulm nodule solit R91.1  Resp fail acute J96.00  SOB R06.02    Additional COVID-19 diagnoses:  Concern of possible exposure to COVID-19, Now RULED OUT Z03.818    ASSESSMENT: 69-year-old female smoker with a history of severe O2-dependent COPD, coronary artery disease admitted with shortness of breath secondary to a COPD exacerbation and NSTEMI.  COVID negative. Chest x-ray on admission showed hyperinflated lung fields with a mildly increased hazy density at the right lung base, left lung clear.  CT scan of the chest showed emphysema and a 1.9 cm right lower lobe pulmonary nodule, new versus prior exam 6/14/2017; there was no evidence for pneumonia.  Echocardiogram shows new regional wall motion  abnormalities with an LVEF 35 to 40%.  Patient has been seen by cardiology and is currently being managed medically.  Respiratory status has slowly improved with bronchodilators, antibiotics and steroids.  Right lower lobe pulmonary nodule concerning for malignancy, this will require further evaluation (likely PET scan and CT-guided needle biopsy versus bronchoscopy) at a later date once recovered from her current acute illness (and Plavix can be held). Respiratory status remains stable on low flow O2.    PLAN:  1. Adjust oxygen, keep SaO2 > 90%  2. Bronchodilators - Breo 200/25, Incruse  3. Antibiotics - Zithro & Rocephin.  4. Steroids - Prednisone taper.  5. Mucolytics - Mucinex  6. Recommend outpatient Pulmonary follow-up for further evaluation of RLL pulmonary nodule [phone].    Dr. Lopez will be following the patient starting tomorrow.      Mckay Hines MD    [facility] / [facility]  [phone] (pager)  [phone] (office)

## 2020-05-06 NOTE — PLAN OF CARE
Full code. Refused CPAP overnight. Urinary incontinence. Aphasia. A&O x1. Cooperative, frustrated at HS over taking pills, calm throughout remainder of night. 2L overnight, home O2 1-2L. Plan discharge to TCU, pending. Continue to monitor.

## 2020-05-06 NOTE — PROGRESS NOTES
Patient is on a 2L NC with SpO2 in the mid 90's. BS diminished. Pt is receiving scheduled nebs.   Will cont to follow.  5/6/2020  Gali Leger, RT

## 2020-05-06 NOTE — PROGRESS NOTES
SW:  Discharge Planner   Discharge Plans in progress: TCU at discharge.  Barriers to discharge plan: medical readiness, bed availability  Follow up plan:   -SW Left VM for [facility], per patient preference for TCU, inquiring about bed availability and nursing assessment. Return call pending.    1025:   -SW received TC from patient's spouse Oh ([phone]) who stated, 'We have been down this road before where the TCU's wouldn't take Preeti, so if that is the case, I will just plan to bring her home.' SW asked spouse to allow me a chance to follow up with the TCU referrals and attend rounds at 1100 prior to making final decision to bring her home.    1035: SW left VM for Drew in adm's at [facility], requesting return call re: referral. Return call pending.    1041: SW spoke to Suzanne at [facility]  Who indicated they can accept pt under the following criteria:  1-Patient agrees to wearing nicotine patch vs being smoke free during TCU stay.  2-Agreeable to private room fee of $16/day  3-Patient/MD agrees to delaying PET scan until after TCU stay    1050: Sent MD davila re: above note re: TCU.    1130: SW received VM from Drew at [facility] [phone], , asking if patient is still on Bi-pap. SW attempted to return call to inform that patient has not been on bi-pap since 5/4. VM full, unable to leave msg.    1145: MD informed this writer that patient will not be having an Angio, therefore ready for discharge today.    1150: IVONNE spoke to Drew at [facility] who indicated they are able to admit patient today although they are required to get insurance auth and unlikely they will receive it in time to coordinate admission today and if so, patient will need to arrive at 1500. Patient's  had planned on transporting but is working this afternoon and unable to get patient there by 1500 even if auth received. Spouse able to coordinate transport for tomorrow morning. SW updated MD and will continue to follow and  coordinate discharge for tomorrow morning.    1515: SW received call from patient's spouse who inquired about the safety of sending pt to TCU in light of COVID pandemic. SW agreed to ask this direct question re: COVID status at [facility]. SW attempted to call [place] adm's although no answer and VM box full. SW to attempt later.      SW to continue to follow and assist with discharge planning.    PHOEBE Young  Daytime (8:00am-4:30pm): [phone]  After-Hours  Pager (4:30pm-11:30pm): [phone]              Entered by: Genesis Gambino 05/06/2020 10:34 AM

## 2020-05-06 NOTE — PROGRESS NOTES
[facility]    Hospitalist Progress Note    Interval History   - More alert and conversant today, less expressive aphasia noted. Patient does not want to go to TCU, however discussed her deconditioning. Yoseph supports TCU discharge.  - Medically stable to discharge today, needs insurance auth, plan discharge tomorrow morning    Assessment & Plan   Summary: Preeti Ford is a 69 year old female with PMH COPD, CAD, CVA, HTN, arthritis, PAD, tobacco use disorder who was admitted on 5/2/2020 with shortness of breath, found to have a COPD exacerbation, NSTEMI, and decreased ejection fraction.     Acute on chronic hypoxic and hypercapneic respiratory failure  Acute COPD exacerbation  Suspected bronchitis  COVID-19 negative  Patient presenting with progressive shortness of breath and wheezing. COVID 19 negative. Patient required BIPap initially, improved with antibiotics, steroids. CT chest negative for PE, with pulmonary nodule concerning for malignancy. Respiratory status near back to baseline on 5/5/2020.  - Appreciate Pulm consult  - Continue azithro, ceftriaxone--end date 5/6/2020  - Prednisone 60mg daily, taper to 40mg daily tomorrow  - Recommend CPAP at night due to hypercarbia, patient refusing  - Continue scheduled nebs  - Follow up blood cultures and sputum culture, NGTD     NSTEMI  Ischemic cardiomyopathy  Possible systolic congestive heart failure  CAD s/p PCI 2018 due to instent restenosis of mid-circumflex  HTN, HLD  Patient denies chest pain, but does endorse shortness of breath.  EKG with nonspecific ST-T wave changes.  Troponin 0.680-->2.893.  Suspect in part due to her COPD exacerbation.  May be some heart failure component.  proBNP 9512.  CRP 4.2. TTE 5/3/2020 with decreased EF 35-40%, basal to mid inferior WMA, mild aortic stenosis; EF is decreased compared to prior. Cardiology discussed with family 5/5, plan medical management no angiogram.  - Cardiology consulted   - Plavix,  ASA   - Metoprolol   - Telemetry     Pulmonary nodules, concern for malignancy  Indeterminant 1.9 cm right lower lobe pulmonary nodules new since previous exam, and is suspicious for primary or metastatic malignancy per radiology report. Per Pulm, outpatient follow up.     History of CVA with expressive aphasia.     Tobacco use disorder  Patient has tried to quit smoking, however still continues to use at least 3 to 4 cigarettes a day.    - Declined nicotine patch at this time  - Continue to encourage smoking cessation    Hx PAD with aortoiliac bypass.     Nonsevere malnutrition: Appreciate Nutrition consult     Physical deconditioning: PT/OT reconsulted today     DVT Prophylaxis: Heparin drip  Code Status: Full Code  PT/OT: Hold PT/OT  Diet: Regular Diet Adult  Snacks/Supplements Adult: Boost Plus; With Meals      Disposition: Expected discharge tomorrow to [facility]    Soren Robles MD  Text Page  (7am to 6pm)  -Data reviewed today: I reviewed all new labs and imaging results over the last 24 hours.    Physical Exam   Temp: 97.7  F (36.5  C) Temp src: Oral BP: 127/70 Pulse: 55 Heart Rate: 81 Resp: 18 SpO2: 94 % O2 Device: Nasal cannula Oxygen Delivery: 2 LPM  Vitals:    05/04/20 0400 05/05/20 0400 05/06/20 0629   Weight: 44.4 kg (97 lb 14.4 oz) 45.5 kg (100 lb 3.2 oz) 46.2 kg (101 lb 13.6 oz)     Vital Signs with Ranges  Temp:  [97.7  F (36.5  C)-98.8  F (37.1  C)] 97.7  F (36.5  C)  Pulse:  [55] 55  Heart Rate:  [74-84] 81  Resp:  [18-20] 18  BP: (115-159)/(61-84) 127/70  SpO2:  [92 %-100 %] 94 %  I/O last 3 completed shifts:  In: 360 [P.O.:360]  Out: 350 [Urine:350]  O2 requirements: none    Constitutional: Thin elderly female in NAD  HEENT: Eyes nonicteric, oral mucosa moist  Cardiovascular: RRR, normal S1/2, no m/r/g  Respiratory: Diminished lung sounds bilaterally, mild wheezing, no crackles heard  Vascular: No LE pitting edema  GI: Normoactive bowel sounds, nontender, nondistended  Skin/Integumen: No  dottie  Neuro/Psych: Appropriate affect and mood. A&Ox3, moves all extremities    Medications     - MEDICATION INSTRUCTIONS -       - MEDICATION INSTRUCTIONS -       - MEDICATION INSTRUCTIONS -       - MEDICATION INSTRUCTIONS -       - MEDICATION INSTRUCTIONS -       ACE/ARB/ARNI NOT PRESCRIBED         aspirin  81 mg Oral Daily     atorvastatin  20 mg Oral At Bedtime     azithromycin  250 mg Oral Daily     cefTRIAXone  2 g Intravenous Q24H     clopidogrel  75 mg Oral Daily     fluticasone-vilanterol  1 puff Inhalation Daily     guaiFENesin  600 mg Oral BID     ipratropium  0.5 mg Nebulization 4x daily     metoprolol tartrate  6.25 mg Oral BID     montelukast  10 mg Oral At Bedtime     predniSONE  60 mg Oral Daily     sodium chloride (PF)  3 mL Intracatheter Q8H     sodium chloride (PF)  3 mL Intracatheter Q8H     umeclidinium  1 puff Inhalation Daily       Data   Recent Labs   Lab 05/05/20  1737 05/05/20  0535 05/04/20  0534 05/03/20  1018 05/03/20  0358  05/02/20  0834   WBC  --  5.6 6.3 6.9  --   --  7.8   HGB  --  13.4 14.2 15.5  --   --  16.1*   MCV  --  94 93 91  --   --  89   PLT  --  202 201 197  --   --  232   INR  --   --   --   --   --   --  0.84*   NA  --  142 136 137  --   --  140   POTASSIUM  --  4.2 4.5 4.6  --   --  4.4   CHLORIDE  --  106 101 103  --   --  104   CO2  --  36* 34* 30  --   --  30   BUN  --  20 19 16  --   --  14   CR  --  0.57 0.59 0.62  --   --  0.57   ANIONGAP  --  <1* 1* 4  --   --  6   CALDERON  --  8.2* 8.5 8.9  --   --  8.3*   GLC  --  94 96 104*  --   --  129*   ALBUMIN  --   --   --   --   --   --  3.6   PROTTOTAL  --   --   --   --   --   --  7.6   BILITOTAL  --   --   --   --   --   --  0.4   ALKPHOS  --   --   --   --   --   --  78   ALT  --   --   --   --   --   --  34   AST  --   --   --   --   --   --  30   TROPI 0.849*  --   --  2.893* 2.396*   < > 0.680*    < > = values in this interval not displayed.       Imaging:   No results found for this or any previous visit (from the  past 24 hour(s)).

## 2020-05-07 ENCOUNTER — APPOINTMENT (OUTPATIENT)
Dept: OCCUPATIONAL THERAPY | Facility: CLINIC | Age: 70
DRG: 280 | End: 2020-05-07
Attending: HOSPITALIST
Payer: COMMERCIAL

## 2020-05-07 VITALS
BODY MASS INDEX: 19 KG/M2 | TEMPERATURE: 98.4 F | RESPIRATION RATE: 20 BRPM | HEART RATE: 72 BPM | WEIGHT: 103.9 LBS | DIASTOLIC BLOOD PRESSURE: 62 MMHG | OXYGEN SATURATION: 96 % | SYSTOLIC BLOOD PRESSURE: 133 MMHG

## 2020-05-07 PROCEDURE — 25000132 ZZH RX MED GY IP 250 OP 250 PS 637: Performed by: PHYSICIAN ASSISTANT

## 2020-05-07 PROCEDURE — 94640 AIRWAY INHALATION TREATMENT: CPT

## 2020-05-07 PROCEDURE — 94640 AIRWAY INHALATION TREATMENT: CPT | Mod: 76

## 2020-05-07 PROCEDURE — 97535 SELF CARE MNGMENT TRAINING: CPT | Mod: GO

## 2020-05-07 PROCEDURE — 25000125 ZZHC RX 250: Performed by: HOSPITALIST

## 2020-05-07 PROCEDURE — 25000131 ZZH RX MED GY IP 250 OP 636 PS 637: Performed by: INTERNAL MEDICINE

## 2020-05-07 PROCEDURE — 40000275 ZZH STATISTIC RCP TIME EA 10 MIN

## 2020-05-07 PROCEDURE — 99238 HOSP IP/OBS DSCHRG MGMT 30/<: CPT | Performed by: INTERNAL MEDICINE

## 2020-05-07 RX ADMIN — IPRATROPIUM BROMIDE 0.5 MG: 0.5 SOLUTION RESPIRATORY (INHALATION) at 14:23

## 2020-05-07 RX ADMIN — ASPIRIN 81 MG: 81 TABLET, DELAYED RELEASE ORAL at 09:23

## 2020-05-07 RX ADMIN — FLUTICASONE FUROATE AND VILANTEROL TRIFENATATE 1 PUFF: 200; 25 POWDER RESPIRATORY (INHALATION) at 09:22

## 2020-05-07 RX ADMIN — UMECLIDINIUM 1 PUFF: 62.5 AEROSOL, POWDER ORAL at 09:22

## 2020-05-07 RX ADMIN — IPRATROPIUM BROMIDE 0.5 MG: 0.5 SOLUTION RESPIRATORY (INHALATION) at 07:53

## 2020-05-07 RX ADMIN — GUAIFENESIN 600 MG: 600 TABLET, EXTENDED RELEASE ORAL at 09:23

## 2020-05-07 RX ADMIN — METOPROLOL TARTRATE 6.25 MG: 25 TABLET, FILM COATED ORAL at 09:23

## 2020-05-07 RX ADMIN — IPRATROPIUM BROMIDE 0.5 MG: 0.5 SOLUTION RESPIRATORY (INHALATION) at 11:45

## 2020-05-07 RX ADMIN — CLOPIDOGREL BISULFATE 75 MG: 75 TABLET, FILM COATED ORAL at 09:23

## 2020-05-07 RX ADMIN — PREDNISONE 40 MG: 20 TABLET ORAL at 09:23

## 2020-05-07 NOTE — PROGRESS NOTES
D: SW following for discharge planning.   I: Pt will discharge today to [facility] via spouse at 1500. Orders faxed and facility updated. Spouse in agreement. Nursing aware. Facility received prior-auth from [employer].  P: Discharge.     LUIS Eubanks, LGSW  [phone]      PAS-RR    D: Per DHS regulation, SW completed and submitted PAS-RR to [place] [employer] via the [employer] Line.  PAS-RR confirmation # is : [id number]    I: SW spoke with spouse and they are aware a PAS-RR has been submitted.  SW reviewed with spouse that they may be contacted for a follow up appointment within 10 days of hospital discharge if their SNF stay is < 30 days.  Contact information for [employer] was also provided.    A: Spouse verbalized understanding.    P: Further questions may be directed to [employer] at #[phone], option #4 for PAS-RR staff.

## 2020-05-07 NOTE — PROGRESS NOTES
Pt. Discharged per order to [facility]. Transportation per . Packet sent with pt.

## 2020-05-07 NOTE — DISCHARGE INSTRUCTIONS
A Cardiology follow-up appointment was recommended for you    DUE TO COVID-19 PANDEMIC, MANY CLINICS ARE TEMPORARILY NOT TAKING IN-PERSON APPOINTMENTS. ?  Your clinic was contacted by a hospital care coordinator, and a request was made for a visit. They will determine which type of visit is most appropriate for you. --They will call you so please answer your phone.  If you develop any symptoms or have any followup questions please call [facility] at [phone] option 2.      It is very important to check in with your provider--during the phone visit, talk about your hospital stay. Tell the clinic provider how you feel. Talk about the medications listed on the discharge papers. Your doctor will update records, make sure you are still doing OK, and decide if any tests or medication changes are needed.     Please have your current measured weight and medication list available for review. If possible, please check and record your blood pressures prior to the phone visit for the provider's review.  _____________________________________________________________

## 2020-05-07 NOTE — DISCHARGE SUMMARY
[facility]    Hospitalist Discharge Summary       Date of Admission:  5/2/2020  Date of Discharge:  5/7/2020  Discharging Provider: Soren Robles MD      Discharge Diagnoses   Acute on chronic hypoxic and hypercarbic respiratory failure  Acute COPD exacerbation  Suspected bronchitis vs CAP  COVID-19 negative  Nonsevere malnutrition  NSTEMI  Ischemic cardiomyopathy  Possible acute systolic congestive heart failure exacerbation  Pulmonary nodule concerning for malignancy  Tobacco use disorder    Follow-ups Needed After Discharge   Follow-up Appointments     Follow Up and recommended labs and tests      Follow up with intermediate physician.  The following labs/tests are   recommended: BMP and CBC in one week.  - Follow up with cardiology clinic in about 3-4 weeks  - Follow up with a sleep center for a sleep study  - Follow up with pulmonology in ~3 weeks for further evaluation of the   nodule found in your lungs.           Unresulted Labs Ordered in the Past 30 Days of this Admission     Date and Time Order Name Status Description    5/2/2020 1639 Blood culture Preliminary     5/2/2020 1639 Blood culture Preliminary       These results will be followed up by Nursing home physician    Hospital Course   Preeti Ford is a 69 year old female with PMH COPD, CAD, CVA, HTN, arthritis, PAD, tobacco use disorder who was admitted on 5/2/2020 with shortness of breath, found to have a COPD exacerbation, NSTEMI, and decreased ejection fraction. COVID 19 negative. Patient required BIPap initially, improved with antibiotics, steroids. CT chest negative for PE, with pulmonary nodule concerning for malignancy. Pulmonary medicine was consulted and patient will follow up with them after discharge. Respiratory status near back to baseline on 5/5/2020. Patient completed a five day course of Ceftriaxone + azithromycin on 5/6/2020, and is completing an approximately two week steroid taper.  - Due to the  patient's hypercarbia, she is recommended for an outpatient sleep study     NSTEMI  Ischemic cardiomyopathy  Possible systolic congestive heart failure  CAD s/p PCI 2018 due to instent restenosis of mid-circumflex  HTN, HLD  Patient denies chest pain, but does endorse shortness of breath.  EKG with nonspecific ST-T wave changes.  Troponin 0.680-->2.893.  Suspect in part due to her COPD exacerbation.  May be some heart failure component.  proBNP 9512.  CRP 4.2. TTE 5/3/2020 with decreased EF 35-40%, basal to mid inferior WMA, mild aortic stenosis; EF is decreased compared to prior. Cardiology discussed with family 5/5, plan medical management no angiogram.  - Outpatient cardiology appointment     Pulmonary nodules, concern for malignancy  Indeterminant 1.9 cm right lower lobe pulmonary nodules new since previous exam, and is suspicious for primary or metastatic malignancy per radiology report.   - Per Pulm, outpatient follow up.      Tobacco use disorder  Patient has tried to quit smoking, however still continues to use at least 3 to 4 cigarettes a day. Encouraged smoking cessation this admission  - Continue nicotine patch at discharge  - Continue to encourage smoking cessation    Consultations This Hospital Stay   CARDIAC REHAB IP CONSULT  CARDIOLOGY IP CONSULT  PHARMACY TO DOSE HEPARIN  ADVANCE DIRECTIVE IP CONSULT  SOCIAL WORK IP CONSULT  SPIRITUAL HEALTH SERVICES IP CONSULT  PULMONARY IP CONSULT  PULMONARY IP CONSULT  NUTRITION SERVICES ADULT IP CONSULT  PHYSICAL THERAPY ADULT IP CONSULT  OCCUPATIONAL THERAPY ADULT IP CONSULT  PHYSICAL THERAPY ADULT IP CONSULT  OCCUPATIONAL THERAPY ADULT IP CONSULT  SMOKING CESSATION PROGRAM IP CONSULT    Code Status   Full Code    Time Spent on this Encounter   Soren MAY, personally saw the patient today and spent approximately 25 minutes discharging this patient.       Soren Robles MD  [facility]  [facility]  ______________________________________________________________________    Physical Exam   Vital Signs: Temp: 98  F (36.7  C) Temp src: Oral BP: 122/61 Pulse: 70 Heart Rate: 92 Resp: 20 SpO2: (!) 86 % O2 Device: Nasal cannula Oxygen Delivery: 1.5 LPM  Weight: 103 lbs 14.4 oz    Constitutional: Thin elderly female in NAD  HEENT: Eyes nonicteric, oral mucosa moist  Cardiovascular: RRR, normal S1/2, no m/r/g  Respiratory: Diminished lung sounds bilaterally, mild wheezing, no crackles heard  Vascular: No LE pitting edema  GI: Normoactive bowel sounds, nontender, nondistended  Skin/Integumen: No rashes  Neuro/Psych: Appropriate affect and mood. A&Ox3, moves all extremities       Primary Care Physician   Deisy Carter    Discharge Disposition   Discharged to short-term care facility  Condition at discharge: Stable    Significant Results and Procedures   Most Recent 3 CBC's:  Recent Labs   Lab Test 05/05/20  0535 05/04/20  0534 05/03/20  1018   WBC 5.6 6.3 6.9   HGB 13.4 14.2 15.5   MCV 94 93 91    201 197     Most Recent 3 BMP's:  Recent Labs   Lab Test 05/05/20  0535 05/04/20  0534 05/03/20  1018    136 137   POTASSIUM 4.2 4.5 4.6   CHLORIDE 106 101 103   CO2 36* 34* 30   BUN 20 19 16   CR 0.57 0.59 0.62   ANIONGAP <1* 1* 4   CALDERON 8.2* 8.5 8.9   GLC 94 96 104*     Most Recent 2 LFT's:  Recent Labs   Lab Test 05/02/20  0834 12/11/19  1003   AST 30 30   ALT 34 40   ALKPHOS 78 76   BILITOTAL 0.4 0.7     Most Recent 3 Troponin's:  Recent Labs   Lab Test 05/05/20  1737 05/03/20  1018 05/03/20  0358  08/18/13  0830   TROPI 0.849* 2.893* 2.396*   < > Duplicate request   TROPONIN  --   --   --   --  0.00    < > = values in this interval not displayed.     Most Recent 3 BNP's:  Recent Labs   Lab Test 05/02/20  1750 05/17/18  0436 06/14/17  1408   NTBNPI 9,512* 10,476* 3,930*   ,   Results for orders placed or performed during the hospital encounter of 05/02/20   XR Chest Port 1 View    Narrative     CHEST ONE VIEW  2020 7:56 PM     HISTORY: Hypoxia.    COMPARISON: 2020 at 0858 hours.      Impression    IMPRESSION: Density at the right base is much improved, possibly  related to atelectasis. No new infiltrates.     ALFRED DUNCAN MD   Echocardiogram Complete    Narrative    [id number]  [id number]  [id number]  [id number]^^BRETT^ELVER           [facility]  Echocardiography Laboratory  [address] [place] [address]        Name: TARIK HERNANDEZ  MRN: [id number]  : 1950  Study Date: 2020 07:35 AM  Age: 69 yrs  Gender: Female  Patient Location: [facility]  Reason For Study: MI  Ordering Physician: BRETT DURANT  Referring Physician: NAYE WILLIS  Performed By: Kanu Oliver RDCS     BSA: 1.4 m2  Height: 62 in  Weight: 104 lb  HR: 85  BP: 137/67 mmHg  _____________________________________________________________________________  __        Procedure  Complete Portable Echo Adult. Optison (NDC #8630-2999) given intravenously.  _____________________________________________________________________________  __        Interpretation Summary     1. Moderately decreased left ventricular systolic function. visual ejection  fraction is estimated at 35-40%.  2. Basal to mid inferior and inferolateral wall hypokinesis is present. There  is basal to mid anteroseptal/inferoseptal wall hypokinesis.  3. Mildly decreased right ventricular systolic function. RV apex appears  severely hypokinetic.  4. There is mild (1+) mitral regurgitation.  5. Mild aortic stenosis; mean gradient 15 mmHg, peak velocity 2.5 m/sec,  calculated valve area 1.1 cm2.  6. In comparison with prior study, LV function has declined and regional wall  motion abnormalities are new.  _____________________________________________________________________________  __        Left Ventricle  The left ventricle is normal in size. There is normal left ventricular wall  thickness. Moderately decreased left ventricular  systolic function. The visual  ejection fraction is estimated at 35-40%. Grade I or early diastolic  dysfunction. Basal to mid inferior and inferolateral wall hypokinesis is  present. There is basal to mid anteroseptal/inferoseptal wall hypokinesis.     Right Ventricle  The right ventricle is normal size. Mildly decreased right ventricular  systolic function. RV apex appears severely hypokinetic.     Atria  Normal left atrial size. Right atrial size is normal. There is no color  Doppler evidence of an atrial shunt.     Mitral Valve  The mitral valve leaflets appear thickened, but open well. There is mild (1+)  mitral regurgitation.        Tricuspid Valve  The tricuspid valve is normal in structure and function. There is trace  tricuspid regurgitation. The right ventricular systolic pressure is  approximated at 38mmHg plus the right atrial pressure.     Aortic Valve  There is trace aortic regurgitation. Mild valvular aortic stenosis. Mild  aortic stenosis; mean gradient 15 mmHg, peak velocity 2.5 m/sec, calculated  valve area 1.1 cm2.     Pulmonic Valve  The pulmonic valve is normal in structure and function. There is trace  pulmonic valvular regurgitation.     Vessels  Normal size aorta. IVC diameter and respiratory changes fall into an  intermediate range suggesting an RA pressure of 8 mmHg.     Pericardium  There is no pericardial effusion.        Rhythm  Sinus rhythm was noted.  _____________________________________________________________________________  __  MMode/2D Measurements & Calculations  IVSd: 0.99 cm     LVIDd: 4.3 cm  LVIDs: 3.1 cm  LVPWd: 0.98 cm  FS: 28.8 %  LV mass(C)d: 140.3 grams  LV mass(C)dI: 96.9 grams/m2  Ao root diam: 3.3 cm  LA dimension: 3.6 cm  LA/Ao: 1.1  LVOT diam: 2.2 cm  LVOT area: 3.9 cm2  RWT: 0.46        Doppler Measurements & Calculations  MV E max unruly: 66.8 cm/sec  MV A max unruly: 83.3 cm/sec  MV E/A: 0.80  MV dec time: 0.11 sec  Ao V2 max: 252.6 cm/sec  Ao max P.0 mmHg  Ao V2  mean: 178.1 cm/sec  Ao mean P.8 mmHg  Ao V2 VTI: 51.6 cm  YOLANDA(I,D): 1.1 cm2  YOLANDA(V,D): 1.1 cm2  LV V1 max P.1 mmHg  LV V1 max: 71.8 cm/sec  LV V1 VTI: 14.6 cm  SV(LVOT): 56.5 ml  SI(LVOT): 39.0 ml/m2  PA acc time: 0.10 sec  TR max ryan: 306.8 cm/sec  TR max P.6 mmHg  AV Ryan Ratio (DI): 0.28     YOLANDA Index (cm2/m2): 0.76  E/E' av.9  Lateral E/e': 13.5  Medial E/e': 14.3           _____________________________________________________________________________  __           Report approved by: Rob Baron 2020 09:22 AM            Discharge Orders      SLEEP EVALUATION & MANAGEMENT REFERRAL - [facility] [phone]  (Age 18 and up)      General info for SNF    Length of Stay Estimate: Short Term Care: Estimated # of Days <30  Condition at Discharge: Improving  Level of care:skilled   Rehabilitation Potential: Good  Admission H&P remains valid and up-to-date: Yes  Recent Chemotherapy: N/A  Use Nursing Home Standing Orders: Yes     Mantoux instructions    Give two-step Mantoux (PPD) Per Facility Policy Yes     Reason for your hospital stay    You were hospitalized for respiratory failure due to COPD exacerbation. You also had a heart attack which was medically managed.     Glucose monitor nursing POCT    Before meals and at bedtime     Additional Discharge Instructions     Activity - Up with nursing assistance     Follow Up and recommended labs and tests    Follow up with prison physician.  The following labs/tests are recommended: BMP and CBC in one week.  - Follow up with cardiology clinic in about 3-4 weeks  - Follow up with a sleep center for a sleep study  - Follow up with pulmonology in ~3 weeks for further evaluation of the nodule found in your lungs.     Full Code     Physical Therapy Adult Consult    Evaluate and treat as clinically indicated.    Reason:  deconditioning     Occupational Therapy Adult Consult    Evaluate and treat as clinically  indicated.    Reason:  deconditioning     Oxygen Adult/Peds    Oxygen Documentation:   I certify that this patient, Preeti Ford has been under my care (or a nurse practitioner or physican's assistant working with me). This is the face-to-face encounter for oxygen medical necessity.      Preeti Ford is now in a chronic stable state and continues to require supplemental oxygen. Patient has continued oxygen desaturation due to COPD J44.9.    Alternative treatment(s) tried or considered and deemed clinically infective for treatment of COPD J44.9 include nebulizers, inhalers and steroids.  If portability is ordered, is the patient mobile within the home? yes    **Patients who qualify for home O2 coverage under the CMS guidelines require ABG tests or O2 sat readings obtained closest to, but no earlier than 2 days prior to the discharge, as evidence of the need for home oxygen therapy. Testing must be performed while patient is in the chronic stable state. See notes for O2 sats.**     Advance Diet as Tolerated    Follow this diet upon discharge:      Snacks/Supplements Adult: Boost Plus; With Meals      Regular Diet Adult     Discharge Medications   Current Discharge Medication List      START taking these medications    Details   predniSONE (DELTASONE) 20 MG tablet Take 2 tablets (40 mg) by mouth daily for 3 days, THEN 1.5 tablets (30 mg) daily for 4 days, THEN 1 tablet (20 mg) daily for 4 days, THEN 0.5 tablets (10 mg) daily for 4 days.    Associated Diagnoses: COPD exacerbation (H)         CONTINUE these medications which have NOT CHANGED    Details   albuterol (2.5 MG/3ML) 0.083% neb solution Take 1 vial by nebulization every 4 hours as needed for shortness of breath / dyspnea or wheezing      aspirin 81 MG EC tablet Take 1 tablet (81 mg) by mouth daily    Associated Diagnoses: Coronary artery disease involving native coronary artery of native heart with angina pectoris (H)      atorvastatin (LIPITOR) 20 MG  tablet Take 1 tablet (20 mg) by mouth At Bedtime  Qty: 90 tablet, Refills: 3    Associated Diagnoses: PAD (peripheral artery disease) (H)      budesonide-formoterol (SYMBICORT) 160-4.5 MCG/ACT Inhaler INHALE TWO PUFFS BY MOUTH TWICE A DAY  Qty: 10.2 g, Refills: 5    Associated Diagnoses: Chronic bronchitis, unspecified chronic bronchitis type (H)      clopidogrel (PLAVIX) 75 MG tablet TAKE ONE TABLET BY MOUTH ONCE DAILY  Qty: 90 tablet, Refills: 3    Associated Diagnoses: Acute CVA (cerebrovascular accident) (H)      ipratropium - albuterol 0.5 mg/2.5 mg/3 mL (DUONEB) 0.5-2.5 (3) MG/3ML neb solution Take 1 vial (3 mLs) by nebulization every 4 hours as needed for shortness of breath / dyspnea or wheezing  Qty: 270 mL, Refills: 3    Associated Diagnoses: Simple chronic bronchitis (H)      metoprolol tartrate (LOPRESSOR) 25 MG tablet Take 0.25 tablets (6.25 mg) by mouth 2 times daily    Associated Diagnoses: Coronary artery disease involving native coronary artery of native heart with angina pectoris (H)      montelukast (SINGULAIR) 10 MG tablet Take 1 tablet (10 mg) by mouth At Bedtime  Qty: 90 tablet, Refills: 3    Associated Diagnoses: COPD, severe (H)      nicotine (NICODERM CQ) 7 MG/24HR 24 hr patch Place 1 patch onto the skin every 24 hours  Qty: 30 patch, Refills: 1    Associated Diagnoses: Cigarette nicotine dependence without complication      senna-docusate (SENOKOT-S;PERICOLACE) 8.6-50 MG per tablet Take 1 tablet by mouth 2 times daily as needed for constipation  Qty: 100 tablet    Associated Diagnoses: Constipation, unspecified constipation type      umeclidinium (INCRUSE ELLIPTA) 62.5 MCG/INH inhaler Inhale 1 puff into the lungs daily      ACE/ARB/ARNI NOT PRESCRIBED, INTENTIONAL, Please choose reason not prescribed, below    Associated Diagnoses: Elevated blood pressure reading without diagnosis of hypertension; History of stroke      acetaminophen (TYLENOL) 325 MG tablet Take 325 mg by mouth every 4  hours as needed for mild pain   Qty: 100 tablet      albuterol (PROAIR HFA/PROVENTIL HFA/VENTOLIN HFA) 108 (90 Base) MCG/ACT inhaler Inhale 2 puffs into the lungs every 6 hours as needed for shortness of breath / dyspnea  Qty: 18 g, Refills: 0    Associated Diagnoses: Chronic bronchitis, unspecified chronic bronchitis type (H)      Calcium Carbonate-Vitamin D (OSCAL 500/200 D-3 PO) Take 1 tablet by mouth 2 times daily      Coenzyme Q10 (COQ10 PO) Take 100 mg by mouth every 24 hours (at 16:00)      guaiFENesin (MUCINEX) 600 MG 12 hr tablet Take 600 mg by mouth 2 times daily      nitroGLYcerin (NITROSTAT) 0.4 MG sublingual tablet For chest pain place 1 tablet under the tongue every 5 minutes for 3 doses. If symptoms persist 5 minutes after 1st dose call 911.  Qty: 25 tablet    Associated Diagnoses: Coronary artery disease involving native coronary artery of native heart with angina pectoris (H)      Nutritional Supplements (BOOST) Take 1 Bottle by mouth 3 times daily (with meals)  Qty: 90 each, Refills: 0    Associated Diagnoses: History of stroke; Ischemic cardiomyopathy; Panlobular emphysema (H); Malaise; Loss of weight      !! order for DME Equipment being ordered: Nebulizer machine  Qty: 1 Device, Refills: 0    Associated Diagnoses: Chronic bronchitis, unspecified chronic bronchitis type (H)      !! order for DME Equipment being ordered: Nebulizer  Qty: 1 Device, Refills: 0    Associated Diagnoses: Pulmonary emphysema, unspecified emphysema type (H)      polyethylene glycol (MIRALAX/GLYCOLAX) Packet Take 1 packet by mouth daily as needed for constipation      Respiratory Therapy Supplies (NEBULIZER/TUBING/MOUTHPIECE) KIT 1 kit as needed (if becomes damaged)  Qty: 1 kit, Refills: 1    Associated Diagnoses: Panlobular emphysema (H); Chronic bronchitis, unspecified chronic bronchitis type (H)       !! - Potential duplicate medications found. Please discuss with provider.        Allergies   Allergies   Allergen  Reactions     Crestor [Rosuvastatin] Other (See Comments)     dizziness     Hmg-Coa-R Inhibitors Other (See Comments)     Muscle/joint aching     Lisinopril Cough     Optison [Albumin Human] Other (See Comments)     Pt was flush and very dizzy.  Also had a BP drop     Penicillins Rash

## 2020-05-07 NOTE — PLAN OF CARE
Discharge Planner OT   Patient plan for discharge: home  Current status: SBA for bed mob, CGA for transfers and mobility w/in room using 2WW, needs VC's for safety throughout tasks. Declined FB dressing, open to g/h at sink, fatigued quickly w/ task, requiring resting UE's on counter. Pt hypertensive but VSS otherwise.  Barriers to return to prior living situation: impaired balance, safety, baseline aphasia, impaired strength and activity tolerance and would be home alone during the day as appears that pt's  works.  Recommendations for discharge: TCU  Rationale for recommendations: Pt continues to require increased A with ADLs and functional mobility and safety cues. Pt will require daily therapy to increase ADL and functional mobility independence and safety to PLOF. If pt returned home would require 24 hr care and A with all functional mobility and ADL's and IADL's, ie dressing, toileting, tub/shower transfer, med mgmt, etc and home RN, OT, PT and HHA.       Entered by: Vaishnavi Blackburn 05/07/2020 8:38 AM     Occupational Therapy Discharge Summary    Reason for therapy discharge:    Discharged to transitional care facility.    Progress towards therapy goal(s). See goals on Care Plan in [facility] electronic health record for goal details.  Goals not met.  Barriers to achieving goals:   discharge from facility.    Therapy recommendation(s):    Continued therapy is recommended.  Rationale/Recommendations:  at TCU to improve ind w/ ADL's and mobility.

## 2020-05-07 NOTE — PLAN OF CARE
VSS. Monitor remains Sinus rhythm. Stable O2 sats with O2 at 1.5 L/NC. Plan for pt. To discharge to TCU today.

## 2020-05-08 ENCOUNTER — TELEPHONE (OUTPATIENT)
Dept: CARDIOLOGY | Facility: CLINIC | Age: 70
End: 2020-05-08

## 2020-05-08 ENCOUNTER — TELEPHONE (OUTPATIENT)
Dept: FAMILY MEDICINE | Facility: OTHER | Age: 70
End: 2020-05-08

## 2020-05-08 LAB
BACTERIA SPEC CULT: NO GROWTH
BACTERIA SPEC CULT: NO GROWTH
Lab: NORMAL
Lab: NORMAL
SPECIMEN SOURCE: NORMAL
SPECIMEN SOURCE: NORMAL

## 2020-05-08 NOTE — PROGRESS NOTES
" [facility] [facility]  Preeti Ford is being evaluated via a billable video visit due to the restrictions of the Covid-19 pandemic.   The patient has been notified of following:    \"This video visit will be conducted via a call between you and your provider. We have found that certain health care needs can be provided without the need for an in-person physical exam.  This service lets us provide the care you need with a video conversation. If during the course of the call the provider feels a video visit is not appropriate, you will not be charged for this service.\"   The provider has received verbal consent for a Video Visit from the patient and or first contact? Yes  Patient/facility staff would like the video invitation sent by: N/A   Video Start Time: 11am  Which Facility the Patient is at during the time of visit: [facility]     PRIMARY CARE PROVIDER AND CLINIC:  SPRING Banegas CNP, [id number]  / [place] [id number]  Chief Complaint   Patient presents with     Video Visit     Hospital F/U     [place] Medical Record Number:  [id number]  Preeti Ford  is a 69 year old  (1950), admitted to the above facility from  [facility]. Hospital stay 5/2/20 through 5/7/20..  Admitted to this facility for  rehab, medical management and nursing care.  HPI:    HPI information obtained from: facility chart records, facility staff, patient report and [facility] chart review.   Brief Summary of Hospital Course: Preeti presented to the [facility] ED with breathing difficulties.  She related it to the dust from the road construction in front of her house.  Typically on 1-2L/min at home.  Is a smoker.  In the ED it was planned to admit here but noted her troponin was increasing.  Concerned for a NSTEMI with troponin at 1.0432, she was transferred to [facility] for cardiac care.  At [facility], found to have COPD exacerbation, NSTEMI and decreased ejection " fraction.  COVID negative.  She did complete a 5 day course of Ceftriaxone + azithromycin on 5/6/2020 and will complete a 2 week steroid taper.  She was recommended for outpatient sleep study due to her hypercarbia.    Troponin ranged from 0.680 to 2.893 and part could have been her COPD exacerbation.  proBNP 9512.  TTE on 5/3 showed decreased EF 35-40% to mid inferior WMA, mild aortic stenosis, EF was decreased compared to prior study.  indeterminate 1.9cm right LL pulmonary nodules new and per pulmonary, follow up outpatient.      Updates on Status Since Skilled nursing Admission: with the help of staff today, did meet Preeti in her room.  Long white hair and neatly groomed with help of staff.  Can see she is confused or not understanding questions despite being repeated by nursing.  Would answer with the same answer for different questions.  A BIMS has not been yet conducted by the .    Goal is for her to return home with spouse.  Has been self transferring, able to feed self and staff assist with cares.  No acute concerns from nursing.    Her weight is around 100 lbs.  Talked about changing her boost to Glucerna.      CODE STATUS/ADVANCE DIRECTIVES DISCUSSION:   CPR/Full code   Patient's living condition: lives with spouse  ALLERGIES: Crestor [rosuvastatin]; Hmg-coa-r inhibitors; Lisinopril; Optison [albumin human]; and Penicillins  PAST MEDICAL HISTORY:  has a past medical history of Arthritis, COPD (chronic obstructive pulmonary disease) (H), Coronary artery disease, CVA (cerebral vascular accident) (H) (8-), and Hypertension.  PAST SURGICAL HISTORY:   has a past surgical history that includes salpingo oophorectomy,r/l/florentin; appendectomy; and Bypass graft aortoiliac (N/A, 3/7/2017).  FAMILY HISTORY: family history includes Cerebrovascular Disease in her father and mother; Genitourinary Problems in her brother.  SOCIAL HISTORY:   reports that she has been smoking cigarettes. She has been  smoking about 0.50 packs per day. She has never used smokeless tobacco. She reports that she does not drink alcohol or use drugs.  Current Outpatient Medications   Medication Sig Dispense Refill     ACE/ARB/ARNI NOT PRESCRIBED, INTENTIONAL, Please choose reason not prescribed, below       acetaminophen (TYLENOL) 325 MG tablet Take 325 mg by mouth every 4 hours as needed for mild pain  100 tablet      albuterol (2.5 MG/3ML) 0.083% neb solution Take 1 vial by nebulization every 4 hours as needed for shortness of breath / dyspnea or wheezing       albuterol (PROAIR HFA/PROVENTIL HFA/VENTOLIN HFA) 108 (90 Base) MCG/ACT inhaler Inhale 2 puffs into the lungs every 6 hours as needed for shortness of breath / dyspnea 18 g 0     aspirin 81 MG EC tablet Take 1 tablet (81 mg) by mouth daily       atorvastatin (LIPITOR) 20 MG tablet Take 1 tablet (20 mg) by mouth At Bedtime 90 tablet 3     budesonide-formoterol (SYMBICORT) 160-4.5 MCG/ACT Inhaler INHALE TWO PUFFS BY MOUTH TWICE A DAY 10.2 g 5     Calcium Carbonate-Vitamin D (OSCAL 500/200 D-3 PO) Take 1 tablet by mouth 2 times daily       clopidogrel (PLAVIX) 75 MG tablet TAKE ONE TABLET BY MOUTH ONCE DAILY 90 tablet 3     Coenzyme Q10 (COQ10 PO) Take 100 mg by mouth every 24 hours (at 16:00)       guaiFENesin (MUCINEX) 600 MG 12 hr tablet Take 600 mg by mouth 2 times daily       ipratropium - albuterol 0.5 mg/2.5 mg/3 mL (DUONEB) 0.5-2.5 (3) MG/3ML neb solution Take 1 vial (3 mLs) by nebulization every 4 hours as needed for shortness of breath / dyspnea or wheezing 270 mL 3     metoprolol tartrate (LOPRESSOR) 25 MG tablet Take 0.25 tablets (6.25 mg) by mouth 2 times daily       montelukast (SINGULAIR) 10 MG tablet Take 1 tablet (10 mg) by mouth At Bedtime 90 tablet 3     nicotine (NICODERM CQ) 7 MG/24HR 24 hr patch Place 1 patch onto the skin every 24 hours 30 patch 1     nitroGLYcerin (NITROSTAT) 0.4 MG sublingual tablet For chest pain place 1 tablet under the tongue every 5  minutes for 3 doses. If symptoms persist 5 minutes after 1st dose call 911. 25 tablet      Nutritional Supplements (BOOST) Take 1 Bottle by mouth 3 times daily (with meals) 90 each 0     order for DME Equipment being ordered: Nebulizer machine 1 Device 0     order for DME Equipment being ordered: Nebulizer 1 Device 0     polyethylene glycol (MIRALAX/GLYCOLAX) Packet Take 1 packet by mouth daily as needed for constipation       predniSONE (DELTASONE) 20 MG tablet Take 2 tablets (40 mg) by mouth daily for 3 days, THEN 1.5 tablets (30 mg) daily for 4 days, THEN 1 tablet (20 mg) daily for 4 days, THEN 0.5 tablets (10 mg) daily for 4 days.       Respiratory Therapy Supplies (NEBULIZER/TUBING/MOUTHPIECE) KIT 1 kit as needed (if becomes damaged) 1 kit 1     senna-docusate (SENOKOT-S;PERICOLACE) 8.6-50 MG per tablet Take 1 tablet by mouth 2 times daily as needed for constipation 100 tablet      umeclidinium (INCRUSE ELLIPTA) 62.5 MCG/INH inhaler Inhale 1 puff into the lungs daily          ROS: Limited secondary to cognitive impairment but today pt reports no chest pain, no stomach issues, has a 'smokers' cough.  Vitals:There were no vitals taken for this visit.   Limited Visit Exam done given COVID-19 precautions:  GENERAL APPEARANCE:  Alert, in no distress, thin, cooperative  EYES:  Conjunctiva and lids normal, no corrective lenses  RESP:  no respiratory distress, lungs clear with occasional wheeze, oxygen at 2L/min  CV:  Palpation and auscultation of heart done , regular rate and rhythm, no murmur, rub, or gallop, no edema  ABDOMEN:  normal bowel sounds, soft, nontender, no hepatosplenomegaly or other masses, no guarding or rebound  M/S:   Gait and station abnormal assist from staff to ambulate, get to destinations.  will not use the call light properly so staff do catch her unassisted.  attending therapies for strengthening.    SKIN:  pale, warm and dry.  PSYCH:  oriented to person, place with time vague, insight and  judgement impaired, memory impaired , affect and mood normal    Lab/Diagnostic data:  Component      Latest Ref Rng & Units 5/5/2020   WBC      4.0 - 11.0 10e9/L 5.6   RBC Count      3.8 - 5.2 10e12/L 4.64   Hemoglobin      11.7 - 15.7 g/dL 13.4   Hematocrit      35.0 - 47.0 % 43.4   MCV      78 - 100 fl 94   MCH      26.5 - 33.0 pg 28.9   MCHC      31.5 - 36.5 g/dL 30.9 (L)   RDW      10.0 - 15.0 % 12.9   Platelet Count      150 - 450 10e9/L 202     Component      Latest Ref Rng & Units 5/4/2020   Sodium      133 - 144 mmol/L 136   Potassium      3.4 - 5.3 mmol/L 4.5   Chloride      94 - 109 mmol/L 101   Carbon Dioxide      20 - 32 mmol/L 34 (H)   Anion Gap      3 - 14 mmol/L 1 (L)   Glucose      70 - 99 mg/dL 96   Urea Nitrogen      7 - 30 mg/dL 19   Creatinine      0.52 - 1.04 mg/dL 0.59   GFR Estimate      >60 mL/min/1.73:m2 >90   GFR Estimate If Black      >60 mL/min/1.73:m2 >90   Calcium      8.5 - 10.1 mg/dL 8.5     ASSESSMENT/PLAN:  NSTEMI (non-ST elevated myocardial infarction) (H)  Coronary artery disease involving native coronary artery of native heart without angina pectoris  - no acute distress.  Attending therapies to strengthen with mobility and extremity muscles.    Is on ASA 81mg daily, plavix 75mg daily and CoQ10 100mg daily.    Does have Nitroglycerin PRN for angina.  No changes.    Vitals done daily.  Will measure in her strength.  Goal is to return home.  Has a virtual visit planned for 5/13 with cardiology.      Acute systolic congestive heart failure (H)  No diuretics.  Did come with lopressor and Cozaar.    Weights daily.  Does have ACE wraps and no edema.  Being asked to discontinue these and so order done.  1.  Discontinue ace wraps.      COPD exacerbation, Chronic O2 Dependent  Came with multiple nebs which include a duoneb, albuterol neb which are PRN, PRN albuterol inhaler, incruse ellipta inhaler, singular, BID Mucinex, and budesonide neb scheduled twice a day  Came with a prednisone  taper and so sugars being monitored as well.  Oxygen is on continuous and at 2L/min.    No acute distress while talking today.    Pharmacy consultant will make a comment about the multiple nebs available and which one to use first.  Nursing checked and PRN albuterol has been used.  For now will discontinue the Duonebs prn.  Continue to monitor respiratory status.    Pulmonary nodule -- 1.9 cm RLL, new 5/2/20  Pulmonary recommended outpatient consult and so will need to remind her and family to have an appointment set up.      Slow transit constipation  Came with PRN Senna-S and PRN Miralax.    Bowels monitored every shift and recorded.  Will not change anything now.  Discussed with nursing and give it another couple of days to see if she uses any of the two.    Hypocalcemia  Came with oscal 500/200 D3 1 tab twice a day.  Lab Results   Component Value Date    CALDERON 7.8 05/15/2020     She did not know why she really took the Calcium but has a low level.  Continue with medication.    Smoker  Came with Nicotine patch 7mg/hr and change patch daily.  She is encouraged to quit and while here will not be smoking.  Will be instructed when she leaves about smoking with the patch.    Forgetfulness  Noticed with the visit that she repeated the same answers to different questions.  Staff recording their findings as well.  Typically the SW will do a BIMS and OT can do a SLUMS.  Will inquire with nursing about getting a SLUMS done.    Meantime, continue to give her reminded about the call light and safety.         Orders given to   1.  Discontinue ACE wraps - CHF  2.  Discontinue Duonebs as she has other nebs  3.  Change BOOST to Glucerna  4.  Dx for Calcium = hypocalcemia  5.  Add CBC to the BMP order due to NSTEMI, CAD      Electronically signed by:  SRPING Barrera CNP     Video-Visit Details  Type of service:  Video Visit  Video End Time (time video stopped): 2:25pm  Distant Location (provider location):   [facility]

## 2020-05-08 NOTE — TELEPHONE ENCOUNTER
Our goal is to have forms completed with 72 hours, however some forms may require a visit or additional information.    Who is the form from?: [employer] (if other please explain)  Where the form came from: form was faxed in  What clinic location was the form placed at?: [place]  Where the form was placed: mailbox  What number is listed as a contact on the form?: [phone]    Phone call message- patient request for a letter, form or note:    Date needed: as soon as possible  Please fax to [phone]  Has the patient signed a consent form for release of information? NO    Additional comments:     Call taken on 5/8/2020 at 2:14 PM by Rowena Douglass    Type of letter, form or note: medical

## 2020-05-08 NOTE — TELEPHONE ENCOUNTER
Patient was evaluated by cardiology while inpatient for increased SOB x 2 weeks, elevated troponin. PMH with multiple co morbidities, including 02 dependent COPD, HTN, CVA, CAD, PAD, ICM. LVEF 35-40%, Basal to mid inferior and inferolateral wall hypokinesis is present with basal to mid anteroseptal/inferoseptal wall hypokinesis. Decision made with spouse for medical management only. Prednisone continued at time of discharge. RN called [facility] to confirm the follow up plan for patient with Care Coordinator. RN confirmed with Care Coordinator that patient has a scheduled F/U phone appt on 5/13/20 at 1015 with [facility] MAURICIO-Rowena should call the nurses' station at [phone] for phone visit. JACOB Farias RN.

## 2020-05-08 NOTE — TELEPHONE ENCOUNTER
Our goal is to have forms completed with 72 hours, however some forms may require a visit or additional information.    Who is the form from?: [employer] (if other please explain)  Where the form came from: form was mailed in  What clinic location was the form placed at?: [place]  Where the form was placed: mailbox  What number is listed as a contact on the form?: [phone]    Phone call message- patient request for a letter, form or note:    Date needed: as soon as possible  Please mail to jed frank  Has the patient signed a consent form for release of information? NO    Additional comments:     Call taken on 5/8/2020 at 2:33 PM by Rowena Douglass    Type of letter, form or note: medical

## 2020-05-11 ENCOUNTER — VIRTUAL VISIT (OUTPATIENT)
Dept: GERIATRICS | Facility: CLINIC | Age: 70
End: 2020-05-11
Payer: COMMERCIAL

## 2020-05-11 VITALS
TEMPERATURE: 96.8 F | DIASTOLIC BLOOD PRESSURE: 92 MMHG | SYSTOLIC BLOOD PRESSURE: 160 MMHG | OXYGEN SATURATION: 94 % | HEIGHT: 62 IN | RESPIRATION RATE: 20 BRPM | WEIGHT: 100 LBS | HEART RATE: 80 BPM | BODY MASS INDEX: 18.4 KG/M2

## 2020-05-11 DIAGNOSIS — J44.1 COPD EXACERBATION (H): ICD-10-CM

## 2020-05-11 DIAGNOSIS — I25.10 CORONARY ARTERY DISEASE INVOLVING NATIVE CORONARY ARTERY OF NATIVE HEART WITHOUT ANGINA PECTORIS: ICD-10-CM

## 2020-05-11 DIAGNOSIS — I21.4 NSTEMI (NON-ST ELEVATED MYOCARDIAL INFARCTION) (H): Primary | ICD-10-CM

## 2020-05-11 DIAGNOSIS — R91.1 PULMONARY NODULE: ICD-10-CM

## 2020-05-11 DIAGNOSIS — E83.51 HYPOCALCEMIA: ICD-10-CM

## 2020-05-11 DIAGNOSIS — F17.200 SMOKER: ICD-10-CM

## 2020-05-11 DIAGNOSIS — R68.89 FORGETFULNESS: ICD-10-CM

## 2020-05-11 DIAGNOSIS — I50.21 ACUTE SYSTOLIC CONGESTIVE HEART FAILURE (H): ICD-10-CM

## 2020-05-11 DIAGNOSIS — K59.01 SLOW TRANSIT CONSTIPATION: ICD-10-CM

## 2020-05-11 PROCEDURE — 99309 SBSQ NF CARE MODERATE MDM 30: CPT | Mod: 95 | Performed by: NURSE PRACTITIONER

## 2020-05-11 ASSESSMENT — MIFFLIN-ST. JEOR: SCORE: 941.85

## 2020-05-11 NOTE — LETTER
"    5/11/2020        RE: Preeti oFrd  [address]  [place] [address]         [facility]  Preeti Ford is being evaluated via a billable video visit due to the restrictions of the Covid-19 pandemic.   The patient has been notified of following:    \"This video visit will be conducted via a call between you and your provider. We have found that certain health care needs can be provided without the need for an in-person physical exam.  This service lets us provide the care you need with a video conversation. If during the course of the call the provider feels a video visit is not appropriate, you will not be charged for this service.\"   The provider has received verbal consent for a Video Visit from the patient and or first contact? Yes  Patient/facility staff would like the video invitation sent by: N/A   Video Start Time: 11am  Which Facility the Patient is at during the time of visit: [facility]     PRIMARY CARE PROVIDER AND CLINIC:  Deisy Carter, [facility], [address]  / [place] [address]  Chief Complaint   Patient presents with     Video Visit     Hospital F/U     [facility] Medical Record Number:  [id number]  Preeti Ford  is a 69 year old  (1950), admitted to the above facility from  [facility]. Hospital stay 5/2/20 through 5/7/20..  Admitted to this facility for  rehab, medical management and nursing care.  HPI:    HPI information obtained from: facility chart records, facility staff, patient report and [facility] chart review.   Brief Summary of Hospital Course: Preeti presented to the [facility] ED with breathing difficulties.  She related it to the dust from the road construction in front of her house.  Typically on 1-2L/min at home.  Is a smoker.  In the ED it was planned to admit here but noted her troponin was increasing.  Concerned for a NSTEMI with troponin at 1.0432, she was transferred to [facility] for cardiac " care.  At [facility], found to have COPD exacerbation, NSTEMI and decreased ejection fraction.  COVID negative.  She did complete a 5 day course of Ceftriaxone + azithromycin on 5/6/2020 and will complete a 2 week steroid taper.  She was recommended for outpatient sleep study due to her hypercarbia.    Troponin ranged from 0.680 to 2.893 and part could have been her COPD exacerbation.  proBNP 9512.  TTE on 5/3 showed decreased EF 35-40% to mid inferior WMA, mild aortic stenosis, EF was decreased compared to prior study.  indeterminate 1.9cm right LL pulmonary nodules new and per pulmonary, follow up outpatient.      Updates on Status Since Skilled nursing Admission: with the help of staff today, did meet Preeti in her room.  Long white hair and neatly groomed with help of staff.  Can see she is confused or not understanding questions despite being repeated by nursing.  Would answer with the same answer for different questions.  A BIMS has not been yet conducted by the .    Goal is for her to return home with spouse.  Has been self transferring, able to feed self and staff assist with cares.  No acute concerns from nursing.    Her weight is around 100 lbs.  Talked about changing her boost to Glucerna.      CODE STATUS/ADVANCE DIRECTIVES DISCUSSION:   CPR/Full code   Patient's living condition: lives with spouse  ALLERGIES: Crestor [rosuvastatin]; Hmg-coa-r inhibitors; Lisinopril; Optison [albumin human]; and Penicillins  PAST MEDICAL HISTORY:  has a past medical history of Arthritis, COPD (chronic obstructive pulmonary disease) (H), Coronary artery disease, CVA (cerebral vascular accident) (H) (8-), and Hypertension.  PAST SURGICAL HISTORY:   has a past surgical history that includes salpingo oophorectomy,r/l/florentin; appendectomy; and Bypass graft aortoiliac (N/A, 3/7/2017).  FAMILY HISTORY: family history includes Cerebrovascular Disease in her father and mother; Genitourinary Problems in her  brother.  SOCIAL HISTORY:   reports that she has been smoking cigarettes. She has been smoking about 0.50 packs per day. She has never used smokeless tobacco. She reports that she does not drink alcohol or use drugs.  Current Outpatient Medications   Medication Sig Dispense Refill     ACE/ARB/ARNI NOT PRESCRIBED, INTENTIONAL, Please choose reason not prescribed, below       acetaminophen (TYLENOL) 325 MG tablet Take 325 mg by mouth every 4 hours as needed for mild pain  100 tablet      albuterol (2.5 MG/3ML) 0.083% neb solution Take 1 vial by nebulization every 4 hours as needed for shortness of breath / dyspnea or wheezing       albuterol (PROAIR HFA/PROVENTIL HFA/VENTOLIN HFA) 108 (90 Base) MCG/ACT inhaler Inhale 2 puffs into the lungs every 6 hours as needed for shortness of breath / dyspnea 18 g 0     aspirin 81 MG EC tablet Take 1 tablet (81 mg) by mouth daily       atorvastatin (LIPITOR) 20 MG tablet Take 1 tablet (20 mg) by mouth At Bedtime 90 tablet 3     budesonide-formoterol (SYMBICORT) 160-4.5 MCG/ACT Inhaler INHALE TWO PUFFS BY MOUTH TWICE A DAY 10.2 g 5     Calcium Carbonate-Vitamin D (OSCAL 500/200 D-3 PO) Take 1 tablet by mouth 2 times daily       clopidogrel (PLAVIX) 75 MG tablet TAKE ONE TABLET BY MOUTH ONCE DAILY 90 tablet 3     Coenzyme Q10 (COQ10 PO) Take 100 mg by mouth every 24 hours (at 16:00)       guaiFENesin (MUCINEX) 600 MG 12 hr tablet Take 600 mg by mouth 2 times daily       ipratropium - albuterol 0.5 mg/2.5 mg/3 mL (DUONEB) 0.5-2.5 (3) MG/3ML neb solution Take 1 vial (3 mLs) by nebulization every 4 hours as needed for shortness of breath / dyspnea or wheezing 270 mL 3     metoprolol tartrate (LOPRESSOR) 25 MG tablet Take 0.25 tablets (6.25 mg) by mouth 2 times daily       montelukast (SINGULAIR) 10 MG tablet Take 1 tablet (10 mg) by mouth At Bedtime 90 tablet 3     nicotine (NICODERM CQ) 7 MG/24HR 24 hr patch Place 1 patch onto the skin every 24 hours 30 patch 1     nitroGLYcerin  (NITROSTAT) 0.4 MG sublingual tablet For chest pain place 1 tablet under the tongue every 5 minutes for 3 doses. If symptoms persist 5 minutes after 1st dose call 911. 25 tablet      Nutritional Supplements (BOOST) Take 1 Bottle by mouth 3 times daily (with meals) 90 each 0     order for DME Equipment being ordered: Nebulizer machine 1 Device 0     order for DME Equipment being ordered: Nebulizer 1 Device 0     polyethylene glycol (MIRALAX/GLYCOLAX) Packet Take 1 packet by mouth daily as needed for constipation       predniSONE (DELTASONE) 20 MG tablet Take 2 tablets (40 mg) by mouth daily for 3 days, THEN 1.5 tablets (30 mg) daily for 4 days, THEN 1 tablet (20 mg) daily for 4 days, THEN 0.5 tablets (10 mg) daily for 4 days.       Respiratory Therapy Supplies (NEBULIZER/TUBING/MOUTHPIECE) KIT 1 kit as needed (if becomes damaged) 1 kit 1     senna-docusate (SENOKOT-S;PERICOLACE) 8.6-50 MG per tablet Take 1 tablet by mouth 2 times daily as needed for constipation 100 tablet      umeclidinium (INCRUSE ELLIPTA) 62.5 MCG/INH inhaler Inhale 1 puff into the lungs daily          ROS: Limited secondary to cognitive impairment but today pt reports no chest pain, no stomach issues, has a 'smokers' cough.  Vitals:There were no vitals taken for this visit.   Limited Visit Exam done given COVID-19 precautions:  GENERAL APPEARANCE:  Alert, in no distress, thin, cooperative  EYES:  Conjunctiva and lids normal, no corrective lenses  RESP:  no respiratory distress, lungs clear with occasional wheeze, oxygen at 2L/min  CV:  Palpation and auscultation of heart done , regular rate and rhythm, no murmur, rub, or gallop, no edema  ABDOMEN:  normal bowel sounds, soft, nontender, no hepatosplenomegaly or other masses, no guarding or rebound  M/S:   Gait and station abnormal assist from staff to ambulate, get to destinations.  will not use the call light properly so staff do catch her unassisted.  attending therapies for strengthening.     SKIN:  pale, warm and dry.  PSYCH:  oriented to person, place with time vague, insight and judgement impaired, memory impaired , affect and mood normal    Lab/Diagnostic data:  Component      Latest Ref Rng & Units 5/5/2020   WBC      4.0 - 11.0 10e9/L 5.6   RBC Count      3.8 - 5.2 10e12/L 4.64   Hemoglobin      11.7 - 15.7 g/dL 13.4   Hematocrit      35.0 - 47.0 % 43.4   MCV      78 - 100 fl 94   MCH      26.5 - 33.0 pg 28.9   MCHC      31.5 - 36.5 g/dL 30.9 (L)   RDW      10.0 - 15.0 % 12.9   Platelet Count      150 - 450 10e9/L 202     Component      Latest Ref Rng & Units 5/4/2020   Sodium      133 - 144 mmol/L 136   Potassium      3.4 - 5.3 mmol/L 4.5   Chloride      94 - 109 mmol/L 101   Carbon Dioxide      20 - 32 mmol/L 34 (H)   Anion Gap      3 - 14 mmol/L 1 (L)   Glucose      70 - 99 mg/dL 96   Urea Nitrogen      7 - 30 mg/dL 19   Creatinine      0.52 - 1.04 mg/dL 0.59   GFR Estimate      >60 mL/min/1.73:m2 >90   GFR Estimate If Black      >60 mL/min/1.73:m2 >90   Calcium      8.5 - 10.1 mg/dL 8.5     ASSESSMENT/PLAN:  NSTEMI (non-ST elevated myocardial infarction) (H)  Coronary artery disease involving native coronary artery of native heart without angina pectoris  - no acute distress.  Attending therapies to strengthen with mobility and extremity muscles.    Is on ASA 81mg daily, plavix 75mg daily and CoQ10 100mg daily.    Does have Nitroglycerin PRN for angina.  No changes.    Vitals done daily.  Will measure in her strength.  Goal is to return home.  Has a virtual visit planned for 5/13 with cardiology.      Acute systolic congestive heart failure (H)  No diuretics.  Did come with lopressor and Cozaar.    Weights daily.  Does have ACE wraps and no edema.  Being asked to discontinue these and so order done.  1.  Discontinue ace wraps.      COPD exacerbation, Chronic O2 Dependent  Came with multiple nebs which include a duoneb, albuterol neb which are PRN, PRN albuterol inhaler, incruse ellipta  inhaler, singular, BID Mucinex, and budesonide neb scheduled twice a day  Came with a prednisone taper and so sugars being monitored as well.  Oxygen is on continuous and at 2L/min.    No acute distress while talking today.    Pharmacy consultant will make a comment about the multiple nebs available and which one to use first.  Nursing checked and PRN albuterol has been used.  For now will discontinue the Duonebs prn.  Continue to monitor respiratory status.    Pulmonary nodule -- 1.9 cm RLL, new 5/2/20  Pulmonary recommended outpatient consult and so will need to remind her and family to have an appointment set up.      Slow transit constipation  Came with PRN Senna-S and PRN Miralax.    Bowels monitored every shift and recorded.  Will not change anything now.  Discussed with nursing and give it another couple of days to see if she uses any of the two.    Hypocalcemia  Came with oscal 500/200 D3 1 tab twice a day.  Lab Results   Component Value Date    CALDERON 7.8 05/15/2020     She did not know why she really took the Calcium but has a low level.  Continue with medication.    Smoker  Came with Nicotine patch 7mg/hr and change patch daily.  She is encouraged to quit and while here will not be smoking.  Will be instructed when she leaves about smoking with the patch.    Forgetfulness  Noticed with the visit that she repeated the same answers to different questions.  Staff recording their findings as well.  Typically the SW will do a BIMS and OT can do a SLUMS.  Will inquire with nursing about getting a SLUMS done.    Meantime, continue to give her reminded about the call light and safety.         Orders given to   1.  Discontinue ACE wraps - CHF  2.  Discontinue Duonebs as she has other nebs  3.  Change BOOST to Glucerna  4.  Dx for Calcium = hypocalcemia  5.  Add CBC to the BMP order due to NSTEMI, CAD      Electronically signed by:  SPRING Barrera CNP     Video-Visit Details  Type of service:   Video Visit  Video End Time (time video stopped): 2:25pm  Distant Location (provider location):  [facility]                 Sincerely,        SPRING Barrera CNP

## 2020-05-12 ENCOUNTER — DOCUMENTATION ONLY (OUTPATIENT)
Dept: OTHER | Facility: CLINIC | Age: 70
End: 2020-05-12

## 2020-05-12 NOTE — PROGRESS NOTES
"Preeti Ford is a 69 year old female who is being evaluated via a billable telephone visit.      The patient has been notified of following:     \"This telephone visit will be conducted via a call between you and your physician/provider. We have found that certain health care needs can be provided without the need for a physical exam.  This service lets us provide the care you need with a short phone conversation.  If a prescription is necessary we can send it directly to your pharmacy.  If lab work is needed we can place an order for that and you can then stop by our lab to have the test done at a later time.    If during the course of the call the physician/provider feels a telephone visit is not appropriate, you will not be charged for this service.\"     Physician has received verbal consent for a Telephone Visit from the patient? Yes    Preeti Ford complains of  No chief complaint on file.      I have reviewed and updated the patient's Past Medical History, Social History, Family History and Medication List.    ALLERGIES  Crestor [rosuvastatin]; Hmg-coa-r inhibitors; Lisinopril; Optison [albumin human]; and Penicillins     Blood pressure: 132/72 10pm last night   Pulse: 72  Weight: 99 #  Temp 97.1 F  Resp 14  O2 sat 98%      Reason for visit: Discharge follow up    Primary cardiologist: Dr. 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    She was admitted to [facility] on 5/2/2020 with acute on chronic hypoxic respiratory failure " and acute COPD exacerbation.  Her COVID-19 status was negative at that time and she did have a troponin peak at 2.893.  An echocardiogram showed LVEF of 35 to 40% (LVEF was 55% 10/2019) with basal to mid inferior wall motion abnormalities, mild aortic stenosis.    She was ruled out for PE on a CT, but there was an incidental finding of a 1.9 right lower lobe pulmonary nodule was noted that was concerned for limits he and she will be pursuing an outpatient pulmonary follow-up.     The initial phone call was placed to patient's care facility and spoke to patient directly.  She seemed very confused on the phone stating she was going home today and was waiting for her  to come pick her up.  She was unable to give an update on her symptoms and there were no staff participating in the call to my knowledge.    Then her  who is listed as emergency contact and whom I spoke to while she was inpatient was called with an update.  He states that she is not scheduled to return home at this time and is still completing therapy.  He is unsure if he is able to care for her at home and has many financial concerns regarding her health.         Assessment and Plan:     ASSESSMENT:    1. Non-STEMI    In the setting of acute on chronic respiratory failure and CHF exacerbation    Troponin peak of 2.89    Given patient's comorbidities and recent CVA was elected to pursue ongoing medical management instead of coronary angiography during this admission as she was stable from a cardiac perspective.    Remains on aspirin, Plavix, statin, beta-blocker    2. Ischemic cardiomyopathy with acute on chronic CHF with reduced EF exacerbation    LVEF prior to admission was 55% in October 2019 and her recent echocardiogram showed a LVEF declined to 35 to 40%.    Previously was documented that she was not on an ACE or an ARB due to patient preference and she does have an allergy to lisinopril listed as a cough.    Currently only on  metoprolol tartrate 6.25 mg twice daily due to hypotension as an inpatient.    3. Coronary artery disease    Most recent coronary angiogram was in 2018 showing 80% in-stent restenosis of a previously placed left circumflex stent that was successfully treated with a drug-eluting stent    As above deferred inpatient coronary angiogram given comorbidities and discussion with family    4. Recent acute on chronic episode of hypoxic respiratory failure    Known COPD with chronic oxygen use of 1 to 2 L    Currently on oral steroids and inhalers    PLAN:     1. Losartan 12.5 mg daily  2. BMP in 1 to 2 weeks  3. Follow-up in 2 months with cardiology     SPRING Paulson, CNP    Phone call duration: 13 minutes

## 2020-05-13 ENCOUNTER — TELEPHONE (OUTPATIENT)
Dept: CARDIOLOGY | Facility: CLINIC | Age: 70
End: 2020-05-13

## 2020-05-13 ENCOUNTER — VIRTUAL VISIT (OUTPATIENT)
Dept: CARDIOLOGY | Facility: CLINIC | Age: 70
End: 2020-05-13
Payer: COMMERCIAL

## 2020-05-13 DIAGNOSIS — I25.119 CORONARY ARTERY DISEASE INVOLVING NATIVE CORONARY ARTERY OF NATIVE HEART WITH ANGINA PECTORIS (H): ICD-10-CM

## 2020-05-13 DIAGNOSIS — I10 BENIGN ESSENTIAL HYPERTENSION: Primary | ICD-10-CM

## 2020-05-13 DIAGNOSIS — I10 BENIGN ESSENTIAL HYPERTENSION: ICD-10-CM

## 2020-05-13 DIAGNOSIS — I25.10 CORONARY ARTERY DISEASE INVOLVING NATIVE CORONARY ARTERY OF NATIVE HEART WITHOUT ANGINA PECTORIS: Primary | ICD-10-CM

## 2020-05-13 DIAGNOSIS — I25.5 ISCHEMIC CARDIOMYOPATHY: ICD-10-CM

## 2020-05-13 PROCEDURE — 99214 OFFICE O/P EST MOD 30 MIN: CPT | Mod: TEL | Performed by: NURSE PRACTITIONER

## 2020-05-13 RX ORDER — LOSARTAN POTASSIUM 25 MG/1
12.5 TABLET ORAL DAILY
Qty: 30 TABLET | Refills: 1 | Status: SHIPPED | OUTPATIENT
Start: 2020-05-13 | End: 2020-05-26

## 2020-05-13 NOTE — TELEPHONE ENCOUNTER
----- Message from SPRING Jerome CNP sent at 5/13/2020  2:22 PM CDT -----    I want to start Preeti on Losartan 12.5 mg daily for ICM with a BMP in 1 week. Can you please help me relay this to [place]?     Rowena

## 2020-05-13 NOTE — PATIENT INSTRUCTIONS
TODAY'S RECOMMENDATIONS    1. Continue present medical therapy  2. Follow-up with cardiology via a telephone visit in 2 months    If you have questions or concerns please call clinic at [phone].    Please call [phone] for scheduling.      It was a pleasure seeing you today!

## 2020-05-13 NOTE — TELEPHONE ENCOUNTER
Writer was unable to connect with a nurse at [facility]. Detailed voicemail was left to return call with [facility]'s fax number where orders can be faxed for losartan 12.5mg daily and for a BMP in 1 week. Direct cardiology call back number given.

## 2020-05-15 ENCOUNTER — HOSPITAL LABORATORY (OUTPATIENT)
Dept: NURSING HOME | Facility: OTHER | Age: 70
End: 2020-05-15

## 2020-05-15 LAB
ANION GAP SERPL CALCULATED.3IONS-SCNC: 4 MMOL/L (ref 3–14)
BUN SERPL-MCNC: 17 MG/DL (ref 7–30)
CALCIUM SERPL-MCNC: 7.8 MG/DL (ref 8.5–10.1)
CHLORIDE SERPL-SCNC: 102 MMOL/L (ref 94–109)
CO2 SERPL-SCNC: 35 MMOL/L (ref 20–32)
CREAT SERPL-MCNC: 0.63 MG/DL (ref 0.52–1.04)
ERYTHROCYTE [DISTWIDTH] IN BLOOD BY AUTOMATED COUNT: 12.5 % (ref 10–15)
GFR SERPL CREATININE-BSD FRML MDRD: >90 ML/MIN/{1.73_M2}
GLUCOSE SERPL-MCNC: 69 MG/DL (ref 70–99)
HCT VFR BLD AUTO: 43.6 % (ref 35–47)
HGB BLD-MCNC: 13.7 G/DL (ref 11.7–15.7)
MCH RBC QN AUTO: 29.3 PG (ref 26.5–33)
MCHC RBC AUTO-ENTMCNC: 31.4 G/DL (ref 31.5–36.5)
MCV RBC AUTO: 93 FL (ref 78–100)
PLATELET # BLD AUTO: 225 10E9/L (ref 150–450)
POTASSIUM SERPL-SCNC: 4.1 MMOL/L (ref 3.4–5.3)
RBC # BLD AUTO: 4.68 10E12/L (ref 3.8–5.2)
SODIUM SERPL-SCNC: 141 MMOL/L (ref 133–144)
WBC # BLD AUTO: 5.7 10E9/L (ref 4–11)

## 2020-05-18 ENCOUNTER — VIRTUAL VISIT (OUTPATIENT)
Dept: GERIATRICS | Facility: CLINIC | Age: 70
End: 2020-05-18
Payer: COMMERCIAL

## 2020-05-18 VITALS
DIASTOLIC BLOOD PRESSURE: 68 MMHG | OXYGEN SATURATION: 98 % | TEMPERATURE: 96.4 F | WEIGHT: 99.5 LBS | SYSTOLIC BLOOD PRESSURE: 106 MMHG | HEIGHT: 62 IN | HEART RATE: 64 BPM | RESPIRATION RATE: 20 BRPM | BODY MASS INDEX: 18.31 KG/M2

## 2020-05-18 DIAGNOSIS — J44.1 COPD EXACERBATION (H): ICD-10-CM

## 2020-05-18 DIAGNOSIS — R68.89 FORGETFULNESS: ICD-10-CM

## 2020-05-18 DIAGNOSIS — R91.1 PULMONARY NODULE: ICD-10-CM

## 2020-05-18 DIAGNOSIS — Z86.73 HISTORY OF STROKE: ICD-10-CM

## 2020-05-18 DIAGNOSIS — I50.21 ACUTE SYSTOLIC CONGESTIVE HEART FAILURE (H): ICD-10-CM

## 2020-05-18 DIAGNOSIS — I25.10 CORONARY ARTERY DISEASE INVOLVING NATIVE CORONARY ARTERY OF NATIVE HEART WITHOUT ANGINA PECTORIS: ICD-10-CM

## 2020-05-18 DIAGNOSIS — R41.89 COGNITIVE IMPAIRMENT: ICD-10-CM

## 2020-05-18 DIAGNOSIS — F17.200 SMOKER: ICD-10-CM

## 2020-05-18 DIAGNOSIS — I21.4 NSTEMI (NON-ST ELEVATED MYOCARDIAL INFARCTION) (H): Primary | ICD-10-CM

## 2020-05-18 PROCEDURE — 99316 NF DSCHRG MGMT 30 MIN+: CPT | Mod: 95 | Performed by: NURSE PRACTITIONER

## 2020-05-18 ASSESSMENT — MIFFLIN-ST. JEOR: SCORE: 939.58

## 2020-05-18 NOTE — PROGRESS NOTES
Documentation of Face-to-Face and Certification for Home Health Services     Patient: Preeti Ford   YOB: 1950  MR Number: [id number]  Today's Date: 5/18/2020    I certify that patient: Preeti Ford is under my care and that I, or a nurse practitioner or physician's assistant working with me, had a face-to-face encounter that meets the physician face-to-face encounter requirements with this patient on: 5/18/2020.    This encounter with the patient was in whole, or in part, for the following medical condition, which is the primary reason for home health care: NSTEMI, CAD, COPD exacerbation, Heart failure, smoker.    I certify that, based on my findings, the following services are medically necessary home health services: Nursing, Occupational Therapy and Physical Therapy.    My clinical findings support the need for the above services because: Nurse is needed: To assess vitals, medications, lung status and cardiac status after changes in medications or other medical regimen.., Occupational Therapy Services are needed to assess and treat cognitive ability and address ADL safety due to impairment in upper body strength, ADLs. and Physical Therapy Services are needed to assess and treat the following functional impairments: mobility, balance, endurance.    Further, I certify that my clinical findings support that this patient is homebound (i.e. absences from home require considerable and taxing effort and are for medical reasons or [facility] services or infrequently or of short duration when for other reasons) because: Requires assistance of another person or specialized equipment to access medical services because patient: Is unable to exit home safely on own due to: oxygen use, device for mobility...    Based on the above findings. I certify that this patient is confined to the home and needs intermittent skilled nursing care, physical therapy and/or speech therapy.  The patient is under  my care, and I have initiated the establishment of the plan of care.  This patient will be followed by a physician who will periodically review the plan of care.  Physician/Provider to provide follow up care: Deisy Carter    Responsible Medicare certified PEC[employer] Physician: Deborah Conrad MD  Physician Signature:   Electronically signed:  Deborah Conrad MD  Date: 5/18/2020

## 2020-05-18 NOTE — PROGRESS NOTES
"[facility] [facility] DISCHARGE SUMMARY  Preeti Ford is being evaluated via a billable video visit due to the restrictions of the Covid-19 pandemic.   The patient has been notified of following:  \"This video visit will be conducted via a call between you and a [facility] provider. We have found that certain health care needs can be provided without the need for an in-person physical exam.  This service lets us provide the care you need with a video conversation. If during the course of the call the provider feels a video visit is not appropriate, you will not be charged for this service.\"   The provider has received verbal consent for a Video Visit from the patient or family/first contact? Yes  Patient or /facility staff would like the video invitation sent by: N/A   Video Start Time: 8:45am    PATIENT'S NAME: Preeti Ford  YOB: 1950  MEDICAL RECORD NUMBER:  [id number]  Place of Location at the time of visit: [facility]   PRIMARY CARE PROVIDER AND CLINIC RESPONSIBLE AFTER TRANSFER:   SPRING Banegas CNP, [id number]  / [place] [id number] ;  [facility] [place] [facility]   Transferring providers: SPRING Barrera CNP, Deborah Thomson MD  Recent Hospitalization/ED:  [facility] Hospital stay 5/2/2020 to 5/7/2020.  Date of SNF Admission: May / 07 / 2020  Date of SNF (anticipated) Discharge: May / 21 / 2020  Discharged to: previous independent home  Cognitive Scores: BIMS: 4/15  Physical Function: uses a cane at home, w/c at the facility that she could propel, transfers self  DME: Home Oxygen  CODE STATUS/ADVANCE DIRECTIVES DISCUSSION:  Full Code     ALLERGIES: Crestor [rosuvastatin]; Hmg-coa-r inhibitors; Lisinopril; Optison [albumin human]; and Penicillins    DISCHARGE DIAGNOSIS/NURSING FACILITY COURSE:   (I21.4) NSTEMI (non-ST elevated myocardial infarction) (H)  (primary encounter diagnosis)  Comment: Preeti came for rehab for mobility after having a " hospital stay and NSTEMI.  Can see in today's visit she seems more alert and answering questions much easier and correct.  Is forgetful.  No complaints of chest pain.    Plan: remains on Plavix 75mg daily.      (I25.10) Coronary artery disease involving native coronary artery of native heart without angina pectoris  Comment: as noted above, could see she was getting stronger.  No complaints of chest discomfort.  Did have a cardiology visit on 5/13 and the Losartan appears to have been started at 12.5mg daily with a BMP in 1-2 weeks as well as follow up in 2 months with cardiology.    Is taking ASA 81mg daily, CoQ10 100mg daily and has lipitor 20mg at HS.  Nitroglycerin PRN for angina.    Plan: no changes.    (I50.21) Acute systolic congestive heart failure (H)  Comment: improved and no troubles breathing.  Remains on Cozaar 25mg (12.5mg) daily and metoprolol 6.25mg twice a day.  Blood pressures ranged from 100-140's/50-80's.    Weight between 97 - 103 lbs.    Plan: no changes.  Will be followed by home care as they can monitor her breathing and cardiac status.    (J44.1) COPD exacerbation, Chronic O2 Dependent  (F17.200) Smoker  Comment: came with the nicotine patch 7mg/day and to be changed daily.  Instructed to remove this if she starts smoking.  Continues with continuous oxygen and she is at 2L/min.  Does have pulmicort neb twice a day, singular 10mg daily, Mucinex 600mg twice a day, Ecruse Ellipta inhaler daily.  While at the [facility], did discontinue the duoneb and kept the PRN albuterol neb because that is what she used.  Did not discontinue this on her Epic medication list as she came out of the hospital with PRN Duoneb and Albuterol neb.    Plan: no changes with her medications.  Breathing is not labored.  She is very talkative and breathing is fine.  Prednisone taper should be done on 5/21.    (R91.1) Pulmonary nodule -- 1.9 cm RLL, new 5/2/20  Comment: is to have a pulmonary consult outpatient.  Spouse  can set that up or when she has her follow up with primary provider it can be discussed.  No appointment is [facility] in [facility] for pulmonary as of this discharge visit.    (Z86.73) History of stroke - right thalamus in 8/2013 and left cerebellar and right vermis in 3/2018  Comment: spouse has told the staff that Preeti has been confused since her stroke.  Today she seems more clear with her answer.  She knows she is going home and so mood is upbeat.  Staff assisted with cares and mobility.  States she typically ambulated with a cane at home.    Will be going home with home care services for smooth transition.  Remains on Plavix 75mg daily.    (R41.89) Cognitive impairment BIMS 4/15 on 5/12/2020  Comment: previously in her clinic record it was noted to be forgetful.  With a BIMS being done and score of 4/15, which shows cognitive impairment.  Will put this diagnosis down.    No medications.  No negative behaviors noted by staff like resistance to care or elopement.  Plan: lives with spouse whom is her caregiver.    Past Medical History:  has a past medical history of Arthritis, COPD (chronic obstructive pulmonary disease) (H), Coronary artery disease, CVA (cerebral vascular accident) (H) (8-), and Hypertension.  Discharge Medications:    Current Outpatient Medications   Medication Sig Dispense Refill     ACE/ARB/ARNI NOT PRESCRIBED, INTENTIONAL, Please choose reason not prescribed, below       acetaminophen (TYLENOL) 325 MG tablet Take 325 mg by mouth every 4 hours as needed for mild pain  100 tablet      albuterol (2.5 MG/3ML) 0.083% neb solution Take 1 vial by nebulization every 4 hours as needed for shortness of breath / dyspnea or wheezing       albuterol (PROAIR HFA/PROVENTIL HFA/VENTOLIN HFA) 108 (90 Base) MCG/ACT inhaler Inhale 2 puffs into the lungs every 6 hours as needed for shortness of breath / dyspnea 18 g 0     aspirin 81 MG EC tablet Take 1 tablet (81 mg) by mouth daily       atorvastatin (LIPITOR) 20  MG tablet Take 1 tablet (20 mg) by mouth At Bedtime 90 tablet 3     budesonide-formoterol (SYMBICORT) 160-4.5 MCG/ACT Inhaler INHALE TWO PUFFS BY MOUTH TWICE A DAY 10.2 g 5     Calcium Carbonate-Vitamin D (OSCAL 500/200 D-3 PO) Take 1 tablet by mouth 2 times daily       clopidogrel (PLAVIX) 75 MG tablet TAKE ONE TABLET BY MOUTH ONCE DAILY 90 tablet 3     Coenzyme Q10 (COQ10 PO) Take 100 mg by mouth every 24 hours (at 16:00)       guaiFENesin (MUCINEX) 600 MG 12 hr tablet Take 600 mg by mouth 2 times daily       ipratropium - albuterol 0.5 mg/2.5 mg/3 mL (DUONEB) 0.5-2.5 (3) MG/3ML neb solution Take 1 vial (3 mLs) by nebulization every 4 hours as needed for shortness of breath / dyspnea or wheezing (Patient not taking: Reported on 5/13/2020) 270 mL 3     losartan (COZAAR) 25 MG tablet Take 0.5 tablets (12.5 mg) by mouth daily 30 tablet 1     metoprolol tartrate (LOPRESSOR) 25 MG tablet Take 0.25 tablets (6.25 mg) by mouth 2 times daily       montelukast (SINGULAIR) 10 MG tablet Take 1 tablet (10 mg) by mouth At Bedtime 90 tablet 3     nicotine (NICODERM CQ) 7 MG/24HR 24 hr patch Place 1 patch onto the skin every 24 hours (Patient not taking: Reported on 5/13/2020) 30 patch 1     nitroGLYcerin (NITROSTAT) 0.4 MG sublingual tablet For chest pain place 1 tablet under the tongue every 5 minutes for 3 doses. If symptoms persist 5 minutes after 1st dose call 911. (Patient not taking: Reported on 5/13/2020) 25 tablet      Nutritional Supplements (BOOST) Take 1 Bottle by mouth 3 times daily (with meals) 90 each 0     order for DME Equipment being ordered: Nebulizer machine 1 Device 0     order for DME Equipment being ordered: Nebulizer 1 Device 0     polyethylene glycol (MIRALAX/GLYCOLAX) Packet Take 1 packet by mouth daily as needed for constipation       predniSONE (DELTASONE) 20 MG tablet Take 2 tablets (40 mg) by mouth daily for 3 days, THEN 1.5 tablets (30 mg) daily for 4 days, THEN 1 tablet (20 mg) daily for 4 days,  THEN 0.5 tablets (10 mg) daily for 4 days.       Respiratory Therapy Supplies (NEBULIZER/TUBING/MOUTHPIECE) KIT 1 kit as needed (if becomes damaged) 1 kit 1     senna-docusate (SENOKOT-S;PERICOLACE) 8.6-50 MG per tablet Take 1 tablet by mouth 2 times daily as needed for constipation 100 tablet      umeclidinium (INCRUSE ELLIPTA) 62.5 MCG/INH inhaler Inhale 1 puff into the lungs daily        Medication Changes/Rationale:     5/18/2020  OK to discharge home with medications.    Will be discharged with home care of choice:  RN for vitals, medication management, cardiac and lung assessment.  HHA for bathing, PT/OT to eval and treat.      Controlled medications sent with patient:   not applicable/none     ROS: 4 point ROS including Respiratory, CV, GI and , other than that noted in the HPI,  is negative    Physical Exam: Vitals: There were no vitals taken for this visit. BMI= There is no height or weight on file to calculate BMI.  Limited Visit exam done for COVID-19 precautions  GENERAL APPEARANCE:  Alert, in no distress, thin, cooperative  EYES:  Conjunctiva and lids normal  RESP:  no respiratory distress, oxygen at 2L/min, lung sounds diminished.  CV:  Palpation and auscultation of heart done , regular rate and rhythm, no murmur, rub, or gallop, no edema  ABDOMEN:  normal bowel sounds, soft, nontender, no hepatosplenomegaly or other masses, no guarding or rebound  M/S:   Gait and station abnormal wheelchair used at NH, self transfers.  using a cane for ambulation  SKIN:  pale, warm and dry.  no open areas  PSYCH:  oriented to oriented to person and place most of the time.  time is vague.     SNF labs:   Most Recent 3 CBC's:  Recent Labs   Lab Test 05/15/20  0700 05/05/20  0535 05/04/20  0534   WBC 5.7 5.6 6.3   HGB 13.7 13.4 14.2   MCV 93 94 93    202 201     Most Recent 3 BMP's:  Recent Labs   Lab Test 05/15/20  0700 05/05/20  0535 05/04/20  0534    142 136   POTASSIUM 4.1 4.2 4.5   CHLORIDE 102 106  101   CO2 35* 36* 34*   BUN 17 20 19   CR 0.63 0.57 0.59   ANIONGAP 4 <1* 1*   CALDERON 7.8* 8.2* 8.5   GLC 69* 94 96       DISCHARGE PLAN:    Follow up labs: No labs orders/due    Medical Follow Up:      Follow up with primary care provider in 1-2 weeks for continuity of care    Current [place] scheduled appointments:   spouse can make appointment with primary care provider    Discharge Services: Home Care:  Occupational Therapy, Physical Therapy, Registered Nurse and Home Health Aide    Discharge Instructions Verbalized to Patient at Discharge:     Weight bearing restrictions:  Weight bearing as tolerated    Use cane for ambulation for steadiness/balance.     TOTAL DISCHARGE TIME:   Greater than 30 minutes  Electronically signed by:  SPRING Barrera CNP     Video-Visit Details  Type of service:  Video Visit  Video End Time (time video stopped): 9:15am  Distant Location (provider location):  [place] [facility]     Home care face to face done with this visit but  out of this note so that Dr. Conrad able to sign off per request of home care agency

## 2020-05-18 NOTE — LETTER
"    5/18/2020        RE: Preeti Ford  [address]  [place] [address]        [facility] DISCHARGE SUMMARY  Preeti Ford is being evaluated via a billable video visit due to the restrictions of the Covid-19 pandemic.   The patient has been notified of following:  \"This video visit will be conducted via a call between you and a [facility] provider. We have found that certain health care needs can be provided without the need for an in-person physical exam.  This service lets us provide the care you need with a video conversation. If during the course of the call the provider feels a video visit is not appropriate, you will not be charged for this service.\"   The provider has received verbal consent for a Video Visit from the patient or family/first contact? Yes  Patient or /facility staff would like the video invitation sent by: N/A   Video Start Time: 8:45am    PATIENT'S NAME: Preeti Ford  YOB: 1950  MEDICAL RECORD NUMBER:  [id number]  Place of Location at the time of visit: [facility]   PRIMARY CARE PROVIDER AND CLINIC RESPONSIBLE AFTER TRANSFER:   SPRING Banegas CNP, [address]  / [place] [address] ;  [facility] Provider   Transferring providers: SPRING Barrera CNP, Deborah Thomson MD  Recent Hospitalization/ED:  [facility] Hospital stay 5/2/2020 to 5/7/2020.  Date of SNF Admission: May / 07 / 2020  Date of SNF (anticipated) Discharge: May / 21 / 2020  Discharged to: previous independent home  Cognitive Scores: BIMS: 4/15  Physical Function: uses a cane at home, w/c at the facility that she could propel, transfers self  DME: Home Oxygen  CODE STATUS/ADVANCE DIRECTIVES DISCUSSION:  Full Code     ALLERGIES: Crestor [rosuvastatin]; Hmg-coa-r inhibitors; Lisinopril; Optison [albumin human]; and Penicillins    DISCHARGE DIAGNOSIS/NURSING FACILITY COURSE:   (I21.4) NSTEMI (non-ST elevated myocardial infarction) (H)  " (primary encounter diagnosis)  Comment: Preeti came for rehab for mobility after having a hospital stay and NSTEMI.  Can see in today's visit she seems more alert and answering questions much easier and correct.  Is forgetful.  No complaints of chest pain.    Plan: remains on Plavix 75mg daily.      (I25.10) Coronary artery disease involving native coronary artery of native heart without angina pectoris  Comment: as noted above, could see she was getting stronger.  No complaints of chest discomfort.  Did have a cardiology visit on 5/13 and the Losartan appears to have been started at 12.5mg daily with a BMP in 1-2 weeks as well as follow up in 2 months with cardiology.    Is taking ASA 81mg daily, CoQ10 100mg daily and has lipitor 20mg at HS.  Nitroglycerin PRN for angina.    Plan: no changes.    (I50.21) Acute systolic congestive heart failure (H)  Comment: improved and no troubles breathing.  Remains on Cozaar 25mg (12.5mg) daily and metoprolol 6.25mg twice a day.  Blood pressures ranged from 100-140's/50-80's.    Weight between 97 - 103 lbs.    Plan: no changes.  Will be followed by home care as they can monitor her breathing and cardiac status.    (J44.1) COPD exacerbation, Chronic O2 Dependent  (F17.200) Smoker  Comment: came with the nicotine patch 7mg/day and to be changed daily.  Instructed to remove this if she starts smoking.  Continues with continuous oxygen and she is at 2L/min.  Does have pulmicort neb twice a day, singular 10mg daily, Mucinex 600mg twice a day, Ecruse Ellipta inhaler daily.  While at the [facility], did discontinue the duoneb and kept the PRN albuterol neb because that is what she used.  Did not discontinue this on her Epic medication list as she came out of the hospital with PRN Duoneb and Albuterol neb.    Plan: no changes with her medications.  Breathing is not labored.  She is very talkative and breathing is fine.  Prednisone taper should be done on 5/21.    (R91.1) Pulmonary nodule  -- 1.9 cm RLL, new 5/2/20  Comment: is to have a pulmonary consult outpatient.  Spouse can set that up or when she has her follow up with primary provider it can be discussed.  No appointment is seen in [facility] for pulmonary as of this discharge visit.    (Z86.73) History of stroke - right thalamus in 8/2013 and left cerebellar and right vermis in 3/2018  Comment: spouse has told the staff that Preeti has been confused since her stroke.  Today she seems more clear with her answer.  She knows she is going home and so mood is upbeat.  Staff assisted with cares and mobility.  States she typically ambulated with a cane at home.    Will be going home with home care services for smooth transition.  Remains on Plavix 75mg daily.    (R41.89) Cognitive impairment BIMS 4/15 on 5/12/2020  Comment: previously in her clinic record it was noted to be forgetful.  With a BIMS being done and score of 4/15, which shows cognitive impairment.  Will put this diagnosis down.    No medications.  No negative behaviors noted by staff like resistance to care or elopement.  Plan: lives with spouse whom is her caregiver.    Past Medical History:  has a past medical history of Arthritis, COPD (chronic obstructive pulmonary disease) (H), Coronary artery disease, CVA (cerebral vascular accident) (H) (8-), and Hypertension.  Discharge Medications:    Current Outpatient Medications   Medication Sig Dispense Refill     ACE/ARB/ARNI NOT PRESCRIBED, INTENTIONAL, Please choose reason not prescribed, below       acetaminophen (TYLENOL) 325 MG tablet Take 325 mg by mouth every 4 hours as needed for mild pain  100 tablet      albuterol (2.5 MG/3ML) 0.083% neb solution Take 1 vial by nebulization every 4 hours as needed for shortness of breath / dyspnea or wheezing       albuterol (PROAIR HFA/PROVENTIL HFA/VENTOLIN HFA) 108 (90 Base) MCG/ACT inhaler Inhale 2 puffs into the lungs every 6 hours as needed for shortness of breath / dyspnea 18 g 0      aspirin 81 MG EC tablet Take 1 tablet (81 mg) by mouth daily       atorvastatin (LIPITOR) 20 MG tablet Take 1 tablet (20 mg) by mouth At Bedtime 90 tablet 3     budesonide-formoterol (SYMBICORT) 160-4.5 MCG/ACT Inhaler INHALE TWO PUFFS BY MOUTH TWICE A DAY 10.2 g 5     Calcium Carbonate-Vitamin D (OSCAL 500/200 D-3 PO) Take 1 tablet by mouth 2 times daily       clopidogrel (PLAVIX) 75 MG tablet TAKE ONE TABLET BY MOUTH ONCE DAILY 90 tablet 3     Coenzyme Q10 (COQ10 PO) Take 100 mg by mouth every 24 hours (at 16:00)       guaiFENesin (MUCINEX) 600 MG 12 hr tablet Take 600 mg by mouth 2 times daily       ipratropium - albuterol 0.5 mg/2.5 mg/3 mL (DUONEB) 0.5-2.5 (3) MG/3ML neb solution Take 1 vial (3 mLs) by nebulization every 4 hours as needed for shortness of breath / dyspnea or wheezing (Patient not taking: Reported on 5/13/2020) 270 mL 3     losartan (COZAAR) 25 MG tablet Take 0.5 tablets (12.5 mg) by mouth daily 30 tablet 1     metoprolol tartrate (LOPRESSOR) 25 MG tablet Take 0.25 tablets (6.25 mg) by mouth 2 times daily       montelukast (SINGULAIR) 10 MG tablet Take 1 tablet (10 mg) by mouth At Bedtime 90 tablet 3     nicotine (NICODERM CQ) 7 MG/24HR 24 hr patch Place 1 patch onto the skin every 24 hours (Patient not taking: Reported on 5/13/2020) 30 patch 1     nitroGLYcerin (NITROSTAT) 0.4 MG sublingual tablet For chest pain place 1 tablet under the tongue every 5 minutes for 3 doses. If symptoms persist 5 minutes after 1st dose call 911. (Patient not taking: Reported on 5/13/2020) 25 tablet      Nutritional Supplements (BOOST) Take 1 Bottle by mouth 3 times daily (with meals) 90 each 0     order for DME Equipment being ordered: Nebulizer machine 1 Device 0     order for DME Equipment being ordered: Nebulizer 1 Device 0     polyethylene glycol (MIRALAX/GLYCOLAX) Packet Take 1 packet by mouth daily as needed for constipation       predniSONE (DELTASONE) 20 MG tablet Take 2 tablets (40 mg) by mouth daily for  3 days, THEN 1.5 tablets (30 mg) daily for 4 days, THEN 1 tablet (20 mg) daily for 4 days, THEN 0.5 tablets (10 mg) daily for 4 days.       Respiratory Therapy Supplies (NEBULIZER/TUBING/MOUTHPIECE) KIT 1 kit as needed (if becomes damaged) 1 kit 1     senna-docusate (SENOKOT-S;PERICOLACE) 8.6-50 MG per tablet Take 1 tablet by mouth 2 times daily as needed for constipation 100 tablet      umeclidinium (INCRUSE ELLIPTA) 62.5 MCG/INH inhaler Inhale 1 puff into the lungs daily        Medication Changes/Rationale:     5/18/2020  OK to discharge home with medications.    Will be discharged with home care of choice:  RN for vitals, medication management, cardiac and lung assessment.  HHA for bathing, PT/OT to eval and treat.      Controlled medications sent with patient:   not applicable/none     ROS: 4 point ROS including Respiratory, CV, GI and , other than that noted in the HPI,  is negative    Physical Exam: Vitals: There were no vitals taken for this visit. BMI= There is no height or weight on file to calculate BMI.  Limited Visit exam done for COVID-19 precautions  GENERAL APPEARANCE:  Alert, in no distress, thin, cooperative  EYES:  Conjunctiva and lids normal  RESP:  no respiratory distress, oxygen at 2L/min, lung sounds diminished.  CV:  Palpation and auscultation of heart done , regular rate and rhythm, no murmur, rub, or gallop, no edema  ABDOMEN:  normal bowel sounds, soft, nontender, no hepatosplenomegaly or other masses, no guarding or rebound  M/S:   Gait and station abnormal wheelchair used at NH, self transfers.  using a cane for ambulation  SKIN:  pale, warm and dry.  no open areas  PSYCH:  oriented to oriented to person and place most of the time.  time is vague.     SNF labs:   Most Recent 3 CBC's:  Recent Labs   Lab Test 05/15/20  0700 05/05/20  0535 05/04/20  0534   WBC 5.7 5.6 6.3   HGB 13.7 13.4 14.2   MCV 93 94 93    202 201     Most Recent 3 BMP's:  Recent Labs   Lab Test 05/15/20  0700  05/05/20  0535 05/04/20  0534    142 136   POTASSIUM 4.1 4.2 4.5   CHLORIDE 102 106 101   CO2 35* 36* 34*   BUN 17 20 19   CR 0.63 0.57 0.59   ANIONGAP 4 <1* 1*   CALDERON 7.8* 8.2* 8.5   GLC 69* 94 96       DISCHARGE PLAN:    Follow up labs: No labs orders/due    Medical Follow Up:      Follow up with primary care provider in 1-2 weeks for continuity of care    Current [place] scheduled appointments:   spouse can make appointment with primary care provider    Discharge Services: Home Care:  Occupational Therapy, Physical Therapy, Registered Nurse and Home Health Aide    Discharge Instructions Verbalized to Patient at Discharge:     Weight bearing restrictions:  Weight bearing as tolerated    Use cane for ambulation for steadiness/balance.     TOTAL DISCHARGE TIME:   Greater than 30 minutes  Electronically signed by:  SPRING Barrera CNP     Video-Visit Details  Type of service:  Video Visit  Video End Time (time video stopped): 9:15am  Distant Location (provider location):  [facility]     Home care face to face done with this visit but  out of this note so that Dr. Conrad able to sign off per request of home care agency                      Documentation of Face-to-Face and Certification for Home Health Services     Patient: Preeti Ford   YOB: 1950  MR Number: [id number]  Today's Date: 5/18/2020    I certify that patient: Preeti Ford is under my care and that I, or a nurse practitioner or physician's assistant working with me, had a face-to-face encounter that meets the physician face-to-face encounter requirements with this patient on: 5/18/2020.    This encounter with the patient was in whole, or in part, for the following medical condition, which is the primary reason for home health care: NSTEMI, CAD, COPD exacerbation, Heart failure, smoker.    I certify that, based on my findings, the following services are medically necessary home health  services: Nursing, Occupational Therapy and Physical Therapy.    My clinical findings support the need for the above services because: Nurse is needed: To assess vitals, medications, lung status and cardiac status after changes in medications or other medical regimen.., Occupational Therapy Services are needed to assess and treat cognitive ability and address ADL safety due to impairment in upper body strength, ADLs. and Physical Therapy Services are needed to assess and treat the following functional impairments: mobility, balance, endurance.    Further, I certify that my clinical findings support that this patient is homebound (i.e. absences from home require considerable and taxing effort and are for medical reasons or [employer] services or infrequently or of short duration when for other reasons) because: Requires assistance of another person or specialized equipment to access medical services because patient: Is unable to exit home safely on own due to: oxygen use, device for mobility...    Based on the above findings. I certify that this patient is confined to the home and needs intermittent skilled nursing care, physical therapy and/or speech therapy.  The patient is under my care, and I have initiated the establishment of the plan of care.  This patient will be followed by a physician who will periodically review the plan of care.  Physician/Provider to provide follow up care: Deisy Carter    Responsible Medicare certified PECOS Physician: Deborah Conrad MD  Physician Signature:   Electronically signed:  Deborah Conrad MD  Date: 5/18/2020        Sincerely,        SPRING Barrera CNP

## 2020-05-19 ENCOUNTER — TELEPHONE (OUTPATIENT)
Dept: FAMILY MEDICINE | Facility: OTHER | Age: 70
End: 2020-05-19

## 2020-05-19 NOTE — TELEPHONE ENCOUNTER
Reason for Call:  Form, our goal is to have forms completed with 72 hours, however, some forms may require a visit or additional information.    Type of letter, form or note:  medical    Who is the form from?: Home care    Where did the form come from: form was faxed in    What clinic location was the form placed at?: [facility] - [phone]    Where the form was placed: box Box/Folder    What number is listed as a contact on the form?: fax [phone]       Additional comments: Please complete and fax    Call taken on 5/19/2020 at 2:20 PM by Summer Jc

## 2020-05-26 ENCOUNTER — TELEPHONE (OUTPATIENT)
Dept: FAMILY MEDICINE | Facility: OTHER | Age: 70
End: 2020-05-26

## 2020-05-26 ENCOUNTER — TELEPHONE (OUTPATIENT)
Dept: INTERVENTIONAL RADIOLOGY/VASCULAR | Facility: CLINIC | Age: 70
End: 2020-05-26

## 2020-05-26 DIAGNOSIS — I10 BENIGN ESSENTIAL HYPERTENSION: ICD-10-CM

## 2020-05-26 RX ORDER — LOSARTAN POTASSIUM 25 MG/1
12.5 TABLET ORAL DAILY
Qty: 30 TABLET | Refills: 3 | Status: SHIPPED | OUTPATIENT
Start: 2020-05-26 | End: 2020-07-28

## 2020-05-26 NOTE — TELEPHONE ENCOUNTER
Reason for Call:  Form, our goal is to have forms completed with 72 hours, however, some forms may require a visit or additional information.    Type of letter, form or note:  medical    Who is the form from?: Home care    Where did the form come from: form was faxed in    What clinic location was the form placed at?: [facility] - [phone]    Where the form was placed: box Box/Folder    What number is listed as a contact on the form?: fax [phone]       Additional comments: Please complete and fax    Call taken on 5/26/2020 at 8:46 AM by Summer Jc

## 2020-05-26 NOTE — TELEPHONE ENCOUNTER
Request for RLL lung biopsy approved by yvonne Monroy 5/.26/20 She takes Plavix and that will need to be stopped. She was IP at [facility] 5/2/20 and had H&P then and she has had several virtual visits since that time. She may need heart and lung assessment at time of biopsy if over 30 days. Scheduling was made aware of the approval.

## 2020-05-26 NOTE — TELEPHONE ENCOUNTER
Reason for Call:  Medication or medication refill:    Do you use a [facility]?  Name of the pharmacy and phone number for the current request:  [facility] - [phone]    Name of the medication requested: losartan (COZAAR) 25 MG tablet     Other request: Please send this to [facility] pharmacy.  Previous one went to wrong pharmacy.      Can we leave a detailed message on this number? YES    Phone number patient can be reached at: Other phone number:  [phone]    Best Time: any    Call taken on 5/26/2020 at 2:15 PM by Summer Jc

## 2020-05-26 NOTE — TELEPHONE ENCOUNTER
Reason for Call:  Other call back    Detailed comments: Kika from [facility] called clinic asking for verbal orders for this patient. Orders needed for: home care for nursing 2x/week, 3 visit PRN, & speech therapy. [phone]    Phone Number Patient can be reached at: Home number on file [phone] (home)    Best Time: any    Can we leave a detailed message on this number? YES    Call taken on 5/26/2020 at 10:17 AM by Samuel Jang

## 2020-05-26 NOTE — TELEPHONE ENCOUNTER
Reason for Call:  Other call back    Detailed comments: Beba from [facility] called clinic requesting verbal orders for: speech therapy 1x/week for 4 weeks starting 5/31/20 for aphagia therapy. [phone], okay to leave voicemail.     Phone Number Patient can be reached at: Home number on file [phone] (home)    Best Time: any    Can we leave a detailed message on this number? YES    Call taken on 5/26/2020 at 3:52 PM by Samuel Jang

## 2020-05-26 NOTE — TELEPHONE ENCOUNTER
Called pt to ask about Plavix and ASA status and pt was unaware of any request for a biopsy. This RN immediately called the office and they will call the patient to answer questions and explain what is to be done. Biopsy is approved to be done after ASA and Plavix hold.

## 2020-05-28 ENCOUNTER — TELEPHONE (OUTPATIENT)
Dept: FAMILY MEDICINE | Facility: OTHER | Age: 70
End: 2020-05-28

## 2020-05-28 NOTE — TELEPHONE ENCOUNTER
Reason for Call:  Form, our goal is to have forms completed with 72 hours, however, some forms may require a visit or additional information.    Type of letter, form or note:  medical    Who is the form from?: Home care    Where did the form come from: form was faxed in    What clinic location was the form placed at?: [facility] - [phone]    Where the form was placed: TEAM B Box/Folder    What number is listed as a contact on the form?: [phone]       Additional comments: PLEASE HAVE MD SIGN AND FAX BACK TO: [phone]    Call taken on 5/28/2020 at 8:10 AM by Samuel Jang

## 2020-05-28 NOTE — TELEPHONE ENCOUNTER
LMTC for Kika please advise her of message below thanks  Arabella Palmer RT (R)         John for verbal orders as written.  Deisy Carter, DRE-C

## 2020-05-29 NOTE — TELEPHONE ENCOUNTER
Per Kika she states she called back and someone had relayed message below  Closing encounter  Arabella Palmer RT (R)

## 2020-06-04 ENCOUNTER — TELEPHONE (OUTPATIENT)
Dept: FAMILY MEDICINE | Facility: OTHER | Age: 70
End: 2020-06-04

## 2020-06-04 ENCOUNTER — MEDICAL CORRESPONDENCE (OUTPATIENT)
Dept: HEALTH INFORMATION MANAGEMENT | Facility: CLINIC | Age: 70
End: 2020-06-04

## 2020-06-04 NOTE — TELEPHONE ENCOUNTER
Reason for Call:  Form, our goal is to have forms completed with 72 hours, however, some forms may require a visit or additional information.    Type of letter, form or note:  medical    Who is the form from?: Patient    Where did the form come from: form was faxed in    What clinic location was the form placed at?: [facility] - [phone]    Where the form was placed: Team B Box/Folder    What number is listed as a contact on the form?: [phone]       Additional comments: Fax when complete to [phone]. Valerie Patient    Call taken on 6/4/2020 at 7:15 AM by Letty Hernandez

## 2020-06-04 NOTE — TELEPHONE ENCOUNTER
Our goal is to have forms completed with 72 hours, however some forms may require a visit or additional information.    Who is the form from?: [facility] (if other please explain)  Where the form came from: form was faxed in  What clinic location was the form placed at?: [facility]  Where the form was placed: mailbox  What number is listed as a contact on the form?: [phone]    Phone call message- patient request for a letter, form or note:    Date needed: as soon as possible  Please fax to [phone]  Has the patient signed a consent form for release of information? NO    Additional comments:     Call taken on 6/4/2020 at 2:57 PM by Rowena Douglass    Type of letter, form or note: medical

## 2020-06-08 ENCOUNTER — TELEPHONE (OUTPATIENT)
Dept: FAMILY MEDICINE | Facility: OTHER | Age: 70
End: 2020-06-08

## 2020-06-08 NOTE — TELEPHONE ENCOUNTER
Reason for Call:  Form, our goal is to have forms completed with 72 hours, however, some forms may require a visit or additional information.    Type of letter, form or note:  medical    Who is the form from?: Home care    Where did the form come from: form was faxed in    What clinic location was the form placed at?: [facility] - [phone]    Where the form was placed: box Box/Folder    What number is listed as a contact on the form?: fax [phone]       Additional comments: Please complete and fax    Call taken on 6/8/2020 at 10:13 AM by Summer Jc

## 2020-06-10 ENCOUNTER — TELEPHONE (OUTPATIENT)
Dept: FAMILY MEDICINE | Facility: OTHER | Age: 70
End: 2020-06-10

## 2020-06-10 ENCOUNTER — VIRTUAL VISIT (OUTPATIENT)
Dept: SLEEP MEDICINE | Facility: CLINIC | Age: 70
End: 2020-06-10
Attending: INTERNAL MEDICINE
Payer: COMMERCIAL

## 2020-06-10 DIAGNOSIS — J96.12 CHRONIC RESPIRATORY FAILURE WITH HYPOXIA AND HYPERCAPNIA (H): ICD-10-CM

## 2020-06-10 DIAGNOSIS — J96.11 CHRONIC RESPIRATORY FAILURE WITH HYPOXIA AND HYPERCAPNIA (H): ICD-10-CM

## 2020-06-10 PROCEDURE — 99204 OFFICE O/P NEW MOD 45 MIN: CPT | Mod: TEL | Performed by: INTERNAL MEDICINE

## 2020-06-10 NOTE — PATIENT INSTRUCTIONS
Noninvasive ventilator set up with oxygen  Home oximetry on this device  Telemedicine follow-up within 1 to 2 weeks of device set up

## 2020-06-10 NOTE — TELEPHONE ENCOUNTER
Reason for call:  Form  Reason for Call:  Form, our goal is to have forms completed with 72 hours, however, some forms may require a visit or additional information.    Type of letter, form or note:  Home Health Certification    Who is the form from?: Home care    Where did the form come from: form was faxed in    What clinic location was the form placed at?: [facility] - [phone]    Where the form was placed: X Box/Folder    What number is listed as a contact on the form?: [phone]       Additional comments: complete and fax to: [phone]    Call taken on 6/10/2020 at 3:55 PM by Suzanne Andrews

## 2020-06-10 NOTE — PROGRESS NOTES
"Preeti Ford is a 69 year old female who is being evaluated via a billable telephone visit.      The patient has been notified of following:     \"This telephone visit will be conducted via a call between you and your physician/provider. We have found that certain health care needs can be provided without the need for a physical exam.  This service lets us provide the care you need with a short phone conversation.  If a prescription is necessary we can send it directly to your pharmacy.  If lab work is needed we can place an order for that and you can then stop by our lab to have the test done at a later time.    Telephone visits are billed at different rates depending on your insurance coverage. During this emergency period, for some insurers they may be billed the same as an in-person visit.  Please reach out to your insurance provider with any questions.    If during the course of the call the physician/provider feels a telephone visit is not appropriate, you will not be charged for this service.\"    Patient has given verbal consent for Telephone visit?  Yes      How would you like to obtain your AVS? Mail a copy    Phone call duration: 40 minutes                [facility]   Outpatient Sleep Medicine Consultation  Vijaya 10, 2020      Name: Preeti Ford MRN# [id number]   Age: 69 year old YOB: 1950     Date of Consultation: Vijaya 10, 2020  Consultation is requested by: Soren Robles MD   [facility]  [address] [place] [place] [address] Soren Robles  Primary care provider: Deisy Carter         Reason for Sleep Consult:     Preeti Ford is a 69 year old female referred after an inpatient hospitalization for consideration of noninvasive ventilatory support for rescue during the day and for nighttime use in the setting of severe chronic and recently acute respiratory failure.         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     Hypertension    Coronary artery disease and peripheral vascular disease    Patient is ambulatory in the home and does her own self cares    Summary Recommendations:      Average volume assured ventilatory support by full facemask using noninvasive ventilator with oxygen 1 L/min with EHR integration    Follow-up telemedicine visit within 2 weeks        Noninvasive ventilatory support requirement: Noninvasive ventilatory support is required for patients such as this with with chronic respiratory failure secondary to chronic obstructive pulmonary disease with intermittent respiratory distress occurring both at night and during the day.  This allows for the use of mouthpiece ventilation during the day during periods of respiratory distress that accompany exacerbations of disease.  Disease exacerbations typically last for 2 to 3 weeks with drop in airflows and worsening severity of disease night and day.  This patient has profound hypercapnic respiratory failure and may need to progress to continuous ventilatory support respiratory assist devices such as BiPAP are primarily targeted for nighttime use with insufficient mode flexibility to allow alterations in mode or mouthpiece ventilation during the day.  She will need respiratory assistant during periods of exacerbations and will required operability for alternative modes as her disease progresses.             History of Present Illness:     Preeti Ford is a 69 year old female who was unable to speak clearly due to stroke 2/2020. Her  provided answers to questions which patient responded to during the interview. She denies difficulty  initiating or maintaining sleep. Her  indicates the visit was prompted by a hospitalization 2/2020 for stroke and hypercapnic respiratory failure requiring bilevel PAP [PCO2 64-81 mmHg]. She has a history of severe COPD with FeV1 28% predicted in 2003.  Her hospitalization was prompted by ambient exposure to dust due to construction near her house.  She became progressively more dyspneic and was admitted in respiratory failure.  Normally she has difficulty with dyspnea with exertion and does have morning headaches.  She has dysarthria and a history of cerebrovascular disease with multiple infarcts.          SLEEP-WAKE SCHEDULE:   9:30 bedtime with sleep in minutes with infrequent movements  6:00 am awakening spontaneaously  No napping       SLEEP COMPLAINTS:  Cardio-respiratory    Snoring- 1/week without apneas  Dyspnea- denies  Morning headaches or confusion-denies  Coexisting Lung disease: denies    Coexisting Heart disease: denies    Does patient have a bed partner: denies  Has bed partner been sleeping separately because of snoring:  denies            RLS Screen: When you try to relax in the evening or sleep at  night, do you ever have unpleasant, restless feelings in your  legs that can be relieved by walking or movement? denies    Periodic limb movement: denies               Medications:     Current Outpatient Medications   Medication Sig     ACE/ARB/ARNI NOT PRESCRIBED, INTENTIONAL, Please choose reason not prescribed, below     acetaminophen (TYLENOL) 325 MG tablet Take 325 mg by mouth every 4 hours as needed for mild pain      albuterol (2.5 MG/3ML) 0.083% neb solution Take 1 vial by nebulization every 4 hours as needed for shortness of breath / dyspnea or wheezing     albuterol (PROAIR HFA/PROVENTIL HFA/VENTOLIN HFA) 108 (90 Base) MCG/ACT inhaler Inhale 2 puffs into the lungs every 6 hours as needed for shortness of breath / dyspnea     aspirin 81 MG EC tablet Take 1 tablet (81 mg) by mouth daily      atorvastatin (LIPITOR) 20 MG tablet Take 1 tablet (20 mg) by mouth At Bedtime     budesonide-formoterol (SYMBICORT) 160-4.5 MCG/ACT Inhaler INHALE TWO PUFFS BY MOUTH TWICE A DAY     Calcium Carbonate-Vitamin D (OSCAL 500/200 D-3 PO) Take 1 tablet by mouth 2 times daily     clopidogrel (PLAVIX) 75 MG tablet TAKE ONE TABLET BY MOUTH ONCE DAILY     Coenzyme Q10 (COQ10 PO) Take 100 mg by mouth every 24 hours (at 16:00)     guaiFENesin (MUCINEX) 600 MG 12 hr tablet Take 600 mg by mouth 2 times daily     ipratropium - albuterol 0.5 mg/2.5 mg/3 mL (DUONEB) 0.5-2.5 (3) MG/3ML neb solution Take 1 vial (3 mLs) by nebulization every 4 hours as needed for shortness of breath / dyspnea or wheezing (Patient not taking: Reported on 5/13/2020)     losartan (COZAAR) 25 MG tablet Take 0.5 tablets (12.5 mg) by mouth daily     metoprolol tartrate (LOPRESSOR) 25 MG tablet Take 0.25 tablets (6.25 mg) by mouth 2 times daily     montelukast (SINGULAIR) 10 MG tablet Take 1 tablet (10 mg) by mouth At Bedtime     nicotine (NICODERM CQ) 7 MG/24HR 24 hr patch Place 1 patch onto the skin every 24 hours (Patient not taking: Reported on 5/13/2020)     nitroGLYcerin (NITROSTAT) 0.4 MG sublingual tablet For chest pain place 1 tablet under the tongue every 5 minutes for 3 doses. If symptoms persist 5 minutes after 1st dose call 911. (Patient not taking: Reported on 5/13/2020)     Nutritional Supplements (BOOST) Take 1 Bottle by mouth 3 times daily (with meals)     order for DME Equipment being ordered: Nebulizer machine     order for DME Equipment being ordered: Nebulizer     polyethylene glycol (MIRALAX/GLYCOLAX) Packet Take 1 packet by mouth daily as needed for constipation     Respiratory Therapy Supplies (NEBULIZER/TUBING/MOUTHPIECE) KIT 1 kit as needed (if becomes damaged)     senna-docusate (SENOKOT-S;PERICOLACE) 8.6-50 MG per tablet Take 1 tablet by mouth 2 times daily as needed for constipation     umeclidinium (INCRUSE ELLIPTA) 62.5  MCG/INH inhaler Inhale 1 puff into the lungs daily     No current facility-administered medications for this visit.      Facility-Administered Medications Ordered in Other Visits   Medication     Reason aspirin not prescribed (intentional)        Allergies   Allergen Reactions     Crestor [Rosuvastatin] Other (See Comments)     dizziness     Hmg-Coa-R Inhibitors Other (See Comments)     Muscle/joint aching     Lisinopril Cough     Optison [Albumin Human] Other (See Comments)     Pt was flush and very dizzy.  Also had a BP drop     Penicillins Rash            Past Medical History:       Past Medical History:   Diagnosis Date     Arthritis      COPD (chronic obstructive pulmonary disease) (H)      Coronary artery disease      CVA (cerebral vascular accident) (H) 8-     Hypertension              Past Surgical History:      Past Surgical History:   Procedure Laterality Date     APPENDECTOMY       BYPASS GRAFT AORTOILIAC N/A 3/7/2017    Procedure: BYPASS GRAFT AORTOILIAC;  Surgeon: Marcos Pratt MD;  Location: [facility] OR     SALPINGO OOPHORECTOMY,R/L/DELORES      Salpingo Oophorectomy, RT/LT/DELORES            Social History:     Social History     Tobacco Use     Smoking status: Current Every Day Smoker     Packs/day: 0.50     Types: Cigarettes     Smokeless tobacco: Never Used     Tobacco comment: patient states she is working on it    Substance Use Topics     Alcohol use: No     Alcohol/week: 0.0 standard drinks     This patient lives with her  and they currently do not have           Family History:     Family History   Problem Relation Age of Onset     Cerebrovascular Disease Mother      Cerebrovascular Disease Father      Genitourinary Problems Brother         Dialysis               Review of Systems:     A complete 10 point review of systems was negative other than HPI or as commented below:    Speech pathologist evaluated this patient and she does not have swallowing difficulty.  She denies chronic  pain  There is no difficulty initiating or maintaining sleep           Physical Examination:     Objective   Reported vitals:  There were no vitals taken for this visit.   Patient is dysarthric though responded coherently with some understandable responses.  She appeared to be oriented to time and place with appropriate responses.            Data: All pertinent previous laboratory data reviewed     Lab Results   Component Value Date    PH 7.24 (L) 05/02/2020    PH 7.25 (L) 05/02/2020    PO2 250 (H) 05/02/2020    PO2 180 (H) 05/02/2020    PCO2 72 (H) 05/02/2020    PCO2 68 (H) 05/02/2020    HCO3 31 (H) 05/02/2020    HCO3 30 (H) 05/02/2020    OLIVIA 0.9 05/02/2020    OLIVIA 0.1 05/02/2020     Lab Results   Component Value Date    TSH 1.05 08/18/2013    TSH 0.86 02/16/2011     Lab Results   Component Value Date    GLC 69 (L) 05/15/2020    GLC 94 05/05/2020     Lab Results   Component Value Date    HGB 13.7 05/15/2020    HGB 13.4 05/05/2020     Lab Results   Component Value Date    BUN 17 05/15/2020    BUN 20 05/05/2020    CR 0.63 05/15/2020    CR 0.57 05/05/2020     Lab Results   Component Value Date    AST 30 05/02/2020    AST 30 12/11/2019    ALT 34 05/02/2020    ALT 40 12/11/2019    ALKPHOS 78 05/02/2020    ALKPHOS 76 12/11/2019    BILITOTAL 0.4 05/02/2020    BILITOTAL 0.7 12/11/2019     No results found for: UAMP, UBARB, BENZODIAZEUR, UCANN, UCOC, OPIT, UPCP      Copy to: Deisy Carter MD 6/10/2020     Total time spent with patient: 45min>50% counseling      DME (Durable Medical Equipment) Orders and Documentation  Orders Placed This Encounter   Procedures     Non-Invasive Vent Order (NIV)      The patient was assessed and it was determined the patient is in need of the following listed DME Supplies/Equipment. Please complete supporting documentation below to demonstrate medical necessity.      Ventilation Documentation  In effort to reduce and/or prevent hospitalizations and emergency room  visits, we are recommending a(n) Non Invasive Ventilator for home use. Preeti Ford has had numerous hospitalizations recently due to chronic respiratory failure. The patient would benefit from Non Invasive Ventilator with portability for continuous support at night and recovery breathing during the day  Last Arterial Blood Gas:  Recent Labs   Lab Test 05/06/20  0532 05/04/20  0534 05/02/20  2135 05/02/20  1756  05/19/18  1400   PH ARTERIAL  --   --  7.24* 7.25*  --  7.39   PCO2 ARTERIAL  --   --  72* 68*  --  60*   PO2 ARTERIAL  --   --  250* 180*  --  96   BICARBONATE ARTERIAL  --   --  31* 30*  --  36*   BASE EXCESS ART  --   --  0.9 0.1  --  8.7   FIO2 2L PER RN 2lpm  --  8L   < > VENT 35%    < > = values in this interval not displayed.     Last Venous Blood Gas:  Recent Labs   Lab Test 05/06/20  0532 05/04/20  0534 05/03/20  0358 05/02/20  1756   PH VENOUS 7.35 7.28* 7.32  --    PCO2 VENOUS 72* 81* 64*  --    PO2 VENOUS 62* 43 49*  --    BICARBONATE VENOUS 40* 38* 33*  --    BASE EXCESS VENOUS 11.3 7.2 4.2  --    FIO2 2L PER RN 2lpm  --  8L

## 2020-06-11 ENCOUNTER — TELEPHONE (OUTPATIENT)
Dept: FAMILY MEDICINE | Facility: OTHER | Age: 70
End: 2020-06-11

## 2020-06-11 NOTE — TELEPHONE ENCOUNTER
Reason for Call:  Form, our goal is to have forms completed with 72 hours, however, some forms may require a visit or additional information.     Type of letter, form or note:  medical     Who is the form from?: [facility]     Where did the form come from: form was faxed in     What clinic location was the form placed at?: [facility] - [phone]     Where the form was placed: [employer] [place]  Box/Folder     What number is listed as a contact on the form?: [phone]       Additional comments: Please sign last page and return fax to [phone]     Call taken on 6/11/2020 at 2:34 PM by Tyra Parrish

## 2020-06-12 NOTE — TELEPHONE ENCOUNTER
Scheduled for telephone visit 6/15, they do not have video capability  Thanks  Arabella Palmer RT (R)

## 2020-06-12 NOTE — PROGRESS NOTES
"Preeti Ford is a 69 year old female who is being evaluated via a billable telephone visit.      The patient has been notified of following:     \"This telephone visit will be conducted via a call between you and your physician/provider. We have found that certain health care needs can be provided without the need for a physical exam.  This service lets us provide the care you need with a short phone conversation.  If a prescription is necessary we can send it directly to your pharmacy.  If lab work is needed we can place an order for that and you can then stop by our lab to have the test done at a later time.    Telephone visits are billed at different rates depending on your insurance coverage. During this emergency period, for some insurers they may be billed the same as an in-person visit.  Please reach out to your insurance provider with any questions.    If during the course of the call the physician/provider feels a telephone visit is not appropriate, you will not be charged for this service.\"    Patient has given verbal consent for Telephone visit?  Yes    What phone number would you like to be contacted at? [phone]    How would you like to obtain your AVS? Mail a copy    Subjective     Preeti Ford is a 69 year old female who presents via phone visit today for the following health issues:    HPI     FORMS  Pt has forms to be completed with provider    Was in hospital for NSTEMI and respiratory failure. She is home now and doing pretty good. The nebulizer seems to be the most helpful for breathing. Is on O2 1 liter at night. They are in the process of getting a bipap to wear at night hoping this will flores of future episodes of respiratory failure. She is Symbicort 160/4.5 two puffs twice daily, Singulair, Incruse Ellipta. Smoking \"one here a there\" but just a few puffs then puts it out.   /60 at home today. Had nodule on CT suspicious for cancer but she does not want to do anything about " this. She wants no further work-up as she states she would not want treatment.   Lost a dog yesterday so she is sad about that. Struggling financially to afford medications.  is caring for her.   [facility] home [facility] currently involved.      Patient Active Problem List   Diagnosis     Urinary incontinence     Forgetfulness     Hyperlipidemia LDL goal <130     History of stroke - right thalamus in 8/2013 and left cerebellar and right vermis in 3/2018     Numbness and tingling     Smoker     CVA (cerebral vascular accident) (H)     ACS (acute coronary syndrome) (H)     Ischemic cardiomyopathy - EF 25% in 2015 but 55% in 3/2017 and 45-50% on 3/18     HTN, goal below 130/80     Acute systolic congestive heart failure (H)     Pulmonary nodule -- 1.9 cm RLL, new 5/2/20     GERD (gastroesophageal reflux disease)     NSTEMI (non-ST elevated myocardial infarction) (H)     Chronic bronchitis, unspecified chronic bronchitis type (H)     PAD (peripheral artery disease) (H) - moderate left common carotid stenosis, aortoiliac bypass, chronic left vertebral and right posterior cerebral stenoses     Cervical high risk HPV (human papillomavirus) test positive     COPD exacerbation, Chronic O2 Dependent     Coronary artery disease involving native coronary artery - STEMI with LAD and circ stents in 8/2015     Elevated troponin     Pulmonary emphysema (H)     Vertigo     Other seizures (H) - possible     Acute CVA (cerebrovascular accident) - right paramedian superior vermis and left cerebellar hemisphere     Malnutrition, unspecified type (H)     Hyponatremia     Hypochloremia     Past Surgical History:   Procedure Laterality Date     APPENDECTOMY       BYPASS GRAFT AORTOILIAC N/A 3/7/2017    Procedure: BYPASS GRAFT AORTOILIAC;  Surgeon: Marcos Pratt MD;  Location: [facility] OR     SALPINGO OOPHORECTOMY,R/L/DELORES      Salpingo Oophorectomy, RT/LT/DELORES       Social History     Tobacco Use     Smoking status: Current Every Day  Smoker     Packs/day: 0.50     Types: Cigarettes     Smokeless tobacco: Never Used     Tobacco comment: patient states she is working on it    Substance Use Topics     Alcohol use: No     Alcohol/week: 0.0 standard drinks     Family History   Problem Relation Age of Onset     Cerebrovascular Disease Mother      Cerebrovascular Disease Father      Genitourinary Problems Brother         Dialysis         Current Outpatient Medications   Medication Sig Dispense Refill     ACE/ARB/ARNI NOT PRESCRIBED, INTENTIONAL, Please choose reason not prescribed, below       acetaminophen (TYLENOL) 325 MG tablet Take 325 mg by mouth every 4 hours as needed for mild pain  100 tablet      albuterol (PROAIR HFA/PROVENTIL HFA/VENTOLIN HFA) 108 (90 Base) MCG/ACT inhaler Inhale 2 puffs into the lungs every 6 hours as needed for shortness of breath / dyspnea 18 g 0     aspirin 81 MG EC tablet Take 1 tablet (81 mg) by mouth daily       atorvastatin (LIPITOR) 20 MG tablet Take 1 tablet (20 mg) by mouth At Bedtime 90 tablet 3     budesonide-formoterol (SYMBICORT) 160-4.5 MCG/ACT Inhaler INHALE TWO PUFFS BY MOUTH TWICE A DAY 10.2 g 5     Calcium Carbonate-Vitamin D (OSCAL 500/200 D-3 PO) Take 1 tablet by mouth 2 times daily       clopidogrel (PLAVIX) 75 MG tablet TAKE ONE TABLET BY MOUTH ONCE DAILY 90 tablet 3     Coenzyme Q10 (COQ10 PO) Take 100 mg by mouth every 24 hours (at 16:00)       guaiFENesin (MUCINEX) 600 MG 12 hr tablet Take 600 mg by mouth 2 times daily       ipratropium - albuterol 0.5 mg/2.5 mg/3 mL (DUONEB) 0.5-2.5 (3) MG/3ML neb solution Take 1 vial (3 mLs) by nebulization every 4 hours as needed for shortness of breath / dyspnea or wheezing 270 mL 3     losartan (COZAAR) 25 MG tablet Take 0.5 tablets (12.5 mg) by mouth daily 30 tablet 3     montelukast (SINGULAIR) 10 MG tablet Take 1 tablet (10 mg) by mouth At Bedtime 90 tablet 3     nitroGLYcerin (NITROSTAT) 0.4 MG sublingual tablet For chest pain place 1 tablet under the  tongue every 5 minutes for 3 doses. If symptoms persist 5 minutes after 1st dose call 911. 25 tablet      Nutritional Supplements (BOOST) Take 1 Bottle by mouth 3 times daily (with meals) 90 each 0     order for DME Equipment being ordered: Nebulizer machine 1 Device 0     order for DME Equipment being ordered: Nebulizer 1 Device 0     polyethylene glycol (MIRALAX/GLYCOLAX) Packet Take 1 packet by mouth daily as needed for constipation       Respiratory Therapy Supplies (NEBULIZER/TUBING/MOUTHPIECE) KIT 1 kit as needed (if becomes damaged) 1 kit 1     senna-docusate (SENOKOT-S;PERICOLACE) 8.6-50 MG per tablet Take 1 tablet by mouth 2 times daily as needed for constipation 100 tablet      umeclidinium (INCRUSE ELLIPTA) 62.5 MCG/INH inhaler Inhale 1 puff into the lungs daily       metoprolol tartrate (LOPRESSOR) 25 MG tablet Take 0.25 tablets (6.25 mg) by mouth 2 times daily 45 tablet 1     Allergies   Allergen Reactions     Crestor [Rosuvastatin] Other (See Comments)     dizziness     Hmg-Coa-R Inhibitors Other (See Comments)     Muscle/joint aching     Lisinopril Cough     Optison [Albumin Human] Other (See Comments)     Pt was flush and very dizzy.  Also had a BP drop     Penicillins Rash     Recent Labs   Lab Test 05/15/20  0700 05/05/20  0535  05/02/20  0834 12/11/19  1003 10/02/19  0931  05/18/18  1255  03/24/18  0515  03/07/17  1940  02/18/16  0718  08/18/13  0830   A1C  --   --   --   --   --   --   --   --   --  5.5  --  5.5  --  5.7  --   --    LDL  --   --   --   --   --  99  --  91  --  77   < >  --    < > 86   < >  --    HDL  --   --   --   --   --  81  --  53  --  67   < >  --    < > 66   < >  --    TRIG  --   --   --   --   --  63  --  107  --  106   < >  --    < > 90   < >  --    ALT  --   --   --  34 40 29  --   --    < >  --    < > 26   < >  --    < > 20   CR 0.63 0.57   < > 0.57 0.68  --    < >  --    < >  --    < > 0.89   < > 0.73   < > 0.84   GFRESTIMATED >90 >90   < > >90 >90  --    < >  --    <  >  --    < > 64   < > 80   < > 69   GFRESTBLACK >90 >90   < > >90 >90  --    < >  --    < >  --    < > 77   < > >90   GFR Calc     < > 84   POTASSIUM 4.1 4.2   < > 4.4 4.3  --    < >  --    < >  --    < > 4.6   < >  --    < > 3.7   TSH  --   --   --   --   --   --   --   --   --   --   --   --   --   --   --  1.05    < > = values in this interval not displayed.      BP Readings from Last 3 Encounters:   05/18/20 106/68   05/11/20 (!) 160/92   05/07/20 133/62    Wt Readings from Last 3 Encounters:   05/18/20 45.1 kg (99 lb 8 oz)   05/11/20 45.4 kg (100 lb)   05/07/20 47.1 kg (103 lb 14.4 oz)                    Reviewed and updated as needed this visit by Provider         Review of Systems   Constitutional, HEENT, cardiovascular, pulmonary, GI, , musculoskeletal, neuro, skin, endocrine and psych systems are negative, except as otherwise noted.       Objective   Reported vitals:  There were no vitals taken for this visit.   alert and no distress  PSYCH: Alert and oriented times 3; coherent speech, normal   rate and volume, able to articulate logical thoughts, able   to abstract reason, no tangential thoughts, no hallucinations   or delusions   RESP: No cough, no audible wheezing, sentences interrupted but quick breaths occasionally  Remainder of exam unable to be completed due to telephone visits    Diagnostic Test Results:  Labs reviewed in Epic        Assessment/Plan:  1. Pulmonary nodule -- 1.9 cm RLL, new 5/2/20  2. COPD exacerbation, Chronic O2 Dependent  3. Malnutrition, unspecified type (H)  4. NSTEMI (non-ST elevated myocardial infarction) (H)  She feels she is doing well. Will see if they can get help with affording medications and SW with homecare will discuss. Continue home care for monitoring to avoid rehospitalization which she is at high risk for. Breathing stable. Working on getting bipap for at night. No changes to medications.       No follow-ups on file.    This visit was completed  virtually due to our current COVID 19 pandemic.  The patient will be seen as soon as possible in the office.  If there is any significant change in or worsening of symptoms patient will call in to be triaged, present to the emergency department, or call 911 if acute worsening is noted.        Phone call duration:  10 minutes    SPRING Banegas CNP

## 2020-06-12 NOTE — TELEPHONE ENCOUNTER
Patient needs a virtual visit to comply with Medicare forms for home care. Please call  to schedule.  TAHMINA RobertoC

## 2020-06-15 ENCOUNTER — VIRTUAL VISIT (OUTPATIENT)
Dept: FAMILY MEDICINE | Facility: OTHER | Age: 70
End: 2020-06-15
Payer: COMMERCIAL

## 2020-06-15 DIAGNOSIS — E46 MALNUTRITION, UNSPECIFIED TYPE (H): ICD-10-CM

## 2020-06-15 DIAGNOSIS — J44.1 COPD EXACERBATION (H): ICD-10-CM

## 2020-06-15 DIAGNOSIS — I21.4 NSTEMI (NON-ST ELEVATED MYOCARDIAL INFARCTION) (H): ICD-10-CM

## 2020-06-15 DIAGNOSIS — R91.1 PULMONARY NODULE: Primary | ICD-10-CM

## 2020-06-15 PROCEDURE — 99214 OFFICE O/P EST MOD 30 MIN: CPT | Mod: TEL | Performed by: STUDENT IN AN ORGANIZED HEALTH CARE EDUCATION/TRAINING PROGRAM

## 2020-06-16 ENCOUNTER — MEDICAL CORRESPONDENCE (OUTPATIENT)
Dept: HEALTH INFORMATION MANAGEMENT | Facility: CLINIC | Age: 70
End: 2020-06-16

## 2020-06-17 ENCOUNTER — TELEPHONE (OUTPATIENT)
Dept: FAMILY MEDICINE | Facility: OTHER | Age: 70
End: 2020-06-17

## 2020-06-17 ENCOUNTER — MEDICAL CORRESPONDENCE (OUTPATIENT)
Dept: HEALTH INFORMATION MANAGEMENT | Facility: CLINIC | Age: 70
End: 2020-06-17

## 2020-06-17 DIAGNOSIS — I25.119 CORONARY ARTERY DISEASE INVOLVING NATIVE CORONARY ARTERY OF NATIVE HEART WITH ANGINA PECTORIS (H): ICD-10-CM

## 2020-06-17 NOTE — TELEPHONE ENCOUNTER
Our goal is to have forms completed with 72 hours, however some forms may require a visit or additional information.    Who is the form from?: [facility] (if other please explain)  Where the form came from: form was faxed in  What clinic location was the form placed at?: [place]  Where the form was placed: mailbox  What number is listed as a contact on the form?: [phone]    Phone call message- patient request for a letter, form or note:    Date needed: as soon as possible  Please fax to [phone]  Has the patient signed a consent form for release of information? NO    Additional comments:     Call taken on 6/17/2020 at 2:27 PM by Rowena Douglass    Type of letter, form or note: medical

## 2020-06-17 NOTE — TELEPHONE ENCOUNTER
FYI~ I have approved Preeti for up to $500 of the Pharmacy Assistance Fund to be filled at the [facility].  Preeti and the Pharmacy have been informed.    Malaika Bolivar  Prescription   Pharmacy Assistance  [phone]

## 2020-06-17 NOTE — TELEPHONE ENCOUNTER
Reason for Call:  Form, our goal is to have forms completed with 72 hours, however, some forms may require a visit or additional information.    Type of letter, form or note:  medical Orders    Who is the form from?: Home care [facility]    Where did the form come from: form was faxed in    What clinic location was the form placed at?: [facility] - [phone]    Where the form was placed: faxed to [facility] [phone]    What number is listed as a contact on the form?: [phone]       Additional comments: Please sign and return fax to [phone]    Call taken on 6/17/2020 at 8:35 AM by Tyra Parrish

## 2020-06-18 RX ORDER — METOPROLOL TARTRATE 25 MG/1
6.25 TABLET, FILM COATED ORAL 2 TIMES DAILY
Qty: 45 TABLET | Refills: 1 | Status: SHIPPED | OUTPATIENT
Start: 2020-06-18 | End: 2020-11-09

## 2020-06-18 NOTE — TELEPHONE ENCOUNTER
Pending Prescriptions:                       Disp   Refills    metoprolol tartrate (LOPRESSOR) 25 MG tabl*180 ta*0        Sig: TAKE ONE TABLET BY MOUTH TWICE A DAY      Routing refill request to provider for review/approval because:  Dose    Mikki Frost, MSN, RN

## 2020-06-19 ENCOUNTER — TELEPHONE (OUTPATIENT)
Dept: FAMILY MEDICINE | Facility: OTHER | Age: 70
End: 2020-06-19

## 2020-06-19 NOTE — TELEPHONE ENCOUNTER
Reason for Call:  Form, our goal is to have forms completed with 72 hours, however, some forms may require a visit or additional information.    Type of letter, form or note:  medical    Who is the form from?: Home care    Where did the form come from: form was faxed in    What clinic location was the form placed at?: [facility] - [phone]    Where the form was placed: TEAM B Box/Folder    What number is listed as a contact on the form?: [phone]       Additional comments: PLEASE SIGN AND FAX BACK TO: [phone]    Call taken on 6/19/2020 at 11:31 AM by Samuel Jang

## 2020-06-19 NOTE — TELEPHONE ENCOUNTER
Form placed in out of office bin for review/signature in EM absence, if appropriate.  Anusha Cox, CMA

## 2020-06-19 NOTE — TELEPHONE ENCOUNTER
Reason for Call:  Form, our goal is to have forms completed with 72 hours, however, some forms may require a visit or additional information.    Type of letter, form or note:  medical Orders    Who is the form from?: Home care [facility]    Where did the form come from: form was faxed in    What clinic location was the form placed at?: [facility] - [phone]    Where the form was placed: team B Box/Folder    What number is listed as a contact on the form?: [phone]       Additional comments: Please have Dr Henderson sign, date and return fax to [phone]    Call taken on 6/19/2020 at 8:13 AM by Tyra Parrish

## 2020-06-22 ENCOUNTER — MEDICAL CORRESPONDENCE (OUTPATIENT)
Dept: HEALTH INFORMATION MANAGEMENT | Facility: CLINIC | Age: 70
End: 2020-06-22

## 2020-06-23 ENCOUNTER — TELEPHONE (OUTPATIENT)
Dept: FAMILY MEDICINE | Facility: OTHER | Age: 70
End: 2020-06-23

## 2020-06-23 NOTE — TELEPHONE ENCOUNTER
Form does not need to be cosigned. Faxed and placed in completed folder of fax bin in pod #1  Alfreda Perez MA

## 2020-06-23 NOTE — TELEPHONE ENCOUNTER
FYI~ June 23, 2020 I spoke with Oh and Preeti, she is in need of financial assistance for medication.    We reviewed the Prescription Assistance Program for manfacturer brand name assistance programs, gross income, insurance and Rx list.  Preeti was granted te pharmacy Assistance Fund June 17, 2020    I gave Oh and Preeti the phone number to [facility] to apply for the Social Security Extra Help Program.  Based on the income information given, she may qualify for this program.  AstraZeneca/Symbiucort will require a denial letter from that program for her to be approved.     assistance applications will be completed for Ventolin HFA, Incruse Ellipta & Symbicort.  When approved, Preeti will receive this medication at no cost for the remainder of 2020.    Malaika Bolivar  Prescription   Pharmacy Assistance  [phone]

## 2020-06-23 NOTE — TELEPHONE ENCOUNTER
Form does not need to be cosigned. Form faxed and placed in completed folder of fax bin in pod #1  Alfreda Perez MA

## 2020-06-24 ENCOUNTER — TELEPHONE (OUTPATIENT)
Dept: FAMILY MEDICINE | Facility: OTHER | Age: 70
End: 2020-06-24

## 2020-06-24 NOTE — TELEPHONE ENCOUNTER
Reason for Call:  Form, our goal is to have forms completed with 72 hours, however, some forms may require a visit or additional information.    Type of letter, form or note:  medical Orders    Who is the form from?: Home care [facility]    Where did the form come from: form was faxed in    What clinic location was the form placed at?: [facility] - [phone]    Where the form was placed: Team B Box/Folder    What number is listed as a contact on the form?: [phone]       Additional comments: Please have MD sign, date and return fax to [phone]    Call taken on 6/24/2020 at 7:50 AM by Tyra Parrish

## 2020-06-26 ENCOUNTER — MEDICAL CORRESPONDENCE (OUTPATIENT)
Dept: HEALTH INFORMATION MANAGEMENT | Facility: CLINIC | Age: 70
End: 2020-06-26

## 2020-06-29 ENCOUNTER — TELEPHONE (OUTPATIENT)
Dept: FAMILY MEDICINE | Facility: OTHER | Age: 70
End: 2020-06-29

## 2020-06-29 ENCOUNTER — MEDICAL CORRESPONDENCE (OUTPATIENT)
Dept: HEALTH INFORMATION MANAGEMENT | Facility: CLINIC | Age: 70
End: 2020-06-29

## 2020-06-29 NOTE — TELEPHONE ENCOUNTER
Reason for Call:  Form, our goal is to have forms completed with 72 hours, however, some forms may require a visit or additional information.    Type of letter, form or note:  medical Order Form    Who is the form from?: [employer]iel [employer] / [employer]    Where did the form come from: form was faxed in    What clinic location was the form placed at?: [facility] - [phone]    Where the form was placed: Deisy Gottlieb Box/Folder    What number is listed as a contact on the form?: [phone]       Additional comments: Please review, sign date and return fax to [phone]    Call taken on 6/29/2020 at 3:10 PM by Tyra Parrish

## 2020-06-29 NOTE — TELEPHONE ENCOUNTER
Form given to EM for review/signature.  Marcia King CMA ([facility])

## 2020-06-29 NOTE — TELEPHONE ENCOUNTER
Reason for Call:  Form, our goal is to have forms completed with 72 hours, however, some forms may require a visit or additional information.    Type of letter, form or note:  medical    Who is the form from?: Home care    Where did the form come from: form was faxed in    What clinic location was the form placed at?: [facility] - [phone]    Where the form was placed: TEAM B Box/Folder    What number is listed as a contact on the form?: [phone]       Additional comments: PLEASE SIGN AND FAX BACK TO: [phone]    Call taken on 6/29/2020 at 10:13 AM by Samuel Jang

## 2020-06-30 NOTE — TELEPHONE ENCOUNTER
No signature needed from provider. Fax was an FYI to provider. Provider has reviewed. Closing encounter.

## 2020-07-10 ENCOUNTER — TELEPHONE (OUTPATIENT)
Dept: FAMILY MEDICINE | Facility: OTHER | Age: 70
End: 2020-07-10

## 2020-07-10 NOTE — TELEPHONE ENCOUNTER
Reason for Call:  Form, our goal is to have forms completed with 72 hours, however, some forms may require a visit or additional information.    Type of letter, form or note:  medical    Who is the form from?: Patient    Where did the form come from: form was mailed in    What clinic location was the form placed at?: [facility] - [phone]    Where the form was placed: Team B Box/Folder    What number is listed as a contact on the form?: [phone]       Additional comments: sign and send back thank you    Call taken on 7/10/2020 at 2:54 PM by Letty Hernandez

## 2020-07-13 ENCOUNTER — TELEPHONE (OUTPATIENT)
Dept: SLEEP MEDICINE | Facility: CLINIC | Age: 70
End: 2020-07-13

## 2020-07-16 ENCOUNTER — TELEPHONE (OUTPATIENT)
Dept: FAMILY MEDICINE | Facility: OTHER | Age: 70
End: 2020-07-16

## 2020-07-16 ENCOUNTER — MEDICAL CORRESPONDENCE (OUTPATIENT)
Dept: HEALTH INFORMATION MANAGEMENT | Facility: CLINIC | Age: 70
End: 2020-07-16

## 2020-07-16 NOTE — TELEPHONE ENCOUNTER
Our goal is to have forms completed with 72 hours, however some forms may require a visit or additional information.    Who is the form from?: Home care  Where the form came from: form was faxed in  What clinic location was the form placed at?: it was faxed to [place], I am faxing to [place]  Where the form was placed: forms bin  What number is listed as a contact on the form?: [phone]    Phone call message- patient request for a letter, form or note:    Date needed: as soon as possible  Please fax to [phone]  Has the patient signed a consent form for release of information? NO    Additional comments:     Call taken on 7/16/2020 at 9:45 AM by Rowena Douglass    Type of letter, form or note: medical

## 2020-07-17 ENCOUNTER — TELEPHONE (OUTPATIENT)
Dept: FAMILY MEDICINE | Facility: OTHER | Age: 70
End: 2020-07-17

## 2020-07-17 NOTE — TELEPHONE ENCOUNTER
Good Morning,     I received the [employer] application for Symbicort via fax, I need you to please inter-office me the original application back, Drug company's will not accept a faxed copy.    Please inter-office to  Rachael Bolivar.    Thank you,    Catie Kinney  Prescription Assistance

## 2020-07-21 ENCOUNTER — CARE COORDINATION (OUTPATIENT)
Dept: SLEEP MEDICINE | Facility: CLINIC | Age: 70
End: 2020-07-21

## 2020-07-21 NOTE — PROGRESS NOTES
Faxed copy of office notes 6/10/2020, showing why NIV is required , to [facility] attn:  Jasmyn Arreguin, Fax [phone], phone [phone]

## 2020-07-23 ENCOUNTER — TELEPHONE (OUTPATIENT)
Dept: FAMILY MEDICINE | Facility: OTHER | Age: 70
End: 2020-07-23

## 2020-07-23 ENCOUNTER — MEDICAL CORRESPONDENCE (OUTPATIENT)
Dept: HEALTH INFORMATION MANAGEMENT | Facility: CLINIC | Age: 70
End: 2020-07-23

## 2020-07-23 NOTE — TELEPHONE ENCOUNTER
Form has been faxed to [place] and given to provider for signature  Thanks  Arabella Palmer RT (R)

## 2020-07-23 NOTE — TELEPHONE ENCOUNTER
Our goal is to have forms completed with 72 hours, however some forms may require a visit or additional information.    Who is the form from?: Home care  Where the form came from: form was faxed in  What clinic location was the form placed at?: [place]  Where the form was placed: [facility]  What number is listed as a contact on the form?: [phone]    Phone call message- patient request for a letter, form or note:    Date needed: as soon as possible  Please fax to [phone]  Has the patient signed a consent form for release of information? NO    Additional comments:     Call taken on 7/23/2020 at 10:49 AM by Rowena Douglass    Type of letter, form or note: medical

## 2020-07-24 ENCOUNTER — TELEPHONE (OUTPATIENT)
Dept: SLEEP MEDICINE | Facility: CLINIC | Age: 70
End: 2020-07-24

## 2020-07-24 NOTE — TELEPHONE ENCOUNTER
"left message earlier with Jasmyn Arreguin the clinical  who is handling NIV for [facility]. Her message said she was not available.   e actually is at [facility] location.  I spoke with Sheila NICOLAS,  at the [facility] location.  He said that he will not receive the order for NIV until cleared by [facility] customer service and wording for medicare is  correct.  He did suggest that Dr. Gonzales addend the notes to say\" Bipap is ruled out because..........and ordering NIV because...... \" ,  He said after patient is setup on NIV through [facility] you may call their location directly at [phone] and ask to speak with RT. This message sent to Dr. Gonzales for review.  "

## 2020-07-28 ENCOUNTER — CARE COORDINATION (OUTPATIENT)
Dept: SLEEP MEDICINE | Facility: CLINIC | Age: 70
End: 2020-07-28

## 2020-07-28 DIAGNOSIS — I10 BENIGN ESSENTIAL HYPERTENSION: ICD-10-CM

## 2020-07-28 RX ORDER — LOSARTAN POTASSIUM 25 MG/1
12.5 TABLET ORAL DAILY
Qty: 90 TABLET | Refills: 0 | Status: SHIPPED | OUTPATIENT
Start: 2020-07-28 | End: 2020-11-09

## 2020-07-28 NOTE — PROGRESS NOTES
Faxed Dr. Gonzales notes with reason for NIV versus bipap to Jasmyn Arreguin [facility], phone:  [phone], fax:  [phone], alba may qualify patient for NIV setup

## 2020-07-29 ENCOUNTER — TELEPHONE (OUTPATIENT)
Dept: FAMILY MEDICINE | Facility: OTHER | Age: 70
End: 2020-07-29

## 2020-07-29 NOTE — TELEPHONE ENCOUNTER
Reason for Call:  Form, our goal is to have forms completed with 72 hours, however, some forms may require a visit or additional information.    Type of letter, form or note:  medical    Who is the form from?: Home care    Where did the form come from: form was faxed in    What clinic location was the form placed at?: [facility] - [phone]    Where the form was placed: box Box/Folder    What number is listed as a contact on the form?: fax [phone]       Additional comments: Please complete and fax    Call taken on 7/29/2020 at 1:27 PM by Summer Jc

## 2020-08-04 ENCOUNTER — TELEPHONE (OUTPATIENT)
Dept: FAMILY MEDICINE | Facility: OTHER | Age: 70
End: 2020-08-04

## 2020-08-04 DIAGNOSIS — Z59.9 FINANCIAL DIFFICULTIES: Primary | ICD-10-CM

## 2020-08-04 SDOH — ECONOMIC STABILITY - INCOME SECURITY: PROBLEM RELATED TO HOUSING AND ECONOMIC CIRCUMSTANCES, UNSPECIFIED: Z59.9

## 2020-08-04 NOTE — TELEPHONE ENCOUNTER
Spoke with Marilee tamayo [facility], she is looking to help get patient and spouse some help/resources  Phone was transferred to provider to discuss  Thanks  Arabella Palmer RT (R)

## 2020-08-04 NOTE — TELEPHONE ENCOUNTER
Can you please call Marilee from [employer] [phone]  She would like to get a care coordinator to help Preeti get some community resources to help her with her care

## 2020-08-05 NOTE — TELEPHONE ENCOUNTER
I talked to Marilee and placed care coordination referral.   See more details copied from referral:  Jaja Carter CNP    Services are provided by a Care Coordinator for people with complex needs such as: medical, social, or financial troubles.  The Care Coordinator works with the patient and their Primary Care Provider to determine health goals, obtain resources, achieve outcomes, and develop care plans that help coordinate the patient's care.      Reason for Referral: Financial Support: Food, Housing, Insurance, Medication Affordability. I received a call from Marilee Giraldo who is the  for [facility]reeti with [facility] and she talked to Preeti's  Oh who is not working as he is primary caregiver for Preeti after she had strokes. They are having trouble affording food and medicine. They need help getting resources. Oh was in the process of getting some financial help from the  with [facility] but then Preeti was rehospitalized and the deadline wasn't met and he has to reapply. He is very overwhelmed. Marilee said you can call her to discuss what she has already done - her direct number is [phone]. Marilee is wondering if any of the  make home visits as she thought this might be helpful to assist Oh in sorting through all the options and completing the necessary forms/applications, etc. Let me know if there is anything I can do.      Additional pertinent details:  See above     Clinical Staff have discussed the Care Coordination Referral with the patient and/or caregiver: Marilee from [facility] reached out to me and I told her I would place the care coord referral but I did not directly talk to Oh or Preeti.

## 2020-08-06 ENCOUNTER — PATIENT OUTREACH (OUTPATIENT)
Dept: CARE COORDINATION | Facility: CLINIC | Age: 70
End: 2020-08-06

## 2020-08-06 NOTE — PROGRESS NOTES
Clinic Care Coordination Contact  Care Team Conversations    Reason for Referral: Financial Support: Food, Housing, Insurance, Medication Affordability. I received a call from Marilee Giraldo who is the  for Preeti with [employer] and she talked to Preeti's  Oh who is not working as he is primary caregiver for Preeti after she had strokes. They are having trouble affording food and medicine. They need help getting resources. Oh was in the process of getting some financial help from the  with [facility] but then Preeti was rehospitalized and the deadline wasn't met and he has to reapply. He is very overwhelmed. Marilee said you can call her to discuss what she has already done - her direct number is [phone]. Marilee is wondering if any of the SW make home visits as she thought this might be helpful to assist Oh in sorting through all the options and completing the necessary forms/applications, etc. Let me know if there is anything I can do.     SW CC spoke with Marilee Giraldo, [phone], [facility]  for pt.     Marilee is wondering if  CC can get involved to try to help pt. Ideally they need someone that can come to the home. Explained that unfortunately we are not able to do home visits and due to COVID our dept is working remotely so unable to even see pt in clinic right now.     [facility]. Discharged to home with  and home care services with [facility]. Pt is done with home care now. Pt did have a [employer] assessment to have ongoing care as finances are a concern. This assessment  as they didn't follow through unfortunately.     Pt needs to apply for MA and have another [employer] assessment. Pt spouse is overwhelmed and Marilee seeing if anyone can help in the home, hands on with paperwork, etc.. He can't afford any private pay care for pt. Pt likely would be approved for MA Marilee said. Marilee did get Me[employer] set up for her. Marilee said [employer] helped with a  SNAP application. Marilee talked with [employer] and they are short on volunteers right now.    Appears pt was approved for [facility] Prescription assistance program and some applications for  programs were turned in. IVONNE CC can verify with Prescription asst program to ensure pt  turned everything in. He has been providing 24 hour supervision and is not able to work. Marilee hasn't been able to get [facility] to call back about a [employer] assessment.     IVONNE CC can see if can get a hold of someone at [facility]. Explained can also refer pt to [facility]. Will first reach out to  with [facility] to see if the contact for [facility] is doing home visits or not.       Plan:     IVONNE CC will outreach to pt spouse and introduce self. Will check into all of the above as well.     PHOEBE Maria   Primary Care Clinic- Social Work Care Coordinator  [facility]  8/7/2020 2:22 PM  [phone]

## 2020-08-12 ENCOUNTER — PATIENT OUTREACH (OUTPATIENT)
Dept: CARE COORDINATION | Facility: CLINIC | Age: 70
End: 2020-08-12

## 2020-08-12 NOTE — PROGRESS NOTES
Clinic Care Coordination Contact  Care Team Conversations    IVONNE HUDSON reached out to Catie with Prescription assistance program to find out if pt turned in applications for assistance.     Catie sent everything to pt and  back in June. She followed up with them in July as she hadn't received anything. Catie still hasn't gotten the paperwork back from pt. Let Catie know IVONNE HUDSON plans to follow up with pt spouse Austin.     Also reached out to contact with [facility] for [facility] to see if they're doing in home visits yet or not. All of the staff with [facility] are working remotely through the end of the year. They are not doing in home visits unfortunately.     IVONNE HUDSON will follow up with pt spouse. Will also update Marilee with [employer].     PHOEBE Maria   Primary Care Clinic- Social Work Care Coordinator  [facility]  8/12/2020 4:12 PM  [phone]

## 2020-08-13 ENCOUNTER — PATIENT OUTREACH (OUTPATIENT)
Dept: FAMILY MEDICINE | Facility: CLINIC | Age: 70
End: 2020-08-13
Payer: COMMERCIAL

## 2020-08-13 ENCOUNTER — TELEPHONE (OUTPATIENT)
Dept: CARE COORDINATION | Facility: CLINIC | Age: 70
End: 2020-08-13

## 2020-08-13 DIAGNOSIS — J41.8 MIXED SIMPLE AND MUCOPURULENT CHRONIC BRONCHITIS (H): Primary | ICD-10-CM

## 2020-08-13 ASSESSMENT — ACTIVITIES OF DAILY LIVING (ADL): DEPENDENT_IADLS:: CLEANING;MEDICATION MANAGEMENT

## 2020-08-13 NOTE — PROGRESS NOTES
Clinic Care Coordination Contact    Clinic Care Coordination Contact  OUTREACH    Referral Information:Reason for Referral: Financial Support: Food, Housing, Insurance, Medication Affordability. I received a call from Marilee Giraldo who is the  for Preeti with [employer] and she talked to Preeti's  Oh who is not working as he is primary caregiver for Preeti after she had strokes. They are having trouble affording food and medicine. They need help getting resources. Oh was in the process of getting some financial help from the  with [facility] but then Preeti was rehospitalized and the deadline wasn't met and he has to reapply. He is very overwhelmed. Marilee said you can call her to discuss what she has already done - her direct number is [phone]. Marilee is wondering if any of the  make home visits as she thought this might be helpful to assist hO in sorting through all the options and completing the necessary forms/applications, etc. Let me know if there is anything I can do.   Referral Source: PCP    Primary Diagnosis: Cardiovascular - other    Chief Complaint   Patient presents with     Clinic Care Coordination - Initial             Universal Utilization: Pt does stroke rehab at [facility] Utilization  Difficulty keeping appointments:: No  Compliance Concerns: No  No-Show Concerns: No  No PCP office visit in Past Year: No  Utilization    Last refreshed: 8/13/2020  2:37 PM:  Hospital Admissions 1           Last refreshed: 8/13/2020  2:37 PM:  ED Visits 1           Last refreshed: 8/13/2020  2:37 PM:  No Show Count (past year) 1              Current as of: 8/13/2020  2:37 PM            Clinical Concerns:   Patient Active Problem List   Diagnosis     Urinary incontinence     Forgetfulness     Hyperlipidemia LDL goal <130     History of stroke - right thalamus in 8/2013 and left cerebellar and right vermis in 3/2018     Numbness and tingling     Smoker     CVA (cerebral vascular  "accident) (H)     ACS (acute coronary syndrome) (H)     Ischemic cardiomyopathy - EF 25% in 2015 but 55% in 3/2017 and 45-50% on 3/18     HTN, goal below 130/80     Acute systolic congestive heart failure (H)     Pulmonary nodule -- 1.9 cm RLL, new 5/2/20     GERD (gastroesophageal reflux disease)     NSTEMI (non-ST elevated myocardial infarction) (H)     Chronic bronchitis, unspecified chronic bronchitis type (H)     PAD (peripheral artery disease) (H) - moderate left common carotid stenosis, aortoiliac bypass, chronic left vertebral and right posterior cerebral stenoses     Cervical high risk HPV (human papillomavirus) test positive     COPD exacerbation, Chronic O2 Dependent     Coronary artery disease involving native coronary artery - STEMI with LAD and circ stents in 8/2015     Elevated troponin     Pulmonary emphysema (H)     Vertigo     Other seizures (H) - possible     Acute CVA (cerebrovascular accident) - right paramedian superior vermis and left cerebellar hemisphere     Malnutrition, unspecified type (H)     Hyponatremia     Hypochloremia       Current Medical Concerns:  Pt had been hospitalized back in April due to NSTEMI, COPD exacerbation. Pt has history of stroke as well. Pt went to TCU for a while and then home with home care. Is home with  as primary care giver. Neda Hunt  reached out to clinic care coordinator for assistance. Wondered if CC does home visits which we do not. Let her know will reach out to pt  Bud and try to assist. Spoke with  \"Bud\" states pt is doing much better and getting around well. Pt no longer needing walker. Is quite independent. She has aphasia he said and gets frustrated when can't get words out. Explained to Coppell that SW CC is with the clinic and am wanting to help him with resources, navigating trying to care for pt.       Current Behavioral Concerns: n/a      Education Provided to patient: Let Coppell know SW CC can help with " communicating with Prescription assistance program as he has been trying to get paperwork in for help with prescription drug costs, especially pt inhalers. Oh states he sent in the paperwork last week. Mailed it into them. IVONNE HUDSON had spoke with Catie in Prescription assistance prior to talking with Oh and as of then hadn't received anything.  Oh states he got the MA application completed and just needs to get some tax information. [facility] is working with him on that. States he spoke with someone at [facility] as well recently about assessment for pt. Oh feels things are coming together. He did a application for SNAP and they do have Meals on wheels that Marilee with [employer] helped with. Main concern is getting some help with medication costs. Let Oh know IVONNE HUDSON will follow up with Catie to let her know apps were mailed to her last week.        Health Maintenance Reviewed: Due/Overdue   Health Maintenance Due   Topic Date Due     DEXA  1950     COLPOSCOPY  10/24/1973     ZOSTER IMMUNIZATION (1 of 2) 10/24/2002     MEDICARE ANNUAL WELLNESS VISIT  12/20/2017     COLORECTAL CANCER SCREENING  12/12/2018     HF ACTION PLAN  07/01/2019     PHQ-2  01/01/2020     MAMMO SCREENING  01/02/2020     LIPID  04/02/2020       Clinical Pathway: None    Medication Management:  See above re: Prescription assistance.     Oh states pt is on Mucinex and it is quite expensive. Wondered about a prescription for it as SLL said he could get some financial assistance for paying for it if in prescription form. Let Oh know IVONNE HUDSON can send message to PCP requesting this.     Functional Status:  Dependent ADLs:: Independent  Dependent IADLs:: Cleaning, Medication Management  Bed or wheelchair confined:: No  Mobility Status: Independent  Fallen 2 or more times in the past year?: No  Any fall with injury in the past year?: No    Living Situation:  Current living arrangement:: I live in a private home with  spouse    Lifestyle & Psychosocial Needs:        Diet:: Regular  Inadequate nutrition (GOAL):: No  Tube Feeding: No  Inadequate activity/exercise (GOAL):: No  Significant changes in sleep pattern (GOAL): No  Transportation means:: Family, Regular car, Medical transport(medical transportation only as needed)     Nondenominational or spiritual beliefs that impact treatment:: No  Mental health DX:: No  Mental health management concern (GOAL):: No  Informal Support system:: Spouse, Children, Family   Socioeconomic History     Marital status:      Spouse name: Not on file     Number of children: Not on file     Years of education: Not on file     Highest education level: Not on file     Tobacco Use     Smoking status: Current Every Day Smoker     Packs/day: 0.50     Types: Cigarettes     Smokeless tobacco: Never Used     Tobacco comment: patient states she is working on it    Substance and Sexual Activity     Alcohol use: No     Alcohol/week: 0.0 standard drinks     Drug use: No     Sexual activity: Not Currently     Partners: Male     Resources and Interventions:  Current Resources:      Community Resources: Other (see comment), Meals on Wheels(insurance care coordinator/)     Equipment Currently Used at Home: walker, standard(has walker, doesn't use it anymore only needed after TCU)    Advance Care Plan/Directive  Advanced Care Plans/Directives on file:: No    Referrals Placed: Prescription Assistance Programs     Goals:   Goals        General    Financial Wellbeing (pt-stated)     Notes - Note created  8/13/2020  3:02 PM by Lucero Flowers LSW    Goal Statement: I (pt spouse) would like to get financial help for pt prescriptions and medical costs.   Date Goal set: 8/13/20  Barriers: overwhelmed; was having hard time getting paperwork turned in  Strengths: is connected with [facility] Prescription assistance program already  Date to Achieve By: 9/30/20  Patient expressed understanding of goal: yes, pt spouse  Bud  Action steps to achieve this goal:  1. I will continue to work with SLMARIANNE on the MA application.   2. I will work with Catie at  Prescription assistance.   3. SW CC will help with communication, coordination with Prescription assistance.  4. I will contact SW CC with questions or resource needs.               Patient/Caregiver understanding: yes    Outreach Frequency: monthly      Plan:     Pt enrolled in CC. Will send intro letter and Complex care plan.     SW CC to outreach to continue to communicate with Prescription assistance and back with spouse on what else needs to be turned in.       PHOEBE Maria   Primary Care Clinic- Social Work Care Coordinator  [facility]  8/13/2020 3:02 PM  [phone]

## 2020-08-13 NOTE — TELEPHONE ENCOUNTER
Pt spouse Oh was talking with IVONNE HUDSON and had a question regarding Mucinex that patient takes. States it is over the counter but it is quite expensive. Oh is wondering if it is an option to get a prescription for it for patient then he may be able to get further financial assistance for it.     Please contact Oh either way if this is or is not an option or to discuss further.     PHOEBE Maria   Primary Care Clinic- Social Work Care Coordinator  [facility]  8/13/2020 3:12 PM  [phone]

## 2020-08-13 NOTE — LETTER
[facility]  Complex Care Plan  About Me:    Patient Name:  Preeti Ford    YOB: 1950  Age:         69 year old   Kaylee MRN:    [id number] Telephone Information:  Home Phone [phone]   Mobile [phone]       Address:  [address] [place] [address]  [place] [address] Email address:  [email or web address]      Emergency Contact(s)    Name Relationship Lgl Grd Work Phone Home Phone Mobile Phone   1. LINDA FORD* Spouse No  [phone]   2. JEREMI PONCE* Son No   [phone]   3. KALEN FRY Relative No   [phone]           Primary language:  English     needed? No   [facility]:  [phone] op. 1  Other communication barriers: Other  Preferred Method of Communication:  Mail  Current living arrangement: I live in a private home with spouse  Mobility Status/ Medical Equipment: Independent    Health Maintenance  Health Maintenance Reviewed: Due/Overdue   Health Maintenance Due   Topic Date Due     DEXA  1950     COLPOSCOPY  10/24/1973     ZOSTER IMMUNIZATION (1 of 2) 10/24/2002     MEDICARE ANNUAL WELLNESS VISIT  12/20/2017     COLORECTAL CANCER SCREENING  12/12/2018     HF ACTION PLAN  07/01/2019     PHQ-2  01/01/2020     MAMMO SCREENING  01/02/2020     LIPID  04/02/2020         My Access Plan  Medical Emergency 911   Primary Clinic Line [facility] [phone]   24 Hour Appointment Line [phone] or  [phone]) (toll-free)   24 Hour Nurse Line [phone] (toll-free)   Preferred Urgent Care     Preferred Hospital     Preferred Pharmacy [facility] [place]  [place] - [id number] [address]      Behavioral Health Crisis Line The National Suicide Prevention Lifeline at 1-674.899.5325 or 911       My Care Team Members  Patient Care Team       Relationship Specialty Notifications Start End    Deisy Carter, APRN CNP PCP - General Nurse Practitioner - Family   12/11/17     Phone: [phone] Fax: [phone]         [address] [place] [address]    Marcos Pratt MD Surgeon Surgery  10/17/16     Phone: [phone] Fax: [phone] [address] [place] [address]    Deisy Carter APRN CNP Assigned PCP   6/2/19     Phone: [phone] Fax: [phone]         [address] [place] [address]    Lucero Flowers LSW Lead Care Coordinator Primary Care - CC Admissions 8/5/20     Phone: [phone]                 My Care Plans  Self Management and Treatment Plan  Goals and (Comments)  Goals        General    Financial Wellbeing (pt-stated)     Notes - Note created  8/13/2020  3:02 PM by Lucero Flowers LSW    Goal Statement: I (pt spouse) would like to get financial help for pt prescriptions and medical costs.   Date Goal set: 8/13/20  Barriers: overwhelmed; was having hard time getting paperwork turned in  Strengths: is connected with [facility] Prescription assistance program already  Date to Achieve By: 9/30/20  Patient expressed understanding of goal: yes, pt spouse [place]  Action steps to achieve this goal:  1. I will continue to work with LISA on the MA application.   2. I will work with Catie at [facility] Prescription assistance.   3.  CC will help with communication, coordination with Prescription assistance.  4. I will contact  CC with questions or resource needs.                Action Plans on File:         Advance Care Plans/Directives Type:        My Medical and Care Information  Problem List   Patient Active Problem List   Diagnosis     Urinary incontinence     Forgetfulness     Hyperlipidemia LDL goal <130     History of stroke - right thalamus in 8/2013 and left cerebellar and right vermis in 3/2018     Numbness and tingling     Smoker     CVA (cerebral vascular accident) (H)     ACS (acute coronary syndrome) (H)     Ischemic cardiomyopathy - EF 25% in 2015 but 55% in 3/2017 and 45-50% on 3/18     HTN, goal below 130/80      Acute systolic congestive heart failure (H)     Pulmonary nodule -- 1.9 cm RLL, new 5/2/20     GERD (gastroesophageal reflux disease)     NSTEMI (non-ST elevated myocardial infarction) (H)     Chronic bronchitis, unspecified chronic bronchitis type (H)     PAD (peripheral artery disease) (H) - moderate left common carotid stenosis, aortoiliac bypass, chronic left vertebral and right posterior cerebral stenoses     Cervical high risk HPV (human papillomavirus) test positive     COPD exacerbation, Chronic O2 Dependent     Coronary artery disease involving native coronary artery - STEMI with LAD and circ stents in 8/2015     Elevated troponin     Pulmonary emphysema (H)     Vertigo     Other seizures (H) - possible     Acute CVA (cerebrovascular accident) - right paramedian superior vermis and left cerebellar hemisphere     Malnutrition, unspecified type (H)     Hyponatremia     Hypochloremia      Current Medications and Allergies:  See printed Medication Report.    Care Coordination Start Date: 8/13/2020   Frequency of Care Coordination: 2 weeks   Form Last Updated: 08/18/2020

## 2020-08-13 NOTE — LETTER
[facility] CARE COORDINATION  [facility]  [address] [place] [address]    August 18, 2020    Preeti Ford  [address] [address] [place] [address]      Dear Shaneka,    I am a clinic care coordinator who works with SPRING Banegas CNP at [facility]. I wanted to thank you for spending the time to talk with me last week.  Below is a description of clinic care coordination and how I can further assist you.      The clinic care coordination team is made up of a registered nurse,  and community health worker who understand the health care system. The goal of clinic care coordination is to help you manage your health and improve access to the health care system in the most efficient manner. The team can assist you in meeting your health care goals by providing education, coordinating services, strengthening the communication among your providers and supporting you with any resource needs.    Please feel free to contact me at [phone] with any questions or concerns. We are focused on providing you with the highest-quality healthcare experience possible and that all starts with you.     Sincerely,       PHOEBE Maria   Primary Care Clinic- Social Work Care Coordinator  [facility]   [phone]      Enclosed: I have enclosed a copy of the Complex Care Plan. This has helpful information and goals that we have talked about. Please keep this in an easy to access place to use as needed.

## 2020-08-14 ENCOUNTER — TELEPHONE (OUTPATIENT)
Dept: FAMILY MEDICINE | Facility: OTHER | Age: 70
End: 2020-08-14

## 2020-08-14 DIAGNOSIS — J42 CHRONIC BRONCHITIS, UNSPECIFIED CHRONIC BRONCHITIS TYPE (H): ICD-10-CM

## 2020-08-14 RX ORDER — ALBUTEROL SULFATE 90 UG/1
2 AEROSOL, METERED RESPIRATORY (INHALATION) EVERY 6 HOURS PRN
Qty: 3 INHALER | Refills: 3 | Status: SHIPPED | OUTPATIENT
Start: 2020-08-14 | End: 2020-08-14

## 2020-08-14 RX ORDER — GUAIFENESIN 600 MG/1
600-1200 TABLET, EXTENDED RELEASE ORAL 2 TIMES DAILY
Qty: 120 TABLET | Refills: 4 | Status: SHIPPED | OUTPATIENT
Start: 2020-08-14 | End: 2021-01-01

## 2020-08-14 RX ORDER — ALBUTEROL SULFATE 90 UG/1
2 AEROSOL, METERED RESPIRATORY (INHALATION) EVERY 6 HOURS PRN
Qty: 3 INHALER | Refills: 3 | Status: SHIPPED | OUTPATIENT
Start: 2020-08-14 | End: 2020-12-21

## 2020-08-14 NOTE — TELEPHONE ENCOUNTER
I am in process of applying to the Incruse & Ventolin HFA assistance program through [employer] assistance.  [employer] requires hand signed, brand name, hard copy scripts be submitted with their application.    Please hand sign a BRAND name, hard copy script for:      INCRUSE ELLIPTA      VENTOLIN HFA    Please send the script to me via interoffice mail FPS Malaika Cano or via US mail  at:      [facility]   Malaika Bolivar   [address] [place]  [address]    Thanks so much for your help!    Malaika Bolivar  Prescription   Pharmacy Assistance  [phone]

## 2020-08-14 NOTE — TELEPHONE ENCOUNTER
Please call  [place] and let him know that I sent in a prescription for Mucinex to the [facility] pharmacy.  Jaja Carter, CNP

## 2020-08-14 NOTE — TELEPHONE ENCOUNTER
I printed and signed the scripts. Please interoffice or mail to below.  Thanks,  Jaja Carter CNP     Please send the script to me via interoffice mail FPS Malaika Cano or via US mail  at:      [facility]   Malaika Bolivar   [address] [place]  [address]

## 2020-08-25 ENCOUNTER — PATIENT OUTREACH (OUTPATIENT)
Dept: FAMILY MEDICINE | Facility: CLINIC | Age: 70
End: 2020-08-25
Payer: COMMERCIAL

## 2020-08-25 NOTE — PROGRESS NOTES
Clinic Care Coordination Contact    Follow Up Progress Note      Assessment:  CC spoke with pt  Oh.     Let him know prescription assistance did get the paperwork for a couple of the medications. Oh said he has gotten 2 of the prescriptions already. He was very appreciative.     Oh also said he has still been working with his contact at [employer] on the MA. He heard back from [facility] and has appointment for pt [employer] assessment on 9/2/20. Oh is very relieved to be getting some help for pt and assistance with managing all of this paperwork.     Goals addressed this encounter:   Goals Addressed                 This Visit's Progress       Patient Stated      Financial Wellbeing (pt-stated)   40%     Goal Statement: I (pt spouse) would like to get financial help for pt prescriptions and medical costs.   Date Goal set: 8/13/20  Barriers: overwhelmed; was having hard time getting paperwork turned in  Strengths: is connected with [facility] Prescription assistance program already  Date to Achieve By: 9/30/20  Patient expressed understanding of goal: yes, pt spouse Oh  Action steps to achieve this goal:  1. I will continue to work with LISA on the MA application.   2. I will work with Catie at [facility] Prescription assistance.   3.  CC will help with communication, coordination with Prescription assistance.  4. I will contact  CC with questions or resource needs.                Intervention/Education provided during outreach: Let Oh know that Catie with Prescription assistance still needs one more form it's the SSXH-D for the pt Symbicourt. Oh states he does have that. He will fill it out. States it might be easier for him to fax it. Provided Oh with fax number for Catie and Catie indicated that Oh should call her when he faxes it. Oh states he has Catie's number and will do that.      Outreach Frequency: 2 weeks    Plan:   Oh will complete the form SSXH-D for [facility] Prescription assistance  and will fax to them.     Pt will attend [employer] assessment on 9/2/20.     Care Coordinator will follow up in 2 weeks.       PHOEBE Maria   Primary Care Clinic- Social Work Care Coordinator  [facility]  8/25/2020 3:20 PM  [phone]

## 2020-08-26 ENCOUNTER — TELEPHONE (OUTPATIENT)
Dept: FAMILY MEDICINE | Facility: OTHER | Age: 70
End: 2020-08-26

## 2020-08-26 NOTE — TELEPHONE ENCOUNTER
Preeti has been approved to the [employer] assistance program for Ventolin HFA & Incruse Ellipta through December 31, 2020. She will receive this medication at no cost through the enrollment period.    A 90 day supply of Ventolin HFA & Incruse Ellipta will be delivered to Preeti's home within 7-10 business days. She has been informed of this approval.    She will contact my office for refills as we must work directly with the .  I will note EPIC as each refill is requested.    Thank you,    Catie Kinney  Prescription Assistance

## 2020-08-27 ENCOUNTER — TELEPHONE (OUTPATIENT)
Dept: FAMILY MEDICINE | Facility: OTHER | Age: 70
End: 2020-08-27

## 2020-08-27 ENCOUNTER — PATIENT OUTREACH (OUTPATIENT)
Dept: FAMILY MEDICINE | Facility: CLINIC | Age: 70
End: 2020-08-27
Payer: COMMERCIAL

## 2020-08-27 NOTE — TELEPHONE ENCOUNTER
Reason for Call:  Form, our goal is to have forms completed with 72 hours, however, some forms may require a visit or additional information.    Type of letter, form or note:  medical    Who is the form from?: Home care    Where did the form come from: form was faxed in    What clinic location was the form placed at?: [facility] - [phone]    Where the form was placed: Team B Box/Folder    What number is listed as a contact on the form?: [phone]       Additional comments: fax number [phone]    Call taken on 8/27/2020 at 4:08 PM by Letty Hernandez

## 2020-08-27 NOTE — PROGRESS NOTES
Clinic Care Coordination Contact    Spoke with pt spouse Oh. He had a question re: pt form he is filling out for prescription assistance, needing to know who prescribed it- the Symbicourt. Appears from chart review that is pt PCP. Oh wondered about dropping off at [facility] for PCP to sign which unfortunately isn't open to in person care.     Let Oh know after talking with care team, that he can either fax it to them or can drop it off at [facility] Provided [place] with fax # [phone] let him know to make sure and have PCP name on it and that form needs to get to Catie at  Prescription assistance from there.     Oh was appreciative of the assistance.     PHOEBE Maria   Primary Care Clinic- Social Work Care Coordinator  [facility]  8/27/2020 3:07 PM  [phone]

## 2020-08-28 NOTE — TELEPHONE ENCOUNTER
Form placed in out of office bin for review/signature in AE absence, if appropriate.  Anusha Cox, CMA

## 2020-08-31 ENCOUNTER — TELEPHONE (OUTPATIENT)
Dept: FAMILY MEDICINE | Facility: OTHER | Age: 70
End: 2020-08-31

## 2020-08-31 DIAGNOSIS — J42 CHRONIC BRONCHITIS, UNSPECIFIED CHRONIC BRONCHITIS TYPE (H): Primary | ICD-10-CM

## 2020-08-31 DIAGNOSIS — J43.1 PANLOBULAR EMPHYSEMA (H): ICD-10-CM

## 2020-08-31 NOTE — TELEPHONE ENCOUNTER
Reason for Call:  Form, our goal is to have forms completed with 72 hours, however, some forms may require a visit or additional information.    Type of letter, form or note:  medical    Who is the form from?: application for free [employer] medications (if other please explain)    Where did the form come from: Patient or family brought in       What clinic location was the form placed at?: [facility] - [phone]    Where the form was placed:  Box/Folder    What number is listed as a contact on the form?: [phone]       Additional comments: fill out and call when done.    Call taken on 8/31/2020 at 12:03 PM by Padmini Cheema

## 2020-09-03 RX ORDER — BUDESONIDE AND FORMOTEROL FUMARATE DIHYDRATE 160; 4.5 UG/1; UG/1
2 AEROSOL RESPIRATORY (INHALATION) 2 TIMES DAILY
Qty: 1 INHALER | Refills: 5 | Status: SHIPPED | OUTPATIENT
Start: 2020-09-03 | End: 2020-12-02

## 2020-09-09 ENCOUNTER — PATIENT OUTREACH (OUTPATIENT)
Dept: FAMILY MEDICINE | Facility: CLINIC | Age: 70
End: 2020-09-09
Payer: MEDICAID

## 2020-09-09 NOTE — PROGRESS NOTES
Clinic Care Coordination Contact    IVONNE HUDSON spoke with pt  Oh. He was calling to see if IVONNE HUDSON knew if help could get with pt Symbicourt. Oh said he must've screwed up with the Renee from Prescription assistance program because he ended up with 6 inhalers of albuterol/proventil and didn't get the Symbicourt. He thought he had some of each but does not.     IVONNE HDUSON attempted to get a hold of prescription assistance office to see if any further renee money can be used for him to get pt the inhaler. Oh did complete the application so he is waiting to hear if pt was approved for  assistance for the Symbicourt. Let him know will check in with Catie to see where things are at with that.     Oh will contact the pharmacy and get a refill for the Symbicourt for now as pt needs it. Oh will check [employer] to see if any coupons to help with the co-pay. Oh will ask pharmacy about family wise program as well.     Oh hasn't heard back from [facility] on the MA application so that is still pending. It hasn't been that long that he turned it in either.     Plan: Will let Oh know if Prescription assistance is able to help any further and see if any next steps for him with application for  program.     PHOEBE Maria   Primary Care Clinic- Social Work Care Coordinator  [facility]  9/9/2020 4:36 PM  [phone]

## 2020-09-15 ENCOUNTER — TELEPHONE (OUTPATIENT)
Dept: FAMILY MEDICINE | Facility: OTHER | Age: 70
End: 2020-09-15

## 2020-09-15 NOTE — TELEPHONE ENCOUNTER
Form to get assistance through [employer] was faxed on 8/27 to get Symbicort covered. Wondering if her  Bud knows any info about if this was approved? Not sure if care coordinator would be helpful in figuring this out? Also wondering if we have the [employer] form scanned in to see if there is a number to call to find out about coverage of Symbicort through their program?  Jaja Carter, CNP

## 2020-09-16 NOTE — TELEPHONE ENCOUNTER
Spoke to Oh and informed him that I have a copy of insurance cards and pharmacy wasn't able to give me the copy of copay's. Needs to come from Patient.  So  Pharmacy has the print out ready to go. I will contact Oh tomorrow regarding how to get the copy of copay's so I can fax them to the company.

## 2020-09-16 NOTE — TELEPHONE ENCOUNTER
" is calling because he contacted [employer] and they need her insurance card ( front and back )  and a copy of copays for the past year. He does not have a way to get that information is there a way we can. Please advise.     Also he is wondering if there is a way to get a \"free sample\" from the pharmacy because she has been without it for 3 days. Please advise.   "

## 2020-09-17 NOTE — TELEPHONE ENCOUNTER
Spoke to Oh and he found copies of payment through [employer]  So he dropped them off and I made a copy of them so I can fax them down to [employer] program   Fax is [phone]

## 2020-10-02 ENCOUNTER — PATIENT OUTREACH (OUTPATIENT)
Dept: FAMILY MEDICINE | Facility: CLINIC | Age: 70
End: 2020-10-02
Payer: MEDICAID

## 2020-10-02 NOTE — PROGRESS NOTES
Clinic Care Coordination Contact    Follow Up Progress Note      Assessment: IVONNE HUDSON spoke with patient spouse Shafer. Things are progressing with approval for patient Ricky. States he spoke with someone and the medication should be coming soon. He was told it would likely be a 3 month supply. Oh will let either myself or Catie in Prescription assistance know when he gets it.     Goals addressed this encounter:   Goals Addressed                 This Visit's Progress       Patient Stated      Financial Wellbeing (pt-stated)   60%     Goal Statement: I (pt spouse) would like to get financial help for pt prescriptions and medical costs.   Date Goal set: 8/13/20  Barriers: overwhelmed; was having hard time getting paperwork turned in  Strengths: is connected with [facility] Prescription assistance program already  Date to Achieve By: 11/30/20  Patient expressed understanding of goal: yes, pt spouse Oh  Action steps to achieve this goal:  1. I will continue to work with SLL on the MA application.   2. I will work with Catie at [facility] Prescription assistance.   3. IVONNE CC will help with communication, coordination with Prescription assistance.  4. I will contact IVONNE CC with questions or resource needs.                Intervention/Education provided during outreach: Asked Oh if he's heard on pt MA or [employer] assessment. Oh said he heard from Karma from [facility] that it is all being processed and he should hear something next week. Then Oh will be able to get some hands on assistance in the home for patient. Oh is so appreciative of everyone's help. He continues to work with [facility] [employer] [facility] as well.      Outreach Frequency: monthly    Plan:   Oh will call if questions, needs arise.     Care Coordinator will follow up in 1 month.     PHOEBE Maria   Primary Care Clinic- Social Work Care Coordinator  [facility]  10/2/2020 4:00 PM  [phone]

## 2020-10-28 DIAGNOSIS — I73.9 PAD (PERIPHERAL ARTERY DISEASE) (H): ICD-10-CM

## 2020-10-28 RX ORDER — ATORVASTATIN CALCIUM 20 MG/1
20 TABLET, FILM COATED ORAL AT BEDTIME
Qty: 90 TABLET | Refills: 3 | Status: SHIPPED | OUTPATIENT
Start: 2020-10-28 | End: 2020-12-02

## 2020-10-29 ENCOUNTER — HOSPITAL ENCOUNTER (OUTPATIENT)
Dept: CARDIOLOGY | Facility: CLINIC | Age: 70
Discharge: HOME | End: 2020-10-29
Attending: INTERNAL MEDICINE | Admitting: INTERNAL MEDICINE
Payer: COMMERCIAL

## 2020-10-29 DIAGNOSIS — I35.0 NONRHEUMATIC AORTIC VALVE STENOSIS: ICD-10-CM

## 2020-10-29 DIAGNOSIS — I25.10 CORONARY ARTERY DISEASE INVOLVING NATIVE CORONARY ARTERY OF NATIVE HEART WITHOUT ANGINA PECTORIS: ICD-10-CM

## 2020-10-29 DIAGNOSIS — I73.9 PAD (PERIPHERAL ARTERY DISEASE) (H): ICD-10-CM

## 2020-10-29 LAB
ALT SERPL W P-5'-P-CCNC: 22 U/L (ref 0–50)
ANION GAP SERPL CALCULATED.3IONS-SCNC: 5 MMOL/L (ref 3–14)
BUN SERPL-MCNC: 14 MG/DL (ref 7–30)
CALCIUM SERPL-MCNC: 9 MG/DL (ref 8.5–10.1)
CHLORIDE SERPL-SCNC: 104 MMOL/L (ref 94–109)
CHOLEST SERPL-MCNC: 185 MG/DL
CO2 SERPL-SCNC: 29 MMOL/L (ref 20–32)
CREAT SERPL-MCNC: 0.62 MG/DL (ref 0.52–1.04)
GFR SERPL CREATININE-BSD FRML MDRD: >90 ML/MIN/{1.73_M2}
GLUCOSE SERPL-MCNC: 81 MG/DL (ref 70–99)
HDLC SERPL-MCNC: 84 MG/DL
LDLC SERPL CALC-MCNC: 85 MG/DL
NONHDLC SERPL-MCNC: 101 MG/DL
POTASSIUM SERPL-SCNC: 4 MMOL/L (ref 3.4–5.3)
SODIUM SERPL-SCNC: 138 MMOL/L (ref 133–144)
TRIGL SERPL-MCNC: 82 MG/DL

## 2020-10-29 PROCEDURE — 80048 BASIC METABOLIC PNL TOTAL CA: CPT | Performed by: INTERNAL MEDICINE

## 2020-10-29 PROCEDURE — 93306 TTE W/DOPPLER COMPLETE: CPT

## 2020-10-29 PROCEDURE — 36415 COLL VENOUS BLD VENIPUNCTURE: CPT | Performed by: INTERNAL MEDICINE

## 2020-10-29 PROCEDURE — 84460 ALANINE AMINO (ALT) (SGPT): CPT | Performed by: INTERNAL MEDICINE

## 2020-10-29 PROCEDURE — 80061 LIPID PANEL: CPT | Performed by: INTERNAL MEDICINE

## 2020-10-29 PROCEDURE — 93306 TTE W/DOPPLER COMPLETE: CPT | Mod: 26 | Performed by: INTERNAL MEDICINE

## 2020-11-02 ENCOUNTER — PATIENT OUTREACH (OUTPATIENT)
Dept: FAMILY MEDICINE | Facility: CLINIC | Age: 70
End: 2020-11-02
Payer: MEDICAID

## 2020-11-02 NOTE — PROGRESS NOTES
"Clinic Care Coordination Contact    Follow Up Progress Note      Assessment: IVONNE HUDSON spoke with patient spouse Oh. He states he heard back from [facility] and patient was approved for MA. He may be looking into a [employer] plan he said. Sounds like patient will be on a waiver and he has a meeting coming up with [facility] to see about what kind of help they can use at home- in home services.     Goals addressed this encounter:   Goals Addressed                 This Visit's Progress       Patient Stated      Financial Wellbeing (pt-stated)   70%     Goal Statement: I (pt spouse) would like to get financial help for pt prescriptions and medical costs.   Date Goal set: 8/13/20  Barriers: overwhelmed; was having hard time getting paperwork turned in  Strengths: is connected with [facility] Prescription assistance program already  Date to Achieve By: 12/31/20  Patient expressed understanding of goal: yes, pt spouse Oh  Action steps to achieve this goal:  1. I will continue to work with LISA on the MA application.   2. I will work with Catie at [facility] Prescription assistance.   3. IVONNE HUDSON will help with communication, coordination with Prescription assistance.  4. I will contact IVONNE HUDSON with questions or resource needs.                Intervention/Education provided during outreach: Asked Oh if he completed the \"Extra Help\" application with [employer] and he did. Said he did get it sent in but hasn't received a denial letter yet. Patient is going to be needing more symbicourt. Has the other inhalers. Oh remembered he was supposed to call Catie with prescription assistance to let her know how many/what patient had for the Symbicourt and he forgot. Says he'll call her right away.      Outreach Frequency: monthly    Plan:   IVONNE HUDSON did send e-mail to Catie with prescription assistance program to see what else patient spouse is needing to do/get turned in.     Care Coordinator will follow up in 1 month.    PHOEBE Maria   Primary Care " Clinic- Social Work Care Coordinator  [facility]  11/2/2020 1:25 PM  [phone]

## 2020-11-09 ENCOUNTER — OFFICE VISIT (OUTPATIENT)
Dept: CARDIOLOGY | Facility: CLINIC | Age: 70
End: 2020-11-09
Payer: COMMERCIAL

## 2020-11-09 VITALS
DIASTOLIC BLOOD PRESSURE: 64 MMHG | HEIGHT: 62 IN | OXYGEN SATURATION: 98 % | BODY MASS INDEX: 16.51 KG/M2 | WEIGHT: 89.7 LBS | HEART RATE: 70 BPM | SYSTOLIC BLOOD PRESSURE: 166 MMHG

## 2020-11-09 DIAGNOSIS — I73.9 PVD (PERIPHERAL VASCULAR DISEASE) (H): ICD-10-CM

## 2020-11-09 DIAGNOSIS — I73.9 PAD (PERIPHERAL ARTERY DISEASE) (H): ICD-10-CM

## 2020-11-09 DIAGNOSIS — I35.0 NONRHEUMATIC AORTIC VALVE STENOSIS: ICD-10-CM

## 2020-11-09 DIAGNOSIS — J44.1 COPD EXACERBATION (H): ICD-10-CM

## 2020-11-09 DIAGNOSIS — F17.200 SMOKER: ICD-10-CM

## 2020-11-09 DIAGNOSIS — E46 MALNUTRITION, UNSPECIFIED TYPE (H): ICD-10-CM

## 2020-11-09 DIAGNOSIS — I10 BENIGN ESSENTIAL HYPERTENSION: ICD-10-CM

## 2020-11-09 DIAGNOSIS — I25.118 CORONARY ARTERY DISEASE OF NATIVE ARTERY OF NATIVE HEART WITH STABLE ANGINA PECTORIS (H): Primary | ICD-10-CM

## 2020-11-09 PROCEDURE — 99214 OFFICE O/P EST MOD 30 MIN: CPT | Performed by: INTERNAL MEDICINE

## 2020-11-09 RX ORDER — LOSARTAN POTASSIUM 25 MG/1
25 TABLET ORAL DAILY
Qty: 90 TABLET | Refills: 0 | Status: SHIPPED | OUTPATIENT
Start: 2020-11-09 | End: 2020-12-02

## 2020-11-09 RX ORDER — METOPROLOL TARTRATE 25 MG/1
12.5 TABLET, FILM COATED ORAL 2 TIMES DAILY
Qty: 45 TABLET | Refills: 1 | Status: SHIPPED | OUTPATIENT
Start: 2020-11-09 | End: 2020-12-02

## 2020-11-09 ASSESSMENT — MIFFLIN-ST. JEOR: SCORE: 890.13

## 2020-11-09 NOTE — LETTER
11/9/2020    Deisy Carter, SPRING CNP  [address] [place] [address]    RE: Preeti Ford       Dear Colleague,    I had the pleasure of seeing Preeti Ford in the [facility].    HISTORY:    Preeti Ford is a pleasant 70-year-old female accompanied by her very attentive .  She has a history of coronary artery disease with previous stenting of her LAD and circumflex.  She has had various times been noted to have a reduced ejection fraction, initially 25% secondary to coronary disease but eventually improving to 50 to 55% with revascularization.  She also has a history of peripheral vascular disease with aortic iliac bypass, COPD oxygen dependent at night, ongoing cigarette use, mild aortic stenosis with a mean gradient of 12 mmHg, aortic valve area of 1 cm  by last echo, hypertension, history of 2 previous CVAs the last being in February 2020, hyperlipidemia, and severe pulmonary hypertension with estimated RV pressure 56 mmHg plus right atrial pressure on last echo.    Preeti was hospitalized with a COPD exacerbation in May of this year and was found to have a non-STEMI with troponin of 2.9.  She also had a reduction in her ejection fraction to 35 to 40%.  Her most recent echocardiogram shows resolution of her ejection fraction back to normal.  Her last angiogram was done in May 2018.  At that time her small circumflex stent was restenosed and the vessel was restented using a 2.5 mm ROWDY.  Mild disease was found elsewhere.    Today Preeti reports that she overall is stable and has no cardiovascular concerns.  Review of her chart shows that she has unintentionally lost 11 pounds since her last clinic visit in February, currently weighing just 89-1/2 pounds.  She appears frail and cachectic.  She states that she feels that her appetite is normal, that food still tastes good to her, and she has not had any GI complaints.    Pretei does describe occasional  "episodes of chest tightness.  Her  thinks this comes on with exertion but generally last less than a minute and happens only about once a month on average.  He has nitroglycerin available but has never given it to her since the pain is so short-lived.  This pain does not radiate elsewhere.  Preeti states that when she gets that she \"sits back and tries to slow it down\".    Preeti continues to smoke cigarettes and advises me that she is not interested in quitting.  She has been intolerant of high-dose statins and is currently using Lipitor 20 mg daily without muscle aches.  She continues to have some issues with aphasia.  She is also currently on both Plavix and aspirin on a daily basis.  Her  checks her blood pressure regularly at home and states that there is quite a wide variation but it is generally around 140/85.  In the past her blood pressure medicines were decreased because her blood pressure was running low during her hospitalization with a CVA.      ASSESSMENT/PLAN:    1.  Coronary artery disease.  The patient has stable angina and an angiogram done 2-1/2 years ago showing mild disease.  She is frail and cachectic and I think it is best that we treat her conservatively as long as she has only mild symptoms.  She is on beta-blocker, ARB, statin, and DAPT.  I discussed the option of proceeding with coronary angiography but the patient and her  are in agreement that they would prefer a conservative approach at this point.  They will contact me if her chest pain worsens.  I emphasized this a couple of times during our conversation.  2.  Hypertension.  Blood pressure is not well controlled at this time with an office blood pressure reading of 166/64.  I will double her dose of metoprolol to 12.5 mg twice daily and also doubled her dose of losartan to 25 mg daily.  We will plan 1 month follow-up for further medication titration.  3.  Hyperlipidemia.  She has had difficulty with high dose " statins.  Her current LDL is 85 which is not ideal but is acceptable.  4.  Aortic stenosis.  Moderate in severity but gradually progressive.  We will continue to monitor this.  She would not be a candidate for SAVR but should be able to tolerate TAVR when necessary.  It is of course difficult to distinguish symptoms of aortic stenosis from her underlying severe COPD.  She reports being extremely inactive, mostly just moving from room to room around the house.  They have a basement but she no longer goes downstairs because she has such difficulty walking back up a flight of steps.  5.  Ongoing cigarette use.  Patient is not interested in smoking cessation and made this clear to me.  6.  Peripheral vascular disease.  No current symptoms that need to be addressed.  7.  COPD.  Management per primary care and others.    Thank you for inviting me to participate in your patient's care.  1 month follow-up primarily for blood pressure management will be planned.    Chart documentation was completed, in part, with daPulse voice-recognition software. Even though reviewed, some grammatical, spelling, and word errors may remain.       Orders Placed This Encounter   Procedures     Follow-Up with Cardiac Advanced Practice Provider     Orders Placed This Encounter   Medications     losartan (COZAAR) 25 MG tablet     Sig: Take 1 tablet (25 mg) by mouth daily     Dispense:  90 tablet     Refill:  0     metoprolol tartrate (LOPRESSOR) 25 MG tablet     Sig: Take 0.5 tablets (12.5 mg) by mouth 2 times daily     Dispense:  45 tablet     Refill:  1     Medications Discontinued During This Encounter   Medication Reason     metoprolol tartrate (LOPRESSOR) 25 MG tablet      losartan (COZAAR) 25 MG tablet        10 year ASCVD risk: The ASCVD Risk score (Jennyisabelle WRAY Jr., et al., 2013) failed to calculate for the following reasons:    The patient has a prior MI or stroke diagnosis    Encounter Diagnoses   Name Primary?     Coronary artery disease of  native artery of native heart with stable angina pectoris (H) Yes     PAD (peripheral artery disease) (H)      Benign essential hypertension      PVD (peripheral vascular disease) (H)      Malnutrition, unspecified type (H)      Smoker      COPD exacerbation, Chronic O2 Dependent        CURRENT MEDICATIONS:  Current Outpatient Medications   Medication Sig Dispense Refill     ACE/ARB/ARNI NOT PRESCRIBED, INTENTIONAL, Please choose reason not prescribed, below       acetaminophen (TYLENOL) 325 MG tablet Take 325 mg by mouth every 4 hours as needed for mild pain  100 tablet      albuterol (PROAIR HFA/PROVENTIL HFA/VENTOLIN HFA) 108 (90 Base) MCG/ACT inhaler Inhale 2 puffs into the lungs every 6 hours as needed for shortness of breath / dyspnea 3 Inhaler 3     aspirin 81 MG EC tablet Take 1 tablet (81 mg) by mouth daily       atorvastatin (LIPITOR) 20 MG tablet Take 1 tablet (20 mg) by mouth At Bedtime 90 tablet 3     budesonide-formoterol (SYMBICORT) 160-4.5 MCG/ACT Inhaler Inhale 2 puffs into the lungs 2 times daily 1 Inhaler 5     budesonide-formoterol (SYMBICORT) 160-4.5 MCG/ACT Inhaler INHALE TWO PUFFS BY MOUTH TWICE A DAY 10.2 g 5     Calcium Carbonate-Vitamin D (OSCAL 500/200 D-3 PO) Take 1 tablet by mouth 2 times daily       clopidogrel (PLAVIX) 75 MG tablet TAKE ONE TABLET BY MOUTH ONCE DAILY 90 tablet 3     Coenzyme Q10 (COQ10 PO) Take 100 mg by mouth every 24 hours (at 16:00)       guaiFENesin (MUCINEX) 600 MG 12 hr tablet Take 1-2 tablets (600-1,200 mg) by mouth 2 times daily 120 tablet 4     guaiFENesin (MUCINEX) 600 MG 12 hr tablet Take 600 mg by mouth 2 times daily       ipratropium - albuterol 0.5 mg/2.5 mg/3 mL (DUONEB) 0.5-2.5 (3) MG/3ML neb solution Take 1 vial (3 mLs) by nebulization every 4 hours as needed for shortness of breath / dyspnea or wheezing 270 mL 3     losartan (COZAAR) 25 MG tablet Take 1 tablet (25 mg) by mouth daily 90 tablet 0     metoprolol tartrate (LOPRESSOR) 25 MG tablet Take 0.5  tablets (12.5 mg) by mouth 2 times daily 45 tablet 1     montelukast (SINGULAIR) 10 MG tablet Take 1 tablet (10 mg) by mouth At Bedtime 90 tablet 3     Nutritional Supplements (BOOST) Take 1 Bottle by mouth 3 times daily (with meals) 90 each 0     order for DME Equipment being ordered: Nebulizer machine 1 Device 0     order for DME Equipment being ordered: Nebulizer 1 Device 0     polyethylene glycol (MIRALAX/GLYCOLAX) Packet Take 1 packet by mouth daily as needed for constipation       Respiratory Therapy Supplies (NEBULIZER/TUBING/MOUTHPIECE) KIT 1 kit as needed (if becomes damaged) 1 kit 1     senna-docusate (SENOKOT-S;PERICOLACE) 8.6-50 MG per tablet Take 1 tablet by mouth 2 times daily as needed for constipation 100 tablet      umeclidinium (INCRUSE ELLIPTA) 62.5 MCG/INH inhaler Inhale 1 puff into the lungs daily 3 each 3     nitroGLYcerin (NITROSTAT) 0.4 MG sublingual tablet For chest pain place 1 tablet under the tongue every 5 minutes for 3 doses. If symptoms persist 5 minutes after 1st dose call 911. (Patient not taking: Reported on 11/9/2020) 25 tablet        ALLERGIES     Allergies   Allergen Reactions     Crestor [Rosuvastatin] Other (See Comments)     dizziness     Hmg-Coa-R Inhibitors Other (See Comments)     Muscle/joint aching     Lisinopril Cough     Optison [Albumin Human] Other (See Comments)     Pt was flush and very dizzy.  Also had a BP drop     Penicillins Rash       PAST MEDICAL HISTORY:  Past Medical History:   Diagnosis Date     Arthritis      COPD (chronic obstructive pulmonary disease) (H)      Coronary artery disease      CVA (cerebral vascular accident) (H) 8-     Hypertension        PAST SURGICAL HISTORY:  Past Surgical History:   Procedure Laterality Date     APPENDECTOMY       BYPASS GRAFT AORTOILIAC N/A 3/7/2017    Procedure: BYPASS GRAFT AORTOILIAC;  Surgeon: Marcos Pratt MD;  Location: [facility] OR     SALPINGO OOPHORECTOMY,R/L/DELORES      Salpingo Oophorectomy, RT/LT/DELORES        FAMILY HISTORY:  Family History   Problem Relation Age of Onset     Cerebrovascular Disease Mother      Cerebrovascular Disease Father      Genitourinary Problems Brother         Dialysis       SOCIAL HISTORY:  Social History     Socioeconomic History     Marital status:      Spouse name: Not on file     Number of children: Not on file     Years of education: Not on file     Highest education level: Not on file   Occupational History     Not on file   Social Needs     Financial resource strain: Not on file     Food insecurity     Worry: Not on file     Inability: Not on file     Transportation needs     Medical: Not on file     Non-medical: Not on file   Tobacco Use     Smoking status: Current Every Day Smoker     Packs/day: 0.50     Types: Cigarettes     Smokeless tobacco: Never Used     Tobacco comment: patient states she is working on it    Substance and Sexual Activity     Alcohol use: No     Alcohol/week: 0.0 standard drinks     Drug use: No     Sexual activity: Not Currently     Partners: Male   Lifestyle     Physical activity     Days per week: Not on file     Minutes per session: Not on file     Stress: Not on file   Relationships     Social connections     Talks on phone: Not on file     Gets together: Not on file     Attends Orthodoxy service: Not on file     Active member of club or organization: Not on file     Attends meetings of clubs or organizations: Not on file     Relationship status: Not on file     Intimate partner violence     Fear of current or ex partner: Not on file     Emotionally abused: Not on file     Physically abused: Not on file     Forced sexual activity: Not on file   Other Topics Concern      Service No     Blood Transfusions No     Caffeine Concern No     Occupational Exposure No     Hobby Hazards No     Sleep Concern Yes     Comment: Trouble breathing at night due to COPD     Stress Concern No     Weight Concern No     Special Diet No     Back Care No      "Exercise No     Bike Helmet No     Seat Belt Yes     Self-Exams Yes     Parent/sibling w/ CABG, MI or angioplasty before 65F 55M? No   Social History Narrative     Not on file       Review of Systems:  Skin:        Eyes:  Negative    ENT:  Negative    Respiratory:  Positive for dyspnea on exertion  Cardiovascular:  Negative for;chest pain;palpitations;edema;lightheadedness;dizziness    Gastroenterology: Negative    Genitourinary:  Negative    Musculoskeletal:  Negative    Neurologic:  Negative    Psychiatric:  Negative    Heme/Lymph/Imm:  Positive for allergies  Endocrine:  Negative      Physical Exam:  Vitals: BP (!) 166/64 (BP Location: Right arm, Patient Position: Fowlers, Cuff Size: Adult Regular)   Pulse 70   Ht 1.575 m (5' 2\")   Wt 40.7 kg (89 lb 11.2 oz)   SpO2 98%   BMI 16.41 kg/m      Constitutional:  cooperative thin;cachectic;frail;appears older than stated age examined in wheelchair    Skin:  warm and dry to the touch        Head:  normocephalic        Eyes:  sclera white;no nystagmus;no xanthalasma        ENT:  no pallor or cyanosis   masked    Neck:  JVP normal        Chest:  clear to auscultation diminished breath sounds bilaterally;prolonged expiration      Cardiac: regular rhythm;normal S1 and S2       grade 1;systolic ejection murmur;apical          Abdomen:  abdomen soft;BS normoactive        Vascular:   pulses below the femoral arteries are diminished                                    Extremities and Back:  no edema        Neurological:      residual mild aphasia      Recent Lab Results:  LIPID RESULTS:  Lab Results   Component Value Date    CHOL 185 10/29/2020    HDL 84 10/29/2020    LDL 85 10/29/2020    TRIG 82 10/29/2020    CHOLHDLRATIO 3.6 10/09/2015       LIVER ENZYME RESULTS:  Lab Results   Component Value Date    AST 30 05/02/2020    ALT 22 10/29/2020       CBC RESULTS:  Lab Results   Component Value Date    WBC 5.7 05/15/2020    RBC 4.68 05/15/2020    HGB 13.7 05/15/2020    HCT 43.6 " 05/15/2020    MCV 93 05/15/2020    MCH 29.3 05/15/2020    MCHC 31.4 (L) 05/15/2020    RDW 12.5 05/15/2020     05/15/2020       BMP RESULTS:  Lab Results   Component Value Date     10/29/2020    POTASSIUM 4.0 10/29/2020    CHLORIDE 104 10/29/2020    CO2 29 10/29/2020    ANIONGAP 5 10/29/2020    GLC 81 10/29/2020    BUN 14 10/29/2020    CR 0.62 10/29/2020    GFRESTIMATED >90 10/29/2020    GFRESTBLACK >90 10/29/2020    CALDERON 9.0 10/29/2020        A1C RESULTS:  Lab Results   Component Value Date    A1C 5.5 03/24/2018       INR RESULTS:  Lab Results   Component Value Date    INR 0.84 (L) 05/02/2020    INR 0.91 05/17/2018         Thank you for allowing me to participate in the care of your patient.    Sincerely,     Victor Hugo Cortez MD     [facility]

## 2020-11-09 NOTE — PROGRESS NOTES
HISTORY:    Preeti Ford is a pleasant 70-year-old female accompanied by her very attentive .  She has a history of coronary artery disease with previous stenting of her LAD and circumflex.  She has had various times been noted to have a reduced ejection fraction, initially 25% secondary to coronary disease but eventually improving to 50 to 55% with revascularization.  She also has a history of peripheral vascular disease with aortic iliac bypass, COPD oxygen dependent at night, ongoing cigarette use, mild aortic stenosis with a mean gradient of 12 mmHg, aortic valve area of 1 cm  by last echo, hypertension, history of 2 previous CVAs the last being in February 2020, hyperlipidemia, and severe pulmonary hypertension with estimated RV pressure 56 mmHg plus right atrial pressure on last echo.    Preeti was hospitalized with a COPD exacerbation in May of this year and was found to have a non-STEMI with troponin of 2.9.  She also had a reduction in her ejection fraction to 35 to 40%.  Her most recent echocardiogram shows resolution of her ejection fraction back to normal.  Her last angiogram was done in May 2018.  At that time her small circumflex stent was restenosed and the vessel was restented using a 2.5 mm ROWDY.  Mild disease was found elsewhere.    Today Preeti reports that she overall is stable and has no cardiovascular concerns.  Review of her chart shows that she has unintentionally lost 11 pounds since her last clinic visit in February, currently weighing just 89-1/2 pounds.  She appears frail and cachectic.  She states that she feels that her appetite is normal, that food still tastes good to her, and she has not had any GI complaints.    Preeti does describe occasional episodes of chest tightness.  Her  thinks this comes on with exertion but generally last less than a minute and happens only about once a month on average.  He has nitroglycerin available but has never given it to her since the  "pain is so short-lived.  This pain does not radiate elsewhere.  Preeti states that when she gets that she \"sits back and tries to slow it down\".    Preeti continues to smoke cigarettes and advises me that she is not interested in quitting.  She has been intolerant of high-dose statins and is currently using Lipitor 20 mg daily without muscle aches.  She continues to have some issues with aphasia.  She is also currently on both Plavix and aspirin on a daily basis.  Her  checks her blood pressure regularly at home and states that there is quite a wide variation but it is generally around 140/85.  In the past her blood pressure medicines were decreased because her blood pressure was running low during her hospitalization with a CVA.      ASSESSMENT/PLAN:    1.  Coronary artery disease.  The patient has stable angina and an angiogram done 2-1/2 years ago showing mild disease.  She is frail and cachectic and I think it is best that we treat her conservatively as long as she has only mild symptoms.  She is on beta-blocker, ARB, statin, and DAPT.  I discussed the option of proceeding with coronary angiography but the patient and her  are in agreement that they would prefer a conservative approach at this point.  They will contact me if her chest pain worsens.  I emphasized this a couple of times during our conversation.  2.  Hypertension.  Blood pressure is not well controlled at this time with an office blood pressure reading of 166/64.  I will double her dose of metoprolol to 12.5 mg twice daily and also doubled her dose of losartan to 25 mg daily.  We will plan 1 month follow-up for further medication titration.  3.  Hyperlipidemia.  She has had difficulty with high dose statins.  Her current LDL is 85 which is not ideal but is acceptable.  4.  Aortic stenosis.  Moderate in severity but gradually progressive.  We will continue to monitor this.  She would not be a candidate for SAVR but should be able to " tolerate TAVR when necessary.  It is of course difficult to distinguish symptoms of aortic stenosis from her underlying severe COPD.  She reports being extremely inactive, mostly just moving from room to room around the house.  They have a basement but she no longer goes downstairs because she has such difficulty walking back up a flight of steps.  5.  Ongoing cigarette use.  Patient is not interested in smoking cessation and made this clear to me.  6.  Peripheral vascular disease.  No current symptoms that need to be addressed.  7.  COPD.  Management per primary care and others.    Thank you for inviting me to participate in your patient's care.  1 month follow-up primarily for blood pressure management will be planned.    Chart documentation was completed, in part, with [employer] voice-recognition software. Even though reviewed, some grammatical, spelling, and word errors may remain.       Orders Placed This Encounter   Procedures     Follow-Up with Cardiac Advanced Practice Provider     Orders Placed This Encounter   Medications     losartan (COZAAR) 25 MG tablet     Sig: Take 1 tablet (25 mg) by mouth daily     Dispense:  90 tablet     Refill:  0     metoprolol tartrate (LOPRESSOR) 25 MG tablet     Sig: Take 0.5 tablets (12.5 mg) by mouth 2 times daily     Dispense:  45 tablet     Refill:  1     Medications Discontinued During This Encounter   Medication Reason     metoprolol tartrate (LOPRESSOR) 25 MG tablet      losartan (COZAAR) 25 MG tablet        10 year ASCVD risk: The ASCVD Risk score (Tinnie KAMALA Jr., et al., 2013) failed to calculate for the following reasons:    The patient has a prior MI or stroke diagnosis    Encounter Diagnoses   Name Primary?     Coronary artery disease of native artery of native heart with stable angina pectoris (H) Yes     PAD (peripheral artery disease) (H)      Benign essential hypertension      PVD (peripheral vascular disease) (H)      Malnutrition, unspecified type (H)      Smoker       COPD exacerbation, Chronic O2 Dependent        CURRENT MEDICATIONS:  Current Outpatient Medications   Medication Sig Dispense Refill     ACE/ARB/ARNI NOT PRESCRIBED, INTENTIONAL, Please choose reason not prescribed, below       acetaminophen (TYLENOL) 325 MG tablet Take 325 mg by mouth every 4 hours as needed for mild pain  100 tablet      albuterol (PROAIR HFA/PROVENTIL HFA/VENTOLIN HFA) 108 (90 Base) MCG/ACT inhaler Inhale 2 puffs into the lungs every 6 hours as needed for shortness of breath / dyspnea 3 Inhaler 3     aspirin 81 MG EC tablet Take 1 tablet (81 mg) by mouth daily       atorvastatin (LIPITOR) 20 MG tablet Take 1 tablet (20 mg) by mouth At Bedtime 90 tablet 3     budesonide-formoterol (SYMBICORT) 160-4.5 MCG/ACT Inhaler Inhale 2 puffs into the lungs 2 times daily 1 Inhaler 5     budesonide-formoterol (SYMBICORT) 160-4.5 MCG/ACT Inhaler INHALE TWO PUFFS BY MOUTH TWICE A DAY 10.2 g 5     Calcium Carbonate-Vitamin D (OSCAL 500/200 D-3 PO) Take 1 tablet by mouth 2 times daily       clopidogrel (PLAVIX) 75 MG tablet TAKE ONE TABLET BY MOUTH ONCE DAILY 90 tablet 3     Coenzyme Q10 (COQ10 PO) Take 100 mg by mouth every 24 hours (at 16:00)       guaiFENesin (MUCINEX) 600 MG 12 hr tablet Take 1-2 tablets (600-1,200 mg) by mouth 2 times daily 120 tablet 4     guaiFENesin (MUCINEX) 600 MG 12 hr tablet Take 600 mg by mouth 2 times daily       ipratropium - albuterol 0.5 mg/2.5 mg/3 mL (DUONEB) 0.5-2.5 (3) MG/3ML neb solution Take 1 vial (3 mLs) by nebulization every 4 hours as needed for shortness of breath / dyspnea or wheezing 270 mL 3     losartan (COZAAR) 25 MG tablet Take 1 tablet (25 mg) by mouth daily 90 tablet 0     metoprolol tartrate (LOPRESSOR) 25 MG tablet Take 0.5 tablets (12.5 mg) by mouth 2 times daily 45 tablet 1     montelukast (SINGULAIR) 10 MG tablet Take 1 tablet (10 mg) by mouth At Bedtime 90 tablet 3     Nutritional Supplements (BOOST) Take 1 Bottle by mouth 3 times daily (with meals) 90  each 0     order for DME Equipment being ordered: Nebulizer machine 1 Device 0     order for DME Equipment being ordered: Nebulizer 1 Device 0     polyethylene glycol (MIRALAX/GLYCOLAX) Packet Take 1 packet by mouth daily as needed for constipation       Respiratory Therapy Supplies (NEBULIZER/TUBING/MOUTHPIECE) KIT 1 kit as needed (if becomes damaged) 1 kit 1     senna-docusate (SENOKOT-S;PERICOLACE) 8.6-50 MG per tablet Take 1 tablet by mouth 2 times daily as needed for constipation 100 tablet      umeclidinium (INCRUSE ELLIPTA) 62.5 MCG/INH inhaler Inhale 1 puff into the lungs daily 3 each 3     nitroGLYcerin (NITROSTAT) 0.4 MG sublingual tablet For chest pain place 1 tablet under the tongue every 5 minutes for 3 doses. If symptoms persist 5 minutes after 1st dose call 911. (Patient not taking: Reported on 11/9/2020) 25 tablet        ALLERGIES     Allergies   Allergen Reactions     Crestor [Rosuvastatin] Other (See Comments)     dizziness     Hmg-Coa-R Inhibitors Other (See Comments)     Muscle/joint aching     Lisinopril Cough     Optison [Albumin Human] Other (See Comments)     Pt was flush and very dizzy.  Also had a BP drop     Penicillins Rash       PAST MEDICAL HISTORY:  Past Medical History:   Diagnosis Date     Arthritis      COPD (chronic obstructive pulmonary disease) (H)      Coronary artery disease      CVA (cerebral vascular accident) (H) 8-     Hypertension        PAST SURGICAL HISTORY:  Past Surgical History:   Procedure Laterality Date     APPENDECTOMY       BYPASS GRAFT AORTOILIAC N/A 3/7/2017    Procedure: BYPASS GRAFT AORTOILIAC;  Surgeon: Marcos Pratt MD;  Location: [facility] OR     SALPINGO OOPHORECTOMY,R/L/EDLORES      Salpingo Oophorectomy, RT/LT/DELORES       FAMILY HISTORY:  Family History   Problem Relation Age of Onset     Cerebrovascular Disease Mother      Cerebrovascular Disease Father      Genitourinary Problems Brother         Dialysis       SOCIAL HISTORY:  Social History      Socioeconomic History     Marital status:      Spouse name: Not on file     Number of children: Not on file     Years of education: Not on file     Highest education level: Not on file   Occupational History     Not on file   Social Needs     Financial resource strain: Not on file     Food insecurity     Worry: Not on file     Inability: Not on file     Transportation needs     Medical: Not on file     Non-medical: Not on file   Tobacco Use     Smoking status: Current Every Day Smoker     Packs/day: 0.50     Types: Cigarettes     Smokeless tobacco: Never Used     Tobacco comment: patient states she is working on it    Substance and Sexual Activity     Alcohol use: No     Alcohol/week: 0.0 standard drinks     Drug use: No     Sexual activity: Not Currently     Partners: Male   Lifestyle     Physical activity     Days per week: Not on file     Minutes per session: Not on file     Stress: Not on file   Relationships     Social connections     Talks on phone: Not on file     Gets together: Not on file     Attends [employer] service: Not on file     Active member of club or organization: Not on file     Attends meetings of clubs or organizations: Not on file     Relationship status: Not on file     Intimate partner violence     Fear of current or ex partner: Not on file     Emotionally abused: Not on file     Physically abused: Not on file     Forced sexual activity: Not on file   Other Topics Concern      Service No     Blood Transfusions No     Caffeine Concern No     Occupational Exposure No     Hobby Hazards No     Sleep Concern Yes     Comment: Trouble breathing at night due to COPD     Stress Concern No     Weight Concern No     Special Diet No     Back Care No     Exercise No     Bike Helmet No     Seat Belt Yes     Self-Exams Yes     Parent/sibling w/ CABG, MI or angioplasty before 65F 55M? No   Social History Narrative     Not on file       Review of Systems:  Skin:        Eyes:  Negative   "  ENT:  Negative    Respiratory:  Positive for dyspnea on exertion  Cardiovascular:  Negative for;chest pain;palpitations;edema;lightheadedness;dizziness    Gastroenterology: Negative    Genitourinary:  Negative    Musculoskeletal:  Negative    Neurologic:  Negative    Psychiatric:  Negative    Heme/Lymph/Imm:  Positive for allergies  Endocrine:  Negative      Physical Exam:  Vitals: BP (!) 166/64 (BP Location: Right arm, Patient Position: Fowlers, Cuff Size: Adult Regular)   Pulse 70   Ht 1.575 m (5' 2\")   Wt 40.7 kg (89 lb 11.2 oz)   SpO2 98%   BMI 16.41 kg/m      Constitutional:  cooperative thin;cachectic;frail;appears older than stated age examined in wheelchair    Skin:  warm and dry to the touch        Head:  normocephalic        Eyes:  sclera white;no nystagmus;no xanthalasma        ENT:  no pallor or cyanosis   masked    Neck:  JVP normal        Chest:  clear to auscultation diminished breath sounds bilaterally;prolonged expiration      Cardiac: regular rhythm;normal S1 and S2       grade 1;systolic ejection murmur;apical          Abdomen:  abdomen soft;BS normoactive        Vascular:   pulses below the femoral arteries are diminished                                    Extremities and Back:  no edema        Neurological:      residual mild aphasia      Recent Lab Results:  LIPID RESULTS:  Lab Results   Component Value Date    CHOL 185 10/29/2020    HDL 84 10/29/2020    LDL 85 10/29/2020    TRIG 82 10/29/2020    CHOLHDLRATIO 3.6 10/09/2015       LIVER ENZYME RESULTS:  Lab Results   Component Value Date    AST 30 05/02/2020    ALT 22 10/29/2020       CBC RESULTS:  Lab Results   Component Value Date    WBC 5.7 05/15/2020    RBC 4.68 05/15/2020    HGB 13.7 05/15/2020    HCT 43.6 05/15/2020    MCV 93 05/15/2020    MCH 29.3 05/15/2020    MCHC 31.4 (L) 05/15/2020    RDW 12.5 05/15/2020     05/15/2020       BMP RESULTS:  Lab Results   Component Value Date     10/29/2020    POTASSIUM 4.0 10/29/2020 "    CHLORIDE 104 10/29/2020    CO2 29 10/29/2020    ANIONGAP 5 10/29/2020    GLC 81 10/29/2020    BUN 14 10/29/2020    CR 0.62 10/29/2020    GFRESTIMATED >90 10/29/2020    GFRESTBLACK >90 10/29/2020    CALDERON 9.0 10/29/2020        A1C RESULTS:  Lab Results   Component Value Date    A1C 5.5 03/24/2018       INR RESULTS:  Lab Results   Component Value Date    INR 0.84 (L) 05/02/2020    INR 0.91 05/17/2018         Victor Hugo Cortez MD, FACC    CC  Victor Hugo Cortez MD  [address] [place] [id number]

## 2020-11-19 ENCOUNTER — TELEPHONE (OUTPATIENT)
Dept: FAMILY MEDICINE | Facility: OTHER | Age: 70
End: 2020-11-19

## 2020-11-19 NOTE — TELEPHONE ENCOUNTER
Reason for Call:  Other FYI    Detailed comments: Patient signed up for [facility] for all her medications and there going to be faxing a form over. Thank you    Phone Number Patient can be reached at: Home number on file [phone] (home)    Best Time: anytime    Can we leave a detailed message on this number? YES    Call taken on 11/19/2020 at 3:09 PM by Suzanne Flowers

## 2020-11-30 NOTE — PROGRESS NOTES
Subjective     Preeti Ford is a 70 year old female who presents to clinic today for the following health issues:    History of Present Illness       Vascular Disease:  She presents for follow up of vascular disease.  She takes daily aspirin.    She eats 0-1 servings of fruits and vegetables daily.She consumes 0 sweetened beverage(s) daily.She exercises with enough effort to increase her heart rate 9 or less minutes per day.  She exercises with enough effort to increase her heart rate 3 or less days per week.   She is taking medications regularly.         Hyperlipidemia Follow-Up      Are you regularly taking any medication or supplement to lower your cholesterol?   Yes- cozaar    Are you having muscle aches or other side effects that you think could be caused by your cholesterol lowering medication?  No    Hypertension Follow-up      Do you check your blood pressure regularly outside of the clinic? Yes but needs a new cuff     Are you following a low salt diet? Yes    Are your blood pressures ever more than 140 on the top number (systolic) OR more   than 90 on the bottom number (diastolic), for example 140/90? Yes    COPD Follow-Up    Overall, how are your COPD symptoms since your last clinic visit?  No change    How much fatigue or shortness of breath do you have when you are walking?  Same as usual    How much shortness of breath do you have when you are resting?  Same as usual    How often do you cough? Rarely    Have you noticed any change in your sputum/phlegm?  No    Have you experienced a recent fever? No    Please describe how far you can walk without stopping to rest:  Less than 1 block    How many flights of stairs are you able to walk up without stopping?  None  Have you had any Emergency Room Visits, Urgent Care Visits, or Hospital Admissions because of your COPD since your last office visit?  Yes    How many times have you gone to the ED, Urgent Care, or been hospitalized for COPD since your last  "clinic visit?  1    Has been trying to put on weight. Has a good appetite and eats but doesn't eat a lot because she gets full. Will graze on food throughout the day. Has been drinking [employer] breakfast.   Declines influenza vaccine.     History   Smoking Status     Current Every Day Smoker     Packs/day: 0.50     Types: Cigarettes   Smokeless Tobacco     Never Used     Comment: patient states she is working on it      No results found for: FEV1, XBI1WXE          Review of Systems   Constitutional, HEENT, cardiovascular, pulmonary, GI, , musculoskeletal, neuro, skin, endocrine and psych systems are negative, except as otherwise noted.      Objective    /60   Pulse 66   Temp 97.6  F (36.4  C)   Resp 14   Ht 1.59 m (5' 2.6\")   Wt 40.9 kg (90 lb 4 oz)   SpO2 98%   BMI 16.19 kg/m    Body mass index is 16.19 kg/m .  Physical Exam   GENERAL: alert and no distress  EYES: Eyes grossly normal to inspection, PERRL and conjunctivae and sclerae normal  NECK: no adenopathy, no asymmetry, masses, or scars and thyroid normal to palpation  RESP: lungs clear to auscultation - no rales, rhonchi or wheezes  CV: regular rate and rhythm, normal S1 S2, no S3 or S4, no murmur, click or rub, no peripheral edema  MS: no gross musculoskeletal defects noted, no edema  SKIN: no suspicious lesions or rashes  NEURO: Normal strength and tone, mentation intact and speech normal  PSYCH: mentation appears normal, affect normal/bright    No results found for this or any previous visit (from the past 24 hour(s)).        Assessment & Plan     Chronic bronchitis, unspecified chronic bronchitis type (H)  Stable. Continue current medication(s) and dose(s).   - umeclidinium (INCRUSE ELLIPTA) 62.5 MCG/INH inhaler; Inhale 1 puff into the lungs daily  - budesonide-formoterol (SYMBICORT) 160-4.5 MCG/ACT Inhaler; Inhale 2 puffs into the lungs 2 times daily    Panlobular emphysema (H)  Stable. Continue current medication(s) and dose(s). "   - budesonide-formoterol (SYMBICORT) 160-4.5 MCG/ACT Inhaler; Inhale 2 puffs into the lungs 2 times daily    Acute CVA (cerebrovascular accident) - right paramedian superior vermis and left cerebellar hemisphere  Stable. Continue current medication(s) and dose(s).   - clopidogrel (PLAVIX) 75 MG tablet; TAKE ONE TABLET BY MOUTH ONCE DAILY    COPD, severe (H)  Stable. Continue current medication(s) and dose(s).   - montelukast (SINGULAIR) 10 MG tablet; Take 1 tablet (10 mg) by mouth At Bedtime    PAD (peripheral artery disease) (H)  Stable. Continue current medication(s) and dose(s).   - atorvastatin (LIPITOR) 20 MG tablet; Take 1 tablet (20 mg) by mouth At Bedtime    Benign essential hypertension  Stable. Continue current medication(s) and dose(s).   - losartan (COZAAR) 25 MG tablet; Take 1 tablet (25 mg) by mouth daily     Tobacco Cessation:   reports that she has been smoking cigarettes. She has been smoking about 0.50 packs per day. She has never used smokeless tobacco.  Tobacco Cessation Action Plan: Self help information given to patient         No follow-ups on file.    SPRING Banegas CNP  [facility]

## 2020-12-02 ENCOUNTER — OFFICE VISIT (OUTPATIENT)
Dept: FAMILY MEDICINE | Facility: OTHER | Age: 70
End: 2020-12-02
Payer: COMMERCIAL

## 2020-12-02 VITALS
OXYGEN SATURATION: 98 % | HEART RATE: 66 BPM | WEIGHT: 90.25 LBS | DIASTOLIC BLOOD PRESSURE: 60 MMHG | SYSTOLIC BLOOD PRESSURE: 124 MMHG | HEIGHT: 63 IN | BODY MASS INDEX: 15.99 KG/M2 | RESPIRATION RATE: 14 BRPM | TEMPERATURE: 97.6 F

## 2020-12-02 DIAGNOSIS — I73.9 PAD (PERIPHERAL ARTERY DISEASE) (H): ICD-10-CM

## 2020-12-02 DIAGNOSIS — I10 BENIGN ESSENTIAL HYPERTENSION: ICD-10-CM

## 2020-12-02 DIAGNOSIS — J43.1 PANLOBULAR EMPHYSEMA (H): ICD-10-CM

## 2020-12-02 DIAGNOSIS — I63.9 ACUTE CVA (CEREBROVASCULAR ACCIDENT) (H): ICD-10-CM

## 2020-12-02 DIAGNOSIS — J42 CHRONIC BRONCHITIS, UNSPECIFIED CHRONIC BRONCHITIS TYPE (H): Primary | ICD-10-CM

## 2020-12-02 DIAGNOSIS — J44.9 COPD, SEVERE (H): ICD-10-CM

## 2020-12-02 PROCEDURE — 99213 OFFICE O/P EST LOW 20 MIN: CPT | Performed by: STUDENT IN AN ORGANIZED HEALTH CARE EDUCATION/TRAINING PROGRAM

## 2020-12-02 RX ORDER — METOPROLOL TARTRATE 25 MG/1
12.5 TABLET, FILM COATED ORAL 2 TIMES DAILY
Qty: 45 TABLET | Refills: 1 | Status: SHIPPED | OUTPATIENT
Start: 2020-12-02 | End: 2021-01-26

## 2020-12-02 RX ORDER — LOSARTAN POTASSIUM 25 MG/1
25 TABLET ORAL DAILY
Qty: 90 TABLET | Refills: 0 | Status: SHIPPED | OUTPATIENT
Start: 2020-12-02 | End: 2020-12-21

## 2020-12-02 RX ORDER — MONTELUKAST SODIUM 10 MG/1
10 TABLET ORAL AT BEDTIME
Qty: 90 TABLET | Refills: 3 | Status: SHIPPED | OUTPATIENT
Start: 2020-12-02 | End: 2021-01-01

## 2020-12-02 RX ORDER — CLOPIDOGREL BISULFATE 75 MG/1
TABLET ORAL
Qty: 90 TABLET | Refills: 3 | Status: SHIPPED | OUTPATIENT
Start: 2020-12-02 | End: 2021-01-01

## 2020-12-02 RX ORDER — BUDESONIDE AND FORMOTEROL FUMARATE DIHYDRATE 160; 4.5 UG/1; UG/1
2 AEROSOL RESPIRATORY (INHALATION) 2 TIMES DAILY
Qty: 1 INHALER | Refills: 5 | Status: SHIPPED | OUTPATIENT
Start: 2020-12-02 | End: 2021-01-01

## 2020-12-02 RX ORDER — ATORVASTATIN CALCIUM 20 MG/1
20 TABLET, FILM COATED ORAL AT BEDTIME
Qty: 90 TABLET | Refills: 3 | Status: SHIPPED | OUTPATIENT
Start: 2020-12-02 | End: 2021-01-01

## 2020-12-02 ASSESSMENT — PAIN SCALES - GENERAL: PAINLEVEL: NO PAIN (0)

## 2020-12-02 ASSESSMENT — MIFFLIN-ST. JEOR: SCORE: 902.11

## 2020-12-09 ENCOUNTER — PATIENT OUTREACH (OUTPATIENT)
Dept: CARE COORDINATION | Facility: CLINIC | Age: 70
End: 2020-12-09

## 2020-12-09 NOTE — PROGRESS NOTES
Clinic Care Coordination Contact  [facility]/Voicemail       Clinical Data: Care Coordinator Outreach  Outreach attempted x 1. Busy signal, unable to leave a message.   Plan:  Care Coordinator will try to reach patient spouse again in 3-5 business days.      PHOEBE Maria   Primary Care Clinic- Social Work Care Coordinator  [facility]  12/9/2020 3:38 PM  [phone]

## 2020-12-14 ENCOUNTER — VIRTUAL VISIT (OUTPATIENT)
Dept: CARDIOLOGY | Facility: CLINIC | Age: 70
End: 2020-12-14
Attending: INTERNAL MEDICINE
Payer: COMMERCIAL

## 2020-12-14 DIAGNOSIS — I25.118 CORONARY ARTERY DISEASE OF NATIVE ARTERY OF NATIVE HEART WITH STABLE ANGINA PECTORIS (H): ICD-10-CM

## 2020-12-14 DIAGNOSIS — I10 BENIGN ESSENTIAL HYPERTENSION: ICD-10-CM

## 2020-12-14 DIAGNOSIS — J44.1 COPD EXACERBATION (H): Primary | ICD-10-CM

## 2020-12-14 DIAGNOSIS — I35.0 NONRHEUMATIC AORTIC VALVE STENOSIS: ICD-10-CM

## 2020-12-14 DIAGNOSIS — I73.9 PAD (PERIPHERAL ARTERY DISEASE) (H): ICD-10-CM

## 2020-12-14 PROCEDURE — 99213 OFFICE O/P EST LOW 20 MIN: CPT | Mod: TEL | Performed by: INTERNAL MEDICINE

## 2020-12-14 NOTE — PROGRESS NOTES
"Clinic Care Coordination Contact  [facility]/Voicemail       Clinical Data: Care Coordinator Outreach  Outreach attempted x 2.  Busy signal still on home phone. Tried patient  Bud's cell phone and \"calling restrictions.\" Unable to leave message.   Plan: Care Coordinator sent e-mail to Catie with Prescription assistance for update if [place] got her the Extra Help letter/denial from social security.  Care Coordinator will try to reach patient again in 3-5 business days.      PHOEBE Maria   Primary Care Clinic- Social Work Care Coordinator  [facility]  12/14/2020 4:29 PM  [phone]      "

## 2020-12-14 NOTE — LETTER
"12/14/2020    Deisy Carter, [facility]  [address] [place] [address]    RE: Preeti Ford       Dear Colleague,    I had the pleasure of seeing Preeti Ford in the [facility].      The patient has been notified of following:     \"This telephone visit will be conducted via a call between you and your physician/provider. We have found that certain health care needs can be provided without the need for a physical exam.  This service lets us provide the care you need with a short phone conversation.  If a prescription is necessary we can send it directly to your pharmacy.  If lab work is needed we can place an order for that and you can then stop by our lab to have the test done at a later time.    Telephone visits are billed at different rates depending on your insurance coverage. During this emergency period, for some insurers they may be billed the same as an in-person visit.  Please reach out to your insurance provider with any questions.    If during the course of the call the physician/provider feels a telephone visit is not appropriate, you will not be charged for this service.\"    Patient has given verbal consent for Telephone visit?  Yes    What phone number would you like to be contacted at? [phone]    How would you like to obtain your AVS? Mail a copy      Blood pressure: about 125/65  Pulse: 60-65  Weight: 90 #    HISTORY:    Preeti Ford is a pleasant 70-year-old female visited via telephone today because of the pandemic and once again accompanied by her very attentive .  She has a history of coronary artery disease with previous stenting of her LAD and circumflex.  She has had various times been noted to have a reduced ejection fraction, initially 25% secondary to coronary disease but eventually improving to 50 to 55% with revascularization.  She also has a history of peripheral vascular disease with aortic iliac bypass, COPD oxygen " dependent at night, ongoing cigarette use, mild aortic stenosis with a mean gradient of 12 mmHg, aortic valve area of 1 cm  by last echo, hypertension, history of 2 previous CVAs the last being in February 2020, hyperlipidemia, and severe pulmonary hypertension with estimated RV pressure 56 mmHg plus right atrial pressure on last echo.     Preeti was hospitalized with a COPD exacerbation in May of this year and was found to have a non-STEMI with troponin of 2.9.  She also had a reduction in her ejection fraction to 35 to 40%.  Her most recent echocardiogram shows resolution of her ejection fraction back to normal.  Her last angiogram was done in May 2018.  At that time her small circumflex stent was restenosed and the vessel was restented using a 2.5 mm ROWDY.  Mild disease was found elsewhere.    I spoke with Preeti just over a month ago and she was doing well.  The reader is referred to that note.  At the time her blood pressure was quite elevated and her dose of losartan was doubled to 50 mg and her dose of metoprolol was doubled from 1/4 to 1/2 of a 25 mg tablet twice daily.  Today she reports that she has tolerated her medication change without difficulty and her , who cuts her pills, appreciates not having to cut her metoprolol into 4 pieces.  They have been checking blood pressure on a regular basis and typically her systolic values are in the 120s with diastolic values in the 60s or 70s.  Today's value was 125/65 and on December 2 in clinic it was 124/60.  She denies dizziness or lightheadedness or other side effects from her medication changes.    Preeti once again reports that she has not had any changes in frequency of chest pain, new syncope or near syncope, peripheral edema, or strokelike symptoms.  She is a smoker and just a month ago I talked to her about smoking cessation and she told me she was not interested so I did not raise that topic again today.    ASSESSMENT/PLAN:    1.  Hypertension.   Blood pressure now under excellent control.  They will continue to monitor blood pressure frequently and let us know if the systolic value is frequently greater than 140.  I offered to switch over to Toprol-XL, which would allow him to avoid splitting pills and to be a once a day medication, but she would prefer to stay on the current regimen that is working so well.  2.  Coronary artery disease.  Stable angina, no changes to medical regimen made today.  3.  Hyperlipidemia.  She has had issue with statin intolerance but is currently using atorvastatin 20 mg daily with moderately good lipid control.  4.  Aortic stenosis.  Low flow low gradient with peak gradient of 14 mmHg but calculated valve area of 1 cm .  I do not believe her aortic stenosis is contributing significantly to her symptoms of dyspnea which are mostly due to her advanced COPD.  We will continue to follow.  She does have a dimensionless index of 0.28, indicating that the aortic stenosis is likely only moderate.    Thank you for inviting me to participate in your patient's care.  She is stable from a cardiac standpoint and we will plan on seeing her in 6 months.    Chart documentation was completed, in part, with [employer] voice-recognition software. Even though reviewed, some grammatical, spelling, and word errors may remain.         Phone call duration: 12 minutes      Thank you for allowing me to participate in the care of your patient.    Sincerely,     Victor Hugo Cortez MD     [facility]

## 2020-12-14 NOTE — PROGRESS NOTES
"  The patient has been notified of following:     \"This telephone visit will be conducted via a call between you and your physician/provider. We have found that certain health care needs can be provided without the need for a physical exam.  This service lets us provide the care you need with a short phone conversation.  If a prescription is necessary we can send it directly to your pharmacy.  If lab work is needed we can place an order for that and you can then stop by our lab to have the test done at a later time.    Telephone visits are billed at different rates depending on your insurance coverage. During this emergency period, for some insurers they may be billed the same as an in-person visit.  Please reach out to your insurance provider with any questions.    If during the course of the call the physician/provider feels a telephone visit is not appropriate, you will not be charged for this service.\"    Patient has given verbal consent for Telephone visit?  Yes    What phone number would you like to be contacted at? [phone]    How would you like to obtain your AVS? Mail a copy      Blood pressure: about 125/65  Pulse: 60-65  Weight: 90 #    HISTORY:    Preeti Ford is a pleasant 70-year-old female visited via telephone today because of the pandemic and once again accompanied by her very attentive .  She has a history of coronary artery disease with previous stenting of her LAD and circumflex.  She has had various times been noted to have a reduced ejection fraction, initially 25% secondary to coronary disease but eventually improving to 50 to 55% with revascularization.  She also has a history of peripheral vascular disease with aortic iliac bypass, COPD oxygen dependent at night, ongoing cigarette use, mild aortic stenosis with a mean gradient of 12 mmHg, aortic valve area of 1 cm  by last echo, hypertension, history of 2 previous CVAs the last being in February 2020, hyperlipidemia, and " severe pulmonary hypertension with estimated RV pressure 56 mmHg plus right atrial pressure on last echo.     Preeti was hospitalized with a COPD exacerbation in May of this year and was found to have a non-STEMI with troponin of 2.9.  She also had a reduction in her ejection fraction to 35 to 40%.  Her most recent echocardiogram shows resolution of her ejection fraction back to normal.  Her last angiogram was done in May 2018.  At that time her small circumflex stent was restenosed and the vessel was restented using a 2.5 mm ROWDY.  Mild disease was found elsewhere.    I spoke with Preeti just over a month ago and she was doing well.  The reader is referred to that note.  At the time her blood pressure was quite elevated and her dose of losartan was doubled to 50 mg and her dose of metoprolol was doubled from 1/4 to 1/2 of a 25 mg tablet twice daily.  Today she reports that she has tolerated her medication change without difficulty and her , who cuts her pills, appreciates not having to cut her metoprolol into 4 pieces.  They have been checking blood pressure on a regular basis and typically her systolic values are in the 120s with diastolic values in the 60s or 70s.  Today's value was 125/65 and on December 2 in clinic it was 124/60.  She denies dizziness or lightheadedness or other side effects from her medication changes.    Preeti once again reports that she has not had any changes in frequency of chest pain, new syncope or near syncope, peripheral edema, or strokelike symptoms.  She is a smoker and just a month ago I talked to her about smoking cessation and she told me she was not interested so I did not raise that topic again today.    ASSESSMENT/PLAN:    1.  Hypertension.  Blood pressure now under excellent control.  They will continue to monitor blood pressure frequently and let us know if the systolic value is frequently greater than 140.  I offered to switch over to Toprol-XL, which would allow him to  avoid splitting pills and to be a once a day medication, but she would prefer to stay on the current regimen that is working so well.  2.  Coronary artery disease.  Stable angina, no changes to medical regimen made today.  3.  Hyperlipidemia.  She has had issue with statin intolerance but is currently using atorvastatin 20 mg daily with moderately good lipid control.  4.  Aortic stenosis.  Low flow low gradient with peak gradient of 14 mmHg but calculated valve area of 1 cm .  I do not believe her aortic stenosis is contributing significantly to her symptoms of dyspnea which are mostly due to her advanced COPD.  We will continue to follow.  She does have a dimensionless index of 0.28, indicating that the aortic stenosis is likely only moderate.    Thank you for inviting me to participate in your patient's care.  She is stable from a cardiac standpoint and we will plan on seeing her in 6 months.    Chart documentation was completed, in part, with [employer] voice-recognition software. Even though reviewed, some grammatical, spelling, and word errors may remain.         Phone call duration: 12 minutes    Victor Hugo Cortez MD

## 2020-12-21 ENCOUNTER — TELEPHONE (OUTPATIENT)
Dept: FAMILY MEDICINE | Facility: OTHER | Age: 70
End: 2020-12-21

## 2020-12-21 DIAGNOSIS — I10 BENIGN ESSENTIAL HYPERTENSION: ICD-10-CM

## 2020-12-21 DIAGNOSIS — J42 CHRONIC BRONCHITIS, UNSPECIFIED CHRONIC BRONCHITIS TYPE (H): ICD-10-CM

## 2020-12-21 RX ORDER — LOSARTAN POTASSIUM 25 MG/1
25 TABLET ORAL DAILY
Qty: 90 TABLET | Refills: 1 | Status: SHIPPED | OUTPATIENT
Start: 2020-12-21 | End: 2021-01-01

## 2020-12-21 RX ORDER — ALBUTEROL SULFATE 90 UG/1
2 AEROSOL, METERED RESPIRATORY (INHALATION) EVERY 6 HOURS PRN
Qty: 3 INHALER | Refills: 3 | Status: SHIPPED | OUTPATIENT
Start: 2020-12-21 | End: 2022-01-01

## 2020-12-21 RX ORDER — ALBUTEROL SULFATE 90 UG/1
2 AEROSOL, METERED RESPIRATORY (INHALATION) EVERY 6 HOURS PRN
Qty: 1 INHALER | Refills: 3 | Status: SHIPPED | OUTPATIENT
Start: 2020-12-21 | End: 2020-12-21

## 2020-12-21 RX ORDER — LOSARTAN POTASSIUM 25 MG/1
25 TABLET ORAL DAILY
Qty: 90 TABLET | Refills: 0 | Status: SHIPPED | OUTPATIENT
Start: 2020-12-21 | End: 2020-12-21

## 2020-12-21 NOTE — TELEPHONE ENCOUNTER
I sent the prescriptions for losartan and albuterol to [facility] pharmacy. I had sent losartan on 12/2 to [facility] so her  may need to call [facility] to find out what is going on with her prescriptions. I resent the losartan and albuterol to [facility] today.  Jaja Carter, CNP

## 2020-12-21 NOTE — TELEPHONE ENCOUNTER
Reason for Call:  Medication or medication refill:    Do you use a [facility]?  Name of the pharmacy and phone number for the current request:  [facility] - [phone]    Name of the medication requested: albuterol & losartan    Other request: Yoseph is calling stating pt is completely out of her losartan and albuterol she has 1wk left.  Pt was supposed to switch all of her prescriptions [facility] but pt needs these meds now and would like them filled at [facility] now and then switched to [facility] after this.    Can we leave a detailed message on this number? YES    Phone number patient can be reached at: Home number on file [phone] (home)    Best Time: any    Call taken on 12/21/2020 at 1:11 PM by Lisa Oliveros

## 2020-12-21 NOTE — PROGRESS NOTES
Clinic Care Coordination Contact  [facility]/Voicemail       Clinical Data: Care Coordinator Outreach  Outreach attempted x 3.  Tried home number and got busy signal still. Tried pt spouse Oh's cell phone and says number no longer in service.   Plan:  Care Coordinator will do no further outreaches at this time.    IVONNE HUDSON did confirm with Catie with Prescription assistance that patient was approved for help for her Symbicourt medication. Catie had spoke with Oh recently and he told her patient was approved for $8 co-pays for brand name and $3 for generic!      PHOEBE Maria   Primary Care Clinic- Social Work Care Coordinator  [facility]  12/21/2020 9:58 AM  [phone]

## 2020-12-30 DIAGNOSIS — J41.0 SIMPLE CHRONIC BRONCHITIS (H): ICD-10-CM

## 2020-12-30 RX ORDER — IPRATROPIUM BROMIDE AND ALBUTEROL SULFATE 2.5; .5 MG/3ML; MG/3ML
1 SOLUTION RESPIRATORY (INHALATION) EVERY 4 HOURS PRN
Qty: 270 ML | Refills: 3 | Status: ON HOLD | OUTPATIENT
Start: 2020-12-30 | End: 2021-01-01

## 2020-12-30 RX ORDER — IPRATROPIUM BROMIDE AND ALBUTEROL SULFATE 2.5; .5 MG/3ML; MG/3ML
1 SOLUTION RESPIRATORY (INHALATION) EVERY 4 HOURS PRN
Qty: 270 ML | Refills: 3 | Status: SHIPPED | OUTPATIENT
Start: 2020-12-30 | End: 2021-01-05

## 2020-12-30 NOTE — TELEPHONE ENCOUNTER
Pt needs here didn't get at mail order  Violet Patterson, Pharmacy [facility], [facility] [phone]

## 2021-01-01 ENCOUNTER — TELEPHONE (OUTPATIENT)
Dept: FAMILY MEDICINE | Facility: CLINIC | Age: 71
End: 2021-01-01
Payer: COMMERCIAL

## 2021-01-01 ENCOUNTER — TELEPHONE (OUTPATIENT)
Dept: FAMILY MEDICINE | Facility: CLINIC | Age: 71
End: 2021-01-01

## 2021-01-01 ENCOUNTER — OFFICE VISIT (OUTPATIENT)
Dept: FAMILY MEDICINE | Facility: OTHER | Age: 71
End: 2021-01-01
Payer: COMMERCIAL

## 2021-01-01 ENCOUNTER — APPOINTMENT (OUTPATIENT)
Dept: GENERAL RADIOLOGY | Facility: CLINIC | Age: 71
DRG: 199 | End: 2021-01-01
Attending: INTERNAL MEDICINE
Payer: COMMERCIAL

## 2021-01-01 ENCOUNTER — VIRTUAL VISIT (OUTPATIENT)
Dept: PHARMACY | Facility: CLINIC | Age: 71
End: 2021-01-01
Attending: PEDIATRICS
Payer: COMMERCIAL

## 2021-01-01 ENCOUNTER — APPOINTMENT (OUTPATIENT)
Dept: CARDIOLOGY | Facility: CLINIC | Age: 71
DRG: 189 | End: 2021-01-01
Attending: INTERNAL MEDICINE
Payer: COMMERCIAL

## 2021-01-01 ENCOUNTER — APPOINTMENT (OUTPATIENT)
Dept: PHYSICAL THERAPY | Facility: CLINIC | Age: 71
DRG: 193 | End: 2021-01-01
Payer: COMMERCIAL

## 2021-01-01 ENCOUNTER — APPOINTMENT (OUTPATIENT)
Dept: PHYSICAL THERAPY | Facility: CLINIC | Age: 71
DRG: 189 | End: 2021-01-01
Payer: COMMERCIAL

## 2021-01-01 ENCOUNTER — VIRTUAL VISIT (OUTPATIENT)
Dept: FAMILY MEDICINE | Facility: CLINIC | Age: 71
End: 2021-01-01
Payer: COMMERCIAL

## 2021-01-01 ENCOUNTER — TELEPHONE (OUTPATIENT)
Dept: FAMILY MEDICINE | Facility: OTHER | Age: 71
End: 2021-01-01

## 2021-01-01 ENCOUNTER — OFFICE VISIT (OUTPATIENT)
Dept: CARDIOLOGY | Facility: CLINIC | Age: 71
End: 2021-01-01
Payer: COMMERCIAL

## 2021-01-01 ENCOUNTER — APPOINTMENT (OUTPATIENT)
Dept: GENERAL RADIOLOGY | Facility: CLINIC | Age: 71
DRG: 199 | End: 2021-01-01
Attending: PEDIATRICS
Payer: COMMERCIAL

## 2021-01-01 ENCOUNTER — PATIENT OUTREACH (OUTPATIENT)
Dept: CARE COORDINATION | Facility: CLINIC | Age: 71
End: 2021-01-01
Payer: COMMERCIAL

## 2021-01-01 ENCOUNTER — OFFICE VISIT (OUTPATIENT)
Dept: FAMILY MEDICINE | Facility: CLINIC | Age: 71
End: 2021-01-01
Payer: COMMERCIAL

## 2021-01-01 ENCOUNTER — HOSPITAL ENCOUNTER (INPATIENT)
Facility: CLINIC | Age: 71
LOS: 4 days | Discharge: HOME | DRG: 193 | End: 2021-09-21
Attending: EMERGENCY MEDICINE | Admitting: INTERNAL MEDICINE
Payer: COMMERCIAL

## 2021-01-01 ENCOUNTER — MEDICAL CORRESPONDENCE (OUTPATIENT)
Dept: HEALTH INFORMATION MANAGEMENT | Facility: CLINIC | Age: 71
End: 2021-01-01

## 2021-01-01 ENCOUNTER — APPOINTMENT (OUTPATIENT)
Dept: GENERAL RADIOLOGY | Facility: CLINIC | Age: 71
DRG: 193 | End: 2021-01-01
Attending: EMERGENCY MEDICINE
Payer: COMMERCIAL

## 2021-01-01 ENCOUNTER — APPOINTMENT (OUTPATIENT)
Dept: GENERAL RADIOLOGY | Facility: CLINIC | Age: 71
DRG: 193 | End: 2021-01-01
Attending: FAMILY MEDICINE
Payer: COMMERCIAL

## 2021-01-01 ENCOUNTER — PATIENT OUTREACH (OUTPATIENT)
Dept: CARE COORDINATION | Facility: CLINIC | Age: 71
End: 2021-01-01

## 2021-01-01 ENCOUNTER — APPOINTMENT (OUTPATIENT)
Dept: PHYSICAL THERAPY | Facility: CLINIC | Age: 71
DRG: 199 | End: 2021-01-01
Attending: FAMILY MEDICINE
Payer: COMMERCIAL

## 2021-01-01 ENCOUNTER — NURSE TRIAGE (OUTPATIENT)
Dept: NURSING | Facility: CLINIC | Age: 71
End: 2021-01-01

## 2021-01-01 ENCOUNTER — APPOINTMENT (OUTPATIENT)
Dept: GENERAL RADIOLOGY | Facility: CLINIC | Age: 71
DRG: 199 | End: 2021-01-01
Attending: FAMILY MEDICINE
Payer: COMMERCIAL

## 2021-01-01 ENCOUNTER — HOSPITAL ENCOUNTER (OUTPATIENT)
Dept: GENERAL RADIOLOGY | Facility: CLINIC | Age: 71
Discharge: HOME | End: 2021-09-10
Attending: PHYSICIAN ASSISTANT | Admitting: PHYSICIAN ASSISTANT
Payer: COMMERCIAL

## 2021-01-01 ENCOUNTER — APPOINTMENT (OUTPATIENT)
Dept: GENERAL RADIOLOGY | Facility: CLINIC | Age: 71
DRG: 199 | End: 2021-01-01
Attending: HOSPITALIST
Payer: COMMERCIAL

## 2021-01-01 ENCOUNTER — TELEPHONE (OUTPATIENT)
Dept: CARDIOLOGY | Facility: CLINIC | Age: 71
End: 2021-01-01

## 2021-01-01 ENCOUNTER — APPOINTMENT (OUTPATIENT)
Dept: GENERAL RADIOLOGY | Facility: CLINIC | Age: 71
DRG: 189 | End: 2021-01-01
Attending: EMERGENCY MEDICINE
Payer: COMMERCIAL

## 2021-01-01 ENCOUNTER — DOCUMENTATION ONLY (OUTPATIENT)
Dept: CARE COORDINATION | Facility: CLINIC | Age: 71
End: 2021-01-01

## 2021-01-01 ENCOUNTER — APPOINTMENT (OUTPATIENT)
Dept: CARDIOLOGY | Facility: CLINIC | Age: 71
DRG: 193 | End: 2021-01-01
Attending: NURSE PRACTITIONER
Payer: COMMERCIAL

## 2021-01-01 ENCOUNTER — APPOINTMENT (OUTPATIENT)
Dept: CT IMAGING | Facility: CLINIC | Age: 71
DRG: 193 | End: 2021-01-01
Attending: PEDIATRICS
Payer: COMMERCIAL

## 2021-01-01 ENCOUNTER — HOSPITAL ENCOUNTER (INPATIENT)
Facility: CLINIC | Age: 71
LOS: 5 days | Discharge: HOME HEALTH | DRG: 189 | End: 2021-03-28
Attending: EMERGENCY MEDICINE | Admitting: INTERNAL MEDICINE
Payer: COMMERCIAL

## 2021-01-01 ENCOUNTER — APPOINTMENT (OUTPATIENT)
Dept: OCCUPATIONAL THERAPY | Facility: CLINIC | Age: 71
DRG: 189 | End: 2021-01-01
Attending: INTERNAL MEDICINE
Payer: COMMERCIAL

## 2021-01-01 ENCOUNTER — HOSPITAL ENCOUNTER (OUTPATIENT)
Dept: CARDIOLOGY | Facility: CLINIC | Age: 71
Discharge: HOME | End: 2021-06-07
Attending: INTERNAL MEDICINE | Admitting: INTERNAL MEDICINE
Payer: COMMERCIAL

## 2021-01-01 ENCOUNTER — PATIENT OUTREACH (OUTPATIENT)
Dept: FAMILY MEDICINE | Facility: CLINIC | Age: 71
End: 2021-01-01
Payer: COMMERCIAL

## 2021-01-01 ENCOUNTER — VIRTUAL VISIT (OUTPATIENT)
Dept: PHARMACY | Facility: CLINIC | Age: 71
End: 2021-01-01
Payer: COMMERCIAL

## 2021-01-01 ENCOUNTER — HOSPITAL ENCOUNTER (INPATIENT)
Facility: CLINIC | Age: 71
LOS: 5 days | Discharge: HOME HEALTH | DRG: 199 | End: 2021-09-04
Attending: FAMILY MEDICINE | Admitting: INTERNAL MEDICINE
Payer: COMMERCIAL

## 2021-01-01 ENCOUNTER — APPOINTMENT (OUTPATIENT)
Dept: CT IMAGING | Facility: CLINIC | Age: 71
DRG: 189 | End: 2021-01-01
Attending: INTERNAL MEDICINE
Payer: COMMERCIAL

## 2021-01-01 ENCOUNTER — OFFICE VISIT (OUTPATIENT)
Dept: CARDIOLOGY | Facility: CLINIC | Age: 71
End: 2021-01-01
Attending: INTERNAL MEDICINE
Payer: COMMERCIAL

## 2021-01-01 ENCOUNTER — APPOINTMENT (OUTPATIENT)
Dept: PHYSICAL THERAPY | Facility: CLINIC | Age: 71
DRG: 189 | End: 2021-01-01
Attending: INTERNAL MEDICINE
Payer: COMMERCIAL

## 2021-01-01 VITALS
DIASTOLIC BLOOD PRESSURE: 49 MMHG | HEART RATE: 69 BPM | SYSTOLIC BLOOD PRESSURE: 106 MMHG | OXYGEN SATURATION: 98 % | WEIGHT: 92.9 LBS | TEMPERATURE: 98.3 F | RESPIRATION RATE: 16 BRPM | BODY MASS INDEX: 16.46 KG/M2 | HEIGHT: 63 IN

## 2021-01-01 VITALS
HEIGHT: 63 IN | DIASTOLIC BLOOD PRESSURE: 74 MMHG | OXYGEN SATURATION: 91 % | RESPIRATION RATE: 20 BRPM | HEART RATE: 90 BPM | WEIGHT: 94.7 LBS | BODY MASS INDEX: 16.78 KG/M2 | SYSTOLIC BLOOD PRESSURE: 162 MMHG | TEMPERATURE: 96.7 F

## 2021-01-01 VITALS
BODY MASS INDEX: 16.05 KG/M2 | TEMPERATURE: 99 F | SYSTOLIC BLOOD PRESSURE: 104 MMHG | DIASTOLIC BLOOD PRESSURE: 64 MMHG | HEART RATE: 75 BPM | WEIGHT: 94 LBS | HEIGHT: 64 IN | OXYGEN SATURATION: 100 %

## 2021-01-01 VITALS
HEART RATE: 88 BPM | BODY MASS INDEX: 15.8 KG/M2 | WEIGHT: 90.6 LBS | DIASTOLIC BLOOD PRESSURE: 60 MMHG | TEMPERATURE: 99.2 F | OXYGEN SATURATION: 100 % | SYSTOLIC BLOOD PRESSURE: 106 MMHG | RESPIRATION RATE: 16 BRPM

## 2021-01-01 VITALS
DIASTOLIC BLOOD PRESSURE: 56 MMHG | BODY MASS INDEX: 15.4 KG/M2 | HEART RATE: 80 BPM | WEIGHT: 90.2 LBS | HEIGHT: 64 IN | OXYGEN SATURATION: 97 % | SYSTOLIC BLOOD PRESSURE: 116 MMHG

## 2021-01-01 VITALS
BODY MASS INDEX: 15.45 KG/M2 | OXYGEN SATURATION: 99 % | HEART RATE: 66 BPM | SYSTOLIC BLOOD PRESSURE: 152 MMHG | WEIGHT: 90.5 LBS | DIASTOLIC BLOOD PRESSURE: 74 MMHG | HEIGHT: 64 IN

## 2021-01-01 VITALS
HEART RATE: 75 BPM | TEMPERATURE: 97.4 F | HEIGHT: 63 IN | BODY MASS INDEX: 15.2 KG/M2 | OXYGEN SATURATION: 97 % | DIASTOLIC BLOOD PRESSURE: 55 MMHG | RESPIRATION RATE: 20 BRPM | WEIGHT: 85.8 LBS | SYSTOLIC BLOOD PRESSURE: 133 MMHG

## 2021-01-01 VITALS
SYSTOLIC BLOOD PRESSURE: 102 MMHG | RESPIRATION RATE: 20 BRPM | BODY MASS INDEX: 16.95 KG/M2 | WEIGHT: 97.2 LBS | DIASTOLIC BLOOD PRESSURE: 60 MMHG | OXYGEN SATURATION: 100 % | TEMPERATURE: 98.6 F | HEART RATE: 82 BPM

## 2021-01-01 VITALS
RESPIRATION RATE: 24 BRPM | SYSTOLIC BLOOD PRESSURE: 122 MMHG | TEMPERATURE: 100.7 F | OXYGEN SATURATION: 99 % | DIASTOLIC BLOOD PRESSURE: 80 MMHG | HEART RATE: 89 BPM

## 2021-01-01 DIAGNOSIS — J44.1 COPD EXACERBATION (H): ICD-10-CM

## 2021-01-01 DIAGNOSIS — J18.9 PNEUMONIA OF RIGHT MIDDLE LOBE DUE TO INFECTIOUS ORGANISM: ICD-10-CM

## 2021-01-01 DIAGNOSIS — J18.9 PNEUMONIA OF RIGHT UPPER LOBE DUE TO INFECTIOUS ORGANISM: ICD-10-CM

## 2021-01-01 DIAGNOSIS — R06.02 SHORTNESS OF BREATH: ICD-10-CM

## 2021-01-01 DIAGNOSIS — I73.9 PAD (PERIPHERAL ARTERY DISEASE) (H): ICD-10-CM

## 2021-01-01 DIAGNOSIS — Z11.52 ENCOUNTER FOR SCREENING LABORATORY TESTING FOR SEVERE ACUTE RESPIRATORY SYNDROME CORONAVIRUS 2 (SARS-COV-2): ICD-10-CM

## 2021-01-01 DIAGNOSIS — J96.22 ACUTE ON CHRONIC RESPIRATORY FAILURE WITH HYPOXIA AND HYPERCAPNIA (H): Chronic | ICD-10-CM

## 2021-01-01 DIAGNOSIS — R50.9 FEVER, UNSPECIFIED FEVER CAUSE: ICD-10-CM

## 2021-01-01 DIAGNOSIS — I25.118 CORONARY ARTERY DISEASE OF NATIVE ARTERY OF NATIVE HEART WITH STABLE ANGINA PECTORIS (H): ICD-10-CM

## 2021-01-01 DIAGNOSIS — Z95.5 S/P DRUG ELUTING CORONARY STENT PLACEMENT: ICD-10-CM

## 2021-01-01 DIAGNOSIS — F17.210 CIGARETTE SMOKER: ICD-10-CM

## 2021-01-01 DIAGNOSIS — J41.0 SIMPLE CHRONIC BRONCHITIS (H): ICD-10-CM

## 2021-01-01 DIAGNOSIS — R56.9 SEIZURES (H): ICD-10-CM

## 2021-01-01 DIAGNOSIS — Z53.9 DIAGNOSIS NOT YET DEFINED: Primary | ICD-10-CM

## 2021-01-01 DIAGNOSIS — I10 BENIGN ESSENTIAL HYPERTENSION: ICD-10-CM

## 2021-01-01 DIAGNOSIS — D50.9 IRON DEFICIENCY ANEMIA, UNSPECIFIED IRON DEFICIENCY ANEMIA TYPE: Primary | ICD-10-CM

## 2021-01-01 DIAGNOSIS — I25.119 CORONARY ARTERY DISEASE INVOLVING NATIVE CORONARY ARTERY OF NATIVE HEART WITH ANGINA PECTORIS (H): ICD-10-CM

## 2021-01-01 DIAGNOSIS — J44.9 END STAGE COPD (H): ICD-10-CM

## 2021-01-01 DIAGNOSIS — Z86.73 HISTORY OF STROKE: Primary | ICD-10-CM

## 2021-01-01 DIAGNOSIS — I95.9 HYPOTENSION, UNSPECIFIED HYPOTENSION TYPE: ICD-10-CM

## 2021-01-01 DIAGNOSIS — J96.11 CHRONIC RESPIRATORY FAILURE WITH HYPOXIA AND HYPERCAPNIA (H): ICD-10-CM

## 2021-01-01 DIAGNOSIS — J96.21 ACUTE ON CHRONIC RESPIRATORY FAILURE WITH HYPOXIA AND HYPERCAPNIA (H): Chronic | ICD-10-CM

## 2021-01-01 DIAGNOSIS — J20.9 ACUTE BRONCHITIS, UNSPECIFIED ORGANISM: ICD-10-CM

## 2021-01-01 DIAGNOSIS — J96.21 ACUTE ON CHRONIC RESPIRATORY FAILURE WITH HYPOXIA AND HYPERCAPNIA (H): ICD-10-CM

## 2021-01-01 DIAGNOSIS — J96.22 ACUTE ON CHRONIC RESPIRATORY FAILURE WITH HYPOXIA AND HYPERCAPNIA (H): ICD-10-CM

## 2021-01-01 DIAGNOSIS — I35.0 NONRHEUMATIC AORTIC VALVE STENOSIS: ICD-10-CM

## 2021-01-01 DIAGNOSIS — J44.9 CHRONIC OBSTRUCTIVE PULMONARY DISEASE, UNSPECIFIED COPD TYPE (H): ICD-10-CM

## 2021-01-01 DIAGNOSIS — E43 SEVERE PROTEIN-CALORIE MALNUTRITION (H): ICD-10-CM

## 2021-01-01 DIAGNOSIS — J96.01 ACUTE RESPIRATORY FAILURE WITH HYPOXIA (H): ICD-10-CM

## 2021-01-01 DIAGNOSIS — J44.9 CHRONIC OBSTRUCTIVE PULMONARY DISEASE, UNSPECIFIED COPD TYPE (H): Primary | ICD-10-CM

## 2021-01-01 DIAGNOSIS — R79.89 ELEVATED TROPONIN: ICD-10-CM

## 2021-01-01 DIAGNOSIS — J44.1 COPD WITH ACUTE EXACERBATION (H): Primary | ICD-10-CM

## 2021-01-01 DIAGNOSIS — J96.12 CHRONIC RESPIRATORY FAILURE WITH HYPOXIA AND HYPERCAPNIA (H): ICD-10-CM

## 2021-01-01 DIAGNOSIS — E78.5 HYPERLIPIDEMIA LDL GOAL <100: ICD-10-CM

## 2021-01-01 DIAGNOSIS — J44.9 COPD, SEVERE (H): Primary | ICD-10-CM

## 2021-01-01 DIAGNOSIS — J43.1 PANLOBULAR EMPHYSEMA (H): ICD-10-CM

## 2021-01-01 DIAGNOSIS — R09.02 HYPOXIA: ICD-10-CM

## 2021-01-01 DIAGNOSIS — J43.1 PANLOBULAR EMPHYSEMA (H): Primary | ICD-10-CM

## 2021-01-01 DIAGNOSIS — J44.9 COPD, SEVERE (H): ICD-10-CM

## 2021-01-01 DIAGNOSIS — I63.9 ACUTE CVA (CEREBROVASCULAR ACCIDENT) (H): ICD-10-CM

## 2021-01-01 DIAGNOSIS — Z96.89 CHEST TUBE IN PLACE: ICD-10-CM

## 2021-01-01 DIAGNOSIS — J44.9 END STAGE COPD (H): Primary | ICD-10-CM

## 2021-01-01 DIAGNOSIS — R62.7 ADULT FAILURE TO THRIVE: ICD-10-CM

## 2021-01-01 DIAGNOSIS — J44.1 COPD EXACERBATION (H): Primary | ICD-10-CM

## 2021-01-01 DIAGNOSIS — J42 CHRONIC BRONCHITIS, UNSPECIFIED CHRONIC BRONCHITIS TYPE (H): ICD-10-CM

## 2021-01-01 DIAGNOSIS — Z87.891 PERSONAL HISTORY OF TOBACCO USE, PRESENTING HAZARDS TO HEALTH: ICD-10-CM

## 2021-01-01 DIAGNOSIS — E43 SEVERE PROTEIN-CALORIE MALNUTRITION (H): Primary | ICD-10-CM

## 2021-01-01 DIAGNOSIS — J93.83 SPONTANEOUS PNEUMOTHORAX: ICD-10-CM

## 2021-01-01 DIAGNOSIS — F17.210 CIGARETTE SMOKER: Primary | ICD-10-CM

## 2021-01-01 DIAGNOSIS — Z11.52 ENCOUNTER FOR SCREENING LABORATORY TESTING FOR COVID-19 VIRUS: ICD-10-CM

## 2021-01-01 DIAGNOSIS — E43 SEVERE PROTEIN-CALORIE MALNUTRITION (GOMEZ: LESS THAN 60% OF STANDARD WEIGHT) (H): ICD-10-CM

## 2021-01-01 DIAGNOSIS — D64.9 LOW HEMOGLOBIN: ICD-10-CM

## 2021-01-01 DIAGNOSIS — Z71.89 OTHER SPECIFIED COUNSELING: ICD-10-CM

## 2021-01-01 DIAGNOSIS — D50.9 IRON DEFICIENCY ANEMIA, UNSPECIFIED IRON DEFICIENCY ANEMIA TYPE: ICD-10-CM

## 2021-01-01 LAB
ABO/RH(D): NORMAL
ALBUMIN SERPL-MCNC: 2.2 G/DL (ref 3.4–5)
ALBUMIN SERPL-MCNC: 2.3 G/DL (ref 3.4–5)
ALBUMIN SERPL-MCNC: 3.5 G/DL (ref 3.4–5)
ALP SERPL-CCNC: 102 U/L (ref 40–150)
ALP SERPL-CCNC: 70 U/L (ref 40–150)
ALP SERPL-CCNC: 74 U/L (ref 40–150)
ALT SERPL W P-5'-P-CCNC: 13 U/L (ref 0–50)
ALT SERPL W P-5'-P-CCNC: 27 U/L (ref 0–50)
ALT SERPL W P-5'-P-CCNC: 88 U/L (ref 0–50)
ANION GAP SERPL CALCULATED.3IONS-SCNC: 1 MMOL/L (ref 3–14)
ANION GAP SERPL CALCULATED.3IONS-SCNC: 2 MMOL/L (ref 3–14)
ANION GAP SERPL CALCULATED.3IONS-SCNC: 3 MMOL/L (ref 3–14)
ANION GAP SERPL CALCULATED.3IONS-SCNC: 4 MMOL/L (ref 3–14)
ANION GAP SERPL CALCULATED.3IONS-SCNC: 5 MMOL/L (ref 3–14)
ANION GAP SERPL CALCULATED.3IONS-SCNC: <1 MMOL/L (ref 3–14)
ANTIBODY SCREEN: NEGATIVE
AST SERPL W P-5'-P-CCNC: 16 U/L (ref 0–45)
AST SERPL W P-5'-P-CCNC: 27 U/L (ref 0–45)
AST SERPL W P-5'-P-CCNC: 50 U/L (ref 0–45)
BACTERIA BLD CULT: NO GROWTH
BACTERIA SPEC CULT: NO GROWTH
BASE EXCESS BLDA CALC-SCNC: 1.7 MMOL/L
BASE EXCESS BLDA CALC-SCNC: 2.1 MMOL/L
BASE EXCESS BLDA CALC-SCNC: 2.7 MMOL/L (ref -9–1.8)
BASE EXCESS BLDA CALC-SCNC: 5.5 MMOL/L (ref -9–1.8)
BASE EXCESS BLDV CALC-SCNC: 0.5 MMOL/L
BASE EXCESS BLDV CALC-SCNC: 0.7 MMOL/L
BASE EXCESS BLDV CALC-SCNC: 1 MMOL/L
BASE EXCESS BLDV CALC-SCNC: 1.3 MMOL/L
BASE EXCESS BLDV CALC-SCNC: 11.7 MMOL/L
BASE EXCESS BLDV CALC-SCNC: 11.8 MMOL/L (ref -7.7–1.9)
BASE EXCESS BLDV CALC-SCNC: 12.7 MMOL/L (ref -7.7–1.9)
BASE EXCESS BLDV CALC-SCNC: 13.2 MMOL/L (ref -7.7–1.9)
BASE EXCESS BLDV CALC-SCNC: 13.8 MMOL/L (ref -7.7–1.9)
BASE EXCESS BLDV CALC-SCNC: 14.6 MMOL/L
BASE EXCESS BLDV CALC-SCNC: 2.5 MMOL/L
BASE EXCESS BLDV CALC-SCNC: 3.8 MMOL/L
BASE EXCESS BLDV CALC-SCNC: 3.9 MMOL/L
BASE EXCESS BLDV CALC-SCNC: 9.4 MMOL/L (ref -7.7–1.9)
BASOPHILS # BLD AUTO: 0 10E3/UL (ref 0–0.2)
BASOPHILS # BLD AUTO: 0 10E9/L (ref 0–0.2)
BASOPHILS # BLD AUTO: 0 10E9/L (ref 0–0.2)
BASOPHILS NFR BLD AUTO: 0 %
BASOPHILS NFR BLD AUTO: 0.7 %
BILIRUB SERPL-MCNC: 0.3 MG/DL (ref 0.2–1.3)
BILIRUB SERPL-MCNC: 0.4 MG/DL (ref 0.2–1.3)
BILIRUB SERPL-MCNC: 0.5 MG/DL (ref 0.2–1.3)
BUN SERPL-MCNC: 12 MG/DL (ref 7–30)
BUN SERPL-MCNC: 12 MG/DL (ref 7–30)
BUN SERPL-MCNC: 13 MG/DL (ref 7–30)
BUN SERPL-MCNC: 13 MG/DL (ref 7–30)
BUN SERPL-MCNC: 14 MG/DL (ref 7–30)
BUN SERPL-MCNC: 16 MG/DL (ref 7–30)
BUN SERPL-MCNC: 22 MG/DL (ref 7–30)
BUN SERPL-MCNC: 23 MG/DL (ref 7–30)
BUN SERPL-MCNC: 24 MG/DL (ref 7–30)
BUN SERPL-MCNC: 25 MG/DL (ref 7–30)
BUN SERPL-MCNC: 27 MG/DL (ref 7–30)
CALCIUM SERPL-MCNC: 7.9 MG/DL (ref 8.5–10.1)
CALCIUM SERPL-MCNC: 8.1 MG/DL (ref 8.5–10.1)
CALCIUM SERPL-MCNC: 8.1 MG/DL (ref 8.5–10.1)
CALCIUM SERPL-MCNC: 8.3 MG/DL (ref 8.5–10.1)
CALCIUM SERPL-MCNC: 8.3 MG/DL (ref 8.5–10.1)
CALCIUM SERPL-MCNC: 8.4 MG/DL (ref 8.5–10.1)
CALCIUM SERPL-MCNC: 8.5 MG/DL (ref 8.5–10.1)
CALCIUM SERPL-MCNC: 8.5 MG/DL (ref 8.5–10.1)
CALCIUM SERPL-MCNC: 8.8 MG/DL (ref 8.5–10.1)
CALCIUM SERPL-MCNC: 8.9 MG/DL (ref 8.5–10.1)
CALCIUM SERPL-MCNC: 9.6 MG/DL (ref 8.5–10.1)
CHLORIDE BLD-SCNC: 104 MMOL/L (ref 94–109)
CHLORIDE BLD-SCNC: 105 MMOL/L (ref 94–109)
CHLORIDE BLD-SCNC: 105 MMOL/L (ref 94–109)
CHLORIDE BLD-SCNC: 106 MMOL/L (ref 94–109)
CHLORIDE BLD-SCNC: 108 MMOL/L (ref 94–109)
CHLORIDE BLD-SCNC: 97 MMOL/L (ref 94–109)
CHLORIDE BLD-SCNC: 97 MMOL/L (ref 94–109)
CHLORIDE SERPL-SCNC: 100 MMOL/L (ref 94–109)
CHLORIDE SERPL-SCNC: 105 MMOL/L (ref 94–109)
CHLORIDE SERPL-SCNC: 105 MMOL/L (ref 94–109)
CHLORIDE SERPL-SCNC: 110 MMOL/L (ref 94–109)
CHOLEST SERPL-MCNC: 143 MG/DL
CO2 SERPL-SCNC: 29 MMOL/L (ref 20–32)
CO2 SERPL-SCNC: 31 MMOL/L (ref 20–32)
CO2 SERPL-SCNC: 32 MMOL/L (ref 20–32)
CO2 SERPL-SCNC: 33 MMOL/L (ref 20–32)
CO2 SERPL-SCNC: 36 MMOL/L (ref 20–32)
CREAT SERPL-MCNC: 0.44 MG/DL (ref 0.52–1.04)
CREAT SERPL-MCNC: 0.46 MG/DL (ref 0.52–1.04)
CREAT SERPL-MCNC: 0.46 MG/DL (ref 0.52–1.04)
CREAT SERPL-MCNC: 0.5 MG/DL (ref 0.52–1.04)
CREAT SERPL-MCNC: 0.56 MG/DL (ref 0.52–1.04)
CREAT SERPL-MCNC: 0.59 MG/DL (ref 0.52–1.04)
CREAT SERPL-MCNC: 0.6 MG/DL (ref 0.52–1.04)
CREAT SERPL-MCNC: 0.62 MG/DL (ref 0.52–1.04)
CREAT SERPL-MCNC: 0.67 MG/DL (ref 0.52–1.04)
CREAT SERPL-MCNC: 0.7 MG/DL (ref 0.52–1.04)
CREAT SERPL-MCNC: 0.73 MG/DL (ref 0.52–1.04)
CRP SERPL-MCNC: 8.7 MG/L (ref 0–8)
DIFFERENTIAL METHOD BLD: ABNORMAL
DIFFERENTIAL METHOD BLD: ABNORMAL
EOSINOPHIL # BLD AUTO: 0 10E3/UL (ref 0–0.7)
EOSINOPHIL # BLD AUTO: 0 10E9/L (ref 0–0.7)
EOSINOPHIL # BLD AUTO: 0.1 10E3/UL (ref 0–0.7)
EOSINOPHIL # BLD AUTO: 0.1 10E3/UL (ref 0–0.7)
EOSINOPHIL # BLD AUTO: 0.2 10E9/L (ref 0–0.7)
EOSINOPHIL NFR BLD AUTO: 0 %
EOSINOPHIL NFR BLD AUTO: 1 %
EOSINOPHIL NFR BLD AUTO: 2.6 %
ERYTHROCYTE [DISTWIDTH] IN BLOOD BY AUTOMATED COUNT: 11.9 % (ref 10–15)
ERYTHROCYTE [DISTWIDTH] IN BLOOD BY AUTOMATED COUNT: 12 % (ref 10–15)
ERYTHROCYTE [DISTWIDTH] IN BLOOD BY AUTOMATED COUNT: 12.1 % (ref 10–15)
ERYTHROCYTE [DISTWIDTH] IN BLOOD BY AUTOMATED COUNT: 12.2 % (ref 10–15)
ERYTHROCYTE [DISTWIDTH] IN BLOOD BY AUTOMATED COUNT: 12.2 % (ref 10–15)
ERYTHROCYTE [DISTWIDTH] IN BLOOD BY AUTOMATED COUNT: 13 % (ref 10–15)
ERYTHROCYTE [DISTWIDTH] IN BLOOD BY AUTOMATED COUNT: 14.3 % (ref 10–15)
FASTING STATUS PATIENT QL REPORTED: NO
FLUAV RNA RESP QL NAA+PROBE: NEGATIVE
FLUBV RNA RESP QL NAA+PROBE: NEGATIVE
GFR SERPL CREATININE-BSD FRML MDRD: 85 ML/MIN/1.73M2
GFR SERPL CREATININE-BSD FRML MDRD: 89 ML/MIN/{1.73_M2}
GFR SERPL CREATININE-BSD FRML MDRD: >90 ML/MIN/1.73M2
GFR SERPL CREATININE-BSD FRML MDRD: >90 ML/MIN/{1.73_M2}
GLUCOSE BLD-MCNC: 102 MG/DL (ref 70–99)
GLUCOSE BLD-MCNC: 111 MG/DL (ref 70–99)
GLUCOSE BLD-MCNC: 125 MG/DL (ref 70–99)
GLUCOSE BLD-MCNC: 155 MG/DL (ref 70–99)
GLUCOSE BLD-MCNC: 159 MG/DL (ref 70–99)
GLUCOSE BLD-MCNC: 79 MG/DL (ref 70–99)
GLUCOSE BLD-MCNC: 95 MG/DL (ref 70–99)
GLUCOSE SERPL-MCNC: 110 MG/DL (ref 70–99)
GLUCOSE SERPL-MCNC: 120 MG/DL (ref 70–99)
GLUCOSE SERPL-MCNC: 132 MG/DL (ref 70–99)
GLUCOSE SERPL-MCNC: 98 MG/DL (ref 70–99)
HCO3 BLD-SCNC: 28 MMOL/L (ref 21–28)
HCO3 BLD-SCNC: 30 MMOL/L (ref 21–28)
HCO3 BLD-SCNC: 30 MMOL/L (ref 21–28)
HCO3 BLD-SCNC: 31 MMOL/L (ref 21–28)
HCO3 BLDV-SCNC: 26 MMOL/L (ref 21–28)
HCO3 BLDV-SCNC: 27 MMOL/L (ref 21–28)
HCO3 BLDV-SCNC: 28 MMOL/L (ref 21–28)
HCO3 BLDV-SCNC: 29 MMOL/L (ref 21–28)
HCO3 BLDV-SCNC: 29 MMOL/L (ref 21–28)
HCO3 BLDV-SCNC: 33 MMOL/L (ref 21–28)
HCO3 BLDV-SCNC: 33 MMOL/L (ref 21–28)
HCO3 BLDV-SCNC: 35 MMOL/L (ref 21–28)
HCO3 BLDV-SCNC: 40 MMOL/L (ref 21–28)
HCO3 BLDV-SCNC: 41 MMOL/L (ref 21–28)
HCO3 BLDV-SCNC: 41 MMOL/L (ref 21–28)
HCO3 BLDV-SCNC: 42 MMOL/L (ref 21–28)
HCT VFR BLD AUTO: 26.8 % (ref 35–47)
HCT VFR BLD AUTO: 29.7 % (ref 35–47)
HCT VFR BLD AUTO: 32.1 % (ref 35–47)
HCT VFR BLD AUTO: 33.4 % (ref 35–47)
HCT VFR BLD AUTO: 34.6 % (ref 35–47)
HCT VFR BLD AUTO: 37.2 % (ref 35–47)
HCT VFR BLD AUTO: 39.5 % (ref 35–47)
HCT VFR BLD AUTO: 42.2 % (ref 35–47)
HCT VFR BLD AUTO: 47.7 % (ref 35–47)
HDLC SERPL-MCNC: 41 MG/DL
HGB BLD-MCNC: 10 G/DL (ref 11.7–15.7)
HGB BLD-MCNC: 10.4 G/DL (ref 11.7–15.7)
HGB BLD-MCNC: 10.5 G/DL (ref 11.7–15.7)
HGB BLD-MCNC: 12.4 G/DL (ref 11.7–15.7)
HGB BLD-MCNC: 13 G/DL (ref 11.7–15.7)
HGB BLD-MCNC: 14.2 G/DL (ref 11.7–15.7)
HGB BLD-MCNC: 15.8 G/DL (ref 11.7–15.7)
HGB BLD-MCNC: 8.6 G/DL (ref 11.7–15.7)
HGB BLD-MCNC: 8.8 G/DL (ref 11.7–15.7)
HGB BLD-MCNC: 9.4 G/DL (ref 11.7–15.7)
HGB BLD-MCNC: 9.4 G/DL (ref 11.7–15.7)
HGB BLD-MCNC: 9.6 G/DL (ref 11.7–15.7)
HGB BLD-MCNC: 9.7 G/DL (ref 11.7–15.7)
HOLD SPECIMEN: NORMAL
IMM GRANULOCYTES # BLD: 0 10E3/UL
IMM GRANULOCYTES # BLD: 0 10E9/L (ref 0–0.4)
IMM GRANULOCYTES # BLD: 0 10E9/L (ref 0–0.4)
IMM GRANULOCYTES # BLD: 0.1 10E3/UL
IMM GRANULOCYTES # BLD: 0.1 10E3/UL
IMM GRANULOCYTES NFR BLD: 0 %
IMM GRANULOCYTES NFR BLD: 0 %
IMM GRANULOCYTES NFR BLD: 0.3 %
IMM GRANULOCYTES NFR BLD: 0.3 %
IMM GRANULOCYTES NFR BLD: 1 %
INR PPP: 0.91 (ref 0.85–1.15)
LABORATORY COMMENT REPORT: NORMAL
LACTATE BLD-SCNC: 1 MMOL/L (ref 0.7–2)
LACTATE BLD-SCNC: 1.8 MMOL/L (ref 0.7–2)
LACTATE BLD-SCNC: 2.3 MMOL/L (ref 0.7–2)
LACTATE SERPL-SCNC: 0.8 MMOL/L (ref 0.7–2)
LACTATE SERPL-SCNC: 0.8 MMOL/L (ref 0.7–2)
LACTATE SERPL-SCNC: 0.9 MMOL/L (ref 0.7–2)
LACTATE SERPL-SCNC: 1.3 MMOL/L (ref 0.7–2)
LACTATE SERPL-SCNC: 1.4 MMOL/L (ref 0.7–2)
LACTATE SERPL-SCNC: 1.9 MMOL/L (ref 0.7–2)
LDLC SERPL CALC-MCNC: 76 MG/DL
LVEF ECHO: NORMAL
LYMPHOCYTES # BLD AUTO: 0.4 10E9/L (ref 0.8–5.3)
LYMPHOCYTES # BLD AUTO: 0.8 10E3/UL (ref 0.8–5.3)
LYMPHOCYTES # BLD AUTO: 0.8 10E3/UL (ref 0.8–5.3)
LYMPHOCYTES # BLD AUTO: 1.1 10E3/UL (ref 0.8–5.3)
LYMPHOCYTES # BLD AUTO: 1.1 10E9/L (ref 0.8–5.3)
LYMPHOCYTES NFR BLD AUTO: 18.8 %
LYMPHOCYTES NFR BLD AUTO: 6 %
LYMPHOCYTES NFR BLD AUTO: 7 %
LYMPHOCYTES NFR BLD AUTO: 7 %
LYMPHOCYTES NFR BLD AUTO: 9 %
Lab: NORMAL
MAGNESIUM SERPL-MCNC: 2.1 MG/DL (ref 1.6–2.3)
MAGNESIUM SERPL-MCNC: 2.1 MG/DL (ref 1.6–2.3)
MAGNESIUM SERPL-MCNC: 2.2 MG/DL (ref 1.6–2.3)
MAGNESIUM SERPL-MCNC: 2.3 MG/DL (ref 1.6–2.3)
MCH RBC QN AUTO: 22.9 PG (ref 26.5–33)
MCH RBC QN AUTO: 26.8 PG (ref 26.5–33)
MCH RBC QN AUTO: 28.8 PG (ref 26.5–33)
MCH RBC QN AUTO: 28.9 PG (ref 26.5–33)
MCH RBC QN AUTO: 29.4 PG (ref 26.5–33)
MCH RBC QN AUTO: 29.6 PG (ref 26.5–33)
MCH RBC QN AUTO: 30.3 PG (ref 26.5–33)
MCH RBC QN AUTO: 30.9 PG (ref 26.5–33)
MCH RBC QN AUTO: 30.9 PG (ref 26.5–33)
MCHC RBC AUTO-ENTMCNC: 29.9 G/DL (ref 31.5–36.5)
MCHC RBC AUTO-ENTMCNC: 30.3 G/DL (ref 31.5–36.5)
MCHC RBC AUTO-ENTMCNC: 32.3 G/DL (ref 31.5–36.5)
MCHC RBC AUTO-ENTMCNC: 32.4 G/DL (ref 31.5–36.5)
MCHC RBC AUTO-ENTMCNC: 32.8 G/DL (ref 31.5–36.5)
MCHC RBC AUTO-ENTMCNC: 32.9 G/DL (ref 31.5–36.5)
MCHC RBC AUTO-ENTMCNC: 33.1 G/DL (ref 31.5–36.5)
MCHC RBC AUTO-ENTMCNC: 33.3 G/DL (ref 31.5–36.5)
MCHC RBC AUTO-ENTMCNC: 33.6 G/DL (ref 31.5–36.5)
MCV RBC AUTO: 76 FL (ref 78–100)
MCV RBC AUTO: 88 FL (ref 78–100)
MCV RBC AUTO: 89 FL (ref 78–100)
MCV RBC AUTO: 90 FL (ref 78–100)
MCV RBC AUTO: 93 FL (ref 78–100)
MCV RBC AUTO: 93 FL (ref 78–100)
MONOCYTES # BLD AUTO: 0.4 10E9/L (ref 0–1.3)
MONOCYTES # BLD AUTO: 0.4 10E9/L (ref 0–1.3)
MONOCYTES # BLD AUTO: 0.8 10E3/UL (ref 0–1.3)
MONOCYTES # BLD AUTO: 0.8 10E3/UL (ref 0–1.3)
MONOCYTES # BLD AUTO: 1 10E3/UL (ref 0–1.3)
MONOCYTES NFR BLD AUTO: 5.7 %
MONOCYTES NFR BLD AUTO: 6.8 %
MONOCYTES NFR BLD AUTO: 7 %
MONOCYTES NFR BLD AUTO: 7 %
MONOCYTES NFR BLD AUTO: 8 %
NEUTROPHILS # BLD AUTO: 4.1 10E9/L (ref 1.6–8.3)
NEUTROPHILS # BLD AUTO: 6 10E9/L (ref 1.6–8.3)
NEUTROPHILS # BLD AUTO: 9.4 10E3/UL (ref 1.6–8.3)
NEUTROPHILS # BLD AUTO: 9.5 10E3/UL (ref 1.6–8.3)
NEUTROPHILS # BLD AUTO: 9.7 10E3/UL (ref 1.6–8.3)
NEUTROPHILS NFR BLD AUTO: 70.8 %
NEUTROPHILS NFR BLD AUTO: 82 %
NEUTROPHILS NFR BLD AUTO: 85 %
NEUTROPHILS NFR BLD AUTO: 86 %
NEUTROPHILS NFR BLD AUTO: 88 %
NONHDLC SERPL-MCNC: 102 MG/DL
NRBC # BLD AUTO: 0 10*3/UL
NRBC # BLD AUTO: 0 10*3/UL
NRBC # BLD AUTO: 0 10E3/UL
NRBC BLD AUTO-RTO: 0 /100
NT-PROBNP SERPL-MCNC: 1397 PG/ML (ref 0–900)
NT-PROBNP SERPL-MCNC: 1764 PG/ML (ref 0–900)
NT-PROBNP SERPL-MCNC: 462 PG/ML (ref 0–900)
O2/TOTAL GAS SETTING VFR VENT: 21 %
O2/TOTAL GAS SETTING VFR VENT: 21 %
O2/TOTAL GAS SETTING VFR VENT: 24 %
O2/TOTAL GAS SETTING VFR VENT: 26 %
O2/TOTAL GAS SETTING VFR VENT: 27 %
O2/TOTAL GAS SETTING VFR VENT: 28 %
O2/TOTAL GAS SETTING VFR VENT: 30 %
O2/TOTAL GAS SETTING VFR VENT: 35 %
O2/TOTAL GAS SETTING VFR VENT: 40 %
O2/TOTAL GAS SETTING VFR VENT: 40 %
PCO2 BLD: 45 MM HG (ref 35–45)
PCO2 BLD: 51 MM HG (ref 35–45)
PCO2 BLD: 57 MM HG (ref 35–45)
PCO2 BLD: 62 MM HG (ref 35–45)
PCO2 BLDV: 45 MM HG (ref 40–50)
PCO2 BLDV: 47 MM HG (ref 40–50)
PCO2 BLDV: 51 MM HG (ref 40–50)
PCO2 BLDV: 53 MM HG (ref 40–50)
PCO2 BLDV: 53 MM HG (ref 40–50)
PCO2 BLDV: 56 MM HG (ref 40–50)
PCO2 BLDV: 59 MM HG (ref 40–50)
PCO2 BLDV: 65 MM HG (ref 40–50)
PCO2 BLDV: 66 MM HG (ref 40–50)
PCO2 BLDV: 67 MM HG (ref 40–50)
PCO2 BLDV: 70 MM HG (ref 40–50)
PCO2 BLDV: 71 MM HG (ref 40–50)
PH BLD: 7.29 PH (ref 7.35–7.45)
PH BLD: 7.32 PH (ref 7.35–7.45)
PH BLD: 7.4 [PH] (ref 7.35–7.45)
PH BLD: 7.4 [PH] (ref 7.35–7.45)
PH BLDV: 7.29 PH (ref 7.32–7.43)
PH BLDV: 7.31 PH (ref 7.32–7.43)
PH BLDV: 7.32 PH (ref 7.32–7.43)
PH BLDV: 7.33 PH (ref 7.32–7.43)
PH BLDV: 7.37 PH (ref 7.32–7.43)
PH BLDV: 7.37 PH (ref 7.32–7.43)
PH BLDV: 7.37 [PH] (ref 7.32–7.43)
PH BLDV: 7.38 PH (ref 7.32–7.43)
PH BLDV: 7.38 [PH] (ref 7.32–7.43)
PH BLDV: 7.39 PH (ref 7.32–7.43)
PH BLDV: 7.39 [PH] (ref 7.32–7.43)
PH BLDV: 7.4 [PH] (ref 7.32–7.43)
PH BLDV: 7.43 [PH] (ref 7.32–7.43)
PH BLDV: 7.46 PH (ref 7.32–7.43)
PHOSPHATE SERPL-MCNC: 2.5 MG/DL (ref 2.5–4.5)
PHOSPHATE SERPL-MCNC: 3 MG/DL (ref 2.5–4.5)
PHOSPHATE SERPL-MCNC: 3.5 MG/DL (ref 2.5–4.5)
PLATELET # BLD AUTO: 180 10E3/UL (ref 150–450)
PLATELET # BLD AUTO: 186 10E9/L (ref 150–450)
PLATELET # BLD AUTO: 206 10E3/UL (ref 150–450)
PLATELET # BLD AUTO: 206 10E3/UL (ref 150–450)
PLATELET # BLD AUTO: 221 10E9/L (ref 150–450)
PLATELET # BLD AUTO: 344 10E3/UL (ref 150–450)
PLATELET # BLD AUTO: 359 10E3/UL (ref 150–450)
PLATELET # BLD AUTO: 382 10E3/UL (ref 150–450)
PLATELET # BLD AUTO: 383 10E3/UL (ref 150–450)
PLATELET # BLD AUTO: 383 10E3/UL (ref 150–450)
PLATELET # BLD AUTO: 447 10E3/UL (ref 150–450)
PO2 BLD: 41 MM HG (ref 80–105)
PO2 BLD: 81 MM HG (ref 80–105)
PO2 BLD: 85 MM HG (ref 80–105)
PO2 BLD: 89 MM HG (ref 80–105)
PO2 BLDV: 15 MM HG (ref 25–47)
PO2 BLDV: 22 MM HG (ref 25–47)
PO2 BLDV: 28 MM HG (ref 25–47)
PO2 BLDV: 29 MM HG (ref 25–47)
PO2 BLDV: 42 MM HG (ref 25–47)
PO2 BLDV: 43 MM HG (ref 25–47)
PO2 BLDV: 44 MM HG (ref 25–47)
PO2 BLDV: 46 MM HG (ref 25–47)
PO2 BLDV: 47 MM HG (ref 25–47)
PO2 BLDV: 49 MM HG (ref 25–47)
PO2 BLDV: 50 MM HG (ref 25–47)
PO2 BLDV: 52 MM HG (ref 25–47)
PO2 BLDV: 55 MM HG (ref 25–47)
PO2 BLDV: 78 MM HG (ref 25–47)
POTASSIUM BLD-SCNC: 3.8 MMOL/L (ref 3.4–5.3)
POTASSIUM BLD-SCNC: 3.9 MMOL/L (ref 3.4–5.3)
POTASSIUM BLD-SCNC: 4 MMOL/L (ref 3.4–5.3)
POTASSIUM BLD-SCNC: 4 MMOL/L (ref 3.4–5.3)
POTASSIUM BLD-SCNC: 4.1 MMOL/L (ref 3.4–5.3)
POTASSIUM BLD-SCNC: 4.2 MMOL/L (ref 3.4–5.3)
POTASSIUM BLD-SCNC: 4.3 MMOL/L (ref 3.4–5.3)
POTASSIUM BLD-SCNC: 4.4 MMOL/L (ref 3.4–5.3)
POTASSIUM BLD-SCNC: 4.4 MMOL/L (ref 3.4–5.3)
POTASSIUM BLD-SCNC: 4.5 MMOL/L (ref 3.4–5.3)
POTASSIUM SERPL-SCNC: 4.2 MMOL/L (ref 3.4–5.3)
POTASSIUM SERPL-SCNC: 4.3 MMOL/L (ref 3.4–5.3)
POTASSIUM SERPL-SCNC: 4.5 MMOL/L (ref 3.4–5.3)
POTASSIUM SERPL-SCNC: 4.5 MMOL/L (ref 3.4–5.3)
POTASSIUM SERPL-SCNC: 4.7 MMOL/L (ref 3.4–5.3)
PROCALCITONIN SERPL-MCNC: 0.05 NG/ML
PROCALCITONIN SERPL-MCNC: 0.07 NG/ML
PROCALCITONIN SERPL-MCNC: <0.05 NG/ML
PROT SERPL-MCNC: 5.9 G/DL (ref 6.8–8.8)
PROT SERPL-MCNC: 7 G/DL (ref 6.8–8.8)
PROT SERPL-MCNC: 7 G/DL (ref 6.8–8.8)
RADIOLOGIST FLAGS: ABNORMAL
RADIOLOGIST FLAGS: ABNORMAL
RBC # BLD AUTO: 2.99 10E6/UL (ref 3.8–5.2)
RBC # BLD AUTO: 3.32 10E6/UL (ref 3.8–5.2)
RBC # BLD AUTO: 3.61 10E6/UL (ref 3.8–5.2)
RBC # BLD AUTO: 3.92 10E6/UL (ref 3.8–5.2)
RBC # BLD AUTO: 4.01 10E12/L (ref 3.8–5.2)
RBC # BLD AUTO: 4.37 10E6/UL (ref 3.8–5.2)
RBC # BLD AUTO: 4.39 10E6/UL (ref 3.8–5.2)
RBC # BLD AUTO: 4.69 10E6/UL (ref 3.8–5.2)
RBC # BLD AUTO: 5.11 10E12/L (ref 3.8–5.2)
RSV RNA SPEC QL NAA+PROBE: NORMAL
SARS-COV-2 RNA RESP QL NAA+PROBE: NEGATIVE
SODIUM SERPL-SCNC: 133 MMOL/L (ref 133–144)
SODIUM SERPL-SCNC: 134 MMOL/L (ref 133–144)
SODIUM SERPL-SCNC: 138 MMOL/L (ref 133–144)
SODIUM SERPL-SCNC: 139 MMOL/L (ref 133–144)
SODIUM SERPL-SCNC: 140 MMOL/L (ref 133–144)
SODIUM SERPL-SCNC: 140 MMOL/L (ref 133–144)
SODIUM SERPL-SCNC: 141 MMOL/L (ref 133–144)
SODIUM SERPL-SCNC: 142 MMOL/L (ref 133–144)
SODIUM SERPL-SCNC: 143 MMOL/L (ref 133–144)
SPECIMEN EXPIRATION DATE: NORMAL
SPECIMEN SOURCE: NORMAL
TRIGL SERPL-MCNC: 129 MG/DL
TROPONIN I SERPL-MCNC: 0.04 UG/L (ref 0–0.04)
TROPONIN I SERPL-MCNC: 0.06 UG/L (ref 0–0.04)
TROPONIN I SERPL-MCNC: 0.07 UG/L (ref 0–0.04)
TROPONIN I SERPL-MCNC: 0.1 UG/L (ref 0–0.04)
TROPONIN I SERPL-MCNC: 0.17 UG/L (ref 0–0.04)
TROPONIN I SERPL-MCNC: 0.25 UG/L (ref 0–0.04)
TROPONIN I SERPL-MCNC: 0.41 UG/L (ref 0–0.04)
TROPONIN I SERPL-MCNC: 0.48 UG/L (ref 0–0.04)
TROPONIN I SERPL-MCNC: 0.52 UG/L (ref 0–0.04)
TROPONIN I SERPL-MCNC: 0.55 UG/L (ref 0–0.04)
WBC # BLD AUTO: 11.1 10E3/UL (ref 4–11)
WBC # BLD AUTO: 11.2 10E3/UL (ref 4–11)
WBC # BLD AUTO: 12 10E3/UL (ref 4–11)
WBC # BLD AUTO: 5.9 10E9/L (ref 4–11)
WBC # BLD AUTO: 6 10E3/UL (ref 4–11)
WBC # BLD AUTO: 6.1 10E3/UL (ref 4–11)
WBC # BLD AUTO: 6.5 10E3/UL (ref 4–11)
WBC # BLD AUTO: 6.9 10E9/L (ref 4–11)
WBC # BLD AUTO: 7.1 10E3/UL (ref 4–11)
WBC # BLD AUTO: 8.5 10E3/UL (ref 4–11)

## 2021-01-01 PROCEDURE — 93306 TTE W/DOPPLER COMPLETE: CPT

## 2021-01-01 PROCEDURE — 82803 BLOOD GASES ANY COMBINATION: CPT | Performed by: NURSE PRACTITIONER

## 2021-01-01 PROCEDURE — 36600 WITHDRAWAL OF ARTERIAL BLOOD: CPT

## 2021-01-01 PROCEDURE — 250N000009 HC RX 250: Performed by: NURSE PRACTITIONER

## 2021-01-01 PROCEDURE — 250N000009 HC RX 250

## 2021-01-01 PROCEDURE — 999N000157 HC STATISTIC RCP TIME EA 10 MIN

## 2021-01-01 PROCEDURE — 94640 AIRWAY INHALATION TREATMENT: CPT | Mod: 76

## 2021-01-01 PROCEDURE — 200N000001 HC R&B ICU

## 2021-01-01 PROCEDURE — 250N000009 HC RX 250: Performed by: PEDIATRICS

## 2021-01-01 PROCEDURE — 97161 PT EVAL LOW COMPLEX 20 MIN: CPT | Mod: GP | Performed by: PHYSICAL THERAPIST

## 2021-01-01 PROCEDURE — 250N000013 HC RX MED GY IP 250 OP 250 PS 637: Performed by: INTERNAL MEDICINE

## 2021-01-01 PROCEDURE — 82565 ASSAY OF CREATININE: CPT | Performed by: FAMILY MEDICINE

## 2021-01-01 PROCEDURE — 250N000011 HC RX IP 250 OP 636: Performed by: EMERGENCY MEDICINE

## 2021-01-01 PROCEDURE — 87040 BLOOD CULTURE FOR BACTERIA: CPT | Performed by: EMERGENCY MEDICINE

## 2021-01-01 PROCEDURE — 250N000009 HC RX 250: Performed by: EMERGENCY MEDICINE

## 2021-01-01 PROCEDURE — 84132 ASSAY OF SERUM POTASSIUM: CPT | Performed by: INTERNAL MEDICINE

## 2021-01-01 PROCEDURE — 250N000009 HC RX 250: Performed by: INTERNAL MEDICINE

## 2021-01-01 PROCEDURE — 999N000105 HC STATISTIC NO DOCUMENTATION TO SUPPORT CHARGE

## 2021-01-01 PROCEDURE — 83735 ASSAY OF MAGNESIUM: CPT | Performed by: INTERNAL MEDICINE

## 2021-01-01 PROCEDURE — 82803 BLOOD GASES ANY COMBINATION: CPT | Performed by: PEDIATRICS

## 2021-01-01 PROCEDURE — 36415 COLL VENOUS BLD VENIPUNCTURE: CPT | Performed by: INTERNAL MEDICINE

## 2021-01-01 PROCEDURE — 120N000001 HC R&B MED SURG/OB

## 2021-01-01 PROCEDURE — 84484 ASSAY OF TROPONIN QUANT: CPT | Performed by: INTERNAL MEDICINE

## 2021-01-01 PROCEDURE — 80053 COMPREHEN METABOLIC PANEL: CPT | Performed by: FAMILY MEDICINE

## 2021-01-01 PROCEDURE — 99285 EMERGENCY DEPT VISIT HI MDM: CPT | Performed by: EMERGENCY MEDICINE

## 2021-01-01 PROCEDURE — 94640 AIRWAY INHALATION TREATMENT: CPT

## 2021-01-01 PROCEDURE — 250N000011 HC RX IP 250 OP 636: Performed by: INTERNAL MEDICINE

## 2021-01-01 PROCEDURE — 36415 COLL VENOUS BLD VENIPUNCTURE: CPT | Performed by: PHYSICIAN ASSISTANT

## 2021-01-01 PROCEDURE — 250N000013 HC RX MED GY IP 250 OP 250 PS 637: Performed by: PEDIATRICS

## 2021-01-01 PROCEDURE — 86140 C-REACTIVE PROTEIN: CPT | Performed by: PEDIATRICS

## 2021-01-01 PROCEDURE — 258N000003 HC RX IP 258 OP 636: Performed by: INTERNAL MEDICINE

## 2021-01-01 PROCEDURE — 99233 SBSQ HOSP IP/OBS HIGH 50: CPT | Performed by: INTERNAL MEDICINE

## 2021-01-01 PROCEDURE — 87636 SARSCOV2 & INF A&B AMP PRB: CPT | Performed by: EMERGENCY MEDICINE

## 2021-01-01 PROCEDURE — 999N000159 HC STATISTIC RCP TIME PACU VENT EA 10 MIN

## 2021-01-01 PROCEDURE — 97162 PT EVAL MOD COMPLEX 30 MIN: CPT | Mod: GP | Performed by: PHYSICAL THERAPIST

## 2021-01-01 PROCEDURE — 82803 BLOOD GASES ANY COMBINATION: CPT | Performed by: FAMILY MEDICINE

## 2021-01-01 PROCEDURE — 94660 CPAP INITIATION&MGMT: CPT

## 2021-01-01 PROCEDURE — 93325 DOPPLER ECHO COLOR FLOW MAPG: CPT | Mod: 26 | Performed by: INTERNAL MEDICINE

## 2021-01-01 PROCEDURE — 99207 PR CDG-MDM COMPONENT: MEETS HIGH - UP CODED: CPT | Performed by: PEDIATRICS

## 2021-01-01 PROCEDURE — 999N000185 HC STATISTIC TRANSPORT TIME EA 15 MIN

## 2021-01-01 PROCEDURE — 99214 OFFICE O/P EST MOD 30 MIN: CPT | Performed by: STUDENT IN AN ORGANIZED HEALTH CARE EDUCATION/TRAINING PROGRAM

## 2021-01-01 PROCEDURE — 250N000011 HC RX IP 250 OP 636: Performed by: FAMILY MEDICINE

## 2021-01-01 PROCEDURE — 36415 COLL VENOUS BLD VENIPUNCTURE: CPT | Performed by: PEDIATRICS

## 2021-01-01 PROCEDURE — 250N000011 HC RX IP 250 OP 636: Performed by: RADIOLOGY

## 2021-01-01 PROCEDURE — 85027 COMPLETE CBC AUTOMATED: CPT | Performed by: STUDENT IN AN ORGANIZED HEALTH CARE EDUCATION/TRAINING PROGRAM

## 2021-01-01 PROCEDURE — 36415 COLL VENOUS BLD VENIPUNCTURE: CPT | Performed by: EMERGENCY MEDICINE

## 2021-01-01 PROCEDURE — 93308 TTE F-UP OR LMTD: CPT | Mod: 26 | Performed by: INTERNAL MEDICINE

## 2021-01-01 PROCEDURE — 97110 THERAPEUTIC EXERCISES: CPT | Mod: GP | Performed by: PHYSICAL THERAPIST

## 2021-01-01 PROCEDURE — 250N000011 HC RX IP 250 OP 636: Performed by: PEDIATRICS

## 2021-01-01 PROCEDURE — 99239 HOSP IP/OBS DSCHRG MGMT >30: CPT | Performed by: FAMILY MEDICINE

## 2021-01-01 PROCEDURE — G0378 HOSPITAL OBSERVATION PER HR: HCPCS

## 2021-01-01 PROCEDURE — 93005 ELECTROCARDIOGRAM TRACING: CPT

## 2021-01-01 PROCEDURE — 84132 ASSAY OF SERUM POTASSIUM: CPT | Performed by: PEDIATRICS

## 2021-01-01 PROCEDURE — 258N000003 HC RX IP 258 OP 636: Performed by: EMERGENCY MEDICINE

## 2021-01-01 PROCEDURE — 87635 SARS-COV-2 COVID-19 AMP PRB: CPT | Performed by: FAMILY MEDICINE

## 2021-01-01 PROCEDURE — 84484 ASSAY OF TROPONIN QUANT: CPT | Performed by: EMERGENCY MEDICINE

## 2021-01-01 PROCEDURE — 99231 SBSQ HOSP IP/OBS SF/LOW 25: CPT | Performed by: SURGERY

## 2021-01-01 PROCEDURE — 85018 HEMOGLOBIN: CPT | Performed by: PEDIATRICS

## 2021-01-01 PROCEDURE — 999N000065 XR CHEST PORT 1 VIEW

## 2021-01-01 PROCEDURE — 84484 ASSAY OF TROPONIN QUANT: CPT | Performed by: FAMILY MEDICINE

## 2021-01-01 PROCEDURE — 84100 ASSAY OF PHOSPHORUS: CPT | Performed by: INTERNAL MEDICINE

## 2021-01-01 PROCEDURE — 999N000123 HC STATISTIC OXYGEN O2DAILY TECH TIME

## 2021-01-01 PROCEDURE — 85027 COMPLETE CBC AUTOMATED: CPT | Performed by: NURSE PRACTITIONER

## 2021-01-01 PROCEDURE — 99222 1ST HOSP IP/OBS MODERATE 55: CPT | Performed by: SURGERY

## 2021-01-01 PROCEDURE — 99291 CRITICAL CARE FIRST HOUR: CPT | Mod: 25 | Performed by: FAMILY MEDICINE

## 2021-01-01 PROCEDURE — 71046 X-RAY EXAM CHEST 2 VIEWS: CPT

## 2021-01-01 PROCEDURE — 83605 ASSAY OF LACTIC ACID: CPT | Performed by: EMERGENCY MEDICINE

## 2021-01-01 PROCEDURE — C9803 HOPD COVID-19 SPEC COLLECT: HCPCS | Performed by: EMERGENCY MEDICINE

## 2021-01-01 PROCEDURE — 36415 COLL VENOUS BLD VENIPUNCTURE: CPT | Performed by: NURSE PRACTITIONER

## 2021-01-01 PROCEDURE — 99207 PR NOT IN PERSON INPATIENT CONSULT STATISTICAL MARKER: CPT | Performed by: INTERNAL MEDICINE

## 2021-01-01 PROCEDURE — 99285 EMERGENCY DEPT VISIT HI MDM: CPT | Mod: 25 | Performed by: EMERGENCY MEDICINE

## 2021-01-01 PROCEDURE — 85025 COMPLETE CBC W/AUTO DIFF WBC: CPT | Performed by: PHYSICIAN ASSISTANT

## 2021-01-01 PROCEDURE — 83605 ASSAY OF LACTIC ACID: CPT | Performed by: INTERNAL MEDICINE

## 2021-01-01 PROCEDURE — 71045 X-RAY EXAM CHEST 1 VIEW: CPT | Mod: 77

## 2021-01-01 PROCEDURE — 93306 TTE W/DOPPLER COMPLETE: CPT | Mod: 26 | Performed by: INTERNAL MEDICINE

## 2021-01-01 PROCEDURE — 71045 X-RAY EXAM CHEST 1 VIEW: CPT

## 2021-01-01 PROCEDURE — 99233 SBSQ HOSP IP/OBS HIGH 50: CPT | Performed by: PEDIATRICS

## 2021-01-01 PROCEDURE — 93321 DOPPLER ECHO F-UP/LMTD STD: CPT | Mod: 26 | Performed by: INTERNAL MEDICINE

## 2021-01-01 PROCEDURE — 999N000147 HC STATISTIC PT IP EVAL DEFER: Performed by: PHYSICAL THERAPIST

## 2021-01-01 PROCEDURE — 36592 COLLECT BLOOD FROM PICC: CPT | Performed by: NURSE PRACTITIONER

## 2021-01-01 PROCEDURE — 96365 THER/PROPH/DIAG IV INF INIT: CPT | Performed by: EMERGENCY MEDICINE

## 2021-01-01 PROCEDURE — 96374 THER/PROPH/DIAG INJ IV PUSH: CPT | Performed by: FAMILY MEDICINE

## 2021-01-01 PROCEDURE — 99223 1ST HOSP IP/OBS HIGH 75: CPT | Mod: AI | Performed by: INTERNAL MEDICINE

## 2021-01-01 PROCEDURE — 99232 SBSQ HOSP IP/OBS MODERATE 35: CPT | Performed by: PEDIATRICS

## 2021-01-01 PROCEDURE — 85610 PROTHROMBIN TIME: CPT | Performed by: INTERNAL MEDICINE

## 2021-01-01 PROCEDURE — 80048 BASIC METABOLIC PNL TOTAL CA: CPT | Performed by: INTERNAL MEDICINE

## 2021-01-01 PROCEDURE — 99207 PR NO CHARGE LOS: CPT | Performed by: PHARMACIST

## 2021-01-01 PROCEDURE — 84145 PROCALCITONIN (PCT): CPT | Performed by: NURSE PRACTITIONER

## 2021-01-01 PROCEDURE — 120N000004 HC R&B MS OVERFLOW

## 2021-01-01 PROCEDURE — 83605 ASSAY OF LACTIC ACID: CPT | Performed by: NURSE PRACTITIONER

## 2021-01-01 PROCEDURE — 93010 ELECTROCARDIOGRAM REPORT: CPT | Performed by: EMERGENCY MEDICINE

## 2021-01-01 PROCEDURE — 84145 PROCALCITONIN (PCT): CPT | Performed by: PEDIATRICS

## 2021-01-01 PROCEDURE — 93010 ELECTROCARDIOGRAM REPORT: CPT | Performed by: FAMILY MEDICINE

## 2021-01-01 PROCEDURE — 80048 BASIC METABOLIC PNL TOTAL CA: CPT | Performed by: EMERGENCY MEDICINE

## 2021-01-01 PROCEDURE — 250N000012 HC RX MED GY IP 250 OP 636 PS 637: Performed by: INTERNAL MEDICINE

## 2021-01-01 PROCEDURE — 99239 HOSP IP/OBS DSCHRG MGMT >30: CPT | Performed by: INTERNAL MEDICINE

## 2021-01-01 PROCEDURE — 87635 SARS-COV-2 COVID-19 AMP PRB: CPT | Performed by: EMERGENCY MEDICINE

## 2021-01-01 PROCEDURE — 96375 TX/PRO/DX INJ NEW DRUG ADDON: CPT | Performed by: EMERGENCY MEDICINE

## 2021-01-01 PROCEDURE — 250N000009 HC RX 250: Performed by: FAMILY MEDICINE

## 2021-01-01 PROCEDURE — 250N000013 HC RX MED GY IP 250 OP 250 PS 637: Performed by: FAMILY MEDICINE

## 2021-01-01 PROCEDURE — 272N000580 HC KIT, 4 OR 5FR DUAL LUMEN POWER PICC

## 2021-01-01 PROCEDURE — 99215 OFFICE O/P EST HI 40 MIN: CPT | Performed by: INTERNAL MEDICINE

## 2021-01-01 PROCEDURE — 250N000011 HC RX IP 250 OP 636: Performed by: HOSPITALIST

## 2021-01-01 PROCEDURE — 99207 PR CDG-CODE CATEGORY CHANGED: CPT | Performed by: INTERNAL MEDICINE

## 2021-01-01 PROCEDURE — 82803 BLOOD GASES ANY COMBINATION: CPT | Performed by: INTERNAL MEDICINE

## 2021-01-01 PROCEDURE — 99232 SBSQ HOSP IP/OBS MODERATE 35: CPT | Performed by: FAMILY MEDICINE

## 2021-01-01 PROCEDURE — 83605 ASSAY OF LACTIC ACID: CPT | Performed by: FAMILY MEDICINE

## 2021-01-01 PROCEDURE — 99232 SBSQ HOSP IP/OBS MODERATE 35: CPT | Performed by: SURGERY

## 2021-01-01 PROCEDURE — 258N000003 HC RX IP 258 OP 636: Performed by: NURSE PRACTITIONER

## 2021-01-01 PROCEDURE — 999N000215 HC STATISTIC HFNC ADULT NON-CPAP

## 2021-01-01 PROCEDURE — 97530 THERAPEUTIC ACTIVITIES: CPT | Mod: GP | Performed by: PHYSICAL THERAPIST

## 2021-01-01 PROCEDURE — 83735 ASSAY OF MAGNESIUM: CPT | Performed by: PEDIATRICS

## 2021-01-01 PROCEDURE — 99207 PR APP CREDIT; MD BILLING SHARED VISIT: CPT | Performed by: NURSE PRACTITIONER

## 2021-01-01 PROCEDURE — 97110 THERAPEUTIC EXERCISES: CPT | Mod: GP

## 2021-01-01 PROCEDURE — 97165 OT EVAL LOW COMPLEX 30 MIN: CPT | Mod: GO

## 2021-01-01 PROCEDURE — 99291 CRITICAL CARE FIRST HOUR: CPT | Mod: 25 | Performed by: EMERGENCY MEDICINE

## 2021-01-01 PROCEDURE — 80048 BASIC METABOLIC PNL TOTAL CA: CPT | Performed by: NURSE PRACTITIONER

## 2021-01-01 PROCEDURE — 86901 BLOOD TYPING SEROLOGIC RH(D): CPT | Performed by: PEDIATRICS

## 2021-01-01 PROCEDURE — 99214 OFFICE O/P EST MOD 30 MIN: CPT | Performed by: PHYSICIAN ASSISTANT

## 2021-01-01 PROCEDURE — 250N000012 HC RX MED GY IP 250 OP 636 PS 637: Performed by: FAMILY MEDICINE

## 2021-01-01 PROCEDURE — 82565 ASSAY OF CREATININE: CPT | Performed by: INTERNAL MEDICINE

## 2021-01-01 PROCEDURE — 96368 THER/DIAG CONCURRENT INF: CPT | Performed by: EMERGENCY MEDICINE

## 2021-01-01 PROCEDURE — 80048 BASIC METABOLIC PNL TOTAL CA: CPT | Performed by: FAMILY MEDICINE

## 2021-01-01 PROCEDURE — 85027 COMPLETE CBC AUTOMATED: CPT | Performed by: INTERNAL MEDICINE

## 2021-01-01 PROCEDURE — 82040 ASSAY OF SERUM ALBUMIN: CPT | Performed by: EMERGENCY MEDICINE

## 2021-01-01 PROCEDURE — 93005 ELECTROCARDIOGRAM TRACING: CPT | Performed by: EMERGENCY MEDICINE

## 2021-01-01 PROCEDURE — 97530 THERAPEUTIC ACTIVITIES: CPT | Mod: GP

## 2021-01-01 PROCEDURE — 99220 PR INITIAL OBSERVATION CARE,LEVEL III: CPT | Mod: 95 | Performed by: INTERNAL MEDICINE

## 2021-01-01 PROCEDURE — 36415 COLL VENOUS BLD VENIPUNCTURE: CPT | Performed by: STUDENT IN AN ORGANIZED HEALTH CARE EDUCATION/TRAINING PROGRAM

## 2021-01-01 PROCEDURE — 84145 PROCALCITONIN (PCT): CPT | Performed by: INTERNAL MEDICINE

## 2021-01-01 PROCEDURE — 80053 COMPREHEN METABOLIC PANEL: CPT | Performed by: PHYSICIAN ASSISTANT

## 2021-01-01 PROCEDURE — 258N000003 HC RX IP 258 OP 636: Performed by: PEDIATRICS

## 2021-01-01 PROCEDURE — 250N000012 HC RX MED GY IP 250 OP 636 PS 637: Performed by: PEDIATRICS

## 2021-01-01 PROCEDURE — 0W9930Z DRAINAGE OF RIGHT PLEURAL CAVITY WITH DRAINAGE DEVICE, PERCUTANEOUS APPROACH: ICD-10-PCS | Performed by: FAMILY MEDICINE

## 2021-01-01 PROCEDURE — 80048 BASIC METABOLIC PNL TOTAL CA: CPT | Performed by: PEDIATRICS

## 2021-01-01 PROCEDURE — 82803 BLOOD GASES ANY COMBINATION: CPT | Performed by: EMERGENCY MEDICINE

## 2021-01-01 PROCEDURE — 999N000156 HC STATISTIC RCP CONSULT EA 30 MIN

## 2021-01-01 PROCEDURE — 83880 ASSAY OF NATRIURETIC PEPTIDE: CPT | Performed by: EMERGENCY MEDICINE

## 2021-01-01 PROCEDURE — 85048 AUTOMATED LEUKOCYTE COUNT: CPT | Performed by: PEDIATRICS

## 2021-01-01 PROCEDURE — 85049 AUTOMATED PLATELET COUNT: CPT | Performed by: PEDIATRICS

## 2021-01-01 PROCEDURE — 999N000055 HC STATISTIC END TITIAL CO2 MONITORING

## 2021-01-01 PROCEDURE — 96375 TX/PRO/DX INJ NEW DRUG ADDON: CPT | Performed by: FAMILY MEDICINE

## 2021-01-01 PROCEDURE — 258N000003 HC RX IP 258 OP 636: Performed by: FAMILY MEDICINE

## 2021-01-01 PROCEDURE — 93308 TTE F-UP OR LMTD: CPT

## 2021-01-01 PROCEDURE — 999N000111 HC STATISTIC OT IP EVAL DEFER

## 2021-01-01 PROCEDURE — G0180 MD CERTIFICATION HHA PATIENT: HCPCS | Performed by: STUDENT IN AN ORGANIZED HEALTH CARE EDUCATION/TRAINING PROGRAM

## 2021-01-01 PROCEDURE — 36415 COLL VENOUS BLD VENIPUNCTURE: CPT | Performed by: FAMILY MEDICINE

## 2021-01-01 PROCEDURE — 71045 X-RAY EXAM CHEST 1 VIEW: CPT | Mod: 76

## 2021-01-01 PROCEDURE — 96361 HYDRATE IV INFUSION ADD-ON: CPT | Performed by: EMERGENCY MEDICINE

## 2021-01-01 PROCEDURE — 96376 TX/PRO/DX INJ SAME DRUG ADON: CPT

## 2021-01-01 PROCEDURE — 71250 CT THORAX DX C-: CPT

## 2021-01-01 PROCEDURE — 71275 CT ANGIOGRAPHY CHEST: CPT

## 2021-01-01 PROCEDURE — 36569 INSJ PICC 5 YR+ W/O IMAGING: CPT

## 2021-01-01 PROCEDURE — 80061 LIPID PANEL: CPT | Performed by: PHYSICIAN ASSISTANT

## 2021-01-01 PROCEDURE — 83880 ASSAY OF NATRIURETIC PEPTIDE: CPT | Performed by: PEDIATRICS

## 2021-01-01 PROCEDURE — 85049 AUTOMATED PLATELET COUNT: CPT | Performed by: INTERNAL MEDICINE

## 2021-01-01 PROCEDURE — 87040 BLOOD CULTURE FOR BACTERIA: CPT | Performed by: INTERNAL MEDICINE

## 2021-01-01 PROCEDURE — 83880 ASSAY OF NATRIURETIC PEPTIDE: CPT | Performed by: FAMILY MEDICINE

## 2021-01-01 PROCEDURE — 94640 AIRWAY INHALATION TREATMENT: CPT | Performed by: EMERGENCY MEDICINE

## 2021-01-01 PROCEDURE — 99221 1ST HOSP IP/OBS SF/LOW 40: CPT | Mod: AI | Performed by: INTERNAL MEDICINE

## 2021-01-01 PROCEDURE — 85025 COMPLETE CBC W/AUTO DIFF WBC: CPT | Performed by: EMERGENCY MEDICINE

## 2021-01-01 PROCEDURE — 85025 COMPLETE CBC W/AUTO DIFF WBC: CPT | Performed by: FAMILY MEDICINE

## 2021-01-01 PROCEDURE — 87040 BLOOD CULTURE FOR BACTERIA: CPT | Performed by: FAMILY MEDICINE

## 2021-01-01 PROCEDURE — 83605 ASSAY OF LACTIC ACID: CPT | Performed by: PEDIATRICS

## 2021-01-01 PROCEDURE — C9803 HOPD COVID-19 SPEC COLLECT: HCPCS | Performed by: FAMILY MEDICINE

## 2021-01-01 PROCEDURE — 85025 COMPLETE CBC W/AUTO DIFF WBC: CPT | Performed by: INTERNAL MEDICINE

## 2021-01-01 PROCEDURE — 84100 ASSAY OF PHOSPHORUS: CPT | Performed by: PEDIATRICS

## 2021-01-01 PROCEDURE — 5A09357 ASSISTANCE WITH RESPIRATORY VENTILATION, LESS THAN 24 CONSECUTIVE HOURS, CONTINUOUS POSITIVE AIRWAY PRESSURE: ICD-10-PCS | Performed by: EMERGENCY MEDICINE

## 2021-01-01 PROCEDURE — 250N000009 HC RX 250: Performed by: RADIOLOGY

## 2021-01-01 PROCEDURE — 85014 HEMATOCRIT: CPT | Performed by: INTERNAL MEDICINE

## 2021-01-01 PROCEDURE — 93005 ELECTROCARDIOGRAM TRACING: CPT | Performed by: FAMILY MEDICINE

## 2021-01-01 RX ORDER — IPRATROPIUM BROMIDE AND ALBUTEROL SULFATE 2.5; .5 MG/3ML; MG/3ML
SOLUTION RESPIRATORY (INHALATION)
Status: COMPLETED
Start: 2021-01-01 | End: 2021-01-01

## 2021-01-01 RX ORDER — NALOXONE HYDROCHLORIDE 0.4 MG/ML
0.4 INJECTION, SOLUTION INTRAMUSCULAR; INTRAVENOUS; SUBCUTANEOUS
Status: DISCONTINUED | OUTPATIENT
Start: 2021-01-01 | End: 2021-01-01 | Stop reason: HOSPADM

## 2021-01-01 RX ORDER — METHYLPREDNISOLONE SODIUM SUCCINATE 125 MG/2ML
125 INJECTION, POWDER, LYOPHILIZED, FOR SOLUTION INTRAMUSCULAR; INTRAVENOUS ONCE
Status: COMPLETED | OUTPATIENT
Start: 2021-01-01 | End: 2021-01-01

## 2021-01-01 RX ORDER — MORPHINE SULFATE 2 MG/ML
.5-1 INJECTION, SOLUTION INTRAMUSCULAR; INTRAVENOUS EVERY 4 HOURS PRN
Status: DISCONTINUED | OUTPATIENT
Start: 2021-01-01 | End: 2021-01-01 | Stop reason: HOSPADM

## 2021-01-01 RX ORDER — IOPAMIDOL 755 MG/ML
500 INJECTION, SOLUTION INTRAVASCULAR ONCE
Status: DISCONTINUED | OUTPATIENT
Start: 2021-01-01 | End: 2021-01-01

## 2021-01-01 RX ORDER — PREDNISONE 20 MG/1
40 TABLET ORAL DAILY
Status: DISCONTINUED | OUTPATIENT
Start: 2021-01-01 | End: 2021-01-01 | Stop reason: HOSPADM

## 2021-01-01 RX ORDER — SODIUM CHLORIDE 9 MG/ML
INJECTION, SOLUTION INTRAVENOUS CONTINUOUS
Status: DISCONTINUED | OUTPATIENT
Start: 2021-01-01 | End: 2021-01-01

## 2021-01-01 RX ORDER — NITROGLYCERIN 0.4 MG/1
0.4 TABLET SUBLINGUAL EVERY 5 MIN PRN
Status: DISCONTINUED | OUTPATIENT
Start: 2021-01-01 | End: 2021-01-01 | Stop reason: HOSPADM

## 2021-01-01 RX ORDER — PAROXETINE 10 MG/1
20 TABLET, FILM COATED ORAL EVERY MORNING
COMMUNITY
End: 2021-01-01

## 2021-01-01 RX ORDER — IOPAMIDOL 755 MG/ML
500 INJECTION, SOLUTION INTRAVASCULAR ONCE
Status: COMPLETED | OUTPATIENT
Start: 2021-01-01 | End: 2021-01-01

## 2021-01-01 RX ORDER — ATORVASTATIN CALCIUM 10 MG/1
20 TABLET, FILM COATED ORAL AT BEDTIME
Status: DISCONTINUED | OUTPATIENT
Start: 2021-01-01 | End: 2021-01-01 | Stop reason: HOSPADM

## 2021-01-01 RX ORDER — PREDNISONE 20 MG/1
40 TABLET ORAL DAILY
Qty: 12 TABLET | Refills: 0 | Status: SHIPPED | OUTPATIENT
Start: 2021-01-01 | End: 2021-01-01

## 2021-01-01 RX ORDER — ONDANSETRON 2 MG/ML
4 INJECTION INTRAMUSCULAR; INTRAVENOUS EVERY 6 HOURS PRN
Status: DISCONTINUED | OUTPATIENT
Start: 2021-01-01 | End: 2021-01-01 | Stop reason: HOSPADM

## 2021-01-01 RX ORDER — ALBUTEROL SULFATE 0.83 MG/ML
SOLUTION RESPIRATORY (INHALATION)
Qty: 360 ML | Refills: 0 | Status: SHIPPED | OUTPATIENT
Start: 2021-01-01 | End: 2021-01-01

## 2021-01-01 RX ORDER — LORAZEPAM 2 MG/ML
1 INJECTION INTRAMUSCULAR ONCE
Status: COMPLETED | OUTPATIENT
Start: 2021-01-01 | End: 2021-01-01

## 2021-01-01 RX ORDER — ATORVASTATIN CALCIUM 20 MG/1
20 TABLET, FILM COATED ORAL AT BEDTIME
Qty: 90 TABLET | Refills: 3 | Status: SHIPPED | OUTPATIENT
Start: 2021-01-01 | End: 2021-01-01

## 2021-01-01 RX ORDER — MORPHINE SULFATE 15 MG/1
15 TABLET ORAL EVERY 4 HOURS PRN
Status: DISCONTINUED | OUTPATIENT
Start: 2021-01-01 | End: 2021-01-01

## 2021-01-01 RX ORDER — BISACODYL 10 MG
10 SUPPOSITORY, RECTAL RECTAL DAILY PRN
Status: DISCONTINUED | OUTPATIENT
Start: 2021-01-01 | End: 2021-01-01 | Stop reason: HOSPADM

## 2021-01-01 RX ORDER — NOREPINEPHRINE BITARTRATE 0.02 MG/ML
.01-.6 INJECTION, SOLUTION INTRAVENOUS CONTINUOUS
Status: DISCONTINUED | OUTPATIENT
Start: 2021-01-01 | End: 2021-01-01

## 2021-01-01 RX ORDER — CEFDINIR 300 MG/1
600 CAPSULE ORAL EVERY EVENING
Status: DISCONTINUED | OUTPATIENT
Start: 2021-01-01 | End: 2021-01-01 | Stop reason: HOSPADM

## 2021-01-01 RX ORDER — MONTELUKAST SODIUM 10 MG/1
10 TABLET ORAL AT BEDTIME
Status: DISCONTINUED | OUTPATIENT
Start: 2021-01-01 | End: 2021-01-01 | Stop reason: HOSPADM

## 2021-01-01 RX ORDER — NALOXONE HYDROCHLORIDE 0.4 MG/ML
0.2 INJECTION, SOLUTION INTRAMUSCULAR; INTRAVENOUS; SUBCUTANEOUS
Status: DISCONTINUED | OUTPATIENT
Start: 2021-01-01 | End: 2021-01-01 | Stop reason: HOSPADM

## 2021-01-01 RX ORDER — ACETAMINOPHEN 325 MG/1
325-650 TABLET ORAL EVERY 4 HOURS PRN
Qty: 100 TABLET | COMMUNITY
Start: 2021-01-01 | End: 2021-01-01

## 2021-01-01 RX ORDER — CEFTRIAXONE 1 G/1
1 INJECTION, POWDER, FOR SOLUTION INTRAMUSCULAR; INTRAVENOUS ONCE
Status: COMPLETED | OUTPATIENT
Start: 2021-01-01 | End: 2021-01-01

## 2021-01-01 RX ORDER — FUROSEMIDE 10 MG/ML
20 INJECTION INTRAMUSCULAR; INTRAVENOUS ONCE
Status: COMPLETED | OUTPATIENT
Start: 2021-01-01 | End: 2021-01-01

## 2021-01-01 RX ORDER — ONDANSETRON 4 MG/1
4 TABLET, ORALLY DISINTEGRATING ORAL EVERY 6 HOURS PRN
Status: DISCONTINUED | OUTPATIENT
Start: 2021-01-01 | End: 2021-01-01 | Stop reason: HOSPADM

## 2021-01-01 RX ORDER — AMOXICILLIN 250 MG
1 CAPSULE ORAL 2 TIMES DAILY PRN
Status: DISCONTINUED | OUTPATIENT
Start: 2021-01-01 | End: 2021-01-01

## 2021-01-01 RX ORDER — IPRATROPIUM BROMIDE AND ALBUTEROL SULFATE 2.5; .5 MG/3ML; MG/3ML
3 SOLUTION RESPIRATORY (INHALATION) EVERY 4 HOURS
Status: DISCONTINUED | OUTPATIENT
Start: 2021-01-01 | End: 2021-01-01

## 2021-01-01 RX ORDER — METOPROLOL TARTRATE 25 MG/1
25 TABLET, FILM COATED ORAL 2 TIMES DAILY
Status: DISCONTINUED | OUTPATIENT
Start: 2021-01-01 | End: 2021-01-01 | Stop reason: HOSPADM

## 2021-01-01 RX ORDER — IPRATROPIUM BROMIDE AND ALBUTEROL SULFATE 2.5; .5 MG/3ML; MG/3ML
3 SOLUTION RESPIRATORY (INHALATION) 4 TIMES DAILY
Status: DISCONTINUED | OUTPATIENT
Start: 2021-01-01 | End: 2021-01-01 | Stop reason: HOSPADM

## 2021-01-01 RX ORDER — ALBUTEROL SULFATE 0.83 MG/ML
2.5 SOLUTION RESPIRATORY (INHALATION) EVERY 4 HOURS PRN
Qty: 360 ML | Refills: 0 | Status: SHIPPED | OUTPATIENT
Start: 2021-01-01 | End: 2021-01-01

## 2021-01-01 RX ORDER — NUTRITIONAL SUPPLEMENT
1 LIQUID (ML) ORAL 3 TIMES DAILY PRN
COMMUNITY
End: 2021-01-01

## 2021-01-01 RX ORDER — IPRATROPIUM BROMIDE AND ALBUTEROL SULFATE 2.5; .5 MG/3ML; MG/3ML
3 SOLUTION RESPIRATORY (INHALATION)
Status: DISCONTINUED | OUTPATIENT
Start: 2021-01-01 | End: 2021-01-01 | Stop reason: HOSPADM

## 2021-01-01 RX ORDER — LOSARTAN POTASSIUM 25 MG/1
25 TABLET ORAL DAILY
Status: DISCONTINUED | OUTPATIENT
Start: 2021-01-01 | End: 2021-01-01 | Stop reason: HOSPADM

## 2021-01-01 RX ORDER — BUDESONIDE AND FORMOTEROL FUMARATE DIHYDRATE 160; 4.5 UG/1; UG/1
2 AEROSOL RESPIRATORY (INHALATION) 2 TIMES DAILY
Status: DISCONTINUED | OUTPATIENT
Start: 2021-01-01 | End: 2021-01-01 | Stop reason: CLARIF

## 2021-01-01 RX ORDER — AMOXICILLIN 250 MG
1 CAPSULE ORAL 2 TIMES DAILY PRN
Status: DISCONTINUED | OUTPATIENT
Start: 2021-01-01 | End: 2021-01-01 | Stop reason: HOSPADM

## 2021-01-01 RX ORDER — IPRATROPIUM BROMIDE AND ALBUTEROL SULFATE 2.5; .5 MG/3ML; MG/3ML
3 SOLUTION RESPIRATORY (INHALATION) ONCE
Status: COMPLETED | OUTPATIENT
Start: 2021-01-01 | End: 2021-01-01

## 2021-01-01 RX ORDER — IPRATROPIUM BROMIDE AND ALBUTEROL SULFATE 2.5; .5 MG/3ML; MG/3ML
1 SOLUTION RESPIRATORY (INHALATION) EVERY 4 HOURS
Status: DISCONTINUED | OUTPATIENT
Start: 2021-01-01 | End: 2021-01-01 | Stop reason: HOSPADM

## 2021-01-01 RX ORDER — ACETAMINOPHEN 650 MG/1
650 SUPPOSITORY RECTAL EVERY 6 HOURS PRN
Status: DISCONTINUED | OUTPATIENT
Start: 2021-01-01 | End: 2021-01-01 | Stop reason: HOSPADM

## 2021-01-01 RX ORDER — ACETAMINOPHEN 325 MG/1
325 TABLET ORAL EVERY 4 HOURS PRN
Status: DISCONTINUED | OUTPATIENT
Start: 2021-01-01 | End: 2021-01-01 | Stop reason: HOSPADM

## 2021-01-01 RX ORDER — AZITHROMYCIN 250 MG/1
250 TABLET, FILM COATED ORAL DAILY
Status: DISCONTINUED | OUTPATIENT
Start: 2021-01-01 | End: 2021-01-01 | Stop reason: HOSPADM

## 2021-01-01 RX ORDER — ALBUTEROL SULFATE 0.83 MG/ML
SOLUTION RESPIRATORY (INHALATION)
Qty: 360 ML | Refills: 0 | Status: SHIPPED | OUTPATIENT
Start: 2021-01-01 | End: 2022-01-01

## 2021-01-01 RX ORDER — LACTOSE-REDUCED FOOD
1 LIQUID (ML) ORAL 4 TIMES DAILY
Qty: 5688 ML | Refills: 11 | Status: SHIPPED | OUTPATIENT
Start: 2021-01-01

## 2021-01-01 RX ORDER — BUDESONIDE AND FORMOTEROL FUMARATE DIHYDRATE 160; 4.5 UG/1; UG/1
AEROSOL RESPIRATORY (INHALATION)
Qty: 10.2 G | Refills: 0 | Status: SHIPPED | OUTPATIENT
Start: 2021-01-01 | End: 2021-01-01

## 2021-01-01 RX ORDER — GUAIFENESIN 600 MG/1
600 TABLET, EXTENDED RELEASE ORAL 2 TIMES DAILY
Status: DISCONTINUED | OUTPATIENT
Start: 2021-01-01 | End: 2021-01-01

## 2021-01-01 RX ORDER — GUAIFENESIN/DEXTROMETHORPHAN 100-10MG/5
10 SYRUP ORAL EVERY 4 HOURS PRN
Status: DISCONTINUED | OUTPATIENT
Start: 2021-01-01 | End: 2021-01-01 | Stop reason: HOSPADM

## 2021-01-01 RX ORDER — LIDOCAINE 40 MG/G
CREAM TOPICAL
Status: DISCONTINUED | OUTPATIENT
Start: 2021-01-01 | End: 2021-01-01 | Stop reason: HOSPADM

## 2021-01-01 RX ORDER — POLYETHYLENE GLYCOL 3350 17 G/17G
17 POWDER, FOR SOLUTION ORAL DAILY PRN
Status: DISCONTINUED | OUTPATIENT
Start: 2021-01-01 | End: 2021-01-01

## 2021-01-01 RX ORDER — AZITHROMYCIN 500 MG/1
500 INJECTION, POWDER, LYOPHILIZED, FOR SOLUTION INTRAVENOUS EVERY 24 HOURS
Status: COMPLETED | OUTPATIENT
Start: 2021-01-01 | End: 2021-01-01

## 2021-01-01 RX ORDER — PREDNISONE 10 MG/1
TABLET ORAL
Qty: 30 TABLET | Refills: 0 | Status: SHIPPED | OUTPATIENT
Start: 2021-01-01 | End: 2021-01-01

## 2021-01-01 RX ORDER — MORPHINE SULFATE 20 MG/ML
3.25 SOLUTION ORAL EVERY 6 HOURS PRN
Status: DISCONTINUED | OUTPATIENT
Start: 2021-01-01 | End: 2021-01-01 | Stop reason: HOSPADM

## 2021-01-01 RX ORDER — ASPIRIN 81 MG/1
81 TABLET ORAL DAILY
Status: DISCONTINUED | OUTPATIENT
Start: 2021-01-01 | End: 2021-01-01 | Stop reason: HOSPADM

## 2021-01-01 RX ORDER — AMOXICILLIN 250 MG
2 CAPSULE ORAL 2 TIMES DAILY PRN
Status: DISCONTINUED | OUTPATIENT
Start: 2021-01-01 | End: 2021-01-01 | Stop reason: HOSPADM

## 2021-01-01 RX ORDER — HYDRALAZINE HYDROCHLORIDE 10 MG/1
10 TABLET, FILM COATED ORAL EVERY 6 HOURS PRN
Status: DISCONTINUED | OUTPATIENT
Start: 2021-01-01 | End: 2021-01-01 | Stop reason: HOSPADM

## 2021-01-01 RX ORDER — METHYLPREDNISOLONE SODIUM SUCCINATE 125 MG/2ML
80 INJECTION, POWDER, LYOPHILIZED, FOR SOLUTION INTRAMUSCULAR; INTRAVENOUS ONCE
Status: COMPLETED | OUTPATIENT
Start: 2021-01-01 | End: 2021-01-01

## 2021-01-01 RX ORDER — METHYLPREDNISOLONE SODIUM SUCCINATE 125 MG/2ML
60 INJECTION, POWDER, LYOPHILIZED, FOR SOLUTION INTRAMUSCULAR; INTRAVENOUS EVERY 6 HOURS
Status: DISCONTINUED | OUTPATIENT
Start: 2021-01-01 | End: 2021-01-01

## 2021-01-01 RX ORDER — ALBUTEROL SULFATE 90 UG/1
2 AEROSOL, METERED RESPIRATORY (INHALATION) EVERY 6 HOURS PRN
Status: DISCONTINUED | OUTPATIENT
Start: 2021-01-01 | End: 2021-01-01

## 2021-01-01 RX ORDER — IPRATROPIUM BROMIDE AND ALBUTEROL SULFATE 2.5; .5 MG/3ML; MG/3ML
1 SOLUTION RESPIRATORY (INHALATION) 4 TIMES DAILY
Qty: 270 ML | Refills: 3 | COMMUNITY
Start: 2021-01-01 | End: 2021-01-01

## 2021-01-01 RX ORDER — ALBUTEROL SULFATE 0.83 MG/ML
3 SOLUTION RESPIRATORY (INHALATION)
Status: DISCONTINUED | OUTPATIENT
Start: 2021-01-01 | End: 2021-01-01 | Stop reason: HOSPADM

## 2021-01-01 RX ORDER — IPRATROPIUM BROMIDE AND ALBUTEROL SULFATE 2.5; .5 MG/3ML; MG/3ML
1 SOLUTION RESPIRATORY (INHALATION)
Status: DISCONTINUED | OUTPATIENT
Start: 2021-01-01 | End: 2021-01-01 | Stop reason: HOSPADM

## 2021-01-01 RX ORDER — BUDESONIDE AND FORMOTEROL FUMARATE DIHYDRATE 160; 4.5 UG/1; UG/1
2 AEROSOL RESPIRATORY (INHALATION) 2 TIMES DAILY
Qty: 10.2 G | Refills: 5 | Status: SHIPPED | OUTPATIENT
Start: 2021-01-01 | End: 2021-01-01

## 2021-01-01 RX ORDER — SODIUM CHLORIDE, SODIUM LACTATE, POTASSIUM CHLORIDE, CALCIUM CHLORIDE 600; 310; 30; 20 MG/100ML; MG/100ML; MG/100ML; MG/100ML
INJECTION, SOLUTION INTRAVENOUS CONTINUOUS
Status: DISCONTINUED | OUTPATIENT
Start: 2021-01-01 | End: 2021-01-01

## 2021-01-01 RX ORDER — DOXYCYCLINE 100 MG/1
100 CAPSULE ORAL 2 TIMES DAILY
Qty: 20 CAPSULE | Refills: 0 | Status: ON HOLD | OUTPATIENT
Start: 2021-01-01 | End: 2021-01-01

## 2021-01-01 RX ORDER — CEFTRIAXONE 2 G/1
2 INJECTION, POWDER, FOR SOLUTION INTRAMUSCULAR; INTRAVENOUS EVERY 24 HOURS
Status: COMPLETED | OUTPATIENT
Start: 2021-01-01 | End: 2021-01-01

## 2021-01-01 RX ORDER — POLYETHYLENE GLYCOL 3350 17 G/17G
17 POWDER, FOR SOLUTION ORAL DAILY
Status: DISCONTINUED | OUTPATIENT
Start: 2021-01-01 | End: 2021-01-01 | Stop reason: HOSPADM

## 2021-01-01 RX ORDER — ALBUTEROL SULFATE 0.83 MG/ML
2.5 SOLUTION RESPIRATORY (INHALATION)
Status: DISCONTINUED | OUTPATIENT
Start: 2021-01-01 | End: 2021-01-01 | Stop reason: HOSPADM

## 2021-01-01 RX ORDER — MONTELUKAST SODIUM 10 MG/1
10 TABLET ORAL AT BEDTIME
Qty: 90 TABLET | Refills: 3 | Status: SHIPPED | OUTPATIENT
Start: 2021-01-01

## 2021-01-01 RX ORDER — PANTOPRAZOLE SODIUM 40 MG/1
40 TABLET, DELAYED RELEASE ORAL
Status: DISCONTINUED | OUTPATIENT
Start: 2021-01-01 | End: 2021-01-01 | Stop reason: HOSPADM

## 2021-01-01 RX ORDER — PAROXETINE 10 MG/1
10 TABLET, FILM COATED ORAL EVERY MORNING
Qty: 30 TABLET | Refills: 1 | Status: SHIPPED | OUTPATIENT
Start: 2021-01-01 | End: 2021-01-01

## 2021-01-01 RX ORDER — METOPROLOL TARTRATE 25 MG/1
12.5 TABLET, FILM COATED ORAL 2 TIMES DAILY
Qty: 45 TABLET | Refills: 3 | Status: ON HOLD | OUTPATIENT
Start: 2021-01-01 | End: 2021-01-01

## 2021-01-01 RX ORDER — IPRATROPIUM BROMIDE AND ALBUTEROL SULFATE 2.5; .5 MG/3ML; MG/3ML
1 SOLUTION RESPIRATORY (INHALATION) 4 TIMES DAILY
Qty: 270 ML | Refills: 3 | Status: SHIPPED | OUTPATIENT
Start: 2021-01-01 | End: 2022-01-01

## 2021-01-01 RX ORDER — CLOPIDOGREL BISULFATE 75 MG/1
75 TABLET ORAL DAILY
Status: DISCONTINUED | OUTPATIENT
Start: 2021-01-01 | End: 2021-01-01 | Stop reason: HOSPADM

## 2021-01-01 RX ORDER — LORAZEPAM 2 MG/ML
0.2 INJECTION INTRAMUSCULAR ONCE
Status: COMPLETED | OUTPATIENT
Start: 2021-01-01 | End: 2021-01-01

## 2021-01-01 RX ORDER — MONTELUKAST SODIUM 10 MG/1
10 TABLET ORAL AT BEDTIME
Qty: 90 TABLET | Refills: 3 | Status: SHIPPED | OUTPATIENT
Start: 2021-01-01 | End: 2021-01-01

## 2021-01-01 RX ORDER — CLOPIDOGREL BISULFATE 75 MG/1
TABLET ORAL
Qty: 90 TABLET | Refills: 3 | Status: ON HOLD | OUTPATIENT
Start: 2021-01-01 | End: 2021-01-01

## 2021-01-01 RX ORDER — METHYLPREDNISOLONE SODIUM SUCCINATE 40 MG/ML
40 INJECTION, POWDER, LYOPHILIZED, FOR SOLUTION INTRAMUSCULAR; INTRAVENOUS EVERY 12 HOURS
Status: DISCONTINUED | OUTPATIENT
Start: 2021-01-01 | End: 2021-01-01

## 2021-01-01 RX ORDER — ALBUTEROL SULFATE 0.83 MG/ML
SOLUTION RESPIRATORY (INHALATION)
Qty: 360 ML | Refills: 1 | COMMUNITY
Start: 2021-01-01 | End: 2021-01-01

## 2021-01-01 RX ORDER — PANTOPRAZOLE SODIUM 40 MG/1
40 TABLET, DELAYED RELEASE ORAL DAILY
Qty: 30 TABLET | Refills: 0 | Status: SHIPPED | OUTPATIENT
Start: 2021-01-01 | End: 2021-01-01

## 2021-01-01 RX ORDER — FERROUS SULFATE 325(65) MG
325 TABLET ORAL 2 TIMES DAILY
Qty: 60 TABLET | Refills: 1 | Status: SHIPPED | OUTPATIENT
Start: 2021-01-01

## 2021-01-01 RX ORDER — IPRATROPIUM BROMIDE AND ALBUTEROL SULFATE 2.5; .5 MG/3ML; MG/3ML
3 SOLUTION RESPIRATORY (INHALATION) EVERY 4 HOURS
Status: DISCONTINUED | OUTPATIENT
Start: 2021-01-01 | End: 2021-01-01 | Stop reason: ALTCHOICE

## 2021-01-01 RX ORDER — MORPHINE SULFATE 2 MG/ML
0.4 INJECTION, SOLUTION INTRAMUSCULAR; INTRAVENOUS EVERY 4 HOURS PRN
Status: DISCONTINUED | OUTPATIENT
Start: 2021-01-01 | End: 2021-01-01

## 2021-01-01 RX ORDER — CLOPIDOGREL BISULFATE 75 MG/1
75 TABLET ORAL DAILY
COMMUNITY
End: 2021-01-01

## 2021-01-01 RX ORDER — NITROGLYCERIN 0.4 MG/1
TABLET SUBLINGUAL
Qty: 25 TABLET | Refills: 0 | Status: SHIPPED | OUTPATIENT
Start: 2021-01-01

## 2021-01-01 RX ORDER — GUAIFENESIN 400 MG/1
400 TABLET ORAL 2 TIMES DAILY
COMMUNITY
End: 2021-01-01

## 2021-01-01 RX ORDER — MORPHINE SULFATE 15 MG/1
7.5 TABLET ORAL EVERY 4 HOURS PRN
Status: DISCONTINUED | OUTPATIENT
Start: 2021-01-01 | End: 2021-01-01 | Stop reason: HOSPADM

## 2021-01-01 RX ORDER — LOSARTAN POTASSIUM 25 MG/1
25 TABLET ORAL DAILY
COMMUNITY
End: 2021-01-01

## 2021-01-01 RX ORDER — BUDESONIDE AND FORMOTEROL FUMARATE DIHYDRATE 160; 4.5 UG/1; UG/1
AEROSOL RESPIRATORY (INHALATION)
Qty: 10.2 G | Refills: 11 | Status: SHIPPED | OUTPATIENT
Start: 2021-01-01 | End: 2021-01-01 | Stop reason: ALTCHOICE

## 2021-01-01 RX ORDER — METOPROLOL TARTRATE 50 MG
25 TABLET ORAL 2 TIMES DAILY
COMMUNITY
End: 2021-01-01

## 2021-01-01 RX ORDER — ALBUTEROL SULFATE 90 UG/1
2 AEROSOL, METERED RESPIRATORY (INHALATION) EVERY 6 HOURS PRN
Status: DISCONTINUED | OUTPATIENT
Start: 2021-01-01 | End: 2021-01-01 | Stop reason: HOSPADM

## 2021-01-01 RX ORDER — ACETAMINOPHEN 325 MG/1
650 TABLET ORAL EVERY 6 HOURS PRN
Status: DISCONTINUED | OUTPATIENT
Start: 2021-01-01 | End: 2021-01-01 | Stop reason: HOSPADM

## 2021-01-01 RX ORDER — DOXYCYCLINE 100 MG/1
100 CAPSULE ORAL 2 TIMES DAILY
Qty: 20 CAPSULE | Refills: 0 | Status: SHIPPED | OUTPATIENT
Start: 2021-01-01 | End: 2021-01-01

## 2021-01-01 RX ORDER — CEFDINIR 300 MG/1
600 CAPSULE ORAL EVERY EVENING
Qty: 14 CAPSULE | Refills: 0 | Status: SHIPPED | OUTPATIENT
Start: 2021-01-01 | End: 2021-01-01

## 2021-01-01 RX ORDER — LOSARTAN POTASSIUM 25 MG/1
25 TABLET ORAL DAILY
Qty: 90 TABLET | Refills: 1 | Status: ON HOLD | OUTPATIENT
Start: 2021-01-01 | End: 2021-01-01

## 2021-01-01 RX ORDER — IPRATROPIUM BROMIDE AND ALBUTEROL SULFATE 2.5; .5 MG/3ML; MG/3ML
3 SOLUTION RESPIRATORY (INHALATION) EVERY 4 HOURS PRN
Status: DISCONTINUED | OUTPATIENT
Start: 2021-01-01 | End: 2021-01-01

## 2021-01-01 RX ORDER — PAROXETINE 10 MG/1
10 TABLET, FILM COATED ORAL EVERY MORNING
Status: DISCONTINUED | OUTPATIENT
Start: 2021-01-01 | End: 2021-01-01 | Stop reason: HOSPADM

## 2021-01-01 RX ORDER — LANOLIN ALCOHOL/MO/W.PET/CERES
3 CREAM (GRAM) TOPICAL
Status: DISCONTINUED | OUTPATIENT
Start: 2021-01-01 | End: 2021-01-01 | Stop reason: HOSPADM

## 2021-01-01 RX ORDER — ATORVASTATIN CALCIUM 20 MG/1
20 TABLET, FILM COATED ORAL AT BEDTIME
Qty: 90 TABLET | Refills: 3 | Status: ON HOLD | OUTPATIENT
Start: 2021-01-01 | End: 2022-01-01

## 2021-01-01 RX ORDER — LORAZEPAM 2 MG/ML
0.5 INJECTION INTRAMUSCULAR ONCE
Status: DISCONTINUED | OUTPATIENT
Start: 2021-01-01 | End: 2021-01-01

## 2021-01-01 RX ORDER — AZITHROMYCIN 250 MG/1
250 TABLET, FILM COATED ORAL EVERY EVENING
Status: COMPLETED | OUTPATIENT
Start: 2021-01-01 | End: 2021-01-01

## 2021-01-01 RX ORDER — IPRATROPIUM BROMIDE AND ALBUTEROL SULFATE 2.5; .5 MG/3ML; MG/3ML
1 SOLUTION RESPIRATORY (INHALATION) 4 TIMES DAILY
Status: DISCONTINUED | OUTPATIENT
Start: 2021-01-01 | End: 2021-01-01

## 2021-01-01 RX ORDER — MORPHINE SULFATE 2 MG/ML
2 INJECTION, SOLUTION INTRAMUSCULAR; INTRAVENOUS ONCE
Status: COMPLETED | OUTPATIENT
Start: 2021-01-01 | End: 2021-01-01

## 2021-01-01 RX ORDER — GUAIFENESIN 600 MG/1
600-1200 TABLET, EXTENDED RELEASE ORAL 2 TIMES DAILY
Status: DISCONTINUED | OUTPATIENT
Start: 2021-01-01 | End: 2021-01-01 | Stop reason: HOSPADM

## 2021-01-01 RX ORDER — LIDOCAINE 40 MG/G
CREAM TOPICAL
Status: DISCONTINUED | OUTPATIENT
Start: 2021-01-01 | End: 2021-01-01

## 2021-01-01 RX ORDER — IPRATROPIUM BROMIDE AND ALBUTEROL SULFATE 2.5; .5 MG/3ML; MG/3ML
3 SOLUTION RESPIRATORY (INHALATION)
Status: DISCONTINUED | OUTPATIENT
Start: 2021-01-01 | End: 2021-01-01

## 2021-01-01 RX ORDER — POLYETHYLENE GLYCOL 3350 17 G/17G
17 POWDER, FOR SOLUTION ORAL DAILY PRN
Status: DISCONTINUED | OUTPATIENT
Start: 2021-01-01 | End: 2021-01-01 | Stop reason: HOSPADM

## 2021-01-01 RX ORDER — GUAIFENESIN 200 MG/1
800 TABLET ORAL DAILY
Status: ON HOLD | COMMUNITY
End: 2022-01-01

## 2021-01-01 RX ORDER — IPRATROPIUM BROMIDE AND ALBUTEROL SULFATE 2.5; .5 MG/3ML; MG/3ML
1 SOLUTION RESPIRATORY (INHALATION) EVERY 4 HOURS PRN
Status: DISCONTINUED | OUTPATIENT
Start: 2021-01-01 | End: 2021-01-01

## 2021-01-01 RX ORDER — ROPIVACAINE IN 0.9% SOD CHL/PF 0.1 %
.03-.125 PLASTIC BAG, INJECTION (ML) EPIDURAL CONTINUOUS
Status: DISCONTINUED | OUTPATIENT
Start: 2021-01-01 | End: 2021-01-01 | Stop reason: CLARIF

## 2021-01-01 RX ORDER — ALBUTEROL SULFATE 0.83 MG/ML
SOLUTION RESPIRATORY (INHALATION)
Qty: 360 ML | Refills: 1 | Status: SHIPPED | OUTPATIENT
Start: 2021-01-01 | End: 2021-01-01 | Stop reason: DRUGHIGH

## 2021-01-01 RX ORDER — METHYLPREDNISOLONE SODIUM SUCCINATE 40 MG/ML
40 INJECTION, POWDER, LYOPHILIZED, FOR SOLUTION INTRAMUSCULAR; INTRAVENOUS EVERY 6 HOURS
Status: DISCONTINUED | OUTPATIENT
Start: 2021-01-01 | End: 2021-01-01

## 2021-01-01 RX ORDER — MORPHINE SULFATE 15 MG/1
7.5 TABLET ORAL EVERY 6 HOURS PRN
Qty: 5 TABLET | Refills: 0 | Status: SHIPPED | OUTPATIENT
Start: 2021-01-01 | End: 2021-01-01

## 2021-01-01 RX ORDER — LORAZEPAM 0.5 MG/1
0.25 TABLET ORAL EVERY 4 HOURS PRN
Status: DISCONTINUED | OUTPATIENT
Start: 2021-01-01 | End: 2021-01-01 | Stop reason: HOSPADM

## 2021-01-01 RX ADMIN — LOSARTAN POTASSIUM 25 MG: 25 TABLET, FILM COATED ORAL at 08:37

## 2021-01-01 RX ADMIN — MONTELUKAST 10 MG: 10 TABLET, FILM COATED ORAL at 21:14

## 2021-01-01 RX ADMIN — POLYETHYLENE GLYCOL 3350 17 G: 17 POWDER, FOR SOLUTION ORAL at 09:40

## 2021-01-01 RX ADMIN — IPRATROPIUM BROMIDE AND ALBUTEROL SULFATE 3 ML: .5; 3 SOLUTION RESPIRATORY (INHALATION) at 11:35

## 2021-01-01 RX ADMIN — METHYLPREDNISOLONE SODIUM SUCCINATE 40 MG: 40 INJECTION, POWDER, FOR SOLUTION INTRAMUSCULAR; INTRAVENOUS at 17:57

## 2021-01-01 RX ADMIN — Medication 12.5 MG: at 08:30

## 2021-01-01 RX ADMIN — MONTELUKAST 10 MG: 10 TABLET, FILM COATED ORAL at 21:10

## 2021-01-01 RX ADMIN — SODIUM CHLORIDE 1000 ML: 9 INJECTION, SOLUTION INTRAVENOUS at 12:49

## 2021-01-01 RX ADMIN — ASPIRIN 81 MG: 81 TABLET, DELAYED RELEASE ORAL at 09:04

## 2021-01-01 RX ADMIN — ENOXAPARIN SODIUM 40 MG: 40 INJECTION SUBCUTANEOUS at 17:13

## 2021-01-01 RX ADMIN — LOSARTAN POTASSIUM 25 MG: 25 TABLET, FILM COATED ORAL at 10:08

## 2021-01-01 RX ADMIN — Medication 12.5 MG: at 19:53

## 2021-01-01 RX ADMIN — FLUTICASONE FUROATE AND VILANTEROL TRIFENATATE 1 PUFF: 200; 25 POWDER RESPIRATORY (INHALATION) at 08:00

## 2021-01-01 RX ADMIN — FLUTICASONE FUROATE AND VILANTEROL TRIFENATATE 1 PUFF: 200; 25 POWDER RESPIRATORY (INHALATION) at 21:09

## 2021-01-01 RX ADMIN — GUAIFENESIN 600 MG: 600 TABLET, EXTENDED RELEASE ORAL at 08:28

## 2021-01-01 RX ADMIN — Medication 12.5 MG: at 21:17

## 2021-01-01 RX ADMIN — ATORVASTATIN CALCIUM 20 MG: 10 TABLET, FILM COATED ORAL at 20:23

## 2021-01-01 RX ADMIN — SODIUM CHLORIDE: 9 INJECTION, SOLUTION INTRAVENOUS at 09:52

## 2021-01-01 RX ADMIN — ALBUTEROL SULFATE 2.5 MG: 2.5 SOLUTION RESPIRATORY (INHALATION) at 18:31

## 2021-01-01 RX ADMIN — ALBUTEROL SULFATE 2.5 MG: 2.5 SOLUTION RESPIRATORY (INHALATION) at 19:02

## 2021-01-01 RX ADMIN — BUDESONIDE AND FORMOTEROL FUMARATE DIHYDRATE 2 PUFF: 160; 4.5 AEROSOL RESPIRATORY (INHALATION) at 07:01

## 2021-01-01 RX ADMIN — IPRATROPIUM BROMIDE AND ALBUTEROL SULFATE 3 ML: .5; 3 SOLUTION RESPIRATORY (INHALATION) at 16:48

## 2021-01-01 RX ADMIN — IPRATROPIUM BROMIDE AND ALBUTEROL SULFATE 3 ML: .5; 3 SOLUTION RESPIRATORY (INHALATION) at 07:26

## 2021-01-01 RX ADMIN — ASPIRIN 81 MG: 81 TABLET ORAL at 08:37

## 2021-01-01 RX ADMIN — METHYLPREDNISOLONE SODIUM SUCCINATE 40 MG: 40 INJECTION, POWDER, FOR SOLUTION INTRAMUSCULAR; INTRAVENOUS at 12:22

## 2021-01-01 RX ADMIN — MONTELUKAST 10 MG: 10 TABLET, FILM COATED ORAL at 21:24

## 2021-01-01 RX ADMIN — LOSARTAN POTASSIUM 25 MG: 25 TABLET, FILM COATED ORAL at 08:31

## 2021-01-01 RX ADMIN — PANTOPRAZOLE SODIUM 40 MG: 40 TABLET, DELAYED RELEASE ORAL at 06:40

## 2021-01-01 RX ADMIN — SODIUM CHLORIDE, POTASSIUM CHLORIDE, SODIUM LACTATE AND CALCIUM CHLORIDE: 600; 310; 30; 20 INJECTION, SOLUTION INTRAVENOUS at 23:14

## 2021-01-01 RX ADMIN — LORAZEPAM 0.2 MG: 2 INJECTION INTRAMUSCULAR; INTRAVENOUS at 06:51

## 2021-01-01 RX ADMIN — IPRATROPIUM BROMIDE AND ALBUTEROL SULFATE 3 ML: .5; 3 SOLUTION RESPIRATORY (INHALATION) at 21:12

## 2021-01-01 RX ADMIN — MONTELUKAST 10 MG: 10 TABLET, FILM COATED ORAL at 22:28

## 2021-01-01 RX ADMIN — ATORVASTATIN CALCIUM 20 MG: 10 TABLET, FILM COATED ORAL at 20:19

## 2021-01-01 RX ADMIN — GUAIFENESIN 1200 MG: 600 TABLET, EXTENDED RELEASE ORAL at 21:15

## 2021-01-01 RX ADMIN — GUAIFENESIN 10 ML: 100 SOLUTION ORAL at 03:33

## 2021-01-01 RX ADMIN — PREDNISONE 40 MG: 20 TABLET ORAL at 08:38

## 2021-01-01 RX ADMIN — SODIUM CHLORIDE, POTASSIUM CHLORIDE, SODIUM LACTATE AND CALCIUM CHLORIDE 500 ML: 600; 310; 30; 20 INJECTION, SOLUTION INTRAVENOUS at 05:43

## 2021-01-01 RX ADMIN — UMECLIDINIUM 1 PUFF: 62.5 AEROSOL, POWDER ORAL at 08:33

## 2021-01-01 RX ADMIN — IPRATROPIUM BROMIDE AND ALBUTEROL SULFATE 3 ML: .5; 3 SOLUTION RESPIRATORY (INHALATION) at 05:22

## 2021-01-01 RX ADMIN — LOSARTAN POTASSIUM 25 MG: 25 TABLET, FILM COATED ORAL at 08:28

## 2021-01-01 RX ADMIN — Medication 0.03 MCG/KG/MIN: at 22:02

## 2021-01-01 RX ADMIN — BUDESONIDE AND FORMOTEROL FUMARATE DIHYDRATE 2 PUFF: 160; 4.5 AEROSOL RESPIRATORY (INHALATION) at 13:54

## 2021-01-01 RX ADMIN — IPRATROPIUM BROMIDE AND ALBUTEROL SULFATE 3 ML: .5; 3 SOLUTION RESPIRATORY (INHALATION) at 19:40

## 2021-01-01 RX ADMIN — FLUTICASONE FUROATE AND VILANTEROL TRIFENATATE 1 PUFF: 200; 25 POWDER RESPIRATORY (INHALATION) at 08:33

## 2021-01-01 RX ADMIN — SODIUM CHLORIDE: 9 INJECTION, SOLUTION INTRAVENOUS at 15:05

## 2021-01-01 RX ADMIN — CLOPIDOGREL BISULFATE 75 MG: 75 TABLET ORAL at 08:52

## 2021-01-01 RX ADMIN — MORPHINE SULFATE 1 MG: 2 INJECTION, SOLUTION INTRAMUSCULAR; INTRAVENOUS at 18:27

## 2021-01-01 RX ADMIN — METHYLPREDNISOLONE SODIUM SUCCINATE 40 MG: 40 INJECTION, POWDER, FOR SOLUTION INTRAMUSCULAR; INTRAVENOUS at 12:34

## 2021-01-01 RX ADMIN — IPRATROPIUM BROMIDE AND ALBUTEROL SULFATE 3 ML: .5; 3 SOLUTION RESPIRATORY (INHALATION) at 20:00

## 2021-01-01 RX ADMIN — LORAZEPAM 0.2 MG: 2 INJECTION INTRAMUSCULAR; INTRAVENOUS at 05:06

## 2021-01-01 RX ADMIN — IPRATROPIUM BROMIDE AND ALBUTEROL SULFATE 3 ML: .5; 3 SOLUTION RESPIRATORY (INHALATION) at 12:05

## 2021-01-01 RX ADMIN — IPRATROPIUM BROMIDE AND ALBUTEROL SULFATE 3 ML: .5; 3 SOLUTION RESPIRATORY (INHALATION) at 19:46

## 2021-01-01 RX ADMIN — ATORVASTATIN CALCIUM 20 MG: 10 TABLET, FILM COATED ORAL at 19:53

## 2021-01-01 RX ADMIN — ACETAMINOPHEN 650 MG: 325 TABLET, FILM COATED ORAL at 06:31

## 2021-01-01 RX ADMIN — CLOPIDOGREL BISULFATE 75 MG: 75 TABLET ORAL at 09:01

## 2021-01-01 RX ADMIN — ATORVASTATIN CALCIUM 20 MG: 10 TABLET, FILM COATED ORAL at 21:17

## 2021-01-01 RX ADMIN — MORPHINE SULFATE 3.2 MG: 10 SOLUTION ORAL at 06:59

## 2021-01-01 RX ADMIN — ATORVASTATIN CALCIUM 20 MG: 10 TABLET, FILM COATED ORAL at 22:06

## 2021-01-01 RX ADMIN — ASPIRIN 81 MG: 81 TABLET ORAL at 08:27

## 2021-01-01 RX ADMIN — IPRATROPIUM BROMIDE AND ALBUTEROL SULFATE 3 ML: .5; 3 SOLUTION RESPIRATORY (INHALATION) at 07:39

## 2021-01-01 RX ADMIN — MONTELUKAST 10 MG: 10 TABLET, FILM COATED ORAL at 20:23

## 2021-01-01 RX ADMIN — ALBUTEROL SULFATE 2.5 MG: 2.5 SOLUTION RESPIRATORY (INHALATION) at 08:28

## 2021-01-01 RX ADMIN — CLOPIDOGREL BISULFATE 75 MG: 75 TABLET ORAL at 08:01

## 2021-01-01 RX ADMIN — SODIUM CHLORIDE, POTASSIUM CHLORIDE, SODIUM LACTATE AND CALCIUM CHLORIDE: 600; 310; 30; 20 INJECTION, SOLUTION INTRAVENOUS at 12:52

## 2021-01-01 RX ADMIN — IPRATROPIUM BROMIDE AND ALBUTEROL SULFATE 3 ML: .5; 3 SOLUTION RESPIRATORY (INHALATION) at 08:10

## 2021-01-01 RX ADMIN — CLOPIDOGREL BISULFATE 75 MG: 75 TABLET ORAL at 08:28

## 2021-01-01 RX ADMIN — IPRATROPIUM BROMIDE AND ALBUTEROL SULFATE 3 ML: .5; 3 SOLUTION RESPIRATORY (INHALATION) at 06:14

## 2021-01-01 RX ADMIN — AZITHROMYCIN MONOHYDRATE 500 MG: 500 INJECTION, POWDER, LYOPHILIZED, FOR SOLUTION INTRAVENOUS at 14:45

## 2021-01-01 RX ADMIN — IPRATROPIUM BROMIDE AND ALBUTEROL SULFATE 3 ML: .5; 3 SOLUTION RESPIRATORY (INHALATION) at 16:55

## 2021-01-01 RX ADMIN — UMECLIDINIUM 1 PUFF: 62.5 AEROSOL, POWDER ORAL at 10:10

## 2021-01-01 RX ADMIN — IPRATROPIUM BROMIDE AND ALBUTEROL SULFATE 3 ML: .5; 3 SOLUTION RESPIRATORY (INHALATION) at 20:22

## 2021-01-01 RX ADMIN — PANTOPRAZOLE SODIUM 40 MG: 40 TABLET, DELAYED RELEASE ORAL at 07:01

## 2021-01-01 RX ADMIN — Medication 12.5 MG: at 08:29

## 2021-01-01 RX ADMIN — PAROXETINE 10 MG: 10 TABLET, FILM COATED ORAL at 10:08

## 2021-01-01 RX ADMIN — ALBUTEROL SULFATE 2.5 MG: 2.5 SOLUTION RESPIRATORY (INHALATION) at 13:14

## 2021-01-01 RX ADMIN — IPRATROPIUM BROMIDE AND ALBUTEROL SULFATE 3 ML: .5; 3 SOLUTION RESPIRATORY (INHALATION) at 17:01

## 2021-01-01 RX ADMIN — SODIUM CHLORIDE, POTASSIUM CHLORIDE, SODIUM LACTATE AND CALCIUM CHLORIDE 1000 ML: 600; 310; 30; 20 INJECTION, SOLUTION INTRAVENOUS at 11:25

## 2021-01-01 RX ADMIN — IPRATROPIUM BROMIDE AND ALBUTEROL SULFATE 3 ML: .5; 3 SOLUTION RESPIRATORY (INHALATION) at 11:04

## 2021-01-01 RX ADMIN — ATORVASTATIN CALCIUM 20 MG: 10 TABLET, FILM COATED ORAL at 20:43

## 2021-01-01 RX ADMIN — PREDNISONE 40 MG: 20 TABLET ORAL at 08:01

## 2021-01-01 RX ADMIN — SODIUM CHLORIDE, POTASSIUM CHLORIDE, SODIUM LACTATE AND CALCIUM CHLORIDE: 600; 310; 30; 20 INJECTION, SOLUTION INTRAVENOUS at 09:24

## 2021-01-01 RX ADMIN — PAROXETINE 10 MG: 10 TABLET, FILM COATED ORAL at 09:50

## 2021-01-01 RX ADMIN — LOSARTAN POTASSIUM 25 MG: 25 TABLET, FILM COATED ORAL at 09:06

## 2021-01-01 RX ADMIN — FUROSEMIDE 20 MG: 10 INJECTION, SOLUTION INTRAVENOUS at 16:19

## 2021-01-01 RX ADMIN — AZITHROMYCIN MONOHYDRATE 250 MG: 250 TABLET ORAL at 08:52

## 2021-01-01 RX ADMIN — ATORVASTATIN CALCIUM 20 MG: 10 TABLET, FILM COATED ORAL at 21:10

## 2021-01-01 RX ADMIN — MORPHINE SULFATE 3.2 MG: 10 SOLUTION ORAL at 18:21

## 2021-01-01 RX ADMIN — POLYETHYLENE GLYCOL 3350 17 G: 17 POWDER, FOR SOLUTION ORAL at 08:01

## 2021-01-01 RX ADMIN — MORPHINE SULFATE 3.2 MG: 10 SOLUTION ORAL at 19:40

## 2021-01-01 RX ADMIN — ENOXAPARIN SODIUM 40 MG: 40 INJECTION SUBCUTANEOUS at 21:16

## 2021-01-01 RX ADMIN — Medication 12.5 MG: at 22:07

## 2021-01-01 RX ADMIN — ACETAMINOPHEN 650 MG: 325 TABLET, FILM COATED ORAL at 12:01

## 2021-01-01 RX ADMIN — ALBUTEROL SULFATE 2.5 MG: 2.5 SOLUTION RESPIRATORY (INHALATION) at 05:26

## 2021-01-01 RX ADMIN — IPRATROPIUM BROMIDE AND ALBUTEROL SULFATE 3 ML: .5; 3 SOLUTION RESPIRATORY (INHALATION) at 06:31

## 2021-01-01 RX ADMIN — SODIUM CHLORIDE 1000 ML: 9 INJECTION, SOLUTION INTRAVENOUS at 23:26

## 2021-01-01 RX ADMIN — Medication 12.5 MG: at 21:51

## 2021-01-01 RX ADMIN — ALBUTEROL SULFATE 2 PUFF: 90 AEROSOL, METERED RESPIRATORY (INHALATION) at 16:11

## 2021-01-01 RX ADMIN — ALBUTEROL SULFATE 2 PUFF: 90 AEROSOL, METERED RESPIRATORY (INHALATION) at 18:45

## 2021-01-01 RX ADMIN — ASPIRIN 81 MG: 81 TABLET, DELAYED RELEASE ORAL at 08:53

## 2021-01-01 RX ADMIN — ENOXAPARIN SODIUM 40 MG: 40 INJECTION SUBCUTANEOUS at 03:12

## 2021-01-01 RX ADMIN — BUDESONIDE AND FORMOTEROL FUMARATE DIHYDRATE 2 PUFF: 160; 4.5 AEROSOL RESPIRATORY (INHALATION) at 20:46

## 2021-01-01 RX ADMIN — IPRATROPIUM BROMIDE AND ALBUTEROL SULFATE 3 ML: .5; 3 SOLUTION RESPIRATORY (INHALATION) at 04:04

## 2021-01-01 RX ADMIN — ALBUTEROL SULFATE 2.5 MG: 2.5 SOLUTION RESPIRATORY (INHALATION) at 18:46

## 2021-01-01 RX ADMIN — ASPIRIN 81 MG: 81 TABLET ORAL at 10:08

## 2021-01-01 RX ADMIN — ATORVASTATIN CALCIUM 20 MG: 10 TABLET, FILM COATED ORAL at 20:22

## 2021-01-01 RX ADMIN — IPRATROPIUM BROMIDE AND ALBUTEROL SULFATE 3 ML: .5; 3 SOLUTION RESPIRATORY (INHALATION) at 11:45

## 2021-01-01 RX ADMIN — ENOXAPARIN SODIUM 40 MG: 40 INJECTION SUBCUTANEOUS at 21:18

## 2021-01-01 RX ADMIN — IPRATROPIUM BROMIDE AND ALBUTEROL SULFATE 3 ML: .5; 3 SOLUTION RESPIRATORY (INHALATION) at 07:33

## 2021-01-01 RX ADMIN — GUAIFENESIN 600 MG: 600 TABLET, EXTENDED RELEASE ORAL at 08:37

## 2021-01-01 RX ADMIN — IPRATROPIUM BROMIDE AND ALBUTEROL SULFATE 3 ML: .5; 3 SOLUTION RESPIRATORY (INHALATION) at 23:12

## 2021-01-01 RX ADMIN — SODIUM CHLORIDE: 9 INJECTION, SOLUTION INTRAVENOUS at 00:32

## 2021-01-01 RX ADMIN — ENOXAPARIN SODIUM 40 MG: 40 INJECTION SUBCUTANEOUS at 20:00

## 2021-01-01 RX ADMIN — PREDNISONE 40 MG: 20 TABLET ORAL at 08:05

## 2021-01-01 RX ADMIN — Medication 7.5 MG: at 19:45

## 2021-01-01 RX ADMIN — METHYLPREDNISOLONE SODIUM SUCCINATE 62.5 MG: 125 INJECTION, POWDER, FOR SOLUTION INTRAMUSCULAR; INTRAVENOUS at 08:31

## 2021-01-01 RX ADMIN — CEFTRIAXONE SODIUM 2 G: 2 INJECTION, POWDER, FOR SOLUTION INTRAMUSCULAR; INTRAVENOUS at 20:23

## 2021-01-01 RX ADMIN — Medication 0.25 MG: at 12:15

## 2021-01-01 RX ADMIN — ATORVASTATIN CALCIUM 20 MG: 10 TABLET, FILM COATED ORAL at 21:14

## 2021-01-01 RX ADMIN — MONTELUKAST 10 MG: 10 TABLET, FILM COATED ORAL at 20:19

## 2021-01-01 RX ADMIN — IPRATROPIUM BROMIDE AND ALBUTEROL SULFATE 3 ML: .5; 3 SOLUTION RESPIRATORY (INHALATION) at 09:06

## 2021-01-01 RX ADMIN — ENOXAPARIN SODIUM 40 MG: 40 INJECTION SUBCUTANEOUS at 22:14

## 2021-01-01 RX ADMIN — SODIUM CHLORIDE 70 ML: 9 INJECTION, SOLUTION INTRAVENOUS at 13:26

## 2021-01-01 RX ADMIN — CEFTRIAXONE SODIUM 2 G: 2 INJECTION, POWDER, FOR SOLUTION INTRAMUSCULAR; INTRAVENOUS at 20:31

## 2021-01-01 RX ADMIN — GUAIFENESIN 1200 MG: 600 TABLET, EXTENDED RELEASE ORAL at 08:05

## 2021-01-01 RX ADMIN — ALBUTEROL SULFATE 2.5 MG: 2.5 SOLUTION RESPIRATORY (INHALATION) at 22:26

## 2021-01-01 RX ADMIN — ALBUTEROL SULFATE 2.5 MG: 2.5 SOLUTION RESPIRATORY (INHALATION) at 09:57

## 2021-01-01 RX ADMIN — GUAIFENESIN 1200 MG: 600 TABLET, EXTENDED RELEASE ORAL at 08:52

## 2021-01-01 RX ADMIN — IPRATROPIUM BROMIDE AND ALBUTEROL SULFATE 3 ML: .5; 3 SOLUTION RESPIRATORY (INHALATION) at 00:15

## 2021-01-01 RX ADMIN — Medication 0.25 MG: at 17:13

## 2021-01-01 RX ADMIN — MORPHINE SULFATE 1 MG: 2 INJECTION, SOLUTION INTRAMUSCULAR; INTRAVENOUS at 13:10

## 2021-01-01 RX ADMIN — PANTOPRAZOLE SODIUM 40 MG: 40 TABLET, DELAYED RELEASE ORAL at 16:50

## 2021-01-01 RX ADMIN — CEFTRIAXONE 1 G: 1 INJECTION, POWDER, FOR SOLUTION INTRAMUSCULAR; INTRAVENOUS at 14:30

## 2021-01-01 RX ADMIN — MONTELUKAST 10 MG: 10 TABLET, FILM COATED ORAL at 22:06

## 2021-01-01 RX ADMIN — ATORVASTATIN CALCIUM 20 MG: 10 TABLET, FILM COATED ORAL at 22:28

## 2021-01-01 RX ADMIN — IPRATROPIUM BROMIDE AND ALBUTEROL SULFATE 3 ML: .5; 3 SOLUTION RESPIRATORY (INHALATION) at 09:01

## 2021-01-01 RX ADMIN — AZITHROMYCIN MONOHYDRATE 250 MG: 250 TABLET ORAL at 08:05

## 2021-01-01 RX ADMIN — UMECLIDINIUM 1 PUFF: 62.5 AEROSOL, POWDER ORAL at 08:48

## 2021-01-01 RX ADMIN — MORPHINE SULFATE 15 MG: 15 TABLET ORAL at 17:25

## 2021-01-01 RX ADMIN — PAROXETINE 10 MG: 10 TABLET, FILM COATED ORAL at 08:30

## 2021-01-01 RX ADMIN — UMECLIDINIUM 1 PUFF: 62.5 AEROSOL, POWDER ORAL at 08:32

## 2021-01-01 RX ADMIN — IPRATROPIUM BROMIDE AND ALBUTEROL SULFATE 3 ML: .5; 3 SOLUTION RESPIRATORY (INHALATION) at 13:10

## 2021-01-01 RX ADMIN — PREDNISONE 40 MG: 20 TABLET ORAL at 09:34

## 2021-01-01 RX ADMIN — IPRATROPIUM BROMIDE AND ALBUTEROL SULFATE 3 ML: .5; 3 SOLUTION RESPIRATORY (INHALATION) at 01:03

## 2021-01-01 RX ADMIN — CLOPIDOGREL BISULFATE 75 MG: 75 TABLET ORAL at 10:08

## 2021-01-01 RX ADMIN — IPRATROPIUM BROMIDE AND ALBUTEROL SULFATE 3 ML: .5; 3 SOLUTION RESPIRATORY (INHALATION) at 11:07

## 2021-01-01 RX ADMIN — PREDNISONE 40 MG: 20 TABLET ORAL at 08:00

## 2021-01-01 RX ADMIN — CLOPIDOGREL BISULFATE 75 MG: 75 TABLET ORAL at 09:06

## 2021-01-01 RX ADMIN — LOSARTAN POTASSIUM 25 MG: 25 TABLET, FILM COATED ORAL at 08:53

## 2021-01-01 RX ADMIN — ENOXAPARIN SODIUM 40 MG: 40 INJECTION SUBCUTANEOUS at 20:19

## 2021-01-01 RX ADMIN — MORPHINE SULFATE 1 MG: 2 INJECTION, SOLUTION INTRAMUSCULAR; INTRAVENOUS at 22:35

## 2021-01-01 RX ADMIN — GUAIFENESIN 600 MG: 600 TABLET, EXTENDED RELEASE ORAL at 20:29

## 2021-01-01 RX ADMIN — FLUTICASONE FUROATE AND VILANTEROL TRIFENATATE 1 PUFF: 200; 25 POWDER RESPIRATORY (INHALATION) at 08:43

## 2021-01-01 RX ADMIN — IPRATROPIUM BROMIDE AND ALBUTEROL SULFATE 3 ML: .5; 3 SOLUTION RESPIRATORY (INHALATION) at 16:58

## 2021-01-01 RX ADMIN — SODIUM CHLORIDE, POTASSIUM CHLORIDE, SODIUM LACTATE AND CALCIUM CHLORIDE 1000 ML: 600; 310; 30; 20 INJECTION, SOLUTION INTRAVENOUS at 08:51

## 2021-01-01 RX ADMIN — ALBUTEROL SULFATE 2.5 MG: 2.5 SOLUTION RESPIRATORY (INHALATION) at 06:19

## 2021-01-01 RX ADMIN — PAROXETINE 10 MG: 10 TABLET, FILM COATED ORAL at 08:28

## 2021-01-01 RX ADMIN — IPRATROPIUM BROMIDE AND ALBUTEROL SULFATE 3 ML: .5; 3 SOLUTION RESPIRATORY (INHALATION) at 11:33

## 2021-01-01 RX ADMIN — AZITHROMYCIN MONOHYDRATE 500 MG: 500 INJECTION, POWDER, LYOPHILIZED, FOR SOLUTION INTRAVENOUS at 03:11

## 2021-01-01 RX ADMIN — IPRATROPIUM BROMIDE AND ALBUTEROL SULFATE 3 ML: .5; 3 SOLUTION RESPIRATORY (INHALATION) at 04:50

## 2021-01-01 RX ADMIN — IPRATROPIUM BROMIDE AND ALBUTEROL SULFATE 3 ML: .5; 3 SOLUTION RESPIRATORY (INHALATION) at 18:17

## 2021-01-01 RX ADMIN — UMECLIDINIUM 1 PUFF: 62.5 AEROSOL, POWDER ORAL at 09:18

## 2021-01-01 RX ADMIN — FLUTICASONE FUROATE AND VILANTEROL TRIFENATATE 1 PUFF: 200; 25 POWDER RESPIRATORY (INHALATION) at 07:52

## 2021-01-01 RX ADMIN — MONTELUKAST 10 MG: 10 TABLET, FILM COATED ORAL at 20:43

## 2021-01-01 RX ADMIN — IPRATROPIUM BROMIDE AND ALBUTEROL SULFATE 3 ML: .5; 3 SOLUTION RESPIRATORY (INHALATION) at 01:45

## 2021-01-01 RX ADMIN — PREDNISONE 40 MG: 20 TABLET ORAL at 10:08

## 2021-01-01 RX ADMIN — CLOPIDOGREL BISULFATE 75 MG: 75 TABLET ORAL at 08:37

## 2021-01-01 RX ADMIN — ATORVASTATIN CALCIUM 20 MG: 10 TABLET, FILM COATED ORAL at 20:04

## 2021-01-01 RX ADMIN — MONTELUKAST 10 MG: 10 TABLET, FILM COATED ORAL at 20:04

## 2021-01-01 RX ADMIN — IPRATROPIUM BROMIDE AND ALBUTEROL SULFATE 3 ML: .5; 3 SOLUTION RESPIRATORY (INHALATION) at 21:13

## 2021-01-01 RX ADMIN — IPRATROPIUM BROMIDE AND ALBUTEROL SULFATE 3 ML: .5; 3 SOLUTION RESPIRATORY (INHALATION) at 05:52

## 2021-01-01 RX ADMIN — METHYLPREDNISOLONE SODIUM SUCCINATE 40 MG: 40 INJECTION, POWDER, FOR SOLUTION INTRAMUSCULAR; INTRAVENOUS at 06:47

## 2021-01-01 RX ADMIN — ACETAMINOPHEN 650 MG: 325 TABLET, FILM COATED ORAL at 20:07

## 2021-01-01 RX ADMIN — IPRATROPIUM BROMIDE AND ALBUTEROL SULFATE 3 ML: .5; 3 SOLUTION RESPIRATORY (INHALATION) at 21:20

## 2021-01-01 RX ADMIN — FLUTICASONE FUROATE AND VILANTEROL TRIFENATATE 1 PUFF: 200; 25 POWDER RESPIRATORY (INHALATION) at 08:31

## 2021-01-01 RX ADMIN — AZITHROMYCIN MONOHYDRATE 500 MG: 500 INJECTION, POWDER, LYOPHILIZED, FOR SOLUTION INTRAVENOUS at 15:05

## 2021-01-01 RX ADMIN — METHYLPREDNISOLONE SODIUM SUCCINATE 40 MG: 40 INJECTION, POWDER, FOR SOLUTION INTRAMUSCULAR; INTRAVENOUS at 18:27

## 2021-01-01 RX ADMIN — ALBUTEROL SULFATE 2.5 MG: 2.5 SOLUTION RESPIRATORY (INHALATION) at 11:02

## 2021-01-01 RX ADMIN — FLUTICASONE FUROATE AND VILANTEROL TRIFENATATE 1 PUFF: 200; 25 POWDER RESPIRATORY (INHALATION) at 21:14

## 2021-01-01 RX ADMIN — IPRATROPIUM BROMIDE AND ALBUTEROL SULFATE 3 ML: .5; 3 SOLUTION RESPIRATORY (INHALATION) at 11:15

## 2021-01-01 RX ADMIN — GUAIFENESIN 600 MG: 600 TABLET, EXTENDED RELEASE ORAL at 08:31

## 2021-01-01 RX ADMIN — CLOPIDOGREL BISULFATE 75 MG: 75 TABLET ORAL at 07:53

## 2021-01-01 RX ADMIN — PREDNISONE 40 MG: 20 TABLET ORAL at 09:50

## 2021-01-01 RX ADMIN — IPRATROPIUM BROMIDE AND ALBUTEROL SULFATE 3 ML: .5; 3 SOLUTION RESPIRATORY (INHALATION) at 18:26

## 2021-01-01 RX ADMIN — IPRATROPIUM BROMIDE AND ALBUTEROL SULFATE 3 ML: .5; 3 SOLUTION RESPIRATORY (INHALATION) at 09:02

## 2021-01-01 RX ADMIN — IPRATROPIUM BROMIDE AND ALBUTEROL SULFATE 3 ML: .5; 3 SOLUTION RESPIRATORY (INHALATION) at 09:20

## 2021-01-01 RX ADMIN — Medication 12.5 MG: at 08:38

## 2021-01-01 RX ADMIN — CEFTRIAXONE SODIUM 1 G: 1 INJECTION, POWDER, FOR SOLUTION INTRAMUSCULAR; INTRAVENOUS at 21:26

## 2021-01-01 RX ADMIN — IPRATROPIUM BROMIDE AND ALBUTEROL SULFATE 3 ML: .5; 3 SOLUTION RESPIRATORY (INHALATION) at 20:51

## 2021-01-01 RX ADMIN — AZITHROMYCIN 250 MG: 500 INJECTION, POWDER, LYOPHILIZED, FOR SOLUTION INTRAVENOUS at 21:18

## 2021-01-01 RX ADMIN — METHYLPREDNISOLONE SODIUM SUCCINATE 62.5 MG: 125 INJECTION, POWDER, FOR SOLUTION INTRAMUSCULAR; INTRAVENOUS at 02:31

## 2021-01-01 RX ADMIN — MORPHINE SULFATE 15 MG: 15 TABLET ORAL at 13:55

## 2021-01-01 RX ADMIN — ALBUTEROL SULFATE 2.5 MG: 2.5 SOLUTION RESPIRATORY (INHALATION) at 06:52

## 2021-01-01 RX ADMIN — UMECLIDINIUM 1 PUFF: 62.5 AEROSOL, POWDER ORAL at 09:39

## 2021-01-01 RX ADMIN — Medication 12.5 MG: at 09:06

## 2021-01-01 RX ADMIN — IPRATROPIUM BROMIDE AND ALBUTEROL SULFATE 3 ML: .5; 3 SOLUTION RESPIRATORY (INHALATION) at 22:17

## 2021-01-01 RX ADMIN — ENOXAPARIN SODIUM 40 MG: 40 INJECTION SUBCUTANEOUS at 17:57

## 2021-01-01 RX ADMIN — METHYLPREDNISOLONE SODIUM SUCCINATE 125 MG: 125 INJECTION, POWDER, FOR SOLUTION INTRAMUSCULAR; INTRAVENOUS at 19:10

## 2021-01-01 RX ADMIN — PANTOPRAZOLE SODIUM 40 MG: 40 TABLET, DELAYED RELEASE ORAL at 17:02

## 2021-01-01 RX ADMIN — LOSARTAN POTASSIUM 25 MG: 25 TABLET, FILM COATED ORAL at 08:39

## 2021-01-01 RX ADMIN — PANTOPRAZOLE SODIUM 40 MG: 40 TABLET, DELAYED RELEASE ORAL at 15:31

## 2021-01-01 RX ADMIN — METHYLPREDNISOLONE SODIUM SUCCINATE 62.5 MG: 125 INJECTION, POWDER, FOR SOLUTION INTRAMUSCULAR; INTRAVENOUS at 21:16

## 2021-01-01 RX ADMIN — PAROXETINE 10 MG: 10 TABLET, FILM COATED ORAL at 08:37

## 2021-01-01 RX ADMIN — ALBUTEROL SULFATE 2.5 MG: 2.5 SOLUTION RESPIRATORY (INHALATION) at 03:33

## 2021-01-01 RX ADMIN — METOPROLOL TARTRATE 25 MG: 25 TABLET, FILM COATED ORAL at 21:15

## 2021-01-01 RX ADMIN — METHYLPREDNISOLONE SODIUM SUCCINATE 62.5 MG: 125 INJECTION, POWDER, FOR SOLUTION INTRAMUSCULAR; INTRAVENOUS at 10:52

## 2021-01-01 RX ADMIN — IPRATROPIUM BROMIDE AND ALBUTEROL SULFATE 3 ML: .5; 3 SOLUTION RESPIRATORY (INHALATION) at 11:51

## 2021-01-01 RX ADMIN — CEFTRIAXONE SODIUM 2 G: 2 INJECTION, POWDER, FOR SOLUTION INTRAMUSCULAR; INTRAVENOUS at 20:52

## 2021-01-01 RX ADMIN — IOPAMIDOL 60 ML: 755 INJECTION, SOLUTION INTRAVENOUS at 13:26

## 2021-01-01 RX ADMIN — IPRATROPIUM BROMIDE AND ALBUTEROL SULFATE 3 ML: .5; 3 SOLUTION RESPIRATORY (INHALATION) at 08:32

## 2021-01-01 RX ADMIN — GUAIFENESIN 600 MG: 600 TABLET, EXTENDED RELEASE ORAL at 22:28

## 2021-01-01 RX ADMIN — PREDNISONE 40 MG: 20 TABLET ORAL at 09:41

## 2021-01-01 RX ADMIN — ASPIRIN 81 MG: 81 TABLET ORAL at 08:38

## 2021-01-01 RX ADMIN — IPRATROPIUM BROMIDE AND ALBUTEROL SULFATE 3 ML: .5; 3 SOLUTION RESPIRATORY (INHALATION) at 06:20

## 2021-01-01 RX ADMIN — IPRATROPIUM BROMIDE AND ALBUTEROL SULFATE 3 ML: .5; 3 SOLUTION RESPIRATORY (INHALATION) at 18:32

## 2021-01-01 RX ADMIN — IPRATROPIUM BROMIDE AND ALBUTEROL SULFATE 3 ML: .5; 3 SOLUTION RESPIRATORY (INHALATION) at 16:04

## 2021-01-01 RX ADMIN — METHYLPREDNISOLONE SODIUM SUCCINATE 40 MG: 40 INJECTION, POWDER, FOR SOLUTION INTRAMUSCULAR; INTRAVENOUS at 17:45

## 2021-01-01 RX ADMIN — ASPIRIN 81 MG: 81 TABLET, DELAYED RELEASE ORAL at 08:31

## 2021-01-01 RX ADMIN — IPRATROPIUM BROMIDE AND ALBUTEROL SULFATE 3 ML: .5; 3 SOLUTION RESPIRATORY (INHALATION) at 16:28

## 2021-01-01 RX ADMIN — LOSARTAN POTASSIUM 25 MG: 25 TABLET, FILM COATED ORAL at 09:01

## 2021-01-01 RX ADMIN — METHYLPREDNISOLONE SODIUM SUCCINATE 40 MG: 40 INJECTION, POWDER, FOR SOLUTION INTRAMUSCULAR; INTRAVENOUS at 05:21

## 2021-01-01 RX ADMIN — MORPHINE SULFATE 2 MG: 2 INJECTION, SOLUTION INTRAMUSCULAR; INTRAVENOUS at 19:16

## 2021-01-01 RX ADMIN — CLOPIDOGREL BISULFATE 75 MG: 75 TABLET ORAL at 09:50

## 2021-01-01 RX ADMIN — Medication 12.5 MG: at 08:31

## 2021-01-01 RX ADMIN — IPRATROPIUM BROMIDE AND ALBUTEROL SULFATE 3 ML: .5; 3 SOLUTION RESPIRATORY (INHALATION) at 08:39

## 2021-01-01 RX ADMIN — IPRATROPIUM BROMIDE AND ALBUTEROL SULFATE 3 ML: .5; 3 SOLUTION RESPIRATORY (INHALATION) at 13:33

## 2021-01-01 RX ADMIN — GUAIFENESIN 1200 MG: 600 TABLET, EXTENDED RELEASE ORAL at 21:23

## 2021-01-01 RX ADMIN — AZITHROMYCIN 250 MG: 250 TABLET, FILM COATED ORAL at 19:40

## 2021-01-01 RX ADMIN — Medication 12.5 MG: at 20:29

## 2021-01-01 RX ADMIN — MONTELUKAST 10 MG: 10 TABLET, FILM COATED ORAL at 21:51

## 2021-01-01 RX ADMIN — MORPHINE SULFATE 15 MG: 15 TABLET ORAL at 18:23

## 2021-01-01 RX ADMIN — ATORVASTATIN CALCIUM 20 MG: 10 TABLET, FILM COATED ORAL at 21:24

## 2021-01-01 RX ADMIN — CLOPIDOGREL BISULFATE 75 MG: 75 TABLET ORAL at 08:31

## 2021-01-01 RX ADMIN — Medication 12.5 MG: at 20:04

## 2021-01-01 RX ADMIN — LOSARTAN POTASSIUM 25 MG: 25 TABLET, FILM COATED ORAL at 09:50

## 2021-01-01 RX ADMIN — FLUTICASONE FUROATE AND VILANTEROL TRIFENATATE 1 PUFF: 200; 25 POWDER RESPIRATORY (INHALATION) at 09:18

## 2021-01-01 RX ADMIN — MORPHINE SULFATE 1 MG: 2 INJECTION, SOLUTION INTRAMUSCULAR; INTRAVENOUS at 09:04

## 2021-01-01 RX ADMIN — METHYLPREDNISOLONE SODIUM SUCCINATE 125 MG: 125 INJECTION, POWDER, FOR SOLUTION INTRAMUSCULAR; INTRAVENOUS at 20:27

## 2021-01-01 RX ADMIN — IPRATROPIUM BROMIDE AND ALBUTEROL SULFATE 3 ML: .5; 3 SOLUTION RESPIRATORY (INHALATION) at 00:48

## 2021-01-01 RX ADMIN — IPRATROPIUM BROMIDE AND ALBUTEROL SULFATE 3 ML: .5; 3 SOLUTION RESPIRATORY (INHALATION) at 06:00

## 2021-01-01 RX ADMIN — FLUTICASONE FUROATE AND VILANTEROL TRIFENATATE 1 PUFF: 200; 25 POWDER RESPIRATORY (INHALATION) at 21:50

## 2021-01-01 RX ADMIN — IPRATROPIUM BROMIDE AND ALBUTEROL SULFATE 3 ML: .5; 3 SOLUTION RESPIRATORY (INHALATION) at 22:20

## 2021-01-01 RX ADMIN — IPRATROPIUM BROMIDE AND ALBUTEROL SULFATE 3 ML: .5; 3 SOLUTION RESPIRATORY (INHALATION) at 08:25

## 2021-01-01 RX ADMIN — ASPIRIN 81 MG: 81 TABLET ORAL at 08:06

## 2021-01-01 RX ADMIN — IPRATROPIUM BROMIDE AND ALBUTEROL SULFATE 3 ML: 2.5; .5 SOLUTION RESPIRATORY (INHALATION) at 11:15

## 2021-01-01 RX ADMIN — UMECLIDINIUM 1 PUFF: 62.5 AEROSOL, POWDER ORAL at 08:43

## 2021-01-01 RX ADMIN — ACETAMINOPHEN 650 MG: 325 TABLET, FILM COATED ORAL at 00:56

## 2021-01-01 RX ADMIN — LIDOCAINE HYDROCHLORIDE 0.5 ML: 10 INJECTION, SOLUTION EPIDURAL; INFILTRATION; INTRACAUDAL; PERINEURAL at 02:05

## 2021-01-01 RX ADMIN — IPRATROPIUM BROMIDE AND ALBUTEROL SULFATE 3 ML: .5; 3 SOLUTION RESPIRATORY (INHALATION) at 17:47

## 2021-01-01 RX ADMIN — ENOXAPARIN SODIUM 40 MG: 40 INJECTION SUBCUTANEOUS at 18:27

## 2021-01-01 RX ADMIN — METHYLPREDNISOLONE SODIUM SUCCINATE 40 MG: 40 INJECTION, POWDER, FOR SOLUTION INTRAMUSCULAR; INTRAVENOUS at 06:27

## 2021-01-01 RX ADMIN — IPRATROPIUM BROMIDE AND ALBUTEROL SULFATE 3 ML: .5; 3 SOLUTION RESPIRATORY (INHALATION) at 16:24

## 2021-01-01 RX ADMIN — NOREPINEPHRINE BITARTRATE 4 MG/250 ML (16 MCG/ML) IN 0.9 % NACL IV 0.06 MCG/KG/MIN: at 20:28

## 2021-01-01 RX ADMIN — METHYLPREDNISOLONE SODIUM SUCCINATE 62.5 MG: 125 INJECTION, POWDER, FOR SOLUTION INTRAMUSCULAR; INTRAVENOUS at 13:55

## 2021-01-01 RX ADMIN — IPRATROPIUM BROMIDE AND ALBUTEROL SULFATE 3 ML: .5; 3 SOLUTION RESPIRATORY (INHALATION) at 07:41

## 2021-01-01 RX ADMIN — GUAIFENESIN 1200 MG: 600 TABLET, EXTENDED RELEASE ORAL at 09:06

## 2021-01-01 RX ADMIN — Medication 12.5 MG: at 08:52

## 2021-01-01 RX ADMIN — GUAIFENESIN 600 MG: 600 TABLET, EXTENDED RELEASE ORAL at 09:02

## 2021-01-01 RX ADMIN — METHYLPREDNISOLONE SODIUM SUCCINATE 40 MG: 40 INJECTION, POWDER, FOR SOLUTION INTRAMUSCULAR; INTRAVENOUS at 01:13

## 2021-01-01 RX ADMIN — IPRATROPIUM BROMIDE AND ALBUTEROL SULFATE 3 ML: .5; 3 SOLUTION RESPIRATORY (INHALATION) at 16:29

## 2021-01-01 RX ADMIN — PREDNISONE 40 MG: 20 TABLET ORAL at 21:14

## 2021-01-01 RX ADMIN — MORPHINE SULFATE 3.2 MG: 10 SOLUTION ORAL at 12:23

## 2021-01-01 RX ADMIN — ASPIRIN 81 MG: 81 TABLET ORAL at 09:50

## 2021-01-01 RX ADMIN — MORPHINE SULFATE 15 MG: 15 TABLET ORAL at 09:21

## 2021-01-01 RX ADMIN — UMECLIDINIUM 1 PUFF: 62.5 AEROSOL, POWDER ORAL at 08:00

## 2021-01-01 RX ADMIN — IPRATROPIUM BROMIDE AND ALBUTEROL SULFATE 3 ML: .5; 3 SOLUTION RESPIRATORY (INHALATION) at 05:11

## 2021-01-01 RX ADMIN — ALBUTEROL SULFATE 2 PUFF: 90 AEROSOL, METERED RESPIRATORY (INHALATION) at 09:02

## 2021-01-01 RX ADMIN — METHYLPREDNISOLONE SODIUM SUCCINATE 40 MG: 40 INJECTION, POWDER, FOR SOLUTION INTRAMUSCULAR; INTRAVENOUS at 19:18

## 2021-01-01 RX ADMIN — AZITHROMYCIN MONOHYDRATE 250 MG: 250 TABLET ORAL at 09:11

## 2021-01-01 RX ADMIN — AZITHROMYCIN 250 MG: 250 TABLET, FILM COATED ORAL at 20:51

## 2021-01-01 RX ADMIN — CEFTRIAXONE SODIUM 2 G: 2 INJECTION, POWDER, FOR SOLUTION INTRAMUSCULAR; INTRAVENOUS at 20:42

## 2021-01-01 RX ADMIN — IPRATROPIUM BROMIDE AND ALBUTEROL SULFATE 3 ML: .5; 3 SOLUTION RESPIRATORY (INHALATION) at 19:51

## 2021-01-01 RX ADMIN — GUAIFENESIN 600 MG: 600 TABLET, EXTENDED RELEASE ORAL at 21:17

## 2021-01-01 RX ADMIN — FLUTICASONE FUROATE AND VILANTEROL TRIFENATATE 1 PUFF: 200; 25 POWDER RESPIRATORY (INHALATION) at 21:26

## 2021-01-01 RX ADMIN — FLUTICASONE FUROATE AND VILANTEROL TRIFENATATE 1 PUFF: 200; 25 POWDER RESPIRATORY (INHALATION) at 23:17

## 2021-01-01 RX ADMIN — ATORVASTATIN CALCIUM 20 MG: 10 TABLET, FILM COATED ORAL at 20:51

## 2021-01-01 RX ADMIN — MONTELUKAST 10 MG: 10 TABLET, FILM COATED ORAL at 19:54

## 2021-01-01 RX ADMIN — MORPHINE SULFATE 2 MG: 2 INJECTION, SOLUTION INTRAMUSCULAR; INTRAVENOUS at 13:10

## 2021-01-01 RX ADMIN — SODIUM CHLORIDE, POTASSIUM CHLORIDE, SODIUM LACTATE AND CALCIUM CHLORIDE: 600; 310; 30; 20 INJECTION, SOLUTION INTRAVENOUS at 02:32

## 2021-01-01 RX ADMIN — Medication 12.5 MG: at 10:08

## 2021-01-01 RX ADMIN — Medication 12.5 MG: at 09:50

## 2021-01-01 RX ADMIN — MONTELUKAST 10 MG: 10 TABLET, FILM COATED ORAL at 20:51

## 2021-01-01 RX ADMIN — METHYLPREDNISOLONE SODIUM SUCCINATE 40 MG: 40 INJECTION, POWDER, FOR SOLUTION INTRAMUSCULAR; INTRAVENOUS at 23:18

## 2021-01-01 RX ADMIN — LOSARTAN POTASSIUM 25 MG: 25 TABLET, FILM COATED ORAL at 08:05

## 2021-01-01 RX ADMIN — PANTOPRAZOLE SODIUM 40 MG: 40 TABLET, DELAYED RELEASE ORAL at 06:52

## 2021-01-01 RX ADMIN — ACETAMINOPHEN 650 MG: 325 TABLET, FILM COATED ORAL at 18:16

## 2021-01-01 RX ADMIN — MORPHINE SULFATE 1 MG: 2 INJECTION, SOLUTION INTRAMUSCULAR; INTRAVENOUS at 09:54

## 2021-01-01 RX ADMIN — IPRATROPIUM BROMIDE AND ALBUTEROL SULFATE 3 ML: .5; 3 SOLUTION RESPIRATORY (INHALATION) at 19:18

## 2021-01-01 RX ADMIN — GUAIFENESIN 1200 MG: 600 TABLET, EXTENDED RELEASE ORAL at 21:52

## 2021-01-01 RX ADMIN — METHYLPREDNISOLONE SODIUM SUCCINATE 81.25 MG: 125 INJECTION, POWDER, FOR SOLUTION INTRAMUSCULAR; INTRAVENOUS at 11:29

## 2021-01-01 RX ADMIN — ASPIRIN 81 MG: 81 TABLET, DELAYED RELEASE ORAL at 08:05

## 2021-01-01 RX ADMIN — MORPHINE SULFATE 3.2 MG: 10 SOLUTION ORAL at 09:28

## 2021-01-01 RX ADMIN — ACETAMINOPHEN 650 MG: 325 TABLET, FILM COATED ORAL at 19:45

## 2021-01-01 RX ADMIN — BUDESONIDE AND FORMOTEROL FUMARATE DIHYDRATE 2 PUFF: 160; 4.5 AEROSOL RESPIRATORY (INHALATION) at 21:16

## 2021-01-01 RX ADMIN — IPRATROPIUM BROMIDE AND ALBUTEROL SULFATE 3 ML: .5; 3 SOLUTION RESPIRATORY (INHALATION) at 20:17

## 2021-01-01 RX ADMIN — FLUTICASONE FUROATE AND VILANTEROL TRIFENATATE 1 PUFF: 200; 25 POWDER RESPIRATORY (INHALATION) at 09:37

## 2021-01-01 RX ADMIN — HYDRALAZINE HYDROCHLORIDE 10 MG: 10 TABLET, FILM COATED ORAL at 05:20

## 2021-01-01 RX ADMIN — IPRATROPIUM BROMIDE AND ALBUTEROL SULFATE 3 ML: .5; 3 SOLUTION RESPIRATORY (INHALATION) at 21:16

## 2021-01-01 RX ADMIN — ALBUTEROL SULFATE 2.5 MG: 2.5 SOLUTION RESPIRATORY (INHALATION) at 17:55

## 2021-01-01 RX ADMIN — Medication 7.5 MG: at 08:37

## 2021-01-01 RX ADMIN — IPRATROPIUM BROMIDE AND ALBUTEROL SULFATE 3 ML: .5; 3 SOLUTION RESPIRATORY (INHALATION) at 12:45

## 2021-01-01 RX ADMIN — ALBUTEROL SULFATE 2.5 MG: 2.5 SOLUTION RESPIRATORY (INHALATION) at 19:42

## 2021-01-01 RX ADMIN — Medication 12.5 MG: at 22:29

## 2021-01-01 RX ADMIN — Medication 12.5 MG: at 09:04

## 2021-01-01 RX ADMIN — ACETAMINOPHEN 650 MG: 325 TABLET, FILM COATED ORAL at 23:24

## 2021-01-01 RX ADMIN — LORAZEPAM 1 MG: 2 INJECTION INTRAMUSCULAR; INTRAVENOUS at 20:10

## 2021-01-01 RX ADMIN — ALBUTEROL SULFATE 2.5 MG: 2.5 SOLUTION RESPIRATORY (INHALATION) at 09:52

## 2021-01-01 RX ADMIN — IPRATROPIUM BROMIDE AND ALBUTEROL SULFATE 3 ML: .5; 3 SOLUTION RESPIRATORY (INHALATION) at 07:54

## 2021-01-01 RX ADMIN — FLUTICASONE FUROATE AND VILANTEROL TRIFENATATE 1 PUFF: 200; 25 POWDER RESPIRATORY (INHALATION) at 11:05

## 2021-01-01 RX ADMIN — IPRATROPIUM BROMIDE AND ALBUTEROL SULFATE 3 ML: .5; 3 SOLUTION RESPIRATORY (INHALATION) at 13:16

## 2021-01-01 RX ADMIN — METHYLPREDNISOLONE SODIUM SUCCINATE 40 MG: 40 INJECTION, POWDER, FOR SOLUTION INTRAMUSCULAR; INTRAVENOUS at 00:49

## 2021-01-01 RX ADMIN — IPRATROPIUM BROMIDE AND ALBUTEROL SULFATE 3 ML: .5; 3 SOLUTION RESPIRATORY (INHALATION) at 14:45

## 2021-01-01 RX ADMIN — IPRATROPIUM BROMIDE AND ALBUTEROL SULFATE 3 ML: .5; 3 SOLUTION RESPIRATORY (INHALATION) at 18:28

## 2021-01-01 RX ADMIN — MORPHINE SULFATE 15 MG: 15 TABLET ORAL at 03:49

## 2021-01-01 RX ADMIN — UMECLIDINIUM 1 PUFF: 62.5 AEROSOL, POWDER ORAL at 10:56

## 2021-01-01 RX ADMIN — IPRATROPIUM BROMIDE AND ALBUTEROL SULFATE 3 ML: .5; 3 SOLUTION RESPIRATORY (INHALATION) at 05:03

## 2021-01-01 RX ADMIN — IPRATROPIUM BROMIDE AND ALBUTEROL SULFATE 3 ML: .5; 3 SOLUTION RESPIRATORY (INHALATION) at 17:23

## 2021-01-01 RX ADMIN — CLOPIDOGREL BISULFATE 75 MG: 75 TABLET ORAL at 08:38

## 2021-01-01 RX ADMIN — Medication 0.25 MG: at 05:52

## 2021-01-01 RX ADMIN — MONTELUKAST 10 MG: 10 TABLET, FILM COATED ORAL at 21:17

## 2021-01-01 RX ADMIN — ENOXAPARIN SODIUM 40 MG: 40 INJECTION SUBCUTANEOUS at 18:00

## 2021-01-01 RX ADMIN — CLOPIDOGREL BISULFATE 75 MG: 75 TABLET ORAL at 08:05

## 2021-01-01 RX ADMIN — METHYLPREDNISOLONE SODIUM SUCCINATE 40 MG: 40 INJECTION, POWDER, FOR SOLUTION INTRAMUSCULAR; INTRAVENOUS at 05:55

## 2021-01-01 RX ADMIN — MORPHINE SULFATE 15 MG: 15 TABLET ORAL at 11:52

## 2021-01-01 RX ADMIN — Medication 12.5 MG: at 20:19

## 2021-01-01 RX ADMIN — Medication 0.25 MG: at 09:32

## 2021-01-01 RX ADMIN — ASPIRIN 81 MG: 81 TABLET, DELAYED RELEASE ORAL at 09:06

## 2021-01-01 RX ADMIN — SODIUM CHLORIDE 1000 ML: 9 INJECTION, SOLUTION INTRAVENOUS at 22:22

## 2021-01-01 RX ADMIN — METOPROLOL TARTRATE 25 MG: 25 TABLET, FILM COATED ORAL at 08:05

## 2021-01-01 RX ADMIN — IPRATROPIUM BROMIDE AND ALBUTEROL SULFATE 3 ML: .5; 3 SOLUTION RESPIRATORY (INHALATION) at 16:19

## 2021-01-01 RX ADMIN — IPRATROPIUM BROMIDE AND ALBUTEROL SULFATE 3 ML: .5; 3 SOLUTION RESPIRATORY (INHALATION) at 16:17

## 2021-01-01 RX ADMIN — IPRATROPIUM BROMIDE AND ALBUTEROL SULFATE 3 ML: .5; 3 SOLUTION RESPIRATORY (INHALATION) at 12:09

## 2021-01-01 RX ADMIN — ASPIRIN 81 MG: 81 TABLET ORAL at 08:31

## 2021-01-01 RX ADMIN — FLUTICASONE FUROATE AND VILANTEROL TRIFENATATE 1 PUFF: 200; 25 POWDER RESPIRATORY (INHALATION) at 08:49

## 2021-01-01 RX ADMIN — IPRATROPIUM BROMIDE AND ALBUTEROL SULFATE 3 ML: .5; 3 SOLUTION RESPIRATORY (INHALATION) at 01:39

## 2021-01-01 RX ADMIN — IPRATROPIUM BROMIDE AND ALBUTEROL SULFATE 3 ML: .5; 3 SOLUTION RESPIRATORY (INHALATION) at 12:33

## 2021-01-01 RX ADMIN — AZITHROMYCIN MONOHYDRATE 500 MG: 500 INJECTION, POWDER, LYOPHILIZED, FOR SOLUTION INTRAVENOUS at 14:42

## 2021-01-01 RX ADMIN — IPRATROPIUM BROMIDE AND ALBUTEROL SULFATE 3 ML: .5; 3 SOLUTION RESPIRATORY (INHALATION) at 07:45

## 2021-01-01 RX ADMIN — ENOXAPARIN SODIUM 40 MG: 40 INJECTION SUBCUTANEOUS at 20:08

## 2021-01-01 RX ADMIN — UMECLIDINIUM 1 PUFF: 62.5 AEROSOL, POWDER ORAL at 07:58

## 2021-01-01 RX ADMIN — AZITHROMYCIN 250 MG: 250 TABLET, FILM COATED ORAL at 20:21

## 2021-01-01 RX ADMIN — MONTELUKAST 10 MG: 10 TABLET, FILM COATED ORAL at 20:21

## 2021-01-01 RX ADMIN — PAROXETINE 10 MG: 10 TABLET, FILM COATED ORAL at 08:38

## 2021-01-01 RX ADMIN — ASPIRIN 81 MG: 81 TABLET ORAL at 08:01

## 2021-01-01 RX ADMIN — MORPHINE SULFATE 3.2 MG: 10 SOLUTION ORAL at 05:27

## 2021-01-01 RX ADMIN — ATORVASTATIN CALCIUM 20 MG: 10 TABLET, FILM COATED ORAL at 21:52

## 2021-01-01 RX ADMIN — Medication 0.25 MG: at 21:51

## 2021-01-01 RX ADMIN — ENOXAPARIN SODIUM 40 MG: 40 INJECTION SUBCUTANEOUS at 17:45

## 2021-01-01 RX ADMIN — Medication 12.5 MG: at 08:37

## 2021-01-01 RX ADMIN — PREDNISONE 40 MG: 20 TABLET ORAL at 08:31

## 2021-01-01 RX ADMIN — METHYLPREDNISOLONE SODIUM SUCCINATE 62.5 MG: 125 INJECTION, POWDER, FOR SOLUTION INTRAMUSCULAR; INTRAVENOUS at 02:25

## 2021-01-01 RX ADMIN — Medication 12.5 MG: at 21:22

## 2021-01-01 ASSESSMENT — ACTIVITIES OF DAILY LIVING (ADL)
ADLS_ACUITY_SCORE: 17
ADLS_ACUITY_SCORE: 23
ADLS_ACUITY_SCORE: 17
TOILETING_ISSUES: NO
ADLS_ACUITY_SCORE: 21
ADLS_ACUITY_SCORE: 16
ADLS_ACUITY_SCORE: 17
ADLS_ACUITY_SCORE: 18
ADLS_ACUITY_SCORE: 17
ADLS_ACUITY_SCORE: 15
COMMUNICATION: DIFFICULTY SPEAKING
ADLS_ACUITY_SCORE: 20
ADLS_ACUITY_SCORE: 20
ADLS_ACUITY_SCORE: 16
ADLS_ACUITY_SCORE: 29
ADLS_ACUITY_SCORE: 17
ADLS_ACUITY_SCORE: 21
ADLS_ACUITY_SCORE: 30
ADLS_ACUITY_SCORE: 29
ADLS_ACUITY_SCORE: 19
DEPENDENT_IADLS:: CLEANING;COOKING;LAUNDRY;SHOPPING;MEAL PREPARATION;MEDICATION MANAGEMENT;MONEY MANAGEMENT;TRANSPORTATION
WEAR_GLASSES_OR_BLIND: NO
ADLS_ACUITY_SCORE: 20
ADLS_ACUITY_SCORE: 19
ADLS_ACUITY_SCORE: 18
ADLS_ACUITY_SCORE: 31
ADLS_ACUITY_SCORE: 29
DIFFICULTY_EATING/SWALLOWING: YES
ADLS_ACUITY_SCORE: 18
ADLS_ACUITY_SCORE: 21
ADLS_ACUITY_SCORE: 20
DOING_ERRANDS_INDEPENDENTLY_DIFFICULTY: YES
ADLS_ACUITY_SCORE: 16
ADLS_ACUITY_SCORE: 21
ADLS_ACUITY_SCORE: 19
ADLS_ACUITY_SCORE: 16
CONCENTRATING,_REMEMBERING_OR_MAKING_DECISIONS_DIFFICULTY: NO
DIFFICULTY_EATING/SWALLOWING: NO
ADLS_ACUITY_SCORE: 29
ADLS_ACUITY_SCORE: 16
HEARING_DIFFICULTY_OR_DEAF: NO
ADLS_ACUITY_SCORE: 15
DRESSING/BATHING_DIFFICULTY: NO
ADLS_ACUITY_SCORE: 28
ADLS_ACUITY_SCORE: 15
ADLS_ACUITY_SCORE: 19
WEAR_GLASSES_OR_BLIND: NO
ADLS_ACUITY_SCORE: 29
ADLS_ACUITY_SCORE: 28
ADLS_ACUITY_SCORE: 29
ADLS_ACUITY_SCORE: 20
ADLS_ACUITY_SCORE: 31
ADLS_ACUITY_SCORE: 20
ADLS_ACUITY_SCORE: 18
ADLS_ACUITY_SCORE: 16
ADLS_ACUITY_SCORE: 20
ADLS_ACUITY_SCORE: 15
ADLS_ACUITY_SCORE: 20
ADLS_ACUITY_SCORE: 28
ADLS_ACUITY_SCORE: 30
ADLS_ACUITY_SCORE: 23
ADLS_ACUITY_SCORE: 16
ADLS_ACUITY_SCORE: 20
TOILETING_ISSUES: NO
DIFFICULTY_COMMUNICATING: NO
ADLS_ACUITY_SCORE: 16
ADLS_ACUITY_SCORE: 29
ADLS_ACUITY_SCORE: 15
FALL_HISTORY_WITHIN_LAST_SIX_MONTHS: NO
ADLS_ACUITY_SCORE: 17
EATING/SWALLOWING: SWALLOWING LIQUIDS;EATING
ADLS_ACUITY_SCORE: 27
ADLS_ACUITY_SCORE: 21
ADLS_ACUITY_SCORE: 15
ADLS_ACUITY_SCORE: 18
ADLS_ACUITY_SCORE: 31
WALKING_OR_CLIMBING_STAIRS: AMBULATION DIFFICULTY, ASSISTANCE 1 PERSON
ADLS_ACUITY_SCORE: 16
ADLS_ACUITY_SCORE: 17
CONCENTRATING,_REMEMBERING_OR_MAKING_DECISIONS_DIFFICULTY: YES
ADLS_ACUITY_SCORE: 18
FALL_HISTORY_WITHIN_LAST_SIX_MONTHS: NO
ADLS_ACUITY_SCORE: 15
ADLS_ACUITY_SCORE: 16
ADLS_ACUITY_SCORE: 20
ADLS_ACUITY_SCORE: 18
ADLS_ACUITY_SCORE: 18
DEPENDENT_IADLS:: CLEANING;COOKING;LAUNDRY;SHOPPING;MEAL PREPARATION;MEDICATION MANAGEMENT;MONEY MANAGEMENT;TRANSPORTATION
ADLS_ACUITY_SCORE: 18
DOING_ERRANDS_INDEPENDENTLY_DIFFICULTY: NO
DIFFICULTY_COMMUNICATING: YES
ADLS_ACUITY_SCORE: 29
ADLS_ACUITY_SCORE: 19
WALKING_OR_CLIMBING_STAIRS_DIFFICULTY: NO
WALKING_OR_CLIMBING_STAIRS_DIFFICULTY: YES
ADLS_ACUITY_SCORE: 21
ADLS_ACUITY_SCORE: 17
ADLS_ACUITY_SCORE: 16
ADLS_ACUITY_SCORE: 21
PATIENT_/_FAMILY_COMMUNICATION_STYLE: SPOKEN LANGUAGE (ENGLISH OR BILINGUAL)
ADLS_ACUITY_SCORE: 17
ADLS_ACUITY_SCORE: 16
ADLS_ACUITY_SCORE: 23
ADLS_ACUITY_SCORE: 15
ADLS_ACUITY_SCORE: 21
ADLS_ACUITY_SCORE: 17
ADLS_ACUITY_SCORE: 21
ADLS_ACUITY_SCORE: 31
DRESSING/BATHING_DIFFICULTY: OTHER (SEE COMMENTS)
ADLS_ACUITY_SCORE: 18

## 2021-01-01 ASSESSMENT — MIFFLIN-ST. JEOR
SCORE: 920.52
SCORE: 923.24
SCORE: 936.4
SCORE: 933.44
SCORE: 944.11
SCORE: 929.59
SCORE: 928.69
SCORE: 915.53
SCORE: 908.73
SCORE: 917.57
SCORE: 916.2
SCORE: 888.32

## 2021-01-01 ASSESSMENT — COLUMBIA-SUICIDE SEVERITY RATING SCALE - C-SSRS
1. IN THE PAST MONTH, HAVE YOU WISHED YOU WERE DEAD OR WISHED YOU COULD GO TO SLEEP AND NOT WAKE UP?: NO
2. HAVE YOU ACTUALLY HAD ANY THOUGHTS OF KILLING YOURSELF IN THE PAST MONTH?: NO

## 2021-01-01 ASSESSMENT — PAIN SCALES - GENERAL
PAINLEVEL: MILD PAIN (3)
PAINLEVEL: NO PAIN (0)

## 2021-01-01 NOTE — MR AVS SNAPSHOT
After Visit Summary   3/6/2017    Preeti Ford    MRN: [id number]           Patient Information     Date Of Birth          1950        Visit Information        Provider Department      3/6/2017 1:15 PM Roberto Samuel MD [facility]        Today's Diagnoses     Pre-operative cardiovascular examination    -  1    Coronary artery disease involving native coronary artery of native heart without angina pectoris           Follow-ups after your visit        Additional Services     Follow-Up with Cardiac Advanced Practice Provider                 Your next 10 appointments already scheduled     Mar 07, 2017 12:45 PM CST   [facility] OR with Marcos Pratt MD,  Surg Cons Resident,  Surg Cons Fellow   [facility] Surgery [facility]uling ([facility])    [facility] ([facility])   [phone]            Mar 07, 2017   Procedure with Marcos Pratt MD   [facility] (--)    [address]  [facility] [address] [phone]              Future tests that were ordered for you today     Open Future Orders        Priority Expected Expires Ordered    Follow-Up with Cardiac Advanced Practice Provider Routine 6/4/2017 3/6/2018 3/6/2017            Who to contact     If you have questions or need follow up information about today's clinic visit or your schedule please contact [facility] directly at [phone].  Normal or non-critical lab and imaging results will be communicated to you by MyChart, letter or phone within 4 business days after the clinic has received the results. If you do not hear from us within 7 days, please contact the clinic through MyChart or phone. If you have a critical or abnormal lab result, we will notify you by phone as soon as possible.  Submit refill requests through  Problem: Normal [place]: Birth to 24 Hours  Goal: Activity/Safety  Outcome: Resolved/Met  Goal: Consults, if ordered  Outcome: Resolved/Met  Goal: Diagnostic Test/Procedures  Outcome: Resolved/Met  Goal: Nutrition/Diet  Outcome: Resolved/Met  Goal: Discharge Planning  Outcome: Resolved/Met  Goal: Medications  Outcome: Resolved/Met  Goal: Respiratory  Outcome: Resolved/Met  Goal: Treatments/Interventions/Procedures  Outcome: Resolved/Met  Goal: *Vital signs within defined limits  Outcome: Resolved/Met  Goal: *Labs within defined limits  Outcome: Resolved/Met  Goal: *Appropriate parent-infant bonding  Outcome: Resolved/Met  Goal: *Tolerating diet  Outcome: Resolved/Met  Goal: *Adequate stool/void  Outcome: Resolved/Met  Goal: *No signs and symptoms of infection  Outcome: Resolved/Met     Problem: Lactation Care Plan  Goal: *Infant latching appropriately  Outcome: Resolved/Met  Goal: *Weight loss less than 10% of birth weight  Outcome: Resolved/Met     Problem: Patient Education: Go to Patient Education Activity  Goal: Patient/Family Education  Outcome: Resolved/Met "Janet or call your pharmacy and they will forward the refill request to us. Please allow 3 business days for your refill to be completed.          Additional Information About Your Visit        MyChart Information     [employer] lets you send messages to your doctor, view your test results, renew your prescriptions, schedule appointments and more. To sign up, go to [email or web address] . Click on \"Log in\" on the left side of the screen, which will take you to the Welcome page. Then click on \"Sign up Now\" on the right side of the page.     You will be asked to enter the access code listed below, as well as some personal information. Please follow the directions to create your username and password.     Your access code is: [email or web address]  Expires: 2017  9:01 AM     Your access code will  in 90 days. If you need help or a new code, please call your [place] clinic or [phone].        Care EveryWhere ID     This is your Care EveryWhere ID. This could be used by other organizations to access your [place] medical records  [id number]        Your Vitals Were     Pulse Height BMI (Body Mass Index)             80 1.575 m (5' 2\") 21.03 kg/m2          Blood Pressure from Last 3 Encounters:   17 110/64   17 132/74   16 120/62    Weight from Last 3 Encounters:   17 52.2 kg (115 lb)   17 52.8 kg (116 lb 6.4 oz)   16 52.4 kg (115 lb 9.6 oz)                 Today's Medication Changes          These changes are accurate as of: 3/6/17  3:51 PM.  If you have any questions, ask your nurse or doctor.               Start taking these medicines.        Dose/Directions    atorvastatin 10 MG tablet   Commonly known as:  LIPITOR   Used for:  Coronary artery disease involving native coronary artery of native heart without angina pectoris   Started by:  Roberto Samuel MD        Dose:  10 mg   Take 1 tablet (10 mg) by mouth daily   Quantity:  30 tablet   Refills:  3            Where to get " your medicines      These medications were sent to [facility] [place] - [address]   [address] [place] [address]     Phone:  [phone]     atorvastatin 10 MG tablet                Primary Care Provider Office Phone # Fax #    Avelina Yael Miramontes [phone]        [facility] [address] [place] [address]        Thank you!     Thank you for choosing [facility] [place]  for your care. Our goal is always to provide you with excellent care. Hearing back from our patients is one way we can continue to improve our services. Please take a few minutes to complete the written survey that you may receive in the mail after your visit with us. Thank you!             Your Updated Medication List - Protect others around you: Learn how to safely use, store and throw away your medicines at www.disposemymeds.org.          This list is accurate as of: 3/6/17  3:51 PM.  Always use your most recent med list.                   Brand Name Dispense Instructions for use    * albuterol 108 (90 BASE) MCG/ACT Inhaler    PROAIR HFA/PROVENTIL HFA/VENTOLIN HFA    1 Inhaler    Inhale 2 puffs into the lungs every 6 hours as needed for shortness of breath / dyspnea       * albuterol 108 (90 BASE) MCG/ACT Inhaler    PROAIR HFA/PROVENTIL HFA/VENTOLIN HFA    3 Inhaler    Inhale 2 puffs into the lungs every 6 hours as needed for shortness of breath / dyspnea or wheezing       ASPIRIN PO      Take 325 mg by mouth daily       atorvastatin 10 MG tablet    LIPITOR    30 tablet    Take 1 tablet (10 mg) by mouth daily       calcium carbonate 500 MG tablet    OS-CALDERON 500 mg [place]. Ca     Take 1,200 mg by mouth daily       Co Q 10 100 MG Caps      Take 100 mg by mouth daily       ipratropium - albuterol 0.5 mg/2.5 mg/3 mL 0.5-2.5 (3) MG/3ML neb solution    DUONEB    90 vial    Take 1 vial (3 mLs) by nebulization every 6 hours as needed for  shortness of breath / dyspnea or wheezing       metoprolol 25 MG 24 hr tablet    TOPROL-XL    30 tablet    Take 0.5 tablets (12.5 mg) by mouth daily       montelukast 10 MG tablet    SINGULAIR    90 tablet    TAKE ONE TABLET BY MOUTH AT BEDTIME       * nicotine 21 MG/24HR 24 hr patch    NICODERM CQ    30 patch    Place 1 patch onto the skin every 24 hours       * nicotine 7 MG/24HR 24 hr patch    NICODERM CQ    30 patch    Place 1 patch onto the skin every 24 hours       * nicotine 14 MG/24HR 24 hr patch    NICODERM CQ    30 patch    Place 1 patch onto the skin every 24 hours       SPIRIVA HANDIHALER 18 MCG capsule   Generic drug:  tiotropium     90 capsule    USING THE HANDIHALER, INHALE THE CONTENTS OF ONE CAPSULE DAILY       * SYMBICORT 160-4.5 MCG/ACT Inhaler   Generic drug:  budesonide-formoterol     1 Inhaler    INHALE TWO PUFFS BY MOUTH TWICE A DAY       * SYMBICORT 160-4.5 MCG/ACT Inhaler   Generic drug:  budesonide-formoterol     1 Inhaler    INHALE TWO PUFFS BY MOUTH TWICE A DAY       Turmeric Curcumin 500 MG Caps      Take 250 mg by mouth daily (with breakfast)       vitamin D 1000 UNITS capsule      Take 1,000 Units by mouth daily       * Notice:  This list has 7 medication(s) that are the same as other medications prescribed for you. Read the directions carefully, and ask your doctor or other care provider to review them with you.

## 2021-01-05 ENCOUNTER — TELEPHONE (OUTPATIENT)
Dept: FAMILY MEDICINE | Facility: OTHER | Age: 71
End: 2021-01-05

## 2021-01-05 NOTE — TELEPHONE ENCOUNTER
Reason for Call:  Form, our goal is to have forms completed with 72 hours, however, some forms may require a visit or additional information.    Type of letter, form or note:  medical    Who is the form from?: [employer] (if other please explain)    Where did the form come from: form was faxed in    What clinic location was the form placed at?: [facility] - [phone]    Where the form was placed: [employer]    What number is listed as a contact on the form?: fax [phone]       Additional comments: Please complete and fax    Call taken on 1/5/2021 at 2:28 PM by Summer Jc

## 2021-01-06 NOTE — TELEPHONE ENCOUNTER
Please print current medication list and fax with form which was laced in MA task.  Jaja Carter, CNP

## 2021-01-07 NOTE — IP AVS SNAPSHOT
MRN:[id number]                      After Visit Summary   6/14/2017    Preeti Ford    MRN: [id number]           Thank you!     Thank you for choosing [facility] for your care. Our goal is always to provide you with excellent care. Hearing back from our patients is one way we can continue to improve our services. Please take a few minutes to complete the written survey that you may receive in the mail after you visit with us. Thank you!        Patient Information     Date Of Birth          1950        Designated Caregiver       Most Recent Value    Caregiver    Will someone help with your care after discharge? yes    Name of designated caregiver Yoseph Ford    Phone number of caregiver [phone], (c) [phone]    Caregiver address [address] [place] [address]      About your hospital stay     You were admitted on:  June 14, 2017 You last received care in the:  [address]    You were discharged on:  June 17, 2017        Reason for your hospital stay       Hospitalized for breathing problems due to COPD and improved                  Who to Call     For medical emergencies, please call 911.  For non-urgent questions about your medical care, please call your primary care provider or clinic, [phone]          Attending Provider     Provider Specialty    Curtis Fay MD Emergency Medicine    Gael Bass MD Internal Medicine       Primary Care Provider Office Phone # Fax #    Avelina Miramontes [phone]      After Care Instructions     Activity       Your activity upon discharge: activity as tolerated            Diet       Follow this diet upon discharge: Orders Placed This Encounter      Combination Diet Regular Diet Adult                  Follow-up Appointments     Follow-up and recommended labs and tests        Follow up with primary care provider, Avelina Miramontes MD, within 7 days for hospital follow-  "up.                  Your next 10 appointments already scheduled     2017 10:50 AM CDT   Office Visit with Avelina Miramontes MD   [facility]    [address] [facility] [address] [phone]           Bring a current list of meds and any records pertaining to this visit.  For Physicals, please bring immunization records and any forms needing to be filled out.  Please arrive 10 minutes early to complete paperwork.              Pending Results     Date and Time Order Name Status Description    6/15/2017 1635 Sputum Culture Aerobic Bacterial In process             Statement of Approval     Ordered          17 1010  I have reviewed and agree with all the recommendations and orders detailed in this document.  EFFECTIVE NOW     Approved and electronically signed by:  Gael Bass MD             Admission Information     Date & Time Provider Department Dept. Phone    2017 Gael Bass MD [address] [facility] [phone]      Your Vitals Were     Blood Pressure Pulse Temperature Respirations Height Weight    146/63 (BP Location: Left arm) 84 96.6  F (35.9  C) (Oral) 18 1.575 m (5' 2\") 48.5 kg (106 lb 14.8 oz)    Pulse Oximetry BMI (Body Mass Index)                96% 19.56 kg/m2          [employer] Information     [employer] lets you send messages to your doctor, view your test results, renew your prescriptions, schedule appointments and more. To sign up, go to [email or web address] . Click on \"Log in\" on the left side of the screen, which will take you to the Welcome page. Then click on \"Sign up Now\" on the right side of the page.     You will be asked to enter the access code listed below, as well as some personal information. Please follow the directions to create your username and password.     Your access code is: [email or web address]  Expires: 9/10/2017 10:01 AM     Your access code will  in 90 days. If you need " help or a new code, please call your [facility] or [phone].        Care EveryWhere ID     This is your Care EveryWhere ID. This could be used by other organizations to access your [facility] medical records  [id number]           Review of your medicines      START taking        Dose / Directions    azithromycin 250 MG tablet   Commonly known as:  ZITHROMAX   Indication:  copd exacerbation        Dose:  250 mg   Take 1 tablet (250 mg) by mouth daily for 2 days   Quantity:  2 tablet   Refills:  0         CONTINUE these medicines which may have CHANGED, or have new prescriptions. If we are uncertain of the size of tablets/capsules you have at home, strength may be listed as something that might have changed.        Dose / Directions    ipratropium - albuterol 0.5 mg/2.5 mg/3 mL 0.5-2.5 (3) MG/3ML neb solution   Commonly known as:  DUONEB   This may have changed:    - when to take this  - reasons to take this        Dose:  1 vial   Take 1 vial (3 mLs) by nebulization 4 times daily   Quantity:  90 vial   Refills:  1       predniSONE 20 MG tablet   Commonly known as:  DELTASONE   This may have changed:    - how much to take  - how to take this  - when to take this  - additional instructions        Dose:  40 mg   Take 2 tablets (40 mg) by mouth daily for 5 days   Quantity:  10 tablet   Refills:  0       TOPROL XL 25 MG 24 hr tablet   This may have changed:  how much to take   Generic drug:  metoprolol        Dose:  25 mg   Take 1 tablet (25 mg) by mouth every morning (patient takes 0.5 X 25 mg = 12.5 mg dose)   Quantity:  30 tablet   Refills:  0         CONTINUE these medicines which have NOT CHANGED        Dose / Directions    albuterol 108 (90 BASE) MCG/ACT Inhaler   Commonly known as:  PROAIR HFA/PROVENTIL HFA/VENTOLIN HFA   Used for:  Chronic bronchitis, unspecified chronic bronchitis type (H)        Dose:  2 puff   Inhale 2 puffs into the lungs every 6 hours as needed for shortness of breath / dyspnea    Quantity:  1 Inhaler   Refills:  0       aspirin 325 MG tablet   Used for:  PAD (peripheral artery disease) (H)        Dose:  325 mg   Take 1 tablet (325 mg) by mouth daily   Quantity:  180 tablet   Refills:  0       atorvastatin 10 MG tablet   Commonly known as:  LIPITOR   Used for:  PAD (peripheral artery disease) (H)        Dose:  10 mg   Take 1 tablet (10 mg) by mouth At Bedtime   Quantity:  30 tablet   Refills:  3       clindamycin 300 MG capsule   Commonly known as:  CLEOCIN   Used for:  Prophylactic antibiotic        Take 2 capsules (600mg) by mouth once for 1 dose, Take 1 hour prior to invasive procedure (dental cleaning).   Quantity:  2 capsule   Refills:  0       COQ10 PO        Dose:  100 mg   Take 100 mg by mouth every 24 hours (at 16:00)   Refills:  0       nicotine 14 MG/24HR 24 hr patch   Commonly known as:  NICODERM CQ        Dose:  1 patch   Place 1 patch onto the skin every 24 hours Reported on 4/10/2017   Refills:  0       OSCAL 500/200 D-3 PO        Dose:  1 tablet   Take 1 tablet by mouth 2 times daily   Refills:  0       senna-docusate 8.6-50 MG per tablet   Commonly known as:  SENOKOT-S;PERICOLACE   Used for:  Drug-induced constipation        Dose:  1-2 tablet   Take 1-2 tablets by mouth 2 times daily   Quantity:  30 tablet   Refills:  3       SINGULAIR PO        Dose:  10 mg   Take 10 mg by mouth At Bedtime   Refills:  0       SPIRIVA HANDIHALER 18 MCG capsule   Used for:  Chronic bronchitis, unspecified chronic bronchitis type (H)   Generic drug:  tiotropium        USING THE HANDIHALER, INHALE ONE CAPSULE BY MOUTH EVERY DAY   Quantity:  90 capsule   Refills:  5       SYMBICORT 160-4.5 MCG/ACT Inhaler   Used for:  COPD (chronic obstructive pulmonary disease) (H)   Generic drug:  budesonide-formoterol        INHALE TWO PUFFS BY MOUTH TWICE A DAY   Quantity:  1 Inhaler   Refills:  5       Turmeric Curcumin Caps        Dose:  1 capsule   Take 1 capsule by mouth every 24 hours (at 16:00)    Refills:  0         STOP taking     doxycycline 100 MG tablet   Commonly known as:  VIBRA-TABS                Where to get your medicines      These medications were sent to [facility] - [place], [place] - [address]  [address], [place] [address]     Phone:  [phone]     azithromycin 250 MG tablet    predniSONE 20 MG tablet                Protect others around you: Learn how to safely use, store and throw away your medicines at www.disposemymeds.org.             Medication List: This is a list of all your medications and when to take them. Check marks below indicate your daily home schedule. Keep this list as a reference.      Medications           Morning Afternoon Evening Bedtime As Needed    albuterol 108 (90 BASE) MCG/ACT Inhaler   Commonly known as:  PROAIR HFA/PROVENTIL HFA/VENTOLIN HFA   Inhale 2 puffs into the lungs every 6 hours as needed for shortness of breath / dyspnea                                   aspirin 325 MG tablet   Take 1 tablet (325 mg) by mouth daily   Last time this was given:  325 mg on 6/17/2017  8:55 AM                                   atorvastatin 10 MG tablet   Commonly known as:  LIPITOR   Take 1 tablet (10 mg) by mouth At Bedtime   Last time this was given:  10 mg on 6/16/2017  8:37 PM                                   azithromycin 250 MG tablet   Commonly known as:  ZITHROMAX   Take 1 tablet (250 mg) by mouth daily for 2 days   Last time this was given:  250 mg on 6/16/2017  8:37 PM                                   clindamycin 300 MG capsule   Commonly known as:  CLEOCIN   Take 2 capsules (600mg) by mouth once for 1 dose, Take 1 hour prior to invasive procedure (dental cleaning).                                COQ10 PO   Take 100 mg by mouth every 24 hours (at 16:00)                                   ipratropium - albuterol 0.5 mg/2.5 mg/3 mL 0.5-2.5 (3) MG/3ML neb solution   Commonly known as:  DUONEB   Take 1 vial (3 mLs) by nebulization 4 times  daily   Last time this was given:  3 mLs on 6/17/2017  8:38 AM                                            nicotine 14 MG/24HR 24 hr patch   Commonly known as:  NICODERM CQ   Place 1 patch onto the skin every 24 hours Reported on 4/10/2017   Last time this was given:  1 patch on 6/17/2017  8:55 AM                                   OSCAL 500/200 D-3 PO   Take 1 tablet by mouth 2 times daily                                      predniSONE 20 MG tablet   Commonly known as:  DELTASONE   Take 2 tablets (40 mg) by mouth daily for 5 days   Last time this was given:  40 mg on 6/17/2017  8:55 AM                                   senna-docusate 8.6-50 MG per tablet   Commonly known as:  SENOKOT-S;PERICOLACE   Take 1-2 tablets by mouth 2 times daily   Last time this was given:  2 tablets on 6/17/2017  8:55 AM                                      SINGULAIR PO   Take 10 mg by mouth At Bedtime   Last time this was given:  10 mg on 6/16/2017  8:37 PM                                   SPIRIVA HANDIHALER 18 MCG capsule   USING THE HANDIHALER, INHALE ONE CAPSULE BY MOUTH EVERY DAY   Generic drug:  tiotropium                                   SYMBICORT 160-4.5 MCG/ACT Inhaler   INHALE TWO PUFFS BY MOUTH TWICE A DAY   Generic drug:  budesonide-formoterol                                      TOPROL XL 25 MG 24 hr tablet   Take 1 tablet (25 mg) by mouth every morning (patient takes 0.5 X 25 mg = 12.5 mg dose)   Last time this was given:  25 mg on 6/17/2017  8:55 AM   Generic drug:  metoprolol                                   Turmeric Curcumin Caps   Take 1 capsule by mouth every 24 hours (at 16:00)                                             More Information        Patient Education    Azithromycin Ophthalmic drops, solution    Azithromycin Oral suspension    Azithromycin Oral suspension, extended release    Azithromycin Oral tablet    Azithromycin Solution for injection  Azithromycin Oral tablet  What is this medicine?  AZITHROMYCIN (az  ith kassy MYE sin) is a macrolide antibiotic. It is used to treat or prevent certain kinds of bacterial infections. It will not work for colds, flu, or other viral infections.  This medicine may be used for other purposes; ask your health care provider or pharmacist if you have questions.  What should I tell my health care provider before I take this medicine?  They need to know if you have any of these conditions:    kidney disease    liver disease    irregular heartbeat or heart disease    an unusual or allergic reaction to azithromycin, erythromycin, other macrolide antibiotics, foods, dyes, or preservatives    pregnant or trying to get pregnant    breast-feeding  How should I use this medicine?  Take this medicine by mouth with a full glass of water. Follow the directions on the prescription label. The tablets can be taken with food or on an empty stomach. If the medicine upsets your stomach, take it with food. Take your medicine at regular intervals. Do not take your medicine more often than directed. Take all of your medicine as directed even if you think your are better. Do not skip doses or stop your medicine early.  Talk to your pediatrician regarding the use of this medicine in children. Special care may be needed.  Overdosage: If you think you have taken too much of this medicine contact a poison control center or emergency room at once.  NOTE: This medicine is only for you. Do not share this medicine with others.  What if I miss a dose?  If you miss a dose, take it as soon as you can. If it is almost time for your next dose, take only that dose. Do not take double or extra doses.  What may interact with this medicine?  Do not take this medicine with any of the following medications:    lincomycin  This medicine may also interact with the following medications:    amiodarone    antacids    birth control pills    cyclosporine    digoxin    magnesium    nelfinavir    phenytoin    warfarin  This list may not  describe all possible interactions. Give your health care provider a list of all the medicines, herbs, non-prescription drugs, or dietary supplements you use. Also tell them if you smoke, drink alcohol, or use illegal drugs. Some items may interact with your medicine.  What should I watch for while using this medicine?  Tell your doctor or health care professional if your symptoms do not improve.  Do not treat diarrhea with over the counter products. Contact your doctor if you have diarrhea that lasts more than 2 days or if it is severe and watery.  This medicine can make you more sensitive to the sun. Keep out of the sun. If you cannot avoid being in the sun, wear protective clothing and use sunscreen. Do not use sun lamps or tanning beds/booths.  What side effects may I notice from receiving this medicine?  Side effects that you should report to your doctor or health care professional as soon as possible:    allergic reactions like skin rash, itching or hives, swelling of the face, lips, or tongue    confusion, nightmares or hallucinations    dark urine    difficulty breathing    hearing loss    irregular heartbeat or chest pain    pain or difficulty passing urine    redness, blistering, peeling or loosening of the skin, including inside the mouth    white patches or sores in the mouth    yellowing of the eyes or skin  Side effects that usually do not require medical attention (report to your doctor or health care professional if they continue or are bothersome):    diarrhea    dizziness, drowsiness    headache    stomach upset or vomiting    tooth discoloration    vaginal irritation  This list may not describe all possible side effects. Call your doctor for medical advice about side effects. You may report side effects to FDA at 8-796-VKX-7505.  Where should I keep my medicine?  Keep out of the reach of children.  Store at room temperature between 15 and 30 degrees C (59 and 86 degrees F). Throw away any unused  medicine after the expiration date.  NOTE:This sheet is a summary. It may not cover all possible information. If you have questions about this medicine, talk to your doctor, pharmacist, or health care provider. Copyright  2016 [employer]              Rupture of Membranes

## 2021-01-26 ENCOUNTER — TELEPHONE (OUTPATIENT)
Dept: CARDIOLOGY | Facility: CLINIC | Age: 71
End: 2021-01-26

## 2021-01-26 DIAGNOSIS — I10 BENIGN ESSENTIAL HYPERTENSION: ICD-10-CM

## 2021-01-26 RX ORDER — METOPROLOL TARTRATE 25 MG/1
12.5 TABLET, FILM COATED ORAL 2 TIMES DAILY
Qty: 45 TABLET | Refills: 3 | Status: SHIPPED | OUTPATIENT
Start: 2021-01-26 | End: 2021-01-01

## 2021-02-03 NOTE — TELEPHONE ENCOUNTER
Patient due for annual physical and to discuss refills of medications. Please call to schedule.  Jaaj Carter, CNP    [Nl] : Genitourinary [Immunizations are up to date] : Immunizations are up to date [Received Influenza Vaccine this Past Year] : patient has received the Influenza vaccine this past year

## 2021-03-23 PROBLEM — J44.1 COPD EXACERBATION (H): Status: ACTIVE | Noted: 2017-06-14

## 2021-03-23 NOTE — H&P
[facility]    History and Physical - Hospitalist Service       Date of Admission:  3/23/2021    Assessment & Plan        Preeti Ford is a 70 year old female admitted on 3/23/2021. H/o  chronic hypercapnic respiratory failure : on home oxygen ( not sure how many liters at this time). She has been admitted with worsening sob with cough over last few days. She was found to have COPD exacerbation and is being managed with nebulizer treatments, iv steroids. She has been placed on bi pap support for her respiratory failure.      A/p :           Acute onset sob : COPD exacerbation      Acute on chronic hypercapnic respiratory failure : on home oxygen ( not sure how many liters at this time), 2/2 COPD exacerbation, on bi pap.      COPD exacerbation : manage with nebulizer, iv  steroids      Severe pulmonary HTN : 2/2 COPD exacerbation, outpatient f/u      Aortic stenosis , moderate : outpatient cardio f/u      Hypotension : related to dehydration, aortic stenosis, iv fluids.      Lactic acidosis, mild : 2/2 dehydration, iv fluids, monitor.      HLD : on statin      CAD s/p stent  LAD and circumflex 5/2018 : On aspirin, plavix, stable.      PVD s/p aortic iliac bypass : on aspirin, plavix      Stroke h/o : last 2/2020 : on aspirin, plavix.      HTN, Essential : on losartan 25 mg daily, metoprolol 12.5 mg bid             Diet:   NPO  DVT Prophylaxis: Enoxaparin (Lovenox) SQ  Barnhart Catheter: not present  Code Status:   full          Disposition Plan   Expected discharge: 1-2 days, recommended to prior living arrangement once respiratory failure resolved.  Entered: Gurpreet Boston MD 03/23/2021, 1:32 PM     The patient's care was discussed with the Patient.    Gurpreet Boston MD  [facility]  Contact information available via [facility] Paging/Directory      ______________________________________________________________________    Chief Complaint   sob    History is  obtained from the patient    History of Present Illness     Preeti Ford is a 70 year old female admitted on 3/23/2021. H/o  chronic hypercapnic respiratory failure : on home oxygen ( not sure how many liters at this time). She has been admitted with worsening sob with cough over last few days. The symptoms have been progressively worsening. She tried her home bi pap and nebulizer but her symptoms did not improve.  She was found to have COPD exacerbation and is being managed with nebulizer treatments, iv steroids. She has been placed on bi pap support for her respiratory failure.      Review of Systems        No fever or chills  No cp  No abdominal symptoms  No urinary symptoms  No neuro symptoms      Past Medical History    I have reviewed this patient's medical history and updated it with pertinent information if needed.   Past Medical History:   Diagnosis Date     Arthritis      COPD (chronic obstructive pulmonary disease) (H)      Coronary artery disease      CVA (cerebral vascular accident) (H) 8-     Hypertension        Past Surgical History   I have reviewed this patient's surgical history and updated it with pertinent information if needed.  Past Surgical History:   Procedure Laterality Date     APPENDECTOMY       BYPASS GRAFT AORTOILIAC N/A 3/7/2017    Procedure: BYPASS GRAFT AORTOILIAC;  Surgeon: Marcos Pratt MD;  Location: [facility] OR     SALPINGO OOPHORECTOMY,R/L/DELORES      Salpingo Oophorectomy, RT/LT/DELORES       Social History   I have reviewed this patient's social history and updated it with pertinent information if needed.  Social History     Tobacco Use     Smoking status: Current Every Day Smoker     Packs/day: 0.50     Types: Cigarettes     Smokeless tobacco: Never Used     Tobacco comment: patient states she is working on it    Substance Use Topics     Alcohol use: No     Alcohol/week: 0.0 standard drinks     Drug use: No       Lives in [place] in a house with      2  children  Smokes 5 cigarettes per day, no alcohol use, no drugs  Uses a walker          Family History   I have reviewed this patient's family history and updated it with pertinent information if needed.  Family History   Problem Relation Age of Onset     Cerebrovascular Disease Mother      Cerebrovascular Disease Father      Genitourinary Problems Brother         Dialysis       Prior to Admission Medications   Prior to Admission Medications   Prescriptions Last Dose Informant Patient Reported? Taking?   ACE/ARB/ARNI NOT PRESCRIBED, INTENTIONAL,  Self No No   Sig: Please choose reason not prescribed, below   Calcium Carbonate-Vitamin D (OSCAL 500/200 D-3 PO)  Self Yes No   Sig: Take 1 tablet by mouth 2 times daily   Coenzyme Q10 (COQ10 PO)  Self Yes No   Sig: Take 100 mg by mouth every 24 hours (at 16:00)   Nutritional Supplements (BOOST)  Self No No   Sig: Take 1 Bottle by mouth 3 times daily (with meals)   Respiratory Therapy Supplies (NEBULIZER/TUBING/MOUTHPIECE) KIT   No No   Si kit as needed (if becomes damaged)   acetaminophen (TYLENOL) 325 MG tablet  Self Yes No   Sig: Take 325 mg by mouth every 4 hours as needed for mild pain    albuterol (PROAIR HFA/PROVENTIL HFA/VENTOLIN HFA) 108 (90 Base) MCG/ACT inhaler   No No   Sig: Inhale 2 puffs into the lungs every 6 hours as needed for shortness of breath / dyspnea   aspirin 81 MG EC tablet   No No   Sig: Take 1 tablet (81 mg) by mouth daily   atorvastatin (LIPITOR) 20 MG tablet   No No   Sig: Take 1 tablet (20 mg) by mouth At Bedtime   budesonide-formoterol (SYMBICORT) 160-4.5 MCG/ACT Inhaler   No No   Sig: Inhale 2 puffs into the lungs 2 times daily   clopidogrel (PLAVIX) 75 MG tablet   No No   Sig: TAKE ONE TABLET BY MOUTH ONCE DAILY   guaiFENesin (MUCINEX) 600 MG 12 hr tablet   No No   Sig: Take 1-2 tablets (600-1,200 mg) by mouth 2 times daily   ipratropium - albuterol 0.5 mg/2.5 mg/3 mL (DUONEB) 0.5-2.5 (3) MG/3ML neb solution   No No   Sig: Take 1 vial  (3 mLs) by nebulization every 4 hours as needed for shortness of breath / dyspnea or wheezing   losartan (COZAAR) 25 MG tablet   No No   Sig: Take 1 tablet (25 mg) by mouth daily   metoprolol tartrate (LOPRESSOR) 25 MG tablet   No No   Sig: Take 0.5 tablets (12.5 mg) by mouth 2 times daily   montelukast (SINGULAIR) 10 MG tablet   No No   Sig: Take 1 tablet (10 mg) by mouth At Bedtime   nitroGLYcerin (NITROSTAT) 0.4 MG sublingual tablet   No No   Sig: For chest pain place 1 tablet under the tongue every 5 minutes for 3 doses. If symptoms persist 5 minutes after 1st dose call 911.   Patient not taking: Reported on 12/14/2020   order for DME  Self No No   Sig: Equipment being ordered: Nebulizer   order for DME  Self No No   Sig: Equipment being ordered: Nebulizer machine   polyethylene glycol (MIRALAX/GLYCOLAX) Packet  Self Yes No   Sig: Take 1 packet by mouth daily as needed for constipation   senna-docusate (SENOKOT-S;PERICOLACE) 8.6-50 MG per tablet   No No   Sig: Take 1 tablet by mouth 2 times daily as needed for constipation   umeclidinium (INCRUSE ELLIPTA) 62.5 MCG/INH inhaler   No No   Sig: Inhale 1 puff into the lungs daily      Facility-Administered Medications: None     Allergies   Allergies   Allergen Reactions     Crestor [Rosuvastatin] Other (See Comments)     dizziness     Hmg-Coa-R Inhibitors Other (See Comments)     Muscle/joint aching     Lisinopril Cough     Optison [Albumin Human] Other (See Comments)     Pt was flush and very dizzy.  Also had a BP drop     Penicillins Rash       Physical Exam   Vital Signs: Temp: 96  F (35.6  C) Temp src: Temporal BP: (!) 88/61 Pulse: 64   Resp: 30 SpO2: 100 % O2 Device: BiPAP/CPAP    Weight: 96 lbs 0 oz       GENERAL: The patient is not in any acute distressed. Awake and alert.  HEENT: Nonicteric sclerae, PERRLA, EOMI. Oropharynx clear. Moist mucous membranes. Conjunctivae appear well perfused.  HEART: Regular rate and rhythm without murmurs.  LUNGS: b/l  wheezing  present, no crackles.  ABDOMEN: Soft, positive bowel sounds, nontender.  SKIN: No rash, no excessive bruising, petechiae, or purpura.  EXTREMITIES : no rashes, no swelling in legs.  NEUROLOGIC: AxO x 3.  Cranial nerves II-XII intact without motor/sensory deficit.      Data   Data reviewed today: I reviewed all medications, new labs and imaging results over the last 24 hours. I personally reviewed no images or EKG's today.    Recent Labs   Lab 03/23/21  1113   WBC 5.9   HGB 15.8*   MCV 93         POTASSIUM 4.3   CHLORIDE 105   CO2 33*   BUN 16   CR 0.67   ANIONGAP 3   CALDERON 9.6   GLC 98

## 2021-03-23 NOTE — ED NOTES
ED Nursing criteria listed below was addressed during verbal handoff:     Abnormal vitals: Yes  Abnormal results: Yes  Med Reconciliation completed: Yes  Meds given in ED: Yes  Any Overdue Meds: No  Core Measures: No  Isolation: No  Special needs: No  Skin assessment: Yes    Observation Patient  Education provided: N/A

## 2021-03-23 NOTE — PROGRESS NOTES
03/23/21 1423   CPAP/BiPAP/Settings   IPAP/EPAP (cmH2O) 14/5   Rate (breaths/min) 12   Oxygen (%) 35   Timed Inspiration (sec) 1   IPAP RISE  Settings (V60) 2   CPAP/BiPAP Patient Parameters   IPAP (cm H2O) 14 cmH2O   EPAP (cm H2O) 5 cmH2O   Pressure Support (cm H2O) 9 cmH2O   RR Total (breaths/min) 30 breaths/min   Vt (mL) 366 mL   Minute Ventilation (L/min) 9.2 L/min   Peak Inspiratory Pressure (cm H2O) 15 cmH2O   Pt.  Leak (L/min) 61 L/min   Patient off BIPAP per patient request for 25 minutes  Patient having some word finding difficulty  ABG drawn on 2 liters oxygen via nasal cannula  Patient returned to BIPAP

## 2021-03-23 NOTE — PLAN OF CARE
S-(situation): Patient arrives to room 217 via cart from ED    B-(background): COPD exacerbation    A-(assessment): A&O, anxious and tripoding in bed to breathe with bipap in place. Tearful at times, has some expressive aphasia at baseline since CVA per patient.  Able to communicate needs when given time and reassurance.  Lung sounds diminished with expiratory wheezing.      R-(recommendations): Orders reviewed with patient. Will monitor patient per MD orders.     Inpatient nursing criteria listed below were met:    Health care directives status obtained and documented: Yes  SCD's Documented:  NA-lovenox ordered  Skin issues/needs documented:Yes  Isolation addressed and Signage used: NA  Fall Prevention: Care plan updated Yes Education given and documented Yes  Care Plan initiated and Co-Morbidities added: Yes  Education Assessment documented:Yes  Admission Education Documented: Yes  If present CAUTI/CLABI Education done: NA  New medication patient education completed and documented (Possible Side Effects of Common Medications handout): Yes  Allergies Reviewed: Yes  Admission Medication Reconciliation completed: Yes  Home medications if not able to send immediately home with family stored here: NA  Reminder note placed in discharge instructions regarding home meds: NA  Individualized care needs/preferences addressed and charted: Yes  Kd Weir RN

## 2021-03-23 NOTE — PROGRESS NOTES
03/23/21 1139   CPAP/BiPAP/Settings   IPAP/EPAP (cmH2O) 14/5   Rate (breaths/min) 12   Oxygen (%) 30   Timed Inspiration (sec) 1   IPAP RISE  Settings (V60) 2   CPAP/BiPAP Patient Parameters   IPAP (cm H2O) 14 cmH2O   EPAP (cm H2O) 5 cmH2O   Pressure Support (cm H2O) 9 cmH2O   RR Total (breaths/min) 34 breaths/min   Vt (mL) 356 mL   Minute Ventilation (L/min) 10.6 L/min   Peak Inspiratory Pressure (cm H2O) 14 cmH2O   Pt.  Leak (L/min) 50 L/min

## 2021-03-23 NOTE — PROGRESS NOTES
..    S- Respiratory Care Consultation    Preeti Ford    Age: 70 year old      Date of Admission:  3/23/2021    Reason for consult: Chronic obstructive pulmonary disease  Respiratory Distress  BIPAP               Level of consult: Consult and follow for daily recommendations           Chief complaint:   Assessment:   Chronic obstructive pulmonary disease  Hypoxia   Respiratory distress  BIPAP           History of Present Illness:   Patient states her breathing has gotten worse over the last few days until this morning her nebulizer treatments and sleep CPAP machine where not working to improve her work of breathing and dyspnea  Patient brought in by EMS on their BIPAP mask and given nebulizer treatment in rig    She has a history of coronary artery disease with previous stenting of her LAD and circumflex.  She has had various times been noted to have a reduced ejection fraction, initially 25% secondary to coronary disease but eventually improving to 50 to 55% with revascularization.  She also has a history of peripheral vascular disease with aortic iliac bypass, COPD oxygen dependent at night, ongoing cigarette use, mild aortic stenosis with a mean gradient of 12 mmHg, aortic valve area of 1 cm  by last echo, hypertension, history of 2 previous CVAs the last being in February 2020, hyperlipidemia, and severe pulmonary hypertension with estimated RV pressure 56 mmHg plus right atrial pressure on last echo.     Preeti was hospitalized with a COPD exacerbation in May 2020 and was found to have a non-STEMI with troponin of 2.9.  She also had a reduction in her ejection fraction to 35 to 40%.  Her most recent echocardiogram shows resolution of her ejection fraction back to normal.  Her last angiogram was done in May 2018.  At that time her small circumflex stent was restenosed and the vessel was restented using a 2.5 mm ROWDY.  Mild disease was found elsewhere.      Assessment:   Lungs:   Breath sounds crackles and  coarse through out with wheezing pre BIPAP and Duo Neb treatment. Patient is requiring BIPAP for respiratory distress, and hypoxia   Patient using accessory muscles to breathing with respiratory rate 40 bpm.  Patient speaking in 2-3 word sentences before pausing to breath.  Patient states she smokes 5 packs a day         RESPIRATORY:  positive for  cough with sputum, dyspnea and wheezing  Oxygenation = O2 saturation 96% on BIPAP at 35%    Does patient have home oxygen No  Home oxygen deliver device :none    Cough: Occasional loose congested producing none to white secretions     History is obtained from the patient and electronic health record      MRI:  MRI Thoracic Spine with and without Contrast No results found for this or any previous visit.  X-Ray:    CHEST ONE VIEW PORTABLE   3/23/2021 11:51 AM      HISTORY: Shortness of breath. Wheezing.     COMPARISON: Chest x-ray on 5/2/2020                                                                      IMPRESSION: A single portable AP view of the chest was obtained.  Stable cardiomediastinal silhouette. Extensive upper lobes predominant  emphysematous changes. Mild right basilar pulmonary opacities, likely  atelectasis. No significant pleural effusion or pneumothorax.     EFFIE CORLEY MD    ABG:  pH Arterial (pH)   Date Value   05/02/2020 7.24 (L)     pO2 Arterial (mm Hg)   Date Value   05/02/2020 250 (H)     pCO2 Arterial (mm Hg)   Date Value   05/02/2020 72 (H)     Bicarbonate Arterial (mmol/L)   Date Value   05/02/2020 31 (H)       Pulmonary function testing:  3/6/2017  Spirometry shows Sever obstructive airway disease    Results: Severe obstructive ventilatory defect.                  Past Medical History:     Past Medical History:   Diagnosis Date     Arthritis      COPD (chronic obstructive pulmonary disease) (H)      Coronary artery disease      CVA (cerebral vascular accident) (H) 8-     Hypertension              Past Surgical History:     Past  Surgical History:   Procedure Laterality Date     APPENDECTOMY       BYPASS GRAFT AORTOILIAC N/A 3/7/2017    Procedure: BYPASS GRAFT AORTOILIAC;  Surgeon: Marcos Pratt MD;  Location: [facility] OR     SALPINGO OOPHORECTOMY,R/L/DELORES      Salpingo Oophorectomy, RT/LT/DELORES             Social History:     Social History     Socioeconomic History     Marital status:      Spouse name: Not on file     Number of children: Not on file     Years of education: Not on file     Highest education level: Not on file   Occupational History     Not on file   Social Needs     Financial resource strain: Not on file     Food insecurity     Worry: Not on file     Inability: Not on file     Transportation needs     Medical: Not on file     Non-medical: Not on file   Tobacco Use     Smoking status: Current Every Day Smoker     Packs/day: 0.50     Types: Cigarettes     Smokeless tobacco: Never Used     Tobacco comment: patient states she is working on it    Substance and Sexual Activity     Alcohol use: No     Alcohol/week: 0.0 standard drinks     Drug use: No     Sexual activity: Not Currently     Partners: Male   Lifestyle     Physical activity     Days per week: Not on file     Minutes per session: Not on file     Stress: Not on file   Relationships     Social connections     Talks on phone: Not on file     Gets together: Not on file     Attends [employer] service: Not on file     Active member of club or organization: Not on file     Attends meetings of clubs or organizations: Not on file     Relationship status: Not on file     Intimate partner violence     Fear of current or ex partner: Not on file     Emotionally abused: Not on file     Physically abused: Not on file     Forced sexual activity: Not on file   Other Topics Concern      Service No     Blood Transfusions No     Caffeine Concern No     Occupational Exposure No     Hobby Hazards No     Sleep Concern Yes     Comment: Trouble breathing at night due to COPD      Stress Concern No     Weight Concern No     Special Diet No     Back Care No     Exercise No     Bike Helmet No     Seat Belt Yes     Self-Exams Yes     Parent/sibling w/ CABG, MI or angioplasty before 65F 55M? No   Social History Narrative     Not on file     Exposures:  unknown         Family History:     Family History   Problem Relation Age of Onset     Cerebrovascular Disease Mother      Cerebrovascular Disease Father      Genitourinary Problems Brother         Dialysis     Family history              Allergies:   This patient is allergic to is allergic to crestor [rosuvastatin]; hmg-coa-r inhibitors; lisinopril; optison [albumin human]; and penicillins.          Medications:     No current facility-administered medications for this encounter.      Current Outpatient Medications   Medication Sig     ACE/ARB/ARNI NOT PRESCRIBED, INTENTIONAL, Please choose reason not prescribed, below     acetaminophen (TYLENOL) 325 MG tablet Take 325 mg by mouth every 4 hours as needed for mild pain      albuterol (PROAIR HFA/PROVENTIL HFA/VENTOLIN HFA) 108 (90 Base) MCG/ACT inhaler Inhale 2 puffs into the lungs every 6 hours as needed for shortness of breath / dyspnea     aspirin 81 MG EC tablet Take 1 tablet (81 mg) by mouth daily     atorvastatin (LIPITOR) 20 MG tablet Take 1 tablet (20 mg) by mouth At Bedtime     budesonide-formoterol (SYMBICORT) 160-4.5 MCG/ACT Inhaler Inhale 2 puffs into the lungs 2 times daily     Calcium Carbonate-Vitamin D (OSCAL 500/200 D-3 PO) Take 1 tablet by mouth 2 times daily     clopidogrel (PLAVIX) 75 MG tablet TAKE ONE TABLET BY MOUTH ONCE DAILY     Coenzyme Q10 (COQ10 PO) Take 100 mg by mouth every 24 hours (at 16:00)     guaiFENesin (MUCINEX) 600 MG 12 hr tablet Take 1-2 tablets (600-1,200 mg) by mouth 2 times daily     ipratropium - albuterol 0.5 mg/2.5 mg/3 mL (DUONEB) 0.5-2.5 (3) MG/3ML neb solution Take 1 vial (3 mLs) by nebulization every 4 hours as needed for shortness of breath /  dyspnea or wheezing     losartan (COZAAR) 25 MG tablet Take 1 tablet (25 mg) by mouth daily     metoprolol tartrate (LOPRESSOR) 25 MG tablet Take 0.5 tablets (12.5 mg) by mouth 2 times daily     montelukast (SINGULAIR) 10 MG tablet Take 1 tablet (10 mg) by mouth At Bedtime     nitroGLYcerin (NITROSTAT) 0.4 MG sublingual tablet For chest pain place 1 tablet under the tongue every 5 minutes for 3 doses. If symptoms persist 5 minutes after 1st dose call 911. (Patient not taking: Reported on 12/14/2020)     Nutritional Supplements (BOOST) Take 1 Bottle by mouth 3 times daily (with meals)     order for DME Equipment being ordered: Nebulizer machine     order for DME Equipment being ordered: Nebulizer     polyethylene glycol (MIRALAX/GLYCOLAX) Packet Take 1 packet by mouth daily as needed for constipation     Respiratory Therapy Supplies (NEBULIZER/TUBING/MOUTHPIECE) KIT 1 kit as needed (if becomes damaged)     senna-docusate (SENOKOT-S;PERICOLACE) 8.6-50 MG per tablet Take 1 tablet by mouth 2 times daily as needed for constipation     umeclidinium (INCRUSE ELLIPTA) 62.5 MCG/INH inhaler Inhale 1 puff into the lungs daily     Facility-Administered Medications Ordered in Other Encounters   Medication     Reason aspirin not prescribed (intentional)                  Recommendations:   Recommendations:      Patient has elevated Bicarbonate on venus blood gas indicative of a CO2 retainer  PH 7.28 respiratory acidosis with Po2 venus 15 showing hypoxia  Will wean off BIPAP as able and follow with oxygen as needed , nebulizer treatments and steroids             Result data:     Lab Results   Component Value Date    PH 7.24 (L) 05/02/2020    PH 7.25 (L) 05/02/2020    PH 7.39 05/19/2018    PO2 250 (H) 05/02/2020    PO2 180 (H) 05/02/2020    PO2 96 05/19/2018    PCO2 72 (H) 05/02/2020    PCO2 68 (H) 05/02/2020    PCO2 60 (H) 05/19/2018    HCO3 31 (H) 05/02/2020    HCO3 30 (H) 05/02/2020    HCO3 36 (H) 05/19/2018    OLIVIA 0.9 05/02/2020     OLIVIA 0.1 05/02/2020    OLIVIA 8.7 05/19/2018             Kristina Riedel, RT           R- RCP will follow

## 2021-03-23 NOTE — PROGRESS NOTES
03/23/21 1115   CPAP/BiPAP/Settings   $BIPAP/CPAP Therapy continuous   IPAP/EPAP (cmH2O) 14/5   Rate (breaths/min) 12   Oxygen (%) 40   Timed Inspiration (sec) 1   IPAP RISE  Settings (V60) 2   CPAP/BiPAP Patient Parameters   IPAP (cm H2O) 14 cmH2O   EPAP (cm H2O) 5 cmH2O   Pressure Support (cm H2O) 9 cmH2O   RR Total (breaths/min) 42 breaths/min   Vt (mL) 429 mL   Minute Ventilation (L/min) 12.6 L/min   Peak Inspiratory Pressure (cm H2O) 15 cmH2O   Pt.  Leak (L/min) 60 L/min   Patient is requiring BIPAP for ventilatory support

## 2021-03-23 NOTE — PROGRESS NOTES
03/23/21 1358   Oxygen Therapy   SpO2 99 %   O2 Device Nasal cannula   Oxygen Delivery 2 LPM

## 2021-03-23 NOTE — ED TRIAGE NOTES
SOB noted around 0400 today. Tried to use at home nebs and bipap. EMS arrived with sats in the mid 70s. Upon arrival 96% on bipap with inline neb.

## 2021-03-23 NOTE — ED PROVIDER NOTES
History     Chief Complaint   Patient presents with     Shortness of Breath     HPI  Preeti Ford is a 70 year old female who presents to the emergency department secondary to shortness of breath.  She has COPD has home oxygen and home BiPAP and her  called due to rapidly deteriorating respiratory status.  She has been having wheezing and minimal coughing.  No fever new headache body aches sore throat ear pain or productive cough.  She has been using her home nebulizer and her home BiPAP at home O2 sat.  EMS was called and on arrival they found her to be tripoding tachypneic use of accessory muscles for breathing and on her home BiPAP with oxygen saturations in the 70s.  They put her on BiPAP in the ambulance and her sats rapidly improved to the 90s.  She still is short of breath.  No known contacts with patients with Covid.  No nausea or vomiting.  This rapidly progressed over the course of 1 day.    Allergies:  Allergies   Allergen Reactions     Crestor [Rosuvastatin] Other (See Comments)     dizziness     Hmg-Coa-R Inhibitors Other (See Comments)     Muscle/joint aching     Lisinopril Cough     Optison [Albumin Human] Other (See Comments)     Pt was flush and very dizzy.  Also had a BP drop     Penicillins Rash       Problem List:    Patient Active Problem List    Diagnosis Date Noted     Malnutrition, unspecified type (H) 04/16/2018     Priority: Medium     Hyponatremia 04/16/2018     Priority: Medium     Hypochloremia 04/16/2018     Priority: Medium     Vertigo 03/23/2018     Priority: Medium     Other seizures (H) - possible 03/23/2018     Priority: Medium     Acute CVA (cerebrovascular accident) - right paramedian superior vermis and left cerebellar hemisphere 03/23/2018     Priority: Medium     Pulmonary emphysema (H) 03/22/2018     Priority: Medium     COPD exacerbation, Chronic O2 Dependent 06/14/2017     Priority: Medium     Coronary artery disease involving native coronary artery - STEMI  with LAD and circ stents in 8/2015 06/14/2017     Priority: Medium     Elevated troponin 06/14/2017     Priority: Medium     Cervical high risk HPV (human papillomavirus) test positive 12/20/2016     Priority: Medium     12/20/16 NIL pap/+ HPV (not 16 or 18). Plan: cotest in 1 year, due by 12/20/17 12/20/17 NIL Pap, +HR HPV (Not types 16/18). Plan colp  12/26/18 Patient is lost to pap tracking follow-up           PAD (peripheral artery disease) (H) - moderate left common carotid stenosis, aortoiliac bypass, chronic left vertebral and right posterior cerebral stenoses 02/12/2016     Priority: Medium     Chronic bronchitis, unspecified chronic bronchitis type (H) 02/09/2016     Priority: Medium     Ischemic cardiomyopathy - EF 25% in 2015 but 55% in 3/2017 and 45-50% on 3/18 09/08/2015     Priority: Medium     Echo  With ejection fraction of 25-30 %       HTN, goal below 130/80 09/08/2015     Priority: Medium     Acute systolic congestive heart failure (H) 09/08/2015     Priority: Medium     ejection fraction 25-30%       Pulmonary nodule -- 1.9 cm RLL, new 5/2/20 09/08/2015     Priority: Medium     GERD (gastroesophageal reflux disease) 09/08/2015     Priority: Medium     NSTEMI (non-ST elevated myocardial infarction) (H) 09/08/2015     Priority: Medium     ACS (acute coronary syndrome) (H) 08/28/2015     Priority: Medium     Smoker 03/10/2015     Priority: Medium     History of stroke - right thalamus in 8/2013 and left cerebellar and right vermis in 3/2018 08/21/2013     Priority: Medium     Numbness and tingling 08/21/2013     Priority: Medium     Right side of the face , upper and lower limbs         CVA (cerebral vascular accident) (H) 08/17/2013     Priority: Medium     Hyperlipidemia LDL goal <130 07/19/2012     Priority: Medium     Urinary incontinence 03/04/2012     Priority: Medium     Forgetfulness 03/04/2012     Priority: Medium        Past Medical History:    Past Medical History:   Diagnosis Date      Arthritis      COPD (chronic obstructive pulmonary disease) (H)      Coronary artery disease      CVA (cerebral vascular accident) (H) 8-     Hypertension        Past Surgical History:    Past Surgical History:   Procedure Laterality Date     APPENDECTOMY       BYPASS GRAFT AORTOILIAC N/A 3/7/2017    Procedure: BYPASS GRAFT AORTOILIAC;  Surgeon: Marcos Pratt MD;  Location: [facility] OR     SALPINGO OOPHORECTOMY,R/L/DELORES      Salpingo Oophorectomy, RT/LT/DELORES       Family History:    Family History   Problem Relation Age of Onset     Cerebrovascular Disease Mother      Cerebrovascular Disease Father      Genitourinary Problems Brother         Dialysis       Social History:  Marital Status:   [2]  Social History     Tobacco Use     Smoking status: Current Every Day Smoker     Packs/day: 0.50     Types: Cigarettes     Smokeless tobacco: Never Used     Tobacco comment: patient states she is working on it    Substance Use Topics     Alcohol use: No     Alcohol/week: 0.0 standard drinks     Drug use: No        Medications:    aspirin 81 MG EC tablet  atorvastatin (LIPITOR) 20 MG tablet  budesonide-formoterol (SYMBICORT) 160-4.5 MCG/ACT Inhaler  clopidogrel (PLAVIX) 75 MG tablet  guaiFENesin 400 MG TABS  ipratropium - albuterol 0.5 mg/2.5 mg/3 mL (DUONEB) 0.5-2.5 (3) MG/3ML neb solution  losartan (COZAAR) 25 MG tablet  metoprolol tartrate (LOPRESSOR) 25 MG tablet  montelukast (SINGULAIR) 10 MG tablet  nitroGLYcerin (NITROSTAT) 0.4 MG sublingual tablet  Nutritional Supplements (CARNATION INSTANT BREAKFAST) LIQD  order for [facility] [place]  Respiratory Therapy Supplies (NEBULIZER/TUBING/MOUTHPIECE) KIT  umeclidinium (INCRUSE ELLIPTA) 62.5 MCG/INH inhaler  ACE/ARB/ARNI NOT PRESCRIBED, INTENTIONAL,  albuterol (PROAIR HFA/PROVENTIL HFA/VENTOLIN HFA) 108 (90 Base) MCG/ACT inhaler  Nutritional Supplements (BOOST)  polyethylene glycol (MIRALAX/GLYCOLAX) Packet  senna-docusate (SENOKOT-S;PERICOLACE) 8.6-50 MG per  tablet          Review of Systems   All other systems reviewed and are negative.      Physical Exam   BP: 111/76  Pulse: 80  Temp: 96  F (35.6  C)  Resp: (!) 35  Weight: 43.5 kg (96 lb)  SpO2: 95 %      Physical Exam  Vitals signs and nursing note reviewed.   Constitutional:       General: She is not in acute distress.     Appearance: She is not diaphoretic.      Comments: Chronically ill-appearing with low BMI.   HENT:      Head: Normocephalic and atraumatic.      Mouth/Throat:      Pharynx: No pharyngeal swelling.   Eyes:      General: No scleral icterus.     Extraocular Movements: Extraocular movements intact.   Neck:      Musculoskeletal: Normal range of motion and neck supple.   Cardiovascular:      Rate and Rhythm: Normal rate and regular rhythm.      Heart sounds: Normal heart sounds.   Pulmonary:      Effort: Tachypnea and respiratory distress present.      Breath sounds: No stridor. Examination of the right-upper field reveals wheezing. Examination of the left-upper field reveals wheezing. Examination of the right-middle field reveals wheezing. Examination of the left-middle field reveals wheezing. Examination of the right-lower field reveals wheezing. Examination of the left-lower field reveals wheezing. Wheezing present. No decreased breath sounds, rhonchi or rales.   Abdominal:      Palpations: Abdomen is soft. There is no mass.      Tenderness: There is no abdominal tenderness. There is no guarding or rebound.   Musculoskeletal:      Right lower leg: She exhibits no tenderness. No edema.      Left lower leg: She exhibits no tenderness.   Skin:     General: Skin is warm and dry.      Coloration: Skin is not pale.      Findings: No erythema or rash.   Neurological:      General: No focal deficit present.      Mental Status: She is alert and oriented to person, place, and time.   Psychiatric:         Mood and Affect: Mood normal.         Behavior: Behavior normal.         ED Course         Procedures             Results for orders placed or performed during the hospital encounter of 03/23/21 (from the past 24 hour(s))   CBC with platelets differential   Result Value Ref Range    WBC 5.9 4.0 - 11.0 10e9/L    RBC Count 5.11 3.8 - 5.2 10e12/L    Hemoglobin 15.8 (H) 11.7 - 15.7 g/dL    Hematocrit 47.7 (H) 35.0 - 47.0 %    MCV 93 78 - 100 fl    MCH 30.9 26.5 - 33.0 pg    MCHC 33.1 31.5 - 36.5 g/dL    RDW 11.9 10.0 - 15.0 %    Platelet Count 221 150 - 450 10e9/L    Diff Method Automated Method     % Neutrophils 70.8 %    % Lymphocytes 18.8 %    % Monocytes 6.8 %    % Eosinophils 2.6 %    % Basophils 0.7 %    % Immature Granulocytes 0.3 %    Nucleated RBCs 0 0 /100    Absolute Neutrophil 4.1 1.6 - 8.3 10e9/L    Absolute Lymphocytes 1.1 0.8 - 5.3 10e9/L    Absolute Monocytes 0.4 0.0 - 1.3 10e9/L    Absolute Eosinophils 0.2 0.0 - 0.7 10e9/L    Absolute Basophils 0.0 0.0 - 0.2 10e9/L    Abs Immature Granulocytes 0.0 0 - 0.4 10e9/L    Absolute Nucleated RBC 0.0    Basic metabolic panel   Result Value Ref Range    Sodium 141 133 - 144 mmol/L    Potassium 4.3 3.4 - 5.3 mmol/L    Chloride 105 94 - 109 mmol/L    Carbon Dioxide 33 (H) 20 - 32 mmol/L    Anion Gap 3 3 - 14 mmol/L    Glucose 98 70 - 99 mg/dL    Urea Nitrogen 16 7 - 30 mg/dL    Creatinine 0.67 0.52 - 1.04 mg/dL    GFR Estimate >90 >60 mL/min/[1.73_m2]    GFR Estimate If Black >90 >60 mL/min/[1.73_m2]    Calcium 9.6 8.5 - 10.1 mg/dL   Blood gas venous   Result Value Ref Range    Ph Venous 7.29 (L) 7.32 - 7.43 pH    PCO2 Venous 70 (H) 40 - 50 mm Hg    PO2 Venous 15 (L) 25 - 47 mm Hg    Bicarbonate Venous 33 (H) 21 - 28 mmol/L    Base Excess Venous 3.8 mmol/L    FIO2 40    Symptomatic Influenza A/B & SARS-CoV2 (COVID-19) Virus PCR Multiplex    Specimen: Nasopharyngeal   Result Value Ref Range    Flu A/B & SARS-COV-2 PCR Source Nasopharyngeal     SARS-CoV-2 PCR Result NEGATIVE     Influenza A PCR Negative NEG^Negative    Influenza B PCR Negative  NEG^Negative    Respiratory Syncytial Virus PCR (Note)     Flu A/B & SARS-CoV-2 PCR Comment (Note)    XR Chest Port 1 View    Narrative    CHEST ONE VIEW PORTABLE   3/23/2021 11:51 AM     HISTORY: Shortness of breath. Wheezing.    COMPARISON: Chest x-ray on 5/2/2020      Impression    IMPRESSION: A single portable AP view of the chest was obtained.  Stable cardiomediastinal silhouette. Extensive upper lobes predominant  emphysematous changes. Mild right basilar pulmonary opacities, likely  atelectasis. No significant pleural effusion or pneumothorax.    EFFIE CORLEY MD   Blood gas venous   Result Value Ref Range    Ph Venous 7.31 (L) 7.32 - 7.43 pH    PCO2 Venous 66 (H) 40 - 50 mm Hg    PO2 Venous 22 (L) 25 - 47 mm Hg    Bicarbonate Venous 33 (H) 21 - 28 mmol/L    Base Excess Venous 3.9 mmol/L    FIO2 35    Lactic acid whole blood   Result Value Ref Range    Lactic Acid 2.3 (H) 0.7 - 2.0 mmol/L   Blood gas arterial   Result Value Ref Range    pH Arterial 7.29 (L) 7.35 - 7.45 pH    pCO2 Arterial 62 (H) 35 - 45 mm Hg    pO2 Arterial 41 (L) 80 - 105 mm Hg    Bicarbonate Arterial 30 (H) 21 - 28 mmol/L    Base Excess Art 1.7 mmol/L    FIO2 21        Medications   ipratropium - albuterol 0.5 mg/2.5 mg/3 mL (DUONEB) neb solution 3 mL (3 mLs Nebulization Given 3/23/21 1205)   0.9% sodium chloride BOLUS (0 mLs Intravenous Stopped 3/23/21 1504)     Followed by   sodium chloride 0.9% infusion ( Intravenous New Bag 3/23/21 1505)   azithromycin (ZITHROMAX) 500 mg in sodium chloride 0.9 % 250 mL intermittent infusion (500 mg Intravenous New Bag 3/23/21 1505)   ipratropium - albuterol 0.5 mg/2.5 mg/3 mL (DUONEB) neb solution 3 mL (3 mLs Nebulization Given 3/23/21 1115)   methylPREDNISolone sodium succinate (solu-MEDROL) injection 81.25 mg (81.25 mg Intravenous Given 3/23/21 1129)   cefTRIAXone (ROCEPHIN) 1 g vial to attach to  mL bag for ADULTS or NS 50 mL bag for PEDS (1 g Intravenous New Bag 3/23/21 0194)        Assessments & Plan (with Medical Decision Making)  70-year-old female with COPD presented via EMS in respiratory distress secondary to wheezing and probable COPD exacerbation.  An IV was established, BiPAP started and VBG along with basic labs ordered.  No fever or suggestion of sepsis therefore I did not do a lactate.  Chest x-ray also ordered.  Patient was given 80 mg of Solu-Medrol IV and a DuoNeb.  Patient improved. bp low but she is only 90 lbs. No tachycardia or fever, doubt sepsis given no fever, leukocytosis.  Favor copd exacerbation and primary etiology.  One liter of iv ns ordered with lactate and vbg repeat after being on bipap. Plan to admit.   Given low bp, slight elevation of lactic, respiratory illness will give a dose of antibiotics for coverage of cap and blood culture x 2.  vbg without significant improvement but patients primary symptoms are wheezing and has underlying copd.  D/w hospitalist Dr. Boston who accepts the patient for admission to the ICU on BIPAP and he will see the patient in the ER.      I have reviewed the nursing notes.    I have reviewed the findings, diagnosis, plan and need for follow up with the patient.      New Prescriptions    No medications on file       Final diagnoses:   COPD exacerbation (H)       3/23/2021   [facility] EMERGENCY DEPT     Dereck Mendosa MD  03/23/21 [id number]

## 2021-03-24 NOTE — PLAN OF CARE
S-(situation): Physical Therapy evaluation per MD order    B-(background): Patient is a 70 year old female, admitted from the ED due to SOB, patient placed on BiPAP in the ICU and diagnosed with COPD exacerbation, acute on chronic hypercapnic respiratory failure, severe pulmonary hypotension, aortic stenosis, and lactic acidosis. Patient with a previous medical history of malnutrition, vertigo, COPD, seizures, CAD with STEMI and stents, CVA in 2013 and 2018, HTN, pulmonary emphysema, CHF, ischemic cardiomyopathy, GERD, smoker, arthritis, urinary incontinence, hyperlipidemia, and expressive aphasia.     A-(assessment): Patient on BiPAP. Patient observed significantly short of breath during bed bath cares by nursing students. PT attempted to visit again, being seen by the MD. Per patient's RN patient poorly tolerant of activity and simple cares are increasing patient's work of breathing. Per RT Arthur functional mobility assessment is not indicated at this time.    R-(recommendations): PT to hold this date. PT to assess for medical necessity of evaluation 3/25/21.    Thank you for your referral.  Zhane Bashir, PT, DPT, ATC    [facility]  O: [phone]  E: [email or web address]

## 2021-03-24 NOTE — PLAN OF CARE
Problem: Oral Intake Inadequate COPD (Chronic Obstructive Pulmonary Disease)  Goal: Improved Nutrition Intake  Outcome: No Change     Problem: COPD Comorbidity  Goal: Maintenance of COPD Symptom Control  Outcome: No Change     Problem: Adult Inpatient Plan of Care  Goal: Plan of Care Review  Outcome: Improving  Goal: Absence of Hospital-Acquired Illness or Injury  Outcome: Improving  Intervention: Identify and Manage Fall Risk  Recent Flowsheet Documentation  Taken 3/24/2021 0400 by Herb Angeles RN  Safety Promotion/Fall Prevention:   activity supervised   bed alarm on   clutter free environment maintained   fall prevention program maintained   increased rounding and observation   increase visualization of patient   lighting adjusted   nonskid shoes/slippers when out of bed   patient and family education   room door open   room near nurse's station   room organization consistent   safety round/check completed   supervised activity  Taken 3/24/2021 0000 by Herb Angeles RN  Safety Promotion/Fall Prevention:   activity supervised   bed alarm on   clutter free environment maintained   fall prevention program maintained   increased rounding and observation   increase visualization of patient   lighting adjusted   nonskid shoes/slippers when out of bed   patient and family education   room door open   room near nurse's station   room organization consistent   safety round/check completed   supervised activity  Taken 3/23/2021 2000 by Herb Angeles, RN  Safety Promotion/Fall Prevention:   activity supervised   bed alarm on   clutter free environment maintained   fall prevention program maintained   increased rounding and observation   increase visualization of patient   lighting adjusted   nonskid shoes/slippers when out of bed   patient and family education   room door open   room near nurse's station   room organization consistent   safety round/check completed   supervised activity  Intervention: Prevent Skin  Injury  Recent Flowsheet Documentation  Taken 3/24/2021 0400 by Herb Angeles RN  Body Position: position changed independently  Taken 3/24/2021 0000 by Herb Angeles RN  Body Position: position changed independently  Taken 3/23/2021 2000 by Herb Angeles RN  Body Position: position changed independently  Intervention: Prevent and Manage VTE (Venous Thromboembolism) Risk  Recent Flowsheet Documentation  Taken 3/24/2021 0400 by Herb Angeles RN  VTE Prevention/Management: anticoagulant therapy maintained  Taken 3/24/2021 0000 by Herb Angeles RN  VTE Prevention/Management: anticoagulant therapy maintained  Taken 3/23/2021 2000 by Herb Angeles RN  VTE Prevention/Management: anticoagulant therapy maintained  Goal: Optimal Comfort and Wellbeing  Outcome: Improving  Goal: Readiness for Transition of Care  Outcome: Improving     Problem: Adjustment to Illness COPD (Chronic Obstructive Pulmonary Disease)  Goal: Optimal Chronic Illness Coping  Outcome: Improving     Problem: Functional Ability Impaired COPD (Chronic Obstructive Pulmonary Disease)  Goal: Optimal Level of Functional [place]  Outcome: Improving  Intervention: Optimize Functional Ability  Recent Flowsheet Documentation  Taken 3/24/2021 0400 by Herb Angeles RN  Activity Management:   activity adjusted per tolerance   up ad kalen   up to bedside commode  Taken 3/24/2021 0000 by Herb Angeles RN  Activity Management:   activity adjusted per tolerance   up ad kalen   up to bedside commode  Taken 3/23/2021 2000 by Herb Angeles RN  Activity Management:   activity adjusted per tolerance   up ad kalen     Problem: Infection COPD (Chronic Obstructive Pulmonary Disease)  Goal: Absence of Infection Signs and Symptoms  Outcome: Improving     Problem: Respiratory Compromise COPD (Chronic Obstructive Pulmonary Disease)  Goal: Effective Oxygenation and Ventilation  Outcome: Improving  Intervention: Promote Airway Secretion Clearance  Recent Flowsheet  Documentation  Taken 3/24/2021 0400 by Herb Angeles RN  Activity Management:   activity adjusted per tolerance   up ad kalen   up to bedside commode  Taken 3/24/2021 0000 by Herb Angeles RN  Activity Management:   activity adjusted per tolerance   up ad kalen   up to bedside commode  Taken 3/23/2021 2000 by Herb Angeles RN  Activity Management:   activity adjusted per tolerance   up ad kalen  Intervention: Optimize Oxygenation and Ventilation  Recent Flowsheet Documentation  Taken 3/24/2021 0400 by Herb Angeles RN  Head of Bed (HOB) Positioning: HOB at 30-45 degrees  Taken 3/24/2021 0000 by Herb Angeles RN  Head of Bed (HOB) Positioning: HOB at 30-45 degrees  Taken 3/23/2021 2000 by Herb Angeles RN  Head of Bed (HOB) Positioning: HOB at 30-45 degrees     Problem: Obstructive Sleep Apnea Risk or Actual (Comorbidity Management)  Goal: Unobstructed Breathing During Sleep  Outcome: Improving       Pt remains alert and confused , disoriented to place at times, Bipap continues at 14/5 , Fio2 at 30 percent and Pt maintaining oxygen saturations in the mid to high 90's most of the shift.   Pt is very short of breath with activity and often times at rest as well. Lung sound are diminished with faint expiratory wheezes, cough present with stimulation, no productive sputum.  Pt is up stand by assist of one with no complication. Telemetry shows SR with a rate in the 70's, Toes and fingers remain purplish in color, hands are warm , feet are cool, Lower arms are bruised throughout. Iv remains patent and infusing.   One Bm this shift, Pt was able to sleep throughout most of the shift.   Will continue to monitor respiratory status and follow plan of care.

## 2021-03-24 NOTE — PLAN OF CARE
"  Problem: Respiratory Compromise COPD (Chronic Obstructive Pulmonary Disease)  Goal: Effective Oxygenation and Ventilation  Outcome: No Change  Intervention: Promote Airway Secretion Clearance  Recent Flowsheet Documentation  Taken 3/24/2021 1600 by Kd Weir RN  Breathing Techniques/Airway Clearance:   deep/controlled cough encouraged   pursed-lip breathing encouraged   tripod positioning facilitated  Activity Management:   activity adjusted per tolerance   activity minimized   up to bedside commode  Intervention: Optimize Oxygenation and Ventilation  Recent Flowsheet Documentation  Taken 3/24/2021 1600 by Kd Weir RN  Airway/Ventilation Management:   airway patency maintained   humidification applied   calming measures promoted  Fluid/Electrolyte Management: fluids provided  Head of Bed (HOB) Positioning: HOB at 90 degrees  Taken 3/24/2021 1000 by Kd Weir RN  Airway/Ventilation Management:   airway patency maintained   calming measures promoted   Patient Alert, orientated x3. Gets mixed up with numbers and dates, agitated and became tearful with orientation questions, reassured patient asking to make sure oxygenating and mentating WNL.  Still with some word finding difficulties, and needing help with things like calling , states \"I can't do that, I need help, don't know numbers and it all confuses me\".  VSS. Changed to high flow and tolerating well, still with complaints of dyspnea, but able to sit back in bed more frequently and comfortably compared to bipap.  Requested \"more air\", O2 sats mid 90's, Flow increased with improvement in air hunger complaints.  This am was complaining of \"chest tightness\", resolved on own without intervention.  EKG obtained.  Echo completed.  Up to BSC, dyspneic with any activity.  Hungry, tolerated toast and attempting dinner currently.  Continue to monitor respiratory status.    Kd Weir RN    "

## 2021-03-24 NOTE — PROGRESS NOTES
[facility]    Medicine Progress Note - Hospitalist Service       Date of Admission:  3/23/2021  Assessment & Plan             Preeti Ford is a 70 year old female admitted on 3/23/2021. H/o  chronic hypercapnic respiratory failure : on home oxygen ( not sure how many liters at this time). She has been admitted with worsening sob with cough over last few days. She was found to have COPD exacerbation and is being managed with nebulizer treatments, iv steroids. She has been placed on bi pap support for her respiratory failure.           3/24 :       Sob some better but still has sob  Needing bi pap support  No fevers  Episode of chest pressure this am but no CP at admission and no CP currently  EKG : no ischemic changes, echo : pending  Will check VBG  Continue nebs, iv steroids, iv azithromycin      Not medically ready for discharge at this time.  Still needing bi pap support           A/p :              Acute onset sob : COPD exacerbation.       Acute on chronic hypercapnic respiratory failure : on home oxygen ( not sure how many liters at this time), 2/2 COPD exacerbation, on bi pap.       COPD exacerbation with bronchitis : manage with nebulizer, iv  steroids, iv azithromycin.      Elevated troponin, chronic : no CP at admission but some chest pressure this am. EKG : no ischemic changes, echo results pending.  Unlikely ACS.       Severe pulmonary HTN : 2/2 COPD exacerbation, outpatient f/u       Aortic stenosis , moderate : outpatient cardio f/u       Hypotension : related to dehydration, aortic stenosis, taper off iv fluids. Resolved.       Lactic acidosis, mild : 2/2 dehydration, iv fluids, monitor, resolved.       HLD : on statin       CAD s/p stent  LAD and circumflex 5/2018 : On aspirin, plavix, statin, stable.       PVD s/p aortic iliac bypass : on aspirin, plavix, statin.       Stroke h/o : last 2/2020 : on aspirin, plavix, statin.       HTN, Essential : on losartan 25 mg  daily, metoprolol 12.5 mg bid                     Diet: NPO for Medical/Clinical Reasons Except for: Meds, Ice Chips    DVT Prophylaxis: Enoxaparin (Lovenox) SQ  Barnhart Catheter: not present  Code Status: Full Code           Disposition Plan   Expected discharge: 2 days, recommended to prior living arrangement once respiratory failure resolves.  Entered: Gurpreet Boston MD 03/24/2021, 10:01 AM       The patient's care was discussed with the Bedside Nurse, Care Coordinator/ and Patient.    Gurpreet Boston MD  Hospitalist Service  [facility]  Contact information available via [facility] Paging/Directory    ______________________________________________________________________      Data reviewed today: I reviewed all medications, new labs and imaging results over the last 24 hours. I personally reviewed the EKG tracing showing NSR.    Physical Exam   Vital Signs: Temp: 97.1  F (36.2  C) Temp src: Oral BP: 138/76 Pulse: 74   Resp: 23 SpO2: 98 % O2 Device: BiPAP/CPAP Oxygen Delivery: 35 LPM  Weight: 91 lbs 12.8 oz    GENERAL: The patient is not in any acute distressed. Awake and alert.  HEENT: Nonicteric sclerae, PERRLA, EOMI. Oropharynx clear. Moist mucous membranes. Conjunctivae appear well perfused.  HEART: Regular rate and rhythm without murmurs.  LUNGS: b/l  wheezing present, no crackles.  ABDOMEN: Soft, positive bowel sounds, nontender.  SKIN: No rash, no excessive bruising, petechiae, or purpura.  EXTREMITIES : no rashes, no swelling in legs.  NEUROLOGIC: AxO x 3.  Cranial nerves II-XII intact without motor/sensory deficit.        Data   Recent Labs   Lab 03/24/21  0603 03/23/21  2356 03/23/21  1113   WBC  --   --  5.9   HGB  --   --  15.8*   MCV  --   --  93   PLT  --   --  221   NA  --   --  141   POTASSIUM  --   --  4.3   CHLORIDE  --   --  105   CO2  --   --  33*   BUN  --   --  16   CR  --   --  0.67   ANIONGAP  --   --  3   CALDERON  --   --  9.6   GLC  --   --  98   TROPI  0.522* 0.484* 0.097*

## 2021-03-24 NOTE — PROGRESS NOTES
Bipap continues , FIO2 decreased to 30 percent. Pt maintaining saturations in the high 90's despite this reductions in FIO2. Pt is alert and easily arousible at this time. Provider notified with no new orders at this time.

## 2021-03-24 NOTE — PROGRESS NOTES
S-(situation): Respiratory    B-(background): COPD exacerbation    A-(assessment): Diminished breath sounds throughout all lung fields, need BiPAP to keep 02 sats above 90%, abnormal ABGs, tachypnea, cyanotic nailbeds, severe activity intolerance, must tripod for 5 minutes just for commode transfer, currently testing high flow NC to see if patient can tolerate being of BiPAP    R-(recommendations): Maintain HOB above 30 degrees, continuous pulse ox and cardiac monitor, frequent bathroom checks-urinary urgency, question gently-easily upset about knowledge deficit, I found patient crying because she didn't know how to contact -remind pt how to use phone and clearly write husbands number on whiteboard, VS Q4h, maintain NPO status, strict I/O, UO seems low    SHA FINLEY

## 2021-03-24 NOTE — CONSULTS
CLINICAL NUTRITION SERVICES - ASSESSMENT NOTE     Nutrition Prescription    RECOMMENDATIONS FOR MDs/PROVIDERS TO ORDER:  -ADAT (or if extended biPAP is required, option to do MFS a few times during day)  -consideration of nutrition support in 2-3 days if NPO diet continues   -begin MVI if appropriate with POC    Malnutrition Status:    Severe malnutrition in the context of chronic illness/disease    Recommendations already ordered by Registered Dietitian (RD):  None at this time     Future/Additional Recommendations:  -continue daily weights   -will order MFS once diet advances: CIB TID at all snack times        REASON FOR ASSESSMENT  Preeti Ford is a/an 70 year old female assessed by the dietitian for Admission Nutrition Risk Screen for positive MST score of 2: unsure for wt loss and negative  for poor PO intake R/t decreased appetite. However reports poor PO intake related to breathing difficulty  and RN Consult - unplanned wt loss     NUTRITION HISTORY  Information obtained from EMR:     Principle problem: acute onset SOB- COPD exacerbation    Active problems: acute on chronic hypercapnic respiratory failure, COPD exacerbation with bronchitis, elavated troponin-chronic, pulmonary HTN, hypotension, HLD, CAD s/p stent LAD and circumflex 2018, stroke h/o, HTN     -reason for admission rapidly progressed over the course of 1 day  -malnutrition h/o 2018  -current smoker working on quitting  -has been trying to put on wt. Has good appetite and eats but doesn't eat a lot because she gets full. Will graze on food throughout the day. Drinking CIB- per MD PCP note 12/2/2020      Information obtained from Pt:   -brief encounter due to recent hi-flow O2 trial from biPAP  -Pt curled forward, sitting up on bed. Pleasant, hungry and ready to eat and begin CIB  -open to further education during current admission to support wt gain and adequate nutrition    Information obtained from Pt's  (Bud) via phone:   -cant  "seem to put weight on (tried everything)  -meals of wheels pt doesn't enjoy but will eat a bit  - tries to have 3 meals per day for patient however pt gets pretty full after just one meal consumed  -CIB once daily PTA.  asked about doing more, writer agreed 2-3 per day would be better in additional (not to replace) foods being eaten. Also discussed a more calorically dense option that would be less volume and more nutrition   -pt's  has been working hard to try and get pt to gain some wt and consume more nutrition and feeling somewhat at a roadblock on what else to do. RD will talk with pt once able and provide handouts/resources as well as contact information for after discharge       CURRENT NUTRITION ORDERS  Diet: Orders Placed This Encounter      NPO for Medical/Clinical Reasons Except for: Meds, Ice Chips      Intake/Tolerance: none since admission    LABS  Labs reviewed  Sodium (mmol/L)   Date Value   03/23/2021 141     Potassium (mmol/L)   Date Value   03/23/2021 4.3     Troponin I ES (ug/L)   Date Value   03/24/2021 0.522 (HH)     PCO2 Venous (mm Hg)   Date Value   03/24/2021 56 (H)         MEDICATIONS  Medications reviewed  Current Facility-Administered Medications   Medication     atorvastatin (LIPITOR)      clopidogrel (PLAVIX)      enoxaparin ANTICOAGULANT (LOVENOX)      guaiFENesin (MUCINEX)      losartan (COZAAR)      methylPREDNISolone sodium succinate (solu-MEDROL)      metoprolol tartrate (LOPRESSOR)      montelukast (SINGULAIR)      sodium chloride 0.9% infusion at 50 mL/hr         ANTHROPOMETRICS  Height: 160 cm (5' 3\")  Most Recent Weight: 41.6 kg (91 lb 12.8 oz)    IBW: 52.2 kg  BMI: 16.26 kh/m^2     Underweight BMI <18.5  Weight History:   Wt Readings from Last 8 Encounters:   03/23/21 41.6 kg (91 lb 12.8 oz)   12/02/20 40.9 kg (90 lb 4 oz)   11/09/20 40.7 kg (89 lb 11.2 oz)   05/18/20 45.1 kg (99 lb 8 oz)   05/11/20 45.4 kg (100 lb)   05/07/20 47.1 kg (103 lb 14.4 oz) "   05/02/20 46.5 kg (102 lb 8 oz)   02/14/20 45.4 kg (100 lb 1.6 oz)   -wt stable for just under 4 months   -9 lb wt loss in 10 months (9%) - not clinically significant       Dosing Weight: 41.6 kg (CBW)    ASSESSED NUTRITION NEEDS  Estimated Energy Needs: 4030-5980+ kcals/day (30 - 35+ kcals/kg )  Justification: Repletion and Underweight  Estimated Protein Needs: 50-62 grams protein/day (1.2 - 1.5 grams of pro/kg)  Justification: underweight, Increased needs and Repletion  Estimated Fluid Needs: 1810-5897 mL/day (1 mL/kcal)   Justification: Maintenance and Per provider pending fluid status    PHYSICAL FINDINGS  See malnutrition section below.  Poor dentition  Poor skin turgor   -chart note: chronically ill appearing with low BMI    MALNUTRITION  % Intake: < 75% for >/= 3 months (non-severe)  %intake: </= 50% for >/= 5 days (severe)  % Weight Loss: Weight loss does not meet criteria  Subcutaneous Fat Loss: Upper arm:  severe and Thoracic/intercostal:  moderate  Muscle Loss: Temporal:  moderate, Scapular bone:  severe, Thoracic region (clavicle, acromium bone, deltoid, trapezius, pectoral):  severe, Upper arm (bicep, tricep):  severe, Dorsal hand:  severe and Posterior calf:  moderate  Fluid Accumulation/Edema: None noted  Malnutrition Diagnosis: Severe malnutrition in the context of chronic illness/disease    NUTRITION DIAGNOSIS  Inadequate oral intake related to biPAP/NPO diet order as evidenced by pt requiring biPAP due to SOB and COPD exacerbation       INTERVENTIONS  Implementation  Nutrition Education: Not appropriate at this time due to patient condition   -will attempt once pt transitions off of biPAP  -education briefly given to spouse on quantity of MFS daily and different brand options    Collaboration with other providers- ADAT  Medical food supplement therapy- future  Modify composition of meals/snacks- future (smaller portion size)  Multivitamin/mineral supplement therapy- requested  Nutrition education  for nutrition relationship to health/disease- today, future for pt     Goals  Diet adv v nutrition support within 2-3 days.     Monitoring/Evaluation  Progress toward goals will be monitored and evaluated per protocol, weight, food intake, fluid intake      Sonia Rosenberg MBA, RD LD  Clinical Dietitian  [facility] office [phone]  on-call weekend pager [phone]

## 2021-03-24 NOTE — PROVIDER NOTIFICATION
S-(situation): critical lab    B-(background): COPD exacerbation, bipap    A-(assessment): patient on BiPAP 14/5 30% rate 14, she is very dyspneic with out BiPAP and even short of with any activity on BiPAP. Troponin 0.484 at midnight, added on a troponin to am labs and that was 0.097.     R-(recommendations): MD aware and will continue to monitor at this time

## 2021-03-25 NOTE — PLAN OF CARE
Problem: Respiratory Compromise COPD (Chronic Obstructive Pulmonary Disease)  Goal: Effective Oxygenation and Ventilation  Outcome: No Change  Intervention: Promote Airway Secretion Clearance  Recent Flowsheet Documentation  Taken 3/25/2021 1700 by Kd Weir RN  Cough And Deep Breathing: done independently per patient  Activity Management:   up in chair   activity adjusted per tolerance  Taken 3/25/2021 1200 by Kd Weir RN  Breathing Techniques/Airway Clearance:   deep/controlled cough encouraged   pursed-lip breathing encouraged   tripod positioning facilitated  Activity Management: up in chair  Taken 3/25/2021 0900 by Kd Weir RN  Activity Management: activity adjusted per tolerance  Taken 3/25/2021 0800 by Kd Weir RN  Breathing Techniques/Airway Clearance:   deep/controlled cough encouraged   pursed-lip breathing encouraged   tripod positioning facilitated  Activity Management: up in chair  Intervention: Optimize Oxygenation and Ventilation  Recent Flowsheet Documentation  Taken 3/25/2021 1700 by Kd Weir RN  Airway/Ventilation Management: calming measures promoted  Fluid/Electrolyte Management: fluids provided  Head of Bed (HOB) Positioning: HOB at 60-90 degrees  Taken 3/25/2021 1200 by Kd Weir RN  Airway/Ventilation Management:   airway patency maintained   calming measures promoted   humidification applied   pulmonary hygiene promoted  Fluid/Electrolyte Management: fluids provided  Taken 3/25/2021 0900 by Kd Weir RN  Head of Bed (HOB) Positioning: HOB at 60-90 degrees  Taken 3/25/2021 0800 by Kd Weir RN  Airway/Ventilation Management:   airway patency maintained   calming measures promoted   humidification applied   pulmonary hygiene promoted  Fluid/Electrolyte Management: fluids provided   Patient complaints of significant SOA this am, High flow, flow adjusted up to 60 LPM.  VSS. Denies any pain.  Took a 2 hour nap after breakfast and woke with less  SOA.  Up in chairs for all meals. Dyspneic with activity. Put on nasal cannula to transport to CT this afternoon, and tolerated well. Maintaining on nasal cannula. Morphine given x2 with relief for air hunger. This afternoon inadvertently gave 2mg Morphine IV instead of ordered 0.4mg, MD aware, patient tolerated very well and reported relief with work of breathing. Transferred to [facility] med/surg via chair.  Spoke with  Bud and he is going to load up her bipap machine and bring to hospital tonight.  Tearful and stating she wants to go home, willing to stay a day or two tops she states.  Reassurance and listening provided.    Kd Weir RN

## 2021-03-25 NOTE — PROGRESS NOTES
CLINICAL NUTRITION SERVICES-Brief Note    Diet advanced from NPO and pt is now able to consume foods- RD ordered MFS mentioned in full assessment note yesterday.     [place] Instant Breakfast- TID at all snacks mixed with 2% or whole milk       Labs: reviewed  -all refeed labs are WNL: K, Mg, Phos      CLINICAL NUTRITION SERVICES - EDUCATION     ?  Previous diet instructions:  -no previous RD education for increase kcal/pro for wt gain, but has been using MFS in past hospital encounters and at home (CIB once daily)     ?  NUTRITION DIAGNOSIS:  Food- and nutrition-related knowledge deficit related to Weight gain as evidenced by pt and pt's  request for nutrition education related to weight gain, BMI 16.78 kg/m^2 due to COPD and challenge of eating adequately with breathing and SOB, wt loss from ~100 lbs mid 2020 that has never been regained, confusion on what MFS is most nutrient dense for kcals/pro      INTERVENTIONS:  Provided instruction on how to consume adequate kcals and protein, tips for consuming meals and snacks, discussing high fat and protein food items, adding fats and proteins to already prepared MOWs    Provided  the following handouts: Suggestions for Increasing Calories and Protein, High Calorie Beverage Recipes and Medical Food Supplements Available for Retail    Goals:  Patient verbalizes understanding of diet by agreeing to use CIB or another MFS between meals TID upon discharge     Follow Up:  Patient to ask any further nutrition-related questions before discharge. In addition, pt may request outpatient RD appointment.       Sonia Rosenberg MBA, RD LD  Clinical Dietitian  [facility] office [phone]  on-call weekend pager [phone]

## 2021-03-25 NOTE — PROGRESS NOTES
[facility]    Medicine Progress Note - Hospitalist Service       Date of Admission:  3/23/2021  Assessment & Plan             Preeti Ford is a 70 year old female admitted on 3/23/2021. H/o  chronic hypercapnic respiratory failure : on home oxygen ( not sure how many liters at this time). She has been admitted with worsening sob with cough over last few days. She was found to have COPD exacerbation and is being managed with nebulizer treatments, iv steroids. She has been placed on bi pap support for her respiratory failure and then transitioned to high flow oxygen.           3/25 :       Sob some better but still has sob  Off  bi pap support and on high flow oxygen - Fio2  25%, 50 liters  No fevers  No cp  EKG : no ischemic changes, echo : normal  Monitor  VBG's  Continue nebs, iv steroids, iv azithromycin  Will do CT chest to r/o PE  Discussed with cardiology on call at [facility] and unlikely that patient had ACS and can f/u cardiology as outpatient with outpatient stress test.      Not medically ready for discharge at this time.  Still needing high flow oxygen           A/p :              Acute onset sob : COPD exacerbation.       Acute on chronic hypercapnic respiratory failure : on home oxygen ( not sure how many liters at this time), 2/2 COPD exacerbation, on bi pap.       COPD exacerbation with bronchitis : manage with nebulizer, iv  steroids, iv azithromycin.      Elevated troponin, chronic : no CP at admission but some chest pressure this am. EKG : no ischemic changes, echo results pending.  Unlikely ACS.       Severe pulmonary HTN : 2/2 COPD exacerbation, outpatient f/u       Aortic stenosis , moderate : outpatient cardio f/u       Hypotension : related to dehydration, aortic stenosis, taper off iv fluids. Resolved.       Lactic acidosis, mild : 2/2 dehydration, iv fluids, monitor, resolved.       HLD : on statin       CAD s/p stent  LAD and circumflex 5/2018 : On aspirin,  plavix, statin, stable.       PVD s/p aortic iliac bypass : on aspirin, plavix, statin.       Stroke h/o : last 2/2020 : on aspirin, plavix, statin.       HTN, Essential : on losartan 25 mg daily, metoprolol 12.5 mg bid                     Diet: Low Saturated Fat Na <2400 mg  Snacks/Supplements Adult: Other; CIB; Between Meals    DVT Prophylaxis: Enoxaparin (Lovenox) SQ  Barnhart Catheter: not present  Code Status: Full Code           Disposition Plan   Expected discharge: 2 days, recommended to prior living arrangement once respiratory failure resolves.  Entered: Gurpreet Boston MD 03/25/2021, 11:56 AM       The patient's care was discussed with the Bedside Nurse, Care Coordinator/ and Patient.    Gurpreet Boston MD  Hospitalist Service  [facility]  Contact information available via [facility] Paging/Directory    ______________________________________________________________________      Data reviewed today: I reviewed all medications, new labs and imaging results over the last 24 hours. I personally reviewed the EKG tracing showing NSR.    Physical Exam   Vital Signs: Temp: 97.8  F (36.6  C) Temp src: Oral BP: 134/70 Pulse: 102   Resp: 29 SpO2: 98 % O2 Device: High Flow Nasal Cannula (HFNC) Oxygen Delivery: 50 LPM  Weight: 94 lbs 11.2 oz    GENERAL: The patient is not in any acute distressed. Awake and alert.  HEENT: Nonicteric sclerae, PERRLA, EOMI. Oropharynx clear. Moist mucous membranes. Conjunctivae appear well perfused.  HEART: Regular rate and rhythm without murmurs.  LUNGS: b/l  wheezing present, no crackles.  ABDOMEN: Soft, positive bowel sounds, nontender.  SKIN: No rash, no excessive bruising, petechiae, or purpura.  EXTREMITIES : no rashes, no swelling in legs.  NEUROLOGIC: AxO x 3.  Cranial nerves II-XII intact without motor/sensory deficit.        Data   Recent Labs   Lab 03/25/21  0529 03/24/21  1153 03/24/21  0603 03/23/21  2356 03/23/21  1113   WBC  --   --   --    --  5.9   HGB  --   --   --   --  15.8*   MCV  --   --   --   --  93   PLT  --   --   --   --  221   NA  --   --   --   --  141   POTASSIUM 4.2  --   --   --  4.3   CHLORIDE  --   --   --   --  105   CO2  --   --   --   --  33*   BUN  --   --   --   --  16   CR 0.59  --   --   --  0.67   ANIONGAP  --   --   --   --  3   CALDERON  --   --   --   --  9.6   GLC  --   --   --   --  98   TROPI  --  0.551* 0.522* 0.484* 0.097*

## 2021-03-25 NOTE — PLAN OF CARE
Problem: Adjustment to Illness COPD (Chronic Obstructive Pulmonary Disease)  Goal: Optimal Chronic Illness Coping  Outcome: No Change     Problem: Functional Ability Impaired COPD (Chronic Obstructive Pulmonary Disease)  Goal: Optimal Level of Functional [place]  Outcome: No Change  Intervention: Optimize Functional Ability  Recent Flowsheet Documentation  Taken 3/25/2021 0400 by Herb Angeles RN  Activity Management: activity adjusted per tolerance  Taken 3/25/2021 0000 by Herb Angeles RN  Activity Management: activity adjusted per tolerance  Taken 3/24/2021 2000 by Herb Angeles RN  Activity Management: activity adjusted per tolerance     Problem: Adult Inpatient Plan of Care  Goal: Plan of Care Review  Outcome: Improving  Goal: Absence of Hospital-Acquired Illness or Injury  Outcome: Improving  Intervention: Identify and Manage Fall Risk  Recent Flowsheet Documentation  Taken 3/25/2021 0400 by Herb Angeles RN  Safety Promotion/Fall Prevention:   activity supervised   bed alarm on   fall prevention program maintained   increased rounding and observation   nonskid shoes/slippers when out of bed   room near nurse's station   room organization consistent   safety round/check completed   supervised activity  Taken 3/25/2021 0000 by Herb Angeles RN  Safety Promotion/Fall Prevention:   activity supervised   bed alarm on   fall prevention program maintained   increased rounding and observation   nonskid shoes/slippers when out of bed   room near nurse's station   room organization consistent   safety round/check completed   supervised activity  Taken 3/24/2021 2000 by Herb Angeles RN  Safety Promotion/Fall Prevention:   activity supervised   bed alarm on   fall prevention program maintained   increased rounding and observation   nonskid shoes/slippers when out of bed   room near nurse's station   room organization consistent   safety round/check completed   supervised activity  Intervention:  Prevent Skin Injury  Recent Flowsheet Documentation  Taken 3/25/2021 0400 by Herb Angeles RN  Body Position: position changed independently  Taken 3/25/2021 0000 by Herb Angeles RN  Body Position: position changed independently  Taken 3/24/2021 2000 by Herb Angeles RN  Body Position: position changed independently  Intervention: Prevent and Manage VTE (Venous Thromboembolism) Risk  Recent Flowsheet Documentation  Taken 3/25/2021 0400 by Herb Angeles RN  VTE Prevention/Management: anticoagulant therapy maintained  Taken 3/25/2021 0000 by Herb Angeles RN  VTE Prevention/Management: anticoagulant therapy maintained  Taken 3/24/2021 2000 by Herb Angeles RN  VTE Prevention/Management: anticoagulant therapy maintained  Goal: Optimal Comfort and Wellbeing  Outcome: Improving  Goal: Readiness for Transition of Care  Outcome: Improving     Problem: Infection COPD (Chronic Obstructive Pulmonary Disease)  Goal: Absence of Infection Signs and Symptoms  Outcome: Improving     Problem: Oral Intake Inadequate COPD (Chronic Obstructive Pulmonary Disease)  Goal: Improved Nutrition Intake  Outcome: Improving     Problem: Respiratory Compromise COPD (Chronic Obstructive Pulmonary Disease)  Goal: Effective Oxygenation and Ventilation  Outcome: Improving  Intervention: Promote Airway Secretion Clearance  Recent Flowsheet Documentation  Taken 3/25/2021 0400 by Herb Angeles RN  Cough And Deep Breathing: done independently per patient  Activity Management: activity adjusted per tolerance  Taken 3/25/2021 0000 by Herb Angeles RN  Cough And Deep Breathing: done independently per patient  Activity Management: activity adjusted per tolerance  Taken 3/24/2021 2000 by Herb Angeles RN  Cough And Deep Breathing: done independently per patient  Activity Management: activity adjusted per tolerance  Intervention: Optimize Oxygenation and Ventilation  Recent Flowsheet Documentation  Taken 3/25/2021 0400 by Herb Angeles  "RN  Head of Bed (HOB) Positioning: HOB at 30-45 degrees  Taken 3/25/2021 0000 by Herb Angeles RN  Head of Bed (HOB) Positioning: HOB at 30-45 degrees  Taken 3/24/2021 2000 by Herb Angeles RN  Head of Bed (HOB) Positioning: HOB at 30-45 degrees     Problem: COPD Comorbidity  Goal: Maintenance of COPD Symptom Control  Outcome: Improving     Problem: Obstructive Sleep Apnea Risk or Actual (Comorbidity Management)  Goal: Unobstructed Breathing During Sleep  Outcome: Improving     Problem: Violence Risk or Actual  Goal: Anger and Impulse Control  Outcome: Improving     High flow nasal canula continues with Fio2 at 25 percent and flow at 30 LPM, this has been the setting for the greater part of the shift and Pt has maintained oxygen saturations greater than 90 for most of the shift.  Pt remains alert and confused at times, is very easily excitable and very anxious with activity and most interactions with staff. Pt continues to have trouble word finding which makes it difficult to help her in times of high anxiety. At times Pt is worked up and anxious she states \"I can not breath\" , oxygen saturations remain 90 or greater at these times. Pt does continues to desat with activity, but not often at rest. Pt was able to sleep comfortably through out half of the shift.   One time IV ativan given for anxiety this morning and thus far have provided weak results as frequent anxiety continues.  Lung are diminished with expiratory wheezes.   New Iv placed due to complications with infusion in the other Iv in the AC.   Oral hydralazine given for high blood pressure this morning.   Pt does use the call light as advised and is up with assist of one.   Telemetry shows SR.   Will continue to monitor respiratory states and follow plan of care.   " 23.6

## 2021-03-25 NOTE — PROGRESS NOTES
Care management note:    Left message with patient's .  No return call.  No contact with patient today, due to confusion, expressive aphasia and anxiety.  Care management to complete full assessment when able.      Per charge RN, patient and family need assistance with affording boost/meal supplemented.      Zeynep BERNALN RN  Inpatient Care Coordinator  [facility] [phone]  [facility] [phone]

## 2021-03-25 NOTE — PROGRESS NOTES
03/25/21 0800   Quick Adds   Type of Visit Initial PT Evaluation       Present no   Living Environment   People in home spouse   Current Living Arrangements house   Home Accessibility stairs to enter home   Number of Stairs, Main Entrance 3   Stair Railings, Main Entrance none   Transportation Anticipated family or friend will provide   Living Environment Comments reports single level living, walk in shower and sleeps in the recliner or bed   Self-Care   Usual Activity Tolerance moderate   Current Activity Tolerance fair   Regular Exercise No   Equipment Currently Used at Home none   Activity/Exercise/Self-Care Comment reports has a walker at home   Disability/Function   Hearing Difficulty or Deaf no   Wear Glasses or Blind no   Concentrating, Remembering or Making Decisions Difficulty no   Difficulty Communicating yes   Communication difficulty speaking   Communication Management expressive aphasia   Difficulty Eating/Swallowing no   Walking or Climbing Stairs Difficulty no  (limited activity tolerance)   Dressing/Bathing Difficulty no   Toileting issues no   Doing Errands Independently Difficulty (such as shopping) no   Fall history within last six months no   General Information   Onset of Illness/Injury or Date of Surgery 03/23/21   Referring Physician Dr. Boston   Patient/Family Therapy Goals Statement (PT) home   Pertinent History of Current Problem (include personal factors and/or comorbidities that impact the POC) Patient is a 70 year old female, admitted from the ED due to SOB, patient placed on BiPAP in the ICU and diagnosed with COPD exacerbation, acute on chronic hypercapnic respiratory failure, severe pulmonary hypotension, aortic stenosis, and lactic acidosis. Patient with a previous medical history of malnutrition, vertigo, COPD, seizures, CAD with STEMI and stents, CVA in 2013 and 2018, HTN, pulmonary emphysema, CHF, ischemic cardiomyopathy, GERD, smoker, arthritis, urinary  incontinence, hyperlipidemia, and expressive aphasia.    Existing Precautions/Restrictions fall   Weight-Bearing Status - LUE full weight-bearing   Weight-Bearing Status - RUE full weight-bearing   Weight-Bearing Status - LLE full weight-bearing   Weight-Bearing Status - RLE full weight-bearing   General Observations PT orders: eval and treat deconditioning. Activity: ambulate with assistance   Cognition   Orientation Status (Cognition) oriented to;person   Affect/Mental Status (Cognition) flat/blunted affect;low arousal/lethargic   Follows Commands (Cognition) WFL   Pain Assessment   Patient Currently in Pain No   Integumentary/Edema   Integumentary/Edema Comments diffuse ecchymosis throughout   Posture    Posture Forward head position;Kyphosis;Protracted shoulders   Posture Comments preferred postural postion is trunk flexed elbows on knees tripod   Range of Motion (ROM)   ROM Quick Adds ROM WFL   Strength   Strength Comments generalized weakness with low level activity tolerance. due to SOA did not complete break muscle testing   Bed Mobility   Bed Mobility rolling right;rolling left;scooting/bridging;supine-sit;sit-supine   Rolling Left [place] (Bed Mobility) independent   Rolling Right [place] (Bed Mobility) independent   Scooting/Bridging [place] (Bed Mobility) independent   Supine-Sit [place] (Bed Mobility) independent   Sit-Supine [place] (Bed Mobility) independent   Transfers   Transfers bed-chair transfer;sit-stand transfer   Impairments Contributing to Impaired Transfers decreased strength   Bed-Chair Transfer   Bed-Chair [place] (Transfers) modified independence   Assistive Device (Bed-Chair Transfers)   (surface seeking with use of recliner arm rest)   Bed/Chair Transfer Comments completed 180deg turn prior to sitting   Sit-Stand Transfer   Sit-Stand [place] (Transfers) supervision   Sit/Stand Transfer Comments moves quickly, no LOB   Gait/Stairs (Locomotion)   Comment  (Gait/Stairs) did not progress due to desaturation   Balance   Balance Comments heavy reliance on surfaces for mobility indicate balance impairement. As medically improves will further assess DME needs    Clinical Impression   Criteria for Skilled Therapeutic Intervention yes, treatment indicated   PT Diagnosis (PT) deconditioned, muscle weakness, impaired balance, impaired gait   Influenced by the following impairments chronic and acute medical status   Functional limitations due to impairments desaturation with functional mobility. decreased activity tolerance.    Clinical Presentation Evolving/Changing   Clinical Presentation Rationale oxygen demands, desaturation with low level activity, clinical judgement, medical status   Clinical Decision Making (Complexity) moderate complexity   Therapy Frequency (PT) Daily   Predicted Duration of Therapy Intervention (days/wks) 3-4 days   Planned Therapy Interventions (PT) bed mobility training;balance training;gait training;home exercise program;ROM (range of motion);strengthening;patient/family education;transfer training   Anticipated Equipment Needs at Discharge (PT)   (to determine with future assessment)   Risk & Benefits of therapy have been explained evaluation/treatment results reviewed;care plan/treatment goals reviewed;risks/benefits reviewed;participants included;patient   Clinical Impression Comments Patient presents with signs and symptoms consistent with COPD exacerbation. Patient desating to 86% on 30 HiFlo with increased work of breathing. Patient with globally low activity tolerance and overall requiring frequent rest breaks in the tripod positon with attempt to initiate HEP. Patient would benefit from skilled PT intervention to address acute medical status and progress her functional mobility towards greater function and safety in order to determine discharge needs.   PT Discharge Planning    PT Discharge Recommendation (DC Rec)   (too early in patient's  course to determine)   PT Rationale for DC Rec From home with , stairs to enter, uses a walker and is on O2 at baseline. Patient currently requiring increased oxygen support and desating to 86% with bed to chair transfer. Patient would benefit from skilled PT intervention to address acute medical status and progress her functional mobility towards greater function and safety in order to determine discharge needs.   PT Brief overview of current status  IND bed mobility. surface seeking bed to chair transfer. Desat 86% on HiFlo 86% with transfer and ankle pumps.    Total Evaluation Time   Total Evaluation Time (Minutes) 15     Thank you for your referral.  Zhane Bashir, PT, DPT, ATC    [facility]  O: [phone]  E: [email or web address]

## 2021-03-25 NOTE — PLAN OF CARE
S-(situation): Initial IP OT orders    B-(background): OT order acknowledged: Gurpreet Boston MD on 03/25/21 for Deconditioning. Currently seeing IP PT.   Patient is a 70 year old female, admitted from the ED due to SOB, patient placed on BiPAP in the ICU and diagnosed with COPD exacerbation, acute on chronic hypercapnic respiratory failure, severe pulmonary hypotension, aortic stenosis, and lactic acidosis. Patient with a previous medical history of malnutrition, vertigo, COPD, seizures, CAD with STEMI and stents, CVA in 2013 and 2018, HTN, pulmonary emphysema, CHF, ischemic cardiomyopathy, GERD, smoker, arthritis, urinary incontinence, hyperlipidemia, and expressive aphasia. On falls precaution.    A-(assessment): No scheduling availability for OT evaluation on this date.      R-(recommendations): Plan to evaluate 3/26/21.     Thank you for your referral.     PINA Jauregui/L  [facility]  Occupational Therapy     Phone: [phone]  Email: [email or web address]

## 2021-03-26 PROBLEM — E87.20 LACTIC ACIDOSIS: Status: ACTIVE | Noted: 2021-01-01

## 2021-03-26 PROBLEM — J20.9 ACUTE BRONCHITIS: Status: ACTIVE | Noted: 2021-01-01

## 2021-03-26 PROBLEM — I95.9 HYPOTENSION: Status: ACTIVE | Noted: 2021-01-01

## 2021-03-26 NOTE — PROGRESS NOTES
DATE:  3/26/2021   TIME OF RECEIPT FROM LAB:  7969  LAB TEST:  Troponin  LAB VALUE:  0.406  RESULTS GIVEN WITH READ-BACK TO (PROVIDER):  DR. Sagrario Laird  TIME LAB VALUE REPORTED TO PROVIDER:   7739

## 2021-03-26 NOTE — PLAN OF CARE
Pt is very anxious and reports dyspnea continuously. Nebs and IV morphine being given frequently, yet pt reports no relief. VSS. O2 97-95% on room air.

## 2021-03-26 NOTE — PROGRESS NOTES
[facility]    Medicine Progress Note - Hospitalist Service       Date of Admission:  3/23/2021  Assessment & Plan      Patient is a 70-year-old female with past medical history of O2 dependent COPD who presents with acute exacerbation initially requiring BiPAP support.  She has had progressive improvement and has been weaned off high flow nasal cannula since yesterday evening but continues to have significant inspiratory and expiratory wheeze, severe activity intolerance, and ongoing sensation of air hunger and requires ongoing hospitalization.    Principal Problem:    Acute on chronic respiratory failure with hypoxia and hypercapnia    Assessment: Secondary to COPD exacerbation, initially required BiPAP and transition to high flow nasal cannula oxygen and it has now been weaned back to her home 1 to 2 L nasal cannula since last evening but with ongoing wheezing, activity intolerance, tachypnea    Plan: Decrease Solu-Medrol to 40 mg every 12 hours, increase duo nebs to every 4 hours scheduled with increasing albuterol to every 2 hours as needed for wheezing/shortness of breath.  Continue azithromycin for possible bronchitis etiology.  Anticipate 1-3 more days of ongoing hospitalization until respiratory symptoms continue to improve and patient can discharge home with home care services and family support.  Continue to wean O2 supplementation to avoid hypercapnia.    Active Problems:    COPD exacerbation, Chronic O2 Dependent    Assessment: Causing acute respiratory failure as above, slowly improving    Plan: Proceed with plan as outlined above      Hypotension    Assessment: Present initially and thought secondary to dehydration with some concern for worsening aortic stenosis contributing but blood pressures have been slightly hypertensive in the last 24 hours    Plan: Patient has been restarted on her losartan 25 mg daily and metoprolol 12.5 mg twice daily -we will continue to  monitor for blood pressure improvement as these take effect.      Lactic acidosis    Assessment: Present initially and thought secondary to acute respiratory failure though there is concern for possible bronchitis.  Lactic acid has now normalized    Plan: Continue with treatment as above, no further routine lactic acid will be drawn given frequency of nebulization causing artificial elevation      Acute bronchitis    Assessment: Suspected based on clinical history, patient on azithromycin    Plan: Continue with a azithromycin for 5-day course completion, proceed with plan as outlined above      Elevated troponin    Assessment: Patient did have mild elevation of her troponins up to the 0.4-0.5 range, thought likely demand ischemia from acute respiratory failure with echocardiogram showing no wall motion abnormality.  Patient is known to be a chronic troponin spill her as well, however normally that is in the 0.07 range    Plan: We will recheck troponin today and plan to monitor daily to ensure slow improvement as respiratory failure continues to improve.  Patient would benefit from outpatient cardiology evaluation given her worsening aortic stenosis      Severe malnutrition - in the context of chronic illness/disease    Assessment: Noted by nutritionist, patient is trying to increase her oral intake and comply with supplementation recommendations    Plan; continue to monitor, appreciate nutritionist ongoing recommendations and evaluation      Hyperlipidemia LDL goal <130    Assessment: On atorvastatin    Plan: Continue home regimen without change      History of stroke - right thalamus in 8/2013 and left cerebellar and right vermis in 3/2018    Assessment: Patient has residual word finding difficulty and some balance impairment secondary to the strokes, no focal neurological deficits during the stay    Plan: Continue with aspirin, Plavix, atorvastatin, and improving blood pressure control as above      PAD  (peripheral artery disease) (H) - moderate left common carotid stenosis, aortoiliac bypass, chronic left vertebral and right posterior cerebral stenoses    Assessment: Patient symptom free status post bypass, on aspirin, Plavix, and statin    Plan: Continue home regimen without change      Coronary artery disease involving native coronary artery - STEMI with LAD and circ stents in 8/2015; ACS Non-STEMI with mid circumflex stent 5/2018    Assessment: Patient does have elevated troponin but no symptoms concerning for angina on echocardiogram not showing any wall motion abnormality during the stay.    Plan: Continue with home regimen of aspirin, Plavix, statin, and blood pressure control      Mild to moderate pulmonary hypertension (H)    Assessment: Noted on echocardiogram, likely secondary to severe COPD at baseline    Plan: No acute intervention needed, ongoing O2 supplementation as appropriate      Nonrheumatic aortic valve stenosis - moderate to severe    Assessment: Noted on echocardiogram, and has progressed significantly since last echocardiogram in 2020    Plan: No acute intervention is needed however patient would benefit from outpatient cardiology follow-up for further discussion and evaluation       Diet: Low Saturated Fat Na <2400 mg  Snacks/Supplements Adult: Other; CIB; Between Meals    DVT Prophylaxis: Enoxaparin (Lovenox) SQ  Barnhart Catheter: not present  Code Status: Full Code           Disposition Plan   Expected discharge: 2 - 3 days, recommended to prior living arrangement with home care services once Respiratory failure continues to improve, activity tolerance increases, safe disposition plan is in place.  Entered: Deisy Laird MD 03/26/2021, 7:40 AM       The patient's care was discussed with the Bedside Nurse, Patient and Patient's Family.    Deisy Laird MD  Hospitalist Service  [facility]  Contact information available via [facility]  Hina/y    ______________________________________________________________________    Interval History   Patient remains persistently hypertensive but heart rate is normal and patient afebrile.  She continues to have significant sensation of air hunger despite having O2 supplementation in the mid to upper 90 range and has ranged anywhere from 1/2 L to 2 L to maintain saturations in the low 90s over the past 24 hours.  Ativan and morphine given last evening for air hunger did seem to benefit the patient and she was able to sleep well overnight.  This morning she is having increasing work of breathing and increased wheezing and she feels slightly worse than she did last evening but states she is much improved since time of admission.  She denies any new symptoms.  No new nursing concerns.    Data reviewed today: I reviewed all medications, new labs and imaging results over the last 24 hours.    Physical Exam   Vital Signs: Temp: 98  F (36.7  C) Temp src: Oral BP: (!) 148/66 Pulse: 84   Resp: 20 SpO2: 99 % O2 Device: Nasal cannula Oxygen Delivery: 1/2 LPM  Weight: 94 lbs 11.2 oz  Constitutional: awake, alert, cooperative, no apparent distress, and appears stated age  Respiratory: Patient has ongoing mild tachypnea with respiratory rate in the low 20s, patient has inspiratory expiratory wheezing with significant diminished air movement diffusely, no crackles auscultated  Cardiovascular: Regular rate and rhythm -difficult to assess for murmur given severity of wheezing currently present  GI: Bowel sounds present, abdomen soft and nontender  Skin: no redness, warmth, or swelling and no rashes  Musculoskeletal: no lower extremity pitting edema present  Neurologic: Awake, alert, oriented to name, place and overall to situation.  She does appear very anxious at baseline    Data   Recent Labs   Lab 03/26/21  0615 03/25/21  0529 03/24/21  1153 03/24/21  0603 03/23/21  1113 03/23/21  1113   WBC 6.9  --   --   --   --   5.9   HGB 12.4  --   --   --   --  15.8*   MCV 93  --   --   --   --  93     --   --   --   --  221     --   --   --   --  141   POTASSIUM 4.5 4.2  --   --   --  4.3   CHLORIDE 110*  --   --   --   --  105   CO2 32  --   --   --   --  33*   BUN 27  --   --   --   --  16   CR 0.60 0.59  --   --   --  0.67   ANIONGAP 1*  --   --   --   --  3   CALDERON 8.9  --   --   --   --  9.6   *  --   --   --   --  98   TROPI 0.406*  --  0.551* 0.522*   < > 0.097*    < > = values in this interval not displayed.

## 2021-03-26 NOTE — PLAN OF CARE
Problem: Adjustment to Illness COPD (Chronic Obstructive Pulmonary Disease)  Goal: Optimal Chronic Illness Coping  Outcome: No Change     Problem: Functional Ability Impaired COPD (Chronic Obstructive Pulmonary Disease)  Goal: Optimal Level of Functional [place]  Outcome: No Change     Problem: Violence Risk or Actual  Goal: Anger and Impulse Control  Outcome: No Change     Problem: Adult Inpatient Plan of Care  Goal: Plan of Care Review  Outcome: Improving  Goal: Patient-Specific Goal (Individualized)  Description: Patient will be able to tolerate ambulating 50 ft and maintain O2 sats >89% without increased dyspnea prior to discharge.  Outcome: Improving  Goal: Absence of Hospital-Acquired Illness or Injury  Outcome: Improving  Intervention: Identify and Manage Fall Risk  Recent Flowsheet Documentation  Taken 3/26/2021 0110 by Herb Angeles RN  Safety Promotion/Fall Prevention:   activity supervised   bed alarm on   clutter free environment maintained   fall prevention program maintained   increased rounding and observation   increase visualization of patient   lighting adjusted   nonskid shoes/slippers when out of bed   room near nurse's station   room organization consistent   safety round/check completed   supervised activity  Taken 3/25/2021 2000 by Herb Angeles RN  Safety Promotion/Fall Prevention:   activity supervised   bed alarm on   clutter free environment maintained   fall prevention program maintained   increased rounding and observation   increase visualization of patient   lighting adjusted   nonskid shoes/slippers when out of bed   room near nurse's station   room organization consistent   safety round/check completed   supervised activity  Intervention: Prevent Skin Injury  Recent Flowsheet Documentation  Taken 3/26/2021 0110 by Herb Angeles RN  Body Position: position changed independently  Taken 3/25/2021 2000 by Herb Angeles RN  Body Position: position changed  independently  Intervention: Prevent and Manage VTE (Venous Thromboembolism) Risk  Recent Flowsheet Documentation  Taken 3/26/2021 0110 by Herb Angeles RN  VTE Prevention/Management: anticoagulant therapy maintained  Taken 3/25/2021 2000 by Herb Angeles RN  VTE Prevention/Management: anticoagulant therapy maintained  Goal: Optimal Comfort and Wellbeing  Outcome: Improving     Problem: Infection COPD (Chronic Obstructive Pulmonary Disease)  Goal: Absence of Infection Signs and Symptoms  Outcome: Improving     Problem: Oral Intake Inadequate COPD (Chronic Obstructive Pulmonary Disease)  Goal: Improved Nutrition Intake  Outcome: Improving     Problem: Respiratory Compromise COPD (Chronic Obstructive Pulmonary Disease)  Goal: Effective Oxygenation and Ventilation  Outcome: Improving  Intervention: Promote Airway Secretion Clearance  Recent Flowsheet Documentation  Taken 3/26/2021 0110 by Herb Angeles RN  Cough And Deep Breathing: done independently per patient  Taken 3/25/2021 2000 by Herb Angeles RN  Cough And Deep Breathing: done independently per patient  Intervention: Optimize Oxygenation and Ventilation  Recent Flowsheet Documentation  Taken 3/26/2021 0110 by Herb Angeles RN  Head of Bed (HOB) Positioning: HOB at 45 degrees  Taken 3/25/2021 2000 by Herb Angeles RN  Head of Bed (HOB) Positioning: HOB at 60 degrees     Problem: COPD Comorbidity  Goal: Maintenance of COPD Symptom Control  Outcome: Improving     Problem: Obstructive Sleep Apnea Risk or Actual (Comorbidity Management)  Goal: Unobstructed Breathing During Sleep  Outcome: Improving       Pt remains alert and confused at times, continues to present with moderate to severe word finding difficultly, and struggles to make herself understood. Overall behavior remains very labile.  brought in home bipap, Pt very frustrated when it is turned on and has thus far refused to use it but cannot clearly communicate why. Oxygen delivery  continues via nasal canula and oxygen saturations remain stable while pt frequently complains of shortness of breath. Lung sounds present with expiratory wheezes in the bases and diminished in the upper lobes.   Vitals otherwise stable.   Pt up stand by assist of one.   Morphine given for air hunger and comfort x1 with favorable results.   Will continue to monitor and promote comfort as care continues.

## 2021-03-26 NOTE — PROGRESS NOTES
"   03/26/21 1000   Quick Adds   Type of Visit Initial Occupational Therapy Evaluation       Present no   Living Environment   People in home spouse   Current Living Arrangements house   Home Accessibility stairs to enter home   Number of Stairs, Main Entrance 3   Stair Railings, Main Entrance none   Transportation Anticipated family or friend will provide   Living Environment Comments main level living prsent, walk in shower and will sleep in recliner or bedroom.    Self-Care   Usual Activity Tolerance moderate   Current Activity Tolerance fair   Regular Exercise No   Equipment Currently Used at Home walker, standard   Disability/Function   Hearing Difficulty or Deaf no   Wear Glasses or Blind no   Concentrating, Remembering or Making Decisions Difficulty no   Difficulty Communicating yes   Communication difficulty speaking  (expressive aphasia)   Difficulty Eating/Swallowing no   General Information   Onset of Illness/Injury or Date of Surgery 03/25/21   Referring Physician Dr. Boston   Patient/Family Therapy Goal Statement (OT) \"breath better\"   Left Upper Extremity (Weight-bearing Status) full weight-bearing (FWB)   Right Upper Extremity (Weight-bearing Status) full weight-bearing (FWB)   Left Lower Extremity (Weight-bearing Status) full weight-bearing (FWB)   Right Lower Extremity (Weight-bearing Status) full weight-bearing (FWB)   General Observations and Info Patient is a 70 year old female, admitted from the ED due to SOB, patient placed on BiPAP in the ICU and diagnosed with COPD exacerbation, acute on chronic hypercapnic respiratory failure, severe pulmonary hypotension, aortic stenosis, and lactic acidosis. Patient with a previous medical history of malnutrition, vertigo, COPD, seizures, CAD with STEMI and stents, CVA in 2013 and 2018, HTN, pulmonary emphysema, CHF, ischemic cardiomyopathy, GERD, smoker, arthritis, urinary incontinence, hyperlipidemia, and expressive aphasia.  "   Cognitive Status Examination   Orientation Status person;place   Affect/Mental Status (Cognitive) anxious   Follows Commands WFL   Cognitive Status Comments Anxious and tearful throughout OT evaluation.    Visual Perception   Visual Impairment/Limitations WNL;corrective lenses full-time   Sensory   Sensory Comments Intact   Pain Assessment   Patient Currently in Pain No   Posture   Posture forward head position;protracted shoulders   Range of Motion Comprehensive   General Range of Motion no range of motion deficits identified   Strength Comprehensive (MMT)   General Manual Muscle Testing (MMT) Assessment no strength deficits identified  (appears baseline)   Coordination   Upper Extremity Coordination No deficits were identified   Bed Mobility   Bed Mobility rolling left;rolling right;supine-sit;sit-supine   Rolling Left [place] (Bed Mobility) modified independence   Rolling Right [place] (Bed Mobility) modified independence   Supine-Sit [place] (Bed Mobility) modified independence   Sit-Supine [place] (Bed Mobility) modified independence   Assistive Device (Bed Mobility) bed rails   Comment (Bed Mobility) Requires increased time for appropriate pacing to maintain o2 stats   Transfers   Transfers sit-stand transfer;toilet transfer   Sit-Stand Transfer   Sit-Stand [place] (Transfers) supervision   Sit/Stand Transfer Comments Gait belt for saftey    Toilet Transfer   Type (Toilet Transfer) sit-stand   [place] Level (Toilet Transfer) supervision   Balance   Balance Assessment standing balance: static   Activities of Daily Living   BADL Assessment/Intervention lower body dressing;grooming;toileting   Lower Body Dressing Assessment/Training   [place] Level (Lower Body Dressing) modified independence   Position (Lower Body Dressing) edge of bed sitting   Grooming Assessment/Training   [place] Level (Grooming) set up   Position (Grooming) edge of bed sitting   Toileting    [place] Level (Toileting) supervision  (to BSC)   Position (Toileting) unsupported sitting   Clinical Impression   Criteria for Skilled Therapeutic Interventions Met (OT) no   OT Problem List-Impairments impacting ADL fear & anxiety;activity tolerance impaired  (Breathing techniques)   ADL comments/analysis Pt demonstrates functional IND in ADLs with mod I or set up.  Completed bed mobility and transfer to BSC with supervision 02 stats above 90% throughout transfer to BSC. Primarly concern is keeping O2 stats regulated for pt to be successful in I/ADLs in natural enviorment. Pt reports energy conservation strategies previously utilized. Therapist educated pt on techniques to use with ADLS and light house hold tasks. Pt verbalized understanding. Educated pt on pursed lip breathing techniques, pt verbalized and demostrated understanding. Required verbal cues for successful completion after completing ADLs. Handsouts reviewed and provided at bedside for pt.    Clinical Decision Making Complexity (OT) low complexity   Risk & Benefits of therapy have been explained evaluation/treatment results reviewed;risks/benefits reviewed;patient   OT Discharge Planning    OT Discharge Recommendation (DC Rec)   (to early to determine safe d/c plan )   OT Rationale for DC Rec Pt demonstrates functional IND with ADLs, requires continous cueing to complete pursed lip breathing techniques. Required moderate encourgment to complete ADLs d/t ongoing fear/anxiety around breathing concerns. Unable to determine level of support that can be provided in home enviorment at this time.    Total Evaluation Time (Minutes)   Total Evaluation Time (Minutes) 30       Thank you for the referral     TOMAS Faria   [facility]    Email: [email or web address]  Phone: [phone]

## 2021-03-26 NOTE — CONSULTS
Care Management Initial Consult    General Information  Assessment completed with: Patient, Spouse or significant other,    Type of CM/SW Visit: Initial Assessment    Primary Care Provider verified and updated as needed:     Readmission within the last 30 days:        Reason for Consult: discharge planning  Advance Care Planning:          Communication Assessment  Patient's communication style: spoken language (English or Bilingual)    Hearing Difficulty or Deaf: no   Wear Glasses or Blind: no    Cognitive  Cognitive/Neuro/Behavioral: .WDL except  Level of Consciousness: alert  Arousal Level: opens eyes spontaneously  Orientation: oriented x 4  Mood/Behavior: anxious  Best Language: 1 - Mild to moderate  Speech: word-finding difficulty    Living Environment:   People in home:       Current living Arrangements: house      Able to return to prior arrangements: yes  Living Arrangement Comments: (one level home)    Family/Social Support:  Care provided by: spouse/significant other  Provides care for: no one, unable/limited ability to care for self  Marital Status:   , Children  Bud       Description of Support System: Supportive, Involved       Current Resources:   Patient receiving home care services:       Community Resources: PCA  Equipment currently used at home: walker, standard  Supplies currently used at home: Oxygen Tubing/Supplies    Employment/Financial:  Employment Status:          Financial Concerns:           Lifestyle & Psychosocial Needs:        Socioeconomic History     Marital status:      Spouse name: Not on file     Number of children: Not on file     Years of education: Not on file     Highest education level: Not on file     Tobacco Use     Smoking status: Current Every Day Smoker     Packs/day: 0.50     Types: Cigarettes     Smokeless tobacco: Never Used     Tobacco comment: patient states she is working on it    Substance and Sexual Activity     Alcohol use: No      Alcohol/week: 0.0 standard drinks     Drug use: No     Sexual activity: Not Currently     Partners: Male     Functional Status:  Prior to admission patient needed assistance:        Mental Health Status:          Chemical Dependency Status:              Values/Beliefs:  Spiritual, Cultural Beliefs, Congregational Practices, Values that affect care:                 Additional Information:  Met with patient and her , Oh, for initial assessment.  Patient lives with Grayling in a one level home.  There are 2 steps to enter the home.  The laundry is in the basement, but Bud does the laundry.  Patient has a [facility] worker, Melinda.  Patient receives PCA services approved through [facility].  Patient is approved for 19 hours/wk of PCA services through [employer].  Grayling is patient's PCA.  Patient can walk independently in the home for short distances.  If she is feeling more weak, she uses her walker.  Patient is on continuous home O2 at 1-2 L.  She also has home Bi-pap and nebs.  Discussed home care services upon discharge.  Patient has had home care in the past after her stroke.  Patient/Bud are in agreement with home care at discharge.  RN will be ordered and any therapies that are recommended.  Grayling will transport at discharge with portable O2.    PHOEBE Ledesma  [facility] [phone]/ [facility] [phone]  Care Management

## 2021-03-27 NOTE — PLAN OF CARE
"A/ox3, forgetful at times with occasional word finding difficulty. Lungs diminished with exp wheezing noted, increased with exertion. Up to BR with SBA. Very dyspneic with activity, requiring long periods to recover. Ativan PO given for anxiety. BP (!) 148/68   Pulse 90   Temp 98.5  F (36.9  C) (Oral)   Resp 22   Ht 1.6 m (5' 3\")   Wt 43 kg (94 lb 11.2 oz)   SpO2 95%   BMI 16.78 kg/m   Will continue with POC.  "

## 2021-03-27 NOTE — PLAN OF CARE
VSS. Afebrile. Denies any pain/discomfort at this time. SOB noted with activity. Pt desats to 83% with activity on 1L O2/nc. Pt recovers quickly with rest. LS diminished with expiratory wheezing. Denies any chest pain. Denies any nausea/vomiting. Pt up to BR SBA. No complaints at this time.

## 2021-03-27 NOTE — PROVIDER NOTIFICATION
Informed Dr. Oro that pt is on 1L/min/nc, venous PCO2 65 (was 53 on 3/25 and pt was on 2L then). no change in mentaion, pt a/o, forgetful.  No change made at this time.

## 2021-03-27 NOTE — PROGRESS NOTES
[facility]    Medicine Progress Note - Hospitalist Service       Date of Admission:  3/23/2021  Assessment & Plan        Patient is a 70-year-old female with past medical history of O2 dependent COPD who presents with acute exacerbation initially requiring BiPAP support.  She has had progressive improvement and has been weaned off Bipap and subsequent high flow nasal cannula back on her home 1-2L NC oxygen but continues to have inspiratory and expiratory wheeze, severe activity intolerance, and ongoing sensation of air hunger and requires ongoing hospitalization.  Patient continues to want to have increased oxygen administered during these periods of air hunger, even when her oxygen level is % and doesn't appear to understand the risks associated with that.  Prolonged conversation occurred with patient and  today regarding appropriate use of oxygen vs home Bipap for episodes of worsening shortness of breath and how to avoid hypercapnia.  Will transition to PO steroids, decrease Duonebs to every 4 hours while awake and monitor closely.  Anticipate discharge home in 1-2 days as patient continues to improve.      Principal Problem:    Acute on chronic respiratory failure with hypoxia and hypercapnia    Assessment: Secondary to COPD exacerbation, initially required BiPAP and transition to high flow nasal cannula oxygen and it has now been weaned back to her home 1 to 2 L nasal cannula for the past 24 hours but with ongoing wheezing, activity intolerance, tachypnea and episodes of air hunger.      Plan: transition to PO prednisone 40 mg daily, decrease duo nebs to every 4 hours while awake with albuterol to every 2 hours as needed for wheezing/shortness of breath.  Continue azithromycin for possible bronchitis etiology.  Encourage consistent use of Bipap during episodes of air hunger that do not improve with nebs but oxygen levels are at goal range 86-94%.  Anticipate 1-2 more  days of ongoing hospitalization until respiratory symptoms continue to improve and patient can discharge home with home care services and family support.      Active Problems:    COPD exacerbation, Chronic O2 Dependent    Assessment: Causing acute respiratory failure as above, slowly improving    Plan: Proceed with plan as outlined above      Hypotension    Assessment: Present initially and thought secondary to dehydration with some concern for worsening aortic stenosis contributing but blood pressures have now returned to normal hypertensive status.  Continues to be elevated in the 130-160 range systolic despite home regimen being restarted.      Plan: Continue home losartan 25 mg daily but increase metoprolol to 25 mg twice daily - monitor for blood pressure improvement as these take effect.      Lactic acidosis    Assessment: Present initially and thought secondary to acute respiratory failure though there is concern for possible bronchitis.  Lactic acid has now normalized    Plan: Continue with treatment as above, no further routine lactic acid will be drawn given frequency of nebulization causing artificial elevation      Acute bronchitis    Assessment: Suspected based on clinical history, patient on azithromycin    Plan: Continue with a azithromycin for 5-day course completion, proceed with plan as outlined above      Elevated troponin    Assessment: Patient did have mild elevation of her troponins up to the 0.4-0.5 range, thought likely demand ischemia from acute respiratory failure with echocardiogram showing no wall motion abnormality.  Patient is known to be a chronic troponin spill her as well, however normally that is in the 0.07 range.  Troponin now trending downward as respiratory status improves - was 0.249 this morning    Plan: Monitor troponin daily to ensure slow improvement as respiratory failure continues to improve.  Patient would benefit from outpatient cardiology evaluation given her worsening  aortic stenosis      Severe malnutrition - in the context of chronic illness/disease    Assessment: Noted by nutritionist, patient is trying to increase her oral intake and comply with supplementation recommendations    Plan; continue to monitor, appreciate nutritionist ongoing recommendations and evaluation      Hyperlipidemia LDL goal <130    Assessment: On atorvastatin    Plan: Continue home regimen without change      History of stroke - right thalamus in 8/2013 and left cerebellar and right vermis in 3/2018    Assessment: Patient has residual word finding difficulty, memory issues and some balance impairment secondary to the strokes, no focal neurological deficits during the stay    Plan: Continue with aspirin, Plavix, atorvastatin, and improving blood pressure control as above      PAD (peripheral artery disease) (H) - moderate left common carotid stenosis, aortoiliac bypass, chronic left vertebral and right posterior cerebral stenoses    Assessment: Patient symptom free status post bypass, on aspirin, Plavix, and statin    Plan: Continue home regimen without change      Coronary artery disease involving native coronary artery - STEMI with LAD and circ stents in 8/2015; ACS Non-STEMI with mid circumflex stent 5/2018    Assessment: Patient does have elevated troponin as above but no symptoms concerning for angina on echocardiogram not showing any wall motion abnormality during the stay.    Plan: Continue with home regimen of aspirin, Plavix, statin, and blood pressure control      Mild to moderate pulmonary hypertension (H)    Assessment: Noted on echocardiogram, likely secondary to severe COPD at baseline    Plan: No acute intervention needed, ongoing O2 supplementation as appropriate      Nonrheumatic aortic valve stenosis - moderate to severe    Assessment: Noted on echocardiogram, and has progressed significantly since last echocardiogram in 2020    Plan: No acute intervention is needed however patient  would benefit from outpatient cardiology follow-up for further discussion and evaluation         Diet: Low Saturated Fat Na <2400 mg  Snacks/Supplements Adult: Other; CIB; Between Meals    DVT Prophylaxis: Enoxaparin (Lovenox) SQ  Barnhart Catheter: not present  Code Status: Full Code           Disposition Plan   Expected discharge: 1-2 days, recommended to prior living arrangement with increased home care services once patient continues to improve following transition to PO medications and decreased neb frequency, safe disposition plan in place.  Entered: Deisy Laird MD 03/27/2021, 11:00 AM       The patient's care was discussed with the Bedside Nurse, Patient and Patient's Family.    Deisy Laird MD  Hospitalist Service  [facility], located within [facility]  Contact information available via [facility] Paging/Directory    ______________________________________________________________________    Interval History   Patient reports feeling better overall.  Still having episodes of severe air hunger as above for which patient requests her oxygen be turned up.  Had an episode this morning that did not resolve after neb treatment or morphine administration - patient used Bipap for a while and is now feeling much improved and wanting to go home soon because of how much better she is feeling.  No new patient concerns.  No new nursing concerns - just ongoing concern regarding patient's overall understanding of how to manage respiratory symptoms.      Data reviewed today: I reviewed all medications, new labs and imaging results over the last 24 hours.    Physical Exam   Vital Signs: Temp: 96.8  F (36  C) Temp src: Oral BP: (!) 158/78(pt was up to BR) Pulse: 92   Resp: 20 SpO2: 100 % O2 Device: Nasal cannula Oxygen Delivery: 1 LPM  Weight: 94 lbs 11.2 oz  Constitutional: awake, alert, cooperative, no apparent distress, and appears stated age  Respiratory: No increased work of breathing, decreased  by improving air exchange, mild end expiratory wheezing diffusely   Cardiovascular: Normal apical impulse, regular rate and rhythm  GI: bowel sounds present, abdomen soft and non-tender  Skin: no redness, warmth, or swelling and no rashes  Musculoskeletal: no lower extremity pitting edema present  Neurologic: Awake, alert, oriented to name, place and somewhat to situation but she still has very limited understanding of the dangers of using too much oxygen, is very resistant to education/instruction and states she knows her body better than anyone and has been dealing with this issue fine for years.      Data   Recent Labs   Lab 03/27/21  0545 03/26/21  0615 03/25/21  0529 03/24/21  1153 03/23/21  1113 03/23/21  1113   WBC  --  6.9  --   --   --  5.9   HGB  --  12.4  --   --   --  15.8*   MCV  --  93  --   --   --  93   PLT  --  186  --   --   --  221    143  --   --   --  141   POTASSIUM 4.7 4.5 4.2  --   --  4.3   CHLORIDE 105 110*  --   --   --  105   CO2 36* 32  --   --   --  33*   BUN 23 27  --   --   --  16   CR 0.60 0.60 0.59  --   --  0.67   ANIONGAP <1* 1*  --   --   --  3   CALDERON 8.8 8.9  --   --   --  9.6   * 132*  --   --   --  98   TROPI 0.249* 0.406*  --  0.551*   < > 0.097*    < > = values in this interval not displayed.

## 2021-03-28 NOTE — PROGRESS NOTES
Care Management Discharge Note    Discharge Date: 03/27/21     Discharge Disposition: Home, Home Care  [facility] (Phone: [phone]) - RN, PT/OT    Discharge Services: PCA    Discharge DME: Oxygen    Discharge Transportation: family or friend will provide    Private pay costs discussed: Not applicable    PAS Confirmation Code:    Patient/family educated on Medicare website which has current facility and service quality ratings: yes    Education Provided on the Discharge Plan:    Persons Notified of Discharge Plans: Patient/    Patient/Family in Agreement with the Plan: yes    Handoff Referral Completed: Yes    Additional Information:  Patient discharging home today with [facility] (Phone: [phone]) for RN, PT/OT.  Family transport.    Patient is High Risk for re-admission.  [facility] task request sent.    PHOEBE Ledesma  [facility] [phone]/ Raheem [phone]  Care Management

## 2021-03-28 NOTE — DISCHARGE SUMMARY
Physical Therapy Discharge Summary    Reason for therapy discharge:    Discharged to home with home therapy.    Progress towards therapy goal(s). See goals on Care Plan in [facility] electronic health record for goal details.  Goals partially met.  Barriers to achieving goals:   limited tolerance for therapy. Declined stair assessment.    Therapy recommendation(s):  Continue with home health physical therapy.    Thank you for your referral.   Yi Quan PT, DPT    [facility]   O: [phone]  E: [email or web address]

## 2021-03-28 NOTE — PLAN OF CARE
"Patient has been alert and oriented. She is very appreciative of the care she is receiving but repeats that she really wants to go home. She has been ambulating to the bathroom with stand by assist. Pt does become dyspneic with activity but recovers within minutes. Pt has actually been on room air since last evening. Sats have been in the mid 90's and decrease to the upper 80's with activity. The nasal cannula remains in her nose as pt becomes very anxious that this is her \"life link\" and is adamant that she needs it to stay on even when writer tried to explain that there is no oxygen going through the tubing. She has not needed anything for anxiety or air hunger. Lungs are coarse and wheezy. She continues to have a congested cough. Pt reports feeling exhausted and just wants to go home to sleep.  "

## 2021-03-28 NOTE — PLAN OF CARE
"S: Shift note    B: Acute on chronic respiratory failure    A: Alert and oriented x4 with some baseline forgetfulness and word-finding difficulty,  VSS on room air, was on 1-2L O2 NC for most of shift, afebrile. Pt declined home bipap this shift, stating \"I don't need that thing, I have this\" while indicating nasal cannula. Lung sounds inspiratory wheezes throughout, LLL coarse. Infrequent nonproductive cough. Scheduled nebs and inhaler given. Dyspnea on exertion present, pt having to take a break while ambulating to bathroom. Denies pain. Tolerating oral foods and fluids. IV SL. Ambulates as SBA with gait belt. Skin intact.     R: Continue plan of care, monitor VS, labs, SpO2 and activity tolerance.   "

## 2021-03-28 NOTE — PLAN OF CARE
S-(situation): Patient discharged to home via w/c with , escorted to pharmacy and ED entrance by NSA.    B-(background): COPD    A-(assessment): denies pain.  Pt has been on RA all night and all morning this morning.  LS diminished in bases.  Dyspnea with activity.      R-(recommendations): Discharge instructions reviewed with pt and , denies questions or concerns. Listed belongings gathered and returned to patient.          Discharge Nursing Criteria:     Care Plan and Patient education resolved: Yes    New Medications- pt has been educated about purpose and side effects: Yes    Vaccines  Influenza status verified at discharge:  Yes      MISC  Prescriptions if needed, hard copies sent with patient  NA  Home medications returned to patient: NA  Medication Bin checked and emptied on discharge Yes  Patient reports post-discharge pain management plan is effective: Yes

## 2021-03-28 NOTE — DISCHARGE SUMMARY
[facility]  Hospitalist Discharge Summary      Date of Admission:  3/23/2021  Date of Discharge:  3/28/2021  Discharging Provider: Deisy Laird MD    Discharge Diagnoses   Principal Problem:    Acute on chronic respiratory failure with hypoxia and hypercapnia  Active Problems:    COPD exacerbation, Chronic O2 Dependent    Hypotension    Lactic acidosis    Hyperlipidemia LDL goal <130    History of stroke - right thalamus in 8/2013 and left cerebellar and right vermis in 3/2018    PAD (peripheral artery disease) (H) - moderate left common carotid stenosis, aortoiliac bypass, chronic left vertebral and right posterior cerebral stenoses    Coronary artery disease involving native coronary artery - STEMI with LAD and circ stents in 8/2015; ACS Non-STEMI with mid circumflex stent 5/2018    Elevated troponin    Severe malnutrition (H)    Acute bronchitis    Mild to moderate pulmonary hypertension (H)    Nonrheumatic aortic valve stenosis - moderate to severe    Chronic diastolic CHF    Follow-ups Needed After Discharge   Follow-up Appointments     Follow-up and recommended labs and tests       Follow up with primary care provider, Deisy Carter, within 7 days   for hospital follow- up.           Discharge Disposition   Discharged to home with [facility] care services  Condition at discharge: Stable    Hospital Course          Patient is a 70-year-old female with past medical history of O2 dependent COPD who presents with acute exacerbation initially requiring BiPAP support.  She has had progressive improvement and has been weaned off facility Bipap and subsequent high flow nasal cannula back on her home 1-2L NC oxygen with Bipap encouraged at night and during period of air hunger in which her oxygen is at goal.  She has been transitioned to PO prednisone and decreased neb frequency with ongoing improvement.  During this stay patient continues to want to have increased  oxygen administered during her periods of air hunger, even when her oxygen level is % and doesn't appear to understand the risks associated with that.  Prolonged conversation occurred with patient and  several times during this stay regarding appropriate use of oxygen vs home Bipap for episodes of worsening shortness of breath and how to avoid hypercapnia.  Will have home care services initiate at time of discharge and encouraged patient to follow up with her PCP and review this more going forward.      Principal Problem:    Acute on chronic respiratory failure with hypoxia and hypercapnia    Assessment: Secondary to COPD exacerbation, initially required BiPAP and transition to high flow nasal cannula oxygen and it has now been weaned back to her home regimen    Plan: Discharge home with ongoing prednisone taper, azithromycin complete during this stay.  Duonebs QID with prn albuterol nebs as needed at discharge.   Encourage consistent use of Bipap during episodes of air hunger that do not improve with nebs but oxygen levels are at goal range 86-94%.  Patient high risk for recurrent hypercapnia development due to lack of understanding of the risk of inappropriate oxygen use.      Active Problems:    COPD exacerbation, Chronic O2 Dependent    Assessment: Causing acute respiratory failure as above, slowly improving    Plan: discharge with plan as above       Hypotension    Assessment: Present initially and thought secondary to dehydration with some concern for worsening aortic stenosis contributing but blood pressures have now returned to normal hypertensive status and home regimen restarted    Plan: Continue home losartan 25 mg daily and metoprolol 12.5 mg twice daily at time of discharge       Lactic acidosis    Assessment: Present initially and thought secondary to acute respiratory failure though there is concern for possible bronchitis.  Lactic acid has now normalized    Plan: no further antibiotics  needed at time of discharge      Acute bronchitis    Assessment: Suspected based on clinical history, patient on azithromycin with 5 day course completed during this stay    Plan: no further antibiotics needed at time of discharge       Elevated troponin    Assessment: Patient did have mild elevation of her troponins up to the 0.4-0.5 range, thought likely demand ischemia from acute respiratory failure with echocardiogram showing no wall motion abnormality.  Patient is known to be a chronic troponin spill her as well, however normally that is in the 0.07 range.  Troponin now trending downward as respiratory status improves - was 0.167 on day of discharge    Plan: May be worse rechecking to fully establish baseline going forward. Patient would benefit from outpatient cardiology evaluation given her worsening aortic stenosis      Severe malnutrition - in the context of chronic illness/disease    Assessment: Noted by nutritionist, patient is trying to increase her oral intake and comply with supplementation recommendations    Plan; encourage ongoing focus on nutrition and supplementation at time of discharge      Hyperlipidemia LDL goal <130    Assessment: On atorvastatin    Plan: Continue home regimen without change at time of discharge       History of stroke - right thalamus in 8/2013 and left cerebellar and right vermis in 3/2018    Assessment: Patient has residual word finding difficulty, memory issues and some balance impairment secondary to the strokes, no focal neurological deficits during the stay    Plan: Continue with aspirin, Plavix, atorvastatin, blood pressure medications without to home regimen      PAD (peripheral artery disease) (H) - moderate left common carotid stenosis, aortoiliac bypass, chronic left vertebral and right posterior cerebral stenoses    Assessment: Patient symptom free status post bypass, on aspirin, Plavix, and statin    Plan: Continue home regimen without change      Coronary artery  disease involving native coronary artery - STEMI with LAD and circ stents in 8/2015; ACS Non-STEMI with mid circumflex stent 5/2018    Assessment: Patient does have elevated troponin as above but no symptoms concerning for angina on echocardiogram not showing any wall motion abnormality during the stay.    Plan: Continue with home regimen of aspirin, Plavix, statin, and blood pressure control at time of discharge       Chronic diastolic CHF    Assessment:  Stable without acute exacerbation    Plan:  Discharge home without change to home routine.       Mild to moderate pulmonary hypertension (H)    Assessment: Noted on echocardiogram, likely secondary to severe COPD at baseline    Plan: No acute intervention needed, ongoing O2 supplementation as appropriate      Nonrheumatic aortic valve stenosis - moderate to severe    Assessment: Noted on echocardiogram, and has progressed significantly since last echocardiogram in 2020    Plan: No acute intervention is needed however patient would benefit from outpatient cardiology follow-up for further discussion and evaluation      Consultations This Hospital Stay   PHYSICAL THERAPY ADULT IP CONSULT  NUTRITION SERVICES ADULT IP CONSULT  OCCUPATIONAL THERAPY ADULT IP CONSULT  CARE MANAGEMENT / SOCIAL WORK IP CONSULT    Code Status   Full Code    Time Spent on this Encounter   I, Deisy Laird MD, personally saw the patient today and spent greater than 30 minutes discharging this patient.       Deisy Laird MD  [facility] 2A MEDICAL SURGICAL  [address] [place] [address]  Phone: [phone]  ______________________________________________________________________    Physical Exam   Vital Signs: Temp: 96.7  F (35.9  C) Temp src: Oral BP: 139/66 Pulse: 78   Resp: 20 SpO2: 95 % O2 Device: None (Room air) Oxygen Delivery: 1/2 LPM  Weight: 94 lbs 11.2 oz  Constitutional: awake, alert, cooperative, no apparent distress, and appears  stated age  Respiratory: no increased work of breathing other than ongoing mild tachypnea, decreased air movement diffusely but improved from previous, no wheezing on exam today  Cardiovascular: regular rate and rhythm  GI: bowel sounds present, abdomen soft and non-tender  Skin: no redness, warmth, or swelling and no rashes  Musculoskeletal: no lower extremity pitting edema present  Neurologic: Awake, alert, oriented to name, place and somewhat to situation       Primary Care Physician   Deisy Carter    Discharge Orders      HOME CARE NURSING REFERRAL      Reason for your hospital stay    COPD exacerbation which has improved with steroids and nebs during this stay.  You will be going home with ongoing steroids daily and nebs to continue to improve your symptoms going forward.  You have completed your antibiotics during this stay and will not need any more as you go home.  Please remember to USE YOUR PULSE OXIMETER to keep track of your oxygen level.  Your goal oxygen level is 86-95%.  If your level is above 95% and you are using oxygen, you should turn the oxygen down because the level is too high for your body and you might start building up extra carbon dioxide.  If your level is less than 86% you can turn up you oxygen slightly to help get your oxygen in the goal range.  If the number is within goal (the 86-95%) but you are feeling short of breath, please remember it is not because you need more oxygen.  Try a nebulizer and if your symptoms are still not improved but your oxygen level continues to be at goal, USE YOUR BIPAP MACHINE.  This will help increase air movement in your lungs while not making your oxygen level too high or causing your carbon dioxide to build up.  If you try the Bipap for 30-60 minutes and you are still not feeling better, please call the clinic to discuss next steps.     Follow-up and recommended labs and tests     Follow up with primary care provider, Deisy Carter, within  7 days for hospital follow- up.     Activity    Your activity upon discharge: activity as tolerated     Oxygen Adult/Peds    I, the undersigned, certify that the above prescribed supplies are medically necessary for this patient and is both reasonable and necessary in reference to accepted standards of medical and necessary in reference to accepted standards of medical practice in the treatment of this patient's condition and is not prescribed as a convenience.      Diet    Follow this diet upon discharge: Low salt diet       Significant Results and Procedures   Results for orders placed or performed during the hospital encounter of 03/23/21   XR Chest Port 1 View    Narrative    CHEST ONE VIEW PORTABLE   3/23/2021 11:51 AM     HISTORY: Shortness of breath. Wheezing.    COMPARISON: Chest x-ray on 5/2/2020      Impression    IMPRESSION: A single portable AP view of the chest was obtained.  Stable cardiomediastinal silhouette. Extensive upper lobes predominant  emphysematous changes. Mild right basilar pulmonary opacities, likely  atelectasis. No significant pleural effusion or pneumothorax.    EFFIE CORLEY MD   CT Chest Pulmonary Embolism w Contrast    Narrative    CT CHEST PULMONARY EMBOLISM WITH CONTRAST  3/25/2021 1:45 PM    CLINICAL HISTORY: PE suspected, high probability.    HISTORY: History of COPD, cerebral vascular accident, coronary artery  disease, iliofemoral bypass.    TECHNIQUE: CT angiogram chest during arterial phase injection IV  contrast. 2D and 3D MIP reconstructions were performed by the CT  technologist. Dose reduction techniques were used.     CONTRAST: 85 mL [id number].    COMPARISON: Chest x-rays dated 3/23/2021. CT chest dated 5/2/2020.    FINDINGS:  ANGIOGRAM CHEST: Pulmonary arteries are normal caliber and negative  for pulmonary emboli. Thoracic aorta is negative for dissection. No CT  evidence of right heart strain.    LUNGS AND PLEURA: 0.3 cm calcified granuloma in the posteromedial  left  lower lung lobe is stable. Mildly spiculated noncalcified nodule in  the medial aspect of the superior segment of the right lower lung lobe  is again noted (image 152 series 6) measuring approximately 1.0 x 0.8  x 1.3 cm as compared to 1.9 x 1.3 x 1.8 cm on the prior study from  2020. No other significant nodule, mass, infiltrate, effusion, or  pneumothorax is identified. Severe emphysematous changes of the lungs  with apical predominance indicate probable history of tobacco abuse  and were also noted previously. Atelectasis or scarring in the  anteromedial right middle lobe on the prior study is no longer  identified on today's study.    MEDIASTINUM/AXILLAE: Heart is grossly normal in size. Extensive  coronary artery calcifications and/or stents are again noted. No  mediastinal, hilar, or axillary lymphadenopathy is identified. There  are thoracic aortic calcifications. No obvious aortic dissection is  seen, although the contrast bolus in the aorta is suboptimal to  evaluate for dissection. Nodularity of the right lobe of the thyroid  is similar in appearance to the prior CT dated 5/2/2020. Ultrasound  recommended for further evaluation as clinically indicated.    UPPER ABDOMEN: Extensive nonaneurysmal atherosclerosis is again noted.  Visualized upper abdominal contents are otherwise grossly  unremarkable.    MUSCULOSKELETAL: No aggressive osseous lesions or acute osseous  fractures are seen.      Impression    IMPRESSION:  1.  No evidence for pulmonary artery embolism is identified.  2.  Severe emphysematous changes of the lungs are again noted. There  is associated hyperaeration.  3.  Spiculated nodular density superior segment right lower lung lobe  has significantly decreased in size since the prior study dated  5/2/2020, indicating good response to treatment. Recommend clinical  correlation. Neoplasm is still not entirely excluded, though  considered less likely if this patient has not had treatment  for  neoplasm.  4.  Extensive atherosclerosis.  5.  Resolved atelectasis/inflammatory process in the anteromedial  aspect of the right inferior upper lung lobe.    OZIEL OHARA MD   Echocardiogram Complete    Narrative    [id number]  [id number]  [id number]  [id number]^CARL^SHANNAN     [facility]  Echocardiography Laboratory  [address] [place] [address]     Name: TARIK HERNANDEZ  MRN: [id number]  : 1950  Study Date: 2021 07:54 AM  Age: 70 yrs  Gender: Female  Patient Location: [facility]  Reason For Study: Chest Pain  History: COPD, ACS, HTN, CHF, PAD, NSTEMI, CAD, CM  Ordering Physician: SHANNAN HENRY  Referring Physician: Deisy Carter  Performed By: Aracelis Pineda     BSA: 1.4 m2  Height: 63 in  Weight: 91 lb  HR: 83  BP: 138/76 mmHg  ______________________________________________________________________________  Procedure  Complete Portable Echo Adult.  ______________________________________________________________________________  Interpretation Summary     1. Left ventricular systolic function is normal. The visual ejection fraction  is estimated at 55-60%.  2. No regional wall motion abnormalities noted.  3. The right ventricle is normal in structure, function and size.  4. The left atrium is moderately dilated.  5. Moderate to severe aortic stenosis; mean gradient 19 mmHg, peak velocity  3.3 m/sec, calculated valve area 0.9 cm2, DI 0.25. The valve is heavily  calcified and visually consistent with severe aortic stenosis.  6. Mild pulmonary hypertension; The right ventricular systolic pressure is  approximated at 39mmHg plus the right atrial pressure. IVC diameter <2.1 cm  collapsing >50% with sniff suggests a normal RA pressure of 3 mmHg.  7. In comparison with the prior study, the estimated RVSP is lower, the aortic  stenosis has progressed.  ______________________________________________________________________________  Left Ventricle  The left ventricle is  normal in size. There is mild concentric left  ventricular hypertrophy. Left ventricular systolic function is normal. The  visual ejection fraction is estimated at 55-60%. Grade I or early diastolic  dysfunction. No regional wall motion abnormalities noted.     Right Ventricle  The right ventricle is normal in structure, function and size.     Atria  The left atrium is moderately dilated. Right atrial size is normal.     Mitral Valve  There is mild mitral annular calcification. The mitral valve leaflets appear  thickened, but open well. There is mild (1+) mitral regurgitation.     Tricuspid Valve  The tricuspid valve is normal in structure and function. There is trace  tricuspid regurgitation. The right ventricular systolic pressure is  approximated at 39mmHg plus the right atrial pressure.     Aortic Valve  There is mild (1+) aortic regurgitation. Moderate to severe valvular aortic  stenosis. Moderate to severe aortic stenosis; mean gradient 19 mmHg, peak  velocity 3.3 m/sec, calculated valve area 0.9 cm2, DI 0.25. The valve is  heavily calcified and visually consistent with severe aortic stenosis.     Pulmonic Valve  The pulmonic valve is not well seen, but is grossly normal.     Vessels  Normal size aorta. IVC diameter <2.1 cm collapsing >50% with sniff suggests a  normal RA pressure of 3 mmHg.     Pericardium  There is no pericardial effusion.     Rhythm  Sinus rhythm was noted.  ______________________________________________________________________________  MMode/2D Measurements & Calculations  IVSd: 1.1 cm     LVIDd: 3.5 cm  LVIDs: 2.8 cm  LVPWd: 1.1 cm  FS: 20.0 %  LV mass(C)d: 119.0 grams  LV mass(C)dI: 86.0 grams/m2  Ao root diam: 3.5 cm  LA dimension: 3.7 cm  asc Aorta Diam: 2.9 cm  LA/Ao: 1.1  LVOT diam: 2.1 cm  LVOT area: 3.5 cm2  LA Volume (BP): 54.6 ml  LA Volume Index (BP): 39.6 ml/m2  RWT: 0.63     Doppler Measurements & Calculations  MV E max unruly: 75.3 cm/sec  MV A max unruly: 108.0 cm/sec  MV E/A:  0.70  MV dec time: 0.20 sec  Ao V2 max: 328.0 cm/sec  Ao max P.0 mmHg  Ao V2 mean: 194.0 cm/sec  Ao mean P.0 mmHg  Ao V2 VTI: 71.5 cm  YOLANDA(I,D): 0.97 cm2  YOLANDA(V,D): 0.87 cm2  AI P1/2t: 505.5 msec  LV V1 max P.7 mmHg  LV V1 max: 82.0 cm/sec  LV V1 VTI: 20.0 cm  SV(LVOT): 69.3 ml  SI(LVOT): 50.1 ml/m2  PA acc time: 0.08 sec  TR max ryan: 299.0 cm/sec  TR max P.8 mmHg  AV Ryan Ratio (DI): 0.25  YOLANDA Index (cm2/m2): 0.70  E/E' av.4  Lateral E/e': 13.1  Medial E/e': 15.7     ______________________________________________________________________________  Report approved by: Rob Baron 2021 11:02 AM               Discharge Medications   Current Discharge Medication List      START taking these medications    Details   albuterol (PROVENTIL) (2.5 MG/3ML) 0.083% neb solution Take 1 vial (2.5 mg) by nebulization every 4 hours as needed for shortness of breath / dyspnea or wheezing  Qty: 360 mL, Refills: 0    Associated Diagnoses: COPD exacerbation (H); Acute on chronic respiratory failure with hypoxia and hypercapnia (H)      predniSONE (DELTASONE) 20 MG tablet Take 2 tablets (40 mg) by mouth daily x3 days, then 1 tablet (20 mg) daily x4 days, then 0.5 tablet (10 mg) x4 days, then stop.  Qty: 12 tablet, Refills: 0    Associated Diagnoses: COPD exacerbation (H)         CONTINUE these medications which have CHANGED    Details   acetaminophen (TYLENOL) 325 MG tablet Take 1-2 tablets (325-650 mg) by mouth every 4 hours as needed for mild pain  Qty: 100 tablet      ipratropium - albuterol 0.5 mg/2.5 mg/3 mL (DUONEB) 0.5-2.5 (3) MG/3ML neb solution Take 1 vial (3 mLs) by nebulization 4 times daily Until hospital follow up, then as directed by your primary care provider  Qty: 270 mL, Refills: 3    Associated Diagnoses: Simple chronic bronchitis (H)         CONTINUE these medications which have NOT CHANGED    Details   aspirin 81 MG EC tablet Take 1 tablet (81 mg) by mouth daily    Associated  Diagnoses: Coronary artery disease involving native coronary artery of native heart with angina pectoris (H)      atorvastatin (LIPITOR) 20 MG tablet Take 1 tablet (20 mg) by mouth At Bedtime  Qty: 90 tablet, Refills: 3    Comments: Please contact  [place] before filling.  Associated Diagnoses: PAD (peripheral artery disease) (H)      budesonide-formoterol (SYMBICORT) 160-4.5 MCG/ACT Inhaler Inhale 2 puffs into the lungs 2 times daily  Qty: 1 Inhaler, Refills: 5    Comments: Please contact  [place] before filling.  Associated Diagnoses: Chronic bronchitis, unspecified chronic bronchitis type (H); Panlobular emphysema (H)      clopidogrel (PLAVIX) 75 MG tablet TAKE ONE TABLET BY MOUTH ONCE DAILY  Qty: 90 tablet, Refills: 3    Comments: Please contact  [place] before filling.  Associated Diagnoses: Acute CVA (cerebrovascular accident) (H)      guaiFENesin 400 MG TABS Take 400 mg by mouth 2 times daily      losartan (COZAAR) 25 MG tablet Take 1 tablet (25 mg) by mouth daily  Qty: 90 tablet, Refills: 1    Associated Diagnoses: Benign essential hypertension      metoprolol tartrate (LOPRESSOR) 25 MG tablet Take 0.5 tablets (12.5 mg) by mouth 2 times daily  Qty: 45 tablet, Refills: 3    Associated Diagnoses: Benign essential hypertension      montelukast (SINGULAIR) 10 MG tablet Take 1 tablet (10 mg) by mouth At Bedtime  Qty: 90 tablet, Refills: 3    Comments: Please contact  [place] before filling.  Associated Diagnoses: COPD, severe (H)      nitroGLYcerin (NITROSTAT) 0.4 MG sublingual tablet For chest pain place 1 tablet under the tongue every 5 minutes for 3 doses. If symptoms persist 5 minutes after 1st dose call 911.  Qty: 25 tablet    Associated Diagnoses: Coronary artery disease involving native coronary artery of native heart with angina pectoris (H)      !! Nutritional Supplements (CARNATION INSTANT BREAKFAST) LIQD Take 1 packet by mouth 3 times daily as needed      order for DME Equipment being  ordered: Nebulizer machine  Qty: 1 Device, Refills: 0    Associated Diagnoses: Chronic bronchitis, unspecified chronic bronchitis type (H)      Respiratory Therapy Supplies (NEBULIZER/TUBING/MOUTHPIECE) KIT 1 kit as needed (if becomes damaged)  Qty: 1 kit, Refills: 1    Associated Diagnoses: Panlobular emphysema (H); Chronic bronchitis, unspecified chronic bronchitis type (H)      umeclidinium (INCRUSE ELLIPTA) 62.5 MCG/INH inhaler Inhale 1 puff into the lungs daily  Qty: 3 each, Refills: 3    Comments: Please contact  [place] before filling.  Associated Diagnoses: Chronic bronchitis, unspecified chronic bronchitis type (H)      ACE/ARB/ARNI NOT PRESCRIBED, INTENTIONAL, Please choose reason not prescribed, below    Associated Diagnoses: Elevated blood pressure reading without diagnosis of hypertension; History of stroke      albuterol (PROAIR HFA/PROVENTIL HFA/VENTOLIN HFA) 108 (90 Base) MCG/ACT inhaler Inhale 2 puffs into the lungs every 6 hours as needed for shortness of breath / dyspnea  Qty: 3 Inhaler, Refills: 3    Comments: Pharmacy may dispense brand covered by insurance (Proair, or proventil or ventolin or generic albuterol inhaler)  Associated Diagnoses: Chronic bronchitis, unspecified chronic bronchitis type (H)      !! Nutritional Supplements (BOOST) Take 1 Bottle by mouth 3 times daily (with meals)  Qty: 90 each, Refills: 0    Associated Diagnoses: History of stroke; Ischemic cardiomyopathy; Panlobular emphysema (H); Malaise; Loss of weight      polyethylene glycol (MIRALAX/GLYCOLAX) Packet Take 1 packet by mouth daily as needed for constipation      senna-docusate (SENOKOT-S;PERICOLACE) 8.6-50 MG per tablet Take 1 tablet by mouth 2 times daily as needed for constipation  Qty: 100 tablet    Associated Diagnoses: Constipation, unspecified constipation type       !! - Potential duplicate medications found. Please discuss with provider.      STOP taking these medications       guaiFENesin (MUCINEX) 600  MG 12 hr tablet Comments:   Reason for Stopping:             Allergies   Allergies   Allergen Reactions     Crestor [Rosuvastatin] Other (See Comments)     dizziness     Hmg-Coa-R Inhibitors Other (See Comments)     Muscle/joint aching     Lisinopril Cough     Optison [Albumin Human] Other (See Comments)     Pt was flush and very dizzy.  Also had a BP drop     Penicillins Rash

## 2021-03-29 NOTE — TELEPHONE ENCOUNTER
"Hospital/TCU/ED for chronic condition Discharge Protocol    \"Hi, my name is Melissa Flowers RN, a registered nurse, and I am calling from [facility].  I am calling to follow up and see how things are going for you after your recent emergency visit/hospital/TCU stay.\"    Tell me how you are doing now that you are home?\" spoke to caregiver CORNELIUS. She is very tired.       Discharge Instructions    \"Let's review your discharge instructions.  What is/are the follow-up recommendations?  Pt. Response: working and getting weight up.    \"Has an appointment with your primary care provider been scheduled?\"   Yes. (confirm)    \"When you see the provider, I would recommend that you bring your medications with you.\"    Medications    \"Tell me what changed about your medicines when you discharged?\"    Changes to chronic meds?    0-1    \"What questions do you have about your medications?\"    None     New diagnoses of heart failure, COPD, diabetes, or MI?    No              Post Discharge Medication Reconciliation Status: discharge medications reconciled, continue medications without change.    Was MTM referral placed (*Make sure to put transitions as reason for referral)?   No    Call Summary    \"What questions or concerns do you have about your recent visit and your follow-up care?\"     none    \"If you have questions or things don't continue to improve, we encourage you contact us through the main clinic number (give number).  Even if the clinic is not open, triage nurses are available 24/7 to help you.     We would like you to know that our clinic has extended hours (provide information).  We also have urgent care (provide details on closest location and hours/contact info)\"      \"Thank you for your time and take care!\"             "

## 2021-03-29 NOTE — LETTER
[facility] CARE COORDINATION  [address]   [place] [address]    March 29, 2021    Preeti Ford  [address] [place] [address]  [place] [address]      Dear Preeti,    I am a clinic care coordinator who works with SPRING Banegas CNP at [facility]. I wanted to introduce myself and provide you with my contact information for you to be able to call me with any questions or concerns. Below is a description of clinic care coordination and how I can further assist you.      The clinic care coordination team is made up of a registered nurse,  and community health worker who understand the health care system. The goal of clinic care coordination is to help you manage your health and improve access to the health care system in the most efficient manner. The team can assist you in meeting your health care goals by providing education, coordinating services, strengthening the communication among your providers and supporting you with any resource needs.    Please feel free to contact me at [phone] with any questions or concerns. We are focused on providing you with the highest-quality healthcare experience possible and that all starts with you.     Sincerely,     Nestor Anderson MSN, RN, PHN, CCM   Primary Care Clinical RN Care Coordinator  [facility]  3/29/2021   1:18 PM  [email or web address]  Office: [phone]

## 2021-03-29 NOTE — PROGRESS NOTES
Clinic Care Coordination Contact  [facility]       Clinical Data: Care Coordinator Outreach  Outreach attempted x 1.  The phone line was busy so unable to leave a message with the information.  Plan: Care Coordinator will send care coordination introduction letter with care coordinator contact information and explanation of care coordination services via mail. Care Coordinator will try to reach patient again in 1-2 business days.          Nestor Anderson MSN, RN, PHN, CCM   Primary Care Clinical RN Care Coordinator  [facility]  3/29/2021   1:19 PM  [email or web address]  Office: [phone]

## 2021-03-29 NOTE — TELEPHONE ENCOUNTER
Reason for follow up call: Preeti CANO Adrienracquel appeared on our list for being seen in and recenlty discharge from the Emergency Room/In Patient Hospital Admission.    Chief Complaint: Copd Exacerbation (H), History Of Stroke    Encounter routed for Clinic Triage RN to call for follow up    Arabella Gamboa RN on 3/29/2021 at 8:58 AM

## 2021-03-30 NOTE — PROGRESS NOTES
Assessment & Plan     COPD, severe (H)  Panlobular emphysema (H)  Continue using oxygen per NC at 1-1.5 liters to keep O2 sats 86-95%, use bipap at night and as needed during the day for increased difficulty breathing. Continue with daily controller inhalers and use of albuterol or duoneb PRN.  - montelukast (SINGULAIR) 10 MG tablet; Take 1 tablet (10 mg) by mouth At Bedtime  - budesonide-formoterol (SYMBICORT) 160-4.5 MCG/ACT Inhaler; Inhale 2 puffs into the lungs 2 times daily  - umeclidinium (INCRUSE ELLIPTA) 62.5 MCG/INH inhaler; Inhale 1 puff into the lungs daily    Benign essential hypertension  Was hypotensive in the hospital. Had IV fluid resuscitation which is likely the cause of the mild swelling in legs that seems to alternate from one leg to the other (thinking due to positioning?) but always resolves by morning. BP is good today and has been around 120/70 at home.   - losartan (COZAAR) 25 MG tablet; Take 1 tablet (25 mg) by mouth daily    Acute CVA (cerebrovascular accident) - right paramedian superior vermis and left cerebellar hemisphere  Stable. Continue current medication.   - clopidogrel (PLAVIX) 75 MG tablet; TAKE ONE TABLET BY MOUTH ONCE DAILY      PAD (peripheral artery disease) (H)  Stable. No pain, temperature changes in extremities or discoloration. Continue current medication.   - atorvastatin (LIPITOR) 20 MG tablet; Take 1 tablet (20 mg) by mouth At Bedtime    Severe protein-calorie malnutrition (Jones: less than 60% of standard weight) (H)  Is severely malnourished due to poor appetite and poor intake. She was recently on a course of prednisone for exacerbation of COPD/emphysema and is retaining water so her true weight is likely closer to 90 lbs making her BMI less than 16. Is able to take in liquids easier than food so I recommend she drink Ensure 4 times daily to help with nutrition and to increase her weight.   - Nutritional Supplements (ENSURE PLUS) LIQD; Take 1 each by mouth 4  "times daily    Adult failure to thrive  See above notes.   - Nutritional Supplements (ENSURE PLUS) LIQD; Take 1 each by mouth 4 times daily    Seizures (H)  This was a possible diagnosis. Has been stable. Has had no evidence of seizures recently.    Coronary artery disease of native artery of native heart with stable angina pectoris (H)  Stable. Follows with cardiology.         25 minutes spent on the date of the encounter doing chart review, patient visit, documentation and discussion with family        No follow-ups on file.    SPRING Banegas [facility] [place] Álvarez is a 70 year old who presents for the following health issues  accompanied by her spouse:    Hospitals in [place]       Hospital Follow-up Visit:    Hospital/Nursing Home/IP Rehab Facility: [facility]  Date of Admission: 03/23/2021  Date of Discharge: 03/28/2021  Reason(s) for Admission: COPD      Was your hospitalization related to COVID-19? No   Problems taking medications regularly:  None  Medication changes since discharge: None  Problems adhering to non-medication therapy:  None    Summary of hospitalization:  [facility] discharge summary reviewed  Diagnostic Tests/Treatments reviewed.  Follow up needed: none  Other Healthcare Providers Involved in Patient s Care:         Homecare and Specialist appointment - cardiology in June  Update since discharge: stable.   Post Discharge Medication Reconciliation: discharge medications reconciled, continue medications without change.  Plan of care communicated with patient and                        Review of Systems   Constitutional, HEENT, cardiovascular, pulmonary, GI, , musculoskeletal, neuro, skin, endocrine and psych systems are negative, except as otherwise noted.      Objective    /64   Pulse 75   Temp 99  F (37.2  C) (Temporal)   Ht 1.613 m (5' 3.5\")   Wt 42.6 kg (94 lb)   SpO2 100%   BMI 16.39 kg/m    Body mass index is " 16.39 kg/m .  Physical Exam   GENERAL: alert, no acute distress, frail and appears older than stated age  EYES: Eyes grossly normal to inspection, PERRL and conjunctivae and sclerae normal  HENT: ear canals and TM's normal, nose and mouth without ulcers or lesions  NECK: no adenopathy, no asymmetry, masses, or scars and thyroid normal to palpation  RESP: decreased breath sounds throughout  CV: regular rates and rhythm, normal S1 S2, no S3 or S4, no murmur, click or rub, peripheral pulses strong and trace left lower extremity about ankle, no erythema induration or tenderness to palpation     ABDOMEN: soft, nontender, no hepatosplenomegaly, no masses and bowel sounds normal  MS: no gross musculoskeletal defects noted, no edema  SKIN: no suspicious lesions or rashes  NEURO: generalized weakness, sensory exam grossly normal and speech difficulty word-finding at times as is baseline  PSYCH: mentation appears normal, affect normal/bright

## 2021-03-30 NOTE — PROGRESS NOTES
----- Message from Goran Sparks MD sent at 8/7/2020  1:58 PM CDT -----  Please inform the patient that her UGI study reveals reflux, but no other pathology contributing to their symptoms.   Clinic Care Coordination Contact  [facility]       Clinical Data: Care Coordinator Outreach  Outreach attempted x 3.  Phone line continues to be busy.  Unable to contact the patient.    Plan: Care Coordinator sent care coordination introduction letter on 3/29/2021 via mail. Care Coordinator will do no further outreaches at this time.        Nestor Anderson MSN, RN, PHN, [facility]   Primary Care Clinical RN Care Coordinator  [facility]  3/30/2021   9:19 AM  [email or web address]  Office: [phone]

## 2021-04-02 NOTE — TELEPHONE ENCOUNTER
Preeti is having trouble getting her medication from her mail order pharmacy. Can you send a Rx to [facility] for her Metoprolol? We will take care of getting this filled for her.    Thanks,  [facility].  [facility]

## 2021-04-03 NOTE — PROGRESS NOTES
Preeti Ford  Gender: female  : 1950  [address] [facility] [id number] [phone] (home)     Medical Record: [id number]  Pharmacy:    [facility] - [place], [place] - [address] [place] - [facility]  [facility] - [place], [place] - [address]  [facility] - [place], [place] - [address] [place] [address]  Primary Care Provider: Deisy Carter    Parent's names are: Data Unavailable (mother) and Data Unavailable (father).      [facility]  April 3, 2021     First attempt at call back. No answer. No message left.

## 2021-04-06 NOTE — TELEPHONE ENCOUNTER
Reason for Call:  Form, our goal is to have forms completed with 72 hours, however, some forms may require a visit or additional information.    Type of letter, form or note:  medical    Who is the form from?: Home care    Where did the form come from: form was faxed in    What clinic location was the form placed at?: [facility] - [phone]    Where the form was placed: form drop off Box/Folder    What number is listed as a contact on the form?: [phone]       Additional comments: fax [phone]    Call taken on 4/6/2021 at 1:48 PM by Aman Lawrence

## 2021-04-08 NOTE — TELEPHONE ENCOUNTER
Reason for Call:  Form, our goal is to have forms completed with 72 hours, however, some forms may require a visit or additional information.    Type of letter, form or note:  Home Health Certification    Who is the form from?: Home care    Where did the form come from: form was faxed in    What clinic location was the form placed at?: [facility] - [phone]    Where the form was placed: Team A Box/Folder    What number is listed as a contact on the form?: [phone]       Additional comments: Please complete form and fax to [phone]    Call taken on 4/8/2021 at 1:18 PM by Candace Angeles

## 2021-04-08 NOTE — TELEPHONE ENCOUNTER
Reason for Call:  Form, our goal is to have forms completed with 72 hours, however, some forms may require a visit or additional information.    Type of letter, form or note:  Home Health Certification    Who is the form from?: Home care    Where did the form come from: form was faxed in    What clinic location was the form placed at?: [facility] - [phone]    Where the form was placed: Team A Box/Folder    What number is listed as a contact on the form?: [phone]       Additional comments: Please complete and return to [phone]    Call taken on 4/8/2021 at 1:49 PM by Candace Angeles

## 2021-04-20 NOTE — TELEPHONE ENCOUNTER
[facility] now requests orders and shares plan of care/discharge summaries for some patients through [employer].  Please REPLY TO THIS MESSAGE OR ROUTE BACK TO THE AUTHOR in order to give authorization for orders when needed.  This is considered a verbal order, you will still receive a faxed copy of orders for signature.  Thank you for your assistance in improving collaboration for our patients.    ORDER  1. Can anything be ordered for depression?  2. SN 1 wk 2, 3 PRN    MD SUMMARY/PLAN OF CARE   1. Patient reports feeling sad and crying more often. Writer completed a PHQ-9 and score was 8.   2. Patient continues to need more education on nutrition and breathing techniques, please extend SN visits.

## 2021-04-20 NOTE — PROGRESS NOTES
"Preeti Ford  Gender: female  : 1950  [address]  [place] [address] [phone] (home)     Medical Record: [id number]  Pharmacy:    [facility] - [place], [place] - [address] [place] - [facility]  [facility] - [place], [place] - [address]  Primary Care Provider: Deisy Carter    Parent's names are: Data Unavailable (mother) and Data Unavailable (father).      [facility]  2021     Discharge Phone Call:  Key Words/Key Times      Introduction - AIDET (Acknowledge, Introduce, Duration, Explanation)      Empathy-   We are calling to see how you are since your recent stay in the hospital?     Call back COMMENTS:       Clinical Questions -  (f/u appts, medication side effects/purpose, ability to care for self at home) \"For your safety, it is important to us that you understand the purpose and side effects of your medications, can you tell me what your new medications are?\"     Call back COMMENTS:       Staff Recognition -  We like to recognize staff and physicians who have done an excellent job.  Do you remember any people from your care team that you would like recognize?     Call back COMMENTS:       Very Good Care -  We want to provide very good care to all patients.  How was your care?     Call back COMMENTS:       Opportunities for Improvement -  Our goal is to be the best.  Do you have any suggestions for things that we could improve upon?     Call back COMMENTS:       Thank You       For CHF Patients Only:  Teach Back Questions Answered Correctly:    1. What is the name of your  water pill ?   2. What weight gain should you report to your doctor?   3. What foods should you avoid?   4. What symptoms would you report to your doctor?   CHF COMMENTS:         2nd attempt, no answer, no place to leave a message, (voice mail full).  "

## 2021-04-21 NOTE — TELEPHONE ENCOUNTER
Agree with SN orders. I can order a medication for depression. I think Paxil would be a good option since it can have a side effect of weight gain. I will send in the prescription for this to the clinic pharmacy and plan to reassess in 4 weeks.   Jaja Carter, CNP

## 2021-04-26 NOTE — TELEPHONE ENCOUNTER
Reason for Call:  Form, our goal is to have forms completed with 72 hours, however, some forms may require a visit or additional information.    Type of letter, form or note:  orders    Who is the form from?: [facility] (if other please explain)    Where did the form come from: form was faxed in    What clinic location was the form placed at?: [facility] - [phone]    Where the form was placed: Team A Box/Folder    What number is listed as a contact on the form?: [phone]       Additional comments:  Fax [phone]    Call taken on 4/26/2021 at 10:30 AM by Isa Osborn

## 2021-05-06 NOTE — TELEPHONE ENCOUNTER
Reason for Call:  Form, our goal is to have forms completed with 72 hours, however, some forms may require a visit or additional information.    Type of letter, form or note:  medical    Who is the form from?: Home care    Where did the form come from: form was faxed in    What clinic location was the form placed at?: [facility] - [phone]    Where the form was placed: TEAM A FORMS BIN    What number is listed as a contact on the form?: FAX # [phone]       Additional comments: please sign,date,and fax back     Call taken on 5/6/2021 at 8:57 AM by Maegan Swanson

## 2021-05-17 NOTE — TELEPHONE ENCOUNTER
Prescription approved per [facility] Refill Protocol.    DOLORES SinghN, RN  [facility]

## 2021-06-14 NOTE — LETTER
"6/14/2021    Deisy Carter, APRN CNP  [address] [place] [address]    RE: Preeti Ford       Dear Colleague,    I had the pleasure of seeing Preeti Ford in the [facility].    HISTORY:    Preeti Ford is a pleasant 70-year-old female accompanied by her very attentive , 'Brenda".  She has a history of coronary artery disease with previous stenting of her LAD and circumflex, with restenosis of the circumflex requiring re-stenting in 2018 (only mild disease elsewhere at that time).  She has had various times been noted to have a reduced ejection fraction, initially 25% secondary to coronary disease but eventually improving to 50 to 55% with revascularization.  She also has a history of peripheral vascular disease with aortic iliac bypass, COPD oxygen dependent at night, ongoing cigarette use, mild aortic stenosis with a mean gradient of 12 mmHg, aortic valve area of 1 cm  by last echo, hypertension, history of 2 previous CVAs the last being in February 2020, hyperlipidemia, and severe pulmonary hypertension with echo showing variable values, mostly around 60 mmHg.    Since our last visit November Preeti has been hospitalized with respiratory failure once again due to COPD exacerbation.  On that visit she was noted to have a mildly elevated but stable troponin at about 0.5, likely representing a type II MI.  She has undergone 2 echocardiograms, one in March and another in June.  These studies are reviewed.  They show mildly reduced LV function of around 50%, pulmonary hypertension letter to severe, and moderate to severe aortic stenosis which has been stable over the last year or more.    Today Preeti describes a chest heaviness.  This is midsternal does not radiate elsewhere.  She has a great deal of difficulty describing this but states it can last up to an hour at a time, usually occurs at rest, she does not believe it is " pleuritic, she is not sure how long the shortest episodes might be.  She does not think this is related to activity and there is no associated nausea diaphoresis or worsening dyspnea.  The episodes are random but occur only in the daytime.  She thinks she has 1 or 2 episodes a week on average.  She does not get chest pain with physical activity.  She thinks that this pain was present at about the same degree a year ago or even a couple of years ago.  She also reports that she does not notice any significant wheezing.  Her  is concerned because she at times increases her oxygen.  She generally uses 1 to 2 L.  She also has a BiPAP at home which she does not like and does not use very much.    Preeti developed some swelling of her right ankle several months ago and then that resolved and she now has noticed some swelling of her left ankle.  This is dependent and is mild in severity at the time of her visit with me in the mid morning.  We talked about elevation of legs and potentially compression stockings to limit this.    ASSESSMENT/PLAN:    1.  Chest pain.  This is somewhat suspicious for angina but she does not think it feels the same as the discomfort she had prior to her previous stenting procedures, and it sounds like this pain has been present and stable for over a year.  It is nonexertional and does not radiate elsewhere so my suspicion is relatively low that this represents angina pectoris.  I asked her to try using nitroglycerin and if it consistently helps I think we will need to pursue this further, otherwise we will take a watchful waiting approach and pursue it further if her symptoms worsen over time.  She does have documented coronary disease as outlined above, but her last angiogram showed restenosis of her circumflex and minimal disease elsewhere.  2.  Hypertension.  Blood pressure today is on the high side but her  reports that she checks her blood pressure regularly and it is usually  around 115/60 at home.  I see some wide variability in the blood pressures in the office as well.  No medication changes made today but we will keep an eye on this.  3.  Hyperlipidemia.  Currently using Lipitor 20 mg daily with adequate lipid control, continue same.  4.  Aortic stenosis.  This patient has at least moderate and possibly severe aortic stenosis, but she is very inactive and I do not believe that her aortic stenosis is playing a large role in her sense of dyspnea.  I think this is almost entirely due to her COPD.  We will keep an eye on her AS by rechecking an echo in 6 months.  5.  Cigarette use.  The patient has cut back considerably, using less than 1 cigarette/day.  I encouraged her to stop smoking entirely.    Thank you for inviting me to participate in your patient's care.  Please and hesitate to call if I can be of further assistance.  I spent over 45 minutes today reviewing the chart, interviewing and examining the patient, and completing documentation.    Chart documentation was completed, in part, with [employer] voice-recognition software. Even though reviewed, some grammatical, spelling, and word errors may remain.       Orders Placed This Encounter   Procedures     Follow-Up with Cardiologist     Echocardiogram Complete     Orders Placed This Encounter   Medications     nitroGLYcerin (NITROSTAT) 0.4 MG sublingual tablet     Sig: For chest pain place 1 tablet under the tongue every 5 minutes for 3 doses. If symptoms persist 5 minutes after 1st dose call 911.     Dispense:  25 tablet     Refill:  0     Medications Discontinued During This Encounter   Medication Reason     nitroGLYcerin (NITROSTAT) 0.4 MG sublingual tablet Reorder       10 year ASCVD risk: The ASCVD Risk score (Cresco KAMALA Jr., et al., 2013) failed to calculate for the following reasons:    The patient has a prior MI or stroke diagnosis    Encounter Diagnoses   Name Primary?     Coronary artery disease of native artery of native heart  with stable angina pectoris (H)      Benign essential hypertension      COPD exacerbation, Chronic O2 Dependent      Nonrheumatic aortic valve stenosis      Coronary artery disease involving native coronary artery of native heart with angina pectoris (H)        CURRENT MEDICATIONS:  Current Outpatient Medications   Medication Sig Dispense Refill     ACE/ARB/ARNI NOT PRESCRIBED, INTENTIONAL, Please choose reason not prescribed, below       albuterol (PROAIR HFA/PROVENTIL HFA/VENTOLIN HFA) 108 (90 Base) MCG/ACT inhaler Inhale 2 puffs into the lungs every 6 hours as needed for shortness of breath / dyspnea 3 Inhaler 3     albuterol (PROVENTIL) (2.5 MG/3ML) 0.083% neb solution NEBULIZE CONTENTS OF ONE VIAL EVERY 4 HOURS AS NEEDED FOR SHORTNESS OF BREATH /DYSPNEA OR WHEEZING 360 mL 0     aspirin 81 MG EC tablet Take 1 tablet (81 mg) by mouth daily       atorvastatin (LIPITOR) 20 MG tablet Take 1 tablet (20 mg) by mouth At Bedtime 90 tablet 3     budesonide-formoterol (SYMBICORT) 160-4.5 MCG/ACT Inhaler Inhale 2 puffs into the lungs 2 times daily 10.2 g 5     clopidogrel (PLAVIX) 75 MG tablet TAKE ONE TABLET BY MOUTH ONCE DAILY 90 tablet 3     guaiFENesin 400 MG TABS Take 400 mg by mouth 2 times daily       ipratropium - albuterol 0.5 mg/2.5 mg/3 mL (DUONEB) 0.5-2.5 (3) MG/3ML neb solution Take 1 vial (3 mLs) by nebulization 4 times daily Until hospital follow up, then as directed by your primary care provider 270 mL 3     losartan (COZAAR) 25 MG tablet Take 1 tablet (25 mg) by mouth daily 90 tablet 1     metoprolol tartrate (LOPRESSOR) 25 MG tablet Take 0.5 tablets (12.5 mg) by mouth 2 times daily 45 tablet 3     montelukast (SINGULAIR) 10 MG tablet Take 1 tablet (10 mg) by mouth At Bedtime 90 tablet 3     nitroGLYcerin (NITROSTAT) 0.4 MG sublingual tablet For chest pain place 1 tablet under the tongue every 5 minutes for 3 doses. If symptoms persist 5 minutes after 1st dose call 911. 25 tablet 0     Nutritional  Supplements (BOOST) Take 1 Bottle by mouth 3 times daily (with meals) 90 each 0     Nutritional Supplements (CARNATION INSTANT BREAKFAST) LIQD Take 1 packet by mouth 3 times daily as needed       Nutritional Supplements (ENSURE PLUS) LIQD Take 1 each by mouth 4 times daily 5688 mL 11     order for DME Equipment being ordered: Nebulizer machine 1 Device 0     PARoxetine (PAXIL) 10 MG tablet Take 1 tablet (10 mg) by mouth every morning 30 tablet 1     Respiratory Therapy Supplies (NEBULIZER/TUBING/MOUTHPIECE) KIT 1 kit as needed (if becomes damaged) 1 kit 1     umeclidinium (INCRUSE ELLIPTA) 62.5 MCG/INH inhaler Inhale 1 puff into the lungs daily 3 each 3       ALLERGIES     Allergies   Allergen Reactions     Crestor [Rosuvastatin] Other (See Comments)     dizziness     Lisinopril Cough     Statins [Hmg-Coa-R Inhibitors] Other (See Comments)     Muscle/joint aching     Optison [Albumin Human] Other (See Comments)     Pt was flush and very dizzy.  Also had a BP drop     Penicillins Rash       PAST MEDICAL HISTORY:  Past Medical History:   Diagnosis Date     Acute on chronic respiratory failure (H) 3/25/2021     Arthritis      COPD (chronic obstructive pulmonary disease) (H)      Coronary artery disease      CVA (cerebral vascular accident) (H) 8-     Hypertension        PAST SURGICAL HISTORY:  Past Surgical History:   Procedure Laterality Date     APPENDECTOMY       BYPASS GRAFT AORTOILIAC N/A 3/7/2017    Procedure: BYPASS GRAFT AORTOILIAC;  Surgeon: Marcos Pratt MD;  Location: [facility] OR     SALPINGO OOPHORECTOMY,R/L/DELORES      Salpingo Oophorectomy, RT/LT/DELORES       FAMILY HISTORY:  Family History   Problem Relation Age of Onset     Cerebrovascular Disease Mother      Cerebrovascular Disease Father      Genitourinary Problems Brother         Dialysis       SOCIAL HISTORY:  Social History     Socioeconomic History     Marital status:      Spouse name: Not on file     Number of children: Not on file      Years of education: Not on file     Highest education level: Not on file   Occupational History     Not on file   Social Needs     Financial resource strain: Not on file     Food insecurity     Worry: Not on file     Inability: Not on file     Transportation needs     Medical: Not on file     Non-medical: Not on file   Tobacco Use     Smoking status: Current Every Day Smoker     Packs/day: 0.50     Types: Cigarettes     Smokeless tobacco: Never Used     Tobacco comment: patient states she is working on it    Substance and Sexual Activity     Alcohol use: No     Alcohol/week: 0.0 standard drinks     Drug use: No     Sexual activity: Not Currently     Partners: Male   Lifestyle     Physical activity     Days per week: Not on file     Minutes per session: Not on file     Stress: Not on file   Relationships     Social connections     Talks on phone: Not on file     Gets together: Not on file     Attends [employer] service: Not on file     Active member of club or organization: Not on file     Attends meetings of clubs or organizations: Not on file     Relationship status: Not on file     Intimate partner violence     Fear of current or ex partner: Not on file     Emotionally abused: Not on file     Physically abused: Not on file     Forced sexual activity: Not on file   Other Topics Concern      Service No     Blood Transfusions No     Caffeine Concern No     Occupational Exposure No     Hobby Hazards No     Sleep Concern Yes     Comment: Trouble breathing at night due to COPD     Stress Concern No     Weight Concern No     Special Diet No     Back Care No     Exercise No     Bike Helmet No     Seat Belt Yes     Self-Exams Yes     Parent/sibling w/ CABG, MI or angioplasty before 65F 55M? No   Social History Narrative     Not on file       Review of Systems:  Skin:  Negative     Eyes:  Negative    ENT:  Negative    Respiratory:  Positive for dyspnea on exertion;shortness of breath  Cardiovascular:  Negative  "for;palpitations;lightheadedness;dizziness Positive for;edema;chest pain  Gastroenterology: Negative    Genitourinary:  Negative    Musculoskeletal:  Negative    Neurologic:  Negative    Psychiatric:  Negative    Heme/Lymph/Imm:  Positive for allergies  Endocrine:  Negative      Physical Exam:  Vitals: BP (!) 152/74 (BP Location: Right arm, Patient Position: Sitting, Cuff Size: Adult Regular)   Pulse 66   Ht 1.613 m (5' 3.5\")   Wt 41.1 kg (90 lb 8 oz)   SpO2 99%   BMI 15.78 kg/m      Constitutional:  cooperative thin;cachectic;frail;appears older than stated age examined in wheelchair    Skin:  warm and dry to the touch        Head:  normocephalic        Eyes:  sclera white;no nystagmus;no xanthalasma        ENT:  no pallor or cyanosis   masked    Neck:  JVP normal   torticollis    Chest:  clear to auscultation   no wheezing    Cardiac: regular rhythm;normal S1 and S2   distant heart sounds   grade 1;systolic ejection murmur;apical          Abdomen:  abdomen soft;BS normoactive        Vascular: pulses full and equal                                      Extremities and Muscular Skeletal:    trace;RLE edemaLLE edema;1+;pitting        Neurological:      residual mild aphasia    Psych:  affect appropriate, oriented to time, person and place     Recent Lab Results:  LIPID RESULTS:  Lab Results   Component Value Date    CHOL 185 10/29/2020    HDL 84 10/29/2020    LDL 85 10/29/2020    TRIG 82 10/29/2020    CHOLHDLRATIO 3.6 10/09/2015       LIVER ENZYME RESULTS:  Lab Results   Component Value Date    AST 30 05/02/2020    ALT 22 10/29/2020       CBC RESULTS:  Lab Results   Component Value Date    WBC 6.9 03/26/2021    RBC 4.01 03/26/2021    HGB 12.4 03/26/2021    HCT 37.2 03/26/2021    MCV 93 03/26/2021    MCH 30.9 03/26/2021    MCHC 33.3 03/26/2021    RDW 12.1 03/26/2021     03/26/2021       BMP RESULTS:  Lab Results   Component Value Date     03/28/2021    POTASSIUM 4.5 03/28/2021    CHLORIDE 100 " 03/28/2021    CO2 36 (H) 03/28/2021    ANIONGAP 4 03/28/2021     (H) 03/28/2021    BUN 25 03/28/2021    CR 0.70 03/28/2021    GFRESTIMATED 89 03/28/2021    GFRESTBLACK >90 03/28/2021    CALDERON 8.5 03/28/2021        A1C RESULTS:  Lab Results   Component Value Date    A1C 5.5 03/24/2018       INR RESULTS:  Lab Results   Component Value Date    INR 0.84 (L) 05/02/2020    INR 0.91 05/17/2018         Victor Hugo Cortez MD, [facility]    Thank you for allowing me to participate in the care of your patient.      Sincerely,     Victor Hugo Cortez MD     [facility]    cc:   Victor Hugo Cortez MD  [address] [place] [id number]

## 2021-06-14 NOTE — PROGRESS NOTES
"HISTORY:    Preeti Ford is a pleasant 70-year-old female accompanied by her very attentive , 'Brenda".  She has a history of coronary artery disease with previous stenting of her LAD and circumflex, with restenosis of the circumflex requiring re-stenting in 2018 (only mild disease elsewhere at that time).  She has had various times been noted to have a reduced ejection fraction, initially 25% secondary to coronary disease but eventually improving to 50 to 55% with revascularization.  She also has a history of peripheral vascular disease with aortic iliac bypass, COPD oxygen dependent at night, ongoing cigarette use, mild aortic stenosis with a mean gradient of 12 mmHg, aortic valve area of 1 cm  by last echo, hypertension, history of 2 previous CVAs the last being in February 2020, hyperlipidemia, and severe pulmonary hypertension with echo showing variable values, mostly around 60 mmHg.    Since our last visit November Preeti has been hospitalized with respiratory failure once again due to COPD exacerbation.  On that visit she was noted to have a mildly elevated but stable troponin at about 0.5, likely representing a type II MI.  She has undergone 2 echocardiograms, one in March and another in June.  These studies are reviewed.  They show mildly reduced LV function of around 50%, pulmonary hypertension letter to severe, and moderate to severe aortic stenosis which has been stable over the last year or more.    Today Preeti describes a chest heaviness.  This is midsternal does not radiate elsewhere.  She has a great deal of difficulty describing this but states it can last up to an hour at a time, usually occurs at rest, she does not believe it is pleuritic, she is not sure how long the shortest episodes might be.  She does not think this is related to activity and there is no associated nausea diaphoresis or worsening dyspnea.  The episodes are random but occur only in the daytime.  She thinks she has 1 or " 2 episodes a week on average.  She does not get chest pain with physical activity.  She thinks that this pain was present at about the same degree a year ago or even a couple of years ago.  She also reports that she does not notice any significant wheezing.  Her  is concerned because she at times increases her oxygen.  She generally uses 1 to 2 L.  She also has a BiPAP at home which she does not like and does not use very much.    Preeti developed some swelling of her right ankle several months ago and then that resolved and she now has noticed some swelling of her left ankle.  This is dependent and is mild in severity at the time of her visit with me in the mid morning.  We talked about elevation of legs and potentially compression stockings to limit this.    ASSESSMENT/PLAN:    1.  Chest pain.  This is somewhat suspicious for angina but she does not think it feels the same as the discomfort she had prior to her previous stenting procedures, and it sounds like this pain has been present and stable for over a year.  It is nonexertional and does not radiate elsewhere so my suspicion is relatively low that this represents angina pectoris.  I asked her to try using nitroglycerin and if it consistently helps I think we will need to pursue this further, otherwise we will take a watchful waiting approach and pursue it further if her symptoms worsen over time.  She does have documented coronary disease as outlined above, but her last angiogram showed restenosis of her circumflex and minimal disease elsewhere.  2.  Hypertension.  Blood pressure today is on the high side but her  reports that she checks her blood pressure regularly and it is usually around 115/60 at home.  I see some wide variability in the blood pressures in the office as well.  No medication changes made today but we will keep an eye on this.  3.  Hyperlipidemia.  Currently using Lipitor 20 mg daily with adequate lipid control, continue  same.  4.  Aortic stenosis.  This patient has at least moderate and possibly severe aortic stenosis, but she is very inactive and I do not believe that her aortic stenosis is playing a large role in her sense of dyspnea.  I think this is almost entirely due to her COPD.  We will keep an eye on her AS by rechecking an echo in 6 months.  5.  Cigarette use.  The patient has cut back considerably, using less than 1 cigarette/day.  I encouraged her to stop smoking entirely.    Thank you for inviting me to participate in your patient's care.  Please and hesitate to call if I can be of further assistance.  I spent over 45 minutes today reviewing the chart, interviewing and examining the patient, and completing documentation.    Chart documentation was completed, in part, with [employer] voice-recognition software. Even though reviewed, some grammatical, spelling, and word errors may remain.       Orders Placed This Encounter   Procedures     Follow-Up with Cardiologist     Echocardiogram Complete     Orders Placed This Encounter   Medications     nitroGLYcerin (NITROSTAT) 0.4 MG sublingual tablet     Sig: For chest pain place 1 tablet under the tongue every 5 minutes for 3 doses. If symptoms persist 5 minutes after 1st dose call 911.     Dispense:  25 tablet     Refill:  0     Medications Discontinued During This Encounter   Medication Reason     nitroGLYcerin (NITROSTAT) 0.4 MG sublingual tablet Reorder       10 year ASCVD risk: The ASCVD Risk score (Jenny DC Jr., et al., 2013) failed to calculate for the following reasons:    The patient has a prior MI or stroke diagnosis    Encounter Diagnoses   Name Primary?     Coronary artery disease of native artery of native heart with stable angina pectoris (H)      Benign essential hypertension      COPD exacerbation, Chronic O2 Dependent      Nonrheumatic aortic valve stenosis      Coronary artery disease involving native coronary artery of native heart with angina pectoris (H)         CURRENT MEDICATIONS:  Current Outpatient Medications   Medication Sig Dispense Refill     ACE/ARB/ARNI NOT PRESCRIBED, INTENTIONAL, Please choose reason not prescribed, below       albuterol (PROAIR HFA/PROVENTIL HFA/VENTOLIN HFA) 108 (90 Base) MCG/ACT inhaler Inhale 2 puffs into the lungs every 6 hours as needed for shortness of breath / dyspnea 3 Inhaler 3     albuterol (PROVENTIL) (2.5 MG/3ML) 0.083% neb solution NEBULIZE CONTENTS OF ONE VIAL EVERY 4 HOURS AS NEEDED FOR SHORTNESS OF BREATH /DYSPNEA OR WHEEZING 360 mL 0     aspirin 81 MG EC tablet Take 1 tablet (81 mg) by mouth daily       atorvastatin (LIPITOR) 20 MG tablet Take 1 tablet (20 mg) by mouth At Bedtime 90 tablet 3     budesonide-formoterol (SYMBICORT) 160-4.5 MCG/ACT Inhaler Inhale 2 puffs into the lungs 2 times daily 10.2 g 5     clopidogrel (PLAVIX) 75 MG tablet TAKE ONE TABLET BY MOUTH ONCE DAILY 90 tablet 3     guaiFENesin 400 MG TABS Take 400 mg by mouth 2 times daily       ipratropium - albuterol 0.5 mg/2.5 mg/3 mL (DUONEB) 0.5-2.5 (3) MG/3ML neb solution Take 1 vial (3 mLs) by nebulization 4 times daily Until hospital follow up, then as directed by your primary care provider 270 mL 3     losartan (COZAAR) 25 MG tablet Take 1 tablet (25 mg) by mouth daily 90 tablet 1     metoprolol tartrate (LOPRESSOR) 25 MG tablet Take 0.5 tablets (12.5 mg) by mouth 2 times daily 45 tablet 3     montelukast (SINGULAIR) 10 MG tablet Take 1 tablet (10 mg) by mouth At Bedtime 90 tablet 3     nitroGLYcerin (NITROSTAT) 0.4 MG sublingual tablet For chest pain place 1 tablet under the tongue every 5 minutes for 3 doses. If symptoms persist 5 minutes after 1st dose call 911. 25 tablet 0     Nutritional Supplements (BOOST) Take 1 Bottle by mouth 3 times daily (with meals) 90 each 0     Nutritional Supplements (CARNATION INSTANT BREAKFAST) LIQD Take 1 packet by mouth 3 times daily as needed       Nutritional Supplements (ENSURE PLUS) LIQD Take 1 each by mouth 4  times daily 5688 mL 11     order for DME Equipment being ordered: Nebulizer machine 1 Device 0     PARoxetine (PAXIL) 10 MG tablet Take 1 tablet (10 mg) by mouth every morning 30 tablet 1     Respiratory Therapy Supplies (NEBULIZER/TUBING/MOUTHPIECE) KIT 1 kit as needed (if becomes damaged) 1 kit 1     umeclidinium (INCRUSE ELLIPTA) 62.5 MCG/INH inhaler Inhale 1 puff into the lungs daily 3 each 3       ALLERGIES     Allergies   Allergen Reactions     Crestor [Rosuvastatin] Other (See Comments)     dizziness     Lisinopril Cough     Statins [Hmg-Coa-R Inhibitors] Other (See Comments)     Muscle/joint aching     Optison [Albumin Human] Other (See Comments)     Pt was flush and very dizzy.  Also had a BP drop     Penicillins Rash       PAST MEDICAL HISTORY:  Past Medical History:   Diagnosis Date     Acute on chronic respiratory failure (H) 3/25/2021     Arthritis      COPD (chronic obstructive pulmonary disease) (H)      Coronary artery disease      CVA (cerebral vascular accident) (H) 8-     Hypertension        PAST SURGICAL HISTORY:  Past Surgical History:   Procedure Laterality Date     APPENDECTOMY       BYPASS GRAFT AORTOILIAC N/A 3/7/2017    Procedure: BYPASS GRAFT AORTOILIAC;  Surgeon: Marcos Pratt MD;  Location: [facility] OR     SALPINGO OOPHORECTOMY,R/L/DELORES      Salpingo Oophorectomy, RT/LT/DELORES       FAMILY HISTORY:  Family History   Problem Relation Age of Onset     Cerebrovascular Disease Mother      Cerebrovascular Disease Father      Genitourinary Problems Brother         Dialysis       SOCIAL HISTORY:  Social History     Socioeconomic History     Marital status:      Spouse name: Not on file     Number of children: Not on file     Years of education: Not on file     Highest education level: Not on file   Occupational History     Not on file   Social Needs     Financial resource strain: Not on file     Food insecurity     Worry: Not on file     Inability: Not on file     Transportation  needs     Medical: Not on file     Non-medical: Not on file   Tobacco Use     Smoking status: Current Every Day Smoker     Packs/day: 0.50     Types: Cigarettes     Smokeless tobacco: Never Used     Tobacco comment: patient states she is working on it    Substance and Sexual Activity     Alcohol use: No     Alcohol/week: 0.0 standard drinks     Drug use: No     Sexual activity: Not Currently     Partners: Male   Lifestyle     Physical activity     Days per week: Not on file     Minutes per session: Not on file     Stress: Not on file   Relationships     Social connections     Talks on phone: Not on file     Gets together: Not on file     Attends Sabianism service: Not on file     Active member of club or organization: Not on file     Attends meetings of clubs or organizations: Not on file     Relationship status: Not on file     Intimate partner violence     Fear of current or ex partner: Not on file     Emotionally abused: Not on file     Physically abused: Not on file     Forced sexual activity: Not on file   Other Topics Concern      Service No     Blood Transfusions No     Caffeine Concern No     Occupational Exposure No     Hobby Hazards No     Sleep Concern Yes     Comment: Trouble breathing at night due to COPD     Stress Concern No     Weight Concern No     Special Diet No     Back Care No     Exercise No     Bike Helmet No     Seat Belt Yes     Self-Exams Yes     Parent/sibling w/ CABG, MI or angioplasty before 65F 55M? No   Social History Narrative     Not on file       Review of Systems:  Skin:  Negative     Eyes:  Negative    ENT:  Negative    Respiratory:  Positive for dyspnea on exertion;shortness of breath  Cardiovascular:  Negative for;palpitations;lightheadedness;dizziness Positive for;edema;chest pain  Gastroenterology: Negative    Genitourinary:  Negative    Musculoskeletal:  Negative    Neurologic:  Negative    Psychiatric:  Negative    Heme/Lymph/Imm:  Positive for allergies  Endocrine:   "Negative      Physical Exam:  Vitals: BP (!) 152/74 (BP Location: Right arm, Patient Position: Sitting, Cuff Size: Adult Regular)   Pulse 66   Ht 1.613 m (5' 3.5\")   Wt 41.1 kg (90 lb 8 oz)   SpO2 99%   BMI 15.78 kg/m      Constitutional:  cooperative thin;cachectic;frail;appears older than stated age examined in wheelchair    Skin:  warm and dry to the touch        Head:  normocephalic        Eyes:  sclera white;no nystagmus;no xanthalasma        ENT:  no pallor or cyanosis   masked    Neck:  JVP normal   torticollis    Chest:  clear to auscultation   no wheezing    Cardiac: regular rhythm;normal S1 and S2   distant heart sounds   grade 1;systolic ejection murmur;apical          Abdomen:  abdomen soft;BS normoactive        Vascular: pulses full and equal                                      Extremities and Muscular Skeletal:    trace;RLE edemaLLE edema;1+;pitting        Neurological:      residual mild aphasia    Psych:  affect appropriate, oriented to time, person and place     Recent Lab Results:  LIPID RESULTS:  Lab Results   Component Value Date    CHOL 185 10/29/2020    HDL 84 10/29/2020    LDL 85 10/29/2020    TRIG 82 10/29/2020    CHOLHDLRATIO 3.6 10/09/2015       LIVER ENZYME RESULTS:  Lab Results   Component Value Date    AST 30 05/02/2020    ALT 22 10/29/2020       CBC RESULTS:  Lab Results   Component Value Date    WBC 6.9 03/26/2021    RBC 4.01 03/26/2021    HGB 12.4 03/26/2021    HCT 37.2 03/26/2021    MCV 93 03/26/2021    MCH 30.9 03/26/2021    MCHC 33.3 03/26/2021    RDW 12.1 03/26/2021     03/26/2021       BMP RESULTS:  Lab Results   Component Value Date     03/28/2021    POTASSIUM 4.5 03/28/2021    CHLORIDE 100 03/28/2021    CO2 36 (H) 03/28/2021    ANIONGAP 4 03/28/2021     (H) 03/28/2021    BUN 25 03/28/2021    CR 0.70 03/28/2021    GFRESTIMATED 89 03/28/2021    GFRESTBLACK >90 03/28/2021    CALDERON 8.5 03/28/2021        A1C RESULTS:  Lab Results   Component Value Date    A1C " 5.5 03/24/2018       INR RESULTS:  Lab Results   Component Value Date    INR 0.84 (L) 05/02/2020    INR 0.91 05/17/2018         Victor Hugo Cortez MD, FACC    CC  Victor Hugo Cortez MD  [address] [place] [id number]

## 2021-06-17 NOTE — TELEPHONE ENCOUNTER
Prescription approved per [facility] Refill Protocol.  Zarina Jones RN

## 2021-08-10 NOTE — TELEPHONE ENCOUNTER
Prescription approved per [facility] Refill Protocol.    Arabella Gamboa RN on 8/10/2021 at 3:37 PM

## 2021-08-29 PROBLEM — Z96.89 CHEST TUBE IN PLACE: Status: ACTIVE | Noted: 2021-01-01

## 2021-08-29 PROBLEM — J44.9 CHRONIC OBSTRUCTIVE PULMONARY DISEASE, UNSPECIFIED COPD TYPE (H): Status: ACTIVE | Noted: 2021-01-01

## 2021-08-29 PROBLEM — J93.83 SPONTANEOUS PNEUMOTHORAX: Status: ACTIVE | Noted: 2021-01-01

## 2021-08-29 PROBLEM — R09.02 HYPOXIA: Status: ACTIVE | Noted: 2021-01-01

## 2021-08-29 NOTE — ED TRIAGE NOTES
Pt c/o sob oxygen at home 70s - COPD and worsening sx. Pt placed CPAP 15 L 93% - 3nitro sprays by EMS.

## 2021-08-29 NOTE — ED PROVIDER NOTES
History     Chief Complaint   Patient presents with     Shortness of Breath     HPI  Preeti Ford is a 70 year old female who presents via EMS with severe shortness of breath that started today.  Patient states is just gotten progressively worse throughout the day here and started suddenly.  Denies any chest pain or pressure.  Patient states that she has not been coughing significantly.  Denies any fevers or chills.  Patient denies any abdominal pain or back pain.  She tried her nebulizers at home but this did not help much. EMS was called and they brought the patient here. They noted to her to have low O2 sats and so put her on BiPAP in route here.    Allergies:  Allergies   Allergen Reactions     Crestor [Rosuvastatin] Other (See Comments)     dizziness     Lisinopril Cough     Statins [Hmg-Coa-R Inhibitors] Other (See Comments)     Muscle/joint aching     Optison [Albumin Human] Other (See Comments)     Pt was flush and very dizzy.  Also had a BP drop     Penicillins Rash       Problem List:    Patient Active Problem List    Diagnosis Date Noted     Acute bronchitis 03/26/2021     Priority: Medium     Mild to moderate pulmonary hypertension (H) 03/26/2021     Priority: Medium     Nonrheumatic aortic valve stenosis - moderate to severe 03/26/2021     Priority: Medium     Hypotension 03/26/2021     Priority: Medium     Lactic acidosis 03/26/2021     Priority: Medium     Acute on chronic respiratory failure with hypoxia and hypercapnia 03/25/2021     Priority: Medium     Severe malnutrition (H) 04/16/2018     Priority: Medium     Hyponatremia 04/16/2018     Priority: Medium     Hypochloremia 04/16/2018     Priority: Medium     Vertigo 03/23/2018     Priority: Medium     Other seizures (H) - possible 03/23/2018     Priority: Medium     Acute CVA (cerebrovascular accident) - right paramedian superior vermis and left cerebellar hemisphere 03/23/2018     Priority: Medium     Pulmonary emphysema (H) 03/22/2018      Priority: Medium     COPD exacerbation, Chronic O2 Dependent 06/14/2017     Priority: Medium     Coronary artery disease of native artery of native heart with stable angina pectoris (H) 06/14/2017     Priority: Medium     Elevated troponin 06/14/2017     Priority: Medium     Cervical high risk HPV (human papillomavirus) test positive 12/20/2016     Priority: Medium     12/20/16 NIL pap/+ HPV (not 16 or 18). Plan: cotest in 1 year, due by 12/20/17 12/20/17 NIL Pap, +HR HPV (Not types 16/18). Plan colp  12/26/18 Patient is lost to pap tracking follow-up           PAD (peripheral artery disease) (H) - moderate left common carotid stenosis, aortoiliac bypass, chronic left vertebral and right posterior cerebral stenoses 02/12/2016     Priority: Medium     Chronic bronchitis, unspecified chronic bronchitis type (H) 02/09/2016     Priority: Medium     Ischemic cardiomyopathy - EF 25% in 2015 but 55% in 3/2017 and 45-50% on 3/18 09/08/2015     Priority: Medium     Echo  With ejection fraction of 25-30 %       HTN, goal below 130/80 09/08/2015     Priority: Medium     Acute systolic congestive heart failure (H) 09/08/2015     Priority: Medium     ejection fraction 25-30%       Pulmonary nodule -- 1.9 cm RLL, new 5/2/20 09/08/2015     Priority: Medium     GERD (gastroesophageal reflux disease) 09/08/2015     Priority: Medium     NSTEMI (non-ST elevated myocardial infarction) (H) 09/08/2015     Priority: Medium     ACS (acute coronary syndrome) (H) 08/28/2015     Priority: Medium     Smoker 03/10/2015     Priority: Medium     History of stroke - right thalamus in 8/2013 and left cerebellar and right vermis in 3/2018 08/21/2013     Priority: Medium     Numbness and tingling 08/21/2013     Priority: Medium     Right side of the face , upper and lower limbs         CVA (cerebral vascular accident) (H) 08/17/2013     Priority: Medium     Hyperlipidemia LDL goal <130 07/19/2012     Priority: Medium     Urinary incontinence  03/04/2012     Priority: Medium     Forgetfulness 03/04/2012     Priority: Medium        Past Medical History:    Past Medical History:   Diagnosis Date     Acute on chronic respiratory failure (H) 3/25/2021     Arthritis      COPD (chronic obstructive pulmonary disease) (H)      Coronary artery disease      CVA (cerebral vascular accident) (H) 8-     Hypertension        Past Surgical History:    Past Surgical History:   Procedure Laterality Date     APPENDECTOMY       BYPASS GRAFT AORTOILIAC N/A 3/7/2017    Procedure: BYPASS GRAFT AORTOILIAC;  Surgeon: Marcos Pratt MD;  Location: [facility] OR     SALPINGO OOPHORECTOMY,R/L/DELORES      Salpingo Oophorectomy, RT/LT/DELORES       Family History:    Family History   Problem Relation Age of Onset     Cerebrovascular Disease Mother      Cerebrovascular Disease Father      Genitourinary Problems Brother         Dialysis       Social History:  Marital Status:   [2]  Social History     Tobacco Use     Smoking status: Current Every Day Smoker     Packs/day: 0.50     Types: Cigarettes     Smokeless tobacco: Never Used     Tobacco comment: patient states she is working on it    Substance Use Topics     Alcohol use: No     Alcohol/week: 0.0 standard drinks     Drug use: No        Medications:    ACE/ARB/ARNI NOT PRESCRIBED, INTENTIONAL,  albuterol (PROAIR HFA/PROVENTIL HFA/VENTOLIN HFA) 108 (90 Base) MCG/ACT inhaler  albuterol (PROVENTIL) (2.5 MG/3ML) 0.083% neb solution  aspirin 81 MG EC tablet  atorvastatin (LIPITOR) 20 MG tablet  budesonide-formoterol (SYMBICORT) 160-4.5 MCG/ACT Inhaler  clopidogrel (PLAVIX) 75 MG tablet  guaiFENesin 400 MG TABS  ipratropium - albuterol 0.5 mg/2.5 mg/3 mL (DUONEB) 0.5-2.5 (3) MG/3ML neb solution  losartan (COZAAR) 25 MG tablet  metoprolol tartrate (LOPRESSOR) 25 MG tablet  montelukast (SINGULAIR) 10 MG tablet  nitroGLYcerin (NITROSTAT) 0.4 MG sublingual tablet  Nutritional Supplements (BOOST)  Nutritional Supplements (CARNATION  INSTANT BREAKFAST) LIQD  Nutritional Supplements (ENSURE PLUS) LIQD  order for DME  PARoxetine (PAXIL) 10 MG tablet  Respiratory Therapy Supplies (NEBULIZER/TUBING/MOUTHPIECE) KIT  umeclidinium (INCRUSE ELLIPTA) 62.5 MCG/INH inhaler          Review of Systems   All other systems reviewed and are negative.      Physical Exam          Physical Exam  Vitals and nursing note reviewed.   Constitutional:       General: She is not in acute distress.     Appearance: She is well-developed. She is ill-appearing. She is not diaphoretic.   HENT:      Head: Normocephalic and atraumatic.      Nose: Nose normal.      Mouth/Throat:      Pharynx: No oropharyngeal exudate.   Eyes:      Conjunctiva/sclera: Conjunctivae normal.   Cardiovascular:      Rate and Rhythm: Normal rate and regular rhythm.      Heart sounds: Normal heart sounds. No murmur heard.   No friction rub.   Pulmonary:      Effort: Tachypnea and accessory muscle usage present. No respiratory distress.      Breath sounds: Normal breath sounds. No stridor. No wheezing or rales.   Abdominal:      General: Bowel sounds are normal. There is no distension.      Palpations: Abdomen is soft. There is no mass.      Tenderness: There is no abdominal tenderness. There is no guarding.   Musculoskeletal:         General: No tenderness. Normal range of motion.      Cervical back: Normal range of motion and neck supple.   Skin:     General: Skin is warm and dry.      Capillary Refill: Capillary refill takes less than 2 seconds.      Findings: No erythema.   Neurological:      Mental Status: She is alert and oriented to person, place, and time.   Psychiatric:         Judgment: Judgment normal.         ED Course        Procedures              EKG Interpretation:      Interpreted by Axel Soler MD  Time reviewed: now  Symptoms at time of EKG: None   Rhythm: sinus tachycardia  Rate: Tachycardia  Axis: Normal  Ectopy: none  Conduction: normal  ST Segments/ T Waves: No acute  ischemic changes and Non-specific ST-T wave changes  Q Waves: nonspecific  Comparison to prior: No old EKG available    Clinical Impression: sinus tachycardia                Critical Care time:  was 30 minutes for this patient excluding procedures.    Results for orders placed or performed during the hospital encounter of 08/29/21 (from the past 24 hour(s))   CBC with platelets differential    Narrative    The following orders were created for panel order CBC with platelets differential.  Procedure                               Abnormality         Status                     ---------                               -----------         ------                     CBC with platelets and d...[[id number]]  Abnormal            Final result                 Please view results for these tests on the individual orders.   Comprehensive metabolic panel   Result Value Ref Range    Sodium 139 133 - 144 mmol/L    Potassium 4.5 3.4 - 5.3 mmol/L    Chloride 106 94 - 109 mmol/L    Carbon Dioxide (CO2) 29 20 - 32 mmol/L    Anion Gap 4 3 - 14 mmol/L    Urea Nitrogen 24 7 - 30 mg/dL    Creatinine 0.73 0.52 - 1.04 mg/dL    Calcium 8.5 8.5 - 10.1 mg/dL    Glucose 125 (H) 70 - 99 mg/dL    Alkaline Phosphatase 70 40 - 150 U/L    AST 27 0 - 45 U/L    ALT 27 0 - 50 U/L    Protein Total 7.0 6.8 - 8.8 g/dL    Albumin 3.5 3.4 - 5.0 g/dL    Bilirubin Total 0.4 0.2 - 1.3 mg/dL    GFR Estimate 85 >60 mL/min/1.73m2   Troponin I   Result Value Ref Range    Troponin I 0.043 0.000 - 0.045 ug/L   Lactic acid whole blood   Result Value Ref Range    Lactic Acid 1.3 0.7 - 2.0 mmol/L   CBC with platelets and differential   Result Value Ref Range    WBC Count 12.0 (H) 4.0 - 11.0 10e3/uL    RBC Count 4.69 3.80 - 5.20 10e6/uL    Hemoglobin 14.2 11.7 - 15.7 g/dL    Hematocrit 42.2 35.0 - 47.0 %    MCV 90 78 - 100 fL    MCH 30.3 26.5 - 33.0 pg    MCHC 33.6 31.5 - 36.5 g/dL    RDW 12.1 10.0 - 15.0 %    Platelet Count 206 150 - 450 10e3/uL    % Neutrophils 82 %    %  Lymphocytes 9 %    % Monocytes 8 %    % Eosinophils 1 %    % Basophils 0 %    % Immature Granulocytes 0 %    NRBCs per 100 WBC 0 <1 /100    Absolute Neutrophils 9.7 (H) 1.6 - 8.3 10e3/uL    Absolute Lymphocytes 1.1 0.8 - 5.3 10e3/uL    Absolute Monocytes 1.0 0.0 - 1.3 10e3/uL    Absolute Eosinophils 0.1 0.0 - 0.7 10e3/uL    Absolute Basophils 0.0 0.0 - 0.2 10e3/uL    Absolute Immature Granulocytes 0.1 (H) <=0.0 10e3/uL    Absolute NRBCs 0.0 10e3/uL   Blood gas arterial   Result Value Ref Range    pH Arterial 7.40 7.35 - 7.45    pCO2 Arterial 45 35 - 45 mm Hg    pO2 Arterial 81 80 - 105 mm Hg    FIO2 40     Bicarbonate Arterial 28 21 - 28 mmol/L    Base Excess/Deficit (+/-) 2.7 (H) -9.0 - 1.8 mmol/L   [place] Draw    Narrative    The following orders were created for panel order [place] Draw.  Procedure                               Abnormality         Status                     ---------                               -----------         ------                     Extra Blue Top Tube[[id number]]                              In process                 Extra Green Top (Lithium...[[id number]]                      In process                 Extra Purple Top Tube[[id number]]                            In process                 Extra Heparinized Syringe[[id number]]                        Final result               Extra Green Top (Lithium...[[id number]]                      In process                   Please view results for these tests on the individual orders.   Extra Heparinized Syringe   Result Value Ref Range    Hold Specimen     Nt probnp inpatient (BNP)   Result Value Ref Range    N terminal Pro BNP Inpatient 462 0 - 900 pg/mL   Symptomatic COVID-19 Virus (Coronavirus) by PCR Nasopharyngeal    Specimen: Nasopharyngeal; Swab   Result Value Ref Range    SARS CoV2 PCR Negative Negative    Narrative    Testing was performed using the [employer]ah[employer]a  SARS-CoV-2 & Influenza A/B Assay on the jah[employer]  [employer]  System.  This test should be  ordered for the detection of SARS-COV-2 in individuals who meet SARS-CoV-2 clinical and/or epidemiological criteria. Test performance is unknown in asymptomatic patients.  This test is for in vitro diagnostic use under the FDA EUA for laboratories certified under CLIA to perform moderate and/or high complexity testing. This test has not been FDA cleared or approved.  A negative test does not rule out the presence of PCR inhibitors in the specimen or target RNA in concentration below the limit of detection for the assay. The possibility of a false negative should be considered if the patient's recent exposure or clinical presentation suggests COVID-19.  [facility] are certified under the Clinical Laboratory Improvement Amendments of 1988 (CLIA-88) as qualified to perform moderate and/or high complexity laboratory testing.       Medications   ipratropium - albuterol 0.5 mg/2.5 mg/3 mL (DUONEB) neb solution 3 mL (has no administration in time range)   methylPREDNISolone sodium succinate (solu-MEDROL) injection 125 mg (has no administration in time range)     This is a 70-year-old female who presents via EMS on BiPAP with sudden onset shortness of breath. She been doing her nebs at home with no effect. She denied any chest pain. Upon arrival patient was afebrile with normal vitals but mild tachycardia. Initial labs looked okay including a completely normal blood gas which is kind of surprisingly because of her degree of shortness of breath. Patient's troponin was detectable but technically under 0.045. Covid test came back negative and other labs are okay. Chest x-ray shows what appears to be a pneumothorax of the right lung with 50% collapse. There is also some scarring noted. Still awaiting official read on the x-ray.  Unfortunately it is change of shift and Dr. Peterson was very kind to accept the patient in signout and will place the chest tube which I think should help the patient's breathing  significantly. Because I was worried that the BiPAP was going to make the pneumo worse we took her off the BiPAP and now just have her on an OxiMax. She still says she is short of breath but she can talk in full sentences and her vital still appear to be normal. I think her breathing is most likely from this pneumothorax. It should improve once we have chest tube insertion. Patient will be signed out now to Dr. Peterson and please see his note for further details and disposition.    Assessments & Plan (with Medical Decision Making)  Pneumothorax right     I have reviewed the nursing notes.    I have reviewed the findings, diagnosis, plan and need for follow up with the patient.              8/29/2021   [facility] EMERGENCY DEPT     Axel Soler MD  08/29/21 [id number]

## 2021-08-30 PROBLEM — Z11.52 ENCOUNTER FOR SCREENING LABORATORY TESTING FOR SEVERE ACUTE RESPIRATORY SYNDROME CORONAVIRUS 2 (SARS-COV-2): Status: ACTIVE | Noted: 2021-01-01

## 2021-08-30 PROBLEM — F17.210 CIGARETTE SMOKER: Status: ACTIVE | Noted: 2021-01-01

## 2021-08-30 NOTE — PLAN OF CARE
Sats improvin-96% at rest on 2.5 liters now. Diminished lung sounds and some crepitus felt in lower right back. Dyspnea with anxiety and activity. Aching and tenderness in right side where chest tube is inserted was decreased with tylenol and positioning with pillows. 5ml more fluid drained with chest tube for a total of 22ml. Using bedside commode. Will continue to monitor sats, lung sounds, activity tolerance, chest tube site and drainage.

## 2021-08-30 NOTE — ED NOTES
ED Nursing criteria listed below was addressed during verbal handoff:     Abnormal vitals: Yes  Abnormal results: Yes  Med Reconciliation completed: Yes  Meds given in ED: Yes  Any Overdue Meds: No  Core Measures: N/A  Isolation: N/A  Special needs: N/A  Skin assessment: Yes    Observation Patient  Education provided: Yes

## 2021-08-30 NOTE — H&P
[facility], [place]    History and Physical - Hospitalist Service       Date of Admission:  8/29/2021    Assessment & Plan      Preeti Ford is a 70 year old female admitted on 8/29/2021. She presents with a spontaneous pneumothorax.    1. Spontaneous Pneumothorax  She has COPD and most likely has a bleb that has popped. The pneumothorax resulted in an approximately 50% collapse. She was in respiratory distress initially but once the chest tube was placed she felt much better. She still requires some supplemental oxygen.   - observation status  - c/w supplemental oxygen  - chest tube care  - surgery consulted for chest tube management    2. COPD  Patient has severe COPD and still smokes 2 cigarettes a day. She has baseline wheezing and her lungs sound like they have chronic rhonchi.  - c/w supplemental oxygen  - c/w albuterol, symbicort, duonebs as needed  - c/w umeclidinium  - unclear if she is also having a mild excaerbation. She was given solumedrol in the ED so I will continue this    3. History of CVA  - c/w aspirin and clopdogrel  - c/w atorvastatin    4. History of CAD  - c/w aspirin and clopidogrel  - c/w atorvastatin  - c/w metoprolol  - nitroglycerin as needed    5. Hypertension  BP was low whilst acutely ill but her BP has now stabilized.   - c/w losartan and metoprolol    6. Cachexia  Body mass index is 15.2 kg/m .         Diet:   Cardiac diet  DVT Prophylaxis: Enoxaparin (Lovenox) SQ  Barnhart Catheter: Not present  Central Lines: None  Code Status:   Full Code    Clinically Significant Risk Factors Present on Admission              # Platelet Defect: home medication list includes an antiplatelet medication      Disposition Plan   Expected discharge:  1-2 days recommended to prior living arrangement once safe disposition plan/ TCU bed available.     The patient's care was discussed with the Patient.        Senthil Lopez  [facility]  Securely  message with the Vocera Web Console (learn more here)  Text page via [facility] Paging/Directory      Visit/Communication Style   Virtual (Video) communication was used to evaluate Preeti.  Preeti consented to the use of video communication: yes  Video START time: 22:05, 8/29/2021  Video STOP time: 22:20, 8/29/2021   Patient's location: [facility]   Provider's location during the visit: [facility] Tele-medicine site        ______________________________________________________________________    Chief Complaint   Shortness of breath    History is obtained from the patient    History of Present Illness   Preeti Ford is a 70 year old female who presented to the ED with acute onset of shortness of breath. She states that it started around the afternoon and she had dyspnea and cough. She also had some wheezing. She denies any chest pain, fevers or chills. She also denies any trauma. She felt so bad she had to come to the ED. She has never had something similar to this before. She still smokes 2 cigarettes daily but is otherwise compliant with her medications.     Review of Systems    General: negative for fever, chills, sweats, weakness  Eyes: negative for blurred vision, loss of vision  Ear Nose and Throat: negative for pharyngitis, speech or swallowing difficulties  Respiratory:  Positive for VERDE, SOB, wheezing, and cough. Negative for sputum production,  Cardiology:  negative for chest pain, palpitations, orthopnea, PND, edema, syncope   Gastrointestinal: negative for abdominal pain, nausea, vomiting, diarrhea, constipation, hematemesis, melena or hematochezia  Genitourinary: negative for frequency, urgency, dysuria, hematuria   Neurological: negative for focal weakness, paresthesia    Past Medical History    I have reviewed this patient's medical history and updated it with pertinent information if needed.   Past Medical History:   Diagnosis Date     Acute on chronic respiratory  "failure (H) 3/25/2021     Arthritis      COPD (chronic obstructive pulmonary disease) (H)      Coronary artery disease      CVA (cerebral vascular accident) (H) 8-     Hypertension        Past Surgical History   I have reviewed this patient's surgical history and updated it with pertinent information if needed.  Past Surgical History:   Procedure Laterality Date     APPENDECTOMY       BYPASS GRAFT AORTOILIAC N/A 3/7/2017    Procedure: BYPASS GRAFT AORTOILIAC;  Surgeon: Marcos Pratt MD;  Location: [facility] OR     SALPINGO OOPHORECTOMY,R/L/DELORES      Salpingo Oophorectomy, RT/LT/DELORES       Social History   I have reviewed this patient's social history and updated it with pertinent information if needed.  Social History     Tobacco Use     Smoking status: Current Every Day Smoker     Packs/day: 0.50     Types: Cigarettes     Smokeless tobacco: Never Used     Tobacco comment: 'I don't smoke anymore but my  does\"   Substance Use Topics     Alcohol use: No     Alcohol/week: 0.0 standard drinks     Drug use: No       Family History   I have reviewed this patient's family history and updated it with pertinent information if needed.  Family History   Problem Relation Age of Onset     Cerebrovascular Disease Mother      Cerebrovascular Disease Father      Genitourinary Problems Brother         Dialysis       Prior to Admission Medications   Prior to Admission Medications   Prescriptions Last Dose Informant Patient Reported? Taking?   ACE/ARB/ARNI NOT PRESCRIBED, INTENTIONAL,  Self No Yes   Sig: Please choose reason not prescribed, below   Nutritional Supplements (BOOST)  Self No Yes   Sig: Take 1 Bottle by mouth 3 times daily (with meals)   Nutritional Supplements (CARNATION INSTANT BREAKFAST) LIQD  Self Yes Yes   Sig: Take 1 packet by mouth 3 times daily as needed   Nutritional Supplements (ENSURE PLUS) LIQD   No Yes   Sig: Take 1 each by mouth 4 times daily   PARoxetine (PAXIL) 10 MG tablet 8/29/2021 at AM  " No Yes   Sig: Take 1 tablet (10 mg) by mouth every morning   Respiratory Therapy Supplies (NEBULIZER/TUBING/MOUTHPIECE) KIT  Self No Yes   Si kit as needed (if becomes damaged)   albuterol (PROAIR HFA/PROVENTIL HFA/VENTOLIN HFA) 108 (90 Base) MCG/ACT inhaler 2021 at 1100 Self No Yes   Sig: Inhale 2 puffs into the lungs every 6 hours as needed for shortness of breath / dyspnea   albuterol (PROVENTIL) (2.5 MG/3ML) 0.083% neb solution 2021 at 0800  No Yes   Sig: NEBULIZE CONTENTS OF ONE VIAL EVERY 4 HOURS AS NEEDED FOR SHORTNESS OF BREATH / DIFFICULTY BREATHING OR WHEEZING   aspirin 81 MG EC tablet 2021 at AM Self No Yes   Sig: Take 1 tablet (81 mg) by mouth daily   atorvastatin (LIPITOR) 20 MG tablet 2021 at PM  No Yes   Sig: Take 1 tablet (20 mg) by mouth At Bedtime   budesonide-formoterol (SYMBICORT) 160-4.5 MCG/ACT Inhaler 2021 at AM  No Yes   Sig: Inhale 2 puffs into the lungs 2 times daily   clopidogrel (PLAVIX) 75 MG tablet 2021 at AM  No Yes   Sig: TAKE ONE TABLET BY MOUTH ONCE DAILY   guaiFENesin 400 MG TABS 2021 at AM Self Yes Yes   Sig: Take 400 mg by mouth 2 times daily   ipratropium - albuterol 0.5 mg/2.5 mg/3 mL (DUONEB) 0.5-2.5 (3) MG/3ML neb solution 2021 at AM  Yes Yes   Sig: Take 1 vial (3 mLs) by nebulization 4 times daily Until hospital follow up, then as directed by your primary care provider   losartan (COZAAR) 25 MG tablet 2021 at AM  No Yes   Sig: Take 1 tablet (25 mg) by mouth daily   metoprolol tartrate (LOPRESSOR) 25 MG tablet 2021 at AM  No Yes   Sig: Take 0.5 tablets (12.5 mg) by mouth 2 times daily   montelukast (SINGULAIR) 10 MG tablet 2021 at PM  No Yes   Sig: Take 1 tablet (10 mg) by mouth At Bedtime   nitroGLYcerin (NITROSTAT) 0.4 MG sublingual tablet Unknown at Unknown time  No No   Sig: For chest pain place 1 tablet under the tongue every 5 minutes for 3 doses. If symptoms persist 5 minutes after 1st dose call 911.   order  for DME  Self No Yes   Sig: Equipment being ordered: Nebulizer machine   umeclidinium (INCRUSE ELLIPTA) 62.5 MCG/INH inhaler 8/29/2021 at AM  No Yes   Sig: Inhale 1 puff into the lungs daily      Facility-Administered Medications: None     Allergies   Allergies   Allergen Reactions     Crestor [Rosuvastatin] Other (See Comments)     dizziness     Lisinopril Cough     Statins [Hmg-Coa-R Inhibitors] Other (See Comments)     Muscle/joint aching     Optison [Albumin Human] Other (See Comments)     Pt was flush and very dizzy.  Also had a BP drop     Penicillins Rash       Physical Exam   Vital Signs: Temp: 98.4  F (36.9  C) Temp src: Oral BP: 98/58 Pulse: 90   Resp: 17 SpO2: 93 % O2 Device: Oxymask Oxygen Delivery: 2 LPM  Weight: 0 lbs 0 oz    Gen:  Cachexia, in no acute distress, lying semi-supine in hospital stretcher. She has her neck cocked to the right side  HEENT:  Anicteric sclera, PER, hearing intact to voice  Resp:  No accessory muscle use, breath sounds with expiratory wheezing and rhonchi  Card:  No murmur, normal S1, S2   Abd:  Soft per RN exam, no TTP, non-distended, normoactive bowel sounds are present  Musc:  Normal strength and movement of the major muscle groups without obvious deformity  Psych:  Good insight, oriented to person, place and time, not anxious, not agitated    Data     Recent Labs   Lab 08/29/21  1822   WBC 12.0*   HGB 14.2   MCV 90         POTASSIUM 4.5   CHLORIDE 106   CO2 29   BUN 24   CR 0.73   ANIONGAP 4   CALDERON 8.5   *   ALBUMIN 3.5   PROTTOTAL 7.0   BILITOTAL 0.4   ALKPHOS 70   ALT 27   AST 27   TROPONIN 0.043         Recent Results (from the past 24 hour(s))   XR Chest Port 1 View   Result Value    Radiologist flags Right hydropneumothorax (AA)    Narrative    CHEST ONE VIEW PORTABLE   8/29/2021 7:02 PM     HISTORY: Shortness of breath.    COMPARISON: 3/23/2021.      Impression    IMPRESSION: Hyperinflation both lungs. A moderate-sized right  hydropneumothorax is  new since the previous exam. No pneumothorax on  the left. Heart size and pulmonary vascularity are within normal  limits. Aortic calcification.    [Critical Result: Right hydropneumothorax]    Finding was identified on 8/29/2021 7:18 PM.     ER nurse Mikki was contacted by me on 8/29/2021 7:20 PM and verbalized  understanding of the critical result. Dr. Manuel was aware of the  critical finding and was in the process of placing a chest tube when I  called.    ELVER SOLIMAN MD         SYSTEM ID:  [id number]   XR Chest Port 1 View    Narrative    CHEST ONE VIEW PORTABLE   8/29/2021 7:47 PM     HISTORY: Status post chest tube placement for pneumothorax.    COMPARISON: Chest radiograph performed earlier today at 6:55 PM.      Impression    IMPRESSION: There has been interval placement of a single right chest  tube. Small right hydropneumothorax has decreased significantly in  size. Linear opacity in the right midlung likely represents loculated  fluid within the fissure, unchanged. Hyperinflation both lungs. The  left lung is otherwise clear.

## 2021-08-30 NOTE — ED NOTES
Informed consent obtained - answered questions RN and MD in room for procedure. Pt tolerated procedure as appropriate. VS and cardiac monitoring.

## 2021-08-30 NOTE — CONSULTS
Formerly [facility]    General Surgery Consultation    Date of Admission:  8/29/2021    Assessment & Plan   Preeti Ford is a 70 year old female who was admitted on 8/29/2021. I was asked to see the patient for spontaneous pneumothorax.    Principal Problem:    Spontaneous pneumothorax  Active Problems:    Hypoxia    Chest tube in place    Chronic obstructive pulmonary disease, unspecified COPD type (H)    Patient has right spontaneous pneumothorax secondary to bleb versus severe emphysematous changes and COPD.  Patient does have a significant air leak and have failed the Heimlich valve.  Patient is now on continuous suction and the pneumothorax has improved.  No additional surgical intervention.  Patient will have worsening cutaneous emphysema due to the significant air leak.  Watch this clinically.  If the leak does not improve within the next 24 hours, I recommend transferring to the [facility] for further intervention.  Can also get a CT of the chest if patient worsens to rule out any blebs or other causes.  Recommend DC IV fluids if not needed.  Aggressive pulmonary toiletry with aggressive incentive spirometry.  Relayed this to the patient and the hospitalist.  All questions were answered.    60 mins visit, more than 50% of face to face time was spent in counseling and coordinating care as discussed above.        [facility]    Code Status    Full Code    Reason for Consult   Reason for consult: I was asked by Dr. Lopez to evaluate this patient for chest tube managemnt.    Primary Care Physician   Deisy Carter    Chief Complaint   Chest tube management    History is obtained from the patient    History of Present Illness   Preeti Ford is a 70 year old female who presents with significant dyspnea and shortness of breath.  Patient arrived to the ED via EMS.  Patient has significant history of COPD with emphysematous changes, noted to have a spontaneous  "pneumothorax on the right -ED promptly placed a 8 Urdu thoracostomy tube which significantly improved the normal thorax.  She was placed on a Heimlich valve and was admitted to internal medicine.  I was consulted for chest tube management.  I was called this morning as patient has recurrent pneumothorax on a.m. chest x-ray.  I recommended that patient be placed back on continuous suction rather than the Heimlich valve.  Once placed on continuous suction, the pneumothorax resolved.  Patient still has significant coughing when I saw her.  Stated that her pain is much improved since she has been admitted however not \"normal.\"  Stated she has never had a chest tube placed before.  Does not know whether or not she has had a pneumothorax.  Her last CT was back in March 2021 no bleb was noted at this time in the chest.  Patient was a smoker, she still smokes 1 cigarette a day.    Past Medical History   I have reviewed this patient's medical history and updated it with pertinent information if needed.   Past Medical History:   Diagnosis Date     Acute on chronic respiratory failure (H) 3/25/2021     Arthritis      COPD (chronic obstructive pulmonary disease) (H)      Coronary artery disease      CVA (cerebral vascular accident) (H) 8-     Hypertension        Past Surgical History   I have reviewed this patient's surgical history and updated it with pertinent information if needed.  Past Surgical History:   Procedure Laterality Date     APPENDECTOMY       BYPASS GRAFT AORTOILIAC N/A 3/7/2017    Procedure: BYPASS GRAFT AORTOILIAC;  Surgeon: Marcos Pratt MD;  Location: [facility] OR     SALPINGO OOPHORECTOMY,R/L/DELORES      Salpingo Oophorectomy, RT/LT/DELORES       Prior to Admission Medications   Prior to Admission Medications   Prescriptions Last Dose Informant Patient Reported? Taking?   ACE/ARB/ARNI NOT PRESCRIBED, INTENTIONAL,  Self No Yes   Sig: Please choose reason not prescribed, below   Nutritional Supplements " (BOOST) Unknown at Unknown time Self No No   Sig: Take 1 Bottle by mouth 3 times daily (with meals)   Nutritional Supplements (CARNATION INSTANT BREAKFAST) LIQD Unknown at Unknown time Self Yes No   Sig: Take 1 packet by mouth 3 times daily as needed   Nutritional Supplements (ENSURE PLUS) LIQD Past Week at Unknown time  No Yes   Sig: Take 1 each by mouth 4 times daily   PARoxetine (PAXIL) 10 MG tablet 2021 at AM  No Yes   Sig: Take 1 tablet (10 mg) by mouth every morning   Respiratory Therapy Supplies (NEBULIZER/TUBING/MOUTHPIECE) KIT  Self No Yes   Si kit as needed (if becomes damaged)   albuterol (PROAIR HFA/PROVENTIL HFA/VENTOLIN HFA) 108 (90 Base) MCG/ACT inhaler 2021 at 1100 Self No Yes   Sig: Inhale 2 puffs into the lungs every 6 hours as needed for shortness of breath / dyspnea   albuterol (PROVENTIL) (2.5 MG/3ML) 0.083% neb solution 2021 at 0800  No Yes   Sig: NEBULIZE CONTENTS OF ONE VIAL EVERY 4 HOURS AS NEEDED FOR SHORTNESS OF BREATH / DIFFICULTY BREATHING OR WHEEZING   aspirin 81 MG EC tablet 2021 at AM Self No Yes   Sig: Take 1 tablet (81 mg) by mouth daily   atorvastatin (LIPITOR) 20 MG tablet 2021 at PM  No Yes   Sig: Take 1 tablet (20 mg) by mouth At Bedtime   budesonide-formoterol (SYMBICORT) 160-4.5 MCG/ACT Inhaler 2021 at AM  No Yes   Sig: Inhale 2 puffs into the lungs 2 times daily   clopidogrel (PLAVIX) 75 MG tablet 2021 at AM  No Yes   Sig: TAKE ONE TABLET BY MOUTH ONCE DAILY   guaiFENesin 400 MG TABS 2021 at AM Self Yes Yes   Sig: Take 400 mg by mouth 2 times daily   ipratropium - albuterol 0.5 mg/2.5 mg/3 mL (DUONEB) 0.5-2.5 (3) MG/3ML neb solution 2021 at AM  Yes Yes   Sig: Take 1 vial (3 mLs) by nebulization 4 times daily Until hospital follow up, then as directed by your primary care provider   losartan (COZAAR) 25 MG tablet 2021 at AM  No Yes   Sig: Take 1 tablet (25 mg) by mouth daily   metoprolol tartrate (LOPRESSOR) 25 MG tablet  8/29/2021 at AM  No Yes   Sig: Take 0.5 tablets (12.5 mg) by mouth 2 times daily   montelukast (SINGULAIR) 10 MG tablet 8/28/2021 at PM  No Yes   Sig: Take 1 tablet (10 mg) by mouth At Bedtime   nitroGLYcerin (NITROSTAT) 0.4 MG sublingual tablet Unknown at Unknown time  No No   Sig: For chest pain place 1 tablet under the tongue every 5 minutes for 3 doses. If symptoms persist 5 minutes after 1st dose call 911.   order for DME  Self No Yes   Sig: Equipment being ordered: Nebulizer machine   umeclidinium (INCRUSE ELLIPTA) 62.5 MCG/INH inhaler 8/29/2021 at AM  No Yes   Sig: Inhale 1 puff into the lungs daily      Facility-Administered Medications: None     Allergies   Allergies   Allergen Reactions     Crestor [Rosuvastatin] Other (See Comments)     dizziness     Lisinopril Cough     Statins [Hmg-Coa-R Inhibitors] Other (See Comments)     Muscle/joint aching     Optison [Albumin Human] Other (See Comments)     Pt was flush and very dizzy.  Also had a BP drop     Penicillins Rash       Social History   I have reviewed this patient's social history and updated it with pertinent information if needed. Preeti Ford  reports that she has been smoking cigarettes. She has been smoking about 0.50 packs per day. She has never used smokeless tobacco. She reports that she does not drink alcohol and does not use drugs.    Family History   I have reviewed this patient's family history and updated it with pertinent information if needed.   Family History   Problem Relation Age of Onset     Cerebrovascular Disease Mother      Cerebrovascular Disease Father      Genitourinary Problems Brother         Dialysis       Review of Systems   The 10 point Review of Systems is negative other than noted in the HPI or here.     Physical Exam   Temp: 97  F (36.1  C) Temp src: Oral BP: 125/46 Pulse: 73   Resp: 20 SpO2: 95 % O2 Device: Nasal cannula Oxygen Delivery: 2 LPM  Vital Signs with Ranges  Temp:  [97  F (36.1  C)-98.4  F (36.9  C)] 97   F (36.1  C)  Pulse:  [] 73  Resp:  [10-33] 20  BP: ()/() 125/46  FiO2 (%):  [40 %] 40 %  SpO2:  [91 %-99 %] 95 %  85 lbs 12.8 oz    Constitutional: awake, alert, cooperative, no apparent distress, and appears much older  Than stated age  Eyes: Lids and lashes normal, sclera clear, conjunctiva normal  ENT: Normocephalic,   Hematologic / Lymphatic: no cervical lymphadenopathy  Respiratory: no increased work of breathing; does not appear SOB.  On high flow O2 with chest tube in place - moderate amount of subcutaneous air around the chest tube and into the back and chest.  Significant air leak noted on continuous suction and on waterseal. Was coughing throughout my encounter with her - stated that this is normal for her.   Cardiovascular: normal S1 and S2  GI: healed midline cicatrix;  soft, non-distended, non-tender, no masses palpated, no hepatosplenomegally  Skin: ecchymosis on right and left arm(s) and on right and left wrist(s)  Musculoskeletal: There is no redness, warmth, or swelling of the joints.    Neurologic: Awake, alert, oriented to name, place and time.    Neuropsychiatric: General: normal, calm and normal eye contact    Data   Results for orders placed or performed during the hospital encounter of 08/29/21 (from the past 24 hour(s))   CBC with platelets differential    Narrative    The following orders were created for panel order CBC with platelets differential.  Procedure                               Abnormality         Status                     ---------                               -----------         ------                     CBC with platelets and d...[[id number]]  Abnormal            Final result                 Please view results for these tests on the individual orders.   Comprehensive metabolic panel   Result Value Ref Range    Sodium 139 133 - 144 mmol/L    Potassium 4.5 3.4 - 5.3 mmol/L    Chloride 106 94 - 109 mmol/L    Carbon Dioxide (CO2) 29 20 - 32 mmol/L    Anion  Gap 4 3 - 14 mmol/L    Urea Nitrogen 24 7 - 30 mg/dL    Creatinine 0.73 0.52 - 1.04 mg/dL    Calcium 8.5 8.5 - 10.1 mg/dL    Glucose 125 (H) 70 - 99 mg/dL    Alkaline Phosphatase 70 40 - 150 U/L    AST 27 0 - 45 U/L    ALT 27 0 - 50 U/L    Protein Total 7.0 6.8 - 8.8 g/dL    Albumin 3.5 3.4 - 5.0 g/dL    Bilirubin Total 0.4 0.2 - 1.3 mg/dL    GFR Estimate 85 >60 mL/min/1.73m2   Troponin I   Result Value Ref Range    Troponin I 0.043 0.000 - 0.045 ug/L   Lactic acid whole blood   Result Value Ref Range    Lactic Acid 1.3 0.7 - 2.0 mmol/L   CBC with platelets and differential   Result Value Ref Range    WBC Count 12.0 (H) 4.0 - 11.0 10e3/uL    RBC Count 4.69 3.80 - 5.20 10e6/uL    Hemoglobin 14.2 11.7 - 15.7 g/dL    Hematocrit 42.2 35.0 - 47.0 %    MCV 90 78 - 100 fL    MCH 30.3 26.5 - 33.0 pg    MCHC 33.6 31.5 - 36.5 g/dL    RDW 12.1 10.0 - 15.0 %    Platelet Count 206 150 - 450 10e3/uL    % Neutrophils 82 %    % Lymphocytes 9 %    % Monocytes 8 %    % Eosinophils 1 %    % Basophils 0 %    % Immature Granulocytes 0 %    NRBCs per 100 WBC 0 <1 /100    Absolute Neutrophils 9.7 (H) 1.6 - 8.3 10e3/uL    Absolute Lymphocytes 1.1 0.8 - 5.3 10e3/uL    Absolute Monocytes 1.0 0.0 - 1.3 10e3/uL    Absolute Eosinophils 0.1 0.0 - 0.7 10e3/uL    Absolute Basophils 0.0 0.0 - 0.2 10e3/uL    Absolute Immature Granulocytes 0.1 (H) <=0.0 10e3/uL    Absolute NRBCs 0.0 10e3/uL   Blood gas arterial   Result Value Ref Range    pH Arterial 7.40 7.35 - 7.45    pCO2 Arterial 45 35 - 45 mm Hg    pO2 Arterial 81 80 - 105 mm Hg    FIO2 40     Bicarbonate Arterial 28 21 - 28 mmol/L    Base Excess/Deficit (+/-) 2.7 (H) -9.0 - 1.8 mmol/L   [place] Draw    Narrative    The following orders were created for panel order [place] Draw.  Procedure                               Abnormality         Status                     ---------                               -----------         ------                     Extra Blue Top Tube[[id number]]                               Final result               Extra Green Top (Lithium...[[id number]]                      Final result               Extra Purple Top Tube[[id number]]                            Final result               Extra Heparinized Syringe[[id number]]                        Final result               Extra Green Top (Lithium...[[id number]]                      Final result                 Please view results for these tests on the individual orders.   Extra Blue Top Tube   Result Value Ref Range    Hold Specimen JIC    Extra Heparinized Syringe   Result Value Ref Range    Hold Specimen     Extra Green Top (Lithium Heparin) Tube   Result Value Ref Range    Hold Specimen JIC    Extra Purple Top Tube   Result Value Ref Range    Hold Specimen JIC    Extra Green Top (Lithium Heparin) ON ICE   Result Value Ref Range    Hold Specimen JIC    Nt probnp inpatient (BNP)   Result Value Ref Range    N terminal Pro BNP Inpatient 462 0 - 900 pg/mL   Magnesium   Result Value Ref Range    Magnesium 2.3 1.6 - 2.3 mg/dL   Symptomatic COVID-19 Virus (Coronavirus) by PCR Nasopharyngeal    Specimen: Nasopharyngeal; Swab   Result Value Ref Range    SARS CoV2 PCR Negative Negative    Narrative    Testing was performed using the jahaira  SARS-CoV-2 & Influenza A/B Assay on the jahaira  Beena  System.  This test should be ordered for the detection of SARS-COV-2 in individuals who meet SARS-CoV-2 clinical and/or epidemiological criteria. Test performance is unknown in asymptomatic patients.  This test is for in vitro diagnostic use under the FDA EUA for laboratories certified under CLIA to perform moderate and/or high complexity testing. This test has not been FDA cleared or approved.  A negative test does not rule out the presence of PCR inhibitors in the specimen or target RNA in concentration below the limit of detection for the assay. The possibility of a false negative should be considered if the patient's recent exposure or clinical  presentation suggests COVID-19.  [facility] are certified under the Clinical Laboratory Improvement Amendments of 1988 (CLIA-88) as qualified to perform moderate and/or high complexity laboratory testing.   XR Chest Port 1 View   Result Value Ref Range    Radiologist flags Right hydropneumothorax (AA)     Narrative    CHEST ONE VIEW PORTABLE   8/29/2021 7:02 PM     HISTORY: Shortness of breath.    COMPARISON: 3/23/2021.      Impression    IMPRESSION: Hyperinflation both lungs. A moderate-sized right  hydropneumothorax is new since the previous exam. No pneumothorax on  the left. Heart size and pulmonary vascularity are within normal  limits. Aortic calcification.    [Critical Result: Right hydropneumothorax]    Finding was identified on 8/29/2021 7:18 PM.     ER nurse Mikki was contacted by me on 8/29/2021 7:20 PM and verbalized  understanding of the critical result. Dr. Manuel was aware of the  critical finding and was in the process of placing a chest tube when I  called.    ELVER SOLIMAN MD         SYSTEM ID:  [id number]   XR Chest Port 1 View    Narrative    CHEST ONE VIEW PORTABLE   8/29/2021 7:47 PM     HISTORY: Status post chest tube placement for pneumothorax.    COMPARISON: Chest radiograph performed earlier today at 6:55 PM.      Impression    IMPRESSION: There has been interval placement of a single right chest  tube. Small right hydropneumothorax has decreased significantly in  size. Linear opacity in the right midlung likely represents loculated  fluid within the fissure, unchanged. Hyperinflation both lungs. The  left lung is otherwise clear.    ELVER SOLIMAN MD         SYSTEM ID:  [id number]   Basic metabolic panel   Result Value Ref Range    Sodium 138 133 - 144 mmol/L    Potassium 4.5 3.4 - 5.3 mmol/L    Chloride 105 94 - 109 mmol/L    Carbon Dioxide (CO2) 31 20 - 32 mmol/L    Anion Gap 2 (L) 3 - 14 mmol/L    Urea Nitrogen 22 7 - 30 mg/dL    Creatinine 0.44 (L) 0.52 - 1.04  mg/dL    Calcium 8.3 (L) 8.5 - 10.1 mg/dL    Glucose 159 (H) 70 - 99 mg/dL    GFR Estimate >90 >60 mL/min/1.73m2   CBC with platelets   Result Value Ref Range    WBC Count 6.1 4.0 - 11.0 10e3/uL    RBC Count 4.39 3.80 - 5.20 10e6/uL    Hemoglobin 13.0 11.7 - 15.7 g/dL    Hematocrit 39.5 35.0 - 47.0 %    MCV 90 78 - 100 fL    MCH 29.6 26.5 - 33.0 pg    MCHC 32.9 31.5 - 36.5 g/dL    RDW 12.0 10.0 - 15.0 %    Platelet Count 180 150 - 450 10e3/uL   INR   Result Value Ref Range    INR 0.91 0.85 - 1.15   XR Chest Port 1 View   Result Value Ref Range    Radiologist flags Enlarging right pneumothorax and new subcutaneous (AA)     Narrative    CHEST ONE VIEW PORTABLE   8/30/2021 7:04 AM     HISTORY: Pneumothorax.    COMPARISON: Chest x-ray dated 8/29/2021 and 8/29/2021.    FINDINGS:  Right chest tube is again noted, similar in position to the  prior study. There is significantly enlarged right pneumothorax with  small hydro portion pneumothorax and there is new right chest wall  subcutaneous emphysema in the right chest wall since the prior study  dated 8/29/2021 at 7:44 PM. Right midlung opacity is stable.      Impression    IMPRESSION:   1. Enlargement of right pneumothorax with small hydro portion since  the most recent prior study dated 7:44 PM on 8/29/2021.  2. New right chest wall subcutaneous emphysema since the prior  studies.  3. Chest tube does not appear to be significantly changed in position.    [Critical Result: Enlarging right pneumothorax and new subcutaneous  emphysema along the right chest wall]    Finding was identified on 8/30/2021 7:45 AM.     Dr. Boston was contacted by me on 8/30/2021 7:52 AM and verbalized  understanding of the critical result.    XR Chest Port 1 View    Narrative    CHEST PORTABLE ONE VIEW   8/30/2021 8:56 AM     HISTORY: Shortness of breath, enlarging pneumothorax.    COMPARISON: Chest x-rays dated 8/30/2021 at 7:00 AM.      Impression    IMPRESSION: There is decreased size of the  right pneumothorax since  the prior study approximately 2 hours earlier. There is increasing  subcutaneous emphysema since the prior study. No other changes. Right  chest tube is unchanged in position.    I discussed the decreasing size of the right pneumothorax with Dr. Boston on 8/30/2021 at 9:23 AM.

## 2021-08-30 NOTE — PROGRESS NOTES
[facility]    Medicine Progress Note - Hospitalist Service       Date of Admission:  8/29/2021    Assessment & Plan             Preeti Ford is a 70 year old female admitted on 8/29/2021.  H/o  chronic hypercapnic respiratory failure : on home oxygen ( not sure how many liters at this time. She presented with sob and found to have COPD exacerbation and  spontaneous Rt. Side hydropneumothorax. She was managed  With nebulizers and iv steroids. She underwent chest tube placement for her right side moderate hydropneumothorax. CXR post tube placement showed improvement in the pneumothorax but subsequent CXR in the morning showed worsening. The chest tube was subsequently put to suction and attached to the atrium following which her subsequent CXR is showing improvement and reduction in the pneumothorax. She also has subcutaneous emphysema. Her respiratory status is stable at this time.        8/30 :       Sob stable  On oxygen  Chest tube  to suction and   her subsequent CXR is showing improvement and reduction in the pnuemothorax  Continue nebs, iv steroids      Not medically ready for discharge at this time.  Ongoing management of PTX         A/p :         Acute sob : 2/2 COPD exacerbation, PTX      Acute hypoxic respiratory failure : on 2 liter of oxygen      COPD exacerbation : on nebs, iv steroids.      Spontaneous Rt. Side hydropneumothorax. She was managed  With nebulizers and iv steroids. She underwent chest tube placement for her right side moderate hydropneumothorax. CXR post tube placement showed improvement in the pneumothorax but subsequent CXR in the morning showed worsening. The chest tube was subsequently put to suction and attached to the atrium following which her subsequent CXR is showing improvement and reduction in the pnuemothorax. She also has subcutaneous emphysema. Surgery team following.      H/o CVA : on aspirin, plavix, statin      CAD s/p stent  LAD and  circumflex 5/2018 : On aspirin, plavix, statin, stable.      PVD s/p aortic iliac bypass : on aspirin, plavix, statin.       Severe pulmonary HTN : 2/2 COPD exacerbation, outpatient f/u      Hypertension, Essential : on  losartan and metoprolol       malnutrition, severe, protein : monitor oral intake.          Diet: Combination Diet Low Saturated Fat Na <2400mg Diet    DVT Prophylaxis: Enoxaparin (Lovenox) SQ  Barnhart Catheter: Not present  Central Lines: None  Code Status: Full Code      Disposition Plan   Expected discharge: in 2-3 days,  recommended to disposition to be decided once pneumothorax resolves, sob better.     The patient's care was discussed with the Bedside Nurse, Care Coordinator/ and Patient.    Gurpreet Boston MD  Hospitalist Service  [facility]  Securely message with the Vocera Web Console (learn more here)  Text page via My[employer] Paging/Directory      Clinically Significant Risk Factors Present on Admission          # Hypocalcemia: Ca = 8.3 mg/dL (Ref range: 8.5 - 10.1 mg/dL) and/or iCa = N/A on admission, will replace as needed     # Platelet Defect: home medication list includes an antiplatelet medication      ______________________________________________________________________        Data reviewed today: I reviewed all medications, new labs and imaging results over the last 24 hours. I personally reviewed no images or EKG's today.    Physical Exam   Vital Signs: Temp: 97  F (36.1  C) Temp src: Oral BP: 125/46 Pulse: 73   Resp: 20 SpO2: 95 % O2 Device: Nasal cannula Oxygen Delivery: 2 LPM  Weight: 85 lbs 12.8 oz       GENERAL: The patient is not in any acute distressed. Awake and alert.  HEENT: Nonicteric sclerae, PERRLA, EOMI. Oropharynx clear. Moist mucous membranes. Conjunctivae appear well perfused.  HEART: Regular rate and rhythm without murmurs.  LUNGS: Clear to auscultation bilaterally. B/l  Wheezing, no  crackles.  ABDOMEN: Soft, positive bowel  sounds, nontender.  SKIN: No rash, no excessive bruising, petechiae, or purpura.  EXTREMITIES : no rashes, no swelling in legs.  NEUROLOGIC: conscious and oriented, follows commands, no obvious focal deficits.  ROS: All other systems negative       Data   Recent Labs   Lab 08/30/21  0613 08/29/21  1822   WBC 6.1 12.0*   HGB 13.0 14.2   MCV 90 90    206   INR 0.91  --     139   POTASSIUM 4.5 4.5   CHLORIDE 105 106   CO2 31 29   BUN 22 24   CR 0.44* 0.73   ANIONGAP 2* 4   CALDERON 8.3* 8.5   * 125*   ALBUMIN  --  3.5   PROTTOTAL  --  7.0   BILITOTAL  --  0.4   ALKPHOS  --  70   ALT  --  27   AST  --  27   TROPONIN  --  0.043

## 2021-08-30 NOTE — PLAN OF CARE
"S-(situation): Patient changed to inpatient status    B-(background): Patient status change due to observation goals not being met.     A-(assessment): Sats of 94% on 3 liters oxygen and dyspnea with activity. Chest tube has collected 17ml light pink drainage. States she feels pretty good right now \"Actually this is unbelievable!\" Tolerating small amount of low fat low sodium diet. Voiding. VSS. Chest tube intact and at -20 cm H2O.    R-(recommendations):  Will monitor patient per MD orders.      Inpatient nursing criteria listed below were met:    Adult Profile completedYes  Health care directives status obtained and documented: Yes  VTE prophylaxis orders: SCD's on  (FYI: Asprin is not an approved anticoagulation for DVT prophylaxis)  SCD's Documented: Yes  Vaccine assessment done and vaccine ordered if needed. Not Applicable, not in season  My Chart patient sign up addressed and documented: Yes,  accesses it for her.  Care Plan initiated: Yes  Discharge planning review completed (admission navigator) Yes   "

## 2021-08-30 NOTE — ED NOTES
Report to Johnna RN - pt will go to room 270. Pt remains on 2L via oxymask. Med rec and belongings completed. Obs info provided and educated.

## 2021-08-30 NOTE — PLAN OF CARE
Sats and chest x-ray were worse this morning so chest tube was connected to wall suction and chest tube apparatus which is now collecting pale pink fluid in small amount. Easy respirations with sats in the lower 90's on 3 liters of oxygen. Some discomfort at the chest tube insertion site. Positioned with pillows for increased comfort and medicated with Tylenol. Diminished lung sounds. Will continue to monitor sats, lung sounds, chest tube.

## 2021-08-30 NOTE — ED PROVIDER NOTES
Patient was signed out to me at change of shift by Dr. Soler. She has a COPD and has become quite short of breath today. Started suddenly. Tried nebs at home but did not help. Sats were low so they put her on BiPAP in the ambulance on the way here.    She was a little tachycardic when she arrived otherwise. Vitals are normal. VBG was surprisingly normal considering her degree of shortness of breath. Chest x-ray then showed a right-sided pneumothorax of about 50%. I was asked to place a chest tube at change of shift. Informed consent was obtained. Her sats were in the upper 80s.    She did receive 2 mg of morphine prior to the procedure. The right chest was prepped and then draped in a sterile fashion. 1% lidocaine plain was used for local infiltration. A small stab incision was made over the fifth rib in the midaxillary line and then an 8 Citizen of [place] 16 cm needle over catheter was placed into the right pleural space.  Once air was aspirated, the catheter was advanced over the needle and was directed superiorly and posteriorly.  Stopcock and tubing followed by a Heimlich valve were attached. Condensation was noted immediately in the tubing.  Her respiratory status improved very quickly and her O2 sats are now up to 98-99%.    The catheter was sutured in place and Tegaderm dressing applied. She tolerated this quite well.    Post procedure chest x-ray shows marked improvement of the PTX.    Results for orders placed or performed during the hospital encounter of 08/29/21 (from the past 24 hour(s))   CBC with platelets differential    Narrative    The following orders were created for panel order CBC with platelets differential.  Procedure                               Abnormality         Status                     ---------                               -----------         ------                     CBC with platelets and d...[[id number]]  Abnormal            Final result                 Please view results for these tests on  the individual orders.   Comprehensive metabolic panel   Result Value Ref Range    Sodium 139 133 - 144 mmol/L    Potassium 4.5 3.4 - 5.3 mmol/L    Chloride 106 94 - 109 mmol/L    Carbon Dioxide (CO2) 29 20 - 32 mmol/L    Anion Gap 4 3 - 14 mmol/L    Urea Nitrogen 24 7 - 30 mg/dL    Creatinine 0.73 0.52 - 1.04 mg/dL    Calcium 8.5 8.5 - 10.1 mg/dL    Glucose 125 (H) 70 - 99 mg/dL    Alkaline Phosphatase 70 40 - 150 U/L    AST 27 0 - 45 U/L    ALT 27 0 - 50 U/L    Protein Total 7.0 6.8 - 8.8 g/dL    Albumin 3.5 3.4 - 5.0 g/dL    Bilirubin Total 0.4 0.2 - 1.3 mg/dL    GFR Estimate 85 >60 mL/min/1.73m2   Troponin I   Result Value Ref Range    Troponin I 0.043 0.000 - 0.045 ug/L   Lactic acid whole blood   Result Value Ref Range    Lactic Acid 1.3 0.7 - 2.0 mmol/L   CBC with platelets and differential   Result Value Ref Range    WBC Count 12.0 (H) 4.0 - 11.0 10e3/uL    RBC Count 4.69 3.80 - 5.20 10e6/uL    Hemoglobin 14.2 11.7 - 15.7 g/dL    Hematocrit 42.2 35.0 - 47.0 %    MCV 90 78 - 100 fL    MCH 30.3 26.5 - 33.0 pg    MCHC 33.6 31.5 - 36.5 g/dL    RDW 12.1 10.0 - 15.0 %    Platelet Count 206 150 - 450 10e3/uL    % Neutrophils 82 %    % Lymphocytes 9 %    % Monocytes 8 %    % Eosinophils 1 %    % Basophils 0 %    % Immature Granulocytes 0 %    NRBCs per 100 WBC 0 <1 /100    Absolute Neutrophils 9.7 (H) 1.6 - 8.3 10e3/uL    Absolute Lymphocytes 1.1 0.8 - 5.3 10e3/uL    Absolute Monocytes 1.0 0.0 - 1.3 10e3/uL    Absolute Eosinophils 0.1 0.0 - 0.7 10e3/uL    Absolute Basophils 0.0 0.0 - 0.2 10e3/uL    Absolute Immature Granulocytes 0.1 (H) <=0.0 10e3/uL    Absolute NRBCs 0.0 10e3/uL   Blood gas arterial   Result Value Ref Range    pH Arterial 7.40 7.35 - 7.45    pCO2 Arterial 45 35 - 45 mm Hg    pO2 Arterial 81 80 - 105 mm Hg    FIO2 40     Bicarbonate Arterial 28 21 - 28 mmol/L    Base Excess/Deficit (+/-) 2.7 (H) -9.0 - 1.8 mmol/L   [place] Draw    Narrative    The following orders were created for panel order  [place] Draw.  Procedure                               Abnormality         Status                     ---------                               -----------         ------                     Extra Blue Top Tube[[id number]]                              Final result               Extra Green Top (Lithium...[[id number]]                      Final result               Extra Purple Top Tube[[id number]]                            Final result               Extra Heparinized Syringe[[id number]]                        Final result               Extra Green Top (Lithium...[[id number]]                      Final result                 Please view results for these tests on the individual orders.   Extra Blue Top Tube   Result Value Ref Range    Hold Specimen JIC    Extra Heparinized Syringe   Result Value Ref Range    Hold Specimen     Extra Green Top (Lithium Heparin) Tube   Result Value Ref Range    Hold Specimen JIC    Extra Purple Top Tube   Result Value Ref Range    Hold Specimen JIC    Extra Green Top (Lithium Heparin) ON ICE   Result Value Ref Range    Hold Specimen JIC    Nt probnp inpatient (BNP)   Result Value Ref Range    N terminal Pro BNP Inpatient 462 0 - 900 pg/mL   Symptomatic COVID-19 Virus (Coronavirus) by PCR Nasopharyngeal    Specimen: Nasopharyngeal; Swab   Result Value Ref Range    SARS CoV2 PCR Negative Negative    Narrative    Testing was performed using the jahaira  SARS-CoV-2 & Influenza A/B Assay on the jahaira  Beena  System.  This test should be ordered for the detection of SARS-COV-2 in individuals who meet SARS-CoV-2 clinical and/or epidemiological criteria. Test performance is unknown in asymptomatic patients.  This test is for in vitro diagnostic use under the FDA EUA for laboratories certified under CLIA to perform moderate and/or high complexity testing. This test has not been FDA cleared or approved.  A negative test does not rule out the presence of PCR inhibitors in the specimen or target RNA in  concentration below the limit of detection for the assay. The possibility of a false negative should be considered if the patient's recent exposure or clinical presentation suggests COVID-19.  [facility] are certified under the Clinical Laboratory Improvement Amendments of 1988 (CLIA-88) as qualified to perform moderate and/or high complexity laboratory testing.   XR Chest Port 1 View   Result Value Ref Range    Radiologist flags Right hydropneumothorax (AA)     Narrative    CHEST ONE VIEW PORTABLE   8/29/2021 7:02 PM     HISTORY: Shortness of breath.    COMPARISON: 3/23/2021.      Impression    IMPRESSION: Hyperinflation both lungs. A moderate-sized right  hydropneumothorax is new since the previous exam. No pneumothorax on  the left. Heart size and pulmonary vascularity are within normal  limits. Aortic calcification.    [Critical Result: Right hydropneumothorax]    Finding was identified on 8/29/2021 7:18 PM.     ER nurse Mikki was contacted by me on 8/29/2021 7:20 PM and verbalized  understanding of the critical result. Dr. Manuel was aware of the  critical finding and was in the process of placing a chest tube when I  called.    ELVER SOLIMAN MD         SYSTEM ID:  [id number]   XR Chest Port 1 View    Narrative    CHEST ONE VIEW PORTABLE   8/29/2021 7:47 PM     HISTORY: Status post chest tube placement for pneumothorax.    COMPARISON: Chest radiograph performed earlier today at 6:55 PM.      Impression    IMPRESSION: There has been interval placement of a single right chest  tube. Small right hydropneumothorax has decreased significantly in  size. Linear opacity in the right midlung likely represents loculated  fluid within the fissure, unchanged. Hyperinflation both lungs. The  left lung is otherwise clear.         We have been able to titrate her FiO2 down.  She does not have home oxygen.  Is still requiring 2 to 3 L per mask.  Feels much better.  With her underlying COPD and advanced  age, I think it would be best to watch her in the hospital tonight to make sure that she tolerates the chest tube and maintains her sats.  It is possible she could go home with the catheter and Heimlich valve in place.  If she was young and healthy, she might have been able to go home tonight, but she is not.        I spoke with Dr. Murrell from general surgery.  He agreed with watching her overnight.  He will be available this evening for questions if needed and will notify the general surgeon on call tomorrow that she is in the hospital.      I then spoke with Dr. Lopez, hospitalist on call.  He has assumed her care and will write orders.                Abhay Manuel MD  08/29/21 2020

## 2021-08-30 NOTE — ED NOTES
Pt oxygenating 4L via oxymask for comfort. Will titrate as able. Vs and cardiac monitoring. Will call pt  to update and come to ED as pt covid test neg.

## 2021-08-30 NOTE — PROGRESS NOTES
S: RT note  B: Pneumothorax  A: Pt on O2 as per home, nebs as per home schedule. Patient has congested cough and diminished breath sounds.   R: RT to follow

## 2021-08-30 NOTE — PROGRESS NOTES
S-(situation): Patient registered to Observation. Patient arrived to room 270 via cart from ED    B-(background): pt to ED with increasing SOB. Hydropneumothorax found on imaging.     A-(assessment): VSS. Afebrile. Pt c/o discomfort to R chest tube site. PRN tylenol given per MAR which has been effective. LS deminished throughout with expiratory wheezing noted throughout. PRN duonebs given per MAR for SOB which have been effective. Pt up to bedside commode SBA. Denies any nausea/vomiting. Pt voiding clear yellow urine without difficulty.  Bud, updated and pt privacy code given per Pt request. No complaints at this time. R 8F Heimlich valve chest tube in place.     R-(recommendations): Orders and observation goals reviewed with patient    Nursing Observation criteria listed below was met:    Skin issues/needs documented:Yes  Isolation needs addressed and Signage up: NA  Fall Prevention: Education given and documented: NA  Education Assessment documented:Yes  Admission Education Documented: Yes  New medication patient education completed and documented (Possible Side Effects of Common Medications handout): Yes  OBS video/handout Reviewed & Documented: Yes  Allergies Reviewed: Yes  Medication Reconciliation Complete: Yes  Home medications if not able to send immediately home with family stored here: No  Reminder note placed in discharge instructions of home meds: Yes  Patient has discharge needs (If yes, please explain): No  Patient discharge preferences addressed and charted on white board:  Yes

## 2021-08-31 PROBLEM — R93.89 ABNORMAL CXR: Status: ACTIVE | Noted: 2021-01-01

## 2021-08-31 PROBLEM — D69.1 PLATELET FUNCTION DEFECT (H): Status: ACTIVE | Noted: 2021-01-01

## 2021-08-31 PROBLEM — R63.6 UNDERWEIGHT: Status: ACTIVE | Noted: 2021-01-01

## 2021-08-31 PROBLEM — R13.10 DYSPHAGIA: Status: ACTIVE | Noted: 2021-01-01

## 2021-08-31 PROBLEM — R09.02 HYPOXIA: Status: RESOLVED | Noted: 2021-01-01 | Resolved: 2021-01-01

## 2021-08-31 PROBLEM — Z11.52 ENCOUNTER FOR SCREENING LABORATORY TESTING FOR SEVERE ACUTE RESPIRATORY SYNDROME CORONAVIRUS 2 (SARS-COV-2): Status: RESOLVED | Noted: 2021-01-01 | Resolved: 2021-01-01

## 2021-08-31 NOTE — PROGRESS NOTES
Pt c/o dyspnea frequently. No wheezes. Pt appears anxious, asked writer to call  because she couldn't do it. Nebs given as ordered

## 2021-08-31 NOTE — PROGRESS NOTES
[facility]    Medicine Progress Note - Hospitalist Service       Date of Admission:  8/29/2021    Assessment & Plan           70-year-old woman with multiple chronic medical problems including COPD and chronic respiratory failure treated with home oxygen supplementation admitted due to spontaneous right pneumothorax with acute on chronic hypoxic respiratory failure and hypotension that resolved with chest tube placement in the ER.   She was also admitted with COPD exacerbation.  Her respiratory status is improving although she remains quite dyspneic subjectively.  She does have chronic heart problems including CAD with previous coronary artery stent placement as well as moderate to severe aortic stenosis and moderate to severe pulmonary hypertension and it is possible that her problems are contributing to ongoing dyspnea although an acute coronary syndrome and congestive heart failure has not been suspected so far.  Radiographic findings demonstrated an opacity in the right midlung which is probably due to atelectasis associated with pneumothorax, but infection is a possibility.  She chronically has dysphagia after previous stroke, and aspiration is possible although not strongly suspected.  She has chronic platelet function defect associated with the chronic use of aspirin and Plavix, but bleeding has not been evident.  She appears cachectic and possibly malnourished.    Principal Problem:    Spontaneous pneumothorax  Active Problems:    Acute respiratory failure with hypoxia (H)    COPD exacerbation    Hypotension    PAD (peripheral artery disease) (H) - moderate left common carotid stenosis, aortoiliac bypass, chronic left vertebral and right posterior cerebral stenoses    Pulmonary emphysema (H)    Moderate to severe pulmonary hypertension (H)-per Echo Jub[facility] 2021    Nonrheumatic aortic valve stenosis - moderate to severe    Chest tube in place    S/P drug eluting coronary stent  placement-LAD and circ, circ re-do 5/17/18    Abnormal CXR-right mid consolidation    Dysphagia    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    Underweight    History of stroke - right thalamus in 8/2013 and left cerebellar and right vermis in 3/2018    Cigarette smoker    Continue chest tube to continuous suction for another 24 hours per recommendation of general surgery, order placed in accordance with recommendation from surgeon  Continue corticosteroids but will switch from IV to oral steroids tomorrow  Continue bronchodilators but increase frequency of DuoNeb from 4 times daily to every 4 hours and add albuterol neb every 1 hour as needed for breakthrough symptoms in addition to use of albuterol inhaler as needed  Check BNP and if it is elevated will order IV Lasix  Titrate oxygen to keep saturations 90% and higher  Anticipate nutritional assessment during hospitalization       Diet: Combination Diet Low Saturated Fat Na <2400mg Diet    DVT Prophylaxis: Enoxaparin (Lovenox) SQ  Barnhart Catheter: Not present  Central Lines: None  Code Status: Full Code      Disposition Plan   Expected discharge: Unknown recommended to prior living arrangement once Medically stable.     The patient's care was discussed with the Bedside Nurse and Patient.    Gael Bass MD  Hospitalist Service  [facility]  Securely message with the [employer] Web Console (learn more here)  Text page via [facility] Paging/Directory      Clinically Significant Risk Factors Present on Admission              ______________________________________________________________________    Interval History   She is feeling more short of breath today.  Shortness of breath is constant.  She thinks it is transiently better after nebulizer treatments.  She continues to have cough that remains nonproductive although she feels loosen phlegm in her chest.  She denies worsening chest pain.  RT reports that the patient sometimes  positions herself such that her chest tube might become kinked.  Nursing reports that the patient had air leak from her chest tube until about 2:00 this morning after which no ongoing air leak has been evident.  She has been afebrile although did have temperature as low as 96.2.  She remains hemodynamically stable with stable oxygen requirement.  Patient says she normally uses oxygen at home intermittently but is not able to say exactly how she uses it nor the dose at home.  She thinks it was prescribed by her primary doctor but she is not sure.    Data reviewed today: I reviewed all medications, new labs and imaging results over the last 24 hours. I personally reviewed no images or EKG's today.    Physical Exam   Vital Signs: Temp: 97.5  F (36.4  C) Temp src: Oral BP: (!) 146/47 Pulse: 94   Resp: 22 SpO2: 92 % O2 Device: Nasal cannula Oxygen Delivery: 2.5 LPM  Weight: 85 lbs 12.8 oz Body mass index is 15.2 kg/m .  Wt Readings from Last 4 Encounters:   08/29/21 38.9 kg (85 lb 12.8 oz)   06/14/21 41.1 kg (90 lb 8 oz)   04/09/21 42.6 kg (94 lb)   03/25/21 43 kg (94 lb 11.2 oz)     General Appearance: Ill-appearing very thin underweight essentially cachectic woman, mildly tachypneic but able to speak full sentences  Respiratory: No use of accessory muscles, symmetric chest excursion, diminished breath sounds throughout somewhat less at the right base compared with elsewhere  Cardiovascular: Regular rate and rhythm, good radial pulse, normal capillary refill, no peripheral edema     Data   Recent Labs   Lab 08/31/21  0532 08/30/21  0613 08/29/21  1822   WBC  --  6.1 12.0*   HGB  --  13.0 14.2   MCV  --  90 90   PLT  --  180 206   INR  --  0.91  --    NA  --  138 139   POTASSIUM 4.4 4.5 4.5   CHLORIDE  --  105 106   CO2  --  31 29   BUN  --  22 24   CR  --  0.44* 0.73   ANIONGAP  --  2* 4   CALDEORN  --  8.3* 8.5   GLC  --  159* 125*   ALBUMIN  --   --  3.5   PROTTOTAL  --   --  7.0   BILITOTAL  --   --  0.4   ALKPHOS  --    --  70   ALT  --   --  27   AST  --   --  27   TROPONIN  --   --  0.043     Recent Results (from the past 24 hour(s))   XR Chest Port 1 View    Narrative    XR PORTABLE CHEST ONE VIEW   8/30/2021 1:38 PM     HISTORY: Pneumonia, status post thoracostomy.    COMPARISON: Chest x-rays dated 8/30/2021 at 8:50 AM.      Impression    IMPRESSION:  1. There has been further decreased size of the right pneumothorax,  only a tiny portion now visualized in the apex. Right chest tube is  unchanged in position.  2. Right mid to lower lung opacity is slightly increased in  prominence. There is decreased right pleural fluid.  3. Subcutaneous emphysema is stable.  4. Postop changes status post cardiac stent placements are again  noted.  5. No other changes.    OZIEL OHARA MD         SYSTEM ID:  [id number]   XR Chest Port 1 View    Narrative    EXAM: XR CHEST PORT 1 VIEW  LOCATION: [facility] [place]  DATE/TIME: 8/31/2021 6:01 AM    INDICATION: sob  COMPARISON: 08/30/2021      Impression    IMPRESSION: Stable cardiomediastinal silhouette. Small caliber right chest tube. Decreased right pneumothorax. Consolidation right mid lung again seen. Soft tissue emphysema right chest. Stable cardiomediastinal silhouette.     Medications       aspirin  81 mg Oral Daily     atorvastatin  20 mg Oral At Bedtime     budesonide-formoterol  2 puff Inhalation BID     clopidogrel  75 mg Oral Daily     enoxaparin ANTICOAGULANT  40 mg Subcutaneous Q24H     ipratropium - albuterol 0.5 mg/2.5 mg/3 mL  1 vial Nebulization 4x Daily     losartan  25 mg Oral Daily     methylPREDNISolone  62.5 mg Intravenous Q6H     metoprolol tartrate  12.5 mg Oral BID     montelukast  10 mg Oral At Bedtime     PARoxetine  10 mg Oral QAM     sodium chloride (PF)  3 mL Intracatheter Q8H     umeclidinium  1 puff Inhalation Daily

## 2021-08-31 NOTE — CONSULTS
Care Management Initial Consult    General Information  Assessment completed with: Patient, Spouse or significant other,    Type of CM/SW Visit: Initial Assessment    Primary Care Provider verified and updated as needed: Yes   Readmission within the last 30 days:        Reason for Consult: other (see comments) (High Risk)  Advance Care Planning:          Communication Assessment  Patient's communication style: spoken language (English or Bilingual)    Hearing Difficulty or Deaf: no   Wear Glasses or Blind: no    Cognitive  Cognitive/Neuro/Behavioral: WDL  Level of Consciousness: alert  Arousal Level: opens eyes spontaneously  Orientation: oriented x 4  Mood/Behavior: calm, cooperative  Best Language: 0 - No aphasia  Speech: clear, spontaneous    Living Environment:   People in home: spouse     Current living Arrangements: house      Able to return to prior arrangements: yes     Family/Social Support:  Care provided by: self, spouse/significant other  Provides care for:    Marital Status:   , Children  Bud       Description of Support System: Supportive, Involved       Current Resources:   Patient receiving home care services: No     Community Resources:    Equipment currently used at home: none  Supplies currently used at home: Oxygen Tubing/Supplies    Employment/Financial:  Employment Status:          Financial Concerns:         Lifestyle & Psychosocial Needs:  Social Determinants of Health     Tobacco Use: High Risk     Smoking Tobacco Use: Current Every Day Smoker     Smokeless Tobacco Use: Never Used   Alcohol Use:      Frequency of Alcohol Consumption:      Average Number of Drinks:      Frequency of Binge Drinking:    Financial Resource Strain:      Difficulty of Paying Living Expenses:    Food Insecurity:      Worried About Running Out of Food in the Last Year:      Ran Out of Food in the Last Year:    Transportation Needs:      Lack of Transportation (Medical):      Lack of Transportation  (Non-Medical):    Physical Activity:      Days of Exercise per Week:      Minutes of Exercise per Session:    Stress:      Feeling of Stress :    Social Connections:      Frequency of Communication with Friends and Family:      Frequency of Social Gatherings with Friends and Family:      Attends Scientology Services:      Active Member of Clubs or Organizations:      Attends Club or Organization Meetings:      Marital Status:    Intimate Partner Violence:      Fear of Current or Ex-Partner:      Emotionally Abused:      Physically Abused:      Sexually Abused:    Depression: Not at risk     PHQ-2 Score: 1   Housing Stability:      Unable to Pay for Housing in the Last Year:      Number of Places Lived in the Last Year:      Unstable Housing in the Last Year:      Functional Status:  Prior to admission patient needed assistance:        Mental Health Status:        Chemical Dependency Status:         Values/Beliefs:  Spiritual, Cultural Beliefs, Scientology Practices, Values that affect care:                 Additional Information:  Referral for High Risk for re-admission.  Writer met with patient and her , Oh.  Patient lives with [place] in their own home.  Patient is usually quite independent with cares.  [place] is retired and with patient 24/7 to assist as needed.  Patient has home O2 with baseline 2.0-2.5L.  Patient does NOT receive any in-home services.  She does have a [employer].  Discussed O2 provider issue and offered suggestions.  Discussed home care RN.  Patient has had this in the past and is in agreement with home care at discharge.  Discussed list of home care choices.  Patient chooses [facility] (Phone: [phone]) for RN.  Referral to be placed at discharge.  Family transport.  Patient is High Risk for re-admission.  CCR request sent.    PHOEBE Ledesma  [facility] [phone]/ Raheem [phone]  Care Management

## 2021-08-31 NOTE — PLAN OF CARE
Patient this shift has been anxious regarding difficulty breathing. Had PRN albuterol inhaler this morning, and reported that her inhaled medications don't help. Provider updated, and seen patient, and changes were made to her respiratory medications. Patient had difficulty swallowing mucinex tablet, so order was changed to liquid Robitussin instead.     IV in right hand was painful and noted to bulge slightly with flushing, so was changed. Patient tolerated well.     Patient has been on 2.5 L via nasal cannula today and maintaining O2 saturations within parameters. Otherwise vitals stable. Lung sounds diminished. Patient noted to be sitting up in tripod position today, reporting this is more comfortable for her.     Problem: Adult Inpatient Plan of Care  Goal: Absence of Hospital-Acquired Illness or Injury  Intervention: Identify and Manage Fall Risk  Recent Flowsheet Documentation  Taken 8/31/2021 0835 by Muna Decker RN  Safety Promotion/Fall Prevention:   activity supervised   bed alarm on   clutter free environment maintained   fall prevention program maintained   nonskid shoes/slippers when out of bed   room door open  Intervention: Prevent Skin Injury  Recent Flowsheet Documentation  Taken 8/31/2021 0835 by Muna Decker RN  Body Position: (tripod position) position changed independently  Intervention: Prevent and Manage VTE (Venous Thromboembolism) Risk  Recent Flowsheet Documentation  Taken 8/31/2021 0835 by Muna Decker RN  VTE Prevention/Management: pneumatic compression device     Problem: Respiratory Compromise  Goal: Optimal Oxygenation and Ventilation  Intervention: Manage Pneumothorax Effects  Recent Flowsheet Documentation  Taken 8/31/2021 0835 by Muna Decker RN  Administration (IS): refused

## 2021-08-31 NOTE — PLAN OF CARE
VSS. Pneumothorax improving on chest xray, however, patient reports breathing is worse. Patient on 2.5L O2. Chest Tube intact. Zero drainage for last 4 hours. No leak seen. At -20 suction. Requested Home Trilogy Bipap be brought in, OK'd with MD. Lungs are very diminished. Fragile/bruised skin. 1 dose IV lasix given, good urine output and patient seems to be breathing easier since. Up with SBA.

## 2021-08-31 NOTE — PROVIDER NOTIFICATION
I messaged Dr. Chavis:  Preeti Ford, has a chest tube, it is bubbling constantly in the water seal chamber, she says it got pulled on earlier today. I think it is displaced. VS are ok, except she is 96% on 3L/nc. do you want a CXR, or should I call surgeon on call?  MD Lauren Chavis - 10:48 pm  I'll order the chest x-ray but please call the surgeon  ANTONIO  Read - 10:51 pm  Delgado ALVAREZ  Read - 10:59 pm  [facility] - [phone] I talked to , she said that it has always been that way. the doctor said to DC the stat CXR, so I did that. We will continue to monitor. Ty  MD Lauren Chavis - 11:00 pm  Perfect    Quick Messages    Priority    Responses  a

## 2021-08-31 NOTE — PROGRESS NOTES
[facility]    General Surgery  Daily Post-Op Note    Assessment & Plan      -ED placement of perc right thoracostomy tube  Doing well.  No air leak on th is morning exam even with deep cough  CXR this AM with minimal to none pneumothorax  Plan:  -Ambulate  -Continue supportive and symptomatic treatment  -Pain control measures  -continue on cont suction for the next 24hrs - then will do water seal for 12-24hrs prior to removing it  -AM CXR    Principal Problem:    Spontaneous pneumothorax  Active Problems:    Hypoxia    Chest tube in place    Chronic obstructive pulmonary disease, unspecified COPD type (H)    Cigarette smoker    Encounter for screening laboratory testing for severe acute respiratory syndrome coronavirus 2 (SARS-CoV-2)      [facility]    Interval History   Doing well.  Continues to improve.  Pain is well-controlled.  No fevers.  Pain still present.  Some intermittent air leak with movement per RN from last night.  No air leak this AM.         Physical Exam   Temp: (!) 96.2  F (35.7  C) Temp src: Oral BP: (!) 152/63 Pulse: 78   Resp: 20 SpO2: 95 % O2 Device: Nasal cannula Oxygen Delivery: 2.5 LPM  Vitals:    08/29/21 2150   Weight: 38.9 kg (85 lb 12.8 oz)     Vital Signs with Ranges  Temp:  [96.2  F (35.7  C)-96.7  F (35.9  C)] 96.2  F (35.7  C)  Pulse:  [74-88] 78  Resp:  [18-22] 20  BP: (116-152)/(46-70) 152/63  SpO2:  [92 %-97 %] 95 %  I/O last 3 completed shifts:  In: 920 [P.O.:920]  Out: 13 [Chest Tube:13]    Wound clean and dry with minimal or no drainage.  Surrounding skin with minimal erythema.  No airleak this AM even with deep cough.         Medications        aspirin  81 mg Oral Daily     atorvastatin  20 mg Oral At Bedtime     budesonide-formoterol  2 puff Inhalation BID     clopidogrel  75 mg Oral Daily     enoxaparin ANTICOAGULANT  40 mg Subcutaneous Q24H     guaiFENesin  600 mg Oral BID     ipratropium - albuterol 0.5 mg/2.5 mg/3 mL  1 vial Nebulization  4x Daily     losartan  25 mg Oral Daily     methylPREDNISolone  62.5 mg Intravenous Q6H     metoprolol tartrate  12.5 mg Oral BID     montelukast  10 mg Oral At Bedtime     PARoxetine  10 mg Oral QAM     sodium chloride (PF)  3 mL Intracatheter Q8H     umeclidinium  1 puff Inhalation Daily       Data   Recent Results (from the past 24 hour(s))   XR Chest Port 1 View    Narrative    CHEST PORTABLE ONE VIEW   8/30/2021 8:56 AM     HISTORY: Shortness of breath, enlarging pneumothorax.    COMPARISON: Chest x-rays dated 8/30/2021 at 7:00 AM.      Impression    IMPRESSION: There is decreased size of the right pneumothorax since  the prior study approximately 2 hours earlier. There is increasing  subcutaneous emphysema since the prior study. No other changes. Right  chest tube is unchanged in position.    I discussed the decreasing size of the right pneumothorax with Dr. Boston on 8/30/2021 at 9:23 AM.    OZIEL OHARA MD         SYSTEM ID:  [id number]   XR Chest Port 1 View    Narrative    XR PORTABLE CHEST ONE VIEW   8/30/2021 1:38 PM     HISTORY: Pneumonia, status post thoracostomy.    COMPARISON: Chest x-rays dated 8/30/2021 at 8:50 AM.      Impression    IMPRESSION:  1. There has been further decreased size of the right pneumothorax,  only a tiny portion now visualized in the apex. Right chest tube is  unchanged in position.  2. Right mid to lower lung opacity is slightly increased in  prominence. There is decreased right pleural fluid.  3. Subcutaneous emphysema is stable.  4. Postop changes status post cardiac stent placements are again  noted.  5. No other changes.    OZIEL OHARA MD         SYSTEM ID:  [id number]   XR Chest Port 1 View    Narrative    EXAM: XR CHEST PORT 1 VIEW  LOCATION: [facility]  DATE/TIME: 8/31/2021 6:01 AM    INDICATION: sob  COMPARISON: 08/30/2021      Impression    IMPRESSION: Stable cardiomediastinal silhouette. Small caliber right chest tube. Decreased  right pneumothorax. Consolidation right mid lung again seen. Soft tissue emphysema right chest. Stable cardiomediastinal silhouette.

## 2021-08-31 NOTE — PLAN OF CARE
S-(situation): End of shift note    B-(background): Spontaneous R pneumo    A-(assessment): Vital signs stable. Afebrile. Lung sounds diminished. IS encouraged, but pt refused. On 2.5 L O2 per nasal cannula. A&O x4. Pt complaining of generalized pain, controlled with tylenol. Bowel sounds normoactive. Up to bedside commode with 1 assist. Became short of breath after activity and given a neb with minimal result. Voiding adequately. Chest tube site intact. 5 ml serosanguinous output overnight. At 2300, air leak noted, but when pt laid on side, no leak was noted. No leak present since 0200t. Crepitus present on right lateral side from top of hip to upper back. Able to make needs known and uses call light appropriately.     R-(recommendations): Continue to monitor per care plan.

## 2021-09-01 PROBLEM — E43 SEVERE MALNUTRITION (H): Status: ACTIVE | Noted: 2021-01-01

## 2021-09-01 NOTE — PROVIDER NOTIFICATION
MD at 1100 AM regarding new orders.      Spoke with: [facility]    Orders were not obtained.    Comments: Telephone order to change chest tube to water seal and Dr Kent will reassess this afternoon.

## 2021-09-01 NOTE — PLAN OF CARE
"Vss. Using 2.5Lnc with O2 sats 91-94%. Pt very SOA with any activity. Pt is up to bedside commode and RR increases to 28-30 and asks for \"anything to help me breathe\", prn Albuterol neb given, pt recovers within a few minutes with easier work of breathing. Lungs are diminished, expiratory and inspiratory wheezes associated with activity. Crepitus noted from R upper back to just above R pelvic bone. Mild pain at chest tube insertion site, tylenol given. Chest tube with 1mL serosanguinous drainage this evening, no air leak noted. Pt stated that  was to bring in home Trilogy/AVAPS unit, but is not here at this time. Will continue with plan of care, monitor respiratory status closely, chest tube.  "

## 2021-09-01 NOTE — PROGRESS NOTES
"CLINICAL NUTRITION SERVICES - ASSESSMENT NOTE     Nutrition Prescription    RECOMMENDATIONS FOR MDs/PROVIDERS TO ORDER:  1. Recommend starting MVI given chronic malnutrition and poor oral intake     Malnutrition Status:    Severe malnutrition in the context of chronic illness    Recommendations already ordered by Registered Dietitian (RD):  Medical food supplement therapy - PRN     Future/Additional Recommendations:  1. Continue monitoring PO intake. Encourage intake as tolerated. Please offer patient additional meals and snacks available.   2. Weight trends      REASON FOR ASSESSMENT  Preeti JEROME Ford is a/an 70 year old female assessed by the dietitian for Interdisciplinary Rounds    NUTRITION HISTORY  Per Chart Review:   -PMH: Acute on chronic respiratory failure, COPD, CAD, hypertension. Patient presented to the ED with acute onset of shortness of breath  -Has home O2 with baseline 2.0-2.5L  -Patient with significant history for malnutrition since 2018. Per review of previous RD encounters, patient has had trouble gaining weight since 2018. Patient and  have previously received education regarding weight gain and ways to increase oral intake.     Per pt recall ( in room during visit):  Has had poor appetite/oral intake for several years. Says appetite will come and go - sometimes she is able to eat quite a bit and other times eats very little. Notes generalized decrease appetite and little interest in foods most of the time. Per  and patient, have seen an RD multiple times during previous hospital admissions and have been provided with numerous resources and materials regarding weight gain. They state they have tried \"everything\" for patient to gain weight but have been unsuccessful, feel there is nothing more they can do.    CURRENT NUTRITION ORDERS  Diet: Low Saturated Fat/2400 mg Sodium  Intake/Tolerance: Consuming 25% of meals per nursing documentation.     LABS  Labs " "reviewed    MEDICATIONS  Medications reviewed    ANTHROPOMETRICS  Height: 160 cm (5' 3\")  Most Recent Weight: 38.9 kg (85 lb 12.8 oz)    IBW: 52.2 kg  %IBW: 75%  BMI: Underweight BMI <18.5 (BMI 15.20)  Weight History:   5.3% weight loss in about 3 months (non-severe)   8.6% weight loss in about 4 months (non-severe)  Wt Readings from Last 10 Encounters:   08/29/21 38.9 kg (85 lb 12.8 oz)   06/14/21 41.1 kg (90 lb 8 oz)   04/09/21 42.6 kg (94 lb)   03/25/21 43 kg (94 lb 11.2 oz)   12/02/20 40.9 kg (90 lb 4 oz)   11/09/20 40.7 kg (89 lb 11.2 oz)   05/18/20 45.1 kg (99 lb 8 oz)   05/11/20 45.4 kg (100 lb)   05/07/20 47.1 kg (103 lb 14.4 oz)   05/02/20 46.5 kg (102 lb 8 oz)     Dosing Weight: 39 kg (actual)    ASSESSED NUTRITION NEEDS  Estimated Energy Needs: 9524-8844+ kcals/day (30 - 35+ kcals/kg )  Justification: Increased needs, Underweight, Repletion   Estimated Protein Needs: 47-59 grams protein/day (1.2 - 1.5 grams of pro/kg)  Justification: Increased needs and Repletion  Estimated Fluid Needs: 1 mL/kcal   Justification: Per provider pending fluid status    PHYSICAL FINDINGS  See malnutrition section below.  -Fragile/bruised skin  -2.5 L via nasal cannula   -Cigarette smoker  -appears cachectic  -Poor dentition   -Lanugo hair present     MALNUTRITION  % Intake: < 75% for >/= 3 months (non-severe) - patient with poor oral intake for several years   % Weight Loss: Up to 10% in 6 months (non-severe/moderate)  Subcutaneous Fat Loss: Facial region:  Severe, Upper arm:  severe and Lower arm:  severe  Muscle Loss: Temporal:  Severe, Facial & jaw region:  severe, Thoracic region (clavicle, acromium bone, deltoid, trapezius, pectoral):  severe, Upper arm (bicep, tricep):  severe and Lower arm  (forearm):  severe  Fluid Accumulation/Edema: None noted  Malnutrition Diagnosis: Severe malnutrition in the context of chronic illness    NUTRITION DIAGNOSIS  Inadequate oral intake related to chronic illness as evidenced by " patient recall of poor intake for several years, findings of severe fat and muscle wasting, and a BMI of 15.20 kg/m2.       INTERVENTIONS  Implementation  Nutrition Education: Encouraged oral intake as tolerated and focusing on calorie dense foods. Patient declined any other interventions/education at this time.    Collaboration with other providers - Interdisciplinary rounds   Medical food supplement therapy - PRN     Goals  Patient to consume % of nutritionally adequate meal trays TID, or the equivalent with supplements/snacks.    Maintain weight >/= 85 lbs      Monitoring/Evaluation  Progress toward goals will be monitored and evaluated per protocol.    Graham Kinney RDN, [place]  Clinical Dietitian   Office: [phone]  Weekend Pager: [phone]

## 2021-09-01 NOTE — PROGRESS NOTES
Care Management Follow Up    Length of Stay (days): 2    Expected Discharge Date:       Concerns to be Addressed: all concerns addressed in this encounter     Patient plan of care discussed at interdisciplinary rounds: Yes    Anticipated Discharge Disposition: Home, Home Care     Anticipated Discharge Services:    Anticipated Discharge DME:      Patient/family educated on Medicare website which has current facility and service quality ratings: yes  Education Provided on the Discharge Plan:    Patient/Family in Agreement with the Plan: yes    Referrals Placed by CM/SW: Internal Clinic Care Coordination, Scheduled Follow-up appointments, Homecare  Private pay costs discussed: Not applicable    Additional Information:    Writer made referral to [facility] (Phone: [phone]) for RN.     PLAN: Discharge TBD, will return to home w/ home care services.      PHOEBE Rodas  Care Management  [phone]

## 2021-09-01 NOTE — TELEPHONE ENCOUNTER
Reason for call:  Other   Patient called regarding (reason for call): appointment  Additional comments: Pt discharging [facility] 8/2, needs in clinic follow up 9/7 or 9/8. Patient is still in hospital and unable to make appointments please fit in on the schedule with any available provider    Phone number to reach patient:  Home number on file [phone] (home)    Best Time:  n/a    Can we leave a detailed message on this number?  Not Applicable    Travel screening: Not Applicable

## 2021-09-01 NOTE — PLAN OF CARE
"VSS. Remains on 2L top maintain saturations, which is patients home regimen. Complaints of shortness of air with activity, and some time just laying in bed reports \"just feeling like she cant catch her breathe\" Received morphine x2 for this. Lung sounds expiratory wheezes. Crepitus present in R upper back. Up to the bedside commode. Chest tube in place, changed to water seal today. No air leak.  brought in trilogy today, able to use if she wants at night. Will continue to monitor.   "

## 2021-09-01 NOTE — PROGRESS NOTES
[facility]    Medicine Progress Note - Hospitalist Service       Date of Admission:  8/29/2021    Assessment & Plan         70-year-old woman with multiple chronic medical problems including COPD and chronic respiratory failure treated with home oxygen supplementation admitted due to spontaneous right pneumothorax with acute on chronic hypoxic respiratory failure and hypotension that resolved with chest tube placement in the ER.   She was also admitted with COPD exacerbation.  Her respiratory status is improving although she remains intermittently dyspneic subjectively.  She normally uses noninvasive positive pressure ventilation at home both during sleep and as needed during the day and has not been using it during hospitalization so far.  She also has chronic heart problems including CAD with previous coronary artery stent placement as well as moderate to severe aortic stenosis and moderate to severe pulmonary hypertension but these appear to be stable. She has chronic platelet function defect associated with the chronic use of aspirin and Plavix, but bleeding has not been evident.  She appears severely malnourished based upon poor oral intake, weight loss, subcutaneous fat loss, and muscle loss.    Principal Problem:    Spontaneous pneumothorax  Active Problems:    Acute respiratory failure with hypoxia (H)    COPD exacerbation    Hypotension    PAD (peripheral artery disease) (H) - moderate left common carotid stenosis, aortoiliac bypass, chronic left vertebral and right posterior cerebral stenoses    Pulmonary emphysema (H)    Moderate to severe pulmonary hypertension (H)-per Echo Jubne 2021    Nonrheumatic aortic valve stenosis - moderate to severe    Chest tube in place    S/P drug eluting coronary stent placement-LAD and circ, circ re-do 5/17/18    Abnormal CXR-right mid consolidation    Dysphagia    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 and surgeon suggested considering removal of chest tube if clinical status is stable overnight and pneumothorax remains radiographically resolved tomorrow  Continue oral corticosteroids for treatment of COPD exacerbation  Continue higher frequency of bronchodilators   Titrate oxygen to keep saturations 90% and higher  Encourage adequate oral nutritional intake  Encouraged patient to use her home BiPAP machine not only during sleep but as needed during the day when she becomes more dyspneic, would withhold use of BiPAP if she were to develop worsening air leak again       Diet: Combination Diet Low Saturated Fat Na <2400mg Diet    DVT Prophylaxis: Enoxaparin (Lovenox) SQ  Barnhart Catheter: Not present  Central Lines: None  Code Status: Full Code      Disposition Plan   Expected discharge:  unknown recommended to prior living arrangement once Medically stable.     The patient's care was discussed with the Care Coordinator/, Patient and Patient's Family.    Gael Bass MD  Hospitalist Service  [facility]  Securely message with the Vocera Web Console (learn more here)  Text page via [employer] Paging/Directory      Clinically Significant Risk Factors Present on Admission               ______________________________________________________________________    Interval History   She is feeling better today overall.  Her shortness of breath continues to come and go.  However, both she and her  say that shortness of breath is common for her at home.  She does have a BiPAP machine at home that she is supposed to use during sleep.  She also uses it sometimes when she is short of breath during  the day.  She has trouble using it at night during sleep because she awakens having to go to the bathroom and has to take the machine off to ambulate about 20 feet to the bathroom.  Her  wonders whether they could secure a commode for use at home next to her bedside so that she could continue to use her BiPAP during sleep.  She is been afebrile and hemodynamically stable.  Oxygenation has been stable on her normal home oxygen supplementation.    Data reviewed today: I reviewed all medications, new labs and imaging results over the last 24 hours. I personally reviewed no images or EKG's today.    Physical Exam   Vital Signs: Temp: 97.7  F (36.5  C) Temp src: Oral BP: 126/64 Pulse: 83   Resp: 18 SpO2: 94 % O2 Device: Nasal cannula Oxygen Delivery: 2 LPM  Weight: 85 lbs 12.8 oz  General Appearance: Appears anxious but no other signs of acute distress resting in bed, able to speak full sentences  Respiratory: Diminished breath sounds throughout, clear lung fields, symmetric breath sounds  Cardiovascular: Regular rate and rhythm     Data   Recent Labs   Lab 09/01/21  0621 08/31/21  0532 08/30/21  0613 08/29/21  1822   WBC  --   --  6.1 12.0*   HGB  --   --  13.0 14.2   MCV  --   --  90 90   PLT  --   --  180 206   INR  --   --  0.91  --    NA  --   --  138 139   POTASSIUM 3.8 4.4 4.5 4.5   CHLORIDE  --   --  105 106   CO2  --   --  31 29   BUN  --   --  22 24   CR  --   --  0.44* 0.73   ANIONGAP  --   --  2* 4   CALDERON  --   --  8.3* 8.5   GLC  --   --  159* 125*   ALBUMIN  --   --   --  3.5   PROTTOTAL  --   --   --  7.0   BILITOTAL  --   --   --  0.4   ALKPHOS  --   --   --  70   ALT  --   --   --  27   AST  --   --   --  27   TROPONIN  --   --   --  0.043     Recent Results (from the past 24 hour(s))   XR Chest Port 1 View    Narrative    CHEST ONE VIEW PORTABLE   9/1/2021 9:50 AM     HISTORY:  Right pneumothorax, chest tube.    COMPARISON: Chest x-rays dated 8/31/2021 at 11:32 AM.      Impression     IMPRESSION:  1. Right chest tube is unchanged in position. No definite right  pneumothorax is seen.  2. Hyperaeration and pulmonary scarring again noted. Right midlung  opacity is unchanged.  3. Subcutaneous emphysema has slightly decreased along the right  lateral chest wall, right side of the neck and right abdominal wall.   4. No other changes since the prior study dated 8/31/2021 at 11:32 AM.     Medications       aspirin  81 mg Oral Daily     atorvastatin  20 mg Oral At Bedtime     clopidogrel  75 mg Oral Daily     enoxaparin ANTICOAGULANT  40 mg Subcutaneous Q24H     fluticasone-vilanterol  1 puff Inhalation Daily     ipratropium - albuterol 0.5 mg/2.5 mg/3 mL  1 vial Nebulization Q4H     losartan  25 mg Oral Daily     metoprolol tartrate  12.5 mg Oral BID     montelukast  10 mg Oral At Bedtime     PARoxetine  10 mg Oral QAM     predniSONE  40 mg Oral Daily     sodium chloride (PF)  3 mL Intracatheter Q8H     umeclidinium  1 puff Inhalation Daily

## 2021-09-01 NOTE — PROGRESS NOTES
S: Respiratory therapy note  B: Spontaneous right pneumothorax with COPD exac. (home oxygen dependent with home trilogy)  A:  Pt was having increased pain, with air hunger today.  She was getting some relief with neb treatments.  Requested low dose morphine to help with pain and air hunger, which has helped patient a lot today.  BS are diminished with expiratory wheezes throughout, and increased air entry with expiratory wheezes throughout after neb treatments.  Pt on 2 lpm with is patients home liter flow.  R: Discussed with MD and patient is okay to wear home trilogy tonight, If there is an air leak in witnessed in [facility], the home trilogy is to be stopped immediately.  Will continue current treatment plan and monitor respiratory status closely.    Arthur Krishnan, RT on 9/1/2021 at 5:36 PM

## 2021-09-01 NOTE — PROGRESS NOTES
[facility]    General Surgery  Daily Post-Op Note    Assessment & Plan      -ED placement of perc right thoracostomy tube  Doing well.  No air leak on this morning exam even with deep cough  CXR this AM with minimal to none pneumothorax    Plan:  -Ambulate  -Continue supportive and symptomatic treatment  -Pain control measures  - water seal for 12-24hrs prior to removing it  - plan for removing chest tube if CXR showed no pneumothorax  -AM CXR    Principal Problem:    Spontaneous pneumothorax  Active Problems:    History of stroke - right thalamus in 8/2013 and left cerebellar and right vermis in 3/2018    PAD (peripheral artery disease) (H) - moderate left common carotid stenosis, aortoiliac bypass, chronic left vertebral and right posterior cerebral stenoses    Acute respiratory failure with hypoxia (H)    COPD exacerbation    Pulmonary emphysema (H)    Moderate to severe pulmonary hypertension (H)-per Echo Jubne 2021    Nonrheumatic aortic valve stenosis - moderate to severe    Hypotension    Chest tube in place    Cigarette smoker    S/P drug eluting coronary stent placement-LAD and circ, circ re-do 5/17/18    Abnormal CXR-right mid consolidation    Dysphagia    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  Continues to improve.  Pain is well-controlled.  No fevers.  Pain still present.   Does have intermittent SOB; likely from her COPD.  No air leak this AM.         Physical Exam   Temp: 97.7  F (36.5  C) Temp src: Oral BP: 126/64 Pulse: 83   Resp: 18 SpO2: 94 % O2 Device: Nasal cannula Oxygen Delivery: 2 LPM  Vitals:    08/29/21 2150   Weight: 38.9 kg (85 lb 12.8 oz)     Vital Signs with Ranges  Temp:  [96.6  F (35.9  C)-97.7  F (36.5  C)] 97.7  F (36.5  C)  Pulse:  [72-98] 83  Resp:  [18-22] 18  BP: ()/(47-68) 126/64  SpO2:  [89 %-100 %] 94 %  I/O last 3 completed shifts:  In: -   Out: 1 [Chest  Tube:1]    Wound clean and dry with minimal or no drainage.  Surrounding skin with minimal erythema.  No airleak this AM even with deep cough.         Medications        aspirin  81 mg Oral Daily     atorvastatin  20 mg Oral At Bedtime     clopidogrel  75 mg Oral Daily     enoxaparin ANTICOAGULANT  40 mg Subcutaneous Q24H     fluticasone-vilanterol  1 puff Inhalation Daily     ipratropium - albuterol 0.5 mg/2.5 mg/3 mL  1 vial Nebulization Q4H     losartan  25 mg Oral Daily     metoprolol tartrate  12.5 mg Oral BID     montelukast  10 mg Oral At Bedtime     PARoxetine  10 mg Oral QAM     predniSONE  40 mg Oral Daily     sodium chloride (PF)  3 mL Intracatheter Q8H     umeclidinium  1 puff Inhalation Daily       Data   Recent Results (from the past 24 hour(s))   XR Chest Port 1 View    Narrative    CHEST ONE VIEW PORTABLE   9/1/2021 9:50 AM     HISTORY:  Right pneumothorax, chest tube.    COMPARISON: Chest x-rays dated 8/31/2021 at 11:32 AM.      Impression    IMPRESSION:  1. Right chest tube is unchanged in position. No definite right  pneumothorax is seen.  2. Hyperaeration and pulmonary scarring again noted. Right midlung  opacity is unchanged.  3. Subcutaneous emphysema has slightly decreased along the right  lateral chest wall, right side of the neck and right abdominal wall.   4. No other changes since the prior study dated 8/31/2021 at 11:32 AM.

## 2021-09-02 NOTE — PROGRESS NOTES
09/02/21 1650   Oxygen Therapy   SpO2 95 %   O2 Device Nasal cannula   Oxygen Delivery 2 LPM   Assessment   Respiratory WDL X;breath sounds;effort/expansion;rhythm/pattern   Rhythm/Pattern, Respiratory labored;shortness of breath;tachypneic   Expansion/Accessory Muscles/Retractions accessory muscle use   Cough Frequency frequent   Cough Type congested;loose;nonproductive   Breath Sounds   LLL Breath Sounds crackles, coarse;wheezes, expiratory   RLL Breath Sounds crackles, coarse   ARLEN Breath Sounds diminished   RUL Breath Sounds diminished   RML Breath Sounds crackles, coarse   Patient remains on home level of 2 liters oxygen via nasal cannula continuously.  SpO2 drops into the low 80's with movement and mouth breathing.  Patient anxious at times improved post morphine. Nebulizer treatments given as ordered and one prn treatment.  Patient has trilogy home ventilator in the room from [facility] medical patient did not use machine last night.  She states her  probably knows how to use it and that she got it at the other hospital she states but she can not remember when she got it .   Encouraged patient to use Trilogy .  Press power button apply mask and bled in 2 liters oxygen to use at night.    RCP will follow

## 2021-09-02 NOTE — PLAN OF CARE
Patient this shift had chest tube removed, tolerated well, dressing covering area remains intact.  Lung sounds diminished, with expiratory wheeze this a.m. On 2L via nasal cannula this shift. Later in shift was complaining of difficulty breathing and was administered PRN morphine which was effective. Blood gas levels stable.     Problem: Adult Inpatient Plan of Care  Goal: Absence of Hospital-Acquired Illness or Injury  Intervention: Identify and Manage Fall Risk  Recent Flowsheet Documentation  Taken 9/2/2021 0950 by Muan Decker RN  Safety Promotion/Fall Prevention:   activity supervised   bed alarm on   clutter free environment maintained   fall prevention program maintained   nonskid shoes/slippers when out of bed  Intervention: Prevent Skin Injury  Recent Flowsheet Documentation  Taken 9/2/2021 0950 by Muna Decker RN  Body Position: position changed independently     Problem: Respiratory Compromise  Goal: Optimal Oxygenation and Ventilation  Intervention: Manage Pneumothorax Effects  Recent Flowsheet Documentation  Taken 9/2/2021 0950 by Muna Decker RN  Administration (IS): refused

## 2021-09-02 NOTE — TELEPHONE ENCOUNTER
This needs to be addressed by PCP.  RN will call when she is discharged from hospital as we cannot call now, she is currently admitted.  She will fall on our discharge list.    There are no openings in clinic either of these days.  Can PCP fir her in when she is in clinic?  Routing to PCP for further advice.    Nona Gatica RN

## 2021-09-02 NOTE — PROGRESS NOTES
"Writer discussed with patient use of Trilogy at change of shift, and she had no interest in using at that time    Attempted again at 0330 and patient \"yelled, No, I hate that thing\".  She is unable to articulate how often it is utilized at home and quickly changes the subject to using the restroom.  At this time she is declining to use Triology  "

## 2021-09-02 NOTE — PROGRESS NOTES
[facility]    Medicine Progress Note - Hospitalist Service       Date of Admission:  8/29/2021    Assessment & Plan                70-year-old woman with multiple chronic medical problems including COPD and chronic respiratory failure treated with home oxygen supplementation and Trilogy assisted ventilation admitted due to spontaneous right pneumothorax with acute on chronic hypoxic respiratory failure and initial hypotension that resolved with chest tube placement in the ER.   She was also admitted with COPD exacerbation.  Her respiratory status is improving clinically although she remains intermittently dyspneic subjectively.  Pneumothorax appears to have resolved and chest tube was removed this morning by surgeon.  Radiographically she appears to be having increasing opacity in the right mid to lower chest concerning for possible infiltrate.  However, she does not have overt symptoms of pneumonia including fever, worsening cough, worsening shortness of breath, or worsening hypoxia.  She does not have leukocytosis and CRP is low.  She does have swallowing problems and may have had a recent aspiration event.    She also has chronic heart problems including CAD with previous coronary artery stent placement as well as moderate to severe aortic stenosis and moderate to severe pulmonary hypertension which have been stable. She has chronic platelet function defect associated with the chronic use of aspirin and Plavix, but bleeding has not been evident.  She is severely malnourished demonstrating poor oral intake, weight loss, subcutaneous fat loss, and muscle loss.    Principal Problem:    Spontaneous pneumothorax  Active Problems:    Acute respiratory failure with hypoxia (H)    COPD exacerbation    Hypotension    PAD (peripheral artery disease) (H) - moderate left common carotid stenosis, aortoiliac bypass, chronic left vertebral and right posterior cerebral stenoses    Pulmonary  emphysema (H)    Moderate to severe pulmonary hypertension (H)-per Echo Jubne 2021    Nonrheumatic aortic valve stenosis - moderate to severe    Chest tube in place    S/P drug eluting coronary stent placement-LAD and circ, circ re-do 5/17/18    Abnormal CXR-right mid consolidation    Dysphagia    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    Severe malnutrition (H)-reduced intake, weight loss, loss of SC fat and muscle    History of stroke - right thalamus in 8/2013 and left cerebellar and right vermis in 3/2018    Cigarette smoker    Routine repeat radiographic imaging not anticipated at this time but low threshold to obtain a chest x-ray to reevaluate for recurrent pneumothorax if she is deteriorating  Antibiotic therapy considered but not prescribed at this time in the absence of any clinical signs of infection, procalcitonin level is currently pending and if it is significantly elevated would add empiric antibiotic therapy for pneumonia including aspiration  Continue oral corticosteroids and bronchodilators   Titrate oxygen to keep saturations 90% and higher, patient has been prescribed 2 L nasal cannula chronically at home  Encourage adequate oral nutritional intake  Encouraged patient to use her home BiPAP machine not only during sleep but as needed during the day when she becomes more dyspneic, would withhold use of BiPAP if she were to develop recurrent pneumothorax  Advance activity today in anticipation of possible hospital discharge as soon as the next 1 to 2 days       Diet: Combination Diet Low Saturated Fat Na <2400mg Diet  Snacks/Supplements Adult: Other; PRN; Between Meals    DVT Prophylaxis: Enoxaparin (Lovenox) SQ  Barnhart Catheter: Not present  Central Lines: None  Code Status: Full Code      Disposition Plan   Expected discharge: unknown recommended to prior living arrangement once Medically stable.     The patient's care was discussed with the Bedside Nurse, Care Coordinator/Social  Worker and Patient.    Gael Bass MD  Hospitalist Service  [facility]  Securely message with the [employer] Web Console (learn more here)  Text page via [employer] Paging/Directory      Clinically Significant Risk Factors Present on Admission         )     ______________________________________________________________________    Interval History   She feels better today.  She is tired.  Breathing seems better.  Cough is about the same.  Nursing says that she did not have any air leak from her chest tube last night.  Chest tube was removed by surgeon this morning uneventfully.  She has not had fever and remains hemodynamically stable.  Oxygen saturations were 98% on her normal home dose of 2 L nasal cannula oxygen, so oxygen dose was decreased to 1 L and she has maintained saturations 93% at rest.  She did not want to use her home BiPAP machine in the hospital last night.  She is tolerating limited oral intake.  She has not yet been ambulating.    Data reviewed today: I reviewed all medications, new labs and imaging results over the last 24 hours. I personally reviewed no images or EKG's today.    Physical Exam   Vital Signs: Temp: 98.2  F (36.8  C) Temp src: Oral BP: 113/55 Pulse: 81   Resp: 18 SpO2: 95 % O2 Device: Nasal cannula Oxygen Delivery: 2 LPM  Weight: 85 lbs 12.8 oz  General Appearance: Chronically ill very thin appearing even cachectic woman, no acute distress, able to speak full sentences  Respiratory: Normal respiratory effort, diminished breath sounds throughout, scattered rhonchi, some expiratory wheezing over the left lung fields although not on the right  Cardiovascular: Regular rate and rhythm, normal capillary refill     Data   Recent Labs   Lab 09/02/21  0844 09/02/21  0654 09/01/21  0621 08/31/21  0532 08/30/21  0613 08/29/21  1822   WBC  --   --   --   --  6.1 12.0*   HGB  --   --   --   --  13.0 14.2   MCV  --   --   --   --  90 90     --   --   --  180 206    INR  --   --   --   --  0.91  --    NA  --   --   --   --  138 139   POTASSIUM  --  4.4 3.8 4.4 4.5 4.5   CHLORIDE  --   --   --   --  105 106   CO2  --   --   --   --  31 29   BUN  --   --   --   --  22 24   CR  --   --   --   --  0.44* 0.73   ANIONGAP  --   --   --   --  2* 4   CALDERON  --   --   --   --  8.3* 8.5   GLC  --   --   --   --  159* 125*   ALBUMIN  --   --   --   --   --  3.5   PROTTOTAL  --   --   --   --   --  7.0   BILITOTAL  --   --   --   --   --  0.4   ALKPHOS  --   --   --   --   --  70   ALT  --   --   --   --   --  27   AST  --   --   --   --   --  27   TROPONIN  --   --   --   --   --  0.043     Venous Blood Gas  Recent Labs   Lab 09/02/21  0654 08/29/21  1824   PHV 7.39  --    PCO2V 67*  --    PO2V 43  --    HCO3V 40*  --    KYLER 12.7*  --    O2PER 24 40       Recent Results (from the past 24 hour(s))   XR Chest Port 1 View    Narrative    EXAM: XR CHEST PORT 1 VIEW  LOCATION: [facility]  DATE/TIME: 9/2/2021 6:02 AM    INDICATION: right pneumothorax, chest tube  COMPARISON: 09/01/2021      Impression    IMPRESSION: Stable cardiomediastinal silhouette. Small caliber right chest tube. Small residual right apical pneumothorax not excluded. Increased consolidation right mid to lower lung. COPD. Soft tissue emphysema right chest.     Medications       aspirin  81 mg Oral Daily     atorvastatin  20 mg Oral At Bedtime     clopidogrel  75 mg Oral Daily     enoxaparin ANTICOAGULANT  40 mg Subcutaneous Q24H     fluticasone-vilanterol  1 puff Inhalation Daily     ipratropium - albuterol 0.5 mg/2.5 mg/3 mL  1 vial Nebulization Q4H     losartan  25 mg Oral Daily     metoprolol tartrate  12.5 mg Oral BID     montelukast  10 mg Oral At Bedtime     PARoxetine  10 mg Oral QAM     predniSONE  40 mg Oral Daily     sodium chloride (PF)  3 mL Intracatheter Q8H     umeclidinium  1 puff Inhalation Daily

## 2021-09-02 NOTE — PROGRESS NOTES
[facility]    General Surgery  Daily Post-Op Note    Assessment & Plan      -ED placement of perc right thoracostomy tube  Doing well.  No air leak on this morning exam even with deep cough  CXR this AM with minimal to none pneumothorax    Plan:  -Ambulate  -Continue supportive and symptomatic treatment  -Pain control measures  - water seal for 24hrs with no air leak or increasing pneumothorax  - thoracostomy tube removed with no issues - CXR in 4hrs  - discharge to be determine by hospitalist    Principal Problem:    Spontaneous pneumothorax  Active Problems:    History of stroke - right thalamus in 8/2013 and left cerebellar and right vermis in 3/2018    PAD (peripheral artery disease) (H) - moderate left common carotid stenosis, aortoiliac bypass, chronic left vertebral and right posterior cerebral stenoses    Acute respiratory failure with hypoxia (H)    COPD exacerbation    Pulmonary emphysema (H)    Moderate to severe pulmonary hypertension (H)-per Echo Jubne 2021    Nonrheumatic aortic valve stenosis - moderate to severe    Hypotension    Chest tube in place    Cigarette smoker    S/P drug eluting coronary stent placement-LAD and circ, circ re-do 5/17/18    Abnormal CXR-right mid consolidation    Dysphagia    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  Continues to improve.  Pain is well-controlled.  No fevers.  Pain still present.   Does have intermittent SOB; likely from her COPD.  No air leak this AM.         Physical Exam   Temp: 98.2  F (36.8  C) Temp src: Oral BP: 113/55 Pulse: 81   Resp: 18 SpO2: 93 % O2 Device: Nasal cannula Oxygen Delivery: 1 LPM  Vitals:    08/29/21 2150   Weight: 38.9 kg (85 lb 12.8 oz)     Vital Signs with Ranges  Temp:  [97.1  F (36.2  C)-98.8  F (37.1  C)] 98.2  F (36.8  C)  Pulse:  [77-86] 81  Resp:   [18-24] 18  BP: (107-127)/(33-55) 113/55  SpO2:  [89 %-100 %] 93 %  No intake/output data recorded.    Wound clean and dry with minimal or no drainage.  Surrounding skin with minimal erythema.  No airleak this AM even with deep cough.         Medications        aspirin  81 mg Oral Daily     atorvastatin  20 mg Oral At Bedtime     clopidogrel  75 mg Oral Daily     enoxaparin ANTICOAGULANT  40 mg Subcutaneous Q24H     fluticasone-vilanterol  1 puff Inhalation Daily     ipratropium - albuterol 0.5 mg/2.5 mg/3 mL  1 vial Nebulization Q4H     losartan  25 mg Oral Daily     metoprolol tartrate  12.5 mg Oral BID     montelukast  10 mg Oral At Bedtime     PARoxetine  10 mg Oral QAM     predniSONE  40 mg Oral Daily     sodium chloride (PF)  3 mL Intracatheter Q8H     umeclidinium  1 puff Inhalation Daily       Data   Recent Results (from the past 24 hour(s))   XR Chest Port 1 View    Narrative    CHEST ONE VIEW PORTABLE   9/1/2021 9:50 AM     HISTORY:  Right pneumothorax, chest tube.    COMPARISON: Chest x-rays dated 8/31/2021 at 11:32 AM.      Impression    IMPRESSION:  1. Right chest tube is unchanged in position. No definite right  pneumothorax is seen.  2. Hyperaeration and pulmonary scarring again noted. Right midlung  opacity is unchanged.  3. Subcutaneous emphysema has slightly decreased along the right  lateral chest wall, right side of the neck and right abdominal wall.   4. No other changes since the prior study dated 8/31/2021 at 11:32 AM.    OZIEL OHARA MD         SYSTEM ID:  [id number]   XR Chest Port 1 View    Narrative    EXAM: XR CHEST PORT 1 VIEW  LOCATION: [facility]  DATE/TIME: 9/2/2021 6:02 AM    INDICATION: right pneumothorax, chest tube  COMPARISON: 09/01/2021      Impression    IMPRESSION: Stable cardiomediastinal silhouette. Small caliber right chest tube. Small residual right apical pneumothorax not excluded. Increased consolidation right mid to lower lung. COPD. Soft  tissue emphysema right chest.

## 2021-09-02 NOTE — PROGRESS NOTES
09/02/21 0825   Nebulizer Pre Assessment   $RT Use ONLY Delivery Method Nebulizer - Initial   Nebulizer Device Mouthpiece   Pretreatment Heart Rate (beats/min) 72   Pretreatment Resp Rate (breaths/min) 22   Pretreat Breath Sounds - LLL crackles;coarse   Pretreat Breath Sounds - RUL diminished   Pretreat Breath Sounds - RML diminished   Pretreat Breath Sounds - RLL wheezes, expiratory   Breath Sounds Post-Respiratory Treatment   Posttreatment Heart Rate (beats/min) 78   Posttreatment Resp Rate (breaths/min) 18   Breath Sounds Posttreatment All Fields diminished   Breath Sounds Posttreatment ARLEN diminished   Breath Sounds Posttreatment LLL crackles, coarse   Breath Sounds Posttreatment RUL diminished   Breath Sounds Posttreatment RML diminished   Breath Sounds Posttreatment RLL crackles, coarse;wheezes, expiratory

## 2021-09-02 NOTE — PLAN OF CARE
Problem: Adult Inpatient Plan of Care  Goal: Patient-Specific Goal (Individualized)  Outcome: Improving  Flowsheets (Taken 9/2/2021 0602)  Patient-Specific Goals (Include Timeframe): To use the IS more by end of 9/2/21  Goal: Absence of Hospital-Acquired Illness or Injury  Intervention: Identify and Manage Fall Risk  Recent Flowsheet Documentation  Taken 9/2/2021 0059 by Dora Velez RN  Safety Promotion/Fall Prevention:    nonskid shoes/slippers when out of bed    bed alarm on    clutter free environment maintained    safety round/check completed  Taken 9/1/2021 2037 by Dora Velez RN  Safety Promotion/Fall Prevention:    nonskid shoes/slippers when out of bed    bed alarm on    assistive device/personal items within reach    clutter free environment maintained    safety round/check completed  Intervention: Prevent Skin Injury  Recent Flowsheet Documentation  Taken 9/2/2021 0059 by Dora Velez RN  Body Position: position changed independently  Taken 9/1/2021 2037 by Dora Velez RN  Body Position: position changed independently  Intervention: Prevent and Manage VTE (Venous Thromboembolism) Risk  Recent Flowsheet Documentation  Taken 9/2/2021 0059 by Dora Velez RN  VTE Prevention/Management: pneumatic compression device  Taken 9/1/2021 2037 by Dora Velez RN  VTE Prevention/Management: pneumatic compression device     Problem: Respiratory Compromise  Goal: Optimal Oxygenation and Ventilation  Intervention: Manage Pneumothorax Effects  Recent Flowsheet Documentation  Taken 9/2/2021 0059 by Dora Velez RN  Administration (IS): instruction provided, follow-up  Level Incentive Spirometer (mL): 500      Patient is VSS and afebrile. Lungs diminished, bilateral wheezes expiratory. Around 0330 patient couldn't catch her breath from coughing so much. We gave her a neb, robitussin, and morphine. Which seemed to calm her down to catch her breath more. She is  "resting and wanted us to leave her alone to get some sleep. Patient refused to wear Bipap from home- we told her that it could be beneficial but she refused saying \"I hate that thing\". O2 sat was at 100% on 2 L so we bumped her down to 1 L and the sats maintained between 95-97%. She said she tries to use the incentive spirometer but states \"I don't have much air to blow in there, I run out\". Educating to use the IS more consistently. Patient gets anxious randomly and is easy to redirect.  Chest tube dressing clean, dry, and intact. Chest tube hooked up to water seal and no bubbles present in chamber. Will continue to monitor o2 sats, chest tube, mobility, and mentation.      "

## 2021-09-02 NOTE — PROGRESS NOTES
09/02/21 1314   Oxygen Therapy   SpO2 93 %   O2 Device Nasal cannula   Oxygen Delivery 2 LPM   Assessment   Respiratory WDL X;breath sounds;cough;effort/expansion;rhythm/pattern   Rhythm/Pattern, Respiratory labored;shortness of breath;tachypneic   Expansion/Accessory Muscles/Retractions accessory muscle use   Cough Frequency frequent   Cough Type loose;nonproductive     Patient having worsening crackles in lower lobe and more frequent loose congested non productive cough. Increased work of breathing with more use of accessory muscles, tachypnea and dyspnea.  Provider notified.

## 2021-09-03 NOTE — PROGRESS NOTES
[facility]    Medicine Progress Note - Hospitalist Service       Date of Admission:  8/29/2021    Assessment & Plan         Patient is a 70-year-old female with past medical history of oxygen dependent COPD who supposed to be using trilogy assisted ventilation in the home setting as well, coronary artery disease, moderate to severe aortic stenosis, moderate to severe pulmonary hypertension, severe malnutrition who presented with a spontaneous right pneumothorax with acute on chronic hypoxic respiratory failure.  She had a chest tube placed with pneumothorax resolving and chest tube was removed on 9/2/2021 with ongoing stabilization noted with repeat chest x-ray imaging.  Plan was to discharge home today however patient is so weak she is unable to stand.  Possible this is related to the as needed morphine started for air hunger during this stay and dose will be reduced to 7.5 mg every 4 hours as needed.  Patient reports she just needs a day of rest and then will work with physical therapy to improve her strength tomorrow.  She is very insistent she will be going home tomorrow.    Principal Problem:    Spontaneous pneumothorax    Assessment: Resolved following chest tube placement, and continues to be resolved following tube removal    Plan: Continue to monitor for respiratory stability with anticipated discharge home tomorrow as long as patient's activity tolerance improves    Active Problems:    Acute respiratory failure with hypoxia (H)    Assessment: Secondary to pneumothorax, now resolved with patient back on her home regimen    Plan: Continue to monitor for stability.  We will have physical therapy work with the patient tomorrow to ensure she is safe to discharge home      COPD exacerbation    Assessment: Suspected initially with patient on prednisone burst, scheduled bronchodilators, as needed bronchodilators    Plan: Continue with current regimen and monitor for ongoing  respiratory improvement.  Did encourage patient to use a trilogy device while napping or sleeping as well as during the day when she is having increased air hunger sensation      Hypotension    Assessment: Present initially and secondary to pneumothorax, resolved following chest tube placement and has not returned    Plan: Continue to monitor for ongoing resolution      History of stroke - right thalamus in 8/2013 and left cerebellar and right vermis in 3/2018    Assessment: Previous history, on aspirin and Plavix    Plan: Continue home regimen without change      PAD (peripheral artery disease) (H) - moderate left common carotid stenosis, aortoiliac bypass, chronic left vertebral and right posterior cerebral stenoses    Assessment: Patient denies any new symptoms related to this condition    Plan: Continue home regimen and monitor for stability      Moderate to severe pulmonary hypertension (H)-per Echo Jubne 2021    Assessment: Bradycardia contributing to her chronic respiratory symptoms    Plan: Continue with supportive cares as above      Nonrheumatic aortic valve stenosis - moderate to severe    Assessment: Noted    Plan: Ongoing outpatient follow-up as previously recommended      Cigarette smoker    Assessment: Ongoing    Plan: Strongly encouraged tobacco cessation      S/P drug eluting coronary stent placement-LAD and circ, circ re-do 5/17/18    Assessment: Previous history but no concern for acute coronary syndrome at this time    Plan: Continue home regimen and monitor closely, reevaluate if appropriate.      Abnormal CXR-right mid consolidation    Assessment: Possible infection however patient has not had any symptoms of acute illness and no antibiotics have been started at this time    Plan: Continue to monitor patient closely, will need outpatient follow-up for ongoing monitoring and further reevaluation as appropriate      Dysphagia    Assessment: Patient has been noted to have dysphagia in the past and  most recently has been on a dysphagia diet level 3 with thin liquids    Plan: Continue recommended diet during the stay      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    Assessment: On Plavix and aspirin to prevent recurrent stroke    Plan: Continue home regimen without change      Severe malnutrition (H)-reduced intake, weight loss, loss of SC fat and muscle    Assessment: Ongoing with patient's appetite improving but still very poor    Plan: Appreciate nutritionist recommendations and insight       Diet: Combination Diet Low Saturated Fat Na <2400mg Diet  Snacks/Supplements Adult: Other; PRN; Between Meals    DVT Prophylaxis: Enoxaparin (Lovenox) SQ  Barnhart Catheter: Not present  Central Lines: None  Code Status: Full Code      Disposition Plan   Expected discharge:  1-2 days recommended to prior living arrangement once Patient strength is improved and she is tolerating activity tolerance enough that she can return home.     The patient's care was discussed with the Bedside Nurse and Patient.    Deisy Laird MD  Hospitalist Service  [facility]  Securely message with the Vocera Web Console (learn more here)  Text page via [employer] Paging/Directory      Clinically Significant Risk Factors Present on Admission           # Severe Malnutrition, POA: based on Weight loss;Reduced intake;Subcutaneous fat loss;Muscle loss (09/01/21 1300)     ______________________________________________________________________    Interval History   Patient did have acute worsening of her breathing which improved following morphine and Ativan administration with work-up revealing no recurrent pneumothorax.  Patient was also placed on her trilogy and several hours later had complete resolution of her symptoms and reports that her breathing feels better than it does at baseline.  Today she is just feeling very tired and nursing is reported that she appears to have lower activity tolerance  than previous.  Both respiratory therapy and physical therapy have noticed a change since the morphine has been initiated and is questioning if this may be contributing.  No other new concerns.    Data reviewed today: I reviewed all medications, new labs and imaging results over the last 24 hours.    Physical Exam   Vital Signs: Temp: 98.4  F (36.9  C) Temp src: Oral BP: 132/58 Pulse: 77   Resp: 18 SpO2: 94 % O2 Device: Nasal cannula Oxygen Delivery: 2 LPM  Weight: 85 lbs 12.8 oz  Constitutional: awake, alert, cooperative, no apparent distress, and appears older than stated age and chronically ill  Respiratory: Patient has mild tachypnea at baseline, severely diminished breath sounds but no crackles or wheezes auscultated  Cardiovascular: Regular rate and rhythm without murmur  GI: Bowel sounds present, abdomen soft and nontender  Skin: no redness, warmth, or swelling and no rashes  Musculoskeletal: no lower extremity pitting edema present  Neurologic: Awake, alert, oriented to person and place    Data   Recent Labs   Lab 09/03/21  0646 09/02/21  0844 09/02/21  0654 09/01/21  0621 08/30/21  0613 08/29/21  1822   WBC  --  6.5  --   --  6.1 12.0*   HGB  --   --   --   --  13.0 14.2   MCV  --   --   --   --  90 90   PLT  --  206  --   --  180 206   INR  --   --   --   --  0.91  --    NA  --   --   --   --  138 139   POTASSIUM 4.3  --  4.4 3.8 4.5 4.5   CHLORIDE  --   --   --   --  105 106   CO2  --   --   --   --  31 29   BUN  --   --   --   --  22 24   CR  --   --   --   --  0.44* 0.73   ANIONGAP  --   --   --   --  2* 4   CALDERON  --   --   --   --  8.3* 8.5   GLC  --   --   --   --  159* 125*   ALBUMIN  --   --   --   --   --  3.5   PROTTOTAL  --   --   --   --   --  7.0   BILITOTAL  --   --   --   --   --  0.4   ALKPHOS  --   --   --   --   --  70   ALT  --   --   --   --   --  27   AST  --   --   --   --   --  27   TROPONIN  --   --   --   --   --  0.043

## 2021-09-03 NOTE — PROGRESS NOTES
Called re: tachypnea in patient  Evaluated her robotically, she was moving air well, however anxious and wanting to get up  Stat CXR without any evidence of new pneumothorax  Gave ativan 1mg IV and alleviated her agitation  Vital signs remained stable during episode  Did discuss case with Dr. Kent

## 2021-09-03 NOTE — PROGRESS NOTES
"Called to patient's room via her screaming \" I can't breath\"  diminished on the right side, wheezes throughout. Difficult to auscultate through screaming and agitated. Refused NEB (later talked into) refused oxyask.  NC turned up to 10 L as patient was agreeable to this to manage.     Patient is so upset that 10 L NC is going. Continues to yell \" I can't breath\" insisted that her  id \"doing this\" unable to clarify what \"this\" is.   She was turned down to 2 L NC and is currently 97%.  She doesn't understand stand why she \"can't breath\" but is refusing Oximask, further nebs, or cough syrup.    Cap refil > 3 seconds. Dusky/cynotic face and fingers    Dr. Valencia @ 1959 to room via Tele [facility]  STAT PCXR  1 mg IV Ativan    @2020 results noted by Dr. GRUBBS Plan to notify general surgery.  At this time VS are WNL, patient appears more calm/relaxed.  FiO2 to 4 L NC  Crepitus noted right anterior to clavicle/xiphoid. Not appreciated posterior  Per provider it was noted on previous CXR, it was relayed to provider it was not palpable last night.    2028 Per Dr. GRUBBS No pneumo thorax noted. She will communicate with Surgeon who was previously paged.    Patient at this this time is on 2 LNC she appears more relaxed.  "

## 2021-09-03 NOTE — PLAN OF CARE
"Neuro: A/O x4  Pulm: lung sounds diminished and expiratory wheezes in lower lobes  Heart sounds WDL  GI: bowel sounds normoactive x4  : adequate urine output  Skin: intact, scattered bruised- fragile skin  Lines/Tubes/Drains: peripheral IV in right lower arm, saline locked- flushing easy.   Drips: saline locked     /58 (BP Location: Right arm)   Pulse 70   Temp 97  F (36.1  C) (Oral)   Resp 16   Ht 1.6 m (5' 3\")   Wt 38.9 kg (85 lb 12.8 oz)   SpO2 99%   BMI 15.20 kg/m       Patient had a SOB episode around 1945 last night. In tripod position, lung sounds diminished, use of accessory muscles and very high anxiety. Right anterior crepitus. Called Dr. Valencia around 1959 and she ordered a STAT chest xray, and no pneumo noted. Dr ordered 1 mg of ativan to be given and worked well to relax patient. Patient was easy to wake up and assessments done throughout the night.   She was resting in bed- no longer in tripod position. Patient slept with Bipap on all night starting at 2240. Will continue to monitor vs, o2 sats, and mobility.   "

## 2021-09-03 NOTE — PROGRESS NOTES
S:Respiratory therapy   B:Spontaneous right pneumothorax with COPD exac. (home oxygen dependent with home trilogy)  A: Pt has received nebs as scheduled today.  BS have been unchanged, diminished bilaterally, with insp/exp wheezes in lower lobes and coarse in upper lobes.  Pt has a harsh non productive cough.  She remains on home liter flow of 2 lpm.  R:continue current treatment plan.    Arthur Krishnan, RT on 9/3/2021 at 6:10 PM

## 2021-09-03 NOTE — PROGRESS NOTES
"Care Management Follow Up    Length of Stay (days): 4    Expected Discharge Date:  09/04/21     Concerns to be Addressed: Discharge planning    Patient plan of care discussed at interdisciplinary rounds: Yes    Anticipated Discharge Disposition: Home with Home Care     Anticipated Discharge Services:  RN  Anticipated Discharge DME:      Patient/family educated on Medicare website which has current facility and service quality ratings: yes  Education Provided on the Discharge Plan:  yes  Patient/Family in Agreement with the Plan: yes    Referrals Placed by CM/SW: Internal Clinic Care Coordination, Scheduled Follow-up appointments, Homecare  Private pay costs discussed: Not applicable    Additional Information:  CM met with the Pt and spoke to the spouse \"Bud\" via phone today to review the discharge plan and to address any questions/concerns.     Pt and Bud confirmed they are in support of returning back home on discharge. They continue to support a referral to [facility] for RN services. Referral has been placed.     [place] will assist with transportation and will bring a portable O2 tank upon arrival.     Plan: Discharge to home in the care of spouse [place]-who is available 24/7 to assist.   *Referral to [facility] (Phone: [phone])-RN services    [facility]      "

## 2021-09-03 NOTE — PLAN OF CARE
"S-(situation): Physical therapy evaluation per MD order    B-(background): Patient is a 70 year old female, admitted 8/29/21 due to shortness of breath, found to have a spontaneous right pneumothorax with chest tube placement, COPD exacerbation, acute respiratory failure with hypoxia. Patient with a previous medical history of hypotension, PAD, pulmonary emphysema, pulmonary hypertension, dysphagia, severe malnutrition, CVA in 2013 and 2018, former smoker with daily second hand exposure.     A-(assessment): Patient sitting up in bed, air hungry and rapid breathing. \"Not to day, I need to rest, I can't breathe, I need to take a nap so I can breath. Nothing is going right today, I just can't. Leave me alone.\"    R-(recommendations): PT unable to complete PT evaluation. PT will attempt to complete 9/4/21.    Thank you for your referral.  Zhane Bashir, PT, DPT, ATC, LAT    [facility]  O: [phone]  E: [email or web address]        "

## 2021-09-04 NOTE — PLAN OF CARE
"O2 sats maintaining greater than 89% on 2L NC. Received IR Morphine for Increased dyspneic on exertion with anxiety. Refusing to ambulate to BR, \" I'm tired today, just want to rest\". Up with SBA to BSC. Fair appetite today. Pt was skeptical with bedtime meds, \" I don't take this much pills\" Senna was offered due to no BM since admission, pt refused. Will continue to monitor respiratory status. /43   Pulse 71   Temp 98.2  F (36.8  C) (Oral)   Resp 18   Ht 1.6 m (5' 3\")   Wt 38.9 kg (85 lb 12.8 oz)   SpO2 95%   BMI 15.20 kg/m    "

## 2021-09-04 NOTE — PROGRESS NOTES
"Pt c/o'ing \" I can't breath\"after waking up from sleep, received PRN albuterol neb. Has been refusing Trilogy after many attempts/offers, \" I can't do it, I don't like it\". Will continue to monitor respiratory.   "

## 2021-09-04 NOTE — TELEPHONE ENCOUNTER
Reason for call:  Other   Patient called regarding (reason for call): call back  Additional comments: f/u with pcp within 7days per nurse at [facility]    Phone number to reach patient:  Home number on file [phone] (home)    Best Time:  any    Can we leave a detailed message on this number?  YES    Travel screening: Not Applicable

## 2021-09-04 NOTE — DISCHARGE SUMMARY
[facility]  Hospitalist Discharge Summary      Date of Admission:  8/29/2021  Date of Discharge:  9/4/2021  Discharging Provider: Deisy Laird MD  Discharge Diagnoses   Principal Problem:    Spontaneous pneumothorax  Active Problems:    COPD exacerbation    Hypotension    History of stroke - right thalamus in 8/2013 and left cerebellar and right vermis in 3/2018    PAD (peripheral artery disease) (H) - moderate left common carotid stenosis, aortoiliac bypass, chronic left vertebral and right posterior cerebral stenoses    Acute respiratory failure with hypoxia (H)    Pulmonary emphysema (H)    Moderate to severe pulmonary hypertension (H)-per Echo Jubne 2021    Nonrheumatic aortic valve stenosis - moderate to severe    Chest tube in place    Cigarette smoker    S/P drug eluting coronary stent placement-LAD and circ, circ re-do 5/17/18    Abnormal CXR-right mid consolidation    Dysphagia    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  No follow up labs or test are needed.           Discharge Disposition   Discharged to home with [facility]  Condition at discharge: severely chronically ill but stable and back to normal baseline    Hospital Course           Patient is a 70-year-old female with past medical history of oxygen dependent COPD who supposed to be using trilogy assisted ventilation in the home setting as well but is overall non-compliant, coronary artery disease, moderate to severe aortic stenosis, moderate to severe pulmonary hypertension, history of CVA with cognitive impairment noted with most recent slums score 10 out of 30, severe malnutrition who  presented with a spontaneous right pneumothorax with acute on chronic hypoxic respiratory failure.  She had a chest tube placed with pneumothorax resolving and chest tube was removed on 9/2/2021 with ongoing stabilization noted with repeat chest x-ray imaging.  There was also concern for possible COPD exacerbation and patient has been started on oral prednisone with ongoing scheduled DuoNebs, as needed albuterol nebs, and continuation of all home COPD medications.  Patient has now been titrated back to her home regimen of 2 L nasal cannula and she has been seen by physical therapy and cleared to discharge home with initiation of home care RN and PT.      Multiple attempts during the stay to have the patient use her home trilogy device were made but patient remains overall very resistive to this being placed.  She would often have periods of worsening sensation of shortness of breath which would resolve following low-dose Ativan and morphine.  On day of discharge discussed with patient and her  that her pneumothorax has resolved and COPD exacerbation has improved and she is medically stable to discharge home, however if she continues to be noncompliant with her trilogy recommendations she remains at very high risk for decompensation for many reasons in the future.   will try to increase the use of trilogy in the home environment while patient is asleep and while she is having difficulty with breathing when awake.  Did discuss that if after 30 minutes of trilogy administration she is still having air hunger morphine 7.5 mg immediate release could be tried and has been beneficial during this stay and  would like a few tablets of the morphine prescribed to try this new regimen in the home environment and see if it is beneficial there as well.    Principal Problem:    Spontaneous pneumothorax    Assessment: Resolved following chest tube placement, and continues to be resolved following tube removal     Plan: Discharge home with plan as outlined above    Active Problems:    Acute respiratory failure with hypoxia (H)    Assessment: Secondary to pneumothorax and suspected COPD exacerbation, now resolved with patient back on her home regimen    Plan: Discharged with prednisone burst and prolonged taper in addition to ongoing COPD medications      COPD exacerbation    Assessment: Suspected patient treated with prednisone burst, scheduled bronchodilators, as needed bronchodilators    Plan: Discharge with prednisone burst and prolonged taper, scheduled bronchodilators and as needed breakthrough bronchodilators in addition to continuation of previous home regimen.  Emphasis was placed on the importance of trilogy as outlined above      Hypotension    Assessment: Present initially and secondary to pneumothorax, resolved following chest tube placement and has not returned    Plan: No further monitoring is needed following discharge      History of stroke - right thalamus in 8/2013 and left cerebellar and right vermis in 3/2018    Assessment: Previous history, on aspirin and Plavix.  Patient does have memory impairment from the strokes with most recent slums score 10 out of 30 and patient's  performs many of her cares for her both due to her cognitive status and physical limitations    Plan: Continue home regimen without change at time of discharge.  Once more went over with the  how significant the patient's cognitive impairment is and the challenges he will face related to this in the home environment and has been states he is well aware and has been doing with this for many years      PAD (peripheral artery disease) (H) - moderate left common carotid stenosis, aortoiliac bypass, chronic left vertebral and right posterior cerebral stenoses    Assessment: Chronic and stable during this hospitalization    Plan: Continue home regimen at time of discharge      Moderate to severe pulmonary hypertension (H)-per  Echo Jubne 2021    Assessment: Likely contributing to her chronic respiratory symptoms    Plan: Continue with discharge plan as outlined above      Nonrheumatic aortic valve stenosis - moderate to severe    Assessment: Noted    Plan: Ongoing outpatient follow-up as previously recommended      Cigarette smoker    Assessment: Ongoing    Plan: Strongly encouraged tobacco cessation however patient makes several comments that indicates she has no desire to quit and will be returning to smoking following discharge      S/P drug eluting coronary stent placement-LAD and circ, circ re-do 5/17/18    Assessment: Previous history but no concern for acute coronary syndrome during his stay    Plan: Continue home regimen at time of discharge      Abnormal CXR-right mid consolidation    Assessment: Possible infection however patient has not had any symptoms of acute illness and no antibiotics have been started during this stay with only clinical improvement noted    Plan: No antibiotics will be prescribed at time of discharge.  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  Did strongly encourage consideration of outpatient nutritionist follow-up going forward      Consultations This Hospital Stay    SURGERY GENERAL IP CONSULT  CARE MANAGEMENT / SOCIAL WORK IP CONSULT  PHYSICAL THERAPY ADULT IP CONSULT    Code Status   Full Code    Time Spent on this Encounter   I, Deisy Laird MD, personally saw the patient today and spent greater than 30 minutes discharging this patient.       Deisy Laird MD  [facility] 2A MEDICAL SURGICAL  [address] [place] [address]  Phone: [phone]  ______________________________________________________________________    Physical Exam   Vital Signs: Temp: 97.4  F (36.3  C) Temp src: Oral BP: 133/55 Pulse: 75   Resp: 20 SpO2: 97 % O2 Device: Nasal cannula Oxygen Delivery: 2 LPM  Weight: 85 lbs 12.8 oz  Constitutional: awake, alert, cooperative, no apparent respiratory distress at rest, and appears chronically ill and significantly older than stated age  Respiratory: Patient has ongoing mild tachypnea but no additional increased work of breathing, significantly decreased breath sounds with very mild end expiratory wheezes noted diffusely, no crackles  Cardiovascular: Regular rate and rhythm  GI: Bowel sounds present, abdomen soft and nontender  Skin: no redness, warmth, or swelling and no rashes  Musculoskeletal: no lower extremity pitting edema present  Neurologic: Awake, alert, oriented to person, place, year but not month and only somewhat oriented to situation.  She continues to have very limited insight as to the severity of her COPD and how the decision she makes can sabotage her improvement       Primary Care Physician   Deisy Carter    Discharge Orders      HOME CARE NURSING REFERRAL      Reason for your hospital stay    Pneumothorax which allowed air from inside your lung to escape into the chest wall around the lung, putting pressure on the lung and causing it to collapse partially.  This pneumothorax has resolved and the chest tube has been removed and your breathing is returning back to your normal baseline.   Please continue to take the prednisone steroid as you leave, use the Triology device when you are sleeping and when you feel short of breath during the day, consider taking a half a tablet of morphine if your worsened shortness of breath is still present even after at least 30 minutes of Triology use.     Follow-up and recommended labs and tests     Follow up with primary care provider, Desiy Carter, within 7 days for hospital follow- up.  No follow up labs or test are needed.     Activity    Your activity upon discharge: activity as tolerated     Commode    DME Documentation:   Describe the reason for need to support medical necessity: patient has severe end stage COPD and is unable to ambulate from the bed to the bathroom at night without clinical deterioration and high risk of injury and further decompensation.     I, the undersigned, certify that the above prescribed supplies are medically necessary for this patient and is both reasonable and necessary in reference to accepted standards of medical and necessary in reference to accepted standards of medical practice in the treatment of this patient's condition and is not prescribed as a convenience.     Diet    Follow this diet upon discharge: continue regular home diet without change       Significant Results and Procedures   Results for orders placed or performed during the hospital encounter of 08/29/21   XR Chest Port 1 View     Value    Radiologist flags Right hydropneumothorax (AA)    Narrative    CHEST ONE VIEW PORTABLE   8/29/2021 7:02 PM     HISTORY: Shortness of breath.    COMPARISON: 3/23/2021.      Impression    IMPRESSION: Hyperinflation both lungs. A moderate-sized right  hydropneumothorax is new since the previous exam. No pneumothorax on  the left. Heart size and pulmonary vascularity are within normal  limits. Aortic calcification.    [Critical Result: Right hydropneumothorax]    Finding was identified on 8/29/2021 7:18 PM.     ER nurse  Mikki was contacted by me on 8/29/2021 7:20 PM and verbalized  understanding of the critical result. Dr. Manuel was aware of the  critical finding and was in the process of placing a chest tube when I  called.    ELVER SOLIMAN MD         SYSTEM ID:  [id number]   XR Chest Port 1 View    Narrative    CHEST ONE VIEW PORTABLE   8/29/2021 7:47 PM     HISTORY: Status post chest tube placement for pneumothorax.    COMPARISON: Chest radiograph performed earlier today at 6:55 PM.      Impression    IMPRESSION: There has been interval placement of a single right chest  tube. Small right hydropneumothorax has decreased significantly in  size. Linear opacity in the right midlung likely represents loculated  fluid within the fissure, unchanged. Hyperinflation both lungs. The  left lung is otherwise clear.    ELVER SOLIMAN MD         SYSTEM ID:  [id number]   XR Chest Port 1 View     Value    Radiologist flags Enlarging right pneumothorax and new subcutaneous (AA)    Narrative    CHEST ONE VIEW PORTABLE   8/30/2021 7:04 AM     HISTORY: Pneumothorax.    COMPARISON: Chest x-ray dated 8/29/2021 and 8/29/2021.    FINDINGS:  Right chest tube is again noted, similar in position to the  prior study. There is significantly enlarged right pneumothorax with  small hydro portion pneumothorax and there is new right chest wall  subcutaneous emphysema in the right chest wall since the prior study  dated 8/29/2021 at 7:44 PM. Right midlung opacity is stable.      Impression    IMPRESSION:   1. Enlargement of right pneumothorax with small hydro portion since  the most recent prior study dated 7:44 PM on 8/29/2021.  2. New right chest wall subcutaneous emphysema since the prior  studies.  3. Chest tube does not appear to be significantly changed in position.    [Critical Result: Enlarging right pneumothorax and new subcutaneous  emphysema along the right chest wall]    Finding was identified on 8/30/2021 7:45 AM.     Dr. Boston was contacted  by me on 8/30/2021 7:52 AM and verbalized  understanding of the critical result.     OZIEL OHARA MD         SYSTEM ID:  [id number]   XR Chest Port 1 View    Narrative    CHEST PORTABLE ONE VIEW   8/30/2021 8:56 AM     HISTORY: Shortness of breath, enlarging pneumothorax.    COMPARISON: Chest x-rays dated 8/30/2021 at 7:00 AM.      Impression    IMPRESSION: There is decreased size of the right pneumothorax since  the prior study approximately 2 hours earlier. There is increasing  subcutaneous emphysema since the prior study. No other changes. Right  chest tube is unchanged in position.    I discussed the decreasing size of the right pneumothorax with Dr. Boston on 8/30/2021 at 9:23 AM.    OZIEL OHARA MD         SYSTEM ID:  [id number]   XR Chest Port 1 View    Narrative    XR PORTABLE CHEST ONE VIEW   8/30/2021 1:38 PM     HISTORY: Pneumonia, status post thoracostomy.    COMPARISON: Chest x-rays dated 8/30/2021 at 8:50 AM.      Impression    IMPRESSION:  1. There has been further decreased size of the right pneumothorax,  only a tiny portion now visualized in the apex. Right chest tube is  unchanged in position.  2. Right mid to lower lung opacity is slightly increased in  prominence. There is decreased right pleural fluid.  3. Subcutaneous emphysema is stable.  4. Postop changes status post cardiac stent placements are again  noted.  5. No other changes.    OZIEL OHARA MD         SYSTEM ID:  [id number]   XR Chest Port 1 View    Narrative    EXAM: XR CHEST PORT 1 VIEW  LOCATION: [facility]  DATE/TIME: 8/31/2021 6:01 AM    INDICATION: sob  COMPARISON: 08/30/2021      Impression    IMPRESSION: Stable cardiomediastinal silhouette. Small caliber right chest tube. Decreased right pneumothorax. Consolidation right mid lung again seen. Soft tissue emphysema right chest. Stable cardiomediastinal silhouette.   XR Chest Port 1 View    Narrative    XR CHEST PORT 1 VIEW 8/31/2021 11:42  AM    HISTORY: right pneumothorax, chest tube, increased SOB    COMPARISON: Chest x-ray 8/31/2021 at 6:07 AM      Impression    IMPRESSION: Stable small bore chest tube laterally in the right upper  lung. Tiny residual right pneumothorax. Stable irregular opacity in  the mid/lower right lung. Bilateral parenchymal scarring. Normal heart  size and pulmonary vasculature. Coronary stents. Extensive  subcutaneous emphysema overlying the right chest wall with extension  into the right neck is not significantly changed.    RICK LUZ MD         SYSTEM ID:  [id number]   XR Chest Port 1 View    Narrative    CHEST ONE VIEW PORTABLE   9/1/2021 9:50 AM     HISTORY:  Right pneumothorax, chest tube.    COMPARISON: Chest x-rays dated 8/31/2021 at 11:32 AM.      Impression    IMPRESSION:  1. Right chest tube is unchanged in position. No definite right  pneumothorax is seen.  2. Hyperaeration and pulmonary scarring again noted. Right midlung  opacity is unchanged.  3. Subcutaneous emphysema has slightly decreased along the right  lateral chest wall, right side of the neck and right abdominal wall.   4. No other changes since the prior study dated 8/31/2021 at 11:32 AM.    OZIEL OHARA MD         SYSTEM ID:  [id number]   XR Chest Port 1 View    Narrative    EXAM: XR CHEST PORT 1 VIEW  LOCATION: [facility]  DATE/TIME: 9/2/2021 6:02 AM    INDICATION: right pneumothorax, chest tube  COMPARISON: 09/01/2021      Impression    IMPRESSION: Stable cardiomediastinal silhouette. Small caliber right chest tube. Small residual right apical pneumothorax not excluded. Increased consolidation right mid to lower lung. COPD. Soft tissue emphysema right chest.   XR Chest Port 1 View    Narrative    CHEST PORTABLE ONE VIEW September 2, 2021 12:32 PM     HISTORY: Right pneumothorax, chest tube removed today. Chronic  obstructive pulmonary disease, shortness of breath.     COMPARISON: Chest x-rays dated 9/2/2021 at 6:07  AM.      Impression    IMPRESSION:  1. Since the prior study the right-sided chest tube has been removed.  No pneumothorax is identified.  2. Right mid and lower lung opacity/infiltrate is slightly more  prominent than on the prior study dated 9/2/2021 and is significantly  more prominent than on the prior studies from 9/1/2021, 8/31/2021,  8/30/2021 and 8/29/2021. This is concerning for worsening pneumonia.  3. Lungs are persistently hyperaerated.  4. Subcutaneous emphysema along the right lateral chest wall and right  neck base is unchanged since the most recent prior study.  5. Coronary artery stents and thoracic aortic calcifications are again  noted.    I discussed the probable right lung infiltrates and lack of  pneumothorax, status post chest tube removal, with Dr. Bass on  9/2/2021 at 12:45 PM.    OZIEL OHARA MD         SYSTEM ID:  [id number]   XR Chest Port 1 View    Narrative    XR CHEST PORT 1 VIEW  9/2/2021 8:17 PM       INDICATION: sob coughing  COMPARISON: 9/2/2021 at 1222 hours      Impression    IMPRESSION: There is persistent infiltrate in the right lung base.  Probable small amount of fluid in the fissures. Coronary artery  stents. Subcutaneous gas right chest wall and neck is stable. No  pneumothorax.    OZIEL DERAS MD         SYSTEM ID:  [id number]       Discharge Medications   Current Discharge Medication List      START taking these medications    Details   morphine (MSIR) 15 MG IR tablet Take 0.5 tablets (7.5 mg) by mouth every 6 hours as needed (air hunger)  Qty: 5 tablet, Refills: 0    Comments: Please cut in half for the patient.  Associated Diagnoses: End stage COPD (H); Acute on chronic respiratory failure with hypoxia and hypercapnia (H)      predniSONE (DELTASONE) 20 MG tablet Take 2 tablets (40 mg) by mouth daily x3 days, then 1 tablet daily for 4 days, then 0.5 tablet daily x4 days, then stop  Qty: 12 tablet, Refills: 0    Associated Diagnoses: COPD exacerbation (H)          CONTINUE these medications which have NOT CHANGED    Details   ACE/ARB/ARNI NOT PRESCRIBED, INTENTIONAL, Please choose reason not prescribed, below    Associated Diagnoses: Elevated blood pressure reading without diagnosis of hypertension; History of stroke      albuterol (PROAIR HFA/PROVENTIL HFA/VENTOLIN HFA) 108 (90 Base) MCG/ACT inhaler Inhale 2 puffs into the lungs every 6 hours as needed for shortness of breath / dyspnea  Qty: 3 Inhaler, Refills: 3    Comments: Pharmacy may dispense brand covered by insurance (Proair, or proventil or ventolin or generic albuterol inhaler)  Associated Diagnoses: Chronic bronchitis, unspecified chronic bronchitis type (H)      albuterol (PROVENTIL) (2.5 MG/3ML) 0.083% neb solution NEBULIZE CONTENTS OF ONE VIAL EVERY 4 HOURS AS NEEDED FOR SHORTNESS OF BREATH / DIFFICULTY BREATHING OR WHEEZING  Qty: 360 mL, Refills: 1    Associated Diagnoses: COPD exacerbation (H); Acute on chronic respiratory failure with hypoxia and hypercapnia (H)      aspirin 81 MG EC tablet Take 1 tablet (81 mg) by mouth daily    Associated Diagnoses: Coronary artery disease involving native coronary artery of native heart with angina pectoris (H)      atorvastatin (LIPITOR) 20 MG tablet Take 1 tablet (20 mg) by mouth At Bedtime  Qty: 90 tablet, Refills: 3    Comments: Sending all prescriptions to [facility] pharmacy per patient request as she is no longer going to use [facility].  will call when prescription are due for refill.  Associated Diagnoses: PAD (peripheral artery disease) (H)      budesonide-formoterol (SYMBICORT) 160-4.5 MCG/ACT Inhaler Inhale 2 puffs into the lungs 2 times daily  Qty: 10.2 g, Refills: 5    Associated Diagnoses: Panlobular emphysema (H); COPD, severe (H)      clopidogrel (PLAVIX) 75 MG tablet TAKE ONE TABLET BY MOUTH ONCE DAILY  Qty: 90 tablet, Refills: 3    Associated Diagnoses: Acute CVA (cerebrovascular accident) (H)      guaiFENesin 400 MG TABS Take 400  mg by mouth 2 times daily      ipratropium - albuterol 0.5 mg/2.5 mg/3 mL (DUONEB) 0.5-2.5 (3) MG/3ML neb solution Take 1 vial (3 mLs) by nebulization 4 times daily Until hospital follow up, then as directed by your primary care provider  Qty: 270 mL, Refills: 3    Associated Diagnoses: Simple chronic bronchitis (H)      losartan (COZAAR) 25 MG tablet Take 1 tablet (25 mg) by mouth daily  Qty: 90 tablet, Refills: 1    Associated Diagnoses: Benign essential hypertension      metoprolol tartrate (LOPRESSOR) 25 MG tablet Take 0.5 tablets (12.5 mg) by mouth 2 times daily  Qty: 45 tablet, Refills: 3    Associated Diagnoses: Benign essential hypertension      montelukast (SINGULAIR) 10 MG tablet Take 1 tablet (10 mg) by mouth At Bedtime  Qty: 90 tablet, Refills: 3    Comments: Is running low on this so will need filled now  Associated Diagnoses: COPD, severe (H)      !! Nutritional Supplements (ENSURE PLUS) LIQD Take 1 each by mouth 4 times daily  Qty: 5688 mL, Refills: 11    Associated Diagnoses: Severe protein-calorie malnutrition (Jones: less than 60% of standard weight) (H); Adult failure to thrive      order for DME Equipment being ordered: Nebulizer machine  Qty: 1 Device, Refills: 0    Associated Diagnoses: Chronic bronchitis, unspecified chronic bronchitis type (H)      PARoxetine (PAXIL) 10 MG tablet Take 1 tablet (10 mg) by mouth every morning  Qty: 30 tablet, Refills: 1    Associated Diagnoses: Severe protein-calorie malnutrition (H)      Respiratory Therapy Supplies (NEBULIZER/TUBING/MOUTHPIECE) KIT 1 kit as needed (if becomes damaged)  Qty: 1 kit, Refills: 1    Associated Diagnoses: Panlobular emphysema (H); Chronic bronchitis, unspecified chronic bronchitis type (H)      umeclidinium (INCRUSE ELLIPTA) 62.5 MCG/INH inhaler Inhale 1 puff into the lungs daily  Qty: 3 each, Refills: 3    Associated Diagnoses: COPD, severe (H)      nitroGLYcerin (NITROSTAT) 0.4 MG sublingual tablet For chest pain place 1 tablet  under the tongue every 5 minutes for 3 doses. If symptoms persist 5 minutes after 1st dose call 911.  Qty: 25 tablet, Refills: 0    Associated Diagnoses: Coronary artery disease involving native coronary artery of native heart with angina pectoris (H)      !! Nutritional Supplements (BOOST) Take 1 Bottle by mouth 3 times daily (with meals)  Qty: 90 each, Refills: 0    Associated Diagnoses: History of stroke; Ischemic cardiomyopathy; Panlobular emphysema (H); Malaise; Loss of weight      !! Nutritional Supplements (CARNATION INSTANT BREAKFAST) LIQD Take 1 packet by mouth 3 times daily as needed       !! - Potential duplicate medications found. Please discuss with provider.        Allergies   Allergies   Allergen Reactions     Crestor [Rosuvastatin] Other (See Comments)     dizziness     Lisinopril Cough     Statins [Hmg-Coa-R Inhibitors] Other (See Comments)     Muscle/joint aching     Optison [Albumin Human] Other (See Comments)     Pt was flush and very dizzy.  Also had a BP drop     Penicillins Rash

## 2021-09-04 NOTE — DISCHARGE INSTRUCTIONS
Someone from Deisy Collins's office will be calling to help set-up your hospital follow-up. If they do not call you by Tuesday afternoon, please call [phone].

## 2021-09-04 NOTE — PROGRESS NOTES
09/04/21 1031   Quick Adds   Type of Visit Initial PT Evaluation   Living Environment   People in home spouse   Current Living Arrangements house   Home Accessibility stairs to enter home   General Information   Onset of Illness/Injury or Date of Surgery 09/04/21   Referring Physician Dr Laird    Patient/Family Therapy Goals Statement (PT) go home    Pertinent History of Current Problem (include personal factors and/or comorbidities that impact the POC) patient admitted 8/29/21 due to increase SOB , PMH COPD , o2 dependent , COPD , CAD . aoriti stenosis , right Pneumothorax , pulmonary HTN  , lives with  and want to return home    Existing Precautions/Restrictions oxygen therapy device and L/min  (2 liters )   Cognition   Orientation Status (Cognition) person;place;situation   Affect/Mental Status (Cognition) agitated   Pain Assessment   Patient Currently in Pain No   Posture    Posture Forward head position;Protracted shoulders   Range of Motion (ROM)   ROM Quick Adds ROM WFL   Strength   Manual Muscle Testing Quick Adds Strength WFL   Bed Mobility   Comment (Bed Mobility) independent with bed mobility    Transfers   Transfer Safety Comments able to move sit to stand and the reverse with SBA    Gait/Stairs (Locomotion)   Comment (Gait/Stairs) patient able to ambulate in her room with w/walker and O2 2 liters 25 feet with SBA , distance limited by SOB   Balance   Balance Comments fair, believe this to be her baseline function    Clinical Impression   Criteria for Skilled Therapeutic Intervention evaluation only;current level of function same as previous level of function   PT Diagnosis (PT) weakness and SOB    Clinical Presentation Stable/Uncomplicated   Clinical Presentation Rationale patient seen for evaluation of function , at this time she is independent with her bed mobility and transfers and gait for short distance as activity level is limited by her SOB , at this point feel she is at her baseline  function , would benefit from home PT evaluation to evaluate her function and safety at home    Clinical Decision Making (Complexity) moderate complexity   Predicted Duration of Therapy Intervention (days/wks) 1x evaluation will follow up with patient if not discharged to home    Planned Therapy Interventions (PT) gait training;strengthening   Risk & Benefits of therapy have been explained evaluation/treatment results reviewed;care plan/treatment goals reviewed;risks/benefits reviewed;current/potential barriers reviewed;participants voiced agreement with care plan;participants included;patient   PT Discharge Planning    PT Discharge Recommendation (DC Rec) home with assist;home with home care physical therapy   PT Rationale for DC Rec patient would benefit from home PT for progression of strengthening and evaluation of safety    PT Brief overview of current status  patient is independent with bed mobilty , SBA for transfers and gait with w/walker limited tolerance to activity due to SOB but believe this to be her baseline    Total Evaluation Time   Total Evaluation Time (Minutes) 20

## 2021-09-04 NOTE — PLAN OF CARE
S: Shift note    B: Spontaneous pneumothorax    A: Alert and oriented x3, VSS on 2L O2 NC, afebrile. Lungs diminished, expiratory wheezes before scheduled nebs. Denies pain. IV SL. Up to bathroom as SBA. Slept between cares. Dressing from chest tube site CDI. Scattered bruising to skin.     R: Continue to monitor per care plan.

## 2021-09-07 NOTE — PROGRESS NOTES
Clinic Care Coordination Contact  [facility]/Voicemail    Clinical Data: Care Coordinator Outreach  Reason for referral: TCM outreach  Outreach attempted x 1.  Left message on patient's voicemail with call back information and requested return call.  Plan:. Care Coordinator will try to reach patient again in 1-2 business days.    Lyric Jang   Community Health Worker   [facility]

## 2021-09-07 NOTE — TELEPHONE ENCOUNTER
I am completely full tomorrow. Can we see if there is availability for a virtual appointment this week with someone? I can add her on for in person next week Wednesday if needed when I have fewer patients on my schedule.  Thanks,  Jaja Carter, CNP

## 2021-09-08 NOTE — PROGRESS NOTES
Clinic Care Coordination Contact  [facility]/Voicemail    Clinical Data: Care Coordinator Outreach  Reason for referral: TCM outreach  Outreach attempted x 2.  Left message on patient's voicemail with call back information and requested return call.  Plan Care Coordinator will do no further outreaches at this time.    Lyric Jang   Community Health Worker   Connected Care Resource [facility] [place] [place]

## 2021-09-08 NOTE — PROGRESS NOTES
[facility] now requests orders and shares plan of care/discharge summaries for some patients through [employer].  Please REPLY TO THIS MESSAGE OR ROUTE BACK TO THE AUTHOR in order to give authorization for orders when needed.  This is considered a verbal order, you will still receive a faxed copy of orders for signature.  Thank you for your assistance in improving collaboration for our patients.    FYI Patient discharged on 9/4/21 but due to paperwork issues, patient will not be seen until tomorrow post hospitalization for home care start of care.   Call with questions    Brianda Storm RN   [phone]

## 2021-09-09 NOTE — NURSING NOTE
Health Maintenance Due   Topic Date Due     DEXA  Never done     ANNUAL REVIEW OF HM ORDERS  Never done     COVID-19 Vaccine (1) Never done     COLPOSCOPY  Never done     ZOSTER IMMUNIZATION (1 of 2) Never done     MEDICARE ANNUAL WELLNESS VISIT  12/20/2017     COLORECTAL CANCER SCREENING  12/12/2018     HF ACTION PLAN  07/01/2019     MAMMO SCREENING  01/02/2020     LIPID  04/29/2021     FALL RISK ASSESSMENT  08/13/2021     INFLUENZA VACCINE (1) 09/01/2021     Jeri ALVAREZ LPN

## 2021-09-09 NOTE — PROGRESS NOTES
The patient was rescheduled to be seen in clinic tomorrow 09/10.     Alli Castro PA-C on 9/9/2021 at 8:11 PM

## 2021-09-10 NOTE — PATIENT INSTRUCTIONS
Patient Education     COPD Flare-Up    You have had a flare-up of your COPD.  COPD (chronic obstructive pulmonary disease) is a common lung disease. It causes your airways to get irritated and narrower. This makes it harder for you to breathe. Emphysema and chronic bronchitis are both types of COPD. This is a long-term (chronic) condition. This means you always have it. Sometimes it gets worse. When this happens, it's called a flare-up.   Symptoms of COPD  People with COPD may have symptoms most of the time. In a flare-up, your symptoms get worse. These symptoms may mean you are having a flare-up:     Shortness of breath, shallow or rapid breathing, or wheezing that gets worse    Lung infection    Cough that gets worse    More mucus (or sputum), thicker mucus, or mucus of a different color    Tiredness, less energy, or trouble doing your normal activities    Fever    Chest tightness    Your symptoms don t get better even when you use your normal medicines, inhalers, and nebulizer    Trouble talking    You feel confused  Causes of flare-ups  Unfortunately, a flare-up can happen even if you did everything right. And even if you followed your healthcare provider s instructions. Some causes of flare-ups are:     Cold weather    Smoking or secondhand smoke    Use of e-cigarettes or vaping products    Colds, the flu, or respiratory infections    Air pollution    Sudden change in the weather    Dust, vapors, gases, irritating chemicals, or strong fumes    Not taking your medicines as prescribed    Indoor pollution such as burning wood, smoke from home cooking, or heating fuels  Home care  Here are some things you can do at home to treat a flare-up:    Keep calm and try not to panic. This makes it harder to breathe, and keeps you from doing the right things.    Don t smoke or be around others who are smoking. If you smoke, quit. Smoking is the main cause of COPD. Quitting will help you be able to better manage your COPD.  Don't use e-cigarettes or vaping products either. Ask your healthcare provider about ways to help you quit smoking.    Try to drink more fluids than normal during a flare-up, unless your healthcare provider has told you not to because of heart and kidney problems. More fluids can help loosen the mucus.    Eat a healthy, balanced diet. This is important to staying as healthy as possible. So is trying to stay at your ideal weight. Being overweight or underweight can affect your health. Make sure you have a lot of fruits and vegetables every day. And also eat balanced portions of whole grains, lean meats and fish, and low-fat dairy products.    Use your inhalers and nebulizer, if you have one, as you have been told to. When using a metered dose inhaler or nebulizer, it's very important to use the proper techniques. If you have any questions about how to use your device, contact your healthcare provider or refer to the user manual.    If you were given antibiotics, take them until they are used up or your provider tells you to stop. It s important to finish the antibiotics, even though you feel better. This will make sure the infection has cleared.    If you were given a steroid, finish it even if you feel better.    Learn the names of your medicines, as well as how and when to use them. Talk with your provider about other conditions you have and their treatment and how it may affect your COPD.    Oxygen may be prescribed if tests show that your blood contains too little oxygen. Ask your provider about long-term oxygen therapy.    Coping tips for shortness of breath include:  ? Exercise. Try to be as active as possible. This will improve energy levels and strengthen your muscles, so you can do more.  ? Breathing methods. Ask your healthcare provider or nurse show you how to do pursed-lip breathing.  ? Balance rest and activity. Each day, try to balance rest periods with activity. For example, you might start the day  with getting dressed and eating breakfast. Then you can relax and read the paper. After that, take a brief walk. And then sit with your feet up for a while.  ? Pulmonary rehab (rehabilitation).  Community-based and home-based programs work as well as hospital-based programs as long as they are as often and as intense. Standard home-based pulmonary rehab programs help shortness of breath in people with COPD. Supervised, traditional pulmonary rehab remains the best option for people with COPD. These programs help with managing your disease, and also help with breathing methods, exercise, support, and counseling. To find one, ask your provider or call your local hospital. Also talk with your healthcare provider about which rehab or self-management program is best for you.  Preventing a flare-up  Flare-ups happen. But the best way to treat one is to prevent it before it starts. Here are some pointers:     Don t smoke or be around others who are smoking. Avoid using e-cigarettes due to their harmful side effects.    Take your medicines as discussed with your healthcare provider.    Talk with your provider about getting a flu shot every year. Also find out if you need a pneumonia shot.    If there is a weather advisory warning to stay indoors, try to stay inside when possible.    Try to eat healthy, exercise, and get plenty of sleep.    Try to stay away from things that normally set you off. These include dust, chemical fumes, hairsprays, or strong perfumes.  Follow-up care  Follow up with your healthcare provider, or as advised.   If a culture was done, you will be told if your treatment needs to be changed. You can call as directed for the results.   If X-rays were done, you will be told of any new findings that may affect your care.   During each appointment, talk with your healthcare provider about your ability to:     [place] in your normal environment    Correctly use inhaler (or your medicine delivery systems)    [place]  with other conditions you have and their treatments and how they may affect your COPD  Call 911  Call 911 if any of these occur:     Wheezing or shortness or breath does not get better with treatment    Chest pain or chest tightness    Feeling lightheaded or dizzy    You have trouble breathing    You feel confused or it s hard to wake you up    You faint or lose consciousness    You have a rapid heart rate    You have new pain in your chest, arm, shoulder, neck, or upper back  When to seek medical advice  Call your healthcare provider right away if any of these occur:    Fever of 100.4 F (38 C) or higher, or as directed by your healthcare provider    Coughing up lots of dark-colored or bloody mucus (sputum)    You don't start to get better within 24 hours    Swelling of your ankles gets worse    Weakness  [employer] last reviewed this educational content on 4/1/2019 2000-2021 [employer]. All rights reserved. This information is not intended as a substitute for professional medical care. Always follow your healthcare professional's instructions.           Patient Education     Acute Bronchitis  Your healthcare provider has told you that you have acute bronchitis. Bronchitis is infection or inflammation of the airways in the lungs (bronchial tubes). Normally, air moves easily in and out of the airways. Bronchitis narrows the airways. This makes it harder for air to flow in and out of the lungs. This causes symptoms such as shortness of breath, coughing up yellow or green mucus, and wheezing.  Bronchitis can be acute or chronic. Acute means it happens quickly and goes away in a short time. Chronic means a condition lasts a long time and often comes back. Most people with acute bronchitis get better in 1 to 2 weeks.     What causes acute bronchitis?  Acute bronchitis is often caused by a virus such as a cold or the flu. In some cases, it may be caused by bacteria. Certain factors make it more likely for  a cold or flu to turn into bronchitis. These include being very young, being elderly, having a heart or lung problem, or having a weak immune system. Cigarette smoking also makes bronchitis more likely.  When bronchitis develops, the airways become swollen. The airways may also become infected with bacteria. This is known as a secondary infection.  Symptoms of acute bronchitis  Symptoms can include:    Coughing with mucus    Wheezing    Feeling short of breath    Chest pain    Fever  Diagnosing acute bronchitis  Your healthcare provider will ask about your symptoms and health history. He or she will give you a physical exam. This will include listening to your lungs while you breathe. You may have a chest X-ray to look for infection in the lungs (pneumonia) if you have had a fever. You may also have blood tests to check for infection.  Treating acute bronchitis  Bronchitis usually goes away in 1 to 2 weeks without treatment. You can help feel better by:    Taking medicine as directed. Talk to your healthcare provider before taking any over-the-counter medicines (OTC). Some OTC medicines help relieve inflammation in your bronchial tubes. They can also thin mucus. This makes it easier to cough up. Your healthcare provider may prescribe an inhaler to help open up the bronchial tubes. Most of the time, acute bronchitis is caused by a viral infection. Antibiotics are usually not prescribed for viral infections.    Drinking plenty of fluids, such as water, juice, or warm soup. Fluids loosen mucus so that you can cough it up. This helps you breathe more easily. Fluids also prevent dehydration.    Using a humidifier. This can help reduce coughing.    Getting plenty of rest    Not smoking. Also, don't let anyone else smoke in your home. In public places, move away from secondhand smoke.  Recovery and follow-up  Follow up with your doctor. You will likely feel better in 1 to 2 weeks. But you may have a dry cough for a longer  time. Let your doctor know if you still have symptoms other than a dry cough after 2 weeks. Tell him or her if you get bronchial infections often.  Self-care tips  To get relief from your symptoms and prevent bronchitis:    Stop smoking. Stopping smoking is the most important step you can take to treat bronchitis. If you need help stopping smoking, talk with your healthcare provider.    Stay away from secondhand smoke and other irritants. Try to stay away from smoke, chemicals, fumes, and dust. Don t let anyone smoke in your home. Stay indoors on smoggy days.    Prevent lung infections. Ask your healthcare provider about the flu and pneumonia vaccines. Take steps to prevent colds and other lung infections.    Wash your hands well. Wash your hands often with soap and water. Use hand  when you can t wash your hands. Stay away from crowds during cold and flu season.  When to call your healthcare provider  Call the healthcare provider if you have any of these:    Fever of 100.4 F ( 38.0 C) or higher, or as directed by your healthcare provider    Symptoms that get worse, or new symptoms    Breathing not getting better with treatments    Symptoms that don t start to get better in 1 week  [employer] last reviewed this educational content on 8/1/2019 2000-2021 [employer]. All rights reserved. This information is not intended as a substitute for professional medical care. Always follow your healthcare professional's instructions.

## 2021-09-10 NOTE — TELEPHONE ENCOUNTER
[facility] home care nurse called in request verbal order for skilled nurse, PT, OT,  Social work visit and home health aid.      Please advise.        Lincoln Page Nurse Advisor 9/10/2021 3:55 PM

## 2021-09-10 NOTE — PROGRESS NOTES
Assessment & Plan     1. COPD exacerbation (H)    2. Fever, unspecified fever cause    3. Shortness of breath    4. Acute bronchitis, unspecified organism      Patient was hospitalized from 08/29 to 09/04 for spontaneous pneumothorax which improved following chest tube. She has history of COPD and was instructed on scheduled use of duoneb as well as PRN albuterol. Continues to use supplemental O2 at 2 LPM. Has been more consistently using the trilogy each day. Discharged with steroid taper. Despite this patient reports feeling more SOB and coughing up phlegm. Has slight fever on rooming and rhonchi on exam. Imaging returned without evidence of recurrent pneumothorax or infiltrate. I opted to start the patient on course of doxycycline to cover for possible infection given the fever and progression of symptoms. She will follow up next week if not improving as expected.       Return in about 3 months (around 12/10/2021) for Return for scheduled annual checkup with PCP.    Alli Castro PA-C  [facility] WERNER Álvarez is a 70 year old who presents for the following health issues     HPI       Hospital Follow-up Visit:    Hospital/Nursing Home/ Rehab Facility: [facility]  Date of Admission: 08/29/2021  Date of Discharge: 09/04/2021  Reason(s) for Admission: breathing lung issues      Was your hospitalization related to COVID-19? No   Problems taking medications regularly:  none  Medication changes since discharge: none  Problems adhering to non-medication therapy:  None    Summary of hospitalization:  [facility] discharge summary reviewed  Diagnostic Tests/Treatments reviewed.  Follow up needed: none  Other Healthcare Providers Involved in Patient s Care:         None  Update since discharge: fluctuating course.       Post Discharge Medication Reconciliation: discharge medications reconciled, continue medications without change.  Plan of  care communicated with patient and family                Patient is a 70 year old female who is brought in by her spouse for a post hospital follow up. Unfortunately, the spouse was confused about which clinic the appointment was at today and took the patient to the [facility]. Fortunately, they were able to be worked in later. Since discharge the patient reports that she has continued to feel short of breath and her spouse reports she is coughing up quite a bit of phlegm. In review of the hospital summary, I see that the patient was instructed to use her trilogy device on multiple occasions. She informs me that she has been doing this and it does seem to be helping, but she feels her health is worsening. Slight fever on rooming today.       Patient is a 70-year-old female with past medical history of oxygen dependent COPD who supposed to be using trilogy assisted ventilation in the home setting as well but is overall non-compliant, coronary artery disease, moderate to severe aortic stenosis, moderate to severe pulmonary hypertension, history of CVA with cognitive impairment noted with most recent slums score 10 out of 30, severe malnutrition who presented with a spontaneous right pneumothorax with acute on chronic hypoxic respiratory failure.  She had a chest tube placed with pneumothorax resolving and chest tube was removed on 9/2/2021 with ongoing stabilization noted with repeat chest x-ray imaging.  There was also concern for possible COPD exacerbation and patient has been started on oral prednisone with ongoing scheduled DuoNebs, as needed albuterol nebs, and continuation of all home COPD medications.  Patient has now been titrated back to her home regimen of 2 L nasal cannula and she has been seen by physical therapy and cleared to discharge home with initiation of home care RN and PT.       Multiple attempts during the stay to have the patient use her home trilogy device were made but patient remains  overall very resistive to this being placed.  She would often have periods of worsening sensation of shortness of breath which would resolve following low-dose Ativan and morphine.  On day of discharge discussed with patient and her  that her pneumothorax has resolved and COPD exacerbation has improved and she is medically stable to discharge home, however if she continues to be noncompliant with her trilogy recommendations she remains at very high risk for decompensation for many reasons in the future.   will try to increase the use of trilogy in the home environment while patient is asleep and while she is having difficulty with breathing when awake.  Did discuss that if after 30 minutes of trilogy administration she is still having air hunger morphine 7.5 mg immediate release could be tried and has been beneficial during this stay and  would like a few tablets of the morphine prescribed to try this new regimen in the home environment and see if it is beneficial there as well.      Principal Problem:    Spontaneous pneumothorax    Assessment: Resolved following chest tube placement, and continues to be resolved following tube removal    Plan: Discharge home with plan as outlined above     Active Problems:    Acute respiratory failure with hypoxia (H)    Assessment: Secondary to pneumothorax and suspected COPD exacerbation, now resolved with patient back on her home regimen    Plan: Discharged with prednisone burst and prolonged taper in addition to ongoing COPD medications       COPD exacerbation    Assessment: Suspected patient treated with prednisone burst, scheduled bronchodilators, as needed bronchodilators    Plan: Discharge with prednisone burst and prolonged taper, scheduled bronchodilators and as needed breakthrough bronchodilators in addition to continuation of previous home regimen.  Emphasis was placed on the importance of trilogy as outlined above       Hypotension    Assessment:  Present initially and secondary to pneumothorax, resolved following chest tube placement and has not returned    Plan: No further monitoring is needed following discharge       History of stroke - right thalamus in 8/2013 and left cerebellar and right vermis in 3/2018    Assessment: Previous history, on aspirin and Plavix.  Patient does have memory impairment from the strokes with most recent slums score 10 out of 30 and patient's  performs many of her cares for her both due to her cognitive status and physical limitations    Plan: Continue home regimen without change at time of discharge.  Once more went over with the  how significant the patient's cognitive impairment is and the challenges he will face related to this in the home environment and has been states he is well aware and has been doing with this for many years       PAD (peripheral artery disease) (H) - moderate left common carotid stenosis, aortoiliac bypass, chronic left vertebral and right posterior cerebral stenoses    Assessment: Chronic and stable during this hospitalization    Plan: Continue home regimen at time of discharge       Moderate to severe pulmonary hypertension (H)-per [facility]ub[facility] 2021    Assessment: Likely contributing to her chronic respiratory symptoms    Plan: Continue with discharge plan as outlined above       Nonrheumatic aortic valve stenosis - moderate to severe    Assessment: Noted    Plan: Ongoing outpatient follow-up as previously recommended       Cigarette smoker    Assessment: Ongoing    Plan: Strongly encouraged tobacco cessation however patient makes several comments that indicates she has no desire to quit and will be returning to smoking following discharge       S/P drug eluting coronary stent placement-LAD and circ, circ re-do 5/17/18    Assessment: Previous history but no concern for acute coronary syndrome during his stay    Plan: Continue home regimen at time of discharge       Abnormal CXR-right  mid consolidation    Assessment: Possible infection however patient has not had any symptoms of acute illness and no antibiotics have been started during this stay with only clinical improvement noted    Plan: No antibiotics will be prescribed at time of discharge.  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  Did strongly encourage consideration of outpatient nutritionist follow-up going forward          Review of Systems   Constitutional, HEENT, cardiovascular, pulmonary, gi and gu systems are negative, except as otherwise noted.      Objective    There were no vitals taken for this visit.  There is no height or weight on file to calculate BMI.  Physical Exam   GENERAL: healthy, alert and no distress  NECK: no adenopathy, no asymmetry, masses, or scars and thyroid normal to palpation  RESP: rhonchi throughout and decreased breath sounds throughout  CV: regular rate and rhythm, normal S1 S2, no S3 or S4, no murmur, click or rub, no peripheral edema and peripheral pulses strong  MS: no gross musculoskeletal defects noted, no edema  NEURO: Normal strength and tone, mentation intact and speech normal  PSYCH: mentation appears normal and fatigued    XR Chest 2 Views    Result Date:  9/10/2021  CHEST TWO VIEWS   9/10/2021 11:40 AM HISTORY: Hospitalized at the end of August with pneumothorax and possible infection. Was not started on antibiotics. Fever today. COPD exacerbation (H). Fever, unspecified fever cause. COMPARISON: Chest x-ray 9/2/2021.     IMPRESSION: Two views of the chest. Airspace opacity right midlung likely in the anterior right upper lobe along the minor fissure again noted. Lateral view demonstrates probable area of posteroinferior right upper lobe or superior segment right lower lobe cavitation with air-fluid levels on the lateral view. Lungs are hyperinflated consistent with COPD. Left lung is clear. Heart is normal in size. No definite evidence of pneumothorax however lung markings are difficult to visualize at the right lung apex. Subcutaneous emphysema along the lateral right chest wall and right supraclavicular region has improved. Aorta is calcified and coronary artery stents are again noted. No evidence of left pneumothorax. No significant pleural effusions. LIBERTY MCKEE MD   SYSTEM ID:  [id number]

## 2021-09-15 NOTE — TELEPHONE ENCOUNTER
Please call to give verbal orders to [facility] as requested.  Jaja Carter, CNP

## 2021-09-15 NOTE — TELEPHONE ENCOUNTER
Spoke with Jasmyn from [facility]    Call back number [phone]    Looking for PT orders for   1 time a for 1 week   2 times a for 4 weeks   Then 1 time a week for 1 week.    This is a new order, RN unable to give VORB.    Will send to provider for review.    DOLORES CortezN, RN, PHN  [facility] ~ Registered Nurse  Clinic Triage ~ [place] [facility] [place]  September 15, 2021

## 2021-09-16 NOTE — TELEPHONE ENCOUNTER
Melody from [facility] home care called and just wanted to give an FYI letting you know that social work and home care is discharging the patient.

## 2021-09-17 NOTE — PROGRESS NOTES
S: Respiratory therapy note  B: CAP, COPD  A: Bs are improved compared to previous admission.  BS are diminished with crackles and expiratory wheezes more prominent on RLL.   Pt remains on home liter flow of 2 lpm via nasal canula.  Nebs given as scheduled.  R: continue scheduled nebs.  No change recommenced in treatment plan.  Encourage trilogy use for PRN SOB episodes, and have her wear it at night with 2 lpm bled in.    Arthur Krishnan, RT on 9/17/2021 at 5:08 PM

## 2021-09-17 NOTE — ED NOTES
Patient has continued to remove BP cuff and oximetry since arriving in ED. She also refuses to keep tele in place. She is oriented to herself only. She continues to refuse Covid swab. Antibiotic started.

## 2021-09-17 NOTE — ED NOTES
Patient is lying more still now that BP cuff is on her LLE. Last /59 and HR 89. Will continue to monitor.

## 2021-09-17 NOTE — PROGRESS NOTES
[facility]    Medicine Progress Note - Hospitalist Service       Date of Admission:  9/16/2021    Assessment & Plan           Preeti Ford is a 70 year old female admitted on 9/16/2021. She presents with pneumonia and COPD exacerbation.    1. Community Acquired Pneumonia      COPD         `      Chronic respiratory failure      Hypotension  Assessment: Patient noted worsening shortness of breath for approximately 6-10 days prior to admission. She was started on doxycycline by her PCP for pneumonia on 9/10/2021 and completed it but still has a cough and infiltrate is noted on chest X-ray. Of note, patient was hospitalized in late August, 2021 for COPD exacerbation but did not receive IV antibiotics. Likely CAP. She notes improvement in shortness of breath today. Continues to require supplemental oxygen to maintain saturations above 90%. She denies pain. Denies nausea. She is afebrile. Blood pressures have been low today in the 70-80's/40-50's with MAPs in the 50's. Given 1 L bolus of LR x2 with minimal improvement. She is asymptomatic from this. Lactic acid normal at 1.9. Repeat COVID testing negative. Procalcitonin 0.07.   - Continue inpatient status  - c/w ceftriaxone and azithromycin for CAP   - c/w prednisone 40mg PO daily and taper  - c/w duonebs scheduled QID with albuterol nebulizer available q2h prn for shortness of breath/wheezing   - c/w supplemental oxygen  - f/u blood and sputum cultures  - droplet precautions  - c/w symbicort, umeclidinium and montelukast  - Will transfer to ICU as overflow for close monitoring of blood pressure  - Continue LR at 100 mL/hr but monitor for signs of fluid overload given hx of CHF and aortic valve stenosis  - Echocardiogram ordered to evaluate aortic stenosis, heart function given hypotension  - Consider initiating pressors if patient becomes unstable or if patient becomes symptomatic    3. History of CAD s/p PCI      Chronically  elevated troponin      Aortic valve stenosis-moderate to severe      Systolic heart failure with EF of 47-52%     The patient has known CAD and also has a stable troponin elevation. She does not have any chest pain.   - c/w aspirin, clopidogrel and atorvatatin  - c/w losartan and metoprolol  - c/w nitroglycerin prn    4. History of CVA      Vascular Dementia  The patient is only oriented x2 (to self and place).   - c/w aspirin and atorvastatin    5. Hypertension  - Holding losartan and metoprolol in the setting of hypotension    6. Hyperlipidemia  - c/w atorvastatin     Diet: Easy to Chew Diet (level 7)  Snacks/Supplements Adult: Other; Other; Between Meals    DVT Prophylaxis: Enoxaparin (Lovenox) SQ  Barnhart Catheter: Not present  Central Lines: None  Code Status: Full Code      Vince Hutchinson NP  Hospitalist Service  [facility]  Securely message with the Vocera Web Console (learn more here)  Text page via [employer] Paging/Directory      Clinically Significant Risk Factors Present on Admission              # Platelet Defect: home medication list includes an antiplatelet medication  # Severe Malnutrition, POA: based on Subcutaneous fat loss;Muscle loss (09/17/21 1000)

## 2021-09-17 NOTE — CONSULTS
"CLINICAL NUTRITION SERVICES - ASSESSMENT NOTE     Nutrition Prescription    RECOMMENDATIONS FOR MDs/PROVIDERS TO ORDER:  1. Recommend starting MVI given chronic malnutrition and poor oral intake     Malnutrition Status:    Severe malnutrition in the context of chronic illness     Recommendations already ordered by Registered Dietitian (RD):  Medical food supplement therapy - PRN     Future/Additional Recommendations:  1. Encourage oral intake as tolerated. Offer patient a variety of snacks and supplements throughout the day to help meet nutritional needs. May consider supplemental TFs to help with nutritional status given chronic malnutrition.   2. Monitor weight trends      REASON FOR ASSESSMENT  Preeti Ford is a/an 70 year old female assessed by the dietitian for RN Consult - malnutrition    NUTRITION HISTORY  -per H&P: presents to the emergency room for shortness of breath.  Has significant past medical history for COPD on supplemental oxygen, tobacco abuse, and history of spontaneous pneumothorax.  Was hospitalized 8/29 through 9 /4 for the spontaneous pneumothorax.   -Patient with significant history for malnutrition since 2018  -Patient recently seen by this RD during last hospitalization on 9/1/21. \"Per  and patient, have seen an RD multiple times during previous hospital admissions and have been provided with numerous resources and materials regarding weight gain. They state they have tried \"everything\" for patient to gain weight but have been unsuccessful, feel there is nothing more they can do.\"  -Per patient, states she is doing \"ok\" in regards to eating and appetite. No acute changes.     CURRENT NUTRITION ORDERS  Diet: Level 7: Easy to Chew Dysphagia Diet   Intake/Tolerance: No intake documented.     LABS  Labs reviewed    MEDICATIONS  Medications reviewed    ANTHROPOMETRICS  Height: 160 cm (5' 3\")  Most Recent Weight: 41 kg (90 lb 4.8 oz)    IBW: 52.3 kg  % IBW: 78%  BMI: Underweight BMI " <18.5 (BMI 16.00 kg/m2)  Weight History:   -Current weight up 5 lbs in about three weeks.   -4.7% weight loss in 6 months (not significant)  Wt Readings from Last 10 Encounters:   09/17/21 41 kg (90 lb 4.8 oz)   08/29/21 38.9 kg (85 lb 12.8 oz)   06/14/21 41.1 kg (90 lb 8 oz)   04/09/21 42.6 kg (94 lb)   03/25/21 43 kg (94 lb 11.2 oz)   12/02/20 40.9 kg (90 lb 4 oz)   11/09/20 40.7 kg (89 lb 11.2 oz)   05/18/20 45.1 kg (99 lb 8 oz)   05/11/20 45.4 kg (100 lb)   05/07/20 47.1 kg (103 lb 14.4 oz)       Dosing Weight: 41 kg    ASSESSED NUTRITION NEEDS  Estimated Energy Needs: 0122-3671 kcals/day (30 - 35 kcals/kg )  Justification: Repletion and Underweight  Estimated Protein Needs: 49-62 grams protein/day (1.2 - 1.5 grams of pro/kg)  Justification: Repletion  Estimated Fluid Needs: 1 mL/kcal   Justification: Per provider pending fluid status    PHYSICAL FINDINGS  See malnutrition section below.  -Red blanchable areas on spine, heels, elbows, ankle bones and feet.  -Poor dentition   -Lanugo hair present    MALNUTRITION  % Intake: Decreased intake does not meet criteria - patient with poor oral intake for several years   % Weight Loss: Weight loss does not meet criteria (4.7% weight loss in 6 months)  Subcutaneous Fat Loss: Facial region:  Severe, Upper arm:  severe and Lower arm:  severe  Muscle Loss: Temporal:  Severe, Facial & jaw region:  severe, Thoracic region (clavicle, acromium bone, deltoid, trapezius, pectoral):  severe, Upper arm (bicep, tricep):  severe and Lower arm  (forearm):  severe  Fluid Accumulation/Edema: None noted  Malnutrition Diagnosis: Severe malnutrition in the context of chronic illness     NUTRITION DIAGNOSIS  Underweight related to chronic illness as evidenced by BMI of 16.00 kg/m2, findings of severe fat and muscle wasting      INTERVENTIONS  Implementation  Nutrition Education: Reintroduced self to patient and RD role in care. Patient not interested in nutrition education at this time with  no questions or concerns. Patient encouraged to reach out if needs arise during admission.     Collaboration with other providers - IDT rounds  Medical food supplement therapy - PRN  Multivitamin/mineral supplement therapy-Recommended for provider to order     Goals  Patient to consume % of nutritionally adequate meal trays TID, or the equivalent with supplements/snacks.     Monitoring/Evaluation  Progress toward goals will be monitored and evaluated per protocol.    Graham Kinney RDN, LD  Clinical Dietitian   Office: [phone]  Weekend Pager: [phone]

## 2021-09-17 NOTE — ED PROVIDER NOTES
History     Chief Complaint   Patient presents with     Shortness of Breath     HPI  Preeti Ford is a 70 year old female who presents to the emergency room for shortness of breath.  Has significant past medical history for COPD on supplemental oxygen, tobacco abuse, and history of spontaneous pneumothorax.  Was hospitalized 8/29 through 9 /4 for the spontaneous pneumothorax, which resolved with chest tube placement.  She then followed up with her primary provider in clinic, but was continuing to feel symptomatic with shortness of breath.  Repeat imaging did not reveal any new pneumothorax, but she was started on oral doxycycline at that time to cover for bacterial infection.  She had taken this medication but she continues to feel short of breath.  She had also been prescribed a steroid taper upon discharge from the hospital, and had finished that course but continued to feel short of breath.  Also of note, the patient does wear supplemental oxygen via nasal cannula, but when she arrived in the ED her oxygen tank was on empty.  She is a very poor historian due to previous CVA.    Allergies:  Allergies   Allergen Reactions     Crestor [Rosuvastatin] Other (See Comments)     dizziness     Lisinopril Cough     Statins [Hmg-Coa-R Inhibitors] Other (See Comments)     Muscle/joint aching     Optison [Albumin Human] Other (See Comments)     Pt was flush and very dizzy.  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 08/31/2021     Priority: Medium     Severe malnutrition (H)-reduced intake, weight loss, loss of SC fat and muscle 08/31/2021     Priority: Medium      Cigarette smoker 08/30/2021     Priority: Medium     Spontaneous pneumothorax 08/29/2021     Priority: Medium     Chest tube in place 08/29/2021     Priority: Medium     Chronic obstructive pulmonary disease, unspecified COPD type (H) 08/29/2021     Priority: Medium     Acute bronchitis 03/26/2021     Priority: Medium     Moderate to severe pulmonary hypertension (H)-per Echo Jubne 2021 03/26/2021     Priority: Medium     Nonrheumatic aortic valve stenosis - moderate to severe 03/26/2021     Priority: Medium     Hypotension 03/26/2021     Priority: Medium     Lactic acidosis 03/26/2021     Priority: Medium     Acute on chronic respiratory failure with hypoxia and hypercapnia 03/25/2021     Priority: Medium     Severe malnutrition (H) 04/16/2018     Priority: Medium     Hyponatremia 04/16/2018     Priority: Medium     Hypochloremia 04/16/2018     Priority: Medium     Vertigo 03/23/2018     Priority: Medium     Other seizures (H) - possible 03/23/2018     Priority: Medium     Acute CVA (cerebrovascular accident) - right paramedian superior vermis and left cerebellar hemisphere 03/23/2018     Priority: Medium     Pulmonary emphysema (H) 03/22/2018     Priority: Medium     Acute respiratory failure with hypoxia (H) 06/14/2017     Priority: Medium     COPD exacerbation 06/14/2017     Priority: Medium     Coronary artery disease of native artery of native heart with stable angina pectoris (H) 06/14/2017     Priority: Medium     Elevated troponin 06/14/2017     Priority: Medium     Cervical high risk HPV (human papillomavirus) test positive 12/20/2016     Priority: Medium     12/20/16 NIL pap/+ HPV (not 16 or 18). Plan: cotest in 1 year, due by 12/20/17 12/20/17 NIL Pap, +HR HPV (Not types 16/18). Plan colp  12/26/18 Patient is lost to pap tracking follow-up           PAD (peripheral artery disease) (H) - moderate left common carotid stenosis, aortoiliac bypass, chronic left vertebral and right posterior cerebral stenoses  02/12/2016     Priority: Medium     Chronic bronchitis, unspecified chronic bronchitis type (H) 02/09/2016     Priority: Medium     Ischemic cardiomyopathy - EF 25% in 2015 but 55% in 3/2017 and 45-50% on 3/18 09/08/2015     Priority: Medium     Echo  With ejection fraction of 25-30 %       HTN, goal below 130/80 09/08/2015     Priority: Medium     Acute systolic congestive heart failure (H) 09/08/2015     Priority: Medium     ejection fraction 25-30%       Pulmonary nodule -- 1.9 cm RLL, new 5/2/20 09/08/2015     Priority: Medium     GERD (gastroesophageal reflux disease) 09/08/2015     Priority: Medium     NSTEMI (non-ST elevated myocardial infarction) (H) 09/08/2015     Priority: Medium     ACS (acute coronary syndrome) (H) 08/28/2015     Priority: Medium     Smoker 03/10/2015     Priority: Medium     History of stroke - right thalamus in 8/2013 and left cerebellar and right vermis in 3/2018 08/21/2013     Priority: Medium     Numbness and tingling 08/21/2013     Priority: Medium     Right side of the face , upper and lower limbs         CVA (cerebral vascular accident) (H) 08/17/2013     Priority: Medium     Hyperlipidemia LDL goal <130 07/19/2012     Priority: Medium     Urinary incontinence 03/04/2012     Priority: Medium     Forgetfulness 03/04/2012     Priority: Medium        Past Medical History:    Past Medical History:   Diagnosis Date     Acute on chronic respiratory failure (H) 3/25/2021     Arthritis      COPD (chronic obstructive pulmonary disease) (H)      Coronary artery disease      CVA (cerebral vascular accident) (H) 8-     Hypertension        Past Surgical History:    Past Surgical History:   Procedure Laterality Date     APPENDECTOMY       BYPASS GRAFT AORTOILIAC N/A 3/7/2017    Procedure: BYPASS GRAFT AORTOILIAC;  Surgeon: Marcos Pratt MD;  Location: [facility] OR     SALPINGO OOPHORECTOMY,R/L/DELORES      Salpingo Oophorectomy, RT/LT/DELORES       Family History:    Family History   Problem  "Relation Age of Onset     Cerebrovascular Disease Mother      Cerebrovascular Disease Father      Genitourinary Problems Brother         Dialysis       Social History:  Marital Status:   [2]  Social History     Tobacco Use     Smoking status: Former Smoker     Packs/day: 0.50     Types: Cigarettes     Smokeless tobacco: Never Used     Tobacco comment: 'I don't smoke anymore but my  does\"   Substance Use Topics     Alcohol use: No     Alcohol/week: 0.0 standard drinks     Drug use: No        Medications:    ACE/ARB/ARNI NOT PRESCRIBED, INTENTIONAL,  albuterol (PROAIR HFA/PROVENTIL HFA/VENTOLIN HFA) 108 (90 Base) MCG/ACT inhaler  albuterol (PROVENTIL) (2.5 MG/3ML) 0.083% neb solution  aspirin 81 MG EC tablet  atorvastatin (LIPITOR) 20 MG tablet  budesonide-formoterol (SYMBICORT) 160-4.5 MCG/ACT Inhaler  clopidogrel (PLAVIX) 75 MG tablet  doxycycline hyclate (VIBRAMYCIN) 100 MG capsule  guaiFENesin 400 MG TABS  ipratropium - albuterol 0.5 mg/2.5 mg/3 mL (DUONEB) 0.5-2.5 (3) MG/3ML neb solution  losartan (COZAAR) 25 MG tablet  metoprolol tartrate (LOPRESSOR) 25 MG tablet  montelukast (SINGULAIR) 10 MG tablet  morphine (MSIR) 15 MG IR tablet  nitroGLYcerin (NITROSTAT) 0.4 MG sublingual tablet  Nutritional Supplements (BOOST)  Nutritional Supplements (CARNATION INSTANT BREAKFAST) LIQD  Nutritional Supplements (ENSURE PLUS) LIQD  order for DME  PARoxetine (PAXIL) 10 MG tablet  predniSONE (DELTASONE) 20 MG tablet  Respiratory Therapy Supplies (NEBULIZER/TUBING/MOUTHPIECE) KIT  umeclidinium (INCRUSE ELLIPTA) 62.5 MCG/INH inhaler          Review of Systems   All other systems reviewed and are negative.      Physical Exam   BP: (!) 150/111  Pulse: 99  Temp: 100  F (37.8  C)  Resp: 22  Weight: 38.6 kg (85 lb)  SpO2: 93 %      Physical Exam  Vitals and nursing note reviewed.   Constitutional:       General: She is in acute distress.   HENT:      Head: Atraumatic.      Mouth/Throat:      Pharynx: No oropharyngeal " exudate.   Eyes:      General: No scleral icterus.     Pupils: Pupils are equal, round, and reactive to light.   Cardiovascular:      Rate and Rhythm: Normal rate and regular rhythm.      Heart sounds: Normal heart sounds.   Pulmonary:      Breath sounds: No stridor. Decreased breath sounds and wheezing present.      Comments: Gasping, NC with empty oxygen tank  Chest:      Chest wall: No tenderness.   Abdominal:      General: Bowel sounds are normal.      Palpations: Abdomen is soft.      Tenderness: There is no abdominal tenderness.   Musculoskeletal:         General: No tenderness.   Skin:     General: Skin is warm.      Findings: No rash.   Neurological:      Mental Status: She is alert.   Psychiatric:         Behavior: Behavior is agitated.         ED Course        Procedures              EKG Interpretation:      Interpreted by Sveta Laird DO  Time reviewed: 2006  Symptoms at time of EKG: Dyspnea   Rhythm: normal sinus   Rate: Normal  Axis: Right Axis Deviation  Ectopy: Occasional ectopic ventricular beat   Conduction: nonspecific interventricular conduction block  ST Segments/ T Waves: ST depression and nonspecific T abnormality  Q Waves: nonspecific  Comparison to prior: Unchanged    Clinical Impression: no acute changes and non-specific EKG                Critical Care time:  none               Results for orders placed or performed during the hospital encounter of 09/16/21 (from the past 24 hour(s))   CBC with platelets differential    Narrative    The following orders were created for panel order CBC with platelets differential.  Procedure                               Abnormality         Status                     ---------                               -----------         ------                     CBC with platelets and d...[[id number]]  Abnormal            Final result                 Please view results for these tests on the individual orders.   Comprehensive metabolic panel   Result Value  Ref Range    Sodium 134 133 - 144 mmol/L    Potassium 3.9 3.4 - 5.3 mmol/L    Chloride 97 94 - 109 mmol/L    Carbon Dioxide (CO2) 33 (H) 20 - 32 mmol/L    Anion Gap 4 3 - 14 mmol/L    Urea Nitrogen 13 7 - 30 mg/dL    Creatinine 0.62 0.52 - 1.04 mg/dL    Calcium 8.1 (L) 8.5 - 10.1 mg/dL    Glucose 111 (H) 70 - 99 mg/dL    Alkaline Phosphatase 102 40 - 150 U/L    AST 50 (H) 0 - 45 U/L    ALT 88 (H) 0 - 50 U/L    Protein Total 5.9 (L) 6.8 - 8.8 g/dL    Albumin 2.2 (L) 3.4 - 5.0 g/dL    Bilirubin Total 0.5 0.2 - 1.3 mg/dL    GFR Estimate >90 >60 mL/min/1.73m2   Troponin I   Result Value Ref Range    Troponin I 0.064 (H) 0.000 - 0.045 ug/L   Nt probnp inpatient (BNP)   Result Value Ref Range    N terminal Pro BNP Inpatient 1,397 (H) 0 - 900 pg/mL   Blood gas venous   Result Value Ref Range    pH Venous 7.43 7.32 - 7.43    pCO2 Venous 53 (H) 40 - 50 mm Hg    pO2 Venous 44 25 - 47 mm Hg    Bicarbonate Venous 35 (H) 21 - 28 mmol/L    Base Excess/Deficit (+/-) 9.4 (H) -7.7 - 1.9 mmol/L    FIO2 30    Lactic acid whole blood   Result Value Ref Range    Lactic Acid 0.8 0.7 - 2.0 mmol/L   CBC with platelets and differential   Result Value Ref Range    WBC Count 11.2 (H) 4.0 - 11.0 10e3/uL    RBC Count 3.61 (L) 3.80 - 5.20 10e6/uL    Hemoglobin 10.4 (L) 11.7 - 15.7 g/dL    Hematocrit 32.1 (L) 35.0 - 47.0 %    MCV 89 78 - 100 fL    MCH 28.8 26.5 - 33.0 pg    MCHC 32.4 31.5 - 36.5 g/dL    RDW 12.2 10.0 - 15.0 %    Platelet Count 359 150 - 450 10e3/uL    % Neutrophils 85 %    % Lymphocytes 7 %    % Monocytes 7 %    % Eosinophils 0 %    % Basophils 0 %    % Immature Granulocytes 1 %    NRBCs per 100 WBC 0 <1 /100    Absolute Neutrophils 9.5 (H) 1.6 - 8.3 10e3/uL    Absolute Lymphocytes 0.8 0.8 - 5.3 10e3/uL    Absolute Monocytes 0.8 0.0 - 1.3 10e3/uL    Absolute Eosinophils 0.0 0.0 - 0.7 10e3/uL    Absolute Basophils 0.0 0.0 - 0.2 10e3/uL    Absolute Immature Granulocytes 0.1 (H) <=0.0 10e3/uL    Absolute NRBCs 0.0 10e3/uL   XR Chest  Port 1 View    Narrative    XR CHEST PORT 1 VIEW  9/16/2021 8:27 PM       INDICATION: Dyspnea  COMPARISON: 9/10/2021       Impression    IMPRESSION: Worsening airspace infiltrate in the right mid lung, both  in the right upper lobe and inferior to the right minor fissure. Trace  right pleural effusion. No recurrent pneumothorax. The left lung  remains clear.    OZIEL DERAS MD         SYSTEM ID:  [id number]   Extra Tube    Narrative    The following orders were created for panel order Extra Tube.  Procedure                               Abnormality         Status                     ---------                               -----------         ------                     Extra Blue Top Tube[[id number]]                              Final result               Extra Red Top Tube[[id number]]                               Final result                 Please view results for these tests on the individual orders.   Extra Blue Top Tube   Result Value Ref Range    Hold Specimen JIC    Extra Red Top Tube   Result Value Ref Range    Hold Specimen JIC        Medications   cefTRIAXone (ROCEPHIN) 1 g vial to attach to  mL bag for ADULTS or NS 50 mL bag for PEDS (1 g Intravenous New Bag 9/16/21 2126)   0.9% sodium chloride BOLUS (has no administration in time range)     Followed by   sodium chloride 0.9% infusion (has no administration in time range)   methylPREDNISolone sodium succinate (solu-MEDROL) injection 125 mg (125 mg Intravenous Given 9/16/21 2027)   ipratropium - albuterol 0.5 mg/2.5 mg/3 mL (DUONEB) neb solution 3 mL (3 mLs Nebulization Given 9/16/21 2051)       Assessments & Plan (with Medical Decision Making)  Preeti is a 70-year-old female with past medical history of end-stage COPD on supplemental oxygen who presents to the emergency room for shortness of breath ongoing for the last 2 days.  See history and focused physical exam as above  Patient arrived to the ED complaining of shortness of breath.  Is a poor  "historian.  Noted that her oxygen tank was empty.  She was connected to supplemental oxygen here in the ED and saturations improved.  She does have expiratory wheezing in bilateral lung fields, right worse than left.  She states \"just fix me and make me feel better I cannot breathe\".  Peripheral IV, chest x-ray, and lab work was ordered.  RN attempted Covid swab, but patient pulled this out and threw it across the room and refused to have a repeat swab done.  Remainder of lab work was able to be collected, labs as above.  Chest imaging also as above.  Worsening right middle lobe infiltrate.  Suspect this constellation of symptoms and results is due to acute exacerbation of COPD, as well as worsening right middle lobe infiltrate, suspect pneumonia.  Unsure if this is hospital-acquired versus community-acquired as the patient was recently hospitalized 8/29 through 9/4/2021.  She had been seen by her primary provider in clinic in follow-up on 9/10/2021 and started on oral doxycycline.  Would classify this as failure of outpatient treatment.  Spoke with hospitalist regarding admission, patient was accepted.  Had noted soft blood pressure here in the emergency room after her initial presenting hypertension, and 1 L fluid bolus was ordered, she responded well to this.  She also been given IV Rocephin after labs were initially collected to treat for pneumonia, and had been given IV Solu-Medrol and a DuoNeb treatment.  Covid swab was able to be collected before being admitted to the hospital.     I have reviewed the nursing notes.    I have reviewed the findings, diagnosis, plan and need for follow up with the patient.       ED to Inpatient Handoff:    Discussed with Dr. Lopez at 0036  Patient accepted for Observation Stay  Pending studies include N/A  Code Status: Not Addressed           New Prescriptions    No medications on file       Final diagnoses:   COPD exacerbation (H)   Pneumonia of right middle lobe due to " infectious organism   Elevated troponin       9/16/2021   [facility] EMERGENCY DEPT     Sveta Laird,   09/17/21 0652

## 2021-09-17 NOTE — PLAN OF CARE
Major Shift Events:  Patient hypotensive this shift. Fluid boluses administered with no improvement.   Neuro: Alert and oriented to self only. Up to bathroom with assist of 1.   Resp: LS diminished on 2.5 L NC which is patient's baseline  CV: Hypotensive.   GI: Tolerating diet  ; Up to bathroom  Plan: Monitor BP closely    For vital signs and complete assessments, please see documentation flowsheets.     Beth Martinez RN

## 2021-09-17 NOTE — PROGRESS NOTES
S-(situation): Patient arrives to room 252 via cart from ED.     B-(background): COPD    A-(assessment): BP (!) 80/45 (BP Location: Left arm)   Pulse 80   Temp 99.6  F (37.6  C) (Oral)   Resp (!) 39   Wt 38.6 kg (85 lb)   SpO2 99%   BMI 15.06 kg/m    MAP 50. A & O to self only, good at covering.  Ambulating with belt and assist of 1. Pt appears very malnourished.  Nutrition consult ordered. Red blanchable areas on spine, heels, elbows, ankle bones and feet. Mepilex in use for preventative breakdown on coccyx.     R-(recommendations): Orders reviewed with Pt. Will monitor patient per MD orders.     Inpatient nursing criteria listed below were met:    Health care directives status obtained and documented: Yes  SCD's Documented: Yes  Skin issues/needs documented:Yes  Isolation addressed and Signage used: Yes  Fall Prevention: Care plan updated Yes Education given and documented Yes  Care Plan initiated and Co-Morbidities added: Yes  Education Assessment documented:Yes  Admission Education Documented: Yes  If present CAUTI/CLABI Education done: NA  New medication patient education completed and documented (Possible Side Effects of Common Medications handout): Yes  Allergies Reviewed: Yes  Admission Medication Reconciliation completed: Yes  Home medications if not able to send immediately home with family stored here: NA  Reminder note placed in discharge instructions regarding home meds: NA  Individualized care needs/preferences addressed and charted: Yes

## 2021-09-17 NOTE — H&P
[facility]    History and Physical - Hospitalist Service       Date of Admission:  9/16/2021    Assessment & Plan      Preeti Ford is a 70 year old female admitted on 9/16/2021. She presents with pneumonia and COPD exacerbation.    1. Community Acquired Pneumonia      COPD         `      Chronic respiratory failure  The patient has COPD and seems to have had worsened symptoms for the last 6-10 days. She was started on doxycycline by her PCP for pneumonia on 9/10/2021 and completed it but still has a cough and infiltrate is noted on chest X-ray. She also ran out of her home oxygen at an unknown date and was hypoxic.   - admit to hospitalist service  - c/w ceftriaxone and azithromycin  - c/w prednisone 40mg PO daily and taper  - c/w duonebs  - c/w supplemental oxygen  - f/u blood and sputum cultures  - try and obtain repeat SARS-COV2 test. So far she has been refusing  - droplet and contact precautions  - c/w symbicort, umeclidinium and montelukast.    3. History of CAD s/p PCI      Chronically elevated troponin  The patient has known CAD and also has a stable troponin elevation. She does not have any chest pain.   - c/w aspirin, clopidogrel and atorvatatin  - c/w losartan and metoprolol  - c/w nitroglycerin prn    4. History of CVA      Vascular Dementia  The patient is only oriented x2 (to self and place).   - c/w aspirin and atorvastatin    5. Hypertension  - c/w losartan and metoprolol    6. Hyperlipidemia  - c/w atorvastatin       Diet:   Cardiac diet  DVT Prophylaxis: Enoxaparin (Lovenox) SQ  Barnhart Catheter: Not present  Central Lines: None  Code Status:   Full Code    Clinically Significant Risk Factors Present on Admission              # Platelet Defect: home medication list includes an antiplatelet medication      Disposition Plan   Expected discharge:  5 days recommended to prior living arrangement once antibiotic plan established.     The patient's care was discussed  "with the Bedside Nurse.        Senthil Lopez  [facility]  Securely message with the [employer] Web Console (learn more here)  Text page via [employer] Paging/Directory      Visit/Communication Style   Virtual (Video) communication was used to evaluate Preeti.  Preeti consented to the use of video communication: yes  Video START time: 02:30, 9/16/2021  Video STOP time: 02:45, 9/16/2021   Patient's location: [facility]   Provider's location during the visit: [facility] Tele-medicine site        ______________________________________________________________________    Chief Complaint   Shortness of breath     History is obtained from the patient    History of Present Illness   Preeti Ford is a 70 year old female who is a poor historian but states that she came to the hospital because she felt \"cold\". She did not elaborate on fevers or sweats. She has been feeling \"just not right\" for the last week and she remembers that she was on antibiotics. She notes that she did have a cough and maybe some wheezing but she is not really sure. She denies any nausea/vomiting, anosmia, headaches, chest pain.     Review of Systems    General: positive for chills, negative for fever, sweats, weakness  Eyes: negative for blurred vision, loss of vision  Ear Nose and Throat: negative for pharyngitis, speech or swallowing difficulties  Respiratory: negative for sputum production. Positive for wheezing, VERDE, SOB and cough  Cardiology:  negative for chest pain, palpitations, orthopnea, PND, edema, syncope   Gastrointestinal: negative for abdominal pain, nausea, vomiting, diarrhea, constipation, hematemesis, melena or hematochezia  Genitourinary: negative for frequency, urgency, dysuria, hematuria   Neurological: negative for focal weakness, paresthesia    Past Medical History    I have reviewed this patient's medical history and updated it with pertinent information if needed.   Past " "Medical History:   Diagnosis Date     Acute on chronic respiratory failure (H) 03/25/2021     Arthritis      COPD (chronic obstructive pulmonary disease) (H)      Coronary artery disease      CVA (cerebral vascular accident) (H) 08/17/2013     Hypertension      Hypotension, unspecified hypotension type      Sepsis (H)        Past Surgical History   I have reviewed this patient's surgical history and updated it with pertinent information if needed.  Past Surgical History:   Procedure Laterality Date     APPENDECTOMY       BYPASS GRAFT AORTOILIAC N/A 3/7/2017    Procedure: BYPASS GRAFT AORTOILIAC;  Surgeon: Marcos Pratt MD;  Location: [facility] OR     SALPINGO OOPHORECTOMY,R/L/DELORES      Salpingo Oophorectomy, RT/LT/DELORES       Social History   I have reviewed this patient's social history and updated it with pertinent information if needed.  Social History     Tobacco Use     Smoking status: Former Smoker     Packs/day: 0.50     Types: Cigarettes     Smokeless tobacco: Never Used     Tobacco comment: 'I don't smoke anymore but my  does\"   Substance Use Topics     Alcohol use: No     Alcohol/week: 0.0 standard drinks     Drug use: No       Family History   I have reviewed this patient's family history and updated it with pertinent information if needed.  Family History   Problem Relation Age of Onset     Cerebrovascular Disease Mother      Cerebrovascular Disease Father      Genitourinary Problems Brother         Dialysis       Prior to Admission Medications   Prior to Admission Medications   Prescriptions Last Dose Informant Patient Reported? Taking?   ACE/ARB/ARNI NOT PRESCRIBED, INTENTIONAL,  Self No No   Sig: Please choose reason not prescribed, below   Nutritional Supplements (BOOST)  Self No No   Sig: Take 1 Bottle by mouth 3 times daily (with meals)   Nutritional Supplements (CARNATION INSTANT BREAKFAST) LIQD  Self Yes No   Sig: Take 1 packet by mouth 3 times daily as needed   Nutritional Supplements " (ENSURE PLUS) LIQD   No No   Sig: Take 1 each by mouth 4 times daily   Respiratory Therapy Supplies (NEBULIZER/TUBING/MOUTHPIECE) KIT  Self No No   Si kit as needed (if becomes damaged)   albuterol (PROAIR HFA/PROVENTIL HFA/VENTOLIN HFA) 108 (90 Base) MCG/ACT inhaler Unknown at Unknown time Self No Yes   Sig: Inhale 2 puffs into the lungs every 6 hours as needed for shortness of breath / dyspnea   albuterol (PROVENTIL) (2.5 MG/3ML) 0.083% neb solution 2021 at 1630  No Yes   Sig: NEBULIZE CONTENTS OF ONE VIAL EVERY 4 HOURS AS NEEDED FOR SHORTNESS OF BREATH / DIFFICULTY BREATHING OR WHEEZING   Patient taking differently: Take 2.5 mg by nebulization every 6 hours as needed for shortness of breath / dyspnea or wheezing    aspirin 81 MG EC tablet 2021 at 0830 Self No Yes   Sig: Take 1 tablet (81 mg) by mouth daily   atorvastatin (LIPITOR) 20 MG tablet 9/15/2021 at 2100  No Yes   Sig: Take 1 tablet (20 mg) by mouth At Bedtime   budesonide-formoterol (SYMBICORT) 160-4.5 MCG/ACT Inhaler 2021 at 1700  No Yes   Sig: Inhale 2 puffs into the lungs 2 times daily   clopidogrel (PLAVIX) 75 MG tablet 2021 at 0830  No Yes   Sig: TAKE ONE TABLET BY MOUTH ONCE DAILY   doxycycline hyclate (VIBRAMYCIN) 100 MG capsule 2021 at 1700  No Yes   Sig: Take 1 capsule (100 mg) by mouth 2 times daily   ipratropium - albuterol 0.5 mg/2.5 mg/3 mL (DUONEB) 0.5-2.5 (3) MG/3ML neb solution 2021 at 2051  Yes Yes   Sig: Take 1 vial (3 mLs) by nebulization 4 times daily Until hospital follow up, then as directed by your primary care provider   losartan (COZAAR) 25 MG tablet 2021 at 0830  No Yes   Sig: Take 1 tablet (25 mg) by mouth daily   metoprolol tartrate (LOPRESSOR) 25 MG tablet 2021 at 1700  No Yes   Sig: Take 0.5 tablets (12.5 mg) by mouth 2 times daily   montelukast (SINGULAIR) 10 MG tablet 9/15/2021 at 2100  No Yes   Sig: Take 1 tablet (10 mg) by mouth At Bedtime   morphine (MSIR) 15 MG IR tablet  9/16/2021 at 1400  No Yes   Sig: Take 0.5 tablets (7.5 mg) by mouth every 6 hours as needed (air hunger)   Patient taking differently: Take 3.25 mg by mouth every 6 hours as needed (air hunger)    nitroGLYcerin (NITROSTAT) 0.4 MG sublingual tablet   No No   Sig: For chest pain place 1 tablet under the tongue every 5 minutes for 3 doses. If symptoms persist 5 minutes after 1st dose call 911.   order for DME  Self No No   Sig: Equipment being ordered: Nebulizer machine   umeclidinium (INCRUSE ELLIPTA) 62.5 MCG/INH inhaler 9/16/2021 at 1300  No Yes   Sig: Inhale 1 puff into the lungs daily      Facility-Administered Medications: None     Allergies   Allergies   Allergen Reactions     Crestor [Rosuvastatin] Other (See Comments)     dizziness     Lisinopril Cough     Statins [Hmg-Coa-R Inhibitors] Other (See Comments)     Muscle/joint aching     Optison [Albumin Human] Other (See Comments)     Pt was flush and very dizzy.  Also had a BP drop     Penicillins Rash       Physical Exam   Vital Signs: Temp: 99.6  F (37.6  C) Temp src: Oral BP: 102/55 Pulse: 84   Resp: 28 SpO2: 98 % O2 Device: None (Room air)    Weight: 85 lbs 0 oz    Gen:  Well-developed, cachectic, in no acute distress, lying semi-supine in hospital stretcher  HEENT:  Anicteric sclera, PER, hearing intact to voice  Resp:  No accessory muscle use, poor effort, mild rhonchi more on the right side, no wheezes no rales no rhonchi  Card:  No murmur, normal S1, S2   Abd:  Soft per RN exam, no TTP, non-distended, normoactive bowel sounds are present  Musc:  Normal strength and movement of the major muscle groups without obvious deformity  Psych:  Good insight, oriented to person, place and time, not anxious, not agitated    Data     Recent Labs   Lab 09/16/21 2020   WBC 11.2*   HGB 10.4*   MCV 89         POTASSIUM 3.9   CHLORIDE 97   CO2 33*   BUN 13   CR 0.62   ANIONGAP 4   CALDERON 8.1*   *   ALBUMIN 2.2*   PROTTOTAL 5.9*   BILITOTAL 0.5   ALKPHOS  102   ALT 88*   AST 50*   TROPONIN 0.064*         Recent Results (from the past 24 hour(s))   XR Chest Port 1 View    Narrative    XR CHEST PORT 1 VIEW  9/16/2021 8:27 PM       INDICATION: Dyspnea  COMPARISON: 9/10/2021       Impression    IMPRESSION: Worsening airspace infiltrate in the right mid lung, both  in the right upper lobe and inferior to the right minor fissure. Trace  right pleural effusion. No recurrent pneumothorax. The left lung  remains clear.    OZIEL DERAS MD         SYSTEM ID:  [id number]

## 2021-09-18 NOTE — PROGRESS NOTES
Pt's last 2 blood pressures 77/47 and 78/39 with MAPS <65.  MD notified.  Plan to place picc line and start peripheral levophed at a low dose until picc line established.  Pt's  aware of plan and gave consent to place.

## 2021-09-18 NOTE — PROCEDURES
[facility]    Double Lumen PICC Placement    Date/Time: 9/18/2021 1:46 AM  Performed by: Nehal Nair RN  Authorized by: Vince Hutchinson NP   Indications: vascular access    UNIVERSAL PROTOCOL   Site Marked: NA  Prior Images Obtained and Reviewed:  NA  Required items: Required blood products, implants, devices and special equipment available    Patient identity confirmed:  Verbally with patient, arm band and provided demographic data  NA - No sedation, light sedation, or local anesthesia  Confirmation Checklist:  Patient's identity using two indicators, relevant allergies, procedure was appropriate and matched the consent or emergent situation and correct equipment/implants were available  Time out: Immediately prior to the procedure a time out was called    Universal Protocol: the Joint Commission Universal Protocol was followed    Preparation: Patient was prepped and draped in usual sterile fashion    ESBL (mL):  10         ANESTHESIA    Anesthesia: Local infiltration  Local Anesthetic:  Lidocaine 1% without epinephrine  Anesthetic Total (mL):  0.5      SEDATION    Patient Sedated: No        Preparation: skin prepped with 2% chlorhexidine  Skin prep agent: skin prep agent completely dried prior to procedure  Sterile barriers: maximum sterile barriers were used: cap, mask, sterile gown, sterile gloves, and large sterile sheet  Hand hygiene: hand hygiene performed prior to central venous catheter insertion  Type of line used: Power PICC  Catheter type: double lumen  Lumen type: valved  Catheter size: 5 Fr  Brand: Bard  Lot number: [id number]  Placement method: ultrasound  Number of attempts: 2  Successful placement: yes  Orientation: right  Location: brachial vein (medial)  Arm circumference: adults 10 cm  Extremity circumference: 19  Visible catheter length: 5  Internal length: 37 cm  Total catheter length: 42  Dressing and securement: antibiotic disc placed, occlusive dressing  applied, statlock and securement device (Securacath and statlock used)  Post procedure assessment: blood return through all ports and placement verified by x-ray  PROCEDURE   Patient Tolerance:  Patient tolerated the procedure well with no immediate complications  Describe Procedure: Routine PICC placement. Consent obtained over the phone with patients , verified by 2 witnesses. Patient verbally consented as well. Initial attempt in right basilic vein but patient pulled arm away with initial access attempt. 2nd attempt with another RN to help distract patient. 2nd attempt successful in right medial brachial vein. No difficulty with advancement. Brisk blood return both lumens, flushes easily in both lumens. First xray, line appeared deep, retracted to a total of 5cm external line and secured. Xray results of second image as read per Darryl Temple stated Right PICC placed with tip at the cavoatrial junction. Primary RN aware line is good to use. Patient tolerated procedure with distraction. Both a Securacath and statlock were used as patient does need reminding about the line and not to pull on it.

## 2021-09-18 NOTE — PROGRESS NOTES
CLINICAL NUTRITION SERVICES - BRIEF NOTE     Nutrition Prescription    Malnutrition Status:    See pervious RD note     Recommendations already ordered by Registered Dietitian (RD):  Continue PRN supplements    Future/Additional Recommendations:  1. Consider scheduling supplements if intakes remain poor   2. Encourage oral intake as tolerated. Offer patient a variety of snacks and supplements throughout the day to help meet nutritional needs. May consider supplemental TFs to help with nutritional status given chronic malnutrition.   3. Monitor weight trends      REASON FOR ASSESSMENT  Preeti Ford is a 70 year old female assessed by the dietitian for Nutrition Valeriy < 3    Findings  Pt currently being followed by RD. See full assessment note from 9/17. Supplements ordered    INTERVENTIONS  Implementation   Medical food supplement therapy        Follow up/Monitoring  Progress toward goals will be monitored and evaluated per protocol.     Ariane Bucio MS, RD, LDN  Unit Pager [phone]     Verbal/written post procedure instructions were given to patient/caregiver

## 2021-09-18 NOTE — PROGRESS NOTES
Transfer from med/surg to ICU to closely monitor BP.  Pt currently on 2L via NC, lungs diminished.  Denies pain.  AO only to self.  Refusing to wear trilogy machine overnight.  Will continue to monitor BP closely and plan for picc and pressors if unable to maintain a MAP > 65.

## 2021-09-18 NOTE — PROGRESS NOTES
[facility]    Medicine Progress Note - Hospitalist Service       Date of Admission:  9/16/2021    Assessment & Plan           70-year-old woman with COPD and chronic hypoxic and hypercapnic respiratory failure admitted with concerns for pneumonia, COPD exacerbation, and hypotension.  After investigation, sepsis is being ruled out because serial procalcitonin levels have been low, lactic acid has been normal, and she has not had classic systemic inflammatory response syndrome.  Pneumonia remains suspected but may be a more indolent process because right lung radiographic infiltrate was described in early September when she was hospitalized with pneumothorax.  Additionally, according to report of chest x-ray from September 10, there is question of possible cavitation in the right lung with air-fluid levels at that time although that finding was not reported on chest x-ray during this hospitalization.  Noninfectious process including malignancy is possible in this patient who has a long smoking history.  Signs of COPD exacerbation appear to be improving.  Pneumothorax has not been evident radiographically during this hospitalization.    Because there is lower suspicion for infection, hypotension is probably not due to sepsis.  Rather, aortic stenosis and previous chronic antihypertensive medications may contribute to hypotension.  However, at this time it is not clear that her degree of aortic stenosis is severe enough to cause such severe ongoing hypotension.  Finally, anemia has worsened, and worsening anemia could be causing hypotension.  She does have risk factors for bleeding including chronic dual antiplatelet therapy with associated platelet function defect.  Occult GI bleeding is possible.  She also appears to have severe malnutrition which could increase her risk for hypovolemia and hypotension.    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    Severe malnutrition (H)-reduced intake, weight loss, loss of SC fat and muscle    COPD with acute exacerbation (H)    Pneumonia of right lung due to infectious organism    Chronic respiratory failure with hypoxia and hypercapnia (H)-home O2 and Trilogy    History of stroke - right thalamus in 8/2013 and left cerebellar and right vermis in 3/2018    Cigarette smoker    Hold aspirin and Plavix due to concern for bleeding  Hold losartan and metoprolol due to hypotension  Continue Levophed titrated to keep mean arterial pressure 65 and higher, wean Levophed infusion as blood pressure tolerates  Avoiding aggressive additional IV fluid therapy at this time but reconsider boluses if there is increased concern for hypovolemia  Follow serial hemoglobin and check stool for occult blood  Transfuse for hemoglobin 7 or less, active bleeding, or worsening hemodynamic instability with concern for active bleeding  Start Protonix empirically  Continue Rocephin and Zithromax  Continue steroids and bronchodilators  Check phosphorus and provide supplementation if indicated  Anticipate chest CT once she is hemodynamically stable to evaluate the right lung abnormality reported on chest x-rays  Continue oxygen supplementation titrated to keep saturations 90% and higher as long as she does not develop worsening hypercapnia, may use home trilogy if she will tolerate it       Diet: Easy to Chew Diet (level 7)  Snacks/Supplements Adult: Other; Other; Between Meals    DVT Prophylaxis: Pneumatic Compression Devices,  hold anticoagulation for now because of concern for possible bleeding  Barnhart Catheter: Not present  Central Lines: None  Code Status: Full Code      Disposition Plan   Expected discharge:  unknown recommended to Be determined once Medically stable.     The patient's care was discussed with the Bedside Nurse, Care Coordinator/ and Patient.    Gael Bass MD  Hospitalist Service  [facility]  Securely message with the Vocera Web Console (learn more here)  Text page via [employer] Paging/Directory      Clinically Significant Risk Factors Present on Admission              ______________________________________________________________________    Interval History   She says she does not feel too badly.  She denies any dizziness.  She denies shortness of breath.  She says she does not like to use the trilogy machine and does not normally use it at home.  She was severely hypotensive last evening and was transferred to the ICU and started on norepinephrine infusion.  PICC line was placed overnight.  She remains on norepinephrine infusion and has not been able to wean off so far due to persistent severe hypotension.  She is been afebrile with normal heart rate.  Oxygenation has been stable.  Nursing reports that she has been incontinent of urine.  She is tolerating some oral intake although not eating much.  She denies any abdominal pain or nausea.  She denies any recent bleeding.    Data reviewed today: I reviewed all medications, new labs and imaging results over the last 24 hours. I personally reviewed cardiac monitor strips which have not demonstrated any dysrhythmias.    Physical Exam   Vital Signs: Temp: 97.9  F (36.6  C) Temp src: Oral BP: 102/48 Pulse: 84   Resp: 24 SpO2: 97 % O2 Device: Nasal cannula Oxygen Delivery: 1 LPM  Weight: 90 lbs 4.8 oz Body mass index is 17.38 kg/m .  General Appearance: Thin, underweight, cachectic elderly woman, tachypneic but no acute distress  sitting in bed, able to speak full sentences  Respiratory: No use of accessory muscles, severely diminished breath sounds throughout with clear lung fields  Cardiovascular: Regular rate and rhythm, palpable radial pulse, normal capillary refill, no peripheral edema  GI: Normal bowel sounds, nondistended abdomen, soft, no abdominal tenderness  Skin: Pale color, no rash  Other: Alert and maintains wakefulness and attention, seems to answer questions appropriately    Data   Recent Labs   Lab 21  0600 21  0505 21  2335 21   WBC 8.5 7.1  --  11.2*   HGB 8.8* 9.6*  --  10.4*   MCV 90 90  --  89    344  --  359    140  --  134   POTASSIUM 4.0 4.0  --  3.9   CHLORIDE 108 105  --  97   CO2 33* 31  --  33*   BUN 13 14  --  13   CR 0.50* 0.46*  --  0.62   ANIONGAP 1* 4  --  4   CALDERON 8.1* 7.9*  --  8.1*   GLC 95 155*  --  111*   ALBUMIN  --   --   --  2.2*   PROTTOTAL  --   --   --  5.9*   BILITOTAL  --   --   --  0.5   ALKPHOS  --   --   --  102   ALT  --   --   --  88*   AST  --   --   --  50*   TROPONIN  --   --  0.070* 0.064*     Procalcitonin 0.05 today, unchanged compared with 0.07 yesterday  Blood cultures are negative so far  Lactic acid has been normal    Recent Labs   Lab 21  1438 21   PH 7.40  --    PCO2 51*  --    PO2 85  --    HCO3 31*  --    O2PER 30 30     PaO2 to FiO2 ratio on ABG  was 283 which is improved as compared with 203 on ABG on     Old records were reviewed for comparison.  On , hemoglobin was 13.0.    Recent Results (from the past 24 hour(s))   Echocardiogram Limited   Result Value    LVEF  >70%    Narrative    [id number]  [id number]  [id number]  [id number]^ATIF^JOAQUÍN     [facility]  Echocardiography Laboratory  [address] [place] [address]     Name: TARIK HERNANDEZ  MRN: [id number]  : 1950  Study Date: 2021 11:44 AM  Age: 70 yrs  Gender: Female  Patient Location:  PH2A  Reason For Study: Aortic Valve Disorder, SOB  History: Tobacco Abuse, COPD, PAD,CVA, AS, CAD  Ordering Physician: JOAQUÍN SRIVASTAVA  Performed By: AVERY GARCIA     BSA: 1.4 m2  Height: 63 in  Weight: 90 lb  HR: 76  BP: 72/34 mmHg  ______________________________________________________________________________  ______________________________________________________________________________  Interpretation Summary     Moderate to severe aortic valve stenosis.  Visually, aortic valvular opening is severely restricted.  Peak transaortic velocity is 3 m/s with a mean gradient of 19 mmHg giving a  valve area of 0.9-1.2 cmÂ . Although the stroke-volume index is normal, patient  was significantly hypotensive at 72/34 mmHg.  I suspect that the patient's aortic valve hemodynamics on the study are  underestimated.  Normal left ventricular wall thickness.  Hyperdynamic left ventricular function  The visual ejection fraction is >70%.  The right ventricle is normal in size and function.     As noted on previous study dated 6/7/2021, due to discrepancy in aortic valve  findings, please consider corroborative imaging.  ______________________________________________________________________________  Left Ventricle  The left ventricle is normal in size. There is normal left ventricular wall  thickness. Hyperdynamic left ventricular function. The visual ejection  fraction is >70%. Left ventricular diastolic function is indeterminate.     Right Ventricle  The right ventricle is normal in size and function.     Atria  Normal left atrial size. Right atrial size is normal. There is no color  Doppler evidence of an atrial shunt.     Mitral Valve  The mitral valve leaflets appear normal. There is no evidence of stenosis,  fluttering, or prolapse. There is trace mitral regurgitation.     Tricuspid Valve  Normal tricuspid valve. There is trace tricuspid regurgitation. The right  ventricular systolic pressure is approximated at 41.4  mmHg plus the right  atrial pressure.     Aortic Valve  The aortic valve is trileaflet. There is mild (1+) aortic regurgitation.  Moderate to severe valvular aortic stenosis.     Pulmonic Valve  The pulmonic valve is not well seen, but is grossly normal. There is trace  pulmonic valvular regurgitation.     Vessels  The aortic root is normal size. Normal size ascending aorta. The inferior vena  cava is normal.     Pericardium  There is no pericardial effusion.     Rhythm  Sinus rhythm was noted.  ______________________________________________________________________________  MMode/2D Measurements & Calculations     IVSd: 0.85 cm  LVIDd: 4.2 cm  LVIDs: 2.8 cm  LVPWd: 0.99 cm  FS: 34.9 %  LV mass(C)d: 123.4 grams  LV mass(C)dI: 89.4 grams/m2  LA dimension: 3.6 cm  LVOT diam: 2.1 cm  LVOT area: 3.6 cm2  RWT: 0.47     Doppler Measurements & Calculations  Ao V2 max: 300.0 cm/sec  Ao max P.0 mmHg  Ao V2 mean: 200.9 cm/sec  Ao mean P.6 mmHg  Ao V2 VTI: 62.4 cm  YOLANDA(I,D): 0.97 cm2  YOLANDA(V,D): 1.2 cm2  LV V1 max PG: 3.8 mmHg  LV V1 max: 97.6 cm/sec  LV V1 VTI: 16.8 cm  SV(LVOT): 60.4 ml  SI(LVOT): 43.8 ml/m2  TR max ryan: 321.9 cm/sec  TR max P.4 mmHg  AV Ryan Ratio (DI): 0.33  YOLANDA Index (cm2/m2): 0.70     ______________________________________________________________________________  Report approved by: Dr Lynsey Mendoza 2021 04:53 PM         XR Chest Port 1 View    Narrative    EXAM: XR CHEST PORT 1 VIEW  LOCATION: [facility]  DATE/TIME: 2021 1:03 AM    INDICATION: RN placed PICC - verify tip placement  COMPARISON: 2021      Impression    IMPRESSION: Right PICC is been placed with tip at the cavoatrial junction. Stable density right midlung. Interval     Medications     norepinephrine 0.06 mcg/kg/min (21 1139)     sodium chloride 0.8 mL/hr at 21 1139       aspirin  81 mg Oral Daily     atorvastatin  20 mg Oral At Bedtime     azithromycin  250 mg  Intravenous Q24H     cefTRIAXone  2 g Intravenous Q24H     clopidogrel  75 mg Oral Daily     enoxaparin ANTICOAGULANT  40 mg Subcutaneous Q24H JOHN     fluticasone-vilanterol  1 puff Inhalation Daily     ipratropium - albuterol 0.5 mg/2.5 mg/3 mL  3 mL Nebulization 4x Daily     montelukast  10 mg Oral At Bedtime     polyethylene glycol  17 g Oral Daily     predniSONE  40 mg Oral Daily     sodium chloride (PF)  10-40 mL Intracatheter Q8H     sodium chloride (PF)  3 mL Intracatheter Q8H     umeclidinium  1 puff Inhalation Daily

## 2021-09-18 NOTE — PLAN OF CARE
Temp: 97.9  F (36.6  C) Temp src: Oral BP: 111/53 Pulse: 74   Resp: 25 SpO2: 98 % O2 Device: Nasal cannula Oxygen Delivery: 1 LPM  Levophed gtt currently infusing at 0.07 mcg/kg/min to maintain MAP >65.  Attempt to titrate down on gtt resulted in BP 87/50 with MAP 59 while at 0.5mcg/kg/min.  Pt has diminished lung sounds.  VERDE with any activity and fatigues easily.  Air hunger noted after pt ambulated to bathroom this AM, roxanol given x1 with mild improvement noted in dyspnea.  Prn albuterol given, additionally.  Pt up to bedside commode with improvement in activity tolerance this afternoon.  Chlorhexidine bath completed and PICC line dressing change completed due to presence of bloody drainage.  Poor appetite, having 25-50% of meals.  Pt drank 180ml strawberry boost in between breakfast and lunch.  Pt is incontinent of urine, arianne cares provided.  Continuing to monitor and offer supportive cares.      Problem: Adult Inpatient Plan of Care  Goal: Patient-Specific Goal (Individualized)  Outcome: No Change  Flowsheets (Taken 9/18/2021 1353)  Patient-Specific Goals (Include Timeframe): Pt will have increased activity tolerance by 9/19/21  Goal: Absence of Hospital-Acquired Illness or Injury  Intervention: Identify and Manage Fall Risk  Recent Flowsheet Documentation  Taken 9/18/2021 1200 by Hali Davies, RN  Safety Promotion/Fall Prevention:   activity supervised   assistive device/personal items within reach   bed alarm on   clutter free environment maintained   fall prevention program maintained   nonskid shoes/slippers when out of bed   patient and family education   room near nurse's station   safety round/check completed   treat reversible contributory factors   treat underlying cause  Taken 9/18/2021 0730 by Hali Davies, RN  Safety Promotion/Fall Prevention:   activity supervised   assistive device/personal items within reach   bed alarm on   clutter free environment maintained   fall prevention program  maintained   nonskid shoes/slippers when out of bed   patient and family education   room near nurse's station   safety round/check completed   treat reversible contributory factors   treat underlying cause  Intervention: Prevent Skin Injury  Recent Flowsheet Documentation  Taken 9/18/2021 1200 by Hali Davies RN  Body Position: position changed independently  Taken 9/18/2021 0730 by Hali Davies RN  Body Position: position changed independently  Intervention: Prevent and Manage VTE (Venous Thromboembolism) Risk  Recent Flowsheet Documentation  Taken 9/18/2021 1200 by Hali Davies RN  VTE Prevention/Management: anticoagulant therapy maintained  Taken 9/18/2021 0730 by Hali Davies RN  VTE Prevention/Management: anticoagulant therapy maintained     Problem: Impaired Wound Healing  Goal: Optimal Wound Healing  Intervention: Promote Wound Healing  Recent Flowsheet Documentation  Taken 9/18/2021 1200 by Hali Davies RN  Activity Management: activity adjusted per tolerance  Taken 9/18/2021 0910 by Hali Davies RN  Activity Management: ambulated to bathroom  Taken 9/18/2021 0730 by Hali Davies RN  Activity Management: activity adjusted per tolerance

## 2021-09-18 NOTE — PLAN OF CARE
"Pt refused numerous times to wear home trilogy machine overnight.  Stated \"I don't wear that at home and I'm not wearing it here\".  So patient remained on 2L via NC overnight, titrated down to 1L as sats were 100%.  Lungs diminished t/o.  MAPS < 65 after transfer from med/surg floor, MD notified and order to start levophed peripherally given until a picc line could be placed.  Picc line does have a small amount of bloody drainage under dressing from insertion. Pt currently infusing at 0.07 mcg/kg/min on levophed gtt with MAPS > 65.  AO only to self, calm and cooperative most of the time but does get agitated with frequent cares.  Bed alarm on for safety.  Up to BR with assist of 1, unsteady and walker brought into room.  Incontinent of urine at times, brief on.  Preventative mepilex to back and coccyx.    "

## 2021-09-18 NOTE — CONSULTS
"Care Management Initial Consult    General Information  Assessment completed with: Patient, Spouse or significant other, Patient and - Bud   Type of CM/SW Visit: Initial Assessment    Primary Care Provider verified and updated as needed: Yes   Readmission within the last 30 days: previous discharge plan unsuccessful   Return Category: Exacerbation of disease  Reason for Consult: discharge planning, other (see comments) (High Risk for Hospital Readmission )  Advance Care Planning:    has a POSLT on file        Communication Assessment  Patient's communication style: spoken language (English or Bilingual)    Hearing Difficulty or Deaf: no   Wear Glasses or Blind: no    Cognitive  Cognitive/Neuro/Behavioral: .WDL except  Level of Consciousness: confused  Arousal Level: opens eyes spontaneously  Orientation: disoriented to, place, time, situation  Mood/Behavior: calm, cooperative  Best Language: 0 - No aphasia  Speech: clear, spontaneous    Living Environment:   People in home:       Current living Arrangements: house      Able to return to prior arrangements: yes       Family/Social Support:  Care provided by: self, spouse/significant other, homecare agency  Provides care for: no one, unable/limited ability to care for self  Marital Status:   , Other (specify) (other relatives )  Yoseph \"Bud\"         Description of Support System: Supportive, Involved    Support Assessment: Adequate family and caregiver support, Adequate social supports    Current Resources:   Patient receiving home care services: Yes  Skilled Home Care Services: Skilled Nursing, Home Health Aid  Community Resources:    Equipment currently used at home: none  Supplies currently used at home: Oxygen Tubing/Supplies, Nebulizer tubing    Employment/Financial:  Employment Status: retired        Financial Concerns: No concerns identified           Lifestyle & Psychosocial Needs:  Social Determinants of Health     Tobacco Use: Medium Risk " "    Smoking Tobacco Use: Former Smoker     Smokeless Tobacco Use: Never Used   Alcohol Use:      Frequency of Alcohol Consumption:      Average Number of Drinks:      Frequency of Binge Drinking:    Financial Resource Strain:      Difficulty of Paying Living Expenses:    Food Insecurity:      Worried About Running Out of Food in the Last Year:      Ran Out of Food in the Last Year:    Transportation Needs:      Lack of Transportation (Medical):      Lack of Transportation (Non-Medical):    Physical Activity:      Days of Exercise per Week:      Minutes of Exercise per Session:    Stress:      Feeling of Stress :    Social Connections:      Frequency of Communication with Friends and Family:      Frequency of Social Gatherings with Friends and Family:      Attends Latter-day Services:      Active Member of Clubs or Organizations:      Attends Club or Organization Meetings:      Marital Status:    Intimate Partner Violence:      Fear of Current or Ex-Partner:      Emotionally Abused:      Physically Abused:      Sexually Abused:    Depression: Not at risk     PHQ-2 Score: 0   Housing Stability:      Unable to Pay for Housing in the Last Year:      Number of Places Lived in the Last Year:      Unstable Housing in the Last Year:        Functional Status:  Prior to admission patient needed assistance:   Dependent ADLs:: Bathing  Dependent IADLs:: Cleaning, Cooking, Laundry, Shopping, Meal Preparation, Medication Management, Money Management, Transportation       Mental Health Status:  Mental Health Status: No Current Concerns       Chemical Dependency Status:  Chemical Dependency Status: No Current Concerns             Values/Beliefs:  Spiritual, Cultural Beliefs, Latter-day Practices, Values that affect care:            Values/Beliefs Comment: Unknown     Additional Information:  Care Management consulted for discharge planning and high NEETU.  Patient lives with her  \"Bud\" in their one-level home in [place].  " "Patient is considered high risk for hospital readmission.  She was recently hospitalized here at [facility] from 8/29/21 to 9/4/21.  She discharged to home with her  and [facility] RN and aide services.  Oh stated that he got a call from Dionna from home care \"that they will not be coming to the house while patient is in the hospital\", per Oh. Patient and Colrain would like to re-start the home care when patient returns home from the hospital.    Patient uses a walker for ambulation.  She and Colrain live in a one-level home with 2 stairs to enter.  Bud does the laundry, cooking and cleaning.  Patient uses home oxygen.  She also has a Trilogy machine, but prefers not to use it.      Patient and  plan for patient to return home after the hospital stay. Colrain plans to transport and will bring patient's portable oxygen tank for the transport.      1602- Writer spoke with , Oh, over the phone.  Writer verified with him that he is still patient's PCA for 19 hours per week.  He is employed through [employer].  Patient has a [facility] worker- Melinda.       PHOEBE Chavez  [facility]   [phone]     "

## 2021-09-19 NOTE — PROGRESS NOTES
"   09/19/21 1115   Quick Adds   Type of Visit Initial PT Evaluation       Present no   Living Environment   People in home spouse   Current Living Arrangements house   Home Accessibility stairs to enter home   Number of Stairs, Main Entrance 3   Stair Railings, Main Entrance railings on both sides of stairs   Transportation Anticipated family or friend will provide   Living Environment Comments difficult to determine - patient poor historian. from her description sounds like home is 2 level with basement and main level.    Disability/Function   Hearing Difficulty or Deaf no   Fall history within last six months no  (per patient)   General Information   Onset of Illness/Injury or Date of Surgery 09/14/21  (approx per ED notes)   Referring Physician Dr Gael Bass   Patient/Family Therapy Goals Statement (PT) unable to elicit today   Pertinent History of Current Problem (include personal factors and/or comorbidities that impact the POC) 69 yo female with hisptry of tobacco abuse, COPD with supplemental oxygen at home, hospitalized for spontaneous pneumothorax from 8- to 9-4-2021, forgetful, history of CVA years ago. Went to ED due to shortness of breath and was admitted on 9-. She is a poor historian so most of the information was from ED notes and patiet report.    Existing Precautions/Restrictions fall   Weight-Bearing Status - LUE full weight-bearing   Weight-Bearing Status - RUE full weight-bearing   Weight-Bearing Status - LLE full weight-bearing   Weight-Bearing Status - RLE full weight-bearing   General Observations laying in bed with oxygen via nasal cannula in place, bed alarm in place and IV as well as BP and pulse oxymeter in palce. Her oxygen was 99%, /58   Cognition   Follows Commands (Cognition) unable/difficult to assess   Behavioral Issues other (see comments)  (impulsive, difficulty expressing self and uses \"fine\" often)   Cognitive Status Comments She is not " able to answer questions and answer them incorrectly at times. She was asked to walk around the bed  with walker and impulsively stood and walked at a fast unsafe pace to the door and not pausing so PT could keep her lines from tangling.    Pain Assessment   Patient Currently in Pain No   Posture    Posture Comments forward    Strength   Strength Comments 1/2 grade decrease R shoulder elevatiion (4-/5) compared to the L (4/5) and DF: R: 4-/5 and L: 4/5   Bed Mobility   Comment (Bed Mobility) independent supine<->sit   Transfers   Transfer Safety Comments independent sit<->stand   Gait/Stairs (Locomotion)   [place] Level (Gait) modified independence   Assistive Device (Gait) walker, front-wheeled   Distance in Feet (Required for LE Total Joints) 20 feet   Pattern (Gait) other (see comments)  (impulsive and not safe even w/VCs to slow down)   Comment (Gait/Stairs) pushed walker too far ahead of her so she has to bend forward to reach it.    Clinical Impression   Criteria for Skilled Therapeutic Intervention yes, treatment indicated   PT Diagnosis (PT) Endurance    Influenced by the following impairments feels she cannot breathe even with oxygen levels at 99%, anxious, memory, endurance   Functional limitations due to impairments walking safely   Clinical Presentation Evolving/Changing   Clinical Presentation Rationale clinical judgement   Clinical Decision Making (Complexity) moderate complexity   Therapy Frequency (PT) Daily   Predicted Duration of Therapy Intervention (days/wks) 1-2 sessions for gait on level and steps   Planned Therapy Interventions (PT)   (functional activities for endurance)   Anticipated Equipment Needs at Discharge (PT)   (unable to determine, reports she has a cane at home)   Risk & Benefits of therapy have been explained   (attempted - pt not appeaaring to understand info)   Clinical Impression Comments Preeti is able to complete transfers and can amubulate with walker but is unsafe. She  is anxious about not being able to breathe even with higher level sats per monitor. She is unable to answer 30% of questions and poor historian so information from ED and notes as well as patient. She would benefit from energy conservation information and may need to explain to family member vs patient. She reports she does not use cane except at home and does not go out much.    PT Discharge Planning    PT Discharge Recommendation (DC Rec) home with home care physical therapy;home with assist   PT Rationale for DC Rec She is walking and transferring, good bed mobility. Concerned about her impulsivity and endurance for functional activities once home. Because of memory issues recommend 24 supervision with skilled interventions at home to assess her safety and needs.    PT Brief overview of current status  She is demonstrating impulsive behaviors possibly due to her anxiety over not feeling she can breathe. She is also in a new environment and this may affect her ability to focus on her tasks and my be confusing for her.    Total Evaluation Time   Total Evaluation Time (Minutes) 26   Coping Strategies   Trust Relationship/Rapport other (see comments)  (attempted but pt anxious. )

## 2021-09-19 NOTE — PROGRESS NOTES
[facility]    Medicine Progress Note - Hospitalist Service       Date of Admission:  9/16/2021    Assessment & Plan       70-year-old woman with COPD and chronic hypoxic and hypercapnic respiratory failure admitted with concerns for pneumonia, COPD exacerbation, and hypotension.  After investigation, sepsis is being ruled out because serial procalcitonin levels have been low, lactic acid has been normal, and she has not had classic systemic inflammatory response syndrome.  Pneumonia remains possible but may be a more indolent infection such as an anaerobic or fungal infection because right lung radiographic infiltrate was described in early September when she was hospitalized with pneumothorax.  Additionally, according to report of 2 view chest x-ray from September 2, there is question of possible cavitation in the right lung with air-fluid levels at that time although that finding was not reported on single view chest x-ray during this hospitalization.  Cavitation could be associated with a pulmonary infectious process including abscess, but noninfectious process in the right lung including malignancy is also possible in this patient who has a long smoking history.  So far, empiric antibiotic therapy has not clearly been associated with any clinical improvement.    Signs of COPD exacerbation appear to be improving.  Pneumothorax has not been evident radiographically during this hospitalization.    Because there is lower suspicion for infection, hypotension is probably not due to sepsis.  Rather, aortic stenosis and previous chronic antihypertensive medications probably precipitated hypotension superimposed upon hypovolemia associated with chronic poor oral intake and severe malnutrition.  Recently worsening anemia may also be contributing to hypotension.  Cause for worsening anemia is not clear, but she does have risk factors for bleeding including chronic dual antiplatelet therapy  with associated platelet function defect.  Occult GI bleeding is possible.  She also appears to have severe malnutrition likely increasing her risk for hypovolemia and hypotension.  Hypotension now appears to be improving and she weaned off of need for vasopressor therapy overnight last night.    Principal Problem:    Hypotension, unspecified hypotension type  Active Problems:    Anemia    PAD (peripheral artery disease) (H) - moderate left common carotid stenosis, aortoiliac bypass, chronic left vertebral and right posterior cerebral stenoses    Moderate to severe pulmonary hypertension (H)-per Echo    Nonrheumatic aortic valve stenosis - moderate to severe    S/P drug eluting coronary stent placement-LAD and circ, circ re-do 5/17/18    Abnormal CXR 9/2-?right mid lung cavitation with air-fluid levels    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    Severe malnutrition (H)-reduced intake, weight loss, loss of SC fat and muscle    COPD with acute exacerbation (H)    Pneumonia of right lung due to infectious organism    Chronic respiratory failure with hypoxia and hypercapnia (H)-home O2 and Trilogy    History of stroke - right thalamus in 8/2013 and left cerebellar and right vermis in 3/2018    Cigarette smoker    Chest CT advised for further characterization of radiographic abnormalities noted on chest x-ray including concern for cavitating lesion in the right lung particularly given diagnostic uncertainty as to whether this is an infectious or noninfectious process and to help guide recommendations for further evaluation and treatment, patient is refusing today to undergo chest CT and has questionable capacity for informed decision making due to suspected dementia after old stroke so will revisit this recommendation with her spouse    Continue to hold aspirin and Plavix due to concern for bleeding  Continue to hold losartan and metoprolol due to hypotension    Avoiding aggressive additional IV fluid  therapy at this time but reconsider boluses if there is increased concern for hypovolemia    Continue Protonix empirically    Continue Rocephin and Zithromax, anticipate completing a 5-day treatment course and potentially then stopping antibiotics, today is day 3  continue steroids and bronchodilators    Continue oxygen supplementation 2 L nasal cannula which is her home dose with additional titration as needed to keep saturations 90% and higher as long as she does not develop worsening hypercapnia, may use home trilogy if she will tolerate it    Encourage advanced activity today, may transfer to floor         Diet: Easy to Chew Diet (level 7)  Snacks/Supplements Adult: Other; Other; Between Meals    DVT Prophylaxis: Pneumatic Compression Devices, Lovenox on hold because of concerns about bleeding  Barnhart Catheter: Not present  Central Lines: None  Code Status: Full Code      Disposition Plan   Expected discharge:  unknown recommended to Be determined once Diagnostic evaluation has been completed, treatment plan has been formulated, and she is medically stable.     The patient's care was discussed with the Bedside Nurse, Care Coordinator/ and Patient.    Gael Bass MD  Hospitalist Service  [facility]  Securely message with the Vocera Web Console (learn more here)  Text page via [facility] Paging/Directory      Clinically Significant Risk Factors Present on Admission     ______________________________________________________________________    Interval History   Patient and her spouse report that she feels far more short of breath and anxious after she eats.  This is an observation they have been making at home as well.  She denies any choking during eating.  Nursing reports that the patient did not desaturate after eating although was obviously more short of breath and anxious.  Patient has been receiving doses of concentrated morphine orally intermittently which  dramatically improve her symptoms of dyspnea and anxiety.  Patient otherwise has no complaints today but generally feels tired.  She has been afebrile with temperature as low as 96.  Levophed infusion was discontinued at about 1 AM and her blood pressure has been generally stable since that time with only occasional low blood pressure readings from which she has not been symptomatic today.  Oxygenation has been stable on her normal home dose of supplemental oxygen 2 L by nasal cannula continuously.  Urine output has been good.    Data reviewed today: I reviewed all medications, new labs and imaging results over the last 24 hours. I personally reviewed cardiac monitor strips demonstrating normal sinus rhythm.    Physical Exam   Vital Signs: Temp: 98.4  F (36.9  C) Temp src: Oral BP: 103/88 Pulse: 90   Resp: 21 SpO2: 99 % O2 Device: Nasal cannula Oxygen Delivery: 2 LPM  Weight: 96 lbs 6.4 oz  General Appearance: Thin, cachectic chronically ill-appearing elderly woman, no signs of acute distress resting in bed, able to speak full sentences  Respiratory: Normal respiratory effort, diminished breath sounds throughout  Cardiovascular: Regular rate and rhythm, normal capillary refill, no peripheral edema    Data   Recent Labs   Lab 09/19/21  0546 09/19/21  0545 09/19/21  0014 09/18/21  1826 09/18/21  1213 09/18/21  0600 09/17/21  0505 09/17/21  0505 09/16/21  2335 09/16/21 2020 09/16/21 2020   WBC  --   --   --   --   --  8.5  --  7.1  --   --  11.2*   HGB 9.4*  --  8.6* 9.4*   < > 8.8*   < > 9.6*  --    < > 10.4*   MCV  --   --   --   --   --  90  --  90  --   --  89   PLT  --   --  383  --   --  383  --  344  --    < > 359   NA  --  141  --   --   --  142  --  140  --    < > 134   POTASSIUM  --  4.5  --   --   --  4.0  --  4.0  --    < > 3.9   CHLORIDE  --  104  --   --   --  108  --  105  --    < > 97   CO2  --  36*  --   --   --  33*  --  31  --    < > 33*   BUN  --  12  --   --   --  13  --  14  --    < > 13   CR   --  0.56  --   --   --  0.50*  --  0.46*  --    < > 0.62   ANIONGAP  --  1*  --   --   --  1*  --  4  --    < > 4   CALDERON  --  8.3*  --   --   --  8.1*  --  7.9*  --    < > 8.1*   GLC  --  79  --   --   --  95  --  155*  --    < > 111*   ALBUMIN  --   --   --   --   --   --   --   --   --   --  2.2*   PROTTOTAL  --   --   --   --   --   --   --   --   --   --  5.9*   BILITOTAL  --   --   --   --   --   --   --   --   --   --  0.5   ALKPHOS  --   --   --   --   --   --   --   --   --   --  102   ALT  --   --   --   --   --   --   --   --   --   --  88*   AST  --   --   --   --   --   --   --   --   --   --  50*   TROPONIN  --   --   --   --   --   --   --   --  0.070*  --  0.064*    < > = values in this interval not displayed.     Phosphorus 2.5 yesterday    Blood cultures remain negative    Medications       [Held by provider] aspirin  81 mg Oral Daily     atorvastatin  20 mg Oral At Bedtime     azithromycin  250 mg Oral QPM     cefTRIAXone  2 g Intravenous Q24H     [Held by provider] clopidogrel  75 mg Oral Daily     [Held by provider] enoxaparin ANTICOAGULANT  40 mg Subcutaneous Q24H JOHN     fluticasone-vilanterol  1 puff Inhalation Daily     ipratropium - albuterol 0.5 mg/2.5 mg/3 mL  3 mL Nebulization 4x Daily     montelukast  10 mg Oral At Bedtime     pantoprazole  40 mg Oral BID AC     polyethylene glycol  17 g Oral Daily     predniSONE  40 mg Oral Daily     sodium chloride (PF)  10-40 mL Intracatheter Q8H     sodium chloride (PF)  3 mL Intracatheter Q8H     umeclidinium  1 puff Inhalation Daily

## 2021-09-19 NOTE — PLAN OF CARE
"S-(situation): end of shift note    B-(background): Acute/Chronic Respiratory Distress    A-(assessment): patient had one low BP with map less than 65 when lying on right side, normal when adjusted cuff AND one low BP after receiving roxanol.  Patient refused CT exam x2, reports she had \"this test before and doesn't want it again\".  Patient anxious and reports difficulty breathing all morning, sat above 92%.  Admin Roxanol for discomfort with breathing and after patient reports \"I feel great\" and \"thank you for helping me breath\" - o2 sat 95 and greater after.    R-(recommendations): Plan to transfer out to medsur unit.  Plan to talk to  about refusal of CT exam.    "

## 2021-09-19 NOTE — PLAN OF CARE
Levophed weaned overnight and has been off since 1 am.  BP MAPs> 65 since drip discontinued.  Serial hemoglobins drawn, at 9.4 this am.  Lungs diminished, sats good on 2L via NC.  Pt gets very dyspneic with activity, prn alb neb and roxanol given x 1 this am for air hunger after returning back to bed from commode.  Gets OOB with assist of 1, incontinent most of the time.  AO only to self but pleasant and cooperative.

## 2021-09-19 NOTE — PROGRESS NOTES
"S- Transfer to [facility] from ICU.    B- Hypotension    A- Brief systems assessment: Pt is A & O x 2 but flucuates, VSS, afebrile, denies pain, 2.5L 02 via NC.    R- Transfer to [facility] per physician orders. Continue to monitor pt and update physician as needed.      Code status: Full Code  Skin: See flowsheets  Fall Risk: Yes- Department fall risk interventions implemented.  Isolation and Signage: None  Medication drips upon transfer: S/L  Blue Bin checked and medications transfer out with patientYesABLE\"}    "

## 2021-09-20 PROBLEM — R46.89 UNCOOPERATIVE BEHAVIOR: Status: ACTIVE | Noted: 2021-01-01

## 2021-09-20 PROBLEM — R46.89 UNCOOPERATIVE BEHAVIOR: Status: RESOLVED | Noted: 2021-01-01 | Resolved: 2021-01-01

## 2021-09-20 NOTE — PLAN OF CARE
S-(situation): End of shift note    B-(background): COPD, Pneumonia, hypotension    A-(assessment): Vital signs stable. Afebrile. Lung sounds diminished. On 2L O2 per nasal cannula. Pt states breathing feels much better since receiving the Roxanol. A&O to self, but fluctuates. Pt denies pain. Ambulated to bedside commode with 1 assist. Voiding adequately. Able to make needs known and uses call light appropriately. At 0630, pt stated she could not breathe. Tried albuterol neb and gave Roxanol.     R-(recommendations): Continue to monitor per care plan.

## 2021-09-20 NOTE — PLAN OF CARE
S-(situation): Shift note    B-(background): Admitted 9/17 with COPD exacerbation and pneumonia.    A-(assessment): Patient's lungs diminished in upper lobes and wheezy in the bases. Short of air with any activity.  Up to bathroom with one assist, walker, and gait belt.  Patient continues to refuse lung CT.  Update given to patient's -- he plans to visit later this afternoon.    R-(recommendations): Monitor and treat respiratory status.  Encourage CT to determine further treatment.

## 2021-09-20 NOTE — PLAN OF CARE
"Pt is A & O to self only. VSS, afebrile, denies pain, remains on 2 L NC. Lung sounds diminished throughout. Earlier in the evening pt was anxious and stating \"I can't breathe\". Roxanol given x 1 and prn neb given x 1. Scheduled nebs given as well. Up to the commode via SBA, voiding well. Good appetite for dinner. IV antibiotic given per MAR. PICC and IV saline locked. Will continue to monitor vitals, pain, respiratory status, and POC.  "

## 2021-09-20 NOTE — PROGRESS NOTES
S: Respiratory therapy note  B: CAP, COPD  A: Bs are improved compared to previous admission.  BS diminished with inspiratory wheezes pre neb, post neb some faint expiratory wheezes.   Pt remains on home oxygen liter flow at 2 lpm.    R: continue scheduled nebs.  No change recommenced in treatment plan.  Encourage trilogy use for PRN SOB episodes, and have her wear it at night with 2 lpm bled in.  Pt is non compliant with this device.      Arthur Krishnan, RT on 9/20/2021 at 4:22 PM

## 2021-09-20 NOTE — PROGRESS NOTES
S: Respiratory therapy note  B: CAP, COPD  A: Bs are improved compared to previous admission.  BS are diminished with faint inspiratory wheezes at times, and expiratory wheezes.  Pt remains on home oxygen liter flow at 2 lpm.    R: continue scheduled nebs.  No change recommenced in treatment plan.  Encourage trilogy use for PRN SOB episodes, and have her wear it at night with 2 lpm bled in.    Arthur Krishnan, RT on 9/19/2021 at 8:04 PM

## 2021-09-20 NOTE — PROGRESS NOTES
[facility]    Medicine Progress Note - Hospitalist Service       Date of Admission:  9/16/2021    Assessment & Plan          70-year-old woman with COPD and chronic hypoxic and hypercapnic respiratory failure and recent hospitalization for spontaneous pneumothorax requiring chest tube placement admitted with initial concerns for pneumonia, COPD exacerbation, and hypotension.  After investigation, sepsis is being ruled out because serial procalcitonin levels have been low, lactic acid has been normal, and she has not had classic systemic inflammatory response syndrome.  Additionally, empiric antibiotic therapy has not clearly been associated with clinical improvement.   Pneumonia remains possible but may be a more indolent infection such as an anaerobic or fungal infection because right lung radiographic infiltrate was described in early September when she was hospitalized with pneumothorax.  Lung abscess is possible because according to report of 2 view chest x-ray from September 2, there is question of possible cavitation in the right lung with air-fluid levels at that time although that finding was not reported on single view chest x-ray during this hospitalization.  Alternatively, she may not have  infectious pneumonia or lung abscess at all.  Rather, right lung opacity may be due to a noninfectious process in the right lung including malignancy.  Of note, she had incidental note of right lower lobe pulmonary nodule in May 2020 suspicious for malignancy but failed any follow-up for evaluation of that suspicious lung nodule.  Given her clinical course with low suspicion for an acute infectious process, right lung malignancy is more strongly suspected.  Additional radiographic evaluation with chest CT has been recommended but so far the patient has declined to have that test done.  It may be relevant that significant underlying cognitive impairment is suspected.  She has had chronic  forgetfulness and in May 2020 when she was at a skilled nursing facility, mental status testing was very abnormal when she scored 4 out of 15 points on the BIMS.  There are concerns that she does not have capacity to make informed medical decisions.  So far today, her  has not been available for further discussion about options for additional diagnostic testing which may subsequently lead to different recommendations for treatment.    Signs of COPD exacerbation appear to be resolving.  Pneumothorax has not been evident radiographically during this hospitalization.    Her initial severe hypotension that did not fully respond to fluid resuscitation required vasopressor treatment in the ICU.  Because there is now low suspicion for infection, hypotension is probably not due to sepsis and septic shock.  Rather, aortic stenosis and previous chronic antihypertensive medications probably precipitated hypotension superimposed upon hypovolemia associated with anemia, chronic poor oral intake and severe malnutrition.  Cause for recently worsening anemia is not clear, but she does have risk factors for bleeding including chronic dual antiplatelet therapy with associated platelet function defect.  Occult GI bleeding is possible.  She is severe malnourished increasing her risk for hypovolemia and hypotension.  After fluid resuscitation, hypotension is resolving and she weaned off of need for vasopressor therapy 2 nights ago.    Principal Problem:    Hypotension, unspecified hypotension type  Active Problems:    Cognitive impairment-BIMS 4/15 at [facility] May 2020    Pulmonary nodule -- 1.9 cm RLL, new 5/2/20    PAD (peripheral artery disease) (H) - moderate left common carotid stenosis, aortoiliac bypass, chronic left vertebral and right posterior cerebral stenoses    Anemia    Moderate to severe pulmonary hypertension (H)-per Echo    Nonrheumatic aortic valve stenosis - moderate to severe    S/P drug eluting coronary stent  placement-LAD and circ, circ re-do 5/17/18    Abnormal CXR 9/2-?right mid lung cavitation with air-fluid levels    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    Severe malnutrition (H)-reduced intake, weight loss, loss of SC fat and muscle    COPD with acute exacerbation (H)    Pneumonia of right lung due to infectious organism    Chronic respiratory failure with hypoxia and hypercapnia (H)-home O2 and Trilogy    Uncooperative behavior-declining chest CT    History of stroke - right thalamus in 8/2013 and left cerebellar and right vermis in 3/2018    Cigarette smoker        Chest CT again advised for further characterization of radiographic abnormalities noted on chest x-ray including concern for cavitating lesion in the right lung particularly given diagnostic uncertainty as to whether this is an infectious or noninfectious process and to help guide recommendations for further evaluation and treatment, patient again is refusing today to undergo chest CT, due to concern for her lack of capacity for informed decision making, will revisit this recommendation with her spouse and discussion about palliative care or possibly even hospice may be appropriate due to probable advanced dementia and COPD with respiratory failure, limited functional status, and concern for malignancy for which she may not desire any intervention depending upon  results of CT if she is agreeable to performing CT and their preferences    Continue to hold aspirin and Plavix due to concern for bleeding  Continue to hold losartan and metoprolol due to hypotension    Avoiding aggressive additional IV fluid therapy at this time but reconsider boluses if there is increased concern for hypovolemia    Continue Protonix empirically    Continue Rocephin and Zithromax, complete an empiric 5-day treatment course today     continue steroids and bronchodilators    Continue oxygen supplementation 2 L nasal cannula which is her home dose with additional  "titration as needed to keep saturations 90% and higher as long as she does not develop worsening hypercapnia, may use home trilogy if she will tolerate it    Encourage advanced activity today, PT assisting with evaluation for disposition planning       Diet: Easy to Chew Diet (level 7)  Snacks/Supplements Adult: Other; Other; Between Meals    DVT Prophylaxis: Pneumatic Compression Devices  Barnhart Catheter: Not present  Central Lines: None  Code Status: Full Code      Disposition Plan   Expected discharge: 1 to 2 days recommended to prior living arrangement once Plan for further diagnostic evaluation or treatment has been clarified along with discharge plan.     The patient's care was discussed with the Bedside Nurse, Care Coordinator/ and Patient.    Gael Bass MD  Hospitalist Service  [facility]  Securely message with the Vocera Web Console (learn more here)  Text page via Bravofly Paging/Directory      Clinically Significant Risk Factors Present on Admission              ______________________________________________________________________    Interval History   Patient says she feels fine.  She has no concerns at this time.  Nursing reports that the patient was obviously more dyspneic this morning when she awoke.  She was given several successive bronchodilator treatments and her oxygen supplementation was increased.  She also was given a dose of Roxanol.  After those measures, symptoms gradually subsided and have not recurred.  She denies any chest pain.  When again discussing recommendation for additional diagnostic imaging due to her lung abnormalities and clinical condition, patient repeatedly says \"I don't want to do that\" and \"I am not doing that\".  When explained that imaging results might affect her treatment plan allowing us to better help her with her breathing problems, she says \"I'm not doing anything different\".  Nursing reports this morning that the " "patient was unable to report her own date of birth.  Old medical records were reviewed today.  When she was treated at a skilled nursing facility for rehabilitation in May 2020 after hospitalization in early May 2020, she underwent cognitive evaluation at that skilled nursing facility which was abnormal and concerning for cognitive impairment.  Her BIMS score at that time was 4 out of 15.  Other old records from clinic visits refer to previous diagnosis of \"forgetfulness\".  It does not appear as though she carries a formal diagnosis of dementia previously.  Old records were also reviewed with respect to right lower lobe pulmonary nodule.  She was hospitalized in May 2020 with shortness of breath and on CT imaging at that time was found to have a 1.9 cm right lower lobe pulmonary nodule \"suspicious for primary or metastatic malignancy\".  Further outpatient evaluation of that pulmonary nodule was recommended, but it appears as though the patient failed outpatient follow-up for such.  When she was admitted to the hospital in March 2021 for worsening respiratory failure, chest CT at that time again demonstrated right lower lobe pulmonary nodule although it appeared smaller radiographically than it had previously.    She remains afebrile and hemodynamically stable.  Oxygen supplementation continues at her normal dose of 2 L nasal cannula continuously.  She has had good urine output.  She does not eat much and has refused nutritional supplements advised by registered dietitian.    Data reviewed today: I reviewed all medications, new labs and imaging results over the last 24 hours. I personally reviewed no images or EKG's today.    Physical Exam   Vital Signs: Temp: 97  F (36.1  C) Temp src: Oral BP: 107/55 Pulse: 86   Resp: 22 SpO2: 98 % O2 Device: Nasal cannula Oxygen Delivery: 3 LPM  Weight: 93 lbs 8 oz   Vitals:    09/16/21 1906 09/17/21 0340 09/18/21 1304 09/19/21 0526   Weight: 38.6 kg (85 lb) 41 kg (90 lb 4.8 oz) " 44.5 kg (98 lb 1.6 oz) 43.7 kg (96 lb 6.4 oz)    09/20/21 0647   Weight: 42.4 kg (93 lb 8 oz)     General Appearance: Chronically ill-appearing, underweight and cachectic, no acute distress sitting in bed, able to speak full sentences  Respiratory: Normal respiratory effort, severely diminished breath sounds throughout  Cardiovascular: Regular rate and rhythm, brisk capillary refill, no peripheral edema    Data   Recent Labs   Lab 09/19/21  0546 09/19/21  0545 09/19/21  0014 09/18/21  1826 09/18/21  1213 09/18/21  0600 09/17/21  0505 09/17/21  0505 09/16/21  2335 09/16/21 2020 09/16/21 2020   WBC  --   --   --   --   --  8.5  --  7.1  --   --  11.2*   HGB 9.4*  --  8.6* 9.4*   < > 8.8*   < > 9.6*  --    < > 10.4*   MCV  --   --   --   --   --  90  --  90  --   --  89   PLT  --   --  383  --   --  383  --  344  --    < > 359   NA  --  141  --   --   --  142  --  140  --    < > 134   POTASSIUM  --  4.5  --   --   --  4.0  --  4.0  --    < > 3.9   CHLORIDE  --  104  --   --   --  108  --  105  --    < > 97   CO2  --  36*  --   --   --  33*  --  31  --    < > 33*   BUN  --  12  --   --   --  13  --  14  --    < > 13   CR  --  0.56  --   --   --  0.50*  --  0.46*  --    < > 0.62   ANIONGAP  --  1*  --   --   --  1*  --  4  --    < > 4   CALDERON  --  8.3*  --   --   --  8.1*  --  7.9*  --    < > 8.1*   GLC  --  79  --   --   --  95  --  155*  --    < > 111*   ALBUMIN  --   --   --   --   --   --   --   --   --   --  2.2*   PROTTOTAL  --   --   --   --   --   --   --   --   --   --  5.9*   BILITOTAL  --   --   --   --   --   --   --   --   --   --  0.5   ALKPHOS  --   --   --   --   --   --   --   --   --   --  102   ALT  --   --   --   --   --   --   --   --   --   --  88*   AST  --   --   --   --   --   --   --   --   --   --  50*   TROPONIN  --   --   --   --   --   --   --   --  0.070*  --  0.064*    < > = values in this interval not displayed.       Medications       [Held by provider] aspirin  81 mg Oral Daily      atorvastatin  20 mg Oral At Bedtime     azithromycin  250 mg Oral QPM     cefTRIAXone  2 g Intravenous Q24H     [Held by provider] clopidogrel  75 mg Oral Daily     [Held by provider] enoxaparin ANTICOAGULANT  40 mg Subcutaneous Q24H JOHN     fluticasone-vilanterol  1 puff Inhalation Daily     ipratropium - albuterol 0.5 mg/2.5 mg/3 mL  3 mL Nebulization 4x Daily     montelukast  10 mg Oral At Bedtime     pantoprazole  40 mg Oral BID AC     polyethylene glycol  17 g Oral Daily     predniSONE  40 mg Oral Daily     sodium chloride (PF)  10-40 mL Intracatheter Q8H     sodium chloride (PF)  3 mL Intracatheter Q8H     umeclidinium  1 puff Inhalation Daily

## 2021-09-21 NOTE — PLAN OF CARE
S-(situation): End of shift note    B-(background): Pneumonia, COPD    A-(assessment): Vital signs stable. Afebrile. Lung sounds diminished. On 2L O2 per nasal cannula. A&O sometimes x4, but sometimes only to self. Pt denies pain. Ambulating with 1 assist to commode. Voiding adequately. Able to make needs known.     R-(recommendations): Continue to monitor per care plan.

## 2021-09-21 NOTE — TELEPHONE ENCOUNTER
"Pt.  calling regarding wife's RX budesonide-formoterol  160-4.5 MCG/ACT Inhaler  Also known as: SYMBICORT  INSTRUCTIONS: Inhale 2 puffs  into the lungs 2 times daily   making sure she is still supposed to be taking it. \"It wasn't on her discharge papers.\"   Advised it is on page 12 of discharge paper.\"Oh I found it, it fell down into the couch.\"   No triage was needed.  Nury Friedman RN [facility]         Reason for Disposition    Caller has medication question, adult has minor symptoms, caller declines triage, AND triager answers question    Protocols used: MEDICATION QUESTION CALL-A-AH      "

## 2021-09-21 NOTE — PLAN OF CARE
Pt is A to self but fluctuates, VSS, afebrile, denies pain, remains on 2 L 02 via NC with sats in mid to high 90s. Lung sounds diminished throughout. Short of breathe with activity. Prn Roxanol given x 1 for air hunger. Scheduled nebs given. Up to the commode with SBA assist, voiding well. Good appetite for dinner. IV antibiotic given per MAR. Saline locked. Will continue to monitor vitals, respiratory status, and POC.

## 2021-09-21 NOTE — SIGNIFICANT EVENT
Significant Event Note    Time of event: 7:12 PM September 20, 2021    Description of event:  Met with the patient and her  today to review chest CT results and discuss her care plan.  CT results were consistent with right-sided pneumonia.  Community-acquired pneumonia appears most likely and she has improved clinically with empiric Zithromax and Rocephin treatment.  There were no radiographic signs of abscess or cavitating lesion.  Again noted was right pulmonary nodule which she has had previously with radiographic findings interpreted as being most consistent with an inflammatory process rather than malignancy.    Plan:  Transition from IV to oral antibiotic tomorrow to complete a 10 to 14-day course of cephalosporin therapy, she has already completed 5 days of Zithromax so do not anticipate continuing Zithromax at discharge  Anticipate discharge tomorrow home with restarting home care including RN, home health aide, and PT    Discussed with: patient's family/emergency contact    Gael Bass MD

## 2021-09-21 NOTE — PLAN OF CARE
"S-(situation): Physical therapy treatment per plan of care    B-(background): Patient is a 70 year old female admitted 9/16/21 due to pneumonia, COPD exacerbation and hypotension. Patient with a previous medical history of hypotension, PAD, pulmonary emphysema, pulmonary hypertension, dysphagia, severe malnutrition, CVA in 2013 and 2018, former smoker with daily second hand exposure.     A-(assessment): PT attempted to complete treatment as scheduled, patient reports \"I am ready to go, I am going home today, I will be fine once I get home\". Patient agreeable to home health PT and feels she is strong.    R-(recommendations): Discharge inpatient PT plan of care, resume skilled intervention in the form of HHPT to address deconditioning, weakness and progress activity for improved quality of life.    Physical Therapy Discharge Summary    Reason for therapy discharge:    Discharged to home with home therapy.    Progress towards therapy goal(s). See goals on Care Plan in [facility] electronic health record for goal details.  Goals partially met.  Barriers to achieving goals:   discharge from facility and declined to complete stairs and progress mobility.    Therapy recommendation(s):    Continued therapy is recommended.  Rationale/Recommendations:  see above.     Thank you for your referral.  Zhane Bashir, PT, DPT, ATC, LAT    [facility]  O: [phone]  E: [email or web address]            "

## 2021-09-21 NOTE — DISCHARGE INSTRUCTIONS
GI appointment:  Someone from [email or web address]   will be calling you to set up this appointment.  If you do not hear from them within 48hrs   please call them at [phone]

## 2021-09-21 NOTE — PROGRESS NOTES
Care Management Discharge Note    Discharge Date: 09/21/2021     Discharge Disposition: Home, Home Care - [facility] (Phone: [phone])    Discharge Services:  RN, HHA, P/T    Discharge DME:      Discharge Transportation: family or friend will provide    Private pay costs discussed: Not applicable    PAS Confirmation Code:    Patient/family educated on Medicare website which has current facility and service quality ratings:      Education Provided on the Discharge Plan:      Persons Notified of Discharge Plans: Patient/    Patient/Family in Agreement with the Plan: yes    Handoff Referral Completed: Yes    Additional Information:  Patient discharging home today with her , Bud.  Patient to resume [facility] (Phone: [phone]) for RN, HHA, P/T.  Bud to transport.  Patient is High Risk for re-admission.  Follow up appt is scheduled with PCP for 9/27/21.    PHOEBE Ledesma  [facility] [phone]/ [facility] [phone]  Care Management

## 2021-09-21 NOTE — PLAN OF CARE
S-(situation): Patient discharged to home via wheelchair with     B-(background): patient admitted on 9/17 with COPD and pneumonia.     A-(assessment): VSS and went home with 2L of o2 which is her normal home dose.    R-(recommendations): Discharge instructions reviewed with patient and . Listed belongings gathered and returned to patient. yes         Discharge Nursing Criteria:     Care Plan and Patient education resolved: Yes    New Medications- pt has been educated about purpose and side effects: Yes    Vaccines  Influenza status verified at discharge:  Not Applicable    Intentionally Retained Items (examples: Drains, Wound packing, ureteral stents, renae, PICC line or IV)  Patient was sent home with NA left in place.   Follow up appointment made for removal: No iv or picc line left in place    MISC  Prescriptions if needed, hard copies sent with patient  Yes  Home medications returned to patient: NA  Medication Bin checked and emptied on discharge Yes  Patient reports post-discharge pain management plan is effective: Yes

## 2021-09-22 NOTE — PROGRESS NOTES
Clinic Care Coordination Contact    Clinic Care Coordination Contact  OUTREACH    Referral Information:  Referral Source: IP Handoff    Primary Diagnosis: Pneumonia    Chief Complaint   Patient presents with     Clinic Care Coordination - Post Hospital     RNCC assessment-Hospital Discharge     Universal Utilization:   Clinic Utilization  Difficulty keeping appointments:: No  Compliance Concerns: No  No-Show Concerns: No  No PCP office visit in Past Year: No  Utilization    Hospital Admissions  3             ED Visits  3             No Show Count (past year)  1                Current as of: 9/22/2021  8:48 AM            Clinical Concerns:  Current Medical Concerns: Called and spoke with , introduced self and role. Pt was recently hospitalized for sob and dx with pneumonia.  Pt is currently eating and drinking well. She is also taking her antibiotics without any difficulty.   states she is getting up and walking around with her walker.  He has not yet heard from home care to restart her home care services.   states he is patient's PCA and is with her at all times.  States she has not been out of the house for 2 years and does not plan on getting the Covid vaccine.  Reviewed appointment times and dates  will make sure patient attends his appointments.  His goal for her right now is to get her back to baseline.  Current Behavioral Concerns: No current concerns  Education Provided to patient: Complete antibiotics until gone.  Pain  Pain (GOAL):: No  Health Maintenance Reviewed: Not assessed  Clinical Pathway: None    Medication Management:  Medication review status: Medications reviewed.  Changes noted per patient report.     Functional Status:  Dependent ADLs:: Bathing  Dependent IADLs:: Cleaning, Cooking, Laundry, Shopping, Meal Preparation, Medication Management, Money Management, Transportation  Bed or wheelchair confined:: No  Mobility Status: Independent w/Device  Fallen 2 or more times  in the past year?: No  Any fall with injury in the past year?: No    Living Situation:  Current living arrangement:: I live in a private home with family  Type of residence:: Private home - no stairs    Lifestyle & Psychosocial Needs:    Social Determinants of Health     Tobacco Use: Medium Risk     Smoking Tobacco Use: Former Smoker     Smokeless Tobacco Use: Never Used   Alcohol Use:      Frequency of Alcohol Consumption:      Average Number of Drinks:      Frequency of Binge Drinking:    Financial Resource Strain:      Difficulty of Paying Living Expenses:    Food Insecurity:      Worried About Running Out of Food in the Last Year:      Ran Out of Food in the Last Year:    Transportation Needs:      Lack of Transportation (Medical):      Lack of Transportation (Non-Medical):    Physical Activity:      Days of Exercise per Week:      Minutes of Exercise per Session:    Stress:      Feeling of Stress :    Social Connections:      Frequency of Communication with Friends and Family:      Frequency of Social Gatherings with Friends and Family:      Attends Yazdanism Services:      Active Member of Clubs or Organizations:      Attends Club or Organization Meetings:      Marital Status:    Intimate Partner Violence:      Fear of Current or Ex-Partner:      Emotionally Abused:      Physically Abused:      Sexually Abused:    Depression: Not at risk     PHQ-2 Score: 0   Housing Stability:      Unable to Pay for Housing in the Last Year:      Number of Places Lived in the Last Year:      Unstable Housing in the Last Year:      Diet:: Regular  Inadequate nutrition (GOAL):: No  Tube Feeding: No  Inadequate activity/exercise (GOAL):: No  Significant changes in sleep pattern (GOAL): No  Transportation means:: Family     Mental health DX:: No  Mental health management concern (GOAL):: No  Informal Support system:: Family, Spouse      Resources and Interventions:  Current Resources:   Skilled Home Care Services: Skilled Nursing,  Home Health Aid, Physical Therapy  Community Resources: Home Care, PCA  Supplies used at home:: Oxygen Tubing/Supplies, Nebulizer tubing  Equipment Currently Used at Home: walker, rolling  Employment Status: retired     Advance Care Plan/Directive  Advanced Care Plans/Directives on file:: Yes  Type Advanced Care Plans/Directives: POLST  Advanced Care Plan/Directive Status: Not Applicable    Referrals Placed: None     Goals        General    Medical     Goal Statement: Recover from Pneumonia and get back to baseline  Date Goal set: 9/22/2021  Barriers: recent stroke  Strengths: engaged and motivated   Date to Achieve By: 10/22/2021  Patient expressed understanding of goal: yes  Action steps to achieve this goal:  1. I will complete my medications as prescribed  2. I will get up and walk around to help expand my lungs  3. I will use my oxygen at all times  4. I will follow up with my doctor as recommended.        Patient/Caregiver understanding:  verbalized understanding of appointment dates and times.    Outreach Frequency: weekly  Future Appointments              In 5 days Alli Castro PA-C [facility]    In 2 weeks Victor Hugo Cortez MD [facility]          Plan: RN CC will follow up in 1 week      Herminia ZAPATA, RN, PHN, CCM  Primary Clinic Care Coordination    [facility]  Primary Care Clinics  [email or web address]   Office: [phone]  Employed by [employer]

## 2021-09-22 NOTE — LETTER
[facility] CARE COORDINATION  [address]   [address]  Tel. [phone] / Fax [phone]  September 22, 2021    Preeti Ford  [address] [address] [place] [id number]      Dear Preeti,    I am a clinic care coordinator who works with SPRING Banegas CNP at [facility]. I wanted to thank you for spending the time to talk with me.  Below is a description of clinic care coordination and how I can further assist you.      The clinic care coordination team is made up of a registered nurse,  and community health worker who understand the health care system. The goal of clinic care coordination is to help you manage your health and improve access to the health care system in the most efficient manner. The team can assist you in meeting your health care goals by providing education, coordinating services, strengthening the communication among your providers and supporting you with any resource needs.    Please feel free to contact me at [phone] with any questions or concerns. We are focused on providing you with the highest-quality healthcare experience possible and that all starts with you.     Sincerely,     Herminia ZAPATA, RN, PHN, CCM  Primary Clinic Care Coordination    [facility]  Primary Care Clinics  [email or web address]   Office: [phone]  Employed by [employer]          Enclosed: I have enclosed a copy of a 24 Hour Access Plan. This has helpful phone numbers for you to call when needed. Please keep this in an easy to access place to use as needed.

## 2021-09-24 NOTE — TELEPHONE ENCOUNTER
[facility] looking for Verbal Orders for:    Skilled Nursing 2x week for 3 weeks then  Skilled nursing 1xweek for 6 weeks with 3 prn  Homecare aid  1x week for 9 weeks  PT/OT 1 every 14 days   1 every 30  Due  to pneumonia and end stage COPD   Getting ready for hospice     Call back [phone]

## 2021-09-24 NOTE — PROGRESS NOTES
Medication Therapy Management (MTM) Encounter    ASSESSMENT:                            Medication Adherence/Access: See below for considerations.     COPD/Pneumonia: Improving per patient/. Okay to take montelukast early in the day if preferred/not a clinically significant issues with timing. Could potentially consider simplifying inhaler regiment to Trelegy Ellipta in the future if breathing is stable.     CAD/Hypotension: Stable. Blood pressure is good at home.     Hyperlipidemia: Stable. Okay to take atorvastatin early in the day if needed.     Low hemoglobin: Stable.     PLAN:                            1. Okay to take montelukast and atorvastatin early in the evening or morning if difficult to take consistently at bedtime.     2. Future considerations - if looking to simplify inhaler regimen could consider Trelegy Ellipta inhaler - one puff daily to replace Symbicort and Incruse Ellipta.     Follow-up: 2-4 weeks if needed      SUBJECTIVE/OBJECTIVE:                          Preeti Ford is a 70 year old female called for a transitions of care visit. She was discharged from [facility] on 9/21/2021 for COPD exacerbation. Joined today on call by , Oh.      Reason for visit: Hospital Follow-Up.    Allergies/ADRs: Reviewed in chart  Past Medical History: Reviewed in chart  Tobacco: She reports that she has quit smoking. Her smoking use included cigarettes. She smoked 0.50 packs per day. She has never used smokeless tobacco.  Alcohol: not currently using      Medication Adherence/Access:  has stated that she has sometimes fallen asleep prior to her montelukast and     COPD/Pneumonia: Current medications: albuterol nebs/HFA as needed (using Duonebs right now), Duonebs two-four times daily since hospital, montelukast 10 mg at bedtime, Symbicort - two puffs twice daily, and Incruse Ellipta one puff daily. Cefidinir 600 mg every evening for 7 days and prednisone 40 mg daily (about do decrease to  30 mg daily on taper). Oxygen down to 1 L/min during the day. No need for morphine for air hunger since home. Patient rinses their mouth after using steroid inhaler. - most of the time.   Patient is not experiencing side effects - some increase in appetite which  thinks may be due to steroids  Patient reports the following symptoms: none.  Patient does have an COPD Action Plan on file.   Has spirometry been completed: Yes     CAD/Hypotension: Current medications include aspirin 81 mg daily and nitroglycerin SL as needed (has not needed). Stopped losartan and metoprolol due to low pressure. Clopidogrel due to concern for bleed.  Patient does self-monitor blood pressure. Home BP monitoring in range of 116's systolic over 68's diastolic.  Patient reports no current medication side effects.  BP Readings from Last 3 Encounters:   09/21/21 106/49   09/10/21 122/80   09/04/21 133/55     Hyperlipidemia: Current therapy includes atorvastatin 20 mg daily.  Patient reports no significant myalgias or other side effects.  Recent Labs   Lab Test 10/29/20  1034 10/02/19  0931 02/18/16  0718 10/09/15  0825 08/29/15  0700 08/29/15  0700   CHOL 185 193   < > 177   < > 207*   HDL 84 81   < > 49*   < > 64   LDL 85 99   < > 107   < > 117   TRIG 82 63   < > 106   < > 132   CHOLHDLRATIO  --   --   --  3.6  --  3.2    < > = values in this interval not displayed.     Low hemoglobin: Current medications include: Protonix (pantoprazole) 40 mg once daily. Pt reports no current symptoms.  Patient feels that current regimen is effective.    Today's Vitals: There were no vitals taken for this visit.   Wt Readings from Last 5 Encounters:   09/21/21 92 lb 14.4 oz (42.1 kg)   08/29/21 85 lb 12.8 oz (38.9 kg)   06/14/21 90 lb 8 oz (41.1 kg)   04/09/21 94 lb (42.6 kg)   03/25/21 94 lb 11.2 oz (43 kg)     ----------------  Post Discharge Medication Reconciliation Status: discharge medications reconciled and changed, per note/orders.    I spent  24 minutes with this patient today. I offer these suggestions for consideration by Alli Castro PA-C/SPRING Roberto, CNP. A copy of the visit note was provided to the patient's primary care provider.    The patient was mailed a summary of these recommendations.     Trevor Dick, PharmD, BCACP  Medication Therapy Management Pharmacist  Pager: [phone]    Telemedicine Visit Details  Type of service:  Telephone visit  Start Time: 11:00 AM  End Time: 11:24 AM  Originating Location (patient location): [place]  Distant Location (provider location):  [facility]     Medication Therapy Recommendations  Chronic obstructive pulmonary disease, unspecified COPD type (H)    Current Medication: montelukast (SINGULAIR) 10 MG tablet   Rationale: Patient forgets to take - Adherence - Adherence   Recommendation: Change Administration Time - Move montelukast to early evening or morning. Same for atorvastatin.   Status: Accepted - no CPA Needed

## 2021-09-26 NOTE — TELEPHONE ENCOUNTER
Agree with home care orders as written. Please call home care to give verbal orders.  Jaja Carter, CNP

## 2021-09-27 NOTE — PATIENT INSTRUCTIONS
Recommendations from today's MTM visit:                                                    MTM (medication therapy management) is a service provided by a clinical pharmacist designed to help you get the most of out of your medicines.   Today we reviewed what your medicines are for, how to know if they are working, that your medicines are safe and how to make your medicine regimen as easy as possible.      1. Okay to take montelukast and atorvastatin early in the evening or morning if difficult to take consistently at bedtime.     2. Future considerations - if looking to simplify inhaler regimen could consider Trelegy Ellipta inhaler - one puff daily to replace Symbicort and Incruse Ellipta.     Follow-up: 2-4 weeks if needed    It was great to speak with you today.  I value your experience and would be very thankful for your time with providing feedback on our clinic survey. You may receive a survey via email or text message in the next few days.     To schedule another MTM appointment, please call the clinic directly or you may call the MTM scheduling line at [phone] or toll-free at [phone].     My Clinical Pharmacist's contact information:                                                      Please feel free to contact me with any questions or concerns you have.      Trevor Dick, Yolie, BCACP  Medication Therapy Management Pharmacist  Pager: [phone]

## 2021-10-01 NOTE — TELEPHONE ENCOUNTER
Reason for Call:  Other call back    Detailed comments:  called stating they missed her appointment on Monday for the hospital follow up because he did not have the oxygen he needed to be able to transport her and so he was wondering if he could get a new appointment for the hospital follow up.     Phone Number Patient can be reached at: Home number on file [phone] (home)    Best Time: Any    Can we leave a detailed message on this number? YES    Call taken on 10/1/2021 at 10:07 AM by Sarah Nair

## 2021-10-04 NOTE — TELEPHONE ENCOUNTER
IVONNE Oliver from [facility] calling.   PHONE: [phone]    Melody is completing a Health Care Directive with the patient. Melody has the following question:     1. Does Jaja have any cognitive concerns with the patient.     FYI:   The patient has a visit on 10/18/21. At that visit there needs to be a discussion on Code status and CPR. The patient has a lot of questions regarding this.       JODI Mc, RN, PHN  [place]  October 4, 2021

## 2021-10-04 NOTE — TELEPHONE ENCOUNTER
Reason for Call:  Form, our goal is to have forms completed with 72 hours, however, some forms may require a visit or additional information.    Type of letter, form or note:  Home Health Certification    Who is the form from?: Home care    Where did the form come from: form was faxed in    What clinic location was the form placed at?: [facility]     Where the form was placed: Given to MA/EULA SWAIN    What number is listed as a contact on the form?: [phone]       Additional comments:

## 2021-10-06 NOTE — PROGRESS NOTES
Clinic Care Coordination Contact    Follow Up Progress Note      Assessment:  Bud called back, states pt's pneumonia has resolved and she is feeling better.  States the home care nurse continues to come out to see pt and also states she is doing well. She is taking her medications as prescribed. She did get the portable oxygen so she can get to appointments. Reviewed up coming appts and they were not aware of the cardiology appt. Reviewed date and time and he will make sure she gets there.  No further questions at this time.     Care Gaps:    Health Maintenance Due   Topic Date Due     DEXA  Never done     COVID-19 Vaccine (1) Never done     COLPOSCOPY  Never done     ZOSTER IMMUNIZATION (1 of 2) Never done     MEDICARE ANNUAL WELLNESS VISIT  12/20/2017     COLORECTAL CANCER SCREENING  12/12/2018     HF ACTION PLAN  07/01/2019     MAMMO SCREENING  01/02/2020     LIPID  04/29/2021     INFLUENZA VACCINE (1) 09/01/2021     Goals addressed this encounter:   Goals Addressed                    This Visit's Progress       Follow-up in my overdue health maintenance concerns (pt-stated)         Goal Statement: Follow-up in my overdue health maintenance concerns  Date Goal set: 10/6/2021  Barriers: COVID-19, recent pneumonia  Strengths: engaged  Date to Achieve By: 12/1/2021  Patient expressed understanding of goal: yes  Action steps to achieve this goal:  1. I will ask to update HM at appointments  2. I will scheduled appts for important HM items       COMPLETED: Medical         Goal Statement: Recover from Pnuemonia and get back to baseline  Date Goal set: 9/22/2021  Barriers: recent stroke  Strengths: engaged and motivated   Date to Achieve By: 10/22/2021  Patient expressed understanding of goal: yes  Action steps to achieve this goal:  1. I will complete my medications as prescribed  2. I will get up and walk around to help expand my lungs  3. I will use my oxygen at all times  4. I will follow up with my doctor as  recommended.        Intervention/Education provided during outreach: reviewed up coming appts.      Outreach Frequency: monthly    Plan:   Care Coordinator will follow up in one month.     Herminia BERNALN, RN, PHN, CCM  Primary Clinic Care Coordination    [facility]  Primary Care Clinics  [email or web address]   Office: [phone]  Employed by [employer]

## 2021-10-06 NOTE — DISCHARGE SUMMARY
[facility]  Hospitalist Discharge Summary      Date of Admission:  9/16/2021  Date of Discharge:  9/21/2021  1:43 PM  Discharging Provider: Gurpreet Boston MD      Discharge Diagnoses              Principal Problem:    Hypotension, unspecified hypotension type  Active Problems:    Cognitive impairment-BIMS 4/15 at 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    Severe malnutrition (H)-reduced intake, weight loss, loss of SC fat and muscle    COPD with acute exacerbation (H)    Pneumonia of right lung due to infectious organism    Chronic respiratory failure with hypoxia and hypercapnia (H)-home O2 and Trilogy    Uncooperative behavior-declining chest CT    History of stroke - right thalamus in 8/2013 and left cerebellar and right vermis in 3/2018    Cigarette smoker                 Follow-ups Needed After Discharge   Follow-up Appointments     Follow-up and recommended labs and tests       Follow up with primary care provider, Deisy Carter, within 7 days   to follow up on results.  No follow up labs or test are needed.  Follow up with  GI     As advised in 1 week    Follow up with   Cardiology     As advised in 1 week             Unresulted Labs Ordered in the Past 30 Days of this Admission     No orders found from 8/17/2021 to 9/17/2021.      These results will be followed up by pcp    Discharge Disposition   Discharged to home  Condition at discharge: Stable        Hospital Course           70-year-old woman with COPD and chronic hypoxic and hypercapnic respiratory failure and recent hospitalization for spontaneous pneumothorax requiring chest tube placement admitted with initial concerns for pneumonia, COPD exacerbation, and hypotension. Sepsis ruled out with normal lactic acid and procalcitonin level but there were concerns for pneumonia on CT chest. She was initially managed with ceftriaxone and azithromycin. She completed 5 days of azithromycin. Patient was discharged on oral cefdinir and will complete course for management of Pneumonia. Also treated with steroids and discharged on prednisone taper for management of COPD flare up. BP was low at admission and has improved. Patient also has  significant drop in hemoglobin recently  raising concerns for potential occult GI bleed but hb was monitored and remained stable. She will f/u with GI outpatient for further evaluation. Aspirin was continued at discharge but held plavix, started PPI,  due to concerns for occult GI bleed. Losartan and metoprolol also held at discharge as patient had low blood pressures during hospitalization. She had elevated troponin during hospitalization. Troponin trend was flat with no chest pain and is likely from demand ischemia. She will also f/u with cardiology as outpatient for recommendation on continuation of aspirin and plavix as patient had low hemoglobin and also has moderate to severe aortic stenosis. She had CAD and s/p Kayley in 2018.  Patient seems clinically stable at this time and will be  Discharged home.          Consultations This Hospital Stay   RESPIRATORY CARE IP CONSULT  NUTRITION SERVICES ADULT IP CONSULT  SOCIAL WORK IP CONSULT  VASCULAR ACCESS ADULT IP CONSULT  NUTRITION SERVICES ADULT IP CONSULT  CARE MANAGEMENT / SOCIAL WORK IP CONSULT  PHYSICAL THERAPY ADULT IP CONSULT  SMOKING CESSATION PROGRAM IP CONSULT    Code Status   Prior    Time Spent on this Encounter   Gurpreet MAY MD,  personally saw the patient today and spent greater than 30 minutes discharging this patient.       Gurpreet Boston MD  [facility] MEDICAL SURGICAL  [address]   [place] [address]  Phone: [phone]  ______________________________________________________________________    Physical Exam   Vital Signs:                    Weight: 92 lbs 14.4 oz       GENERAL: The patient is not in any acute distressed. Awake and alert.  HEENT: Nonicteric sclerae, PERRLA, EOMI. Oropharynx clear. Moist mucous membranes. Conjunctivae appear well perfused.  HEART: Regular rate and rhythm without murmurs.  LUNGS: Clear to auscultation bilaterally. No wheezing or crackles.  ABDOMEN: Soft, positive bowel sounds, nontender.  SKIN: No rash, no excessive bruising, petechiae, or purpura.  EXTREMITIES : no rashes, no swelling in legs.  NEUROLOGIC: conscious and oriented, follows commands, no obvious focal deficits.  ROS: All other systems negative          Primary Care Physician   Deisy Carter    Discharge Orders      Medication Therapy Management Referral      Care Coordination Referral      Home care nursing referral      Home Care PT Referral for Hospital Discharge      Adult Gastro Ref - Consult Only      Adult Cardiology Eval Referral      Reason for your hospital stay    sob     Activity    Your activity upon discharge: activity as tolerated     MD face to face encounter    Documentation of Face to Face and Certification for Home Health Services    I certify that patient: Preeti Ford is under my care and that I, or a nurse practitioner or physician's assistant working with me, had a face-to-face encounter that meets the physician face-to-face encounter requirements with this patient on: 9/16/2021.    This encounter with the patient was in whole, or in part, for the following medical condition, which is the primary reason for home health care: deconditioning.    I certify that, based on my  findings, the following services are medically necessary home health services: Nursing, Physical Therapy, and Home Health Aide.    My clinical findings support the need for the above services because: Nurse is needed: To assess clinical status after changes in medications or other medical regimen.. and Physical Therapy Services are needed to assess and treat the following functional impairments: deconditoning.    Further, I certify that my clinical findings support that this patient is homebound (i.e. absences from home require considerable and taxing effort and are for medical reasons or Mosque services or infrequently or of short duration when for other reasons) because: Patient is bedbound due to: deconditioning..    Based on the above findings. I certify that this patient is confined to the home and needs intermittent skilled nursing care, physical therapy and/or speech therapy.  The patient is under my care, and I have initiated the establishment of the plan of care.  This patient will be followed by a physician who will periodically review the plan of care.  Physician/Provider to provide follow up care: Deisy Carter    Attending hospital physician (the Medicare certified [place] provider): Gurpreet Boston MD  Physician Signature: See electronic signature associated with these discharge orders.  Date: 9/21/2021     Follow-up and recommended labs and tests     Follow up with primary care provider, Deisy Carter, within 7 days to follow up on results.  No follow up labs or test are needed.  Follow up with  GI     As advised in 1 week    Follow up with   Cardiology     As advised in 1 week     Diet    Follow this diet upon discharge: Orders Placed This Encounter      Snacks/Supplements Adult: Other; Other; Between Meals      Easy to Chew Diet (level 7)     CBC with Platelets & Differential       Significant Results and Procedures   Most Recent 3 CBC's:Recent Labs   Lab Test 09/19/21  0586  09/19/21  0014 09/18/21  1826 09/18/21  1213 09/18/21  0600 09/17/21  0505 09/17/21  0505 09/16/21 2020 09/16/21 2020   WBC  --   --   --   --  8.5  --  7.1  --  11.2*   HGB 9.4* 8.6* 9.4*   < > 8.8*   < > 9.6*   < > 10.4*   MCV  --   --   --   --  90  --  90  --  89   PLT  --  383  --   --  383  --  344   < > 359    < > = values in this interval not displayed.     Most Recent 3 BMP's:Recent Labs   Lab Test 09/19/21  0545 09/18/21  0600 09/17/21  0505    142 140   POTASSIUM 4.5 4.0 4.0   CHLORIDE 104 108 105   CO2 36* 33* 31   BUN 12 13 14   CR 0.56 0.50* 0.46*   ANIONGAP 1* 1* 4   CALDERON 8.3* 8.1* 7.9*   GLC 79 95 155*       Discharge Medications   Discharge Medication List as of 9/21/2021 11:43 AM      START taking these medications    Details   cefdinir (OMNICEF) 300 MG capsule Take 2 capsules (600 mg) by mouth every evening for 7 days, Disp-14 capsule, R-0, E-Prescribe      pantoprazole (PROTONIX) 40 MG EC tablet Take 1 tablet (40 mg) by mouth daily, Disp-30 tablet, R-0, E-Prescribe      predniSONE (DELTASONE) 10 MG tablet Take prednisone, 10 mg tab - 4 tabs for 3 days, then 3 tabs for 3 days, then 2 tabs for 3 days, then 1 tab for 3 days, then stop., Disp-30 tablet, R-0, Local Print         CONTINUE these medications which have NOT CHANGED    Details   albuterol (PROAIR HFA/PROVENTIL HFA/VENTOLIN HFA) 108 (90 Base) MCG/ACT inhaler Inhale 2 puffs into the lungs every 6 hours as needed for shortness of breath / dyspnea, Disp-3 Inhaler, R-3, E-PrescribePharmacy may dispense brand covered by insurance (Proair, or proventil or ventolin or generic albuterol inhaler)      aspirin 81 MG EC tablet Take 1 tablet (81 mg) by mouth daily, Transitional      atorvastatin (LIPITOR) 20 MG tablet Take 1 tablet (20 mg) by mouth At Bedtime, Disp-90 tablet, R-3, E-PrescribeSending all prescriptions to [facility] pharmacy per patient request as she is no longer going to use [facility].  will call when  prescription are due for ref ill.      ipratropium - albuterol 0.5 mg/2.5 mg/3 mL (DUONEB) 0.5-2.5 (3) MG/3ML neb solution Take 1 vial (3 mLs) by nebulization 4 times daily Until hospital follow up, then as directed by your primary care provider, Disp-270 mL, R-3, Historical      montelukast (SINGULAIR) 10 MG tablet Take 1 tablet (10 mg) by mouth At Bedtime, Disp-90 tablet, R-3, E-PrescribeIs running low on this so will need filled now      morphine (MSIR) 15 MG IR tablet Take 0.5 tablets (7.5 mg) by mouth every 6 hours as needed (air hunger), Disp-5 tablet, R-0, E-PrescribePlease cut in half for the patient.      umeclidinium (INCRUSE ELLIPTA) 62.5 MCG/INH inhaler Inhale 1 puff into the lungs daily, Disp-3 each, R-3, E-Prescribe      albuterol (PROVENTIL) (2.5 MG/3ML) 0.083% neb solution NEBULIZE CONTENTS OF ONE VIAL EVERY 4 HOURS AS NEEDED FOR SHORTNESS OF BREATH / DIFFICULTY BREATHING OR WHEEZING, Disp-360 mL, R-1, E-Prescribe      budesonide-formoterol (SYMBICORT) 160-4.5 MCG/ACT Inhaler Inhale 2 puffs into the lungs 2 times daily, Disp-10.2 g, R-5, E-Prescribe      ACE/ARB/ARNI NOT PRESCRIBED, INTENTIONAL, Reason ACE/ARB was Not Prescribed: Refusal by patient      nitroGLYcerin (NITROSTAT) 0.4 MG sublingual tablet For chest pain place 1 tablet under the tongue every 5 minutes for 3 doses. If symptoms persist 5 minutes after 1st dose call 911., Disp-25 tablet, R-0, E-Prescribe      !! Nutritional Supplements (ENSURE PLUS) LIQD Take 1 each by mouth 4 times daily, Disp-5688 mL, R-11, E-Prescribe      order for DME Equipment being ordered: Nebulizer machineDisp-1 Device, R-0, Local Print      Respiratory Therapy Supplies (NEBULIZER/TUBING/MOUTHPIECE) KIT 1 kit as needed (if becomes damaged), Disp-1 kit, R-1, E-Prescribe      !! Nutritional Supplements (BOOST) Take 1 Bottle by mouth 3 times daily (with meals), Disp-90 each, R-0, E-Prescribe      !! Nutritional Supplements (CARNATION INSTANT BREAKFAST) LIQD Take 1  packet by mouth 3 times daily as needed, Historical       !! - Potential duplicate medications found. Please discuss with provider.      STOP taking these medications       clopidogrel (PLAVIX) 75 MG tablet Comments:   Reason for Stopping:         losartan (COZAAR) 25 MG tablet Comments:   Reason for Stopping:         metoprolol tartrate (LOPRESSOR) 25 MG tablet Comments:   Reason for Stopping:             Allergies   Allergies   Allergen Reactions     Crestor [Rosuvastatin] Other (See Comments)     dizziness     Lisinopril Cough     Statins [Hmg-Coa-R Inhibitors] Other (See Comments)     Muscle/joint aching     Optison [Albumin Human] Other (See Comments)     Pt was flush and very dizzy.  Also had a BP drop     Penicillins Rash

## 2021-10-06 NOTE — PROGRESS NOTES
Clinic Care Coordination Contact  [facility]/Voicemail       Clinical Data: Care Coordinator Outreach  Outreach attempted x 1.  Left message on patient's voicemail with call back information and requested return call.  Plan:  Care Coordinator will try to reach patient again in 3-5 business days.      Herminia BERNALN, RN, PHN, [facility]  Primary Clinic Care Coordination    [facility]  Primary Care Clinics  [email or web address]   Office: [phone]  Employed by [employer]

## 2021-10-11 NOTE — TELEPHONE ENCOUNTER
Will discuss code status and evaluate current cognitive status at 10/18 visit.  Jaja Carter, CNP

## 2021-10-11 NOTE — LETTER
10/11/2021    Deisy Carter, SPRING CNP  [address] [facility] [id number]    RE: Preeti Ford       Dear Colleague,    I had the pleasure of seeing Preeti Ford in the [facility].    HISTORY:    Preeti Ford is a 89-year-old female presenting with her very attentive , Oh.   She has a history of severe oxygen dependent COPD dating back many years, coronary artery disease with previous LAD infarct and stenting of her circumflex with restenosis requiring a restenting, cardiomyopathy with a history of cardiogenic shock, ejection fraction variable by echo with the last echo showing EF greater than 70%, peripheral vascular disease with peripheral bypass and previous CVA's (last in February 2020), hypertension, hyperlipidemia, pulmonary hypertension, severe aortic stenosis.  She is here for a routine visit.    Preeti reports that she is that her breathing is worse over the last 6 months.  She continues to use cigarettes although she has cut back.  Exam room with  and wife smelled extremely strongly of smoke.  She reports that her appetite is normal and she and her  agree that she eats a lot, but she continues to lose weight, although compared to a year ago her weight is unchanged by our records.  Preeti also complains of a discomfort in her chest.  This is a tightness that does not radiate and is not associate with nausea, diaphoresis, or necessarily worsening shortness of breath.  These episodes occur randomly, not really associated with activity, and last few hours at a time.  She cannot recall whether this is discomfort similar to what she experienced prior to her previous stenting procedures.    Preeti was hospitalized twice in the last 6 weeks, the first with a spontaneous pneumothorax and COPD exacerbation, the second with pneumonia and COPD exacerbation with small troponin elevation to 0.07 with elevated proBNP at  1700.    An echocardiogram was recently done for follow-up of her aortic stenosis.  Her mean gradient was 19 mmHg the calculated valve area of 1 to 1.2 cm  and a DI of 0.33.  Her LV function was felt to be hyperdynamic the right ventricle was normal in size and function with an estimated systolic pressure of 41 mmHg plus RAP, lower than previous estimates.  This echocardiogram was done with a blood pressure 72/34 which may influence gradients and calculations.    Preeti reports that she has not yet been vaccinated against COVID-19.  Her  reports that they were not certain whether it was safe given her multiple medical problems.  This was discussed in great detail and I assured them that it is far safer to be vaccinated than remain unvaccinated.  Preeti states that she is not interested.    ASSESSMENT/PLAN:    1.  Coronary artery disease.  Previous LAD and circumflex stenting with restenosis of circumflex requiring restenting.  This was done in 2018.  The patient continues to experience chest discomfort which certainly could represent angina but does not seem to be changing over time.  MRI stress testing is recommended, see below.  2.  Aortic stenosis.  By visualization the valve stenosis appears severe but the calculations suggest more moderate stenosis.  The echo reader felt that further imaging was desirable to exclude severe AS.  This certainly could be a contributing factor to her fatigue and dyspnea, cardiac MRI is recommended to evaluate aortic stenosis as well as ischemia.  She is not sure that she wants to go through with this, has been somewhat reluctant over time to accept certain medical cares.  She would prefer to give this some thought.  I reminded her that if we do not schedule today it will be her responsibility to contact me to have this done.  Both her and her  expressed an understanding of the situation.  My clinical suspicion is rather low that the aortic stenosis is a significant  contributor to this patient's dyspnea.  I suspect that her dyspnea is entirely due to progressive COPD in the setting of ongoing cigarette use.  3.  Hypertension.  Well-controlled on current medications, no changes needed  4.  Hyperlipidemia.  Adequate control with LDL 85, no changes to medications recommended, currently using Lipitor 20 mg daily.  5.  Ongoing cigarette use.  Pleat smoking cessation was once again recommended.    Thank you for inviting me to participate in the care of your patient.  Please don't hesitate to call if I can be of further assistance.  45 minutes were spent today reviewing the chart and other records, interviewing and examining the patient, and documenting our visit.    Chart documentation was completed, in part, with [employer] voice-recognition software. Even though reviewed, some grammatical, spelling, and word errors may remain.       Orders Placed This Encounter   Procedures     Follow-Up with Cardiologist     No orders of the defined types were placed in this encounter.    Medications Discontinued During This Encounter   Medication Reason     losartan (COZAAR) 25 MG tablet      metoprolol tartrate (LOPRESSOR) 50 MG tablet      clopidogrel (PLAVIX) 75 MG tablet      predniSONE (DELTASONE) 10 MG tablet        10 year ASCVD risk: The ASCVD Risk score (Danforth DC Jr., et al., 2013) failed to calculate for the following reasons:    The patient has a prior MI or stroke diagnosis    Encounter Diagnosis   Name Primary?     S/P drug eluting coronary stent placement-LAD and circ, circ re-do 5/17/18        CURRENT MEDICATIONS:  Current Outpatient Medications   Medication Sig Dispense Refill     ACE/ARB/ARNI NOT PRESCRIBED, INTENTIONAL, Please choose reason not prescribed, below       albuterol (PROAIR HFA/PROVENTIL HFA/VENTOLIN HFA) 108 (90 Base) MCG/ACT inhaler Inhale 2 puffs into the lungs every 6 hours as needed for shortness of breath / dyspnea 3 Inhaler 3     albuterol (PROVENTIL) (2.5 MG/3ML)  0.083% neb solution NEBULIZE CONTENTS OF ONE VIAL EVERY 4 HOURS AS NEEDED FOR SHORTNESS OF BREATH / DIFFICULTY BREATHING OR WHEEZING 360 mL 1     aspirin 81 MG EC tablet Take 1 tablet (81 mg) by mouth daily       atorvastatin (LIPITOR) 20 MG tablet Take 1 tablet (20 mg) by mouth At Bedtime 90 tablet 3     guaiFENesin (ORGANIDIN) 200 MG tablet Take 800 mg by mouth daily       ipratropium - albuterol 0.5 mg/2.5 mg/3 mL (DUONEB) 0.5-2.5 (3) MG/3ML neb solution Take 1 vial (3 mLs) by nebulization 4 times daily Until hospital follow up, then as directed by your primary care provider 270 mL 3     montelukast (SINGULAIR) 10 MG tablet Take 1 tablet (10 mg) by mouth At Bedtime 90 tablet 3     Nutritional Supplements (ENSURE PLUS) LIQD Take 1 each by mouth 4 times daily 5688 mL 11     order for DME Equipment being ordered: Nebulizer machine 1 Device 0     pantoprazole (PROTONIX) 40 MG EC tablet Take 1 tablet (40 mg) by mouth daily 30 tablet 0     PARoxetine (PAXIL) 10 MG tablet Take 20 mg by mouth every morning       Respiratory Therapy Supplies (NEBULIZER/TUBING/MOUTHPIECE) KIT 1 kit as needed (if becomes damaged) 1 kit 1     SYMBICORT 160-4.5 MCG/ACT Inhaler INHALE TWO PUFFS BY MOUTH TWICE A DAY 10.2 g 0     umeclidinium (INCRUSE ELLIPTA) 62.5 MCG/INH inhaler Inhale 1 puff into the lungs daily 3 each 3     morphine (MSIR) 15 MG IR tablet Take 0.5 tablets (7.5 mg) by mouth every 6 hours as needed (air hunger) (Patient not taking: Reported on 10/11/2021) 5 tablet 0     nitroGLYcerin (NITROSTAT) 0.4 MG sublingual tablet For chest pain place 1 tablet under the tongue every 5 minutes for 3 doses. If symptoms persist 5 minutes after 1st dose call 911. (Patient not taking: Reported on 10/11/2021) 25 tablet 0       ALLERGIES     Allergies   Allergen Reactions     Crestor [Rosuvastatin] Other (See Comments)     dizziness     Lisinopril Cough     Statins [Hmg-Coa-R Inhibitors] Other (See Comments)     Muscle/joint aching     Optison  "[Albumin Human] Other (See Comments)     Pt was flush and very dizzy.  Also had a BP drop     Penicillins Rash       PAST MEDICAL HISTORY:  Past Medical History:   Diagnosis Date     Acute on chronic respiratory failure (H) 03/25/2021     Arthritis      COPD (chronic obstructive pulmonary disease) (H)      Coronary artery disease      CVA (cerebral vascular accident) (H) 08/17/2013     Hypertension      Hypotension, unspecified hypotension type      Sepsis (H)        PAST SURGICAL HISTORY:  Past Surgical History:   Procedure Laterality Date     APPENDECTOMY       BYPASS GRAFT AORTOILIAC N/A 3/7/2017    Procedure: BYPASS GRAFT AORTOILIAC;  Surgeon: Marcos Pratt MD;  Location: [facility] OR     SALPINGO OOPHORECTOMY,R/L/DELORES      Salpingo Oophorectomy, RT/LT/DELORES       FAMILY HISTORY:  Family History   Problem Relation Age of Onset     Cerebrovascular Disease Mother      Cerebrovascular Disease Father      Genitourinary Problems Brother         Dialysis       SOCIAL HISTORY:  Social History     Socioeconomic History     Marital status:      Spouse name: Not on file     Number of children: Not on file     Years of education: Not on file     Highest education level: Not on file   Occupational History     Not on file   Tobacco Use     Smoking status: Former Smoker     Packs/day: 0.50     Types: Cigarettes     Smokeless tobacco: Never Used     Tobacco comment: 'I don't smoke anymore but my  does\"   Substance and Sexual Activity     Alcohol use: No     Alcohol/week: 0.0 standard drinks     Drug use: No     Sexual activity: Not Currently     Partners: Male   Other Topics Concern      Service No     Blood Transfusions No     Caffeine Concern No     Occupational Exposure No     Hobby Hazards No     Sleep Concern Yes     Comment: Trouble breathing at night due to COPD     Stress Concern No     Weight Concern No     Special Diet No     Back Care No     Exercise No     Bike Helmet No     Seat Belt Yes     " "Self-Exams Yes     Parent/sibling w/ CABG, MI or angioplasty before 65F 55M? No   Social History Narrative     Not on file     Social Determinants of Health     Financial Resource Strain:      Difficulty of Paying Living Expenses:    Food Insecurity:      Worried About Running Out of Food in the Last Year:      Ran Out of Food in the Last Year:    Transportation Needs:      Lack of Transportation (Medical):      Lack of Transportation (Non-Medical):    Physical Activity:      Days of Exercise per Week:      Minutes of Exercise per Session:    Stress:      Feeling of Stress :    Social Connections:      Frequency of Communication with Friends and Family:      Frequency of Social Gatherings with Friends and Family:      Attends Scientologist Services:      Active Member of Clubs or Organizations:      Attends Club or Organization Meetings:      Marital Status:    Intimate Partner Violence:      Fear of Current or Ex-Partner:      Emotionally Abused:      Physically Abused:      Sexually Abused:        Review of Systems:  Skin:  Negative     Eyes:  Negative    ENT:  Negative    Respiratory:  Positive for dyspnea on exertion;shortness of breath  Cardiovascular:  Negative for;palpitations;chest pain;lightheadedness;dizziness edema;Positive for  Gastroenterology: Negative    Genitourinary:  Negative    Musculoskeletal:  Negative    Neurologic:  Negative    Psychiatric:  Negative    Heme/Lymph/Imm:  Positive for allergies  Endocrine:  Negative      Physical Exam:  Vitals: /56 (BP Location: Right arm, Patient Position: Sitting, Cuff Size: Adult Regular)   Pulse 80   Ht 1.613 m (5' 3.5\")   Wt 40.9 kg (90 lb 3.2 oz)   SpO2 97%   BMI 15.73 kg/m      Constitutional:  cooperative thin;cachectic;frail;appears older than stated age examined in wheelchair    Skin:  warm and dry to the touch        Head:  normocephalic        Eyes:  sclera white;no nystagmus;no xanthalasma        ENT:  no pallor or cyanosis   masked    Neck:  " JVP normal   torticollis    Chest:  clear to auscultation diminished breath sounds bilaterally;prolonged expiration no wheezing    Cardiac: regular rhythm;normal S1 and S2   distant heart sounds no presence of murmur            Abdomen:  abdomen soft;BS normoactive        Vascular:   pulses below the femoral arteries are diminished                                    Extremities and Muscular Skeletal:        bilateral LE edema;1+;pitting;L greater than R;2+edema of ankle and foot bilateral    Neurological:  no gross motor deficits        Psych:  affect appropriate, oriented to time, person and place     Recent Lab Results:  LIPID RESULTS:  Lab Results   Component Value Date    CHOL 185 10/29/2020    HDL 84 10/29/2020    LDL 85 10/29/2020    TRIG 82 10/29/2020    CHOLHDLRATIO 3.6 10/09/2015       LIVER ENZYME RESULTS:  Lab Results   Component Value Date    AST 50 (H) 09/16/2021    AST 30 05/02/2020    ALT 88 (H) 09/16/2021    ALT 22 10/29/2020       CBC RESULTS:  Lab Results   Component Value Date    WBC 8.5 09/18/2021    WBC 6.9 03/26/2021    RBC 2.99 (L) 09/18/2021    RBC 4.01 03/26/2021    HGB 9.4 (L) 09/19/2021    HGB 12.4 03/26/2021    HCT 26.8 (L) 09/18/2021    HCT 37.2 03/26/2021    MCV 90 09/18/2021    MCV 93 03/26/2021    MCH 29.4 09/18/2021    MCH 30.9 03/26/2021    MCHC 32.8 09/18/2021    MCHC 33.3 03/26/2021    RDW 12.1 09/18/2021    RDW 12.1 03/26/2021     09/19/2021     03/26/2021       BMP RESULTS:  Lab Results   Component Value Date     09/19/2021     03/28/2021    POTASSIUM 4.5 09/19/2021    POTASSIUM 4.5 03/28/2021    CHLORIDE 104 09/19/2021    CHLORIDE 100 03/28/2021    CO2 36 (H) 09/19/2021    CO2 36 (H) 03/28/2021    ANIONGAP 1 (L) 09/19/2021    ANIONGAP 4 03/28/2021    GLC 79 09/19/2021     (H) 03/28/2021    BUN 12 09/19/2021    BUN 25 03/28/2021    CR 0.56 09/19/2021    CR 0.70 03/28/2021    GFRESTIMATED >90 09/19/2021    GFRESTIMATED 89 03/28/2021    GFRESTBLACK  >90 03/28/2021    CALDERON 8.3 (L) 09/19/2021    CALDERON 8.5 03/28/2021        A1C RESULTS:  Lab Results   Component Value Date    A1C 5.5 03/24/2018       INR RESULTS:  Lab Results   Component Value Date    INR 0.91 08/30/2021    INR 0.84 (L) 05/02/2020    INR 0.91 05/17/2018         Victor Hugo Cortez MD, FAC    CC  Gurpreet Boston MD  [address] [place] [address]                      Thank you for allowing me to participate in the care of your patient.      Sincerely,     Victor Hugo Cortez MD     [facility]  cc:   Gurpreet Boston MD  [address] [place] [address]

## 2021-10-11 NOTE — PROGRESS NOTES
HISTORY:    Preeti Ford is a 89-year-old female presenting with her very attentive , Oh.   She has a history of severe oxygen dependent COPD dating back many years, coronary artery disease with previous LAD infarct and stenting of her circumflex with restenosis requiring a restenting, cardiomyopathy with a history of cardiogenic shock, ejection fraction variable by echo with the last echo showing EF greater than 70%, peripheral vascular disease with peripheral bypass and previous CVA's (last in February 2020), hypertension, hyperlipidemia, pulmonary hypertension, severe aortic stenosis.  She is here for a routine visit.    Preeti reports that she is that her breathing is worse over the last 6 months.  She continues to use cigarettes although she has cut back.  Exam room with  and wife smelled extremely strongly of smoke.  She reports that her appetite is normal and she and her  agree that she eats a lot, but she continues to lose weight, although compared to a year ago her weight is unchanged by our records.  Preeti also complains of a discomfort in her chest.  This is a tightness that does not radiate and is not associate with nausea, diaphoresis, or necessarily worsening shortness of breath.  These episodes occur randomly, not really associated with activity, and last few hours at a time.  She cannot recall whether this is discomfort similar to what she experienced prior to her previous stenting procedures.    Preeti was hospitalized twice in the last 6 weeks, the first with a spontaneous pneumothorax and COPD exacerbation, the second with pneumonia and COPD exacerbation with small troponin elevation to 0.07 with elevated proBNP at 1700.    An echocardiogram was recently done for follow-up of her aortic stenosis.  Her mean gradient was 19 mmHg the calculated valve area of 1 to 1.2 cm  and a DI of 0.33.  Her LV function was felt to be hyperdynamic the right ventricle was normal in size and  function with an estimated systolic pressure of 41 mmHg plus RAP, lower than previous estimates.  This echocardiogram was done with a blood pressure 72/34 which may influence gradients and calculations.    Preeti reports that she has not yet been vaccinated against COVID-19.  Her  reports that they were not certain whether it was safe given her multiple medical problems.  This was discussed in great detail and I assured them that it is far safer to be vaccinated than remain unvaccinated.  Preeti states that she is not interested.    ASSESSMENT/PLAN:    1.  Coronary artery disease.  Previous LAD and circumflex stenting with restenosis of circumflex requiring restenting.  This was done in 2018.  The patient continues to experience chest discomfort which certainly could represent angina but does not seem to be changing over time.  MRI stress testing is recommended, see below.  2.  Aortic stenosis.  By visualization the valve stenosis appears severe but the calculations suggest more moderate stenosis.  The echo reader felt that further imaging was desirable to exclude severe AS.  This certainly could be a contributing factor to her fatigue and dyspnea, cardiac MRI is recommended to evaluate aortic stenosis as well as ischemia.  She is not sure that she wants to go through with this, has been somewhat reluctant over time to accept certain medical cares.  She would prefer to give this some thought.  I reminded her that if we do not schedule today it will be her responsibility to contact me to have this done.  Both her and her  expressed an understanding of the situation.  My clinical suspicion is rather low that the aortic stenosis is a significant contributor to this patient's dyspnea.  I suspect that her dyspnea is entirely due to progressive COPD in the setting of ongoing cigarette use.  3.  Hypertension.  Well-controlled on current medications, no changes needed  4.  Hyperlipidemia.  Adequate control with  LDL 85, no changes to medications recommended, currently using Lipitor 20 mg daily.  5.  Ongoing cigarette use.  Pleat smoking cessation was once again recommended.    Thank you for inviting me to participate in the care of your patient.  Please don't hesitate to call if I can be of further assistance.  45 minutes were spent today reviewing the chart and other records, interviewing and examining the patient, and documenting our visit.    Chart documentation was completed, in part, with [employer] voice-recognition software. Even though reviewed, some grammatical, spelling, and word errors may remain.       Orders Placed This Encounter   Procedures     Follow-Up with Cardiologist     No orders of the defined types were placed in this encounter.    Medications Discontinued During This Encounter   Medication Reason     losartan (COZAAR) 25 MG tablet      metoprolol tartrate (LOPRESSOR) 50 MG tablet      clopidogrel (PLAVIX) 75 MG tablet      predniSONE (DELTASONE) 10 MG tablet        10 year ASCVD risk: The ASCVD Risk score ([place] KAMALA Jr., et al., 2013) failed to calculate for the following reasons:    The patient has a prior MI or stroke diagnosis    Encounter Diagnosis   Name Primary?     S/P drug eluting coronary stent placement-LAD and circ, circ re-do 5/17/18        CURRENT MEDICATIONS:  Current Outpatient Medications   Medication Sig Dispense Refill     ACE/ARB/ARNI NOT PRESCRIBED, INTENTIONAL, Please choose reason not prescribed, below       albuterol (PROAIR HFA/PROVENTIL HFA/VENTOLIN HFA) 108 (90 Base) MCG/ACT inhaler Inhale 2 puffs into the lungs every 6 hours as needed for shortness of breath / dyspnea 3 Inhaler 3     albuterol (PROVENTIL) (2.5 MG/3ML) 0.083% neb solution NEBULIZE CONTENTS OF ONE VIAL EVERY 4 HOURS AS NEEDED FOR SHORTNESS OF BREATH / DIFFICULTY BREATHING OR WHEEZING 360 mL 1     aspirin 81 MG EC tablet Take 1 tablet (81 mg) by mouth daily       atorvastatin (LIPITOR) 20 MG tablet Take 1 tablet (20  mg) by mouth At Bedtime 90 tablet 3     guaiFENesin (ORGANIDIN) 200 MG tablet Take 800 mg by mouth daily       ipratropium - albuterol 0.5 mg/2.5 mg/3 mL (DUONEB) 0.5-2.5 (3) MG/3ML neb solution Take 1 vial (3 mLs) by nebulization 4 times daily Until hospital follow up, then as directed by your primary care provider 270 mL 3     montelukast (SINGULAIR) 10 MG tablet Take 1 tablet (10 mg) by mouth At Bedtime 90 tablet 3     Nutritional Supplements (ENSURE PLUS) LIQD Take 1 each by mouth 4 times daily 5688 mL 11     order for DME Equipment being ordered: Nebulizer machine 1 Device 0     pantoprazole (PROTONIX) 40 MG EC tablet Take 1 tablet (40 mg) by mouth daily 30 tablet 0     PARoxetine (PAXIL) 10 MG tablet Take 20 mg by mouth every morning       Respiratory Therapy Supplies (NEBULIZER/TUBING/MOUTHPIECE) KIT 1 kit as needed (if becomes damaged) 1 kit 1     SYMBICORT 160-4.5 MCG/ACT Inhaler INHALE TWO PUFFS BY MOUTH TWICE A DAY 10.2 g 0     umeclidinium (INCRUSE ELLIPTA) 62.5 MCG/INH inhaler Inhale 1 puff into the lungs daily 3 each 3     morphine (MSIR) 15 MG IR tablet Take 0.5 tablets (7.5 mg) by mouth every 6 hours as needed (air hunger) (Patient not taking: Reported on 10/11/2021) 5 tablet 0     nitroGLYcerin (NITROSTAT) 0.4 MG sublingual tablet For chest pain place 1 tablet under the tongue every 5 minutes for 3 doses. If symptoms persist 5 minutes after 1st dose call 911. (Patient not taking: Reported on 10/11/2021) 25 tablet 0       ALLERGIES     Allergies   Allergen Reactions     Crestor [Rosuvastatin] Other (See Comments)     dizziness     Lisinopril Cough     Statins [Hmg-Coa-R Inhibitors] Other (See Comments)     Muscle/joint aching     Optison [Albumin Human] Other (See Comments)     Pt was flush and very dizzy.  Also had a BP drop     Penicillins Rash       PAST MEDICAL HISTORY:  Past Medical History:   Diagnosis Date     Acute on chronic respiratory failure (H) 03/25/2021     Arthritis      COPD (chronic  "obstructive pulmonary disease) (H)      Coronary artery disease      CVA (cerebral vascular accident) (H) 08/17/2013     Hypertension      Hypotension, unspecified hypotension type      Sepsis (H)        PAST SURGICAL HISTORY:  Past Surgical History:   Procedure Laterality Date     APPENDECTOMY       BYPASS GRAFT AORTOILIAC N/A 3/7/2017    Procedure: BYPASS GRAFT AORTOILIAC;  Surgeon: Marcos Pratt MD;  Location: [facility] OR     SALPINGO OOPHORECTOMY,R/L/DELORES      Salpingo Oophorectomy, RT/LT/DELORES       FAMILY HISTORY:  Family History   Problem Relation Age of Onset     Cerebrovascular Disease Mother      Cerebrovascular Disease Father      Genitourinary Problems Brother         Dialysis       SOCIAL HISTORY:  Social History     Socioeconomic History     Marital status:      Spouse name: Not on file     Number of children: Not on file     Years of education: Not on file     Highest education level: Not on file   Occupational History     Not on file   Tobacco Use     Smoking status: Former Smoker     Packs/day: 0.50     Types: Cigarettes     Smokeless tobacco: Never Used     Tobacco comment: 'I don't smoke anymore but my  does\"   Substance and Sexual Activity     Alcohol use: No     Alcohol/week: 0.0 standard drinks     Drug use: No     Sexual activity: Not Currently     Partners: Male   Other Topics Concern      Service No     Blood Transfusions No     Caffeine Concern No     Occupational Exposure No     Hobby Hazards No     Sleep Concern Yes     Comment: Trouble breathing at night due to COPD     Stress Concern No     Weight Concern No     Special Diet No     Back Care No     Exercise No     Bike Helmet No     Seat Belt Yes     Self-Exams Yes     Parent/sibling w/ CABG, MI or angioplasty before 65F 55M? No   Social History Narrative     Not on file     Social Determinants of Health     Financial Resource Strain:      Difficulty of Paying Living Expenses:    Food Insecurity:      Worried " "About Running Out of Food in the Last Year:      Ran Out of Food in the Last Year:    Transportation Needs:      Lack of Transportation (Medical):      Lack of Transportation (Non-Medical):    Physical Activity:      Days of Exercise per Week:      Minutes of Exercise per Session:    Stress:      Feeling of Stress :    Social Connections:      Frequency of Communication with Friends and Family:      Frequency of Social Gatherings with Friends and Family:      Attends Buddhist Services:      Active Member of Clubs or Organizations:      Attends Club or Organization Meetings:      Marital Status:    Intimate Partner Violence:      Fear of Current or Ex-Partner:      Emotionally Abused:      Physically Abused:      Sexually Abused:        Review of Systems:  Skin:  Negative     Eyes:  Negative    ENT:  Negative    Respiratory:  Positive for dyspnea on exertion;shortness of breath  Cardiovascular:  Negative for;palpitations;chest pain;lightheadedness;dizziness edema;Positive for  Gastroenterology: Negative    Genitourinary:  Negative    Musculoskeletal:  Negative    Neurologic:  Negative    Psychiatric:  Negative    Heme/Lymph/Imm:  Positive for allergies  Endocrine:  Negative      Physical Exam:  Vitals: /56 (BP Location: Right arm, Patient Position: Sitting, Cuff Size: Adult Regular)   Pulse 80   Ht 1.613 m (5' 3.5\")   Wt 40.9 kg (90 lb 3.2 oz)   SpO2 97%   BMI 15.73 kg/m      Constitutional:  cooperative thin;cachectic;frail;appears older than stated age examined in wheelchair    Skin:  warm and dry to the touch        Head:  normocephalic        Eyes:  sclera white;no nystagmus;no xanthalasma        ENT:  no pallor or cyanosis   masked    Neck:  JVP normal   torticollis    Chest:  clear to auscultation diminished breath sounds bilaterally;prolonged expiration no wheezing    Cardiac: regular rhythm;normal S1 and S2   distant heart sounds no presence of murmur            Abdomen:  abdomen soft;BS " normoactive        Vascular:   pulses below the femoral arteries are diminished                                    Extremities and Muscular Skeletal:        bilateral LE edema;1+;pitting;L greater than R;2+edema of ankle and foot bilateral    Neurological:  no gross motor deficits        Psych:  affect appropriate, oriented to time, person and place     Recent Lab Results:  LIPID RESULTS:  Lab Results   Component Value Date    CHOL 185 10/29/2020    HDL 84 10/29/2020    LDL 85 10/29/2020    TRIG 82 10/29/2020    CHOLHDLRATIO 3.6 10/09/2015       LIVER ENZYME RESULTS:  Lab Results   Component Value Date    AST 50 (H) 09/16/2021    AST 30 05/02/2020    ALT 88 (H) 09/16/2021    ALT 22 10/29/2020       CBC RESULTS:  Lab Results   Component Value Date    WBC 8.5 09/18/2021    WBC 6.9 03/26/2021    RBC 2.99 (L) 09/18/2021    RBC 4.01 03/26/2021    HGB 9.4 (L) 09/19/2021    HGB 12.4 03/26/2021    HCT 26.8 (L) 09/18/2021    HCT 37.2 03/26/2021    MCV 90 09/18/2021    MCV 93 03/26/2021    MCH 29.4 09/18/2021    MCH 30.9 03/26/2021    MCHC 32.8 09/18/2021    MCHC 33.3 03/26/2021    RDW 12.1 09/18/2021    RDW 12.1 03/26/2021     09/19/2021     03/26/2021       BMP RESULTS:  Lab Results   Component Value Date     09/19/2021     03/28/2021    POTASSIUM 4.5 09/19/2021    POTASSIUM 4.5 03/28/2021    CHLORIDE 104 09/19/2021    CHLORIDE 100 03/28/2021    CO2 36 (H) 09/19/2021    CO2 36 (H) 03/28/2021    ANIONGAP 1 (L) 09/19/2021    ANIONGAP 4 03/28/2021    GLC 79 09/19/2021     (H) 03/28/2021    BUN 12 09/19/2021    BUN 25 03/28/2021    CR 0.56 09/19/2021    CR 0.70 03/28/2021    GFRESTIMATED >90 09/19/2021    GFRESTIMATED 89 03/28/2021    GFRESTBLACK >90 03/28/2021    CALDERON 8.3 (L) 09/19/2021    CALDERON 8.5 03/28/2021        A1C RESULTS:  Lab Results   Component Value Date    A1C 5.5 03/24/2018       INR RESULTS:  Lab Results   Component Value Date    INR 0.91 08/30/2021    INR 0.84 (L) 05/02/2020    INR  0.91 05/17/2018         Victor Hugo Cortez MD, FACC    CC  Gurpreet Boston MD  [address] [place] [address]

## 2021-10-13 NOTE — TELEPHONE ENCOUNTER
Jeannette, from [facility] calling [phone] ext [phone], stated she only received the 1st page of the fax... Wondering if you could re-fax to them at [phone]  If not if you could please call her back

## 2021-10-18 NOTE — PROGRESS NOTES
Assessment & Plan     Iron deficiency anemia, unspecified iron deficiency anemia type  She denies bloody or black stools. Hemoglobin has improved so we can hold off on GI consult and continue to work on increasing iron through her diet.   - CBC with platelets; Future  - CBC with platelets    Panlobular emphysema (H)  COPD, severe (H)  Continue current medications. Using O2 at 1 liter, when she increases higher than that her breathing doesn't feel as good. Is thinking she may want to transition to hospice as she does not want to be hospitalized again though she does want treatment of COPD exacerbations. Her  will talk to the home care nurse about this and make a decision after getting more info.   - Fluticasone-Umeclidin-Vilanterol (TRELEGY ELLIPTA) 200-62.5-25 MCG/INH oral inhaler; Inhale 1 puff into the lungs daily  - BETA BLOCKER NOT PRESCRIBED (INTENTIONAL); Please choose reason not prescribed from choices below.      No follow-ups on file.    SPRING Banegas CNP  [facility] WERNER Álvarez is a 70 year old who presents for the following health issues  accompanied by her spouse:    [facility]       Hospital Follow-up Visit:    Hospital/Nursing Home/IP Rehab Facility: [facility]  Date of Admission: 09/16/2021  Date of Discharge: 09/21/2021  Reason(s) for Admission: COPD with acute exacerbation      Was your hospitalization related to COVID-19? No   Problems taking medications regularly:  None  Medication changes since discharge: Dropped Metoprolol, Losartan, and Plavix  Problems adhering to non-medication therapy:  No    70-year-old woman with COPD and chronic hypoxic and hypercapnic respiratory failure and recent hospitalization for spontaneous pneumothorax requiring chest tube placement admitted with initial concerns for pneumonia, COPD exacerbation, and hypotension. Sepsis ruled out with normal lactic acid and procalcitonin level but there were  concerns for pneumonia on CT chest. She was initially managed with ceftriaxone and azithromycin. She completed 5 days of azithromycin. Patient was discharged on oral cefdinir and will complete course for management of Pneumonia. Also treated with steroids and discharged on prednisone taper for management of COPD flare up. BP was low at admission and has improved. Patient also has  significant drop in hemoglobin recently  raising concerns for potential occult GI bleed but hb was monitored and remained stable. She will f/u with GI outpatient for further evaluation. Aspirin was continued at discharge but held plavix, started PPI,  due to concerns for occult GI bleed. Losartan and metoprolol also held at discharge as patient had low blood pressures during hospitalization. She had elevated troponin during hospitalization. Troponin trend was flat with no chest pain and is likely from demand ischemia. She will also f/u with cardiology as outpatient for recommendation on continuation of aspirin and plavix as patient had low hemoglobin and also has moderate to severe aortic stenosis. She had CAD and s/p Kayley in 2018.  Patient seems clinically stable at this time and will be  Discharged home.    Summary of hospitalization:  [facility] discharge summary reviewed  Diagnostic Tests/Treatments reviewed.  Follow up needed: none  Other Healthcare Providers Involved in Patient s Care:         Homecare  Update since discharge: stable. Post Discharge Medication Reconciliation: discharge medications reconciled, continue medications without change.  Plan of care communicated with patient and family                  Review of Systems   Constitutional, HEENT, cardiovascular, pulmonary, GI, , musculoskeletal, neuro, skin, endocrine and psych systems are negative, except as otherwise noted.      Objective    /60   Pulse 88   Temp 99.2  F (37.3  C) (Temporal)   Resp 16   Wt 41.1 kg (90 lb 9.6 oz)   SpO2 100%    BMI 15.80 kg/m    Body mass index is 15.8 kg/m .  Physical Exam   GENERAL: alert, no distress, frail and appears older than stated age  EYES: Eyes grossly normal to inspection, PERRL and conjunctivae and sclerae normal  RESP: lungs clear to auscultation - no rales, rhonchi or wheezes  CV: regular rate and rhythm, normal S1 S2, no S3 or S4, no murmur, click or rub, no peripheral edema and peripheral pulses strong  MS: no gross musculoskeletal defects noted, no edema  SKIN: no suspicious lesions or rashes  NEURO: Normal strength and tone, mentation intact and speech normal  PSYCH: mentation appears normal, affect normal/bright    Results for orders placed or performed in visit on 10/18/21   CBC with platelets     Status: Abnormal   Result Value Ref Range    WBC Count 6.0 4.0 - 11.0 10e3/uL    RBC Count 3.92 3.80 - 5.20 10e6/uL    Hemoglobin 10.5 (L) 11.7 - 15.7 g/dL    Hematocrit 34.6 (L) 35.0 - 47.0 %    MCV 88 78 - 100 fL    MCH 26.8 26.5 - 33.0 pg    MCHC 30.3 (L) 31.5 - 36.5 g/dL    RDW 13.0 10.0 - 15.0 %    Platelet Count 382 150 - 450 10e3/uL

## 2021-10-20 NOTE — TELEPHONE ENCOUNTER
Patient is being discharged from home care . Community resources have been provided.  Health care directive was completed.

## 2021-10-25 NOTE — PROGRESS NOTES
"Preeti Ford  Gender: female  : 1950  [address]  [place] [address] [phone] (home)     Medical Record: [id number]  Pharmacy:    [facility] [place] - [place], [place] - [address] [place] - [facility]  [facility] - [place], [place] - [address]  [facility] [place] [address] [facility] [place], [place] - [address] [facility]   Primary Care Provider: Deisy Carter    Parent's names are: Data Unavailable (mother) and Data Unavailable (father).      [facility]  2021     Discharge Phone Call:  Key Words/Key Times      Introduction - AIDET (Acknowledge, Introduce, Duration, Explanation)      Empathy-   We are calling to see how you are since your recent stay in the hospital?     Call back COMMENTS: doing fine      Clinical Questions -  (f/u appts, medication side effects/purpose, ability to care for self at home) \"For your safety, it is important to us that you understand the purpose and side effects of your medications, can you tell me what your new medications are?\"     Call back COMMENTS:  helps with all meds      Staff Recognition -  We like to recognize staff and physicians who have done an excellent job.  Do you remember any people from your care team that you would like recognize?     Call back COMMENTS: no      Very Good Care -  We want to provide very good care to all patients.  How was your care?     Call back COMMENTS: everything was fine      Opportunities for Improvement -  Our goal is to be the best.  Do you have any suggestions for things that we could improve upon?     Call back COMMENTS: no      Thank You     Kd Weir RN            "

## 2021-11-02 NOTE — PROGRESS NOTES
Clinic Care Coordination Contact    Follow Up Progress Note      Assessment: Called and spoke with pt's , states she is doing fine.  Her breathing is good, except he wants to change her over to the Trelegy Ellipta because its easier for her to use.  A prescription was sent to the pharmacy but they have not checked to see if her insurance will cover it.  He also reports home care is still coming out to see her however, it sounds like they will be ending soon.  Discussed HM, pt declined getting the covid vaccine.  states she never leave the house so they don't feel she needs it.      Care Gaps:    Health Maintenance Due   Topic Date Due     DEXA  Never done     COLPOSCOPY  Never done     ZOSTER IMMUNIZATION (1 of 2) Never done     MEDICARE ANNUAL WELLNESS VISIT  12/20/2017     COLORECTAL CANCER SCREENING  12/12/2018     HF ACTION PLAN  07/01/2019     MAMMO SCREENING  01/02/2020     LIPID  04/29/2021     INFLUENZA VACCINE (1) 09/01/2021       Care Gaps Last addressed on : 11/2/2021    Goals addressed this encounter:   Goals Addressed                    This Visit's Progress       Follow-up in my overdue health maintenance concerns (pt-stated)   50%      Goal Statement: Follow-up in my overdue health maintenance concerns  Date Goal set: 10/6/2021  Barriers: COVID-19, recent pneumonia  Strengths: engaged  Date to Achieve By: 12/1/2021  Patient expressed understanding of goal: yes  Action steps to achieve this goal:  1. I will ask to update HM at appointments  2. I will scheduled appts for important HM items        Intervention/Education provided during outreach: discussed need for updating HM, pt and  verbalized understanding of recommendation.      Outreach Frequency: monthly    Plan:   Care Coordinator will follow up in one month.       Herminia ZAPATA, RN, PHN, CCM  Primary Clinic Care Coordination    [facility]  Primary Care Clinics  [email or web address]   Office:  [phone]  Employed by [facility]

## 2021-11-07 NOTE — LETTER
[address] [address]  Phone: [phone]  Fax: [phone]        November 11, 2021      Preeti CANO Liliana                                                                                                                                [address] [address] [place] [id number]            Dear Ms. Juradobillyrobbie,    We are concerned about your health care.  We recently provided you with a medication refill.  Many medications require routine follow-up with your Doctor.      At this time we ask that: You a establish care appointment with a new provider. Please call the clinic at [phone] option 1 to schedule.     Your prescription: (Albuterol) Has been refilled for 1 time so you may have time for the above noted follow-up.      Thank you,      [facility]

## 2021-11-09 NOTE — TELEPHONE ENCOUNTER
Routing to team to set up virtual appointment for patient for establish care.  Patient has been given #90 to get to appointment per triage protocol.

## 2021-11-11 NOTE — PROGRESS NOTES
Medication Therapy Management (MTM) Encounter    ASSESSMENT:                            Medication Adherence/Access: No issues identified    COPD/Pneumonia: Improved - Trelegy Ellipta effective    CAD/Hypotension: Stable.    PLAN:                            1. Remember to rinse your mouth after using Trelegy Ellipta.    Follow-up: 3 months or sooner if needed      SUBJECTIVE/OBJECTIVE:                          Preeti Ford is a 71 year old female called for a follow-up visit. She was referred to me from transitions of care .  Today's visit is a follow-up MTM visit from 9/24/2021     Reason for visit: COPD Follow-Up.    Allergies/ADRs: Reviewed in chart  Past Medical History: Reviewed in chart  Tobacco: She reports that she has quit smoking. Her smoking use included cigarettes. She has never used smokeless tobacco.  Alcohol: not currently using      Medication Adherence/Access:  has stated that she has sometimes fallen asleep prior to her montelukast and     COPD/Pneumonia: Current medications: albuterol nebs/HFA as needed (using Duonebs right now), Duonebs two-four times daily, montelukast 10 mg at bedtime, Trelegy Ellipta one puff daily - using for over a week.  She feels that there has not been a drop off with this. Way past steroids/antibitoics. Oxygen down to 1 L/min during the day. No need for morphine for air hunger since home. Patient rinses their mouth after using steroid inhaler. - most of the time.   Patient is not experiencing side effects  Patient reports the following symptoms: none.  Patient does have an COPD Action Plan on file.   Has spirometry been completed: Yes     CAD/Hypotension: Current medications include aspirin 81 mg daily and nitroglycerin SL as needed (has not needed).  Patient does self-monitor blood pressure. Home BP monitoring in range of 116's systolic over 68's diastolic.  Patient reports no current medication side effects.  BP Readings from Last 3 Encounters:   10/18/21  106/60   10/11/21 116/56   09/21/21 106/49       Today's Vitals: There were no vitals taken for this visit.     Wt Readings from Last 5 Encounters:   10/18/21 90 lb 9.6 oz (41.1 kg)   10/11/21 90 lb 3.2 oz (40.9 kg)   09/21/21 92 lb 14.4 oz (42.1 kg)   08/29/21 85 lb 12.8 oz (38.9 kg)   06/14/21 90 lb 8 oz (41.1 kg)     ----------------      I spent 10 minutes with this patient today. A copy of the visit note was provided to the patient's primary care provider.    The patient declined a summary of these recommendations.     Trevor Dick, RogerioD, [facility]  Medication Therapy Management Pharmacist  Pager: [phone]    Telemedicine Visit Details  Type of service:  Telephone visit  Start Time: 3:15 PM  End Time: 3:25 PM  Originating Location (patient location): [place]  Distant Location (provider location):  [facility]     Medication Therapy Recommendations  No medication therapy recommendations to display

## 2021-11-11 NOTE — PATIENT INSTRUCTIONS
Recommendations from today's MTM visit:                                                    MTM (medication therapy management) is a service provided by a clinical pharmacist designed to help you get the most of out of your medicines.      1. Remember to rinse your mouth after using Trelegy Ellipta.    Follow-up: 3 months or sooner if needed    It was great to speak with you today.  I value your experience and would be very thankful for your time with providing feedback on our clinic survey. You may receive a survey via email or text message in the next few days.     To schedule another MTM appointment, please call the clinic directly or you may call the MTM scheduling line at [phone] or toll-free at [phone].     My Clinical Pharmacist's contact information:                                                      Please feel free to contact me with any questions or concerns you have.      Trevor Dick, PharmD, [facility]  Medication Therapy Management Pharmacist  Pager: [phone]

## 2021-12-09 NOTE — PROGRESS NOTES
Clinic Care Coordination Contact  [facility]/Voicemail       Clinical Data: Care Coordinator Outreach  Outreach attempted x 1.  Left message on patient's voicemail with call back information and requested return call.  Plan:  Care Coordinator will try to reach patient again in 3-5 business days.      Herminia BERNALN, RN, PHN, [facility]  Primary Clinic Care Coordination    [facility]  Primary Care Clinics  [email or web address]   Office: [phone]  Employed by [facility]

## 2021-12-13 NOTE — NURSING NOTE
Health Maintenance Due   Topic Date Due     DEXA  Never done     ANNUAL REVIEW OF HM ORDERS  Never done     COLPOSCOPY  Never done     ZOSTER IMMUNIZATION (1 of 2) Never done     MEDICARE ANNUAL WELLNESS VISIT  12/20/2017     COLORECTAL CANCER SCREENING  12/12/2018     HF ACTION PLAN  07/01/2019     MAMMO SCREENING  01/02/2020     LIPID  04/29/2021     INFLUENZA VACCINE (1) 09/01/2021     Jeri ALVAREZ LPN

## 2021-12-13 NOTE — PROGRESS NOTES
Assessment & Plan     COPD exacerbation (H)  Panlobular emphysema (H)  Cigarette smoker  Acute on chronic respiratory failure with hypoxia and hypercapnia (H)  Patient has continued to smoke despite multiple recommendations to quit. She is making efforts to reduce the amount of cigarettes and was praised for this, but reminded that it is very important to completely abstain from smoking given the poor condition of her lungs. Chest CT completed in 09/2021 did not identify any nodules, centrilobular emphysema with bilateral apical scarring. Patient will continue use of supplemental oxygen at 1LPM.   - Lipid Profile (Chol, Trig, HDL, LDL calc); Future  - Lipid Profile (Chol, Trig, HDL, LDL calc)  - CBC with platelets and differential; Future  - CBC with platelets and differential    Benign essential hypertension  Coronary artery disease of native artery of native heart with stable angina pectoris (H)  History of complex cardiac disease including myocardial infarction with stenting of the LAD and circumflex and re-stenting circumflex in 2018, PVD, aortic stenosis, HTN, hyperlipidemia and ongoing anginal pains. Patient is established with cardiology who has her taking statin and ASA. Though it appears that she did not tolerate BB.    - Lipid Profile (Chol, Trig, HDL, LDL calc); Future  - Comprehensive metabolic panel (BMP + Alb, Alk Phos, ALT, AST, Total. Bili, TP); Future  - Lipid Profile (Chol, Trig, HDL, LDL calc)  - Comprehensive metabolic panel (BMP + Alb, Alk Phos, ALT, AST, Total. Bili, TP)    Tobacco Cessation:   reports that she has been smoking cigarettes. She has never used smokeless tobacco.  Tobacco Cessation Action Plan: Information offered: Patient not interested at this time      Return in about 6 months (around 6/13/2022) for Return for scheduled annual checkup with PCP.    YOLANDA Pond [facility] Minnesota[facility]lvarez is a 71 year old who presents for the following  health issues     HPI     Concern - establish care  Onset:   Description: establish care  Intensity: mild  Progression of Symptoms:  improving  Accompanying Signs & Symptoms: none  Previous history of similar problem:   Precipitating factors:        Worsened by:   Alleviating factors:        Improved by:   Therapies tried and outcome: none    Patient is a 71 year old female who presents today with her spouse to establish care. She has a complex medical history including endstage COPD, daily tobacco use, history of CAD, CVA and PVD, HTN, hyperlipidemia, malnutrition and chronic anemia. Patient had previously been seen by Chery and says that she has not yet found a provide to manage her care. She is established with cardiology, last visit in June 2021.     Review of Systems   Constitutional, HEENT, cardiovascular, pulmonary, gi and gu systems are negative, except as otherwise noted.      Objective    /60 (BP Location: Right arm, Patient Position: Chair, Cuff Size: Adult Regular)   Pulse 82   Temp 98.6  F (37  C) (Temporal)   Resp 20   Wt 44.1 kg (97 lb 3.2 oz)   SpO2 100%   BMI 16.95 kg/m    Body mass index is 16.95 kg/m .  Physical Exam   GENERAL: healthy, alert and no distress  EYES: Eyes grossly normal to inspection, PERRL and conjunctivae and sclerae normal  HENT: ear canals and TM's normal, nose and mouth without ulcers or lesions  RESP: lungs clear to auscultation - no rales, rhonchi or wheezes  CV: regular rate and rhythm, normal S1 S2, no S3 or S4, no murmur, click or rub, no peripheral edema and peripheral pulses strong  PSYCH: mentation appears normal, affect normal/bright    Results for orders placed or performed in visit on 12/13/21   Lipid Profile (Chol, Trig, HDL, LDL calc)     Status: Abnormal   Result Value Ref Range    Cholesterol 143 <200 mg/dL    Triglycerides 129 <150 mg/dL    Direct Measure HDL 41 (L) >=50 mg/dL    LDL Cholesterol Calculated 76 <=100 mg/dL    Non HDL Cholesterol 102  <130 mg/dL    Patient Fasting > 8hrs? No     Narrative    Cholesterol  Desirable:  <200 mg/dL    Triglycerides  Normal:  Less than 150 mg/dL  Borderline High:  150-199 mg/dL  High:  200-499 mg/dL  Very High:  Greater than or equal to 500 mg/dL    Direct Measure HDL  Female:  Greater than or equal to 50 mg/dL   Male:  Greater than or equal to 40 mg/dL    LDL Cholesterol  Desirable:  <100mg/dL  Above Desirable:  100-129 mg/dL   Borderline High:  130-159 mg/dL   High:  160-189 mg/dL   Very High:  >= 190 mg/dL    Non HDL Cholesterol  Desirable:  130 mg/dL  Above Desirable:  130-159 mg/dL  Borderline High:  160-189 mg/dL  High:  190-219 mg/dL  Very High:  Greater than or equal to 220 mg/dL   Comprehensive metabolic panel (BMP + Alb, Alk Phos, ALT, AST, Total. Bili, TP)     Status: Abnormal   Result Value Ref Range    Sodium 133 133 - 144 mmol/L    Potassium 4.2 3.4 - 5.3 mmol/L    Chloride 97 94 - 109 mmol/L    Carbon Dioxide (CO2) 31 20 - 32 mmol/L    Anion Gap 5 3 - 14 mmol/L    Urea Nitrogen 12 7 - 30 mg/dL    Creatinine 0.46 (L) 0.52 - 1.04 mg/dL    Calcium 8.4 (L) 8.5 - 10.1 mg/dL    Glucose 102 (H) 70 - 99 mg/dL    Alkaline Phosphatase 74 40 - 150 U/L    AST 16 0 - 45 U/L    ALT 13 0 - 50 U/L    Protein Total 7.0 6.8 - 8.8 g/dL    Albumin 2.3 (L) 3.4 - 5.0 g/dL    Bilirubin Total 0.3 0.2 - 1.3 mg/dL    GFR Estimate >90 >60 mL/min/1.73m2   CBC with platelets and differential     Status: Abnormal   Result Value Ref Range    WBC Count 11.1 (H) 4.0 - 11.0 10e3/uL    RBC Count 4.37 3.80 - 5.20 10e6/uL    Hemoglobin 10.0 (L) 11.7 - 15.7 g/dL    Hematocrit 33.4 (L) 35.0 - 47.0 %    MCV 76 (L) 78 - 100 fL    MCH 22.9 (L) 26.5 - 33.0 pg    MCHC 29.9 (L) 31.5 - 36.5 g/dL    RDW 14.3 10.0 - 15.0 %    Platelet Count 447 150 - 450 10e3/uL    % Neutrophils 86 %    % Lymphocytes 7 %    % Monocytes 7 %    % Eosinophils 0 %    % Basophils 0 %    % Immature Granulocytes 0 %    NRBCs per 100 WBC 0 <1 /100    Absolute Neutrophils 9.4  (H) 1.6 - 8.3 10e3/uL    Absolute Lymphocytes 0.8 0.8 - 5.3 10e3/uL    Absolute Monocytes 0.8 0.0 - 1.3 10e3/uL    Absolute Eosinophils 0.1 0.0 - 0.7 10e3/uL    Absolute Basophils 0.0 0.0 - 0.2 10e3/uL    Absolute Immature Granulocytes 0.0 <=0.4 10e3/uL    Absolute NRBCs 0.0 10e3/uL   CBC with platelets and differential     Status: Abnormal    Narrative    The following orders were created for panel order CBC with platelets and differential.  Procedure                               Abnormality         Status                     ---------                               -----------         ------                     CBC with platelets and d...[[id number]]  Abnormal            Final result                 Please view results for these tests on the individual orders.

## 2021-12-13 NOTE — PATIENT INSTRUCTIONS
1. Start multivitamin    2. Update labs today    3. Consider DEXA and cancer screening at next annual checkup    4. Quit smoking     5. Get covid vaccine

## 2021-12-15 NOTE — TELEPHONE ENCOUNTER
Spoke with patients spouse tana AVALOS on file and informed of results below. They understood and would like script sent to [facility] for the iron supplement.     Wondering if the Chronic Kidney diease is severe enough where patient needs to be on dialysis?     Please also sign future orders as well.     Irais Monzon MA

## 2021-12-15 NOTE — TELEPHONE ENCOUNTER
----- Message from Alli Castro PA-C sent at 12/15/2021  4:19 PM CST -----  Please call with results. Please inform patient that her metabolic panel returned with evidence of chronic kidney disease consistent with her medical history. She was low on calcium and protein. I suspect that this is a result of her ongoing struggles with ensuring she is eating regular meals.    The lipid panel, which evaluates the fats/oils of the body, returned with excellent results. The lipids were all within their normal ranges. I would like the patient to continue her statin medication without change.     The patient's cell counts did return with ongoing anemia suggestive of iron deficiency. Please check to see if the patient is taking any iron supplements. If not I will send out one for her to start. We can recheck the hemoglobin in 1 month.       Alli Castro PA-C on 12/15/2021 at 4:03 PM

## 2021-12-17 NOTE — TELEPHONE ENCOUNTER
I have sent out the iron tablet. This can sometimes cause upset stomach and can be taken with food. It can also cause stool to darken.     The renal function does not require dialysis.     Alli Castro PA-C on 12/17/2021 at 10:55 AM

## 2021-12-21 NOTE — LETTER
[facility] COORDINATION  [facility] - [address]   [address]  Tel. [phone] / Fax [phone]  December 21, 2021    Preeti Ford  [address] [address] [place] [id number]      Dear Preeti,    I have been attempting to reach you since our last contact. I would like to continue to work with you and provide any additional support you may need on achieving your health care related goals. I would appreciate if you would give me a call at [phone] to let me know if you would like to continue working together. I know that there are many things that can affect our ability to communicate and I hope we can continue to work together.    All of us at the [facility] are invested in your health and are here to assist you in meeting your goals.     Sincerely,    Herminia BERNALN, RN, PHN, [facility]  Primary Clinic Care Coordination    [facility]  Primary Care Clinics  [email or web address]   Office: [phone]  Employed by [employer] [facility]

## 2021-12-21 NOTE — PROGRESS NOTES
Clinic Care Coordination Contact  [facility]/Voicemail       Clinical Data: Care Coordinator Outreach  Outreach attempted x 2.  Left message on patient's voicemail with call back information and requested return call.  Plan: Care Coordinator will send unable to contact letter with care coordinator contact information via mail. Care Coordinator will try to reach patient again in 10 business days.      Herminia BERNALN, RN, PHN, CCM  Primary Clinic Care Coordination    [facility]  Primary Care Clinics  [email or web address]   Office: [phone]  Employed by [facility]

## 2021-12-22 NOTE — LETTER
[facility] COORDINATION  [place] [address]  Tel. [phone] / Fax [phone]  December 22, 2021        Preeti Ford  [address] [address] [place] [address]  [place] [address]          Dear Preeti,     Alessandro is an updated Patient Centered Plan of Care for your continued enrollment in Care Coordination. Please let us know if you have additional questions, concerns, or goals that we can assist with.    Sincerely,    Herminia BERNALN, RN, PHN, [facility]  Primary Clinic Care Coordination    [facility]  Primary Care Clinics  [email or web address]   Office: [phone]  Employed by [facility]  Patient Centered Plan of Care  About Me:        Patient Name:  Preeti Ford    YOB: 1950  Age:         71 year old   Lynnd[place] MRN:    [id number] Telephone Information:  Home Phone [phone]   Mobile [phone]       Address:  [address] [address] [place] [address]  [place] [address] Email address:  [email or web address]      Emergency Contact(s)    Name Relationship Lgl Grd Work Phone Home Phone Mobile Phone   1. LINDA FORD* Spouse No  [phone]   2. JEREMI PONCE* Son No   [phone]   3. KALEN FRY Relative No   [phone]           Primary language:  English     needed? No   [employer]:  [phone] op. 1  Other communication barriers:No data recorded  Preferred Method of Communication:  Mail  Current living arrangement: I live in a private home with family    Mobility Status/ Medical Equipment: Independent w/Device  Health Maintenance  Health Maintenance Reviewed:   Health Maintenance Due   Topic Date Due     DEXA  Never done     COLPOSCOPY  Never done     ZOSTER IMMUNIZATION (1 of 2) Never done     MEDICARE ANNUAL WELLNESS VISIT  12/20/2017     COLORECTAL CANCER SCREENING  12/12/2018     HF ACTION  PLAN  07/01/2019     MAMMO SCREENING  01/02/2020     INFLUENZA VACCINE (1) 09/01/2021               My Access Plan  Medical Emergency 911   Primary Clinic Line [facility] [phone]   24 Hour Appointment Line [phone] or  [phone]) (toll-free)   24 Hour Nurse Line [phone] (toll-free)   Preferred Urgent Care No data recorded   Preferred Hospital [facility]  [phone]     [facility] [place] [facility] [place] [facility] [address] [address]      Behavioral Health Crisis Line The National Suicide Prevention Lifeline at 1-511.335.7619 or 911     My Care Team Members  Patient Care Team       Relationship Specialty Notifications Start End    Alli Castro PA-C PCP - General Internal Medicine All results, Admissions 12/22/21     Phone: [phone] Fax: [phone]         [address] [place] [address]    Marcos Pratt Surgeon Surgery  10/17/16     Phone: [phone] Fax: [phone] [address] [place] [address]    Beck Gonzales MD Assigned Sleep Provider   10/23/20     Phone: [phone] Fax: [phone]         [address] [facility] [address]    Victor Hugo Cortez MD Assigned Heart and Vascular Provider   10/23/20     Phone: [phone] Fax: [phone]         [facility] [address]    Alejandrina Marcos, RN Lead Care Coordinator Primary Care - CC Admissions 9/22/21     Phone: [phone]         Alfredo Dick [facility] Pharmacist Pharmacist Clinician- Clinical Pharmacy Specialist  9/24/21     Phone: [phone] Fax: [phone] [address] [place] [address]    Alli Castro PA-C Assigned PCP   10/10/21     Phone: [phone] Fax: [phone]         [address] [place] [address]            My Care Plans  Self Management and Treatment Plan  Goals and (Comments)  Goals        COPD     Goal Statement: I  will manage my COPD  symptoms effectively at home.  Date Goal set: 12/22/2021  Barriers: unknown  Strengths: motivated  Date to Achieve By: 6/1/2022  Patient expressed understanding of goal: yes  Action steps to achieve this goal:  1. I will use my nebulizer every 6 hours as needed for wheezing  2. I will use my inhalers daily as prescribed  3. I will follow-up with my doctor if I do not improve with home care measures.    Follow-up in my overdue health maintenance concerns     Goal Statement: Follow-up in my overdue health maintenance concerns  Date Goal set: 10/6/2021  Barriers: COVID-19, recent pneumonia  Strengths: engaged  Date to Achieve By: 6/1/2022  Patient expressed understanding of goal: yes  Action steps to achieve this goal:  1. I will ask to update HM at appointments  2. I will scheduled appts for important HM items           Action Plans on File:     Advance Care Plans/Directives Type:   No data recorded    My Medical and Care Information  Problem List   Patient Active Problem List   Diagnosis     Urinary incontinence     Cognitive impairment-[facility] 2020     Hyperlipidemia LDL goal <130     History of stroke - right thalamus in 8/2013 and left cerebellar and right vermis in 3/2018     Numbness and tingling     Smoker     CVA (cerebral vascular accident) (H)     ACS (acute coronary syndrome) (H)     Ischemic cardiomyopathy - EF 25% in 2015 but 55% in 3/2017 and 45-50% on 3/18     HTN, goal below 130/80     Acute systolic congestive heart failure (H)     Pulmonary nodule -- 1.9 cm RLL, new 5/2/20     GERD (gastroesophageal reflux disease)     NSTEMI (non-ST elevated myocardial infarction) (H)     Chronic bronchitis, unspecified chronic bronchitis type (H)     PAD (peripheral artery disease) (H) - moderate left common carotid stenosis, aortoiliac bypass, chronic left vertebral and right posterior cerebral stenoses     Cervical high risk HPV (human papillomavirus) test positive     Acute  respiratory failure with hypoxia (H)     Anemia     Coronary artery disease of native artery of native heart with stable angina pectoris (H)     Elevated troponin     Pulmonary emphysema (H)     Vertigo     Other seizures (H) - possible     Acute CVA (cerebrovascular accident) - right paramedian superior vermis and left cerebellar hemisphere     Severe malnutrition (H)     Hyponatremia     Hypochloremia     Acute on chronic respiratory failure with hypoxia and hypercapnia     Acute bronchitis     Moderate to severe pulmonary hypertension (H)-per Echo     Nonrheumatic aortic valve stenosis - moderate to severe     Hypotension     Lactic acidosis     Spontaneous pneumothorax     Chest tube in place     Chronic obstructive pulmonary disease, unspecified COPD type (H)     Cigarette smoker     S/P drug eluting coronary stent placement-LAD and circ, circ re-do 5/17/18     Abnormal CXR 9/2-?right mid lung cavitation with air-fluid levels     Dysphagia     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    Care Coordination Start Date: 09/22/2021   Frequency of Care Coordination: monthly   Form Last Updated: 12/22/2021

## 2021-12-22 NOTE — PROGRESS NOTES
"Clinic Care Coordination Contact    Follow Up Progress Note      Assessment: Called and spoke with  Oh.  But states patient is doing okay she has some congestion from the cold weather but this is her \"usual\".  She has been taking the Trelegy that she was given and it seems to work in the morning, but they feel it starts to fade in the evening.  Patient is also started the iron pills and seems to be tolerating them well.  Patient is taking all other medications as prescribed.  States her breathing is stable and has pretty much quit smoking.  She will occasionally light a cigarette but not smoke it and just put it out.    Care Gaps:    Health Maintenance Due   Topic Date Due     DEXA  Never done     COLPOSCOPY  Never done     ZOSTER IMMUNIZATION (1 of 2) Never done     MEDICARE ANNUAL WELLNESS VISIT  12/20/2017     COLORECTAL CANCER SCREENING  12/12/2018     HF ACTION PLAN  07/01/2019     MAMMO SCREENING  01/02/2020     INFLUENZA VACCINE (1) 09/01/2021       Care Gaps Last addressed on : 12/22/2021    Goals       COPD      Goal Statement: I will manage my COPD  symptoms effectively at home.  Date Goal set: 12/22/2021  Barriers: unknown  Strengths: motivated  Date to Achieve By: 6/1/2022  Patient expressed understanding of goal: yes  Action steps to achieve this goal:  1. I will use my nebulizer every 6 hours as needed for wheezing  2. I will use my inhalers daily as prescribed  3. I will follow-up with my doctor if I do not improve with home care measures.      Follow-up in my overdue health maintenance concerns      Goal Statement: Follow-up in my overdue health maintenance concerns  Date Goal set: 10/6/2021  Barriers: COVID-19, recent pneumonia  Strengths: engaged  Date to Achieve By: 6/1/2022  Patient expressed understanding of goal: yes  Action steps to achieve this goal:  1. I will ask to update HM at appointments  2. I will scheduled appts for important HM items        Intervention/Education provided " during outreach: continue to monitor breathing.     Plan:   Care Coordinator will follow up in one month.     Herminia ZAPATA, RN, PHN, CCM  Primary Clinic Care Coordination    [facility]  Primary Care Clinics  [email or web address]   Office: [phone]  Employed by [facility]

## 2022-01-01 ENCOUNTER — TELEPHONE (OUTPATIENT)
Dept: FAMILY MEDICINE | Facility: CLINIC | Age: 72
End: 2022-01-01
Payer: COMMERCIAL

## 2022-01-01 ENCOUNTER — APPOINTMENT (OUTPATIENT)
Dept: OCCUPATIONAL THERAPY | Facility: HOSPITAL | Age: 72
DRG: 280 | End: 2022-01-01
Attending: HOSPITALIST
Payer: COMMERCIAL

## 2022-01-01 ENCOUNTER — APPOINTMENT (OUTPATIENT)
Dept: CT IMAGING | Facility: CLINIC | Age: 72
DRG: 280 | End: 2022-01-01
Attending: FAMILY MEDICINE
Payer: COMMERCIAL

## 2022-01-01 ENCOUNTER — APPOINTMENT (OUTPATIENT)
Dept: SPEECH THERAPY | Facility: HOSPITAL | Age: 72
DRG: 280 | End: 2022-01-01
Attending: HOSPITALIST
Payer: COMMERCIAL

## 2022-01-01 ENCOUNTER — APPOINTMENT (OUTPATIENT)
Dept: RADIOLOGY | Facility: HOSPITAL | Age: 72
DRG: 280 | End: 2022-01-01
Attending: INTERNAL MEDICINE
Payer: COMMERCIAL

## 2022-01-01 ENCOUNTER — MEDICAL CORRESPONDENCE (OUTPATIENT)
Dept: HEALTH INFORMATION MANAGEMENT | Facility: CLINIC | Age: 72
End: 2022-01-01
Payer: COMMERCIAL

## 2022-01-01 ENCOUNTER — HOSPITAL ENCOUNTER (INPATIENT)
Facility: CLINIC | Age: 72
LOS: 2 days | Discharge: ANOTHER HOSPITAL | DRG: 280 | End: 2022-02-07
Attending: FAMILY MEDICINE | Admitting: FAMILY MEDICINE
Payer: COMMERCIAL

## 2022-01-01 ENCOUNTER — APPOINTMENT (OUTPATIENT)
Dept: RADIOLOGY | Facility: HOSPITAL | Age: 72
DRG: 280 | End: 2022-01-01
Attending: HOSPITALIST
Payer: COMMERCIAL

## 2022-01-01 ENCOUNTER — PATIENT OUTREACH (OUTPATIENT)
Dept: FAMILY MEDICINE | Facility: CLINIC | Age: 72
End: 2022-01-01
Payer: COMMERCIAL

## 2022-01-01 ENCOUNTER — OFFICE VISIT (OUTPATIENT)
Dept: FAMILY MEDICINE | Facility: CLINIC | Age: 72
End: 2022-01-01
Payer: COMMERCIAL

## 2022-01-01 ENCOUNTER — APPOINTMENT (OUTPATIENT)
Dept: SPEECH THERAPY | Facility: HOSPITAL | Age: 72
DRG: 280 | End: 2022-01-01
Attending: INTERNAL MEDICINE
Payer: COMMERCIAL

## 2022-01-01 ENCOUNTER — APPOINTMENT (OUTPATIENT)
Dept: GENERAL RADIOLOGY | Facility: CLINIC | Age: 72
DRG: 280 | End: 2022-01-01
Attending: FAMILY MEDICINE
Payer: COMMERCIAL

## 2022-01-01 ENCOUNTER — HOSPITAL ENCOUNTER (INPATIENT)
Facility: HOSPITAL | Age: 72
LOS: 7 days | Discharge: HOME | DRG: 280 | End: 2022-02-15
Attending: HOSPITALIST | Admitting: STUDENT IN AN ORGANIZED HEALTH CARE EDUCATION/TRAINING PROGRAM
Payer: COMMERCIAL

## 2022-01-01 ENCOUNTER — PATIENT OUTREACH (OUTPATIENT)
Dept: CARE COORDINATION | Facility: CLINIC | Age: 72
End: 2022-01-01

## 2022-01-01 ENCOUNTER — PATIENT OUTREACH (OUTPATIENT)
Dept: CARE COORDINATION | Facility: CLINIC | Age: 72
End: 2022-01-01
Payer: COMMERCIAL

## 2022-01-01 ENCOUNTER — DOCUMENTATION ONLY (OUTPATIENT)
Dept: OTHER | Facility: CLINIC | Age: 72
End: 2022-01-01
Payer: COMMERCIAL

## 2022-01-01 ENCOUNTER — APPOINTMENT (OUTPATIENT)
Dept: OCCUPATIONAL THERAPY | Facility: HOSPITAL | Age: 72
DRG: 280 | End: 2022-01-01
Attending: INTERNAL MEDICINE
Payer: COMMERCIAL

## 2022-01-01 ENCOUNTER — APPOINTMENT (OUTPATIENT)
Dept: CARDIOLOGY | Facility: CLINIC | Age: 72
DRG: 280 | End: 2022-01-01
Attending: FAMILY MEDICINE
Payer: COMMERCIAL

## 2022-01-01 VITALS
RESPIRATION RATE: 20 BRPM | OXYGEN SATURATION: 98 % | HEART RATE: 64 BPM | WEIGHT: 97.6 LBS | DIASTOLIC BLOOD PRESSURE: 62 MMHG | BODY MASS INDEX: 17.29 KG/M2 | SYSTOLIC BLOOD PRESSURE: 102 MMHG | TEMPERATURE: 97.6 F

## 2022-01-01 VITALS
RESPIRATION RATE: 26 BRPM | TEMPERATURE: 98.7 F | BODY MASS INDEX: 16.6 KG/M2 | WEIGHT: 93.7 LBS | DIASTOLIC BLOOD PRESSURE: 105 MMHG | OXYGEN SATURATION: 98 % | HEART RATE: 84 BPM | HEIGHT: 63 IN | SYSTOLIC BLOOD PRESSURE: 155 MMHG

## 2022-01-01 VITALS
DIASTOLIC BLOOD PRESSURE: 53 MMHG | BODY MASS INDEX: 16.74 KG/M2 | TEMPERATURE: 97.9 F | HEART RATE: 62 BPM | OXYGEN SATURATION: 100 % | SYSTOLIC BLOOD PRESSURE: 96 MMHG | WEIGHT: 94.5 LBS | RESPIRATION RATE: 20 BRPM | HEIGHT: 63 IN

## 2022-01-01 DIAGNOSIS — R91.8 PULMONARY NODULES: Primary | ICD-10-CM

## 2022-01-01 DIAGNOSIS — J96.22 ACUTE ON CHRONIC RESPIRATORY FAILURE WITH HYPERCAPNIA (H): ICD-10-CM

## 2022-01-01 DIAGNOSIS — I48.0 PAROXYSMAL ATRIAL FIBRILLATION (H): ICD-10-CM

## 2022-01-01 DIAGNOSIS — J96.21 ACUTE ON CHRONIC RESPIRATORY FAILURE WITH HYPOXIA AND HYPERCAPNIA (H): Chronic | ICD-10-CM

## 2022-01-01 DIAGNOSIS — F17.210 CIGARETTE SMOKER: ICD-10-CM

## 2022-01-01 DIAGNOSIS — Z95.5 S/P DRUG ELUTING CORONARY STENT PLACEMENT: ICD-10-CM

## 2022-01-01 DIAGNOSIS — R41.0 DELIRIUM: ICD-10-CM

## 2022-01-01 DIAGNOSIS — R79.89 ELEVATED TROPONIN: ICD-10-CM

## 2022-01-01 DIAGNOSIS — R45.1 AGITATION: Primary | ICD-10-CM

## 2022-01-01 DIAGNOSIS — I25.5 ISCHEMIC CARDIOMYOPATHY: ICD-10-CM

## 2022-01-01 DIAGNOSIS — I21.4 NSTEMI (NON-ST ELEVATED MYOCARDIAL INFARCTION) (H): ICD-10-CM

## 2022-01-01 DIAGNOSIS — I50.21 ACUTE SYSTOLIC CONGESTIVE HEART FAILURE (H): ICD-10-CM

## 2022-01-01 DIAGNOSIS — J96.21 ACUTE ON CHRONIC RESPIRATORY FAILURE WITH HYPOXIA AND HYPERCAPNIA (H): ICD-10-CM

## 2022-01-01 DIAGNOSIS — J96.22 ACUTE ON CHRONIC RESPIRATORY FAILURE WITH HYPOXIA AND HYPERCAPNIA (H): Chronic | ICD-10-CM

## 2022-01-01 DIAGNOSIS — M62.81 GENERALIZED MUSCLE WEAKNESS: ICD-10-CM

## 2022-01-01 DIAGNOSIS — I25.118 CORONARY ARTERY DISEASE OF NATIVE ARTERY OF NATIVE HEART WITH STABLE ANGINA PECTORIS (H): ICD-10-CM

## 2022-01-01 DIAGNOSIS — I50.21 ACUTE SYSTOLIC CONGESTIVE HEART FAILURE (H): Primary | ICD-10-CM

## 2022-01-01 DIAGNOSIS — J44.1 COPD EXACERBATION (H): ICD-10-CM

## 2022-01-01 DIAGNOSIS — J44.1 COPD WITH ACUTE EXACERBATION (H): ICD-10-CM

## 2022-01-01 DIAGNOSIS — I47.10 PAROXYSMAL SUPRAVENTRICULAR TACHYCARDIA (H): ICD-10-CM

## 2022-01-01 DIAGNOSIS — I25.5 ISCHEMIC CARDIOMYOPATHY: Primary | ICD-10-CM

## 2022-01-01 DIAGNOSIS — J43.1 PANLOBULAR EMPHYSEMA (H): ICD-10-CM

## 2022-01-01 DIAGNOSIS — J43.9 PULMONARY EMPHYSEMA, UNSPECIFIED EMPHYSEMA TYPE (H): Primary | ICD-10-CM

## 2022-01-01 DIAGNOSIS — E43 SEVERE PROTEIN-CALORIE MALNUTRITION (GOMEZ: LESS THAN 60% OF STANDARD WEIGHT) (H): ICD-10-CM

## 2022-01-01 DIAGNOSIS — J42 CHRONIC BRONCHITIS, UNSPECIFIED CHRONIC BRONCHITIS TYPE (H): ICD-10-CM

## 2022-01-01 DIAGNOSIS — Z11.52 ENCOUNTER FOR SCREENING LABORATORY TESTING FOR SEVERE ACUTE RESPIRATORY SYNDROME CORONAVIRUS 2 (SARS-COV-2): ICD-10-CM

## 2022-01-01 DIAGNOSIS — Z53.9 DIAGNOSIS NOT YET DEFINED: Primary | ICD-10-CM

## 2022-01-01 DIAGNOSIS — I73.9 PAD (PERIPHERAL ARTERY DISEASE) (H): ICD-10-CM

## 2022-01-01 DIAGNOSIS — D69.1 PLATELET FUNCTION DEFECT (H): ICD-10-CM

## 2022-01-01 DIAGNOSIS — K59.00 CONSTIPATION, UNSPECIFIED CONSTIPATION TYPE: ICD-10-CM

## 2022-01-01 DIAGNOSIS — R56.9 SEIZURES (H): ICD-10-CM

## 2022-01-01 DIAGNOSIS — J96.22 ACUTE ON CHRONIC RESPIRATORY FAILURE WITH HYPOXIA AND HYPERCAPNIA (H): ICD-10-CM

## 2022-01-01 LAB
ALBUMIN SERPL-MCNC: 2.7 G/DL (ref 3.4–5)
ALBUMIN SERPL-MCNC: 2.9 G/DL (ref 3.5–5)
ALBUMIN SERPL-MCNC: 3.2 G/DL (ref 3.5–5)
ALP SERPL-CCNC: 43 U/L (ref 45–120)
ALP SERPL-CCNC: 53 U/L (ref 45–120)
ALP SERPL-CCNC: 77 U/L (ref 40–150)
ALT SERPL W P-5'-P-CCNC: 17 U/L (ref 0–50)
ALT SERPL W P-5'-P-CCNC: 49 U/L (ref 0–45)
ALT SERPL W P-5'-P-CCNC: 76 U/L (ref 0–45)
ANION GAP SERPL CALCULATED.3IONS-SCNC: 10 MMOL/L (ref 5–18)
ANION GAP SERPL CALCULATED.3IONS-SCNC: 16 MMOL/L (ref 5–18)
ANION GAP SERPL CALCULATED.3IONS-SCNC: 2 MMOL/L (ref 3–14)
ANION GAP SERPL CALCULATED.3IONS-SCNC: 5 MMOL/L (ref 3–14)
ANION GAP SERPL CALCULATED.3IONS-SCNC: 7 MMOL/L (ref 5–18)
ANION GAP SERPL CALCULATED.3IONS-SCNC: 7 MMOL/L (ref 5–18)
ANION GAP SERPL CALCULATED.3IONS-SCNC: 9 MMOL/L (ref 5–18)
ANION GAP SERPL CALCULATED.3IONS-SCNC: <1 MMOL/L (ref 3–14)
AST SERPL W P-5'-P-CCNC: 18 U/L (ref 0–45)
AST SERPL W P-5'-P-CCNC: 21 U/L (ref 0–40)
AST SERPL W P-5'-P-CCNC: 55 U/L (ref 0–40)
ATRIAL RATE - MUSE: 174 BPM
ATRIAL RATE - MUSE: 61 BPM
ATRIAL RATE - MUSE: 72 BPM
BACTERIA BLD CULT: NO GROWTH
BACTERIA BLD CULT: NO GROWTH
BASE EXCESS BLDA CALC-SCNC: 5.6 MMOL/L
BASE EXCESS BLDV CALC-SCNC: 11.1 MMOL/L
BASE EXCESS BLDV CALC-SCNC: 11.2 MMOL/L (ref -7.7–1.9)
BASE EXCESS BLDV CALC-SCNC: 11.5 MMOL/L (ref -7.7–1.9)
BASE EXCESS BLDV CALC-SCNC: 11.7 MMOL/L (ref -7.7–1.9)
BASE EXCESS BLDV CALC-SCNC: 12.3 MMOL/L (ref -7.7–1.9)
BASE EXCESS BLDV CALC-SCNC: 14.9 MMOL/L
BASE EXCESS BLDV CALC-SCNC: 16.1 MMOL/L
BASE EXCESS BLDV CALC-SCNC: 23.3 MMOL/L
BASE EXCESS BLDV CALC-SCNC: 6.1 MMOL/L (ref -7.7–1.9)
BASE EXCESS BLDV CALC-SCNC: 7.6 MMOL/L
BASE EXCESS BLDV CALC-SCNC: 9.7 MMOL/L
BASE EXCESS BLDV CALC-SCNC: 9.8 MMOL/L (ref -7.7–1.9)
BASE EXCESS BLDV CALC-SCNC: 9.9 MMOL/L (ref -7.7–1.9)
BASOPHILS # BLD AUTO: 0 10E3/UL (ref 0–0.2)
BASOPHILS NFR BLD AUTO: 0 %
BASOPHILS NFR BLD AUTO: 1 %
BILIRUB SERPL-MCNC: 0.2 MG/DL (ref 0.2–1.3)
BILIRUB SERPL-MCNC: 0.3 MG/DL (ref 0–1)
BILIRUB SERPL-MCNC: 0.3 MG/DL (ref 0–1)
BUN SERPL-MCNC: 12 MG/DL (ref 7–30)
BUN SERPL-MCNC: 14 MG/DL (ref 7–30)
BUN SERPL-MCNC: 19 MG/DL (ref 8–22)
BUN SERPL-MCNC: 20 MG/DL (ref 8–22)
BUN SERPL-MCNC: 23 MG/DL (ref 7–30)
BUN SERPL-MCNC: 26 MG/DL (ref 8–22)
BUN SERPL-MCNC: 26 MG/DL (ref 8–22)
BUN SERPL-MCNC: 37 MG/DL (ref 8–22)
C PNEUM DNA SPEC QL NAA+PROBE: NOT DETECTED
CALCIUM SERPL-MCNC: 8 MG/DL (ref 8.5–10.5)
CALCIUM SERPL-MCNC: 8.2 MG/DL (ref 8.5–10.5)
CALCIUM SERPL-MCNC: 8.3 MG/DL (ref 8.5–10.1)
CALCIUM SERPL-MCNC: 8.6 MG/DL (ref 8.5–10.5)
CALCIUM SERPL-MCNC: 8.7 MG/DL (ref 8.5–10.1)
CALCIUM SERPL-MCNC: 8.7 MG/DL (ref 8.5–10.5)
CALCIUM SERPL-MCNC: 8.8 MG/DL (ref 8.5–10.1)
CALCIUM SERPL-MCNC: 8.9 MG/DL (ref 8.5–10.5)
CHLORIDE BLD-SCNC: 100 MMOL/L (ref 94–109)
CHLORIDE BLD-SCNC: 101 MMOL/L (ref 94–109)
CHLORIDE BLD-SCNC: 101 MMOL/L (ref 98–107)
CHLORIDE BLD-SCNC: 103 MMOL/L (ref 98–107)
CHLORIDE BLD-SCNC: 104 MMOL/L (ref 94–109)
CHLORIDE BLD-SCNC: 90 MMOL/L (ref 98–107)
CHLORIDE BLD-SCNC: 98 MMOL/L (ref 98–107)
CHLORIDE BLD-SCNC: 98 MMOL/L (ref 98–107)
CO2 SERPL-SCNC: 30 MMOL/L (ref 20–32)
CO2 SERPL-SCNC: 32 MMOL/L (ref 22–31)
CO2 SERPL-SCNC: 32 MMOL/L (ref 22–31)
CO2 SERPL-SCNC: 33 MMOL/L (ref 20–32)
CO2 SERPL-SCNC: 35 MMOL/L (ref 22–31)
CO2 SERPL-SCNC: 35 MMOL/L (ref 22–31)
CO2 SERPL-SCNC: 37 MMOL/L (ref 22–31)
CO2 SERPL-SCNC: 39 MMOL/L (ref 20–32)
COHGB MFR BLD: 98.3 % (ref 95–96)
CREAT SERPL-MCNC: 0.45 MG/DL (ref 0.52–1.04)
CREAT SERPL-MCNC: 0.47 MG/DL (ref 0.52–1.04)
CREAT SERPL-MCNC: 0.52 MG/DL (ref 0.52–1.04)
CREAT SERPL-MCNC: 0.54 MG/DL (ref 0.6–1.1)
CREAT SERPL-MCNC: 0.59 MG/DL (ref 0.6–1.1)
CREAT SERPL-MCNC: 0.61 MG/DL (ref 0.6–1.1)
CREAT SERPL-MCNC: 0.64 MG/DL (ref 0.6–1.1)
CREAT SERPL-MCNC: 0.69 MG/DL (ref 0.6–1.1)
CREAT SERPL-MCNC: 0.85 MG/DL (ref 0.6–1.1)
CRP SERPL-MCNC: 9.3 MG/L (ref 0–8)
D DIMER PPP FEU-MCNC: 2.75 UG/ML FEU (ref 0–0.5)
D DIMER PPP FEU-MCNC: 2.96 UG/ML FEU (ref 0–0.5)
DIASTOLIC BLOOD PRESSURE - MUSE: NORMAL MMHG
EOSINOPHIL # BLD AUTO: 0 10E3/UL (ref 0–0.7)
EOSINOPHIL # BLD AUTO: 0.4 10E3/UL (ref 0–0.7)
EOSINOPHIL NFR BLD AUTO: 0 %
EOSINOPHIL NFR BLD AUTO: 6 %
ERYTHROCYTE [DISTWIDTH] IN BLOOD BY AUTOMATED COUNT: 16.8 % (ref 10–15)
ERYTHROCYTE [DISTWIDTH] IN BLOOD BY AUTOMATED COUNT: 16.9 % (ref 10–15)
ERYTHROCYTE [DISTWIDTH] IN BLOOD BY AUTOMATED COUNT: 16.9 % (ref 10–15)
ERYTHROCYTE [DISTWIDTH] IN BLOOD BY AUTOMATED COUNT: 17.5 % (ref 10–15)
ERYTHROCYTE [DISTWIDTH] IN BLOOD BY AUTOMATED COUNT: 17.6 % (ref 10–15)
ERYTHROCYTE [DISTWIDTH] IN BLOOD BY AUTOMATED COUNT: 17.7 % (ref 10–15)
ERYTHROCYTE [DISTWIDTH] IN BLOOD BY AUTOMATED COUNT: 17.8 % (ref 10–15)
ERYTHROCYTE [DISTWIDTH] IN BLOOD BY AUTOMATED COUNT: 18 % (ref 10–15)
ERYTHROCYTE [DISTWIDTH] IN BLOOD BY AUTOMATED COUNT: 18.4 % (ref 10–15)
FLUAV H1 2009 PAND RNA SPEC QL NAA+PROBE: NOT DETECTED
FLUAV H1 RNA SPEC QL NAA+PROBE: NOT DETECTED
FLUAV H3 RNA SPEC QL NAA+PROBE: NOT DETECTED
FLUAV RNA SPEC QL NAA+PROBE: NEGATIVE
FLUAV RNA SPEC QL NAA+PROBE: NOT DETECTED
FLUBV RNA RESP QL NAA+PROBE: NEGATIVE
FLUBV RNA SPEC QL NAA+PROBE: NOT DETECTED
GFR SERPL CREATININE-BSD FRML MDRD: 74 ML/MIN/1.73M2
GFR SERPL CREATININE-BSD FRML MDRD: >90 ML/MIN/1.73M2
GLUCOSE BLD-MCNC: 105 MG/DL (ref 70–125)
GLUCOSE BLD-MCNC: 105 MG/DL (ref 70–125)
GLUCOSE BLD-MCNC: 105 MG/DL (ref 70–99)
GLUCOSE BLD-MCNC: 108 MG/DL (ref 70–125)
GLUCOSE BLD-MCNC: 131 MG/DL (ref 70–99)
GLUCOSE BLD-MCNC: 133 MG/DL (ref 70–125)
GLUCOSE BLD-MCNC: 145 MG/DL (ref 70–99)
GLUCOSE BLD-MCNC: 89 MG/DL (ref 70–125)
GLUCOSE BLDC GLUCOMTR-MCNC: 102 MG/DL (ref 70–99)
GLUCOSE BLDC GLUCOMTR-MCNC: 103 MG/DL (ref 70–99)
GLUCOSE BLDC GLUCOMTR-MCNC: 104 MG/DL (ref 70–99)
GLUCOSE BLDC GLUCOMTR-MCNC: 113 MG/DL (ref 70–99)
GLUCOSE BLDC GLUCOMTR-MCNC: 119 MG/DL (ref 70–99)
GLUCOSE BLDC GLUCOMTR-MCNC: 129 MG/DL (ref 70–99)
GLUCOSE BLDC GLUCOMTR-MCNC: 136 MG/DL (ref 70–99)
GLUCOSE BLDC GLUCOMTR-MCNC: 137 MG/DL (ref 70–99)
GLUCOSE BLDC GLUCOMTR-MCNC: 137 MG/DL (ref 70–99)
GLUCOSE BLDC GLUCOMTR-MCNC: 138 MG/DL (ref 70–99)
GLUCOSE BLDC GLUCOMTR-MCNC: 139 MG/DL (ref 70–99)
GLUCOSE BLDC GLUCOMTR-MCNC: 153 MG/DL (ref 70–99)
GLUCOSE BLDC GLUCOMTR-MCNC: 76 MG/DL (ref 70–99)
GLUCOSE BLDC GLUCOMTR-MCNC: 89 MG/DL (ref 70–99)
GLUCOSE BLDC GLUCOMTR-MCNC: 95 MG/DL (ref 70–99)
GLUCOSE BLDC GLUCOMTR-MCNC: 98 MG/DL (ref 70–99)
HADV DNA SPEC QL NAA+PROBE: NOT DETECTED
HCO3 BLD-SCNC: 28 MMOL/L (ref 23–29)
HCO3 BLDV-SCNC: 28 MMOL/L (ref 24–30)
HCO3 BLDV-SCNC: 31 MMOL/L (ref 24–30)
HCO3 BLDV-SCNC: 32 MMOL/L (ref 24–30)
HCO3 BLDV-SCNC: 33 MMOL/L (ref 21–28)
HCO3 BLDV-SCNC: 36 MMOL/L (ref 21–28)
HCO3 BLDV-SCNC: 36 MMOL/L (ref 24–30)
HCO3 BLDV-SCNC: 37 MMOL/L (ref 21–28)
HCO3 BLDV-SCNC: 37 MMOL/L (ref 24–30)
HCO3 BLDV-SCNC: 38 MMOL/L (ref 21–28)
HCO3 BLDV-SCNC: 39 MMOL/L (ref 21–28)
HCO3 BLDV-SCNC: 41 MMOL/L (ref 21–28)
HCO3 BLDV-SCNC: 42 MMOL/L (ref 21–28)
HCO3 BLDV-SCNC: 44 MMOL/L (ref 24–30)
HCOV PNL SPEC NAA+PROBE: NOT DETECTED
HCT VFR BLD AUTO: 29.8 % (ref 35–47)
HCT VFR BLD AUTO: 35 % (ref 35–47)
HCT VFR BLD AUTO: 36.8 % (ref 35–47)
HCT VFR BLD AUTO: 36.9 % (ref 35–47)
HCT VFR BLD AUTO: 37.4 % (ref 35–47)
HCT VFR BLD AUTO: 37.6 % (ref 35–47)
HCT VFR BLD AUTO: 38.9 % (ref 35–47)
HCT VFR BLD AUTO: 40.1 % (ref 35–47)
HCT VFR BLD AUTO: 41.9 % (ref 35–47)
HGB BLD-MCNC: 10.4 G/DL (ref 11.7–15.7)
HGB BLD-MCNC: 10.5 G/DL (ref 11.7–15.7)
HGB BLD-MCNC: 10.6 G/DL (ref 11.7–15.7)
HGB BLD-MCNC: 10.7 G/DL (ref 11.7–15.7)
HGB BLD-MCNC: 11.2 G/DL (ref 11.7–15.7)
HGB BLD-MCNC: 11.4 G/DL (ref 11.7–15.7)
HGB BLD-MCNC: 11.5 G/DL (ref 11.7–15.7)
HGB BLD-MCNC: 12.4 G/DL (ref 11.7–15.7)
HGB BLD-MCNC: 8.4 G/DL (ref 11.7–15.7)
HGB BLD-MCNC: 9.5 G/DL (ref 11.7–15.7)
HMPV RNA SPEC QL NAA+PROBE: NOT DETECTED
HOLD SPECIMEN: NORMAL
HPIV1 RNA SPEC QL NAA+PROBE: NOT DETECTED
HPIV2 RNA SPEC QL NAA+PROBE: NOT DETECTED
HPIV3 RNA SPEC QL NAA+PROBE: NOT DETECTED
HPIV4 RNA SPEC QL NAA+PROBE: NOT DETECTED
IMM GRANULOCYTES # BLD: 0 10E3/UL
IMM GRANULOCYTES NFR BLD: 0 %
IMM GRANULOCYTES NFR BLD: 1 %
INTERPRETATION ECG - MUSE: NORMAL
L PNEUMO1 AG UR QL IA: NEGATIVE
LACTATE SERPL-SCNC: 0.9 MMOL/L (ref 0.7–2)
LACTATE SERPL-SCNC: 1.4 MMOL/L (ref 0.7–2)
LACTATE SERPL-SCNC: 2.5 MMOL/L (ref 0.7–2)
LACTATE SERPL-SCNC: 2.7 MMOL/L (ref 0.7–2)
LACTATE SERPL-SCNC: 3.7 MMOL/L (ref 0.7–2)
LVEF ECHO: NORMAL
LYMPHOCYTES # BLD AUTO: 0.5 10E3/UL (ref 0.8–5.3)
LYMPHOCYTES # BLD AUTO: 0.5 10E3/UL (ref 0.8–5.3)
LYMPHOCYTES # BLD AUTO: 1 10E3/UL (ref 0.8–5.3)
LYMPHOCYTES # BLD AUTO: 1.1 10E3/UL (ref 0.8–5.3)
LYMPHOCYTES NFR BLD AUTO: 16 %
LYMPHOCYTES NFR BLD AUTO: 20 %
LYMPHOCYTES NFR BLD AUTO: 20 %
LYMPHOCYTES NFR BLD AUTO: 8 %
M PNEUMO DNA SPEC QL NAA+PROBE: NOT DETECTED
MAGNESIUM SERPL-MCNC: 1.7 MG/DL (ref 1.8–2.6)
MAGNESIUM SERPL-MCNC: 1.9 MG/DL (ref 1.8–2.6)
MAGNESIUM SERPL-MCNC: 1.9 MG/DL (ref 1.8–2.6)
MAGNESIUM SERPL-MCNC: 2 MG/DL (ref 1.8–2.6)
MAGNESIUM SERPL-MCNC: 2.1 MG/DL (ref 1.8–2.6)
MAGNESIUM SERPL-MCNC: 2.2 MG/DL (ref 1.8–2.6)
MCH RBC QN AUTO: 22.1 PG (ref 26.5–33)
MCH RBC QN AUTO: 22.3 PG (ref 26.5–33)
MCH RBC QN AUTO: 22.3 PG (ref 26.5–33)
MCH RBC QN AUTO: 22.4 PG (ref 26.5–33)
MCH RBC QN AUTO: 22.5 PG (ref 26.5–33)
MCH RBC QN AUTO: 22.6 PG (ref 26.5–33)
MCH RBC QN AUTO: 22.6 PG (ref 26.5–33)
MCH RBC QN AUTO: 22.8 PG (ref 26.5–33)
MCH RBC QN AUTO: 22.8 PG (ref 26.5–33)
MCHC RBC AUTO-ENTMCNC: 27.1 G/DL (ref 31.5–36.5)
MCHC RBC AUTO-ENTMCNC: 27.4 G/DL (ref 31.5–36.5)
MCHC RBC AUTO-ENTMCNC: 27.9 G/DL (ref 31.5–36.5)
MCHC RBC AUTO-ENTMCNC: 28.2 G/DL (ref 31.5–36.5)
MCHC RBC AUTO-ENTMCNC: 28.3 G/DL (ref 31.5–36.5)
MCHC RBC AUTO-ENTMCNC: 28.4 G/DL (ref 31.5–36.5)
MCHC RBC AUTO-ENTMCNC: 28.6 G/DL (ref 31.5–36.5)
MCHC RBC AUTO-ENTMCNC: 28.7 G/DL (ref 31.5–36.5)
MCHC RBC AUTO-ENTMCNC: 28.8 G/DL (ref 31.5–36.5)
MCV RBC AUTO: 77 FL (ref 78–100)
MCV RBC AUTO: 77 FL (ref 78–100)
MCV RBC AUTO: 79 FL (ref 78–100)
MCV RBC AUTO: 79 FL (ref 78–100)
MCV RBC AUTO: 80 FL (ref 78–100)
MCV RBC AUTO: 81 FL (ref 78–100)
MCV RBC AUTO: 81 FL (ref 78–100)
MCV RBC AUTO: 82 FL (ref 78–100)
MCV RBC AUTO: 82 FL (ref 78–100)
MONOCYTES # BLD AUTO: 0.2 10E3/UL (ref 0–1.3)
MONOCYTES # BLD AUTO: 0.5 10E3/UL (ref 0–1.3)
MONOCYTES # BLD AUTO: 0.7 10E3/UL (ref 0–1.3)
MONOCYTES # BLD AUTO: 0.7 10E3/UL (ref 0–1.3)
MONOCYTES NFR BLD AUTO: 11 %
MONOCYTES NFR BLD AUTO: 11 %
MONOCYTES NFR BLD AUTO: 13 %
MONOCYTES NFR BLD AUTO: 8 %
NEUTROPHILS # BLD AUTO: 1.6 10E3/UL (ref 1.6–8.3)
NEUTROPHILS # BLD AUTO: 3.4 10E3/UL (ref 1.6–8.3)
NEUTROPHILS # BLD AUTO: 4.5 10E3/UL (ref 1.6–8.3)
NEUTROPHILS # BLD AUTO: 5.5 10E3/UL (ref 1.6–8.3)
NEUTROPHILS NFR BLD AUTO: 66 %
NEUTROPHILS NFR BLD AUTO: 68 %
NEUTROPHILS NFR BLD AUTO: 69 %
NEUTROPHILS NFR BLD AUTO: 80 %
NRBC # BLD AUTO: 0 10E3/UL
NRBC BLD AUTO-RTO: 0 /100
NT-PROBNP SERPL-MCNC: 1817 PG/ML (ref 0–900)
O2/TOTAL GAS SETTING VFR VENT: 21 %
O2/TOTAL GAS SETTING VFR VENT: 24 %
O2/TOTAL GAS SETTING VFR VENT: 24 %
O2/TOTAL GAS SETTING VFR VENT: 30 %
OXYHGB MFR BLD: 96.9 % (ref 95–96)
OXYHGB MFR BLDV: 15.5 % (ref 70–75)
OXYHGB MFR BLDV: 65.7 % (ref 70–75)
OXYHGB MFR BLDV: 73.2 % (ref 70–75)
OXYHGB MFR BLDV: 76.7 % (ref 70–75)
OXYHGB MFR BLDV: 82.7 % (ref 70–75)
OXYHGB MFR BLDV: 85.5 % (ref 70–75)
OXYHGB MFR BLDV: 89 % (ref 70–75)
P AXIS - MUSE: 66 DEGREES
P AXIS - MUSE: 67 DEGREES
P AXIS - MUSE: NORMAL DEGREES
PCO2 BLD: 91 MM HG (ref 35–45)
PCO2 BLDV: 53 MM HG (ref 40–50)
PCO2 BLDV: 54 MM HG (ref 40–50)
PCO2 BLDV: 55 MM HG (ref 40–50)
PCO2 BLDV: 55 MM HG (ref 40–50)
PCO2 BLDV: 58 MM HG (ref 40–50)
PCO2 BLDV: 64 MM HG (ref 35–50)
PCO2 BLDV: 68 MM HG (ref 35–50)
PCO2 BLDV: 69 MM HG (ref 35–50)
PCO2 BLDV: 82 MM HG (ref 40–50)
PCO2 BLDV: 83 MM HG (ref 35–50)
PCO2 BLDV: 89 MM HG (ref 35–50)
PCO2 BLDV: <20 MM HG (ref 40–50)
PCO2 BLDV: >98 MM HG (ref 35–50)
PH BLD: 7.18 [PH] (ref 7.37–7.44)
PH BLDV: 7.13 [PH] (ref 7.35–7.45)
PH BLDV: 7.17 [PH] (ref 7.32–7.43)
PH BLDV: 7.24 [PH] (ref 7.35–7.45)
PH BLDV: 7.26 [PH] (ref 7.35–7.45)
PH BLDV: 7.3 [PH] (ref 7.32–7.43)
PH BLDV: 7.38 [PH] (ref 7.32–7.43)
PH BLDV: 7.38 [PH] (ref 7.35–7.45)
PH BLDV: 7.38 [PH] (ref 7.35–7.45)
PH BLDV: 7.43 [PH] (ref 7.32–7.43)
PH BLDV: 7.44 [PH] (ref 7.32–7.43)
PH BLDV: 7.47 [PH] (ref 7.35–7.45)
PLATELET # BLD AUTO: 183 10E3/UL (ref 150–450)
PLATELET # BLD AUTO: 217 10E3/UL (ref 150–450)
PLATELET # BLD AUTO: 224 10E3/UL (ref 150–450)
PLATELET # BLD AUTO: 225 10E3/UL (ref 150–450)
PLATELET # BLD AUTO: 226 10E3/UL (ref 150–450)
PLATELET # BLD AUTO: 233 10E3/UL (ref 150–450)
PLATELET # BLD AUTO: 240 10E3/UL (ref 150–450)
PLATELET # BLD AUTO: 246 10E3/UL (ref 150–450)
PLATELET # BLD AUTO: 252 10E3/UL (ref 150–450)
PLATELET # BLD AUTO: 286 10E3/UL (ref 150–450)
PO2 BLD: 127 MM HG (ref 75–85)
PO2 BLDV: 18 MM HG (ref 25–47)
PO2 BLDV: 24 MM HG (ref 25–47)
PO2 BLDV: 32 MM HG (ref 25–47)
PO2 BLDV: 32 MM HG (ref 25–47)
PO2 BLDV: 35 MM HG (ref 25–47)
PO2 BLDV: 35 MM HG (ref 25–47)
PO2 BLDV: 39 MM HG (ref 25–47)
PO2 BLDV: 44 MM HG (ref 25–47)
PO2 BLDV: 45 MM HG (ref 25–47)
PO2 BLDV: 47 MM HG (ref 25–47)
PO2 BLDV: 50 MM HG (ref 25–47)
PO2 BLDV: 52 MM HG (ref 25–47)
PO2 BLDV: 64 MM HG (ref 25–47)
POTASSIUM BLD-SCNC: 3.7 MMOL/L (ref 3.5–5)
POTASSIUM BLD-SCNC: 4.2 MMOL/L (ref 3.4–5.3)
POTASSIUM BLD-SCNC: 4.3 MMOL/L (ref 3.4–5.3)
POTASSIUM BLD-SCNC: 4.4 MMOL/L (ref 3.4–5.3)
POTASSIUM BLD-SCNC: 4.5 MMOL/L (ref 3.5–5)
POTASSIUM BLD-SCNC: 4.6 MMOL/L (ref 3.5–5)
POTASSIUM BLD-SCNC: 4.7 MMOL/L (ref 3.5–5)
POTASSIUM BLD-SCNC: 5.4 MMOL/L (ref 3.5–5)
PR INTERVAL - MUSE: 104 MS
PR INTERVAL - MUSE: 132 MS
PR INTERVAL - MUSE: NORMAL MS
PROCALCITONIN SERPL-MCNC: <0.05 NG/ML
PROCALCITONIN SERPL-MCNC: <0.05 NG/ML
PROT SERPL-MCNC: 5.5 G/DL (ref 6–8)
PROT SERPL-MCNC: 6.5 G/DL (ref 6–8)
PROT SERPL-MCNC: 7.6 G/DL (ref 6.8–8.8)
QRS DURATION - MUSE: 102 MS
QRS DURATION - MUSE: 104 MS
QRS DURATION - MUSE: 90 MS
QT - MUSE: 316 MS
QT - MUSE: 492 MS
QT - MUSE: 632 MS
QTC - MUSE: 518 MS
QTC - MUSE: 538 MS
QTC - MUSE: 636 MS
R AXIS - MUSE: 89 DEGREES
R AXIS - MUSE: 91 DEGREES
R AXIS - MUSE: 97 DEGREES
RBC # BLD AUTO: 3.69 10E6/UL (ref 3.8–5.2)
RBC # BLD AUTO: 4.25 10E6/UL (ref 3.8–5.2)
RBC # BLD AUTO: 4.56 10E6/UL (ref 3.8–5.2)
RBC # BLD AUTO: 4.68 10E6/UL (ref 3.8–5.2)
RBC # BLD AUTO: 4.7 10E6/UL (ref 3.8–5.2)
RBC # BLD AUTO: 4.85 10E6/UL (ref 3.8–5.2)
RBC # BLD AUTO: 5.03 10E6/UL (ref 3.8–5.2)
RBC # BLD AUTO: 5.05 10E6/UL (ref 3.8–5.2)
RBC # BLD AUTO: 5.12 10E6/UL (ref 3.8–5.2)
RSV RNA SPEC QL NAA+PROBE: NOT DETECTED
RSV RNA SPEC QL NAA+PROBE: NOT DETECTED
RV+EV RNA SPEC QL NAA+PROBE: NOT DETECTED
S PNEUM AG SPEC QL: NEGATIVE
SAO2 % BLDV: 15.8 % (ref 70–75)
SAO2 % BLDV: 66.7 % (ref 70–75)
SAO2 % BLDV: 74.9 % (ref 70–75)
SAO2 % BLDV: 77.8 % (ref 70–75)
SAO2 % BLDV: 83.9 % (ref 70–75)
SAO2 % BLDV: 86.6 % (ref 70–75)
SARS-COV-2 RNA RESP QL NAA+PROBE: NEGATIVE
SODIUM SERPL-SCNC: 136 MMOL/L (ref 133–144)
SODIUM SERPL-SCNC: 137 MMOL/L (ref 133–144)
SODIUM SERPL-SCNC: 139 MMOL/L (ref 133–144)
SODIUM SERPL-SCNC: 140 MMOL/L (ref 136–145)
SODIUM SERPL-SCNC: 142 MMOL/L (ref 136–145)
SODIUM SERPL-SCNC: 142 MMOL/L (ref 136–145)
SODIUM SERPL-SCNC: 143 MMOL/L (ref 136–145)
SODIUM SERPL-SCNC: 143 MMOL/L (ref 136–145)
SYSTOLIC BLOOD PRESSURE - MUSE: NORMAL MMHG
T AXIS - MUSE: 101 DEGREES
T AXIS - MUSE: 239 DEGREES
T AXIS - MUSE: 61 DEGREES
TEMPERATURE: 37 DEGREES C
TROPONIN I SERPL HS-MCNC: 1001 NG/L
TROPONIN I SERPL HS-MCNC: 1016 NG/L
TROPONIN I SERPL HS-MCNC: 1132 NG/L
TROPONIN I SERPL HS-MCNC: 1135 NG/L
TROPONIN I SERPL HS-MCNC: 258 NG/L
TROPONIN I SERPL HS-MCNC: 366 NG/L
TROPONIN I SERPL HS-MCNC: 465 NG/L
TROPONIN I SERPL HS-MCNC: 537 NG/L
TROPONIN I SERPL HS-MCNC: 608 NG/L
TROPONIN I SERPL HS-MCNC: 708 NG/L
TROPONIN I SERPL HS-MCNC: 779 NG/L
TROPONIN I SERPL HS-MCNC: 802 NG/L
TROPONIN I SERPL HS-MCNC: 916 NG/L
TROPONIN I SERPL HS-MCNC: 943 NG/L
TROPONIN I SERPL-MCNC: 0.07 NG/ML (ref 0–0.29)
TROPONIN I SERPL-MCNC: 0.07 NG/ML (ref 0–0.29)
TROPONIN I SERPL-MCNC: 0.08 NG/ML (ref 0–0.29)
TROPONIN I SERPL-MCNC: 0.19 NG/ML (ref 0–0.29)
UFH PPP CHRO-ACNC: 0.17 IU/ML
UFH PPP CHRO-ACNC: 0.25 IU/ML
UFH PPP CHRO-ACNC: 0.26 IU/ML
UFH PPP CHRO-ACNC: 0.32 IU/ML
UFH PPP CHRO-ACNC: 0.34 IU/ML
UFH PPP CHRO-ACNC: 0.4 IU/ML
UFH PPP CHRO-ACNC: 0.44 IU/ML
UFH PPP CHRO-ACNC: <0.1 IU/ML
VENTRICULAR RATE- MUSE: 162 BPM
VENTRICULAR RATE- MUSE: 61 BPM
VENTRICULAR RATE- MUSE: 72 BPM
WBC # BLD AUTO: 2 10E3/UL (ref 4–11)
WBC # BLD AUTO: 2.3 10E3/UL (ref 4–11)
WBC # BLD AUTO: 4.1 10E3/UL (ref 4–11)
WBC # BLD AUTO: 4.2 10E3/UL (ref 4–11)
WBC # BLD AUTO: 4.2 10E3/UL (ref 4–11)
WBC # BLD AUTO: 5.1 10E3/UL (ref 4–11)
WBC # BLD AUTO: 6.2 10E3/UL (ref 4–11)
WBC # BLD AUTO: 6.5 10E3/UL (ref 4–11)
WBC # BLD AUTO: 6.7 10E3/UL (ref 4–11)

## 2022-01-01 PROCEDURE — 250N000011 HC RX IP 250 OP 636: Performed by: INTERNAL MEDICINE

## 2022-01-01 PROCEDURE — 84484 ASSAY OF TROPONIN QUANT: CPT

## 2022-01-01 PROCEDURE — 97535 SELF CARE MNGMENT TRAINING: CPT | Mod: GO

## 2022-01-01 PROCEDURE — 93458 L HRT ARTERY/VENTRICLE ANGIO: CPT | Mod: 26 | Performed by: INTERNAL MEDICINE

## 2022-01-01 PROCEDURE — 99222 1ST HOSP IP/OBS MODERATE 55: CPT | Performed by: NURSE PRACTITIONER

## 2022-01-01 PROCEDURE — 258N000003 HC RX IP 258 OP 636: Performed by: STUDENT IN AN ORGANIZED HEALTH CARE EDUCATION/TRAINING PROGRAM

## 2022-01-01 PROCEDURE — 250N000009 HC RX 250: Performed by: INTERNAL MEDICINE

## 2022-01-01 PROCEDURE — 250N000013 HC RX MED GY IP 250 OP 250 PS 637: Performed by: INTERNAL MEDICINE

## 2022-01-01 PROCEDURE — 97110 THERAPEUTIC EXERCISES: CPT | Mod: GO

## 2022-01-01 PROCEDURE — 94660 CPAP INITIATION&MGMT: CPT

## 2022-01-01 PROCEDURE — 999N000157 HC STATISTIC RCP TIME EA 10 MIN

## 2022-01-01 PROCEDURE — 93306 TTE W/DOPPLER COMPLETE: CPT

## 2022-01-01 PROCEDURE — 99291 CRITICAL CARE FIRST HOUR: CPT | Performed by: INTERNAL MEDICINE

## 2022-01-01 PROCEDURE — 999N000127 HC STATISTIC PERIPHERAL IV START W US GUIDANCE

## 2022-01-01 PROCEDURE — 36415 COLL VENOUS BLD VENIPUNCTURE: CPT | Performed by: STUDENT IN AN ORGANIZED HEALTH CARE EDUCATION/TRAINING PROGRAM

## 2022-01-01 PROCEDURE — 83880 ASSAY OF NATRIURETIC PEPTIDE: CPT | Performed by: FAMILY MEDICINE

## 2022-01-01 PROCEDURE — 92526 ORAL FUNCTION THERAPY: CPT | Mod: GN | Performed by: REHABILITATION PRACTITIONER

## 2022-01-01 PROCEDURE — 36415 COLL VENOUS BLD VENIPUNCTURE: CPT | Performed by: HOSPITALIST

## 2022-01-01 PROCEDURE — 36415 COLL VENOUS BLD VENIPUNCTURE: CPT | Performed by: INTERNAL MEDICINE

## 2022-01-01 PROCEDURE — 250N000011 HC RX IP 250 OP 636

## 2022-01-01 PROCEDURE — 82803 BLOOD GASES ANY COMBINATION: CPT | Performed by: FAMILY MEDICINE

## 2022-01-01 PROCEDURE — 250N000011 HC RX IP 250 OP 636: Performed by: STUDENT IN AN ORGANIZED HEALTH CARE EDUCATION/TRAINING PROGRAM

## 2022-01-01 PROCEDURE — 82805 BLOOD GASES W/O2 SATURATION: CPT | Performed by: INTERNAL MEDICINE

## 2022-01-01 PROCEDURE — 250N000013 HC RX MED GY IP 250 OP 250 PS 637: Performed by: HOSPITALIST

## 2022-01-01 PROCEDURE — 99233 SBSQ HOSP IP/OBS HIGH 50: CPT | Performed by: INTERNAL MEDICINE

## 2022-01-01 PROCEDURE — 250N000009 HC RX 250: Performed by: STUDENT IN AN ORGANIZED HEALTH CARE EDUCATION/TRAINING PROGRAM

## 2022-01-01 PROCEDURE — 83605 ASSAY OF LACTIC ACID: CPT | Performed by: FAMILY MEDICINE

## 2022-01-01 PROCEDURE — 250N000013 HC RX MED GY IP 250 OP 250 PS 637: Performed by: STUDENT IN AN ORGANIZED HEALTH CARE EDUCATION/TRAINING PROGRAM

## 2022-01-01 PROCEDURE — 80053 COMPREHEN METABOLIC PANEL: CPT | Performed by: STUDENT IN AN ORGANIZED HEALTH CARE EDUCATION/TRAINING PROGRAM

## 2022-01-01 PROCEDURE — 85027 COMPLETE CBC AUTOMATED: CPT | Performed by: FAMILY MEDICINE

## 2022-01-01 PROCEDURE — 85520 HEPARIN ASSAY: CPT | Performed by: FAMILY MEDICINE

## 2022-01-01 PROCEDURE — 82805 BLOOD GASES W/O2 SATURATION: CPT | Performed by: HOSPITALIST

## 2022-01-01 PROCEDURE — 94640 AIRWAY INHALATION TREATMENT: CPT

## 2022-01-01 PROCEDURE — 85520 HEPARIN ASSAY: CPT | Performed by: INTERNAL MEDICINE

## 2022-01-01 PROCEDURE — 92526 ORAL FUNCTION THERAPY: CPT | Mod: GN

## 2022-01-01 PROCEDURE — 85049 AUTOMATED PLATELET COUNT: CPT | Performed by: INTERNAL MEDICINE

## 2022-01-01 PROCEDURE — 250N000011 HC RX IP 250 OP 636: Performed by: FAMILY MEDICINE

## 2022-01-01 PROCEDURE — 99233 SBSQ HOSP IP/OBS HIGH 50: CPT | Performed by: FAMILY MEDICINE

## 2022-01-01 PROCEDURE — 93010 ELECTROCARDIOGRAM REPORT: CPT | Performed by: GENERAL ACUTE CARE HOSPITAL

## 2022-01-01 PROCEDURE — 74230 X-RAY XM SWLNG FUNCJ C+: CPT

## 2022-01-01 PROCEDURE — 93005 ELECTROCARDIOGRAM TRACING: CPT | Performed by: INTERNAL MEDICINE

## 2022-01-01 PROCEDURE — C1887 CATHETER, GUIDING: HCPCS | Performed by: INTERNAL MEDICINE

## 2022-01-01 PROCEDURE — 99233 SBSQ HOSP IP/OBS HIGH 50: CPT | Performed by: NURSE PRACTITIONER

## 2022-01-01 PROCEDURE — 250N000011 HC RX IP 250 OP 636: Performed by: HOSPITALIST

## 2022-01-01 PROCEDURE — 99285 EMERGENCY DEPT VISIT HI MDM: CPT | Mod: 25 | Performed by: FAMILY MEDICINE

## 2022-01-01 PROCEDURE — 84484 ASSAY OF TROPONIN QUANT: CPT | Performed by: FAMILY MEDICINE

## 2022-01-01 PROCEDURE — 250N000009 HC RX 250: Performed by: FAMILY MEDICINE

## 2022-01-01 PROCEDURE — 85027 COMPLETE CBC AUTOMATED: CPT | Performed by: INTERNAL MEDICINE

## 2022-01-01 PROCEDURE — 84145 PROCALCITONIN (PCT): CPT | Performed by: FAMILY MEDICINE

## 2022-01-01 PROCEDURE — 93005 ELECTROCARDIOGRAM TRACING: CPT

## 2022-01-01 PROCEDURE — 85379 FIBRIN DEGRADATION QUANT: CPT | Performed by: FAMILY MEDICINE

## 2022-01-01 PROCEDURE — 250N000013 HC RX MED GY IP 250 OP 250 PS 637: Performed by: FAMILY MEDICINE

## 2022-01-01 PROCEDURE — 85018 HEMOGLOBIN: CPT | Performed by: HOSPITALIST

## 2022-01-01 PROCEDURE — 258N000003 HC RX IP 258 OP 636: Performed by: HOSPITALIST

## 2022-01-01 PROCEDURE — 83735 ASSAY OF MAGNESIUM: CPT | Performed by: HOSPITALIST

## 2022-01-01 PROCEDURE — 99233 SBSQ HOSP IP/OBS HIGH 50: CPT | Mod: 25 | Performed by: INTERNAL MEDICINE

## 2022-01-01 PROCEDURE — 36600 WITHDRAWAL OF ARTERIAL BLOOD: CPT

## 2022-01-01 PROCEDURE — 87899 AGENT NOS ASSAY W/OPTIC: CPT | Performed by: FAMILY MEDICINE

## 2022-01-01 PROCEDURE — 255N000002 HC RX 255 OP 636: Performed by: INTERNAL MEDICINE

## 2022-01-01 PROCEDURE — 93306 TTE W/DOPPLER COMPLETE: CPT | Mod: 26 | Performed by: INTERNAL MEDICINE

## 2022-01-01 PROCEDURE — 71045 X-RAY EXAM CHEST 1 VIEW: CPT

## 2022-01-01 PROCEDURE — 99232 SBSQ HOSP IP/OBS MODERATE 35: CPT | Performed by: STUDENT IN AN ORGANIZED HEALTH CARE EDUCATION/TRAINING PROGRAM

## 2022-01-01 PROCEDURE — 87486 CHLMYD PNEUM DNA AMP PROBE: CPT | Performed by: FAMILY MEDICINE

## 2022-01-01 PROCEDURE — 36415 COLL VENOUS BLD VENIPUNCTURE: CPT | Performed by: SURGERY

## 2022-01-01 PROCEDURE — 99292 CRITICAL CARE ADDL 30 MIN: CPT | Performed by: FAMILY MEDICINE

## 2022-01-01 PROCEDURE — B2111ZZ FLUOROSCOPY OF MULTIPLE CORONARY ARTERIES USING LOW OSMOLAR CONTRAST: ICD-10-PCS | Performed by: INTERNAL MEDICINE

## 2022-01-01 PROCEDURE — 85520 HEPARIN ASSAY: CPT | Performed by: HOSPITALIST

## 2022-01-01 PROCEDURE — 250N000012 HC RX MED GY IP 250 OP 636 PS 637: Performed by: INTERNAL MEDICINE

## 2022-01-01 PROCEDURE — 200N000001 HC R&B ICU

## 2022-01-01 PROCEDURE — 80048 BASIC METABOLIC PNL TOTAL CA: CPT | Performed by: FAMILY MEDICINE

## 2022-01-01 PROCEDURE — 4A023N7 MEASUREMENT OF CARDIAC SAMPLING AND PRESSURE, LEFT HEART, PERCUTANEOUS APPROACH: ICD-10-PCS | Performed by: INTERNAL MEDICINE

## 2022-01-01 PROCEDURE — 85025 COMPLETE CBC W/AUTO DIFF WBC: CPT | Performed by: STUDENT IN AN ORGANIZED HEALTH CARE EDUCATION/TRAINING PROGRAM

## 2022-01-01 PROCEDURE — 99207 PR CDG-MDM COMPONENT: MEETS HIGH - UP CODED: CPT | Performed by: STUDENT IN AN ORGANIZED HEALTH CARE EDUCATION/TRAINING PROGRAM

## 2022-01-01 PROCEDURE — 999N000215 HC STATISTIC HFNC ADULT NON-CPAP

## 2022-01-01 PROCEDURE — 99232 SBSQ HOSP IP/OBS MODERATE 35: CPT | Performed by: INTERNAL MEDICINE

## 2022-01-01 PROCEDURE — 250N000009 HC RX 250

## 2022-01-01 PROCEDURE — 92611 MOTION FLUOROSCOPY/SWALLOW: CPT | Mod: GN

## 2022-01-01 PROCEDURE — 258N000003 HC RX IP 258 OP 636: Performed by: FAMILY MEDICINE

## 2022-01-01 PROCEDURE — 83735 ASSAY OF MAGNESIUM: CPT | Performed by: FAMILY MEDICINE

## 2022-01-01 PROCEDURE — 250N000013 HC RX MED GY IP 250 OP 250 PS 637: Performed by: NURSE PRACTITIONER

## 2022-01-01 PROCEDURE — 85018 HEMOGLOBIN: CPT | Performed by: STUDENT IN AN ORGANIZED HEALTH CARE EDUCATION/TRAINING PROGRAM

## 2022-01-01 PROCEDURE — 99291 CRITICAL CARE FIRST HOUR: CPT | Performed by: FAMILY MEDICINE

## 2022-01-01 PROCEDURE — 36415 COLL VENOUS BLD VENIPUNCTURE: CPT | Performed by: FAMILY MEDICINE

## 2022-01-01 PROCEDURE — 83605 ASSAY OF LACTIC ACID: CPT | Performed by: STUDENT IN AN ORGANIZED HEALTH CARE EDUCATION/TRAINING PROGRAM

## 2022-01-01 PROCEDURE — 94640 AIRWAY INHALATION TREATMENT: CPT | Mod: 76

## 2022-01-01 PROCEDURE — 87633 RESP VIRUS 12-25 TARGETS: CPT | Performed by: FAMILY MEDICINE

## 2022-01-01 PROCEDURE — 84484 ASSAY OF TROPONIN QUANT: CPT | Performed by: HOSPITALIST

## 2022-01-01 PROCEDURE — 85025 COMPLETE CBC W/AUTO DIFF WBC: CPT | Performed by: FAMILY MEDICINE

## 2022-01-01 PROCEDURE — 92526 ORAL FUNCTION THERAPY: CPT | Mod: GN | Performed by: SPEECH-LANGUAGE PATHOLOGIST

## 2022-01-01 PROCEDURE — 80053 COMPREHEN METABOLIC PANEL: CPT | Performed by: INTERNAL MEDICINE

## 2022-01-01 PROCEDURE — C1894 INTRO/SHEATH, NON-LASER: HCPCS | Performed by: INTERNAL MEDICINE

## 2022-01-01 PROCEDURE — 999N000156 HC STATISTIC RCP CONSULT EA 30 MIN

## 2022-01-01 PROCEDURE — 96367 TX/PROPH/DG ADDL SEQ IV INF: CPT | Performed by: FAMILY MEDICINE

## 2022-01-01 PROCEDURE — C9113 INJ PANTOPRAZOLE SODIUM, VIA: HCPCS | Performed by: INTERNAL MEDICINE

## 2022-01-01 PROCEDURE — 93010 ELECTROCARDIOGRAM REPORT: CPT | Performed by: INTERNAL MEDICINE

## 2022-01-01 PROCEDURE — 82310 ASSAY OF CALCIUM: CPT | Performed by: INTERNAL MEDICINE

## 2022-01-01 PROCEDURE — C9113 INJ PANTOPRAZOLE SODIUM, VIA: HCPCS | Performed by: STUDENT IN AN ORGANIZED HEALTH CARE EDUCATION/TRAINING PROGRAM

## 2022-01-01 PROCEDURE — 87636 SARSCOV2 & INF A&B AMP PRB: CPT | Performed by: FAMILY MEDICINE

## 2022-01-01 PROCEDURE — 93458 L HRT ARTERY/VENTRICLE ANGIO: CPT | Performed by: INTERNAL MEDICINE

## 2022-01-01 PROCEDURE — 99232 SBSQ HOSP IP/OBS MODERATE 35: CPT | Performed by: NURSE PRACTITIONER

## 2022-01-01 PROCEDURE — 96375 TX/PRO/DX INJ NEW DRUG ADDON: CPT | Performed by: FAMILY MEDICINE

## 2022-01-01 PROCEDURE — 83735 ASSAY OF MAGNESIUM: CPT | Performed by: INTERNAL MEDICINE

## 2022-01-01 PROCEDURE — 85379 FIBRIN DEGRADATION QUANT: CPT | Performed by: HOSPITALIST

## 2022-01-01 PROCEDURE — 93010 ELECTROCARDIOGRAM REPORT: CPT | Performed by: FAMILY MEDICINE

## 2022-01-01 PROCEDURE — 82805 BLOOD GASES W/O2 SATURATION: CPT | Performed by: SURGERY

## 2022-01-01 PROCEDURE — 85520 HEPARIN ASSAY: CPT | Performed by: STUDENT IN AN ORGANIZED HEALTH CARE EDUCATION/TRAINING PROGRAM

## 2022-01-01 PROCEDURE — 5A09457 ASSISTANCE WITH RESPIRATORY VENTILATION, 24-96 CONSECUTIVE HOURS, CONTINUOUS POSITIVE AIRWAY PRESSURE: ICD-10-PCS | Performed by: FAMILY MEDICINE

## 2022-01-01 PROCEDURE — 83735 ASSAY OF MAGNESIUM: CPT | Performed by: STUDENT IN AN ORGANIZED HEALTH CARE EDUCATION/TRAINING PROGRAM

## 2022-01-01 PROCEDURE — 99223 1ST HOSP IP/OBS HIGH 75: CPT | Performed by: INTERNAL MEDICINE

## 2022-01-01 PROCEDURE — 80053 COMPREHEN METABOLIC PANEL: CPT | Performed by: FAMILY MEDICINE

## 2022-01-01 PROCEDURE — 80048 BASIC METABOLIC PNL TOTAL CA: CPT | Performed by: HOSPITALIST

## 2022-01-01 PROCEDURE — 96365 THER/PROPH/DIAG IV INF INIT: CPT | Performed by: FAMILY MEDICINE

## 2022-01-01 PROCEDURE — 86140 C-REACTIVE PROTEIN: CPT | Performed by: FAMILY MEDICINE

## 2022-01-01 PROCEDURE — 93005 ELECTROCARDIOGRAM TRACING: CPT | Performed by: FAMILY MEDICINE

## 2022-01-01 PROCEDURE — 99291 CRITICAL CARE FIRST HOUR: CPT | Mod: 25 | Performed by: INTERNAL MEDICINE

## 2022-01-01 PROCEDURE — 99495 TRANSJ CARE MGMT MOD F2F 14D: CPT | Performed by: PHYSICIAN ASSISTANT

## 2022-01-01 PROCEDURE — G0180 MD CERTIFICATION HHA PATIENT: HCPCS | Performed by: PHYSICIAN ASSISTANT

## 2022-01-01 PROCEDURE — 71275 CT ANGIOGRAPHY CHEST: CPT

## 2022-01-01 PROCEDURE — 99207 PR NO BILLABLE SERVICE THIS VISIT: CPT | Performed by: HOSPITALIST

## 2022-01-01 PROCEDURE — 93460 R&L HRT ART/VENTRICLE ANGIO: CPT | Performed by: INTERNAL MEDICINE

## 2022-01-01 PROCEDURE — 99223 1ST HOSP IP/OBS HIGH 75: CPT | Mod: AI | Performed by: STUDENT IN AN ORGANIZED HEALTH CARE EDUCATION/TRAINING PROGRAM

## 2022-01-01 PROCEDURE — 272N000001 HC OR GENERAL SUPPLY STERILE: Performed by: INTERNAL MEDICINE

## 2022-01-01 PROCEDURE — 84484 ASSAY OF TROPONIN QUANT: CPT | Performed by: STUDENT IN AN ORGANIZED HEALTH CARE EDUCATION/TRAINING PROGRAM

## 2022-01-01 PROCEDURE — 80048 BASIC METABOLIC PNL TOTAL CA: CPT | Performed by: INTERNAL MEDICINE

## 2022-01-01 PROCEDURE — 99239 HOSP IP/OBS DSCHRG MGMT >30: CPT | Performed by: STUDENT IN AN ORGANIZED HEALTH CARE EDUCATION/TRAINING PROGRAM

## 2022-01-01 PROCEDURE — 85025 COMPLETE CBC W/AUTO DIFF WBC: CPT | Performed by: INTERNAL MEDICINE

## 2022-01-01 PROCEDURE — 85048 AUTOMATED LEUKOCYTE COUNT: CPT | Performed by: INTERNAL MEDICINE

## 2022-01-01 PROCEDURE — 87040 BLOOD CULTURE FOR BACTERIA: CPT | Performed by: FAMILY MEDICINE

## 2022-01-01 PROCEDURE — B2151ZZ FLUOROSCOPY OF LEFT HEART USING LOW OSMOLAR CONTRAST: ICD-10-PCS | Performed by: INTERNAL MEDICINE

## 2022-01-01 PROCEDURE — 92610 EVALUATE SWALLOWING FUNCTION: CPT | Mod: GN

## 2022-01-01 PROCEDURE — 82310 ASSAY OF CALCIUM: CPT | Performed by: FAMILY MEDICINE

## 2022-01-01 PROCEDURE — 999N000105 HC STATISTIC NO DOCUMENTATION TO SUPPORT CHARGE

## 2022-01-01 PROCEDURE — 83735 ASSAY OF MAGNESIUM: CPT

## 2022-01-01 PROCEDURE — 97165 OT EVAL LOW COMPLEX 30 MIN: CPT | Mod: GO

## 2022-01-01 PROCEDURE — C9803 HOPD COVID-19 SPEC COLLECT: HCPCS | Performed by: FAMILY MEDICINE

## 2022-01-01 PROCEDURE — 99223 1ST HOSP IP/OBS HIGH 75: CPT | Mod: AI | Performed by: FAMILY MEDICINE

## 2022-01-01 PROCEDURE — 82803 BLOOD GASES ANY COMBINATION: CPT | Performed by: INTERNAL MEDICINE

## 2022-01-01 PROCEDURE — 82565 ASSAY OF CREATININE: CPT | Performed by: INTERNAL MEDICINE

## 2022-01-01 RX ORDER — QUETIAPINE FUMARATE 25 MG/1
25 TABLET, FILM COATED ORAL AT BEDTIME
Status: DISCONTINUED | OUTPATIENT
Start: 2022-01-01 | End: 2022-01-01 | Stop reason: HOSPADM

## 2022-01-01 RX ORDER — POLYETHYLENE GLYCOL 3350 17 G/17G
17 POWDER, FOR SOLUTION ORAL DAILY PRN
Status: CANCELLED | OUTPATIENT
Start: 2022-01-01

## 2022-01-01 RX ORDER — IPRATROPIUM BROMIDE AND ALBUTEROL SULFATE 2.5; .5 MG/3ML; MG/3ML
3 SOLUTION RESPIRATORY (INHALATION)
Status: DISCONTINUED | OUTPATIENT
Start: 2022-01-01 | End: 2022-01-01

## 2022-01-01 RX ORDER — BARIUM SULFATE 400 MG/ML
SUSPENSION ORAL ONCE
Status: COMPLETED | OUTPATIENT
Start: 2022-01-01 | End: 2022-01-01

## 2022-01-01 RX ORDER — ONDANSETRON 4 MG/1
4 TABLET, ORALLY DISINTEGRATING ORAL EVERY 6 HOURS PRN
Status: DISCONTINUED | OUTPATIENT
Start: 2022-01-01 | End: 2022-01-01 | Stop reason: HOSPADM

## 2022-01-01 RX ORDER — MONTELUKAST SODIUM 10 MG/1
10 TABLET ORAL AT BEDTIME
Status: DISCONTINUED | OUTPATIENT
Start: 2022-01-01 | End: 2022-01-01 | Stop reason: HOSPADM

## 2022-01-01 RX ORDER — ASPIRIN 81 MG/1
81 TABLET ORAL DAILY
Status: DISCONTINUED | OUTPATIENT
Start: 2022-01-01 | End: 2022-01-01

## 2022-01-01 RX ORDER — AMOXICILLIN 250 MG
2 CAPSULE ORAL 2 TIMES DAILY PRN
Status: DISCONTINUED | OUTPATIENT
Start: 2022-01-01 | End: 2022-01-01 | Stop reason: HOSPADM

## 2022-01-01 RX ORDER — NALOXONE HYDROCHLORIDE 0.4 MG/ML
0.2 INJECTION, SOLUTION INTRAMUSCULAR; INTRAVENOUS; SUBCUTANEOUS
Status: DISCONTINUED | OUTPATIENT
Start: 2022-01-01 | End: 2022-01-01

## 2022-01-01 RX ORDER — PROCHLORPERAZINE 25 MG
12.5 SUPPOSITORY, RECTAL RECTAL EVERY 12 HOURS PRN
Status: CANCELLED | OUTPATIENT
Start: 2022-01-01

## 2022-01-01 RX ORDER — ALBUTEROL SULFATE 90 UG/1
2 AEROSOL, METERED RESPIRATORY (INHALATION) EVERY 6 HOURS PRN
Qty: 18 G | Refills: 1 | Status: SHIPPED | OUTPATIENT
Start: 2022-01-01

## 2022-01-01 RX ORDER — POLYETHYLENE GLYCOL 3350 17 G/17G
17 POWDER, FOR SOLUTION ORAL DAILY PRN
Status: DISCONTINUED | OUTPATIENT
Start: 2022-01-01 | End: 2022-01-01 | Stop reason: HOSPADM

## 2022-01-01 RX ORDER — NALOXONE HYDROCHLORIDE 0.4 MG/ML
0.4 INJECTION, SOLUTION INTRAMUSCULAR; INTRAVENOUS; SUBCUTANEOUS
Status: DISCONTINUED | OUTPATIENT
Start: 2022-01-01 | End: 2022-01-01

## 2022-01-01 RX ORDER — SENNOSIDES 8.6 MG
2 TABLET ORAL 2 TIMES DAILY PRN
Status: DISCONTINUED | OUTPATIENT
Start: 2022-01-01 | End: 2022-01-01 | Stop reason: HOSPADM

## 2022-01-01 RX ORDER — METHYLPREDNISOLONE SODIUM SUCCINATE 125 MG/2ML
125 INJECTION, POWDER, LYOPHILIZED, FOR SOLUTION INTRAMUSCULAR; INTRAVENOUS ONCE
Status: COMPLETED | OUTPATIENT
Start: 2022-01-01 | End: 2022-01-01

## 2022-01-01 RX ORDER — PREDNISONE 20 MG/1
40 TABLET ORAL DAILY
Status: COMPLETED | OUTPATIENT
Start: 2022-01-01 | End: 2022-01-01

## 2022-01-01 RX ORDER — ACETAMINOPHEN 325 MG/1
650 TABLET ORAL EVERY 6 HOURS PRN
Status: DISCONTINUED | OUTPATIENT
Start: 2022-01-01 | End: 2022-01-01 | Stop reason: HOSPADM

## 2022-01-01 RX ORDER — MULTIVITAMIN,THERAPEUTIC
1 TABLET ORAL DAILY
Status: DISCONTINUED | OUTPATIENT
Start: 2022-01-01 | End: 2022-01-01 | Stop reason: HOSPADM

## 2022-01-01 RX ORDER — NITROGLYCERIN 0.4 MG/1
TABLET SUBLINGUAL
Status: DISCONTINUED | OUTPATIENT
Start: 2022-01-01 | End: 2022-01-01 | Stop reason: HOSPADM

## 2022-01-01 RX ORDER — IPRATROPIUM BROMIDE AND ALBUTEROL SULFATE 2.5; .5 MG/3ML; MG/3ML
3 SOLUTION RESPIRATORY (INHALATION)
Status: CANCELLED | OUTPATIENT
Start: 2022-01-01

## 2022-01-01 RX ORDER — FUROSEMIDE 20 MG
20 TABLET ORAL DAILY
Status: DISCONTINUED | OUTPATIENT
Start: 2022-01-01 | End: 2022-01-01 | Stop reason: HOSPADM

## 2022-01-01 RX ORDER — HYDRALAZINE HYDROCHLORIDE 20 MG/ML
5 INJECTION INTRAMUSCULAR; INTRAVENOUS EVERY 4 HOURS PRN
Status: DISCONTINUED | OUTPATIENT
Start: 2022-01-01 | End: 2022-01-01 | Stop reason: HOSPADM

## 2022-01-01 RX ORDER — QUETIAPINE FUMARATE 25 MG/1
25 TABLET, FILM COATED ORAL AT BEDTIME
Qty: 30 TABLET | Refills: 0 | Status: SHIPPED | OUTPATIENT
Start: 2022-01-01 | End: 2022-01-01

## 2022-01-01 RX ORDER — OLANZAPINE 10 MG/2ML
2.5 INJECTION, POWDER, FOR SOLUTION INTRAMUSCULAR DAILY PRN
Status: DISCONTINUED | OUTPATIENT
Start: 2022-01-01 | End: 2022-01-01 | Stop reason: HOSPADM

## 2022-01-01 RX ORDER — LIDOCAINE 40 MG/G
CREAM TOPICAL
Status: DISCONTINUED | OUTPATIENT
Start: 2022-01-01 | End: 2022-01-01 | Stop reason: HOSPADM

## 2022-01-01 RX ORDER — MAGNESIUM OXIDE 400 MG/1
400 TABLET ORAL 2 TIMES DAILY
Status: COMPLETED | OUTPATIENT
Start: 2022-01-01 | End: 2022-01-01

## 2022-01-01 RX ORDER — ATORVASTATIN CALCIUM 40 MG/1
40 TABLET, FILM COATED ORAL EVERY EVENING
Status: DISCONTINUED | OUTPATIENT
Start: 2022-01-01 | End: 2022-01-01 | Stop reason: HOSPADM

## 2022-01-01 RX ORDER — DOCUSATE SODIUM 100 MG/1
100 CAPSULE, LIQUID FILLED ORAL 2 TIMES DAILY
Status: DISCONTINUED | OUTPATIENT
Start: 2022-01-01 | End: 2022-01-01 | Stop reason: HOSPADM

## 2022-01-01 RX ORDER — LOSARTAN POTASSIUM 25 MG/1
25 TABLET ORAL DAILY
Status: DISCONTINUED | OUTPATIENT
Start: 2022-01-01 | End: 2022-01-01 | Stop reason: HOSPADM

## 2022-01-01 RX ORDER — IPRATROPIUM BROMIDE AND ALBUTEROL SULFATE 2.5; .5 MG/3ML; MG/3ML
3 SOLUTION RESPIRATORY (INHALATION) EVERY 4 HOURS
Status: CANCELLED | OUTPATIENT
Start: 2022-01-01

## 2022-01-01 RX ORDER — ACETAMINOPHEN 325 MG/1
650 TABLET ORAL EVERY 4 HOURS PRN
Status: DISCONTINUED | OUTPATIENT
Start: 2022-01-01 | End: 2022-01-01 | Stop reason: HOSPADM

## 2022-01-01 RX ORDER — ADENOSINE 3 MG/ML
12 INJECTION, SOLUTION INTRAVENOUS ONCE
Status: COMPLETED | OUTPATIENT
Start: 2022-01-01 | End: 2022-01-01

## 2022-01-01 RX ORDER — CEFTRIAXONE 2 G/1
2 INJECTION, POWDER, FOR SOLUTION INTRAMUSCULAR; INTRAVENOUS EVERY 24 HOURS
Status: CANCELLED | OUTPATIENT
Start: 2022-01-01

## 2022-01-01 RX ORDER — METHYLPREDNISOLONE SODIUM SUCCINATE 125 MG/2ML
60 INJECTION, POWDER, LYOPHILIZED, FOR SOLUTION INTRAMUSCULAR; INTRAVENOUS EVERY 6 HOURS
Status: DISCONTINUED | OUTPATIENT
Start: 2022-01-01 | End: 2022-01-01

## 2022-01-01 RX ORDER — FUROSEMIDE 20 MG
20 TABLET ORAL DAILY
Qty: 30 TABLET | Refills: 0 | Status: SHIPPED | OUTPATIENT
Start: 2022-01-01 | End: 2022-01-01

## 2022-01-01 RX ORDER — ONDANSETRON 2 MG/ML
4 INJECTION INTRAMUSCULAR; INTRAVENOUS EVERY 6 HOURS PRN
Status: CANCELLED | OUTPATIENT
Start: 2022-01-01

## 2022-01-01 RX ORDER — QUETIAPINE FUMARATE 25 MG/1
25 TABLET, FILM COATED ORAL AT BEDTIME
Status: CANCELLED | OUTPATIENT
Start: 2022-01-01

## 2022-01-01 RX ORDER — ONDANSETRON 4 MG/1
4 TABLET, ORALLY DISINTEGRATING ORAL EVERY 6 HOURS PRN
Status: CANCELLED | OUTPATIENT
Start: 2022-01-01

## 2022-01-01 RX ORDER — AMOXICILLIN 250 MG
1 CAPSULE ORAL 2 TIMES DAILY PRN
Status: DISCONTINUED | OUTPATIENT
Start: 2022-01-01 | End: 2022-01-01 | Stop reason: HOSPADM

## 2022-01-01 RX ORDER — NALOXONE HYDROCHLORIDE 0.4 MG/ML
0.2 INJECTION, SOLUTION INTRAMUSCULAR; INTRAVENOUS; SUBCUTANEOUS
Status: DISCONTINUED | OUTPATIENT
Start: 2022-01-01 | End: 2022-01-01 | Stop reason: HOSPADM

## 2022-01-01 RX ORDER — METOPROLOL TARTRATE 1 MG/ML
2.5 INJECTION, SOLUTION INTRAVENOUS EVERY 6 HOURS
Status: DISCONTINUED | OUTPATIENT
Start: 2022-01-01 | End: 2022-01-01

## 2022-01-01 RX ORDER — PREDNISONE 5 MG/1
5 TABLET ORAL DAILY
Qty: 2 TABLET | Refills: 0 | Status: SHIPPED | OUTPATIENT
Start: 2022-01-01 | End: 2022-01-01

## 2022-01-01 RX ORDER — AZITHROMYCIN 500 MG/1
500 INJECTION, POWDER, LYOPHILIZED, FOR SOLUTION INTRAVENOUS EVERY 24 HOURS
Status: DISCONTINUED | OUTPATIENT
Start: 2022-01-01 | End: 2022-01-01 | Stop reason: HOSPADM

## 2022-01-01 RX ORDER — METHYLPREDNISOLONE SODIUM SUCCINATE 125 MG/2ML
60 INJECTION, POWDER, LYOPHILIZED, FOR SOLUTION INTRAMUSCULAR; INTRAVENOUS EVERY 6 HOURS
Status: CANCELLED | OUTPATIENT
Start: 2022-01-01

## 2022-01-01 RX ORDER — FERROUS SULFATE 325(65) MG
325 TABLET ORAL 2 TIMES DAILY
Status: DISCONTINUED | OUTPATIENT
Start: 2022-01-01 | End: 2022-01-01 | Stop reason: HOSPADM

## 2022-01-01 RX ORDER — HEPARIN SODIUM 10000 [USP'U]/100ML
0-5000 INJECTION, SOLUTION INTRAVENOUS CONTINUOUS
Status: DISCONTINUED | OUTPATIENT
Start: 2022-01-01 | End: 2022-01-01

## 2022-01-01 RX ORDER — METHYLPREDNISOLONE SODIUM SUCCINATE 125 MG/2ML
60 INJECTION, POWDER, LYOPHILIZED, FOR SOLUTION INTRAMUSCULAR; INTRAVENOUS EVERY 24 HOURS
Status: DISCONTINUED | OUTPATIENT
Start: 2022-01-01 | End: 2022-01-01

## 2022-01-01 RX ORDER — AZITHROMYCIN 500 MG/1
500 INJECTION, POWDER, LYOPHILIZED, FOR SOLUTION INTRAVENOUS EVERY 24 HOURS
Status: CANCELLED | OUTPATIENT
Start: 2022-01-01

## 2022-01-01 RX ORDER — DEXTROSE MONOHYDRATE 25 G/50ML
25-50 INJECTION, SOLUTION INTRAVENOUS
Status: DISCONTINUED | OUTPATIENT
Start: 2022-01-01 | End: 2022-01-01 | Stop reason: HOSPADM

## 2022-01-01 RX ORDER — HEPARIN SODIUM 10000 [USP'U]/100ML
0-5000 INJECTION, SOLUTION INTRAVENOUS CONTINUOUS
Status: DISCONTINUED | OUTPATIENT
Start: 2022-01-01 | End: 2022-01-01 | Stop reason: HOSPADM

## 2022-01-01 RX ORDER — NICOTINE POLACRILEX 4 MG
15-30 LOZENGE BUCCAL
Status: CANCELLED | OUTPATIENT
Start: 2022-01-01

## 2022-01-01 RX ORDER — CEFTRIAXONE 2 G/1
2 INJECTION, POWDER, FOR SOLUTION INTRAMUSCULAR; INTRAVENOUS EVERY 24 HOURS
Status: DISCONTINUED | OUTPATIENT
Start: 2022-01-01 | End: 2022-01-01

## 2022-01-01 RX ORDER — ALBUTEROL SULFATE 0.83 MG/ML
SOLUTION RESPIRATORY (INHALATION)
Qty: 360 ML | Refills: 0 | Status: SHIPPED | OUTPATIENT
Start: 2022-01-01 | End: 2022-01-01

## 2022-01-01 RX ORDER — FUROSEMIDE 10 MG/ML
60 INJECTION INTRAMUSCULAR; INTRAVENOUS ONCE
Status: COMPLETED | OUTPATIENT
Start: 2022-01-01 | End: 2022-01-01

## 2022-01-01 RX ORDER — LOSARTAN POTASSIUM 25 MG/1
25 TABLET ORAL DAILY
Qty: 30 TABLET | Refills: 0 | Status: SHIPPED | OUTPATIENT
Start: 2022-01-01 | End: 2022-03-18

## 2022-01-01 RX ORDER — SENNOSIDES 8.6 MG
2 TABLET ORAL AT BEDTIME
Status: DISCONTINUED | OUTPATIENT
Start: 2022-01-01 | End: 2022-01-01 | Stop reason: HOSPADM

## 2022-01-01 RX ORDER — ASPIRIN 81 MG/1
81 TABLET ORAL DAILY
Status: DISCONTINUED | OUTPATIENT
Start: 2022-01-01 | End: 2022-01-01 | Stop reason: HOSPADM

## 2022-01-01 RX ORDER — METOPROLOL SUCCINATE 25 MG/1
25 TABLET, EXTENDED RELEASE ORAL DAILY
Qty: 30 TABLET | Refills: 0 | Status: SHIPPED | OUTPATIENT
Start: 2022-01-01

## 2022-01-01 RX ORDER — DEXTROSE MONOHYDRATE, SODIUM CHLORIDE, AND POTASSIUM CHLORIDE 50; 1.49; 4.5 G/1000ML; G/1000ML; G/1000ML
INJECTION, SOLUTION INTRAVENOUS CONTINUOUS
Status: DISCONTINUED | OUTPATIENT
Start: 2022-01-01 | End: 2022-01-01 | Stop reason: HOSPADM

## 2022-01-01 RX ORDER — DEXTROSE MONOHYDRATE 25 G/50ML
25-50 INJECTION, SOLUTION INTRAVENOUS
Status: CANCELLED | OUTPATIENT
Start: 2022-01-01

## 2022-01-01 RX ORDER — PROCHLORPERAZINE MALEATE 5 MG
5 TABLET ORAL EVERY 6 HOURS PRN
Status: DISCONTINUED | OUTPATIENT
Start: 2022-01-01 | End: 2022-01-01 | Stop reason: HOSPADM

## 2022-01-01 RX ORDER — PROCHLORPERAZINE 25 MG
12.5 SUPPOSITORY, RECTAL RECTAL EVERY 12 HOURS PRN
Status: DISCONTINUED | OUTPATIENT
Start: 2022-01-01 | End: 2022-01-01 | Stop reason: HOSPADM

## 2022-01-01 RX ORDER — ALBUTEROL SULFATE 0.83 MG/ML
2.5 SOLUTION RESPIRATORY (INHALATION)
Status: CANCELLED | OUTPATIENT
Start: 2022-01-01

## 2022-01-01 RX ORDER — AMIODARONE HYDROCHLORIDE 200 MG/1
200 TABLET ORAL 2 TIMES DAILY
Qty: 60 TABLET | Refills: 0 | Status: SHIPPED | OUTPATIENT
Start: 2022-01-01

## 2022-01-01 RX ORDER — ASPIRIN 81 MG/1
243 TABLET, CHEWABLE ORAL ONCE
Status: COMPLETED | OUTPATIENT
Start: 2022-01-01 | End: 2022-01-01

## 2022-01-01 RX ORDER — HYDROMORPHONE HCL IN WATER/PF 6 MG/30 ML
0.2 PATIENT CONTROLLED ANALGESIA SYRINGE INTRAVENOUS
Status: DISCONTINUED | OUTPATIENT
Start: 2022-01-01 | End: 2022-01-01

## 2022-01-01 RX ORDER — IODIXANOL 320 MG/ML
INJECTION, SOLUTION INTRAVASCULAR
Status: DISCONTINUED | OUTPATIENT
Start: 2022-01-01 | End: 2022-01-01 | Stop reason: HOSPADM

## 2022-01-01 RX ORDER — PREDNISONE 5 MG/1
5 TABLET ORAL DAILY
Status: DISCONTINUED | OUTPATIENT
Start: 2022-01-01 | End: 2022-01-01 | Stop reason: HOSPADM

## 2022-01-01 RX ORDER — METOPROLOL SUCCINATE 25 MG/1
25 TABLET, EXTENDED RELEASE ORAL DAILY
Status: DISCONTINUED | OUTPATIENT
Start: 2022-01-01 | End: 2022-01-01

## 2022-01-01 RX ORDER — IPRATROPIUM BROMIDE AND ALBUTEROL SULFATE 2.5; .5 MG/3ML; MG/3ML
3 SOLUTION RESPIRATORY (INHALATION) EVERY 4 HOURS
Status: DISCONTINUED | OUTPATIENT
Start: 2022-01-01 | End: 2022-01-01

## 2022-01-01 RX ORDER — METHYLPREDNISOLONE SODIUM SUCCINATE 125 MG/2ML
60 INJECTION, POWDER, LYOPHILIZED, FOR SOLUTION INTRAMUSCULAR; INTRAVENOUS EVERY 12 HOURS
Status: DISCONTINUED | OUTPATIENT
Start: 2022-01-01 | End: 2022-01-01

## 2022-01-01 RX ORDER — AMOXICILLIN 250 MG
2 CAPSULE ORAL 2 TIMES DAILY PRN
Status: DISCONTINUED | OUTPATIENT
Start: 2022-01-01 | End: 2022-01-01

## 2022-01-01 RX ORDER — IPRATROPIUM BROMIDE AND ALBUTEROL SULFATE 2.5; .5 MG/3ML; MG/3ML
3 SOLUTION RESPIRATORY (INHALATION) EVERY 4 HOURS
Status: DISCONTINUED | OUTPATIENT
Start: 2022-01-01 | End: 2022-01-01 | Stop reason: HOSPADM

## 2022-01-01 RX ORDER — AMIODARONE HYDROCHLORIDE 200 MG/1
200 TABLET ORAL 2 TIMES DAILY
Status: DISCONTINUED | OUTPATIENT
Start: 2022-01-01 | End: 2022-01-01 | Stop reason: HOSPADM

## 2022-01-01 RX ORDER — METOPROLOL TARTRATE 1 MG/ML
5 INJECTION, SOLUTION INTRAVENOUS EVERY 6 HOURS
Status: DISCONTINUED | OUTPATIENT
Start: 2022-01-01 | End: 2022-01-01

## 2022-01-01 RX ORDER — QUETIAPINE FUMARATE 25 MG/1
25 TABLET, FILM COATED ORAL 2 TIMES DAILY PRN
Status: DISCONTINUED | OUTPATIENT
Start: 2022-01-01 | End: 2022-01-01

## 2022-01-01 RX ORDER — CARBOXYMETHYLCELLULOSE SODIUM 5 MG/ML
1 SOLUTION/ DROPS OPHTHALMIC
Status: CANCELLED | OUTPATIENT
Start: 2022-01-01

## 2022-01-01 RX ORDER — AMOXICILLIN 250 MG
2 CAPSULE ORAL 2 TIMES DAILY PRN
Status: CANCELLED | OUTPATIENT
Start: 2022-01-01

## 2022-01-01 RX ORDER — NICOTINE 21 MG/24HR
1 PATCH, TRANSDERMAL 24 HOURS TRANSDERMAL DAILY
Status: DISCONTINUED | OUTPATIENT
Start: 2022-01-01 | End: 2022-01-01 | Stop reason: HOSPADM

## 2022-01-01 RX ORDER — ADENOSINE 3 MG/ML
INJECTION, SOLUTION INTRAVENOUS
Status: COMPLETED
Start: 2022-01-01 | End: 2022-01-01

## 2022-01-01 RX ORDER — HEPARIN SODIUM 10000 [USP'U]/100ML
0-5000 INJECTION, SOLUTION INTRAVENOUS CONTINUOUS
Status: CANCELLED | OUTPATIENT
Start: 2022-01-01

## 2022-01-01 RX ORDER — ALBUTEROL SULFATE 0.83 MG/ML
SOLUTION RESPIRATORY (INHALATION)
Qty: 360 ML | Refills: 0 | Status: SHIPPED | OUTPATIENT
Start: 2022-01-01

## 2022-01-01 RX ORDER — METOPROLOL TARTRATE 1 MG/ML
5 INJECTION, SOLUTION INTRAVENOUS ONCE
Status: COMPLETED | OUTPATIENT
Start: 2022-01-01 | End: 2022-01-01

## 2022-01-01 RX ORDER — SENNA AND DOCUSATE SODIUM 50; 8.6 MG/1; MG/1
1 TABLET, FILM COATED ORAL AT BEDTIME
Qty: 30 TABLET | Refills: 0 | Status: SHIPPED | OUTPATIENT
Start: 2022-01-01 | End: 2022-01-01

## 2022-01-01 RX ORDER — CARBOXYMETHYLCELLULOSE SODIUM 5 MG/ML
1 SOLUTION/ DROPS OPHTHALMIC
Status: DISCONTINUED | OUTPATIENT
Start: 2022-01-01 | End: 2022-01-01 | Stop reason: HOSPADM

## 2022-01-01 RX ORDER — NALOXONE HYDROCHLORIDE 0.4 MG/ML
0.4 INJECTION, SOLUTION INTRAMUSCULAR; INTRAVENOUS; SUBCUTANEOUS
Status: DISCONTINUED | OUTPATIENT
Start: 2022-01-01 | End: 2022-01-01 | Stop reason: HOSPADM

## 2022-01-01 RX ORDER — METHYLPREDNISOLONE SODIUM SUCCINATE 125 MG/2ML
60 INJECTION, POWDER, LYOPHILIZED, FOR SOLUTION INTRAMUSCULAR; INTRAVENOUS EVERY 6 HOURS
Status: DISCONTINUED | OUTPATIENT
Start: 2022-01-01 | End: 2022-01-01 | Stop reason: HOSPADM

## 2022-01-01 RX ORDER — QUETIAPINE FUMARATE 25 MG/1
25 TABLET, FILM COATED ORAL AT BEDTIME
Qty: 30 TABLET | Refills: 0 | Status: SHIPPED | OUTPATIENT
Start: 2022-01-01

## 2022-01-01 RX ORDER — PREDNISONE 20 MG/1
20 TABLET ORAL DAILY
Qty: 2 TABLET | Refills: 0 | Status: SHIPPED | OUTPATIENT
Start: 2022-01-01 | End: 2022-01-01

## 2022-01-01 RX ORDER — METOPROLOL SUCCINATE 25 MG/1
25 TABLET, EXTENDED RELEASE ORAL DAILY
Status: DISCONTINUED | OUTPATIENT
Start: 2022-01-01 | End: 2022-01-01 | Stop reason: HOSPADM

## 2022-01-01 RX ORDER — METOPROLOL TARTRATE 25 MG/1
25 TABLET, FILM COATED ORAL EVERY 6 HOURS
Status: DISCONTINUED | OUTPATIENT
Start: 2022-01-01 | End: 2022-01-01

## 2022-01-01 RX ORDER — PREDNISONE 10 MG/1
10 TABLET ORAL DAILY
Status: DISCONTINUED | OUTPATIENT
Start: 2022-01-01 | End: 2022-01-01 | Stop reason: HOSPADM

## 2022-01-01 RX ORDER — HYDROMORPHONE HCL IN WATER/PF 6 MG/30 ML
0.2 PATIENT CONTROLLED ANALGESIA SYRINGE INTRAVENOUS
Status: DISCONTINUED | OUTPATIENT
Start: 2022-01-01 | End: 2022-01-01 | Stop reason: HOSPADM

## 2022-01-01 RX ORDER — PANTOPRAZOLE SODIUM 40 MG/1
40 TABLET, DELAYED RELEASE ORAL
Status: DISCONTINUED | OUTPATIENT
Start: 2022-01-01 | End: 2022-01-01 | Stop reason: HOSPADM

## 2022-01-01 RX ORDER — PREDNISONE 20 MG/1
20 TABLET ORAL DAILY
Status: DISCONTINUED | OUTPATIENT
Start: 2022-01-01 | End: 2022-01-01 | Stop reason: HOSPADM

## 2022-01-01 RX ORDER — METOPROLOL TARTRATE 1 MG/ML
5 INJECTION, SOLUTION INTRAVENOUS EVERY 6 HOURS
Status: CANCELLED | OUTPATIENT
Start: 2022-01-01

## 2022-01-01 RX ORDER — ALBUTEROL SULFATE 0.83 MG/ML
2.5 SOLUTION RESPIRATORY (INHALATION)
Status: DISCONTINUED | OUTPATIENT
Start: 2022-01-01 | End: 2022-01-01 | Stop reason: HOSPADM

## 2022-01-01 RX ORDER — ALBUTEROL SULFATE 90 UG/1
2 AEROSOL, METERED RESPIRATORY (INHALATION) EVERY 6 HOURS PRN
Status: DISCONTINUED | OUTPATIENT
Start: 2022-01-01 | End: 2022-01-01 | Stop reason: HOSPADM

## 2022-01-01 RX ORDER — NICOTINE POLACRILEX 4 MG
15-30 LOZENGE BUCCAL
Status: DISCONTINUED | OUTPATIENT
Start: 2022-01-01 | End: 2022-01-01 | Stop reason: HOSPADM

## 2022-01-01 RX ORDER — ONDANSETRON 2 MG/ML
4 INJECTION INTRAMUSCULAR; INTRAVENOUS EVERY 6 HOURS PRN
Status: DISCONTINUED | OUTPATIENT
Start: 2022-01-01 | End: 2022-01-01

## 2022-01-01 RX ORDER — HYDROMORPHONE HCL IN WATER/PF 6 MG/30 ML
0.2 PATIENT CONTROLLED ANALGESIA SYRINGE INTRAVENOUS
Status: CANCELLED | OUTPATIENT
Start: 2022-01-01

## 2022-01-01 RX ORDER — METHYLPREDNISOLONE SODIUM SUCCINATE 125 MG/2ML
60 INJECTION, POWDER, LYOPHILIZED, FOR SOLUTION INTRAMUSCULAR; INTRAVENOUS EVERY 12 HOURS
Status: CANCELLED | OUTPATIENT
Start: 2022-01-01

## 2022-01-01 RX ORDER — AMIODARONE HYDROCHLORIDE 200 MG/1
200 TABLET ORAL 2 TIMES DAILY
Qty: 60 TABLET | Refills: 0 | Status: SHIPPED | OUTPATIENT
Start: 2022-01-01 | End: 2022-01-01

## 2022-01-01 RX ORDER — OLANZAPINE 5 MG/1
5 TABLET, ORALLY DISINTEGRATING ORAL ONCE
Status: COMPLETED | OUTPATIENT
Start: 2022-01-01 | End: 2022-01-01

## 2022-01-01 RX ORDER — ONDANSETRON 4 MG/1
4 TABLET, ORALLY DISINTEGRATING ORAL EVERY 6 HOURS PRN
Status: DISCONTINUED | OUTPATIENT
Start: 2022-01-01 | End: 2022-01-01

## 2022-01-01 RX ORDER — ONDANSETRON 2 MG/ML
4 INJECTION INTRAMUSCULAR; INTRAVENOUS EVERY 6 HOURS PRN
Status: DISCONTINUED | OUTPATIENT
Start: 2022-01-01 | End: 2022-01-01 | Stop reason: HOSPADM

## 2022-01-01 RX ORDER — IPRATROPIUM BROMIDE AND ALBUTEROL SULFATE 2.5; .5 MG/3ML; MG/3ML
3 SOLUTION RESPIRATORY (INHALATION)
Status: DISCONTINUED | OUTPATIENT
Start: 2022-01-01 | End: 2022-01-01 | Stop reason: HOSPADM

## 2022-01-01 RX ORDER — ACETAMINOPHEN 650 MG/1
650 SUPPOSITORY RECTAL EVERY 6 HOURS PRN
Status: DISCONTINUED | OUTPATIENT
Start: 2022-01-01 | End: 2022-01-01 | Stop reason: HOSPADM

## 2022-01-01 RX ORDER — CEFTRIAXONE 1 G/1
1 INJECTION, POWDER, FOR SOLUTION INTRAMUSCULAR; INTRAVENOUS EVERY 24 HOURS
Status: COMPLETED | OUTPATIENT
Start: 2022-01-01 | End: 2022-01-01

## 2022-01-01 RX ORDER — CEFTRIAXONE 2 G/1
2 INJECTION, POWDER, FOR SOLUTION INTRAMUSCULAR; INTRAVENOUS EVERY 24 HOURS
Status: DISCONTINUED | OUTPATIENT
Start: 2022-01-01 | End: 2022-01-01 | Stop reason: HOSPADM

## 2022-01-01 RX ORDER — FUROSEMIDE 20 MG
20 TABLET ORAL DAILY
Qty: 30 TABLET | Refills: 0 | Status: SHIPPED | OUTPATIENT
Start: 2022-01-01

## 2022-01-01 RX ORDER — METOPROLOL TARTRATE 1 MG/ML
INJECTION, SOLUTION INTRAVENOUS
Status: COMPLETED
Start: 2022-01-01 | End: 2022-01-01

## 2022-01-01 RX ORDER — METOPROLOL TARTRATE 1 MG/ML
5 INJECTION, SOLUTION INTRAVENOUS EVERY 6 HOURS
Status: DISCONTINUED | OUTPATIENT
Start: 2022-01-01 | End: 2022-01-01 | Stop reason: HOSPADM

## 2022-01-01 RX ORDER — PREDNISONE 10 MG/1
10 TABLET ORAL DAILY
Qty: 2 TABLET | Refills: 0 | Status: SHIPPED | OUTPATIENT
Start: 2022-01-01 | End: 2022-01-01

## 2022-01-01 RX ORDER — MAGNESIUM SULFATE HEPTAHYDRATE 40 MG/ML
2 INJECTION, SOLUTION INTRAVENOUS ONCE
Status: COMPLETED | OUTPATIENT
Start: 2022-01-01 | End: 2022-01-01

## 2022-01-01 RX ORDER — AMOXICILLIN 250 MG
1 CAPSULE ORAL 2 TIMES DAILY PRN
Status: CANCELLED | OUTPATIENT
Start: 2022-01-01

## 2022-01-01 RX ORDER — METOPROLOL TARTRATE 25 MG/1
25 TABLET, FILM COATED ORAL 2 TIMES DAILY
Status: DISCONTINUED | OUTPATIENT
Start: 2022-01-01 | End: 2022-01-01

## 2022-01-01 RX ORDER — ASPIRIN 81 MG/1
81 TABLET ORAL DAILY
Status: CANCELLED | OUTPATIENT
Start: 2022-01-01

## 2022-01-01 RX ORDER — ACETAMINOPHEN 325 MG/1
650 TABLET ORAL EVERY 4 HOURS PRN
Status: CANCELLED | OUTPATIENT
Start: 2022-01-01

## 2022-01-01 RX ORDER — ALBUTEROL SULFATE 0.83 MG/ML
2.5 SOLUTION RESPIRATORY (INHALATION)
Status: DISCONTINUED | OUTPATIENT
Start: 2022-01-01 | End: 2022-01-01

## 2022-01-01 RX ORDER — ATORVASTATIN CALCIUM 10 MG/1
20 TABLET, FILM COATED ORAL AT BEDTIME
Status: DISCONTINUED | OUTPATIENT
Start: 2022-01-01 | End: 2022-01-01

## 2022-01-01 RX ORDER — LORAZEPAM 2 MG/ML
0.5 INJECTION INTRAMUSCULAR EVERY 6 HOURS PRN
Status: DISCONTINUED | OUTPATIENT
Start: 2022-01-01 | End: 2022-01-01

## 2022-01-01 RX ORDER — MONTELUKAST SODIUM 10 MG/1
10 TABLET ORAL AT BEDTIME
Status: DISCONTINUED | OUTPATIENT
Start: 2022-01-01 | End: 2022-01-01

## 2022-01-01 RX ORDER — ATORVASTATIN CALCIUM 40 MG/1
40 TABLET, FILM COATED ORAL EVERY EVENING
Status: DISCONTINUED | OUTPATIENT
Start: 2022-01-01 | End: 2022-01-01

## 2022-01-01 RX ORDER — PROCHLORPERAZINE MALEATE 5 MG
5 TABLET ORAL EVERY 6 HOURS PRN
Status: CANCELLED | OUTPATIENT
Start: 2022-01-01

## 2022-01-01 RX ORDER — HYDROMORPHONE HCL IN WATER/PF 6 MG/30 ML
0.2 PATIENT CONTROLLED ANALGESIA SYRINGE INTRAVENOUS EVERY 4 HOURS PRN
Status: DISCONTINUED | OUTPATIENT
Start: 2022-01-01 | End: 2022-01-01

## 2022-01-01 RX ORDER — ATORVASTATIN CALCIUM 40 MG/1
40 TABLET, FILM COATED ORAL EVERY EVENING
Qty: 30 TABLET | Refills: 0 | Status: SHIPPED | OUTPATIENT
Start: 2022-01-01 | End: 2022-01-01

## 2022-01-01 RX ORDER — AMOXICILLIN 250 MG
1 CAPSULE ORAL 2 TIMES DAILY PRN
Status: DISCONTINUED | OUTPATIENT
Start: 2022-01-01 | End: 2022-01-01

## 2022-01-01 RX ORDER — METOPROLOL SUCCINATE 25 MG/1
25 TABLET, EXTENDED RELEASE ORAL DAILY
Qty: 30 TABLET | Refills: 0 | Status: SHIPPED | OUTPATIENT
Start: 2022-01-01 | End: 2022-01-01

## 2022-01-01 RX ORDER — IOPAMIDOL 755 MG/ML
500 INJECTION, SOLUTION INTRAVASCULAR ONCE
Status: COMPLETED | OUTPATIENT
Start: 2022-01-01 | End: 2022-01-01

## 2022-01-01 RX ADMIN — ASPIRIN 81 MG: 81 TABLET, COATED ORAL at 08:53

## 2022-01-01 RX ADMIN — DOCUSATE SODIUM 100 MG: 100 CAPSULE, LIQUID FILLED ORAL at 20:48

## 2022-01-01 RX ADMIN — PANTOPRAZOLE SODIUM 40 MG: 40 TABLET, DELAYED RELEASE ORAL at 06:32

## 2022-01-01 RX ADMIN — IPRATROPIUM BROMIDE AND ALBUTEROL SULFATE 3 ML: 2.5; .5 SOLUTION RESPIRATORY (INHALATION) at 17:36

## 2022-01-01 RX ADMIN — QUETIAPINE 12.5 MG: 25 TABLET, FILM COATED ORAL at 18:11

## 2022-01-01 RX ADMIN — AMIODARONE HYDROCHLORIDE 200 MG: 200 TABLET ORAL at 21:28

## 2022-01-01 RX ADMIN — IPRATROPIUM BROMIDE AND ALBUTEROL SULFATE 3 ML: 2.5; .5 SOLUTION RESPIRATORY (INHALATION) at 02:03

## 2022-01-01 RX ADMIN — QUETIAPINE FUMARATE 12.5 MG: 25 TABLET ORAL at 06:57

## 2022-01-01 RX ADMIN — SENNOSIDES 2 TABLET: 8.6 TABLET, COATED ORAL at 21:27

## 2022-01-01 RX ADMIN — POTASSIUM CHLORIDE, DEXTROSE MONOHYDRATE AND SODIUM CHLORIDE: 150; 5; 450 INJECTION, SOLUTION INTRAVENOUS at 17:08

## 2022-01-01 RX ADMIN — METHYLPREDNISOLONE SODIUM SUCCINATE 62.5 MG: 125 INJECTION, POWDER, FOR SOLUTION INTRAMUSCULAR; INTRAVENOUS at 20:48

## 2022-01-01 RX ADMIN — HEPARIN SODIUM 900 UNITS/HR: 10000 INJECTION, SOLUTION INTRAVENOUS at 19:53

## 2022-01-01 RX ADMIN — IPRATROPIUM BROMIDE AND ALBUTEROL SULFATE 3 ML: 2.5; .5 SOLUTION RESPIRATORY (INHALATION) at 21:02

## 2022-01-01 RX ADMIN — OLANZAPINE 5 MG: 5 TABLET, ORALLY DISINTEGRATING ORAL at 22:54

## 2022-01-01 RX ADMIN — LOSARTAN POTASSIUM 25 MG: 25 TABLET, FILM COATED ORAL at 08:35

## 2022-01-01 RX ADMIN — ENOXAPARIN SODIUM 30 MG: 30 INJECTION SUBCUTANEOUS at 13:37

## 2022-01-01 RX ADMIN — IPRATROPIUM BROMIDE AND ALBUTEROL SULFATE 3 ML: 2.5; .5 SOLUTION RESPIRATORY (INHALATION) at 16:10

## 2022-01-01 RX ADMIN — CEFTRIAXONE SODIUM 2 G: 2 INJECTION, POWDER, FOR SOLUTION INTRAMUSCULAR; INTRAVENOUS at 09:14

## 2022-01-01 RX ADMIN — IPRATROPIUM BROMIDE AND ALBUTEROL SULFATE 3 ML: 2.5; .5 SOLUTION RESPIRATORY (INHALATION) at 21:00

## 2022-01-01 RX ADMIN — PANTOPRAZOLE SODIUM 40 MG: 40 INJECTION, POWDER, FOR SOLUTION INTRAVENOUS at 09:15

## 2022-01-01 RX ADMIN — OLANZAPINE 2.5 MG: 10 INJECTION, POWDER, FOR SOLUTION INTRAMUSCULAR at 03:33

## 2022-01-01 RX ADMIN — IPRATROPIUM BROMIDE AND ALBUTEROL SULFATE 3 ML: 2.5; .5 SOLUTION RESPIRATORY (INHALATION) at 11:24

## 2022-01-01 RX ADMIN — BARIUM SULFATE 60 ML: 400 SUSPENSION ORAL at 10:17

## 2022-01-01 RX ADMIN — AZITHROMYCIN MONOHYDRATE 500 MG: 500 INJECTION, POWDER, LYOPHILIZED, FOR SOLUTION INTRAVENOUS at 09:57

## 2022-01-01 RX ADMIN — FUROSEMIDE 60 MG: 10 INJECTION, SOLUTION INTRAMUSCULAR; INTRAVENOUS at 06:29

## 2022-01-01 RX ADMIN — AMIODARONE HYDROCHLORIDE 150 MG: 1.5 INJECTION, SOLUTION INTRAVENOUS at 18:49

## 2022-01-01 RX ADMIN — HYDROMORPHONE HYDROCHLORIDE 0.2 MG: 0.2 INJECTION, SOLUTION INTRAMUSCULAR; INTRAVENOUS; SUBCUTANEOUS at 22:28

## 2022-01-01 RX ADMIN — IPRATROPIUM BROMIDE AND ALBUTEROL SULFATE 3 ML: 2.5; .5 SOLUTION RESPIRATORY (INHALATION) at 07:51

## 2022-01-01 RX ADMIN — QUETIAPINE 12.5 MG: 25 TABLET, FILM COATED ORAL at 01:48

## 2022-01-01 RX ADMIN — MONTELUKAST 10 MG: 10 TABLET, FILM COATED ORAL at 20:52

## 2022-01-01 RX ADMIN — PANTOPRAZOLE SODIUM 40 MG: 40 INJECTION, POWDER, FOR SOLUTION INTRAVENOUS at 07:54

## 2022-01-01 RX ADMIN — QUETIAPINE 12.5 MG: 25 TABLET, FILM COATED ORAL at 12:55

## 2022-01-01 RX ADMIN — ADENOSINE 12 MG: 3 INJECTION, SOLUTION INTRAVENOUS at 18:24

## 2022-01-01 RX ADMIN — MAGNESIUM OXIDE TAB 400 MG (241.3 MG ELEMENTAL MG) 400 MG: 400 (241.3 MG) TAB at 21:18

## 2022-01-01 RX ADMIN — ATORVASTATIN CALCIUM 40 MG: 40 TABLET, FILM COATED ORAL at 20:53

## 2022-01-01 RX ADMIN — METHYLPREDNISOLONE SODIUM SUCCINATE 125 MG: 125 INJECTION, POWDER, FOR SOLUTION INTRAMUSCULAR; INTRAVENOUS at 07:36

## 2022-01-01 RX ADMIN — METOPROLOL TARTRATE 5 MG: 5 INJECTION INTRAVENOUS at 01:46

## 2022-01-01 RX ADMIN — ACETAMINOPHEN 650 MG: 325 TABLET, FILM COATED ORAL at 16:19

## 2022-01-01 RX ADMIN — METHYLPREDNISOLONE SODIUM SUCCINATE 62.5 MG: 125 INJECTION, POWDER, FOR SOLUTION INTRAMUSCULAR; INTRAVENOUS at 02:13

## 2022-01-01 RX ADMIN — ACETAMINOPHEN 650 MG: 325 TABLET, FILM COATED ORAL at 07:38

## 2022-01-01 RX ADMIN — CEFTRIAXONE SODIUM 1 G: 1 INJECTION, POWDER, FOR SOLUTION INTRAMUSCULAR; INTRAVENOUS at 09:15

## 2022-01-01 RX ADMIN — HYDRALAZINE HYDROCHLORIDE 5 MG: 20 INJECTION INTRAMUSCULAR; INTRAVENOUS at 12:16

## 2022-01-01 RX ADMIN — IPRATROPIUM BROMIDE AND ALBUTEROL SULFATE 3 ML: 2.5; .5 SOLUTION RESPIRATORY (INHALATION) at 16:29

## 2022-01-01 RX ADMIN — METHYLPREDNISOLONE SODIUM SUCCINATE 62.5 MG: 125 INJECTION, POWDER, FOR SOLUTION INTRAMUSCULAR; INTRAVENOUS at 07:55

## 2022-01-01 RX ADMIN — QUETIAPINE 25 MG: 25 TABLET ORAL at 00:24

## 2022-01-01 RX ADMIN — DOCUSATE SODIUM 100 MG: 100 CAPSULE, LIQUID FILLED ORAL at 21:28

## 2022-01-01 RX ADMIN — QUETIAPINE 25 MG: 25 TABLET ORAL at 21:47

## 2022-01-01 RX ADMIN — Medication 100 MG: at 08:04

## 2022-01-01 RX ADMIN — LOSARTAN POTASSIUM 25 MG: 25 TABLET, FILM COATED ORAL at 08:53

## 2022-01-01 RX ADMIN — PANTOPRAZOLE SODIUM 40 MG: 40 TABLET, DELAYED RELEASE ORAL at 06:33

## 2022-01-01 RX ADMIN — IPRATROPIUM BROMIDE AND ALBUTEROL SULFATE 3 ML: 2.5; .5 SOLUTION RESPIRATORY (INHALATION) at 12:34

## 2022-01-01 RX ADMIN — IPRATROPIUM BROMIDE AND ALBUTEROL SULFATE 3 ML: 2.5; .5 SOLUTION RESPIRATORY (INHALATION) at 17:31

## 2022-01-01 RX ADMIN — QUETIAPINE 25 MG: 25 TABLET ORAL at 21:28

## 2022-01-01 RX ADMIN — IPRATROPIUM BROMIDE AND ALBUTEROL SULFATE 3 ML: 2.5; .5 SOLUTION RESPIRATORY (INHALATION) at 15:54

## 2022-01-01 RX ADMIN — IPRATROPIUM BROMIDE AND ALBUTEROL SULFATE 3 ML: 2.5; .5 SOLUTION RESPIRATORY (INHALATION) at 03:07

## 2022-01-01 RX ADMIN — AZITHROMYCIN MONOHYDRATE 500 MG: 500 INJECTION, POWDER, LYOPHILIZED, FOR SOLUTION INTRAVENOUS at 09:17

## 2022-01-01 RX ADMIN — IPRATROPIUM BROMIDE AND ALBUTEROL SULFATE 3 ML: 2.5; .5 SOLUTION RESPIRATORY (INHALATION) at 08:04

## 2022-01-01 RX ADMIN — MAGNESIUM OXIDE TAB 400 MG (241.3 MG ELEMENTAL MG) 400 MG: 400 (241.3 MG) TAB at 08:34

## 2022-01-01 RX ADMIN — METOPROLOL TARTRATE 25 MG: 25 TABLET, FILM COATED ORAL at 20:49

## 2022-01-01 RX ADMIN — METOPROLOL TARTRATE 25 MG: 25 TABLET, FILM COATED ORAL at 21:19

## 2022-01-01 RX ADMIN — DOCUSATE SODIUM 100 MG: 100 CAPSULE, LIQUID FILLED ORAL at 08:52

## 2022-01-01 RX ADMIN — METHYLPREDNISOLONE SODIUM SUCCINATE 62.5 MG: 125 INJECTION, POWDER, FOR SOLUTION INTRAMUSCULAR; INTRAVENOUS at 06:47

## 2022-01-01 RX ADMIN — ASPIRIN 81 MG: 81 TABLET, COATED ORAL at 10:58

## 2022-01-01 RX ADMIN — QUETIAPINE 25 MG: 25 TABLET ORAL at 20:49

## 2022-01-01 RX ADMIN — IPRATROPIUM BROMIDE AND ALBUTEROL SULFATE 3 ML: 2.5; .5 SOLUTION RESPIRATORY (INHALATION) at 11:39

## 2022-01-01 RX ADMIN — LORAZEPAM 0.5 MG: 2 INJECTION INTRAMUSCULAR; INTRAVENOUS at 01:33

## 2022-01-01 RX ADMIN — IPRATROPIUM BROMIDE AND ALBUTEROL SULFATE 3 ML: 2.5; .5 SOLUTION RESPIRATORY (INHALATION) at 07:17

## 2022-01-01 RX ADMIN — ATORVASTATIN CALCIUM 40 MG: 40 TABLET, FILM COATED ORAL at 21:27

## 2022-01-01 RX ADMIN — IPRATROPIUM BROMIDE AND ALBUTEROL SULFATE 3 ML: 2.5; .5 SOLUTION RESPIRATORY (INHALATION) at 11:56

## 2022-01-01 RX ADMIN — Medication 100 MG: at 16:21

## 2022-01-01 RX ADMIN — THERA TABS 1 TABLET: TAB at 08:05

## 2022-01-01 RX ADMIN — QUETIAPINE 25 MG: 25 TABLET ORAL at 15:46

## 2022-01-01 RX ADMIN — METOPROLOL TARTRATE 2.5 MG: 5 INJECTION INTRAVENOUS at 16:20

## 2022-01-01 RX ADMIN — DOCUSATE SODIUM 100 MG: 100 CAPSULE, LIQUID FILLED ORAL at 08:53

## 2022-01-01 RX ADMIN — METOPROLOL TARTRATE 2.5 MG: 5 INJECTION INTRAVENOUS at 08:06

## 2022-01-01 RX ADMIN — ALBUTEROL SULFATE 2.5 MG: 2.5 SOLUTION RESPIRATORY (INHALATION) at 10:51

## 2022-01-01 RX ADMIN — NITROGLYCERIN 10 MCG/MIN: 20 INJECTION INTRAVENOUS at 18:05

## 2022-01-01 RX ADMIN — SODIUM CHLORIDE 70 ML: 9 INJECTION, SOLUTION INTRAVENOUS at 15:22

## 2022-01-01 RX ADMIN — ENOXAPARIN SODIUM 30 MG: 30 INJECTION SUBCUTANEOUS at 12:29

## 2022-01-01 RX ADMIN — CEFTRIAXONE SODIUM 1 G: 1 INJECTION, POWDER, FOR SOLUTION INTRAMUSCULAR; INTRAVENOUS at 08:43

## 2022-01-01 RX ADMIN — LORAZEPAM 0.5 MG: 2 INJECTION INTRAMUSCULAR; INTRAVENOUS at 09:14

## 2022-01-01 RX ADMIN — METOPROLOL TARTRATE 5 MG: 5 INJECTION INTRAVENOUS at 16:01

## 2022-01-01 RX ADMIN — METHYLPREDNISOLONE SODIUM SUCCINATE 62.5 MG: 125 INJECTION, POWDER, FOR SOLUTION INTRAMUSCULAR; INTRAVENOUS at 16:01

## 2022-01-01 RX ADMIN — CEFTRIAXONE SODIUM 2 G: 2 INJECTION, POWDER, FOR SOLUTION INTRAMUSCULAR; INTRAVENOUS at 08:41

## 2022-01-01 RX ADMIN — QUETIAPINE FUMARATE 12.5 MG: 25 TABLET ORAL at 22:35

## 2022-01-01 RX ADMIN — METOPROLOL TARTRATE 25 MG: 25 TABLET, FILM COATED ORAL at 01:40

## 2022-01-01 RX ADMIN — NICOTINE 1 PATCH: 21 PATCH, EXTENDED RELEASE TRANSDERMAL at 08:42

## 2022-01-01 RX ADMIN — THERA TABS 1 TABLET: TAB at 08:34

## 2022-01-01 RX ADMIN — IPRATROPIUM BROMIDE AND ALBUTEROL SULFATE 3 ML: 2.5; .5 SOLUTION RESPIRATORY (INHALATION) at 15:42

## 2022-01-01 RX ADMIN — IPRATROPIUM BROMIDE AND ALBUTEROL SULFATE 3 ML: 2.5; .5 SOLUTION RESPIRATORY (INHALATION) at 07:38

## 2022-01-01 RX ADMIN — MONTELUKAST 10 MG: 10 TABLET, FILM COATED ORAL at 21:27

## 2022-01-01 RX ADMIN — CEFTRIAXONE SODIUM 1 G: 1 INJECTION, POWDER, FOR SOLUTION INTRAMUSCULAR; INTRAVENOUS at 08:35

## 2022-01-01 RX ADMIN — ASPIRIN 81 MG: 81 TABLET, COATED ORAL at 08:06

## 2022-01-01 RX ADMIN — CEFTRIAXONE SODIUM 1 G: 1 INJECTION, POWDER, FOR SOLUTION INTRAMUSCULAR; INTRAVENOUS at 08:05

## 2022-01-01 RX ADMIN — HYDRALAZINE HYDROCHLORIDE 5 MG: 20 INJECTION INTRAMUSCULAR; INTRAVENOUS at 06:32

## 2022-01-01 RX ADMIN — FERROUS SULFATE TAB 325 MG (65 MG ELEMENTAL FE) 325 MG: 325 (65 FE) TAB at 08:53

## 2022-01-01 RX ADMIN — NICOTINE 1 PATCH: 21 PATCH, EXTENDED RELEASE TRANSDERMAL at 08:52

## 2022-01-01 RX ADMIN — PREDNISONE 40 MG: 20 TABLET ORAL at 08:43

## 2022-01-01 RX ADMIN — METHYLPREDNISOLONE SODIUM SUCCINATE 62.5 MG: 125 INJECTION, POWDER, FOR SOLUTION INTRAMUSCULAR; INTRAVENOUS at 14:31

## 2022-01-01 RX ADMIN — QUETIAPINE FUMARATE 12.5 MG: 25 TABLET ORAL at 10:59

## 2022-01-01 RX ADMIN — ATORVASTATIN CALCIUM 40 MG: 40 TABLET, FILM COATED ORAL at 20:38

## 2022-01-01 RX ADMIN — IPRATROPIUM BROMIDE AND ALBUTEROL SULFATE 3 ML: 2.5; .5 SOLUTION RESPIRATORY (INHALATION) at 15:13

## 2022-01-01 RX ADMIN — DOCUSATE SODIUM 100 MG: 100 CAPSULE, LIQUID FILLED ORAL at 08:34

## 2022-01-01 RX ADMIN — NITROGLYCERIN 100 MCG/MIN: 20 INJECTION INTRAVENOUS at 04:51

## 2022-01-01 RX ADMIN — METOPROLOL TARTRATE 5 MG: 5 INJECTION INTRAVENOUS at 07:56

## 2022-01-01 RX ADMIN — METHYLPREDNISOLONE SODIUM SUCCINATE 62.5 MG: 125 INJECTION, POWDER, FOR SOLUTION INTRAMUSCULAR; INTRAVENOUS at 18:12

## 2022-01-01 RX ADMIN — IPRATROPIUM BROMIDE AND ALBUTEROL SULFATE 3 ML: .5; 3 SOLUTION RESPIRATORY (INHALATION) at 10:23

## 2022-01-01 RX ADMIN — FUROSEMIDE 20 MG: 20 TABLET ORAL at 08:53

## 2022-01-01 RX ADMIN — HEPARIN SODIUM 900 UNITS/HR: 1000 INJECTION, SOLUTION INTRAVENOUS; SUBCUTANEOUS at 23:29

## 2022-01-01 RX ADMIN — THERA TABS 1 TABLET: TAB at 09:17

## 2022-01-01 RX ADMIN — HYDROMORPHONE HYDROCHLORIDE 0.2 MG: 1 INJECTION, SOLUTION INTRAMUSCULAR; INTRAVENOUS; SUBCUTANEOUS at 04:36

## 2022-01-01 RX ADMIN — METOPROLOL TARTRATE 5 MG: 5 INJECTION INTRAVENOUS at 20:38

## 2022-01-01 RX ADMIN — IPRATROPIUM BROMIDE AND ALBUTEROL SULFATE 3 ML: 2.5; .5 SOLUTION RESPIRATORY (INHALATION) at 07:57

## 2022-01-01 RX ADMIN — METOPROLOL TARTRATE 25 MG: 25 TABLET, FILM COATED ORAL at 08:34

## 2022-01-01 RX ADMIN — IPRATROPIUM BROMIDE AND ALBUTEROL SULFATE 3 ML: 2.5; .5 SOLUTION RESPIRATORY (INHALATION) at 13:32

## 2022-01-01 RX ADMIN — LOSARTAN POTASSIUM 25 MG: 25 TABLET, FILM COATED ORAL at 12:05

## 2022-01-01 RX ADMIN — AMIODARONE HYDROCHLORIDE 200 MG: 200 TABLET ORAL at 08:53

## 2022-01-01 RX ADMIN — ADENOSINE 12 MG: 3 INJECTION, SOLUTION INTRAVENOUS at 18:40

## 2022-01-01 RX ADMIN — IPRATROPIUM BROMIDE AND ALBUTEROL SULFATE 3 ML: 2.5; .5 SOLUTION RESPIRATORY (INHALATION) at 15:32

## 2022-01-01 RX ADMIN — METOPROLOL TARTRATE 2.5 MG: 5 INJECTION INTRAVENOUS at 09:16

## 2022-01-01 RX ADMIN — METHYLPREDNISOLONE SODIUM SUCCINATE 62.5 MG: 125 INJECTION, POWDER, FOR SOLUTION INTRAMUSCULAR; INTRAVENOUS at 07:53

## 2022-01-01 RX ADMIN — IPRATROPIUM BROMIDE AND ALBUTEROL SULFATE 3 ML: 2.5; .5 SOLUTION RESPIRATORY (INHALATION) at 06:27

## 2022-01-01 RX ADMIN — MONTELUKAST 10 MG: 10 TABLET, FILM COATED ORAL at 20:49

## 2022-01-01 RX ADMIN — FERROUS SULFATE TAB 325 MG (65 MG ELEMENTAL FE) 325 MG: 325 (65 FE) TAB at 09:17

## 2022-01-01 RX ADMIN — THERA TABS 1 TABLET: TAB at 08:53

## 2022-01-01 RX ADMIN — ATORVASTATIN CALCIUM 40 MG: 40 TABLET, FILM COATED ORAL at 21:20

## 2022-01-01 RX ADMIN — MAGNESIUM SULFATE HEPTAHYDRATE 2 G: 40 INJECTION, SOLUTION INTRAVENOUS at 20:50

## 2022-01-01 RX ADMIN — FERROUS SULFATE TAB 325 MG (65 MG ELEMENTAL FE) 325 MG: 325 (65 FE) TAB at 21:19

## 2022-01-01 RX ADMIN — FUROSEMIDE 20 MG: 20 TABLET ORAL at 08:34

## 2022-01-01 RX ADMIN — PREDNISONE 30 MG: 10 TABLET ORAL at 08:52

## 2022-01-01 RX ADMIN — NICOTINE 1 PATCH: 21 PATCH, EXTENDED RELEASE TRANSDERMAL at 08:35

## 2022-01-01 RX ADMIN — QUETIAPINE FUMARATE 25 MG: 25 TABLET ORAL at 20:56

## 2022-01-01 RX ADMIN — MAGNESIUM OXIDE TAB 400 MG (241.3 MG ELEMENTAL MG) 400 MG: 400 (241.3 MG) TAB at 08:54

## 2022-01-01 RX ADMIN — THERA TABS 1 TABLET: TAB at 08:07

## 2022-01-01 RX ADMIN — METOPROLOL TARTRATE 5 MG: 1 INJECTION, SOLUTION INTRAVENOUS at 18:29

## 2022-01-01 RX ADMIN — IPRATROPIUM BROMIDE AND ALBUTEROL SULFATE 3 ML: 2.5; .5 SOLUTION RESPIRATORY (INHALATION) at 12:33

## 2022-01-01 RX ADMIN — ASPIRIN 81 MG: 81 TABLET, COATED ORAL at 08:04

## 2022-01-01 RX ADMIN — FERROUS SULFATE TAB 325 MG (65 MG ELEMENTAL FE) 325 MG: 325 (65 FE) TAB at 20:13

## 2022-01-01 RX ADMIN — METOPROLOL TARTRATE 2.5 MG: 5 INJECTION INTRAVENOUS at 03:47

## 2022-01-01 RX ADMIN — QUETIAPINE 25 MG: 25 TABLET ORAL at 20:12

## 2022-01-01 RX ADMIN — IPRATROPIUM BROMIDE AND ALBUTEROL SULFATE 3 ML: 2.5; .5 SOLUTION RESPIRATORY (INHALATION) at 21:16

## 2022-01-01 RX ADMIN — METOPROLOL TARTRATE 5 MG: 5 INJECTION INTRAVENOUS at 18:29

## 2022-01-01 RX ADMIN — SENNOSIDES 2 TABLET: 8.6 TABLET, COATED ORAL at 20:49

## 2022-01-01 RX ADMIN — AMIODARONE HYDROCHLORIDE 200 MG: 200 TABLET ORAL at 08:34

## 2022-01-01 RX ADMIN — AZITHROMYCIN MONOHYDRATE 500 MG: 500 INJECTION, POWDER, LYOPHILIZED, FOR SOLUTION INTRAVENOUS at 08:00

## 2022-01-01 RX ADMIN — METOPROLOL TARTRATE 5 MG: 5 INJECTION INTRAVENOUS at 14:34

## 2022-01-01 RX ADMIN — IPRATROPIUM BROMIDE AND ALBUTEROL SULFATE 3 ML: 2.5; .5 SOLUTION RESPIRATORY (INHALATION) at 11:46

## 2022-01-01 RX ADMIN — METHYLPREDNISOLONE SODIUM SUCCINATE 62.5 MG: 125 INJECTION, POWDER, FOR SOLUTION INTRAMUSCULAR; INTRAVENOUS at 07:39

## 2022-01-01 RX ADMIN — MONTELUKAST 10 MG: 10 TABLET, FILM COATED ORAL at 20:13

## 2022-01-01 RX ADMIN — METHYLPREDNISOLONE SODIUM SUCCINATE 62.5 MG: 125 INJECTION, POWDER, FOR SOLUTION INTRAMUSCULAR; INTRAVENOUS at 06:15

## 2022-01-01 RX ADMIN — SENNOSIDES 2 TABLET: 8.6 TABLET, COATED ORAL at 21:19

## 2022-01-01 RX ADMIN — IOPAMIDOL 65 ML: 755 INJECTION, SOLUTION INTRAVENOUS at 15:23

## 2022-01-01 RX ADMIN — POTASSIUM CHLORIDE, DEXTROSE MONOHYDRATE AND SODIUM CHLORIDE: 150; 5; 450 INJECTION, SOLUTION INTRAVENOUS at 14:28

## 2022-01-01 RX ADMIN — MONTELUKAST 10 MG: 10 TABLET, FILM COATED ORAL at 21:19

## 2022-01-01 RX ADMIN — ENOXAPARIN SODIUM 30 MG: 30 INJECTION SUBCUTANEOUS at 11:45

## 2022-01-01 RX ADMIN — ADENOSINE 6 MG: 3 INJECTION, SOLUTION INTRAVENOUS at 18:18

## 2022-01-01 RX ADMIN — THERA TABS 1 TABLET: TAB at 08:51

## 2022-01-01 RX ADMIN — NICOTINE 1 PATCH: 21 PATCH, EXTENDED RELEASE TRANSDERMAL at 09:19

## 2022-01-01 RX ADMIN — IPRATROPIUM BROMIDE AND ALBUTEROL SULFATE 3 ML: 2.5; .5 SOLUTION RESPIRATORY (INHALATION) at 19:58

## 2022-01-01 RX ADMIN — IPRATROPIUM BROMIDE AND ALBUTEROL SULFATE 3 ML: 2.5; .5 SOLUTION RESPIRATORY (INHALATION) at 03:15

## 2022-01-01 RX ADMIN — SODIUM CHLORIDE 1 MG/MIN: 9 INJECTION, SOLUTION INTRAVENOUS at 20:17

## 2022-01-01 RX ADMIN — METHYLPREDNISOLONE SODIUM SUCCINATE 62.5 MG: 125 INJECTION, POWDER, FOR SOLUTION INTRAMUSCULAR; INTRAVENOUS at 21:18

## 2022-01-01 RX ADMIN — IPRATROPIUM BROMIDE AND ALBUTEROL SULFATE 3 ML: 2.5; .5 SOLUTION RESPIRATORY (INHALATION) at 19:15

## 2022-01-01 RX ADMIN — METHYLPREDNISOLONE SODIUM SUCCINATE 62.5 MG: 125 INJECTION, POWDER, FOR SOLUTION INTRAMUSCULAR; INTRAVENOUS at 18:17

## 2022-01-01 RX ADMIN — ENOXAPARIN SODIUM 30 MG: 30 INJECTION SUBCUTANEOUS at 12:06

## 2022-01-01 RX ADMIN — LOSARTAN POTASSIUM 25 MG: 25 TABLET, FILM COATED ORAL at 08:42

## 2022-01-01 RX ADMIN — METHYLPREDNISOLONE SODIUM SUCCINATE 62.5 MG: 125 INJECTION, POWDER, FOR SOLUTION INTRAMUSCULAR; INTRAVENOUS at 19:42

## 2022-01-01 RX ADMIN — IPRATROPIUM BROMIDE AND ALBUTEROL SULFATE 3 ML: 2.5; .5 SOLUTION RESPIRATORY (INHALATION) at 19:48

## 2022-01-01 RX ADMIN — AMIODARONE HYDROCHLORIDE 200 MG: 200 TABLET ORAL at 21:19

## 2022-01-01 RX ADMIN — IPRATROPIUM BROMIDE AND ALBUTEROL SULFATE 3 ML: 2.5; .5 SOLUTION RESPIRATORY (INHALATION) at 09:48

## 2022-01-01 RX ADMIN — ASPIRIN 325 MG: 325 TABLET ORAL at 16:01

## 2022-01-01 RX ADMIN — METHYLPREDNISOLONE SODIUM SUCCINATE 62.5 MG: 125 INJECTION, POWDER, FOR SOLUTION INTRAMUSCULAR; INTRAVENOUS at 03:07

## 2022-01-01 RX ADMIN — NITROGLYCERIN 100 MCG/MIN: 20 INJECTION INTRAVENOUS at 13:20

## 2022-01-01 RX ADMIN — IPRATROPIUM BROMIDE AND ALBUTEROL SULFATE 3 ML: 2.5; .5 SOLUTION RESPIRATORY (INHALATION) at 12:17

## 2022-01-01 RX ADMIN — IPRATROPIUM BROMIDE AND ALBUTEROL SULFATE 3 ML: 2.5; .5 SOLUTION RESPIRATORY (INHALATION) at 07:24

## 2022-01-01 RX ADMIN — CEFTRIAXONE SODIUM 1 G: 1 INJECTION, POWDER, FOR SOLUTION INTRAMUSCULAR; INTRAVENOUS at 09:18

## 2022-01-01 RX ADMIN — IPRATROPIUM BROMIDE AND ALBUTEROL SULFATE 3 ML: 2.5; .5 SOLUTION RESPIRATORY (INHALATION) at 13:20

## 2022-01-01 RX ADMIN — PREDNISONE 30 MG: 10 TABLET ORAL at 08:54

## 2022-01-01 RX ADMIN — Medication 100 MG: at 09:17

## 2022-01-01 RX ADMIN — PANTOPRAZOLE SODIUM 40 MG: 40 TABLET, DELAYED RELEASE ORAL at 06:52

## 2022-01-01 RX ADMIN — ADENOSINE 6 MG: 3 INJECTION, SOLUTION INTRAVENOUS at 18:33

## 2022-01-01 RX ADMIN — FUROSEMIDE 20 MG: 20 TABLET ORAL at 08:41

## 2022-01-01 RX ADMIN — NICOTINE 1 PATCH: 21 PATCH, EXTENDED RELEASE TRANSDERMAL at 08:55

## 2022-01-01 RX ADMIN — THERA TABS 1 TABLET: TAB at 16:21

## 2022-01-01 RX ADMIN — FERROUS SULFATE TAB 325 MG (65 MG ELEMENTAL FE) 325 MG: 325 (65 FE) TAB at 20:48

## 2022-01-01 RX ADMIN — IPRATROPIUM BROMIDE AND ALBUTEROL SULFATE 3 ML: 2.5; .5 SOLUTION RESPIRATORY (INHALATION) at 11:26

## 2022-01-01 RX ADMIN — Medication 100 MG: at 08:51

## 2022-01-01 RX ADMIN — FERROUS SULFATE TAB 325 MG (65 MG ELEMENTAL FE) 325 MG: 325 (65 FE) TAB at 08:34

## 2022-01-01 RX ADMIN — IPRATROPIUM BROMIDE AND ALBUTEROL SULFATE 3 ML: .5; 3 SOLUTION RESPIRATORY (INHALATION) at 07:21

## 2022-01-01 RX ADMIN — FUROSEMIDE 20 MG: 20 TABLET ORAL at 12:05

## 2022-01-01 RX ADMIN — FERROUS SULFATE TAB 325 MG (65 MG ELEMENTAL FE) 325 MG: 325 (65 FE) TAB at 10:59

## 2022-01-01 RX ADMIN — CEFTRIAXONE SODIUM 1 G: 1 INJECTION, POWDER, FOR SOLUTION INTRAMUSCULAR; INTRAVENOUS at 08:54

## 2022-01-01 RX ADMIN — QUETIAPINE 25 MG: 25 TABLET ORAL at 20:37

## 2022-01-01 RX ADMIN — HEPARIN SODIUM 600 UNITS/HR: 10000 INJECTION, SOLUTION INTRAVENOUS at 14:38

## 2022-01-01 RX ADMIN — MAGNESIUM OXIDE TAB 400 MG (241.3 MG ELEMENTAL MG) 400 MG: 400 (241.3 MG) TAB at 21:27

## 2022-01-01 RX ADMIN — IPRATROPIUM BROMIDE AND ALBUTEROL SULFATE 3 ML: 2.5; .5 SOLUTION RESPIRATORY (INHALATION) at 07:36

## 2022-01-01 RX ADMIN — IPRATROPIUM BROMIDE AND ALBUTEROL SULFATE 3 ML: 2.5; .5 SOLUTION RESPIRATORY (INHALATION) at 07:33

## 2022-01-01 RX ADMIN — ATORVASTATIN CALCIUM 40 MG: 40 TABLET, FILM COATED ORAL at 20:49

## 2022-01-01 RX ADMIN — ASPIRIN 81 MG CHEWABLE TABLET 243 MG: 81 TABLET CHEWABLE at 09:36

## 2022-01-01 RX ADMIN — AMIODARONE HYDROCHLORIDE 200 MG: 200 TABLET ORAL at 08:52

## 2022-01-01 RX ADMIN — METHYLPREDNISOLONE SODIUM SUCCINATE 62.5 MG: 125 INJECTION, POWDER, FOR SOLUTION INTRAMUSCULAR; INTRAVENOUS at 14:25

## 2022-01-01 RX ADMIN — QUETIAPINE 25 MG: 25 TABLET ORAL at 20:53

## 2022-01-01 RX ADMIN — FERROUS SULFATE TAB 325 MG (65 MG ELEMENTAL FE) 325 MG: 325 (65 FE) TAB at 20:37

## 2022-01-01 RX ADMIN — METHYLPREDNISOLONE SODIUM SUCCINATE 62.5 MG: 125 INJECTION, POWDER, FOR SOLUTION INTRAMUSCULAR; INTRAVENOUS at 06:20

## 2022-01-01 RX ADMIN — IPRATROPIUM BROMIDE AND ALBUTEROL SULFATE 3 ML: 2.5; .5 SOLUTION RESPIRATORY (INHALATION) at 19:21

## 2022-01-01 RX ADMIN — ASPIRIN 81 MG: 81 TABLET, COATED ORAL at 09:17

## 2022-01-01 RX ADMIN — METOPROLOL SUCCINATE 25 MG: 25 TABLET, EXTENDED RELEASE ORAL at 10:15

## 2022-01-01 RX ADMIN — NICOTINE 1 PATCH: 21 PATCH, EXTENDED RELEASE TRANSDERMAL at 08:07

## 2022-01-01 RX ADMIN — METOPROLOL SUCCINATE 25 MG: 25 TABLET, EXTENDED RELEASE ORAL at 12:06

## 2022-01-01 RX ADMIN — ASPIRIN 81 MG: 81 TABLET, COATED ORAL at 08:34

## 2022-01-01 RX ADMIN — Medication 100 MG: at 08:06

## 2022-01-01 RX ADMIN — DOCUSATE SODIUM 100 MG: 100 CAPSULE, LIQUID FILLED ORAL at 20:37

## 2022-01-01 RX ADMIN — METOPROLOL TARTRATE 5 MG: 5 INJECTION INTRAVENOUS at 20:12

## 2022-01-01 RX ADMIN — IPRATROPIUM BROMIDE AND ALBUTEROL SULFATE 3 ML: 2.5; .5 SOLUTION RESPIRATORY (INHALATION) at 15:35

## 2022-01-01 RX ADMIN — FERROUS SULFATE TAB 325 MG (65 MG ELEMENTAL FE) 325 MG: 325 (65 FE) TAB at 08:41

## 2022-01-01 RX ADMIN — IPRATROPIUM BROMIDE AND ALBUTEROL SULFATE 3 ML: 2.5; .5 SOLUTION RESPIRATORY (INHALATION) at 17:46

## 2022-01-01 RX ADMIN — PREDNISONE 40 MG: 20 TABLET ORAL at 08:34

## 2022-01-01 RX ADMIN — ALBUTEROL SULFATE 2 PUFF: 90 AEROSOL, METERED RESPIRATORY (INHALATION) at 22:18

## 2022-01-01 RX ADMIN — PANTOPRAZOLE SODIUM 40 MG: 40 INJECTION, POWDER, FOR SOLUTION INTRAVENOUS at 06:47

## 2022-01-01 RX ADMIN — POTASSIUM CHLORIDE, DEXTROSE MONOHYDRATE AND SODIUM CHLORIDE: 150; 5; 450 INJECTION, SOLUTION INTRAVENOUS at 03:38

## 2022-01-01 RX ADMIN — PANTOPRAZOLE SODIUM 40 MG: 40 TABLET, DELAYED RELEASE ORAL at 06:49

## 2022-01-01 RX ADMIN — METOPROLOL TARTRATE 5 MG: 5 INJECTION INTRAVENOUS at 14:07

## 2022-01-01 RX ADMIN — METOPROLOL TARTRATE 5 MG: 5 INJECTION INTRAVENOUS at 20:46

## 2022-01-01 RX ADMIN — METOPROLOL TARTRATE 5 MG: 5 INJECTION INTRAVENOUS at 02:50

## 2022-01-01 RX ADMIN — FERROUS SULFATE TAB 325 MG (65 MG ELEMENTAL FE) 325 MG: 325 (65 FE) TAB at 08:05

## 2022-01-01 RX ADMIN — DOCUSATE SODIUM 100 MG: 100 CAPSULE, LIQUID FILLED ORAL at 09:17

## 2022-01-01 RX ADMIN — Medication 100 MG: at 08:34

## 2022-01-01 RX ADMIN — IPRATROPIUM BROMIDE AND ALBUTEROL SULFATE 3 ML: 2.5; .5 SOLUTION RESPIRATORY (INHALATION) at 16:01

## 2022-01-01 RX ADMIN — METOPROLOL TARTRATE 2.5 MG: 5 INJECTION INTRAVENOUS at 20:53

## 2022-01-01 RX ADMIN — ALBUTEROL SULFATE 2.5 MG: 2.5 SOLUTION RESPIRATORY (INHALATION) at 08:58

## 2022-01-01 RX ADMIN — FERROUS SULFATE TAB 325 MG (65 MG ELEMENTAL FE) 325 MG: 325 (65 FE) TAB at 20:52

## 2022-01-01 RX ADMIN — CEFTRIAXONE SODIUM 2 G: 2 INJECTION, POWDER, FOR SOLUTION INTRAMUSCULAR; INTRAVENOUS at 11:25

## 2022-01-01 RX ADMIN — FERROUS SULFATE TAB 325 MG (65 MG ELEMENTAL FE) 325 MG: 325 (65 FE) TAB at 21:27

## 2022-01-01 RX ADMIN — FERROUS SULFATE TAB 325 MG (65 MG ELEMENTAL FE) 325 MG: 325 (65 FE) TAB at 08:06

## 2022-01-01 RX ADMIN — ASPIRIN 81 MG: 81 TABLET, COATED ORAL at 08:42

## 2022-01-01 RX ADMIN — HEPARIN SODIUM 900 UNITS/HR: 10000 INJECTION, SOLUTION INTRAVENOUS at 14:25

## 2022-01-01 RX ADMIN — METHYLPREDNISOLONE SODIUM SUCCINATE 62.5 MG: 125 INJECTION, POWDER, FOR SOLUTION INTRAMUSCULAR; INTRAVENOUS at 18:30

## 2022-01-01 RX ADMIN — Medication 100 MG: at 08:43

## 2022-01-01 RX ADMIN — IPRATROPIUM BROMIDE AND ALBUTEROL SULFATE 3 ML: 2.5; .5 SOLUTION RESPIRATORY (INHALATION) at 05:09

## 2022-01-01 RX ADMIN — QUETIAPINE 25 MG: 25 TABLET ORAL at 21:19

## 2022-01-01 RX ADMIN — SODIUM CHLORIDE 1 MG/MIN: 9 INJECTION, SOLUTION INTRAVENOUS at 11:39

## 2022-01-01 RX ADMIN — ENOXAPARIN SODIUM 30 MG: 30 INJECTION SUBCUTANEOUS at 11:51

## 2022-01-01 RX ADMIN — PANTOPRAZOLE SODIUM 40 MG: 40 INJECTION, POWDER, FOR SOLUTION INTRAVENOUS at 08:05

## 2022-01-01 RX ADMIN — HYDROMORPHONE HYDROCHLORIDE 0.2 MG: 0.2 INJECTION, SOLUTION INTRAMUSCULAR; INTRAVENOUS; SUBCUTANEOUS at 00:45

## 2022-01-01 RX ADMIN — DOCUSATE SODIUM 100 MG: 100 CAPSULE, LIQUID FILLED ORAL at 21:19

## 2022-01-01 RX ADMIN — ENOXAPARIN SODIUM 30 MG: 30 INJECTION SUBCUTANEOUS at 11:33

## 2022-01-01 RX ADMIN — Medication 100 MG: at 08:54

## 2022-01-01 RX ADMIN — QUETIAPINE FUMARATE 12.5 MG: 25 TABLET ORAL at 16:16

## 2022-01-01 RX ADMIN — NICOTINE 1 PATCH: 21 PATCH, EXTENDED RELEASE TRANSDERMAL at 08:04

## 2022-01-01 RX ADMIN — IPRATROPIUM BROMIDE AND ALBUTEROL SULFATE 3 ML: 2.5; .5 SOLUTION RESPIRATORY (INHALATION) at 17:13

## 2022-01-01 RX ADMIN — NICOTINE 1 PATCH: 21 PATCH, EXTENDED RELEASE TRANSDERMAL at 12:55

## 2022-01-01 RX ADMIN — IPRATROPIUM BROMIDE AND ALBUTEROL SULFATE 3 ML: .5; 3 SOLUTION RESPIRATORY (INHALATION) at 10:48

## 2022-01-01 RX ADMIN — METOPROLOL SUCCINATE 25 MG: 25 TABLET, EXTENDED RELEASE ORAL at 08:51

## 2022-01-01 RX ADMIN — MONTELUKAST 10 MG: 10 TABLET, FILM COATED ORAL at 20:37

## 2022-01-01 RX ADMIN — NITROGLYCERIN 30 MCG/MIN: 20 INJECTION INTRAVENOUS at 05:50

## 2022-01-01 RX ADMIN — IPRATROPIUM BROMIDE AND ALBUTEROL SULFATE 3 ML: 2.5; .5 SOLUTION RESPIRATORY (INHALATION) at 19:13

## 2022-01-01 RX ADMIN — ATORVASTATIN CALCIUM 40 MG: 40 TABLET, FILM COATED ORAL at 20:12

## 2022-01-01 ASSESSMENT — ACTIVITIES OF DAILY LIVING (ADL)
ADLS_ACUITY_SCORE: 25
ADLS_ACUITY_SCORE: 24
ADLS_ACUITY_SCORE: 33
ADLS_ACUITY_SCORE: 27
ADLS_ACUITY_SCORE: 33
ADLS_ACUITY_SCORE: 27
ADLS_ACUITY_SCORE: 24
ADLS_ACUITY_SCORE: 27
EATING/SWALLOWING: OTHER (SEE COMMENTS)
ADLS_ACUITY_SCORE: 24
ADLS_ACUITY_SCORE: 29
ADLS_ACUITY_SCORE: 24
ADLS_ACUITY_SCORE: 27
ADLS_ACUITY_SCORE: 31
ADLS_ACUITY_SCORE: 27
ADLS_ACUITY_SCORE: 23
ADLS_ACUITY_SCORE: 29
ADLS_ACUITY_SCORE: 29
ADLS_ACUITY_SCORE: 27
ADLS_ACUITY_SCORE: 22
COMMUNICATION: DIFFICULTY UNDERSTANDING
ADLS_ACUITY_SCORE: 27
ADLS_ACUITY_SCORE: 24
ADLS_ACUITY_SCORE: 26
ADLS_ACUITY_SCORE: 25
ADLS_ACUITY_SCORE: 26
ADLS_ACUITY_SCORE: 23
ADLS_ACUITY_SCORE: 25
ADLS_ACUITY_SCORE: 27
ADLS_ACUITY_SCORE: 25
ADLS_ACUITY_SCORE: 25
ADLS_ACUITY_SCORE: 33
ADLS_ACUITY_SCORE: 25
ADLS_ACUITY_SCORE: 24
ADLS_ACUITY_SCORE: 31
ADLS_ACUITY_SCORE: 27
ADLS_ACUITY_SCORE: 25
ADLS_ACUITY_SCORE: 25
ADLS_ACUITY_SCORE: 24
ADLS_ACUITY_SCORE: 27
ADLS_ACUITY_SCORE: 27
ADLS_ACUITY_SCORE: 24
WALKING_OR_CLIMBING_STAIRS: OTHER (SEE COMMENTS)
ADLS_ACUITY_SCORE: 29
ADLS_ACUITY_SCORE: 24
ADLS_ACUITY_SCORE: 26
ADLS_ACUITY_SCORE: 27
DRESSING/BATHING_DIFFICULTY: OTHER (SEE COMMENTS)
ADLS_ACUITY_SCORE: 27
ADLS_ACUITY_SCORE: 27
ADLS_ACUITY_SCORE: 24
ADLS_ACUITY_SCORE: 33
ADLS_ACUITY_SCORE: 29
ADLS_ACUITY_SCORE: 27
ADLS_ACUITY_SCORE: 25
ADLS_ACUITY_SCORE: 25
ADLS_ACUITY_SCORE: 27
ADLS_ACUITY_SCORE: 26
ADLS_ACUITY_SCORE: 31
ADLS_ACUITY_SCORE: 27
ADLS_ACUITY_SCORE: 23
ADLS_ACUITY_SCORE: 33
ADLS_ACUITY_SCORE: 27
ADLS_ACUITY_SCORE: 24
ADLS_ACUITY_SCORE: 26
ADLS_ACUITY_SCORE: 24
ADLS_ACUITY_SCORE: 27
ADLS_ACUITY_SCORE: 31
ADLS_ACUITY_SCORE: 25
ADLS_ACUITY_SCORE: 26
ADLS_ACUITY_SCORE: 27
ADLS_ACUITY_SCORE: 25
ADLS_ACUITY_SCORE: 31
ADLS_ACUITY_SCORE: 27
DOING_ERRANDS_INDEPENDENTLY_DIFFICULTY: YES
ADLS_ACUITY_SCORE: 27
ADLS_ACUITY_SCORE: 33
ADLS_ACUITY_SCORE: 27
ADLS_ACUITY_SCORE: 25
ADLS_ACUITY_SCORE: 25
ADLS_ACUITY_SCORE: 24
ADLS_ACUITY_SCORE: 25
ADLS_ACUITY_SCORE: 22
ADLS_ACUITY_SCORE: 31
ADLS_ACUITY_SCORE: 25
ADLS_ACUITY_SCORE: 23
ADLS_ACUITY_SCORE: 25
ADLS_ACUITY_SCORE: 29
ADLS_ACUITY_SCORE: 31
ADLS_ACUITY_SCORE: 29
DEPENDENT_IADLS:: TRANSPORTATION
ADLS_ACUITY_SCORE: 25
ADLS_ACUITY_SCORE: 27
ADLS_ACUITY_SCORE: 22
ADLS_ACUITY_SCORE: 31
ADLS_ACUITY_SCORE: 27
ADLS_ACUITY_SCORE: 27
ADLS_ACUITY_SCORE: 24
DIFFICULTY_COMMUNICATING: YES
ADLS_ACUITY_SCORE: 27
ADLS_ACUITY_SCORE: 27
ADLS_ACUITY_SCORE: 29
ADLS_ACUITY_SCORE: 26
ADLS_ACUITY_SCORE: 25
ADLS_ACUITY_SCORE: 27
ADLS_ACUITY_SCORE: 24
ADLS_ACUITY_SCORE: 27
ADLS_ACUITY_SCORE: 27
ADLS_ACUITY_SCORE: 25
ADLS_ACUITY_SCORE: 22
ADLS_ACUITY_SCORE: 24
ADLS_ACUITY_SCORE: 27
ADLS_ACUITY_SCORE: 24
ADLS_ACUITY_SCORE: 25
ADLS_ACUITY_SCORE: 27
ADLS_ACUITY_SCORE: 24
ADLS_ACUITY_SCORE: 25
ADLS_ACUITY_SCORE: 27
ADLS_ACUITY_SCORE: 25
WALKING_OR_CLIMBING_STAIRS_DIFFICULTY: OTHER (SEE COMMENTS)
ADLS_ACUITY_SCORE: 24
ADLS_ACUITY_SCORE: 27
WEAR_GLASSES_OR_BLIND: NO
ADLS_ACUITY_SCORE: 27
ADLS_ACUITY_SCORE: 25
ADLS_ACUITY_SCORE: 25
ADLS_ACUITY_SCORE: 27
ADLS_ACUITY_SCORE: 26
ADLS_ACUITY_SCORE: 19
ADLS_ACUITY_SCORE: 27
ADLS_ACUITY_SCORE: 23
ADLS_ACUITY_SCORE: 27
ADLS_ACUITY_SCORE: 27
ADLS_ACUITY_SCORE: 24
ADLS_ACUITY_SCORE: 27
DIFFICULTY_EATING/SWALLOWING: NO
ADLS_ACUITY_SCORE: 25
ADLS_ACUITY_SCORE: 28
ADLS_ACUITY_SCORE: 33
ADLS_ACUITY_SCORE: 27
ADLS_ACUITY_SCORE: 25
ADLS_ACUITY_SCORE: 27
ADLS_ACUITY_SCORE: 24
ADLS_ACUITY_SCORE: 25
ADLS_ACUITY_SCORE: 25
ADLS_ACUITY_SCORE: 27
ADLS_ACUITY_SCORE: 27
ADLS_ACUITY_SCORE: 31
ADLS_ACUITY_SCORE: 27
ADLS_ACUITY_SCORE: 19
ADLS_ACUITY_SCORE: 27
ADLS_ACUITY_SCORE: 31
ADLS_ACUITY_SCORE: 25
ADLS_ACUITY_SCORE: 27
ADLS_ACUITY_SCORE: 27
ADLS_ACUITY_SCORE: 24
ADLS_ACUITY_SCORE: 29
ADLS_ACUITY_SCORE: 22
ADLS_ACUITY_SCORE: 27
ADLS_ACUITY_SCORE: 28
ADLS_ACUITY_SCORE: 25
ADLS_ACUITY_SCORE: 27
ADLS_ACUITY_SCORE: 26
ADLS_ACUITY_SCORE: 27
ADLS_ACUITY_SCORE: 25
ADLS_ACUITY_SCORE: 24
ADLS_ACUITY_SCORE: 19
ADLS_ACUITY_SCORE: 25
ADLS_ACUITY_SCORE: 27
ADLS_ACUITY_SCORE: 25
ADLS_ACUITY_SCORE: 25
ADLS_ACUITY_SCORE: 27
ADLS_ACUITY_SCORE: 24
ADLS_ACUITY_SCORE: 25
ADLS_ACUITY_SCORE: 33
ADLS_ACUITY_SCORE: 27
ADLS_ACUITY_SCORE: 25
ADLS_ACUITY_SCORE: 24
ADLS_ACUITY_SCORE: 27
ADLS_ACUITY_SCORE: 25
ADLS_ACUITY_SCORE: 25
ADLS_ACUITY_SCORE: 27
ADLS_ACUITY_SCORE: 23
ADLS_ACUITY_SCORE: 24
ADLS_ACUITY_SCORE: 25
ADLS_ACUITY_SCORE: 28
ADLS_ACUITY_SCORE: 27
ADLS_ACUITY_SCORE: 26
ADLS_ACUITY_SCORE: 31
ADLS_ACUITY_SCORE: 25
TOILETING_ISSUES: OTHER (SEE COMMENTS)
ADLS_ACUITY_SCORE: 19
ADLS_ACUITY_SCORE: 27
ADLS_ACUITY_SCORE: 24
ADLS_ACUITY_SCORE: 25
ADLS_ACUITY_SCORE: 29
ADLS_ACUITY_SCORE: 31
ADLS_ACUITY_SCORE: 26
ADLS_ACUITY_SCORE: 25
ADLS_ACUITY_SCORE: 27
ADLS_ACUITY_SCORE: 25
FALL_HISTORY_WITHIN_LAST_SIX_MONTHS: NO
ADLS_ACUITY_SCORE: 31
ADLS_ACUITY_SCORE: 31
ADLS_ACUITY_SCORE: 29
ADLS_ACUITY_SCORE: 25
ADLS_ACUITY_SCORE: 29
ADLS_ACUITY_SCORE: 25
ADLS_ACUITY_SCORE: 19
ADLS_ACUITY_SCORE: 25
PATIENT_/_FAMILY_COMMUNICATION_STYLE: SPOKEN LANGUAGE (ENGLISH OR BILINGUAL)
HEARING_DIFFICULTY_OR_DEAF: NO
ADLS_ACUITY_SCORE: 29
ADLS_ACUITY_SCORE: 27
ADLS_ACUITY_SCORE: 26
ADLS_ACUITY_SCORE: 27
ADLS_ACUITY_SCORE: 26
ADLS_ACUITY_SCORE: 25
ADLS_ACUITY_SCORE: 25
CONCENTRATING,_REMEMBERING_OR_MAKING_DECISIONS_DIFFICULTY: YES
EQUIPMENT_CURRENTLY_USED_AT_HOME: OTHER (SEE COMMENTS)
ADLS_ACUITY_SCORE: 26
ADLS_ACUITY_SCORE: 24
ADLS_ACUITY_SCORE: 24
ADLS_ACUITY_SCORE: 27
ADLS_ACUITY_SCORE: 33
ADLS_ACUITY_SCORE: 22
ADLS_ACUITY_SCORE: 25
ADLS_ACUITY_SCORE: 24
ADLS_ACUITY_SCORE: 29
ADLS_ACUITY_SCORE: 25
ADLS_ACUITY_SCORE: 23
ADLS_ACUITY_SCORE: 25
ADLS_ACUITY_SCORE: 25
ADLS_ACUITY_SCORE: 22
ADLS_ACUITY_SCORE: 27
ADLS_ACUITY_SCORE: 25
ADLS_ACUITY_SCORE: 27
ADLS_ACUITY_SCORE: 24
ADLS_ACUITY_SCORE: 27
ADLS_ACUITY_SCORE: 22

## 2022-01-01 ASSESSMENT — PAIN SCALES - GENERAL: PAINLEVEL: NO PAIN (0)

## 2022-01-01 ASSESSMENT — MIFFLIN-ST. JEOR
SCORE: 919.13
SCORE: 914.13
SCORE: 922.78
SCORE: 935.93
SCORE: 974.94
SCORE: 924.14

## 2022-01-01 ASSESSMENT — EJECTION FRACTION: EF_VALUE: .29

## 2022-01-13 NOTE — TELEPHONE ENCOUNTER
Prescription approved per [facility] Refill Protocol.    DOLORES SinghN, RN  [facility]

## 2022-01-25 NOTE — PROGRESS NOTES
Clinic Care Coordination Contact  [facility]/Voicemail       Clinical Data: Care Coordinator Outreach  Outreach attempted x 1.  Left message on patient's voicemail with call back information and requested return call.  Plan: . Care Coordinator will try to reach patient again in 3-5 business days.      Herminia BERNALN, RN, PHN, [facility]  Primary Clinic Care Coordination    [facility]  Primary Care Clinics  [email or web address]   Office: [phone]  Employed by [facility]

## 2022-01-25 NOTE — PROGRESS NOTES
Clinic Care Coordination Contact    Follow Up Progress Note      Assessment: Called and spoke with patient and significant [place], but states [facility] financial worker was out today and help pt with her on going medical copays.  Pt states otherwise she is doing well, she feels like she has better energy with the iron pills she has been taking.  They are wondering when she will need to recheck her hgb. Reviewed notes and pt is due to have that rechecked and orders are in for her to have them drawn. Appt was made for feb 10th for recheck. Until then pt will continue to take iron pills.  Discussed HM and pt states she will update them when it gets a little warmer out.     Care Gaps:    Health Maintenance Due   Topic Date Due     DEXA  Never done     COLPOSCOPY  Never done     ZOSTER IMMUNIZATION (1 of 2) Never done     MEDICARE ANNUAL WELLNESS VISIT  12/20/2017     COLORECTAL CANCER SCREENING  12/12/2018     HF ACTION PLAN  07/01/2019     MAMMO SCREENING  01/02/2020     INFLUENZA VACCINE (1) 09/01/2021     PHQ-2  01/01/2022       Care Gaps Last addressed on :  1/25/2022    Goals addressed this encounter:   Goals       COPD      Goal Statement: I will manage my COPD  symptoms effectively at home.  Date Goal set: 12/22/2021  Barriers: unknown  Strengths: motivated  Date to Achieve By: 6/1/2022  Patient expressed understanding of goal: yes  Action steps to achieve this goal:  1. I will use my nebulizer every 6 hours as needed for wheezing  2. I will use my inhalers daily as prescribed  3. I will follow-up with my doctor if I do not improve with home care measures.      Follow-up in my overdue health maintenance concerns      Goal Statement: Follow-up in my overdue health maintenance concerns  Date Goal set: 10/6/2021  Barriers: COVID-19, recent pneumonia  Strengths: engaged  Date to Achieve By: 6/1/2022  Patient expressed understanding of goal: yes  Action steps to achieve this goal:  1. I will ask to update HM at  appointments  2. I will scheduled appts for important HM items        Intervention/Education provided during outreach: appt scheduled for lab 2/10 for repeat hgb     Outreach Frequency: monthly    Plan:   Care Coordinator will follow up in monthly      Herminia ZAPATA, RN, PHN, CCM  Primary Clinic Care Coordination    [facility]  Primary Care Clinics  [email or web address]   Office: [phone]  Employed by [facility] [employer]

## 2022-02-05 PROBLEM — J96.11 CHRONIC RESPIRATORY FAILURE WITH HYPOXIA AND HYPERCAPNIA (H): Status: ACTIVE | Noted: 2021-01-01

## 2022-02-05 PROBLEM — J96.12 CHRONIC RESPIRATORY FAILURE WITH HYPOXIA AND HYPERCAPNIA (H): Status: ACTIVE | Noted: 2021-01-01

## 2022-02-05 PROBLEM — R79.89 ELEVATED TROPONIN: Status: ACTIVE | Noted: 2017-06-14

## 2022-02-05 PROBLEM — I69.320 APHASIA DUE TO RECENT CEREBRAL INFARCTION: Status: ACTIVE | Noted: 2020-02-06

## 2022-02-05 PROBLEM — I25.118 CORONARY ARTERY DISEASE OF NATIVE ARTERY OF NATIVE HEART WITH STABLE ANGINA PECTORIS (H): Status: ACTIVE | Noted: 2017-06-14

## 2022-02-05 PROBLEM — Z95.5 S/P DRUG ELUTING CORONARY STENT PLACEMENT: Status: ACTIVE | Noted: 2021-01-01

## 2022-02-05 PROBLEM — J44.1 COPD WITH ACUTE EXACERBATION (H): Status: ACTIVE | Noted: 2021-01-01

## 2022-02-05 PROBLEM — Z72.0 TOBACCO USE: Status: ACTIVE | Noted: 2020-02-06

## 2022-02-05 PROBLEM — E46 MALNUTRITION (H): Status: ACTIVE | Noted: 2018-04-16

## 2022-02-05 PROBLEM — J18.9 PNEUMONIA OF RIGHT LUNG DUE TO INFECTIOUS ORGANISM: Status: ACTIVE | Noted: 2021-01-01

## 2022-02-05 PROBLEM — D64.9 ANEMIA: Status: ACTIVE | Noted: 2017-06-14

## 2022-02-05 PROBLEM — J43.9 PULMONARY EMPHYSEMA (H): Status: ACTIVE | Noted: 2018-03-22

## 2022-02-05 PROBLEM — I27.20 MODERATE TO SEVERE PULMONARY HYPERTENSION (H): Status: ACTIVE | Noted: 2021-01-01

## 2022-02-05 PROBLEM — J96.20 ACUTE ON CHRONIC RESPIRATORY FAILURE (H): Chronic | Status: ACTIVE | Noted: 2021-01-01

## 2022-02-05 PROBLEM — I35.0 NONRHEUMATIC AORTIC VALVE STENOSIS: Status: ACTIVE | Noted: 2021-01-01

## 2022-02-05 NOTE — PROGRESS NOTES
Component Ref Range & Units 10:05 AM   (2/5/22)  7:08 AM   (2/5/22) 4 mo ago   (9/17/21) 4 mo ago   (9/16/21) 5 mo ago   (9/4/21) 5 mo ago   (9/3/21) 5 mo ago   (9/2/21)    pH Venous 7.32 - 7.43 7.30 Low   7.17 Low Panic    7.43  7.37  7.40  7.38     pCO2 Venous 40 - 50 mm Hg 82 High Panic   <20 Low Panic    53 High   71 High   67 High   67 High      pO2 Venous 25 - 47 mm Hg 32  32   44  52 High   50 High   42     Bicarbonate Venous 21 - 28 mmol/L 41 High   42 High    35 High   41 High   41 High   40 High      Base Excess/Deficit (+/-) -7.7 - 1.9 mmol/L 11.5 High   9.8 High    9.4 High   13.2 High   13.8 High       Patient continues to be in  Metabolic acidosis  Bicarb continues to be high  Provider wishes patient returned to BIPAP and not try HFNC at this time

## 2022-02-05 NOTE — PROGRESS NOTES
02/05/22 1048   CPAP/BiPAP/Settings   IPAP/EPAP (cmH2O) 12/5   Rate (breaths/min) 12   Oxygen (%) 24   Timed Inspiration (sec) 0.9   IPAP RISE  Settings (V60) 3   CPAP/BiPAP Patient Parameters   IPAP (cm H2O) 12 cmH2O   EPAP (cm H2O) 5 cmH2O   Pressure Support (cm H2O) 8 cmH2O   RR Total (breaths/min) 26 breaths/min   Vt (mL) 384 mL   Minute Ventilation (L/min) 10.6 L/min   Peak Inspiratory Pressure (cm H2O) 12 cmH2O   Pt.  Leak (L/min) 35 L/min   CPAP/BiPAP/AVAPS/AVAPS AE Alarms   High Pressure (cm H2O) 25 cmH2O   Low Pressure (cm H2O) 10   Low Pressure Delay (sec) 20 sec   Lo Min Vent 2   High Rate (breaths/min) 50 breaths/min   Low Rate (breaths/min) 12   Audible Alarm set at (Volume of Alarm) 5     Three nebulizer treatments given with increased air movement  Patient returned to BIPAP , patient initially refusing BIPAP reinstatement  Decreased BIPAP pressures

## 2022-02-05 NOTE — PROGRESS NOTES
Patient's oxygen turned to home level of 2 liters oxygen via nasal cannula continuously. . Frequent loose congested cough.   Patient had a  trilogy home ventilator in the room from [facility] medical patient per last admission, patient is unclear what she has at home currently.  She states her  probably knows how to use it and that she got it at the other hospital she states but she can not remember when she got it .  Patient confused with difficulty answering questions.

## 2022-02-05 NOTE — ED TRIAGE NOTES
sudden onset of shortness of breath  Family increased her home oxygen from 2 to 4 after finding sat's in the 85 range.  Also started her home bipap unit 15/5 setting.

## 2022-02-05 NOTE — ED PROVIDER NOTES
History     Chief Complaint   Patient presents with     Shortness of Breath     HPI  Preeti Ford is a 71 year old female who presents via EMS with increasing shortness of breath. Patient has a known history of COPD and she has chronically oxygen dependent at home. Sounds like overnight patient started getting more more short of breath. They turned her oxygen up which was not helping much. She has CPAP at home for sleep apnea which they put her on and this did not help so they called EMS. EMS found her to be very tachypneic and working hard to breathe. They tried a DuoNeb and then eventually put her on BiPAP and brought her in. She states that the BiPAP is helping significantly with her breathing and she is feeling better now. Patient denies any recent chest pain or current chest pain. Denies any type of chest symptoms whatsoever. She does have a deep cough. Denies any fevers or chills. Denies any recent nausea or vomiting.    Allergies:  Allergies   Allergen Reactions     Crestor [Rosuvastatin] Other (See Comments)     dizziness     Lisinopril Cough     Statins [Hmg-Coa-R Inhibitors] Other (See Comments)     Muscle/joint aching     Optison [Albumin Human] Other (See Comments)     Pt was flush and very dizzy.  Also had a BP drop     Penicillins Rash       Problem List:    Patient Active Problem List    Diagnosis Date Noted     Chronic respiratory failure with hypoxia and hypercapnia (H)-home O2 and Trilogy 09/18/2021     Priority: Medium     Pneumonia of right upper lobe due to infectious organism 09/17/2021     Priority: Medium     COPD with acute exacerbation (H) 09/16/2021     Priority: Medium     Hypotension, unspecified hypotension type      Priority: Medium     S/P drug eluting coronary stent placement-LAD and circ, circ re-do 5/17/18 08/31/2021     Priority: Medium     Abnormal CXR 9/2-?right mid lung cavitation with air-fluid levels 08/31/2021     Priority: Medium     Dysphagia 08/31/2021      Priority: Medium     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 08/31/2021     Priority: Medium     Severe malnutrition (H)-reduced intake, weight loss, loss of SC fat and muscle 08/31/2021     Priority: Medium     Cigarette smoker 08/30/2021     Priority: Medium     Spontaneous pneumothorax 08/29/2021     Priority: Medium     Chest tube in place 08/29/2021     Priority: Medium     Chronic obstructive pulmonary disease, unspecified COPD type (H) 08/29/2021     Priority: Medium     Acute bronchitis 03/26/2021     Priority: Medium     Moderate to severe pulmonary hypertension (H)-per Echo 03/26/2021     Priority: Medium     Nonrheumatic aortic valve stenosis - moderate to severe 03/26/2021     Priority: Medium     Hypotension 03/26/2021     Priority: Medium     Lactic acidosis 03/26/2021     Priority: Medium     Acute on chronic respiratory failure with hypoxia and hypercapnia 03/25/2021     Priority: Medium     Tobacco use 02/06/2020     Priority: Medium     Aphasia due to recent cerebral infarction 02/06/2020     Priority: Medium     Severe malnutrition (H) 04/16/2018     Priority: Medium     Hyponatremia 04/16/2018     Priority: Medium     Hypochloremia 04/16/2018     Priority: Medium     Vertigo 03/23/2018     Priority: Medium     Other seizures (H) - possible 03/23/2018     Priority: Medium     Acute CVA (cerebrovascular accident) - right paramedian superior vermis and left cerebellar hemisphere 03/23/2018     Priority: Medium     Pulmonary emphysema (H) 03/22/2018     Priority: Medium     Acute respiratory failure with hypoxia (H) 06/14/2017     Priority: Medium     Anemia 06/14/2017     Priority: Medium     Coronary artery disease of native artery of native heart with stable angina pectoris (H) 06/14/2017     Priority: Medium     Elevated troponin 06/14/2017     Priority: Medium     Cervical high risk HPV (human papillomavirus) test positive 12/20/2016     Priority: Medium     12/20/16 NIL pap/+ HPV  (not 16 or 18). Plan: cotest in 1 year, due by 12/20/17 12/20/17 NIL Pap, +HR HPV (Not types 16/18). Plan colp  12/26/18 Patient is lost to pap tracking follow-up           PAD (peripheral artery disease) (H) - moderate left common carotid stenosis, aortoiliac bypass, chronic left vertebral and right posterior cerebral stenoses 02/12/2016     Priority: Medium     Chronic bronchitis, unspecified chronic bronchitis type (H) 02/09/2016     Priority: Medium     Ischemic cardiomyopathy - EF 25% in 2015 but 55% in 3/2017 and 45-50% on 3/18 09/08/2015     Priority: Medium     Echo  With ejection fraction of 25-30 %       HTN, goal below 130/80 09/08/2015     Priority: Medium     Acute systolic congestive heart failure (H) 09/08/2015     Priority: Medium     ejection fraction 25-30%       Pulmonary nodule -- 1.9 cm RLL, new 5/2/20 09/08/2015     Priority: Medium     GERD (gastroesophageal reflux disease) 09/08/2015     Priority: Medium     NSTEMI (non-ST elevated myocardial infarction) (H) 09/08/2015     Priority: Medium     ACS (acute coronary syndrome) (H) 08/28/2015     Priority: Medium     Smoker 03/10/2015     Priority: Medium     History of stroke - right thalamus in 8/2013 and left cerebellar and right vermis in 3/2018 08/21/2013     Priority: Medium     Numbness and tingling 08/21/2013     Priority: Medium     Right side of the face , upper and lower limbs         CVA (cerebral vascular accident) (H) 08/17/2013     Priority: Medium     Hyperlipidemia LDL goal <130 07/19/2012     Priority: Medium     Urinary incontinence 03/04/2012     Priority: Medium     Cognitive impairment-BIMS 4/15 at [facility] May 2020 03/04/2012     Priority: Medium        Past Medical History:    Past Medical History:   Diagnosis Date     Acute on chronic respiratory failure (H) 03/25/2021     Arthritis      COPD (chronic obstructive pulmonary disease) (H)      Coronary artery disease      CVA (cerebral vascular accident) (H) 08/17/2013      "Hypertension      Hypotension, unspecified hypotension type      Sepsis (H)        Past Surgical History:    Past Surgical History:   Procedure Laterality Date     APPENDECTOMY       BYPASS GRAFT AORTOILIAC N/A 3/7/2017    Procedure: BYPASS GRAFT AORTOILIAC;  Surgeon: Marcos Pratt MD;  Location: [facility] OR     SALPINGO OOPHORECTOMY,R/L/DELORES      Salpingo Oophorectomy, RT/LT/DELORES       Family History:    Family History   Problem Relation Age of Onset     Cerebrovascular Disease Mother      Cerebrovascular Disease Father      Genitourinary Problems Brother         Dialysis       Social History:  Marital Status:   [2]  Social History     Tobacco Use     Smoking status: Current Some Day Smoker     Types: Cigarettes     Smokeless tobacco: Never Used     Tobacco comment: \"just a couple a day\"   Substance Use Topics     Alcohol use: No     Alcohol/week: 0.0 standard drinks     Drug use: No        Medications:    ACE/ARB/ARNI NOT PRESCRIBED, INTENTIONAL,  albuterol (PROAIR HFA/PROVENTIL HFA/VENTOLIN HFA) 108 (90 Base) MCG/ACT inhaler  albuterol (PROVENTIL) (2.5 MG/3ML) 0.083% neb solution  aspirin 81 MG EC tablet  atorvastatin (LIPITOR) 20 MG tablet  BETA BLOCKER NOT PRESCRIBED (INTENTIONAL)  ferrous sulfate (FEROSUL) 325 (65 Fe) MG tablet  Fluticasone-Umeclidin-Vilanterol (TRELEGY ELLIPTA) 200-62.5-25 MCG/INH oral inhaler  guaiFENesin (ORGANIDIN) 200 MG tablet  ipratropium - albuterol 0.5 mg/2.5 mg/3 mL (DUONEB) 0.5-2.5 (3) MG/3ML neb solution  montelukast (SINGULAIR) 10 MG tablet  nitroGLYcerin (NITROSTAT) 0.4 MG sublingual tablet  Nutritional Supplements (ENSURE PLUS) LIQD  order for DME  Respiratory Therapy Supplies (NEBULIZER/TUBING/MOUTHPIECE) KIT          Review of Systems   All other systems reviewed and are negative.      Physical Exam   BP: (!) 162/81  Pulse: 85  Temp: (!) 96.6  F (35.9  C)  Resp: 23  Height: 160 cm (5' 3\")  Weight: 48.1 kg (106 lb)  SpO2: 100 %      Physical Exam  Vitals and nursing " note reviewed.   Constitutional:       General: She is not in acute distress.     Appearance: She is well-developed. She is not diaphoretic.   HENT:      Head: Normocephalic and atraumatic.      Nose: Nose normal.      Mouth/Throat:      Pharynx: No oropharyngeal exudate.   Eyes:      Conjunctiva/sclera: Conjunctivae normal.   Cardiovascular:      Rate and Rhythm: Normal rate and regular rhythm.      Heart sounds: Normal heart sounds. No murmur heard.  No friction rub.   Pulmonary:      Effort: Pulmonary effort is normal. No respiratory distress.      Breath sounds: No stridor. Wheezing and rhonchi present. No rales.   Abdominal:      General: Bowel sounds are normal. There is no distension.      Palpations: Abdomen is soft. There is no mass.      Tenderness: There is no abdominal tenderness. There is no guarding.   Musculoskeletal:         General: No tenderness. Normal range of motion.      Cervical back: Normal range of motion and neck supple.   Skin:     General: Skin is warm and dry.      Capillary Refill: Capillary refill takes less than 2 seconds.      Findings: No erythema.   Neurological:      Mental Status: She is alert and oriented to person, place, and time.   Psychiatric:         Judgment: Judgment normal.         ED Course                 Procedures              EKG Interpretation:      Interpreted by Axel Soler MD  Time reviewed: now  Symptoms at time of EKG: None   Rhythm: normal sinus   Rate: Normal  Axis: Normal  Ectopy: none  Conduction: nonspecific interventricular conduction block  ST Segments/ T Waves: No acute ischemic changes and Non-specific ST-T wave changes  Q Waves: nonspecific  Comparison to prior: Unchanged    Clinical Impression: no acute changes and non-specific EKG                Critical Care time:  was 30 minutes for this patient excluding procedures.      Results for orders placed or performed during the hospital encounter of 02/05/22 (from the past 24 hour(s))   CBC  with platelets differential    Narrative    The following orders were created for panel order CBC with platelets differential.  Procedure                               Abnormality         Status                     ---------                               -----------         ------                     CBC with platelets and d...[[id number]]  Abnormal            Final result                 Please view results for these tests on the individual orders.   Comprehensive metabolic panel   Result Value Ref Range    Sodium 137 133 - 144 mmol/L    Potassium 4.3 3.4 - 5.3 mmol/L    Chloride 100 94 - 109 mmol/L    Carbon Dioxide (CO2) 39 (H) 20 - 32 mmol/L    Anion Gap <1 (L) 3 - 14 mmol/L    Urea Nitrogen 12 7 - 30 mg/dL    Creatinine 0.52 0.52 - 1.04 mg/dL    Calcium 8.3 (L) 8.5 - 10.1 mg/dL    Glucose 105 (H) 70 - 99 mg/dL    Alkaline Phosphatase 77 40 - 150 U/L    AST 18 0 - 45 U/L    ALT 17 0 - 50 U/L    Protein Total 7.6 6.8 - 8.8 g/dL    Albumin 2.7 (L) 3.4 - 5.0 g/dL    Bilirubin Total 0.2 0.2 - 1.3 mg/dL    GFR Estimate >90 >60 mL/min/1.73m2   Troponin I   Result Value Ref Range    Troponin I High Sensitivity 258 (HH) <54 ng/L   Lactic acid whole blood   Result Value Ref Range    Lactic Acid 0.9 0.7 - 2.0 mmol/L   Magnesium   Result Value Ref Range    Magnesium 1.9 1.8 - 2.6 mg/dL   Nt probnp inpatient (BNP)   Result Value Ref Range    N terminal Pro BNP Inpatient 1,817 (H) 0 - 900 pg/mL   CRP inflammation   Result Value Ref Range    CRP Inflammation 9.3 (H) 0.0 - 8.0 mg/L   [place] Draw    Narrative    The following orders were created for panel order [place] Draw.  Procedure                               Abnormality         Status                     ---------                               -----------         ------                     Extra Blue Top Tube[[id number]]                              Final result                 Please view results for these tests on the individual orders.   CBC with platelets and  differential   Result Value Ref Range    WBC Count 6.5 4.0 - 11.0 10e3/uL    RBC Count 5.12 3.80 - 5.20 10e6/uL    Hemoglobin 11.5 (L) 11.7 - 15.7 g/dL    Hematocrit 41.9 35.0 - 47.0 %    MCV 82 78 - 100 fL    MCH 22.5 (L) 26.5 - 33.0 pg    MCHC 27.4 (L) 31.5 - 36.5 g/dL    RDW 16.8 (H) 10.0 - 15.0 %    Platelet Count 225 150 - 450 10e3/uL    % Neutrophils 69 %    % Lymphocytes 16 %    % Monocytes 8 %    % Eosinophils 6 %    % Basophils 1 %    % Immature Granulocytes 0 %    NRBCs per 100 WBC 0 <1 /100    Absolute Neutrophils 4.5 1.6 - 8.3 10e3/uL    Absolute Lymphocytes 1.0 0.8 - 5.3 10e3/uL    Absolute Monocytes 0.5 0.0 - 1.3 10e3/uL    Absolute Eosinophils 0.4 0.0 - 0.7 10e3/uL    Absolute Basophils 0.0 0.0 - 0.2 10e3/uL    Absolute Immature Granulocytes 0.0 <=0.4 10e3/uL    Absolute NRBCs 0.0 10e3/uL   Extra Blue Top Tube   Result Value Ref Range    Hold Specimen [facility]    Blood gas venous   Result Value Ref Range    pH Venous 7.17 (LL) 7.32 - 7.43    pCO2 Venous <20 (LL) 40 - 50 mm Hg    pO2 Venous 32 25 - 47 mm Hg    Bicarbonate Venous 42 (H) 21 - 28 mmol/L    Base Excess/Deficit (+/-) 9.8 (H) -7.7 - 1.9 mmol/L    FIO2 30    Symptomatic; Unknown Influenza A/B & SARS-CoV2 (COVID-19) Virus PCR Multiplex Nose    Specimen: Nose; Swab   Result Value Ref Range    Influenza A PCR Negative Negative    Influenza B PCR Negative Negative    SARS CoV2 PCR Negative Negative    Narrative    Testing was performed using the jahaira SARS-CoV-2 & Influenza A/B Assay on the jahaira Beena System. This test should be ordered for the detection of SARS-CoV-2 and influenza viruses in individuals who meet clinical and/or epidemiological criteria. Test performance is unknown in asymptomatic patients. This test is for in vitro diagnostic use under the FDA EUA for laboratories certified under CLIA to perform moderate and/or high complexity testing. This test has not been FDA cleared or approved. A negative result does not rule out the presence of  PCR inhibitors in the specimen or target RNA in concentration below the limit of detection for the assay. If only one viral target is positive but coinfection with multiple targets is suspected, the sample should be re-tested with another FDA cleared, approved or authorized test, if coinfection would change clinical management. [facility] are certified under the Clinical Laboratory Improvement Amendments of 1988 (CLIA-88) as  qualified to perform moderate and/or high complexity laboratory testing.   XR Chest Port 1 View    Narrative    EXAM: XR CHEST PORTABLE 1 VIEW  LOCATION: [facility]  DATE/TIME: 02/05/2022, 8:14 AM    INDICATION: Shortness of breath.  COMPARISON: Chest x-ray 09/18/2021.      Impression    IMPRESSION: New focal consolidation at the lateral right lung base could be developing acute airspace disease including pneumonia or other airspace disease. Loculated effusion left lateral mid chest is again seen, but its configuration is more elongated   in the craniocaudal dimension than on the prior exam. Adjacent airspace consolidation versus scarring again identified. No new airspace disease on the left. Diffuse emphysema. Normal cardiac silhouette.     Troponin I (now)   Result Value Ref Range    Troponin I High Sensitivity 366 (HH) <54 ng/L   Blood gas venous   Result Value Ref Range    pH Venous 7.30 (L) 7.32 - 7.43    pCO2 Venous 82 (HH) 40 - 50 mm Hg    pO2 Venous 32 25 - 47 mm Hg    Bicarbonate Venous 41 (H) 21 - 28 mmol/L    Base Excess/Deficit (+/-) 11.5 (H) -7.7 - 1.9 mmol/L    FIO2 21        Medications   ipratropium - albuterol 0.5 mg/2.5 mg/3 mL (DUONEB) neb solution 3 mL (3 mLs Nebulization Given 2/5/22 0653)   cefTRIAXone (ROCEPHIN) 2 g vial to attach to  ml bag for ADULTS or NS 50 ml bag for PEDS (0 g Intravenous Stopped 2/5/22 7516)   azithromycin (ZITHROMAX) 500 mg vial to attach to  mL bag (0 mg Intravenous Stopped 2/5/22  1103)   methylPREDNISolone sodium succinate (solu-MEDROL) injection 125 mg (125 mg Intravenous Given 2/5/22 3236)     Is a 71-year-old female who is got history of chronic respiratory failure presents with worsening shortness of breath.  Medics found her tachypneic and ended up putting her on BiPAP in route here.  They stated that her initial sats in the arrived were in the mid 80s.  Patient is normally on 2 L of oxygen at home.  Upon arrival here patient seems much more comfortable on the BiPAP.  Initial labs are a little bit off.  Her venous blood gas showed she was little acidotic at 7.17 with a PCO2 less than 20.  Patient's troponin was also slightly elevated but I have asked the patient repeatedly and she denies any type of chest pain whatsoever.  Chest x-ray shows that she has had a recurrence of her right-sided pneumonia that she dealt with a lot last fall.  I have obtained blood cultures but it does not appear patient is septic.  I have initiated antibiotics with Zithromax and ceftriaxone.  We were able to wean her off of the BiPAP once I got the venous blood gas results back and she is doing well on 2 to 3 L of oxygen right now.  This troponin I think could have been from an acute hypoxic event from earlier.  She is having no chest pain or no significant EKG changes.  May want to consider rechecking that at 2 hours to see which weights trending.  Will discuss with the hospitalist and I suspect they may want us to get those results back before agreeing to admission.  Repeat venous blood gas actually now shows the CO2 has climbed up to 80 from less than 20 before.  At first she was saying that she was not feeling more short of breath but when the hospitalist came down she is now telling her that she is on little bit more short of breath so we will put the patient back on BiPAP now.  Troponin also did go up slightly from 230 up to 360.  I consulted cardiology at [facility] and spoke to Dr. Delcid.  He reviewed  her chart and stated that her last angiogram she had a very small stent put in in because it was so small, has a high risk of restenosing.  He would recommend starting the patient on heparin for the next 48 hours and would trend the troponin to see when it peaks.  If the patient does not develop any chest pain or becomes unstable would recommend follow-up as an outpatient to consider a possible repeat angiogram as an outpatient.  I discussed all this with her hospitalist again and she will accept the patient up to the ICU.    Assessments & Plan (with Medical Decision Making)  Acute on chronic respiratory failure with hypercapnia     I have reviewed the nursing notes.    I have reviewed the findings, diagnosis, plan and need for follow up with the patient.              2/5/2022   [facility] EMERGENCY DEPT     Axel Soler MD  02/05/22 4084

## 2022-02-05 NOTE — PROGRESS NOTES
02/05/22 0729   CPAP/BiPAP/Settings   IPAP/EPAP (cmH2O) 16/8   Oxygen (%) 30   Timed Inspiration (sec) 0.9   IPAP RISE  Settings (V60) 3   CPAP/BiPAP Patient Parameters   IPAP (cm H2O) 16 cmH2O   EPAP (cm H2O) 8 cmH2O   RR Total (breaths/min) 29 breaths/min   Vt (mL) 277 mL   Minute Ventilation (L/min) 8 L/min   Peak Inspiratory Pressure (cm H2O) 16 cmH2O   Pt.  Leak (L/min) 31 L/min     Patient arrived via EMS on there BIPAP mask  Transferred to our BIPAP for ventilatory support  Breath sounds coarse , crackles and wheezing  Frequent loose congested non productive cough  SpO2 = 95% on 30%

## 2022-02-05 NOTE — ED NOTES
ED Nursing criteria listed below was addressed during verbal handoff:     Abnormal vitals: Yes  Abnormal results: Yes  Med Reconciliation completed: Yes  Meds given in ED: Yes  Any Overdue Meds: No  Core Measures: No  Isolation: No  Special needs: Yes Fall risk  Skin assessment: Yes    Observation Patient  Education provided: N/A

## 2022-02-05 NOTE — PROGRESS NOTES
02/05/22 1615 02/05/22 1713   CPAP/BiPAP/Settings   IPAP/EPAP (cmH2O) 12/5  --    Rate (breaths/min) 12  --    Oxygen (%) 24  --    Timed Inspiration (sec) 1  --    IPAP RISE  Settings (V60) 1  --    CPAP/BiPAP Patient Parameters   IPAP (cm H2O) 12 cmH2O  --    EPAP (cm H2O) 5 cmH2O  --    Pressure Support (cm H2O) 8 cmH2O  --    RR Total (breaths/min) 29 breaths/min  --    Vt (mL) 346 mL  --    Minute Ventilation (L/min) 10.7 L/min  --    Peak Inspiratory Pressure (cm H2O) 12 cmH2O  --    Pt.  Leak (L/min) 32 L/min  --    CPAP/BiPAP/AVAPS/AVAPS AE Alarms   High Pressure (cm H2O) 25 cmH2O  --    Low Pressure (cm H2O) 10  --    Low Pressure Delay (sec) 20 sec  --    Lo Min Vent 2  --    High Rate (breaths/min) 50 breaths/min  --    Low Rate (breaths/min) 12  --    Audible Alarm set at (Volume of Alarm) 5  --    CPAP/BiPAP/AVAPS/AVAPS AE Patient Assessment   Interface Face Mask - Small  --    Skin Integrity intact  --    RT Device Skin Assessment   Oxygen Delivery Device CPAP/BiPAP Mask  --    Site Appearance neck circumference Clean and dry  --    Site Appearance bridge of nose Clean and dry  --    Site Appearance occiput Clean and dry  --    Strap Tightness Finger Allowance between head and device strap  --    Skin Interventions Taken No adjustments needed  --    Respiratory WDL   Respiratory WDL X;rhythm/pattern  --    Rhythm/Pattern, Respiratory assisted mechanically  --    Cough Frequency infrequent  --    Cough Type congested;nonproductive  --    Nebulizer Pre Assessment   $RT Use ONLY Delivery Method  --  Nebulizer - Additional   Nebulizer Device  --  In line   Pretreatment Heart Rate (beats/min)  --  85   Pretreatment Resp Rate (breaths/min)  --  27   Pretreat Breath Sounds - Bilat - All Lobes  --  diminished   Pretreat Breath Sounds - ARLEN  --  diminished   Pretreat Breath Sounds - LLL  --  diminished   Pretreat Breath Sounds - RUL  --  diminished   Pretreat Breath Sounds - RML  --  diminished   Pretreat  Breath Sounds - RLL  --  diminished     Patient's home trilogy ventilator arrived transitioned to home unit with 1 liter Oxygen bleed in SpO2 = 93%.  Patient resting comfortably in bed.  Nebulizer treatments given in benefit.  Cough less frequent through out the day on BIPAP  Occasional congested non-productive

## 2022-02-05 NOTE — H&P
[facility]    History and Physical - Hospitalist Service       Date of Admission:  2/5/2022    Assessment & Plan      Preeti Ford is a 71 year old female with a past medical history of severe COPD on 2 L nasal cannula chronically as well as trilogy with sleep and during the day when shortness of breath worsens, coronary artery disease, previous CVA with cognitive impairment and aphasia admitted on 2/5/2022 with acute on chronic respiratory failure. She presented via EMS with a 2-day history of worsening shortness of breath and cough productive of yellow sputum which had failed to improve with home nebulizer and brief attempt at trilogy unit.  Patient arrived on CPAP with VBG showing initial severe acidosis and appeared metabolic in nature with CO2 less than 20 and patient that normally is a chronic CO2 retainer.  In the ED attempted to transition to nasal cannula but within 2 to 3 hours patient was having worsening shortness of breath despite adequate oxygenation and VBG showed return of hypercapnia and BiPAP has now been reinitiated.  Chest x-ray is revealing a right lobe pneumonia, of note in similar location to the pneumonia noted in September 2021.  Troponins are elevated then increasing with concern for demand ischemia versus non-STEMI.  Breath sounds are tight and wheezy despite frequent nebulizer treatment, IV steroids, BiPAP initiation in the ED.  Patient will be hospitalized for ongoing management of her COPD exacerbation, suspected right lobe pneumonia, and elevated troponins.  At this time she is critically ill and will be admitted to the ICU for ongoing BiPAP administration and critical care management.    Principal Problem:    Acute on chronic respiratory failure with hypoxia and hypercapnia    Assessment: Thought secondary to COPD exacerbation with likely underlying bacterial pneumonia based on chest x-ray results however white blood cell count is normal    Plan:  We will continue with BiPAP 12/5 and titrate FiO2 as needed.  We will recheck VBG upon arrival to the ICU floor to make sure her current BiPAP settings are appropriate.  We will add procalcitonin onto the blood work previously drawn and consider CT scan of the chest once patient has stabilized going forward.  Continue Rocephin and azithromycin for antibiotic, IV Solu-Medrol 60 mg every 6 hours in addition to scheduled DuoNebs and as needed albuterol nebs.   will bring in home trilogy device for use going forward.    Active Problems:    COPD with acute exacerbation (H)    Assessment: Causing respiratory failure as above    Plan: Continue with Solu-Medrol, bronchodilators and BiPAP as outlined above.        Pneumonia of right lung due to infectious organism    Assessment: Noted on x-ray and is in similar location compared to the atypical pneumonia noted in September 2021    Plan: Initiate Rocephin and azithromycin, perform procalcitonin, repeat CT scan as patient stabilizes going further for further evaluation      Elevated troponin    Assessment: Mildly elevated at 258 initially but has increased to 366 on repeat testing in a patient with history of severe CAD.  Emergency room provider discussed with cardiology who is recommending initiation of IV heparin for at least 48 hours with echocardiogram when available    Plan: Proceed with heparin drip initiation and close monitoring with echocardiogram to be performed on 2/7/2022.  Will perform serial troponins and would anticipate peak and subsequent reduction in approximately 12 hours if related to patient's respiratory failure occurring prior to BiPAP initiation.  If ongoing elevation occurs beyond 12-hour loretta, would rediscuss with cardiology.      Coronary artery disease of native artery of native heart with stable angina pectoris S/P drug eluting coronary stent placement-LAD and circ, circ re-do 5/17/18    Assessment: Present at baseline, with repeat stent  placed in 2018 fairly small and at high risk for restenosis per cardiology.      Plan: Initiate IV heparin, serial troponins, and echocardiogram as above      Ischemic cardiomyopathy - EF 25% in 2015 but 55% in 3/2017 and 45-50% on 3/18 but over 70% on 9/21    Assessment: Noted     Plan: Ejection fraction will be reevaluated with echocardiogram when available as outlined above.  For now we will give cautious maintenance fluids and monitor closely for signs of volume overload.      Pulmonary emphysema (H)    Assessment: with acute worsening as above    Plan: proceed with plan as above      Severe malnutrition (H)    Assessment: present at previous hospitalization with patient still very cachetic in appearance on evaluation today    Plan: Will have nutritionist re-evaluate at this time.  Will need to keep NPO for now based on current respiratory needs.       History of stroke - right thalamus in 8/2013 and left cerebellar and right vermis in 3/2018    Assessment: Previous history    Plan: Continue supportive care, monitor for any concern for recurrent stroke      Cognitive impairment-[facility]IMS 4/15 at [facility] May 2020    Assessment: Per  has been present since her stroke in 2013 with patient having minimal insight as to the consequences of her decisions    Plan: Continue supportive care during this hospital stay      Hyperlipidemia LDL goal <130    Assessment: Is supposed be taking a statin, however has not been doing so recently    Plan: Hold statin therapy due to n.p.o. status for now, reevaluate when patient is clinically improved      PAD (peripheral artery disease) (H) - moderate left common carotid stenosis, aortoiliac bypass, chronic left vertebral and right posterior cerebral stenoses    Assessment: Noted    Plan: IV heparin as above      Anemia    Assessment: Chronic and is actually slightly improved compared to values from the end of 2021    Plan: We will monitor for stability, may see slight reduction  following IV fluid initiation as patient has not been eating or drinking well in the home environment prior to this admission      Moderate to severe pulmonary hypertension (H)-per Echo    Assessment: Present at baseline    Plan: Continue with oxygen supplementation as above      Nonrheumatic aortic valve stenosis - moderate to severe    Assessment: Noted on previous echocardiograms    Plan: We will reevaluate with echo during this stay as above      Chronic respiratory failure with hypoxia and hypercapnia (H)-home O2 and Trilogy    Assessment: Present at baseline, currently with acute worsening as outlined above    Plan: Continue with plan as above.   will bring in patient's home trilogy device      Tobacco use    Assessment: Ongoing, however patient reports that she has been cutting back and is now only using 4 to 6 cigarettes a day but  reports it has been more than that.  She declines any need for nicotine patch    Plan: Monitor closely and consider nicotine supplementation if there is concern for withdrawal      Aphasia due to cerebral infarction    Assessment: Previous history but difficult to assess at this time given patient's respiratory distress, hypercapnia, and BiPAP use    Plan: Continue supportive cares       Diet:   NPO  DVT Prophylaxis: heparin drip  Barnhart Catheter: Not present  Central Lines: None  Cardiac Monitoring: None  Code Status:   DNR - pre-arrest intubation okay    Clinically Significant Risk Factors Present on Admission            # Anion Gap Metabolic Acidosis: AG = <1 mmol/L (Ref range: 3 - 14 mmol/L) on admission, will monitor and treat as appropriate    # Platelet Defect: home medication list includes an antiplatelet medication       Disposition Plan   Expected Discharge:  3-5 days   Anticipated discharge location:  Awaiting care coordination huddle  Delays:  Ongoing BiPAP need, critically ill       The patient's care was discussed with the Bedside Nurse, Patient and  Patient's Family.    Deisy Laird MD  Hospitalist Service  [facility]  Securely message with the Nurse[employer] Web Console (learn more here)  Text page via [facility] Paging/Directory         ______________________________________________________________________    Chief Complaint   Worsening shortness of breath and productive cough for the past 2 days    History is obtained from the patient's  is a patient was only reliably oriented to self and sometimes location    History of Present Illness   Preeti JEROME Ford is a 71 year old female who had been in her usual state of health until approximately 2 days ago when she began having worsening shortness of breath with associated cough productive of a yellow sputum and started using her nebulizers more frequently.  No known fever, chills.  Ongoing very poor appetite at baseline but unchanged from her baseline.  Her  realized that yesterday patient had to be feeling quite bad as she was willing to use her trilogy for approximately an hour and a half, which she normally refuses to even consider.  Patient did have transient improvement following noninvasive pressure support but once more worsened overnight and this morning respiratory status continued to worsen despite multiple nebulizer treatments and EMS was called.  When they arrived O2 saturations were in the 70% range and patient was started on CPAP and brought to the emergency room for further assessment.  In the ED patient was initially transitioned off CPAP and nasal cannula oxygen but has subsequently had worsening shortness of breath, worsening hypercapnia and increased wheezing and tightness despite multiple DuoNeb administrations and patient has been transitioned to BiPAP at time of this visit.  Patient has now been diagnosed with a right lobe pneumonia in addition to COPD exacerbation and elevated troponins with concerns for demand ischemia versus non-STEMI and is  "being hospitalized as outlined above    Review of Systems    Review of systems not obtained due to patient factors - confusion.  Did attempt to review with patient's  and pertinent positives are as outlined above.  He has not noticed any nausea, vomiting, complaints of abdominal pain, changes in gait or facial level.      Past Medical History    I have reviewed this patient's medical history and updated it with pertinent information if needed.   Past Medical History:   Diagnosis Date     Acute on chronic respiratory failure (H) 03/25/2021     Arthritis      COPD (chronic obstructive pulmonary disease) (H)      Coronary artery disease      CVA (cerebral vascular accident) (H) 08/17/2013     Hypertension      Hypotension, unspecified hypotension type      Sepsis (H)        Past Surgical History   I have reviewed this patient's surgical history and updated it with pertinent information if needed.  Past Surgical History:   Procedure Laterality Date     APPENDECTOMY       BYPASS GRAFT AORTOILIAC N/A 3/7/2017    Procedure: BYPASS GRAFT AORTOILIAC;  Surgeon: Marcos Pratt MD;  Location: [facility] OR     SALPINGO OOPHORECTOMY,R/L/DELORES      Salpingo Oophorectomy, RT/LT/DELORES       Social History   I have reviewed this patient's social history and updated it with pertinent information if needed.  Social History     Tobacco Use     Smoking status: Current Some Day Smoker     Types: Cigarettes     Smokeless tobacco: Never Used     Tobacco comment: \"just a couple a day\"   Substance Use Topics     Alcohol use: No     Alcohol/week: 0.0 standard drinks     Drug use: No       Family History   I have reviewed this patient's family history and updated it with pertinent information if needed.  Family History   Problem Relation Age of Onset     Cerebrovascular Disease Mother      Cerebrovascular Disease Father      Genitourinary Problems Brother         Dialysis       Prior to Admission Medications   Prior to Admission " Medications   Prescriptions Last Dose Informant Patient Reported? Taking?   ACE/ARB/ARNI NOT PRESCRIBED, INTENTIONAL,  Self No No   Sig: Please choose reason not prescribed, below   BETA BLOCKER NOT PRESCRIBED (INTENTIONAL)   No No   Sig: Please choose reason not prescribed from choices below.   Fluticasone-Umeclidin-Vilanterol (TRELEGY ELLIPTA) 200-62.5-25 MCG/INH oral inhaler 2022 at Unknown time  No Yes   Sig: Inhale 1 puff into the lungs daily   Nutritional Supplements (ENSURE PLUS) LIQD   No No   Sig: Take 1 each by mouth 4 times daily   Respiratory Therapy Supplies (NEBULIZER/TUBING/MOUTHPIECE) KIT  Self No No   Si kit as needed (if becomes damaged)   albuterol (PROAIR HFA/PROVENTIL HFA/VENTOLIN HFA) 108 (90 Base) MCG/ACT inhaler Past Week at Unknown time Self No Yes   Sig: Inhale 2 puffs into the lungs every 6 hours as needed for shortness of breath / dyspnea   albuterol (PROVENTIL) (2.5 MG/3ML) 0.083% neb solution 2022 at 0600  No Yes   Sig: INHALE ONE VIAL BY NEBULIZATION EVERY 4 HOURS AS NEEDED FOR SHORTNESS OF BREATH / DIFFICULTY BREATHING OR WHEEZING   aspirin 81 MG EC tablet 2022 at Unknown time Self No Yes   Sig: Take 1 tablet (81 mg) by mouth daily   atorvastatin (LIPITOR) 20 MG tablet Past Month at Unknown time  No Yes   Sig: Take 1 tablet (20 mg) by mouth At Bedtime   ferrous sulfate (FEROSUL) 325 (65 Fe) MG tablet 2022 at Unknown time  No Yes   Sig: Take 1 tablet (325 mg) by mouth 2 times daily   guaiFENesin (ORGANIDIN) 200 MG tablet   Yes No   Sig: Take 800 mg by mouth daily   Patient not taking: Reported on 10/18/2021   montelukast (SINGULAIR) 10 MG tablet Past Month at Unknown time  No Yes   Sig: Take 1 tablet (10 mg) by mouth At Bedtime   nitroGLYcerin (NITROSTAT) 0.4 MG sublingual tablet   No No   Sig: For chest pain place 1 tablet under the tongue every 5 minutes for 3 doses. If symptoms persist 5 minutes after 1st dose call 911.   Patient not taking: Reported on  12/13/2021   order for DME  Self No No   Sig: Equipment being ordered: Nebulizer machine      Facility-Administered Medications: None     Allergies   Allergies   Allergen Reactions     Crestor [Rosuvastatin] Other (See Comments)     dizziness     Lisinopril Cough     Statins [Hmg-Coa-R Inhibitors] Other (See Comments)     Muscle/joint aching     Optison [Albumin Human] Other (See Comments)     Pt was flush and very dizzy.  Also had a BP drop     Penicillins Rash       Physical Exam   Vital Signs: Temp: 97.8  F (36.6  C) Temp src: Oral BP: 97/57 Pulse: 85   Resp: 27 SpO2: 92 % O2 Device: BiPAP/CPAP Oxygen Delivery: 1 LPM  Weight: 106 lbs 0 oz    Constitutional: Awake, alert, oriented reliably to self.  She appears acutely short of breath and is complaining of not being able to get enough air even has her O2 saturation is in the mid to upper 90s on 1 to 2 L nasal cannula.  She was placed on BiPAP during this assessment and had notable improvement in her work of breathing prior to this interview and exam being completed  Eyes: Lids and lashes normal, pupils equal, round and reactive to light, extra ocular muscles intact, sclera clear, conjunctiva normal  Respiratory: Patient had increased work of breathing prior to BiPAP initiation but it is now resolved within minutes of BiPAP being started.  Patient has severely diminished breath sounds with diffuse expiratory wheezes noted and prolonged expiratory phase present.  No crackles identified  Cardiovascular: Regular rate and rhythm without murmur  GI: Bowel sounds present, abdomen soft, nontender, nondistended  Skin: no redness, warmth, or swelling and no rashes  Musculoskeletal: no lower extremity pitting edema present  Neurologic: Awake, alert, oriented only to self.  Patient is so focused on her breathing that she is unable to follow commands well and unable to perform in-depth neurological exam today.    Data   Data reviewed today: I reviewed all medications, new labs  and imaging results over the last 24 hours.    Recent Labs   Lab 02/05/22  1654 02/05/22  1336 02/05/22  0708   WBC  --  4.2 6.5   HGB  --  11.4* 11.5*   MCV  --  79 82   PLT  --  252 225   NA  --   --  137   POTASSIUM  --   --  4.3   CHLORIDE  --   --  100   CO2  --   --  39*   BUN  --   --  12   CR  --   --  0.52   ANIONGAP  --   --  <1*   CALDERON  --   --  8.3*   *  --  105*   ALBUMIN  --   --  2.7*   PROTTOTAL  --   --  7.6   BILITOTAL  --   --  0.2   ALKPHOS  --   --  77   ALT  --   --  17   AST  --   --  18     Recent Results (from the past 24 hour(s))   XR Chest Port 1 View    Narrative    EXAM: XR CHEST PORTABLE 1 VIEW  LOCATION: [facility]  DATE/TIME: 02/05/2022, 8:14 AM    INDICATION: Shortness of breath.  COMPARISON: Chest x-ray 09/18/2021.      Impression    IMPRESSION: New focal consolidation at the lateral right lung base could be developing acute airspace disease including pneumonia or other airspace disease. Loculated effusion left lateral mid chest is again seen, but its configuration is more elongated   in the craniocaudal dimension than on the prior exam. Adjacent airspace consolidation versus scarring again identified. No new airspace disease on the left. Diffuse emphysema. Normal cardiac silhouette.

## 2022-02-05 NOTE — PROGRESS NOTES
02/05/22 1023   Oxygen Therapy   SpO2 99 %   O2 Device Nasal cannula   Oxygen Delivery 2 LPM   Assessment   Respiratory WDL rhythm/pattern   Rhythm/Pattern, Respiratory depth regular;pattern regular;unlabored   Cough Frequency infrequent   Cough Type congested;good;nonproductive   Breath Sounds   Breath Sounds All Fields   All Lung Fields Breath Sounds wheezes, expiratory   Nebulizer Pre Assessment   $RT Use ONLY Delivery Method Nebulizer - Additional   Nebulizer Device Mask   Pretreatment Heart Rate (beats/min) 87   Pretreatment Resp Rate (breaths/min) 29   Pretreat Breath Sounds - Bilat - All Lobes wheezes, expiratory   Pretreat Breath Sounds - ARLEN wheezes, expiratory   Pretreat Breath Sounds - LLL wheezes, expiratory   Pretreat Breath Sounds - RUL wheezes, expiratory   Pretreat Breath Sounds - RML wheezes, expiratory   Pretreat Breath Sounds - RLL wheezes, expiratory   Breath Sounds Post-Respiratory Treatment   Posttreatment Resp Rate (breaths/min) 26   Breath Sounds Posttreatment All Fields aeration increased;wheezes, expiratory   Breath Sounds Posttreatment ARLEN aeration increased;wheezes, expiratory   Breath Sounds Posttreatment LLL aeration increased;wheezes, expiratory   Breath Sounds Posttreatment RUL aeration increased;wheezes, expiratory   Breath Sounds Posttreatment RML aeration increased;wheezes, expiratory   Breath Sounds Posttreatment RLL aeration increased;wheezes, expiratory

## 2022-02-05 NOTE — PROGRESS NOTES
S-(situation): Patient arrives to room 215 via cart from ED    B-(background): COPD    A-(assessment): VSS, afebrile.  Pt arrives wearing BIPAP, tolerating mask.  Pt is alert to self only, disoriented to time, place and situation but reorients easily.  Lungs sounds diminished, expiratory wheezes.  Congested, tight and non-productive cough.    R-(recommendations): Orders reviewed with patient and pt's significant other. Will monitor patient per MD orders.     Inpatient nursing criteria listed below were met:    Health care directives status obtained and documented: Yes  SCD's Documented: NA  Skin issues/needs documented:Yes, blanchable redness on coccyx  Isolation addressed and Signage used: NA  Fall Prevention: Care plan updated Yes Education given and documented Yes  Care Plan initiated and Co-Morbidities added: Yes  Education Assessment documented:Yes  Admission Education Documented: Yes  If present CAUTI/CLABI Education done: NA  New medication patient education completed and documented (Possible Side Effects of Common Medications handout): Yes  Allergies Reviewed: Yes  Admission Medication Reconciliation completed: Yes  Home medications if not able to send immediately home with family stored here: NA  Reminder note placed in discharge instructions regarding home meds: NA  Individualized care needs/preferences addressed and charted: Yes

## 2022-02-05 NOTE — ED NOTES
Bottom reddened.  No open areas noted.  Bilateral lower extremities and feet are discolored with dry flaky skin.

## 2022-02-06 NOTE — PROGRESS NOTES
"S- Respiratory Therapy  B- Patient off and on respiratory support Trilogy  A- Patient continues to require home Trilogy Ventilatory for airway support off and on through out the day.   Target Tidal volume (6-8 mL/Kg of body weight) 400    AVAPS Min Pressure 12    Max Pressure 16    AVAPS EPAP 4    Backup Respiratory Rate 3    Keep SaO2 above 90%        02/06/22 1214               Patient has been able to eat toast and drink liquids with no problem while on room air or nasal cannula for SpO2.  SpO2 varies greatly at rest on room air from 84% to 99%.  Oxygen up to 1 liter off and on for desaturation and bled into Trilogy 1/2 to 1 liter to maintain SpO2.  Patient air hunger, anxiety , dyspnea and increased work of breathing off and on.  Confusion and difficulty finding words and answering questions as previous. Nebulizer treatments given with benefit and patient requesting \"I need air at times\" but refusing , neb or trilogy until redirected and consoled over a period of time.   Breath sounds are diminished with wheezing in lower lobes.  Cough infrequent, loose congested non productive  R- Follow with oxygen, nebulizer treatments and airway support.   "

## 2022-02-06 NOTE — PROGRESS NOTES
02/06/22 0800   CPAP/BiPAP/Settings   $BIPAP/CPAP Therapy continuous   IPAP/EPAP (cmH2O) 14/4   Oxygen (%) 23   CPAP/BiPAP Patient Parameters   IPAP (cm H2O) 14 cmH2O   EPAP (cm H2O) 4 cmH2O   Pressure Support (cm H2O) 10 cmH2O   RR Total (breaths/min) 23 breaths/min   CPAP/BiPAP/AVAPS/AVAPS AE Patient Assessment   Interface Face Mask - Medium   Skin Integrity intact   RT Device Skin Assessment   Oxygen Delivery Device CPAP/BiPAP Mask   Site Appearance neck circumference Clean and dry   Site Appearance bridge of nose Clean and dry   Site Appearance occiput Clean and dry   Strap Tightness Finger Allowance between head and device strap   Skin Interventions Taken No adjustments needed     Patient continues on home Trilogy for ventilatory support

## 2022-02-06 NOTE — PROGRESS NOTES
After dose of Zyprexa Pt was much more agreeable, cooperative, and did allow Lab to draw blood for Troponin, VBG, and Heparin level at midnight.

## 2022-02-06 NOTE — PLAN OF CARE
Problem: Adult Inpatient Plan of Care  Goal: Plan of Care Review  Outcome: No Change     Problem: Adjustment to Illness COPD (Chronic Obstructive Pulmonary Disease)  Goal: Optimal Chronic Illness Coping  Outcome: No Change     Problem: Functional Ability Impaired COPD (Chronic Obstructive Pulmonary Disease)  Goal: Optimal Level of Functional [place]  Outcome: No Change  Intervention: Optimize Functional Ability  Recent Flowsheet Documentation  Taken 2/6/2022 0000 by Herb Angeles RN  Activity Management: activity adjusted per tolerance  Taken 2/5/2022 2000 by Herb Angeles RN  Activity Management: activity adjusted per tolerance     Problem: Oral Intake Inadequate COPD (Chronic Obstructive Pulmonary Disease)  Goal: Improved Nutrition Intake  Outcome: No Change     Problem: Hypertension Comorbidity  Goal: Blood Pressure in Desired Range  Outcome: No Change     Problem: Adult Inpatient Plan of Care  Goal: Patient-Specific Goal (Individualized)  Outcome: Improving  Goal: Absence of Hospital-Acquired Illness or Injury  Outcome: Improving  Intervention: Identify and Manage Fall Risk  Recent Flowsheet Documentation  Taken 2/6/2022 0000 by Herb Angeles RN  Safety Promotion/Fall Prevention:   activity supervised   bed alarm on  Taken 2/5/2022 2000 by Herb Angeles RN  Safety Promotion/Fall Prevention:   activity supervised   bed alarm on  Intervention: Prevent Skin Injury  Recent Flowsheet Documentation  Taken 2/6/2022 0000 by Herb Angeles RN  Body Position: position changed independently  Taken 2/5/2022 2000 by Herb Angeles RN  Body Position: position changed independently  Intervention: Prevent and Manage VTE (Venous Thromboembolism) Risk  Recent Flowsheet Documentation  Taken 2/6/2022 0000 by Herb Angeles RN  VTE Prevention/Management: anticoagulant therapy maintained  Taken 2/5/2022 2000 by Herb Angeles RN  VTE Prevention/Management: anticoagulant therapy maintained  Goal: Optimal Comfort and  Wellbeing  Outcome: Improving  Goal: Readiness for Transition of Care  Outcome: Improving  Intervention: Mutually Develop Transition Plan  Recent Flowsheet Documentation  Taken 2/5/2022 2000 by Herb Angeles RN  Equipment Currently Used at Home: walker, rolling     Problem: Infection COPD (Chronic Obstructive Pulmonary Disease)  Goal: Absence of Infection Signs and Symptoms  Outcome: Improving     Problem: Respiratory Compromise COPD (Chronic Obstructive Pulmonary Disease)  Goal: Effective Oxygenation and Ventilation  Outcome: Improving  Intervention: Promote Airway Secretion Clearance  Recent Flowsheet Documentation  Taken 2/6/2022 0000 by Herb Angeles RN  Cough And Deep Breathing: done independently per patient  Activity Management: activity adjusted per tolerance  Taken 2/5/2022 2000 by Herb Angeles RN  Cough And Deep Breathing: done independently per patient  Activity Management: activity adjusted per tolerance  Intervention: Optimize Oxygenation and Ventilation  Recent Flowsheet Documentation  Taken 2/6/2022 0000 by Herb Angeles RN  Head of Bed (HOB) Positioning: HOB at 30-45 degrees  Taken 2/5/2022 2000 by Herb Angeles RN  Head of Bed (HOB) Positioning: HOB at 30-45 degrees       Pt remains alert and intermittently confused throughout the shift.   Oxygen saturations on average range from 88- 94 percent, Pt has used Home supplied trilogy throughout the shift, No supplemental oxygen for the last few hours. Pt still remains anxious but has been much more agreeable and cooperative after dose of zyprexa. One large incontinent void, Purwik also in place, Pt denies pain, Telemetry has been sinus rhythm with a rate in the 80's, blood pressures have been elevated, will continue to monitor oxygen demands, respiratory status, and follow plan of care.

## 2022-02-06 NOTE — PROGRESS NOTES
DATE:  2/6/2022   TIME OF RECEIPT FROM LAB:  0038  LAB TEST:  High intensity Troponin  LAB VALUE:  608  RESULTS GIVEN WITH READ-BACK TO (PROVIDER): Dr. Meesala  TIME LAB VALUE REPORTED TO PROVIDER:   0058

## 2022-02-06 NOTE — PROGRESS NOTES
DATE:  2/6/2022   TIME OF RECEIPT FROM LAB:  0616  LAB TEST:  High sensitivity troponin  LAB VALUE:  708  RESULTS GIVEN WITH READ-BACK TO (PROVIDER):  Dr. Meesala  TIME LAB VALUE REPORTED TO PROVIDER:   0618

## 2022-02-06 NOTE — PROGRESS NOTES
"So far this shift Pt has been very emotionally labile but over all very erratic,and uncooperative with cares. Most recently Pt would not be still for a blood draw which resulted in an insufficient draw.  Pt is refusing any further blood draws at this time.   At is concerns Pt home Trilogy Bipap unit Pt very frequently repeats \"Its not right! Its not right!!\" but , it not able to communicate what exactly is not right.  Pt has refused to use hospital provided Bipap, but has kept her own on. Oxygen saturations have been consistently above 90 percent with occasional desat with interruption in mask use and with blood draws. Provider updated, Charge nurse aware.   Will continue to monitor.   "

## 2022-02-06 NOTE — PROGRESS NOTES
[facility]    Medicine Progress Note - Hospitalist Service    Date of Admission:  2/5/2022    Assessment & Plan     Preeti Ford is a 71 year old female with a past medical history of severe COPD on 2 L nasal cannula chronically as well as trilogy with sleep and during the day when shortness of breath worsens, coronary artery disease, previous CVA with cognitive impairment and aphasia admitted on 2/5/2022 with acute on chronic respiratory failure secondary to COPD exacerbation and possible pneumonia currently on steroids, Rocephin and azithromycin, scheduled bronchodilators.  She required Bipap initiation in ICU admission and has remained on BiPAP overnight.  She did attempt transitioning to nasal cannula this morning but had worsening respiratory fatigue after approximately 1-1/2 hours.  Her home trilogy device is now present and patient has been initiated on this with oxygen blended in and titrated as appropriate.  Troponins have been elevated and worsening since time of admission, increasing concern for demand ischemia versus non-STEMI and patient continues on a heparin drip with plans for echocardiogram tomorrow when available.  Patient's ongoing baseline mentation greatly impairs her understanding of the current critical illness as well as global understanding of her medical conditions and poor prognosis.  We will continue patient on trilogy the majority of the day, however could trial her off onto nasal cannula to allow for oral intake, starting with clear liquids and toast as tolerated, as this will improve patient's compliance with a trilogy device.  Continue critical care management at this time and monitor closely for any signs of decompensation.    Principal Problem:    Acute on chronic respiratory failure with hypoxia and hypercapnia    Assessment: Thought secondary to COPD exacerbation with likely underlying bacterial pneumonia based on chest x-ray results however  white blood cell count is normal and procalcitonin is low risk    Plan: Transition to patient's home trilogy device for ongoing pressure support.  Continue Rocephin and azithromycin for antibiotic, IV Solu-Medrol 60 mg every 6 hours in addition to scheduled DuoNebs and as needed albuterol nebs.  Anticipate CT scan of the chest to further evaluate pneumonia given its abnormal appearance on initial imaging once patient is able to maintain off of noninvasive pressure support.    Active Problems:    COPD with acute exacerbation (H)    Assessment: Causing respiratory failure as above    Plan: Continue with Solu-Medrol, bronchodilators and transition to the patient's home trilogy as outlined above      Pneumonia of right lung due to infectious organism    Assessment: Noted on x-ray and is in similar location compared to the atypical pneumonia noted in September 2021    Plan: Initiate Rocephin and azithromycin, perform procalcitonin, repeat CT scan as patient stabilizes going further for further evaluation      Elevated troponin    Assessment: Mildly elevated at 258 initially but has increased significantly up to 802 today.  Cardiology recommending IV heparin for at least 48 hours (started the afternoon of 2/5/22) with echocardiogram when available for further evaluation.  Patient does have a very small stent placed in 2018 that does have high risk for restenosis per cardiology    Plan: Continue with heparin drip initiation and close monitoring with echocardiogram to be performed on 2/7/2022.  Will perform serial troponins and monitor for peak and progressive improvement now that patient's respiratory symptoms appear to be stabilizing.        SIRS vs sepsis    Assessment: Signs of SIRS present at time of admission and thought secondary to COPD exacerbation and respiratory distress but with concern for possible bacterial pneumonia    Plan: Continue with plan as outlined above      Cognitive impairment-[facility]IMS 4/15 at [facility] May  2020    Assessment: Per  has been present since her stroke in 2013 with patient having minimal insight as to the consequences of her decisions    Plan: Continue supportive care during this hospital stay      Coronary artery disease of native artery of native heart with stable angina pectoris S/P drug eluting coronary stent placement-LAD and circ, circ re-do 5/17/18    Assessment: Present at baseline, with repeat stent placed in 2018 fairly small and at high risk for restenosis per cardiology.      Plan: Continue IV heparin, serial troponins, and echocardiogram as above      Ischemic cardiomyopathy - EF 25% in 2015 but 55% in 3/2017 and 45-50% on 3/18 but over 70% on 9/21    Assessment: Noted     Plan: Ejection fraction will be reevaluated with echocardiogram when available as outlined above.  For now we will give cautious maintenance fluids and monitor closely for signs of volume overload.      Pulmonary emphysema (H)    Assessment: with acute worsening as above    Plan: proceed with plan as above      Severe malnutrition (H)    Assessment: present at previous hospitalization with patient still very cachetic in appearance on evaluation today    Plan: Will have nutritionist re-evaluate at this time.  Will need to keep NPO for now based on current respiratory needs.       History of stroke - right thalamus in 8/2013 and left cerebellar and right vermis in 3/2018    Assessment: Previous history    Plan: Continue supportive care, monitor for any concern for recurrent stroke      Hyperlipidemia LDL goal <130    Assessment: Is supposed be taking a statin, however has not been doing so recently    Plan: Hold statin therapy due to n.p.o. status for now, reevaluate when patient is clinically improved      PAD (peripheral artery disease) (H) - moderate left common carotid stenosis, aortoiliac bypass, chronic left vertebral and right posterior cerebral stenoses    Assessment: Noted    Plan: IV heparin as above       Anemia    Assessment: Chronic and is actually slightly improved compared to values from the end of 2021    Plan: We will monitor for stability, may see slight reduction following IV fluid initiation as patient has not been eating or drinking well in the home environment prior to this admission      Moderate to severe pulmonary hypertension (H)-per Echo    Assessment: Present at baseline    Plan: Continue with oxygen supplementation as above      Nonrheumatic aortic valve stenosis - moderate to severe    Assessment: Noted on previous echocardiograms    Plan: We will reevaluate with echo during this stay as above      Chronic respiratory failure with hypoxia and hypercapnia (H)-home O2 and Trilogy    Assessment: Present at baseline, currently with acute worsening as outlined above    Plan: Continue with plan as above.       Tobacco use    Assessment: Ongoing, however patient reports that she has been cutting back but is unable to give an accurate estimate as to how many she has been taking recently. She declines any need for nicotine patch    Plan: Monitor closely and consider nicotine supplementation if there is concern for withdrawal      Aphasia due to cerebral infarction    Assessment: Previous history but difficult to assess at this time given patient's respiratory distress, hypercapnia, and BiPAP use    Plan: Continue supportive cares       Diet: Clear Liquid Diet    DVT Prophylaxis: Heparin drip  Barnhart Catheter: Not present  Central Lines: None  Cardiac Monitoring: ACTIVE order. Indication: ICU  Code Status: No CPR- Pre-arrest intubation OK      Disposition Plan   Expected Discharge:  3-5 days   Anticipated discharge location:  Awaiting care coordination huddle  Delays:  Ongoing critical care management       The patient's care was discussed with the Bedside Nurse, Care Coordinator/ and Patient.    Deisy Laird MD  Hospitalist [facility]  "Center  Securely message with the Vocera Web Console (learn more here)  Text page via [facility] Paging/Directory         Clinically Significant Risk Factors Present on Admission               # Platelet Defect: home medication list includes an antiplatelet medication       ______________________________________________________________________    Interval History   Patient remained on BiPAP throughout the night with good respiratory tolerance. She did get intermittently restless and agitated but calmed with reorientation and a one-time Zyprexa tablet. This morning she did attempt to transition to nasal cannula and lasted approximately an hour and a half before she had signs of worsening respiratory distress and requested initiation of BiPAP. Patient's home trilogy has been reinitiated now with improvement of patient's symptoms and good oxygenation noted. Patient currently is oriented only to person and she is very frustrated, stating that she has been on BiPAP for \"days\" and that we are starving her by not allowing her to have any oral intake. She does not remember trialing off of BiPAP onto high flow this morning and does not believe that this has happened. She denies any chest pain, shortness of breath, and states that she feels fine and is ready to get the mask off    Data reviewed today: I reviewed all medications, new labs and imaging results over the last 24 hours.    Physical Exam   Vital Signs: Temp: (P) 97.9  F (36.6  C) Temp src: (P) Axillary BP: (!) 153/73 Pulse: 90   Resp: 21 SpO2: 95 % O2 Device: (P) Other (Comments) (home supplied trilogy) Oxygen Delivery: (P) 1/2 LPM  Weight: 92 lbs 9.49 oz  Constitutional: Awake, alert, slightly resistant to all interventions unless she feels that it is going to help aid her causing getting the mask off and and allowing her to eat, oriented only to self and patient is very poor mental focus and understanding  Respiratory: Mild tachypnea even on trilogy at rest, decreased " breath sounds with end expiratory wheezes heard diffusely but aeration is improved compared to yesterday. No crackles  Cardiovascular: Regular rate and rhythm without murmur  GI: Bowel sounds present, abdomen soft and nontender  Skin: no redness, warmth, or swelling and no rashes  Musculoskeletal: no lower extremity pitting edema present  Neurologic: Awake, alert, oriented to name but not place, time, or situation. Patient continues to perseverate on taking her mask off and being allowed to eat and is not interested in additional conversation. No apparent aphasia noted on exam today    Data   Recent Labs   Lab   02/06/22  0746 02/06/22  0530 02/05/22  1654 02/05/22  1336 02/05/22  0708   WBC    --  2.0*  --  4.2 6.5   HGB    --  10.7*  --  11.4* 11.5*   MCV    --  77*  --  79 82   PLT    --  246  --  252 225   NA    --  136  --   --  137   POTASSIUM    --  4.4  --   --  4.3   CHLORIDE    --  101  --   --  100   CO2    --  33*  --   --  39*   BUN    --  14  --   --  12   CR    --  0.45*  --   --  0.52   ANIONGAP    --  2*  --   --  <1*   CADLERON    --  8.8  --   --  8.3*   GLC   137* 145*   < >  --  105*   ALBUMIN    --   --   --   --  2.7*   PROTTOTAL    --   --   --   --  7.6   BILITOTAL    --   --   --   --  0.2   ALKPHOS    --   --   --   --  77   ALT    --   --   --   --  17   AST    --   --   --   --  18    < > = values in this interval not displayed.

## 2022-02-06 NOTE — PROGRESS NOTES
Provider contacted after Pt refused / was uncooperative for Lab draws, Provider okay to Adjust Heparin dose based on Prior Heparin level which was 0.17 at 1828.

## 2022-02-07 NOTE — PLAN OF CARE
Shift note:    VSS, afebrile.  Pt tolerating alternating home trilogy and nasal cannula with 1/2 to 1LPM O2 bled in.  Seroquel given x1 for agitation/anxiety.  Tylenol given for generalized discomfort.  Pt tolerating liquids well.      Problem: Adult Inpatient Plan of Care  Goal: Absence of Hospital-Acquired Illness or Injury  Intervention: Identify and Manage Fall Risk  Recent Flowsheet Documentation  Taken 2/6/2022 1600 by Hali Davies RN  Safety Promotion/Fall Prevention:   activity supervised   assistive device/personal items within reach   bed alarm on   clutter free environment maintained   fall prevention program maintained   increase visualization of patient   nonskid shoes/slippers when out of bed   patient and family education   room organization consistent   safety round/check completed   treat reversible contributory factors   treat underlying cause  Taken 2/6/2022 1200 by Hali Davies RN  Safety Promotion/Fall Prevention:   activity supervised   assistive device/personal items within reach   bed alarm on   clutter free environment maintained   fall prevention program maintained   increase visualization of patient   nonskid shoes/slippers when out of bed   patient and family education   room organization consistent   safety round/check completed   treat reversible contributory factors   treat underlying cause  Taken 2/6/2022 0900 by Hali Davies RN  Safety Promotion/Fall Prevention:   activity supervised   assistive device/personal items within reach   bed alarm on   clutter free environment maintained   fall prevention program maintained   increase visualization of patient   nonskid shoes/slippers when out of bed   patient and family education   room organization consistent   safety round/check completed   treat reversible contributory factors   treat underlying cause  Intervention: Prevent Skin Injury  Recent Flowsheet Documentation  Taken 2/6/2022 1600 by Hali Davies RN  Body Position:  position changed independently  Taken 2/6/2022 1200 by Hali Davies RN  Body Position: position changed independently  Taken 2/6/2022 0900 by Hali Davies RN  Body Position: position changed independently  Intervention: Prevent and Manage VTE (Venous Thromboembolism) Risk  Recent Flowsheet Documentation  Taken 2/6/2022 1600 by Hali Davies RN  VTE Prevention/Management: anticoagulant therapy maintained  Taken 2/6/2022 1200 by Hali Davies RN  VTE Prevention/Management: anticoagulant therapy maintained  Taken 2/6/2022 0900 by Hali Davies RN  VTE Prevention/Management: anticoagulant therapy maintained     Problem: Functional Ability Impaired COPD (Chronic Obstructive Pulmonary Disease)  Goal: Optimal Level of Functional [place]  Intervention: Optimize Functional Ability  Recent Flowsheet Documentation  Taken 2/6/2022 1600 by Hali Davies RN  Activity Management: activity adjusted per tolerance  Taken 2/6/2022 1200 by Hali Davies RN  Activity Management: activity adjusted per tolerance  Taken 2/6/2022 0900 by Hali Davies RN  Activity Management: activity adjusted per tolerance     Problem: Respiratory Compromise COPD (Chronic Obstructive Pulmonary Disease)  Goal: Effective Oxygenation and Ventilation  Intervention: Promote Airway Secretion Clearance  Recent Flowsheet Documentation  Taken 2/6/2022 1600 by Hali Davies RN  Cough And Deep Breathing: done independently per patient  Activity Management: activity adjusted per tolerance  Taken 2/6/2022 1200 by Hali Davies RN  Cough And Deep Breathing: done independently per patient  Activity Management: activity adjusted per tolerance  Taken 2/6/2022 0900 by Hali Davies RN  Cough And Deep Breathing: done independently per patient  Activity Management: activity adjusted per tolerance

## 2022-02-07 NOTE — PROVIDER NOTIFICATION
Patient's BP creeping upward. She is intolerant of NIPB with the machine, but tolerates a manual well. Her most recent readings are accurate and a pink note for provider was submitted.

## 2022-02-07 NOTE — PROGRESS NOTES
[facility] [place]    Medicine Progress Note - Hospitalist Service    Date of Admission:  2/5/2022    Assessment & Plan     Preeti Ford is a 71 year old female with a past medical history of severe COPD on 2 L nasal cannula chronically as well as trilogy with sleep and during the day when shortness of breath worsens, coronary artery disease s/p stent placement, previous CVA with cognitive impairment and aphasia at baseline admitted on 2/5/2022 with acute on chronic respiratory failure thought secondary to COPD exacerbation and possible pneumonia currently on steroids, Rocephin and azithromycin, scheduled bronchodilators.  She initially required hospital Bipap but has since transitioned to home Triology unit but has been requiring application the majority of the time, only able to tolerate being off for 30-90 minutes which is drastically different than her baseline.   Troponins have been elevated and worsening since time of admission even as her respiratory status stabilized, increasing concern for non-STEMI and patient continues on a heparin drip with echocardiogram being performed now.  Troponins appeared to have peaked at 1,135 during the night and have trended downward slightly this morning.      Of note, patient's ongoing baseline mentation and aphasia makes it difficult to assess her understanding of the current critical illness as well as global understanding of her medical conditions and overall poor prognosis but patient is able to verbalize her wishes and stays consistent in her wishes when reasked.  Still wishes DNR status today but would consider intubation.  Would consider surgical intervention if needed.   is in agreement.      Principal Problem:    Acute on chronic respiratory failure with hypoxia and hypercapnia    Assessment: Thought secondary to COPD exacerbation with possible underlying bacterial pneumonia based on chest x-ray results however white blood cell  count is normal and procalcitonin is low risk.  May also be secondary to Non-STEMI given ongoing worsening of troponins    Plan: Transition to patient's home trilogy device for pressure support.  Continue Rocephin and azithromycin for antibiotic, IV Solu-Medrol 60 mg every 6 hours in addition to scheduled DuoNebs and as needed albuterol nebs. Ongoing IV heparin infusion and echo as above.  CT scan of the chest today to further evaluate lungs and pneumonia risk given abnormal appearance on initial imaging.      Active Problems:    COPD with acute exacerbation (H)    Assessment: Causing respiratory failure as above    Plan: Continue with Solu-Medrol, bronchodilators and transition to the patient's home trilogy as outlined above      Pneumonia of right lung due to infectious organism    Assessment: Noted on x-ray and is in similar location compared to the atypical pneumonia noted in September 2021    Plan: Continue Rocephin and azithromycin, perform CT scan today for further assessment      Elevated troponin - concern for Non-STEMI    Assessment: Mildly elevated at 258 initially but has increased to peak of 1,135.  Cardiology recommended IV heparin (started the afternoon of 2/5/22) with echocardiogram when available for further evaluation which will occur this morning.  Patient does have a very small stent placed in 2018 that does have high risk for restenosis per cardiology    Plan: Continue with heparin drip initiation and close monitoring, proceed with echocardiogram to be performed on 2/7/2022.  Continue ASA 81 mg daily.  Continue to monitor serial troponins for now.          SIRS vs sepsis    Assessment: Signs of SIRS present at time of admission and thought secondary to COPD exacerbation and respiratory distress but with concern for possible bacterial pneumonia vs Non-STEMI    Plan: Continue with plan as outlined above      Cognitive impairment-BIMS 4/15 at [facility] May 2020    Assessment: Per  has been present  since her stroke in 2013 with patient having minimal insight as to the immediate consequences of her decisions but states consistent with overall desires and goals for the future    Plan: Continue supportive care during this hospital stay      Coronary artery disease of native artery of native heart with stable angina pectoris S/P drug eluting coronary stent placement-LAD and circ, circ re-do 5/17/18    Assessment: Present at baseline, with repeat stent placed in 2018 fairly small and at high risk for restenosis per cardiology.      Plan: Continue IV heparin, serial troponins, and echocardiogram as above      Ischemic cardiomyopathy - EF 25% in 2015 but 55% in 3/2017 and 45-50% on 3/18 but over 70% on 9/21    Assessment: Noted     Plan: Ejection fraction will be reevaluated with echocardiogram today.  For now we will give cautious maintenance fluids, avoid bolusing if possible, and monitor closely for signs of volume overload.      Pulmonary emphysema (H)    Assessment: with acute worsening as above    Plan: proceed with plan as above      Severe malnutrition (H)    Assessment: present at previous hospitalization with patient still very cachetic in appearance on evaluation today    Plan: Will have nutritionist re-evaluate at this time.       History of stroke - right thalamus in 8/2013 and left cerebellar and right vermis in 3/2018    Assessment: Previous history    Plan: Continue supportive care, monitor for any concern for recurrent stroke      Hyperlipidemia LDL goal <130    Assessment: Is supposed be taking a statin, however has not been doing so recently    Plan: Anticipate restarting status during this stay       PAD (peripheral artery disease) (H) - moderate left common carotid stenosis, aortoiliac bypass, chronic left vertebral and right posterior cerebral stenoses    Assessment: Noted    Plan: IV heparin as above      Anemia    Assessment: Chronic and is actually slightly improved compared to values from  the end of 2021    Plan: monitor for ongoing stability      Moderate to severe pulmonary hypertension (H)-per Echo    Assessment: Present at baseline    Plan: Continue with oxygen supplementation as above      Nonrheumatic aortic valve stenosis - moderate to severe    Assessment: Noted on previous echocardiograms    Plan: We will reevaluate with echo during this stay as above      Chronic respiratory failure with hypoxia and hypercapnia (H)-home O2 and Trilogy    Assessment: Present at baseline, currently with acute worsening as outlined above    Plan: Continue with plan as above.       Tobacco use    Assessment: Ongoing, however patient reports that she has been cutting back but is unable to give an accurate estimate as to how many she has been taking recently. She declines any need for nicotine patch    Plan: Monitor closely and consider nicotine supplementation if there is concern for withdrawal      Aphasia due to cerebral infarction    Assessment: Continues to be notable but stable    Plan: Continue supportive cares         Diet: Clear Liquid Diet    DVT Prophylaxis: Heparin drip  Barnhart Catheter: Not present  Central Lines: None  Cardiac Monitoring: ACTIVE order. Indication: ICU  Code Status: No CPR- Pre-arrest intubation OK      Disposition Plan   Expected Discharge: 02/08/2022     Anticipated discharge location:  Awaiting care coordination huddle  Delays:  Ongoing critical illness       The patient's care was discussed with the Bedside Nurse, Care Coordinator/, Patient and Patient's Family.    34 minutes spent in management of this critically ill patient so far today.    Deisy Laird MD  Hospitalist Service  [facility]  Securely message with the Vocera Web Console (learn more here)  Text page via [employer] Paging/Directory         Clinically Significant Risk Factors Present on Admission                  ______________________________________________________________________    Interval History   Patient remained on her trilogy device the majority of the night but was able to tolerate brief periods off and can some some clears, fatigued quickly and needed to return to [facility].  Vital signs overall stable with patient continuing to be afebrile.  Blood pressures are trending upward and are hypertensive this morning.  Troponin continued to trend upward and has reached apparent peak at 1135.  No additional new patient or nursing concerns.    Data reviewed today: I reviewed all medications, new labs and imaging results over the last 24 hours.    Physical Exam   Vital Signs: Temp: 98.7  F (37.1  C) Temp src: Oral BP: (!) 182/90 Pulse: 105   Resp: (!) 31 SpO2: 92 % O2 Device: BiPAP/CPAP (home trilogy- no oxygen) Oxygen Delivery: 1 LPM  Weight: 93 lbs 11.13 oz  Constitutional: awake, alert, cooperative, no apparent distress, and appears stated age and chronically ill  Respiratory: Patient has ongoing tachypnea in the upper 20s, ongoing severe diminished air movement with only faint end expiratory wheezes noted today, no crackles  Cardiovascular: Sinus tachycardia in the low 100 range present  GI: Bowel sounds present, abdomen is soft, nontender, nondistended  Musculoskeletal: no lower extremity pitting edema present  Neurologic: Patient is awake, alert, oriented to person and the fact that she is in the hospital but not which one but not oriented to year and knows that she is having difficulty breathing but does not know what has been found up to this point.    Data   Recent Labs   Lab 02/07/22  0737 02/07/22  0537 02/07/22  0001 02/06/22  0746 02/06/22  0530 02/05/22  1654 02/05/22  1336 02/05/22  0708   WBC  --  4.1  --   --  2.0*  --  4.2 6.5   HGB  --  11.2*  --   --  10.7*  --  11.4* 11.5*   MCV  --  77*  --   --  77*  --  79 82   PLT  --  286  --   --  246  --  252 225   NA  --  139  --   --  136  --   --  137    POTASSIUM  --  4.2  --   --  4.4  --   --  4.3   CHLORIDE  --  104  --   --  101  --   --  100   CO2  --  30  --   --  33*  --   --  39*   BUN  --  23  --   --  14  --   --  12   CR  --  0.47*  --   --  0.45*  --   --  0.52   ANIONGAP  --  5  --   --  2*  --   --  <1*   CALDERON  --  8.7  --   --  8.8  --   --  8.3*   * 131* 137*   < > 145*   < >  --  105*   ALBUMIN  --   --   --   --   --   --   --  2.7*   PROTTOTAL  --   --   --   --   --   --   --  7.6   BILITOTAL  --   --   --   --   --   --   --  0.2   ALKPHOS  --   --   --   --   --   --   --  77   ALT  --   --   --   --   --   --   --  17   AST  --   --   --   --   --   --   --  18    < > = values in this interval not displayed.

## 2022-02-07 NOTE — PROGRESS NOTES
Clinic Care Coordination Contact  Ambulatory Care Coordination to Inpatient Care Management   Hand-In Communication    Date:  February 7, 2022  Name: Preeti Ford is enrolled in Ambulatory Care Coordination program and Herminia Marcos is the Lead Care Coordinator.  CC Contact Information: Epic InBasket + phone  Payor Source: Payor: MEDICA / Plan: MEDICA ADVANTAGE SOLUTIONS / Product Type: HMO /   Current services in place:     Please see the CC Snaphot and Care Management Flowsheets for specific  details of this Preeti Ford care plan.   Additional details/specific concerns r/t this admission:    Goals of Care .   Goals        General      COPD      Goal Statement: I will manage my COPD  symptoms effectively at home.  Date Goal set: 12/22/2021  Barriers: unknown  Strengths: motivated  Date to Achieve By: 6/1/2022  Patient expressed understanding of goal: yes  Action steps to achieve this goal:  1. I will use my nebulizer every 6 hours as needed for wheezing  2. I will use my inhalers daily as prescribed  3. I will follow-up with my doctor if I do not improve with home care measures.          Follow-up in my overdue health maintenance concerns      Goal Statement: Follow-up in my overdue health maintenance concerns  Date Goal set: 10/6/2021  Barriers: COVID-19, recent pneumonia  Strengths: engaged  Date to Achieve By: 6/1/2022  Patient expressed understanding of goal: yes  Action steps to achieve this goal:  1. I will ask to update HM at appointments  2. I will scheduled appts for important HM items                I will follow this admission in Epic. Please feel free to contact Lead CC Herminia FORDE  with questions or for further collaboration in discharge planning.    Writer is covering for Lead Herminia HUDSON RN.  Robina Hunter RN, BSN, PHN Care Coordinator  [facility]   Phone: [phone]

## 2022-02-07 NOTE — PROGRESS NOTES
"Attempted to obtain nasal swab. Explained to patient what it what and why. She stated, \" you ain't gonna do that, if you do I'll shove it up your ass!\"  This writer appreciated the honesty of the statement, and clarified \"you're saying you won't let me take a sample\" Patient's response, \"you're damn right!\"   Test was firmly declined.  "

## 2022-02-07 NOTE — PLAN OF CARE
"  Problem: Adult Inpatient Plan of Care  Goal: Plan of Care Review  Outcome: Improving     Problem: Adult Inpatient Plan of Care  Goal: Patient-Specific Goal (Individualized)  Flowsheets (Taken 2/7/2022 0340)  Individualized Care Needs: Patient to wear home trilogy for napping/sleep 2/7.   Likes to have little black stuffed dog \"Osmany\" with her.  Patient-Specific Goals (Include Timeframe): Patient to wear home trilogy for napping/sleep 2/7  Goal: Absence of Hospital-Acquired Illness or Injury  Intervention: Identify and Manage Fall Risk  Recent Flowsheet Documentation  Taken 2/7/2022 0009 by Enrike Hill RN  Safety Promotion/Fall Prevention: activity supervised  Taken 2/6/2022 2000 by Enrike Hill RN  Safety Promotion/Fall Prevention: activity supervised  Intervention: Prevent Skin Injury  Recent Flowsheet Documentation  Taken 2/7/2022 0009 by Enrike Hill RN  Body Position: position changed independently  Taken 2/6/2022 2000 by Enrike Hill RN  Body Position: position changed independently  Intervention: Prevent and Manage VTE (Venous Thromboembolism) Risk  Recent Flowsheet Documentation  Taken 2/7/2022 0009 by Enrike Hill RN  VTE Prevention/Management: (Heparin gtt) anticoagulant therapy maintained  Taken 2/6/2022 2000 by Enrike Hill RN  VTE Prevention/Management: (Heparin gtt) anticoagulant therapy maintained     Problem: Functional Ability Impaired COPD (Chronic Obstructive Pulmonary Disease)  Goal: Optimal Level of Functional [place]  Intervention: Optimize Functional Ability  Recent Flowsheet Documentation  Taken 2/7/2022 0009 by Enrike Hill RN  Activity Management: activity adjusted per tolerance  Taken 2/6/2022 2000 by Enrike Hill RN  Activity Management: activity adjusted per tolerance     Problem: Respiratory Compromise COPD (Chronic Obstructive Pulmonary Disease)  Goal: Effective Oxygenation and Ventilation  Intervention: Promote Airway Secretion Clearance  Recent Flowsheet " Documentation  Taken 2/7/2022 0009 by Enrike Hill, RN  Cough And Deep Breathing: done with encouragement  Activity Management: activity adjusted per tolerance  Taken 2/6/2022 2000 by Enrike Hill, RN  Cough And Deep Breathing: done with encouragement  Activity Management: activity adjusted per tolerance    Patient is oriented to self consistently, all other levels of orientation wax and wane with patient's anxiety.  The more upset she is the less she remembers.  She does eventually re-orient but it takes some time.  She is re directable. She has received scheduled and PRN Seroquel, it slows things down and she cat naps but not really a good chunk of sleep.    Patient is afebrile.  She had one quite hypertensive BP reading which came down once she was calm. BP has been checked every 4 hours.  Tele is SR to ST    LS are diminished sometimes with expiratory wheezes. She is on and off home trilogy. Oxygen has been difficult to titrate she has been on RA to 1 L NC with saturations consistently > 85% but sometimes on RA to 99%.  Her WOB appears stable, she does become dyspneic with exertion or when she is upset, but it is short.     She is continent/incontinent. UOP appears adequate. She is drinking well.     Glucoses have been stable. Patient allowed to sleep though 0400 check as she was awake most of night with needs, cares, and Troponin checks.    Weight is up 0.5 kg today

## 2022-02-07 NOTE — PROVIDER NOTIFICATION
Notified Tele ICU regarding most recent Troponin.   They recommended that writer discuss with Cardiologist, speculating that Heparin drip would be what was recommended.  She is currently on Heparin gtt.  Patient is not showing signs of pain CPOT scores are 0.  She is difficult to asses due to her confusion.  After conferring with Charge RN for contact information for Cardiology, that it needed to be provider to provider, writer called Tele ICU back to relay this.  Per them it was okay to leave things as they are unless patient showed signs of instability. Recommendation to speak with Cardiology in AM was received.

## 2022-02-07 NOTE — CONSULTS
"CLINICAL NUTRITION SERVICES - ASSESSMENT NOTE     Nutrition Prescription    RECOMMENDATIONS FOR MDs/PROVIDERS TO ORDER:  Diet adv within 2-3 days.  Recommend starting MVI w/ minerals given ongoing chronic malnutrition     Malnutrition Status:    Severe malnutrition in the context of chronic illness     Recommendations already ordered by Registered Dietitian (RD):  Ensure Clear BID   Gelatein daily     Future/Additional Recommendations:  1. Diet adv per team. Monitor PO intake, tolerance to diet or ONS.   2. Monitor weight trends, labs      REASON FOR ASSESSMENT  Preeti JEROME Ford is a/an 71 year old female assessed by the dietitian for Provider Order - history of severe malnutrition, ongoing cachetic appearance    NUTRITION HISTORY  -Presented to ED on 2/5 with increasing SOB. History of COPD and chronically oxygen dependent   -Pt currently on bipap.   -Pt alert/oriented to self, other levels of orientation wax and wane with patient's anxiety per RN notes.   -Pt has been assessed by this RD in the past. Last RD assessment on 9/17/21. Pt known with chronic malnutrition. Historically declining nutritional interventions.   -Pt not appropriate to talk with today due to confusion and requiring bipap. No family at bedside at this time. RD to obtain full nutrition history as able.     CURRENT NUTRITION ORDERS  Diet: Clear Liquid  Intake/Tolerance: No intake documented. Per RN, tolerating liquids.     LABS  Labs reviewed    MEDICATIONS  Medications reviewed  D5 @ 50 mL/hr     ANTHROPOMETRICS  Height: 160 cm (5' 3\")  Most Recent Weight: 42.5 kg (93 lb 11.1 oz)    IBW: 52.3 kg  % IBW: 81%   BMI: Underweight BMI <18.5 (BMI 16.60 kg/m2)  Weight History:   -Current weight down 3.6% in 8 weeks (not   Wt Readings from Last 10 Encounters:   02/07/22 42.5 kg (93 lb 11.1 oz)   12/13/21 44.1 kg (97 lb 3.2 oz)   10/18/21 41.1 kg (90 lb 9.6 oz)   10/11/21 40.9 kg (90 lb 3.2 oz)   09/21/21 42.1 kg (92 lb 14.4 oz)   08/29/21 38.9 kg (85 " lb 12.8 oz)   06/14/21 41.1 kg (90 lb 8 oz)   04/09/21 42.6 kg (94 lb)   03/25/21 43 kg (94 lb 11.2 oz)   12/02/20 40.9 kg (90 lb 4 oz)     Dosing Weight: 43 kg (actual)    ASSESSED NUTRITION NEEDS  Estimated Energy Needs: 4083-9929 kcals/day (30 - 35 kcals/kg )  Justification: Repletion and Underweight  Estimated Protein Needs: 52-65 grams protein/day (1.2 - 1.5 grams of pro/kg)  Justification: Increased needs and Repletion  Estimated Fluid Needs: 1 mL/kcal   Justification: Per provider pending fluid status    PHYSICAL FINDINGS  See malnutrition section below.    MALNUTRITION  % Intake: Unable to assess  % Weight Loss: Weight loss does not meet criteria  Subcutaneous Fat Loss: Facial region:  Severe, Upper arm:  severe and Lower arm:  severe  Muscle Loss: Temporal:  Severe, Facial & jaw region:  severe, Thoracic region (clavicle, acromium bone, deltoid, trapezius, pectoral):  severe, Upper arm (bicep, tricep):  severe and Lower arm  (forearm):  severe  Fluid Accumulation/Edema: None noted  Malnutrition Diagnosis: Severe malnutrition in the context of chronic illness     NUTRITION DIAGNOSIS  Malnutrition related to chronic illness as evidenced by patient with findings of severe fat and muscle wasting.       INTERVENTIONS  Implementation  Nutrition Education: Not appropriate at this time due to patient condition   Collaboration with other providers     Goals  Diet adv within 2-3 days.    Maintain weight >/= 93 lbs      Monitoring/Evaluation  Progress toward goals will be monitored and evaluated per protocol.    Graham Kinney RDN, LD  Clinical Dietitian   Office: [phone]  Weekend Pager: [phone]

## 2022-02-07 NOTE — CONSULTS
"Care Management Initial Consult    General Information  Assessment completed with: SpouseYoseph \"Bud\"  Type of CM/SW Visit: Initial Assessment    Primary Care Provider verified and updated as needed: Yes   Readmission within the last 30 days: no previous admission in last 30 days   Reason for Consult: discharge planning, initial assessment  Advance Care Planning: Advance Care Planning Reviewed: no concerns identified       DME Needed: [employer] and a portable O2 concentrator (if able)    Communication Assessment  Patient's communication style: spoken language (English or Bilingual)    Hearing Difficulty or Deaf: no   Wear Glasses or Blind: no    Cognitive  Cognitive/Neuro/Behavioral: .WDL except  Level of Consciousness: confused  Arousal Level: opens eyes spontaneously  Orientation: place,time,situation (she states with location: home I am going home)  Mood/Behavior: calm,cooperative  Best Language: 0 - No aphasia  Speech: clear    Living Environment:   People in home: spouseYoseph \"Bud\"  Current living Arrangements: house        Family/Social Support:  Care provided by: spouse/significant other  Provides care for: no one  Marital Status:     Yoseph \"Bud\"       Description of Support System: Supportive,Involved    Support Assessment: Adequate family and caregiver support,Adequate social supports    Current Resources:   Patient receiving home care services:       Community Resources:  [facility] Worker- Amaya Bashir [phone], Meals on Wheels  Equipment currently used at home: walker, rolling  Supplies currently used at home:  O2 through [employer] (does not have portable concentrator)    Employment/Financial:  Employment Status: retired        Financial Concerns: None    Functional Status:  Prior to admission patient needed assistance: Dependent IADLs:: Laundry,Cooking,Shopping,Transportation,Money Management..  2 stairs to get into home    Additional Information:  SW spoke w/Tonya Hameed " "\"Oh\" via phone to complete initial assessment and discuss possible discharge plans for the future. Home w/HC vs TCU. Oh reported that he is Preeti's primary caregiver. He receives financial assistance from the [facility] through [employer] to care for Preeti since having to leave his job a couple years ago when she had a stroke. Preeti had used [facility] in the past when HomeCare was needed and they would be open to a TCU if recommended upon discharge.   Oh reports Preeti uses a cane and/or walker when ambulating in the home. She receives Meals on Wheels, has a county worker and home O2 through [employer].   Oh states that they have had issues in the past with [employer] and is wanting to find another home oxygen company upon Preeti's discharge if possible as well as seeing if she is able to qualify for a portable concentrator as they do not have one at home.     Transport:     Marcy Plata, BSW      "

## 2022-02-07 NOTE — PROVIDER NOTIFICATION
Patient with increasing confusion and restlessness/agitation. Requested VBG to be ordered with AM Labs.  Oxygenation remains the same she is low 80's to high 90's. LS with expiratory wheezes.

## 2022-02-07 NOTE — PROVIDER NOTIFICATION
Called Tele-hospitalist regarding elevated troponin and to be sure that cardiology consult is okay to wait until am.  I informed Dr. Johnston of the troponins, had EKG yesterday.  The plan is to do EKG now and see what the 0200 Troponin is.

## 2022-02-07 NOTE — PROGRESS NOTES
Sepsis Evaluation Progress Note    I was called to see Preeti Ford due to abnormal vital signs triggering the Sepsis SIRS screening alert. She is suspected to have an infection, COPD exacerbation and possible demand ischemia vs non-STEMI.    Physical Exam   Vital Signs:  Temp: 98.7  F (37.1  C) Temp src: Oral BP: (!) 182/90 Pulse: 105   Resp: (!) 31 SpO2: 92 % O2 Device: BiPAP/CPAP (home trilogy- no oxygen) Oxygen Delivery: 1 LPM     General: chronically ill but in no acute distress  Mental Status: oriented to person and that she is in the hospital (doesn't know which one) and not oriented to time or situation - this is her baseline..     Remainder of physical exam is significant for increased respiratory rate in the upper 20s with ongoing decreased air movement with very mild end expiratory wheezing, no crackles, sinus tachycardia in the low 100s, abdomen soft and non-distended.  Patient is in the process of getting her echo and is talking in full sentences, comfortable and following directions without difficulty.      Data   Lactic Acid   Date Value Ref Range Status   02/07/2022 2.5 (H) 0.7 - 2.0 mmol/L Final   02/06/2022 1.4 0.7 - 2.0 mmol/L Final   03/23/2021 1.0 0.7 - 2.0 mmol/L Final   03/23/2021 2.3 (H) 0.7 - 2.0 mmol/L Final     Comment:     Significant value called to and read back by  DILCIA MCKEON RN IN ER AT 1255 TA       Lactate for Sepsis Protocol   Date Value Ref Range Status   03/25/2021 1.8 0.7 - 2.0 mmol/L Final       Assessment & Plan   SIRS with possible concern for sepsis however felt more likely related to COPD exacerbation and/or cardiac concerns.  WBC normal, procalcitonin undetectable, patient afebrile.      Disposition: The patient will remain on the current unit. We will continue to monitor this patient closely.  Continue Rocephin and azithromycin for now.  Recheck lactic acid again this afternoon.  Deisy Laird MD    Sepsis Criteria   Sepsis: 2+ SIRS criteria due to  infection  Severe Sepsis: Sepsis AND 1+ new sign of acute organ dysfunction (Note: lactate >2 or acute encephalopathy each qualify as organ dysfunction)  Septic Shock: Sepsis AND hypotension despite volume resuscitation with 30 ml/kg crystalloid or lactate >=4  Note: HYPOTENSION is defined as 2 BP readings measured 3 hrs apart that have a SBP <90, MAP <65, or decrease >40 mmHg, occurring 6 hrs before or after t-zero

## 2022-02-07 NOTE — PROGRESS NOTES
Called by primary care team from OSH. I am told that patient has been admitted with severe COPD exacerbation and has positive troponins and wall motion abnormalities on echo with moderate to severe aortic stenosis. Requesting transfer. Agree with transferring to a facility as soon as possible pending medical stability for consideration of coronary angiogram. In the interim medical Rx for CAD.

## 2022-02-08 NOTE — PROGRESS NOTES
"CLINICAL NUTRITION SERVICES - ASSESSMENT NOTE     Nutrition Prescription    RECOMMENDATIONS FOR MDs/PROVIDERS TO ORDER:  Pt at risk for refeeding syndrome.  Please monitor electrolytes and replace if low.  Consider checking phos level with risk for refeeding syndrome    Malnutrition Status:    Severe in acute and chronic illness    Recommendations already ordered by Registered Dietitian (RD):  Start 100 mg thiamine/day for refeeding risk.  Start MVI daily for decreased po intake  Hold off on supplements as pt at risk for refeeding.  Consider supplements once pt eating and electrolytes are stable.    Future/Additional Recommendations:  refeed labs.     REASON FOR ASSESSMENT  Preeti Ford is a/an 71 year old female assessed by the dietitian for Admission Nutrition Risk Screen for \"Unsure\" wt loss, no decreased intake.    NUTRITION HISTORY  Met pt and pt's spouse.  Speech therapist working with pt on bedside swallow, pt was unable to participate in VFSS today.  Pt's spouse reports pt was down to 89 lb last summer.  Pt's spouse reports pt with wt loss and decreased po intake coinciding with recent pandemic.  Recently pt gets Meals on Wheels 5 days/week.  Pt was drinking 3-5 Ensure/day on some days though not consistently.  Pt has dentures but they don't fit well so she eats softer food.  RD at [facility] assessed pt on 2/7/22.   Pt was on Clear liquid x 1 day, unsure of intake.    CURRENT NUTRITION ORDERS  Diet: NPO, except ice chips    LABS  ROUTINE ICU LABS (Last four results)  CMPRecent Labs   Lab 02/08/22  1253 02/08/22  0759 02/08/22  0519 02/08/22  0340 02/07/22  0737 02/07/22  0537 02/06/22  0746 02/06/22  0530 02/05/22  1654 02/05/22  0708   NA  --   --  142  --   --  139  --  136  --  137   POTASSIUM  --   --  5.4*  --   --  4.2  --  4.4  --  4.3   CHLORIDE  --   --  103  --   --  104  --  101  --  100   CO2  --   --  32*  --   --  30  --  33*  --  39*   ANIONGAP  --   --  7  --   --  5  --  2*  --  <1* "   * 104* 108 119*   < > 131*   < > 145*   < > 105*   BUN  --   --  26*  --   --  23  --  14  --  12   CR  --   --  0.69  --   --  0.47*  --  0.45*  --  0.52   GFRESTIMATED  --   --  >90  --   --  >90  --  >90  --  >90   CALDERON  --   --  8.2*  --   --  8.7  --  8.8  --  8.3*   MAG  --   --   --   --   --   --   --   --   --  1.9   PROTTOTAL  --   --  6.5  --   --   --   --   --   --  7.6   ALBUMIN  --   --  3.2*  --   --   --   --   --   --  2.7*   BILITOTAL  --   --  0.3  --   --   --   --   --   --  0.2   ALKPHOS  --   --  53  --   --   --   --   --   --  77   AST  --   --  55*  --   --   --   --   --   --  18   ALT  --   --  76*  --   --   --   --   --   --  17    < > = values in this interval not displayed.     CBC  Recent Labs   Lab 02/08/22  0519 02/07/22  0537 02/06/22  0530 02/05/22  1336   WBC 6.7 4.1 2.0* 4.2   RBC 4.25 5.03 4.85 5.05   HGB 9.5* 11.2* 10.7* 11.4*   HCT 35.0 38.9 37.4 40.1   MCV 82 77* 77* 79   MCH 22.4* 22.3* 22.1* 22.6*   MCHC 27.1* 28.8* 28.6* 28.4*   RDW 17.6* 17.8* 16.9* 16.9*    286 246 252     INRNo lab results found in last 7 days.  Arterial Blood Gas  Recent Labs   Lab 02/08/22  0111 02/07/22  0538 02/06/22  1316 02/06/22  0532 02/06/22  0005   PH 7.18*  --   --   --   --    PCO2 91*  --   --   --   --    PO2 127*  --   --   --   --    HCO3 28  --   --   --   --    O2PER  --  24 21 21 21     Labs reviewed    MEDICATIONS    aspirin  81 mg Oral Daily     atorvastatin  40 mg Oral QPM     cefTRIAXone  1 g Intravenous Q24H     ferrous sulfate  325 mg Oral BID     influenza vac high-dose quad  0.7 mL Intramuscular Prior to discharge     insulin aspart  1-6 Units Subcutaneous Q4H JOHN     ipratropium - albuterol 0.5 mg/2.5 mg/3 mL  3 mL Nebulization Q4H WA     methylPREDNISolone  62.5 mg Intravenous Q12H     metoprolol  2.5 mg Intravenous Q6H     montelukast  10 mg Oral At Bedtime     nicotine  1 patch Transdermal Daily     nicotine   Transdermal Q8H     pantoprazole (PROTONIX)  "IV  40 mg Intravenous Daily with breakfast     QUEtiapine  12.5 mg Oral TID     QUEtiapine  25 mg Oral At Bedtime     sodium chloride (PF)  3 mL Intracatheter Q8H        heparin 900 Units/hr (02/08/22 0250)     nitroGLYcerin 50 mg in D5W 250 mL 100 mcg/min (02/08/22 1320)     - MEDICATION INSTRUCTIONS -       - MEDICATION INSTRUCTIONS -       - MEDICATION INSTRUCTIONS -        acetaminophen **OR** acetaminophen, acetaminophen **OR** acetaminophen, artificial tears ophthalmic solution, glucose **OR** dextrose **OR** glucagon, HYDROmorphone, HYDROmorphone, lidocaine 4%, lidocaine (buffered or not buffered), melatonin, naloxone **OR** naloxone **OR** naloxone **OR** naloxone, - MEDICATION INSTRUCTIONS -, - MEDICATION INSTRUCTIONS -, - MEDICATION INSTRUCTIONS -, polyethylene glycol, prochlorperazine **OR** prochlorperazine **OR** prochlorperazine, QUEtiapine, senna-docusate **OR** senna-docusate, sodium chloride (PF)   Medications reviewed    ANTHROPOMETRICS  Height: 160 cm (5' 3\")  Most Recent Weight: 44.2 kg (97 lb 6.4 oz)      BMI: Underweight BMI <18.5  Weight History:   Wt Readings from Last 10 Encounters:   02/08/22 44.2 kg (97 lb 6.4 oz)   02/07/22 42.5 kg (93 lb 11.1 oz)   12/13/21 44.1 kg (97 lb 3.2 oz)   10/18/21 41.1 kg (90 lb 9.6 oz)   10/11/21 40.9 kg (90 lb 3.2 oz)   09/21/21 42.1 kg (92 lb 14.4 oz)   08/29/21 38.9 kg (85 lb 12.8 oz)   06/14/21 41.1 kg (90 lb 8 oz)   04/09/21 42.6 kg (94 lb)   03/25/21 43 kg (94 lb 11.2 oz)   No significant wt loss found over the past year    Dosing Weight: 44.2 kg    ASSESSED NUTRITION NEEDS  Estimated Energy Needs: 1325+ kcals/day (30+)  Justification: Repletion and Underweight  Estimated Protein Needs: 53+ grams protein/day (1.2+)  Justification: Increased needs and Repletion  Estimated Fluid Needs: 1325+ mL/day (1 mL/kcal)   Justification: Maintenance    PHYSICAL FINDINGS  See malnutrition section below.  Bruising on arms.  Pt with weak  " strength.      MALNUTRITION:  % Weight Loss:  Weight loss does not meet criteria for malnutrition. No significant wt loss over the past year  % Intake:  Decreased intake does not meet criteria for malnutrition npo/CL 3 days, further decrease not quantified.  Subcutaneous Fat Loss:  Orbital region severe depletion and Upper arm region severe depletion  Muscle Loss:  Temporal region severe depletion, Clavicle bone region severe depletion, Acromion bone region severe depletion and Dorsal hand region severe depletion  Fluid Retention:  None noted    Malnutrition Diagnosis: Severe malnutrition  In Context of:  Acute illness or injury  Chronic illness or disease    NUTRITION DIAGNOSIS  Malnutrition related to acute and chronic illness as evidenced by severe loss of muscle and fat      INTERVENTIONS  Implementation  Nutrition Education: we discussed protein foods for healing and repletion   Medical food supplement therapy-hold off at this time as pt at risk for refeeding syndrome.     Goals  Diet advancement vs nutrition support within 2-3 days.  Patient to consume % of nutritionally adequate meals three times per day, or the equivalent with supplements/snacks.  No wt loss     Monitoring/Evaluation  Progress toward goals will be monitored and evaluated per protocol.

## 2022-02-08 NOTE — PROGRESS NOTES
"/65   Pulse 78   Temp 97.2  F (36.2  C) (Axillary)   Resp 24   Ht 1.6 m (5' 3\")   Wt 44.2 kg (97 lb 6.4 oz)   SpO2 100%   BMI 17.25 kg/m    Iso:  No active isolations  Diet: NPO for Medical/Clinical Reasons Except for: Meds, Ice Chips  Mental Status:     Main Acuity Score: 137.4  O2:  2 LPM  Mg+: 1.9 (02/05 0708)  K:  5.4 (02/08 0519)  PLT: 240 (02/08 0519)  HGB: 9.5 (02/08 0519)  BS: 108 (02/08 0519)  No components found for: TROPL   Infusions: heparin, Last Rate: 900 Units/hr (02/08/22 0250)  nitroGLYcerin 50 mg in D5W 250 mL, Last Rate: 100 mcg/min (02/08/22 0451)  - MEDICATION INSTRUCTIONS -  - MEDICATION INSTRUCTIONS -  - MEDICATION INSTRUCTIONS -  - MEDICATION INSTRUCTIONS -       Patient arrived to the unit at 0000.  Alert to self only, does not follow commands. Patient was initially sinus tachycardia, but became sinus rhythm later on in the shift. On nitroglycerin and heparin drip. NPO at this time. Patient arrived on 2 L NC, was switched to BiPAP after provider assessment. Patient very agitated upon arrival, mitt restraints applied, increased agitation occurred. Pateint given IV Ativan, agitation decreased, patient currently sleeping.      "

## 2022-02-08 NOTE — PRE-PROCEDURE
"GENERAL PRE-PROCEDURE:   Procedure:  Coronary angiogram with possible PCI  Date/Time:  2/9/2022 9:20 AM    Written consent obtained?: Yes    Risks and benefits: Risks, benefits and alternatives were discussed    Consent given by:  Spouse (Patient with aphasia/critical illness)  Patient states understanding of procedure being performed: Yes    Patient's understanding of procedure matches consent: Yes    Procedure consent matches procedure scheduled: Yes    Expected level of sedation:  Moderate  Appropriately NPO:  Yes  ASA Class:  4 (NSTEMI, CAD; s/p PCI, HFmrEF, HTN, moderate-severe AS, COPD, respiratory failure, Hx of CVA, PVD; s/p aortoiliac bypass)  Mallampati  :  N/A- Alternate secured airway (BiPAP-NIPPV)  Lungs:  Other (comment)  Lung exam comment:  Diminished  Heart:  Systolic murmur  History & Physical reviewed:  History and physical reviewed and no updates needed  Statement of review:  I have reviewed the lab findings, diagnostic data, medications, and the plan for sedation    Patient is a DNR. Spoke with patient's  \"Bud\" by phone who understands DNR will be placed on hold for today's procedure.   "

## 2022-02-08 NOTE — Clinical Note
RCA Cine(s)  injected and visualized utilizing power injector system. I will SWITCH the dose or number of times a day I take the medications listed below when I get home from the hospital:  None

## 2022-02-08 NOTE — PROGRESS NOTES
Transfer of Care:    Patient accepted for transfer at [facility].  Report called by previous RN.  Paperwork prepared by previous RN and charge RN, faxed and copy sent with EMS.  Transported to EMS cart with help of staff.  Upon leaving this facility; HR SR 80s, /100 RR upper 20s, sats 92% on 2lpm NC.  Gtt's; Nitroglycerin @ 60, Heparin @ 900u/hr.  Patient forgetful but redirectable.   aware of transfer and last updated @ 2000.  Patient left with all belongings; including trilogy unit.

## 2022-02-08 NOTE — H&P
Pending transfer signed out to overnight hospitalist, summary below: Refer to hospitalist present upon arrival for full H and P with physical exam.     Preeti Ford is a 71 year old female who transfers with concern for NSTEMI. Transferred for further evaluation and treatment.      NSTEMI, coronary artery disease, ischemic cardiomyopathy, moderate to severe pulmonary hypertension, nonrheumatic aortic valve stenosis, tobacco use: Patient with elevated troponins.  Previously on heparin drip and nitroglycerin drip.  Reports also an exacerbation of COPD.  D-dimer elevated at 2.96, CTA of the chest is negative for pulmonary embolism.  Echocardiogram completed on the seventh with severe hypokinesis noted, see report for further details.  Patient with longstanding coronary artery disease with previous drug-eluting stent placement in 2018.  Additional history and review of systems is limited due to patient with cognitive impairment.  -Cardiology consulted.  -Heparin drip.  -Nitroglycerin drip.  -Close hemodynamic monitoring.  -Serial troponins.     Acute on chronic respiratory failure with hypoxia and hypercapnia, COPD with acute exacerbation, possible pneumonia of the right lung, possible sepsis, pulmonary emphysema: Per report transferring provider talked with pulmonary medicine who recommended stopping antibiotics.  -Continue steroids.    -IV Solu medrol at outside facility.   -Nebs as clinically indicated.   -Pulmonary hygiene.  -Consider stopping antibiotic therapy. Was on azithro/rocephin prior to transfer per report.   -RCAT.   -Blood cultures pending from 5 February.  -Legionella and strep pneumo negative.  Coronavirus influenza a and B-.  Respiratory panel negative.     Pulmonary nodules, enlarging: CT with enlarging pulmonary nodules documented, see report for further details.  Differential includes malignancy.  -Consider biopsy once medically stable and off of anticoagulation.     Anemia, chronic: MCV is  77.  -Monitor.     Severe malnutrition: Documented before.  -Nutrition consult.     History of stroke, cognitive impairment, aphasia: Previously in 2013 in 2018.  Including right thalamus and left cerebellar and right vermis.  Present cognitive impairments from strokes according to the .  -Monitor.    DVT Prophylaxis: Heparin drip.  Code Status: No CPR, pre arrest intubation ok has acp docs.     Disposition: Admission to ICU.     Dr. Mushtaq Quan D.O.  [facility] Hospitalist  Pager [phone]    History of Present Illness   Per outside hospital report: Preeti Ford is a 71 year old female who transfers with concern for NSTEMI. Transferred for further evaluation and treatment. Patient with elevated troponins.  Previously on heparin drip and nitroglycerin drip.  Reports also an exacerbation of COPD.  D-dimer elevated at 2.96, CTA of the chest is negative for pulmonary embolism.  Echocardiogram completed on the seventh with severe hypokinesis noted, see report for further details.  Patient with longstanding coronary artery disease with previous drug-eluting stent placement in 2018.  Additional history and review of systems is limited due to patient with cognitive impairment per reports.

## 2022-02-08 NOTE — CONSULTS
PULMONARY / CRITICAL CARE CONSULT NOTE    Date / Time of Admission:  2/8/2022 12:06 AM    Assessment:     Preeti Ford is a 71 year old female with history of chronic respiratory failure due to COPD (FEV1 30%) on home O2 2 LPM and Trilogy positive pressure ventilation at night, spontaneous pneumothorax, HTN, CAD, moderate to severe Aortic stenosis, CVA, PVD s/p aortoiliac bypass.   Patient was admitted at [facility] on 2/5/22 for evaluation of shortness of breath. Patient was in severe respiratory distress, initial SpO2 80's. ABG showed acute on chronic respiratory acidosis.   Troponin was elevated, normal WBC and diff, elevated ddimer, CXR showed lung infiltrates. Follow up Chest CT scan showed emphysematous changes, enlarging lung nodules, right upper lobe peripheral infiltrate.   Diagnosed with COPD exacerbation, pneumonia, NSTEMI.   Patient was started on BiPAP, steroids, antibiotics (ceftriaxone and azithromycin) and heparin drip.   Follow up echocardiogram showed severe hypokinesis of the mid and distal anterior, anteroseptal and distalinferior walls and apex. Function of the lateral and inferolateral walls is well preserved. EF 40-45%. Moderate valvular aortic stenosis. YOLANDA 1.1 cm2  Right ventricular systolic pressure is elevated, consistent with moderate pulmonary hypertension. Cardiology service was consulted, recommending that patient should be transferred to a facility for coronary angiogram.     1. Acute on chronic respiratory failure  Titrate FiO2, BiPAP as needed  2. Severe COPD with acute exacerbation   Systemic steroids, scheduled bronchodilators  3. NSTEMI  Echocardiogram showed severe hypokinesis of the mid and distal anterior, anteroseptal and distalinferior walls and apex. EF 40-45%. Moderate valvular aortic stenosis. YOLANDA 1.1 cm2. Cardiology service was already consulted.   Continue heparin drip. ASA, statins, B-blockers.   4. HTN , ischemic cardiomyopathy    5. Moderate aortic stenosis   6. Lung nodules   Increase size of RUL nodule from 0.9 to 1.3 cm and new nodule of 1.3 cm in RML  Could be inflammatory in nature. Malignancy can not be rule out.   Continue Abx.   PET CT scan as outpatient   7. Peripheral RUL infiltrate, loculated pleural air.   Reviewing previous images, chest CT scan 9/2021 showed large RUL infiltrate, consistent with pneumonia, could be lung abscess formation.   Recent CT scan showed volume loss , infiltrate / atelectasis, loculated pleural air.   Recommend video swallow study prior to oral intake.   8. PVD  9. Pulmonary cachexia Body mass index is 17.25 kg/m .    Advance Directives: DNR    Plan:   1. Titrate FiO2, keep SpO2 > 90%  2. BiPAP at night and as needed  3. Heplock IV fluids  4. Nitroglycerin drip  5. Heparin drip   6. ASA, statins  7. Cardiology consult  8. Get MRSA nasal screen , sputum Cx  9. Continue Abx ceftriaxone   10. NPO  11. Video swallow study   12. PPI for GI prophylaxis   13. Glucose level monitoring   14. DVT prophylaxis , anticoagulated with heparin   15. PET CT scan as outpatient     Please contact me if you have any questions.  Total critical care time, not including separately billable procedure time: 45 minutes  This patient had a high probability of imminent or life threatening deterioration due to acute respiratory failure which required my direct attention, intervention and personal management.     Owen Sorensen  Pulmonary / Critical Care  02/08/2022  12:29 AM          ICU DAILY CHECKLIST                           Can patient transfer out of MICU? no    FAST HUG:    Feeding:  Feeding: no.  Patient is receiving NPO    Barnhart: yes  Analgesia/Sedation:no    Thromboembolic prophylaxis: Yes; Mode:  Heparin  HOB>30:  Yes  Stress Ulcer Protocol Active: Yes; Mode: PPI  Glycemic Control: Any glucose > 180 no; Mode of Insulin Therapy: Sliding Scale Insulin    INTUBATED:  Can patient have daily waking:  No  Can  patient have spontaneous breathing trial:  No    Restraints? no    PHYSICAL THERAPY AND MOBILITY:  Can patient have PT and mobility trial: no  Activity: bedrest    Reason for consult : acute respiratory failure      HPI:  Preeti Ford is a 71 year old female with history of chronic respiratory failure due to COPD on home O2 2 LPM and Trilogy positive pressure ventilation at night, spontaneous pneumothorax, HTN, CAD, moderate to severe Aortic stenosis, CVA, PVD s/p aortoiliac bypass.   Patient was admitted at [facility] on 2/5/22 for evaluation of shortness of breath. Patient was in severe respiratory distress, initial SpO2 80's. ABG showed acute on chronic respiratory acidosis.   Troponin was elevated, normal WBC and diff, elevated ddimer, CXR showed lung infiltrates. Follow up Chest CT scan showed emphysematous changes, enlarging lung nodules, right upper lobe peripheral infiltrate.   Diagnosed with COPD exacerbation, pneumonia, NSTEMI.   Patient was started on BiPAP, steroids, antibiotics (ceftriaxone and azithromycin) and heparin drip.   Follow up echocardiogram showed severe hypokinesis of the mid and distal anterior, anteroseptal and distalinferior walls and apex. Function of the lateral and inferolateral walls is well preserved. EF 40-45%. Moderate valvular aortic stenosis. YOLANDA 1.1 cm2  Right ventricular systolic pressure is elevated, consistent with moderate pulmonary hypertension. Cardiology service was consulted, recommending that patient should be transferred to a facility for coronary angiogram.     Past Medical History:   Diagnosis Date     Acute on chronic respiratory failure (H) 03/25/2021     Arthritis      COPD (chronic obstructive pulmonary disease) (H)      Coronary artery disease      CVA (cerebral vascular accident) (H) 08/17/2013     Hypertension      Hypotension, unspecified hypotension type      Sepsis (H)      Allergies: Crestor [rosuvastatin], Lisinopril, Statins  [hmg-coa-r inhibitors], Optison [albumin human], and Penicillins     MEDS:  Current Facility-Administered Medications   Medication     acetaminophen (TYLENOL) tablet 650 mg    Or     acetaminophen (TYLENOL) solution 650 mg     aspirin EC tablet 81 mg     carboxymethylcellulose PF (REFRESH PLUS) 0.5 % ophthalmic solution 1 drop     glucose gel 15-30 g    Or     dextrose 50 % injection 25-50 mL    Or     glucagon injection 1 mg     heparin infusion 25,000 units in D5W 250 mL ANTICOAGULANT     HYDROmorphone (DILAUDID) injection 0.2 mg     influenza vac high-dose quad (FLUZONE HD) injection SONIDO 0.7 mL     ipratropium - albuterol 0.5 mg/2.5 mg/3 mL (DUONEB) neb solution 3 mL     LORazepam (ATIVAN) injection 0.5 mg     methylPREDNISolone sodium succinate (solu-MEDROL) injection 62.5 mg     naloxone (NARCAN) injection 0.2 mg    Or     naloxone (NARCAN) injection 0.4 mg    Or     naloxone (NARCAN) injection 0.2 mg    Or     naloxone (NARCAN) injection 0.4 mg     nitroGLYcerin 50 mg in D5W 250 mL PERIPHERAL IV infusion     No lozenges or gum should be given while patient on BIPAP/AVAPS/AVAPS AE     Patient is already receiving anticoagulation with heparin, enoxaparin (LOVENOX), warfarin (COUMADIN)  or other anticoagulant medication     Patient may continue current oral medications     polyethylene glycol (MIRALAX) Packet 17 g     prochlorperazine (COMPAZINE) injection 5 mg    Or     prochlorperazine (COMPAZINE) tablet 5 mg    Or     prochlorperazine (COMPAZINE) suppository 12.5 mg     QUEtiapine (SEROquel) half-tab 12.5 mg     QUEtiapine (SEROquel) tablet 25 mg     senna-docusate (SENOKOT-S/PERICOLACE) 8.6-50 MG per tablet 1 tablet    Or     senna-docusate (SENOKOT-S/PERICOLACE) 8.6-50 MG per tablet 2 tablet     Facility-Administered Medications Ordered in Other Encounters   Medication     Reason aspirin not prescribed (intentional)     Social History     Socioeconomic History     Marital status:      Spouse name:  "Not on file     Number of children: Not on file     Years of education: Not on file     Highest education level: Not on file   Occupational History     Not on file   Tobacco Use     Smoking status: Current Some Day Smoker     Types: Cigarettes     Smokeless tobacco: Never Used     Tobacco comment: \"just a couple a day\"   Substance and Sexual Activity     Alcohol use: No     Alcohol/week: 0.0 standard drinks     Drug use: No     Sexual activity: Not Currently     Partners: Male   Other Topics Concern      Service No     Blood Transfusions No     Caffeine Concern No     Occupational Exposure No     Hobby Hazards No     Sleep Concern Yes     Comment: Trouble breathing at night due to COPD     Stress Concern No     Weight Concern No     Special Diet No     Back Care No     Exercise No     Bike Helmet No     Seat Belt Yes     Self-Exams Yes     Parent/sibling w/ CABG, MI or angioplasty before 65F 55M? No   Social History Narrative     Not on file     Social Determinants of Health     Financial Resource Strain: Not on file   Food Insecurity: Not on file   Transportation Needs: Not on file   Physical Activity: Not on file   Stress: Not on file   Social Connections: Not on file   Intimate Partner Violence: Not on file   Housing Stability: Not on file     Family History   Problem Relation Age of Onset     Cerebrovascular Disease Mother      Cerebrovascular Disease Father      Genitourinary Problems Brother         Dialysis     ROS  - Difficult to obtain due to respiratory distress    Objective:   BP (!) 185/83   Pulse 100   Temp 97.3  F (36.3  C) (Axillary)   Resp 26   Ht 1.6 m (5' 3\")   Wt 44.2 kg (97 lb 6.4 oz)   SpO2 97%   BMI 17.25 kg/m      EXAM:   Gen: awake, alert, moderate respiratory distress  HEENT: pale conjunctiva, moist mucosa  Neck: no thyromegaly, masses or JVD  Lungs: decrease breath sounds both HT  CV: regular, no murmurs or gallops appreciated  Abdomen: soft, NT, BS wnl  Ext: trace " edema  Neuro: CN II-XII intact, non focal       Data Review:     2/7/2022 07:47   Coronavirus Not Detected   Influenza A Not Detected   Influenza B Not Detected       Recent Labs   Lab 02/07/22  0737 02/07/22  0537 02/07/22  0001 02/06/22 2025 02/06/22  1119 02/06/22  0746   * 131* 137* 138* 136* 137*        2/6/2022 12:18 2/6/2022 16:05 2/6/2022 20:38 2/6/2022 23:02 2/7/2022 02:15 2/7/2022 05:37 2/7/2022 07:47 2/7/2022 12:49 2/7/2022 12:52 2/7/2022 17:51   Troponin I High Sensitivity 802 (HH) 943 (HH) 1,132 (HH) 1,135 (HH) 1,001 (HH) 1,016 (HH)   916 (HH) 779 (HH)      2/7/2022 05:38   FIO2 24   Ph Venous 7.38   PCO2 Venous 55 (H)   PO2 Venous 64 (H)   Bicarbonate Venous 33 (H)   Base Excess Venous 6.1 (H)   Oxyhemoglobin Venous 89 (H)      2/7/2022 05:37   WBC 4.1   Hemoglobin 11.2 (L)   Hematocrit 38.9   Platelet Count 286   RBC Count 5.03   MCV 77 (L)   MCH 22.3 (L)   MCHC 28.8 (L)   RDW 17.8 (H)      2/7/2022 05:37 2/7/2022 07:47 2/7/2022 12:49   Sodium 139     Potassium 4.2     Chloride 104     Carbon Dioxide 30     Urea Nitrogen 23     Creatinine 0.47 (L)     GFR Estimate >90     Calcium 8.7     Anion Gap 5     Lactic Acid  2.5 (H) 3.7 (H)     CT CHEST PULMONARY EMBOLISM WITH CONTRAST 2/7/2022 3:34 PM  CLINICAL HISTORY: Chest pain. Pulmonary embolus suspected, low/intermediate probability, positive D-dimer.  COMPARISON: Chest CT from September 20, 2021  FINDINGS:  ANGIOGRAM CHEST: Pulmonary arteries are normal caliber and negative for pulmonary emboli. Thoracic aorta is nondiagnostic for dissection. No CT evidence of right heart strain.  LUNGS AND PLEURA: Emphysema. Volume loss in the right upper lobe noted. Question some loculated pleural air versus necrotic lung laterally. There appeared to have been a lung abscess on the comparison study. This may have also been loculated in the fissure on the comparison. Nodule in the superior segment of the right lower lobe now measures 1.3 cm previously 0.9 cm.  Nodule in the right middle lobe laterally measures 1.3 cm, not definitely seen previously although there was infiltrate on the comparison study.  MEDIASTINUM/AXILLAE: No aneurysm. There are extensive coronary vascular calcifications and/or stents consistent with coronary artery disease. No discrete adenopathy.  UPPER ABDOMEN: Mild right hydronephrosis. Cholelithiasis without cholecystitis.   MUSCULOSKELETAL: No frankly destructive bony lesions.  IMPRESSION:  1.  No pulmonary embolism demonstrated.   2.  Enlarging pulmonary nodules in the superior segment of the right lower lobe and right middle lobe.  3.  Volume loss in the right upper lobe is indeterminate.  4.  New right hydronephrosis of uncertain etiology.     Echocardiogram 2/7/2022  Severe hypokinesis of the mid and distal anterior, anteroseptal and distal  inferior walls and apex. Function of the lateral and inferolateral walls is well preserved.  Left ventricular systolic function is mild to moderately reduced.  The visual ejection fraction is 40-45%.  There is mild (1+) mitral regurgitation.  Moderate valvular aortic stenosis. YOLANDA 1.1 cm2, mean AV gradient 16 mmHg. Low  SVI suggests low-flow, low-gradient moderate to severe AS.  Right ventricular systolic pressure is elevated, consistent with moderate pulmonary hypertension.  Compared to the previous study, the LVF is much lower and WMA's are now present. AS is probably unchanged.      By:  Owen Sorensen MD, 02/08/2022  12:29 AM    Primary Care Physician:  Alli Castro

## 2022-02-08 NOTE — DISCHARGE SUMMARY
[facility]  Hospitalist Discharge Summary      Date of Admission:  2/5/2022  Date of Discharge:  2/7/2022  Discharging Provider: Deisy Laird MD  Discharge Service: Hospitalist Service    Discharge Diagnoses   Principal Problem:    NSTEMI (non-ST elevated myocardial infarction) (H)  Active Problems:    Anemia    Elevated troponin    Acute on chronic respiratory failure with hypoxia and hypercapnia    COPD with acute exacerbation (H)    Pneumonia of right lung due to infectious organism    Cognitive impairment-BIMS 4/15 at [facility] May 2020    Hyperlipidemia LDL goal <130    History of stroke - right thalamus in 8/2013 and left cerebellar and right vermis in 3/2018    Ischemic cardiomyopathy - EF 25% in 2015 but 55% in 3/2017 and 45-50% on 3/18    PAD (peripheral artery disease) (H) - moderate left common carotid stenosis, aortoiliac bypass, chronic left vertebral and right posterior cerebral stenoses    Coronary artery disease of native artery of native heart with stable angina pectoris (H)    Pulmonary emphysema (H)    Severe malnutrition (H)    Moderate to severe pulmonary hypertension (H)-per Echo    Nonrheumatic aortic valve stenosis - moderate to severe    S/P drug eluting coronary stent placement-LAD and circ, circ re-do 5/17/18    Chronic respiratory failure with hypoxia and hypercapnia (H)-home O2 and Trilogy    Tobacco use    Aphasia due to recent cerebral infarction    Follow-ups Needed After Discharge       Unresulted Labs Ordered in the Past 30 Days of this Admission     Date and Time Order Name Status Description    2/5/2022  8:44 AM Blood Culture Arm, Right Preliminary     2/5/2022  8:44 AM Blood Culture Arm, Left Preliminary       These results will be followed up by accepting provider at [facility]    Discharge Disposition   Transferred to [facility]   Condition at discharge: Serious but stable    Hospital Course   Preeti Ford is a 71  year old female with a past medical history of severe COPD on 2 L nasal cannula chronically as well as Trilogy with sleep, coronary artery disease s/p stent placement, previous CVA with residual cognitive impairment and aphasia admitted on 2/5/2022 with acute on chronic respiratory failure secondary to non-STEMI, for which the patient will be transferring to [facility] for interventional cardiology consideration.  Initial diagnosis was unclear and patient was treated with steroids scheduled nebulizers for concern of COPD exacerbation, Rocephin and azithromycin for concern of right upper lobe pneumonia as well as IV heparin drip secondary to elevated troponin with concern for demand ischemia versus non-STEMI.      Patient's troponins continued to worsen to a peak of 1135 and echocardiogram shows anterior, anterior septal, and inferior hypokinesis with new EF of 40 to 45% consistent with non-STEMI.  Patient continues to have ongoing respiratory failure and has been requiring her home Trilogy device to be on the vast majority of the time to maintain respiratory stability.  She continues on the heparin drip, has been given aspirin 325 mg, is on IV metoprolol 5 mg every 8 hours and has been initiated on a nitroglycerin drip to improve blood pressure control and also hopefully minimize respiratory symptoms, which is felt possibly to be an anginal equivalent.  Patient will be transferring via EMS to [facility] for ongoing ICU management as well as interventional cardiology evaluation    Of note, CTA performed during this hospital stay showed several abnormalities and imaging was reviewed with Dr. Burroughs, pulmonologist from the [facility], who on review feels the right upper lobe changes are likely chronic and related to patient's significant pneumonia from September 2021 and may be permanent.  Given patient's afebrile status, low white blood cell count, negative procalcitonin,  lack of cough or other respiratory symptoms and based on CT appearance, could consider discontinuation of antibiotics.  There is a concern for true pulmonary nodule, 1 of which in the right lower lobe that has increased in size from 0.9 cm up to 1.3 cm in the past 5 months and a new nodule in the right middle lobe measuring 1.3 cm that was not apparent on imaging 3 months ago.  There growth and appearance is worrisome for cancer versus infectious process versus other and once patient's cardiac condition stabilizes, it is recommended patient undergo CT-guided biopsy of one of the lesions to further evaluate etiology.    Of note, patient's ongoing baseline mentation and aphasia makes it difficult to assess her understanding of the current critical illness as well as global understanding of her medical conditions and overall poor prognosis but patient is able to verbalize her wishes and stays consistent in her wishes when reasked.  Still wishes DNR status today but would consider intubation and tach if needed.  Would desire surgical intervention if needed and does want to proceed with biopsies of the lung nodules when able.   is in agreement.        Consultations This Hospital Stay   PHARMACY IP CONSULT  PHARMACY IP CONSULT  NUTRITION SERVICES ADULT IP CONSULT  CARE MANAGEMENT / SOCIAL WORK IP CONSULT  PHARMACY IP CONSULT  PHARMACY IP CONSULT    Code Status   No CPR- Pre-arrest intubation OK    Time Spent on this Encounter   Total time of 121 minutes has been spent on management of this critically ill patient today in initial assessment, discussions with cardiology and pulmonology, discussion with patient and  regarding goals of care and options, and facilitating transfer to [facility] for ongoing critical care management.       Deisy Laird MD  [facility] INTENSIVE CARE  [address] [place] [address]  Phone:  [phone]  ______________________________________________________________________    Physical Exam   Vital Signs: Temp: 97.8  F (36.6  C) Temp src: Oral BP: (!) 178/88 Pulse: 90   Resp: 29 SpO2: 95 % O2 Device: Nasal cannula Oxygen Delivery: 1 LPM  Weight: 93 lbs 11.13 oz  Constitutional: Patient is awake, alert, cooperative, on home trilogy without signs of respiratory distress  Respiratory: Mild tachypnea in the mid 20 range, ongoing severe diminished breath sounds but without wheezes or crackles  Cardiovascular: Regular rate and rhythm in the upper 90s  GI: Bowel sounds present, abdomen soft and nondistended, nontender to palpation  Musculoskeletal: no lower extremity pitting edema present  Neurologic: Awake, alert, oriented to person, the fact that she is in the hospital and that she will be transferring to another hospital but cannot remember the name, the fact that they are considering surgery so that she can breathe better but not the name of the procedure.       Primary Care Physician   Alli Castro    Discharge Orders      Follow Up and recommended labs and tests    Follow up following hospitalization to be determined following hospital completion at [facility]     Reason for your hospital stay    Acute respiratory failure with hypoxia and hypercapnia, COPD exacerbation, Non-STEMI, possible pneumonia, pulmonary lung nodules     Activity - Up with nursing assistance     Diet    Follow this diet upon discharge: NPO in transfer       Significant Results and Procedures   Results for orders placed or performed during the hospital encounter of 02/05/22   XR Chest Port 1 View    Narrative    EXAM: XR CHEST PORTABLE 1 VIEW  LOCATION: [facility]  DATE/TIME: 02/05/2022, 8:14 AM    INDICATION: Shortness of breath.  COMPARISON: Chest x-ray 09/18/2021.      Impression    IMPRESSION: New focal consolidation at the lateral right lung base could be developing acute airspace  disease including pneumonia or other airspace disease. Loculated effusion left lateral mid chest is again seen, but its configuration is more elongated   in the craniocaudal dimension than on the prior exam. Adjacent airspace consolidation versus scarring again identified. No new airspace disease on the left. Diffuse emphysema. Normal cardiac silhouette.     CT Chest Pulmonary Embolism w Contrast    Narrative    CT CHEST PULMONARY EMBOLISM WITH CONTRAST 2/7/2022 3:34 PM    CLINICAL HISTORY: Chest pain. Pulmonary embolus suspected,  low/intermediate probability, positive D-dimer.    TECHNIQUE: CT angiogram chest during arterial phase injection IV  contrast. 2D and 3D MIP reconstructions were performed by the CT  technologist. Dose reduction techniques were used.   CONTRAST: 65mL, [id number]    COMPARISON: Chest CT from September 20, 2021    FINDINGS:  ANGIOGRAM CHEST: Pulmonary arteries are normal caliber and negative  for pulmonary emboli. Thoracic aorta is nondiagnostic for dissection.  No CT evidence of right heart strain.    LUNGS AND PLEURA: Emphysema. Volume loss in the right upper lobe  noted. Question some loculated pleural air versus necrotic lung  laterally. There appeared to have been a lung abscess on the  comparison study. This may have also been loculated in the fissure on  the comparison. Nodule in the superior segment of the right lower lobe  now measures 1.3 cm previously 0.9 cm. Nodule in the right middle lobe  laterally measures 1.3 cm, not definitely seen previously although  there was infiltrate on the comparison study.    MEDIASTINUM/AXILLAE: No aneurysm. There are extensive coronary  vascular calcifications and/or stents consistent with coronary artery  disease. No discrete adenopathy.    UPPER ABDOMEN: Mild right hydronephrosis. Cholelithiasis without  cholecystitis.    MUSCULOSKELETAL: No frankly destructive bony lesions.      Impression    IMPRESSION:  1.  No pulmonary embolism demonstrated.    2.  Enlarging pulmonary nodules in the superior segment of the right  lower lobe and right middle lobe.  3.  Volume loss in the right upper lobe is indeterminate.  4.  New right hydronephrosis of uncertain etiology.     ALFRED DUNCAN MD         SYSTEM ID:  [id number]   Echocardiogram Complete     Value    LVEF  40-45%    [facility]    [id number]  [id number]  [id number]  [id number]^ANNA^NAYE^ABHISHEK     [facility]  Echocardiography Laboratory  [address] [place] [address]     Name: TARIK HERNANDEZ  MRN: [id number]  : 1950  Study Date: 2022 07:49 AM  Age: 71 yrs  Gender: Female  [facility]  Reason For Study: CAD, SOB, Respiratory Failure  Ordering Physician: NAYE CASTILLO  Referring Physician: RICK CARROLL  Performed By: Rocio Stockton RDCS     BSA: 1.5 m2  Height: 63 in  Weight: 106 lb  HR: 111  BP: 137/71 mmHg  ______________________________________________________________________________  Procedure  Complete Portable Echo Adult.  ______________________________________________________________________________  Interpretation Summary     Severe hypokinesis of the mid and distal anterior, anteroseptal and distal  inferior walls and apex. Function of the lateral and inferolateral walls is  well preserved.  Left ventricular systolic function is mild to moderately reduced.  The visual ejection fraction is 40-45%.  There is mild (1+) mitral regurgitation.  Moderate valvular aortic stenosis. YOLANDA 1.1 cm2, mean AV gradient 16 mmHg. Low  SVI suggests low-flow, low-gradient moderate to severe AS.  Right ventricular systolic pressure is elevated, consistent with moderate  pulmonary hypertension.     Compared to the previous study, the LVF is much lower and WMA's are now  present. AS is probably unchanged.  ______________________________________________________________________________  Left Ventricle  The left ventricle is normal in size. There is  normal left ventricular wall  thickness. Grade I or early diastolic dysfunction. Left ventricular systolic  function is mild to moderately reduced. The visual ejection fraction is 40-  45%. Severe hypokinesis of the mid and distal anterior, anteroseptal and  distal inferior walls and apex. Function of the lateral and inferolateral  walls is well preserved.     Right Ventricle  The right ventricle is normal size. Mildly decreased right ventricular  systolic function. The right ventricle is not well visualized.     Atria  Normal left atrial size. Right atrial size is normal. There is no atrial shunt  seen.     Mitral Valve  The mitral valve leaflets appear thickened, but open well. There is mild (1+)  mitral regurgitation.     Tricuspid Valve  The tricuspid valve is normal in structure and function. There is trace  tricuspid regurgitation. The right ventricular systolic pressure is  approximated at 49.6 mmHg plus the right atrial pressure. IVC diameter and  respiratory changes fall into an intermediate range suggesting an RA pressure  of 8 mmHg. Right ventricular systolic pressure is elevated, consistent with  moderate pulmonary hypertension.     Aortic Valve  There is trace aortic regurgitation. The calculated aortic valve are is 1.1  cm^2. The peak AoV pressure gradient is 26.0 mmHg. The mean AoV pressure  gradient is 14.0 mmHg. Moderate valvular aortic stenosis.     Pulmonic Valve  The pulmonic valve is not well seen, but is grossly normal. There is trace  pulmonic valvular regurgitation.     Vessels  Normal size aorta.     Pericardium  There is no pericardial effusion.     Rhythm  The rhythm was sinus tachycardia.  ______________________________________________________________________________  MMode/2D Measurements & Calculations  IVSd: 1.2 cm     LVIDd: 4.2 cm  LVIDs: 3.7 cm  LVPWd: 1.0 cm  FS: 11.9 %  LV mass(C)d: 157.1 grams  LV mass(C)dI: 106.4 grams/m2  Ao root diam: 2.6 cm  LA dimension: 4.1 cm  LA/Ao:  1.6  LVOT diam: 2.1 cm  LVOT area: 3.5 cm2  RWT: 0.48     Doppler Measurements & Calculations  MV E max ryan: 79.3 cm/sec  MV dec slope: 363.0 cm/sec2  MV dec time: 0.22 sec  Ao V2 max: 257.0 cm/sec  Ao max P.0 mmHg  Ao V2 mean: 173.0 cm/sec  Ao mean P.0 mmHg  Ao V2 VTI: 44.9 cm  YOLANDA(I,D): 1.1 cm2  YOLANDA(V,D): 0.99 cm2  LV V1 max P.1 mmHg  LV V1 max: 73.1 cm/sec  LV V1 VTI: 14.7 cm  SV(LVOT): 50.9 ml  SI(LVOT): 34.5 ml/m2  PA acc time: 0.10 sec  TR max ryan: 352.0 cm/sec  TR max P.6 mmHg  AV Ryan Ratio (DI): 0.28     YOLANDA Index (cm2/m2): 0.77  E/E' avg: 10.1  Lateral E/e': 9.0  Medial E/e': 11.2     ______________________________________________________________________________  Report approved by: Jose C Dos Santos 2022 10:30 AM               Discharge Medications   Current Discharge Medication List      CONTINUE these medications which have NOT CHANGED    Details   albuterol (PROAIR HFA/PROVENTIL HFA/VENTOLIN HFA) 108 (90 Base) MCG/ACT inhaler Inhale 2 puffs into the lungs every 6 hours as needed for shortness of breath / dyspnea  Qty: 3 Inhaler, Refills: 3    Comments: Pharmacy may dispense brand covered by insurance (Proair, or proventil or ventolin or generic albuterol inhaler)  Associated Diagnoses: Chronic bronchitis, unspecified chronic bronchitis type (H)      albuterol (PROVENTIL) (2.5 MG/3ML) 0.083% neb solution INHALE ONE VIAL BY NEBULIZATION EVERY 4 HOURS AS NEEDED FOR SHORTNESS OF BREATH / DIFFICULTY BREATHING OR WHEEZING  Qty: 360 mL, Refills: 0    Associated Diagnoses: COPD exacerbation (H); Acute on chronic respiratory failure with hypoxia and hypercapnia (H)      aspirin 81 MG EC tablet Take 1 tablet (81 mg) by mouth daily    Associated Diagnoses: Coronary artery disease involving native coronary artery of native heart with angina pectoris (H)      atorvastatin (LIPITOR) 20 MG tablet Take 1 tablet (20 mg) by mouth At Bedtime  Qty: 90 tablet, Refills: 3    Associated Diagnoses: PAD  (peripheral artery disease) (H)      ferrous sulfate (FEROSUL) 325 (65 Fe) MG tablet Take 1 tablet (325 mg) by mouth 2 times daily  Qty: 60 tablet, Refills: 1    Associated Diagnoses: Iron deficiency anemia, unspecified iron deficiency anemia type      Fluticasone-Umeclidin-Vilanterol (TRELEGY ELLIPTA) 200-62.5-25 MCG/INH oral inhaler Inhale 1 puff into the lungs daily  Qty: 28 each, Refills: 11    Associated Diagnoses: COPD, severe (H)      montelukast (SINGULAIR) 10 MG tablet Take 1 tablet (10 mg) by mouth At Bedtime  Qty: 90 tablet, Refills: 3    Associated Diagnoses: COPD, severe (H)      !! ACE/ARB/ARNI NOT PRESCRIBED, INTENTIONAL, Please choose reason not prescribed, below    Associated Diagnoses: Elevated blood pressure reading without diagnosis of hypertension; History of stroke      !! BETA BLOCKER NOT PRESCRIBED (INTENTIONAL) Please choose reason not prescribed from choices below.    Associated Diagnoses: COPD, severe (H)      guaiFENesin (ORGANIDIN) 200 MG tablet Take 800 mg by mouth daily      nitroGLYcerin (NITROSTAT) 0.4 MG sublingual tablet For chest pain place 1 tablet under the tongue every 5 minutes for 3 doses. If symptoms persist 5 minutes after 1st dose call 911.  Qty: 25 tablet, Refills: 0    Associated Diagnoses: Coronary artery disease involving native coronary artery of native heart with angina pectoris (H)      Nutritional Supplements (ENSURE PLUS) LIQD Take 1 each by mouth 4 times daily  Qty: 5688 mL, Refills: 11    Associated Diagnoses: Severe protein-calorie malnutrition (Jones: less than 60% of standard weight) (H); Adult failure to thrive      order for DME Equipment being ordered: Nebulizer machine  Qty: 1 Device, Refills: 0    Associated Diagnoses: Chronic bronchitis, unspecified chronic bronchitis type (H)      Respiratory Therapy Supplies (NEBULIZER/TUBING/MOUTHPIECE) KIT 1 kit as needed (if becomes damaged)  Qty: 1 kit, Refills: 1    Associated Diagnoses: Panlobular emphysema (H);  Chronic bronchitis, unspecified chronic bronchitis type (H)       !! - Potential duplicate medications found. Please discuss with provider.        Allergies   Allergies   Allergen Reactions     Crestor [Rosuvastatin] Other (See Comments)     dizziness     Lisinopril Cough     Statins [Hmg-Coa-R Inhibitors] Other (See Comments)     Muscle/joint aching     Optison [Albumin Human] Other (See Comments)     Pt was flush and very dizzy.  Also had a BP drop     Penicillins Rash

## 2022-02-08 NOTE — CONSULTS
Care Management Initial Consult    General Information  Assessment completed with: Spouse or significant other,  (Bud)  Type of CM/SW Visit: Offer D/C Planning    Primary Care Provider verified and updated as needed: Yes   Readmission within the last 30 days:           Advance Care Planning: Advance Care Planning Reviewed: questions answered   (spouse has at home, not notarized )       Communication Assessment  Patient's communication style: spoken language (English or Bilingual)    Hearing Difficulty or Deaf: no   Wear Glasses or Blind: no    Cognitive  Cognitive/Neuro/Behavioral: .WDL except,mood/behavior,orientation,speech  Level of Consciousness: confused  Arousal Level: opens eyes spontaneously  Orientation: disoriented to,place,time,situation  Mood/Behavior: anxious,agitated  Best Language: 0 - No aphasia  Speech: clear    Living Environment:   People in home: spouse   (Bud)  Current living Arrangements: house      Able to return to prior arrangements: yes       Family/Social Support:  Care provided by: spouse/significant other  Provides care for: no one, unable/limited ability to care for self  Marital Status:      (Bud)       Description of Support System: Supportive,Involved    Support Assessment: Adequate family and caregiver support    Current Resources:   Patient receiving home care services: No- previously had [facility]. Spouse requesting again.     Community Resources:    Equipment currently used at home: grab bar, toilet,grab bar, tub/shower,shower chair,walker, rolling  Supplies currently used at home: Incontinence Supplies,[employer],  Oxygen Tubing/Supplies- 2 L at home, [employer]- pt has concentrator     Employment/Financial:  Employment Status: retired        Financial Concerns: No concerns identified   Referral to Financial Counselor: No       Lifestyle & Psychosocial Needs:  Social Determinants of Health     Tobacco Use: High Risk     Smoking Tobacco Use: Current Some Day Smoker      Smokeless Tobacco Use: Never Used   Alcohol Use: Not on file   Financial Resource Strain: Not on file   Food Insecurity: Not on file   Transportation Needs: Not on file   Physical Activity: Not on file   Stress: Not on file   Social Connections: Not on file   Intimate Partner Violence: Not on file   Depression: Not at risk     PHQ-2 Score: 0   Housing Stability: Not on file       Functional Status:  Prior to admission patient needed assistance:   Dependent ADLs:: Ambulation-walker,Incontinence  Dependent IADLs:: Transportation       Mental Health Status:  Mental Health Status: No Current Concerns       Chemical Dependency Status:  Chemical Dependency Status: No Current Concerns             Values/Beliefs:  Spiritual, Cultural Beliefs, Restoration Practices, Values that affect care: no               Additional Information:  IVONNE completed assessment over the phone with pt  Yoseph, (Oh). Plan is for pt to return home at discharge, with  to transport. [place] would like home care, he plans to purchase a commode to place next to pt bed. Spouse Oh is pt primary caregiver, pt has been able to bathe and dress on her own at home previously.     LUIS Navarrete

## 2022-02-08 NOTE — PROGRESS NOTES
"Received PT on AVAPS with the settings of  MIN P 10 MAX P35 EPAP 5 R 22 40% with a SMALL mask and gelpad over bridge of nose to prevent redness and breakdown. No breakdown noted.  Spoke with PT's nurse on the above. BS diminished. Nebs to start in am Q4HWA scheduled.  ABG was drawn with results below. RN and M.D. aware of ABG results and changes made to AVAPS at that time. RT will continue to follow.     (ABGs/VBGs)  Last Arterial Blood Gas:  Arterial Blood Gas result:  pO2 127; pCO2 91; pH 7.18;  HCO3 28, %O2 Sat 96.9.      /59   Pulse 79   Temp 97.3  F (36.3  C) (Axillary)   Resp 19   Ht 1.6 m (5' 3\")   Wt 44.2 kg (97 lb 6.4 oz)   SpO2 100%   BMI 17.25 kg/m      Aaron Leal, RT  2/8/2022      "

## 2022-02-08 NOTE — PROGRESS NOTES
"[facility]    Medicine Progress Note - Hospitalist Service    Date of Admission:  2/8/2022     Assessment & Plan   SUMMARY:  71 year old female with history of severe oxygen dependent COPD using trilogy BIPAP support at night, pulmonary hypertension, still actively smoking, PVD, s/p several CVA's resulting in expressive aphasia and poor impulse control, CAD s/p PCI 2018, ischemic cardiomyopathy, moderate aortic stenosis, malnutrition, transferred from [facility] evening of 2/7/2022 for acute on chronic respiratory failure, NSTEMI with unstable angina, planning for coronary angiogram with PCI when stable.    Spoke at length with  \"Bud\" by phone today with update on patient's ongoing severe respiratory compromise, concern for respiratory decompensation during angiogram, patient's poor memory function. Spouse understands that cardiac status currently severe/stable on heparin and NTG drips, maximizing respiratory function and considering cardiac procedure tomorrow if improves.  Spouse to visit today, encouraged to get daughter involved in goals of care conversation as well.    Addendum: reviewed patient's respiratory status with spouse. He agrees that she would not want to be on ventilator support. OK for BIPAP. Change code status to DNR/DNI.    ACTIVE PROBLEM LIST  NSTEMI, coronary artery disease, ischemic cardiomyopathy, moderate to severe pulmonary hypertension, nonrheumatic aortic valve stenosis, tobacco use: Patient with elevated troponins. D-dimer elevated at 2.96, CTA of the chest is negative for pulmonary embolism. Echocardiogram completed on the seventh with severe hypokinesis of the mid and distal anterior, anteroseptal and distalinferior walls and apex. Function of the lateral and inferolateral walls is well preserved. EF 40-45%. Moderate valvular aortic stenosis. YOLANDA 1.1 cm2.  Patient with longstanding coronary artery disease with previous drug-eluting stent " placement in 2018.    -Cardiology consulted.  -Heparin drip.  -Nitroglycerin drip.  -Close hemodynamic monitoring.  -Serial troponins - stable trending down     Acute on chronic respiratory failure with hypoxia and hypercapnia, COPD with acute exacerbation, possible pneumonia of the right lung, possible sepsis, pulmonary emphysema:  -Pulmonary on board appreciate recommendations  -BiPAP, IV Solu medrol, scheduled duo nebs   -Pulmonary hygiene.  -Pulmonary concerned regarding lung infiltrate, continuing ceftriaxone IV for now  -RCAT.   -Blood cultures pending from 5 February.  -Legionella and strep pneumo negative.  Coronavirus influenza a and B-.  Respiratory panel negative.    Aspiration risk:  NPO until swallow evaluation completed    Pulmonary nodules, enlarging: CT with enlarging pulmonary nodules documented, see report for further details.  Differential includes acute inflammatory changes vs malignancy.  -Consider biopsy once medically stable and off of anticoagulation.     Anemia, chronic: MCV is 77.  -Monitor.     Severe malnutrition: Documented before.  -Nutrition consult.     History of stroke, cognitive impairment, aphasia: Previously in 2013, again in 2018.  Including right thalamus and left cerebellar and right vermis.  Present cognitive impairments from strokes according to the .    Acute metabolic encephalopathy  -multifactorial  -Scheduled and prn seroquel     DVT Prophylaxis: Heparin drip.  Code Status: No CPR, pre arrest intubation ok has acp docs.      Disposition: Admission to ICU.     Principal Problem:    Acute on chronic respiratory failure with hypoxia and hypercapnia  Active Problems:    Hyperlipidemia LDL goal <130    History of stroke - right thalamus in 8/2013 and left cerebellar and right vermis in 3/2018    Ischemic cardiomyopathy - EF 25% in 2015 but 55% in 3/2017 and 45-50% on 3/18    HTN, goal below 130/80    NSTEMI (non-ST elevated myocardial infarction) (H)    PAD (peripheral  "artery disease) (H) - moderate left common carotid stenosis, aortoiliac bypass, chronic left vertebral and right posterior cerebral stenoses    Severe malnutrition (H)    Moderate to severe pulmonary hypertension (H)-per Echo    Aphasia due to recent cerebral infarction    Disposition Plan: Expected discharge: 02/12/2022   recommended to Pending once respiratory and cardiac status stable.    Diet: Orders Placed This Encounter      NPO for Medical/Clinical Reasons Except for: Meds, Ice Chips    Barnhart Catheter: Not present  Central Lines/Port-a-cath: Not present  Drains: Not present     --------------------------------------------    The patient's care was discussed with the Intensivist, cardiologist, Patient, Patient's Family and Bedside Nurse.    Marcin Cordova MD  Hospitalist Service  [facility]  Securely message with the Vocera Web Console (learn more here)  Text page via [employer]    Clinically Significant Risk Factors Present on Admission        # Hyperkalemia: K = 5.4 mmol/L (Ref range: 3.5 - 5.0 mmol/L) on admission, will monitor as appropriate        # Platelet Defect: home medication list includes an antiplatelet medication   ______________________________________________________________________    Interval History   Patient awake, alert but oriented only to self. Impulsive, keeps trying to get out of bed to walk to [place]. Complains of intermittent chest tightness, persistent shortness of breath.    ROS: passing urine well and good appetite    Data personally reviewed from the last 24 hours:   Reviewed telemetry, Laboratory results, Consultant recommendations and medications     Physical Exam   BP (!) 148/69   Pulse 84   Temp 97.1  F (36.2  C) (Axillary)   Resp 23   Ht 1.6 m (5' 3\")   Wt 44.2 kg (97 lb 6.4 oz)   SpO2 100%   BMI 17.25 kg/m      Physical Exam    General Appearance:    HEENT:  Awake, Alert, Cooperative, in no distress and appears stated age "   Normocephalic, atraumatic, conjunctiva clear without icterus and ears without discharge   Lungs:   Bilateral wheezing, prolonged expiratory phase   Cardiovascular:  Regular Rate and Rythm, normal apical impulse, normal S1 and S2, no lower extremity edema bilaterally   Abdomen: Soft, non-tender and Non-distended, active bowel sounds   Skin:  Skin color, texture normal and bruising or bleeding. No rashes or lesions over face, neck, arms and legs, turgor normal.   Neurologic:    Neuropsychiatric: Alert & Oriented X 3, Facial symmetry preserved and upper & lower extremities moving well with symmetry  Calm, normal eye contact, Affect normal     Data   Recent Labs   Lab 02/08/22  0759 02/08/22  0519 02/08/22  0340 02/07/22  0737 02/07/22  0537 02/06/22  0746 02/06/22  0530 02/05/22  1336 02/05/22  0708   WBC  --  6.7  --   --  4.1  --  2.0*   < > 6.5   HGB  --  9.5*  --   --  11.2*  --  10.7*   < > 11.5*   MCV  --  82  --   --  77*  --  77*   < > 82   PLT  --  240  --   --  286  --  246   < > 225   NA  --  142  --   --  139  --  136  --  137   POTASSIUM  --  5.4*  --   --  4.2  --  4.4  --  4.3   CHLORIDE  --  103  --   --  104  --  101  --  100   CO2  --  32*  --   --  30  --  33*  --  39*   BUN  --  26*  --   --  23  --  14  --  12   CR  --  0.69  --   --  0.47*  --  0.45*  --  0.52   ANIONGAP  --  7  --   --  5  --  2*  --  <1*   CALDERON  --  8.2*  --   --  8.7  --  8.8  --  8.3*   * 108 119*   < > 131*   < > 145*   < > 105*   ALBUMIN  --  3.2*  --   --   --   --   --   --  2.7*   PROTTOTAL  --  6.5  --   --   --   --   --   --  7.6   BILITOTAL  --  0.3  --   --   --   --   --   --  0.2   ALKPHOS  --  53  --   --   --   --   --   --  77   ALT  --  76*  --   --   --   --   --   --  17   AST  --  55*  --   --   --   --   --   --  18    < > = values in this interval not displayed.     Recent Results (from the past 24 hour(s))   CT Chest Pulmonary Embolism w Contrast    Narrative    CT CHEST PULMONARY EMBOLISM WITH  CONTRAST 2/7/2022 3:34 PM    CLINICAL HISTORY: Chest pain. Pulmonary embolus suspected,  low/intermediate probability, positive D-dimer.    TECHNIQUE: CT angiogram chest during arterial phase injection IV  contrast. 2D and 3D MIP reconstructions were performed by the CT  technologist. Dose reduction techniques were used.   CONTRAST: 65mL, [id number]    COMPARISON: Chest CT from September 20, 2021    FINDINGS:  ANGIOGRAM CHEST: Pulmonary arteries are normal caliber and negative  for pulmonary emboli. Thoracic aorta is nondiagnostic for dissection.  No CT evidence of right heart strain.    LUNGS AND PLEURA: Emphysema. Volume loss in the right upper lobe  noted. Question some loculated pleural air versus necrotic lung  laterally. There appeared to have been a lung abscess on the  comparison study. This may have also been loculated in the fissure on  the comparison. Nodule in the superior segment of the right lower lobe  now measures 1.3 cm previously 0.9 cm. Nodule in the right middle lobe  laterally measures 1.3 cm, not definitely seen previously although  there was infiltrate on the comparison study.    MEDIASTINUM/AXILLAE: No aneurysm. There are extensive coronary  vascular calcifications and/or stents consistent with coronary artery  disease. No discrete adenopathy.    UPPER ABDOMEN: Mild right hydronephrosis. Cholelithiasis without  cholecystitis.    MUSCULOSKELETAL: No frankly destructive bony lesions.      Impression    IMPRESSION:  1.  No pulmonary embolism demonstrated.   2.  Enlarging pulmonary nodules in the superior segment of the right  lower lobe and right middle lobe.  3.  Volume loss in the right upper lobe is indeterminate.  4.  New right hydronephrosis of uncertain etiology.     ALFRED DUNCAN MD         SYSTEM ID:  [id number]

## 2022-02-08 NOTE — PLAN OF CARE
Neuro: alert to self only, forgets birthday at times. Inconsistent with following commands, gets easily worked up when work of breathing increases.   CMS: intact, denies any numbness/tingling. Very pale skin-baseline  Pulmonary: lung sounds diminished throughout. On Trilogy more often then not today with 1L oxgyen bled in, otherwise on 1-2L oxygen per NC when eating and for transport.   CV: SR/ST. Heparin gtt infusing along with Nitro  GI: bowels active, passing gas-no bowel movement. Tolerating clear liquid diet and toast  : adequate urine output, incontinent   Skin: very frail, scattered bruises from lab draws  Lines/Tubes/Drains: PIV x2  Drips: nitroglycerin, heparin    Plan: transfer to [place].

## 2022-02-08 NOTE — Clinical Note
Potential access sites were evaluated for patency using ultrasound.   Unable to advance wire via right radial converting to femoral approach.

## 2022-02-08 NOTE — H&P
[facility]    History and Physical - Hospitalist Service       Date of Admission:  2/8/2022    Assessment & Plan        Preeti Ford is a 71 year old female who transfers from [facility] with concern for NSTEMI. Transferred for further evaluation and treatment.      NSTEMI, coronary artery disease, ischemic cardiomyopathy, moderate to severe pulmonary hypertension, nonrheumatic aortic valve stenosis, tobacco use: Patient with elevated troponins.  Previously on heparin drip and nitroglycerin drip.  Reports also an exacerbation of COPD.  D-dimer elevated at 2.96, CTA of the chest is negative for pulmonary embolism.  Echocardiogram completed on the seventh with severe hypokinesis noted, see report for further details.  Patient with longstanding coronary artery disease with previous drug-eluting stent placement in 2018.  Additional history and review of systems is limited due to patient with cognitive impairment.  -Cardiology consulted.  -Heparin drip.  -Nitroglycerin drip.  -Close hemodynamic monitoring.  -Serial troponins.     Acute on chronic respiratory failure with hypoxia and hypercapnia, COPD with acute exacerbation, possible pneumonia of the right lung, possible sepsis, pulmonary emphysema:  -Pulmonary on board appreciate recommendations  -BiPAP, IV Solu medrol, scheduled duo nebs   -Pulmonary hygiene.  -Defer antibiotic therapy to pulmonary  -RCAT.   -Blood cultures pending from 5 February.  -Legionella and strep pneumo negative.  Coronavirus influenza a and B-.  Respiratory panel negative.     Pulmonary nodules, enlarging: CT with enlarging pulmonary nodules documented, see report for further details.  Differential includes malignancy.  -Consider biopsy once medically stable and off of anticoagulation.     Anemia, chronic: MCV is 77.  -Monitor.     Severe malnutrition: Documented before.  -Nutrition consult.     History of stroke, cognitive impairment, aphasia:  Previously in 2013 in 2018.  Including right thalamus and left cerebellar and right vermis.  Present cognitive impairments from strokes according to the .  -Ativan as needed for anxiety/agitation,     DVT Prophylaxis: Heparin drip.  Code Status: No CPR, pre arrest intubation ok has acp docs.      Disposition: Admission to ICU.      Diet: NPO for Medical/Clinical Reasons Except for: No Exceptions    Barnhart Catheter: Not present  Central Lines: None  Cardiac Monitoring: ACTIVE order. Indication: Chest pain/ ACS rule out (24 hours)    Clinically Significant Risk Factors Present on Admission       Victor Hugo Copeland MD  Hospitalist Service  [facility]  Securely message with the Vocera Web Console (learn more here)  Text page via [facility] Paging/Directory         ______________________________________________________________________    Chief Complaint   Transferred from [facility] for NSTEMI    History is obtained from chart review and sign out        History of Present Illness   Preeti JEROME Ford is a 71 year old female who was initially admitted to [facility] on 2/5 for shortness of breath now transfers to [facility] with concern for NSTEMI. Transferred for further evaluation and treatment. Patient with elevated troponins.  Previously on heparin drip and nitroglycerin drip.  Reports also an exacerbation of COPD.  D-dimer elevated at 2.96, CTA of the chest is negative for pulmonary embolism.  Echocardiogram completed on the seventh with severe hypokinesis noted, see report for further details.  Patient with longstanding coronary artery disease with previous drug-eluting stent placement in 2018.  Additional history and review of systems is limited due to patient with cognitive impairment per reports.    Review of Systems    Unable to obtain due to cognitive impairment.  Past Medical History    I have reviewed this patient's medical history and updated it with  "pertinent information if needed.   Past Medical History:   Diagnosis Date     Acute on chronic respiratory failure (H) 03/25/2021     Arthritis      COPD (chronic obstructive pulmonary disease) (H)      Coronary artery disease      CVA (cerebral vascular accident) (H) 08/17/2013     Hypertension      Hypotension, unspecified hypotension type      Sepsis (H)        Past Surgical History   I have reviewed this patient's surgical history and updated it with pertinent information if needed.  Past Surgical History:   Procedure Laterality Date     APPENDECTOMY       BYPASS GRAFT AORTOILIAC N/A 3/7/2017    Procedure: BYPASS GRAFT AORTOILIAC;  Surgeon: Marcos Pratt MD;  Location: [facility] OR     SALPINGO OOPHORECTOMY,R/L/DELORES      Salpingo Oophorectomy, RT/LT/DELORES       Social History   I have reviewed this patient's social history and updated it with pertinent information if needed.  Social History     Tobacco Use     Smoking status: Current Some Day Smoker     Types: Cigarettes     Smokeless tobacco: Never Used     Tobacco comment: \"just a couple a day\"   Substance Use Topics     Alcohol use: No     Alcohol/week: 0.0 standard drinks     Drug use: No       Family History   I have reviewed this patient's family history and updated it with pertinent information if needed.  Family History   Problem Relation Age of Onset     Cerebrovascular Disease Mother      Cerebrovascular Disease Father      Genitourinary Problems Brother         Dialysis       Prior to Admission Medications   Prior to Admission Medications   Prescriptions Last Dose Informant Patient Reported? Taking?   ACE/ARB/ARNI NOT PRESCRIBED, INTENTIONAL,  Self No No   Sig: Please choose reason not prescribed, below   BETA BLOCKER NOT PRESCRIBED (INTENTIONAL)   No No   Sig: Please choose reason not prescribed from choices below.   Fluticasone-Umeclidin-Vilanterol (TRELEGY ELLIPTA) 200-62.5-25 MCG/INH oral inhaler   No No   Sig: Inhale 1 puff into the lungs daily "   Nutritional Supplements (ENSURE PLUS) LIQD   No No   Sig: Take 1 each by mouth 4 times daily   Respiratory Therapy Supplies (NEBULIZER/TUBING/MOUTHPIECE) KIT  Self No No   Si kit as needed (if becomes damaged)   albuterol (PROAIR HFA/PROVENTIL HFA/VENTOLIN HFA) 108 (90 Base) MCG/ACT inhaler  Self No No   Sig: Inhale 2 puffs into the lungs every 6 hours as needed for shortness of breath / dyspnea   albuterol (PROVENTIL) (2.5 MG/3ML) 0.083% neb solution   No No   Sig: INHALE ONE VIAL BY NEBULIZATION EVERY 4 HOURS AS NEEDED FOR SHORTNESS OF BREATH / DIFFICULTY BREATHING OR WHEEZING   aspirin 81 MG EC tablet  Self No No   Sig: Take 1 tablet (81 mg) by mouth daily   atorvastatin (LIPITOR) 20 MG tablet   No No   Sig: Take 1 tablet (20 mg) by mouth At Bedtime   ferrous sulfate (FEROSUL) 325 (65 Fe) MG tablet   No No   Sig: Take 1 tablet (325 mg) by mouth 2 times daily   guaiFENesin (ORGANIDIN) 200 MG tablet   Yes No   Sig: Take 800 mg by mouth daily   Patient not taking: Reported on 10/18/2021   montelukast (SINGULAIR) 10 MG tablet   No No   Sig: Take 1 tablet (10 mg) by mouth At Bedtime   nitroGLYcerin (NITROSTAT) 0.4 MG sublingual tablet   No No   Sig: For chest pain place 1 tablet under the tongue every 5 minutes for 3 doses. If symptoms persist 5 minutes after 1st dose call 911.   Patient not taking: Reported on 2021   order for DME  Self No No   Sig: Equipment being ordered: Nebulizer machine      Facility-Administered Medications: None     Allergies   Allergies   Allergen Reactions     Crestor [Rosuvastatin] Other (See Comments)     dizziness     Lisinopril Cough     Statins [Hmg-Coa-R Inhibitors] Other (See Comments)     Muscle/joint aching     Optison [Albumin Human] Other (See Comments)     Pt was flush and very dizzy.  Also had a BP drop     Penicillins Rash       Physical Exam   Vital Signs: Temp: 97.3  F (36.3  C) Temp src: Axillary BP: (!) 184/94 Pulse: 101   Resp: 30 SpO2: 97 % O2 Device:  BiPAP/CPAP Oxygen Delivery: 2 LPM  Weight: 97 lbs 6.4 oz    General Appearance: On BiPAP, appears anxious, slightly agitated   eyes: Pink NG, NIS  HEENT: PERRLA, EOMI  Respiratory: Bilateral diffuse wheezing with decreased air entry  Cardiovascular: S1-S2, tachycardic regular rhythm  GI: Soft, nondistended abdomen  Genitourinary: No SP tenderness  Skin: No skin rash  Musculoskeletal: No peripheral edema  Neurologic: Awake, aphasic, does not respond to questions.  Noted to be moving bilateral extremities    Data   Data reviewed today: I reviewed all medications, new labs and imaging results over the last 24 hours. I personally reviewed     Recent Labs   Lab 02/07/22  0737 02/07/22  0537 02/07/22  0001 02/06/22  0746 02/06/22  0530 02/05/22  1654 02/05/22  1336 02/05/22  0708   WBC  --  4.1  --   --  2.0*  --  4.2 6.5   HGB  --  11.2*  --   --  10.7*  --  11.4* 11.5*   MCV  --  77*  --   --  77*  --  79 82   PLT  --  286  --   --  246  --  252 225   NA  --  139  --   --  136  --   --  137   POTASSIUM  --  4.2  --   --  4.4  --   --  4.3   CHLORIDE  --  104  --   --  101  --   --  100   CO2  --  30  --   --  33*  --   --  39*   BUN  --  23  --   --  14  --   --  12   CR  --  0.47*  --   --  0.45*  --   --  0.52   ANIONGAP  --  5  --   --  2*  --   --  <1*   CALDERON  --  8.7  --   --  8.8  --   --  8.3*   * 131* 137*   < > 145*   < >  --  105*   ALBUMIN  --   --   --   --   --   --   --  2.7*   PROTTOTAL  --   --   --   --   --   --   --  7.6   BILITOTAL  --   --   --   --   --   --   --  0.2   ALKPHOS  --   --   --   --   --   --   --  77   ALT  --   --   --   --   --   --   --  17   AST  --   --   --   --   --   --   --  18    < > = values in this interval not displayed.     Recent Results (from the past 24 hour(s))   Echocardiogram Complete   Result Value    LVEF  40-45%    [facility]    [id number]  [id number]  [id number]  [id number]^ANNA^NAYE^ABHISHEK     [facility]  Echocardiography  Laboratory  [address] [place] [place] [address]     Name: TARIK HERNANDEZ  MRN: [id number]  : 1950  Study Date: 2022 07:49 AM  Age: 71 yrs  Gender: Female  Patient Location: [facility]  Reason For Study: CAD, SOB, Respiratory Failure  Ordering Physician: NAYE CASTILLO  Referring Physician: RICK CARROLL  Performed By: Rocio Stockton RDCS     BSA: 1.5 m2  Height: 63 in  Weight: 106 lb  HR: 111  BP: 137/71 mmHg  ______________________________________________________________________________  Procedure  Complete Portable Echo Adult.  ______________________________________________________________________________  Interpretation Summary     Severe hypokinesis of the mid and distal anterior, anteroseptal and distal  inferior walls and apex. Function of the lateral and inferolateral walls is  well preserved.  Left ventricular systolic function is mild to moderately reduced.  The visual ejection fraction is 40-45%.  There is mild (1+) mitral regurgitation.  Moderate valvular aortic stenosis. YOLANDA 1.1 cm2, mean AV gradient 16 mmHg. Low  SVI suggests low-flow, low-gradient moderate to severe AS.  Right ventricular systolic pressure is elevated, consistent with moderate  pulmonary hypertension.     Compared to the previous study, the LVF is much lower and WMA's are now  present. AS is probably unchanged.  ______________________________________________________________________________  Left Ventricle  The left ventricle is normal in size. There is normal left ventricular wall  thickness. Grade I or early diastolic dysfunction. Left ventricular systolic  function is mild to moderately reduced. The visual ejection fraction is 40-  45%. Severe hypokinesis of the mid and distal anterior, anteroseptal and  distal inferior walls and apex. Function of the lateral and inferolateral  walls is well preserved.     Right Ventricle  The right ventricle is normal size. Mildly decreased right ventricular  systolic  function. The right ventricle is not well visualized.     Atria  Normal left atrial size. Right atrial size is normal. There is no atrial shunt  seen.     Mitral Valve  The mitral valve leaflets appear thickened, but open well. There is mild (1+)  mitral regurgitation.     Tricuspid Valve  The tricuspid valve is normal in structure and function. There is trace  tricuspid regurgitation. The right ventricular systolic pressure is  approximated at 49.6 mmHg plus the right atrial pressure. IVC diameter and  respiratory changes fall into an intermediate range suggesting an RA pressure  of 8 mmHg. Right ventricular systolic pressure is elevated, consistent with  moderate pulmonary hypertension.     Aortic Valve  There is trace aortic regurgitation. The calculated aortic valve are is 1.1  cm^2. The peak AoV pressure gradient is 26.0 mmHg. The mean AoV pressure  gradient is 14.0 mmHg. Moderate valvular aortic stenosis.     Pulmonic Valve  The pulmonic valve is not well seen, but is grossly normal. There is trace  pulmonic valvular regurgitation.     Vessels  Normal size aorta.     Pericardium  There is no pericardial effusion.     Rhythm  The rhythm was sinus tachycardia.  ______________________________________________________________________________  MMode/2D Measurements & Calculations  IVSd: 1.2 cm     LVIDd: 4.2 cm  LVIDs: 3.7 cm  LVPWd: 1.0 cm  FS: 11.9 %  LV mass(C)d: 157.1 grams  LV mass(C)dI: 106.4 grams/m2  Ao root diam: 2.6 cm  LA dimension: 4.1 cm  LA/Ao: 1.6  LVOT diam: 2.1 cm  LVOT area: 3.5 cm2  RWT: 0.48     Doppler Measurements & Calculations  MV E max unruly: 79.3 cm/sec  MV dec slope: 363.0 cm/sec2  MV dec time: 0.22 sec  Ao V2 max: 257.0 cm/sec  Ao max P.0 mmHg  Ao V2 mean: 173.0 cm/sec  Ao mean P.0 mmHg  Ao V2 VTI: 44.9 cm  YOLANDA(I,D): 1.1 cm2  YOLANDA(V,D): 0.99 cm2  LV V1 max P.1 mmHg  LV V1 max: 73.1 cm/sec  LV V1 VTI: 14.7 cm  SV(LVOT): 50.9 ml  SI(LVOT): 34.5 ml/m2  PA acc time: 0.10 sec  TR  max ryan: 352.0 cm/sec  TR max P.6 mmHg  AV Ryan Ratio (DI): 0.28     YOLANDA Index (cm2/m2): 0.77  E/E' avg: 10.1  Lateral E/e': 9.0  Medial E/e': 11.2     ______________________________________________________________________________  Report approved by: Jose C Dos Santos 2022 10:30 AM         CT Chest Pulmonary Embolism w Contrast    Narrative    CT CHEST PULMONARY EMBOLISM WITH CONTRAST 2022 3:34 PM    CLINICAL HISTORY: Chest pain. Pulmonary embolus suspected,  low/intermediate probability, positive D-dimer.    TECHNIQUE: CT angiogram chest during arterial phase injection IV  contrast. 2D and 3D MIP reconstructions were performed by the CT  technologist. Dose reduction techniques were used.   CONTRAST: 65mL, Isovue-370    COMPARISON: Chest CT from 2021    FINDINGS:  ANGIOGRAM CHEST: Pulmonary arteries are normal caliber and negative  for pulmonary emboli. Thoracic aorta is nondiagnostic for dissection.  No CT evidence of right heart strain.    LUNGS AND PLEURA: Emphysema. Volume loss in the right upper lobe  noted. Question some loculated pleural air versus necrotic lung  laterally. There appeared to have been a lung abscess on the  comparison study. This may have also been loculated in the fissure on  the comparison. Nodule in the superior segment of the right lower lobe  now measures 1.3 cm previously 0.9 cm. Nodule in the right middle lobe  laterally measures 1.3 cm, not definitely seen previously although  there was infiltrate on the comparison study.    MEDIASTINUM/AXILLAE: No aneurysm. There are extensive coronary  vascular calcifications and/or stents consistent with coronary artery  disease. No discrete adenopathy.    UPPER ABDOMEN: Mild right hydronephrosis. Cholelithiasis without  cholecystitis.    MUSCULOSKELETAL: No frankly destructive bony lesions.      Impression    IMPRESSION:  1.  No pulmonary embolism demonstrated.   2.  Enlarging pulmonary nodules in the superior segment  of the right  lower lobe and right middle lobe.  3.  Volume loss in the right upper lobe is indeterminate.  4.  New right hydronephrosis of uncertain etiology.     ALFRED DUNCAN MD         SYSTEM ID:  [id number]

## 2022-02-08 NOTE — PHARMACY-ADMISSION MEDICATION HISTORY
Pharmacy Note - Admission Medication History    Pertinent Provider Information: none     ______________________________________________________________________    Prior To Admission (PTA) med list completed and updated in EMR.       PTA Med List   Medication Sig Last Dose     albuterol (PROAIR HFA/PROVENTIL HFA/VENTOLIN HFA) 108 (90 Base) MCG/ACT inhaler Inhale 2 puffs into the lungs every 6 hours as needed for shortness of breath / dyspnea Unknown at Unknown time     albuterol (PROVENTIL) (2.5 MG/3ML) 0.083% neb solution INHALE ONE VIAL BY NEBULIZATION EVERY 4 HOURS AS NEEDED FOR SHORTNESS OF BREATH / DIFFICULTY BREATHING OR WHEEZING Unknown at Unknown time     aspirin 81 MG EC tablet Take 1 tablet (81 mg) by mouth daily Unknown at Unknown time     atorvastatin (LIPITOR) 20 MG tablet Take 1 tablet (20 mg) by mouth At Bedtime Unknown at Unknown time     ferrous sulfate (FEROSUL) 325 (65 Fe) MG tablet Take 1 tablet (325 mg) by mouth 2 times daily Unknown at Unknown time     Fluticasone-Umeclidin-Vilanterol (TRELEGY ELLIPTA) 200-62.5-25 MCG/INH oral inhaler Inhale 1 puff into the lungs daily Unknown at Unknown time     montelukast (SINGULAIR) 10 MG tablet Take 1 tablet (10 mg) by mouth At Bedtime Unknown at Unknown time     nitroGLYcerin (NITROSTAT) 0.4 MG sublingual tablet For chest pain place 1 tablet under the tongue every 5 minutes for 3 doses. If symptoms persist 5 minutes after 1st dose call 911. Unknown at Unknown time     Nutritional Supplements (ENSURE PLUS) LIQD Take 1 each by mouth 4 times daily Unknown at Unknown time       Information source(s): Clinic records, Hospital records and [facility]  Method of interview communication: N/A    Summary of Changes to PTA Med List  New: none  Discontinued: none  Changed: none    Patient was asked about OTC/herbal products specifically.  PTA med list reflects this.    In the past week, patient estimated taking medication this percent of the time:   Unable to assess    Allergies were reviewed, assessed, and updated with the patient.      Patient did not bring any medications to the hospital and can't retrieve from home. No multi-dose medications are available for use during hospital stay.     The information provided in this note is only as accurate as the sources available at the time of the update(s).    Thank you for the opportunity to participate in the care of this patient.    Kanu Marie [facility]  2/8/2022 8:38 AM

## 2022-02-08 NOTE — PROGRESS NOTES
Speech-Language Pathology: Clinical Swallow Evaluation     02/08/22 9650   General Information   Onset of Illness/Injury or Date of Surgery 02/08/22   Pertinent History of Current Problem Per MD note: 71 year old female with PMHx of chronic respiratory failure due to COPD (FEV1 30%) on home O2 2 LPM and Trilogy positive pressure ventilation at night, spontaneous pneumothorax, HTN, CAD, moderate to severe Aortic stenosis, CVA, PVD s/p aortoiliac bypass. Patient was admitted at [facility] on 2/5/22 for evaluation of shortness of breath. Patient was in severe respiratory distress, initial SpO2 80's. ABG showed acute on chronic respiratory acidosis. Troponin was elevated, normal WBC and diff, elevated ddimer, CXR showed lung infiltrates. Follow up Chest CT scan showed emphysematous changes, enlarging lung nodules, right upper lobe    General Observations Alert and cooperative; somnolent then agitated earlier today when VFSS scheduled   Past History of Dysphagia BSS in 2018 following CVA with recommendation for NPO initially; Per patient's , dysphagia resolved but did have some coughing and difficulty over the last few months   Type of Evaluation   Type of Evaluation Swallow Evaluation   Oral Motor   Oral Musculature generally intact   Dentition (Oral Motor)   Dentition (Oral Motor) significant number of missing teeth   Facial Symmetry (Oral Motor)   Facial Symmetry (Oral Motor)   (WFL- questionable left asymmetry)   Lip Function (Oral Motor)   Lip Range of Motion (Oral Motor) WNL   Tongue Function (Oral Motor)   Tongue ROM (Oral Motor) WNL   General Swallowing Observations   Swallowing Evaluation Clinical swallow evaluation   Clinical Swallow Evaluation   Clinical Swallow Evaluation Textures Trialed thin liquids;moderately thick liquids/liquidized;pureed   Clinical Swallow Eval: Thin Liquid Texture Trial   Mode of Presentation, Thin Liquids cup;spoon   Volume of Liquid or Food Presented 4  sips   Oral Phase of Swallow premature pharyngeal entry  (suspect premature pharyngeal entry)   Pharyngeal Phase of Swallow reduction in laryngeal movement;repeated swallows  (apparent discoordination; appeared to have a stifled cough)   Diagnostic Statement Vague s/s aspiration and consistent s/s dysphagia noted   Clinical Swallow Eval: Moderately Thick Liquids   Mode of Presentation spoon;cup   Volume Presented 4 ounces   Oral Phase WFL   Pharyngeal Phase intact   Diagnostic Statement No s/s aspiration or dysphagia   Clinical Swallow Evaluation: Puree Solid Texture Trial   Mode of Presentation, Puree spoon   Volume of Puree Presented 2 ounces   Oral Phase, Puree WFL   Pharyngeal Phase, Puree intact   Diagnostic Statement No s/s aspiration or dysphagia   Swallowing Recommendations   Diet Consistency Recommendations pureed (level 4);moderately thick liquids/liquidized (level 3)   Supervision Level for Intake 1:1 supervision needed   Mode of Delivery Recommendations bolus size, small;slow rate of intake   Monitoring/Assistance Required (Eating/Swallowing) monitor for cough or change in vocal quality with intake;stop eating activities when fatigue is present   Recommended Feeding/Eating Techniques (Swallow Eval) provide assist with feeding;maintain upright sitting position for eating   Medication Administration Recommendations, Swallowing (SLP) crushed in puree   Instrumental Assessment Recommendations VFSS (videofluroscopic swallowing study)  (anticipate tomorrow d/t behaviors today)   Comment, Swallowing Recommendations Bedside Swallow Study completed. Patient had vague s/s aspiration with thin liquids and no s/s aspiration with moderately thick liquids. Oral motor function was functional for trials. Mastication was not formally tested due to decreased dentition, variable cognition and dentures are at home. Hyolaryngeal elevation appears intact but delayed upon visualization and palpation. Recommend diet of Puree  and Moderately thick with strategies of small bites and sips, assist with feeding and monitor closely for s/s aspiration. VFSS tomorrow pending alertness and behaviors. SLP to follow for dysphagia and diet management.   General Therapy Interventions   Planned Therapy Interventions Dysphagia Treatment   Dysphagia treatment Compensatory strategies for swallowing;Instruction of safe swallow strategies;Modified diet education   Intervention Comments VFSS   SLP Therapy Assessment/Plan   Criteria for Skilled Therapeutic Interventions Met (SLP Eval) yes;treatment indicated   SLP Diagnosis dysphagia   Rehab Potential (SLP Eval) good, to achieve stated therapy goals   Therapy Frequency (SLP Eval) 6 times/wk   Predicted Duration of Therapy Intervention (SLP Eval) 1 week   Comment, Therapy Assessment/Plan (SLP) Suspect pharyngeal dysphagia and aspiration risk. Patient would benefit from a VFSS to further assess.    SLP Discharge Planning    SLP Discharge Recommendation (DC Rec) home   SLP Brief overview of current status  History of dysph with CVA in 2018 and worsening lately per report. Concern for aspiration, unable to do VFSS 2/8 d/t cognition. Softer foods at baseline due to dentures no longer fit.    Total Evaluation Time   Total Evaluation Time (Minutes) 10   Coping Strategies   Trust Relationship/Rapport care explained;questions answered;questions encouraged   Wellbeing Promotion   Family/Support System Care involvement promoted;presence promoted

## 2022-02-08 NOTE — PROGRESS NOTES
[facility] Pulmonary/Critical Care Consult Team Note    Preeti Frod,  1950, MRN [id number]  Admitting Dx: NSTEMI (non-ST elevated myocardial infarction) (H) [I21.4]  Date / Time of Admission:  2022 12:06 AM    Overnight Events:  Intake/Output last 3 shifts:  I/O last 3 completed shifts:  In: 239.1 [I.V.:239.1]  Out: 0   On bipap that was switched to AVAPS overnight  Denies pain    Assessment/Plan: Preeti Ford is a 71 year old female with PMHx of chronic respiratory failure due to COPD (FEV1 30%) on home O2 2 LPM and Trilogy positive pressure ventilation at night, spontaneous pneumothorax, HTN, CAD, moderate to severe Aortic stenosis, CVA, PVD s/p aortoiliac bypass. Patient was admitted at [facility] on 22 for evaluation of shortness of breath. Patient was in severe respiratory distress, initial SpO2 80's. ABG showed acute on chronic respiratory acidosis. Troponin was elevated, normal WBC and diff, elevated ddimer, CXR showed lung infiltrates. Follow up Chest CT scan showed emphysematous changes, enlarging lung nodules, right upper lobe peripheral infiltrate.   Diagnosed with COPD exacerbation, pneumonia, NSTEMI with new wall motion abnormalities on ECHO recommended transferring to [facility]  for eval by cardiology.     PULM/ID: Acute on chronic respiratory failure due to Severe COPD with acute exacerbation   - Titrate FiO2, BiPAP as needed  - Systemic steroids, scheduled bronchodilators  - Increase size of RUL nodule from 0.9 to 1.3 cm and new nodule of 1.3 cm in RML - outpt PET scan  - Reviewing previous images, chest CT scan 2021 showed large RUL infiltrate, consistent with pneumonia, could be lung abscess formation.   - Recent CT scan showed volume loss , infiltrate / atelectasis, loculated pleural air.   - Recommend video swallow study prior to oral intake.   - continue Trilogy positive pressure ventilation at night  - on home O2 during the day  - continue  ceftriaxone and solumedrol 60 q12    CV: NSTEMI with wall motion abnormalities on ECHO  - heparin gtt and nitroglycerin gtt  - cardiology following  - Hx of HTN, AORTIC STENOSIS,  and ischemic cardiomyopathy   - Monitor on tele    GI: Concern for aspiration  - swallow study  - NPO  - GI proph     RENAL:   - baseline Cr of 0.47  - Follow BUN/Creatinine  - strict I/O's    NEURO: Cognitive decline - unclear if acute or chronic    Pt has critical illness and impairs breathing on NIPPV such as there is high probability of imminent of life threatening deterioration in the patient's condition.    Code Status: DNR    Infusions:    heparin 900 Units/hr (02/08/22 0250)     nitroGLYcerin 50 mg in D5W 250 mL 100 mcg/min (02/08/22 0900)     - MEDICATION INSTRUCTIONS -       - MEDICATION INSTRUCTIONS -       - MEDICATION INSTRUCTIONS -         ICU DAILY CHECKLIST           Can patient transfer out of MICU? no  FAST HUG:  Feeding:  Feeding: No  Barnhart:{Yes  Analgesia/Sedation:Yes  Thromboembolic prophylaxis:Heparin  HOB>30:  Yes  Stress Ulcer Protocol Active: Yes; Mode: PPI/H2 Antagonist  Glycemic Control: No components found for: GLU Any glucose > 180 no; Mode of Insulin Therapy: Sliding Scale Insulin  INTUBATED:  PHYSICAL THERAPY AND MOBILITY: Can patient have PT and mobility trial: yes Activity: ICU mobility protocol    Critical Care Time excluding procedures and family discussions greater than: 45 Minutes    Risk Factors Present on Admission:  Clinically Significant Risk Factors Present on Admission        # Hyperkalemia: K = 5.4 mmol/L (Ref range: 3.5 - 5.0 mmol/L) on admission, will monitor as appropriate        # Platelet Defect: home medication list includes an antiplatelet medication   # Anemia: based on hgb <11   Code Status: No CPR- Pre-arrest intubation JIMMY Zabala DO  Pulmonary and Critical Care Attending  pgr [phone]    Allergies   Allergen Reactions     Crestor [Rosuvastatin] Other (See Comments)  "    dizziness     Lisinopril Cough     Statins [Hmg-Coa-R Inhibitors] Other (See Comments)     Muscle/joint aching     Optison [Albumin Human] Other (See Comments)     Pt was flush and very dizzy.  Also had a BP drop     Penicillins Rash       Meds: See MAR    Physical Exam:  BP (!) 148/69   Pulse 84   Temp 97.1  F (36.2  C) (Axillary)   Resp 23   Ht 1.6 m (5' 3\")   Wt 44.2 kg (97 lb 6.4 oz)   SpO2 100%   BMI 17.25 kg/m    Intake/Output this shift:  I/O this shift:  In: -   Out: 200 [Urine:200]  GEN: sleeping,  NAD  HEENT: bipap mask in place  CVS: regular rhythm, no murmurs  RESP: CTA BL, poor air mvmt, no wheezing  ABD: Soft, No abdominal pain with palpation, no guarding, no rigidity  EXT: Warm, well perfused, no edema  NEURO:  Follows simple commands, moving all extremities    Pertinent Labs: Latest lab results in EHR personally reviewed.   CMP  Recent Labs   Lab 02/08/22  0759 02/08/22  0519 02/08/22  0340 02/07/22  0737 02/07/22  0537 02/06/22  0746 02/06/22  0530 02/05/22  1654 02/05/22  0708   NA  --  142  --   --  139  --  136  --  137   POTASSIUM  --  5.4*  --   --  4.2  --  4.4  --  4.3   CHLORIDE  --  103  --   --  104  --  101  --  100   CO2  --  32*  --   --  30  --  33*  --  39*   ANIONGAP  --  7  --   --  5  --  2*  --  <1*   * 108 119* 139* 131*   < > 145*   < > 105*   BUN  --  26*  --   --  23  --  14  --  12   CR  --  0.69  --   --  0.47*  --  0.45*  --  0.52   GFRESTIMATED  --  >90  --   --  >90  --  >90  --  >90   CALDERON  --  8.2*  --   --  8.7  --  8.8  --  8.3*   MAG  --   --   --   --   --   --   --   --  1.9   PROTTOTAL  --  6.5  --   --   --   --   --   --  7.6   ALBUMIN  --  3.2*  --   --   --   --   --   --  2.7*   BILITOTAL  --  0.3  --   --   --   --   --   --  0.2   ALKPHOS  --  53  --   --   --   --   --   --  77   AST  --  55*  --   --   --   --   --   --  18   ALT  --  76*  --   --   --   --   --   --  17    < > = values in this interval not displayed.     CBC  Recent Labs "   Lab 02/08/22  0519 02/07/22  0537 02/06/22  0530 02/05/22  1336   WBC 6.7 4.1 2.0* 4.2   RBC 4.25 5.03 4.85 5.05   HGB 9.5* 11.2* 10.7* 11.4*   HCT 35.0 38.9 37.4 40.1   MCV 82 77* 77* 79   MCH 22.4* 22.3* 22.1* 22.6*   MCHC 27.1* 28.8* 28.6* 28.4*   RDW 17.6* 17.8* 16.9* 16.9*    286 246 252     INRNo lab results found in last 7 days.  Arterial Blood Gas  Recent Labs   Lab 02/08/22  0111 02/07/22  0538 02/06/22  1316 02/06/22  0532 02/06/22  0005   PH 7.18*  --   --   --   --    PCO2 91*  --   --   --   --    PO2 127*  --   --   --   --    HCO3 28  --   --   --   --    O2PER  --  24 21 21 21       Imaging: personally reviewed.   Results for orders placed or performed during the hospital encounter of 02/05/22   XR Chest Port 1 View    Narrative    EXAM: XR CHEST PORTABLE 1 VIEW  LOCATION: [facility]  DATE/TIME: 02/05/2022, 8:14 AM    INDICATION: Shortness of breath.  COMPARISON: Chest x-ray 09/18/2021.      Impression    IMPRESSION: New focal consolidation at the lateral right lung base could be developing acute airspace disease including pneumonia or other airspace disease. Loculated effusion left lateral mid chest is again seen, but its configuration is more elongated   in the craniocaudal dimension than on the prior exam. Adjacent airspace consolidation versus scarring again identified. No new airspace disease on the left. Diffuse emphysema. Normal cardiac silhouette.     CT Chest Pulmonary Embolism w Contrast    Narrative    CT CHEST PULMONARY EMBOLISM WITH CONTRAST 2/7/2022 3:34 PM    CLINICAL HISTORY: Chest pain. Pulmonary embolus suspected,  low/intermediate probability, positive D-dimer.    TECHNIQUE: CT angiogram chest during arterial phase injection IV  contrast. 2D and 3D MIP reconstructions were performed by the CT  technologist. Dose reduction techniques were used.   CONTRAST: 65mL, [id number]    COMPARISON: Chest CT from September 20,      FINDINGS:  ANGIOGRAM CHEST: Pulmonary arteries are normal caliber and negative  for pulmonary emboli. Thoracic aorta is nondiagnostic for dissection.  No CT evidence of right heart strain.    LUNGS AND PLEURA: Emphysema. Volume loss in the right upper lobe  noted. Question some loculated pleural air versus necrotic lung  laterally. There appeared to have been a lung abscess on the  comparison study. This may have also been loculated in the fissure on  the comparison. Nodule in the superior segment of the right lower lobe  now measures 1.3 cm previously 0.9 cm. Nodule in the right middle lobe  laterally measures 1.3 cm, not definitely seen previously although  there was infiltrate on the comparison study.    MEDIASTINUM/AXILLAE: No aneurysm. There are extensive coronary  vascular calcifications and/or stents consistent with coronary artery  disease. No discrete adenopathy.    UPPER ABDOMEN: Mild right hydronephrosis. Cholelithiasis without  cholecystitis.    MUSCULOSKELETAL: No frankly destructive bony lesions.      Impression    IMPRESSION:  1.  No pulmonary embolism demonstrated.   2.  Enlarging pulmonary nodules in the superior segment of the right  lower lobe and right middle lobe.  3.  Volume loss in the right upper lobe is indeterminate.  4.  New right hydronephrosis of uncertain etiology.     ALFRED DUNCAN MD         SYSTEM ID:  [id number]   Echocardiogram Complete     Value    LVEF  40-45%    Narrative    [id number]  [id number]  [id number]  [id number]^ANNA^NAYE^ABHISHEK     [facility]  Echocardiography Laboratory  [address] [place] [id number]     Name: TARIK HERNANDEZ  MRN: [id number]  : 1950  Study Date: 2022 07:49 AM  Age: 71 yrs  Gender: Female  Patient Location: [facility]  Reason For Study: CAD, SOB, Respiratory Failure  Ordering Physician: NAYE CASTILLO  Referring Physician: RICK CARROLL  Performed By: Rocio Stockton RDCS     BSA: 1.5  m2  Height: 63 in  Weight: 106 lb  HR: 111  BP: 137/71 mmHg  ______________________________________________________________________________  Procedure  Complete Portable Echo Adult.  ______________________________________________________________________________  Interpretation Summary     Severe hypokinesis of the mid and distal anterior, anteroseptal and distal  inferior walls and apex. Function of the lateral and inferolateral walls is  well preserved.  Left ventricular systolic function is mild to moderately reduced.  The visual ejection fraction is 40-45%.  There is mild (1+) mitral regurgitation.  Moderate valvular aortic stenosis. YOLANDA 1.1 cm2, mean AV gradient 16 mmHg. Low  SVI suggests low-flow, low-gradient moderate to severe AS.  Right ventricular systolic pressure is elevated, consistent with moderate  pulmonary hypertension.     Compared to the previous study, the LVF is much lower and WMA's are now  present. AS is probably unchanged.  ______________________________________________________________________________  Left Ventricle  The left ventricle is normal in size. There is normal left ventricular wall  thickness. Grade I or early diastolic dysfunction. Left ventricular systolic  function is mild to moderately reduced. The visual ejection fraction is 40-  45%. Severe hypokinesis of the mid and distal anterior, anteroseptal and  distal inferior walls and apex. Function of the lateral and inferolateral  walls is well preserved.     Right Ventricle  The right ventricle is normal size. Mildly decreased right ventricular  systolic function. The right ventricle is not well visualized.     Atria  Normal left atrial size. Right atrial size is normal. There is no atrial shunt  seen.     Mitral Valve  The mitral valve leaflets appear thickened, but open well. There is mild (1+)  mitral regurgitation.     Tricuspid Valve  The tricuspid valve is normal in structure and function. There is trace  tricuspid  regurgitation. The right ventricular systolic pressure is  approximated at 49.6 mmHg plus the right atrial pressure. IVC diameter and  respiratory changes fall into an intermediate range suggesting an RA pressure  of 8 mmHg. Right ventricular systolic pressure is elevated, consistent with  moderate pulmonary hypertension.     Aortic Valve  There is trace aortic regurgitation. The calculated aortic valve are is 1.1  cm^2. The peak AoV pressure gradient is 26.0 mmHg. The mean AoV pressure  gradient is 14.0 mmHg. Moderate valvular aortic stenosis.     Pulmonic Valve  The pulmonic valve is not well seen, but is grossly normal. There is trace  pulmonic valvular regurgitation.     Vessels  Normal size aorta.     Pericardium  There is no pericardial effusion.     Rhythm  The rhythm was sinus tachycardia.  ______________________________________________________________________________  MMode/2D Measurements & Calculations  IVSd: 1.2 cm     LVIDd: 4.2 cm  LVIDs: 3.7 cm  LVPWd: 1.0 cm  FS: 11.9 %  LV mass(C)d: 157.1 grams  LV mass(C)dI: 106.4 grams/m2  Ao root diam: 2.6 cm  LA dimension: 4.1 cm  LA/Ao: 1.6  LVOT diam: 2.1 cm  LVOT area: 3.5 cm2  RWT: 0.48     Doppler Measurements & Calculations  MV E max ryan: 79.3 cm/sec  MV dec slope: 363.0 cm/sec2  MV dec time: 0.22 sec  Ao V2 max: 257.0 cm/sec  Ao max P.0 mmHg  Ao V2 mean: 173.0 cm/sec  Ao mean P.0 mmHg  Ao V2 VTI: 44.9 cm  YOLANDA(I,D): 1.1 cm2  YOLANDA(V,D): 0.99 cm2  LV V1 max P.1 mmHg  LV V1 max: 73.1 cm/sec  LV V1 VTI: 14.7 cm  SV(LVOT): 50.9 ml  SI(LVOT): 34.5 ml/m2  PA acc time: 0.10 sec  TR max ryan: 352.0 cm/sec  TR max P.6 mmHg  AV Ryan Ratio (DI): 0.28     YOLANDA Index (cm2/m2): 0.77  E/E' avg: 10.1  Lateral E/e': 9.0  Medial E/e': 11.2     ______________________________________________________________________________  Report approved by: Jose C Dos Santos 2022 10:30 AM             Patient Active Problem List   Diagnosis     Urinary incontinence      Cognitive impairment-BIMS 4/15 at [facility] May 2020     Hyperlipidemia LDL goal <130     History of stroke - right thalamus in 8/2013 and left cerebellar and right vermis in 3/2018     Numbness and tingling     Smoker     CVA (cerebral vascular accident) (H)     ACS (acute coronary syndrome) (H)     Ischemic cardiomyopathy - EF 25% in 2015 but 55% in 3/2017 and 45-50% on 3/18     HTN, goal below 130/80     Acute systolic congestive heart failure (H)     Pulmonary nodule -- 1.9 cm RLL, new 5/2/20     GERD (gastroesophageal reflux disease)     NSTEMI (non-ST elevated myocardial infarction) (H)     Chronic bronchitis, unspecified chronic bronchitis type (H)     PAD (peripheral artery disease) (H) - moderate left common carotid stenosis, aortoiliac bypass, chronic left vertebral and right posterior cerebral stenoses     Cervical high risk HPV (human papillomavirus) test positive     Acute respiratory failure with hypoxia (H)     Anemia     Coronary artery disease of native artery of native heart with stable angina pectoris (H)     Elevated troponin     Pulmonary emphysema (H)     Vertigo     Other seizures (H) - possible     Acute CVA (cerebrovascular accident) - right paramedian superior vermis and left cerebellar hemisphere     Severe malnutrition (H)     Hyponatremia     Hypochloremia     Acute on chronic respiratory failure with hypoxia and hypercapnia     Acute bronchitis     Moderate to severe pulmonary hypertension (H)-per Echo     Nonrheumatic aortic valve stenosis - moderate to severe     Hypotension     Lactic acidosis     Spontaneous pneumothorax     Chest tube in place     Chronic obstructive pulmonary disease, unspecified COPD type (H)     Cigarette smoker     S/P drug eluting coronary stent placement-LAD and circ, circ re-do 5/17/18     Abnormal CXR 9/2-?right mid lung cavitation with air-fluid levels     Dysphagia     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  Given the location of the defect and the proximity to free margins a trilobed flap was deemed most appropriate.  Using a sterile surgical marker, an appropriate trilobed flap drawn around the defect.    The area thus outlined was incised deep to adipose tissue with a #15 scalpel blade.  The skin margins were undermined to an appropriate distance in all directions utilizing iris scissors.

## 2022-02-08 NOTE — CONSULTS
HEART CARE NOTE        Thank you, Dr. Laird, for asking the [facility] Heart Care team to see Preeti Ford to evaluate NSTEMI.      Assessment/Recommendations     1. CAD c/b NSTEMI  Assessment / Plan    Hx of PCI in 2018    Repeat echo significant for new wall motion abnormalities; continue heparin and nitroglycerin gtt    Keep NPO for coronary angiogram +/- PCI. I discussed this with him including potential risk of stroke, myocardial infarction, or death.  We also discussed the possibility of vascular injury, bleeding requiring blood transfusion, or reaction to x-ray dye. Will also try to reach her  to discuss as patient's capacity for decision making is questionable.    Continue to monitor on telemetry    Continue ASA, high intensity atorvastatin, metoprolol     2. HFmrEF  Assessment / Plan    Appears to be near euvolemia on physical exam; denies HF symptoms    Will address GDMT s/p coronary angiogram    3. COPD c/b acute respiratory compromise  Assessment / Plan    Management per pulmonary team    4. HTN  Assessment / Plan    Inadequately controlled on current regimen    Will start Losartan in hopes of transitioning to Sacubitril-Valsartan at a later date    5. Valvular heart disease  Assessment / Plan    Potential low-flow-low gradient aortic stenosis - will refer to valve clinic is patient is amenable     Clinically Significant Risk Factors Present on Admission               # Platelet Defect: home medication list includes an antiplatelet medication       History of Present Illness/Subjective    Ms. Preeti Ford is a 71 year old female with a PMHx significant for chronic respiratory failure due to COPD (FEV1 30%) on home O2 2 LPM and Trilogy positive pressure ventilation at night, spontaneous pneumothorax, HTN, CAD s.p PCI in 2018, moderate to severe Aortic stenosis, CVA, PVD s/p aortoiliac bypass. who transfers from [facility] with concern for NSTEMI.     Today, Mr. Ford  "denies any acute events or complaints. She denies chest pain, palpitations, orthopnea. She's unable to elaborate on her recent for hospital admission and presenting symptoms    ECG: Personally reviewed. normal sinus rhythm, nonspecific ST and T waves changes.    ECHO (personnaly Reviewed):   Severe hypokinesis of the mid and distal anterior, anteroseptal and distal  inferior walls and apex. Function of the lateral and inferolateral walls is  well preserved.  Left ventricular systolic function is mild to moderately reduced.  The visual ejection fraction is 40-45%.  There is mild (1+) mitral regurgitation.  Moderate valvular aortic stenosis. YOLANDA 1.1 cm2, mean AV gradient 16 mmHg. Low  SVI suggests low-flow, low-gradient moderate to severe AS.  Right ventricular systolic pressure is elevated, consistent with moderate  pulmonary hypertension.     Compared to the previous study, the LVF is much lower and WMA's are now  present. AS is probably unchanged.        Physical Examination Review of Systems   /57   Pulse 75   Temp 97.3  F (36.3  C) (Axillary)   Resp 22   Ht 1.6 m (5' 3\")   Wt 44.2 kg (97 lb 6.4 oz)   SpO2 100%   BMI 17.25 kg/m    Body mass index is 17.25 kg/m .  Wt Readings from Last 3 Encounters:   02/08/22 44.2 kg (97 lb 6.4 oz)   02/07/22 42.5 kg (93 lb 11.1 oz)   12/13/21 44.1 kg (97 lb 3.2 oz)     General Appearance:   no distress, normal body habitus   ENT/Mouth: membranes moist, no oral lesions or bleeding gums.      EYES:  no scleral icterus, normal conjunctivae   Neck: no carotid bruits or thyromegaly   Chest/Lungs:   lungs are clear to auscultation, no rales or wheezing,  equal chest wall expansion    Cardiovascular:   Regular. Normal first and second heart sounds with norubs, or gallops; the carotid, radial and posterior tibial pulses are intact, no JVD or LE edema bilaterally    Abdomen:  no organomegaly, masses, bruits, or tenderness; bowel sounds are present   Extremities: no cyanosis " "or clubbing   Skin: no xanthelasma, warm.    Neurologic: alert and oriented x3, calm     Psychiatric: alert and oriented x3, calm     A complete 10 systems ROS was reviewed  And is negative except what is listed in the HPI.          Medical History  Surgical History Family History Social History   Past Medical History:   Diagnosis Date     Acute on chronic respiratory failure (H) 03/25/2021     Arthritis      COPD (chronic obstructive pulmonary disease) (H)      Coronary artery disease      CVA (cerebral vascular accident) (H) 08/17/2013     Hypertension      Hypotension, unspecified hypotension type      Sepsis (H)     Past Surgical History:   Procedure Laterality Date     APPENDECTOMY       BYPASS GRAFT AORTOILIAC N/A 3/7/2017    Procedure: BYPASS GRAFT AORTOILIAC;  Surgeon: Marcos Pratt MD;  Location: [facility] OR     SALPINGO OOPHORECTOMY,R/L/DELORES      Salpingo Oophorectomy, RT/LT/DELORES    no family history of premature coronary artery disease Social History     Socioeconomic History     Marital status:      Spouse name: Not on file     Number of children: Not on file     Years of education: Not on file     Highest education level: Not on file   Occupational History     Not on file   Tobacco Use     Smoking status: Current Some Day Smoker     Types: Cigarettes     Smokeless tobacco: Never Used     Tobacco comment: \"just a couple a day\"   Substance and Sexual Activity     Alcohol use: No     Alcohol/week: 0.0 standard drinks     Drug use: No     Sexual activity: Not Currently     Partners: Male   Other Topics Concern      Service No     Blood Transfusions No     Caffeine Concern No     Occupational Exposure No     Hobby Hazards No     Sleep Concern Yes     Comment: Trouble breathing at night due to COPD     Stress Concern No     Weight Concern No     Special Diet No     Back Care No     Exercise No     Bike Helmet No     Seat Belt Yes     Self-Exams Yes     Parent/sibling w/ CABG, MI or " "angioplasty before 65F 55M? No   Social History Narrative     Not on file     Social Determinants of Health     Financial Resource Strain: Not on file   Food Insecurity: Not on file   Transportation Needs: Not on file   Physical Activity: Not on file   Stress: Not on file   Social Connections: Not on file   Intimate Partner Violence: Not on file   Housing Stability: Not on file           Lab Results    Chemistry/lipid CBC Cardiac Enzymes/BNP/TSH/INR   Lab Results   Component Value Date    CHOL 143 12/13/2021    HDL 41 (L) 12/13/2021    TRIG 129 12/13/2021    BUN 23 02/07/2022     02/07/2022    CO2 30 02/07/2022    Lab Results   Component Value Date    WBC 6.7 02/08/2022    HGB 9.5 (L) 02/08/2022    HCT 35.0 02/08/2022    MCV 82 02/08/2022     02/08/2022    Lab Results   Component Value Date    TROPONINI 0.08 02/08/2022    TSH 1.05 08/18/2013    INR 0.91 08/30/2021     Lab Results   Component Value Date    TROPONINI 0.08 02/08/2022          Weight:    Wt Readings from Last 3 Encounters:   02/08/22 44.2 kg (97 lb 6.4 oz)   02/07/22 42.5 kg (93 lb 11.1 oz)   12/13/21 44.1 kg (97 lb 3.2 oz)       Allergies  Allergies   Allergen Reactions     Crestor [Rosuvastatin] Other (See Comments)     dizziness     Lisinopril Cough     Statins [Hmg-Coa-R Inhibitors] Other (See Comments)     Muscle/joint aching     Optison [Albumin Human] Other (See Comments)     Pt was flush and very dizzy.  Also had a BP drop     Penicillins Rash         Surgical History  Past Surgical History:   Procedure Laterality Date     APPENDECTOMY       BYPASS GRAFT AORTOILIAC N/A 3/7/2017    Procedure: BYPASS GRAFT AORTOILIAC;  Surgeon: Marcos Pratt MD;  Location: [facility] OR     SALPINGO OOPHORECTOMY,R/L/DELORES      Salpingo Oophorectomy, RT/LT/DELORES       Social History  Tobacco:   History   Smoking Status     Current Some Day Smoker     Types: Cigarettes   Smokeless Tobacco     Never Used     Comment: \"just a couple a day\"    Alcohol: "   Social History    Substance and Sexual Activity      Alcohol use: No        Alcohol/week: 0.0 standard drinks   Illicit Drugs:   History   Drug Use No       Family History  Family History   Problem Relation Age of Onset     Cerebrovascular Disease Mother      Cerebrovascular Disease Father      Genitourinary Problems Brother         Dialysis          Burton Rodriguez MD on 2/8/2022      cc: Alli Castro,

## 2022-02-08 NOTE — CONSULTS
[facility]  Palliative Care Consultation Note    Patient: Preeti Ford  Date of Admission:  2/8/2022    Requesting Clinician / Team: Dr. Zabala  Reason for consult: Goals of care    Recommendations:  Goals of care  - Unclear at this time as patient does not have decision making capacity today  - I called numbers listed for , son, and relative.  No answers.  I was only able to leave a voicemail on number listed for son.  - Per Discharge summary yesterday: Still wishes DNR...would consider intubation and tach if needed.  Would desire surgical intervention if needed and does want to proceed with biopsies of the lung nodules when able.   - Code Status: No CPR- Pre-arrest intubation OK    Symptom management  - per ICU team   - May consider repeating a blood gas given her encephalopathy     Psychosocial/spiritual support  - unknown as patient unable to provide any information      Advanced Care Planning  - Patient has a completed Health Care Directive: No  - POLST from 05/2020 indicating full code/full treatment   - Surrogate decision maker: unclear    Discussed with Dr. Zabala and RN.      Thank you for the opportunity to participate in the care of this patient and family. Our team: will continue to follow.     During regular M-F work hours -- if you are not sure who specifically to contact -- please contact us by calling us directly at the Palliative Care Main Line [phone]    After regular work hours and on weekends/holidays, you can leave a message at [phone]      Assessments:  71 yoF with hx chronic resp failure on nocturnal trilogy, CAD s/p ROWDY 2018, CVA with aphasia, COPD, HTN admitted today with concern for NSTEMI. CTA chest neg for PE.  Also being treated for AECOPD and possible pneumonia prior to transfer from outside facility, and concern for new and increasing pulmonary nodules.      Today, the patient was seen for:  Acute on chronic respiratory  "failure  NSTEMI  Encephalopathy in setting of hypoxia and respiratory acidosis on blood gas this morning     Prognosis, Goals, & Planning:      Functional Status just prior to hospitalization: unclear      Prognosis, Goals, and/or Advance Care Planning were addressed today: No        Summary/Comments: unable d/t encepahlopathy      Patient's decision making preferences: unable to assess          Patient has decision-making capacity today for complex decisions: No            I have concerns about the patient/family's health literacy today: Yes          Coping, Meaning, & Spirituality:   Mood, coping, and/or meaning in the context of serious illness were addressed today: No      Social: patient unable to provide any meaningful info  - numbers listed for a  Oh, son Yfn, and relative Yamilet  - Patient did tell me she has 4 kids, but than said three kids a second later    History of Present Illness:  History gathered today from: medical chart, medical team members    71 yoF with hx chronic resp failure on nocturnal trilogy, CAD, CVA, COPD, HTN who initially admitted to [facility] on 2/5 for shortness of breath now transfers to [facility] 2/8 with concern for NSTEMI. Transferred for further evaluation and treatment. Patient with elevated troponins.  Previously on heparin drip and nitroglycerin drip.  Reports also an exacerbation of COPD.  D-dimer elevated at 2.96, CTA of the chest is negative for pulmonary embolism.  Echocardiogram completed on the seventh with severe hypokinesis noted, see report for further details.  Patient with longstanding coronary artery disease with previous drug-eluting stent placement in 2018.     Patient is alert but not oriented even to self for me today.  She was currently placed on 1:1 observation d/t trying to crawl out of bed.  She does deny pain, sob, n/v but when asking her any orientation questions she is either incoherent, or says \"I don't know.\"  Unable to " provide any history.     Called number listed for  loraine multiple times.  I kept getting a busy signal, thus unable to leave a message.  I called number listed for son Yfn, no answer, I left a voicemail.  I called number listed for relative Yamilet, no answer and voce mailbox was full.     In re: goals, this is from discharge summary yesterday: patient's ongoing baseline mentation and aphasia makes it difficult to assess her understanding of the current critical illness as well as global understanding of her medical conditions and overall poor prognosis but patient is able to verbalize her wishes and stays consistent in her wishes when reasked.  Still wishes DNR status today but would consider intubation and tach if needed.  Would desire surgical intervention if needed and does want to proceed with biopsies of the lung nodules when able.   is in agreement    Key Palliative Symptom Data: (0=no symptom/no concern, 1=mild, 2=moderate, 3=severe):  Pain: 0  Dyspnea: 0  Nausea: 0  Anxiety: 0    ROS:  Difficult to obtain meaningful ROS d/t encephalopathy     Current Problem List:   Active Problems:    NSTEMI (non-ST elevated myocardial infarction) (H)       Past Medical History:  Past Medical History:   Diagnosis Date     Acute on chronic respiratory failure (H) 03/25/2021     Arthritis      COPD (chronic obstructive pulmonary disease) (H)      Coronary artery disease      CVA (cerebral vascular accident) (H) 08/17/2013     Hypertension      Hypotension, unspecified hypotension type      Sepsis (H)         Past Surgical History:  Past Surgical History:   Procedure Laterality Date     APPENDECTOMY       BYPASS GRAFT AORTOILIAC N/A 3/7/2017    Procedure: BYPASS GRAFT AORTOILIAC;  Surgeon: Marcos Pratt MD;  Location: [facility] OR     SALPINGO OOPHORECTOMY,R/L/DELORES      Salpingo Oophorectomy, RT/LT/DELORES         Family History:  Family History   Problem Relation Age of Onset     Cerebrovascular Disease Mother       Cerebrovascular Disease Father      Genitourinary Problems Brother         Dialysis     Family Status   Relation Name Status     Mo   at age 68     Fa       Bro  (Not Specified)     Kimi  Alive     Son  Alive      Allergies:  Allergies   Allergen Reactions     Crestor [Rosuvastatin] Other (See Comments)     dizziness     Lisinopril Cough     Statins [Hmg-Coa-R Inhibitors] Other (See Comments)     Muscle/joint aching     Optison [Albumin Human] Other (See Comments)     Pt was flush and very dizzy.  Also had a BP drop     Penicillins Rash        Medications:  I have reviewed this patient's medication profile and medications from this hospitalization.   Noted:  Medications Prior to Admission   Medication Sig Dispense Refill Last Dose     albuterol (PROAIR HFA/PROVENTIL HFA/VENTOLIN HFA) 108 (90 Base) MCG/ACT inhaler Inhale 2 puffs into the lungs every 6 hours as needed for shortness of breath / dyspnea 3 Inhaler 3 Unknown at Unknown time     albuterol (PROVENTIL) (2.5 MG/3ML) 0.083% neb solution INHALE ONE VIAL BY NEBULIZATION EVERY 4 HOURS AS NEEDED FOR SHORTNESS OF BREATH / DIFFICULTY BREATHING OR WHEEZING 360 mL 0 Unknown at Unknown time     aspirin 81 MG EC tablet Take 1 tablet (81 mg) by mouth daily   Unknown at Unknown time     atorvastatin (LIPITOR) 20 MG tablet Take 1 tablet (20 mg) by mouth At Bedtime 90 tablet 3 Unknown at Unknown time     ferrous sulfate (FEROSUL) 325 (65 Fe) MG tablet Take 1 tablet (325 mg) by mouth 2 times daily 60 tablet 1 Unknown at Unknown time     Fluticasone-Umeclidin-Vilanterol (TRELEGY ELLIPTA) 200-62.5-25 MCG/INH oral inhaler Inhale 1 puff into the lungs daily 28 each 11 Unknown at Unknown time     montelukast (SINGULAIR) 10 MG tablet Take 1 tablet (10 mg) by mouth At Bedtime 90 tablet 3 Unknown at Unknown time     nitroGLYcerin (NITROSTAT) 0.4 MG sublingual tablet For chest pain place 1 tablet under the tongue every 5 minutes for 3 doses. If symptoms persist 5  "minutes after 1st dose call 911. 25 tablet 0 Unknown at Unknown time     Nutritional Supplements (ENSURE PLUS) LIQD Take 1 each by mouth 4 times daily 5688 mL 11 Unknown at Unknown time     order for DME Equipment being ordered: Nebulizer machine 1 Device 0      Respiratory Therapy Supplies (NEBULIZER/TUBING/MOUTHPIECE) KIT 1 kit as needed (if becomes damaged) 1 kit 1        Emergency Contact Info (Pulled from chart)  Extended Emergency Contact Information  Primary Emergency Contact: Yoseph Frod ([place])  Address: [address] [place] [address]  Home Phone: [phone]  Mobile Phone: [phone]  Relation: Spouse  Secondary Emergency Contact: Yfn Nunez   [facility]  Mobile Phone: [phone]  Relation: Son    Physical Exam:  Vital Signs: Blood pressure 115/55, pulse 77, temperature 97.2  F (36.2  C), temperature source Axillary, resp. rate 20, height 1.6 m (5' 3\"), weight 44.2 kg (97 lb 6.4 oz), SpO2 98 %, not currently breastfeeding.   GENERAL: lying in bed in NAD    SKIN: Warm and dry   HEENT: Normocephalic  LUNGS: Clear to auscultation anterolaterally; non-labored   CARDIAC: RRR, normal s1/s2, w/o m/r/g   ABDOMINAL: BS (+), soft, non distended, non tender  EXTREMITIES: No edema or cyanosis, pulses 2+ and symmetrical  NEUROLOGIC: alert, confused, oriented x0  PSYCH: calm    Data reviewed:  Recent imaging reviewed:   CT CHEST PULMONARY EMBOLISM WITH CONTRAST 2/7/2022                                 IMPRESSION:  1.  No pulmonary embolism demonstrated.   2.  Enlarging pulmonary nodules in the superior segment of the right  lower lobe and right middle lobe.  3.  Volume loss in the right upper lobe is indeterminate.  4.  New right hydronephrosis of uncertain etiology.   -----------------  Echo 2/7/2022  Interpretation Summary     Severe hypokinesis of the mid and distal anterior, anteroseptal and distal  inferior walls and apex. Function of the lateral and " inferolateral walls is  well preserved.  Left ventricular systolic function is mild to moderately reduced.  The visual ejection fraction is 40-45%.  There is mild (1+) mitral regurgitation.  Moderate valvular aortic stenosis. YOLANDA 1.1 cm2, mean AV gradient 16 mmHg. Low  SVI suggests low-flow, low-gradient moderate to severe AS.  Right ventricular systolic pressure is elevated, consistent with moderate  pulmonary hypertension.     Compared to the previous study, the LVF is much lower and WMA's are now  present. AS is probably unchanged.       Recent lab data reviewed:   Last Comprehensive Metabolic Panel:  Sodium   Date Value Ref Range Status   02/08/2022 142 136 - 145 mmol/L Final   03/28/2021 140 133 - 144 mmol/L Final     Potassium   Date Value Ref Range Status   02/08/2022 5.4 (H) 3.5 - 5.0 mmol/L Final   03/28/2021 4.5 3.4 - 5.3 mmol/L Final     Chloride   Date Value Ref Range Status   02/08/2022 103 98 - 107 mmol/L Final   03/28/2021 100 94 - 109 mmol/L Final     Carbon Dioxide   Date Value Ref Range Status   03/28/2021 36 (H) 20 - 32 mmol/L Final     Carbon Dioxide (CO2)   Date Value Ref Range Status   02/08/2022 32 (H) 22 - 31 mmol/L Final     Anion Gap   Date Value Ref Range Status   02/08/2022 7 5 - 18 mmol/L Final   03/28/2021 4 3 - 14 mmol/L Final     Glucose   Date Value Ref Range Status   02/08/2022 108 70 - 125 mg/dL Final   03/28/2021 110 (H) 70 - 99 mg/dL Final     GLUCOSE BY METER POCT   Date Value Ref Range Status   02/08/2022 104 (H) 70 - 99 mg/dL Final     Urea Nitrogen   Date Value Ref Range Status   02/08/2022 26 (H) 8 - 22 mg/dL Final   03/28/2021 25 7 - 30 mg/dL Final     Creatinine   Date Value Ref Range Status   02/08/2022 0.69 0.60 - 1.10 mg/dL Final   03/28/2021 0.70 0.52 - 1.04 mg/dL Final     GFR Estimate   Date Value Ref Range Status   02/08/2022 >90 >60 mL/min/1.73m2 Final     Comment:     Effective December 21, 2021 eGFRcr in adults is calculated using the 2021 CKD-EPI creatinine  equation which includes age and gender (Hasmukh salcido al., NEJ, DOI: 10.1056/XSCTrz2816935)   03/28/2021 89 >60 mL/min/[1.73_m2] Final     Comment:     Non  GFR Calc  Starting 12/18/2018, serum creatinine based estimated GFR (eGFR) will be   calculated using the Chronic Kidney Disease Epidemiology Collaboration   (CKD-EPI) equation.       Calcium   Date Value Ref Range Status   02/08/2022 8.2 (L) 8.5 - 10.5 mg/dL Final   03/28/2021 8.5 8.5 - 10.1 mg/dL Final     Bilirubin Total   Date Value Ref Range Status   02/08/2022 0.3 0.0 - 1.0 mg/dL Final   05/02/2020 0.4 0.2 - 1.3 mg/dL Final     Alkaline Phosphatase   Date Value Ref Range Status   02/08/2022 53 45 - 120 U/L Final   05/02/2020 78 40 - 150 U/L Final     ALT   Date Value Ref Range Status   02/08/2022 76 (H) 0 - 45 U/L Final   10/29/2020 22 0 - 50 U/L Final     AST   Date Value Ref Range Status   02/08/2022 55 (H) 0 - 40 U/L Final   05/02/2020 30 0 - 45 U/L Final     CBC RESULTS: Recent Labs   Lab Test 02/08/22  0519   WBC 6.7   RBC 4.25   HGB 9.5*   HCT 35.0   MCV 82   MCH 22.4*   MCHC 27.1*   RDW 17.6*        Last Arterial Blood Gas:  pH Arterial   Date Value Ref Range Status   02/08/2022 7.18 (LL) 7.37 - 7.44 Final   03/25/2021 7.32 (L) 7.35 - 7.45 pH Final     pCO2 Arterial   Date Value Ref Range Status   02/08/2022 91 (HH) 35 - 45 mm Hg Final   03/25/2021 57 (H) 35 - 45 mm Hg Final     pO2 Arterial   Date Value Ref Range Status   02/08/2022 127 (H) 75 - 85 mm Hg Final   03/25/2021 89 80 - 105 mm Hg Final     Bicarbonate Arterial   Date Value Ref Range Status   02/08/2022 28 23 - 29 mmol/L Final   03/25/2021 30 (H) 21 - 28 mmol/L Final     O2 Sat Arterial   Date Value Ref Range Status   03/07/2017 99 92 - 100 % Final     O2 Sat, Arterial   Date Value Ref Range Status   02/08/2022 98.3 (H) 95.0 - 96.0 % Final     Base Excess Art   Date Value Ref Range Status   03/25/2021 2.1 mmol/L Final     Comment:     Abnormal Result, Ref range: -9.0 to  1.8     Base Excess/Deficit (+/-)   Date Value Ref Range Status   02/08/2022 5.6   mmol/L Final     Venous Blood Gas  Recent Labs   Lab 02/08/22  0519 02/07/22  0538 02/06/22  1316 02/06/22  0532 02/06/22  0005   PHV 7.13* 7.38 7.44* 7.44* 7.44*   PCO2V >98* 55* 53* 55* 54*   PO2V 18* 64* 24* 35 35   HCO3V 28 33* 36* 38* 37*   KYLER 7.6 6.1* 9.9* 11.7* 11.2*   O2PER  --  24 21 21 21               ====================================================  TT: I have personally spent a total of 35 minutes on the unit in review of medical record, consultation with the medical providers and assessment of patient today, with more than 50% of this time spent in counseling, coordination of care, and discussion with patient and staff re: symptom management, and development of plan of care as noted above.  ====================================================    EVANGELINA Dong [facility]  Palliative Medicine  Pager [phone]  Office: [phone]

## 2022-02-08 NOTE — Clinical Note
The DP pulses are 2+ bilaterally. The PT pulses are 1+ bilaterally. The radial pulses are 2+ bilaterally.

## 2022-02-08 NOTE — PLAN OF CARE
Problem: Restraint for Non-Violent/Non-Self-Destructive Behavior  Goal: Prevent/Manage Potential Problems  Description: Maintain safety of patient and others during period of restraint.  Promote psychological and physical wellbeing.  Prevent injury to skin and involved body parts.  Promote nutrition, hydration, and elimination.  Outcome: No Change     Patient was more agitated with restraints. Given IV lorazepam, using 1:1 monitoring at this time, responding well.

## 2022-02-08 NOTE — PLAN OF CARE
Patient has been on 2L NC most of this shift. Now she is back on BiPAP.    Current settings,    AVAPS with the settings of  MIN P 10 MAX P35 EPAP 5 R 22 30%. Sats high 90's. Scheduled nebs given x3 via mask.  Will continue with current therapy and assess.      VBG continues to improve, pH 7.26, PCO2 83, PO2 39, HCO3 31      Evan Epperson, RT

## 2022-02-09 NOTE — PROGRESS NOTES
"[facility]    Medicine Progress Note - Hospitalist Service    Date of Admission:  2/8/2022     Assessment & Plan   SUMMARY:  71 year old female with history of severe oxygen dependent COPD using trilogy BIPAP support at night, pulmonary hypertension, still actively smoking, PVD, s/p several CVA's resulting in expressive aphasia and poor impulse control, CAD s/p PCI 2018, ischemic cardiomyopathy, moderate aortic stenosis, malnutrition, admitted to [facility] 2/5/2022, transferred to [facility] evening of 2/7/2022 for acute on chronic respiratory failure, NSTEMI with unstable angina, planning for coronary angiogram with PCI today 2/9/2022.    Spoke at length with  \"Bud\" regarding patient's ongoing severe respiratory compromise, concern for respiratory decompensation during angiogram, patient's poor memory function.  Reviewed patient's chronic severe COPD respiratory status, slowly declining over last 1-2 years. Spouse agrees that patient would not want to be on ventilator support. OK for BIPAP. Change code status to DNR/DNI.    ACTIVE PROBLEM LIST  NSTEMI, coronary artery disease, ischemic cardiomyopathy, moderate to severe pulmonary hypertension, nonrheumatic aortic valve stenosis, tobacco use: Patient with elevated troponins. D-dimer elevated at 2.96, CTA of the chest is negative for pulmonary embolism. Echocardiogram completed on 2/7 showing new severe hypokinesis of the mid and distal anterior, anteroseptal and distalinferior walls and apex. Function of the lateral and inferolateral walls is well preserved. EF 40-45%. Moderate valvular aortic stenosis. YOLANDA 1.1 cm2.  Patient with longstanding coronary artery disease with previous drug-eluting stent placement in 2018.    -Cardiology consulted.  -Heparin drip.  -Nitroglycerin drip.  -Close hemodynamic monitoring.  -Serial troponins - stable trending down  -Respiratory status improved/stable today, acceptable risk for " attempting palliative coronary angiogram with PCI. Would not recommend CABG in this patient, baseline pulmonary status is not supportive.     Acute on chronic respiratory failure with hypoxia and hypercapnia, COPD with acute exacerbation, possible pneumonia of the right lung, possible sepsis, pulmonary emphysema:  -Pulmonary on board appreciate recommendations  -BiPAP, IV Solu medrol, scheduled duo nebs   -Pulmonary hygiene.  -Pulmonary concerned regarding lung infiltrate, continuing ceftriaxone IV for now  -RCAT.   -Blood cultures NGTD from 5 February.  -Legionella and strep pneumo negative.  Coronavirus influenza a and B-.  Respiratory panel negative.    Aspiration risk:  Assessed by speech path, ok for careful feeding with small bites, thickened liquids.  Recommending video swallow eval when completed with angiogram, pulmonary status stable    Pulmonary nodules, enlarging: CT with enlarging pulmonary nodules documented, see report for further details.  Differential includes acute inflammatory changes vs malignancy.  -Consider biopsy once medically stable and off of anticoagulation.     Anemia, chronic: MCV is 77.  -Monitor.     Severe malnutrition: Documented before.  -Nutrition consult.     History of stroke, cognitive impairment, aphasia: Previously in 2013, again in 2018.  Including right thalamus and left cerebellar and right vermis.  Present cognitive impairments from strokes according to the .    Acute metabolic encephalopathy  -more awake, alert, appropriate today  -multifactorial with underlying hx of CVA and acute hypercarbia on admission  -Bedtime seroquel and prn seroquel     DVT Prophylaxis: Heparin drip.  Code Status: No CPR, no intubation, BIPAP ok    Disposition Plan: Expected discharge: 02/12/2022   recommended to Pending once respiratory and cardiac status stable.    Barnhart Catheter: Not present  Central Lines/Port-a-cath: Not present  Drains: Not present    Principal Problem:    Acute on  "chronic respiratory failure with hypoxia and hypercapnia  Active Problems:    Hyperlipidemia LDL goal <130    History of stroke - right thalamus in 8/2013 and left cerebellar and right vermis in 3/2018    Ischemic cardiomyopathy - EF 25% in 2015 but 55% in 3/2017 and 45-50% on 3/18    HTN, goal below 130/80    NSTEMI (non-ST elevated myocardial infarction) (H)    PAD (peripheral artery disease) (H) - moderate left common carotid stenosis, aortoiliac bypass, chronic left vertebral and right posterior cerebral stenoses    Severe malnutrition (H)    Moderate to severe pulmonary hypertension (H)-per Echo    Aphasia due to recent cerebral infarction    Diet: Orders Placed This Encounter      NPO for Medical/Clinical Reasons Except for: Meds, Ice Chips     --------------------------------------------    The patient's care was discussed with the Patient and Patient's Family.    Marcin Cordova MD  Hospitalist Service  [facility]  Securely message with the Vocera Web Console (learn more here)  Text page via [employer] Paging/Directory    Clinically Significant Risk Factors Present on Admission           # Severe Malnutrition: based on nutrition assessment  ______________________________________________________________________    Interval History   Patient awake, alert, breathing easily on full facemask BIPAP.     ROS: Denies chest pain, passing urine well and good appetite    Data personally reviewed from the last 24 hours:   Reviewed telemetry, Laboratory results, Consultant recommendations and medications     Physical Exam   /75 (BP Location: Left arm)   Pulse 81   Temp 97.8  F (36.6  C) (Axillary)   Resp 29   Ht 1.6 m (5' 3\")   Wt 44.2 kg (97 lb 6.4 oz)   SpO2 100%   BMI 17.25 kg/m      Physical Exam    General Appearance:    HEENT:  anxious, Awake, Alert, Cooperative, in no distress and appears stated age   Normocephalic, atraumatic, conjunctiva clear without icterus and ears without " discharge   Lungs:   Bilateral wheezing, prolonged expiratory phase   Cardiovascular:  Regular Rate and Rythm, normal apical impulse, normal S1 and S2, no lower extremity edema bilaterally   Abdomen: Soft, non-tender and Non-distended, active bowel sounds   Skin:  Skin color, texture normal and bruising or bleeding. No rashes or lesions over face, neck, arms and legs, turgor normal.   Neurologic:    Neuropsychiatric: Alert but confused, Facial symmetry preserved and upper & lower extremities moving well with symmetry  anxious     Data   Recent Labs   Lab 02/09/22  1127 02/09/22  0803 02/09/22  0429 02/08/22  0759 02/08/22  0519 02/07/22  0737 02/07/22  0537   WBC  --   --  2.3*  --  6.7  --  4.1   HGB  --   --  8.4*  --  9.5*  --  11.2*   MCV  --   --  81  --  82  --  77*   PLT  --   --  183  --  240  --  286   NA  --   --  140  --  142  --  139   POTASSIUM  --   --  4.6  --  5.4*  --  4.2   CHLORIDE  --   --  101  --  103  --  104   CO2  --   --  32*  --  32*  --  30   BUN  --   --  20  --  26*  --  23   CR  --   --  0.54*  --  0.69  --  0.47*   ANIONGAP  --   --  7  --  7  --  5   CALDERON  --   --  8.0*  --  8.2*  --  8.7   * 89 105   < > 108   < > 131*   ALBUMIN  --   --  2.9*  --  3.2*  --   --    PROTTOTAL  --   --  5.5*  --  6.5  --   --    BILITOTAL  --   --  0.3  --  0.3  --   --    ALKPHOS  --   --  43*  --  53  --   --    ALT  --   --  49*  --  76*  --   --    AST  --   --  21  --  55*  --   --     < > = values in this interval not displayed.     No results found for this or any previous visit (from the past 24 hour(s)).

## 2022-02-09 NOTE — PROGRESS NOTES
HEART CARE NOTE          Assessment/Recommendations     1. CAD c/b NSTEMI  Assessment / Plan    Hx of PCI in 2018; repeat echo significant for new wall motion abnormalities; continue heparin and nitroglycerin gtt    NPO for coronary angiogram +/- PCI today. I discussed this with her including potential risk of stroke, myocardial infarction, or death.  We also discussed the possibility of vascular injury, bleeding requiring blood transfusion, or reaction to x-ray dye. Primary team discussed with  who;s also in agreement    Continue to monitor on telemetry    Continue ASA, high intensity atorvastatin, metoprolol      2. HFmrEF  Assessment / Plan    Appears to be near euvolemia on physical exam; denies HF symptoms    Will address GDMT s/p coronary angiogram     3. COPD c/b acute respiratory compromise  Assessment / Plan    Management per pulmonary team     4. HTN  Assessment / Plan    Inadequately controlled on current regimen    Will start Losartan in hopes of transitioning to Sacubitril-Valsartan at a later date     5. Valvular heart disease  Assessment / Plan    Potential low-flow-low gradient aortic stenosis - will refer to valve clinic is patient is amenable     History of Present Illness/Subjective      Ms. Preeti Ford is a 71 year old female with a PMHx significant for chronic respiratory failure due to COPD (FEV1 30%) on home O2 2 LPM and Trilogy positive pressure ventilation at night, spontaneous pneumothorax, HTN, CAD s.p PCI in 2018, moderate to severe Aortic stenosis, CVA, PVD s/p aortoiliac bypass. who transfers from [facility] with concern for NSTEMI.      Today, Mr. Ford denies any acute events or complaints. altered     ECG: Personally reviewed. normal sinus rhythm, nonspecific ST and T waves changes.     ECHO (personnaly Reviewed):   Severe hypokinesis of the mid and distal anterior, anteroseptal and distal  inferior walls and apex. Function of the lateral and  "inferolateral walls is  well preserved.  Left ventricular systolic function is mild to moderately reduced.  The visual ejection fraction is 40-45%.  There is mild (1+) mitral regurgitation.  Moderate valvular aortic stenosis. YOLANDA 1.1 cm2, mean AV gradient 16 mmHg. Low  SVI suggests low-flow, low-gradient moderate to severe AS.  Right ventricular systolic pressure is elevated, consistent with moderate  pulmonary hypertension.     Compared to the previous study, the LVF is much lower and WMA's are now  present. AS is probably unchanged.          Physical Examination Review of Systems   /60 (BP Location: Right arm)   Pulse 66   Temp 98.4  F (36.9  C) (Axillary)   Resp 19   Ht 1.6 m (5' 3\")   Wt 44.2 kg (97 lb 6.4 oz)   SpO2 100%   BMI 17.25 kg/m    Body mass index is 17.25 kg/m .  Wt Readings from Last 3 Encounters:   02/08/22 44.2 kg (97 lb 6.4 oz)   02/07/22 42.5 kg (93 lb 11.1 oz)   12/13/21 44.1 kg (97 lb 3.2 oz)     General Appearance:   no distress, normal body habitus   ENT/Mouth: membranes moist, no oral lesions or bleeding gums.      EYES:  no scleral icterus, normal conjunctivae   Neck: no carotid bruits or thyromegaly   Chest/Lungs:   lungs are clear to auscultation, no rales or wheezing, equal chest wall expansion    Cardiovascular:   Regular. Normal first and second heart sounds with no murmurs, rubs, or gallops; the carotid, radial and posterior tibial pulses are intact, no JVD or LE edema bilaterally    Abdomen:  no organomegaly, masses, bruits, or tenderness; bowel sounds are present   Extremities: no cyanosis or clubbing   Skin: no xanthelasma, warm.    Neurologic: altered     Psychiatric: altered     A complete 10 systems ROS was reviewed  And is negative except what is listed in the HPI.          Medical History  Surgical History Family History Social History   Past Medical History:   Diagnosis Date     Acute on chronic respiratory failure (H) 03/25/2021     Arthritis      COPD (chronic " "obstructive pulmonary disease) (H)      Coronary artery disease      CVA (cerebral vascular accident) (H) 08/17/2013     Hypertension      Hypotension, unspecified hypotension type      Sepsis (H)     Past Surgical History:   Procedure Laterality Date     APPENDECTOMY       BYPASS GRAFT AORTOILIAC N/A 3/7/2017    Procedure: BYPASS GRAFT AORTOILIAC;  Surgeon: Marcos Pratt MD;  Location: [facility] OR     SALPINGO OOPHORECTOMY,R/L/DELORES      Salpingo Oophorectomy, RT/LT/DELORES    no family history of premature coronary artery disease Social History     Socioeconomic History     Marital status:      Spouse name: Not on file     Number of children: Not on file     Years of education: Not on file     Highest education level: Not on file   Occupational History     Not on file   Tobacco Use     Smoking status: Current Some Day Smoker     Types: Cigarettes     Smokeless tobacco: Never Used     Tobacco comment: \"just a couple a day\"   Substance and Sexual Activity     Alcohol use: No     Alcohol/week: 0.0 standard drinks     Drug use: No     Sexual activity: Not Currently     Partners: Male   Other Topics Concern      Service No     Blood Transfusions No     Caffeine Concern No     Occupational Exposure No     Hobby Hazards No     Sleep Concern Yes     Comment: Trouble breathing at night due to COPD     Stress Concern No     Weight Concern No     Special Diet No     Back Care No     Exercise No     Bike Helmet No     Seat Belt Yes     Self-Exams Yes     Parent/sibling w/ CABG, MI or angioplasty before 65F 55M? No   Social History Narrative     Not on file     Social Determinants of Health     Financial Resource Strain: Not on file   Food Insecurity: Not on file   Transportation Needs: Not on file   Physical Activity: Not on file   Stress: Not on file   Social Connections: Not on file   Intimate Partner Violence: Not on file   Housing Stability: Not on file           Lab Results    Chemistry/lipid CBC Cardiac " "Enzymes/BNP/TSH/INR   Lab Results   Component Value Date    CHOL 143 12/13/2021    HDL 41 (L) 12/13/2021    TRIG 129 12/13/2021    BUN 20 02/09/2022     02/09/2022    CO2 32 (H) 02/09/2022    Lab Results   Component Value Date    WBC 2.3 (L) 02/09/2022    HGB 8.4 (L) 02/09/2022    HCT 29.8 (L) 02/09/2022    MCV 81 02/09/2022     02/09/2022    Lab Results   Component Value Date    TROPONINI 0.07 02/08/2022    TSH 1.05 08/18/2013    INR 0.91 08/30/2021     Lab Results   Component Value Date    TROPONINI 0.07 02/08/2022          Weight:    Wt Readings from Last 3 Encounters:   02/08/22 44.2 kg (97 lb 6.4 oz)   02/07/22 42.5 kg (93 lb 11.1 oz)   12/13/21 44.1 kg (97 lb 3.2 oz)       Allergies  Allergies   Allergen Reactions     Crestor [Rosuvastatin] Other (See Comments)     dizziness     Lisinopril Cough     Statins [Hmg-Coa-R Inhibitors] Other (See Comments)     Muscle/joint aching     Optison [Albumin Human] Other (See Comments)     Pt was flush and very dizzy.  Also had a BP drop     Penicillins Rash         Surgical History  Past Surgical History:   Procedure Laterality Date     APPENDECTOMY       BYPASS GRAFT AORTOILIAC N/A 3/7/2017    Procedure: BYPASS GRAFT AORTOILIAC;  Surgeon: Marcos Pratt MD;  Location: [facility] OR     SALPINGO OOPHORECTOMY,R/L/DELORES      Salpingo Oophorectomy, RT/LT/DELORES       Social History  Tobacco:   History   Smoking Status     Current Some Day Smoker     Types: Cigarettes   Smokeless Tobacco     Never Used     Comment: \"just a couple a day\"    Alcohol:   Social History    Substance and Sexual Activity      Alcohol use: No        Alcohol/week: 0.0 standard drinks   Illicit Drugs:   History   Drug Use No       Family History  Family History   Problem Relation Age of Onset     Cerebrovascular Disease Mother      Cerebrovascular Disease Father      Genitourinary Problems Brother         Dialysis          Burton Rodriguez MD on 2/9/2022      cc: Alli Castro    "

## 2022-02-09 NOTE — PROGRESS NOTES
[facility] Pulmonary/Critical Care Consult Team Note    Preeti Ford,  1950, MRN [id number]  Admitting Dx: NSTEMI (non-ST elevated myocardial infarction) (H) [I21.4]  Date / Time of Admission:  2022 12:06 AM    Overnight Events:  Intake/Output last 3 shifts:  I/O last 3 completed shifts:  In: 787.6 [P.O.:220; I.V.:567.6]  Out: 1425 [Urine:1425]  On bipap that was switched to AVAPS overnight  Denies pain    Assessment/Plan: Preeti Ford is a 71 year old female with PMHx of chronic respiratory failure due to COPD (FEV1 30%) on home O2 2 LPM and Trilogy positive pressure ventilation at night, spontaneous pneumothorax, HTN, CAD, moderate to severe Aortic stenosis, CVA, PVD s/p aortoiliac bypass. Patient was admitted at [facility] on 22 for evaluation of shortness of breath. Patient was in severe respiratory distress, initial SpO2 80's. ABG showed acute on chronic respiratory acidosis. Troponin was elevated, normal WBC and diff, elevated ddimer, CXR showed lung infiltrates. Follow up Chest CT scan showed emphysematous changes, enlarging lung nodules, right upper lobe peripheral infiltrate.   Diagnosed with COPD exacerbation, pneumonia, NSTEMI with new wall motion abnormalities on ECHO recommended transferring to [facility]  for eval by cardiology.     PULM/ID: Acute on chronic respiratory failure due to Severe COPD with acute exacerbation   - AVAPS at night, NC during the day  - Increase size of RUL nodule from 0.9 to 1.3 cm and new nodule of 1.3 cm in RML - outpt PET scan  - Reviewing previous images, chest CT scan 2021 showed large RUL infiltrate, consistent with pneumonia, could be lung abscess formation.   - Recent CT scan showed volume loss , infiltrate / atelectasis, loculated pleural air.   - continue nebs and singulair  - continue ceftriaxone for 7 days and solumedrol 60 q12 - may be able to change to prednisone PO tomorrow     CV: NSTEMI with wall motion  abnormalities on ECHO  - heparin gtt and nitroglycerin gtt  - increasing metoprolol to 5mg PRN and will try to get off nitroglycerine gtt  - will have angio today  - cardiology following  - Hx of HTN, AORTIC STENOSIS,  and ischemic cardiomyopathy   - Monitor on tele    GI: Concern for aspiration  - video swallow study  - NPO except pills  - GI proph     RENAL:   - baseline Cr of 0.47  - Follow BUN/Creatinine  - strict I/O's    NEURO: Cognitive decline - unclear if acute or chronic    Pt has critical illness and impairs breathing on NIPPV such as there is high probability of imminent of life threatening deterioration in the patient's condition.    Code Status: DNR, palliative care following    Infusions:    heparin 900 Units/hr (02/09/22 0800)     nitroGLYcerin 50 mg in D5W 250 mL Stopped (02/09/22 0847)     - MEDICATION INSTRUCTIONS -       - MEDICATION INSTRUCTIONS -       - MEDICATION INSTRUCTIONS -         ICU DAILY CHECKLIST           Can patient transfer out of MICU? no  FAST HUG:  Feeding:  Feeding: No  Barnhart:{Yes  Analgesia/Sedation:Yes  Thromboembolic prophylaxis:Heparin  HOB>30:  Yes  Stress Ulcer Protocol Active: Yes; Mode: PPI/H2 Antagonist  Glycemic Control: No components found for: GLU Any glucose > 180 no; Mode of Insulin Therapy: not indicated  INTUBATED:  PHYSICAL THERAPY AND MOBILITY: Can patient have PT and mobility trial: yes Activity: ICU mobility protocol    Critical Care Time excluding procedures and family discussions greater than: 45 Minutes    Risk Factors Present on Admission:  Clinically Significant Risk Factors Present on Admission               # Severe Malnutrition: based on nutrition assessment  Code Status: No CPR- Do NOT Intubate         Sheri Zabala,   Pulmonary and Critical Care Attending  pgr [phone]    Allergies   Allergen Reactions     Crestor [Rosuvastatin] Other (See Comments)     dizziness     Lisinopril Cough     Statins [Hmg-Coa-R Inhibitors] Other (See Comments)  "    Muscle/joint aching     Optison [Albumin Human] Other (See Comments)     Pt was flush and very dizzy.  Also had a BP drop     Penicillins Rash       Meds: See MAR    Physical Exam:  /75 (BP Location: Left arm)   Pulse 80   Temp 97.8  F (36.6  C) (Axillary)   Resp 24   Ht 1.6 m (5' 3\")   Wt 44.2 kg (97 lb 6.4 oz)   SpO2 100%   BMI 17.25 kg/m    Intake/Output this shift:  I/O this shift:  In: -   Out: 250 [Urine:250]  GEN: sleeping,  NAD  HEENT: NC in place, MMM  CVS: regular rhythm, no murmurs  RESP: bilateral rhonchi and trace end exp wheezing, coughing  ABD: Soft, No abdominal pain with palpation, no guarding, no rigidity  EXT: Warm, well perfused, no edema  NEURO:  Follows commands, moving all extremities    Pertinent Labs: Latest lab results in EHR personally reviewed.   CMP  Recent Labs   Lab 02/09/22  1127 02/09/22  0803 02/09/22  0429 02/09/22  0359 02/08/22  0759 02/08/22  0519 02/07/22  0737 02/07/22  0537 02/06/22  0746 02/06/22  0530 02/05/22  1654 02/05/22  0708   NA  --   --  140  --   --  142  --  139  --  136  --  137   POTASSIUM  --   --  4.6  --   --  5.4*  --  4.2  --  4.4  --  4.3   CHLORIDE  --   --  101  --   --  103  --  104  --  101  --  100   CO2  --   --  32*  --   --  32*  --  30  --  33*  --  39*   ANIONGAP  --   --  7  --   --  7  --  5  --  2*  --  <1*   * 89 105 103*   < > 108   < > 131*   < > 145*   < > 105*   BUN  --   --  20  --   --  26*  --  23  --  14  --  12   CR  --   --  0.54*  --   --  0.69  --  0.47*  --  0.45*  --  0.52   GFRESTIMATED  --   --  >90  --   --  >90  --  >90  --  >90  --  >90   CALDERON  --   --  8.0*  --   --  8.2*  --  8.7  --  8.8  --  8.3*   MAG  --   --   --   --   --   --   --   --   --   --   --  1.9   PROTTOTAL  --   --  5.5*  --   --  6.5  --   --   --   --   --  7.6   ALBUMIN  --   --  2.9*  --   --  3.2*  --   --   --   --   --  2.7*   BILITOTAL  --   --  0.3  --   --  0.3  --   --   --   --   --  0.2   ALKPHOS  --   --  43*  --   -- "  53  --   --   --   --   --  77   AST  --   --  21  --   --  55*  --   --   --   --   --  18   ALT  --   --  49*  --   --  76*  --   --   --   --   --  17    < > = values in this interval not displayed.     CBC  Recent Labs   Lab 02/09/22  0429 02/08/22  0519 02/07/22  0537 02/06/22  0530   WBC 2.3* 6.7 4.1 2.0*   RBC 3.69* 4.25 5.03 4.85   HGB 8.4* 9.5* 11.2* 10.7*   HCT 29.8* 35.0 38.9 37.4   MCV 81 82 77* 77*   MCH 22.8* 22.4* 22.3* 22.1*   MCHC 28.2* 27.1* 28.8* 28.6*   RDW 17.5* 17.6* 17.8* 16.9*    240 286 246     INRNo lab results found in last 7 days.  Arterial Blood Gas  Recent Labs   Lab 02/08/22  0111 02/07/22  0538 02/06/22  1316 02/06/22  0532 02/06/22  0005   PH 7.18*  --   --   --   --    PCO2 91*  --   --   --   --    PO2 127*  --   --   --   --    HCO3 28  --   --   --   --    O2PER  --  24 21 21 21       Imaging: personally reviewed.   Results for orders placed or performed during the hospital encounter of 02/05/22   XR Chest Port 1 View    Narrative    EXAM: XR CHEST PORTABLE 1 VIEW  LOCATION: [facility]  DATE/TIME: 02/05/2022, 8:14 AM    INDICATION: Shortness of breath.  COMPARISON: Chest x-ray 09/18/2021.      Impression    IMPRESSION: New focal consolidation at the lateral right lung base could be developing acute airspace disease including pneumonia or other airspace disease. Loculated effusion left lateral mid chest is again seen, but its configuration is more elongated   in the craniocaudal dimension than on the prior exam. Adjacent airspace consolidation versus scarring again identified. No new airspace disease on the left. Diffuse emphysema. Normal cardiac silhouette.     CT Chest Pulmonary Embolism w Contrast    Narrative    CT CHEST PULMONARY EMBOLISM WITH CONTRAST 2/7/2022 3:34 PM    CLINICAL HISTORY: Chest pain. Pulmonary embolus suspected,  low/intermediate probability, positive D-dimer.    TECHNIQUE: CT angiogram chest during arterial phase  injection IV  contrast. 2D and 3D MIP reconstructions were performed by the CT  technologist. Dose reduction techniques were used.   CONTRAST: 65mL, [id number]    COMPARISON: Chest CT from 2021    FINDINGS:  ANGIOGRAM CHEST: Pulmonary arteries are normal caliber and negative  for pulmonary emboli. Thoracic aorta is nondiagnostic for dissection.  No CT evidence of right heart strain.    LUNGS AND PLEURA: Emphysema. Volume loss in the right upper lobe  noted. Question some loculated pleural air versus necrotic lung  laterally. There appeared to have been a lung abscess on the  comparison study. This may have also been loculated in the fissure on  the comparison. Nodule in the superior segment of the right lower lobe  now measures 1.3 cm previously 0.9 cm. Nodule in the right middle lobe  laterally measures 1.3 cm, not definitely seen previously although  there was infiltrate on the comparison study.    MEDIASTINUM/AXILLAE: No aneurysm. There are extensive coronary  vascular calcifications and/or stents consistent with coronary artery  disease. No discrete adenopathy.    UPPER ABDOMEN: Mild right hydronephrosis. Cholelithiasis without  cholecystitis.    MUSCULOSKELETAL: No frankly destructive bony lesions.      Impression    IMPRESSION:  1.  No pulmonary embolism demonstrated.   2.  Enlarging pulmonary nodules in the superior segment of the right  lower lobe and right middle lobe.  3.  Volume loss in the right upper lobe is indeterminate.  4.  New right hydronephrosis of uncertain etiology.     ALFRED DUNACN MD         SYSTEM ID:  [id number]   Echocardiogram Complete     Value    LVEF  40-45%    Narrative    [id number]  [id number]  [id number]  [id number]^ANNA^NAYE^ABHISHEK     [facility]  Echocardiography Laboratory  [address] [place] [address]     Name: TARIK HERNANDEZ  MRN: [id number]  : 1950  Study Date: 2022 07:49 AM  Age: 71 yrs  Gender: Female  Patient  Location: [facility]  Reason For Study: CAD, SOB, Respiratory Failure  Ordering Physician: NAYE CASTILLO  Referring Physician: RICK CARROLL  Performed By: Rocio Stockton RDCS     BSA: 1.5 m2  Height: 63 in  Weight: 106 lb  HR: 111  BP: 137/71 mmHg  ______________________________________________________________________________  Procedure  Complete Portable Echo Adult.  ______________________________________________________________________________  Interpretation Summary     Severe hypokinesis of the mid and distal anterior, anteroseptal and distal  inferior walls and apex. Function of the lateral and inferolateral walls is  well preserved.  Left ventricular systolic function is mild to moderately reduced.  The visual ejection fraction is 40-45%.  There is mild (1+) mitral regurgitation.  Moderate valvular aortic stenosis. YOLANDA 1.1 cm2, mean AV gradient 16 mmHg. Low  SVI suggests low-flow, low-gradient moderate to severe AS.  Right ventricular systolic pressure is elevated, consistent with moderate  pulmonary hypertension.     Compared to the previous study, the LVF is much lower and WMA's are now  present. AS is probably unchanged.  ______________________________________________________________________________  Left Ventricle  The left ventricle is normal in size. There is normal left ventricular wall  thickness. Grade I or early diastolic dysfunction. Left ventricular systolic  function is mild to moderately reduced. The visual ejection fraction is 40-  45%. Severe hypokinesis of the mid and distal anterior, anteroseptal and  distal inferior walls and apex. Function of the lateral and inferolateral  walls is well preserved.     Right Ventricle  The right ventricle is normal size. Mildly decreased right ventricular  systolic function. The right ventricle is not well visualized.     Atria  Normal left atrial size. Right atrial size is normal. There is no atrial shunt  seen.     Mitral Valve  The mitral  valve leaflets appear thickened, but open well. There is mild (1+)  mitral regurgitation.     Tricuspid Valve  The tricuspid valve is normal in structure and function. There is trace  tricuspid regurgitation. The right ventricular systolic pressure is  approximated at 49.6 mmHg plus the right atrial pressure. IVC diameter and  respiratory changes fall into an intermediate range suggesting an RA pressure  of 8 mmHg. Right ventricular systolic pressure is elevated, consistent with  moderate pulmonary hypertension.     Aortic Valve  There is trace aortic regurgitation. The calculated aortic valve are is 1.1  cm^2. The peak AoV pressure gradient is 26.0 mmHg. The mean AoV pressure  gradient is 14.0 mmHg. Moderate valvular aortic stenosis.     Pulmonic Valve  The pulmonic valve is not well seen, but is grossly normal. There is trace  pulmonic valvular regurgitation.     Vessels  Normal size aorta.     Pericardium  There is no pericardial effusion.     Rhythm  The rhythm was sinus tachycardia.  ______________________________________________________________________________  MMode/2D Measurements & Calculations  IVSd: 1.2 cm     LVIDd: 4.2 cm  LVIDs: 3.7 cm  LVPWd: 1.0 cm  FS: 11.9 %  LV mass(C)d: 157.1 grams  LV mass(C)dI: 106.4 grams/m2  Ao root diam: 2.6 cm  LA dimension: 4.1 cm  LA/Ao: 1.6  LVOT diam: 2.1 cm  LVOT area: 3.5 cm2  RWT: 0.48     Doppler Measurements & Calculations  MV E max ryan: 79.3 cm/sec  MV dec slope: 363.0 cm/sec2  MV dec time: 0.22 sec  Ao V2 max: 257.0 cm/sec  Ao max P.0 mmHg  Ao V2 mean: 173.0 cm/sec  Ao mean P.0 mmHg  Ao V2 VTI: 44.9 cm  YOLANDA(I,D): 1.1 cm2  YOLANDA(V,D): 0.99 cm2  LV V1 max P.1 mmHg  LV V1 max: 73.1 cm/sec  LV V1 VTI: 14.7 cm  SV(LVOT): 50.9 ml  SI(LVOT): 34.5 ml/m2  PA acc time: 0.10 sec  TR max ryan: 352.0 cm/sec  TR max P.6 mmHg  AV Ryan Ratio (DI): 0.28     YOLANDA Index (cm2/m2): 0.77  E/E' avg: 10.1  Lateral E/e': 9.0  Medial E/e': 11.2      ______________________________________________________________________________  Report approved by: Jose C Dos Santos 02/07/2022 10:30 AM             Patient Active Problem List   Diagnosis     Urinary incontinence     Cognitive impairment-BIMS 4/15 at [facility] May 2020     Hyperlipidemia LDL goal <130     History of stroke - right thalamus in 8/2013 and left cerebellar and right vermis in 3/2018     Numbness and tingling     Smoker     CVA (cerebral vascular accident) (H)     ACS (acute coronary syndrome) (H)     Ischemic cardiomyopathy - EF 25% in 2015 but 55% in 3/2017 and 45-50% on 3/18     HTN, goal below 130/80     Acute systolic congestive heart failure (H)     Pulmonary nodule -- 1.9 cm RLL, new 5/2/20     GERD (gastroesophageal reflux disease)     NSTEMI (non-ST elevated myocardial infarction) (H)     Chronic bronchitis, unspecified chronic bronchitis type (H)     PAD (peripheral artery disease) (H) - moderate left common carotid stenosis, aortoiliac bypass, chronic left vertebral and right posterior cerebral stenoses     Cervical high risk HPV (human papillomavirus) test positive     Acute respiratory failure with hypoxia (H)     Anemia     Coronary artery disease of native artery of native heart with stable angina pectoris (H)     Elevated troponin     Pulmonary emphysema (H)     Vertigo     Other seizures (H) - possible     Acute CVA (cerebrovascular accident) - right paramedian superior vermis and left cerebellar hemisphere     Severe malnutrition (H)     Hyponatremia     Hypochloremia     Acute on chronic respiratory failure with hypoxia and hypercapnia     Acute bronchitis     Moderate to severe pulmonary hypertension (H)-per Echo     Nonrheumatic aortic valve stenosis - moderate to severe     Hypotension     Lactic acidosis     Spontaneous pneumothorax     Chest tube in place     Chronic obstructive pulmonary disease, unspecified COPD type (H)     Cigarette smoker     S/P drug eluting coronary stent  placement-LAD and circ, circ re-do 5/17/18     Abnormal CXR 9/2-?right mid lung cavitation with air-fluid levels     Dysphagia     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     Severe malnutrition (H)-reduced intake, weight loss, loss of SC fat and muscle     Hypotension, unspecified hypotension type     COPD with acute exacerbation (H)     Pneumonia of right lung due to infectious organism     Chronic respiratory failure with hypoxia and hypercapnia (H)-home O2 and Trilogy     Tobacco use     Aphasia due to recent cerebral infarction       Sheri Zabala DO  Pulmonary and Critical Care Attending  pgr [phone]

## 2022-02-09 NOTE — PLAN OF CARE
Problem: Adult Inpatient Plan of Care  Goal: Plan of Care Review  Outcome: No Change   Pt remains on BIPAP 30% and is tolerating well . Given prn Seroquel and prn Dilaudid for pain and agitation. Pt remains on NPO for possible angio today at noon. No order yet. Heparin @ 900 units /hr and NTG @ 30 mcg/hr cont. Dry cough. Intermittent agitation/restlessness. VSS. Afebrile.

## 2022-02-09 NOTE — PROGRESS NOTES
"Received PT on AVAPS with the settings of , RR 22, MIN P 10, MAX P 35, EPAP 5, 30% with a TOTAL FACE mask. No breakdown noted.  Spoke with PT's nurse on the above. Nebs were given as scheduled. RT will continue to follow.     (ABGs/VBGs)    /60 (BP Location: Right arm)   Pulse 66   Temp 98.4  F (36.9  C) (Axillary)   Resp 19   Ht 1.6 m (5' 3\")   Wt 44.2 kg (97 lb 6.4 oz)   SpO2 100%   BMI 17.25 kg/m      Aaron Leal, RT  2/9/2022      "

## 2022-02-09 NOTE — PROGRESS NOTES
"Patient prepped for angiogram. Telephone consents obtained from patient's  yesterday. Patient is alert & aware that she is having a procedure today in \"my chest\". No complaint of pain. She is ready & just awaiting for her procedure.  "

## 2022-02-09 NOTE — PROGRESS NOTES
Care Management Follow Up    Length of Stay (days): 1    Expected Discharge Date: 02/12/2022     Concerns to be Addressed:  ICU level of cares, NTG and Heparin gtts, Cardiology following, angio's today, video swallow, IV abx, IV steroids, Palliative Care following     Patient plan of care discussed at interdisciplinary rounds: Yes    Anticipated Discharge Disposition:  Home with spouse     Anticipated Discharge Services:  TBD  Anticipated Discharge DME:  TBD    Patient/family educated on Medicare website which has current facility and service quality ratings:    Education Provided on the Discharge Plan:    Patient/Family in Agreement with the Plan:      Referrals Placed by CM/SW:  None at this time  Private pay costs discussed: Not applicable    Additional Information:  Chart reviewed.   Patient to have coronary angiogram with PCI today.  Patient is now DNR/DNI per Hospitalist progress note.  Care Management will continue to follow, review recommendations of care team, and assist as needed with discharge planning needs.      Deonna Ortega RN

## 2022-02-09 NOTE — PLAN OF CARE
Patient wore BiPAP last night. Currently on 2L NC sats 100%. BiPAP on S/B in the room.  B.S diminished scheduled nebs given x3 mask. Will continue with current therapy and assess as needed.    Evan Epperson, RT

## 2022-02-09 NOTE — PROGRESS NOTES
"[facility]  Palliative Care Daily Progress Note         Recommendations & Counseling     Goals of Care:  - Now DNR/I  - To get PCI today  - Plan to have GOC discussion tomorrow.  Tito Coulter plans on coming by ~2pm.  Unclear if  Yoseph aware.  I was unable to reach him and son Yfn also has been unable to reach him today.      Symptom Management:  -per ICU    Advanced Care Planning:   - No HCD  - surrogate decision maker:  Yoseph and son Yfn    Psychosocial/Spiritual Support:  -   - Two kids      Assessments          71 yoF with hx chronic resp failure d/t severe COPD on nocturnal trilogy, CAD s/p ROWDY 2018, CVA with aphasia, HTN, and Aortic stenosis admitted today with NSTEMI. CTA chest neg for PE.  Also being treated for AECOPD and pneumonia prior to transfer from outside facility, and concern for new and increasing pulmonary nodules.       Today, the patient was seen for:  Acute on chronic respiratory failure  NSTEMI   Encephalopathy in setting of hypoxia and respiratory acidosis - improved today     Prognosis, Goals, or Advance Care Planning was addressed today with: Yes.  Mood, coping, and/or meaning in the context of serious illness were addressed today: No.            Interval History:     Chart review/discussion with unit or clinical team members:   - Plan for PCI today  - more oriented today per RN, now off 1:1  -  Yoseph discussed codes status with Dr. Cordova today and changed to DNR/I    Per patient:  - visit brief as she was getting a neb treatment  - no acute complaints today  - I stopped back later to see but she was already gone to cath lab     Per tito Coulter today:  - Preeti has been  x2, then  Yoseph  - Two kids: Yfn and Rocio  - Preeti lived at home with Yoseph with home care.  Unstable even with walker at baseline, not very mobile.  Needs either assistance or supervision with all ADLs  - \"Swallowing has been an issue for the last 2-3 years " "since her stroke\"  - \"Appetite is not great\"  - In re: baseline cognition: \"I think she knows what's going on but has trouble with speech and I don't think all the dots quite connect for her\"  - \"Rocio not capable of making medical decisions\"  - Yfn and Yoseph make decisions collectively  - \"Yoseph can get side tracked very easily, probably has ADHD\"  - Yfn hasn't been updated for the last three weeks and sometimes has difficulty getting in touch with Yoseph as he doesn't answer the phone consistently.  Yfn is familiar with complex medical decision making as his wife  from colon cancer three years ago  - We discussed code status.  He agreed with DNR.  We discussed appropriateness of intubation given her underlying medical issues, baseline function, and if this would be acceptable QOL as she would be at high risk to not liberate from the vent.  He feels DNI is also appropriate but does feel he needs to discuss more with his family  1355: Updated Yfn that code status was changed to DNR/I.  He was fine with this.  He still hasn't been able to reach Yoseph but plans on coming by tomrrow ~2pm.      - I attempted to call Yoseph again but no answer     Key Palliative Symptoms: (0=no symptom/no concern, 1=mild, 2=moderate, 3=severe):  Pain: 0  Dyspnea: 0  Nausea: 0  Anxiety: 0    Patient is on opioids: assessed and bowels ok/no needed changes to plan of care today.           Review of Systems:     A full 14 point review of systems was otherwise completed and is negative aside from that mentionedabove          Medications:     I have reviewed this patient's medication profile and medications during this hospitalization.    Noted meds:    Current Facility-Administered Medications   Medication     acetaminophen (TYLENOL) tablet 650 mg    Or     acetaminophen (TYLENOL) solution 650 mg     acetaminophen (TYLENOL) tablet 650 mg    Or     acetaminophen (TYLENOL) Suppository 650 mg     aspirin EC tablet 81 mg     atorvastatin " (LIPITOR) tablet 40 mg     carboxymethylcellulose PF (REFRESH PLUS) 0.5 % ophthalmic solution 1 drop     cefTRIAXone (ROCEPHIN) 1 g vial to attach to  mL bag for ADULTS or NS 50 mL bag for PEDS     glucose gel 15-30 g    Or     dextrose 50 % injection 25-50 mL    Or     glucagon injection 1 mg     ferrous sulfate (FEROSUL) tablet 325 mg     heparin infusion 25,000 units in D5W 250 mL ANTICOAGULANT     HYDROmorphone (DILAUDID) injection 0.2 mg     influenza vac high-dose quad (FLUZONE HD) injection SONIDO 0.7 mL     ipratropium - albuterol 0.5 mg/2.5 mg/3 mL (DUONEB) neb solution 3 mL     lidocaine (LMX4) cream     lidocaine 1 % 0.1-1 mL     melatonin tablet 1 mg     methylPREDNISolone sodium succinate (solu-MEDROL) injection 62.5 mg     metoprolol (LOPRESSOR) injection 5 mg     montelukast (SINGULAIR) tablet 10 mg     multivitamin, therapeutic (THERA-VIT) tablet 1 tablet     naloxone (NARCAN) injection 0.2 mg    Or     naloxone (NARCAN) injection 0.4 mg    Or     naloxone (NARCAN) injection 0.2 mg    Or     naloxone (NARCAN) injection 0.4 mg     nicotine (NICODERM CQ) 21 MG/24HR 24 hr patch 1 patch     nicotine Patch in Place     nitroGLYcerin 50 mg in D5W 250 mL PERIPHERAL IV infusion     No lozenges or gum should be given while patient on BIPAP/AVAPS/AVAPS AE     [START ON 2/10/2022] pantoprazole (PROTONIX) EC tablet 40 mg    Or     [START ON 2/10/2022] pantoprazole (PROTONIX) IV push injection 40 mg     Patient is already receiving anticoagulation with heparin, enoxaparin (LOVENOX), warfarin (COUMADIN)  or other anticoagulant medication     Patient may continue current oral medications     polyethylene glycol (MIRALAX) Packet 17 g     prochlorperazine (COMPAZINE) injection 5 mg    Or     prochlorperazine (COMPAZINE) tablet 5 mg    Or     prochlorperazine (COMPAZINE) suppository 12.5 mg     QUEtiapine (SEROquel) half-tab 12.5 mg     QUEtiapine (SEROquel) tablet 25 mg     QUEtiapine (SEROquel) tablet 25 mg      "senna-docusate (SENOKOT-S/PERICOLACE) 8.6-50 MG per tablet 1 tablet    Or     senna-docusate (SENOKOT-S/PERICOLACE) 8.6-50 MG per tablet 2 tablet     sodium chloride (PF) 0.9% PF flush 3 mL     sodium chloride (PF) 0.9% PF flush 3 mL     thiamine (B-1) tablet 100 mg     Facility-Administered Medications Ordered in Other Encounters   Medication     Reason aspirin not prescribed (intentional)                Physical Exam:   Vital Signs: Blood pressure (!) 140/72, pulse 80, temperature 97.8  F (36.6  C), temperature source Axillary, resp. rate 27, height 1.6 m (5' 3\"), weight 44.2 kg (97 lb 6.4 oz), SpO2 100 %, not currently breastfeeding.   GENERAL: lying in bed in NAD getting a nebulizer treatment    SKIN: Warm and dry   HEENT: Normocephalic, anicteric sclera, moist mucous membranes  LUNGS: diminished to auscultation anterolaterally; non-labored   CARDIAC: RRR, normal s1/s2, w/o m/r/g   ABDOMINAL: BS (+), soft, non distended, non tender  EXTREMITIES: No edema or cyanosis, pulses 2+ and symmetrical  NEUROLOGIC: alert, follows commands  PSYCH: calm              Data Reviewed:     No recent pertinent imaging:     Reviewed recent labs:  Last Comprehensive Metabolic Panel:  Sodium   Date Value Ref Range Status   02/09/2022 140 136 - 145 mmol/L Final   03/28/2021 140 133 - 144 mmol/L Final     Potassium   Date Value Ref Range Status   02/09/2022 4.6 3.5 - 5.0 mmol/L Final   03/28/2021 4.5 3.4 - 5.3 mmol/L Final     Chloride   Date Value Ref Range Status   02/09/2022 101 98 - 107 mmol/L Final   03/28/2021 100 94 - 109 mmol/L Final     Carbon Dioxide   Date Value Ref Range Status   03/28/2021 36 (H) 20 - 32 mmol/L Final     Carbon Dioxide (CO2)   Date Value Ref Range Status   02/09/2022 32 (H) 22 - 31 mmol/L Final     Anion Gap   Date Value Ref Range Status   02/09/2022 7 5 - 18 mmol/L Final   03/28/2021 4 3 - 14 mmol/L Final     Glucose   Date Value Ref Range Status   02/09/2022 105 70 - 125 mg/dL Final   03/28/2021 110 (H) " 70 - 99 mg/dL Final     GLUCOSE BY METER POCT   Date Value Ref Range Status   02/09/2022 102 (H) 70 - 99 mg/dL Final     Urea Nitrogen   Date Value Ref Range Status   02/09/2022 20 8 - 22 mg/dL Final   03/28/2021 25 7 - 30 mg/dL Final     Creatinine   Date Value Ref Range Status   02/09/2022 0.54 (L) 0.60 - 1.10 mg/dL Final   03/28/2021 0.70 0.52 - 1.04 mg/dL Final     GFR Estimate   Date Value Ref Range Status   02/09/2022 >90 >60 mL/min/1.73m2 Final     Comment:     Effective December 21, 2021 eGFRcr in adults is calculated using the 2021 CKD-EPI creatinine equation which includes age and gender (Hasmukh et al., NEJM, DOI: 10.1056/YFJBhz1052451)   03/28/2021 89 >60 mL/min/[1.73_m2] Final     Comment:     Non  GFR Calc  Starting 12/18/2018, serum creatinine based estimated GFR (eGFR) will be   calculated using the Chronic Kidney Disease Epidemiology Collaboration   (CKD-EPI) equation.       Calcium   Date Value Ref Range Status   02/09/2022 8.0 (L) 8.5 - 10.5 mg/dL Final   03/28/2021 8.5 8.5 - 10.1 mg/dL Final      CBC RESULTS: Recent Labs   Lab Test 02/09/22  0429   WBC 2.3*   RBC 3.69*   HGB 8.4*   HCT 29.8*   MCV 81   MCH 22.8*   MCHC 28.2*   RDW 17.5*            ====================================================  TT: I have personally spent a total of 25 minutes on the unit in review of medical record, consultation with the medical providers and assessment of patient today, with more than 50% of this time spent in counseling, coordination of care, and discussion with patient and son re: diagnostic results, prognosis, symptom management, risks and benefits of management options, and development of plan of care as noted above.  ====================================================    [facility]  Palliative Medicine  Pager [phone]  Office: [phone]

## 2022-02-10 NOTE — PROGRESS NOTES
Pt to go on BIPAP/AVAPS tonight. Currently on 2 L NC. Tolerated tx well. No complaints at time of tx. Rt to continue to follow.    Aaron Leal, RT on 2/9/2022 at 9:57 PM

## 2022-02-10 NOTE — PROGRESS NOTES
Occupational Therapy/Cardiac Rehab     02/10/22 0845   Quick Adds   Type of Visit Initial Occupational Therapy Evaluation   Living Environment   People in home spouse   Current Living Arrangements house  (pt states no stairs.)   Living Environment Comments pt unable to provide further info (family not present)   Self-Care   Activity/Exercise/Self-Care Comment pt unable to provide prior level; per report pt uses a walker @ baseline. Pt on home O2.   Disability/Function   Hearing Difficulty or Deaf no   Concentrating, Remembering or Making Decisions Difficulty yes   Walking or Climbing Stairs ambulation difficulty, requires equipment   Doing Errands Independently Difficulty (such as shopping) no   Change in Functional Status Since Onset of Current Illness/Injury yes   General Information   Onset of Illness/Injury or Date of Surgery 02/08/22   Referring Physician Sheri Zabala   Patient/Family Therapy Goal Statement (OT) none stated   Existing Precautions/Restrictions cardiac  (s/p angio precautions (fem./groin access))   General Observations and Info Admitted w/NSTEMI; exacerbation of COPD. s/p angio 2/9. (PMH includes CVAs 13' & 18' w/residual impaired cognition & aphasia.   Cognitive Status Examination   Orientation Status person   Affect/Mental Status (Cognitive) confused   Follows Commands follows one-step commands;repetition of directions required  (able to redirect)   Safety Deficit impulsivity;judgment   Memory Deficit   (impaired @ baseline.)   Attention Deficit   (easily distracted)   Cognitive Status Comments impaired cognition @ baseline; pt cooperative.   Pain Assessment   Patient Currently in Pain No   Posture   Posture   (light flexed posture; no overt unsteadiness/no LOB.)   Range of Motion Comprehensive   Comment, General Range of Motion demo functional UE ROM   Strength Comprehensive (MMT)   Comment, General Manual Muscle Testing (MMT) Assessment demo functional UE strength for ADLs/trfs   Bed  Mobility   Comment (Bed Mobility) SBA   Transfers   Transfer Comments SBA/CGA   Balance   Balance Comments Ambulating CGA to occ min A for safe manuevering-easily distracted; used FWW.   Clinical Impression   Criteria for Skilled Therapeutic Interventions Met (OT) yes;meets criteria   OT Diagnosis decreased ADLs   OT Problem List-Impairments impacting ADL activity tolerance impaired;cognition;mobility;post-surgical precautions   Assessment of Occupational Performance 3-5 Performance Deficits   Identified Performance Deficits activity tolerance, SOB, impaired cognition   Clinical Decision Making Complexity (OT) low complexity   Therapy Frequency (OT) 2x/day  (if tolerated)   Predicted Duration of Therapy 3 days   Risk & Benefits of therapy have been explained evaluation/treatment results reviewed;care plan/treatment goals reviewed;patient   OT Discharge Planning    OT Discharge Recommendation (DC Rec) Home with assist   Total Evaluation Time (Minutes)   Total Evaluation Time (Minutes) 10

## 2022-02-10 NOTE — PROGRESS NOTES
Respiratory Care Note    On 2L wears 2 at home sats upper 90s. V60 on s/b. Has a Trilogy at home, but has been refusing BiPAP here. Nebs given. Faint wheezes pre and post. No respiratory distress noted. Plan to continue current therapies.       Urban Salgado, RT

## 2022-02-10 NOTE — PROGRESS NOTES
Physical Therapy     Physical therapy order received and after discussion with OT/CR, pt has no PT needs and all mobility will be addressed by OT/CR    Meliza Parson, PT, DPT  2/10/2022

## 2022-02-10 NOTE — PROGRESS NOTES
[facility]:  CRITICAL CARE PROGRESS NOTE     Date / Time of Admission:  2/8/2022 12:06 AM    ID: Preeti Ford is a 71 year old female with cachexia (BMI 17.25 kg/m2), h/o chronic respiratory failure due to COPD (FEV1 30%) on home O2 2 LPM and Trilogy positive pressure ventilation at night, spontaneous pneumothorax, HTN, CAD, moderate to severe aortic stenosis, CVA, PVD s/p aortoiliac bypass who was admitted to [facility] on 2/5/22 for evaluation of shortness of breath, diagnosed with acute on chronic respiratory acidosis with COPD exacerbation, pneumonia, NSTEMI with new wall motion abnormalities on ECHO recommended transferring to [facility] 2/8 for eval by cardiology.           Changes for today: 1.   Hypertension - will add hydralazine IV PRN.  2. Refused to wear NIPPV overnight - will make PRN for now  3. VBG obtained, pH normal and CO2 retention noted.  4. NPO, video swallow today with speech therapy.  High risk for re-feeding syndrome due to cachexia.  5. Family care conference around 2 pm - unclear if they are coming.  I updated son via telephone.  Question is, how extensive do we want to work-up pulmonary nodules that are growing with time.  Would recommend outpatient body PET/CT if we plan to go for further investigation.          Assessment/Plan:   Neurologic: Confusion/delirium.  Unclear baseline.  Cristobal reports some confusion occasionally.      Continue seroquel 25 mg daily.  Monitor QT interval with use.      Pulmonary: Acute on chronic respiratory failure with hypoxia/hypercapnia.  At baseline, on 2 L/min nasal cannula and trilogy at night.  Concern for COPD exacerbation, possible pneumonia as instigating process.  Acute COPD exacerbation.  FEV1 30%.  Progressive pulmonary nodules with possible pneumonia.  CT scan showing nodule in the superior segment of the right lower lobe went from 0.9 cm to 1.3; nodule in the right middle lobe laterally measures 1.3 cm,  not definitely seen previously although there was infiltrate on the comparison study.  History of spontaneous pneumothorax.  Chronic tobacco use.    Wean supplemental O2 as tolerated; goal O2 sat > 92%.  HOB > 30 degrees to limit aspiration risk.      Continue trilogy at night per home regimen as tolerates.      Obtained VBG today, CO2 retention but pH stable.    Continue Solumedrol 62.5 mg IV 2x/day (start 2/8), plan to dose reduce tomorrow.    Continue DuoNebs Q4H.  Holding home inhalers.    Resume home montelukast 10 mg daily    Continue nicotine patch 21 mg/day (start 2/8)     Cardiovascular: Essential hypertension.  NSTEMI.  New wall motion abnormalities on TTE 2/7 when compared to prior study in 2021.  Transferred from [facility] to [facility] 2/8, went for angiogram 2/9.  Final report pending, no obstructive disease and no interventions identified.      Cardiac monitoring.  SBP >= 90 mmHg, MAP >= 65 mmHg.  EKG PRN.    Continue metoprolol 5mg IV q6H, start hydralazine IV PRN for SBP goal < 140.    Follow-up final cardiac angiogram report.      Off heparin gtt.     GI/: Cachexia, poor dentition.       Going for video swallow today with speech therapy.    Nutrition: N.p.o.  High risk of refeeding syndrome given underlying cachexia    Last BM: None.  Meds: Initiate colace 100 mg 2x/day.    GI prophylaxis: PRN.     Renal: Metabolic alkalosis.      Monitor I/O's.  Electrolyte repletion PRN.  Avoid/limit nephrotoxic agents.    IVFs: Saline lock.    Monitor for contrast-induced nephropathy.  Repeat creatinine tomorrow.     Heme/Onc: Normocytic anemia.      DVT prophylaxis: Initiate Lovenox subcu for DVT prophylaxis.     Endocrine: No issues.      FSBG as needed. Hypoglycemia protocol.     Infectious diseases:  Progressive pulmonary nodules with possible pneumonia.      Follow-up blood cultures.    Continue ceftriaxone x7 days    Rheum/Musculoskeletal:     Activity: PT/OT eval    May need one-to-one  deo.    RISK FACTORS PRESENT ON ADMISSION:  Clinically Significant Risk Factors Present on Admission              # Severe Malnutrition: based on nutrition assessment  Code Status: No CPR- Do NOT Intubate         Lines/Drains/Tubes:  PIVs x 2     Code Status:  No CPR- Do NOT Intubate.  Discussed care with patient's son via telephone today.  He will also follow-up with Yoseph (Oh) regarding goals of care and continued evaluation of enlarging pulmonary nodules.     The patient and/or the family was educated about the above plan of care and indicated understanding.       This patient is no longer considered critically ill and may downgrade from the ICU.  Signout was given to Dr. Montgomery at the bedside.  Critical Care team will sign-off.  Pulmonary team will follow-up with patient tomorrow.  Placing downgrade orders to med/surg-tele given use of NIPPV.     Total Patient Care time, not including separate billable procedure time: 35 minutes.    Nini Lopez MD, MPH  Pulmonary/Critical Care Medicine  02/10/2022   11:50 AM         ICU DAILY CHECKLIST:   ICU DAILY CHECKLIST           Can patient transfer out of MICU? yes    FAST HUG:  N/A    INTUBATED:  N/A    PHYSICAL THERAPY AND MOBILITY:  Can patient have PT and mobility trial: yes  Activity: PT/OT        Subjective/Interval History:   Hallucinating overnight, but more alert today.  Trying to get out of bed overnight, previously had 1:1, might need this.  Skin bruising.   NPO - going for swallow study now.  Has purewick.    Patient confused a little - said she was at home.    Says she lives with her  Oh.  Reports occasional cough and wheezing.    Review of Systems:  Review of systems is limited by patient factors - confusion        Allergies/Medications:   Allergies:     Allergies   Allergen Reactions     Crestor [Rosuvastatin] Other (See Comments)     dizziness     Lisinopril Cough     Statins [Hmg-Coa-R Inhibitors] Other (See Comments)      "Muscle/joint aching     Optison [Albumin Human] Other (See Comments)     Pt was flush and very dizzy.  Also had a BP drop     Penicillins Rash       Continuous Infusions:    nitroGLYcerin 50 mg in D5W 250 mL Stopped (02/09/22 0847)     - MEDICATION INSTRUCTIONS -       - MEDICATION INSTRUCTIONS -       - MEDICATION INSTRUCTIONS -       Scheduled Medications:    aspirin  81 mg Oral Daily     atorvastatin  40 mg Oral QPM     cefTRIAXone  1 g Intravenous Q24H     enoxaparin ANTICOAGULANT  30 mg Subcutaneous Q24H     ferrous sulfate  325 mg Oral BID     influenza vac high-dose quad  0.7 mL Intramuscular Prior to discharge     ipratropium - albuterol 0.5 mg/2.5 mg/3 mL  3 mL Nebulization Q4H WA     methylPREDNISolone  62.5 mg Intravenous Q12H     metoprolol  5 mg Intravenous Q6H     montelukast  10 mg Oral At Bedtime     multivitamin, therapeutic  1 tablet Oral Daily     nicotine  1 patch Transdermal Daily     nicotine   Transdermal Q8H     pantoprazole  40 mg Oral QAM AC    Or     pantoprazole (PROTONIX) IV  40 mg Intravenous QAM AC     QUEtiapine  25 mg Oral At Bedtime     sodium chloride (PF)  3 mL Intracatheter Q8H     thiamine  100 mg Oral Daily           Objective:   Vitals:  BP (!) 145/93   Pulse 81   Temp 98  F (36.7  C) (Oral)   Resp (!) 34   Ht 1.6 m (5' 3\")   Wt 44.2 kg (97 lb 6.4 oz)   SpO2 98%   BMI 17.25 kg/m    Vent: FiO2 (%): 2 %  Resp: (!) 34    GEN: Pleasant female, cachectic, in no acute distress  HEENT: Temporal wasting bilaterally. Extraoccular eye movements intact, anicteric sclera. No sinus tenderness to palpation.  Moist mucous membranes.   NECK: Supple.    PULM: Non-labored breathing. Diminished breath sounds.  Fine inspiratory wheezing anterior chst (left side)  CVS: Regular rate and rhythm.  Normal S1, S2.  No rubs, murmurs, or gallops.    ABDOMEN: Normoactive bowel sounds.  Non-tender to palpation.  Non-distended.    EXTREMITES:  No clubbing, cyanosis, or edema.    NEURO:  Confused, " oriented to name but not location/situation.  Cranial nerves 2-12 grossly intact.  Moving all extremities.      Intake/Output: I/O last 3 completed shifts:  In: -   Out: 800 [Urine:800]        Pertinent Studies:   All laboratory data reviewed  Serum Glucose range:   Recent Labs   Lab 02/10/22  0853 02/09/22  1127 02/09/22  0803 02/09/22  0429   GLC 89 102* 89 105     ABG:   Recent Labs   Lab 02/08/22  0111 02/07/22  0538 02/06/22  1316 02/06/22  0532   PH 7.18*  --   --   --    PCO2 91*  --   --   --    PO2 127*  --   --   --    HCO3 28  --   --   --    O2PER  --  24 21 21     CBC:   Recent Labs   Lab 02/10/22  0853 02/09/22  0429 02/08/22  0519   WBC 5.1 2.3* 6.7   HGB 10.5* 8.4* 9.5*   HCT 37.6 29.8* 35.0   MCV 80 81 82    183 240   NEUTROPHIL 66 68 80   LYMPH 20 20 8   MONOCYTE 13 11 11   EOSINOPHIL 0 0 0     Chemistry:   Recent Labs   Lab 02/10/22  0853 02/09/22  0429 02/08/22  0519 02/06/22  0530 02/05/22  0708    140 142   < > 137   POTASSIUM 4.5 4.6 5.4*   < > 4.3   CHLORIDE 98 101 103   < > 100   CO2 35* 32* 32*   < > 39*   BUN 19 20 26*   < > 12   CR 0.64 0.54* 0.69   < > 0.52   GFRESTIMATED >90 >90 >90   < > >90   CALDERON 8.6 8.0* 8.2*   < > 8.3*   MAG 2.0  --   --   --  1.9   PROTTOTAL  --  5.5* 6.5  --  7.6   ALBUMIN  --  2.9* 3.2*  --  2.7*   AST  --  21 55*  --  18   ALT  --  49* 76*  --  17   ALKPHOS  --  43* 53  --  77   BILITOTAL  --  0.3 0.3  --  0.2    < > = values in this interval not displayed.     Coags:  No results for input(s): INR, PROTIME, PTT in the last 168 hours.    Invalid input(s): APTT  Cardiac Markers:  Recent Labs   Lab 02/08/22  0519   TROPONINI 0.07      Microbiology:  COVID PCR, 2/5 and 2/7: Negative  Legionella urine Ag, 2/6: Negative  Streptococcus urine Ag, 2/6: Negative        Cardiology/Radiology:   Cardiac: All cardiac studies reviewed by me.    EKG:  Reviewed    TTE, 2/7:  Summary: Severe hypokinesis of the mid and distal anterior, anteroseptal and distal inferior  walls and apex. Function of the lateral and inferolateral walls is well preserved. Left ventricular systolic function is mild to moderately reduced. The visual ejection fraction is 40-45%. There is mild (1+) mitral regurgitation. Moderate valvular aortic stenosis. YOLANDA 1.1 cm2, mean AV gradient 16 mmHg. Low SVI suggests low-flow, low-gradient moderate to severe AS. Right ventricular systolic pressure is elevated, consistent with moderate pulmonary hypertension.  Compared to the previous study, the LVF is much lower and WMA's are now present. AS is probably unchanged.    Radiology:  All imaging studies reviewed by me.    Chest X-Ray, 2/5:  New focal consolidation at the lateral right lung base could be developing acute airspace disease including pneumonia or other airspace disease. Loculated effusion left lateral mid chest is again seen, but its configuration is more elongated in the craniocaudal dimension than on the prior exam. Adjacent airspace consolidation versus scarring again identified. No new airspace disease on the left. Diffuse emphysema. Normal cardiac silhouette.    CT chest PE study, 2/7:   FINDINGS: ANGIOGRAM CHEST: Pulmonary arteries are normal caliber and negative for pulmonary emboli. Thoracic aorta is nondiagnostic for dissection. No CT evidence of right heart strain.  LUNGS AND PLEURA: Emphysema. Volume loss in the right upper lobe noted. Question some loculated pleural air versus necrotic lung laterally. There appeared to have been a lung abscess on the comparison study. This may have also been loculated in the fissure on the comparison. Nodule in the superior segment of the right lower lobe now measures 1.3 cm previously 0.9 cm. Nodule in the right middle lobe laterally measures 1.3 cm, not definitely seen previously although there was infiltrate on the comparison study.  MEDIASTINUM/AXILLAE: No aneurysm. There are extensive coronary vascular calcifications and/or stents consistent with coronary  artery disease. No discrete adenopathy.  UPPER ABDOMEN: Mild right hydronephrosis. Cholelithiasis without Cholecystitis.  MUSCULOSKELETAL: No frankly destructive bony lesions.     IMPRESSION: 1.  No pulmonary embolism demonstrated.  2.  Enlarging pulmonary nodules in the superior segment of the right lower lobe and right middle lobe. 3.  Volume loss in the right upper lobe is indeterminate. 4.  New right hydronephrosis of uncertain etiology.

## 2022-02-10 NOTE — PROGRESS NOTES
[facility]  Palliative Care Daily Progress Note         Recommendations & Counseling     Goals of Care discussion with  and patient.  Patient lacks decision making capacity today   - Continue current treatments/therapies for now  - I anticipate a home discharge with hospice services.  Not confirmed yet.  I plan to discuss this more with  tomorrow ~1pm     Symptom Management:  - no changes from writer today    Advanced Care Planning:   - No HCD -  started but it was not notarized   - Plan to complete POLST tomorrow with   - Code status: DNR/I  - surrogate decision maker:  Yoseph per patient today     Psychosocial/Spiritual Support:  -   - Two kids      Assessments          71 yoF with hx chronic resp failure d/t severe COPD on nocturnal trilogy, CAD s/p ROWDY 2018, CVA with aphasia, HTN, and Aortic stenosis admitted today with NSTEMI. CTA chest neg for PE.  Also being treated for AECOPD and pneumonia prior to transfer from outside facility, and concern for new and increasing pulmonary nodules.       Today, the patient was seen for:  Acute on chronic respiratory failure  NSTEMI s/p PCI with no intervention   Encephalopathy  Progressive pulmonary nodules   Goals of care     Prognosis, Goals, or Advance Care Planning was addressed today with: Yes.  Mood, coping, and/or meaning in the context of serious illness were addressed today: No.            Interval History:     Chart review/discussion with unit or clinical team members:   - More confused again today per RN  - refused BiPAP overnight, but improved pH on gas this morning   - Discussed with Dr. Lopez.  If patient/ wish to continue aggressive measures, likely would involve f/u with pulmonary for PET/CT, then possible biopsy      Per patient:  - She is oriented x0 for me but able to answer yes/no questions.  Denies sob, pain, n/v     Per  Venkatesh today:  - Discussed her current clinical status as  he did not seem up to speed on this current hospitalization.    - Discussed current options for care going forward.  Aggressive care involving more potential rehab, rehospitalizations, possibly more procedures, further pulmonary nodule workup vs conservative care involving more comfort/hospice focused care and keeping her home.  All questions answered.  After discussion it was clear Yoseph was looking to Preeti for insight on how she'd like to proceed but I did need to re-iterate multiple times that she clearly does not have complex decision making capacity and Yoseph will likely need to be the one to make these decisions.  He expressed understanding and does feel that the hospice route is likely more appropriate for her given her progressive decline over the past two years and understanding the underlying severity of her COPD, and functional decline.  When I attempted to put in simple terms for Preeti, she did seem to favor comfort type care which I think was helpful for Yoseph to hear.  I mentioned we don't have to make a decision today but he would appreciate meeting with me again tomorrow so he has some time to ponder which I agree was a good idea.        Key Palliative Symptoms: (0=no symptom/no concern, 1=mild, 2=moderate, 3=severe):  Pain: 0  Dyspnea: 0  Nausea: 0  Anxiety: 0    Patient is on opioids: assessed and bowels ok/no needed changes to plan of care today.           Review of Systems:     A full 14 point review of systems was otherwise completed and is negative aside from that mentionedabove          Medications:     I have reviewed this patient's medication profile and medications during this hospitalization.    Noted meds:    Current Facility-Administered Medications   Medication     acetaminophen (TYLENOL) tablet 650 mg    Or     acetaminophen (TYLENOL) solution 650 mg     acetaminophen (TYLENOL) tablet 650 mg    Or     acetaminophen (TYLENOL) Suppository 650 mg     albuterol (PROVENTIL HFA/VENTOLIN  HFA) inhaler     aspirin EC tablet 81 mg     atorvastatin (LIPITOR) tablet 40 mg     carboxymethylcellulose PF (REFRESH PLUS) 0.5 % ophthalmic solution 1 drop     cefTRIAXone (ROCEPHIN) 1 g vial to attach to  mL bag for ADULTS or NS 50 mL bag for PEDS     glucose gel 15-30 g    Or     dextrose 50 % injection 25-50 mL    Or     glucagon injection 1 mg     docusate sodium (COLACE) capsule 100 mg     enoxaparin ANTICOAGULANT (LOVENOX) injection 30 mg     ferrous sulfate (FEROSUL) tablet 325 mg     hydrALAZINE (APRESOLINE) injection 5 mg     HYDROmorphone (DILAUDID) injection 0.2 mg     influenza vac high-dose quad (FLUZONE HD) injection SONIDO 0.7 mL     ipratropium - albuterol 0.5 mg/2.5 mg/3 mL (DUONEB) neb solution 3 mL     lidocaine (LMX4) cream     lidocaine 1 % 0.1-1 mL     melatonin tablet 1 mg     methylPREDNISolone sodium succinate (solu-MEDROL) injection 62.5 mg     metoprolol (LOPRESSOR) injection 5 mg     montelukast (SINGULAIR) tablet 10 mg     multivitamin, therapeutic (THERA-VIT) tablet 1 tablet     naloxone (NARCAN) injection 0.2 mg    Or     naloxone (NARCAN) injection 0.4 mg    Or     naloxone (NARCAN) injection 0.2 mg    Or     naloxone (NARCAN) injection 0.4 mg     nicotine (NICODERM CQ) 21 MG/24HR 24 hr patch 1 patch     nicotine Patch in Place     No lozenges or gum should be given while patient on BIPAP/AVAPS/AVAPS AE     pantoprazole (PROTONIX) EC tablet 40 mg    Or     pantoprazole (PROTONIX) IV push injection 40 mg     Patient is already receiving anticoagulation with heparin, enoxaparin (LOVENOX), warfarin (COUMADIN)  or other anticoagulant medication     Patient may continue current oral medications     polyethylene glycol (MIRALAX) Packet 17 g     prochlorperazine (COMPAZINE) injection 5 mg    Or     prochlorperazine (COMPAZINE) tablet 5 mg    Or     prochlorperazine (COMPAZINE) suppository 12.5 mg     QUEtiapine (SEROquel) half-tab 12.5 mg     QUEtiapine (SEROquel) tablet 25 mg      "senna-docusate (SENOKOT-S/PERICOLACE) 8.6-50 MG per tablet 1 tablet    Or     senna-docusate (SENOKOT-S/PERICOLACE) 8.6-50 MG per tablet 2 tablet     sodium chloride (PF) 0.9% PF flush 3 mL     sodium chloride (PF) 0.9% PF flush 3 mL     thiamine (B-1) tablet 100 mg     Facility-Administered Medications Ordered in Other Encounters   Medication     Reason aspirin not prescribed (intentional)                Physical Exam:   Vital Signs: Blood pressure (!) 153/69, pulse 85, temperature 98  F (36.7  C), temperature source Oral, resp. rate (!) 34, height 1.6 m (5' 3\"), weight 44.2 kg (97 lb 6.4 oz), SpO2 97 %, not currently breastfeeding.   GENERAL: lying in bed in NAD getting a nebulizer treatment    SKIN: Warm and dry   HEENT: Normocephalic, anicteric sclera, moist mucous membranes  LUNGS: diminished to auscultation anterolaterally; non-labored   CARDIAC: RRR, normal s1/s2, w/o m/r/g   ABDOMINAL: BS (+), soft, non distended, non tender  EXTREMITIES: No edema or cyanosis, pulses 2+ and symmetrical  NEUROLOGIC: alert, follows commands  PSYCH: calm              Data Reviewed:     No recent pertinent imaging:     Reviewed recent labs:  Last Comprehensive Metabolic Panel:  Sodium   Date Value Ref Range Status   02/10/2022 142 136 - 145 mmol/L Final   03/28/2021 140 133 - 144 mmol/L Final     Potassium   Date Value Ref Range Status   02/10/2022 4.5 3.5 - 5.0 mmol/L Final   03/28/2021 4.5 3.4 - 5.3 mmol/L Final     Chloride   Date Value Ref Range Status   02/10/2022 98 98 - 107 mmol/L Final   03/28/2021 100 94 - 109 mmol/L Final     Carbon Dioxide   Date Value Ref Range Status   03/28/2021 36 (H) 20 - 32 mmol/L Final     Carbon Dioxide (CO2)   Date Value Ref Range Status   02/10/2022 35 (H) 22 - 31 mmol/L Final     Anion Gap   Date Value Ref Range Status   02/10/2022 9 5 - 18 mmol/L Final   03/28/2021 4 3 - 14 mmol/L Final     Glucose   Date Value Ref Range Status   02/10/2022 89 70 - 125 mg/dL Final   03/28/2021 110 (H) 70 - " 99 mg/dL Final     Urea Nitrogen   Date Value Ref Range Status   02/10/2022 19 8 - 22 mg/dL Final   03/28/2021 25 7 - 30 mg/dL Final     Creatinine   Date Value Ref Range Status   02/10/2022 0.64 0.60 - 1.10 mg/dL Final   03/28/2021 0.70 0.52 - 1.04 mg/dL Final     GFR Estimate   Date Value Ref Range Status   02/10/2022 >90 >60 mL/min/1.73m2 Final     Comment:     Effective December 21, 2021 eGFRcr in adults is calculated using the 2021 CKD-EPI creatinine equation which includes age and gender (Hasmukh et al., NE, DOI: 10.1056/PUDXxs8509299)   03/28/2021 89 >60 mL/min/[1.73_m2] Final     Comment:     Non  GFR Calc  Starting 12/18/2018, serum creatinine based estimated GFR (eGFR) will be   calculated using the Chronic Kidney Disease Epidemiology Collaboration   (CKD-EPI) equation.       Calcium   Date Value Ref Range Status   02/10/2022 8.6 8.5 - 10.5 mg/dL Final   03/28/2021 8.5 8.5 - 10.1 mg/dL Final     CBC RESULTS: Recent Labs   Lab Test 02/10/22  0853   WBC 5.1   RBC 4.70   HGB 10.5*   HCT 37.6   MCV 80   MCH 22.3*   MCHC 27.9*   RDW 17.7*           Venous Blood Gas  Recent Labs   Lab 02/10/22  0852 02/08/22  1722 02/08/22  1405 02/08/22  0519 02/07/22  0538 02/06/22  1316 02/06/22  0532 02/06/22  0005   PHV 7.38 7.24* 7.26* 7.13* 7.38 7.44* 7.44* 7.44*   PCO2V 68* 89* 83* >98* 55* 53* 55* 54*   PO2V 50* 45 39 18* 64* 24* 35 35   HCO3V 36* 32* 31* 28 33* 36* 38* 37*   KYLER 14.9 11.1 9.7 7.6 6.1* 9.9* 11.7* 11.2*   O2PER  --   --   --   --  24 21 21 21       ====================================================  TT: I have personally spent a total of 35 minutes on the unit in review of medical record, consultation with the medical providers and assessment of patient today, with more than 50% of this time spent in counseling, coordination of care, and discussion with patient and  re: diagnostic results, prognosis, symptom management, risks and benefits of management options, and  development of plan of care as noted above.  ====================================================    EVANGELINA Dong [facility]  Palliative Medicine  Pager [phone]  Office: [phone]

## 2022-02-10 NOTE — PROGRESS NOTES
HEART CARE NOTE          Assessment/Recommendations     1. CAD c/b NSTEMI  Assessment / Plan  Hx of PCI in 2018; repeat echo significant for new wall motion abnormalities; continue heparin and nitroglycerin gtt  S/p coron coronary angiogram without significant stenosis - no intervention was indicated   Continue to monitor on telemetry  Continue ASA, atorvastatin, metoprolol      2. HFmrEF  Assessment / Plan  Appears to be near euvolemia on physical exam; denies HF symptoms  Will address GDMT s/p coronary angiogram     3. COPD c/b acute respiratory compromise  Assessment / Plan  Management per pulmonary team     4. HTN  Assessment / Plan  Inadequately controlled on current regimen  Will start Losartan in hopes of transitioning to Sacubitril-Valsartan at a later date     5. Valvular heart disease  Assessment / Plan  Potential low-flow-low gradient aortic stenosis - will refer to valve clinic is patient is amenable and within goals of care      History of Present Illness/Subjective      Ms. Preeti Ford is a 71 year old female with a PMHx significant for chronic respiratory failure due to COPD (FEV1 30%) on home O2 2 LPM and Trilogy positive pressure ventilation at night, spontaneous pneumothorax, HTN, CAD s.p PCI in 2018, moderate to severe Aortic stenosis, CVA, PVD s/p aortoiliac bypass. who transfers from [facility] with concern for NSTEMI.      Today, Mr. Ford denies any acute events or complaints. altered     ECG: Personally reviewed. normal sinus rhythm, nonspecific ST and T waves changes.     ECHO (personnaly Reviewed):   Severe hypokinesis of the mid and distal anterior, anteroseptal and distal  inferior walls and apex. Function of the lateral and inferolateral walls is  well preserved.  Left ventricular systolic function is mild to moderately reduced.  The visual ejection fraction is 40-45%.  There is mild (1+) mitral regurgitation.  Moderate valvular aortic stenosis. YOLANDA 1.1 cm2,  "mean AV gradient 16 mmHg. Low  SVI suggests low-flow, low-gradient moderate to severe AS.  Right ventricular systolic pressure is elevated, consistent with moderate  pulmonary hypertension.     Compared to the previous study, the LVF is much lower and WMA's are now  present. AS is probably unchanged.          Physical Examination Review of Systems   BP (!) 151/71   Pulse 80   Temp 98.2  F (36.8  C) (Oral)   Resp 23   Ht 1.6 m (5' 3\")   Wt 44.2 kg (97 lb 6.4 oz)   SpO2 100%   BMI 17.25 kg/m    Body mass index is 17.25 kg/m .  Wt Readings from Last 3 Encounters:   02/08/22 44.2 kg (97 lb 6.4 oz)   02/07/22 42.5 kg (93 lb 11.1 oz)   12/13/21 44.1 kg (97 lb 3.2 oz)     General Appearance:   no distress, normal body habitus   ENT/Mouth: membranes moist, no oral lesions or bleeding gums.      EYES:  no scleral icterus, normal conjunctivae   Neck: no carotid bruits or thyromegaly   Chest/Lungs:   lungs are clear to auscultation, no rales or wheezing, equal chest wall expansion    Cardiovascular:   Regular. Normal first and second heart sounds with no murmurs, rubs, or gallops; the carotid, radial and posterior tibial pulses are intact, no JVD or LE edema bilaterally    Abdomen:  no organomegaly, masses, bruits, or tenderness; bowel sounds are present   Extremities: no cyanosis or clubbing   Skin: no xanthelasma, warm.    Neurologic: alert and calm      Psychiatric: alert and calm     A complete 10 systems ROS was reviewed  And is negative except what is listed in the HPI.          Medical History  Surgical History Family History Social History   Past Medical History:   Diagnosis Date     Acute on chronic respiratory failure (H) 03/25/2021     Arthritis      COPD (chronic obstructive pulmonary disease) (H)      Coronary artery disease      CVA (cerebral vascular accident) (H) 08/17/2013     Hypertension      Hypotension, unspecified hypotension type      Sepsis (H)     Past Surgical History:   Procedure Laterality " "Date     APPENDECTOMY       BYPASS GRAFT AORTOILIAC N/A 3/7/2017    Procedure: BYPASS GRAFT AORTOILIAC;  Surgeon: Marcos Pratt MD;  Location: [facility] OR     SALPINGO OOPHORECTOMY,R/L/DELORES      Salpingo Oophorectomy, RT/LT/DELORES    no family history of premature coronary artery disease Social History     Socioeconomic History     Marital status:      Spouse name: Not on file     Number of children: Not on file     Years of education: Not on file     Highest education level: Not on file   Occupational History     Not on file   Tobacco Use     Smoking status: Current Some Day Smoker     Types: Cigarettes     Smokeless tobacco: Never Used     Tobacco comment: \"just a couple a day\"   Substance and Sexual Activity     Alcohol use: No     Alcohol/week: 0.0 standard drinks     Drug use: No     Sexual activity: Not Currently     Partners: Male   Other Topics Concern      Service No     Blood Transfusions No     Caffeine Concern No     Occupational Exposure No     Hobby Hazards No     Sleep Concern Yes     Comment: Trouble breathing at night due to COPD     Stress Concern No     Weight Concern No     Special Diet No     Back Care No     Exercise No     Bike Helmet No     Seat Belt Yes     Self-Exams Yes     Parent/sibling w/ CABG, MI or angioplasty before 65F 55M? No   Social History Narrative     Not on file     Social Determinants of Health     Financial Resource Strain: Not on file   Food Insecurity: Not on file   Transportation Needs: Not on file   Physical Activity: Not on file   Stress: Not on file   Social Connections: Not on file   Intimate Partner Violence: Not on file   Housing Stability: Not on file           Lab Results    Chemistry/lipid CBC Cardiac Enzymes/BNP/TSH/INR   Lab Results   Component Value Date    CHOL 143 12/13/2021    HDL 41 (L) 12/13/2021    TRIG 129 12/13/2021    BUN 20 02/09/2022     02/09/2022    CO2 32 (H) 02/09/2022    Lab Results   Component Value Date    WBC 2.3 (L) " "02/09/2022    HGB 8.4 (L) 02/09/2022    HCT 29.8 (L) 02/09/2022    MCV 81 02/09/2022     02/09/2022    Lab Results   Component Value Date    TROPONINI 0.07 02/08/2022    TSH 1.05 08/18/2013    INR 0.91 08/30/2021     Lab Results   Component Value Date    TROPONINI 0.07 02/08/2022          Weight:    Wt Readings from Last 3 Encounters:   02/08/22 44.2 kg (97 lb 6.4 oz)   02/07/22 42.5 kg (93 lb 11.1 oz)   12/13/21 44.1 kg (97 lb 3.2 oz)       Allergies  Allergies   Allergen Reactions     Crestor [Rosuvastatin] Other (See Comments)     dizziness     Lisinopril Cough     Statins [Hmg-Coa-R Inhibitors] Other (See Comments)     Muscle/joint aching     Optison [Albumin Human] Other (See Comments)     Pt was flush and very dizzy.  Also had a BP drop     Penicillins Rash         Surgical History  Past Surgical History:   Procedure Laterality Date     APPENDECTOMY       BYPASS GRAFT AORTOILIAC N/A 3/7/2017    Procedure: BYPASS GRAFT AORTOILIAC;  Surgeon: Marcos Pratt MD;  Location: [facility] OR     SALPINGO OOPHORECTOMY,R/L/DELORES      Salpingo Oophorectomy, RT/LT/DELORES       Social History  Tobacco:   History   Smoking Status     Current Some Day Smoker     Types: Cigarettes   Smokeless Tobacco     Never Used     Comment: \"just a couple a day\"    Alcohol:   Social History    Substance and Sexual Activity      Alcohol use: No        Alcohol/week: 0.0 standard drinks   Illicit Drugs:   History   Drug Use No       Family History  Family History   Problem Relation Age of Onset     Cerebrovascular Disease Mother      Cerebrovascular Disease Father      Genitourinary Problems Brother         Dialysis          Burton Rodriguez MD on 2/10/2022      cc: Alli Castro    "

## 2022-02-10 NOTE — PROGRESS NOTES
Progress Note    Date of admission: 02/08/2022    Assessment/Plan  Preeti Ford is a 71 year old female with history of severe oxygen dependent COPD using trilogy BIPAP support at night, pulmonary hypertension, still actively smoking, PVD, s/p several CVA's resulting in expressive aphasia and poor impulse control, CAD s/p PCI 2018, ischemic cardiomyopathy, moderate aortic stenosis, malnutrition, admitted to [facility] 2/5/2022, transferred to [facility] evening of 2/7/2022 for acute on chronic respiratory failure, NSTEMI with unstable angina.     NSTEMI, coronary artery disease, ischemic cardiomyopathy, moderate to severe pulmonary hypertension, nonrheumatic aortic valve stenosis, tobacco use:   - Patient with elevated troponins. D-dimer elevated at 2.96, CTA of the chest is negative for pulmonary embolism. Echocardiogram completed on 2/7 showing new severe hypokinesis of the mid and distal anterior, anteroseptal and distalinferior walls and apex. Function of the lateral and inferolateral walls is well preserved. EF 40-45%. Moderate valvular aortic stenosis. YOLANDA 1.1 cm2.  Patient with longstanding coronary artery disease with previous drug-eluting stent placement in 2018.    -Cardiology consulted.  -s/p Heparin drip.  -s/p Nitroglycerin drip.   -  Hydralazine as needed  -Close hemodynamic monitoring.  -Serial troponins - stable trending down  - s/p coronary angiogram without significant stenosis no intervention was indicated    Acute on chronic respiratory failure with hypoxia and hypercapnia, COPD with acute exacerbation, possible pneumonia of the right lung, possible sepsis, pulmonary emphysema:  -Pulmonary on board appreciate recommendations  -BiPAP, IV Solu medrol, scheduled duo nebs   -Pulmonary hygiene.  -Pulmonary concerned regarding lung infiltrate, continuing ceftriaxone IV for now  -RCAT.   -Blood cultures NGTD from 5 February.  -Legionella and strep pneumo negative.  Coronavirus  influenza a and B-.  Respiratory panel negative.     Aspiration risk:  Assessed by speech path, ok for careful feeding with small bites, thickened liquids.  Recommending video swallow eval when completed with angiogram, pulmonary status stable     Pulmonary nodules, enlarging:   CT with enlarging pulmonary nodules documented, see report for further details.  Differential includes acute inflammatory changes vs malignancy.  -Consider biopsy once medically stable and off of anticoagulation.     Anemia, chronic:   -MCV is 77.  -Monitor.     Severe malnutrition:   - Documented before.  -Nutrition consult.     History of stroke, cognitive impairment, aphasia:   - Previously in 2013, again in 2018.  Including right thalamus and left cerebellar and right vermis.  Present cognitive impairments from strokes according to the .     Acute metabolic encephalopathy  -more awake, alert, appropriate today  -multifactorial with underlying hx of CVA and acute hypercarbia on admission  -Bedtime seroquel and prn seroquel    DVT PPX : Lovenox    Barriers to discharge: Palliative consult for determination of goals of care    Anticipated Discharge date  : 1 to 2 days    Subjective  No distress noted.  Patient is still in the ICU.  Plan to treat to downgrade to MedSurg telemetry today as per intensivist.     Objective  Vital signs in last 24 hours  Temp:  [97.5  F (36.4  C)-98.5  F (36.9  C)] 98  F (36.7  C)  Pulse:  [72-98] 85  Resp:  [20-37] 34  BP: (131-185)/(61-94) 153/69  SpO2:  [87 %-100 %] 97 %    Input and Output in 24 hrs     Intake/Output Summary (Last 24 hours) at 2/10/2022 1614  Last data filed at 2/10/2022 1400  Gross per 24 hour   Intake 800 ml   Output 150 ml   Net 650 ml       Physical Exam:  GEN: AAOX1. Not in acute distress  HEENT: Atraumatic    Pupils- round and reactive to light bilaterally   Neck- supple, no JVP elevation, no lymphadenopathy or thyromegaly   Sclera- anicteric   Mucous membrane- moist and  pink  CHEST: Clear to auscultation bilaterally  HEART: S1S2 regular. No murmurs, rubs or gallops  ABDOMEN: Soft. Non-tender, non-distended. No organomegaly. No guarding or rigidity. Bowel sounds- active  Extremities: No pedal oedema  CNS: No focal neurological deficit. No involuntary movements  SKIN: No skin rash, no cyanosis or clubbing      Pertinent Labs   Lab Results: Personally Reviewed    Recent Results (from the past 24 hour(s))   Heparin Unfractionated Anti Xa Level    Collection Time: 02/10/22  4:28 AM   Result Value Ref Range    Anti Xa Unfractionated Heparin <0.10 For Reference Range, See Comment IU/mL   Extra Red Top Tube    Collection Time: 02/10/22  4:28 AM   Result Value Ref Range    Hold Specimen JIC    Extra Purple Top Tube    Collection Time: 02/10/22  4:28 AM   Result Value Ref Range    Hold Specimen JIC    Blood gas venous    Collection Time: 02/10/22  8:52 AM   Result Value Ref Range    pH Venous 7.38 7.35 - 7.45    pCO2 Venous 68 (H) 35 - 50 mm Hg    pO2 Venous 50 (H) 25 - 47 mm Hg    Bicarbonate Venous 36 (H) 24 - 30 mmol/L    Base Excess/Deficit (+/-) 14.9   mmol/L    Oxyhemoglobin Venous 82.7 (H) 70.0 - 75.0 %    O2 Sat, Venous 83.9 (H) 70.0 - 75.0 %   Basic metabolic panel    Collection Time: 02/10/22  8:53 AM   Result Value Ref Range    Sodium 142 136 - 145 mmol/L    Potassium 4.5 3.5 - 5.0 mmol/L    Chloride 98 98 - 107 mmol/L    Carbon Dioxide (CO2) 35 (H) 22 - 31 mmol/L    Anion Gap 9 5 - 18 mmol/L    Urea Nitrogen 19 8 - 22 mg/dL    Creatinine 0.64 0.60 - 1.10 mg/dL    Calcium 8.6 8.5 - 10.5 mg/dL    Glucose 89 70 - 125 mg/dL    GFR Estimate >90 >60 mL/min/1.73m2   Magnesium    Collection Time: 02/10/22  8:53 AM   Result Value Ref Range    Magnesium 2.0 1.8 - 2.6 mg/dL   CBC with platelets and differential    Collection Time: 02/10/22  8:53 AM   Result Value Ref Range    WBC Count 5.1 4.0 - 11.0 10e3/uL    RBC Count 4.70 3.80 - 5.20 10e6/uL    Hemoglobin 10.5 (L) 11.7 - 15.7 g/dL     Hematocrit 37.6 35.0 - 47.0 %    MCV 80 78 - 100 fL    MCH 22.3 (L) 26.5 - 33.0 pg    MCHC 27.9 (L) 31.5 - 36.5 g/dL    RDW 17.7 (H) 10.0 - 15.0 %    Platelet Count 217 150 - 450 10e3/uL    % Neutrophils 66 %    % Lymphocytes 20 %    % Monocytes 13 %    % Eosinophils 0 %    % Basophils 0 %    % Immature Granulocytes 1 %    NRBCs per 100 WBC 0 <1 /100    Absolute Neutrophils 3.4 1.6 - 8.3 10e3/uL    Absolute Lymphocytes 1.1 0.8 - 5.3 10e3/uL    Absolute Monocytes 0.7 0.0 - 1.3 10e3/uL    Absolute Eosinophils 0.0 0.0 - 0.7 10e3/uL    Absolute Basophils 0.0 0.0 - 0.2 10e3/uL    Absolute Immature Granulocytes 0.0 <=0.4 10e3/uL    Absolute NRBCs 0.0 10e3/uL       Pertinent Radiology       Advanced Care Planning      Lesa Esposito MD   [facility]

## 2022-02-10 NOTE — PLAN OF CARE
Problem: Hypertension Acute  Goal: Blood Pressure Within Desired Range  Outcome: Improving     Problem: Risk for Delirium  Goal: Improved Sleep  Outcome: Improving    Problem: Adjustment to Illness COPD (Chronic Obstructive Pulmonary Disease)  Goal: Optimal Chronic Illness Coping  Outcome: No Change     BP improving this shift with scheduled IV lopressor. She continues to refuse to wear the bipap. She does ok with a NC at 2L/min but occasionally wakes in a panic having a coughing fit and desats for a few minutes. Once she is calm, O2 sats improve. She is alert but confused and this increases her anxiety. She did speak with her  today who was able to calm her. She slept consistently for several hours overnight. She did get belligerent a couple of times demanding to get out of the bed but she was able to be redirected.

## 2022-02-10 NOTE — PROGRESS NOTES
Speech-Language Pathology: Video Swallow Study     02/10/22 1000   General Information   Onset of Illness/Injury or Date of Surgery 02/08/22   Pertinent History of Current Problem Per MD note: 71 year old female with PMHx of chronic respiratory failure due to COPD (FEV1 30%) on home O2 2 LPM and Trilogy positive pressure ventilation at night, spontaneous pneumothorax, HTN, CAD, moderate to severe Aortic stenosis, CVA, PVD s/p aortoiliac bypass. Patient was admitted at [facility] on 2/5/22 for evaluation of shortness of breath. Patient was in severe respiratory distress, initial SpO2 80's. ABG showed acute on chronic respiratory acidosis. Troponin was elevated, normal WBC and diff, elevated ddimer, CXR showed lung infiltrates. Follow up Chest CT scan showed emphysematous changes, enlarging lung nodules, right upper lobe    General Observations Alert and cooperative; pleasantly confused and reporting children running through   Type of Evaluation   Type of Evaluation Swallow Evaluation   Oral Motor   Oral Musculature generally intact   Dentition (Oral Motor)   Dentition (Oral Motor) significant number of missing teeth   Facial Symmetry (Oral Motor)   Facial Symmetry (Oral Motor)   (WFL)   Lip Function (Oral Motor)   Lip Range of Motion (Oral Motor) WNL   Tongue Function (Oral Motor)   Tongue ROM (Oral Motor) WNL   General Swallowing Observations   Current Diet/Method of Nutritional Intake (General Swallowing Observations, NIS) NPO   Swallowing Evaluation Videofluoroscopic swallow study (VFSS)   VFSS Evaluation   Radiologist Dr. Vital   Views Taken left lateral   VFSS Textures Trialed thin liquids;pureed;solid foods;mildly thick liquids   VFSS Eval: Thin Liquid Texture Trial   Mode of Presentation, Thin Liquid cup;straw;self-fed   Preparatory Phase WFL   Oral Phase, Thin Liquid WFL   Pharyngeal Phase, Thin Liquid WFL;Delayed swallow reflex   Rosenbek's Penetration Aspiration Scale: Thin Liquid  Trial Results 2 - contrast enters airway, remains above the vocal cords, no residue remains (penetration)  (isolated episode with straw)   Diagnostic Statement Isolated episode of penetration with continuous straw sips following cracker trial. Penetration was shallow and transient.   VFSS Eval: Mildly Thick Liquids   Mode of Presentation cup;self-fed   Preparatory Phase WFL   Oral Phase WFL   Pharyngeal Phase WFL;Delayed swallow reflex  (minimally delayed)   Rosenbek's Penetration Aspiration Scale 1 - no aspiration, contrast does not enter airway   VFSS Evaluation: Puree Solid Texture Trial   Mode of Presentation, Puree spoon   Preparatory Phase WFL   Oral Phase, Puree WFL   Pharyngeal Phase, Puree WFL   Rosenbek's Penetration Aspiration Scale: Puree Food Trial Results 1 - no aspiration, contrast does not enter airway   VFSS Evaluation: Solid Food Texture Trial   Mode of Presentation, Solid self-fed   Preparatory Phase WFL  (mildly prolonged due to decreased dentition)   Oral Phase, Solid WFL   Pharyngeal Phase, Solid WFL   Rosenbek's Penetration Aspiration Scale: Solid Food Trial Results 1 - no aspiration, contrast does not enter airway   Esophageal Phase of Swallow   Patient reports or presents with symptoms of esophageal dysphagia No   Swallowing Recommendations   Diet Consistency Recommendations thin liquids (level 0);minced & moist (level 5)   Supervision Level for Intake close supervision needed  (due to attention/cognition)   Mode of Delivery Recommendations bolus size, small;slow rate of intake   Monitoring/Assistance Required (Eating/Swallowing) monitor for cough or change in vocal quality with intake   Recommended Feeding/Eating Techniques (Swallow Eval) set-up and prepare tray;maintain upright sitting position for eating;minimize distractions during oral intake   Medication Administration Recommendations, Swallowing (SLP) per patient preference   Comment, Swallowing Recommendations Videofluoroscopic  Swallow Study completed. Patient had no aspiration with any consistency and isolated episode of penetration with thin liquid via continuous straw drinking; penetration was shallow and transient. Oral motor function grossly and oral phase mildly delayed but appears predominantly related to cognition/attention. Swallow response was mildly delayed with thin liquids with swallow trigger just past the epiglottis and epiglottic inversion was complete to nearly complete with all trials.  No significant stasis occurred with any texture. Hyolaryngeal elevation was complete and hyolaryngeal excursion was complete.  Noted mild luminal narrowing at C4-C5 with question prominent cricopharyngeus but largely safe and functional. Recommend diet of Minced and Moist textures with thin liquids; cues for cognition/attention and slow rate of intake recommended.   General Therapy Interventions   Planned Therapy Interventions Dysphagia Treatment   Dysphagia treatment Compensatory strategies for swallowing;Instruction of safe swallow strategies;Modified diet education   SLP Therapy Assessment/Plan   Criteria for Skilled Therapeutic Interventions Met (SLP Eval) yes;treatment indicated   SLP Diagnosis oral dysphagia   Rehab Potential (SLP Eval) good, to achieve stated therapy goals   Therapy Frequency (SLP Eval) 5 times/wk   Predicted Duration of Therapy Intervention (SLP Eval) LOS   Comment, Therapy Assessment/Plan (SLP) Mild dysphagia with low aspiration risk. Mastication affected by cognition and dentition.   SLP Discharge Planning    SLP Discharge Recommendation (DC Rec) home   SLP Brief overview of current status  No aspiration on VFSS, slight pen with thin with straw. Food textures impacted by dentition and cognition. Start with minced and moist and thin and upgrade textures as appropriate.    Total Evaluation Time   Total Evaluation Time (Minutes) 10

## 2022-02-10 NOTE — PLAN OF CARE
"  Problem: Risk for Delirium  Goal: Optimal Coping  Intervention: Optimize Psychosocial Adjustment to Delirium  Recent Flowsheet Documentation  Taken 2/10/2022 1200 by Yokasta Sanon RN  Supportive Measures:   active listening utilized   positive reinforcement provided   verbalization of feelings encouraged   relaxation techniques promoted  Taken 2/10/2022 0800 by Yokasta Sanon RN  Supportive Measures:   active listening utilized   positive reinforcement provided   verbalization of feelings encouraged   relaxation techniques promoted   Patient remains only alert to self.  She has been agitated and restless throughout the course of the day; increased agitation once  was here this afternoon. Hallucinating on and off; seeing \"bubbles\" and \"children in the hallway.\"        Problem: Hypertension Acute  Goal: Blood Pressure Within Desired Range  Outcome: No Change  Intervention: Normalize Blood Pressure  Recent Flowsheet Documentation  Taken 2/10/2022 1200 by Yokasta Sanon RN  Sensory Stimulation Regulation:   care clustered   quiet environment promoted  Medication Review/Management: medications reviewed  Taken 2/10/2022 0800 by Yokasta Sanon RN  Sensory Stimulation Regulation:   care clustered   quiet environment promoted  Medication Review/Management: medications reviewed   Patient remains on scheduled metoprolol and was given one dose of hydralazine.  Patients agitation seems to drive her blood pressures as well as she comes down nicely once she is calm.  Continue frequent monitoring.    "

## 2022-02-11 NOTE — PROGRESS NOTES
Duoneb tx given via mask patient preference, tolerated well. BS diminished bilaterally, no changed with tx. O2 at 2 lpm/NC, SpO2 96 %. RT to follow.

## 2022-02-11 NOTE — PROGRESS NOTES
"CLINICAL NUTRITION SERVICES - Brief NOTE     Nutrition Prescription    RECOMMENDATIONS FOR MDs/PROVIDERS TO ORDER:      Malnutrition Status:    Severe in acute and chronic illness    Recommendations already ordered by Registered Dietitian (RD):  Start Glucerna daily for additional protein.    Future/Additional Recommendations:       EVALUATION OF THE PROGRESS TOWARD GOALS   Diet: Level 6: Soft & Bite-Sized Dysphagia Diet   Diet upgraded 2/11.  Intake: eating % at meals.       NEW FINDINGS   Pt reports that she is starving.  Pt ate a good breakfast and is looking forward to eating lunch and supper today.      ANTHROPOMETRICS  Height: 160 cm (5' 3\")  Admit wt 97 lb 2/9/22  Most Recent Weight: 43 kg (94 lb 12.8 oz)    -3.1 L since admission. Pt on diuretic    INTERVENTIONS  Implementation  Start Glucerna daily (low carb supplement as pt may still be at risk for refeeding syndrome)      Monitoring/Evaluation  Progress toward goals will be monitored and evaluated per protocol.    "

## 2022-02-11 NOTE — PROGRESS NOTES
Patient on 2L nasal cannula all night. Duo neb not given as patient was sleeping. Bipap on standby in room if needed. RT to monitor.

## 2022-02-11 NOTE — PROGRESS NOTES
[facility]:  PULMONARY PROGRESS NOTE     Date / Time of Admission:  2/8/2022 12:06 AM    ID: Preeti Ford is a 71 year old female with cachexia (BMI 17.25 kg/m2), h/o chronic respiratory failure due to COPD (FEV1 30%) on home O2 2 LPM and Trilogy positive pressure ventilation at night, spontaneous pneumothorax, HTN, CAD, moderate to severe aortic stenosis, CVA, PVD s/p aortoiliac bypass who was admitted to [facility] on 2/5/22 for evaluation of shortness of breath, diagnosed with acute on chronic respiratory acidosis with COPD exacerbation, pneumonia, NSTEMI with new wall motion abnormalities on ECHO recommended transferring to [facility] 2/8 for eval by cardiology.       Reason for Consult:  COPD, pulm nodules         Assessment: 1.   Acute on chronic respiratory failure with hypoxia/hypercapnia.  At baseline, on 2 L/min nasal cannula and trilogy at night.  Concern for COPD exacerbation, possible pneumonia as instigating process.   Video swallow study 2/10 without any concerns of aspiration.  2. Acute COPD exacerbation.  FEV1 30%.    3. Progressive pulmonary nodules with possible pneumonia.  CT scan showing nodule in the superior segment of the right lower lobe went from 0.9 cm to 1.3; nodule in the right middle lobe laterally measures 1.3 cm, not definitely seen previously although there was infiltrate on the comparison study.    4. History of spontaneous pneumothorax.    5. Chronic tobacco use.         Plan:     Continue trilogy at night per home regimen as tolerates.      Continue Solumedrol 62.5 mg IV 2x/day (start 2/8), reduce to daily today (2/11).     Recommend transition to prednisone taper tomorrow.  Orders placed    Pred 40 mg x 2 days, then 30 mg x 2 days, then 20 mg x 2 days, 10 mg x 2 days, 5 mg x 2 days, then stop.    Resume home Trellegy Ellipta, 1 puff daily.     Reduce DuoNebs from Q4H to to QID.    Continue montelukast 10 mg daily    Continue nicotine  "patch 21 mg/day (start 2/8)    Question remains, how extensive does family want to work-up pulmonary nodules that are growing with time.  Would recommend outpatient pulmonary follow-up with body PET/CT if plan for further investigation and treatment.  I discussed this with patient's son and also Palliative Care team yesterday.  Based on discussions, this would likely not be the path they choose.      Patient and/or family were educated on the above recommendations.   Thank you for allowing our service to participate in the care of this patient.  Please call with any questions or concerns.  Pulmonary Service will sign-off.  If family desires additional outpatient pulmonary nodule work-up, please place Pulmonary Medicine outpatient referral at time of discharge.    Total patient care time: 35 minutes, with over 50% spent in counseling/coordination of care.      Nini Lopez MD, MPH  Pulmonary/Critical Care Medicine  02/11/2022  10:05 AM        Subjective/Interval History:   Had agitation overnight, given Zyprexa.  Patient reports having struggles and anger overnight.    This morning, states some wheezing.  No dyspnea.    States that she is in a classroom.  And there is a bathroom.  Then she stated that she is in the tub.  And after further questions, she reports being in a hospital.  Unable to state why she is here.  \"It's for everything\".  When asked about what might be the issue - ie, lungs, heart, stomach, etc - unable to specific.      Review of Systems:  Pertinent items are noted in HPI.  Review of systems is limited by patient factors - confusion        Allergies/Medications:   Allergies:     Allergies   Allergen Reactions     Crestor [Rosuvastatin] Other (See Comments)     dizziness     Lisinopril Cough     Statins [Hmg-Coa-R Inhibitors] Other (See Comments)     Muscle/joint aching     Optison [Albumin Human] Other (See Comments)     Pt was flush and very dizzy.  Also had a BP drop     Penicillins Rash " "      Continuous Infusions:    - MEDICATION INSTRUCTIONS -       - MEDICATION INSTRUCTIONS -       - MEDICATION INSTRUCTIONS -       Scheduled Medications:    aspirin  81 mg Oral Daily     atorvastatin  40 mg Oral QPM     cefTRIAXone  1 g Intravenous Q24H     docusate sodium  100 mg Oral BID     enoxaparin ANTICOAGULANT  30 mg Subcutaneous Q24H     ferrous sulfate  325 mg Oral BID     furosemide  20 mg Oral Daily     influenza vac high-dose quad  0.7 mL Intramuscular Prior to discharge     ipratropium - albuterol 0.5 mg/2.5 mg/3 mL  3 mL Nebulization Q4H WA     losartan  25 mg Oral Daily     methylPREDNISolone  62.5 mg Intravenous Q12H     metoprolol succinate ER  25 mg Oral Daily     montelukast  10 mg Oral At Bedtime     multivitamin, therapeutic  1 tablet Oral Daily     nicotine  1 patch Transdermal Daily     nicotine   Transdermal Q8H     pantoprazole  40 mg Oral QAM AC    Or     pantoprazole (PROTONIX) IV  40 mg Intravenous QAM AC     QUEtiapine  25 mg Oral At Bedtime     sodium chloride (PF)  3 mL Intracatheter Q8H     thiamine  100 mg Oral Daily            Objective:   Vitals:  BP (!) 145/76 (BP Location: Right arm)   Pulse 81   Temp 98.1  F (36.7  C) (Oral)   Resp 20   Ht 1.6 m (5' 3\")   Wt 43 kg (94 lb 12.8 oz)   SpO2 99%   BMI 16.79 kg/m    GEN: Pleasant female, cachectic, in no acute distress  HEENT: Temporal wasting bilaterally. Extraoccular eye movements intact, anicteric sclera. No sinus tenderness to palpation.  Moist mucous membranes.   NECK: Supple.    PULM: Non-labored breathing. Diminished breath sounds.  No inspiratory/expiratory wheezing.  Some bronchial breath sounds auscultated.   CVS: Regular rate and rhythm.  Normal S1, S2.  No rubs, murmurs, or gallops.    ABDOMEN: Normoactive bowel sounds.  Non-tender to palpation.  Non-distended.    EXTREMITES:  No clubbing, cyanosis, or edema.    NEURO:  Confused, oriented to name but not location/situation.  Cranial nerves 2-12 grossly intact.  " Moving all extremities.      Intake/Output:  I/O last 3 completed shifts:  In: 923 [P.O.:920; I.V.:3]  Out: -         Pertinent Studies:   All laboratory data reviewed  Serum Glucose range:   Recent Labs   Lab 02/11/22  0403 02/10/22  0853 02/09/22  1127 02/09/22  0803 02/09/22  0429 02/09/22  0359   * 89 102* 89 105 103*     ABG:   Recent Labs   Lab 02/08/22  0111 02/07/22  0538 02/06/22  1316 02/06/22  0532   PH 7.18*  --   --   --    PCO2 91*  --   --   --    PO2 127*  --   --   --    HCO3 28  --   --   --    O2PER  --  24 21 21     CBC:   Recent Labs   Lab 02/11/22  0403 02/10/22  0853 02/09/22  0429 02/08/22  0519   WBC 4.2 5.1 2.3* 6.7   HGB 10.6* 10.5* 8.4* 9.5*   HCT 36.9 37.6 29.8* 35.0   MCV 79 80 81 82    217 183 240   NEUTROPHIL  --  66 68 80   LYMPH  --  20 20 8   MONOCYTE  --  13 11 11   EOSINOPHIL  --  0 0 0     Chemistry:   Recent Labs   Lab 02/11/22  0403 02/10/22  0853 02/09/22  0429 02/08/22  0519 02/06/22  0530 02/05/22  0708    142 140 142   < > 137   POTASSIUM 4.7 4.5 4.6 5.4*   < > 4.3   CHLORIDE 98 98 101 103   < > 100   CO2 35* 35* 32* 32*   < > 39*   BUN 26* 19 20 26*   < > 12   CR 0.61 0.64 0.54* 0.69   < > 0.52   GFRESTIMATED >90 >90 >90 >90   < > >90   CALDERON 8.7 8.6 8.0* 8.2*   < > 8.3*   MAG  --  2.0  --   --   --  1.9   PROTTOTAL  --   --  5.5* 6.5  --  7.6   ALBUMIN  --   --  2.9* 3.2*  --  2.7*   AST  --   --  21 55*  --  18   ALT  --   --  49* 76*  --  17   ALKPHOS  --   --  43* 53  --  77   BILITOTAL  --   --  0.3 0.3  --  0.2    < > = values in this interval not displayed.     Coags:  No results for input(s): INR, PROTIME, PTT in the last 168 hours.    Invalid input(s): APTT  Cardiac Markers:  Recent Labs   Lab 02/08/22  0519   TROPONINI 0.07        Microbiology:  COVID PCR, 2/5 and 2/7: Negative  Legionella urine Ag, 2/6: Negative  Streptococcus urine Ag, 2/6: Negative        Cardiology/Radiology:   Cardiac: All cardiac studies reviewed by me.    EKG:   Reviewed    TTE, 2/7:  Summary: Severe hypokinesis of the mid and distal anterior, anteroseptal and distal inferior walls and apex. Function of the lateral and inferolateral walls is well preserved. Left ventricular systolic function is mild to moderately reduced. The visual ejection fraction is 40-45%. There is mild (1+) mitral regurgitation. Moderate valvular aortic stenosis. YOLANDA 1.1 cm2, mean AV gradient 16 mmHg. Low SVI suggests low-flow, low-gradient moderate to severe AS. Right ventricular systolic pressure is elevated, consistent with moderate pulmonary hypertension.  Compared to the previous study, the LVF is much lower and WMA's are now present. AS is probably unchanged.    Radiology: All imaging studies reviewed by me.    Chest X-Ray, 2/5:  New focal consolidation at the lateral right lung base could be developing acute airspace disease including pneumonia or other airspace disease. Loculated effusion left lateral mid chest is again seen, but its configuration is more elongated in the craniocaudal dimension than on the prior exam. Adjacent airspace consolidation versus scarring again identified. No new airspace disease on the left. Diffuse emphysema. Normal cardiac silhouette.    CT chest PE study, 2/7:   FINDINGS: ANGIOGRAM CHEST: Pulmonary arteries are normal caliber and negative for pulmonary emboli. Thoracic aorta is nondiagnostic for dissection. No CT evidence of right heart strain.  LUNGS AND PLEURA: Emphysema. Volume loss in the right upper lobe noted. Question some loculated pleural air versus necrotic lung laterally. There appeared to have been a lung abscess on the comparison study. This may have also been loculated in the fissure on the comparison. Nodule in the superior segment of the right lower lobe now measures 1.3 cm previously 0.9 cm. Nodule in the right middle lobe laterally measures 1.3 cm, not definitely seen previously although there was infiltrate on the comparison study.   MEDIASTINUM/AXILLAE: No aneurysm. There are extensive coronary vascular calcifications and/or stents consistent with coronary artery disease. No discrete adenopathy.  UPPER ABDOMEN: Mild right hydronephrosis. Cholelithiasis without Cholecystitis.  MUSCULOSKELETAL: No frankly destructive bony lesions.     IMPRESSION: 1.  No pulmonary embolism demonstrated.  2.  Enlarging pulmonary nodules in the superior segment of the right lower lobe and right middle lobe. 3.  Volume loss in the right upper lobe is indeterminate. 4.  New right hydronephrosis of uncertain etiology.

## 2022-02-11 NOTE — PROGRESS NOTES
HEART CARE NOTE          Assessment/Recommendations       1. CAD c/b NSTEMI  Assessment / Plan    Hx of PCI in 2018; repeat echo significant for new wall motion abnormalities; continue heparin and nitroglycerin gtt    S/p coronary angiogram without significant stenosis - no intervention was indicated     Continue to monitor on telemetry    Continue ASA, atorvastatin, metoprolol      2. HFmrEF  Assessment / Plan    Appears to be near euvolemia on physical exam; denies HF symptoms    GDMT as detailed below    Current Pharmacotherapy AHA Guideline-Directed Medical Therapy   Losartan 25 mg daily Lisinopril 20 mg twice daily   Metoprolol succinate 25 mg daily Carvedilol 25 mg twice daily   Spironolactone NA Spironolactone 25 mg once daily   Hydralazine NA Hydralazine 100 mg three times daily   Isosorbide dinitrate NA Isosorbide dinitrate 40 mg three times daily   SGLT2 inhibitor: not started Dapagliflozin or Empagliflozin 10 mg daily        3. COPD c/b acute respiratory compromise  Assessment / Plan    Management per pulmonary team     4. HTN  Assessment / Plan    Inadequately controlled on current regimen    Will start Losartan in hopes of transitioning to Sacubitril-Valsartan at a later date     5. Valvular heart disease  Assessment / Plan    Potential low-flow-low gradient aortic stenosis - will refer to valve clinic is patient is amenable and within goals of care       Ok for discharge from a cardiology standpoint. Cardiology team will sign-off for now. Please do not hesitate to consult us again if new questions or concerns arise. Follow-up appointment will be arranged by CORE/HF clinic.       History of Present Illness/Subjective      Ms. Preeti Ford is a 71 year old female with a PMHx significant for chronic respiratory failure due to COPD (FEV1 30%) on home O2 2 LPM and Trilogy positive pressure ventilation at night, spontaneous pneumothorax, HTN, CAD s.p PCI in 2018, moderate to severe Aortic stenosis,  "CVA, PVD s/p aortoiliac bypass. who transfers from [facility] with concern for NSTEMI.      Today, Mr. Ford denies any acute events or complaints. altered     ECG: Personally reviewed. normal sinus rhythm, nonspecific ST and T waves changes.     ECHO (personnaly Reviewed):   Severe hypokinesis of the mid and distal anterior, anteroseptal and distal  inferior walls and apex. Function of the lateral and inferolateral walls is  well preserved.  Left ventricular systolic function is mild to moderately reduced.  The visual ejection fraction is 40-45%.  There is mild (1+) mitral regurgitation.  Moderate valvular aortic stenosis. YOLANDA 1.1 cm2, mean AV gradient 16 mmHg. Low  SVI suggests low-flow, low-gradient moderate to severe AS.  Right ventricular systolic pressure is elevated, consistent with moderate  pulmonary hypertension.     Compared to the previous study, the LVF is much lower and WMA's are now  present. AS is probably unchanged.          Physical Examination Review of Systems   BP (!) 149/85 (BP Location: Right arm)   Pulse 83   Temp 97.9  F (36.6  C) (Oral)   Resp 22   Ht 1.6 m (5' 3\")   Wt 43 kg (94 lb 12.8 oz)   SpO2 100%   BMI 16.79 kg/m    Body mass index is 16.79 kg/m .  Wt Readings from Last 3 Encounters:   02/11/22 43 kg (94 lb 12.8 oz)   02/07/22 42.5 kg (93 lb 11.1 oz)   12/13/21 44.1 kg (97 lb 3.2 oz)     General Appearance:   no distress, normal body habitus   ENT/Mouth: membranes moist, no oral lesions or bleeding gums.      EYES:  no scleral icterus, normal conjunctivae   Neck: no carotid bruits or thyromegaly   Chest/Lungs:   lungs are clear to auscultation, no rales or wheezing, equal chest wall expansion    Cardiovascular:   Regular. Normal first and second heart sounds with no murmurs, rubs, or gallops; the carotid, radial and posterior tibial pulses are intact, no JVD or LE edema bilaterally    Abdomen:  no organomegaly, masses, bruits, or tenderness; bowel sounds are " "present   Extremities: no cyanosis or clubbing   Skin: no xanthelasma, warm.    Neurologic: Alert, calm, altered     Psychiatric: Alert, calm, altered    A complete 10 systems ROS was reviewed  And is negative except what is listed in the HPI.          Medical History  Surgical History Family History Social History   Past Medical History:   Diagnosis Date     Acute on chronic respiratory failure (H) 03/25/2021     Arthritis      COPD (chronic obstructive pulmonary disease) (H)      Coronary artery disease      CVA (cerebral vascular accident) (H) 08/17/2013     Hypertension      Hypotension, unspecified hypotension type      Sepsis (H)     Past Surgical History:   Procedure Laterality Date     APPENDECTOMY       BYPASS GRAFT AORTOILIAC N/A 3/7/2017    Procedure: BYPASS GRAFT AORTOILIAC;  Surgeon: Marcos Pratt MD;  Location: [facility] OR     CV CORONARY ANGIOGRAM N/A 2/9/2022    Procedure: Coronary Angiogram;  Surgeon: Elenita Pan MD;  Location: [facility] CATH LAB CV     CV LEFT HEART CATH N/A 2/9/2022    Procedure: Left Heart Cath;  Surgeon: Elenita Pan MD;  Location: [facility] CATH LAB CV     CV LEFT VENTRICULOGRAM N/A 2/9/2022    Procedure: Left Ventriculogram;  Surgeon: Elenita Pan MD;  Location: [facility] CATH LAB CV     SALPINGO OOPHORECTOMY,R/L/DELORES      Salpingo Oophorectomy, RT/LT/DELORES    no family history of premature coronary artery disease Social History     Socioeconomic History     Marital status:      Spouse name: Not on file     Number of children: Not on file     Years of education: Not on file     Highest education level: Not on file   Occupational History     Not on file   Tobacco Use     Smoking status: Current Some Day Smoker     Types: Cigarettes     Smokeless tobacco: Never Used     Tobacco comment: \"just a couple a day\"   Substance and Sexual Activity     Alcohol use: No     Alcohol/week: 0.0 standard drinks     Drug use: No     Sexual activity: Not Currently     Partners: " Male   Other Topics Concern      Service No     Blood Transfusions No     Caffeine Concern No     Occupational Exposure No     Hobby Hazards No     Sleep Concern Yes     Comment: Trouble breathing at night due to COPD     Stress Concern No     Weight Concern No     Special Diet No     Back Care No     Exercise No     Bike Helmet No     Seat Belt Yes     Self-Exams Yes     Parent/sibling w/ CABG, MI or angioplasty before 65F 55M? No   Social History Narrative     Not on file     Social Determinants of Health     Financial Resource Strain: Not on file   Food Insecurity: Not on file   Transportation Needs: Not on file   Physical Activity: Not on file   Stress: Not on file   Social Connections: Not on file   Intimate Partner Violence: Not on file   Housing Stability: Not on file           Lab Results    Chemistry/lipid CBC Cardiac Enzymes/BNP/TSH/INR   Lab Results   Component Value Date    CHOL 143 12/13/2021    HDL 41 (L) 12/13/2021    TRIG 129 12/13/2021    BUN 26 (H) 02/11/2022     02/11/2022    CO2 35 (H) 02/11/2022    Lab Results   Component Value Date    WBC 4.2 02/11/2022    HGB 10.6 (L) 02/11/2022    HCT 36.9 02/11/2022    MCV 79 02/11/2022     02/11/2022    Lab Results   Component Value Date    TROPONINI 0.07 02/08/2022    TSH 1.05 08/18/2013    INR 0.91 08/30/2021     Lab Results   Component Value Date    TROPONINI 0.07 02/08/2022          Weight:    Wt Readings from Last 3 Encounters:   02/11/22 43 kg (94 lb 12.8 oz)   02/07/22 42.5 kg (93 lb 11.1 oz)   12/13/21 44.1 kg (97 lb 3.2 oz)       Allergies  Allergies   Allergen Reactions     Crestor [Rosuvastatin] Other (See Comments)     dizziness     Lisinopril Cough     Statins [Hmg-Coa-R Inhibitors] Other (See Comments)     Muscle/joint aching     Optison [Albumin Human] Other (See Comments)     Pt was flush and very dizzy.  Also had a BP drop     Penicillins Rash         Surgical History  Past Surgical History:   Procedure Laterality Date  "    APPENDECTOMY       BYPASS GRAFT AORTOILIAC N/A 3/7/2017    Procedure: BYPASS GRAFT AORTOILIAC;  Surgeon: Marcos Pratt MD;  Location: [facility] OR     CV CORONARY ANGIOGRAM N/A 2/9/2022    Procedure: Coronary Angiogram;  Surgeon: Elenita Pan MD;  Location: [facility] CATH LAB CV     CV LEFT HEART CATH N/A 2/9/2022    Procedure: Left Heart Cath;  Surgeon: Elenita Pan MD;  Location: [facility] LAB CV     CV LEFT VENTRICULOGRAM N/A 2/9/2022    Procedure: Left Ventriculogram;  Surgeon: Elenita Pan MD;  Location: [facility] CATH LAB CV     SALPINGO OOPHORECTOMY,R/L/DELORES      Salpingo Oophorectomy, RT/LT/DELORES       Social History  Tobacco:   History   Smoking Status     Current Some Day Smoker     Types: Cigarettes   Smokeless Tobacco     Never Used     Comment: \"just a couple a day\"    Alcohol:   Social History    Substance and Sexual Activity      Alcohol use: No        Alcohol/week: 0.0 standard drinks   Illicit Drugs:   History   Drug Use No       Family History  Family History   Problem Relation Age of Onset     Cerebrovascular Disease Mother      Cerebrovascular Disease Father      Genitourinary Problems Brother         Dialysis          Burton Rodriguez MD on 2/11/2022      cc: Alli Castro    "

## 2022-02-11 NOTE — PROGRESS NOTES
[facility]  Palliative Care Daily Progress Note         Recommendations & Counseling     Goals of Care discussion with  and patient.  Patient lacks decision making capacity today   - Continue current treatments/therapies  - They are hoping for a discharge home with home care.  They are not quite ready to initiate hospice care.  They would appreciate an informational consult from hospice(ordered), and if patient continues to decline after discharge, they will likely enroll her at that time, and not pursue work-up for her pulmonary nodules.  They want to ultimately avoid re-hospitalization.    - At this time I would recommend placing a pulmonary medicine outpatient referral at time of discharge.   - I will place a referral for outpatient palliative medicine follow up after discharge     Symptom Management:  - start Senna 2 tabs HS.  No BM documented since admission     Advanced Care Planning:   - No HCD -  started but it was not notarized   - POLST completed and placed in chart  - Code status: DNR/I  - surrogate decision maker:  Yoseph per patient today     Psychosocial/Spiritual Support:  -   - Two kids      Assessments          71 yoF with hx chronic resp failure d/t severe COPD on nocturnal trilogy, CAD s/p ROWDY 2018, CVA with aphasia, HTN, and Aortic stenosis admitted today with NSTEMI. CTA chest neg for PE.  Also being treated for AECOPD and pneumonia prior to transfer from outside facility, and concern for new and increasing pulmonary nodules.       Today, the patient was seen for:  Acute on chronic respiratory failure - back to baseline.  No aspiration on VFSS  NSTEMI s/p PCI with no intervention   Encephalopathy - remains confused with intermittent agitation.  Could be exacerbated by steroids  On Seroquel and PRN zyprexa.    Progressive pulmonary nodules   Goals of care     Prognosis, Goals, or Advance Care Planning was addressed today with: Yes.  Mood, coping,  and/or meaning in the context of serious illness were addressed today: No.            Interval History:     Chart review/discussion with unit or clinical team members:   - Agitated overnight received 2.5 IM Zyprexa    - Did not use BiPAP last night: blood gas stable     Per patient:  - She feels good today.  Denies pain, sob, n/v.  She is aware she gets angry sometimes and I think she was referring to last night.  Oriented to her name and hospital only.   - Good appetite today      Per :  - Discussed care going forward, see above.  He again was looking to Preeti for guidance on how to proceed and I did need to remind him multiple times that she does not have complex decision making capacity which is very apparent.  He was clearly struggling but was happy with our plan of hoping for home with home care, and getting introduced to hospice knowing they aren't quite ready to sign on.        Key Palliative Symptoms: (0=no symptom/no concern, 1=mild, 2=moderate, 3=severe):  Pain: 0  Dyspnea: 0  Nausea: 0  Anxiety: 0    Patient is on opioids: assessed and see med adjustments above            Review of Systems:     A full 14 point review of systems was otherwise completed and is negative aside from that mentionedabove          Medications:     I have reviewed this patient's medication profile and medications during this hospitalization.    Noted meds:    Current Facility-Administered Medications   Medication     acetaminophen (TYLENOL) tablet 650 mg    Or     acetaminophen (TYLENOL) solution 650 mg     acetaminophen (TYLENOL) tablet 650 mg    Or     acetaminophen (TYLENOL) Suppository 650 mg     albuterol (PROVENTIL HFA/VENTOLIN HFA) inhaler     aspirin EC tablet 81 mg     atorvastatin (LIPITOR) tablet 40 mg     carboxymethylcellulose PF (REFRESH PLUS) 0.5 % ophthalmic solution 1 drop     cefTRIAXone (ROCEPHIN) 1 g vial to attach to  mL bag for ADULTS or NS 50 mL bag for PEDS     glucose gel 15-30 g    Or      dextrose 50 % injection 25-50 mL    Or     glucagon injection 1 mg     docusate sodium (COLACE) capsule 100 mg     enoxaparin ANTICOAGULANT (LOVENOX) injection 30 mg     ferrous sulfate (FEROSUL) tablet 325 mg     Fluticasone-Umeclidin-Vilanterol (TRELEGY ELLIPTA) 200-62.5-25 MCG/INH oral inhaler 1 puff     furosemide (LASIX) tablet 20 mg     hydrALAZINE (APRESOLINE) injection 5 mg     HYDROmorphone (DILAUDID) injection 0.2 mg     influenza vac high-dose quad (FLUZONE HD) injection SONIDO 0.7 mL     ipratropium - albuterol 0.5 mg/2.5 mg/3 mL (DUONEB) neb solution 3 mL     lidocaine (LMX4) cream     lidocaine 1 % 0.1-1 mL     losartan (COZAAR) tablet 25 mg     melatonin tablet 1 mg     metoprolol succinate ER (TOPROL-XL) 24 hr tablet 25 mg     montelukast (SINGULAIR) tablet 10 mg     multivitamin, therapeutic (THERA-VIT) tablet 1 tablet     naloxone (NARCAN) injection 0.2 mg    Or     naloxone (NARCAN) injection 0.4 mg    Or     naloxone (NARCAN) injection 0.2 mg    Or     naloxone (NARCAN) injection 0.4 mg     nicotine (NICODERM CQ) 21 MG/24HR 24 hr patch 1 patch     nicotine Patch in Place     No lozenges or gum should be given while patient on BIPAP/AVAPS/AVAPS AE     OLANZapine (zyPREXA) injection 2.5 mg     pantoprazole (PROTONIX) EC tablet 40 mg    Or     pantoprazole (PROTONIX) IV push injection 40 mg     Patient is already receiving anticoagulation with heparin, enoxaparin (LOVENOX), warfarin (COUMADIN)  or other anticoagulant medication     Patient may continue current oral medications     polyethylene glycol (MIRALAX) Packet 17 g     [START ON 2/12/2022] predniSONE (DELTASONE) tablet 40 mg    Followed by     [START ON 2/14/2022] predniSONE (DELTASONE) tablet 30 mg    Followed by     [START ON 2/16/2022] predniSONE (DELTASONE) tablet 20 mg    Followed by     [START ON 2/18/2022] predniSONE (DELTASONE) tablet 10 mg    Followed by     [START ON 2/20/2022] predniSONE (DELTASONE) tablet 5 mg     prochlorperazine  "(COMPAZINE) injection 5 mg    Or     prochlorperazine (COMPAZINE) tablet 5 mg    Or     prochlorperazine (COMPAZINE) suppository 12.5 mg     QUEtiapine (SEROquel) half-tab 12.5 mg     QUEtiapine (SEROquel) tablet 25 mg     senna-docusate (SENOKOT-S/PERICOLACE) 8.6-50 MG per tablet 1 tablet    Or     senna-docusate (SENOKOT-S/PERICOLACE) 8.6-50 MG per tablet 2 tablet     sodium chloride (PF) 0.9% PF flush 3 mL     sodium chloride (PF) 0.9% PF flush 3 mL     thiamine (B-1) tablet 100 mg     Facility-Administered Medications Ordered in Other Encounters   Medication     Reason aspirin not prescribed (intentional)                Physical Exam:   Vital Signs: Blood pressure 134/69, pulse 89, temperature 99.1  F (37.3  C), temperature source Oral, resp. rate 18, height 1.6 m (5' 3\"), weight 43 kg (94 lb 12.8 oz), SpO2 99 %, not currently breastfeeding.   GENERAL: lying in bed in NAD  SKIN: Warm and dry   HEENT: Normocephalic, anicteric sclera, moist mucous membranes  LUNGS: diminished to auscultation anterolaterally; non-labored   CARDIAC: RRR, normal s1/s2, w/o m/r/g   ABDOMINAL: BS (+), soft, non distended, non tender  EXTREMITIES: No edema or cyanosis, pulses 2+ and symmetrical  NEUROLOGIC: alert, follows commands, confused, oriented to name and hospital only   PSYCH: calm              Data Reviewed:     No recent pertinent imaging:     Reviewed recent labs:  Last Comprehensive Metabolic Panel:  Sodium   Date Value Ref Range Status   02/11/2022 143 136 - 145 mmol/L Final   03/28/2021 140 133 - 144 mmol/L Final     Potassium   Date Value Ref Range Status   02/11/2022 4.7 3.5 - 5.0 mmol/L Final   03/28/2021 4.5 3.4 - 5.3 mmol/L Final     Chloride   Date Value Ref Range Status   02/11/2022 98 98 - 107 mmol/L Final   03/28/2021 100 94 - 109 mmol/L Final     Carbon Dioxide   Date Value Ref Range Status   03/28/2021 36 (H) 20 - 32 mmol/L Final     Carbon Dioxide (CO2)   Date Value Ref Range Status   02/11/2022 35 (H) 22 - 31 " mmol/L Final     Anion Gap   Date Value Ref Range Status   02/11/2022 10 5 - 18 mmol/L Final   03/28/2021 4 3 - 14 mmol/L Final     Glucose   Date Value Ref Range Status   02/11/2022 133 (H) 70 - 125 mg/dL Final   03/28/2021 110 (H) 70 - 99 mg/dL Final     Urea Nitrogen   Date Value Ref Range Status   02/11/2022 26 (H) 8 - 22 mg/dL Final   03/28/2021 25 7 - 30 mg/dL Final     Creatinine   Date Value Ref Range Status   02/11/2022 0.61 0.60 - 1.10 mg/dL Final   03/28/2021 0.70 0.52 - 1.04 mg/dL Final     GFR Estimate   Date Value Ref Range Status   02/11/2022 >90 >60 mL/min/1.73m2 Final     Comment:     Effective December 21, 2021 eGFRcr in adults is calculated using the 2021 CKD-EPI creatinine equation which includes age and gender (Hasmukh et al., NE, DOI: 10.1056/DJGKff8284400)   03/28/2021 89 >60 mL/min/[1.73_m2] Final     Comment:     Non  GFR Calc  Starting 12/18/2018, serum creatinine based estimated GFR (eGFR) will be   calculated using the Chronic Kidney Disease Epidemiology Collaboration   (CKD-EPI) equation.       Calcium   Date Value Ref Range Status   02/11/2022 8.7 8.5 - 10.5 mg/dL Final   03/28/2021 8.5 8.5 - 10.1 mg/dL Final     CBC RESULTS: Recent Labs   Lab Test 02/11/22  0403   WBC 4.2   RBC 4.68   HGB 10.6*   HCT 36.9   MCV 79   MCH 22.6*   MCHC 28.7*   RDW 18.0*              Venous Blood Gas  Recent Labs   Lab 02/11/22  0404 02/10/22  0852 02/08/22  1722 02/08/22  1405 02/08/22  0519 02/07/22  0538 02/06/22  1316 02/06/22  0532 02/06/22  0005   PHV 7.38 7.38 7.24* 7.26*   < > 7.38 7.44* 7.44* 7.44*   PCO2V 69* 68* 89* 83*   < > 55* 53* 55* 54*   PO2V 44 50* 45 39   < > 64* 24* 35 35   HCO3V 37* 36* 32* 31*   < > 33* 36* 38* 37*   KYLER 16.1 14.9 11.1 9.7   < > 6.1* 9.9* 11.7* 11.2*   O2PER  --   --   --   --   --  24 21 21 21    < > = values in this interval not displayed.       ====================================================  TT: I have personally spent a total of 35  minutes on the unit in review of medical record, consultation with the medical providers and assessment of patient today, with more than 50% of this time spent in counseling, coordination of care, and discussion with patient and  re: symptom management, risks and benefits of management options, and development of plan of care as noted above.  ====================================================    Chau Boykin CNP  [facility]  Palliative Medicine  Pager [phone]  Office: [phone]

## 2022-02-11 NOTE — SIGNIFICANT EVENT
Significant Event Note    Time of event: 3:43 AM February 11, 2022    Description of event:  Patient trying to get out of bed    Plan:  Patient placed in soft restraints  Right wrist and Left wrist restraints continued 2/11/2022    Clinical Justification: Pulling lines, pulling tubes, and pulling equipment  Less Restrictive Alternative: Repositioning,Re-evaluate equipment,De-escalation,Reorientation  Attending Physician Notified: Yes, Attending Physician's Name: Dinorah   New orders placed Yes  Length of Order: 1 Day    Patient was also given 2.5 mg of Zyprexa to help with agitation.  Pateint has a prolonged QTc on telemetry, so this will continue to be monitored with the medications.  Given her level of agitation and combativeness it was felt necessary to administer the medication and zyprexa was chosen given his decreased impact on QT.    Marcos Garcia MD

## 2022-02-11 NOTE — PROGRESS NOTES
Progress Note    Date of admission: 02/08/2022    Assessment/Plan  Preeti Ford is a 71 year old female with history of severe oxygen dependent COPD using trilogy BIPAP support at night, pulmonary hypertension, still actively smoking, PVD, s/p several CVA's resulting in expressive aphasia and poor impulse control, CAD s/p PCI 2018, ischemic cardiomyopathy, moderate aortic stenosis, malnutrition, admitted to [facility] 2/5/2022, transferred to [facility] evening of 2/7/2022 for acute on chronic respiratory failure, NSTEMI with unstable angina.     NSTEMI, coronary artery disease, ischemic cardiomyopathy, moderate to severe pulmonary hypertension, nonrheumatic aortic valve stenosis, tobacco use:   - Patient with elevated troponins. D-dimer elevated at 2.96, CTA of the chest is negative for pulmonary embolism. Echocardiogram completed on 2/7 showing new severe hypokinesis of the mid and distal anterior, anteroseptal and distalinferior walls and apex. Function of the lateral and inferolateral walls is well preserved. EF 40-45%. Moderate valvular aortic stenosis. YOLANDA 1.1 cm2.  Patient with longstanding coronary artery disease with previous drug-eluting stent placement in 2018.    -Cardiology consult appreciated , - s/p coronary angiogram without significant stenosis no intervention was indicated  -s/p Heparin drip.  -s/p Nitroglycerin drip.   -  Hydralazine as needed  -Close hemodynamic monitoring.  -Serial troponins - stable trending down    Acute on chronic respiratory failure with hypoxia and hypercapnia, COPD with acute exacerbation, possible pneumonia of the right lung, possible sepsis, pulmonary emphysema:  -Pulmonary on board appreciate recommendations  -BiPAP, IV Solu medrol, scheduled duo nebs , prednisone  taper starting from tomorrow  -Pulmonary hygiene.  -Pulmonary concerned regarding lung infiltrate, continuing ceftriaxone IV for now  -RCAT.   -Blood cultures NGTD from 5  February.  -Legionella and strep pneumo negative.  Coronavirus influenza a and B-.  Respiratory panel negative.      Pulmonary nodules, enlarging:   CT with enlarging pulmonary nodules documented, see report for further details.  Differential includes acute inflammatory changes vs malignancy.  -Consider biopsy once medically stable and off of anticoagulation.  -Palliative care consulted for determination of goals of care.     Anemia, chronic:   -MCV is 77.  -Monitor.  -Ferrous sulfate     Severe malnutrition:   - Documented before.  -Nutrition consult.     History of stroke, cognitive impairment, aphasia:   - Previously in 2013, again in 2018.  Including right thalamus and left cerebellar and right vermis.  Present cognitive impairments from strokes according to the .     Acute metabolic encephalopathy  -more awake, alert, appropriate today  -multifactorial with underlying hx of CVA and acute hypercarbia on admission  -Bedtime seroquel and prn seroquel  -Noted to be restless and agitated overnight.  `  Constipation  -Scheduled Senokot    DVT PPX : Lovenox    Barriers to discharge: Palliative consult for determination of goals of care    Anticipated Discharge date  : 1 to 2 days    Subjective  No distress noted.  Patient is still in the ICU.  Plan to treat to downgrade to MedSurg telemetry today as per intensivist.     Objective  Vital signs in last 24 hours  Temp:  [97.6  F (36.4  C)-99.1  F (37.3  C)] 99.1  F (37.3  C)  Pulse:  [] 86  Resp:  [18-22] 18  BP: (123-164)/(67-85) 134/69  SpO2:  [82 %-100 %] 100 %    Input and Output in 24 hrs     Intake/Output Summary (Last 24 hours) at 2/10/2022 1614  Last data filed at 2/10/2022 1400  Gross per 24 hour   Intake 800 ml   Output 150 ml   Net 650 ml       Physical Exam:  GEN: AAOX1. Not in acute distress, seems to be confused   HEENT: Atraumatic    Pupils- round and reactive to light bilaterally   Neck- supple, no JVP elevation, no lymphadenopathy or  thyromegaly   Sclera- anicteric   Mucous membrane- moist and pink  CHEST: Clear to auscultation bilaterally  HEART: S1S2 regular. No murmurs, rubs or gallops  ABDOMEN: Soft. Non-tender, non-distended. No organomegaly. No guarding or rigidity. Bowel sounds- active  Extremities: No pedal oedema  CNS: No focal neurological deficit. No involuntary movements  SKIN: No skin rash, no cyanosis or clubbing      Pertinent Labs   Lab Results: Personally Reviewed    Recent Results (from the past 24 hour(s))   CBC with platelets    Collection Time: 02/11/22  4:03 AM   Result Value Ref Range    WBC Count 4.2 4.0 - 11.0 10e3/uL    RBC Count 4.68 3.80 - 5.20 10e6/uL    Hemoglobin 10.6 (L) 11.7 - 15.7 g/dL    Hematocrit 36.9 35.0 - 47.0 %    MCV 79 78 - 100 fL    MCH 22.6 (L) 26.5 - 33.0 pg    MCHC 28.7 (L) 31.5 - 36.5 g/dL    RDW 18.0 (H) 10.0 - 15.0 %    Platelet Count 224 150 - 450 10e3/uL   Basic metabolic panel    Collection Time: 02/11/22  4:03 AM   Result Value Ref Range    Sodium 143 136 - 145 mmol/L    Potassium 4.7 3.5 - 5.0 mmol/L    Chloride 98 98 - 107 mmol/L    Carbon Dioxide (CO2) 35 (H) 22 - 31 mmol/L    Anion Gap 10 5 - 18 mmol/L    Urea Nitrogen 26 (H) 8 - 22 mg/dL    Creatinine 0.61 0.60 - 1.10 mg/dL    Calcium 8.7 8.5 - 10.5 mg/dL    Glucose 133 (H) 70 - 125 mg/dL    GFR Estimate >90 >60 mL/min/1.73m2   Blood gas venous    Collection Time: 02/11/22  4:04 AM   Result Value Ref Range    pH Venous 7.38 7.35 - 7.45    pCO2 Venous 69 (H) 35 - 50 mm Hg    pO2 Venous 44 25 - 47 mm Hg    Bicarbonate Venous 37 (H) 24 - 30 mmol/L    Base Excess/Deficit (+/-) 16.1   mmol/L    Oxyhemoglobin Venous 76.7 (H) 70.0 - 75.0 %    O2 Sat, Venous 77.8 (H) 70.0 - 75.0 %       Pertinent Radiology       Advanced Care Planning      Lesa Esposito MD   [facility]

## 2022-02-11 NOTE — PLAN OF CARE
Problem: Risk for Delirium  Goal: Optimal Coping  Outcome: No Change     Problem: Restraint for Non-Violent/Non-Self-Destructive Behavior  Goal: Prevent/Manage Potential Problems  Description: Maintain safety of patient and others during period of restraint.  Promote psychological and physical wellbeing.  Prevent injury to skin and involved body parts.  Promote nutrition, hydration, and elimination.  Outcome: No Change       Pt confused on shift, getting agitated and combative, trying to get out of bed. Kicking at nursing staff. Refusing PO meds and not redirectable. Pt paranoid and hallucinating. Restraints initiated and IM zyprexa given as well as pain medication. 1:1 sitter maintained. Incontinent of urine x2.     Sveta Valverde RN

## 2022-02-11 NOTE — PLAN OF CARE
Still alert and confused, impulsive, anxious. Vitals stable, denied any pain. Poor appetite, ate 50% of dinner. Pt refused to keep on external catheter- purewick. Pt has been up to use commode twice and incontinent output with brief on . Pt is back to 1:1 sitter due to impulsive bahavior and unable to redirected most of time. Will continue to monitor.  Karlos Adam RN

## 2022-02-12 NOTE — SIGNIFICANT EVENT
pt's rhythm changed to SVT with HR=160's-170's at around 1745. Pt is awake, follow commands and interactive.  Pt's nh=663-701's /60's. Stat ECG ordered per protocol. Dr. Mccollum paged and came at the bedside. Multiple dose of 6 mg and 12 mg of Adenosine ordered given as ordered see MAR for more details but not effective in converting pt's rhythm to NSR. Amiodarone bolus given as ordered with minima improvement on pt's  HR= 120-130's. Will continue to monitor pt's status and start Amiodarone drip ( pharmacy page to make drip stat) as ordered once received from pharmacy.

## 2022-02-12 NOTE — PROGRESS NOTES
RESPIRATORY CARE NOTE     Patient Name: Preeti Ford  Today's Date: 2/12/2022       Pt continues on 0.5 LNC and denies any shortness of breath at this time. BS: better aerations auscultated upper airways and decreased @ bases. Pt did receive her scheduled neb as ordered. RT will continue to follow.     Kitty Samuel RRT

## 2022-02-12 NOTE — PROGRESS NOTES
HEART CARE CONSULTATON NOTE        Assessment/Recommendations   Assessment  1.  Atrial fib with rapid ventricular response.  Patient was given adenosine.  Atrial Fib waves are noted.  2.  Heart failure with reduced ejection fraction.  Nonischemic  3.  Acute on chronic hypoxic and hypercapnic respiratory failure  4.  Severe COPD with exacerbation    Plan:  1. Complete loading of amiodarone drip, reduce to 0.5 (total 24 hours)  2. Oral amiodarone 200 mg BID  3. Recommend anticoagulation  4.  Continue losartan 25 mg daily for heart failure district fraction  5.  Continue metoprolol 25 mg twice daily.  Will convert to XL prior to discharge    We will follow       History of Present Illness/Subjective    S: Overnight patient developed narrow complex tachycardia.  There is irregularities to the RR with a high rate of 160 bpm.  There was gradual slowing with the use of AV blocking agents.  She did receive adenosine which did cause AV block but of note there was A. fib waves underlying.  She immediately returned into A. fib with RVR.  With use of IV metoprolol heart rates decreased to 130.  Given ongoing symptomatic A. fib she was started on amiodarone last night with a bolus and gradually converted to normal sinus rhythm in the early a.m.  Currently she is in sinus rhythm feeling better.  She denies any active chest pain.  She had a long and complicated course.  She underwent coronary angiogram which was reviewed.  There is no significant coronary artery disease.  Echocardiogram demonstrated reduced left ventricular ejection fraction with focal wall motion abnormalities likely representing a stress-induced cardiomyopathy or possible tachycardia induced cardiomyopathy with undiagnosed A. Fib.    Currently the patient denies any active chest pain symptoms.  She has dyspnea on exertion which is improving.  She is on nasal cannula oxygen.  No lower extremity edema orthopnea or PND symptoms.  No lightheadedness today.    "    Physical Examination  Review of Systems   VITALS: /64 (BP Location: Right arm)   Pulse 75   Temp 98.7  F (37.1  C) (Oral)   Resp 20   Ht 1.6 m (5' 3\")   Wt 43 kg (94 lb 12.8 oz)   SpO2 96%   BMI 16.79 kg/m    BMI: Body mass index is 16.79 kg/m .  Wt Readings from Last 3 Encounters:   02/11/22 43 kg (94 lb 12.8 oz)   02/07/22 42.5 kg (93 lb 11.1 oz)   12/13/21 44.1 kg (97 lb 3.2 oz)       Intake/Output Summary (Last 24 hours) at 2/12/2022 1212  Last data filed at 2/12/2022 1143  Gross per 24 hour   Intake 1498.64 ml   Output 1700 ml   Net -201.36 ml     General Appearance:   no distress, thin body habitus   ENT/Mouth: membranes moist, no oral lesions or bleeding gums.      EYES:  no scleral icterus, normal conjunctivae   Neck: no carotid bruits or thyromegaly   Chest/Lungs:    Decreased breath sounds. No Sternal scar, equal chest wall expansion    Cardiovascular:   Regular. Normal first and second heart sounds with 2.6 mid peaking ssytolic murmurs, rubs, or gallops; the carotid, radial and posterior tibial pulses are intact, Jugular venous pressure 8 cm, no edema bilaterally    Abdomen:  no organomegaly, masses, bruits, or tenderness; bowel sounds are present   Extremities: no cyanosis or clubbing   Skin: no xanthelasma, warm.    Neurologic: normal  bilateral, no tremors     Psychiatric: alert and oriented x3, calm     Review Of Systems  Skin: negative  Eyes: negative  Ears/Nose/Throat: negative  Respiratory: Shortness of breath- and Dyspnea on exertion-  Cardiovascular: positive for palpitations  Gastrointestinal: negative  Genitourinary: negative  Musculoskeletal: negative  Neurologic: negative  Psychiatric: negative  Hematologic/Lymphatic/Immunologic: negative  Endocrine: negative          Lab Results    Chemistry/lipid CBC Cardiac Enzymes/BNP/TSH/INR   Recent Labs   Lab Test 12/13/21  1341 02/18/16  0718 10/09/15  0825   CHOL 143   < > 177   HDL 41*   < > 49*   LDL 76   < > 107   TRIG 129   < " > 106   CHOLHDLRATIO  --   --  3.6    < > = values in this interval not displayed.     Recent Labs   Lab Test 12/13/21  1341 10/29/20  1034 10/02/19  0931   LDL 76 85 99     Recent Labs   Lab Test 02/11/22 1942      POTASSIUM 3.7   CHLORIDE 90*   CO2 37*      BUN 37*   CR 0.85   GFRESTIMATED 74   CALDERON 8.9     Recent Labs   Lab Test 02/11/22  1942 02/11/22  0403 02/10/22  0853   CR 0.85 0.61 0.64     Recent Labs   Lab Test 03/24/18  0515 03/07/17  1940 02/18/16  0718   A1C 5.5 5.5 5.7          Recent Labs   Lab Test 02/11/22  1942 02/11/22  0403   WBC  --  4.2   HGB 12.4 10.6*   HCT  --  36.9   MCV  --  79   PLT  --  224     Recent Labs   Lab Test 02/11/22  1942 02/11/22  0403 02/10/22  0853   HGB 12.4 10.6* 10.5*    Recent Labs   Lab Test 02/12/22  0332 02/11/22  1942 02/08/22  0519   TROPONINI 0.19 0.07 0.07     Recent Labs   Lab Test 02/05/22  0708 09/16/21  2020 08/31/21  0532   NTBNPI 1,817* 1,397* 1,764*     No results for input(s): TSH in the last 76055 hours.  Recent Labs   Lab Test 08/30/21  0613 05/02/20  0834 05/17/18  0911   INR 0.91 0.84* 0.91        Medical History  Surgical History Family History Social History   Past Medical History:   Diagnosis Date     Acute on chronic respiratory failure (H) 03/25/2021     Arthritis      COPD (chronic obstructive pulmonary disease) (H)      Coronary artery disease      CVA (cerebral vascular accident) (H) 08/17/2013     Hypertension      Hypotension, unspecified hypotension type      Sepsis (H)      Past Surgical History:   Procedure Laterality Date     APPENDECTOMY       BYPASS GRAFT AORTOILIAC N/A 3/7/2017    Procedure: BYPASS GRAFT AORTOILIAC;  Surgeon: Marcos Pratt MD;  Location: [facility] OR     CV CORONARY ANGIOGRAM N/A 2/9/2022    Procedure: Coronary Angiogram;  Surgeon: Elenita Pan MD;  Location: [facility] CATH LAB CV     CV LEFT HEART CATH N/A 2/9/2022    Procedure: Left Heart Cath;  Surgeon: Elenita Pan MD;  Location: [facility]  "CATH LAB CV     CV LEFT VENTRICULOGRAM N/A 2/9/2022    Procedure: Left Ventriculogram;  Surgeon: Elenita Pan MD;  Location: [facility] CATH LAB CV     SALPINGO OOPHORECTOMY,R/L/DELORES      Salpingo Oophorectomy, RT/LT/DELORES     Family History   Problem Relation Age of Onset     Cerebrovascular Disease Mother      Cerebrovascular Disease Father      Genitourinary Problems Brother         Dialysis        Social History     Socioeconomic History     Marital status:      Spouse name: Not on file     Number of children: Not on file     Years of education: Not on file     Highest education level: Not on file   Occupational History     Not on file   Tobacco Use     Smoking status: Current Some Day Smoker     Types: Cigarettes     Smokeless tobacco: Never Used     Tobacco comment: \"just a couple a day\"   Substance and Sexual Activity     Alcohol use: No     Alcohol/week: 0.0 standard drinks     Drug use: No     Sexual activity: Not Currently     Partners: Male   Other Topics Concern      Service No     Blood Transfusions No     Caffeine Concern No     Occupational Exposure No     Hobby Hazards No     Sleep Concern Yes     Comment: Trouble breathing at night due to COPD     Stress Concern No     Weight Concern No     Special Diet No     Back Care No     Exercise No     Bike Helmet No     Seat Belt Yes     Self-Exams Yes     Parent/sibling w/ CABG, MI or angioplasty before 65F 55M? No   Social History Narrative     Not on file     Social Determinants of Health     Financial Resource Strain: Not on file   Food Insecurity: Not on file   Transportation Needs: Not on file   Physical Activity: Not on file   Stress: Not on file   Social Connections: Not on file   Intimate Partner Violence: Not on file   Housing Stability: Not on file         Medications  Allergies   No current outpatient medications on file.        Allergies   Allergen Reactions     Crestor [Rosuvastatin] Other (See Comments)     dizziness     " Lisinopril Cough     Statins [Hmg-Coa-R Inhibitors] Other (See Comments)     Muscle/joint aching     Optison [Albumin Human] Other (See Comments)     Pt was flush and very dizzy.  Also had a BP drop     Penicillins Rash         Mckay Catherine, DO

## 2022-02-12 NOTE — CONSULTS
Writer spoke to spouse Oh to discuss hospice philosophy, benefits and services under Medicare. Goals of care are restorative and to continue with treatments. Oh would like patient to discharge with [facility] to get stronger with physical and occupational therapies. Oh would like to see how patient does in the home once she is discharged. If patient declines after discharge, they would consider enrolling patient into hospice at that time. Writer will continue to monitor patient and will assist with discharge planning as needed. There are no discharge plans in place at this time. Please call [facility] with any questions or updates to this discharge plan. Coordinated cares with EULA Wheeler CM.     Thank you for this referral,    Abi Garcia, RN, BSN, PHN   RN Hospice Referral Specialist   [facility]   [phone]   24 hour line: [phone]

## 2022-02-12 NOTE — PROGRESS NOTES
Patient on 1/2 nasal cannula all night. Bipap on standby in room. Duo neb was not given at 8pm due to patient in SVT. RT to continue to monitor.

## 2022-02-12 NOTE — SIGNIFICANT EVENT
9:17 PM    HR remains in 130s  Discussed with cardiology, maybe this is more of a MAT than SVT  She does have hypercarbia and slightly low mag which can cause this  Cardiologist advises discontinue her long-acting metoprolol and change to short acting metoprolol 25 mg every 6 hours.  Focusing on more of a rate control strategy right now.  Continue amiodarone drip.  Discussed with RN- current SPO2 as charted is 100% and we need to target a lower goal at 90 to 92% given her hypercarbia. She will turn down the supplemental O2.    7:29 PM    Patient still in tachyarrhythmia after giving amio, rate improved to 120s to 130s.  Discussed with cardiology, initiate amiodarone drip.  Will check electrolytes, D-dimer, VBG, hemoglobin, chest x-ray    6:52 PM    RN called me for New onset SVT. Patient with HR 160s-170s. EKG SVT.  At the bedside, patient awake, alert, in no distress and denied any problems, was dismissive of concern over tachycardia but is pretty severely demented.  Gave adenosine 6 mg, brief asystole, few beats of normal sinus then back in SVT.  No flutter waves visible during asystole.  Gave 12 mg adenosine, resulted in asystole followed by 5-6 beats sinus rhythm, then rapid rise of heart rate back into 160s.  Called and discussed with cardiology, advised to give IV metoprolol 5 mg and then give additional 12 mg adenosine.  Nurse by mistake gave 6 mg again (second vial was not ready but she thought it was, resulted in only 6 given).  Result was the same as before.  After a few minutes did give the 12 mg, same result, few beats of sinus then back in SVT.  Patient continues to be asymptomatic and maintaining blood pressure well.  Discussed again with cardiology we will load her with amiodarone.  If rhythm is not restored within 30 minutes of giving the amiodarone then I will call cardiology back again.    I called and updated patient's , Oh.  He is okay with everything we are doing even though patient is  "DNR.  He states he would be okay with cardioversion if necessary.  He would like to be updated if/when patient returns to a normal rhythm.    If patient becomes hemodynamically unstable then nurse is to call a rapid response.    /82   Pulse (!) 156   Temp 98.9  F (37.2  C) (Oral)   Resp 20   Ht 1.6 m (5' 3\")   Wt 43 kg (94 lb 12.8 oz)   SpO2 99%   BMI 16.79 kg/m      Isa Mccollum MD  [facility] Medicine [facility]  "

## 2022-02-12 NOTE — PLAN OF CARE
Problem: Adult Inpatient Plan of Care  Goal: Absence of Hospital-Acquired Illness or Injury  Intervention: Identify and Manage Fall Risk  Recent Flowsheet Documentation  Taken 2/11/2022 2030 by Ramírez Hunt RN  Safety Promotion/Fall Prevention:    sitter at bedside    safety round/check completed    toileting scheduled    lighting adjusted    fall prevention program maintained    bedside attendant  Taken 2/11/2022 1600 by Ramírez Hunt RN  Safety Promotion/Fall Prevention:    sitter at bedside    safety round/check completed    toileting scheduled    lighting adjusted    fall prevention program maintained    bedside attendant  Intervention: Prevent Skin Injury  Recent Flowsheet Documentation  Taken 2/11/2022 2030 by Ramírez Hunt RN  Body Position: position changed independently  Taken 2/11/2022 1600 by Ramírez Hunt RN  Body Position: position changed independently  Intervention: Prevent and Manage VTE (Venous Thromboembolism) Risk  Recent Flowsheet Documentation  Taken 2/11/2022 2030 by Ramírez Hunt RN  VTE Prevention/Management: anticoagulant therapy maintained  Taken 2/11/2022 1600 by Ramírez Hunt RN  VTE Prevention/Management: anticoagulant therapy maintained  Intervention: Prevent Infection  Recent Flowsheet Documentation  Taken 2/11/2022 2030 by Ramírez Hunt RN  Infection Prevention:    environmental surveillance performed    equipment surfaces disinfected    hand hygiene promoted    personal protective equipment utilized    single patient room provided  Taken 2/11/2022 1600 by Ramírez Hunt RN  Infection Prevention:    environmental surveillance performed    equipment surfaces disinfected    hand hygiene promoted    personal protective equipment utilized    single patient room provided  Goal: Optimal Comfort and Wellbeing  Intervention: Provide Person-Centered Care  Recent Flowsheet Documentation  Taken 2/11/2022 2030 by Ramírez Hunt RN  Trust  Relationship/Rapport: (1:1 nursing aid at the bedside)    care explained    choices provided    emotional support provided    empathic listening provided    questions answered    questions encouraged    reassurance provided    thoughts/feelings acknowledged  Taken 2/11/2022 1600 by Ramírez Hunt RN  Trust Relationship/Rapport: (1:1 nursing aid at the bedside)    care explained    choices provided    emotional support provided    empathic listening provided    questions answered    questions encouraged    reassurance provided    thoughts/feelings acknowledged     Problem: Risk for Delirium  Goal: Optimal Coping  Intervention: Optimize Psychosocial Adjustment to Delirium  Recent Flowsheet Documentation  Taken 2/11/2022 2030 by Ramírez Hunt RN  Family/Support System Care: involvement promoted  Taken 2/11/2022 1600 by Ramírez Hunt RN  Family/Support System Care: involvement promoted     Problem: Suicide Risk  Goal: Absence of Self-Harm  Intervention: Promote Psychosocial Wellbeing  Recent Flowsheet Documentation  Taken 2/11/2022 2030 by Ramírez Hunt RN  Family/Support System Care: involvement promoted  Taken 2/11/2022 1600 by Ramírez Hunt RN  Family/Support System Care: involvement promoted     Problem: Adjustment to Illness COPD (Chronic Obstructive Pulmonary Disease)  Goal: Optimal Chronic Illness Coping  Intervention: Support and Optimize Psychosocial Response  Recent Flowsheet Documentation  Taken 2/11/2022 2030 by Ramírez Hunt RN  Family/Support System Care: involvement promoted  Taken 2/11/2022 1600 by Ramírez Hunt RN  Family/Support System Care: involvement promoted     Problem: Functional Ability Impaired COPD (Chronic Obstructive Pulmonary Disease)  Goal: Optimal Level of Functional [place]  Intervention: Optimize Functional Ability  Recent Flowsheet Documentation  Taken 2/11/2022 2030 by Ramírez Hunt RN  Activity Management:    activity adjusted per  "tolerance    activity encouraged    up to bedside commode    ROM, active encouraged    up to commode  Taken 2/11/2022 1600 by Ramírez Hunt RN  Activity Management:    activity adjusted per tolerance    activity encouraged    up to bedside commode    ROM, active encouraged    up to commode     Problem: Respiratory Compromise COPD (Chronic Obstructive Pulmonary Disease)  Goal: Effective Oxygenation and Ventilation  Intervention: Promote Airway Secretion Clearance  Recent Flowsheet Documentation  Taken 2/11/2022 2030 by Ramírez Hunt RN  Cough And Deep Breathing: done independently per patient  Activity Management:    activity adjusted per tolerance    activity encouraged    up to bedside commode    ROM, active encouraged    up to commode  Taken 2/11/2022 1600 by Ramírez Hunt RN  Cough And Deep Breathing: done independently per patient  Activity Management:    activity adjusted per tolerance    activity encouraged    up to bedside commode    ROM, active encouraged    up to commode  Intervention: Optimize Oxygenation and Ventilation  Recent Flowsheet Documentation  Taken 2/11/2022 2030 by Ramírez Hunt RN  Head of Bed (HOB) Positioning: HOB at 30-45 degrees  Taken 2/11/2022 1600 by Ramírez Hunt RN  Head of Bed (HOB) Positioning: HOB at 30-45 degrees     Problem: Hypertension Acute  Goal: Blood Pressure Within Desired Range  Intervention: Normalize Blood Pressure  Recent Flowsheet Documentation  Taken 2/11/2022 2030 by Ramírez Hunt RN  Medication Review/Management: medications reviewed  Taken 2/11/2022 1600 by Ramírez Hunt RN  Medication Review/Management: medications reviewed   Pt remains in sinus arrhythmia with  to 140's. Continues to be on Amiodarone drip as ordered see MAR for details. Dr. Mccollum informed multiple time this evening about pt's status including high HR ( 120's to 140's) and low BP (/60's). Dr. Mccollum stated \"if pt's bp keeps trending down call " "the house officer for further management of pt's bp and pt's condition.\" Unit charge nurse updated regarding pt's status including HR, BP and Dr. Mccollum statement. Oxygen titrated off per Dr. Mccollum but pt's O2 sat decreased to 87-88% and restarted oxygen supplement at 1/2L/min via NC to keep sat>90%.  Pt's spouse called and updated on pt status including high HR and let pt to talk to her .   Will inform the incoming nurse to continue to monitor pt status and update the house officer per Dr. Mccollum.   11:45pm Bedside hand off report given to receiving RN.   "

## 2022-02-12 NOTE — PROGRESS NOTES
Progress Note    Date of admission: 02/08/2022    Assessment/Plan  Preeti Ford is a 71 year old female with history of severe oxygen dependent COPD using trilogy BIPAP support at night, pulmonary hypertension, still actively smoking, PVD, s/p several CVA's resulting in expressive aphasia and poor impulse control, CAD s/p PCI 2018, ischemic cardiomyopathy, moderate aortic stenosis, malnutrition, admitted to [facility] 2/5/2022, transferred to [place]'s evening of 2/7/2022 for acute on chronic respiratory failure, NSTEMI with unstable angina.     NSTEMI, coronary artery disease, ischemic cardiomyopathy, moderate to severe pulmonary hypertension, nonrheumatic aortic valve stenosis, tobacco use:   - Patient presented with elevated troponins. D-dimer elevated at 2.96, CTA of the chest is negative for pulmonary embolism. Echocardiogram completed on 2/7 showing new severe hypokinesis of the mid and distal anterior, anteroseptal and distalinferior walls and apex. Function of the lateral and inferolateral walls is well preserved. EF 40-45%. Moderate valvular aortic stenosis. YOLANDA 1.1 cm2.  Patient with longstanding coronary artery disease with previous drug-eluting stent placement in 2018.    -Cardiology consult appreciated , - s/p coronary angiogram without significant stenosis no intervention was indicated  -s/p Heparin drip.  -s/p Nitroglycerin drip.   -Close hemodynamic monitoring.  -Serial troponins - stable trending down    Acute on chronic respiratory failure with hypoxia and hypercapnia, COPD with acute exacerbation, possible pneumonia of the right lung, possible sepsis, pulmonary emphysema:  -Pulmonary on board appreciate recommendations  -BiPAP, IV Solu medrol, scheduled duo nebs , prednisone  taper starting from 02/12/2022to be finished on 02/21/2022  -Pulmonary hygiene., keep saturation >88%  -Pulmonary concerned regarding lung infiltrate, continuing ceftriaxone IV for now  -RCAT.   -Blood  cultures NGTD from 5 February.  -Legionella and strep pneumo negative.  Coronavirus influenza a and B-.  Respiratory panel negative.      Pulmonary nodules, enlarging:   CT with enlarging pulmonary nodules documented, see report for further details.  Differential includes acute inflammatory changes vs malignancy.  -Consider biopsy once medically stable  -Palliative care consulted for determination of goals of care.     Anemia, chronic:   -MCV is 77.  -Monitor.  -Ferrous sulfate     Severe malnutrition:   - Documented before.  -manage as per Rd recommendation     History of stroke, cognitive impairment, aphasia:   - Previously in 2013, again in 2018.  Including right thalamus and left cerebellar and right vermis.    - Present cognitive impairments likely from strokes      Acute metabolic encephalopathy  -multifactorial with underlying hx of CVA and acute hypercarbia on admission  -Bedtime seroquel and prn seroquel  -Keep oxygen saturation greater than 88% given history of pulmonary hypertension  `  SVT  -Patient had an incidence of rapid response last evening for having new onset SVT.  -Patient was given Adenosine few times with no response, and then amiodarone drip was initiated.  -Metoprolol tartrate 25 mg p.o. every 6 hourly  -Currently patient is sinus rhythm,  heart rate in 70s.  -Cardiology on board    Constipation  -Scheduled Senokot    DVT PPX : Lovenox    Barriers to discharge: Palliative consult for determination of goals of care    Anticipated Discharge date  : 1 to 2 days    Subjective  No distress noted.  Patient is still in the ICU.  Plan to treat to downgrade to MedSurg telemetry  as per intensivist.  Patient still physically located in ICU due to bed unavailability.    Objective  Vital signs in last 24 hours  Temp:  [97.7  F (36.5  C)-99.1  F (37.3  C)] 98.7  F (37.1  C)  Pulse:  [] 71  Resp:  [18-22] 20  BP: ()/(46-83) 130/60  SpO2:  [79 %-100 %] 97 %    Input and Output in 24 hrs      Intake/Output Summary (Last 24 hours) at 2/10/2022 1614  Last data filed at 2/10/2022 1400  Gross per 24 hour   Intake 800 ml   Output 150 ml   Net 650 ml       Physical Exam:  GEN: AAOX1. Not in acute distress, seems to be confused   HEENT: Atraumatic    Pupils- round and reactive to light bilaterally   Neck- supple, no JVP elevation, no lymphadenopathy or thyromegaly   Sclera- anicteric   Mucous membrane- moist and pink  CHEST: Clear to auscultation bilaterally  HEART: S1S2 regular. No murmurs, rubs or gallops  ABDOMEN: Soft. Non-tender, non-distended. No organomegaly. No guarding or rigidity. Bowel sounds- active  Extremities: No pedal oedema  CNS: No focal neurological deficit. No involuntary movements  SKIN: No skin rash, no cyanosis or clubbing      Pertinent Labs   Lab Results: Personally Reviewed    Recent Results (from the past 24 hour(s))   ECG 12-LEAD WITH MUSE (L[facility])    Collection Time: 02/11/22  6:00 PM   Result Value Ref Range    Systolic Blood Pressure  mmHg    Diastolic Blood Pressure  mmHg    Ventricular Rate 162 BPM    Atrial Rate 174 BPM    MA Interval  ms    QRS Duration 102 ms     ms    QTc 518 ms    P Axis  degrees    R AXIS 97 degrees    T Axis 239 degrees    Interpretation ECG       Undetermined rhythm  Rightward axis  Marked ST abnormality, possible inferolateral subendocardial injury  Abnormal ECG  When compared with ECG of 18-MAY-2018 11:54,  Current undetermined rhythm precludes rhythm comparison, needs review  ST now depressed in Inferior leads  ST more depressed Lateral leads  T wave inversion now evident in Inferior leads     Extra Red Top Tube    Collection Time: 02/11/22  7:38 PM   Result Value Ref Range    Hold Specimen JI    D dimer quantitative    Collection Time: 02/11/22  7:42 PM   Result Value Ref Range    D-Dimer Quantitative 2.75 (H) 0.00 - 0.50 ug/mL FEU   Basic metabolic panel    Collection Time: 02/11/22  7:42 PM   Result Value Ref Range    Sodium 143 136 - 145  mmol/L    Potassium 3.7 3.5 - 5.0 mmol/L    Chloride 90 (L) 98 - 107 mmol/L    Carbon Dioxide (CO2) 37 (H) 22 - 31 mmol/L    Anion Gap 16 5 - 18 mmol/L    Urea Nitrogen 37 (H) 8 - 22 mg/dL    Creatinine 0.85 0.60 - 1.10 mg/dL    Calcium 8.9 8.5 - 10.5 mg/dL    Glucose 105 70 - 125 mg/dL    GFR Estimate 74 >60 mL/min/1.73m2   Troponin I    Collection Time: 02/11/22  7:42 PM   Result Value Ref Range    Troponin I 0.07 0.00 - 0.29 ng/mL   Hemoglobin    Collection Time: 02/11/22  7:42 PM   Result Value Ref Range    Hemoglobin 12.4 11.7 - 15.7 g/dL   Blood gas venous    Collection Time: 02/11/22  7:42 PM   Result Value Ref Range    pH Venous 7.47 (H) 7.35 - 7.45    pCO2 Venous 64 (H) 35 - 50 mm Hg    pO2 Venous 52 (H) 25 - 47 mm Hg    Bicarbonate Venous 44 (H) 24 - 30 mmol/L    Base Excess/Deficit (+/-) 23.3   mmol/L    Oxyhemoglobin Venous 85.5 (H) 70.0 - 75.0 %    O2 Sat, Venous 86.6 (H) 70.0 - 75.0 %   Magnesium    Collection Time: 02/11/22  7:42 PM   Result Value Ref Range    Magnesium 1.7 (L) 1.8 - 2.6 mg/dL   ECG 12-LEAD WITH MUSE (L[facility])    Collection Time: 02/12/22  3:24 AM   Result Value Ref Range    Systolic Blood Pressure  mmHg    Diastolic Blood Pressure  mmHg    Ventricular Rate 72 BPM    Atrial Rate 72 BPM    NV Interval 104 ms    QRS Duration 104 ms     ms    QTc 538 ms    P Axis 67 degrees    R AXIS 91 degrees    T Axis 101 degrees    Interpretation ECG       Sinus rhythm with short NV  Biatrial enlargement  Rightward axis  Pulmonary disease pattern  ST & T wave abnormality, consider anterolateral ischemia  Prolonged QT  Abnormal ECG  When compared with ECG of 11-FEB-2022 18:00,  Previous ECG has undetermined rhythm, needs review  ST no longer depressed in Inferior leads  ST no longer depressed in Anterior leads  T wave inversion no longer evident in Inferior leads     Magnesium    Collection Time: 02/12/22  3:32 AM   Result Value Ref Range    Magnesium 2.2 1.8 - 2.6 mg/dL   Troponin I    Collection  Time: 02/12/22  3:32 AM   Result Value Ref Range    Troponin I 0.19 0.00 - 0.29 ng/mL   Extra Purple Top Tube    Collection Time: 02/12/22  3:32 AM   Result Value Ref Range    Hold Specimen JIC        Pertinent Radiology       Advanced Care Planning      Lesa Esposito MD   [facility]

## 2022-02-13 NOTE — PLAN OF CARE
Patient remains on 2L NC sats high 90's. Scheduled nebs given x3 via mask.  BiPAP is on S/B in the room. Will continue to follow and assess as needed.    Evan Epperson, RT

## 2022-02-13 NOTE — PROGRESS NOTES
Patient on 1L nasal cannula. SPO2 96%. Duo neb given as scheduled. BS clear/diminished. Patient refused to wear bipap. Bipap on standby in room if needed. RT to monitor.

## 2022-02-13 NOTE — PROGRESS NOTES
Progress Note    Date of admission: 02/08/2022    Assessment/Plan  Preeti Ford is a 71 year old female with history of severe oxygen dependent COPD using trilogy BIPAP support at night, pulmonary hypertension, still actively smoking, PVD, s/p several CVA's resulting in expressive aphasia and poor impulse control, CAD s/p PCI 2018, ischemic cardiomyopathy, moderate aortic stenosis, malnutrition, admitted to [facility] 2/5/2022, transferred to [place]'s evening of 2/7/2022 for acute on chronic respiratory failure, NSTEMI with unstable angina.     NSTEMI, coronary artery disease, ischemic cardiomyopathy, moderate to severe pulmonary hypertension, nonrheumatic aortic valve stenosis, tobacco use:   - Patient presented with elevated troponins. D-dimer elevated at 2.96, CTA of the chest is negative for pulmonary embolism. Echocardiogram completed on 2/7 showing new severe hypokinesis of the mid and distal anterior, anteroseptal and distalinferior walls and apex. Function of the lateral and inferolateral walls is well preserved. EF 40-45%. Moderate valvular aortic stenosis. YOLANDA 1.1 cm2.  Patient with longstanding coronary artery disease with previous drug-eluting stent placement in 2018.    -Cardiology consult appreciated , - s/p coronary angiogram without significant stenosis no intervention was indicated  -s/p Heparin drip.  -s/p Nitroglycerin drip.   -Close hemodynamic monitoring.  -Serial troponins - stable trending down    Acute on chronic respiratory failure with hypoxia and hypercapnia, COPD with acute exacerbation, possible pneumonia of the right lung, possible sepsis, pulmonary emphysema:  -Pulmonary on board appreciate recommendations  -BiPAP, IV Solu medrol, scheduled duo nebs , prednisone  taper starting from 02/12/2022to be finished on 02/21/2022  -Pulmonary hygiene., keep saturation >88%  -Pulmonary concerned regarding lung infiltrate, continuing ceftriaxone IV for now  -RCAT.   -Blood  cultures NGTD from 5 February.  -Legionella and strep pneumo negative.  Coronavirus influenza a and B-.  Respiratory panel negative.      Pulmonary nodules, enlarging:   CT with enlarging pulmonary nodules documented, see report for further details.  Differential includes acute inflammatory changes vs malignancy.  -Consider biopsy once medically stable  -Palliative care consulted for determination of goals of care.     Anemia, chronic:   -MCV is 77.  -Monitor.  -Ferrous sulfate     Severe malnutrition:   -Documented before.  -manage as per Rd recommendation     History of stroke, cognitive impairment, aphasia:   - Previously in 2013, again in 2018.  Including right thalamus and left cerebellar and right vermis.    - Present cognitive impairments likely from strokes      Acute metabolic encephalopathy  -multifactorial with underlying hx of CVA and acute hypercarbia on admission  -Bedtime seroquel and prn seroquel  -Keep oxygen saturation greater than 90% given history of pulmonary hypertension  `  SVT  -Patient had an incidence of rapid response last evening for having new onset SVT.  -Patient was given Adenosine few times with no response, and then amiodarone drip was initiated.  -Metoprolol tartrate 25 mg p.o. Q12H  - amiodarone 200mg Po Q12H   -Currently patient is sinus rhythm,  heart rate in 70s.  -Cardiology on board    Constipation  -Scheduled Senokot    DVT PPX : Lovenox    Barriers to discharge: Palliative consult for determination of goals of care    Anticipated Discharge date  : 1 to 2 days    Subjective  No distress noted.  Patient is still in the ICU.  Plan to downgrade to MedSurg telemetry  as per intensivist.  Patient still physically located in ICU due to bed unavailability.    Objective  Vital signs in last 24 hours  Temp:  [97.8  F (36.6  C)-98.1  F (36.7  C)] 98  F (36.7  C)  Pulse:  [60-83] 65  Resp:  [20] 20  BP: ()/(51-68) 98/51  FiO2 (%):  [24 %] 24 %  SpO2:  [77 %-100 %] 99 %    Input  and Output in 24 hrs     Intake/Output Summary (Last 24 hours) at 2/10/2022 1614  Last data filed at 2/10/2022 1400  Gross per 24 hour   Intake 800 ml   Output 150 ml   Net 650 ml       Physical Exam:  GEN: AAOX1. Not in acute distress, seems to be confused   HEENT: Atraumatic    Pupils- round and reactive to light bilaterally   Neck- supple, no JVP elevation, no lymphadenopathy or thyromegaly   Sclera- anicteric   Mucous membrane- moist and pink  CHEST: Clear to auscultation bilaterally  HEART: S1S2 regular. No murmurs, rubs or gallops  ABDOMEN: Soft. Non-tender, non-distended. No organomegaly. No guarding or rigidity. Bowel sounds- active  Extremities: No pedal oedema  CNS: No focal neurological deficit. No involuntary movements  SKIN: No skin rash, no cyanosis or clubbing      Pertinent Labs   Lab Results: Personally Reviewed    Recent Results (from the past 24 hour(s))   Platelet count    Collection Time: 02/13/22  4:43 AM   Result Value Ref Range    Platelet Count 226 150 - 450 10e3/uL   Creatinine    Collection Time: 02/13/22  4:43 AM   Result Value Ref Range    Creatinine 0.59 (L) 0.60 - 1.10 mg/dL    GFR Estimate >90 >60 mL/min/1.73m2   Magnesium    Collection Time: 02/13/22  4:43 AM   Result Value Ref Range    Magnesium 1.9 1.8 - 2.6 mg/dL   ECG 12-LEAD WITH MUSE (L[facility])    Collection Time: 02/13/22 10:15 AM   Result Value Ref Range    Systolic Blood Pressure  mmHg    Diastolic Blood Pressure  mmHg    Ventricular Rate 61 BPM    Atrial Rate 61 BPM    VA Interval 132 ms    QRS Duration 90 ms     ms    QTc 636 ms    P Axis 66 degrees    R AXIS 89 degrees    T Axis 61 degrees    Interpretation ECG       Sinus rhythm  Poor data quality, interpretation may be adversely affected  QT not welll ldefined; looks quite long  Biatrial enlargement  Septal infarct , age undetermined  Abnormal ECG  When compared with ECG of 12-FEB-2022 03:24,  ST no longer depressed in Lateral leads  T wave inversion no longer evident  in Anterolateral leads  QT has lengthened  Confirmed by MAMIE PRADHAN MD LOC:JN ([id number]) on 2/13/2022 12:52:45 PM         Pertinent Radiology       Advanced Care Planning      Lesa Esposito MD   [facility]

## 2022-02-13 NOTE — PROGRESS NOTES
"  HEART CARE CONSULTATON NOTE        Assessment/Recommendations   Assessment  1.  Atrial fib with rapid ventricular response.  Patient was given adenosine.  Atrial Fib waves are noted.  2.  Heart failure with reduced ejection fraction.  Nonischemic  3.  Acute on chronic hypoxic and hypercapnic respiratory failure  4.  Severe COPD with exacerbation    Plan:  1. Complete loading of amiodarone drip on 2/12  2. Oral amiodarone 200 mg BID  3. Recommend anticoagulation with Eliquis 5 mg BID, if primary medicine team agree given recent anemia.   4.  Continue losartan 25 mg daily for heart failure district fraction  5.  Continue metoprolol 25 mg twice daily.  Will convert to XL prior to discharge  6. Check 12 lead to monitor QTC    We will follow.         History of Present Illness/Subjective    S: No acute events overnight.  Patient oxygenation is down to 2 L nasal cannula.  Reviewed telemetry demonstrates she is maintained sinus rhythm with heart rate between 60 to 70 bpm.  No significant pauses overnight.  Overall patient states she is feeling better.  Tolerating oral amiodarone.  Blood pressure today is 140/68.  Discussed with patient that we need to have her on anticoagulation which she is agreeable.       Physical Examination  Review of Systems   VITALS: BP (!) 142/68   Pulse 67   Temp 98.1  F (36.7  C) (Oral)   Resp 20   Ht 1.6 m (5' 3\")   Wt 43 kg (94 lb 12.8 oz)   SpO2 93%   BMI 16.79 kg/m    BMI: Body mass index is 16.79 kg/m .  Wt Readings from Last 3 Encounters:   02/11/22 43 kg (94 lb 12.8 oz)   02/07/22 42.5 kg (93 lb 11.1 oz)   12/13/21 44.1 kg (97 lb 3.2 oz)       Intake/Output Summary (Last 24 hours) at 2/12/2022 1212  Last data filed at 2/12/2022 1143  Gross per 24 hour   Intake 1498.64 ml   Output 1700 ml   Net -201.36 ml     General Appearance:   no distress, thin body habitus   ENT/Mouth: membranes moist, no oral lesions or bleeding gums.      EYES:  no scleral icterus, normal conjunctivae "   Neck: no carotid bruits or thyromegaly   Chest/Lungs:    Decreased breath sounds. No Sternal scar, equal chest wall expansion    Cardiovascular:   Regular. Normal first and second heart sounds with 2.6 mid peaking ssytolic murmurs, rubs, or gallops; the carotid, radial and posterior tibial pulses are intact, Jugular venous pressure 8 cm, no edema bilaterally    Abdomen:  no organomegaly, masses, bruits, or tenderness; bowel sounds are present   Extremities: no cyanosis or clubbing   Skin: no xanthelasma, warm.    Neurologic: normal  bilateral, no tremors     Psychiatric: alert and oriented x3, calm     Review Of Systems  Skin: negative  Eyes: negative  Ears/Nose/Throat: negative  Respiratory: Shortness of breath- and Dyspnea on exertion-  Cardiovascular: positive for palpitations  Gastrointestinal: negative  Genitourinary: negative  Musculoskeletal: negative  Neurologic: negative  Psychiatric: negative  Hematologic/Lymphatic/Immunologic: negative  Endocrine: negative          Lab Results    Chemistry/lipid CBC Cardiac Enzymes/BNP/TSH/INR   Recent Labs   Lab Test 12/13/21  1341 02/18/16  0718 10/09/15  0825   CHOL 143   < > 177   HDL 41*   < > 49*   LDL 76   < > 107   TRIG 129   < > 106   CHOLHDLRATIO  --   --  3.6    < > = values in this interval not displayed.     Recent Labs   Lab Test 12/13/21  1341 10/29/20  1034 10/02/19  0931   LDL 76 85 99     Recent Labs   Lab Test 02/11/22 1942      POTASSIUM 3.7   CHLORIDE 90*   CO2 37*      BUN 37*   CR 0.85   GFRESTIMATED 74   CALDERON 8.9     Recent Labs   Lab Test 02/11/22 1942 02/11/22  0403 02/10/22  0853   CR 0.85 0.61 0.64     Recent Labs   Lab Test 03/24/18  0515 03/07/17 1940 02/18/16  0718   A1C 5.5 5.5 5.7          Recent Labs   Lab Test 02/11/22 1942 02/11/22  0403   WBC  --  4.2   HGB 12.4 10.6*   HCT  --  36.9   MCV  --  79   PLT  --  224     Recent Labs   Lab Test 02/11/22 1942 02/11/22  0403 02/10/22  0853   HGB 12.4 10.6* 10.5*     Recent Labs   Lab Test 02/12/22  0332 02/11/22  1942 02/08/22  0519   TROPONINI 0.19 0.07 0.07     Recent Labs   Lab Test 02/05/22  0708 09/16/21  2020 08/31/21  0532   NTBNPI 1,817* 1,397* 1,764*     No results for input(s): TSH in the last 28492 hours.  Recent Labs   Lab Test 08/30/21  0613 05/02/20  0834 05/17/18  0911   INR 0.91 0.84* 0.91        Medical History  Surgical History Family History Social History   Past Medical History:   Diagnosis Date     Acute on chronic respiratory failure (H) 03/25/2021     Arthritis      COPD (chronic obstructive pulmonary disease) (H)      Coronary artery disease      CVA (cerebral vascular accident) (H) 08/17/2013     Hypertension      Hypotension, unspecified hypotension type      Sepsis (H)      Past Surgical History:   Procedure Laterality Date     APPENDECTOMY       BYPASS GRAFT AORTOILIAC N/A 3/7/2017    Procedure: BYPASS GRAFT AORTOILIAC;  Surgeon: Marcos Pratt MD;  Location: [facility] OR     CV CORONARY ANGIOGRAM N/A 2/9/2022    Procedure: Coronary Angiogram;  Surgeon: Elenita Pan MD;  Location: [facility] CATH LAB CV     CV LEFT HEART CATH N/A 2/9/2022    Procedure: Left Heart Cath;  Surgeon: Elenita Pan MD;  Location: [facility] LAB CV     CV LEFT VENTRICULOGRAM N/A 2/9/2022    Procedure: Left Ventriculogram;  Surgeon: Elenita Pan MD;  Location: [facility] CATH LAB CV     SALPINGO OOPHORECTOMY,R/L/DELORES      Salpingo Oophorectomy, RT/LT/DELORES     Family History   Problem Relation Age of Onset     Cerebrovascular Disease Mother      Cerebrovascular Disease Father      Genitourinary Problems Brother         Dialysis        Social History     Socioeconomic History     Marital status:      Spouse name: Not on file     Number of children: Not on file     Years of education: Not on file     Highest education level: Not on file   Occupational History     Not on file   Tobacco Use     Smoking status: Current Some Day Smoker     Types: Cigarettes      "Smokeless tobacco: Never Used     Tobacco comment: \"just a couple a day\"   Substance and Sexual Activity     Alcohol use: No     Alcohol/week: 0.0 standard drinks     Drug use: No     Sexual activity: Not Currently     Partners: Male   Other Topics Concern      Service No     Blood Transfusions No     Caffeine Concern No     Occupational Exposure No     Hobby Hazards No     Sleep Concern Yes     Comment: Trouble breathing at night due to COPD     Stress Concern No     Weight Concern No     Special Diet No     Back Care No     Exercise No     Bike Helmet No     Seat Belt Yes     Self-Exams Yes     Parent/sibling w/ CABG, MI or angioplasty before 65F 55M? No   Social History Narrative     Not on file     Social Determinants of Health     Financial Resource Strain: Not on file   Food Insecurity: Not on file   Transportation Needs: Not on file   Physical Activity: Not on file   Stress: Not on file   Social Connections: Not on file   Intimate Partner Violence: Not on file   Housing Stability: Not on file         Medications  Allergies   No current outpatient medications on file.        Allergies   Allergen Reactions     Crestor [Rosuvastatin] Other (See Comments)     dizziness     Lisinopril Cough     Statins [Hmg-Coa-R Inhibitors] Other (See Comments)     Muscle/joint aching     Optison [Albumin Human] Other (See Comments)     Pt was flush and very dizzy.  Also had a BP drop     Penicillins Rash         Mckay Catherine, DO    "

## 2022-02-14 NOTE — PROGRESS NOTES
HEART CARE NOTE          Assessment/Recommendations     1. CAD c/b NSTEMI  Assessment / Plan    Hx of PCI in 2018; repeat echo significant for new wall motion abnormalities    S/p coronary angiogram without significant stenosis - no intervention was indicated     Continue to monitor on telemetry    Continue ASA, atorvastatin, metoprolol      2. HFmrEF  Assessment / Plan    Appears to be near euvolemia on physical exam; denies HF symptoms    GDMT as detailed below     Current Pharmacotherapy AHA Guideline-Directed Medical Therapy   Losartan 25 mg daily Lisinopril 20 mg twice daily   Metoprolol succinate 25 mg daily Carvedilol 25 mg twice daily   Spironolactone NA Spironolactone 25 mg once daily   Hydralazine NA Hydralazine 100 mg three times daily   Isosorbide dinitrate NA Isosorbide dinitrate 40 mg three times daily   SGLT2 inhibitor: not started Dapagliflozin or Empagliflozin 10 mg daily         3. COPD c/b acute respiratory compromise  Assessment / Plan    Management per pulmonary team     4. Afib with RVR  Assessment / Plan    Continue oral amiodarone    Recommend AC with apixaban 5 mg BID     5. Valvular heart disease  Assessment / Plan    Potential low-flow-low gradient aortic stenosis - will refer to valve clinic is patient is amenable and within goals of care       History of Present Illness/Subjective      Ms. Preeti Ford is a 71 year old female with a PMHx significant for chronic respiratory failure due to COPD (FEV1 30%) on home O2 2 LPM and Trilogy positive pressure ventilation at night, spontaneous pneumothorax, HTN, CAD s.p PCI in 2018, moderate to severe Aortic stenosis, CVA, PVD s/p aortoiliac bypass. who transfers from [facility] with concern for NSTEMI.      Today, Mr. Ford denies any acute events or complaints. altered     ECG: Personally reviewed. normal sinus rhythm, nonspecific ST and T waves changes.     ECHO (personnaly Reviewed):   Severe hypokinesis of the mid and  "distal anterior, anteroseptal and distal  inferior walls and apex. Function of the lateral and inferolateral walls is  well preserved.  Left ventricular systolic function is mild to moderately reduced.  The visual ejection fraction is 40-45%.  There is mild (1+) mitral regurgitation.  Moderate valvular aortic stenosis. YOLANDA 1.1 cm2, mean AV gradient 16 mmHg. Low  SVI suggests low-flow, low-gradient moderate to severe AS.  Right ventricular systolic pressure is elevated, consistent with moderate  pulmonary hypertension.     Compared to the previous study, the LVF is much lower and WMA's are now  present. AS is probably unchanged.          Physical Examination Review of Systems   /52   Pulse 58   Temp 97.8  F (36.6  C) (Oral)   Resp 20   Ht 1.6 m (5' 3\")   Wt 43 kg (94 lb 12.8 oz)   SpO2 98%   BMI 16.79 kg/m    Body mass index is 16.79 kg/m .  Wt Readings from Last 3 Encounters:   02/11/22 43 kg (94 lb 12.8 oz)   02/07/22 42.5 kg (93 lb 11.1 oz)   12/13/21 44.1 kg (97 lb 3.2 oz)     General Appearance:   no distress, normal body habitus   ENT/Mouth: membranes moist, no oral lesions or bleeding gums.      EYES:  no scleral icterus, normal conjunctivae   Neck: no carotid bruits or thyromegaly   Chest/Lungs:   lungs are clear to auscultation, no rales or wheezing, equal chest wall expansion    Cardiovascular:   Regular. Normal first and second heart sounds with no murmurs, rubs, or gallops; the carotid, radial and posterior tibial pulses are intact, no JVD or LE edema bilaterally    Abdomen:  no organomegaly, masses, bruits, or tenderness; bowel sounds are present   Extremities: no cyanosis or clubbing   Skin: no xanthelasma, warm.    Neurologic: alert and calm     Psychiatric: alert and calm     A complete 10 systems ROS was reviewed  And is negative except what is listed in the HPI.          Medical History  Surgical History Family History Social History   Past Medical History:   Diagnosis Date     Acute on " "chronic respiratory failure (H) 03/25/2021     Arthritis      COPD (chronic obstructive pulmonary disease) (H)      Coronary artery disease      CVA (cerebral vascular accident) (H) 08/17/2013     Hypertension      Hypotension, unspecified hypotension type      Sepsis (H)     Past Surgical History:   Procedure Laterality Date     APPENDECTOMY       BYPASS GRAFT AORTOILIAC N/A 3/7/2017    Procedure: BYPASS GRAFT AORTOILIAC;  Surgeon: Marcos Pratt MD;  Location: [facility] OR     CV CORONARY ANGIOGRAM N/A 2/9/2022    Procedure: Coronary Angiogram;  Surgeon: Elenita Pan MD;  Location: [facility] CATH LAB CV     CV LEFT HEART CATH N/A 2/9/2022    Procedure: Left Heart Cath;  Surgeon: Elenita Pan MD;  Location: [facility] CATH LAB CV     CV LEFT VENTRICULOGRAM N/A 2/9/2022    Procedure: Left Ventriculogram;  Surgeon: Elenita Pan MD;  Location: [facility] CATH LAB CV     SALPINGO OOPHORECTOMY,R/L/DELORES      Salpingo Oophorectomy, RT/LT/DELORES    no family history of premature coronary artery disease Social History     Socioeconomic History     Marital status:      Spouse name: Not on file     Number of children: Not on file     Years of education: Not on file     Highest education level: Not on file   Occupational History     Not on file   Tobacco Use     Smoking status: Current Some Day Smoker     Types: Cigarettes     Smokeless tobacco: Never Used     Tobacco comment: \"just a couple a day\"   Substance and Sexual Activity     Alcohol use: No     Alcohol/week: 0.0 standard drinks     Drug use: No     Sexual activity: Not Currently     Partners: Male   Other Topics Concern      Service No     Blood Transfusions No     Caffeine Concern No     Occupational Exposure No     Hobby Hazards No     Sleep Concern Yes     Comment: Trouble breathing at night due to COPD     Stress Concern No     Weight Concern No     Special Diet No     Back Care No     Exercise No     Bike Helmet No     Seat Belt Yes     " Self-Exams Yes     Parent/sibling w/ CABG, MI or angioplasty before 65F 55M? No   Social History Narrative     Not on file     Social Determinants of Health     Financial Resource Strain: Not on file   Food Insecurity: Not on file   Transportation Needs: Not on file   Physical Activity: Not on file   Stress: Not on file   Social Connections: Not on file   Intimate Partner Violence: Not on file   Housing Stability: Not on file           Lab Results    Chemistry/lipid CBC Cardiac Enzymes/BNP/TSH/INR   Lab Results   Component Value Date    CHOL 143 12/13/2021    HDL 41 (L) 12/13/2021    TRIG 129 12/13/2021    BUN 37 (H) 02/11/2022     02/11/2022    CO2 37 (H) 02/11/2022    Lab Results   Component Value Date    WBC 6.2 02/14/2022    HGB 10.4 (L) 02/14/2022    HCT 36.8 02/14/2022    MCV 81 02/14/2022     02/14/2022    Lab Results   Component Value Date    TROPONINI 0.19 02/12/2022    TSH 1.05 08/18/2013    INR 0.91 08/30/2021     Lab Results   Component Value Date    TROPONINI 0.19 02/12/2022          Weight:    Wt Readings from Last 3 Encounters:   02/11/22 43 kg (94 lb 12.8 oz)   02/07/22 42.5 kg (93 lb 11.1 oz)   12/13/21 44.1 kg (97 lb 3.2 oz)       Allergies  Allergies   Allergen Reactions     Crestor [Rosuvastatin] Other (See Comments)     dizziness     Lisinopril Cough     Statins [Hmg-Coa-R Inhibitors] Other (See Comments)     Muscle/joint aching     Optison [Albumin Human] Other (See Comments)     Pt was flush and very dizzy.  Also had a BP drop     Penicillins Rash         Surgical History  Past Surgical History:   Procedure Laterality Date     APPENDECTOMY       BYPASS GRAFT AORTOILIAC N/A 3/7/2017    Procedure: BYPASS GRAFT AORTOILIAC;  Surgeon: Marcos Pratt MD;  Location: [facility] OR     CV CORONARY ANGIOGRAM N/A 2/9/2022    Procedure: Coronary Angiogram;  Surgeon: Elenita Pan MD;  Location: [facility] CATH LAB CV     CV LEFT HEART CATH N/A 2/9/2022    Procedure: Left Heart Cath;   "Surgeon: Elenita Pan MD;  Location: [facility] CATH LAB CV     CV LEFT VENTRICULOGRAM N/A 2/9/2022    Procedure: Left Ventriculogram;  Surgeon: Elenita Pan MD;  Location: [facility] CATH LAB CV     SALPINGO OOPHORECTOMY,R/L/DELORES      Salpingo Oophorectomy, RT/LT/DELORES       Social History  Tobacco:   History   Smoking Status     Current Some Day Smoker     Types: Cigarettes   Smokeless Tobacco     Never Used     Comment: \"just a couple a day\"    Alcohol:   Social History    Substance and Sexual Activity      Alcohol use: No        Alcohol/week: 0.0 standard drinks   Illicit Drugs:   History   Drug Use No       Family History  Family History   Problem Relation Age of Onset     Cerebrovascular Disease Mother      Cerebrovascular Disease Father      Genitourinary Problems Brother         Dialysis          Burton Rodriguez MD on 2/14/2022      cc: Alli Castro    "

## 2022-02-14 NOTE — PROGRESS NOTES
"Refused noc AVAPS again tonight. Remains on 2L NC, Sp02 mid 90s.  Continues on DuoNeb QID. Will continue to  Follow and assess.     /57   Pulse 58   Temp 97.8  F (36.6  C) (Oral)   Resp 20   Ht 1.6 m (5' 3\")   Wt 43 kg (94 lb 12.8 oz)   SpO2 100%   BMI 16.79 kg/m       Juan Antonio Pan, RRT  "

## 2022-02-14 NOTE — PROGRESS NOTES
Care Management Follow Up    Length of Stay (days): 6    Expected Discharge Date: 02/14/2022     Concerns to be Addressed: discharge planning Home Care need     Patient plan of care discussed at interdisciplinary rounds: No    Anticipated Discharge Disposition: Home, Home Care     Anticipated Discharge Services:  Skilled Nursing visits from Home Care  Anticipated Discharge DME: None    Patient/family educated on Medicare website which has current facility and service quality ratings:    Education Provided on the Discharge Plan:  yes  Patient/Family in Agreement with the Plan: yes    Referrals Placed by CM/SW:  Home Care  Private pay costs discussed: Not applicable    Additional Information:  Met with pt to discuss discharge planning. She states she is still interested in Home Care services. She has been accepted by [facility] for Skilled Nursing visits. Pt is currently at baseline Oxygen use and has home Oxygen per [employer]. Pt states her spouse will provide transportation home.     Kellee Velez RN

## 2022-02-14 NOTE — PROGRESS NOTES
Progress Note    Date of admission: 02/08/2022    Assessment/Plan  Preeti Ford is a 71 year old female with history of severe oxygen dependent COPD using trilogy BIPAP support at night, pulmonary hypertension, still actively smoking, PVD, s/p several CVA's resulting in expressive aphasia and poor impulse control, CAD s/p PCI 2018, ischemic cardiomyopathy, moderate aortic stenosis, malnutrition, admitted to [facility] 2/5/2022, transferred to [place]'s evening of 2/7/2022 for acute on chronic respiratory failure, NSTEMI with unstable angina.     NSTEMI, coronary artery disease, ischemic cardiomyopathy, moderate to severe pulmonary hypertension, nonrheumatic aortic valve stenosis, tobacco use:   - Patient presented with elevated troponins. D-dimer elevated at 2.96, CTA of the chest is negative for pulmonary embolism. Echocardiogram completed on 2/7 showing new severe hypokinesis of the mid and distal anterior, anteroseptal and distalinferior walls and apex. Function of the lateral and inferolateral walls is well preserved. EF 40-45%. Moderate valvular aortic stenosis. YOLANDA 1.1 cm2.  Patient with longstanding coronary artery disease with previous drug-eluting stent placement in 2018.    -Cardiology consult appreciated , - s/p coronary angiogram without significant stenosis no intervention was indicated  -s/p Heparin drip.  -s/p Nitroglycerin drip.   -Close hemodynamic monitoring.  -Serial troponins - stable trending down    Acute on chronic respiratory failure with hypoxia and hypercapnia, COPD with acute exacerbation, possible pneumonia of the right lung, possible sepsis, pulmonary emphysema:  -Pulmonary on board appreciate recommendations  -BiPAP, IV Solu medrol, scheduled duo nebs , prednisone  taper starting from 02/12/2022to be finished on 02/21/2022  -Pulmonary hygiene., keep saturation >88%  -Pulmonary concerned regarding lung infiltrate, ceftriaxone IV , finished course  -RCAT.   -Blood  cultures NGTD from 5 February.  -Legionella and strep pneumo negative.  Coronavirus influenza a and B-.  Respiratory panel negative.      Pulmonary nodules, enlarging:   CT with enlarging pulmonary nodules documented, see report for further details.  Differential includes acute inflammatory changes vs malignancy.  -Consider biopsy once medically stable  -Palliative care consulted for determination of goals of care.  Family is interested in discharge planning to home with home care for now.  Will consider hospice care as an outpatient if patient condition deteriorates.     Anemia, chronic:   -MCV is 77.  -Monitor.  -Ferrous sulfate     Severe malnutrition:   -Documented before.  -manage as per Rd recommendation     History of stroke, cognitive impairment, aphasia:   - Previously in 2013, again in 2018.  Including right thalamus and left cerebellar and right vermis.    - Present cognitive impairments likely from strokes      Acute metabolic encephalopathy  -multifactorial with underlying hx of CVA and acute hypercarbia on admission  -Bedtime seroquel and prn seroquel  -Keep oxygen saturation greater than 90% given history of pulmonary hypertension  `  SVT  -Patient had an incidence of rapid response on 02/12/2022 for having new onset SVT.  -Patient was given Adenosine few times with no response, and then amiodarone drip was initiated.  -Metoprolol tartrate 25 mg p.o. QD  - amiodarone 200mg Po Q12H   -Currently patient is sinus rhythm,  heart rate in 70s.  -Cardiology on board    Constipation  -Scheduled Senokot    DVT PPX : Lovenox    Barriers to discharge: Palliative consult for determination of goals of care    Anticipated Discharge date : Possibly tomorrow    Subjective  No distress noted. Plan to downgrade to MedSurg telemetry  as per intensivist.  Patient still physically located in ICU due to bed unavailability.    Objective  Vital signs in last 24 hours  Temp:  [97.8  F (36.6  C)-98.3  F (36.8  C)] 98.3  F  (36.8  C)  Pulse:  [58-78] 72  Resp:  [18-20] 18  BP: (103-127)/(52-61) 111/54  SpO2:  [97 %-100 %] 99 %    Input and Output in 24 hrs     Intake/Output Summary (Last 24 hours) at 2/10/2022 1614  Last data filed at 2/10/2022 1400  Gross per 24 hour   Intake 800 ml   Output 150 ml   Net 650 ml       Physical Exam:  GEN: AAOX1. Not in acute distress, seems to be confused   HEENT: Atraumatic    Pupils- round and reactive to light bilaterally   Neck- supple, no JVP elevation, no lymphadenopathy or thyromegaly   Sclera- anicteric   Mucous membrane- moist and pink  CHEST: Clear to auscultation bilaterally  HEART: S1S2 regular. No murmurs, rubs or gallops  ABDOMEN: Soft. Non-tender, non-distended. No organomegaly. No guarding or rigidity. Bowel sounds- active  Extremities: No pedal oedema  CNS: No focal neurological deficit. No involuntary movements  SKIN: No skin rash, no cyanosis or clubbing      Pertinent Labs   Lab Results: Personally Reviewed    Recent Results (from the past 24 hour(s))   CBC with platelets    Collection Time: 02/14/22  4:39 AM   Result Value Ref Range    WBC Count 6.2 4.0 - 11.0 10e3/uL    RBC Count 4.56 3.80 - 5.20 10e6/uL    Hemoglobin 10.4 (L) 11.7 - 15.7 g/dL    Hematocrit 36.8 35.0 - 47.0 %    MCV 81 78 - 100 fL    MCH 22.8 (L) 26.5 - 33.0 pg    MCHC 28.3 (L) 31.5 - 36.5 g/dL    RDW 18.4 (H) 10.0 - 15.0 %    Platelet Count 233 150 - 450 10e3/uL   Extra Green Top (Lithium Heparin) Tube    Collection Time: 02/14/22  4:39 AM   Result Value Ref Range    Hold Specimen JIC    Glucose by meter    Collection Time: 02/14/22  8:18 AM   Result Value Ref Range    GLUCOSE BY METER POCT 76 70 - 99 mg/dL       Pertinent Radiology       Advanced Care Planning      Lesa Esposito MD   [facility]

## 2022-02-14 NOTE — PROGRESS NOTES
Respiratory care note:    Pt remains on 1 lpm nasal cannula with oxygen saturation in the high 90s. Attempted to titrate oxygen off this morning. However, pt requested to keep it on. Some anxiety noted with oxygen being off. Pt reports desaturation and increased WOB with ambulation. Pt continues to receive duoneb QID. BBS diminished pre tx and expiratory wheezes with coarse crackles post tx. BiPAP on standby. Continue to follow.    Donna Ovalles, RT

## 2022-02-15 NOTE — DISCHARGE SUMMARY
[facility] MEDICINE  DISCHARGE SUMMARY     Primary Care Physician: Alli Castro  Admission Date: 2/8/2022   Discharge Provider: Lesa Esposito MD Discharge Date: 2/15/2022   Diet:   Active Diet and Nourishment Order   Procedures     Snacks/Supplements Adult: Glucerna; With Meals     Regular Diet Adult     Diet       Code Status: No CPR- Do NOT Intubate   Activity: DCACTIVITY: Activity as tolerated        Condition at Discharge: Guarded     REASON FOR PRESENTATION(See Admission Note for Details)   Admitted for management of NSTEMI, acute on chronic respiratory failure with hypoxia and hypercapnia    PRINCIPAL & ACTIVE DISCHARGE DIAGNOSES     Principal Problem:    Acute on chronic respiratory failure with hypoxia and hypercapnia  Active Problems:    Hyperlipidemia LDL goal <130    History of stroke - right thalamus in 8/2013 and left cerebellar and right vermis in 3/2018    Ischemic cardiomyopathy - EF 25% in 2015 but 55% in 3/2017 and 45-50% on 3/18    HTN, goal below 130/80    NSTEMI (non-ST elevated myocardial infarction) (H)    PAD (peripheral artery disease) (H) - moderate left common carotid stenosis, aortoiliac bypass, chronic left vertebral and right posterior cerebral stenoses    Severe malnutrition (H)    Moderate to severe pulmonary hypertension (H)-per Echo    Aphasia due to recent cerebral infarction      PENDING LABS     Unresulted Labs Ordered in the Past 30 Days of this Admission     No orders found from 1/9/2022 to 2/9/2022.            PROCEDURES ( this hospitalization only)      Procedure(s):  Coronary Angiogram  Left Heart Cath  Left Ventriculogram    RECOMMENDATIONS TO OUTPATIENT PROVIDER FOR F/U VISIT     Follow-up Appointments     Follow-up and recommended labs and tests       Follow up with primary care provider, Alli Castro, within 7 days   for hospital follow- up.  No follow up labs or test are needed.                 DISPOSITION     Home  with home care    SUMMARY OF HOSPITAL COURSE:    Preeti Ford is a 71 year old female with history of severe oxygen dependent COPD using trilogy BIPAP support at night, pulmonary hypertension, still actively smoking, PVD, s/p several CVA's resulting in expressive aphasia and poor impulse control, CAD s/p PCI 2018, ischemic cardiomyopathy, moderate aortic stenosis, malnutrition, admitted to [facility] 2/5/2022, transferred to [place]'s evening of 2/7/2022 for acute on chronic respiratory failure, NSTEMI with unstable angina.    NSTEMI, coronary artery disease, ischemic cardiomyopathy, moderate to severe pulmonary hypertension, nonrheumatic aortic valve stenosis, tobacco use:   - Patient presented with elevated troponins. D-dimer elevated at 2.96, CTA of the chest is negative for pulmonary embolism. Echocardiogram completed on 2/7 showing new severe hypokinesis of the mid and distal anterior, anteroseptal and distalinferior walls and apex. Function of the lateral and inferolateral walls is well preserved. EF 40-45%. Moderate valvular aortic stenosis. YOLANDA 1.1 cm2.  Patient with longstanding coronary artery disease with previous drug-eluting stent placement in 2018.    -Cardiology consult appreciated , - s/p coronary angiogram without significant stenosis no intervention was indicated  -s/p Heparin drip.  -s/p Nitroglycerin drip.   Acute on chronic respiratory failure with hypoxia and hypercapnia, COPD with acute exacerbation, possible pneumonia of the right lung, possible sepsis, pulmonary emphysema:  -Pulmonary on board , appreciate recommendations  -BiPAP, on  prednisone  taper starting from 02/12/2022to be finished on 02/21/2022  -Pulmonary hygiene., keep saturation >88%  -Pulmonary concerned regarding lung infiltrate, ceftriaxone IV , finished course  -RCAT.   -Blood cultures NGTD from 5 February.  -Legionella and strep pneumo negative.  Coronavirus influenza a and B-.  Respiratory panel  negative.    Pulmonary nodules, enlarging:   CT with enlarging pulmonary nodules documented, see report for further details.  Differential includes acute inflammatory changes vs malignancy.  -Consider biopsy once medically stable  -Palliative care consulted for determination of goals of care.  Family is interested in discharge planning to home with home care for now.  Will consider hospice care as an outpatient if patient condition deteriorates.   Anemia, chronic:   -MCV is 77.  -Monitor.  -Ferrous sulfate   Severe malnutrition:   -managed as per Rd recommendation   History of stroke, cognitive impairment, aphasia:   - Previously in 2013, again in 2018.  Including right thalamus and left cerebellar and right vermis.    - Present cognitive impairments likely from strokes    Acute metabolic encephalopathy  -multifactorial with underlying hx of CVA and acute hypercarbia on admission  -Bedtime seroquel and prn seroquel  -Keep oxygen saturation greater than 90% given history of pulmonary hypertension`  SVT  -Patient had an incidence of rapid response on 02/12/2022 for having new onset SVT.  -Patient was given Adenosine few times with no response, and then amiodarone drip was initiated.  -Metoprolol tartrate 25 mg p.o. QD  - amiodarone 200mg Po Q12H   -Cardiology recommends anticoagulation.  Constipation  -Scheduled Senokot  Patient is currently clinically and hemodynamically stable enough to be discharged home.  Cardiology has cleared patient for discharge.  Medication reconciliation has been done.  Medications has been sent to pharmacy.  Patient will follow with primary care provider.     Discharge Medications with Med changes:     Current Discharge Medication List      START taking these medications    Details   amiodarone (PACERONE) 200 MG tablet Take 1 tablet (200 mg) by mouth 2 times daily  Qty: 60 tablet, Refills: 0    Associated Diagnoses: Paroxysmal atrial fibrillation (H)      apixaban ANTICOAGULANT (ELIQUIS) 5  MG tablet Take 1 tablet (5 mg) by mouth 2 times daily  Qty: 60 tablet, Refills: 0    Associated Diagnoses: Paroxysmal atrial fibrillation (H)      furosemide (LASIX) 20 MG tablet Take 1 tablet (20 mg) by mouth daily  Qty: 30 tablet, Refills: 0    Associated Diagnoses: Ischemic cardiomyopathy      losartan (COZAAR) 25 MG tablet Take 1 tablet (25 mg) by mouth daily  Qty: 30 tablet, Refills: 0    Associated Diagnoses: Ischemic cardiomyopathy      metoprolol succinate ER (TOPROL-XL) 25 MG 24 hr tablet Take 1 tablet (25 mg) by mouth daily  Qty: 30 tablet, Refills: 0    Associated Diagnoses: Ischemic cardiomyopathy      !! predniSONE (DELTASONE) 10 MG tablet Take 1 tablet (10 mg) by mouth daily for 2 doses  Qty: 2 tablet, Refills: 0    Associated Diagnoses: COPD with acute exacerbation (H)      !! predniSONE (DELTASONE) 20 MG tablet Take 1 tablet (20 mg) by mouth daily for 2 doses  Qty: 2 tablet, Refills: 0    Associated Diagnoses: COPD with acute exacerbation (H)      !! predniSONE (DELTASONE) 5 MG tablet Take 1 tablet (5 mg) by mouth daily for 2 doses  Qty: 2 tablet, Refills: 0    Associated Diagnoses: COPD with acute exacerbation (H)      QUEtiapine (SEROQUEL) 25 MG tablet Take 1 tablet (25 mg) by mouth At Bedtime for 30 doses  Qty: 30 tablet, Refills: 0    Associated Diagnoses: Delirium      SENNA-docusate sodium (SENNA S) 8.6-50 MG tablet Take 1 tablet by mouth At Bedtime  Qty: 30 tablet, Refills: 0    Comments: Hold for loose stool  Associated Diagnoses: Constipation, unspecified constipation type       !! - Potential duplicate medications found. Please discuss with provider.      CONTINUE these medications which have NOT CHANGED    Details   albuterol (PROAIR HFA/PROVENTIL HFA/VENTOLIN HFA) 108 (90 Base) MCG/ACT inhaler Inhale 2 puffs into the lungs every 6 hours as needed for shortness of breath / dyspnea  Qty: 3 Inhaler, Refills: 3    Comments: Pharmacy may dispense brand covered by insurance (Proair, or  proventil or ventolin or generic albuterol inhaler)  Associated Diagnoses: Chronic bronchitis, unspecified chronic bronchitis type (H)      albuterol (PROVENTIL) (2.5 MG/3ML) 0.083% neb solution INHALE ONE VIAL BY NEBULIZATION EVERY 4 HOURS AS NEEDED FOR SHORTNESS OF BREATH / DIFFICULTY BREATHING OR WHEEZING  Qty: 360 mL, Refills: 0    Associated Diagnoses: COPD exacerbation (H); Acute on chronic respiratory failure with hypoxia and hypercapnia (H)      aspirin 81 MG EC tablet Take 1 tablet (81 mg) by mouth daily    Associated Diagnoses: Coronary artery disease involving native coronary artery of native heart with angina pectoris (H)      atorvastatin (LIPITOR) 20 MG tablet Take 1 tablet (20 mg) by mouth At Bedtime  Qty: 90 tablet, Refills: 3    Associated Diagnoses: PAD (peripheral artery disease) (H)      ferrous sulfate (FEROSUL) 325 (65 Fe) MG tablet Take 1 tablet (325 mg) by mouth 2 times daily  Qty: 60 tablet, Refills: 1    Associated Diagnoses: Iron deficiency anemia, unspecified iron deficiency anemia type      Fluticasone-Umeclidin-Vilanterol (TRELEGY ELLIPTA) 200-62.5-25 MCG/INH oral inhaler Inhale 1 puff into the lungs daily  Qty: 28 each, Refills: 11    Associated Diagnoses: COPD, severe (H)      montelukast (SINGULAIR) 10 MG tablet Take 1 tablet (10 mg) by mouth At Bedtime  Qty: 90 tablet, Refills: 3    Associated Diagnoses: COPD, severe (H)      nitroGLYcerin (NITROSTAT) 0.4 MG sublingual tablet For chest pain place 1 tablet under the tongue every 5 minutes for 3 doses. If symptoms persist 5 minutes after 1st dose call 911.  Qty: 25 tablet, Refills: 0    Associated Diagnoses: Coronary artery disease involving native coronary artery of native heart with angina pectoris (H)      Nutritional Supplements (ENSURE PLUS) LIQD Take 1 each by mouth 4 times daily  Qty: 5688 mL, Refills: 11    Associated Diagnoses: Severe protein-calorie malnutrition (Jones: less than 60% of standard weight) (H); Adult failure to  thrive      order for DME Equipment being ordered: Nebulizer machine  Qty: 1 Device, Refills: 0    Associated Diagnoses: Chronic bronchitis, unspecified chronic bronchitis type (H)      Respiratory Therapy Supplies (NEBULIZER/TUBING/MOUTHPIECE) KIT 1 kit as needed (if becomes damaged)  Qty: 1 kit, Refills: 1    Associated Diagnoses: Panlobular emphysema (H); Chronic bronchitis, unspecified chronic bronchitis type (H)                   Rationale for medication changes:              Consults       PHARMACY IP CONSULT  PHARMACY IP CONSULT  CARDIOLOGY IP CONSULT  SPEECH LANGUAGE PATH ADULT IP CONSULT  PALLIATIVE CARE ADULT IP CONSULT  SOCIAL WORK IP CONSULT  PHYSICAL THERAPY ADULT IP CONSULT  OCCUPATIONAL THERAPY ADULT IP CONSULT  INPATIENT HOSPICE ADULT CONSULT    Immunizations given this encounter     Most Recent Immunizations   Administered Date(s) Administered     Influenza (High Dose) 3 valent vaccine 12/11/2019     Influenza (IIV3) PF 01/04/2013     Influenza Vaccine IM > 6 months Valent IIV4 (Alfuria,Fluzone) 12/20/2016     Pneumo Conj 13-V (2010&after) 11/01/2017     Pneumococcal 23 valent 12/11/2019     TDAP Vaccine (Adacel) 03/02/2012           Anticoagulation Information      Recent INR results: No results for input(s): INR in the last 168 hours.  Warfarin doses (if applicable) or name of other anticoagulant:       SIGNIFICANT IMAGING FINDINGS     Results for orders placed or performed during the hospital encounter of 02/08/22   XR Chest Port 1 View    Impression    IMPRESSION: Pulmonary hyperinflation correlating with known COPD. Stable peripheral right upper lobar irregular masslike opacity with extensive surrounding architectural distortion. Stable patchy right lower lung airspace opacities. The left lung is   grossly clear. No definite pleural effusion. Normal heart size. Coronary artery stents.       SIGNIFICANT LABORATORY FINDINGS     Most Recent 3 CBC's:Recent Labs   Lab Test 02/14/22  0439  02/13/22 0443 02/11/22 1942 02/11/22 0403 02/10/22  0853   WBC 6.2  --   --  4.2 5.1   HGB 10.4*  --  12.4 10.6* 10.5*   MCV 81  --   --  79 80    226  --  224 217     Most Recent 3 BMP's:Recent Labs   Lab Test 02/14/22  0818 02/13/22 0443 02/11/22 1942 02/11/22 0403 02/10/22  0853   NA  --   --  143 143 142   POTASSIUM  --   --  3.7 4.7 4.5   CHLORIDE  --   --  90* 98 98   CO2  --   --  37* 35* 35*   BUN  --   --  37* 26* 19   CR  --  0.59* 0.85 0.61 0.64   ANIONGAP  --   --  16 10 9   CALDERON  --   --  8.9 8.7 8.6   GLC 76  --  105 133* 89     Most Recent 2 LFT's:Recent Labs   Lab Test 02/09/22 0429 02/08/22  0519   AST 21 55*   ALT 49* 76*   ALKPHOS 43* 53   BILITOTAL 0.3 0.3     Most Recent 3 INR's:Recent Labs   Lab Test 08/30/21  0613 05/02/20  0834 05/17/18  0911   INR 0.91 0.84* 0.91     Most Recent INR's and Anticoagulation Dosing History:  Anticoagulation Dose History     Recent Dosing and Labs Latest Ref Rng & Units 2/18/2016 3/7/2017 3/22/2018 4/19/2018 5/17/2018 5/2/2020 8/30/2021    INR 0.85 - 1.15 0.85(L) 1.05 0.95 0.95 0.91 0.84(L) 0.91        Most Recent 3 Creatinines:Recent Labs   Lab Test 02/13/22 0443 02/11/22 1942 02/11/22  0403   CR 0.59* 0.85 0.61     Most Recent 3 Hemoglobins:Recent Labs   Lab Test 02/14/22  0439 02/11/22 1942 02/11/22  0403   HGB 10.4* 12.4 10.6*     Most Recent 3 Troponin's:Recent Labs   Lab Test 09/16/21  2335 09/16/21 2020 08/29/21  1822 03/28/21  0533 03/27/21  0545 03/26/21  0615   TROPI  --   --   --  0.167* 0.249* 0.406*   TROPONIN 0.070* 0.064* 0.043  --   --   --      Most Recent 3 BNP's:Recent Labs   Lab Test 02/05/22  0708 09/16/21 2020 08/31/21  0532   NTBNPI 1,817* 1,397* 1,764*     Most Recent D-dimer:Recent Labs   Lab Test 02/11/22  1942   DD 2.75*     Most Recent Cholesterol Panel:Recent Labs   Lab Test 12/13/21  1341   CHOL 143   LDL 76   HDL 41*   TRIG 129     Most Recent 6 Bacteria Isolates From Any Culture (See EPIC Reports for Culture  Details):Recent Labs   Lab Test 03/23/21  1425 03/23/21  1415 03/23/21  1400 05/02/20  1752 05/02/20  1749 05/17/18  1020   CULT No growth No growth No growth No growth No growth Light growth  Normal melany    Moderate growth  Pseudomonas aeruginosa  *           Discharge Orders        Home Care PT Referral for Hospital Discharge      Home Care OT Referral for Hospital Discharge      Reason for your hospital stay    Admitted for management of NSTEMI     Follow-up and recommended labs and tests     Follow up with primary care provider, Alli Castro, within 7 days for hospital follow- up.  No follow up labs or test are needed.     Activity    Your activity upon discharge: activity as tolerated     MD face to face encounter    Documentation of Face to Face and Certification for Home Health Services    I certify that patient: Preeti Ford is under my care and that I, or a nurse practitioner or physician's assistant working with me, had a face-to-face encounter that meets the physician face-to-face encounter requirements with this patient on: 2/15/2022.    This encounter with the patient was in whole, or in part, for the following medical condition, which is the primary reason for home health care: .    I certify that, based on my findings, the following services are medically necessary home health services: Nursing, Occupational Therapy, and Physical Therapy.    My clinical findings support the need for the above services because: Nurse is needed: To provide assessment and oversight required in the home to assure adherence to the medical plan due to: advanced COPD with BIPAP use . and To provide caregiver training to assist with: medication adminstration    Further, I certify that my clinical findings support that this patient is homebound (i.e. absences from home require considerable and taxing effort and are for medical reasons or [employer] services or infrequently or of short duration when for other  reasons) because: Patient is bedbound due to: chronic multiple severe COPD, CHF.    Based on the above findings. I certify that this patient is confined to the home and needs intermittent skilled nursing care, physical therapy and/or speech therapy.  The patient is under my care, and I have initiated the establishment of the plan of care.  This patient will be followed by a physician who will periodically review the plan of care.  Physician/Provider to provide follow up care: Alli Castro    Attending hospital physician (the Medicare certified [place] provider): Lesa Esposito MD  Physician Signature: See electronic signature associated with these discharge orders.  Date: 2/15/2022     Diet    Follow this diet upon discharge: Orders Placed This Encounter      Snacks/Supplements Adult: Glucerna; With Meals      Regular Diet Adult       Examination   Physical Exam   Temp:  [97.8  F (36.6  C)-98.4  F (36.9  C)] 97.9  F (36.6  C)  Pulse:  [62-72] 62  Resp:  [16-20] 20  BP: ()/(53-59) 96/53  SpO2:  [98 %-100 %] 100 %  Wt Readings from Last 1 Encounters:   02/15/22 42.9 kg (94 lb 8 oz)     GEN: AAOX1. Not in acute distress, seems to be confused   HEENT: Atraumatic               Pupils- round and reactive to light bilaterally              Neck- supple, no JVP elevation, no lymphadenopathy or thyromegaly              Sclera- anicteric              Mucous membrane- moist and pink  CHEST: Clear to auscultation bilaterally  HEART: S1S2 regular. No murmurs, rubs or gallops  ABDOMEN: Soft. Non-tender, non-distended. No organomegaly. No guarding or rigidity. Bowel sounds- active  Extremities: No pedal oedema  CNS: No focal neurological deficit. No involuntary movements  SKIN: No skin rash, no cyanosis or clubbing          Please see EMR for more detailed significant labs, imaging, consultant notes etc.    I, Lesa Esposito MD, personally saw the patient today and spent greater than 30 minutes discharging  this patient.    Lesa Esposito MD  [facility]    CC:Alli Castro

## 2022-02-15 NOTE — PROGRESS NOTES
RESPIRATORY CARE NOTE   Patient is on 1 lpm N/C, BS decreased, clear, gave Duoneb treatment x1, BS post treatment no change, patient perceives mild improvement, patient tolerated well.     Don Cox, RT

## 2022-02-15 NOTE — PROGRESS NOTES
Care Management Discharge Note    Discharge Date: 02/15/2022       Discharge Disposition: Home    Discharge Services:  (home care)    Discharge DME: None    Discharge Transportation: family or friend will provide      Education Provided on the Discharge Plan:  yes  Persons Notified of Discharge Plans:  yes    Patient/Family in Agreement with the Plan: yes    Handoff Referral Completed:  yes    Additional Information:  Patient to return home with support from spouse. Notified [facility] of discharge and will need RN home care.  Spouse to transport.         Marguerite Andujar RN

## 2022-02-15 NOTE — PLAN OF CARE
Problem: Adult Inpatient Plan of Care  Goal: Plan of Care Review  Outcome: Adequate for Discharge   Discharge education and instructions reviewed with patient.  Questions answered.  Discharging to home.  Family will be patient's ride after they  her home oxygen tank.

## 2022-02-15 NOTE — PROGRESS NOTES
RESPIRATORY CARE NOTE   Patient is on 1 lpm n?c, BS diminished, clear, gave Duoneb treatment x1, BS post treatment no change, patient perceives moderate improvement, patient tolerated well.     Don Cox, RT

## 2022-02-15 NOTE — DISCHARGE INSTRUCTIONS
Home Care has lorena arranged for you with [facility] [phone]. They will call you to arrange home visit.

## 2022-02-15 NOTE — PLAN OF CARE
Problem: SLP General Care Plan  Goal: Swallow Goal: Safely tolerate diet without aspiration (SLP)  Description: Safely tolerate diet without signs/symptoms of aspiration  Outcome: Completed   Speech Language Therapy Discharge Summary    Reason for therapy discharge:    All goals and outcomes met, no further needs identified.    Progress towards therapy goal(s). See goals on [facility] Plan in [facility] electronic health record for goal details.  Goals met    Therapy recommendation(s):    No further therapy is recommended.    SLP: Mastication and swallow function further assessed at breakfast with regular textures and thin liquids. No s/s aspiration and mastication was safe and complete despite decreased dentition. Patient denies concerns with coughing with intake or difficulty with food textures. No fatigue noted and no concerns with cognition affecting oral intake safety.

## 2022-02-15 NOTE — PROGRESS NOTES
HEART CARE NOTE          Assessment/Recommendations     1. CAD c/b NSTEMI  Assessment / Plan    Hx of PCI in 2018; repeat echo significant for new wall motion abnormalities    S/p coronary angiogram without significant stenosis - no intervention was indicated     Continue to monitor on telemetry    Continue ASA, atorvastatin, metoprolol      2. HFmrEF  Assessment / Plan    Appears to be near euvolemia on physical exam; denies HF symptoms    GDMT as detailed below     Current Pharmacotherapy AHA Guideline-Directed Medical Therapy   Losartan 25 mg daily Lisinopril 20 mg twice daily   Metoprolol succinate 25 mg daily Carvedilol 25 mg twice daily   Spironolactone NA Spironolactone 25 mg once daily   Hydralazine NA Hydralazine 100 mg three times daily   Isosorbide dinitrate NA Isosorbide dinitrate 40 mg three times daily   SGLT2 inhibitor: not started Dapagliflozin or Empagliflozin 10 mg daily         3. COPD c/b acute respiratory compromise  Assessment / Plan    Management per pulmonary team     4. Afib with RVR  Assessment / Plan    Continue oral amiodarone    Recommend AC with apixaban 5 mg BID unless otherwise contraindicated      5. Valvular heart disease  Assessment / Plan    Potential low-flow-low gradient aortic stenosis - will refer to valve clinic is patient is amenable and within goals of care    Ok for discharge from a cardiology standpoint. Cardiology team will sign-off for now. Please do not hesitate toconsult us again if new questions or concerns arise. Follow-up appointment will be arranged by CORE/HF clinic.       History of Present Illness/Subjective      Ms. Preeti Ford is a 71 year old female with a PMHx significant for chronic respiratory failure due to COPD (FEV1 30%) on home O2 2 LPM and Trilogy positive pressure ventilation at night, spontaneous pneumothorax, HTN, CAD s.p PCI in 2018, moderate to severe Aortic stenosis, CVA, PVD s/p aortoiliac bypass. who transfers from [facility]  [facility] with concern for NSTEMI.      Today, Mr. Ford denies any acute events or complaints. altered     ECG: Personally reviewed. normal sinus rhythm, nonspecific ST and T waves changes.     ECHO (personnaly Reviewed):   Severe hypokinesis of the mid and distal anterior, anteroseptal and distal  inferior walls and apex. Function of the lateral and inferolateral walls is  well preserved.  Left ventricular systolic function is mild to moderately reduced.  The visual ejection fraction is 40-45%.  There is mild (1+) mitral regurgitation.  Moderate valvular aortic stenosis. YOLANDA 1.1 cm2, mean AV gradient 16 mmHg. Low  SVI suggests low-flow, low-gradient moderate to severe AS.  Right ventricular systolic pressure is elevated, consistent with moderate  pulmonary hypertension.     Compared to the previous study, the LVF is much lower and WMA's are now  present. AS is probably unchanged.          Physical Examination Review of Systems   /56 (BP Location: Right arm)   Pulse 65   Temp 98.4  F (36.9  C) (Oral)   Resp 16   Ht 1.6 m (5' 3\")   Wt 43 kg (94 lb 12.8 oz)   SpO2 98%   BMI 16.79 kg/m    Body mass index is 16.79 kg/m .  Wt Readings from Last 3 Encounters:   02/11/22 43 kg (94 lb 12.8 oz)   02/07/22 42.5 kg (93 lb 11.1 oz)   12/13/21 44.1 kg (97 lb 3.2 oz)     General Appearance:   no distress, normal body habitus   ENT/Mouth: membranes moist, no oral lesions or bleeding gums.      EYES:  no scleral icterus, normal conjunctivae   Neck: no carotid bruits or thyromegaly   Chest/Lungs:   lungs are clear to auscultation, no rales or wheezing, equal chest wall expansion    Cardiovascular:   Regular. Normal first and second heart sounds with no rubs, or gallops; the carotid, radial and posterior tibial pulses are intact, no JVD or LE edema bilaterally    Abdomen:  no organomegaly, masses, bruits, or tenderness; bowel sounds are present   Extremities: no cyanosis or clubbing   Skin: no xanthelasma, warm.  " "  Neurologic: alert and calm     Psychiatric: alert and calm     A complete 10 systems ROS was reviewed  And is negative except what is listed in the HPI.          Medical History  Surgical History Family History Social History   Past Medical History:   Diagnosis Date     Acute on chronic respiratory failure (H) 03/25/2021     Arthritis      COPD (chronic obstructive pulmonary disease) (H)      Coronary artery disease      CVA (cerebral vascular accident) (H) 08/17/2013     Hypertension      Hypotension, unspecified hypotension type      Sepsis (H)     Past Surgical History:   Procedure Laterality Date     APPENDECTOMY       BYPASS GRAFT AORTOILIAC N/A 3/7/2017    Procedure: BYPASS GRAFT AORTOILIAC;  Surgeon: Marcos Pratt MD;  Location: [facility] OR     CV CORONARY ANGIOGRAM N/A 2/9/2022    Procedure: Coronary Angiogram;  Surgeon: Elenita Pan MD;  Location: [facility] CATH LAB CV     CV LEFT HEART CATH N/A 2/9/2022    Procedure: Left Heart Cath;  Surgeon: Elenita Pan MD;  Location: [facility] LAB CV     CV LEFT VENTRICULOGRAM N/A 2/9/2022    Procedure: Left Ventriculogram;  Surgeon: Elenita Pan MD;  Location: [facility] CATH LAB CV     SALPINGO OOPHORECTOMY,R/L/DELORES      Salpingo Oophorectomy, RT/LT/DELORES    no family history of premature coronary artery disease Social History     Socioeconomic History     Marital status:      Spouse name: Not on file     Number of children: Not on file     Years of education: Not on file     Highest education level: Not on file   Occupational History     Not on file   Tobacco Use     Smoking status: Current Some Day Smoker     Types: Cigarettes     Smokeless tobacco: Never Used     Tobacco comment: \"just a couple a day\"   Substance and Sexual Activity     Alcohol use: No     Alcohol/week: 0.0 standard drinks     Drug use: No     Sexual activity: Not Currently     Partners: Male   Other Topics Concern      Service No     Blood Transfusions No     " Caffeine Concern No     Occupational Exposure No     Hobby Hazards No     Sleep Concern Yes     Comment: Trouble breathing at night due to COPD     Stress Concern No     Weight Concern No     Special Diet No     Back Care No     Exercise No     Bike Helmet No     Seat Belt Yes     Self-Exams Yes     Parent/sibling w/ CABG, MI or angioplasty before 65F 55M? No   Social History Narrative     Not on file     Social Determinants of Health     Financial Resource Strain: Not on file   Food Insecurity: Not on file   Transportation Needs: Not on file   Physical Activity: Not on file   Stress: Not on file   Social Connections: Not on file   Intimate Partner Violence: Not on file   Housing Stability: Not on file           Lab Results    Chemistry/lipid CBC Cardiac Enzymes/BNP/TSH/INR   Lab Results   Component Value Date    CHOL 143 12/13/2021    HDL 41 (L) 12/13/2021    TRIG 129 12/13/2021    BUN 37 (H) 02/11/2022     02/11/2022    CO2 37 (H) 02/11/2022    Lab Results   Component Value Date    WBC 6.2 02/14/2022    HGB 10.4 (L) 02/14/2022    HCT 36.8 02/14/2022    MCV 81 02/14/2022     02/14/2022    Lab Results   Component Value Date    TROPONINI 0.19 02/12/2022    TSH 1.05 08/18/2013    INR 0.91 08/30/2021     Lab Results   Component Value Date    TROPONINI 0.19 02/12/2022          Weight:    Wt Readings from Last 3 Encounters:   02/11/22 43 kg (94 lb 12.8 oz)   02/07/22 42.5 kg (93 lb 11.1 oz)   12/13/21 44.1 kg (97 lb 3.2 oz)       Allergies  Allergies   Allergen Reactions     Crestor [Rosuvastatin] Other (See Comments)     dizziness     Lisinopril Cough     Statins [Hmg-Coa-R Inhibitors] Other (See Comments)     Muscle/joint aching     Optison [Albumin Human] Other (See Comments)     Pt was flush and very dizzy.  Also had a BP drop     Penicillins Rash         Surgical History  Past Surgical History:   Procedure Laterality Date     APPENDECTOMY       BYPASS GRAFT AORTOILIAC N/A 3/7/2017    Procedure: BYPASS  "GRAFT AORTOILIAC;  Surgeon: Marcos Pratt MD;  Location: [facility] OR     CV CORONARY ANGIOGRAM N/A 2/9/2022    Procedure: Coronary Angiogram;  Surgeon: Elenita Pan MD;  Location: [facility] CATH LAB CV     CV LEFT HEART CATH N/A 2/9/2022    Procedure: Left Heart Cath;  Surgeon: Elenita Pan MD;  Location: [facility] LAB CV     CV LEFT VENTRICULOGRAM N/A 2/9/2022    Procedure: Left Ventriculogram;  Surgeon: Elenita Pan MD;  Location: [facility] LAB CV     SALPINGO OOPHORECTOMY,R/L/DELORES      Salpingo Oophorectomy, RT/LT/DELORES       Social History  Tobacco:   History   Smoking Status     Current Some Day Smoker     Types: Cigarettes   Smokeless Tobacco     Never Used     Comment: \"just a couple a day\"    Alcohol:   Social History    Substance and Sexual Activity      Alcohol use: No        Alcohol/week: 0.0 standard drinks   Illicit Drugs:   History   Drug Use No       Family History  Family History   Problem Relation Age of Onset     Cerebrovascular Disease Mother      Cerebrovascular Disease Father      Genitourinary Problems Brother         Dialysis          Burton Rodriguez MD on 2/15/2022      cc: Alli Castro    "

## 2022-02-15 NOTE — PROGRESS NOTES
"CLINICAL NUTRITION SERVICES - REASSESSMENT NOTE     Nutrition Prescription    RECOMMENDATIONS FOR MDs/PROVIDERS TO ORDER:      Malnutrition Status:    Severe in acute and chronic illness-2/8/22    Recommendations already ordered by Registered Dietitian (RD):  Glucerna daily, MVI, thiamine.    Future/Additional Recommendations:       EVALUATION OF THE PROGRESS TOWARD GOALS   Diet: Regular  Glucerna daily  Intake: eating % at meals.       NEW FINDINGS   Pt diet upgraded to regular.  Pt reports eating well, enjoys the meals, likes the Glucerna.    ANTHROPOMETRICS  Height: 160 cm (5' 3\")  Admit wt   02/08/22 44.2 kg (97 lb 6.4 oz)     Most Recent Weight: 42.9 kg (94 lb 8 oz)    02/07/22 42.5 kg (93 lb 11.1 oz)   12/13/21 44.1 kg (97 lb 3.2 oz)   Wt down 3% since admission.      PHYSICAL FINDINGS  See malnutrition section below.      GI CONCERNS  LBM 2/15  Missing some teeth    LABS  Reviewed    MEDICATIONS  Reviewed    Malnutrition Diagnosis: Severe malnutrition 2/8/22  In Context of:  Acute illness or injury  Chronic illness or disease    Goals   Diet advancement vs nutrition support within 2-3 days-met.  Patient to consume % of nutritionally adequate meals three times per day, or the equivalent with supplements/snacks.-met  No wt loss-not met. Wt down since admission.      NUTRITION DIAGNOSIS  Malnutrition related to acute and chronic illness as evidenced by severe loss of muscle and fat  -improving    INTERVENTIONS  Implementation  Continue diet as ordered and supplements      Monitoring/Evaluation  Progress toward goals will be monitored and evaluated per protocol.    "

## 2022-02-16 NOTE — TELEPHONE ENCOUNTER
MTM referral from: Transitions of Care (recent hospital discharge or ED visit)    MTM referral outreach attempt #2 on February 16, 2022 at 11:16 AM      Outcome: Patient declined. Pt us getting MTM services through [facility] care and a home nurse, will route to MTM Pharmacist/Provider as an FYI. Thank you for the referral.     Cody Hutchins, MTM coordinator

## 2022-02-16 NOTE — PLAN OF CARE
Cardiac Rehab Discharge Summary    Reason for therapy discharge:    Discharged from the hospital to home.    Progress towards therapy goal(s). See goals on Care Plan in Epic electronic health record for goal details.  Goals met    Therapy recommendation(s):    No further therapy is recommended.

## 2022-02-16 NOTE — TELEPHONE ENCOUNTER
Reason for Call:  Other call back    Detailed comments: Arabella called needing verbal orders for [facility]: RN twice a week for 3 weeks, once a week for 6 weeks and 3 PRN, PT OT lori. Can call back to  [phone] ok to leave detailed message    Phone Number Patient can be reached at: Other phone number:  [phone]    Best Time: Any    Can we leave a detailed message on this number? YES    Call taken on 2/16/2022 at 1:58 PM by Sarah Nair

## 2022-02-17 PROBLEM — I47.10 PAROXYSMAL SUPRAVENTRICULAR TACHYCARDIA (H): Status: ACTIVE | Noted: 2022-01-01

## 2022-02-17 NOTE — NURSING NOTE
Health Maintenance Due   Topic Date Due     DEXA  Never done     ANNUAL REVIEW OF HM ORDERS  Never done     COVID-19 Vaccine (1) Never done     COLPOSCOPY  Never done     ZOSTER IMMUNIZATION (1 of 2) Never done     MEDICARE ANNUAL WELLNESS VISIT  12/20/2017     COLORECTAL CANCER SCREENING  12/12/2018     HF ACTION PLAN  07/01/2019     MAMMO SCREENING  01/02/2020     INFLUENZA VACCINE (1) 09/01/2021     DTAP/TDAP/TD IMMUNIZATION (2 - Td or Tdap) 03/02/2022     Jeri ALVAREZ LPN

## 2022-02-17 NOTE — PROGRESS NOTES
Assessment & Plan     1. Pulmonary nodules    2. Paroxysmal supraventricular tachycardia (H)    3. Severe protein-calorie malnutrition (Jones: less than 60% of standard weight) (H)    4. Panlobular emphysema (H)    5. Acute systolic congestive heart failure (H)    6. PAD (peripheral artery disease) (H)    7. Acute on chronic respiratory failure with hypoxia and hypercapnia (H)    8. Seizures (H)    9. Platelet function defect (H)    10. Coronary artery disease of native artery of native heart with stable angina pectoris (H)    11. Chronic bronchitis, unspecified chronic bronchitis type (H)         Patient will follow up with cardiologist next month. She will also schedule with pulmonology to address finding of nodules on recent CT. Medications will be continued without change at this time as the patient is stable and tolerating them without adverse effect. She was stressed on the importance of complete smoking cessation.     Alli Castro PA-C  [facility] WERNER Álvarez is a 71 year old who presents for the following health issues  accompanied by her spouse.    [facility]       Hospital Follow-up Visit:    Hospital/Nursing Home/ Rehab Facility: [facility]  Date of Admission: 2/8/22  Date of Discharge: 2/15/22   Reason(s) for Admission: Acute on chronic respiratory failure with hypoxia and hypercapnia        Was your hospitalization related to COVID-19? No   Problems taking medications regularly:  None  Medication changes since discharge: unsure of what are the changes are  Problems adhering to non-medication therapy:  None    Summary of hospitalization:  [facility] discharge summary reviewed  Diagnostic Tests/Treatments reviewed.  Follow up needed: Specialist, see orders in [facility]  Other Healthcare Providers Involved in Patient s Care:         Specialist appointment - cardiologist  Update since discharge: stable.  Post Discharge Medication  Reconciliation: discharge medications reconciled, continue medications without change.  Plan of care communicated with patient and family     Patient is a 71 year old female who presents today with spouse for follow up on hospitalization. She presented to the [facility] emergency department on Feb 5th due to chest pain and trouble breathing. She was admitted to the hospital following finding of elevated troponin peaking at 1135. Testing notable for new severe hypokinesis of the mid and distal anterior, anteroseptal and distal inferior walls and apex. This was felt to be consistent with NSTEMI. She was accepted for transfer to [facility] for ICU management. Prior to transfer CTA was completed which noted new pulmonary nodules. These were not present on imaging 3 months ago. This was worrisome for malignancy. I discussed this new finding with patient and advised pulmonology consult. She did not seem to grasp the importance of evaluating these nodules further and declined the recommendation, stating that she felt fine. Her spouse, however, agreed to schedule the consult and will make sure that she attends the appointment. She did discuss hospice care if condition continues to deteriorate.     She has an appointment scheduled with cardiology in 1 month. Review of the notes from [facility] showed that no significant findings on coronary angiogram. She did experience PAF and SVT during her stay. This was stabilized with low dose beta blockers and amiodarone. Patient recalls that she has been on metoprolol before. Per her recollection this was stopped due to light headedness. All lung/blood/swab cultures returned negative for infection. Patient was noted to be quite anemic with MCV of 77. She is on oral iron supplement. Will repeat labs in the next 2-4 weeks. Her appetite has returned and both she and her spouse assure me that she is eating regular meals. Her breathing has remained stable, currently  relying on 1.5 LPM. O2 sats at 98% on rooming.       Review of Systems   Constitutional, HEENT, cardiovascular, pulmonary, gi and gu systems are negative, except as otherwise noted.      Objective    /62 (BP Location: Right arm, Patient Position: Chair, Cuff Size: Adult Regular)   Pulse 64   Temp 97.6  F (36.4  C) (Temporal)   Resp 20   Wt 44.3 kg (97 lb 9.6 oz)   SpO2 98%   BMI 17.29 kg/m    Body mass index is 17.29 kg/m .  Physical Exam   GENERAL: alert, no distress and appears older than stated age  NECK: no adenopathy, no asymmetry, masses, or scars and thyroid normal to palpation  RESP: no rales , no rhonchi, inspiratory wheezes throughout and decreased breath sounds throughout  CV: regular rate and rhythm, normal S1 S2, no S3 or S4, no murmur, click or rub, no peripheral edema and peripheral pulses strong  MS: no gross musculoskeletal defects noted, no edema  PSYCH: mentation appears normal, affect normal/bright    No results found for any visits on 02/17/22.

## 2022-02-17 NOTE — PATIENT INSTRUCTIONS
Patient Education     Common Questions about Lung Nodules  What is a lung nodule?  A nodule is a small spot in the lung that is more solid than normal tissue. It is seen with a chest X-ray or a CT scan. The cause of nodules may be scar tissue, an infection or some irritant in the air. Sometimes, a nodule is an early lung cancer.  What is the chance that the nodule is an early lung cancer?  Most small nodules are not cancer. Fewer than 5 out of 100 people with nodules turn out to have cancer. The risk is greater for patients who:    Are olderw    Have a nodule of 9 mm or larger    Have smoked or still smoke cigarettes    Have added risks, such as a family history of lung cancer or working with asbestos.  If you would like to know more about your risk for lung cancer, please talk to your provider.  What size is a small lung nodule?  A small nodule is less than 9 mm across: 6, 7 or 8 mm. Picture a pea--it's 5mm and a cherry is 10 mm.     What will happen next?    Your care team will probably recommend more CT scans to watch the nodule for changes.    A nodule that is not cancer usually doesn't grow. Generally, if the nodule doesn't grow for 2 years, it is safe to stop the scans. But certain types of nodules may need a longer follow-up.    If the nodule is getting bigger, we will use other studies or do a biopsy to get a closer look.  How do you know how to time the next scan?  We decide when to do a scan based on the size of the nodule and your risk for cancer.   You can help decide when to get the next CT scan. Call your healthcare team if you have questions or concerns about the date.  Is it safe to wait for the next scan?  Most cancers grow fairly slowly. Waiting a few months for the next scan is thought to be very safe.   Why shouldn't I get a biopsy now?    Biopsies are safer for nodules that are 9 mm or larger.    During a biopsy, the doctor removes a small piece of your lung and looks at it under a microscope.  It is difficult to biopsy small nodules safely. It could cause breathing problems, bleeding or infection.  What if I'm still smoking?  We are here to help you quit! Quitting now will lower your chance of getting lung cancer, and other serious health problems like emphysema and heart disease. For help quitting: www.quitplan.[place] or call 1-183.532.6164 for one-on-one coaching from counselors. [place] adults may also receive free patches, gum or lozenges.  What if my nodule is lung cancer?  If a nodule turns out to be lung cancer, it is likely to be in an early stage. If treated at an early stage, you are more likely to survive the cancer.   Please talk with your health care team if you have any concerns about lung cancer. Remember:    Most small nodules are not lung cancer.    Biopsies of small nodules can cause more harm than good.    If you are still smoking, the best way to improve your health is to quit.    It is normal to be worried when there is even a small chance of lung cancer.  When should I call for help?  Call your care team if you:    Have a new or worse cough, or cough up blood    Have shortness of breath, chest pain, fevers or chills    Lose 10 pounds or more without trying to lose weight    Feel worried and anxious  Resources  US National Library of Medicine:   https://medlineplus.gov/ency/article/996157.htm  CDC Tobacco Resources  www.cdc.gov/tobacco/campaign/tips/quit-smoking  www.Smokefree.gov  For informational purposes only. Not to replace the advice of your health care provider. Copyright  2016 [facility]. All rights reserved. [employer] 611726 - Rev 11/16.

## 2022-02-18 NOTE — TELEPHONE ENCOUNTER
I approve of the following orders:    RN twice a week for 3 weeks, once a week for 6 weeks and 3 PRN, PT OT eval. Can call back to  [phone] ok to leave detailed message    Alli Castro PA-C on 2/18/2022 at 10:20 AM

## 2022-02-23 NOTE — TELEPHONE ENCOUNTER
Reason for Call:  Form, our goal is to have forms completed with 72 hours, however, some forms may require a visit or additional information.    Type of letter, form or note:  Home Health Certification    Who is the form from?: Home care    Where did the form come from: form was faxed in    What clinic location was the form placed at?: [facility]     Where the form was placed: Given to MA/EULA, DANIELA FORDE    What number is listed as a contact on the form?: [phone]       Additional comments: Certification Period from 2/16/2022 - 4/16/2022    Call taken on 2/23/2022 at 1:33 PM by Blossom Clemente LPN

## 2022-02-23 NOTE — PROGRESS NOTES
Clinic Care Coordination Contact  [facility]/Voicemail       Clinical Data: Care Coordinator Outreach  Outreach attempted x 1.  Left message on patient's voicemail with call back information and requested return call.  Plan: Care Coordinator will try to reach patient again in 1-2 business days.      Herminia BERNALN, RN, PHN, [facility]  Primary Clinic Care Coordination    [facility]  Primary Care Clinics  [email or web address]   Office: [phone]  Employed by [facility]

## 2022-02-23 NOTE — TELEPHONE ENCOUNTER
Patient's medication reconciliation is completed by RN today, 2/23/22.  Forms placed in DIMPLE Castro's basket for signature.  JODI Bermudez, RN     Certification period: 2/16/22 to 4/16/22.

## 2022-02-24 NOTE — TELEPHONE ENCOUNTER
Signed Home Health Certification and Plan of Care faxed back to [facility] and to scanning. Blossom Clemente LPN     Admission Reconciliation is Completed  Discharge Reconciliation is Not Complete Admission Reconciliation is Completed  Discharge Reconciliation is Completed

## 2022-02-25 NOTE — TELEPHONE ENCOUNTER
Prescription approved per [facility] Refill Protocol.    DOLORES SinghN, RN  [facility]

## 2022-03-01 NOTE — PROGRESS NOTES
Clinic Care Coordination Contact    Follow Up Progress Note      Assessment: Patients  oh called back. States Preeti is doing ok but does not like going to the bathroom so much. She was started on lasix when she was discharged on this.  Oh states she does see the cardiologist on March 20th.  She does have slight swelling in her legs still and are pale in color, but does pink up when she is up walking around. Her weight is up to 102 pounds and eating and drinking well. Advised to continue to take until seen by cardiology as recommended.    Discussed HM and pt will schedule her mammo gram next month when it is warmer.     Care Gaps:    Health Maintenance Due   Topic Date Due     DEXA  Never done     COVID-19 Vaccine (1) Never done     COLPOSCOPY  Never done     ZOSTER IMMUNIZATION (1 of 2) Never done     MEDICARE ANNUAL WELLNESS VISIT  12/20/2017     COLORECTAL CANCER SCREENING  12/12/2018     HF ACTION PLAN  07/01/2019     MAMMO SCREENING  01/02/2020     INFLUENZA VACCINE (1) 09/01/2021     DTAP/TDAP/TD IMMUNIZATION (2 - Td or Tdap) 03/02/2022       Care Gaps Last addressed on :  3/1/2022    Goals addressed this encounter:   Goals       COPD      Goal Statement: I will manage my COPD  symptoms effectively at home.  Date Goal set: 12/22/2021  Barriers: unknown  Strengths: motivated  Date to Achieve By: 6/1/2022  Patient expressed understanding of goal: yes  Action steps to achieve this goal:  1. I will use my nebulizer every 6 hours as needed for wheezing  2. I will use my inhalers daily as prescribed  3. I will follow-up with my doctor if I do not improve with home care measures.        Follow-up in my overdue health maintenance concerns      Goal Statement: Follow-up in my overdue health maintenance concerns  Date Goal set: 10/6/2021  Barriers: COVID-19, recent pneumonia  Strengths: engaged  Date to Achieve By: 6/1/2022  Patient expressed understanding of goal: yes  Action steps to achieve this goal:  1. I  will ask to update HM at appointments  2. I will scheduled appts for important HM items        Intervention/Education provided during outreach: continue to monitor weights and breathing. Follow up with Cardiology as scheduled.      Plan:   Care Coordinator will follow up in one month.       Herminia ZAPATA, RN, PHN, CCM  Primary Clinic Care Coordination    [facility]  Primary Care Clinics  [email or web address]   Office: [phone]  Employed by [facility]

## 2022-03-01 NOTE — PROGRESS NOTES
Clinic Care Coordination Contact  [facility]/Voicemail       Clinical Data: Care Coordinator Outreach  Outreach attempted x 2.  Left message on patient's voicemail with call back information and requested return call.  Plan:  Care Coordinator will try to reach patient again in 3-5 business days.      Herminia BERNALN, RN, PHN, [facility]  Primary Clinic Care Coordination    [facility]  Primary Care Clinics  [email or web address]   Office: [phone]  Employed by [facility]

## 2022-03-10 NOTE — PROGRESS NOTES
Clinic Care Coordination Contact    Follow Up Progress Note      Assessment: Oh called and left a message to return his call.  Oh states pt is just about out of the medications she was prescribed at discharge.  States she was just in to see her PCP but forgot to ask for refills and she does not see the cardiologist until 3/18 which she will be out before that appt.  Reviewed all medications and pended medications that needed refilling.  Oh has not other questions at this time.     Care Gaps:    Health Maintenance Due   Topic Date Due     DEXA  Never done     COVID-19 Vaccine (1) Never done     COLPOSCOPY  Never done     ZOSTER IMMUNIZATION (1 of 2) Never done     MEDICARE ANNUAL WELLNESS VISIT  12/20/2017     COLORECTAL CANCER SCREENING  12/12/2018     HF ACTION PLAN  07/01/2019     MAMMO SCREENING  01/02/2020     INFLUENZA VACCINE (1) 09/01/2021     DTAP/TDAP/TD IMMUNIZATION (2 - Td or Tdap) 03/02/2022       Care Gaps Last addressed on : 3/10/2022    Goals addressed this encounter:   Goals       COPD      Goal Statement: I will manage my COPD  symptoms effectively at home.  Date Goal set: 12/22/2021  Barriers: unknown  Strengths: motivated  Date to Achieve By: 6/1/2022  Patient expressed understanding of goal: yes  Action steps to achieve this goal:  1. I will use my nebulizer every 6 hours as needed for wheezing  2. I will use my inhalers daily as prescribed  3. I will follow-up with my doctor if I do not improve with home care measures.      Follow-up in my overdue health maintenance concerns      Goal Statement: Follow-up in my overdue health maintenance concerns  Date Goal set: 10/6/2021  Barriers: COVID-19, recent pneumonia  Strengths: engaged  Date to Achieve By: 6/1/2022  Patient expressed understanding of goal: yes  Action steps to achieve this goal:  1. I will ask to update HM at appointments  2. I will scheduled appts for important HM items        Intervention/Education provided during outreach:  refills pended     Outreach Frequency: monthly    Plan:   Care Coordinator will follow up in one month.    Herminia ZAPATA, RN, PHN, CCM  Primary Clinic Care Coordination    [facility]  Primary Care Clinics  [email or web address]   Office: [phone]  Employed by [employer]

## 2022-03-10 NOTE — LETTER
[facility] CARE COORDINATION  [place] [address]  Tel. [phone] / Fax [phone]  March 10, 2022        Preeti Ford  [address] [place] [address]  [place] [address]          Dear Preeti,     Alessandro is an updated Patient Centered Plan of Care for your continued enrollment in Care Coordination. Please let us know if you have additional questions, concerns, or goals that we can assist with.    Sincerely,    Herminia BERNALN, RN, PHN, [facility]  Primary Clinic Care Coordination    [facility]  Primary Care Clinics  [email or web address]   Office: [phone]  Employed by [employer]  Patient Centered Plan of Care  About Me:        Patient Name:  Preeti Ford    YOB: 1950  Age:         71 year old   [place] MRN:    [id number] Telephone Information:  Home Phone [phone]   Mobile [phone]       Address:  [address] [place] [address]  [place] [address] Email address:  [email or web address]      Emergency Contact(s)    Name Relationship Lgl Grd Work Phone Home Phone Mobile Phone   1. LINDA FORD* Spouse No  [phone]   2. JEREMI PONCE* Son No   [phone]   3. KALEN FRY Relative No   [phone]           Primary language:  English     needed? No   [place] [employer]:  [phone] op. 1  Other communication barriers:No data recorded  Preferred Method of Communication:  Mail  Current living arrangement: I live in a private home with family    Mobility Status/ Medical Equipment: Independent w/Device    Health Maintenance  Health Maintenance Reviewed: Due/Overdue   Health Maintenance Due   Topic Date Due     DEXA  Never done     COVID-19 Vaccine (1) Never done     COLPOSCOPY  Never done     ZOSTER IMMUNIZATION (1 of 2) Never done     MEDICARE ANNUAL WELLNESS VISIT  12/20/2017      COLORECTAL CANCER SCREENING  12/12/2018     HF ACTION PLAN  07/01/2019     MAMMO SCREENING  01/02/2020     INFLUENZA VACCINE (1) 09/01/2021     DTAP/TDAP/TD IMMUNIZATION (2 - Td or Tdap) 03/02/2022       My Access Plan  Medical Emergency 911   Primary Clinic Line [facility] [phone]   24 Hour Appointment Line [phone] or  [phone]) (toll-free)   24 Hour Nurse Line [phone] (toll-free)   Preferred Urgent Care No data recorded   Preferred Hospital [facility]  [phone]     Preferred Pharmacy [facility] [place] - [address] [address]      Behavioral Health Crisis Line The National Suicide Prevention Lifeline at 1-808.874.9904 or 911     My Care Team Members  Patient Care Team       Relationship Specialty Notifications Start End    Alli Castro PA-C PCP - General Internal Medicine All results, Admissions 12/22/21     Phone: [phone] Fax: [phone]         [address] [place] [address]    Marcos Pratt Surgeon Surgery  10/17/16     Phone: [phone] Fax: [phone] [address] [place] [address]    Victor Hugo Cortez MD Assigned Heart and Vascular Provider   10/23/20     Phone: [phone] Fax: [phone]         [address] [facility] [address]    Alejandrina Marcos, RN Lead Care Coordinator Primary Care - CC Admissions 9/22/21     Phone: [phone]         Alfredo Dick [facility] Pharmacist Pharmacist Clinician- Clinical Pharmacy Specialist  9/24/21     Phone: [phone] Fax: [phone] [address] [place] [address]    Alli Castro PA-C Assigned PCP   10/10/21     Phone: [phone] Fax: [phone]         [address] [place] [address]            My Care Plans  Self Management and Treatment Plan  Goals and (Comments)  Goals        General    COPD     Goal Statement: I will manage my COPD  symptoms  effectively at home.  Date Goal set: 12/22/2021  Barriers: unknown  Strengths: motivated  Date to Achieve By: 6/1/2022  Patient expressed understanding of goal: yes  Action steps to achieve this goal:  1. I will use my nebulizer every 6 hours as needed for wheezing  2. I will use my inhalers daily as prescribed  3. I will follow-up with my doctor if I do not improve with home care measures.    Follow-up in my overdue health maintenance concerns     Goal Statement: Follow-up in my overdue health maintenance concerns  Date Goal set: 10/6/2021  Barriers: COVID-19, recent pneumonia  Strengths: engaged  Date to Achieve By: 6/1/2022  Patient expressed understanding of goal: yes  Action steps to achieve this goal:  1. I will ask to update HM at appointments  2. I will scheduled appts for important HM items           Action Plans on File:   Advance Care Plans/Directives Type:   POLST      My Medical and Care Information  Problem List   Patient Active Problem List   Diagnosis     Urinary incontinence     Cognitive impairment-BIMS 4/15 at [facility] May 2020     Hyperlipidemia LDL goal <130     History of stroke - right thalamus in 8/2013 and left cerebellar and right vermis in 3/2018     Numbness and tingling     Smoker     CVA (cerebral vascular accident) (H)     ACS (acute coronary syndrome) (H)     Ischemic cardiomyopathy - EF 25% in 2015 but 55% in 3/2017 and 45-50% on 3/18     HTN, goal below 130/80     Acute systolic congestive heart failure (H)     Pulmonary nodule -- 1.9 cm RLL, new 5/2/20     GERD (gastroesophageal reflux disease)     NSTEMI (non-ST elevated myocardial infarction) (H)     Chronic bronchitis, unspecified chronic bronchitis type (H)     PAD (peripheral artery disease) (H) - moderate left common carotid stenosis, aortoiliac bypass, chronic left vertebral and right posterior cerebral stenoses     Cervical high risk HPV (human papillomavirus) test positive     Acute respiratory failure with hypoxia (H)      Anemia     Coronary artery disease of native artery of native heart with stable angina pectoris (H)     Elevated troponin     Pulmonary emphysema (H)     Vertigo     Other seizures (H) - possible     Acute CVA (cerebrovascular accident) - right paramedian superior vermis and left cerebellar hemisphere     Severe malnutrition (H)     Hyponatremia     Hypochloremia     Acute on chronic respiratory failure with hypoxia and hypercapnia     Acute bronchitis     Moderate to severe pulmonary hypertension (H)-per Echo     Nonrheumatic aortic valve stenosis - moderate to severe     Hypotension     Lactic acidosis     Spontaneous pneumothorax     Chest tube in place     Chronic obstructive pulmonary disease, unspecified COPD type (H)     Cigarette smoker     S/P drug eluting coronary stent placement-LAD and circ, circ re-do 5/17/18     Abnormal CXR 9/2-?right mid lung cavitation with air-fluid levels     Dysphagia     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     Severe malnutrition (H)-reduced intake, weight loss, loss of SC fat and muscle     Hypotension, unspecified hypotension type     COPD with acute exacerbation (H)     Pneumonia of right lung due to infectious organism     Chronic respiratory failure with hypoxia and hypercapnia (H)-home O2 and Trilogy     Tobacco use     Aphasia due to recent cerebral infarction     Paroxysmal supraventricular tachycardia (H)      Current Medications and Allergies:  See printed Medication Report.    Care Coordination Start Date: 9/22/2021   Frequency of Care Coordination: monthly   Form Last Updated: 03/10/2022

## 2022-03-22 ENCOUNTER — DOCUMENTATION ONLY (OUTPATIENT)
Dept: OTHER | Facility: CLINIC | Age: 72
End: 2022-03-22
Payer: COMMERCIAL

## 2022-03-24 ENCOUNTER — TELEPHONE (OUTPATIENT)
Dept: FAMILY MEDICINE | Facility: OTHER | Age: 72
End: 2022-03-24
Payer: COMMERCIAL

## 2022-03-24 NOTE — TELEPHONE ENCOUNTER
Reason for Call:  Form, our goal is to have forms completed with 72 hours, however, some forms may require a visit or additional information.    Type of letter, form or note:  medical    Who is the form from?: Home care    Where did the form come from: Patient or family brought in       What clinic location was the form placed at?: [facility] [phone]    Where the form was placed: Team C Box/Folder    What number is listed as a contact on the form?: [phone]       Additional comments: Fax: [phone]    Call taken on 3/24/2022 at 3:58 PM by Beverly Garzon

## 2022-03-28 ENCOUNTER — MEDICAL CORRESPONDENCE (OUTPATIENT)
Dept: HEALTH INFORMATION MANAGEMENT | Facility: CLINIC | Age: 72
End: 2022-03-28
Payer: COMMERCIAL

## 2023-07-22 NOTE — PROGRESS NOTES
SUBJECTIVE:   Preeti Ford is a 67 year old female who presents to clinic today for the following health issues:      [facility]      Hospital Follow-up Visit:    Hospital/Nursing Home/IP Rehab Facility: [facility]  Date of Admission: 03/22/18  Date of Discharge: 03/25/18  Reason(s) for Admission: Stroke            Problems taking medications regularly:  None       Medication changes since discharge: Metoprolol - take one tablet       Problems adhering to non-medication therapy:  None    Summary of hospitalization:  [facility] discharge summary reviewed  Diagnostic Tests/Treatments reviewed.  Follow up needed: none  Other Healthcare Providers Involved in Patient s Care:         Homecare  Update since discharge: stable. She has been doing fine since her discharge from the hospital. She does list a little to the left when she walks but she is using a walker for stability. She has not numbness, tingling or apparent weakness on one side of her body or any changes in memory or speech. She continues to smoke although she has ongoing dyspnea on exertion. She tried quitting smoking in the past with the patch but it was too strong and made her feel sick. She thinks she had the 14 mg patch. She understands that her breathing will worsen if she continues to smoke. Her  smokes too so this makes it harder to quit. She is on Symbicort, Singulair, Spiriva, and albuterol as needed. She needs a new nebulizer machine as she is unable to get replacement parts for it since it is so old. She is taking all of her medication as prescribed.     [place] Discharge Summary from 3/22/18:    Acute CVA (cerebrovascular accident) - right paramedian superior vermis and left cerebellar hemisphere    Assessment: Causing dizziness, with the symptoms continuing to slowly improve and patient has been cleared to return home with home physical therapy.  Workup performed during this hospitalization including CTA,  echocardiogram with bubble study, and therapist's evaluation has not showed any acute changes compared to her most recent stroke.  Patient has been transitioned to Plavix as she has now failed aspirin therapy     Plan: Patient will discharge home with ongoing Plavix 75 mg daily, ongoing Lipitor as well as increased metoprolol to improve her blood pressure control.  She will have [facility] care services for ongoing monitoring and physical therapy will follow up with her primary care provider closely.  It has been strongly emphasized that patient does need to seriously consider stopping smoking to decrease her risk of recurrent stroke or heart attack in the future.     Active Problems:    Vertigo    Assessment:  secondary to stroke as above, improving daily    Plan:  Discharge with plan as above       Other seizures (H) - possible    Assessment:  Suspected initially, based on clinical story, but is now felt to be the onset of stroke symptoms with patient having no further activity concerning for seizure during this hospitalization.     Plan:  discharge with plan as above        History of stroke - right thalamus in 8/2013    Assessment: With acute stroke as above    Plan: Discharge with plan as above       Tobacco use disorder    Assessment: Ongoing, with patient having little interest in quitting     Plan: much time during this hospitalization was spent in conversation as to why patient still smokes at this time she feels that the deck is stacked against her and why should she about her quitting.  Did discuss the increased risk that she is taking and strongly encouraged her to continue to think about it at time of discharge and discuss it with her primary care provider going forward.       Ischemic cardiomyopathy - EF 25% in 2015 but 55% in 3/2017 and 45-50% on 3/18    Assessment: Overall stable without concern for volume overload.  Metoprolol was increased during this hospitalization to improve blood pressure  control    Plan:  Discharge patient with ongoing metoprolol 25 mg daily dosing and outpatient follow-up       HTN, goal below 130/80    Assessment:  Blood pressures were initially elevated in the 170-190 range systolically, they have decreased into the 130-150 range with increased metoprolol to 25 mg daily with heart rate tolerating this well.     Plan:  Discharge with metoprolol 25 mg daily.  Did discuss with patient that she might need further adjustment of her medication long-term if blood pressures continue to be slightly elevated.        PAD (peripheral artery disease) (H) - moderate left common carotid stenosis, aortoiliac bypass, chronic left vertebral and right posterior cerebral stenoses    Assessment: chronic and stable     Plan: Discharge with plan as above       Coronary artery disease involving native coronary artery - STEMI with LAD and circ stents in 8/2015    Assessment:  Previous history, without known anginal symptoms during this hospital stay    Plan: Discharge with plan as above       Elevated troponin    Assessment: Chronic and stable from patient's previous baseline without any evidence of angina and EKG  unchanged from previous     Plan:  discharge with plan as above       Pulmonary emphysema (H)    Assessment:  Chronic and stable    Plan: Discharge without change to home regimen.     # Discharge Pain Plan:   - Patient currently has NO PAIN and is not being prescribed pain medications on discharge.        Post Discharge Medication Reconciliation: discharge medications reconciled and changed, per note/orders (see AVS).  Plan of care communicated with patient and       Coding guidelines for this visit:  Type of Medical   Decision Making Face-to-Face Visit       within 7 Days of discharge Face-to-Face Visit        within 14 days of discharge   Moderate Complexity 82200 [id number]   High Complexity 91100 16721            Problem list and histories reviewed & adjusted, as indicated.  Additional  "history: as documented    BP Readings from Last 3 Encounters:   04/02/18 170/72   03/25/18 158/61   12/20/17 142/86    Wt Readings from Last 3 Encounters:   04/02/18 106 lb 6.4 oz (48.3 kg)   03/25/18 109 lb 12.6 oz (49.8 kg)   12/20/17 107 lb 6.4 oz (48.7 kg)                  Labs reviewed in EPIC    ROS:  Constitutional, HEENT, cardiovascular, pulmonary, GI, , musculoskeletal, neuro, skin, endocrine and psych systems are negative, except as otherwise noted.    OBJECTIVE:     /72 (BP Location: Right arm, Patient Position: Sitting, Cuff Size: Child)  Pulse 68  Temp 98.1  F (36.7  C) (Temporal)  Resp 14  Ht 5' 3.07\" (1.602 m)  Wt 106 lb 6.4 oz (48.3 kg)  SpO2 95%  BMI 18.81 kg/m2  Body mass index is 18.81 kg/(m^2).  GENERAL: alert and no acute distress  EYES: Eyes grossly normal to inspection, PERRL and conjunctivae and sclerae normal  HENT: ear canals and TM's normal, nose and mouth without ulcers or lesions  NECK: no adenopathy, no asymmetry, masses, or scars and thyroid normal to palpation  RESP: lungs clear to auscultation - no rales, rhonchi or wheezes  CV: regular rate and rhythm, normal S1 S2, no S3 or S4, no murmur, click or rub, no peripheral edema   ABDOMEN: soft, nontender, no hepatosplenomegaly, no masses and bowel sounds normal  MS: no gross musculoskeletal defects noted, no edema  SKIN: no suspicious lesions or rashes  NEURO: Normal strength and tone, mentation intact and speech normal  PSYCH: mentation appears normal, affect normal/bright    Diagnostic Test Results:  none     ASSESSMENT/PLAN:     1. Pulmonary emphysema, unspecified emphysema type (H)  Patient agreed to try 7 mg nicotine patch to quit smoking after having a shalonda discussion about the long-term risks of continuing to smoke including the effects on her respiratory and cardiovascular system. Order for new nebulizer machine faxed to [facility]. Refill of Plavix sent to pharmacy.  - order for DME; Equipment being ordered: " Nebulizer  Dispense: 1 Device; Refill: 0    2. Cigarette nicotine dependence without complication  - nicotine (NICODERM CQ) 7 MG/24HR 24 hr patch; Place 1 patch onto the skin every 24 hours  Dispense: 30 patch; Refill: 1    3. Acute CVA (cerebrovascular accident) - right paramedian superior vermis and left cerebellar hemisphere  - clopidogrel (PLAVIX) 75 MG tablet; Take 1 tablet (75 mg) by mouth daily  Dispense: 90 tablet; Refill: 3    Patient Instructions   Nicotine patch 7 mg - place on alternating places daily.    Refill of Plavix sent.    [facility]    KARLY Roberto APRN [facility]   Abdomen soft, non-tender, no guarding.

## 2023-08-02 NOTE — TELEPHONE ENCOUNTER
Form reviewed and signed, placed in MA task. Please fax.  Thanks,  TAHMINA RobertoC         [phone]

## 2024-01-15 NOTE — PROGRESS NOTES
Physical Therapy  PT consult received and chart reviewed.  Patient  bpm at rest and RN notified.  Will defer evaluation at this time and follow up at later time pending medical stability.  Clemencia Real, PT     SBAR given to CCU EULA gonzalez. Pt transferred by wheelchair, all belongings- cellphone,  and glass with pt.

## 2024-07-26 NOTE — TELEPHONE ENCOUNTER
Phone is [phone] for [employer] assistance program their office is closed will call them tomorrow to find out the status.  Informed Bud of this so will look into this further tomorrow on the status.    Resident

## 2024-10-18 NOTE — TELEPHONE ENCOUNTER
Duplicate request   Reason for Call:  Form, our goal is to have forms completed with 72 hours, however, some forms may require a visit or additional information.    Type of letter, form or note:  medical    Who is the form from?: [facility]    Where did the form come from: form was faxed in    What clinic location was the form placed at?: [facility] - [phone]    Where the form was placed: TEAM B [place]  Box/Folder    What number is listed as a contact on the form?: [phone]       Additional comments: Please sign last page and return fax to [phone]    Call taken on 6/11/2020 at 7:31 AM by Tyra Parrish

## 2025-03-12 NOTE — PROGRESS NOTES
"  SUBJECTIVE:   Preeti Ford is a 67 year old female who presents to clinic today for the following health issues:    The ASCVD Risk score (Chilo DC Jr, et al., 2013) failed to calculate for the following reasons:    The patient has a prior MCI or stroke diagnosis  Patient is eligible for use of low-dose aspirin for primary prevention of heart attack and stroke.  Provider has discussed aspirin with patient and our decision was:     Prescribe:  Daily low-dose aspirin recommended for primary prevention, patient agrees with plan.        HPI      Hospital Follow-up Visit:    Hospital/Nursing Home/ Rehab Facility: [facility]   Date of Admission: 5/17/2018 -Hospital;   5/26/2018 -Rehab  Date of Discharge: 5/26/2018 -Hospital    6/12/2018 -Rehab  Reason(s) for Admission: Cardiac surgery - put in a stent            Problems taking medications regularly:  None       Medication changes since discharge: None       Problems adhering to non-medication therapy:  None    Summary of hospitalization:  [facility] discharge summary reviewed  Diagnostic Tests/Treatments reviewed.  Follow up needed: see follow up appointment schedule  Other Healthcare Providers Involved in Patient s Care:         Specialist appointment - cardiac rehab and cardiology  Update since discharge: improved.     She is feeling much better. She was discharged from SNF on 6/15/18. She has been taking her medications as prescribed. She checks her BP twice daily before taking metoprolol and holds the medication if her SBP is <100. Her BP has been running 94//74. She has also been keeping track of her weight which has been stable between 103-105 lbs. She has appointments scheduled with cardiology and cardiac rehab. She is off oxygen completely.She denies chest pain, worsening shortness of breath. She admits she has smoked some cigarettes since being discharged from the SNF. She states \"I " Called pt to inform her what medication to hold before her heart cath tomorrow but she was already advised   "am working really hard on quitting smoking\". Her  has been smoking outside since she got home. She has not needed Duoneb since she came home from the SNF. She has used her albuterol 0-1 times daily. She occasionally has times when she has to \"gulp\" to swallow. Denies coughing or sputtering after swallowing. She thinks it is from being intubated that part of her esophagus gets irritated. Has advanced diet to regular with Ensure or Boost 2-3 times daily. These drinks do make her stomach a little upset so she has been drinking these more like twice daily but tries for 3 daily. Her energy is slowly improving.     Post Discharge Medication Reconciliation: discharge medications reconciled, continue medications without change.  Plan of care communicated with patient     Coding guidelines for this visit:  Type of Medical   Decision Making Face-to-Face Visit       within 7 Days of discharge Face-to-Face Visit        within 14 days of discharge   Moderate Complexity [id number]   High Complexity [id number]            Problem list and histories reviewed & adjusted, as indicated.  Additional history: as documented      BP Readings from Last 3 Encounters:   06/25/18 122/72   06/12/18 (!) 88/58   06/07/18 126/56    Wt Readings from Last 3 Encounters:   06/25/18 105 lb 14.4 oz (48 kg)   06/12/18 106 lb (48.1 kg)   06/07/18 107 lb 12.8 oz (48.9 kg)                  Labs reviewed in EPIC    ROS:  Constitutional, HEENT, cardiovascular, pulmonary, GI, , musculoskeletal, neuro, skin, endocrine and psych systems are negative, except as otherwise noted.    OBJECTIVE:     /72  Pulse 70  Temp 99.4  F (37.4  C) (Temporal)  Resp 20  Ht 5' 3.07\" (1.602 m)  Wt 105 lb 14.4 oz (48 kg)  SpO2 99%  BMI 18.72 kg/m2  Body mass index is 18.72 kg/(m^2).  GENERAL: alert and no distress  EYES: Eyes grossly normal to inspection, PERRL and conjunctivae and sclerae normal  HENT: ear canals and TM's normal, nose and mouth without " ulcers or lesions  NECK: no adenopathy, no asymmetry, masses, or scars and thyroid normal to palpation  RESP: lungs clear to auscultation - no rales, rhonchi or wheezes  CV: regular rate and rhythm, normal S1 S2, no S3 or S4, systolic murmur, click or rub, no peripheral edema   MS: no gross musculoskeletal defects noted, no edema  SKIN: no suspicious lesions or rashes  NEURO: Normal strength and tone, mentation intact and speech normal  PSYCH: mentation appears normal, affect normal/bright    Diagnostic Test Results:  none     ASSESSMENT/PLAN:     1. S/P coronary artery stent placement  Improved. Scheduled with cardiac rehab on 7/10/18, echo on 9/7/18 and cardiologist on 9/11/18. BP stable. Continue medications as prescribed. Discussed with patient to follow up quickly with any deterioration in breathing or changes in condition. She verbalizes understanding.     2. Tobacco use disorder  Strongly encouraged complete smoking cessation and discussed potentially fatal effects of continued use given circumstances of recent hospitalization.   - TOBACCO CESSATION ORDER FOR     3. Panlobular emphysema (H)  Stable. Continue current medications.     4. Malnutrition, unspecified type (H)  Stable. Continue Ensure or Boost 3 times daily in addition to meals and snacks.    5. Gastroesophageal reflux disease without esophagitis  Stable. Continue pantoprazole daily.     6. HTN, goal below 130/80  Stable. Continue current medication.       [facility]

## 2025-04-25 NOTE — TELEPHONE ENCOUNTER
10/11/2021      Francesca Pichardo  [address] [facility] [address]                        This is to certify that Francesca Pichardo has been seen by [facility], [address]  and is able to return to activities with the following restrictions:    Restrictions: Please allow elevation and ice of left ankle during group sessions as needed over the next month. Should continue to utilize lace-up ankle brace or Cam Walking boot for another 2 weeks    Follow-up as needed.      DARIEN Strange [address]  [address]  [place] [address] [phone]   Reason for Call:  Medication or medication refill:    Do you use a [facility]?  Name of the pharmacy and phone number for the current request:  [facility] - [phone]    Name of the medication requested: Symbicort     Other request: Patient is out of this medication and the copay is $90 that they do not have at this time.  requesting to talk to Chery or her team. Please call (C2C on file)    Can we leave a detailed message on this number? YES    Phone number patient can be reached at: Home number on file [phone] (home)    Best Time: any    Call taken on 9/15/2020 at 1:09 PM by Tyra Parrish       2 seconds or less

## 2025-04-28 NOTE — TELEPHONE ENCOUNTER
Medication reconciliation completed by RN. Form and chart forwarded to PCP for signatures.  PCP:  Deisy Bowling RN     PAST MEDICAL HISTORY:  Bipolar 2 disorder     GERD (gastroesophageal reflux disease)     Hypertension     Leiomyoma of uterus      PAST MEDICAL HISTORY:  GERD (gastroesophageal reflux disease)     Hypertension     Leiomyoma of uterus     NETTIE (obstructive sleep apnea)     PTSD (post-traumatic stress disorder)

## (undated) DEVICE — SU VICRYL 2-0 CT-1 27" J339H

## (undated) DEVICE — DECANTER BAG 2002S

## (undated) DEVICE — CATH ANGIO INFINITI JR4 4FRX100CM 538421

## (undated) DEVICE — ELECTRODE ADULT PACING MULTI P-211-M1

## (undated) DEVICE — INTRO MICRO MINI STICK 4FR STIFF NITINOL 45-753

## (undated) DEVICE — ESU GROUND PAD UNIVERSAL W/O CORD

## (undated) DEVICE — SU PROLENE 4-0 V-5 36" 8935H

## (undated) DEVICE — SU PROLENE 3-0 SHDA 48" 8534H

## (undated) DEVICE — CUSTOM PACK CORONARY SAN5BCRHEA

## (undated) DEVICE — CLIP ETHICON LIGACLIP SM BLUE LT100

## (undated) DEVICE — DRSG GAUZE 4X4" 3033

## (undated) DEVICE — VESSEL LOOPS RED MAXI

## (undated) DEVICE — KIT HAND CONTROL ACIST 014644 AR-P54

## (undated) DEVICE — SYR ANGIOGRAPHY MULTIUSE KIT ACIST 014612

## (undated) DEVICE — SU MONOCRYL 4-0 PS-2 18" UND Y496G

## (undated) DEVICE — CATH ANGIO INFINITI JL4 4FRX100CM 538420

## (undated) DEVICE — SU PROLENE 5-0 BBDA 36"  8580H

## (undated) DEVICE — PREP CHLORAPREP 26ML TINTED ORANGE  260815

## (undated) DEVICE — SYR 05ML SLIP TIP W/O NDL 309647

## (undated) DEVICE — CATH TRAY FOLEY 16FR W/URINE METER STATLOCK 902916

## (undated) DEVICE — DRSG STERI STRIP 1/2X4" R1547

## (undated) DEVICE — COVER TABLE POLY 65X90" 8186

## (undated) DEVICE — CATH DIAG 4FR ANG PIG 538453S

## (undated) DEVICE — PACK AAA SBA15AAFS3

## (undated) DEVICE — SU SILK 3-0 SH CR 8X30" C017D

## (undated) DEVICE — SU VICRYL 0 CT-1 27" J340H

## (undated) DEVICE — SU VICRYL 3-0 SH 27" J316H

## (undated) DEVICE — INTRO MICRO MINI STICK 5FR STIFF NITINOL

## (undated) DEVICE — NDL 19GA 1.5"

## (undated) DEVICE — DRAPE ISOLATION BAG 1003

## (undated) DEVICE — SYR 20ML SLIP TIP W/O NDL 302831

## (undated) DEVICE — SOL NACL 0.9% IRRIG 1000ML BOTTLE 07138-09

## (undated) DEVICE — SUCTION CANISTER MEDIVAC LINER 3000ML W/LID 65651-530

## (undated) DEVICE — SHEATH 4FR ULTIMUM 407840

## (undated) DEVICE — DRAPE IOBAN INCISE 36X23" 6651EZ

## (undated) DEVICE — SOL NACL 0.9% INJ 250ML BAG 2B1322Q

## (undated) DEVICE — CATH GUIDING 5FR X 100CM LA5PAPA

## (undated) DEVICE — WIPES FOLEY CARE SURESTEP PROVON DFC100

## (undated) DEVICE — SHEATH GLIDE RADIAL 5FR 10CM .021

## (undated) DEVICE — SU SILK 3-0 TIE 24" SA74H

## (undated) DEVICE — GLOVE PROTEXIS W/NEU-THERA 7.5  2D73TE75

## (undated) DEVICE — SU PDS II 0 CTX 60" Z990G

## (undated) DEVICE — SU PROLENE 4-0 V-7DA 36" 8975H

## (undated) DEVICE — SU SILK 3-0 SH CR 8X18" C013D

## (undated) DEVICE — LINEN TOWEL PACK X5 5464

## (undated) DEVICE — CATH KIT ON-Q PAIN SILVER SKR 17GAX6" PM020-A

## (undated) DEVICE — SU UMBILICAL TAPE 36X.125" U12T

## (undated) DEVICE — MANIFOLD KIT ANGIO AUTOMATED 014613

## (undated) DEVICE — SU SILK 2-0 TIE 24" SA75H

## (undated) RX ORDER — PROPOFOL 10 MG/ML
INJECTION, EMULSION INTRAVENOUS
Status: DISPENSED
Start: 2017-03-07

## (undated) RX ORDER — CLINDAMYCIN PHOSPHATE 900 MG/50ML
INJECTION, SOLUTION INTRAVENOUS
Status: DISPENSED
Start: 2017-03-07

## (undated) RX ORDER — HEPARIN SODIUM 1000 [USP'U]/ML
INJECTION, SOLUTION INTRAVENOUS; SUBCUTANEOUS
Status: DISPENSED
Start: 2018-05-18

## (undated) RX ORDER — ASPIRIN 600 MG/1
SUPPOSITORY RECTAL
Status: DISPENSED
Start: 2017-03-07

## (undated) RX ORDER — ONDANSETRON 2 MG/ML
INJECTION INTRAMUSCULAR; INTRAVENOUS
Status: DISPENSED
Start: 2017-03-07

## (undated) RX ORDER — BUPIVACAINE HYDROCHLORIDE 2.5 MG/ML
INJECTION, SOLUTION EPIDURAL; INFILTRATION; INTRACAUDAL
Status: DISPENSED
Start: 2017-03-07

## (undated) RX ORDER — DEXAMETHASONE SODIUM PHOSPHATE 4 MG/ML
INJECTION, SOLUTION INTRA-ARTICULAR; INTRALESIONAL; INTRAMUSCULAR; INTRAVENOUS; SOFT TISSUE
Status: DISPENSED
Start: 2017-03-07

## (undated) RX ORDER — LIDOCAINE HYDROCHLORIDE 20 MG/ML
INJECTION, SOLUTION EPIDURAL; INFILTRATION; INTRACAUDAL; PERINEURAL
Status: DISPENSED
Start: 2017-03-07

## (undated) RX ORDER — HYDROMORPHONE HYDROCHLORIDE 1 MG/ML
INJECTION, SOLUTION INTRAMUSCULAR; INTRAVENOUS; SUBCUTANEOUS
Status: DISPENSED
Start: 2017-03-07

## (undated) RX ORDER — FENTANYL CITRATE 50 UG/ML
INJECTION, SOLUTION INTRAMUSCULAR; INTRAVENOUS
Status: DISPENSED
Start: 2017-03-07

## (undated) RX ORDER — GLYCOPYRROLATE 0.2 MG/ML
INJECTION, SOLUTION INTRAMUSCULAR; INTRAVENOUS
Status: DISPENSED
Start: 2017-03-07

## (undated) RX ORDER — ALBUTEROL SULFATE 90 UG/1
AEROSOL, METERED RESPIRATORY (INHALATION)
Status: DISPENSED
Start: 2017-03-07

## (undated) RX ORDER — NITROGLYCERIN 5 MG/ML
VIAL (ML) INTRAVENOUS
Status: DISPENSED
Start: 2018-05-18